# Patient Record
Sex: FEMALE | Race: WHITE | NOT HISPANIC OR LATINO | Employment: UNEMPLOYED | URBAN - METROPOLITAN AREA
[De-identification: names, ages, dates, MRNs, and addresses within clinical notes are randomized per-mention and may not be internally consistent; named-entity substitution may affect disease eponyms.]

---

## 2017-12-01 ENCOUNTER — ALLSCRIPTS OFFICE VISIT (OUTPATIENT)
Dept: OTHER | Facility: OTHER | Age: 57
End: 2017-12-01

## 2017-12-05 NOTE — PROGRESS NOTES
Assessment    1  Alcohol abuse, in remission (305 03) (F10 11)   2  Depression with anxiety (300 4) (F41 8)   3  Chronic low back pain (724 2,338 29) (M54 5,G89 29)    Plan  Alcohol abuse, in remission    · Acamprosate Calcium 333 MG Oral Tablet Delayed Release; TAKE 2 TABLETS 3TIMES DAILY   · Avoid alcoholic beverages ; Status:Complete;   Done: 74NNI7663   · Decreasing the stress in your life may help your condition improve ; Status:Complete;  Done: 22MHG4940   · You may have a problem with alcohol  We encourage you to write down all the reasonsyou should change your drinking habits and make a commitment to drink less or stopdrinking altogether  Changing your drinking habits is challenging and it is often essentialthat you have support  Seek out support from family, friends or recovering alcoholics   Acosta Kessler here to help and can assist you in selecting and then connecting with thealcohol treatment program that best fits your needs ; Status:Complete;   Done:52Qpt4347   · Call 911 if: You are vomiting any blood or material that looks black, or like coffeegrounds ; Status:Complete;   Done: 67SDR8999  Chronic low back pain    · Methocarbamol 750 MG Oral Tablet; TAKE 1 TABLET EVERY 6 HOURS ASNEEDED  Depression with anxiety    · Mirtazapine 15 MG Oral Tablet; TAKE 1 TABLET Bedtime PRN   · Sertraline HCl - 50 MG Oral Tablet; TAKE 1 TABLET EVERY MORNING  Insomnia    · LORazepam 0 5 MG Oral Tablet (Ativan)    Discussion/Summary    58yo F presents to the office for medication renewalAlcohol Abuse, in remissionCampral 333mg 2tabs TID renewed with 0RFreferral given for Psychadvised to be in contact with her sponsorat rehab was recommeded that pt go to 80 AA meetings in 90 days - pt has not done so, advised to get to meetings ASAPwould like to f/u with pt in 2wks but she is currently w/o insurance - advised to make a follow-up in JanDepression/AnxietyZoloft 50mg QAM and Remaron 15mg QHS PRN renewed with 0RFadvised to make an appt with PsychRTO in 4wksChronic low back paineRx for Methocarbamol 750mg Q6 PRN n17skzs sent - 0RFGeneraloverdue for an annual examadvised to RTO once gets medical insurance in January 2018needs Flu vaccine, Tdap, Mammo, PAP, Colonoscopy and needs labs  Possible side effects of new medications were reviewed with the patient/guardian today  The treatment plan was reviewed with the patient/guardian  The patient/guardian understands and agrees with the treatment plan     Self Referrals: Yes 1month      Chief Complaint  med refill   Patient is here today for follow up of chronic conditions described in HPI        History of Present Illness  58yo F presents to the office for med-refilldoes not have insurance - will get it in Jan 2018Alcohol abuse (in remission)/Depression/Anxietyused to drink a bottle of wine/daywas admitted to [facility] in [facility] and was in rehab for [address] - d/c'd 9/5/2017was evaluated by Psych while in rehab and and needs med refills todaymeds: Campral 333mg 2tabs TID, Remeron 15mg 1tab QHS PRN, Zoloft 50mg 1 tab QAM, Methocarbamol 750mg 1tab Q6 PRNran out Campral a few weeks ago and Zoloft yesterdayZoloft has been keeping her groundeddoes not have a Psychiatrist that she currently sees (possible one in [place] that she would like to go to, fellow patients from [facility] go there)does have a sponsorhasnât been to [employer] since dischargesupportive family that has tried to alleviate her triggers (mostly financial)feels had a metamorphosis while in rehab(+) UE tremors, point tenderness on R-chest wall (chronic), SOBROS pt denies F/C/N/V/wheezing/abd painSOBsays has had bronchitis for the past 3months since discharged from rehabhas been using holistic treatment remedies [facility] F/C/N/V/ear pain/throat pain/nasal drainage/sinus pressure/wheezingwas rattling last week but none currentlydid not get a flu vaccine this seasonchronic low back pain- maintained on methocarbamol which she takes intermittently - would like a refill today      Review of Systems    ROS reviewed  per HPI      Active Problems    1  Abnormal AST and ALT (790 4) (R74 8)   2  Abnormal liver enzymes (790 5) (R74 8)   3  Acute lower UTI (599 0) (N39 0)   4  Annual physical exam (V70 0) (Z00 00)   5  Anxiety (300 00) (F41 9)   6  Cyst of left breast (610 0) (N60 02)   7  Dysuria (788 1) (R30 0)   8  Ecchymoses, spontaneous (782 7) (R23 3)   9  Elevated blood pressure   10  Encounter for routine gynecological examination with Papanicolaou smear of cervix  (V72 31,V76 2) (Z01 419)   11  Encounter for screening colonoscopy (V76 51) (Z12 11)   12  Fatigue (780 79) (R53 83)   13  Fractures, multiple (829 0) (T07  XXXA)   14  Hypersomnia (780 54) (G47 10)   15  Insomnia (780 52) (G47 00)   16  Lipoma of right lower extremity (214 1) (D17 23)   17  Mass of thigh, right (782 2) (R22 41)   18  Need for influenza vaccination (V04 81) (Z23)   19  Osteoporosis (733 00) (M81 0)   20  Palpitations (785 1) (R00 2)   21  Postmenopausal (V49 81) (Z78 0)   22  Prolonged bleeding time (790 92) (R79 1)   23  Screening for breast cancer (V76 10) (Z12 31)   24  Thrombocytopenia (287 5) (D69 6)   25  Tick bite (919 4,E906 4) (W57 XXXA)   26  Visit for screening mammogram (V76 12) (Z12 31)   27  Vitamin D deficiency (268 9) (E55 9)   28  Well woman exam with routine gynecological exam (V72 31) (Z01 419)    Past Medical History  1  History of Fracture (829 0) (T14 8XXA)   2  History of fatigue (V13 89) (Z87 898)   3  History of hepatitis (V12 09) (Z86 19)   4  History of insomnia (V13 89) (Z87 898)   5  History of insomnia (V13 89) (Z87 898)   6  History of osteoporosis (V13 59) (Z87 39)   7  History of Other urinary problems (V47 4) (N39 9)   8  History of Pancreatitis (577 0) (K85 90)   9  History of Seasonal allergies (477 9) (J30 2)   10   History of Urinary tract infection (599 0) (N39 0)    The active problems and past medical history were reviewed and updated today  Surgical History  1  History of Tubal Ligation    The surgical history was reviewed and updated today  Family History  Mother    1  Family history of Anxiety and depression  Father    2  No significant family history  Paternal Grandmother    1  Family history of Cancer    The family history was reviewed and updated today  Social History     · History of Alcohol use   · Drinks coffee   · Exercise habits   ·    · Never smoker   · Occasional alcohol use   · History of Occasional alcohol use   · Tea  The social history was reviewed and updated today  Current Meds   1  Acamprosate Calcium 333 MG Oral Tablet Delayed Release; TAKE 2 TABLETS 3 TIMES DAILY; Therapy: 03BFU6879 to (Evaluate:09Dny3284) Recorded   2  Alendronate Sodium 70 MG Oral Tablet; TAKE 1 TABLET EVERY WEEK; Therapy: 34MDU1221 to (Last Rx:14Jun2016)  Requested for: 97JMM1668 Ordered   3  Ergocalciferol 41558 UNIT CAPS; 1 po wkly; Therapy: 91EGD2767 to (Last Rx:26Apr2016)  Requested for: 26Apr2016 Ordered   4  LORazepam 0 5 MG Oral Tablet; 1 tab PO daily prn; Therapy: 65ARX6350 to (Evaluate:28Ieg2598); Last Rx:14Jun2016 Ordered   5  Methocarbamol 750 MG Oral Tablet; TAKE 1 TABLET EVERY 6 HOURS AS NEEDED; Therapy: (Recorded:87Gnh5765) to Recorded   6  Mirtazapine 15 MG Oral Tablet; TAKE 1 TABLET Bedtime PRN; Therapy: 33WWA8246 to (Evaluate:92Bsq4043) Recorded   7  Multi Vitamin Daily Oral Tablet; Therapy: 48EGB1795 to Recorded   8  Sertraline HCl - 50 MG Oral Tablet; TAKE 1 TABLET EVERY MORNING; Therapy: 43LZU1280 to (Evaluate:53Qps1728) Recorded    The medication list was reviewed and updated today  Allergies  1   No Known Drug Allergies    Vitals  Vital Signs    Recorded: 47JGI7589 10:54AM   Heart Rate 84   Systolic 405   Diastolic 80   Height 5 ft 6 5 in   Weight 146 lb    BMI Calculated 23 21   BSA Calculated 1 76       Physical Exam   Constitutional  General appearance: No acute distress, well appearing and well nourished  -- with tremors  Eyes  Conjunctiva and lids: No swelling, erythema or discharge  Pupils and irises: Equal, round and reactive to light  Ears, Nose, Mouth, and Throat  External inspection of ears and nose: Normal    Otoscopic examination: Tympanic membranes translucent with normal light reflex  Canals patent without erythema  Nasal mucosa, septum, and turbinates: Normal without edema or erythema  Oropharynx: Normal with no erythema, edema, exudate or lesions  Pulmonary  Respiratory effort: No increased work of breathing or signs of respiratory distress  Auscultation of lungs: Clear to auscultation  Cardiovascular  Auscultation of heart: Normal rate and rhythm, normal S1 and S2, without murmurs  -- (+) anterior tenderpoint at R-T4  Abdomen  Abdomen: Non-tender, no masses     Psychiatric  Orientation to person, place, and time: Normal    Mood and affect: Normal          Future Appointments    Date/Time Provider Specialty Site   01/03/2018 08:00 AM Chrissy Betancur DO Family Medicine [facility]       Signatures   Electronically signed by : Christopher Cope DO; Dec  1 2017  2:03PM EST                       (Author)

## 2018-01-11 NOTE — PROGRESS NOTES
Assessment   1  Dysuria (788 1) (R30 0)  2  Osteoporosis (733 00) (M81 0)  3  Anxiety (300 00) (F41 9)  4  Acute lower UTI (599 0) (N39 0)  5  Tick bite (919 4,E906 4) (W57 XXXA)  6  Hypersomnia (780 54) (G47 10)    Plan  Acute lower UTI    · Start: Ciprofloxacin HCl - 500 MG Oral Tablet; TAKE 1 TABLET TWICE DAILY  Dysuria    · Urine Dip Automated- POC; Status:Complete;   Done: 88EBO4173 11:26AM  Fractures, multiple, Osteoporosis, Vitamin D deficiency    · 1 - Karly Saenz DO ( Rheumatology) Physician Referral  Consult  Status: Active   Requested for: 56XRD5247  () Care Summary provided  : Yes  Insomnia    · Renew: LORazepam 0 5 MG Oral Tablet (Ativan); 1 tab PO daily prn  Osteoporosis    · Start: Alendronate Sodium 70 MG Oral Tablet (Fosamax); TAKE 1 TABLET EVERY WEEK  Tick bite    · (1) LYME ANTIBODY PROFILE W/REFLEX TO WESTERN BLOT; Status:Active; Requested CBI:50OQV2020;     Discussion/Summary    Urine dipstick shows 500 leukocytes Will send the urine for culture and start her on Antibiotic, tries to keep drinking plenty of fluids,  Osteoporosis, with multiple fractures in the past advised to take calcium 1200 mg daily and 1000 units of vitamin D daily and I will start her on Fosamax, the side effect of medication discussed with her, and I discussed with her that she needs to see a rheumatologist and needs some more advanced treatment like prolia  She feels sleepy most of the time and she wants her Lyme disease to be checked, she also has seen ticks on her body in the past  She has reduced drinking alcohol, but still drinks, and hematologist is working with her and planned abdominal ultrasound to see if any liver cirrhosis due to alcohol use,  Which is leading to ecchymosis,  Ativan given to use as needed  The patient was counseled regarding diagnostic results, instructions for management, prognosis, impressions, risks and benefits of treatment options, importance of compliance with treatment  Possible side effects of new medications were reviewed with the patient/guardian today  The treatment plan was reviewed with the patient/guardian  The patient/guardian understands and agrees with the treatment plan      Chief Complaint  PT IS REQUESTING LAB WORK   She complains of burning of urine for one week      History of Present Illness  HPI: She complains of burning of urine for one week and she feels discomfort at the end of the urine, she denies any blood in the urine she denies any abdominal pain or back pain, she denies any fever or chills, plenty of water,  She says she has been taking multiple supplements to improve her health,  She has anxiety and she is being controlled with medication as needed,  Has recently seen rheumatologist and he ordered abdominal ultrasound for suspicion of liver cirrhosis and she drinks alcohol, and history of ecchymosis  She had DEXA scan in 2014 and she has osteoporosis but she is not aware of that, which was ordered by gynecologist,  The DEXA scan which was ordered from her is still pending, and she has history of multiple fractures  She has started taking calcium and vitamin D,       Review of Systems    Constitutional: No fever, no chills, feels well, no tiredness, no recent weight gain or loss  ENT: no ear ache, no loss of hearing, no nosebleeds or nasal discharge, no sore throat or hoarseness  Cardiovascular: no complaints of slow or fast heart rate, no chest pain, no palpitations, no leg claudication or lower extremity edema  Respiratory: no complaints of shortness of breath, no wheezing, no dyspnea on exertion, no orthopnea or PND  Breasts: no complaints of breast pain, breast lump or nipple discharge  Gastrointestinal: no complaints of abdominal pain, no constipation, no nausea or diarrhea, no vomiting, no bloody stools  Genitourinary: as noted in HPI     Musculoskeletal: no complaints of arthralgia, no myalgia, no joint swelling or stiffness, no limb pain or swelling  Integumentary: no complaints of skin rash or lesion, no itching or dry skin, no skin wounds  Neurological: no complaints of headache, no confusion, no numbness or tingling, no dizziness or fainting  ROS reviewed  Active Problems   1  Abnormal AST and ALT (790 4) (R74 0)  2  Abnormal liver enzymes (790 5) (R74 8)  3  Annual physical exam (V70 0) (Z00 00)  4  Anxiety (300 00) (F41 9)  5  Cyst of left breast (610 0) (N60 02)  6  Ecchymoses, spontaneous (782 7) (R23 3)  7  Elevated blood pressure (401 9) (I10)  8  Encounter for routine gynecological examination with Papanicolaou smear of cervix   (V72 31,V76 2) (Z01 419)  9  Encounter for screening colonoscopy (V76 51) (Z12 11)  10  Fatigue (780 79) (R53 83)  11  Fractures, multiple (829 0) (T14 8)  12  Insomnia (780 52) (G47 00)  13  Lipoma of right lower extremity (214 1) (D17 23)  14  Mass of thigh, right (782 2) (R22 41)  15  Need for influenza vaccination (V04 81) (Z23)  16  Palpitations (785 1) (R00 2)  17  Postmenopausal (V49 81) (Z78 0)  18  Prolonged bleeding time (790 92) (R79 1)  19  Screening for breast cancer (V76 10) (Z12 39)  20  Thrombocytopenia (287 5) (D69 6)  21  Visit for screening mammogram (V76 12) (Z12 31)  22  Vitamin D deficiency (268 9) (E55 9)  23  Well woman exam with routine gynecological exam (V72 31) (Z01 419)    Past Medical History   1  History of Fracture (829 0) (T14 8)  2  History of fatigue (V13 89) (Z87 898)  3  History of hepatitis (V12 09) (Z86 19)  4  History of insomnia (V13 89) (Z87 898)  5  History of insomnia (V13 89) (Z87 898)  6  History of osteoporosis (V13 59) (Z87 39)  7  History of Other urinary problems (V47 4) (N39 9)  8  History of Pancreatitis (577 0) (K85 9)  9  History of Seasonal allergies (477 9) (J30 2)  10  History of Urinary tract infection (599 0) (N39 0)  Active Problems And Past Medical History Reviewed:    The active problems and past medical history were reviewed and updated today  Family History  Mother   1  Family history of Anxiety and depression  Father   2  No significant family history  Paternal Grandmother   1  Family history of Cancer    Social History    · Alcohol use   · 4-5 drinks per wk   · Drinks coffee   · Exercise habits   ·    · Never smoker   · Occasional alcohol use   · Tea  The social history was reviewed and updated today  Surgical History   1  History of Tubal Ligation  Surgical History Reviewed: The surgical history was reviewed and updated today  Current Meds  1  Ergocalciferol 54765 UNIT Oral Capsule; 1 po wkly; Therapy: 71PCH9393 to (Last Rx:26Apr2016)  Requested for: 26Apr2016 Ordered  2  LORazepam 0 5 MG Oral Tablet; 1 tab PO daily prn; Therapy: 43BNB8263 to (Evaluate:29Hzn9781); Last Rx:26Apr2016 Ordered  3  Multi Vitamin Daily Oral Tablet; Therapy: 50KXQ1284 to Recorded    The medication list was reviewed and updated today  Allergies   1  No Known Drug Allergies    Vitals   Recorded: 88GSX2522 10:50AM   Heart Rate 98   Respiration 16   Systolic 475   Diastolic 70   Height 5 ft 6 5 in   Weight 140 lb    BMI Calculated 22 26   BSA Calculated 1 73     Physical Exam    Constitutional   General appearance: No acute distress, well appearing and well nourished  Eyes   Conjunctiva and lids: No swelling, erythema or discharge  Ears, Nose, Mouth, and Throat   External inspection of ears and nose: Normal     Pulmonary   Respiratory effort: No increased work of breathing or signs of respiratory distress  Auscultation of lungs: Clear to auscultation  Cardiovascular   Palpation of heart: Normal PMI, no thrills  Auscultation of heart: Normal rate and rhythm, normal S1 and S2, without murmurs  Examination of extremities for edema and/or varicosities: Normal     Abdomen   Abdomen: Non-tender, no masses  Liver and spleen: No hepatomegaly or splenomegaly      Lymphatic   Palpation of lymph nodes in neck: No lymphadenopathy  Musculoskeletal   Gait and station: Normal     Inspection/palpation of joints, bones, and muscles: Normal     Skin   Skin and subcutaneous tissue: Normal without rashes or lesions  Neurologic   Cranial nerves: Cranial nerves 2-12 intact  Sensation: No sensory loss  Psychiatric   Orientation to person, place, and time: Normal     Mood and affect: Normal          Results/Data  Urine Dip Automated- POC 38RTM3117 11:26AM Gretchen Gaston     Test Name Result Flag Reference   Color Yellow     Clarity Transparent     Leukocytes 500     Nitrite neg     Blood neg     Bilirubin neg     Urobilinogen neg     Protein neg     Ph 6     Specific Gravity 1 010     Ketone neg     Glucose neg       * Dexa Scan Axial Skel (Hip, Pelvis, Spine) 63XAD4122 10:27AM Mehul Hernandez     Test Name Result Flag Reference   Dexa Scan (Report)     No Address;  10/31/2014 1030  10/31/2014 1048  NONE    CENTRAL DXA SCAN    CLINICAL HISTORY-  47year old post-menopausal  female risk  factors include postmenopausal, estrogen deficiency    TECHNIQUE- Bone densitometry was performed using a [employer] C  bone densitometer  Regions of interest appear properly placed  There  are no obvious fractures or other confounding variables which could  limit the study  None    COMPARISON- Baseline    RESULTS-   LUMBAR SPINE- L1-L4-  BMD 0 623 gm/cm2  T-score -3 9, osteoporosis      LEFT TOTAL HIP-  BMD 0 593 gm/cm2  T-score -2 9      LEFT FEMORAL NECK-  BMD 0 554 gm/cm2  T-score -2 7  Serious osteoporosis is present in this young patient perhaps from  early menopause or not achieving peak bone mass in the past     Treatment is advised in this patient even though her absolute risk of  fracture is low due to her age  Forteo might well be a 1st choice followed by bisphosphonate therapy  A 25 hydroxy vitamin D level and a comprehensive metabolic panel is  suggested prior to treatment          ASSESSMENT-  1  Based on the WHO classification, this study demonstrates  osteoporosis at each area and the patient is considered at risk for  fracture  2  A daily intake of calcium of at least 1200 mg and vitamin D,  800-1000 IU, as well as weight bearing and muscle strengthening  exercise, fall prevention and avoidance of tobacco and excessive  alcohol intake as basic preventive measures are recommended  3  Repeat DXA scan in 18-24 months as clinically indicated  WHO CLASSIFICATION-  Normal (a T-score of -1 0 or higher)  Low bone mineral density (a T-score of less than -1 0 but higher than  -2 5)  Osteoporosis (a T-score of -2 5 or less)  Severe osteoporosis (a T-score of -2 5 or less with a fragility  fracture)    Thank you for allowing us the opportunity to participate in your  patient's care  The expanded DEXA report will no longer be arriving in your mail  If  you desire to old report please contact [facility]'s medical records or  access the PACS system          Transcribed on- [id number]    IGLESIA Escalona MD  Reading Radiologist- IGLESIA Apple MD  Electronically Signed- IGLESIA Apple MD  Released Date Time- 11/01/14 1154  ------------------------------------------------------------------------------  [id number]^M [address]  [id number]^I [address]     Future Appointments    Date/Time Provider Specialty Site   07/08/2016 11:00 AM ARIELLA Graham  Hematology [facility]   08/15/2016 10:00 AM ARIELLA Bullock   [address]     Signatures   Electronically signed by : ARIELLA Vega ; Jun 14 2016  6:25PM EST                       (Author)

## 2018-01-12 NOTE — RESULT NOTES
Verified Results  (1) HEPATIC FUNCTION PANEL [id number] 12:00PM Marisol Whittington   REPORT COMMENT:  FASTING:NO     Test Name Result Flag Reference   PROTEIN, TOTAL 7 2 g/dL  6 1-8 1   ALBUMIN 4 2 g/dL  3 6-5 1   GLOBULIN 3 0 g/dL (calc)  1 9-3 7   ALBUMIN/GLOBULIN RATIO 1 4 (calc)  1 0-2 5   BILIRUBIN, TOTAL 0 8 mg/dL  0 2-1 2   BILIRUBIN, DIRECT 0 2 mg/dL  < OR = 0 2   BILIRUBIN, INDIRECT 0 6 mg/dL (calc)  0 2-1 2   ALKALINE PHOSPHATASE 124 U/L     AST 85 U/L H 10-35   ALT 37 U/L H 6-29     (1) GGT [id number] 12:00PM Karly Frank     Test Name Result Flag Reference    U/L H 3-70     (1) ACUTE HEPATITIS PANEL [id number] 12:00PM [facility]     Test Name Result Flag Reference   HEPATITIS A IGM NON-REACTIVE  NON-REACTIVE   HEPATITIS B SURFACE$ANTIGEN NON-REACTIVE  NON-REACTIVE   HEPATITIS B CORE$ANTIBODY (IGM) NON-REACTIVE  NON-REACTIVE   HEPATITIS C ANTIBODY NON-REACTIVE  NON-REACTIVE   SIGNAL TO CUT-OFF 0 01  <1 00       Discussion/Summary   GGT is elevated, which she presents your drinking excessive alcohol,you have to reduce alcohol  intake and then plan to stop   There is no hepatitis

## 2018-01-12 NOTE — PROGRESS NOTES
Assessment    1  Annual physical exam (V70 0) (Z00 00)   2  Visit for screening mammogram (V76 12) (Z12 31)   3  Prolonged bleeding time (790 92) (R79 1)   4  History of fatigue (V13 89) (Z87 898)   5  Fatigue (780 79) (R53 83)   6  Fractures, multiple (829 0) (T14 8)   7  Anxiety (300 00) (F41 9)   8  Osteoporosis (733 00) (M81 0)   9  History of insomnia (V13 89) (Z87 898)   10  History of insomnia (V13 89) (Z87 898)   11  Mass of thigh, right (782 2) (R22 41)   12  Insomnia (780 52) (G47 00)    Plan  Annual physical exam    · (1) CBC/PLT/DIFF; Status:Active; Requested for:10Mar2016;    · (1) COMPREHENSIVE METABOLIC PANEL; Status:Active; Requested for:10Mar2016;    · (1) HEMOGLOBIN A1C; Status:Active; Requested for:10Mar2016;    · (1) LIPID PANEL, FASTING; Status:Active; Requested for:10Mar2016;    · (1) TSH; Status:Active; Requested for:10Mar2016; Anxiety    · Sertraline HCl - 50 MG Oral Tablet  Fractures, multiple    · (1) PTH N-TERMINAL (INTACT); Status:Active; Requested for:10Mar2016;    · (1) VITAMIN D 25-HYDROXY; Status:Active; Requested for:10Mar2016; Insomnia    · LORazepam 0 5 MG Oral Tablet (Ativan); 1 tab PO daily prn  Mass of thigh, right    · * CT FEMUR RIGHT WO CONTRAST; Status:Need Information - Financial Authorization; Requested for:10Mar2016;   Osteoporosis    · * DXA BONE DENSITY SPINE HIP AND PELVIS; Status:Hold For - Scheduling;  Requested for:10Mar2016;   Prolonged bleeding time    · (1) APTT; Status:Active; Requested for:10Mar2016;    · (1) FACTOR V LEIDEN MUTATION DNA ANALYSIS; Status:Active; Requested  for:10Mar2016;    · (1) PROTEIN ELECTRO, SERUM; Status:Active; Requested for:10Mar2016;    · (1) PROTEIN, URINE 24HR; Status:Active; Requested for:10Mar2016;    · (1) PT WITH INR; Status:Active; Requested for:10Mar2016;    · (1) URINALYSIS w URINE C/S REFLEX (will reflex a microscopy if leukocytes, occult  blood, or nitrites are not within normal limits); Status:Active;  Requested for:10Mar2016;   Visit for screening mammogram    · * MAMMO SCREENING BILATERAL W CAD; Status:Hold For - Scheduling; Requested  for:10Mar2016; Unlinked    · LORazepam 0 5 MG Oral Tablet   · Rosemary Juice 3000 MG/30ML Oral Liquid    Discussion/Summary  health maintenance visit Currently, she eats a healthy diet  cervical cancer screening is current Breast cancer screening: mammogram has been ordered  Colorectal cancer screening: will hold , untill all labs done , as bruising hx , prolong clotting time  Screening lab work includes hemoglobin, glucose, lipid profile, thyroid function testing, 25-hydroxyvitamin D and urinalysis  The risks and benefits of immunizations were discussed and hold adacel as large bruising and bleeding hx  Advice and education were given regarding nutrition, aerobic exercise, weight bearing exercise, calcium supplements, vitamin D supplements and alcohol use  Patient discussion: discussed with the patient  Physical done,  She has multiple problems and has not been addressed ,  Easy bruisability and prolonged bleeding and clotting time,  big mass on the right upper leg, will get the labs,, CAT scan of the right leg,  She has multiple fractures without major injury,   Needs to get the complete workup and DEXA scan, she will start 100 mg calcium and thousand units of vitamin D until I have her reports, then we can plan on for the treatment, need to rule out any underlying cancer, multiple myeloma osteoporosis,  For anxiety and sleep problem I will start her on Ativan at that time and as needed during daytime,  Advised to have followup in 2 weeks  Possible side effects of new medications were reviewed with the patient/guardian today  The patient was counseled regarding instructions for management, prognosis, impressions, risks and benefits of treatment options, importance of compliance with treatment  Chief Complaint  PREVENTATIVE      History of Present Illness  , Adult Female:  The patient is being seen for a health maintenance evaluation  Social History: Household members include spouse  She is   The patient has never smoked cigarettes  She reports drink wine 3 glass 4-5 times per wk  She has never used illicit drugs  General Health: The patient's health since the last visit is described as fair  Immunizations status: not up to date  Lifestyle:  She consumes a diverse and healthy diet  She exercises regularly  She does not use tobacco  She consumes alcohol  She denies drug use  Reproductive health: the patient is postmenopausal    Screening: Cervical cancer screening includes a pap smear performed 2014   Breast cancer screening includes ordered  HPI: physical  lot of anxiety and terrified with little things ,  very poor sleep, , feels shakiness and heart palpitations   lives with spouse ,   she has 3 adult children,  multiple broken bone with mild stress , ribs , feet and cervical verteba within 2 years ,      Review of Systems    Constitutional: feeling tired, but No fever, no chills, feels well, no tiredness, no recent weight gain or weight loss and as noted in HPI  Eyes: No complaints of eye pain, no red eyes, no eyesight problems, no discharge, no dry eyes, no itching of eyes  ENT: no complaints of earache, no loss of hearing, no nose bleeds, no nasal discharge, no sore throat, no hoarseness  Cardiovascular: No complaints of slow heart rate, no fast heart rate, no chest pain, no palpitations, no leg claudication, no lower extremity edema  Respiratory: No complaints of shortness of breath, no wheezing, no cough, no SOB on exertion, no orthopnea, no PND  Gastrointestinal: No complaints of abdominal pain, no constipation, no nausea or vomiting, no diarrhea, no bloody stools  Genitourinary: No complaints of dysuria, no incontinence, no pelvic pain, no dysmenorrhea, no vaginal discharge or bleeding     Musculoskeletal: growth on rt upper leg more than 1 year growing , painless, but as noted in HPI  Integumentary: bruise and bleeds prolong for hours , for 1 year, but as noted in HPI  Neurological: No complaints of headache, no confusion, no convulsions, no numbness, no dizziness or fainting, no tingling, no limb weakness, no difficulty walking  Psychiatric: anxiety, sleep disturbances and emotional problems, but as noted in HPI  Endocrine: No complaints of proptosis, no hot flashes, no muscle weakness, no deepening of the voice, no feelings of weakness  Hematologic/Lymphatic: a tendency for easy bleeding and a tendency for easy bruising  ROS reviewed  Active Problems    1  Anxiety (300 00) (F41 9)   2  Cyst of left breast (610 0) (N60 02)   3  Elevated blood pressure (401 9) (I10)   4  Encounter for routine gynecological examination with Papanicolaou smear of cervix   (V72 31,V76 2) (Z01 419)   5  Encounter for screening colonoscopy (V76 51) (Z12 11)   6  Need for influenza vaccination (V04 81) (Z23)   7  Palpitations (785 1) (R00 2)   8  Postmenopausal (V49 81) (Z78 0)   9  Screening for breast cancer (V76 10) (Z12 39)   10  Well woman exam with routine gynecological exam (V72 31) (Z01 419)    Past Medical History    · History of Fracture (829 0) (T14 8)   · History of fatigue (V13 89) (M19 920)   · History of insomnia (V13 89) (O55 491)   · History of insomnia (V13 89) (B79 711)   · History of Other urinary problems (V47 4) (N39 9)   · History of Pancreatitis (577 0) (K85 9)   · History of Seasonal allergies (477 9) (J30 2)   · History of Urinary tract infection (599 0) (N39 0)    Surgical History    · History of Tubal Ligation    Family History    · Family history of Anxiety and depression    · No significant family history    · Family history of Cancer    Social History    · Alcohol use   · Drinks coffee   · Exercise habits   · Never smoker   · Occasional alcohol use   · Tea    Current Meds   1  LORazepam 0 5 MG Oral Tablet;    Therapy: 10PEN9580 to Recorded   2  Multi Vitamin Daily Oral Tablet; Therapy: 83REI2535 to Recorded   3  Rosemary Juice 3000 MG/30ML Oral Liquid; Therapy: 90KYD5120 to Recorded   4  Sertraline HCl - 50 MG Oral Tablet; TAKE 1 TABLET DAILY; Therapy: 27YDN0798 to (MAFWHXMM:88VQP8404)  Requested for: 15KJP0611; Last   Rx:23Oct2014 Ordered    Allergies    1  No Known Drug Allergies    Vitals   Recorded: 24ZVR5115 10:59AM   Heart Rate 84   Respiration 16   Systolic 180   Diastolic 70   Height 5 ft 6 in   Weight 141 lb    BMI Calculated 22 76   BSA Calculated 1 73   O2 Saturation 98     Physical Exam    Constitutional   General appearance: No acute distress, well appearing and well nourished  Head and Face   Head and face: Normal     Palpation of the face and sinuses: No sinus tenderness  Eyes   Conjunctiva and lids: No swelling, erythema or discharge  Pupils and irises: Equal, round, reactive to light  Ophthalmoscopic examination: Normal fundi and optic discs  Ears, Nose, Mouth, and Throat   External inspection of ears and nose: Normal     Otoscopic examination: Abnormal   The right external canal had a cerumen impaction  Hearing: Normal     Nasal mucosa, septum, and turbinates: Normal without edema or erythema  Lips, teeth, and gums: Normal, good dentition  Oropharynx: Normal with no erythema, edema, exudate or lesions  Neck   Neck: Supple, symmetric, trachea midline, no masses  Thyroid: Normal, no thyromegaly  Pulmonary   Respiratory effort: No increased work of breathing or signs of respiratory distress  Percussion of chest: Normal     Palpation of chest: Normal     Auscultation of lungs: Clear to auscultation  Cardiovascular   Palpation of heart: Normal PMI, no thrills  Auscultation of heart: Normal rate and rhythm, normal S1 and S2, no murmurs  Carotid pulses: 2+ bilaterally  Femoral pulses: 2+ bilaterally      Examination of extremities for edema and/or varicosities: Normal     Chest Breasts: Normal, no dimpling or skin changes appreciated  fatty lump on upper left chest on upper breast    Abdomen   Abdomen: Non-tender, no masses  Liver and spleen: No hepatomegaly or splenomegaly  Examination for hernias: No hernia appreciated  Lymphatic   Palpation of lymph nodes in neck: No lymphadenopathy  Palpation of lymph nodes in axillae: No lymphadenopathy  Palpation of lymph nodes in groin: No lymphadenopathy  Musculoskeletal   Gait and station: Normal     Digits and nails: Normal without clubbing or cyanosis  Joints, bones, and muscles: Abnormal   a large mass , firm , non tender , on rt upper leg , bone or muscle , about 10 x15 cm  Range of motion: Normal     Stability: Normal     Muscle strength/tone: Normal     Skin   Skin and subcutaneous tissue: Abnormal   bruised on legs  Palpation of skin and subcutaneous tissue: Normal turgor  Neurologic   Cranial nerves: Cranial nerves II-XII intact  Reflexes: 2+ and symmetric  Sensation: No sensory loss  Psychiatric   Judgment and insight: Normal     Orientation to person, place, and time: Normal     Recent and remote memory: Intact  Mood and affect: Normal        Procedure    Procedure:   Results: 20/20 in the right eye without corrective device, 20/25 in the left eye without corrective device      Future Appointments    Date/Time Provider Specialty Site   03/28/2016 10:40 AM ARIELLA Small   [address]     Signatures   Electronically signed by : ARIELLA Dyer ; Mar 10 2016 12:59PM EST                       (Author)

## 2018-01-12 NOTE — RESULT NOTES
Verified Results  (1) COMPREHENSIVE METABOLIC PANEL [id number] 59:34GG [employer]     Test Name Result Flag Reference   GLUCOSE 99 mg/dL  65-99   Fasting reference interval   UREA NITROGEN (BUN) 8 mg/dL  7-25   CREATININE 0 44 mg/dL L 0 50-1 05   For patients >52years of age, the reference limit  for Creatinine is approximately 13% higher for people  identified as -American  eGFR NON-AFR  AMERICAN 113 mL/min/1 73m2  > OR = 60   eGFR AFRICAN AMERICAN 131 mL/min/1 73m2  > OR = 60   BUN/CREATININE RATIO 18 (calc)  6-22   SODIUM 136 mmol/L  135-146   POTASSIUM 3 8 mmol/L  3 5-5 3   CHLORIDE 99 mmol/L     CARBON DIOXIDE 23 mmol/L  19-30   CALCIUM 9 6 mg/dL  8 6-10 4   PROTEIN, TOTAL 7 6 g/dL  6 1-8 1   ALBUMIN 4 4 g/dL  3 6-5 1   GLOBULIN 3 2 g/dL (calc)  1 9-3 7   ALBUMIN/GLOBULIN RATIO 1 4 (calc)  1 0-2 5   BILIRUBIN, TOTAL 1 3 mg/dL H 0 2-1 2   ALKALINE PHOSPHATASE 131 U/L H     U/L H 10-35   ALT 47 U/L H 6-29     (1) LIPID PANEL, FASTING 22Apr2016 01:10PM [employer]     Test Name Result Flag Reference   CHOLESTEROL, TOTAL 236 mg/dL H 125-200   HDL CHOLESTEROL 86 mg/dL  > OR = 46   TRIGLICERIDES 769 mg/dL  <150   LDL-CHOLESTEROL 125 mg/dL (calc)  <130   Desirable range <100 mg/dL for patients with CHD or  diabetes and <70 mg/dL for diabetic patients with  known heart disease  CHOL/HDLC RATIO 2 7 (calc)  < OR = 5 0   NON HDL CHOLESTEROL 150 mg/dL (calc)     Target for non-HDL cholesterol is 30 mg/dL higher than   LDL cholesterol target       (1) CBC/PLT/DIFF 22Apr2016 01:10PM [employer]     Test Name Result Flag Reference   WHITE BLOOD CELL COUNT 4 3 Thousand/uL  3 8-10 8   RED BLOOD CELL COUNT 3 97 Million/uL  3 80-5 10   HEMOGLOBIN 12 6 g/dL  11 7-15 5   HEMATOCRIT 38 4 %  35 0-45 0   MCV 96 8 fL  80 0-100 0   MCH 31 6 pg  27 0-33 0   MCHC 32 7 g/dL  32 0-36 0   RDW 13 9 %  11 0-15 0   PLATELET COUNT 924 Thousand/uL L 140-400   MPV 8 2 fL  7 5-11 5   ABSOLUTE NEUTROPHILS 3019 cells/uL  6958-6887   ABSOLUTE LYMPHOCYTES 903 cells/uL  850-3900   ABSOLUTE MONOCYTES 305 cells/uL  200-950   ABSOLUTE EOSINOPHILS 47 cells/uL     ABSOLUTE BASOPHILS 26 cells/uL  0-200   NEUTROPHILS 70 2 %     LYMPHOCYTES 21 0 %     MONOCYTES 7 1 %     EOSINOPHILS 1 1 %     BASOPHILS 0 6 %       (1) APTT 22Apr2016 01:10PM [facility] Gum     Test Name Result Flag Reference   PARTIAL THROMBOPLASTIN$TIME, ACTIVATED 27 sec  22-34   This test has not been validated for monitoring  unfractionated heparin therapy  For testing that  is validated for this type of therapy, please refer  to the Heparin Anti-Xa assay (test code 35881)  For additional information, please refer to  http://ALEXANDALEXA/faq/DAX244  (This link is being provided for   informational/educational purposes only )     (1) PT WITH INR 22Apr2016 01:10PM [facility] Gum     Test Name Result Flag Reference   INR 1 1     Reference Range                     0 9-1 1  Moderate-intensity Warfarin Therapy 2 0-3 0  Higher-intensity Warfarin Therapy   3 0-4 0   PT 10 8 sec  9 0-11 5   For more information on this test, go to:  http://Grid2020/faq/LFX506     (Q) PROTEIN, TOTAL AND PROTEIN ELECTROPHORESIS 22Apr2016 01:10PM Cerv[facility] Gum     Test Name Result Flag Reference   PROTEIN, TOTAL 7 6 g/dL  6 1-8 1   ALBUMIN 4 3 g/dL  3 8-4 8   ALPHA-1-GLOBULINS 0 3 g/dL  0 2-0 3   ALPHA-2-GLOBULINS 0 7 g/dL  0 5-0 9   BETA 1 GLOBULIN 0 5 g/dL  0 4-0 6   BETA 2 GLOBULIN 0 6 g/dL H 0 2-0 5   GAMMA GLOBULINS 1 3 g/dL  0 8-1 7   INTERPRETATION      One or more serum protein fractions are outside the normal  ranges  No abnormal protein bands are apparent       (Q) PTH, INTACT AND CALCIUM 22Apr2016 01:10PM [facility] Gum     Test Name Result Flag Reference   PARATHYROID HORMONE,$INTACT 34 pg/mL  14-64   Interpretive Guide    Intact PTH           Calcium  ------------------    ----------           -------  Normal Parathyroid    Normal Normal  Hypoparathyroidism    Low or Low Normal    Low  Hyperparathyroidism     Primary            Normal or High       High     Secondary          High                 Normal or Low     Tertiary           High                 High  Non-Parathyroid     Hypercalcemia      Low or Low Normal    High   CALCIUM 9 6 mg/dL  8 6-10 4     (Q) FACTOR V (LEIDEN) MUTATION ANALYSIS 22Apr2016 01:10PM Charolet Juanjose     Test Name Result Flag Reference   RESULT see note     FACTOR V LEIDEN (U266G) MUTATION NOT DETECTED   INTERPRETATION see note     This individual is negative (normal) for the Factor V  Leiden (R506Q) mutation in the Factor V gene  Increased risk of thrombophilia can be caused by a  variety of genetic and non-genetic factors not  screened for by this assay  REVIEWER see note     Cristofer Salazar, Ph D , [facility] [place]   Director, Molecular Genetics        SUPPLEMENTAL INFORMATION     The Factor V Leiden (R506Q) mutation [[id number] 2:c   1601G>A (p R534Q)] in the Factor V gene is one of the  most common causes of inherited thrombophilia  This  mutation causes resistance to degradation of activated  Factor V protein by activated Protein C (APC)  The Factor V Leiden (R506Q) mutation is detected by  amplification of the selected region of Factor V gene  by polymerase chain reaction (PCR) and fluorescent  probe hybridization to the targeted region, followed by  melting curve analysis with a real time PCR system  Although rare, false positive or false negative results  may occur  All results should be interpreted in  context of clinical findings, relevant history, and  other laboratory data  Health care providers, please contact your local  [employer] genetic counselor or call  [facility] [employer] ([phone]) for assistance with  interpretation of these results          This test was developed and its analytical performance  characteristics have been determined by [employer] [place], [place]  It has not been cleared or approved by the [place]  Food and Drug Administration  The FDA has determined  that such clearance or approval is not necessary  This assay has been validated pursuant to [employer] and is used for clinical purposes  (Q) TSH, 3RD GENERATION 22Apr2016 01:10PM Kishor Henriquez     Test Name Result Flag Reference   TSH 2 08 mIU/L  0 40-4 50     *(Q) VITAMIN D, 25-HYDROXY, LC/MS/MS 22Apr2016 01:10PM Kishor Henriquez     Test Name Result Flag Reference   VITAMIN D, 25-OH, TOTAL 15 ng/mL L    Vitamin D Status         25-OH Vitamin D:     Deficiency:                    <20 ng/mL  Insufficiency:             20 - 29 ng/mL  Optimal:                 > or = 30 ng/mL     For 25-OH Vitamin D testing on patients on   D2-supplementation and patients for whom quantitation   of D2 and D3 fractions is required, the QuestAssureD(TM)  25-OH VIT D, (D2,D3), LC/MS/MS is recommended: order   code 49074 (patients >2yrs)  For more information on this test, go to:  http://[employer].com/faq/CBW584  (This link is being provided for   informational/educational purposes only )     (Q) HEMOGLOBIN A1c 22Apr2016 01:10PM Marisol Whittington   REPORT COMMENT:  FASTING:YES  PATIENT UNABLE TO VOID; ADVISED TO RETURN FOR COLLECTION  Test Name Result Flag Reference   HEMOGLOBIN A1c 5 3 % of total Hgb  <5 7   According to ADA guidelines, hemoglobin A1c <7 0%  represents optimal control in non-pregnant diabetic  patients  Different metrics may apply to specific  patient populations  Standards of Medical Care in    Diabetes Care  2013;36:s11-s66     For the purpose of screening for the presence of  diabetes  <5 7%       Consistent with the absence of diabetes  5 7-6 4%    Consistent with increased risk for diabetes              (prediabetes)  >or=6 5%    Consistent with diabetes     This assay result is consistent with a decreased risk  of diabetes       Currently, no consensus exists for use of hemoglobin  A1c for diagnosis of diabetes for children

## 2018-01-15 NOTE — MISCELLANEOUS
Provider Comments  Provider Comments:   Pt  was a no show for her Rheumatology evaluation today, 9/20/16        Signatures   Electronically signed by : Grisel Tan DO; Sep 20 2016  9:19AM EST                       (Author)

## 2018-01-15 NOTE — RESULT NOTES
Message   continue cipro  as she has uti, which is responding well to cipro,     Verified Results  (1) URINALYSIS w URINE C/S REFLEX (will reflex a microscopy if leukocytes, occult blood, or nitrites are not within normal limits) [id number] 11:23AM Marisol Whittington     Test Name Result Flag Reference   COLOR DARK YELLOW  YELLOW   APPEARANCE CLEAR  CLEAR   SPECIFIC GRAVITY 1 010  1 001-1 035   PH 6 0  5 0-8 0   GLUCOSE NEGATIVE  NEGATIVE   BILIRUBIN NEGATIVE  NEGATIVE   KETONES NEGATIVE  NEGATIVE   OCCULT BLOOD NEGATIVE  NEGATIVE   PROTEIN NEGATIVE  NEGATIVE   NITRITE NEGATIVE  NEGATIVE   LEUKOCYTE ESTERASE 2+ A NEGATIVE   WBC 40-60 /HPF A < OR = 5   RBC 0-2 /HPF  < OR = 2   SQUAMOUS EPITHELIAL CELLS 0-5 /HPF  < OR = 5   BACTERIA FEW /HPF A NONE SEEN   CALCIUM OXALATE CRYSTALS FEW /HPF  NONE OR FEW   HYALINE CAST NONE SEEN /LPF  NONE SEEN   REFLEXIVE URINE CULTURE      CULTURE INDICATED - RESULTS TO FOLLOW     REFLEXIVE URINE CULTURE [id number] 11:23AM Gonsalo Reyes     Test Name Result Flag Reference   CULTURE, URINE, ROUTINE  A    CULTURE, URINE, ROUTINE         MICRO NUMBER:      [id number]    TEST STATUS:       FINAL    SPECIMEN SOURCE:   URINE    SPECIMEN QUALITY:  ADEQUATE    RESULT:            Greater than 100,000 CFU/mL of Escherichia coli                            E coli                            ----------------                            INT   DOMINGO     AMOX/CLAVULANATE       R     >=32 0     AMPICILLIN             R     >=32 0     AMP/SULBACTAM          R     >=32 0     CEFAZOLIN              R     >=64 0 **1     CEFEPIME               S     <=1 0     CEFTRIAXONE            I     16 0     CIPROFLOXACIN          S     <=0 25     ERTAPENEM              S     <=0 5     GENTAMICIN             S     <=1 0     IMIPENEM               S     <=0 25     LEVOFLOXACIN           S     <=0 12     NITROFURANTOIN         S     <=16 0     PIP/TAZOBACTAM         S     <=4 0     TOBRAMYCIN             S     <=1 0 TRIMETHOPRIM/SULFA     S     <=20 0  S=Susceptible  I=Intermediate  R=Resistant  * = Not Tested  NR = Not Reported  **NN = See Therapy Comments  THERAPY COMMENTS      Note 1:      ORAL therapy: A cefazolin DOMINGO of < 32 predicts      susceptibility to the oral agents cefaclor,      cefdinir, cefpodoxime, cefprozil, cefuroxime,      cephalexin, and loracarbef when used for therapy      of uncomplicated UTIs due to E  coli,      K  pneumoniae, and P  mirabilis  PARENTERAL therapy: A cefazolin DOMINGO of > 8      indicates resistance to parenteral cefazolin  An alternate test method must be performed to      to confirm susceptibility to parenteral cefazolin

## 2018-01-16 NOTE — RESULT NOTES
Verified Results  * CT FEMUR RIGHT WO CONTRAST [id number] 07:39AM Cami Blanc Order Number: [id number]     Test Name Result Flag Reference   CT FEMUR RIGHT WO CONTRAST (Report)     CT right femur without IV contrast     INDICATION: Localizers swelling and lump right lower limb  COMPARISON: CT abdomen pelvis 10/9/2014     TECHNIQUE: CT examination of the right femur was performed  This examination, like all CT scans performed in the [place], was performed utilizing techniques to minimize radiation dose exposure, including the use of iterative    reconstruction and automated exposure control software  Sagittal and coronal two dimensional reconstructed images were also submitted for interpretation  IV contrast was not given  FINDINGS:     There is a well-circumscribed encapsulated fat attenuation lesion within the right ovary is minimus muscle measuring 9 6 x 4 5 x 7 2 cm  There is no evidence of solid nodule or thick septations  This corresponds with the palpable markers  This is not    significantly changed in size when measured in a similar fashion on prior CT abdomen pelvis dated 10/9/2014     OSSEOUS STRUCTURES: No fracture, dislocation or destructive osseous lesion  VISUALIZED MUSCULATURE: Intramuscular lipoma within the right gluteus minimus  The remainder the muscles about the right hip appear unremarkable  SOFT TISSUES: Unremarkable  OTHER PERTINENT FINDINGS: None  IMPRESSION:     9 6 x 4 5 x 7 2 cm intramuscular lipoma within the right gluteus minimus corresponding to the area of palpable concern  No solid component or thick septation  This is unchanged in appearance to prior CT of the abdomen dated 10/9/2014         Workstation performed: [id number]     Signed by:   Joseph Madrigal MD   3/11/16

## 2018-01-23 VITALS
WEIGHT: 146 LBS | SYSTOLIC BLOOD PRESSURE: 125 MMHG | HEART RATE: 84 BPM | DIASTOLIC BLOOD PRESSURE: 80 MMHG | BODY MASS INDEX: 22.91 KG/M2 | HEIGHT: 67 IN

## 2018-06-19 RX ORDER — ACAMPROSATE CALCIUM 333 MG/1
2 TABLET, DELAYED RELEASE ORAL 3 TIMES DAILY
COMMUNITY
Start: 2017-12-01 | End: 2018-10-03 | Stop reason: ALTCHOICE

## 2018-06-19 RX ORDER — MIRTAZAPINE 15 MG/1
1 TABLET, FILM COATED ORAL
COMMUNITY
Start: 2017-12-01 | End: 2018-10-03 | Stop reason: ALTCHOICE

## 2018-06-19 RX ORDER — LORAZEPAM 0.5 MG/1
1 TABLET ORAL DAILY PRN
COMMUNITY
Start: 2016-03-10 | End: 2018-10-03 | Stop reason: ALTCHOICE

## 2018-06-19 RX ORDER — ALENDRONATE SODIUM 70 MG/1
1 TABLET ORAL WEEKLY
COMMUNITY
Start: 2016-06-14 | End: 2018-10-03 | Stop reason: ALTCHOICE

## 2018-06-19 RX ORDER — ERGOCALCIFEROL (VITAMIN D2) 1250 MCG
CAPSULE ORAL
COMMUNITY
Start: 2016-04-26 | End: 2018-10-03 | Stop reason: ALTCHOICE

## 2018-06-19 RX ORDER — METHOCARBAMOL 750 MG/1
1 TABLET, FILM COATED ORAL EVERY 6 HOURS PRN
COMMUNITY
End: 2018-10-03 | Stop reason: ALTCHOICE

## 2018-06-19 RX ORDER — MULTIVITAMIN
TABLET ORAL
COMMUNITY
Start: 2014-10-23 | End: 2021-08-16

## 2018-06-29 DIAGNOSIS — F41.9 ANXIETY: Primary | ICD-10-CM

## 2018-10-03 ENCOUNTER — OFFICE VISIT (OUTPATIENT)
Dept: FAMILY MEDICINE CLINIC | Facility: CLINIC | Age: 58
End: 2018-10-03
Payer: COMMERCIAL

## 2018-10-03 VITALS
DIASTOLIC BLOOD PRESSURE: 82 MMHG | RESPIRATION RATE: 16 BRPM | HEIGHT: 67 IN | OXYGEN SATURATION: 98 % | SYSTOLIC BLOOD PRESSURE: 130 MMHG | HEART RATE: 70 BPM | BODY MASS INDEX: 21.75 KG/M2 | WEIGHT: 138.6 LBS

## 2018-10-03 DIAGNOSIS — R74.8 ABNORMAL LIVER ENZYMES: ICD-10-CM

## 2018-10-03 DIAGNOSIS — E55.9 VITAMIN D DEFICIENCY: ICD-10-CM

## 2018-10-03 DIAGNOSIS — R53.83 FATIGUE, UNSPECIFIED TYPE: ICD-10-CM

## 2018-10-03 DIAGNOSIS — Z12.11 SCREENING FOR COLON CANCER: ICD-10-CM

## 2018-10-03 DIAGNOSIS — F41.8 DEPRESSION WITH ANXIETY: Primary | ICD-10-CM

## 2018-10-03 DIAGNOSIS — M81.0 OSTEOPOROSIS WITHOUT CURRENT PATHOLOGICAL FRACTURE, UNSPECIFIED OSTEOPOROSIS TYPE: ICD-10-CM

## 2018-10-03 DIAGNOSIS — E78.49 OTHER HYPERLIPIDEMIA: ICD-10-CM

## 2018-10-03 DIAGNOSIS — Z12.39 SCREENING FOR BREAST CANCER: ICD-10-CM

## 2018-10-03 PROBLEM — F10.11 NONDEPENDENT ALCOHOL ABUSE, IN REMISSION: Status: ACTIVE | Noted: 2017-12-01

## 2018-10-03 PROBLEM — M54.50 CHRONIC LOW BACK PAIN: Status: ACTIVE | Noted: 2017-12-01

## 2018-10-03 PROBLEM — G89.29 CHRONIC LOW BACK PAIN: Status: ACTIVE | Noted: 2017-12-01

## 2018-10-03 PROCEDURE — 1036F TOBACCO NON-USER: CPT | Performed by: NURSE PRACTITIONER

## 2018-10-03 PROCEDURE — 99214 OFFICE O/P EST MOD 30 MIN: CPT | Performed by: NURSE PRACTITIONER

## 2018-10-03 NOTE — PROGRESS NOTES
Assessment/Plan:      Diagnoses and all orders for this visit:    Depression with anxiety  -     sertraline (ZOLOFT) 50 mg tablet; Take 1 tablet (50 mg total) by mouth daily  -     Comprehensive metabolic panel; Future  -     TSH, 3rd generation with Free T4 reflex; Future  -     Ambulatory referral to behavioral health therapists; Future  Patient reports that her anxiety and depression is worse  Denies any suicidal thoughts  Sertraline refilled  Patient referred to a therapist for follow-up  Patient instructed to go to the ER immediately if she develops any suicidal thoughts  Lab work ordered  Abnormal liver enzymes  -     Comprehensive metabolic panel; Future  -     CBC and differential; Future  -     TSH, 3rd generation with Free T4 reflex; Future    Fatigue, unspecified type  -     Comprehensive metabolic panel; Future  -     CBC and differential; Future  -     TSH, 3rd generation with Free T4 reflex; Future  -     Lyme Ab/Western Blot Reflex; Future    Vitamin D deficiency  -     CBC and differential; Future  -     Vitamin D 25 hydroxy; Future  Continue vitamin D supplement  Screening for breast cancer  -     Mammo screening bilateral w cad; Future    Osteoporosis  -     DXA bone density spine hip and pelvis; Future    Screening for colon cancer  -     Ambulatory referral to Gastroenterology; Future    Other hyperlipidemia  -     Lipid Panel with Direct LDL reflex; Future  Low fat diet reviewed  Patient refuses the influenza vaccine  Lab work ordered  Patient instructed to follow-up in 1 month or sooner prn  Subjective:     Patient ID: Zuri Gabriel is a 62 y o  female  Patient is here for a follow-up for depression and anxiety  Patient reports that her depression and anxiety has gotten worse over the past 6 weeks  Patient reports that she has a lot of family stressors  Patient reports that she was in [facility] in August of 2017 for depression, anxiety, and alcohol abuse   Patient reports that rehab really helped her at the time  Patient reports that she does not drink alcohol anymore  Patient reports that she would like to see a psychologist or therapist again  Denies any homicidal thoughts or suicidal thoughts  Patient reports that she was on sertraline for anxiety and depression but she ran out of it  Patient reports that the sertraline really helped  Patient reports that it she was on lorazepam prn but it did not really help  Patient is here for a follow-up for vitamin D deficiency and osteoporosis  Patient reports that she takes a vitamin D supplement daily  Patient reports that she also has a history of multiple fractures  Patient reports that she would like a script for a dexa scan  Patient reports that she has also been more tired recently  Patient reports that she would like lab work done including a test for lyme disease  Review of Systems   Constitutional: Positive for fatigue  Negative for appetite change, chills and fever  HENT: Negative for congestion, ear pain, sinus pressure, sore throat and trouble swallowing  Eyes: Negative for pain, discharge and redness  Respiratory: Negative for cough, chest tightness, shortness of breath and wheezing  Cardiovascular: Negative for chest pain, palpitations and leg swelling  Gastrointestinal: Negative for abdominal pain, blood in stool, diarrhea, nausea and vomiting  Genitourinary: Negative for dysuria, frequency and hematuria  Musculoskeletal: Negative for myalgias  Skin: Negative for rash  Neurological: Negative for dizziness, seizures, syncope, light-headedness and headaches  Psychiatric/Behavioral: Negative for suicidal ideas  As noted in HPI  Objective:     Physical Exam   Constitutional: She is oriented to person, place, and time  No distress     HENT:   Right Ear: External ear normal    Left Ear: External ear normal    Mouth/Throat: Oropharynx is clear and moist    Tympanic membranes and ear canals wnl  Eyes: Pupils are equal, round, and reactive to light  Conjunctivae are normal    Cardiovascular: Normal rate, regular rhythm, normal heart sounds and intact distal pulses  No edema noted  Pulmonary/Chest: Effort normal and breath sounds normal  No respiratory distress  She has no wheezes  Abdominal: Soft  She exhibits no distension  There is no tenderness  Musculoskeletal:   Gait wnl  Lymphadenopathy:     She has no cervical adenopathy  Neurological: She is alert and oriented to person, place, and time  Skin: No rash noted  Psychiatric: She has a normal mood and affect  Vitals reviewed

## 2019-01-31 DIAGNOSIS — F41.8 DEPRESSION WITH ANXIETY: ICD-10-CM

## 2019-02-26 ENCOUNTER — TELEPHONE (OUTPATIENT)
Dept: GASTROENTEROLOGY | Facility: CLINIC | Age: 59
End: 2019-02-26

## 2019-02-26 NOTE — TELEPHONE ENCOUNTER
02/26/19  Screened by: De Cee    Referring Provider DR Derek Mccormack    Pre- Screening: There is no height or weight on file to calculate BMI  Has patient been referred for a routine screening Colonoscopy? yes  Is the patient between 39-70 years old? yes    SCHEDULING STAFF   If the patient is between 45yrs-49yrs, please advise patient to confirm benefits/coverage with their insurance company for a routine screening colonoscopy, some insurance carriers will only cover at [id number] or older   If the patient is over 66years old, please schedule an office visit  Do you have any of the following symptoms? NO      Have you had a coronary or vascular stent within the last year? no    Have you had a heart attack or stroke in the last 6 months? no    Have you had intestinal surgery in the last 3 months? no    Do you have problems with: Sleep Apnea NO      Have you been hospitalized in the last Month? no    Have you been diagnosed with a bleeding disorder or anemia? no    Have you had chest pain (angina) or breathing problems  (COPD) in the last 3 months? no    Do you have any difficulty walking up a flight of stairs? no    Have you had Kidney failure or insufficiency? no    Have you had heart valve surgery? no    Are you confined to a wheelchair? no    Do you take Other blood thinning medications no    Have you been diagnosed with Diabetes or are you taking any   Diabetic medications? no    : If patient answers NO to medical questions, then schedule procedure  If patient answers YES to medical questions, then schedule office appointment  Previous Colonoscopy no  Date and Facility of last colonoscopy?      Comments: PASSED OA  JOHN LOW

## 2019-03-04 ENCOUNTER — ANESTHESIA EVENT (OUTPATIENT)
Dept: PERIOP | Facility: AMBULARY SURGERY CENTER | Age: 59
End: 2019-03-04

## 2019-03-04 DIAGNOSIS — Z12.11 COLON CANCER SCREENING: Primary | ICD-10-CM

## 2019-03-04 RX ORDER — POLYETHYLENE GLYCOL 3350, SODIUM CHLORIDE, POTASSIUM CHLORIDE, SODIUM BICARBONATE, AND SODIUM SULFATE 240; 5.84; 2.98; 6.72; 22.72 G/4L; G/4L; G/4L; G/4L; G/4L
POWDER, FOR SOLUTION ORAL
Qty: 4000 ML | Refills: 0 | Status: SHIPPED | OUTPATIENT
Start: 2019-03-04 | End: 2019-11-15 | Stop reason: HOSPADM

## 2019-03-07 ENCOUNTER — TELEPHONE (OUTPATIENT)
Dept: FAMILY MEDICINE CLINIC | Facility: CLINIC | Age: 59
End: 2019-03-07

## 2019-03-07 NOTE — TELEPHONE ENCOUNTER
Patients pharmacy called for refills of Sertraline 50 mg # 90  Pharmacy was advised that patient is in need of an office visit refills denied at this time

## 2019-03-09 ENCOUNTER — TELEPHONE (OUTPATIENT)
Dept: OTHER | Facility: HOSPITAL | Age: 59
End: 2019-03-09

## 2019-03-09 NOTE — TELEPHONE ENCOUNTER
Received page at 1100 from  to call patient regarding her prep for colonoscopy on Monday 3/11  No answer, message left on voicemail to call back and I will try to call her again when she is available

## 2019-03-11 ENCOUNTER — ANESTHESIA (OUTPATIENT)
Dept: PERIOP | Facility: AMBULARY SURGERY CENTER | Age: 59
End: 2019-03-11

## 2019-11-11 ENCOUNTER — APPOINTMENT (OUTPATIENT)
Dept: LAB | Facility: CLINIC | Age: 59
End: 2019-11-11
Payer: COMMERCIAL

## 2019-11-11 ENCOUNTER — OFFICE VISIT (OUTPATIENT)
Dept: FAMILY MEDICINE CLINIC | Facility: CLINIC | Age: 59
End: 2019-11-11
Payer: COMMERCIAL

## 2019-11-11 ENCOUNTER — TELEPHONE (OUTPATIENT)
Dept: FAMILY MEDICINE CLINIC | Facility: CLINIC | Age: 59
End: 2019-11-11

## 2019-11-11 VITALS
HEART RATE: 102 BPM | DIASTOLIC BLOOD PRESSURE: 72 MMHG | SYSTOLIC BLOOD PRESSURE: 122 MMHG | HEIGHT: 67 IN | BODY MASS INDEX: 20.88 KG/M2 | WEIGHT: 133 LBS | OXYGEN SATURATION: 97 % | TEMPERATURE: 97.2 F

## 2019-11-11 DIAGNOSIS — D69.6 THROMBOCYTOPENIA (HCC): ICD-10-CM

## 2019-11-11 DIAGNOSIS — K86.1 CHRONIC PANCREATITIS, UNSPECIFIED PANCREATITIS TYPE (HCC): ICD-10-CM

## 2019-11-11 DIAGNOSIS — J01.10 ACUTE NON-RECURRENT FRONTAL SINUSITIS: Primary | ICD-10-CM

## 2019-11-11 DIAGNOSIS — K76.0 FATTY LIVER: ICD-10-CM

## 2019-11-11 DIAGNOSIS — F10.11 NONDEPENDENT ALCOHOL ABUSE, IN REMISSION: ICD-10-CM

## 2019-11-11 DIAGNOSIS — R39.89 URINE DISCOLORATION: ICD-10-CM

## 2019-11-11 DIAGNOSIS — R04.0 EPISTAXIS: ICD-10-CM

## 2019-11-11 DIAGNOSIS — F41.8 DEPRESSION WITH ANXIETY: ICD-10-CM

## 2019-11-11 DIAGNOSIS — R74.8 ABNORMAL LIVER ENZYMES: ICD-10-CM

## 2019-11-11 DIAGNOSIS — E78.49 OTHER HYPERLIPIDEMIA: ICD-10-CM

## 2019-11-11 LAB
BASOPHILS # BLD AUTO: 0.06 THOUSANDS/ΜL (ref 0–0.1)
BASOPHILS NFR BLD AUTO: 2 % (ref 0–1)
EOSINOPHIL # BLD AUTO: 0.07 THOUSAND/ΜL (ref 0–0.61)
EOSINOPHIL NFR BLD AUTO: 2 % (ref 0–6)
ERYTHROCYTE [DISTWIDTH] IN BLOOD BY AUTOMATED COUNT: 16.1 % (ref 11.6–15.1)
HCT VFR BLD AUTO: 32.9 % (ref 34.8–46.1)
HGB BLD-MCNC: 10.3 G/DL (ref 11.5–15.4)
IMM GRANULOCYTES # BLD AUTO: 0.01 THOUSAND/UL (ref 0–0.2)
IMM GRANULOCYTES NFR BLD AUTO: 0 % (ref 0–2)
LYMPHOCYTES # BLD AUTO: 2.1 THOUSANDS/ΜL (ref 0.6–4.47)
LYMPHOCYTES NFR BLD AUTO: 64 % (ref 14–44)
MCH RBC QN AUTO: 31.5 PG (ref 26.8–34.3)
MCHC RBC AUTO-ENTMCNC: 31.3 G/DL (ref 31.4–37.4)
MCV RBC AUTO: 101 FL (ref 82–98)
MONOCYTES # BLD AUTO: 0.25 THOUSAND/ΜL (ref 0.17–1.22)
MONOCYTES NFR BLD AUTO: 8 % (ref 4–12)
NEUTROPHILS # BLD AUTO: 0.77 THOUSANDS/ΜL (ref 1.85–7.62)
NEUTS SEG NFR BLD AUTO: 24 % (ref 43–75)
NRBC BLD AUTO-RTO: 0 /100 WBCS
PLATELET # BLD AUTO: 30 THOUSANDS/UL (ref 149–390)
PMV BLD AUTO: 11.9 FL (ref 8.9–12.7)
RBC # BLD AUTO: 3.27 MILLION/UL (ref 3.81–5.12)
WBC # BLD AUTO: 3.26 THOUSAND/UL (ref 4.31–10.16)

## 2019-11-11 PROCEDURE — 85025 COMPLETE CBC W/AUTO DIFF WBC: CPT

## 2019-11-11 PROCEDURE — 99215 OFFICE O/P EST HI 40 MIN: CPT | Performed by: FAMILY MEDICINE

## 2019-11-11 PROCEDURE — 36415 COLL VENOUS BLD VENIPUNCTURE: CPT

## 2019-11-11 RX ORDER — AMOXICILLIN 875 MG/1
875 TABLET, COATED ORAL 2 TIMES DAILY
Qty: 14 TABLET | Refills: 0 | Status: SHIPPED | OUTPATIENT
Start: 2019-11-11 | End: 2019-11-15 | Stop reason: HOSPADM

## 2019-11-11 NOTE — TELEPHONE ENCOUNTER
Patient was seen in the office this morning complaining of chest heaviness and fatigue also bleeding from nose ( Dr Joe Dan note 11/11/2019)  Patient was advised to go to ER when in the office however patient declined at that time but did agree to STAT labs  Patient was sent for a STAT CBC and critical result for low platelet count of 30 was called to office by [facility]  Dr Deny Aaron was advised and she tried to contact the patient by every phone number we have in her chart including trying to contact patients   A detailed message was left on patients home number by Dr Deny Aaron  All attempts to contact patient have not been successful

## 2019-11-11 NOTE — PROGRESS NOTES
Subjective:      Patient ID: Antony December is a 61 y o  female  Sore Throat    This is a new problem  The current episode started in the past 7 days  The problem has been unchanged  There has been no fever  The pain is at a severity of 6/10  The pain is moderate  Associated symptoms include congestion, coughing, headaches and a hoarse voice  Pertinent negatives include no shortness of breath, trouble swallowing or vomiting  She has had no exposure to strep or mono  She has tried cool liquids, gargles and NSAIDs for the symptoms  The treatment provided no relief  Patient was last seen in October 2018 for follow-up on anxiety and depression  Patient was on Zoloft 50 milligram which she states she has not taken for a long time  Patient was advised basic labs and follow-up but she did not  States that she had a lot of family stressors so she was unable to come back for follow-up  Her labs from 2016 reveals platelet of 88  She has not had any labs since  Her liver enzymes were elevated  Patient has a history of alcohol abuse in the past     CT scan from 2014 reveals pancreatic calcification due to sequelae of chronic pancreatitis, hepatomegaly with fatty infiltration  Patient states that she does not drink alcohol any more  That it is very control  She had a glass of wine yesterday but prior to that her previous alcohol consumption was almost 3 months ago  Past Medical History:   Diagnosis Date    Fracture     Hepatitis     Insomnia     10mar2016 resolved    Osteoporosis     14jun2016 resolved    Pancreatitis     Seasonal allergies        Family History   Problem Relation Age of Onset    Anxiety disorder Mother     Depression Mother     No Known Problems Father     Cancer Paternal Grandmother        Past Surgical History:   Procedure Laterality Date    TUBAL LIGATION          reports that she has never smoked   She has never used smokeless tobacco  She reports that she does not drink alcohol or use drugs  Current Outpatient Medications:     amoxicillin (AMOXIL) 875 mg tablet, Take 1 tablet (875 mg total) by mouth 2 (two) times a day for 7 days, Disp: 14 tablet, Rfl: 0    bisacodyl (DULCOLAX) 5 mg EC tablet, Take 2 tablets (10 mg total) by mouth once for 1 dose Per office instructions, Disp: 2 tablet, Rfl: 0    Cholecalciferol (VITAMIN D PO), Take by mouth, Disp: , Rfl:     Multiple Vitamin (MULTI-VITAMIN DAILY) TABS, Take by mouth, Disp: , Rfl:     PEG 3350-KCl-NaBcb-NaCl-NaSulf (PEG 3350/ELECTROLYTES) 240 g SOLR, Take 4 L by mouth per office instructions (Patient not taking: Reported on 11/11/2019), Disp: 4000 mL, Rfl: 0    sertraline (ZOLOFT) 50 mg tablet, Take 1 tablet (50 mg total) by mouth daily (Patient not taking: Reported on 11/11/2019), Disp: 30 tablet, Rfl: 0    The following portions of the patient's history were reviewed and updated as appropriate: allergies, current medications, past family history, past medical history, past social history, past surgical history and problem list     Review of Systems   Constitutional: Negative  HENT: Positive for congestion, hoarse voice, postnasal drip, sinus pressure, sinus pain, sneezing and sore throat  Negative for trouble swallowing  Respiratory: Positive for cough  Negative for chest tightness and shortness of breath  Cardiovascular: Negative  Gastrointestinal: Negative for vomiting  Neurological: Positive for headaches  Objective:    /72 (BP Location: Left arm, Patient Position: Sitting, Cuff Size: Standard)   Pulse 102   Temp (!) 97 2 °F (36 2 °C)   Ht 5' 7" (1 702 m)   Wt 60 3 kg (133 lb)   SpO2 97%   BMI 20 83 kg/m²      Physical Exam   Constitutional: She is oriented to person, place, and time  She appears well-developed and well-nourished     HENT:   Right Ear: External ear normal    Left Ear: External ear normal    Mouth/Throat: Oropharynx is clear and moist    Posterior pharyngeal wall erythema  Nasal spotting   Cardiovascular: Normal rate, regular rhythm and normal heart sounds  Pulmonary/Chest: Effort normal and breath sounds normal    Abdominal: Soft  Bowel sounds are normal    Neurological: She is alert and oriented to person, place, and time  Skin:   Skin bruising   Psychiatric: Her behavior is normal  Judgment normal          No results found for this or any previous visit (from the past 1008 hour(s))  Assessment/Plan:             Problem List Items Addressed This Visit        Digestive    Fatty liver    Relevant Orders    US abdomen complete       Respiratory    Acute non-recurrent frontal sinusitis - Primary    Relevant Medications    amoxicillin (AMOXIL) 875 mg tablet       Other    Abnormal liver enzymes    Relevant Orders    Comprehensive metabolic panel    Hemoglobin A1C    Gamma GT    Nondependent alcohol abuse, in remission    Relevant Orders    Comprehensive metabolic panel    Gamma GT    US abdomen complete    Depression with anxiety    Relevant Orders    Comprehensive metabolic panel    TSH, 3rd generation with Free T4 reflex    Thrombocytopenia (HCC)    Relevant Orders    CBC and differential    CBC and differential    Other hyperlipidemia    Relevant Orders    Lipid panel    Epistaxis    Relevant Orders    CBC and differential    Ferritin    Urine discoloration    Relevant Orders    UA w Reflex to Microscopic w Reflex to Culture -Lab Collect      Other Visit Diagnoses     Chronic pancreatitis, unspecified pancreatitis type ([facility] [address] )        Relevant Orders    US abdomen complete        since patient had low platelets in 6670, is having active nasal bleed  Advised to go to the ER for stat testing and management of low platelets  Patient declines  I discussed this with her  as well who is here for his sick appointment  Patient states that she wants to go for stat labs to check on her platelets    She is not concerned about the nasal bleed states that the nasal spotting occurs on an of and that she bruises very easily with minimal trauma however that is normal for her  I did explain the risk of internal bleeding including bleeding in her brain and the risk of stroke and death however she continues to decline  States that she is [employer] and that if her labs are abnormal she will not permit for any type of blood transfusion  She currently denies any symptoms of headache blurry vision or dizziness  Patient will be contacted with the lab results ASAP and advised accordingly  Conservative management for nasal bleed explained to the patient  Advised to go to the ER if the nasal bleed persist or worsen or if she develops any red flag symptoms  Patient agrees  I have spent 40 minutes with Patient and family today in which greater than 50% of this time was spent in counseling/coordination of care regarding Diagnostic results, Prognosis, Risks and benefits of tx options, Intructions for management, Patient and family education, Importance of tx compliance, Risk factor reductions and Impressions

## 2019-11-12 ENCOUNTER — APPOINTMENT (INPATIENT)
Dept: CT IMAGING | Facility: HOSPITAL | Age: 59
DRG: 813 | End: 2019-11-12
Payer: COMMERCIAL

## 2019-11-12 ENCOUNTER — HOSPITAL ENCOUNTER (INPATIENT)
Facility: HOSPITAL | Age: 59
LOS: 3 days | Discharge: HOME HEALTH | DRG: 813 | End: 2019-11-15
Attending: EMERGENCY MEDICINE | Admitting: INTERNAL MEDICINE
Payer: COMMERCIAL

## 2019-11-12 ENCOUNTER — APPOINTMENT (EMERGENCY)
Dept: RADIOLOGY | Facility: HOSPITAL | Age: 59
DRG: 813 | End: 2019-11-12
Payer: COMMERCIAL

## 2019-11-12 ENCOUNTER — APPOINTMENT (EMERGENCY)
Dept: CT IMAGING | Facility: HOSPITAL | Age: 59
DRG: 813 | End: 2019-11-12
Payer: COMMERCIAL

## 2019-11-12 DIAGNOSIS — D64.9 ANEMIA: ICD-10-CM

## 2019-11-12 DIAGNOSIS — F10.20 ALCOHOLISM (HCC): Primary | ICD-10-CM

## 2019-11-12 DIAGNOSIS — R51.9 HEADACHE: ICD-10-CM

## 2019-11-12 DIAGNOSIS — R53.83 FATIGUE, UNSPECIFIED TYPE: ICD-10-CM

## 2019-11-12 DIAGNOSIS — D69.6 THROMBOCYTOPENIA (HCC): ICD-10-CM

## 2019-11-12 DIAGNOSIS — F41.8 DEPRESSION WITH ANXIETY: ICD-10-CM

## 2019-11-12 DIAGNOSIS — D69.6 THROMBOCYTOPENIA (HCC): Primary | ICD-10-CM

## 2019-11-12 DIAGNOSIS — Z53.1 REFUSAL OF BLOOD TRANSFUSIONS AS PATIENT IS JEHOVAH'S WITNESS: ICD-10-CM

## 2019-11-12 DIAGNOSIS — K70.10 ALCOHOLIC HEPATITIS WITHOUT ASCITES: ICD-10-CM

## 2019-11-12 DIAGNOSIS — F10.920 ACUTE ALCOHOLIC INTOXICATION WITHOUT COMPLICATION (HCC): ICD-10-CM

## 2019-11-12 DIAGNOSIS — R39.89 URINE DISCOLORATION: ICD-10-CM

## 2019-11-12 DIAGNOSIS — F10.10 ALCOHOL ABUSE: ICD-10-CM

## 2019-11-12 DIAGNOSIS — R50.9 FEVER AND CHILLS: ICD-10-CM

## 2019-11-12 DIAGNOSIS — D61.818 PANCYTOPENIA (HCC): ICD-10-CM

## 2019-11-12 PROBLEM — IMO0001 REFUSAL OF BLOOD TRANSFUSIONS AS PATIENT IS JEHOVAH'S WITNESS: Status: ACTIVE | Noted: 2019-11-12

## 2019-11-12 PROBLEM — K76.9 LIVER DISEASE: Status: ACTIVE | Noted: 2019-11-12

## 2019-11-12 LAB
ABO GROUP BLD: NORMAL
ALBUMIN SERPL BCP-MCNC: 3.1 G/DL (ref 3.5–5)
ALP SERPL-CCNC: 181 U/L (ref 46–116)
ALT SERPL W P-5'-P-CCNC: 80 U/L (ref 12–78)
ANION GAP SERPL CALCULATED.3IONS-SCNC: 9 MMOL/L (ref 4–13)
APTT PPP: 31 SECONDS (ref 23–37)
AST SERPL W P-5'-P-CCNC: 232 U/L (ref 5–45)
ATRIAL RATE: 96 BPM
BACTERIA UR QL AUTO: ABNORMAL /HPF
BASOPHILS # BLD AUTO: 0.02 THOUSANDS/ΜL (ref 0–0.1)
BASOPHILS NFR BLD AUTO: 1 % (ref 0–1)
BILIRUB DIRECT SERPL-MCNC: 0.27 MG/DL (ref 0–0.2)
BILIRUB SERPL-MCNC: 0.5 MG/DL (ref 0.2–1)
BILIRUB UR QL STRIP: NEGATIVE
BLD GP AB SCN SERPL QL: NEGATIVE
BUN SERPL-MCNC: 9 MG/DL (ref 5–25)
CALCIUM SERPL-MCNC: 7.9 MG/DL (ref 8.3–10.1)
CHLORIDE SERPL-SCNC: 107 MMOL/L (ref 100–108)
CLARITY UR: CLEAR
CO2 SERPL-SCNC: 26 MMOL/L (ref 21–32)
COLOR UR: YELLOW
CREAT SERPL-MCNC: 0.55 MG/DL (ref 0.6–1.3)
EOSINOPHIL # BLD AUTO: 0.06 THOUSAND/ΜL (ref 0–0.61)
EOSINOPHIL NFR BLD AUTO: 3 % (ref 0–6)
ERYTHROCYTE [DISTWIDTH] IN BLOOD BY AUTOMATED COUNT: 16.3 % (ref 11.6–15.1)
ETHANOL SERPL-MCNC: 307 MG/DL (ref 0–3)
FOLATE SERPL-MCNC: 16.5 NG/ML (ref 3.1–17.5)
GFR SERPL CREATININE-BSD FRML MDRD: 103 ML/MIN/1.73SQ M
GLUCOSE SERPL-MCNC: 108 MG/DL (ref 65–140)
GLUCOSE UR STRIP-MCNC: NEGATIVE MG/DL
HCT VFR BLD AUTO: 27.8 % (ref 34.8–46.1)
HGB BLD-MCNC: 9 G/DL (ref 11.5–15.4)
HGB UR QL STRIP.AUTO: NEGATIVE
IMM GRANULOCYTES # BLD AUTO: 0.01 THOUSAND/UL (ref 0–0.2)
IMM GRANULOCYTES NFR BLD AUTO: 0 % (ref 0–2)
INR PPP: 1.11 (ref 0.84–1.19)
KETONES UR STRIP-MCNC: NEGATIVE MG/DL
LEUKOCYTE ESTERASE UR QL STRIP: ABNORMAL
LYMPHOCYTES # BLD AUTO: 1.22 THOUSANDS/ΜL (ref 0.6–4.47)
LYMPHOCYTES NFR BLD AUTO: 51 % (ref 14–44)
MCH RBC QN AUTO: 32.4 PG (ref 26.8–34.3)
MCHC RBC AUTO-ENTMCNC: 32.4 G/DL (ref 31.4–37.4)
MCV RBC AUTO: 100 FL (ref 82–98)
MONOCYTES # BLD AUTO: 0.26 THOUSAND/ΜL (ref 0.17–1.22)
MONOCYTES NFR BLD AUTO: 11 % (ref 4–12)
NEUTROPHILS # BLD AUTO: 0.82 THOUSANDS/ΜL (ref 1.85–7.62)
NEUTS SEG NFR BLD AUTO: 34 % (ref 43–75)
NITRITE UR QL STRIP: NEGATIVE
NON-SQ EPI CELLS URNS QL MICRO: ABNORMAL /HPF
NRBC BLD AUTO-RTO: 0 /100 WBCS
PH UR STRIP.AUTO: 6.5 [PH]
PLATELET # BLD AUTO: 26 THOUSANDS/UL (ref 149–390)
PMV BLD AUTO: 10.8 FL (ref 8.9–12.7)
POTASSIUM SERPL-SCNC: 4 MMOL/L (ref 3.5–5.3)
PROT SERPL-MCNC: 6.9 G/DL (ref 6.4–8.2)
PROT UR STRIP-MCNC: NEGATIVE MG/DL
PROTHROMBIN TIME: 13.7 SECONDS (ref 11.6–14.5)
QRS AXIS: 63 DEGREES
QRSD INTERVAL: 82 MS
QT INTERVAL: 358 MS
QTC INTERVAL: 430 MS
RBC # BLD AUTO: 2.78 MILLION/UL (ref 3.81–5.12)
RBC #/AREA URNS AUTO: ABNORMAL /HPF
RH BLD: POSITIVE
SODIUM SERPL-SCNC: 142 MMOL/L (ref 136–145)
SP GR UR STRIP.AUTO: <=1.005 (ref 1–1.03)
SPECIMEN EXPIRATION DATE: NORMAL
T WAVE AXIS: 63 DEGREES
UROBILINOGEN UR QL STRIP.AUTO: 0.2 E.U./DL
VENTRICULAR RATE: 87 BPM
VIT B12 SERPL-MCNC: 594 PG/ML (ref 100–900)
WBC # BLD AUTO: 2.39 THOUSAND/UL (ref 4.31–10.16)
WBC #/AREA URNS AUTO: ABNORMAL /HPF

## 2019-11-12 PROCEDURE — 85730 THROMBOPLASTIN TIME PARTIAL: CPT | Performed by: EMERGENCY MEDICINE

## 2019-11-12 PROCEDURE — 85025 COMPLETE CBC W/AUTO DIFF WBC: CPT | Performed by: EMERGENCY MEDICINE

## 2019-11-12 PROCEDURE — 86850 RBC ANTIBODY SCREEN: CPT | Performed by: EMERGENCY MEDICINE

## 2019-11-12 PROCEDURE — 82746 ASSAY OF FOLIC ACID SERUM: CPT | Performed by: EMERGENCY MEDICINE

## 2019-11-12 PROCEDURE — 36415 COLL VENOUS BLD VENIPUNCTURE: CPT | Performed by: EMERGENCY MEDICINE

## 2019-11-12 PROCEDURE — 86900 BLOOD TYPING SEROLOGIC ABO: CPT | Performed by: EMERGENCY MEDICINE

## 2019-11-12 PROCEDURE — 99285 EMERGENCY DEPT VISIT HI MDM: CPT

## 2019-11-12 PROCEDURE — 74176 CT ABD & PELVIS W/O CONTRAST: CPT

## 2019-11-12 PROCEDURE — 80076 HEPATIC FUNCTION PANEL: CPT | Performed by: EMERGENCY MEDICINE

## 2019-11-12 PROCEDURE — 86901 BLOOD TYPING SEROLOGIC RH(D): CPT | Performed by: EMERGENCY MEDICINE

## 2019-11-12 PROCEDURE — 93010 ELECTROCARDIOGRAM REPORT: CPT | Performed by: INTERNAL MEDICINE

## 2019-11-12 PROCEDURE — 99223 1ST HOSP IP/OBS HIGH 75: CPT | Performed by: INTERNAL MEDICINE

## 2019-11-12 PROCEDURE — 83918 ORGANIC ACIDS TOTAL QUANT: CPT | Performed by: EMERGENCY MEDICINE

## 2019-11-12 PROCEDURE — 87040 BLOOD CULTURE FOR BACTERIA: CPT | Performed by: INTERNAL MEDICINE

## 2019-11-12 PROCEDURE — 96365 THER/PROPH/DIAG IV INF INIT: CPT

## 2019-11-12 PROCEDURE — 93005 ELECTROCARDIOGRAM TRACING: CPT

## 2019-11-12 PROCEDURE — 81001 URINALYSIS AUTO W/SCOPE: CPT | Performed by: INTERNAL MEDICINE

## 2019-11-12 PROCEDURE — 70450 CT HEAD/BRAIN W/O DYE: CPT

## 2019-11-12 PROCEDURE — 87086 URINE CULTURE/COLONY COUNT: CPT | Performed by: INTERNAL MEDICINE

## 2019-11-12 PROCEDURE — 85610 PROTHROMBIN TIME: CPT | Performed by: EMERGENCY MEDICINE

## 2019-11-12 PROCEDURE — 82607 VITAMIN B-12: CPT | Performed by: EMERGENCY MEDICINE

## 2019-11-12 PROCEDURE — 80048 BASIC METABOLIC PNL TOTAL CA: CPT | Performed by: EMERGENCY MEDICINE

## 2019-11-12 PROCEDURE — 71045 X-RAY EXAM CHEST 1 VIEW: CPT

## 2019-11-12 PROCEDURE — 80320 DRUG SCREEN QUANTALCOHOLS: CPT | Performed by: EMERGENCY MEDICINE

## 2019-11-12 RX ORDER — CHLORDIAZEPOXIDE HYDROCHLORIDE 25 MG/1
25 CAPSULE, GELATIN COATED ORAL ONCE
Status: COMPLETED | OUTPATIENT
Start: 2019-11-12 | End: 2019-11-12

## 2019-11-12 RX ORDER — SODIUM CHLORIDE, SODIUM LACTATE, POTASSIUM CHLORIDE, CALCIUM CHLORIDE 600; 310; 30; 20 MG/100ML; MG/100ML; MG/100ML; MG/100ML
500 INJECTION, SOLUTION INTRAVENOUS CONTINUOUS
Status: DISCONTINUED | OUTPATIENT
Start: 2019-11-12 | End: 2019-11-12

## 2019-11-12 RX ORDER — AMOXICILLIN 250 MG/5ML
875 POWDER, FOR SUSPENSION ORAL 2 TIMES DAILY
Status: DISCONTINUED | OUTPATIENT
Start: 2019-11-12 | End: 2019-11-14

## 2019-11-12 RX ADMIN — SODIUM CHLORIDE, SODIUM LACTATE, POTASSIUM CHLORIDE, AND CALCIUM CHLORIDE 500 ML: .6; .31; .03; .02 INJECTION, SOLUTION INTRAVENOUS at 11:08

## 2019-11-12 RX ADMIN — THIAMINE HYDROCHLORIDE 100 MG: 100 INJECTION, SOLUTION INTRAMUSCULAR; INTRAVENOUS at 10:07

## 2019-11-12 RX ADMIN — AMOXICILLIN 900 MG: 250 POWDER, FOR SUSPENSION ORAL at 21:16

## 2019-11-12 RX ADMIN — CHLORDIAZEPOXIDE HYDROCHLORIDE 25 MG: 25 CAPSULE ORAL at 10:08

## 2019-11-12 NOTE — PLAN OF CARE
Problem: Potential for Falls  Goal: Patient will remain free of falls  Description  INTERVENTIONS:  - Assess patient frequently for physical needs  -  Identify cognitive and physical deficits and behaviors that affect risk of falls    -  [place] fall precautions as indicated by assessment   - Educate patient/family on patient safety including physical limitations  - Instruct patient to call for assistance with activity based on assessment  - Modify environment to reduce risk of injury  - Consider OT/PT consult to assist with strengthening/mobility  Outcome: Progressing     Problem: PAIN - ADULT  Goal: Verbalizes/displays adequate comfort level or baseline comfort level  Description  Interventions:  - Encourage patient to monitor pain and request assistance  - Assess pain using appropriate pain scale  - Administer analgesics based on type and severity of pain and evaluate response  - Implement non-pharmacological measures as appropriate and evaluate response  - Consider cultural and social influences on pain and pain management  - Notify physician/advanced practitioner if interventions unsuccessful or patient reports new pain  Outcome: Progressing     Problem: INFECTION - ADULT  Goal: Absence or prevention of progression during hospitalization  Description  INTERVENTIONS:  - Assess and monitor for signs and symptoms of infection  - Monitor lab/diagnostic results  - Monitor all insertion sites, i e  indwelling lines, tubes, and drains  - Monitor endotracheal if appropriate and nasal secretions for changes in amount and color  - [place] appropriate cooling/warming therapies per order  - Administer medications as ordered  - Instruct and encourage patient and family to use good hand hygiene technique  - Identify and instruct in appropriate isolation precautions for identified infection/condition  Outcome: Progressing  Goal: Absence of fever/infection during neutropenic period  Description  INTERVENTIONS:  - Monitor WBC    Outcome: Progressing     Problem: SAFETY ADULT  Goal: Maintain or return to baseline ADL function  Description  INTERVENTIONS:  -  Assess patient's ability to carry out ADLs; assess patient's baseline for ADL function and identify physical deficits which impact ability to perform ADLs (bathing, care of mouth/teeth, toileting, grooming, dressing, etc )  - Assess/evaluate cause of self-care deficits   - Assess range of motion  - Assess patient's mobility; develop plan if impaired  - Assess patient's need for assistive devices and provide as appropriate  - Encourage maximum independence but intervene and supervise when necessary  - Involve family in performance of ADLs  - Assess for home care needs following discharge   - Consider OT consult to assist with ADL evaluation and planning for discharge  - Provide patient education as appropriate  Outcome: Progressing  Goal: Maintain or return mobility status to optimal level  Description  INTERVENTIONS:  - Assess patient's baseline mobility status (ambulation, transfers, stairs, etc )    - Identify cognitive and physical deficits and behaviors that affect mobility  - Identify mobility aids required to assist with transfers and/or ambulation (gait belt, sit-to-stand, lift, walker, cane, etc )  - [place] fall precautions as indicated by assessment  - Record patient progress and toleration of activity level on Mobility SBAR; progress patient to next Phase/Stage  - Instruct patient to call for assistance with activity based on assessment  - Consider rehabilitation consult to assist with strengthening/weightbearing, etc   Outcome: Progressing     Problem: DISCHARGE PLANNING  Goal: Discharge to home or other facility with appropriate resources  Description  INTERVENTIONS:  - Identify barriers to discharge w/patient and caregiver  - Arrange for needed discharge resources and transportation as appropriate  - Identify discharge learning needs (meds, wound care, etc )  - Arrange for interpretive services to assist at discharge as needed  - Refer to Case Management Department for coordinating discharge planning if the patient needs post-hospital services based on physician/advanced practitioner order or complex needs related to functional status, cognitive ability, or social support system  Outcome: Progressing     Problem: Knowledge Deficit  Goal: Patient/family/caregiver demonstrates understanding of disease process, treatment plan, medications, and discharge instructions  Description  Complete learning assessment and assess knowledge base    Interventions:  - Provide teaching at level of understanding  - Provide teaching via preferred learning methods  Outcome: Progressing     Problem: HEMATOLOGIC - ADULT  Goal: Maintains hematologic stability  Description  INTERVENTIONS  - Assess for signs and symptoms of bleeding or hemorrhage  - Monitor labs  - Administer supportive blood products/factors as ordered and appropriate  Outcome: Progressing

## 2019-11-12 NOTE — PROGRESS NOTES
Progress Note - Nicolasa Raoer 1960, 61 y o  female MRN: [id number]    Unit/Bed#: S -01 Encounter: [id number]    Primary Care Provider: Carlos Clement MD   Date and time admitted to hospital: 11/12/2019  9:09 AM        Refusal of blood transfusions as patient is Gab[place]  Refusing to sign consent even in the event of life-threatening bleed    Liver disease  Assessment & Plan  The patient states she no longer drinks  States she has not had a drink in several years  Blood alcohol level today is 307 she says this is because she had some wine last night  Alcohol abuse  Assessment & Plan  Blood alcohol was 307 on admission  Patient states she no longer drinks but drank something last night  I have counseled the patient and stated I think her pancytopenia is likely related to alcohol intake and that she also has elevated liver enzymes and that she likely has liver disease  Urine discoloration  Assessment & Plan  Complaining of dark urine  Also suprapubic pain and tenderness  Will get UA  Will follow up on cultures and get bladder scan  * Thrombocytopenia (HCC)  Assessment & Plan  Platelets are 26  ED did discuss with hematology who recommended transfusing platelets and then following trend  Unfortunately patient is refusing platelets or any blood products at this point  I explained to her risk of death from bleeding and she understands  She is holding on to consent to "think about it" but will not sign consent, and at this time does not want blood product even in the event of a brain bleed or other acute blood loss which may be life threatening  VTE Prophylaxis: platelets are low  / sequential compression device   Code Status: Full Code  POLST: There is no POLST form on file for this patient (pre-hospital)  Discussion with family: Discussed with patient she will update family       Anticipated Length of Stay:  Patient will be admitted on an Inpatient basis with an anticipated length of stay of  More than 2 midnights  Justification for Hospital Stay: thrombocytopenia  Total Time for Visit, including Counseling / Coordination of Care: 45 mintes  Greater than 50% of this total time spent on direct patient counseling and coordination of care  Chief Complaint:     Feeling unwell  History of Present Illness:    Courtney Nam is a 61 y o  female who presents with various complaints  She states that she was sent here primarily from her primary care physician's office with abnormal labs including a platelet count of 30  The patient was actually told to come in yesterday however she had obligations and her  was sick and so she avoided coming in  Her  was in the ER today with urinary tract infection and so she decided to seek attention there to  Her platelets there was found to be 26  This case was discussed between the emergency room doctor and Hematology is on call and the recommendation was that she get a platelet transfusion and her much response be monitored  She was then admitted to Medicine and is now refusing a transfusion  She states she is a [employer] and initially she was going to [facility] receiving blood products however now she is refusing but continues to keep the consent document on her in the event that she may change her mind  She does follow with [address] [place] [address] Hematology for thrombocytopenia  At her last visit it was felt that her thrombocytopenia along with mild macrocytosis and hepatomegaly may be secondary to alcohol intake  Is the hematology note also indicates that the patient drinks half a bottle of wine every night  This note is from June 2016  Since then the patient states that she has quit drinking essentially for the last 3 years but that she did have something to drink last night  Her blood alcohol level is 307 in the ED    Her hepatic panel is abnormal     The patient in addition to being sent here by her primary care physician for abnormal labs has also had a several week history of fatigue heaviness in her head temporal headache suprapubic tenderness and heaviness and overall feeling weak     The patient specifically denies any bleeding from her rectum any melena any hemoptysis or hematemesis  She does admit to easy bruising  She denies any epistaxis or bleeding from the gums over the last 1 week    Review of Systems:    Review of Systems   Constitutional: Positive for activity change and fatigue  Negative for appetite change, chills, diaphoresis and fever  HENT: Negative  Eyes: Negative  Respiratory: Negative  Cardiovascular: Negative  Gastrointestinal: Positive for abdominal pain  Negative for abdominal distention, anal bleeding, blood in stool, constipation, diarrhea and nausea  Endocrine: Negative  Genitourinary: Positive for dysuria  Negative for decreased urine volume, difficulty urinating, dyspareunia, enuresis, flank pain, frequency, genital sores, hematuria, menstrual problem, pelvic pain, urgency, vaginal discharge and vaginal pain  Musculoskeletal: Negative  Neurological: Negative for dizziness, syncope, facial asymmetry, speech difficulty, weakness, light-headedness, numbness and headaches  Hematological: Negative  Negative for adenopathy  Does not bruise/bleed easily  Psychiatric/Behavioral: Negative  Negative for agitation, behavioral problems, confusion, decreased concentration, dysphoric mood, hallucinations, self-injury, sleep disturbance and suicidal ideas  The patient is not nervous/anxious and is not hyperactive          Past Medical and Surgical History:     Past Medical History:   Diagnosis Date    Fracture     Hepatitis     Insomnia     10mar2016 resolved    Osteoporosis     14jun2016 resolved    Pancreatitis     Seasonal allergies        Past Surgical History:   Procedure Laterality Date    TUBAL LIGATION Meds/Allergies:    Prior to Admission medications    Medication Sig Start Date End Date Taking? Authorizing Provider   amoxicillin (AMOXIL) 875 mg tablet Take 1 tablet (875 mg total) by mouth 2 (two) times a day for 7 days 11/11/19 11/18/19  Lizabeth Lira MD   bisacodyl (DULCOLAX) 5 mg EC tablet Take 2 tablets (10 mg total) by mouth once for 1 dose Per office instructions 3/4/19 3/4/19  Ada Jaime PA-C   Cholecalciferol (VITAMIN D PO) Take by mouth    Historical Provider, MD   Multiple Vitamin (MULTI-VITAMIN DAILY) TABS Take by mouth 10/23/14   Historical Provider, MD   PEG 3350-KCl-NaBcb-NaCl-NaSulf (PEG 3350/ELECTROLYTES) 240 g SOLR Take 4 L by mouth per office instructions  Patient not taking: Reported on 11/11/2019 3/4/19   Ada Jaime PA-C   sertraline (ZOLOFT) 50 mg tablet Take 1 tablet (50 mg total) by mouth daily  Patient not taking: Reported on 11/11/2019 10/3/18   Leopold Bodo, CRNP     I have reviewed home medications with patient personally  Allergies: No Known Allergies    Social History:     Marital Status: /Civil Union   Occupation: retired  Used to work in [place] for [employer]   Patient Pre-hospital Living Situation: lives with   Patient Pre-hospital Level of Mobility: fully mobile  Patient Pre-hospital Diet Restrictions: none     Substance Use History:   Social History     Substance and Sexual Activity   Alcohol Use No     Social History     Tobacco Use   Smoking Status Never Smoker   Smokeless Tobacco Never Used     Social History     Substance and Sexual Activity   Drug Use No       Family History:    Family History   Problem Relation Age of Onset    Anxiety disorder Mother     Depression Mother     No Known Problems Father     Cancer Paternal Grandmother        Physical Exam:     Vitals:   Blood Pressure: 137/61 (11/12/19 1514)  Pulse: 87 (11/12/19 1514)  Temperature: 97 8 °F (36 6 °C) (11/12/19 1514)  Temp Source: Oral (11/12/19 1514)  Respirations: 18 (11/12/19 1514)  Height: 5' 7" (170 2 cm) (11/12/19 1514)  Weight - Scale: 59 8 kg (131 lb 14 4 oz) (11/12/19 1514)  SpO2: 98 % (11/12/19 1514)    Physical Exam        Additional Data:     Lab Results: I have personally reviewed pertinent reports  Results from last 7 days   Lab Units 11/12/19  0946   WBC Thousand/uL 2 39*   HEMOGLOBIN g/dL 9 0*   HEMATOCRIT % 27 8*   PLATELETS Thousands/uL 26*   NEUTROS PCT % 34*   LYMPHS PCT % 51*   MONOS PCT % 11   EOS PCT % 3     Results from last 7 days   Lab Units 11/12/19  0946   SODIUM mmol/L 142   POTASSIUM mmol/L 4 0   CHLORIDE mmol/L 107   CO2 mmol/L 26   BUN mg/dL 9   CREATININE mg/dL 0 55*   ANION GAP mmol/L 9   CALCIUM mg/dL 7 9*   ALBUMIN g/dL 3 1*   TOTAL BILIRUBIN mg/dL 0 50   ALK PHOS U/L 181*   ALT U/L 80*   AST U/L 232*   GLUCOSE RANDOM mg/dL 108     Results from last 7 days   Lab Units 11/12/19  0946   INR  1 11                   Imaging: I have personally reviewed pertinent reports  CT abdomen pelvis wo contrast   Final Result by Radha Amezcua MD (11/12 1204)      1  Mild mesenteric edema without evidence of lymphadenopathy  Findings are nonspecific and may be seen in enteritis  Correlate clinically  2   Sequelae of chronic pancreatitis  3   Hepatomegaly noted  The spleen is before  Age-indeterminate T12 superior endplate mild vertebral compression deformity  Correlate with clinical exam                   Workstation performed: [id number]         XR chest 1 view portable   ED Interpretation by Janel Gonzalez MD (11/12 1024)   nad      Final Result by Delia Mitchell MD (11/12 1040)      No acute cardiopulmonary disease  Workstation performed: [id number]             EKG, Pathology, and Other Studies Reviewed on Admission:   · EKG: sinus  Allscripts / Epic Records Reviewed: Yes     ** Please Note: This note has been constructed using a voice recognition system   **

## 2019-11-12 NOTE — ASSESSMENT & PLAN NOTE
· Refusing to sign consent even in the event of life-threatening bleed  · Patient voiced understanding of the risks  · OK to take Promacta and Nplate

## 2019-11-12 NOTE — ED PROCEDURE NOTE
PROCEDURE  ECG 12 Lead Documentation Only  Date/Time: 11/12/2019 10:18 AM  Performed by: Cheyanne Meyer MD  Authorized by: Cheyanne Meyer MD     Indications / Diagnosis:  Cp/palp  ECG reviewed by me, the ED Provider: yes    Patient location:  ED and bedside  Previous ECG:     Previous ECG:  Compared to current    Comparison ECG info:  10/9/14- no sign changes    Similarity:  No change    Comparison to cardiac monitor: Yes    Interpretation:     Interpretation: non-specific    Rate:     ECG rate:  87    ECG rate assessment: normal    Rhythm:     Rhythm: sinus rhythm    Ectopy:     Ectopy: none    QRS:     QRS axis:  Normal    QRS intervals:  Normal  Conduction:     Conduction: normal    ST segments:     ST segments:  Normal  T waves:     T waves: flattening      Flattening:  AVL, I and V1  Q waves:     Q waves:  AVL, V1 and V2  Comments:      No ecg signs of ischemia/ injury / r heart strain / leo/pericarditis         Cheyanne Meyer MD  11/12/19 1020

## 2019-11-12 NOTE — ASSESSMENT & PLAN NOTE
· Patient complained of dark colored urine with suprapubic pain and tenderness     · Urinalysis does not show hematuria, likely contaminated  · Currently symptoms resolved

## 2019-11-12 NOTE — ASSESSMENT & PLAN NOTE
· Blood alcohol was 307 on admission  · states she no longer drinks but had alcohol a few days ago  Plan  1   Monitor mental status

## 2019-11-12 NOTE — ASSESSMENT & PLAN NOTE
· Platelets on admission were 26, currently at 20  Likely due to the history of alcohol abuse and liver disease  · CT head on admission shows no bleeding  · Hematology recommended transfusing platelets and then following trend on admission, however patient is refusing blood products as she is a [employer]  · Patient voices understanding about the risks of not receiving platelet transfusions, however refuses blood products even at life-threatening situations  · Denies hematemesis, melena  However she does have bleeding gums with brushing her teeth and spontaneous bruising which has been chronic issue  · Patient states that she can take "Promacta" and "Nplate"    Plan  1  Inpatient consult to Hematology Hematology  2   Monitor platelet count

## 2019-11-12 NOTE — ASSESSMENT & PLAN NOTE
· Likely due to chronic alcohol abuse  · The patient states she no longer drinks  States she has not had a drink in several years  Blood alcohol level on admission was 307    Plan  1   Continue management as above

## 2019-11-12 NOTE — H&P
H&P - Reji Other 1960, 61 y o  female MRN: [id number]     Unit/Bed#: S -01 Encounter: [id number]     Primary Care Provider: Chris Martínez MD   Date and time admitted to hospital: 11/12/2019  9:09 AM           Refusal of blood transfusions as patient is Gabby  Refusing to sign consent even in the event of life-threatening bleed     Liver disease  Assessment & Plan  The patient states she no longer drinks  States she has not had a drink in several years  Blood alcohol level today is 307 she says this is because she had some wine last night            Alcohol abuse  Assessment & Plan  Blood alcohol was 307 on admission  Patient states she no longer drinks but drank something last night  I have counseled the patient and stated I think her pancytopenia is likely related to alcohol intake and that she also has elevated liver enzymes and that she likely has liver disease          Urine discoloration  Assessment & Plan  Complaining of dark urine  Also suprapubic pain and tenderness  Will get UA  Will follow up on cultures and get bladder scan       * Thrombocytopenia (HCC)  Assessment & Plan  Platelets are 26  ED did discuss with hematology who recommended transfusing platelets and then following trend  Unfortunately patient is refusing platelets or any blood products at this point  I explained to her risk of death from bleeding and she understands  She is holding on to consent to "think about it" but will not sign consent, and at this time does not want blood product even in the event of a brain bleed or other acute blood loss which may be life threatening               VTE Prophylaxis: platelets are low  / sequential compression device   Code Status: Full Code     POLST: There is no POLST form on file for this patient (pre-hospital)  Discussion with family: Discussed with patient she will update family       Anticipated Length of Stay:  Patient will be admitted on an Inpatient basis with an anticipated length of stay of  More than 2 midnights  Justification for Hospital Stay: thrombocytopenia       Total Time for Visit, including Counseling / Coordination of Care: 45 mintes  Greater than 50% of this total time spent on direct patient counseling and coordination of care      Chief Complaint:     Feeling unwell       History of Present Illness:     Elisabet Hutton is a 61 y o  female who presents with various complaints      She states that she was sent here primarily from her primary care physician's office with abnormal labs including a platelet count of 30      The patient was actually told to come in yesterday however she had obligations and her  was sick and so she avoided coming in  Her  was in the ER today with urinary tract infection and so she decided to seek attention there to  Her platelets there was found to be 26  This case was discussed between the emergency room doctor and Hematology is on call and the recommendation was that she get a platelet transfusion and her much response be monitored  She was then admitted to Medicine and is now refusing a transfusion  She states she is a Je[employer] Witness and initially she was going to [facility] receiving blood products however now she is refusing but continues to keep the consent document on her in the event that she may change her mind         She does follow with [facility] for thrombocytopenia  At her last visit it was felt that her thrombocytopenia along with mild macrocytosis and hepatomegaly may be secondary to alcohol intake  Is the hematology note also indicates that the patient drinks half a bottle of wine every night  This note is from June 2016      Since then the patient states that she has quit drinking essentially for the last 3 years but that she did have something to drink last night  Her blood alcohol level is 307 in the ED    Her hepatic panel is abnormal      The patient in addition to being sent here by her primary care physician for abnormal labs has also had a several week history of fatigue heaviness in her head temporal headache suprapubic tenderness and heaviness and overall feeling weak     The patient specifically denies any bleeding from her rectum any melena any hemoptysis or hematemesis  She does admit to easy bruising  She denies any epistaxis or bleeding from the gums over the last 1 week     Review of Systems:     Review of Systems   Constitutional: Positive for activity change and fatigue  Negative for appetite change, chills, diaphoresis and fever  HENT: Negative  Eyes: Negative  Respiratory: Negative  Cardiovascular: Negative  Gastrointestinal: Positive for abdominal pain  Negative for abdominal distention, anal bleeding, blood in stool, constipation, diarrhea and nausea  Endocrine: Negative  Genitourinary: Positive for dysuria  Negative for decreased urine volume, difficulty urinating, dyspareunia, enuresis, flank pain, frequency, genital sores, hematuria, menstrual problem, pelvic pain, urgency, vaginal discharge and vaginal pain  Musculoskeletal: Negative  Neurological: Negative for dizziness, syncope, facial asymmetry, speech difficulty, weakness, light-headedness, numbness and headaches  Hematological: Negative  Negative for adenopathy  Does not bruise/bleed easily  Psychiatric/Behavioral: Negative  Negative for agitation, behavioral problems, confusion, decreased concentration, dysphoric mood, hallucinations, self-injury, sleep disturbance and suicidal ideas   The patient is not nervous/anxious and is not hyperactive           Past Medical and Surgical History:      Medical History        Past Medical History:   Diagnosis Date    Fracture      Hepatitis      Insomnia       10mar2016 resolved    Osteoporosis       14jun2016 resolved    Pancreatitis      Seasonal allergies         Surgical History         Past Surgical History:   Procedure Laterality Date    TUBAL LIGATION                Meds/Allergies:             Prior to Admission medications    Medication Sig Start Date End Date Taking? Authorizing Provider   amoxicillin (AMOXIL) 875 mg tablet Take 1 tablet (875 mg total) by mouth 2 (two) times a day for 7 days 11/11/19 11/18/19   Arielle Tong MD   bisacodyl (DULCOLAX) 5 mg EC tablet Take 2 tablets (10 mg total) by mouth once for 1 dose Per office instructions 3/4/19 3/4/19   Ladarius Hess PA-C   Cholecalciferol (VITAMIN D PO) Take by mouth       Historical Provider, MD   Multiple Vitamin (MULTI-VITAMIN DAILY) TABS Take by mouth 10/23/14     Historical Provider, MD   PEG 3350-KCl-NaBcb-NaCl-NaSulf (PEG 3350/ELECTROLYTES) 240 g SOLR Take 4 L by mouth per office instructions  Patient not taking: Reported on 11/11/2019 3/4/19     Ladarius Hess PA-C   sertraline (ZOLOFT) 50 mg tablet Take 1 tablet (50 mg total) by mouth daily  Patient not taking: Reported on 11/11/2019 10/3/18     PAZ Holland      I have reviewed home medications with patient personally      Allergies: No Known Allergies     Social History:     Marital Status: /Civil Union   Occupation: retired  Used to work in [place] for [employer]   Patient Pre-hospital Living Situation: lives with   Patient Pre-hospital Level of Mobility: fully mobile  Patient Pre-hospital Diet Restrictions: none     Substance Use History:   Social History          Substance and Sexual Activity   Alcohol Use No      Social History          Tobacco Use   Smoking Status Never Smoker   Smokeless Tobacco Never Used      Social History          Substance and Sexual Activity   Drug Use No         Family History:           Family History   Problem Relation Age of Onset    Anxiety disorder Mother      Depression Mother      No Known Problems Father      Cancer Paternal Grandmother           Physical Exam:      Vitals: Blood Pressure: 137/61 (11/12/19 1514)  Pulse: 87 (11/12/19 1514)  Temperature: 97 8 °F (36 6 °C) (11/12/19 1514)  Temp Source: Oral (11/12/19 1514)  Respirations: 18 (11/12/19 1514)  Height: 5' 7" (170 2 cm) (11/12/19 1514)  Weight - Scale: 59 8 kg (131 lb 14 4 oz) (11/12/19 1514)  SpO2: 98 % (11/12/19 1514)     Physical Exam           Additional Data:      Lab Results: I have personally reviewed pertinent reports             Results from last 7 days   Lab Units 11/12/19  0946   WBC Thousand/uL 2 39*   HEMOGLOBIN g/dL 9 0*   HEMATOCRIT % 27 8*   PLATELETS Thousands/uL 26*   NEUTROS PCT % 34*   LYMPHS PCT % 51*   MONOS PCT % 11   EOS PCT % 3           Results from last 7 days   Lab Units 11/12/19  0946   SODIUM mmol/L 142   POTASSIUM mmol/L 4 0   CHLORIDE mmol/L 107   CO2 mmol/L 26   BUN mg/dL 9   CREATININE mg/dL 0 55*   ANION GAP mmol/L 9   CALCIUM mg/dL 7 9*   ALBUMIN g/dL 3 1*   TOTAL BILIRUBIN mg/dL 0 50   ALK PHOS U/L 181*   ALT U/L 80*   AST U/L 232*   GLUCOSE RANDOM mg/dL 108           Results from last 7 days   Lab Units 11/12/19  0946   INR   1 11                     Imaging: I have personally reviewed pertinent reports        CT abdomen pelvis wo contrast   Final Result by Sierra Cordoba MD (11/12 1204)       1  Mild mesenteric edema without evidence of lymphadenopathy  Findings are nonspecific and may be seen in enteritis  Correlate clinically  2   Sequelae of chronic pancreatitis  3   Hepatomegaly noted  The spleen is before  Age-indeterminate T12 superior endplate mild vertebral compression deformity    Correlate with clinical exam                        Workstation performed: [id number]           XR chest 1 view portable   ED Interpretation by Charly Chowdhury MD (11/12 1024)   nad       Final Result by Burgess Sarika MD (11/12 1040)       No acute cardiopulmonary disease                Workstation performed: [id number]                 EKG, Pathology, and Other Studies Reviewed on Admission:   · EKG: sinus      Allscripts / Epic Records Reviewed: Yes      ** Please Note: This note has been constructed using a voice recognition system   **

## 2019-11-13 PROBLEM — F41.9 ANXIETY: Status: ACTIVE | Noted: 2019-11-13

## 2019-11-13 PROBLEM — D64.9 ANEMIA: Status: ACTIVE | Noted: 2019-11-13

## 2019-11-13 PROBLEM — R11.10 VOMITING: Status: ACTIVE | Noted: 2019-11-13

## 2019-11-13 LAB
ALBUMIN SERPL BCP-MCNC: 2.8 G/DL (ref 3.5–5)
ALP SERPL-CCNC: 165 U/L (ref 46–116)
ALT SERPL W P-5'-P-CCNC: 70 U/L (ref 12–78)
ANION GAP SERPL CALCULATED.3IONS-SCNC: 9 MMOL/L (ref 4–13)
AST SERPL W P-5'-P-CCNC: 181 U/L (ref 5–45)
BASOPHILS # BLD AUTO: 0.01 THOUSANDS/ΜL (ref 0–0.1)
BASOPHILS NFR BLD AUTO: 0 % (ref 0–1)
BILIRUB SERPL-MCNC: 0.8 MG/DL (ref 0.2–1)
BUN SERPL-MCNC: 7 MG/DL (ref 5–25)
CALCIUM SERPL-MCNC: 8.4 MG/DL (ref 8.3–10.1)
CHLORIDE SERPL-SCNC: 103 MMOL/L (ref 100–108)
CO2 SERPL-SCNC: 25 MMOL/L (ref 21–32)
CREAT SERPL-MCNC: 0.59 MG/DL (ref 0.6–1.3)
EOSINOPHIL # BLD AUTO: 0.05 THOUSAND/ΜL (ref 0–0.61)
EOSINOPHIL NFR BLD AUTO: 2 % (ref 0–6)
ERYTHROCYTE [DISTWIDTH] IN BLOOD BY AUTOMATED COUNT: 16.4 % (ref 11.6–15.1)
FERRITIN SERPL-MCNC: 128 NG/ML (ref 8–388)
GFR SERPL CREATININE-BSD FRML MDRD: 101 ML/MIN/1.73SQ M
GLUCOSE SERPL-MCNC: 82 MG/DL (ref 65–140)
HCT VFR BLD AUTO: 25.8 % (ref 34.8–46.1)
HGB BLD-MCNC: 8.1 G/DL (ref 11.5–15.4)
IMM GRANULOCYTES # BLD AUTO: 0.01 THOUSAND/UL (ref 0–0.2)
IMM GRANULOCYTES NFR BLD AUTO: 0 % (ref 0–2)
IRON SATN MFR SERPL: 79 %
IRON SERPL-MCNC: 251 UG/DL (ref 50–170)
LYMPHOCYTES # BLD AUTO: 1.2 THOUSANDS/ΜL (ref 0.6–4.47)
LYMPHOCYTES NFR BLD AUTO: 51 % (ref 14–44)
MCH RBC QN AUTO: 31.3 PG (ref 26.8–34.3)
MCHC RBC AUTO-ENTMCNC: 31.4 G/DL (ref 31.4–37.4)
MCV RBC AUTO: 100 FL (ref 82–98)
MONOCYTES # BLD AUTO: 0.29 THOUSAND/ΜL (ref 0.17–1.22)
MONOCYTES NFR BLD AUTO: 12 % (ref 4–12)
NEUTROPHILS # BLD AUTO: 0.84 THOUSANDS/ΜL (ref 1.85–7.62)
NEUTS SEG NFR BLD AUTO: 35 % (ref 43–75)
NRBC BLD AUTO-RTO: 0 /100 WBCS
PLATELET # BLD AUTO: 20 THOUSANDS/UL (ref 149–390)
PMV BLD AUTO: 11.7 FL (ref 8.9–12.7)
POTASSIUM SERPL-SCNC: 3.8 MMOL/L (ref 3.5–5.3)
PROT SERPL-MCNC: 6.3 G/DL (ref 6.4–8.2)
RBC # BLD AUTO: 2.59 MILLION/UL (ref 3.81–5.12)
SODIUM SERPL-SCNC: 137 MMOL/L (ref 136–145)
TIBC SERPL-MCNC: 319 UG/DL (ref 250–450)
WBC # BLD AUTO: 2.4 THOUSAND/UL (ref 4.31–10.16)

## 2019-11-13 PROCEDURE — 80053 COMPREHEN METABOLIC PANEL: CPT | Performed by: INTERNAL MEDICINE

## 2019-11-13 PROCEDURE — 83540 ASSAY OF IRON: CPT | Performed by: INTERNAL MEDICINE

## 2019-11-13 PROCEDURE — 82728 ASSAY OF FERRITIN: CPT | Performed by: INTERNAL MEDICINE

## 2019-11-13 PROCEDURE — 99223 1ST HOSP IP/OBS HIGH 75: CPT | Performed by: INTERNAL MEDICINE

## 2019-11-13 PROCEDURE — 85025 COMPLETE CBC W/AUTO DIFF WBC: CPT | Performed by: INTERNAL MEDICINE

## 2019-11-13 PROCEDURE — 83550 IRON BINDING TEST: CPT | Performed by: INTERNAL MEDICINE

## 2019-11-13 PROCEDURE — 99232 SBSQ HOSP IP/OBS MODERATE 35: CPT | Performed by: INTERNAL MEDICINE

## 2019-11-13 RX ORDER — PANTOPRAZOLE SODIUM 40 MG/1
40 TABLET, DELAYED RELEASE ORAL
Status: DISCONTINUED | OUTPATIENT
Start: 2019-11-13 | End: 2019-11-15 | Stop reason: HOSPADM

## 2019-11-13 RX ORDER — LORAZEPAM 2 MG/ML
0.5 INJECTION INTRAMUSCULAR ONCE
Status: COMPLETED | OUTPATIENT
Start: 2019-11-13 | End: 2019-11-13

## 2019-11-13 RX ORDER — ONDANSETRON 2 MG/ML
4 INJECTION INTRAMUSCULAR; INTRAVENOUS EVERY 6 HOURS PRN
Status: DISCONTINUED | OUTPATIENT
Start: 2019-11-13 | End: 2019-11-15 | Stop reason: HOSPADM

## 2019-11-13 RX ADMIN — AMOXICILLIN 900 MG: 250 POWDER, FOR SUSPENSION ORAL at 08:11

## 2019-11-13 RX ADMIN — SERTRALINE HYDROCHLORIDE 50 MG: 50 TABLET ORAL at 08:11

## 2019-11-13 RX ADMIN — LORAZEPAM 0.5 MG: 2 INJECTION INTRAMUSCULAR; INTRAVENOUS at 10:44

## 2019-11-13 RX ADMIN — AMOXICILLIN 900 MG: 250 POWDER, FOR SUSPENSION ORAL at 20:45

## 2019-11-13 RX ADMIN — PANTOPRAZOLE SODIUM 40 MG: 40 TABLET, DELAYED RELEASE ORAL at 14:13

## 2019-11-13 NOTE — UTILIZATION REVIEW
Initial Clinical Review    Admission: Date/Time/Statement: Inpatient Admission Orders (From admission, onward)     Ordered        11/12/19 1131  Inpatient Admission  Once                   Orders Placed This Encounter   Procedures    Inpatient Admission     Standing Status:   Standing     Number of Occurrences:   1     Order Specific Question:   Admitting Physician     Answer:   Christina Alonzo     Order Specific Question:   Level of Care     Answer:   Med Surg [16]     Order Specific Question:   Estimated length of stay     Answer:   More than 2 Midnights     Order Specific Question:   Certification     Answer:   I certify that inpatient services are medically necessary for this patient for a duration of greater than two midnights  See H&P and MD Progress Notes for additional information about the patient's course of treatment  ED Arrival Information     Expected Arrival Acuity Means of Arrival Escorted By Service Admission Type    - 11/12/2019 09:00 Urgent Walk-In Family Member Hospitalist Urgent    Arrival Complaint    abnormal lab value        Chief Complaint   Patient presents with    Evaluation of Abnormal Diagnostic Test     Pt reports getting her platelet count checked yesterday, was 33 sent here by PCP  +dizziness/lightheadedness +palpitations     Assessment/Plan:  this is a 61year old female from home to ED per PCP, admitted as inpatient due to  Alcohol abuse/liver disease/thrombocytopenia  Presented due to abnormal outpatient labs including platelet count of 30; has had several weeks of fatigue, temporal headache, suprapubic tenderness  On arrival platelet count 26, discussed with hematology and recommend transfusion  Patient is a Jehovah witness and refusing  Patient denies alcohol abuse, states has not drunk in last 3 years but did drink last night, ETOH level is 307  , ALT 80  H&H 9/27 8  CT abdomen showed mesenteric edema  Hepatomegaly, sequelae of pancreatitis  Hematology and neurology consulted  11/13/2109 today with vomiting, denies active bleeding, states skin bruising/ecchymosis is old  Continue to monitor CBC and watch signs active bleeding  ED Triage Vitals   Temperature Pulse Respirations Blood Pressure SpO2   11/12/19 0915 11/12/19 0915 11/12/19 0915 11/12/19 0918 11/12/19 0915   98 °F (36 7 °C) (!) 107 18 155/89 96 %      Temp Source Heart Rate Source Patient Position - Orthostatic VS BP Location FiO2 (%)   11/12/19 0915 11/12/19 0915 11/12/19 0918 11/12/19 0918 --   Oral Monitor Sitting Right arm       Pain Score       11/12/19 1514       No Pain        Wt Readings from Last 1 Encounters:   11/13/19 59 2 kg (130 lb 8 2 oz)     Additional Vital Signs:   11/13/19 0700  98 1 °F (36 7 °C)  77  18  140/69    95 %  None (Room air)  Lying   11/12/19 2213  98 3 °F (36 8 °C)  79  16  131/63  80  95 %  None (Room air)  Lying   11/12/19 1514  97 8 °F (36 6 °C)  87  18  137/61    98 %  None (Room air)       Pertinent Labs/Diagnostic Test Results:   11/12/2-19 EKG - sinus  T wave flattening aVl,I and V1  Q waves aVl, V1 and V2    11/12/2019 CT abdomen - Mild mesenteric edema without evidence of lymphadenopathy   Findings are nonspecific and may be seen in enteritis   Correlate clinically  2   Sequelae of chronic pancreatitis    3   Hepatomegaly noted   The spleen is before   Age-indeterminate T12 superior endplate mild vertebral compression deformity    11/12/2019 CxR - No acute cardiopulmonary disease    Results from last 7 days   Lab Units 11/13/19  0445 11/12/19  0946 11/11/19  1058   WBC Thousand/uL 2 40* 2 39* 3 26*   HEMOGLOBIN g/dL 8 1* 9 0* 10 3*   HEMATOCRIT % 25 8* 27 8* 32 9*   PLATELETS Thousands/uL 20* 26* 30*   NEUTROS ABS Thousands/µL 0 84* 0 82* 0 77*     Results from last 7 days   Lab Units 11/13/19  0445 11/12/19  0946   SODIUM mmol/L 137 142   POTASSIUM mmol/L 3 8 4 0   CHLORIDE mmol/L 103 107   CO2 mmol/L 25 26   ANION GAP mmol/L 9 9   BUN mg/dL 7 9   CREATININE mg/dL 0 59* 0 55*   EGFR ml/min/1 73sq m 101 103   CALCIUM mg/dL 8 4 7 9*     Results from last 7 days   Lab Units 11/13/19  0445 11/12/19  0946   AST U/L 181* 232*   ALT U/L 70 80*   ALK PHOS U/L 165* 181*   TOTAL PROTEIN g/dL 6 3* 6 9   ALBUMIN g/dL 2 8* 3 1*   TOTAL BILIRUBIN mg/dL 0 80 0 50   BILIRUBIN DIRECT mg/dL  --  0 27*     Results from last 7 days   Lab Units 11/13/19  0445 11/12/19  0946   GLUCOSE RANDOM mg/dL 82 108     Results from last 7 days   Lab Units 11/12/19  0946   PROTIME seconds 13 7   INR  1 11   PTT seconds 31     Results from last 7 days   Lab Units 11/12/19  1749   CLARITY UA  Clear   COLOR UA  Yellow   SPEC GRAV UA  <=1 005   PH UA  6 5   GLUCOSE UA mg/dl Negative   KETONES UA mg/dl Negative   BLOOD UA  Negative   PROTEIN UA mg/dl Negative   NITRITE UA  Negative   BILIRUBIN UA  Negative   UROBILINOGEN UA E U /dl 0 2   LEUKOCYTES UA  Trace*   WBC UA /hpf 0-5   RBC UA /hpf 0-5   BACTERIA UA /hpf Innumerable*   EPITHELIAL CELLS WET PREP /hpf Innumerable*     Results from last 7 days   Lab Units 11/12/19  0946   ETHANOL LVL mg/dL 307*     Results from last 7 days   Lab Units 11/12/19  1606 11/12/19  1605   BLOOD CULTURE  Received in Microbiology Lab  Culture in Progress  Received in Microbiology Lab  Culture in Progress       ED Treatment:   Medication Administration from 11/12/2019 0900 to 11/12/2019 1432       Date/Time Order Dose Route Action Comments     11/12/2019 1007 thiamine (VITAMIN B1) 100 mg in sodium chloride 0 9 % 50 mL IVPB 100 mg Intravenous New Bag      11/12/2019 1008 chlordiazePOXIDE (LIBRIUM) capsule 25 mg 25 mg Oral Given      11/12/2019 1108 lactated ringers infusion 500 mL 500 mL Intravenous New Bag         Past Medical History:   Diagnosis Date    Fracture     Hepatitis     Insomnia     10mar2016 resolved    Osteoporosis     14jun2016 resolved    Pancreatitis     Seasonal allergies      Present on Admission:   Thrombocytopenia ([address] )   Urine discoloration      Admitting Diagnosis: Alcoholism ([place] [address] ) [F10 20]  Abnormal laboratory test result [R89 9]  Pancytopenia ([place] [address] ) [E56 436]  Alcoholic hepatitis without ascites [X24 96]  Acute alcoholic intoxication without complication ([place] [address] ) [O96 928]  Age/Sex: 61 y o  female  Admission Orders: 11/12/2019  1131 INPATIENT   Scheduled Medications:  Medications:  amoxicillin 900 mg Oral BID   LORazepam 1030 11/13 0 5 mg Intravenous Once   sertraline 50 mg Oral Daily     Continuous IV Infusions: none     PRN Meds: not used   ondansetron 4 mg Intravenous Q6H PRN       IP CONSULT TO HEMATOLOGY  IP CONSULT TO NEUROLOGY    Network Utilization Review Department  [email or web address]  ATTENTION: Please call with any questions or concerns to [phone] and carefully listen to the prompts so that you are directed to the right person  All voicemails are confidential   Hitesh Cornell all requests for admission clinical reviews, approved or denied determinations and any other requests to dedicated fax number below belonging to the campus where the patient is receiving treatment    FACILITY NAME UR FAX NUMBER   ADMISSION DENIALS (Administrative/Medical Necessity) [address] [facility] (Maternity/NICU/Pediatrics) [phone]   [facility] [address] [facility] [phone]   [address] [place] [address] [address] [facility] [phone]   [facility] [address] [phone]

## 2019-11-13 NOTE — PLAN OF CARE
Problem: Potential for Falls  Goal: Patient will remain free of falls  Description  INTERVENTIONS:  - Assess patient frequently for physical needs  -  Identify cognitive and physical deficits and behaviors that affect risk of falls    -  [place] fall precautions as indicated by assessment   - Educate patient/family on patient safety including physical limitations  - Instruct patient to call for assistance with activity based on assessment  - Modify environment to reduce risk of injury  - Consider OT/PT consult to assist with strengthening/mobility  Outcome: Progressing     Problem: PAIN - ADULT  Goal: Verbalizes/displays adequate comfort level or baseline comfort level  Description  Interventions:  - Encourage patient to monitor pain and request assistance  - Assess pain using appropriate pain scale  - Administer analgesics based on type and severity of pain and evaluate response  - Implement non-pharmacological measures as appropriate and evaluate response  - Consider cultural and social influences on pain and pain management  - Notify physician/advanced practitioner if interventions unsuccessful or patient reports new pain  Outcome: Progressing     Problem: INFECTION - ADULT  Goal: Absence or prevention of progression during hospitalization  Description  INTERVENTIONS:  - Assess and monitor for signs and symptoms of infection  - Monitor lab/diagnostic results  - Monitor all insertion sites, i e  indwelling lines, tubes, and drains  - Monitor endotracheal if appropriate and nasal secretions for changes in amount and color  - [place] appropriate cooling/warming therapies per order  - Administer medications as ordered  - Instruct and encourage patient and family to use good hand hygiene technique  - Identify and instruct in appropriate isolation precautions for identified infection/condition  Outcome: Progressing  Goal: Absence of fever/infection during neutropenic period  Description  INTERVENTIONS:  - Monitor WBC    Outcome: Progressing     Problem: SAFETY ADULT  Goal: Maintain or return to baseline ADL function  Description  INTERVENTIONS:  -  Assess patient's ability to carry out ADLs; assess patient's baseline for ADL function and identify physical deficits which impact ability to perform ADLs (bathing, care of mouth/teeth, toileting, grooming, dressing, etc )  - Assess/evaluate cause of self-care deficits   - Assess range of motion  - Assess patient's mobility; develop plan if impaired  - Assess patient's need for assistive devices and provide as appropriate  - Encourage maximum independence but intervene and supervise when necessary  - Involve family in performance of ADLs  - Assess for home care needs following discharge   - Consider OT consult to assist with ADL evaluation and planning for discharge  - Provide patient education as appropriate  Outcome: Progressing  Goal: Maintain or return mobility status to optimal level  Description  INTERVENTIONS:  - Assess patient's baseline mobility status (ambulation, transfers, stairs, etc )    - Identify cognitive and physical deficits and behaviors that affect mobility  - Identify mobility aids required to assist with transfers and/or ambulation (gait belt, sit-to-stand, lift, walker, cane, etc )  - [place] fall precautions as indicated by assessment  - Record patient progress and toleration of activity level on Mobility SBAR; progress patient to next Phase/Stage  - Instruct patient to call for assistance with activity based on assessment  - Consider rehabilitation consult to assist with strengthening/weightbearing, etc   Outcome: Progressing     Problem: DISCHARGE PLANNING  Goal: Discharge to home or other facility with appropriate resources  Description  INTERVENTIONS:  - Identify barriers to discharge w/patient and caregiver  - Arrange for needed discharge resources and transportation as appropriate  - Identify discharge learning needs (meds, wound care, etc )  - Arrange for interpretive services to assist at discharge as needed  - Refer to Case Management Department for coordinating discharge planning if the patient needs post-hospital services based on physician/advanced practitioner order or complex needs related to functional status, cognitive ability, or social support system  Outcome: Progressing     Problem: Knowledge Deficit  Goal: Patient/family/caregiver demonstrates understanding of disease process, treatment plan, medications, and discharge instructions  Description  Complete learning assessment and assess knowledge base    Interventions:  - Provide teaching at level of understanding  - Provide teaching via preferred learning methods  Outcome: Progressing     Problem: HEMATOLOGIC - ADULT  Goal: Maintains hematologic stability  Description  INTERVENTIONS  - Assess for signs and symptoms of bleeding or hemorrhage  - Monitor labs  - Administer supportive blood products/factors as ordered and appropriate  Outcome: Progressing

## 2019-11-13 NOTE — ASSESSMENT & PLAN NOTE
· Patient developed an episode of vomiting this morning  · Denies headache, visual disturbances  · Denies blood in vomitus    Plan  1  IV Zofran 4 mg p r n

## 2019-11-13 NOTE — CONSULTS
Hematology/Oncology Consult   Guerline Van 61 y o  female MRN: [id number]  Unit/Bed#: S -01 Encounter: [id number]      Hospital Physician Requesting Consult: Fredy Blue MD  Reason for Consult: Thrombocytopenia  Hematology/Oncology Supervising Physician: Ria Gilford, M D      HPI: Guerline Van is a 61y o  year old female with a history of liver disease , hepatitis, alcohol abuse, thrombocytopenia who presented  11/12/2019 from her prior an care physician's office following a outpatient abnormal laboratory finding of a platelet count of 76978  On presentation, CBC showed hemoglobin 8 1, WBC 2 4/ANC 0 8, platelets 20257  T an INR were normal   AST was elevated to 232, a LT 80, alkaline phosphatase 181  CT abdomen pelvis showed hepato megaly and chronic pancreatitis but no other evidence of abnormality  Renal function was satisfactory at 0 5  Patient was seen in 2016 in Hematology with a CBC showing hemoglobin 12 6/MCV 96 8, WBC 4 3/ANC 3 0,platelets 591244  In July of 2017 hemoglobin 13 0, WBC 3 8, platelets 529 4040  Do not have any other interval laboratory studies to compare  Patient states she is a Jehov[employer] Witness and is refusing blood products  He reports a 30 year history of alcohol abuse, she states she has quit drinking over the past 2 years time, drinks 1 beer weekly  On admission, her alcohol level was 307  She reports a history of hepatitis approximately 15-20 years ago, she does not know if she was treated or what type of hepatitis only that she acquired this from shellfish  Assessment/Plan:   #1 Pancytopenia/thrombocytopenia  -patient's admission platelet count was 46,895  Prior to that in July of 2017 platelet count was 206901  Do not know the interval trend at this time as there are no records and the patient states she has not seen a physician  Hemoglobin is also low 8 1/ and WBC 2 4/ANC 0 8   -AST is elevated to 232, ALT 80   Coags are normal   I suspect pancytopenia is largely from liver disease as this is re-demonstrated on CT imaging of the abdomen and pelvis this admission with hepatomegaly  Also remarkable on that imaging study was a chronic pancreatitis  Given the patient's alcohol history and elevated alcohol level on admission I do suspect the etiology is likely due to alcoholic hepatic dysfunction  The patient does states she cut down on alcohol use and drinks 1 beer weekly over the past 2 years  She has been counseled on alcohol cessation  Unfortunately, she is not able to receive blood products due to her Buddhism and therefore her platelet count is very low and she is at high risk for bleeding  She does verbalize understand this and does not want to receive any blood products  Instead, she is requesting Promacta and corticosteroids  I discussed the above with Dr Catherine Hale, he does not feel either of those agents is indicated at this time, especially Promacta is not on the formulary in the hospital   Due to the uncertain trend of her pancytopenia, a bone marrow biopsy would be acceptable to perform to rule out any underlying marrow suppression  She is not iron deficient  She has macrocytic anemia without B12 or folate deficiency  Etiology is most consistent with hepatic dysfunction but not to rule out any underlying bone marrow abnormality  I will place the orders for a bone marrow biopsy to be performed once the platelets  improved to Interventional Radiology satisfaction  I have placed the orders and the [facility] form is on the patient's chart  Discussed with the primary team (Ruben Talley) 11/13/2019    We will continue to follow this admission  Please contact us if you have any questions regarding this consult  Thank you for this consult  No Known Allergies    Subjective:  Rafaela Ji reports:     Review of Systems   Constitutional: Positive for appetite change, fatigue and unexpected weight change   Negative for chills, diaphoresis and fever  HENT:   Negative for nosebleeds, sore throat and trouble swallowing  Eyes: Negative for icterus  Respiratory: Positive for chest tightness  Negative for cough, hemoptysis, shortness of breath and wheezing  Cardiovascular: Positive for palpitations  Negative for chest pain and leg swelling  Gastrointestinal: Positive for nausea  Negative for abdominal distention, abdominal pain, blood in stool, diarrhea and vomiting  Genitourinary: Negative for difficulty urinating, dysuria and hematuria  Musculoskeletal: Negative for flank pain, gait problem and myalgias  Skin: Negative for itching, rash and wound  Neurological: Positive for light-headedness  Negative for gait problem, headaches and speech difficulty  Hematological: Negative for adenopathy  Bruises/bleeds easily  Psychiatric/Behavioral: Negative for confusion  All other systems reviewed and are negative  The remainder of a 12 point review of systems was negative  Objective:  /61 (BP Location: Right arm)   Pulse 83   Temp 98 8 °F (37 1 °C) (Oral)   Resp 18   Ht 5' 7" (1 702 m)   Wt 59 2 kg (130 lb 8 2 oz)   SpO2 95%   BMI 20 44 kg/m²     Physical Exam   Constitutional: She is oriented to person, place, and time  No distress  HENT:   Head: Normocephalic and atraumatic  Mouth/Throat: Oropharynx is clear and moist    Eyes: Conjunctivae and EOM are normal  No scleral icterus  Neck: Normal range of motion  Cardiovascular: Normal rate and regular rhythm  Pulmonary/Chest: Effort normal and breath sounds normal  No respiratory distress  She has no wheezes  She exhibits no tenderness  Abdominal: Soft  Bowel sounds are normal  She exhibits no distension  There is no tenderness  There is no rebound  Musculoskeletal: She exhibits no edema or tenderness  Lymphadenopathy:     She has no cervical adenopathy  Neurological: She is alert and oriented to person, place, and time     Skin: Skin is warm and dry  No rash noted  No erythema  No pallor  Vitals reviewed  Current Facility-Administered Medications:     amoxicillin (AMOXIL) 250 mg/5 mL oral suspension 900 mg, 900 mg, Oral, BID, Kathie Suarez MD, 900 mg at 11/13/19 0811    ondansetron (ZOFRAN) injection 4 mg, 4 mg, Intravenous, Q6H PRN, Elizabeth Harrell MD    pantoprazole (PROTONIX) EC tablet 40 mg, 40 mg, Oral, Early Morning, Kenyon Talley MD, 40 mg at 11/13/19 1413    sertraline (ZOLOFT) tablet 50 mg, 50 mg, Oral, Daily, Kathie Suarez MD, 50 mg at 11/13/19 0811      Laboratory studies:  Recent Labs     11/11/19  1058 11/12/19  0946 11/13/19  0445   WBC 3 26* 2 39* 2 40*   HGB 10 3* 9 0* 8 1*   PLT 30* 26* 20*   * 100* 100*   RDW 16 1* 16 3* 16 4*   CREATININE  --  0 55* 0 59*   AST  --  232* 181*   ALT  --  80* 70         Laboratory studies were reviewed    Imaging Studies:    [unfilled]     Pathology: [unfilled]     Code Status: Level 1 - Full Code    Counseling / Coordination of Care  Total floor / unit time spent today 45 minutes  Greater than 50% of total time was spent with the patient and / or family counseling and / or coordination of care

## 2019-11-13 NOTE — ASSESSMENT & PLAN NOTE
· Hemoglobin 8 1, , RDW 16 4  Has ongoing neutropenia with WBC 2 4 and absolute neutrophil count 0 84  · Anemia likely due to liver disease, however we will send an iron panel due to increased RDW    Plan  1  Monitor hemoglobin  2  Iron panel  3   Oral iron therapy if patient is iron deficient

## 2019-11-13 NOTE — PROGRESS NOTES
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  · Patient voices understanding about the risks of not receiving platelet transfusions, however refuses blood products even at life-threatening situations  · Denies hematemesis, melena  However she does have bleeding gums with brushing her teeth and spontaneous bruising which has been chronic issue  · Patient states that she can take "Promacta" and "Nplate"    Plan  1  Inpatient consult to Hematology Hematology  2  Monitor platelet count      Anemia  Assessment & Plan  · Hemoglobin 8 1, , RDW 16 4  Has ongoing neutropenia with WBC 2 4 and absolute neutrophil count 0 84  · Anemia likely due to liver disease, however we will send an iron panel due to increased RDW    Plan  3  Monitor hemoglobin  4  Iron panel  5  Oral iron therapy if patient is iron deficient    Refusal of blood transfusions as patient is [employer]  Assessment & Plan  · Refusing to sign consent even in the event of life-threatening bleed  · Patient voiced understanding of the risks  · OK to take Promacta and Nplate    Anxiety  Assessment & Plan  · Patient complains of increased anxiety this morning with shaking  · She has been taking sertraline 50 mg daily  · Patient vomited after morning sertraline dose    Plan  6  Continue sertraline 50 mg daily  7   IV Ativan 0 5 mg once    Vomiting  Assessment & Plan  · Patient developed an episode of vomiting this morning  · Denies headache, visual disturbances  · Denies blood in vomitus    Plan  8  IV Zofran 4 mg p r n  Liver disease  Assessment & Plan  · Likely due to chronic alcohol abuse  · The patient states she no longer drinks  States she has not had a drink in several years  Blood alcohol level on admission was 307    Plan  1  Continue management as above        Alcohol abuse  Assessment & Plan  · Blood alcohol was 307 on admission  · states she no longer drinks but had alcohol a few days ago  Plan  3  Monitor mental status      Urine discoloration  Assessment & Plan  · Patient complained of dark colored urine with suprapubic pain and tenderness  · Urinalysis does not show hematuria, likely contaminated  · Currently symptoms resolved        VTE Pharmacologic Prophylaxis:   Pharmacologic: Pharmacologic VTE Prophylaxis contraindicated due to Thrombocytopenia with increased risk of bleeding  Mechanical VTE Prophylaxis in Place: No    Discussions with Specialists or Other Care Team Provider:  Case management, nursing    Education and Discussions with Family / Patient:  Patient and her     Current Length of Stay: 1 day(s)    Current Patient Status: Inpatient     Discharge Plan / Estimated Discharge Date: To be determined    Code Status: Level 1 - Full Code      Subjective:   Patient states that she is feeling increasingly anxious with shaking today  She does not have other concerns  She had 1 episode of vomiting this morning with no hematemesis  She has spontaneous bruising and bleeding with gums when she brushes teeth which is not new for her  She denies headache, visual disturbances  She has good appetite with good oral intake of food and fluids  Denies abdominal pain  She still refuses to receive any blood products  She states that she understands the risks, however does not want any infusions with blood products      Objective:     Vitals:   Temp (24hrs), Av 1 °F (36 7 °C), Min:97 8 °F (36 6 °C), Max:98 3 °F (36 8 °C)    Temp:  [97 8 °F (36 6 °C)-98 3 °F (36 8 °C)] 98 1 °F (36 7 °C)  HR:  [77-87] 77  Resp:  [16-18] 18  BP: (131-140)/(61-69) 140/69  SpO2:  [95 %-98 %] 95 %  Body mass index is 20 44 kg/m²  Input and Output Summary (last 24 hours): Intake/Output Summary (Last 24 hours) at 11/13/2019 1147  Last data filed at 11/13/2019 1101  Gross per 24 hour   Intake 960 ml   Output 101 ml   Net 859 ml       Physical Exam:     Physical Exam   Constitutional: She is oriented to person, place, and time  She appears well-developed and well-nourished  No distress  HENT:   Head: Normocephalic and atraumatic  Nose: Nose normal    Mouth/Throat: Oropharynx is clear and moist    Eyes: Pupils are equal, round, and reactive to light  EOM are normal    Neck: Normal range of motion  Neck supple  No JVD present  Cardiovascular: Normal rate, regular rhythm, normal heart sounds and intact distal pulses  No murmur heard  Pulmonary/Chest: Effort normal and breath sounds normal    Abdominal: Soft  Bowel sounds are normal  There is no tenderness  Musculoskeletal: Normal range of motion  She exhibits no edema or tenderness  Neurological: She is alert and oriented to person, place, and time  She exhibits normal muscle tone  Skin: Skin is warm  Capillary refill takes less than 2 seconds  She is not diaphoretic  There is pallor  There is old and new bruising on her skin   Nursing note and vitals reviewed        Additional Data:     Labs:    Results from last 7 days   Lab Units 11/13/19  0445   WBC Thousand/uL 2 40*   HEMOGLOBIN g/dL 8 1*   HEMATOCRIT % 25 8*   PLATELETS Thousands/uL 20*   NEUTROS PCT % 35*   LYMPHS PCT % 51*   MONOS PCT % 12   EOS PCT % 2     Results from last 7 days   Lab Units 11/13/19  0445   POTASSIUM mmol/L 3 8   CHLORIDE mmol/L 103   CO2 mmol/L 25   BUN mg/dL 7   CREATININE mg/dL 0 59*   CALCIUM mg/dL 8 4   ALK PHOS U/L 165*   ALT U/L 70   AST U/L 181*     Results from last 7 days   Lab Units 11/12/19  0946   INR  1 11       * I Have Reviewed All Lab Data Listed Above  * Additional Pertinent Lab Tests Reviewed: [place] 66 Admission Reviewed    Imaging:    Imaging Reports Reviewed Today Include:  None  Imaging Personally Reviewed by Myself Includes:  None    Recent Cultures (last 7 days):     Results from last 7 days   Lab Units 11/12/19  1606 11/12/19  1605   BLOOD CULTURE  Received in Microbiology Lab  Culture in Progress  Received in Microbiology Lab  Culture in Progress  Last 24 Hours Medication List:     Current Facility-Administered Medications:  amoxicillin 900 mg Oral BID Lawrence Jenkins MD   ondansetron 4 mg Intravenous Q6H PRN Daniel Lopes MD   sertraline 50 mg Oral Daily Lawrence Jenkins MD        Today, Patient Was Seen By: Daniel Lopes MD    ** Please Note: This note has been constructed using a voice recognition system   **

## 2019-11-13 NOTE — ASSESSMENT & PLAN NOTE
· Patient complains of increased anxiety this morning with shaking  · She has been taking sertraline 50 mg daily  · Patient vomited after morning sertraline dose    Plan  1  Continue sertraline 50 mg daily  2   IV Ativan 0 5 mg once

## 2019-11-13 NOTE — PLAN OF CARE
Problem: Potential for Falls  Goal: Patient will remain free of falls  Description  INTERVENTIONS:  - Assess patient frequently for physical needs  -  Identify cognitive and physical deficits and behaviors that affect risk of falls    -  [place] fall precautions as indicated by assessment   - Educate patient/family on patient safety including physical limitations  - Instruct patient to call for assistance with activity based on assessment  - Modify environment to reduce risk of injury  - Consider OT/PT consult to assist with strengthening/mobility  Outcome: Progressing     Problem: PAIN - ADULT  Goal: Verbalizes/displays adequate comfort level or baseline comfort level  Description  Interventions:  - Encourage patient to monitor pain and request assistance  - Assess pain using appropriate pain scale  - Administer analgesics based on type and severity of pain and evaluate response  - Implement non-pharmacological measures as appropriate and evaluate response  - Consider cultural and social influences on pain and pain management  - Notify physician/advanced practitioner if interventions unsuccessful or patient reports new pain  Outcome: Progressing     Problem: INFECTION - ADULT  Goal: Absence or prevention of progression during hospitalization  Description  INTERVENTIONS:  - Assess and monitor for signs and symptoms of infection  - Monitor lab/diagnostic results  - Monitor all insertion sites, i e  indwelling lines, tubes, and drains  - Monitor endotracheal if appropriate and nasal secretions for changes in amount and color  - [place] appropriate cooling/warming therapies per order  - Administer medications as ordered  - Instruct and encourage patient and family to use good hand hygiene technique  - Identify and instruct in appropriate isolation precautions for identified infection/condition  Outcome: Progressing  Goal: Absence of fever/infection during neutropenic period  Description  INTERVENTIONS:  - Monitor WBC    Outcome: Progressing     Problem: SAFETY ADULT  Goal: Maintain or return to baseline ADL function  Description  INTERVENTIONS:  -  Assess patient's ability to carry out ADLs; assess patient's baseline for ADL function and identify physical deficits which impact ability to perform ADLs (bathing, care of mouth/teeth, toileting, grooming, dressing, etc )  - Assess/evaluate cause of self-care deficits   - Assess range of motion  - Assess patient's mobility; develop plan if impaired  - Assess patient's need for assistive devices and provide as appropriate  - Encourage maximum independence but intervene and supervise when necessary  - Involve family in performance of ADLs  - Assess for home care needs following discharge   - Consider OT consult to assist with ADL evaluation and planning for discharge  - Provide patient education as appropriate  Outcome: Progressing  Goal: Maintain or return mobility status to optimal level  Description  INTERVENTIONS:  - Assess patient's baseline mobility status (ambulation, transfers, stairs, etc )    - Identify cognitive and physical deficits and behaviors that affect mobility  - Identify mobility aids required to assist with transfers and/or ambulation (gait belt, sit-to-stand, lift, walker, cane, etc )  - [place] fall precautions as indicated by assessment  - Record patient progress and toleration of activity level on Mobility SBAR; progress patient to next Phase/Stage  - Instruct patient to call for assistance with activity based on assessment  - Consider rehabilitation consult to assist with strengthening/weightbearing, etc   Outcome: Progressing     Problem: DISCHARGE PLANNING  Goal: Discharge to home or other facility with appropriate resources  Description  INTERVENTIONS:  - Identify barriers to discharge w/patient and caregiver  - Arrange for needed discharge resources and transportation as appropriate  - Identify discharge learning needs (meds, wound care, etc )  - Arrange for interpretive services to assist at discharge as needed  - Refer to Case Management Department for coordinating discharge planning if the patient needs post-hospital services based on physician/advanced practitioner order or complex needs related to functional status, cognitive ability, or social support system  Outcome: Progressing     Problem: Knowledge Deficit  Goal: Patient/family/caregiver demonstrates understanding of disease process, treatment plan, medications, and discharge instructions  Description  Complete learning assessment and assess knowledge base    Interventions:  - Provide teaching at level of understanding  - Provide teaching via preferred learning methods  Outcome: Progressing     Problem: HEMATOLOGIC - ADULT  Goal: Maintains hematologic stability  Description  INTERVENTIONS  - Assess for signs and symptoms of bleeding or hemorrhage  - Monitor labs  - Administer supportive blood products/factors as ordered and appropriate  Outcome: Progressing

## 2019-11-13 NOTE — PLAN OF CARE
Problem: Potential for Falls  Goal: Patient will remain free of falls  Description  INTERVENTIONS:  - Assess patient frequently for physical needs  -  Identify cognitive and physical deficits and behaviors that affect risk of falls    -  [place] fall precautions as indicated by assessment   - Educate patient/family on patient safety including physical limitations  - Instruct patient to call for assistance with activity based on assessment  - Modify environment to reduce risk of injury  - Consider OT/PT consult to assist with strengthening/mobility  Outcome: Progressing     Problem: PAIN - ADULT  Goal: Verbalizes/displays adequate comfort level or baseline comfort level  Description  Interventions:  - Encourage patient to monitor pain and request assistance  - Assess pain using appropriate pain scale  - Administer analgesics based on type and severity of pain and evaluate response  - Implement non-pharmacological measures as appropriate and evaluate response  - Consider cultural and social influences on pain and pain management  - Notify physician/advanced practitioner if interventions unsuccessful or patient reports new pain  Outcome: Progressing     Problem: INFECTION - ADULT  Goal: Absence or prevention of progression during hospitalization  Description  INTERVENTIONS:  - Assess and monitor for signs and symptoms of infection  - Monitor lab/diagnostic results  - Monitor all insertion sites, i e  indwelling lines, tubes, and drains  - Monitor endotracheal if appropriate and nasal secretions for changes in amount and color  - [place] appropriate cooling/warming therapies per order  - Administer medications as ordered  - Instruct and encourage patient and family to use good hand hygiene technique  - Identify and instruct in appropriate isolation precautions for identified infection/condition  Outcome: Progressing  Goal: Absence of fever/infection during neutropenic period  Description  INTERVENTIONS:  - Monitor WBC    Outcome: Progressing     Problem: SAFETY ADULT  Goal: Maintain or return to baseline ADL function  Description  INTERVENTIONS:  -  Assess patient's ability to carry out ADLs; assess patient's baseline for ADL function and identify physical deficits which impact ability to perform ADLs (bathing, care of mouth/teeth, toileting, grooming, dressing, etc )  - Assess/evaluate cause of self-care deficits   - Assess range of motion  - Assess patient's mobility; develop plan if impaired  - Assess patient's need for assistive devices and provide as appropriate  - Encourage maximum independence but intervene and supervise when necessary  - Involve family in performance of ADLs  - Assess for home care needs following discharge   - Consider OT consult to assist with ADL evaluation and planning for discharge  - Provide patient education as appropriate  Outcome: Progressing  Goal: Maintain or return mobility status to optimal level  Description  INTERVENTIONS:  - Assess patient's baseline mobility status (ambulation, transfers, stairs, etc )    - Identify cognitive and physical deficits and behaviors that affect mobility  - Identify mobility aids required to assist with transfers and/or ambulation (gait belt, sit-to-stand, lift, walker, cane, etc )  - [place] fall precautions as indicated by assessment  - Record patient progress and toleration of activity level on Mobility SBAR; progress patient to next Phase/Stage  - Instruct patient to call for assistance with activity based on assessment  - Consider rehabilitation consult to assist with strengthening/weightbearing, etc   Outcome: Progressing     Problem: DISCHARGE PLANNING  Goal: Discharge to home or other facility with appropriate resources  Description  INTERVENTIONS:  - Identify barriers to discharge w/patient and caregiver  - Arrange for needed discharge resources and transportation as appropriate  - Identify discharge learning needs (meds, wound care, etc )  - Arrange for interpretive services to assist at discharge as needed  - Refer to Case Management Department for coordinating discharge planning if the patient needs post-hospital services based on physician/advanced practitioner order or complex needs related to functional status, cognitive ability, or social support system  Outcome: Progressing     Problem: Knowledge Deficit  Goal: Patient/family/caregiver demonstrates understanding of disease process, treatment plan, medications, and discharge instructions  Description  Complete learning assessment and assess knowledge base    Interventions:  - Provide teaching at level of understanding  - Provide teaching via preferred learning methods  Outcome: Progressing     Problem: HEMATOLOGIC - ADULT  Goal: Maintains hematologic stability  Description  INTERVENTIONS  - Assess for signs and symptoms of bleeding or hemorrhage  - Monitor labs  - Administer supportive blood products/factors as ordered and appropriate  Outcome: Progressing

## 2019-11-14 ENCOUNTER — TELEPHONE (OUTPATIENT)
Dept: HEMATOLOGY ONCOLOGY | Facility: CLINIC | Age: 59
End: 2019-11-14

## 2019-11-14 ENCOUNTER — APPOINTMENT (INPATIENT)
Dept: CT IMAGING | Facility: HOSPITAL | Age: 59
DRG: 813 | End: 2019-11-14
Payer: COMMERCIAL

## 2019-11-14 LAB
ALBUMIN SERPL BCP-MCNC: 2.9 G/DL (ref 3.5–5)
ALP SERPL-CCNC: 170 U/L (ref 46–116)
ALT SERPL W P-5'-P-CCNC: 59 U/L (ref 12–78)
ANION GAP SERPL CALCULATED.3IONS-SCNC: 11 MMOL/L (ref 4–13)
AST SERPL W P-5'-P-CCNC: 134 U/L (ref 5–45)
BACTERIA UR CULT: NORMAL
BASOPHILS # BLD AUTO: 0.02 THOUSANDS/ΜL (ref 0–0.1)
BASOPHILS NFR BLD AUTO: 1 % (ref 0–1)
BILIRUB SERPL-MCNC: 1.1 MG/DL (ref 0.2–1)
BUN SERPL-MCNC: 5 MG/DL (ref 5–25)
CALCIUM SERPL-MCNC: 8.4 MG/DL (ref 8.3–10.1)
CHLORIDE SERPL-SCNC: 104 MMOL/L (ref 100–108)
CO2 SERPL-SCNC: 23 MMOL/L (ref 21–32)
CREAT SERPL-MCNC: 0.49 MG/DL (ref 0.6–1.3)
EOSINOPHIL # BLD AUTO: 0.09 THOUSAND/ΜL (ref 0–0.61)
EOSINOPHIL NFR BLD AUTO: 3 % (ref 0–6)
ERYTHROCYTE [DISTWIDTH] IN BLOOD BY AUTOMATED COUNT: 15.9 % (ref 11.6–15.1)
GFR SERPL CREATININE-BSD FRML MDRD: 107 ML/MIN/1.73SQ M
GLUCOSE SERPL-MCNC: 99 MG/DL (ref 65–140)
HCT VFR BLD AUTO: 28.5 % (ref 34.8–46.1)
HGB BLD-MCNC: 9 G/DL (ref 11.5–15.4)
IMM GRANULOCYTES # BLD AUTO: 0.01 THOUSAND/UL (ref 0–0.2)
IMM GRANULOCYTES NFR BLD AUTO: 0 % (ref 0–2)
LYMPHOCYTES # BLD AUTO: 1.23 THOUSANDS/ΜL (ref 0.6–4.47)
LYMPHOCYTES NFR BLD AUTO: 47 % (ref 14–44)
MCH RBC QN AUTO: 31.8 PG (ref 26.8–34.3)
MCHC RBC AUTO-ENTMCNC: 31.6 G/DL (ref 31.4–37.4)
MCV RBC AUTO: 101 FL (ref 82–98)
MONOCYTES # BLD AUTO: 0.3 THOUSAND/ΜL (ref 0.17–1.22)
MONOCYTES NFR BLD AUTO: 12 % (ref 4–12)
NEUTROPHILS # BLD AUTO: 0.97 THOUSANDS/ΜL (ref 1.85–7.62)
NEUTS SEG NFR BLD AUTO: 37 % (ref 43–75)
NRBC BLD AUTO-RTO: 0 /100 WBCS
PLATELET # BLD AUTO: 24 THOUSANDS/UL (ref 149–390)
PMV BLD AUTO: 12.6 FL (ref 8.9–12.7)
POTASSIUM SERPL-SCNC: 3.9 MMOL/L (ref 3.5–5.3)
PROT SERPL-MCNC: 7 G/DL (ref 6.4–8.2)
RBC # BLD AUTO: 2.83 MILLION/UL (ref 3.81–5.12)
SODIUM SERPL-SCNC: 138 MMOL/L (ref 136–145)
WBC # BLD AUTO: 2.62 THOUSAND/UL (ref 4.31–10.16)

## 2019-11-14 PROCEDURE — 99152 MOD SED SAME PHYS/QHP 5/>YRS: CPT | Performed by: RADIOLOGY

## 2019-11-14 PROCEDURE — 99152 MOD SED SAME PHYS/QHP 5/>YRS: CPT

## 2019-11-14 PROCEDURE — 88305 TISSUE EXAM BY PATHOLOGIST: CPT | Performed by: PATHOLOGY

## 2019-11-14 PROCEDURE — 99232 SBSQ HOSP IP/OBS MODERATE 35: CPT | Performed by: INTERNAL MEDICINE

## 2019-11-14 PROCEDURE — 97163 PT EVAL HIGH COMPLEX 45 MIN: CPT

## 2019-11-14 PROCEDURE — 38222 DX BONE MARROW BX & ASPIR: CPT | Performed by: RADIOLOGY

## 2019-11-14 PROCEDURE — G8979 MOBILITY GOAL STATUS: HCPCS

## 2019-11-14 PROCEDURE — 81450 HL NEO GSAP 5-50DNA/DNA&RNA: CPT | Performed by: INTERNAL MEDICINE

## 2019-11-14 PROCEDURE — 88365 INSITU HYBRIDIZATION (FISH): CPT | Performed by: PATHOLOGY

## 2019-11-14 PROCEDURE — 88262 CHROMOSOME ANALYSIS 15-20: CPT

## 2019-11-14 PROCEDURE — 88360 TUMOR IMMUNOHISTOCHEM/MANUAL: CPT | Performed by: PATHOLOGY

## 2019-11-14 PROCEDURE — 99153 MOD SED SAME PHYS/QHP EA: CPT

## 2019-11-14 PROCEDURE — 88184 FLOWCYTOMETRY/ TC 1 MARKER: CPT | Performed by: RADIOLOGY

## 2019-11-14 PROCEDURE — 88313 SPECIAL STAINS GROUP 2: CPT | Performed by: PATHOLOGY

## 2019-11-14 PROCEDURE — 88342 IMHCHEM/IMCYTCHM 1ST ANTB: CPT | Performed by: PATHOLOGY

## 2019-11-14 PROCEDURE — 88364 INSITU HYBRIDIZATION (FISH): CPT | Performed by: PATHOLOGY

## 2019-11-14 PROCEDURE — 07DR3ZX EXTRACTION OF ILIAC BONE MARROW, PERCUTANEOUS APPROACH, DIAGNOSTIC: ICD-10-PCS | Performed by: RADIOLOGY

## 2019-11-14 PROCEDURE — 88311 DECALCIFY TISSUE: CPT | Performed by: PATHOLOGY

## 2019-11-14 PROCEDURE — 88341 IMHCHEM/IMCYTCHM EA ADD ANTB: CPT | Performed by: PATHOLOGY

## 2019-11-14 PROCEDURE — 99232 SBSQ HOSP IP/OBS MODERATE 35: CPT | Performed by: PHYSICIAN ASSISTANT

## 2019-11-14 PROCEDURE — 85097 BONE MARROW INTERPRETATION: CPT | Performed by: PATHOLOGY

## 2019-11-14 PROCEDURE — 77012 CT SCAN FOR NEEDLE BIOPSY: CPT | Performed by: RADIOLOGY

## 2019-11-14 PROCEDURE — 80053 COMPREHEN METABOLIC PANEL: CPT | Performed by: INTERNAL MEDICINE

## 2019-11-14 PROCEDURE — 88185 FLOWCYTOMETRY/TC ADD-ON: CPT

## 2019-11-14 PROCEDURE — 97116 GAIT TRAINING THERAPY: CPT

## 2019-11-14 PROCEDURE — 38221 DX BONE MARROW BIOPSIES: CPT

## 2019-11-14 PROCEDURE — 85025 COMPLETE CBC W/AUTO DIFF WBC: CPT | Performed by: INTERNAL MEDICINE

## 2019-11-14 PROCEDURE — 88237 TISSUE CULTURE BONE MARROW: CPT | Performed by: INTERNAL MEDICINE

## 2019-11-14 PROCEDURE — G8978 MOBILITY CURRENT STATUS: HCPCS

## 2019-11-14 RX ORDER — FENTANYL CITRATE 50 UG/ML
INJECTION, SOLUTION INTRAMUSCULAR; INTRAVENOUS CODE/TRAUMA/SEDATION MEDICATION
Status: COMPLETED | OUTPATIENT
Start: 2019-11-14 | End: 2019-11-14

## 2019-11-14 RX ORDER — LORAZEPAM 2 MG/ML
0.5 INJECTION INTRAMUSCULAR ONCE
Status: COMPLETED | OUTPATIENT
Start: 2019-11-14 | End: 2019-11-14

## 2019-11-14 RX ORDER — MIDAZOLAM HYDROCHLORIDE 2 MG/2ML
INJECTION, SOLUTION INTRAMUSCULAR; INTRAVENOUS CODE/TRAUMA/SEDATION MEDICATION
Status: COMPLETED | OUTPATIENT
Start: 2019-11-14 | End: 2019-11-14

## 2019-11-14 RX ADMIN — PANTOPRAZOLE SODIUM 40 MG: 40 TABLET, DELAYED RELEASE ORAL at 05:20

## 2019-11-14 RX ADMIN — MIDAZOLAM HYDROCHLORIDE 1 MG: 1 INJECTION, SOLUTION INTRAMUSCULAR; INTRAVENOUS at 14:50

## 2019-11-14 RX ADMIN — MIDAZOLAM HYDROCHLORIDE 1 MG: 1 INJECTION, SOLUTION INTRAMUSCULAR; INTRAVENOUS at 15:05

## 2019-11-14 RX ADMIN — MIDAZOLAM HYDROCHLORIDE 1 MG: 1 INJECTION, SOLUTION INTRAMUSCULAR; INTRAVENOUS at 14:55

## 2019-11-14 RX ADMIN — AMOXICILLIN 900 MG: 250 POWDER, FOR SUSPENSION ORAL at 08:36

## 2019-11-14 RX ADMIN — FENTANYL CITRATE 50 MCG: 50 INJECTION, SOLUTION INTRAMUSCULAR; INTRAVENOUS at 14:50

## 2019-11-14 RX ADMIN — FENTANYL CITRATE 50 MCG: 50 INJECTION, SOLUTION INTRAMUSCULAR; INTRAVENOUS at 14:55

## 2019-11-14 RX ADMIN — SERTRALINE HYDROCHLORIDE 50 MG: 50 TABLET ORAL at 08:35

## 2019-11-14 RX ADMIN — FENTANYL CITRATE 50 MCG: 50 INJECTION, SOLUTION INTRAMUSCULAR; INTRAVENOUS at 15:05

## 2019-11-14 RX ADMIN — LORAZEPAM 0.5 MG: 2 INJECTION INTRAMUSCULAR; INTRAVENOUS at 11:57

## 2019-11-14 RX ADMIN — MIDAZOLAM HYDROCHLORIDE 1 MG: 1 INJECTION, SOLUTION INTRAMUSCULAR; INTRAVENOUS at 15:00

## 2019-11-14 NOTE — PHYSICAL THERAPY NOTE
PHYSICAL THERAPY TREATMENT NOTE    Patient Name: Nacho CUNNINGHAM Date: 11/14/2019 11/14/19 1135   Pain Assessment   Pain Assessment No/denies pain   Restrictions/Precautions   Other Precautions Fall Risk;Bed Alarm  (NPO, anxiety)   General   Chart Reviewed Yes   Family/Caregiver Present Yes   Cognition   Arousal/Participation Alert; Cooperative   Attention Attends with cues to redirect   Orientation Level Oriented to person;Oriented to place; Other (Comment)  (pt was identified w/ full name, birth date)   Following Commands Follows one step commands without difficulty   Subjective   Subjective pt agreed to participate in PT intervention  Bed Mobility   Sit to Supine 7  Independent  (pt declined remaining out of bed due to fatigue)   Additional items Increased time required   Transfers   Sit to Stand 5  Supervision   Additional items Increased time required   Stand to Sit 5  Supervision   Additional items Increased time required   Additional Comments 4 Item DGI w/ cane: 7/12   Ambulation/Elevation   Gait pattern Decreased foot clearance; Short stride   Gait Assistance 6  Modified independent   Additional items Verbal cues  (for cane placement, full step length, breathing technique)   Assistive Device SPC   Distance 150 feet x2 w/ seated rest break x 2 minutes  (additional not possible due to fatigue)   Balance   Static Sitting Good   Dynamic Sitting Fair   Static Standing Fair   Dynamic Standing Fair   Ambulatory Fair  (w/ cane)   Activity Tolerance   Activity Tolerance Patient limited by fatigue   Assessment   Prognosis Fair   Problem List Decreased strength;Decreased endurance; Impaired balance;Decreased mobility; Decreased coordination;Decreased safety awareness; Impaired tone   Goals   Patient Goals go home, do the laundry   STG Expiration Date 11/24/19   Short Term Goal #1 pt will:  Increase bilateral LE strength 1/2 grade to facilitate independent mobility, Perform all bed mobility tasks independently to decrease fall risk factors, Perform all transfers independently to improve independence, Ambulate 400 ft  with least restrictive assistive device independently w/o LOB to facilitate safe return home, Navigate 10 stairs independently with unilateral handrail to facilitate return to previous living environment, Increase ambulatory balance 1 grade to decrease risk for falls, Complete exercise program independently, Tolerate 3 hr OOB to faciliate upright tolerance, Improve [place] Index score to 95 or greater to facilitate independence and Increase 4 Item DGI score to 10/12 or greater to decrease risk for falls   PT Treatment Day 1   Plan   Treatment/Interventions Functional transfer training;LE strengthening/ROM; Elevations; Therapeutic exercise; Endurance training;Patient/family training;Cognitive reorientation;Equipment eval/education;Gait training   Progress Progressing toward goals   PT Frequency 5x/wk   Recommendation   Recommendation Home PT;OT consult   Equipment Recommended Cane     ASSESSMENT: Therapist introduced cane use w/ ambulation to improve gait stability  Pt was noted to have improvement w/ use of cane w/ increased ambulation distance and decreased level of assist needed to maintain safety  Improvement was also seen in 4 Item DGI score  Pt continues to be at risk for falls  continued inpatient PT tx is indicated to reduce fall risk  4 Item Dynamic Gait Index w/ cane  2/3 Gait level surface  2/3 Change in gait speed  1/3 Gait with horizontal head turns  2/3 Gait with vertical head turns  7/12 total score (<10/12 indicates increased risk of fall)    Skilled inpatient PT recommended while in hospital to progress pt toward treatment goals      Mary Ann Johns, PT

## 2019-11-14 NOTE — TELEPHONE ENCOUNTER
DENISE informing patient that she has a F/U appt with CHANTE Peñaloza at the [facility] location on 12/4/19 at 9:00 am  Patient was instructed to call our office if the appt does not work for her schedule

## 2019-11-14 NOTE — ASSESSMENT & PLAN NOTE
· Blood alcohol was 307 on admission  · states she no longer drinks but had alcohol a few days ago     · Patient counselled on alcohol abstinence

## 2019-11-14 NOTE — ASSESSMENT & PLAN NOTE
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

## 2019-11-14 NOTE — ASSESSMENT & PLAN NOTE
· Hemoglobin 8 1, , RDW 16 4  Has ongoing neutropenia with WBC 2 4 and absolute neutrophil count 0 84  · Anemia likely due to liver disease vs bone marrow pathology  · Iron panel : iron 251, ferritin 128, iron saturation 79, TIBC 319  · Folate 16 5  · Vitamin B12 594    Plan  1  Bone marrow biopsy  2   No need of iron therapy at this point

## 2019-11-14 NOTE — ASSESSMENT & PLAN NOTE
· Patient complains of increased anxiety  · She has been taking sertraline 50 mg daily    Plan  1   Continue sertraline 50 mg daily

## 2019-11-14 NOTE — PHYSICAL THERAPY NOTE
PHYSICAL THERAPY EVALUATION NOTE    Patient Name: Nacho Trejo  SOBJV'D Date: 11/14/2019  AGE:   61 y o   Mrn:   [id number]  ADMIT DX:  Alcoholism ([address] ) [F10 20]  Abnormal laboratory test result [R89 9]  Pancytopenia ([address] ) [T86 798]  Alcoholic hepatitis without ascites [C57 39]  Acute alcoholic intoxication without complication ([address] ) [P53 776]    Past Medical History:   Diagnosis Date    Fracture     Hepatitis     Insomnia     10mar2016 resolved    Osteoporosis     14jun2016 resolved    Pancreatitis     Seasonal allergies      Length Of Stay: 2  PHYSICAL THERAPY EVALUATION :   11/14/19 1125   Pain Assessment   Pain Assessment No/denies pain   Home Living   Type of [address] [place] [address] Two level;1/2 bath on main level;Bed/bath upstairs; Able to live on main level with bedroom/bathroom; Other (Comment)  (2 NATALYA)   Additional Comments lives w/ spouse  ambulates w/o device  no DME  independent w/ ADLs  no falls in last 6 months  Prior Function   Comments pt seen supine in bed w/ spouse present at bedside  agreed to PT eval  denied pain or dizziness  pt reports feeling weak and unsteady  pt wants to go home and do the laundry  Restrictions/Precautions   Other Precautions Fall Risk;Bed Alarm  (NPO, anxiety)   General   Additional Pertinent History 11/14/19 at 5:01, platelets were 24 (critical low value)  Family/Caregiver Present Yes   Cognition   Arousal/Participation Alert   Orientation Level Oriented to person;Oriented to place; Other (Comment)  (pt was identified w/ full name, birth date)   Following Commands Follows one step commands without difficulty   RUE Assessment   RUE Assessment WFL  (3+/5)   LUE Assessment   LUE Assessment WFL  (3+/5)   RLE Assessment   RLE Assessment WFL  (4-/5)   LLE Assessment   LLE Assessment WFL  (4-/5)   Coordination   Movements are Fluid and Coordinated 0   Coordination and Movement Description impaired coordination and intermittent tremors UEs  Light Touch   RLE Light Touch Grossly intact   LLE Light Touch Grossly intact   Bed Mobility   Supine to Sit 7  Independent   Additional items Increased time required   Transfers   Sit to Stand 5  Supervision   Additional items Increased time required   Stand to Sit 5  Supervision   Additional items Increased time required   Additional Comments 4 Item DGI w/o device: 6/12  pt declined trial of stair use  pt completed step taps to 6 inch step 3x bilaterally w/ right sided railing and minx1  additional not possible due to fatigue  Ambulation/Elevation   Gait pattern Narrow [place]; Decreased foot clearance; Short stride; Foward flexed   Gait Assistance 5  Supervision   Additional items Verbal cues  (for full step length, posture, breathing technique)   Assistive Device None   Distance 30 feet x2, 120 feet, 100 feet  seated rest breaks x 1 to 2 minutes each  (additional not possible due to fatigue)   Stair Management Assistance   (see additional comments above)   Balance   Static Sitting Good   Dynamic Sitting Fair   Static Standing Fair   Dynamic Standing [address],4, & 5 -   Activity Tolerance   Activity Tolerance Patient limited by fatigue   [facility]    Assessment   Prognosis Fair   Problem List Decreased strength;Decreased endurance; Impaired balance;Decreased mobility; Decreased coordination;Decreased safety awareness; Impaired tone   Assessment Pt was sent here from her primary care physician's office with abnormal labs including a platelet count of 30  Dx: thrombocytopenia, anemia, and nausea/vomiting  order placed for PT eval and tx, w/ activity order of ambulate patient  pt presents w/ comorbidities of osteoporosis, hyperlipidemia, and chronic low back pain and personal factors of living in 2 story house, stair(s) to enter home, anxiety and depression   pt presents w/ weakness, decreased endurance, impaired balance, gait deviations, impaired coordination, impaired tone, decreased safety awareness and fall risk  these impairments are evident in findings from physical examination (weakness, impaired coordination and impaired tone), mobility assessment (need for standby to min assist w/ all phases of mobility when usually mobilizing independently, tolerance to only 120 feet of ambulation and need for cueing for mobility technique), and 4 Item DGI score of 6/12  pt needed input for task focus and mobility technique/safety  pt is at risk for falls due to physical and safety awareness deficits  pt's clinical presentation is unstable/unpredictable (evident in critical low platelet value, need for assist w/ all phases of mobility when usually mobilizing independently, tolerance to only 120 feet of ambulation and need for input for mobility technique/safety)  pt needs inpatient PT tx to improve mobility deficits  discharge recommendation is for home PT to reduce fall risk and maximize level of functional independence  Pt would benefit from OT consult to address safety awareness  pt plans to stay on 1st floor upon discharge from hospital    Goals   Patient Goals go home, do the laundry   STG Expiration Date 11/24/19   Short Term Goal #1 pt will:  Increase bilateral LE strength 1/2 grade to facilitate independent mobility, Perform all bed mobility tasks independently to decrease fall risk factors, Perform all transfers independently to improve independence, Ambulate 400 ft  with least restrictive assistive device independently w/o LOB to facilitate safe return home, Navigate 10 stairs independently with unilateral handrail to facilitate return to previous living environment, Increase ambulatory balance 1 grade to decrease risk for falls, Complete exercise program independently, Tolerate 3 hr OOB to faciliate upright tolerance, Improve Barthel Index score to 95 or greater to facilitate independence and Increase 4 Item DGI score to 10/12 or greater to decrease risk for falls   Plan   Treatment/Interventions Functional transfer training;LE strengthening/ROM; Elevations; Therapeutic exercise; Endurance training;Patient/family training;Cognitive reorientation;Equipment eval/education;Gait training   PT Frequency 5x/wk   Recommendation   Recommendation Home PT;OT consult   Barthel Index   Feeding 10   Bathing 0   Grooming Score 5   Dressing Score 10   Bladder Score 10   Bowels Score 10   Toilet Use Score 10   Transfers (Bed/Chair) Score 10   Mobility (Level Surface) Score 10   Stairs Score 0   Barthel Index Score 75     4 Item Dynamic Gait Index w/o device  2/3 Gait level surface  2/3 Change in gait speed  1/3 Gait with horizontal head turns  1/3 Gait with vertical head turns  6/12 total score (<10/12 indicates increased risk of fall)    Skilled PT recommended while in hospital and upon DC to progress pt toward treatment goals       Mick Aquino PT

## 2019-11-14 NOTE — ASSESSMENT & PLAN NOTE
· Platelets 24, improved from 20 yesterday  · Likely due to the history of alcohol abuse and liver disease evidenced by macrocytosis in the setting of absent vit B12 or folate deficiency  However, per hematology, primary bone marrow pathology cannot be excluded, and recommends BM biopsy  · CT head on admission shows no bleeding  · Patient refuses blood products as she is a Hinduism  · Patient voices understanding about the risks of not receiving platelet transfusions, however refuses blood products even at life-threatening situations  · Denies hematemesis, melena  However she does have bleeding gums with brushing her teeth and spontaneous bruising which has been chronic issue  · Patient states that she can take "Promacta" and "Nplate"    Plan  1  Awaiting biopsy at 3 30pm, gen path form with the patient, to be sent down to the procedure room with the patient  2   Monitor platelet count

## 2019-11-14 NOTE — PLAN OF CARE
Problem: Potential for Falls  Goal: Patient will remain free of falls  Description  INTERVENTIONS:  - Assess patient frequently for physical needs  -  Identify cognitive and physical deficits and behaviors that affect risk of falls    -  [place] fall precautions as indicated by assessment   - Educate patient/family on patient safety including physical limitations  - Instruct patient to call for assistance with activity based on assessment  - Modify environment to reduce risk of injury  - Consider OT/PT consult to assist with strengthening/mobility  Outcome: Progressing     Problem: PAIN - ADULT  Goal: Verbalizes/displays adequate comfort level or baseline comfort level  Description  Interventions:  - Encourage patient to monitor pain and request assistance  - Assess pain using appropriate pain scale  - Administer analgesics based on type and severity of pain and evaluate response  - Implement non-pharmacological measures as appropriate and evaluate response  - Consider cultural and social influences on pain and pain management  - Notify physician/advanced practitioner if interventions unsuccessful or patient reports new pain  Outcome: Progressing     Problem: INFECTION - ADULT  Goal: Absence or prevention of progression during hospitalization  Description  INTERVENTIONS:  - Assess and monitor for signs and symptoms of infection  - Monitor lab/diagnostic results  - Monitor all insertion sites, i e  indwelling lines, tubes, and drains  - Monitor endotracheal if appropriate and nasal secretions for changes in amount and color  - [place] appropriate cooling/warming therapies per order  - Administer medications as ordered  - Instruct and encourage patient and family to use good hand hygiene technique  - Identify and instruct in appropriate isolation precautions for identified infection/condition  Outcome: Progressing  Goal: Absence of fever/infection during neutropenic period  Description  INTERVENTIONS:  - Monitor WBC    Outcome: Progressing     Problem: SAFETY ADULT  Goal: Maintain or return to baseline ADL function  Description  INTERVENTIONS:  -  Assess patient's ability to carry out ADLs; assess patient's baseline for ADL function and identify physical deficits which impact ability to perform ADLs (bathing, care of mouth/teeth, toileting, grooming, dressing, etc )  - Assess/evaluate cause of self-care deficits   - Assess range of motion  - Assess patient's mobility; develop plan if impaired  - Assess patient's need for assistive devices and provide as appropriate  - Encourage maximum independence but intervene and supervise when necessary  - Involve family in performance of ADLs  - Assess for home care needs following discharge   - Consider OT consult to assist with ADL evaluation and planning for discharge  - Provide patient education as appropriate  Outcome: Progressing  Goal: Maintain or return mobility status to optimal level  Description  INTERVENTIONS:  - Assess patient's baseline mobility status (ambulation, transfers, stairs, etc )    - Identify cognitive and physical deficits and behaviors that affect mobility  - Identify mobility aids required to assist with transfers and/or ambulation (gait belt, sit-to-stand, lift, walker, cane, etc )  - [place] fall precautions as indicated by assessment  - Record patient progress and toleration of activity level on Mobility SBAR; progress patient to next Phase/Stage  - Instruct patient to call for assistance with activity based on assessment  - Consider rehabilitation consult to assist with strengthening/weightbearing, etc   Outcome: Progressing     Problem: DISCHARGE PLANNING  Goal: Discharge to home or other facility with appropriate resources  Description  INTERVENTIONS:  - Identify barriers to discharge w/patient and caregiver  - Arrange for needed discharge resources and transportation as appropriate  - Identify discharge learning needs (meds, wound care, etc )  - Arrange for interpretive services to assist at discharge as needed  - Refer to Case Management Department for coordinating discharge planning if the patient needs post-hospital services based on physician/advanced practitioner order or complex needs related to functional status, cognitive ability, or social support system  Outcome: Progressing     Problem: Knowledge Deficit  Goal: Patient/family/caregiver demonstrates understanding of disease process, treatment plan, medications, and discharge instructions  Description  Complete learning assessment and assess knowledge base    Interventions:  - Provide teaching at level of understanding  - Provide teaching via preferred learning methods  Outcome: Progressing     Problem: HEMATOLOGIC - ADULT  Goal: Maintains hematologic stability  Description  INTERVENTIONS  - Assess for signs and symptoms of bleeding or hemorrhage  - Monitor labs  - Administer supportive blood products/factors as ordered and appropriate  Outcome: Progressing

## 2019-11-14 NOTE — PROGRESS NOTES
Progress Note -  Hematology/Oncology   Leslie Dunbar 61 y o  female MRN: [id number]  Unit/Bed#: S -01 Encounter: [id number]    Attending Hospital Physician: Makayla Kelley MD  Date of Initial Hematology/Oncology Hospital Consultation: Thrombocytopenia  Hematology/Oncology Supervising Physician: ARIELLA Ramirez       HPI: Leslie Dunbar is a 61y o  year old female with a history of liver disease , hepatitis, alcohol abuse, thrombocytopenia who presented  11/12/2019 from her prior an care physician's office following a outpatient abnormal laboratory finding of a platelet count of 46598  On presentation, CBC showed hemoglobin 8 1, WBC 2 4/ANC 0 8, platelets 86727  T an INR were normal   AST was elevated to 232, a LT 80, alkaline phosphatase 181  CT abdomen pelvis showed hepato megaly and chronic pancreatitis but no other evidence of abnormality  Renal function was satisfactory at 0 5  Patient was seen in 2016 in Hematology with a CBC showing hemoglobin 12 6/MCV 96 8, WBC 4 3/ANC 3 0,platelets 252806  In July of 2017 hemoglobin 13 0, WBC 3 8, platelets 538 1142  Do not have any other interval laboratory studies to compare      Patient states she is a Jehovah Witness and is refusing blood products  He reports a 30 year history of alcohol abuse, she states she has quit drinking over the past 2 years time, drinks 1 beer weekly  On admission, her alcohol level was 307  She reports a history of hepatitis approximately 15-20 years ago, she does not know if she was treated or what type of hepatitis only that she acquired this from shellfish      Assessment/Plan:   #1 Pancytopenia/thrombocytopenia  -patient's admission platelet count was 04,732  Prior to that in July of 2017 platelet count was 693,794  Do not know the interval trend at this time as there are no records and the patient states she has not seen a physician    Hemoglobin is also low 8 1/ and WBC 2 4/ANC 0 8   -AST is elevated to 232, ALT 80  Coags are normal   I suspect pancytopenia/ thrombocytopenia is largely from liver disease as this is re-demonstrated on CT imaging of the abdomen and pelvis this admission with hepatomegaly  Also remarkable on that imaging study was a chronic pancreatitis  Given the patient's alcohol history and elevated alcohol level on admission I do suspect the etiology is likely due to alcoholic hepatic dysfunction  Mini Betancur, the patient's or slightly improved today and not trending downward  Platelet count is 78761  We will continue to monitor closely  She underwent a bone marrow biopsy earlier today and we will see her in 2 weeks time in the outpatient oncology/hematology clinic to review those results  If there is any abnormality on bone marrow we will discuss appropriateness of treatments at that time  Otherwise, I will monitor her CBC after discharge recommend twice weekly until she is seen in the Hematology office  She is not able to receive blood products due to her Jehovah's witness and therefore her platelet count is very low and she is at high risk for bleeding  We do not recommend Corticosteroids or Promacta at this time      We will continue to follow this admission  Please contact us if you have any questions  Subjective: Today, the patient denies any spontaneous bleeding including gingival bleed, epistaxis, hematuria, blood in the stool  She does feel fatigued but otherwise does not have any complaints today  Review of Systems   All other systems reviewed and are negative  The remainder of a 12 point review of systems was negative  Objective:  /60 (BP Location: Right arm)   Pulse 70   Temp 98 6 °F (37 °C) (Oral)   Resp 18   Ht 5' 7" (1 702 m)   Wt 58 4 kg (128 lb 11 2 oz)   SpO2 94%   BMI 20 16 kg/m²     Physical Exam   Constitutional: She is oriented to person, place, and time  No distress  HENT:   Head: Normocephalic and atraumatic     Eyes: Conjunctivae and EOM are normal  No scleral icterus  Neck: Normal range of motion  Cardiovascular: Normal rate and regular rhythm  Pulmonary/Chest: Effort normal  No respiratory distress  Abdominal: There is no tenderness  Musculoskeletal: She exhibits no edema or tenderness  Lymphadenopathy:     She has no cervical adenopathy  Neurological: She is alert and oriented to person, place, and time  Skin: Skin is warm and dry  No rash noted  Vitals reviewed  Recent Labs     11/12/19  0946 11/13/19  0445 11/14/19  0501   WBC 2 39* 2 40* 2 62*   HGB 9 0* 8 1* 9 0*   PLT 26* 20* 24*   * 100* 101*   RDW 16 3* 16 4* 15 9*   CREATININE 0 55* 0 59* 0 49*   * 181* 134*   ALT 80* 70 59       Laboratory studies were reviewed      Code Status: Level 1 - Full Code    Counseling / Coordination of Care  Total floor / unit time spent today 20 minutes  Greater than 50% of total time was spent with the patient and / or family counseling and / or coordination of care

## 2019-11-14 NOTE — UTILIZATION REVIEW
Continued Stay Review    Date: 11/14/2019                       Current Patient Class: inpatient   Current Level of Care: med surg    HPI:59 y o  female initially admitted on 11/12/2019 inpatient due to  Alcohol abuse/liver disease/thrombocytopenia  Patient is a Jehovah witness and refusing any blood products  Per hematology -  Her pancytopenia could be due to the liver disease  However, primary bone marrow disease cannot be entirely excluded  I recommended bone marrow biopsy by interventional radiologist which she is agreeable    Assessment/Plan:  Patient with thrombocytopenia, platelets 24, improved from 20 of yesterday  Patient aware transfusion of platelets is recommended and continues to refuse  No obvious bleeding  For biopsy at 1530  Has ongoing neutropenia with WBC of 2 61 and absolute neutrophil count of 0 97  Amoxicillin has been dc for suspected UTI due to no findings of infection       Pertinent Labs/Diagnostic Results:   Results from last 7 days   Lab Units 11/14/19  0501 11/13/19 0445 11/12/19  0946 11/11/19  1058   WBC Thousand/uL 2 62* 2 40* 2 39* 3 26*   HEMOGLOBIN g/dL 9 0* 8 1* 9 0* 10 3*   HEMATOCRIT % 28 5* 25 8* 27 8* 32 9*   PLATELETS Thousands/uL 24* 20* 26* 30*   NEUTROS ABS Thousands/µL 0 97* 0 84* 0 82* 0 77*     Results from last 7 days   Lab Units 11/14/19  0501 11/13/19 0445 11/12/19  0946   SODIUM mmol/L 138 137 142   POTASSIUM mmol/L 3 9 3 8 4 0   CHLORIDE mmol/L 104 103 107   CO2 mmol/L 23 25 26   ANION GAP mmol/L 11 9 9   BUN mg/dL 5 7 9   CREATININE mg/dL 0 49* 0 59* 0 55*   EGFR ml/min/1 73sq m 107 101 103   CALCIUM mg/dL 8 4 8 4 7 9*     Results from last 7 days   Lab Units 11/14/19  0501 11/13/19  0445 11/12/19  0946   AST U/L 134* 181* 232*   ALT U/L 59 70 80*   ALK PHOS U/L 170* 165* 181*   TOTAL PROTEIN g/dL 7 0 6 3* 6 9   ALBUMIN g/dL 2 9* 2 8* 3 1*   TOTAL BILIRUBIN mg/dL 1 10* 0 80 0 50   BILIRUBIN DIRECT mg/dL  --   --  0 27*     Results from last 7 days Lab Units 11/14/19  0501 11/13/19  0445 11/12/19  0946   GLUCOSE RANDOM mg/dL 99 82 108     Results from last 7 days   Lab Units 11/12/19  0946   PROTIME seconds 13 7   INR  1 11   PTT seconds 31     Results from last 7 days   Lab Units 11/13/19  1134   FERRITIN ng/mL 128     Results from last 7 days   Lab Units 11/12/19  1749   CLARITY UA  Clear   COLOR UA  Yellow   SPEC GRAV UA  <=1 005   PH UA  6 5   GLUCOSE UA mg/dl Negative   KETONES UA mg/dl Negative   BLOOD UA  Negative   PROTEIN UA mg/dl Negative   NITRITE UA  Negative   BILIRUBIN UA  Negative   UROBILINOGEN UA E U /dl 0 2   LEUKOCYTES UA  Trace*   WBC UA /hpf 0-5   RBC UA /hpf 0-5   BACTERIA UA /hpf Innumerable*   EPITHELIAL CELLS WET PREP /hpf Innumerable*     Results from last 7 days   Lab Units 11/12/19  0946   ETHANOL LVL mg/dL 307*     Results from last 7 days   Lab Units 11/12/19  1606 11/12/19  1605   BLOOD CULTURE  No Growth at 24 hrs  No Growth at 24 hrs  Vital Signs:  11/14/19 0700  98 6 °F (37 °C)  76  18  132/67    96 %  None (Room air)  Lying   11/13/19 2157  98 6 °F (37 °C)  82  18  144/67    95 %  None (Room air)       Medications:   Scheduled Medications:  Medications:  pantoprazole 40 mg Oral Early Morning   sertraline 50 mg Oral Daily   LORazepam (ATIVAN) 2 mg/mL injection 0 5 mg   Dose: 0 5 mg  Freq: Once Route: IV  Start: 11/14/19 1130 End: 11/14/19 1157    Continuous IV Infusions:none     PRN Meds:not used  ondansetron 4 mg Intravenous Q6H PRN       Discharge Plan: PT recommends home PT    Network Utilization Review Department  [email or web address]  ATTENTION: Please call with any questions or concerns to [phone] and carefully listen to the prompts so that you are directed to the right person   All voicemails are confidential   Brigid Wolfe all requests for admission clinical reviews, approved or denied determinations and any other requests to dedicated fax number below belonging to the campus where the patient is receiving treatment    FACILITY NAME UR FAX NUMBER   ADMISSION DENIALS (Administrative/Medical Necessity) [address] [facility] (Maternity/NICU/Pediatrics) [phone]   [facility] [address] [facility] [address] [facility] [phone]   [address] [facility] [address] [facility] [phone]   [address] [place] [address] [phone]

## 2019-11-14 NOTE — PROGRESS NOTES
Progress Note - Nadege Guerrero 1960, 61 y o  female MRN: [id number]    Unit/Bed#: S -01 Encounter: [id number]    Primary Care Provider: Deward Hodgkins, MD   Date and time admitted to hospital: 11/12/2019  9:09 AM        * Thrombocytopenia (HCC)  Assessment & Plan  · Platelets 24, improved from 20 yesterday  · Likely due to the history of alcohol abuse and liver disease evidenced by macrocytosis in the setting of absent vit B12 or folate deficiency  However, per hematology, primary bone marrow pathology cannot be excluded, and recommends BM biopsy  · CT head on admission shows no bleeding  · Patient refuses blood products as she is a [employer]  · Patient voices understanding about the risks of not receiving platelet transfusions, however refuses blood products even at life-threatening situations  · Denies hematemesis, melena  However she does have bleeding gums with brushing her teeth and spontaneous bruising which has been chronic issue  · Patient states that she can take "Promacta" and "Nplate"    Plan  1  Awaiting biopsy at 3 30pm, gen path form with the patient, to be sent down to the procedure room with the patient  2  Monitor platelet count      Anemia  Assessment & Plan  · Hemoglobin 8 1, , RDW 16 4  Has ongoing neutropenia with WBC 2 4 and absolute neutrophil count 0 84  · Anemia likely due to liver disease vs bone marrow pathology  · Iron panel : iron 251, ferritin 128, iron saturation 79, TIBC 319  · Folate 16 5  · Vitamin B12 594    Plan  3  Bone marrow biopsy  4   No need of iron therapy at this point    Refusal of blood transfusions as patient is [place]  · Patient is a [place] witness  · Refusing to sign consent even in the event of life-threatening bleed  · Patient voiced understanding of the risks  · OK to take Promacta and Nplate    Anxiety  Assessment & Plan  · Patient complains of increased anxiety  · She has been taking sertraline 50 mg daily    Plan  5  Continue sertraline 50 mg daily    Vomiting  Assessment & Plan  · Denies headache, visual disturbances  · Denies blood in vomitus    Plan  6  IV Zofran 4 mg p r n  Liver disease  Assessment & Plan  · Likely due to chronic alcohol abuse  · The patient states she no longer drinks  States she has not had a drink in several years  Blood alcohol level on admission was 307    Plan  2  Continue management as above        Alcohol abuse  Assessment & Plan  · Blood alcohol was 307 on admission  · states she no longer drinks but had alcohol a few days ago  · Patient counselled on alcohol abstinence      Urine discoloration  Assessment & Plan  · Patient complained of dark colored urine with suprapubic pain and tenderness  · Urinalysis does not show hematuria, or signs of infection, likely contaminated  · Currently symptoms resolved  · Started on amoxicillin 900 mg BID    Plan  3  Discontinue amoxicillin      VTE Pharmacologic Prophylaxis:   Pharmacologic: Patient at increased bleeding risk due to thrombocytopenia  Mechanical VTE Prophylaxis in Place: No    Discussions with Specialists or Other Care Team Provider:  Interventional radiology, nursing, case management    Education and Discussions with Family / Patient:  Patient and her     Current Length of Stay: 2 day(s)    Current Patient Status: Inpatient     Discharge Plan / Estimated Discharge Date: To be determined    Code Status: Level 1 - Full Code      Subjective:   Patient states that she feels much better than she did yesterday, no longer feels nauseated  Her anxiety is under control  Denies bleeding from nose, rectal, vaginal   Denies hematemesis, melena  Has a good appetite with good oral intake of food and fluids      Objective:     Vitals:   Temp (24hrs), Av 7 °F (37 1 °C), Min:98 6 °F (37 °C), Max:98 8 °F (37 1 °C)    Temp:  [98 6 °F (37 °C)-98 8 °F (37 1 °C)] 98 6 °F (37 °C)  HR:  [76-83] 76  Resp:  [18] 18  BP: (132-144)/(61-67) 132/67  SpO2:  [95 %-96 %] 96 %  Body mass index is 20 16 kg/m²  Input and Output Summary (last 24 hours): Intake/Output Summary (Last 24 hours) at 11/14/2019 1239  Last data filed at 11/14/2019 3881  Gross per 24 hour   Intake 240 ml   Output 2225 ml   Net -1985 ml       Physical Exam:     Physical Exam   Constitutional: She is oriented to person, place, and time  She appears well-developed and well-nourished  No distress  HENT:   Head: Normocephalic and atraumatic  Nose: Nose normal    Mouth/Throat: Oropharynx is clear and moist    Eyes: Pupils are equal, round, and reactive to light  EOM are normal    Neck: Normal range of motion  Neck supple  No JVD present  Cardiovascular: Normal rate, regular rhythm, normal heart sounds and intact distal pulses  No murmur heard  Pulmonary/Chest: Effort normal and breath sounds normal    Abdominal: Soft  Bowel sounds are normal  There is no tenderness  Musculoskeletal: Normal range of motion  She exhibits no edema or tenderness  Neurological: She is alert and oriented to person, place, and time  She exhibits normal muscle tone  Skin: Skin is warm  Capillary refill takes less than 2 seconds  She is not diaphoretic  There is pallor  There is old and new bruising on her skin   Nursing note and vitals reviewed  Additional Data:     Labs:    Results from last 7 days   Lab Units 11/14/19  0501   WBC Thousand/uL 2 62*   HEMOGLOBIN g/dL 9 0*   HEMATOCRIT % 28 5*   PLATELETS Thousands/uL 24*   NEUTROS PCT % 37*   LYMPHS PCT % 47*   MONOS PCT % 12   EOS PCT % 3     Results from last 7 days   Lab Units 11/14/19  0501   POTASSIUM mmol/L 3 9   CHLORIDE mmol/L 104   CO2 mmol/L 23   BUN mg/dL 5   CREATININE mg/dL 0 49*   CALCIUM mg/dL 8 4   ALK PHOS U/L 170*   ALT U/L 59   AST U/L 134*     Results from last 7 days   Lab Units 11/12/19  0946   INR  1 11       * I Have Reviewed All Lab Data Listed Above    * Additional Pertinent Lab Tests Reviewed: [facility] [facility] Admission Reviewed    Imaging:    Imaging Reports Reviewed Today Include:  None  Imaging Personally Reviewed by Myself Includes:  None    Recent Cultures (last 7 days):     Results from last 7 days   Lab Units 11/12/19  1606 11/12/19  1605   BLOOD CULTURE  No Growth at 24 hrs  No Growth at 24 hrs  Last 24 Hours Medication List:     Current Facility-Administered Medications:  amoxicillin 900 mg Oral BID Ja Pulido MD   ondansetron 4 mg Intravenous Q6H PRN Zuri Clement MD   pantoprazole 40 mg Oral Early Morning Kenyon Talley MD   sertraline 50 mg Oral Daily Ja Pulido MD        Today, Patient Was Seen By: Zuri Clement MD    ** Please Note: This note has been constructed using a voice recognition system   **

## 2019-11-14 NOTE — PLAN OF CARE
Problem: PHYSICAL THERAPY ADULT  Goal: Performs mobility at highest level of function for planned discharge setting  See evaluation for individualized goals  Description  Treatment/Interventions: Functional transfer training, LE strengthening/ROM, Elevations, Therapeutic exercise, Endurance training, Patient/family training, Cognitive reorientation, Equipment eval/education, Gait training          See flowsheet documentation for full assessment, interventions and recommendations  Outcome: Progressing  Note:   Prognosis: Fair  Problem List: Decreased strength, Decreased endurance, Impaired balance, Decreased mobility, Decreased coordination, Decreased safety awareness, Impaired tone  Assessment: Pt was sent here from her primary care physician's office with abnormal labs including a platelet count of 30  Dx: thrombocytopenia, anemia, and nausea/vomiting  order placed for PT eval and tx, w/ activity order of ambulate patient  pt presents w/ comorbidities of osteoporosis, hyperlipidemia, and chronic low back pain and personal factors of living in 2 story house, stair(s) to enter home, anxiety and depression  pt presents w/ weakness, decreased endurance, impaired balance, gait deviations, impaired coordination, impaired tone, decreased safety awareness and fall risk  these impairments are evident in findings from physical examination (weakness, impaired coordination and impaired tone), mobility assessment (need for standby to min assist w/ all phases of mobility when usually mobilizing independently, tolerance to only 120 feet of ambulation and need for cueing for mobility technique), and 4 Item DGI score of 6/12  pt needed input for task focus and mobility technique/safety  pt is at risk for falls due to physical and safety awareness deficits   pt's clinical presentation is unstable/unpredictable (evident in critical low platelet value, need for assist w/ all phases of mobility when usually mobilizing independently, tolerance to only 120 feet of ambulation and need for input for mobility technique/safety)  pt needs inpatient PT tx to improve mobility deficits  discharge recommendation is for home PT to reduce fall risk and maximize level of functional independence  Pt would benefit from OT consult to address safety awareness  pt plans to stay on 1st floor upon discharge from hospital         Recommendation: Home PT, OT consult          See flowsheet documentation for full assessment

## 2019-11-14 NOTE — BRIEF OP NOTE (RAD/CATH)
Bone Marrow Biopsy Procedure Note    PATIENT NAME: Venessa Loya  : 1960  MRN: [id number]     Pre-op Diagnosis:   1  Alcoholism ([facility] [address] )    2  Acute alcoholic intoxication without complication (HCC)    3  Pancytopenia ([facility] [address] )    4  Alcoholic hepatitis without ascites    5  Headache      Post-op Diagnosis:   1  Alcoholism ([facility] [address] )    2  Acute alcoholic intoxication without complication (HCC)    3  Pancytopenia ([facility] [address] )    4  Alcoholic hepatitis without ascites    5  Headache        Surgeon:   Cathleen Simeon DO  Assistants:     No qualified resident was available  Estimated Blood Loss:  None  Findings:  Right posterior iliac crest targeted  Specimens:  Bone marrow aspirate and core bone  Complications:  None      Anesthesia: Conscious sedation and Local    Cathleen Simeon DO     Date: 2019  Time: 3:17 PM

## 2019-11-14 NOTE — PLAN OF CARE
Problem: PHYSICAL THERAPY ADULT  Goal: Performs mobility at highest level of function for planned discharge setting  See evaluation for individualized goals  Description  Treatment/Interventions: Functional transfer training, LE strengthening/ROM, Elevations, Therapeutic exercise, Endurance training, Patient/family training, Cognitive reorientation, Equipment eval/education, Gait training          See flowsheet documentation for full assessment, interventions and recommendations  11/14/2019 1259 by Alexia Lomeli PT  Outcome: Progressing  Note:   Prognosis: Fair  Problem List: Decreased strength, Decreased endurance, Impaired balance, Decreased mobility, Decreased coordination, Decreased safety awareness, Impaired tone  Assessment: Pt was sent here from her primary care physician's office with abnormal labs including a platelet count of 30  Dx: thrombocytopenia, anemia, and nausea/vomiting  order placed for PT eval and tx, w/ activity order of ambulate patient  pt presents w/ comorbidities of osteoporosis, hyperlipidemia, and chronic low back pain and personal factors of living in 2 story house, stair(s) to enter home, anxiety and depression  pt presents w/ weakness, decreased endurance, impaired balance, gait deviations, impaired coordination, impaired tone, decreased safety awareness and fall risk  these impairments are evident in findings from physical examination (weakness, impaired coordination and impaired tone), mobility assessment (need for standby to min assist w/ all phases of mobility when usually mobilizing independently, tolerance to only 120 feet of ambulation and need for cueing for mobility technique), and 4 Item DGI score of 6/12  pt needed input for task focus and mobility technique/safety  pt is at risk for falls due to physical and safety awareness deficits   pt's clinical presentation is unstable/unpredictable (evident in critical low platelet value, need for assist w/ all phases of mobility when usually mobilizing independently, tolerance to only 120 feet of ambulation and need for input for mobility technique/safety)  pt needs inpatient PT tx to improve mobility deficits  discharge recommendation is for home PT to reduce fall risk and maximize level of functional independence  Pt would benefit from OT consult to address safety awareness  pt plans to stay on 1st floor upon discharge from hospital         Recommendation: Home PT, OT consult          See flowsheet documentation for full assessment  11/14/2019 1255 by Conchita Thurman PT  Outcome: Progressing  Note:   Prognosis: Fair  Problem List: Decreased strength, Decreased endurance, Impaired balance, Decreased mobility, Decreased coordination, Decreased safety awareness, Impaired tone  Assessment: Pt was sent here from her primary care physician's office with abnormal labs including a platelet count of 30  Dx: thrombocytopenia, anemia, and nausea/vomiting  order placed for PT eval and tx, w/ activity order of ambulate patient  pt presents w/ comorbidities of osteoporosis, hyperlipidemia, and chronic low back pain and personal factors of living in 2 story house, stair(s) to enter home, anxiety and depression  pt presents w/ weakness, decreased endurance, impaired balance, gait deviations, impaired coordination, impaired tone, decreased safety awareness and fall risk  these impairments are evident in findings from physical examination (weakness, impaired coordination and impaired tone), mobility assessment (need for standby to min assist w/ all phases of mobility when usually mobilizing independently, tolerance to only 120 feet of ambulation and need for cueing for mobility technique), and 4 Item DGI score of 6/12  pt needed input for task focus and mobility technique/safety  pt is at risk for falls due to physical and safety awareness deficits   pt's clinical presentation is unstable/unpredictable (evident in critical low platelet value, need for assist w/ all phases of mobility when usually mobilizing independently, tolerance to only 120 feet of ambulation and need for input for mobility technique/safety)  pt needs inpatient PT tx to improve mobility deficits  discharge recommendation is for home PT to reduce fall risk and maximize level of functional independence  Pt would benefit from OT consult to address safety awareness  pt plans to stay on 1st floor upon discharge from hospital         Recommendation: Home PT, OT consult          See flowsheet documentation for full assessment

## 2019-11-15 VITALS
SYSTOLIC BLOOD PRESSURE: 130 MMHG | HEIGHT: 67 IN | BODY MASS INDEX: 20.47 KG/M2 | OXYGEN SATURATION: 95 % | DIASTOLIC BLOOD PRESSURE: 68 MMHG | RESPIRATION RATE: 16 BRPM | TEMPERATURE: 99.2 F | WEIGHT: 130.4 LBS | HEART RATE: 77 BPM

## 2019-11-15 PROBLEM — R11.10 VOMITING: Status: RESOLVED | Noted: 2019-11-13 | Resolved: 2019-11-15

## 2019-11-15 PROBLEM — R39.89 URINE DISCOLORATION: Status: RESOLVED | Noted: 2019-11-11 | Resolved: 2019-11-15

## 2019-11-15 LAB
ERYTHROCYTE [DISTWIDTH] IN BLOOD BY AUTOMATED COUNT: 15.9 % (ref 11.6–15.1)
HCT VFR BLD AUTO: 27.5 % (ref 34.8–46.1)
HGB BLD-MCNC: 8.6 G/DL (ref 11.5–15.4)
MCH RBC QN AUTO: 32.1 PG (ref 26.8–34.3)
MCHC RBC AUTO-ENTMCNC: 31.3 G/DL (ref 31.4–37.4)
MCV RBC AUTO: 103 FL (ref 82–98)
METHYLMALONATE SERPL-SCNC: 105 NMOL/L (ref 0–378)
PLATELET # BLD AUTO: 29 THOUSANDS/UL (ref 149–390)
PMV BLD AUTO: 11.8 FL (ref 8.9–12.7)
RBC # BLD AUTO: 2.68 MILLION/UL (ref 3.81–5.12)
SL AMB DISCLAIMER: NORMAL
WBC # BLD AUTO: 3.61 THOUSAND/UL (ref 4.31–10.16)

## 2019-11-15 PROCEDURE — 99285 EMERGENCY DEPT VISIT HI MDM: CPT | Performed by: EMERGENCY MEDICINE

## 2019-11-15 PROCEDURE — 99232 SBSQ HOSP IP/OBS MODERATE 35: CPT | Performed by: PHYSICIAN ASSISTANT

## 2019-11-15 PROCEDURE — G8987 SELF CARE CURRENT STATUS: HCPCS

## 2019-11-15 PROCEDURE — 97166 OT EVAL MOD COMPLEX 45 MIN: CPT

## 2019-11-15 PROCEDURE — G8988 SELF CARE GOAL STATUS: HCPCS

## 2019-11-15 PROCEDURE — 99239 HOSP IP/OBS DSCHRG MGMT >30: CPT | Performed by: INTERNAL MEDICINE

## 2019-11-15 PROCEDURE — 85027 COMPLETE CBC AUTOMATED: CPT | Performed by: INTERNAL MEDICINE

## 2019-11-15 PROCEDURE — G8989 SELF CARE D/C STATUS: HCPCS

## 2019-11-15 RX ADMIN — PANTOPRAZOLE SODIUM 40 MG: 40 TABLET, DELAYED RELEASE ORAL at 05:24

## 2019-11-15 RX ADMIN — SERTRALINE HYDROCHLORIDE 50 MG: 50 TABLET ORAL at 08:19

## 2019-11-15 NOTE — PLAN OF CARE
Problem: Potential for Falls  Goal: Patient will remain free of falls  Description  INTERVENTIONS:  - Assess patient frequently for physical needs  -  Identify cognitive and physical deficits and behaviors that affect risk of falls    -  [place] fall precautions as indicated by assessment   - Educate patient/family on patient safety including physical limitations  - Instruct patient to call for assistance with activity based on assessment  - Modify environment to reduce risk of injury  - Consider OT/PT consult to assist with strengthening/mobility  Outcome: Progressing     Problem: PAIN - ADULT  Goal: Verbalizes/displays adequate comfort level or baseline comfort level  Description  Interventions:  - Encourage patient to monitor pain and request assistance  - Assess pain using appropriate pain scale  - Administer analgesics based on type and severity of pain and evaluate response  - Implement non-pharmacological measures as appropriate and evaluate response  - Consider cultural and social influences on pain and pain management  - Notify physician/advanced practitioner if interventions unsuccessful or patient reports new pain  Outcome: Progressing     Problem: INFECTION - ADULT  Goal: Absence or prevention of progression during hospitalization  Description  INTERVENTIONS:  - Assess and monitor for signs and symptoms of infection  - Monitor lab/diagnostic results  - Monitor all insertion sites, i e  indwelling lines, tubes, and drains  - Monitor endotracheal if appropriate and nasal secretions for changes in amount and color  - [place] appropriate cooling/warming therapies per order  - Administer medications as ordered  - Instruct and encourage patient and family to use good hand hygiene technique  - Identify and instruct in appropriate isolation precautions for identified infection/condition  Outcome: Progressing  Goal: Absence of fever/infection during neutropenic period  Description  INTERVENTIONS:  - Monitor WBC    Outcome: Progressing     Problem: SAFETY ADULT  Goal: Maintain or return to baseline ADL function  Description  INTERVENTIONS:  -  Assess patient's ability to carry out ADLs; assess patient's baseline for ADL function and identify physical deficits which impact ability to perform ADLs (bathing, care of mouth/teeth, toileting, grooming, dressing, etc )  - Assess/evaluate cause of self-care deficits   - Assess range of motion  - Assess patient's mobility; develop plan if impaired  - Assess patient's need for assistive devices and provide as appropriate  - Encourage maximum independence but intervene and supervise when necessary  - Involve family in performance of ADLs  - Assess for home care needs following discharge   - Consider OT consult to assist with ADL evaluation and planning for discharge  - Provide patient education as appropriate  Outcome: Progressing  Goal: Maintain or return mobility status to optimal level  Description  INTERVENTIONS:  - Assess patient's baseline mobility status (ambulation, transfers, stairs, etc )    - Identify cognitive and physical deficits and behaviors that affect mobility  - Identify mobility aids required to assist with transfers and/or ambulation (gait belt, sit-to-stand, lift, walker, cane, etc )  - [place] fall precautions as indicated by assessment  - Record patient progress and toleration of activity level on Mobility SBAR; progress patient to next Phase/Stage  - Instruct patient to call for assistance with activity based on assessment  - Consider rehabilitation consult to assist with strengthening/weightbearing, etc   Outcome: Progressing     Problem: DISCHARGE PLANNING  Goal: Discharge to home or other facility with appropriate resources  Description  INTERVENTIONS:  - Identify barriers to discharge w/patient and caregiver  - Arrange for needed discharge resources and transportation as appropriate  - Identify discharge learning needs (meds, wound care, etc )  - Arrange for interpretive services to assist at discharge as needed  - Refer to Case Management Department for coordinating discharge planning if the patient needs post-hospital services based on physician/advanced practitioner order or complex needs related to functional status, cognitive ability, or social support system  Outcome: Progressing     Problem: Knowledge Deficit  Goal: Patient/family/caregiver demonstrates understanding of disease process, treatment plan, medications, and discharge instructions  Description  Complete learning assessment and assess knowledge base    Interventions:  - Provide teaching at level of understanding  - Provide teaching via preferred learning methods  Outcome: Progressing     Problem: HEMATOLOGIC - ADULT  Goal: Maintains hematologic stability  Description  INTERVENTIONS  - Assess for signs and symptoms of bleeding or hemorrhage  - Monitor labs  - Administer supportive blood products/factors as ordered and appropriate  Outcome: Progressing

## 2019-11-15 NOTE — ASSESSMENT & PLAN NOTE
· Likely due to chronic alcohol abuse  · The patient states she no longer drinks  States she has not had a drink in several years  Blood alcohol level on admission was 307    Plan  1   See above

## 2019-11-15 NOTE — ASSESSMENT & PLAN NOTE
· Patient complains of increased anxiety, has been requiring iv lorazepam   · She has been taking sertraline 50 mg daily    Plan  1   Continue sertraline 50 mg daily

## 2019-11-15 NOTE — DISCHARGE INSTRUCTIONS
Internal medicine discharge instructions  · It is very important that you abstain from consuming alcohol  · Repeat labs (CBC) twice weekly until you follow up with Hematology  · Follow-up with outpatient Hematology in 2 weeks (you have an appointment scheduled for 12/04/2019), you bone marrow biopsy results will be discussed in this visit if available  · Physical therapy and occupational therapy have recommended home PT, our  have set up [facility] service    Bone Marrow Biopsy     WHAT YOU NEED TO KNOW:   A bone marrow biopsy is a procedure to remove a small amount of bone marrow from your bone  Bone marrow is the soft tissue inside your bone that helps to make blood cells  The sample is tested for disease or infection  DISCHARGE INSTRUCTIONS:     1  Limit your activities day of biopsy as directed by your doctor  2  Use medication as ordered  3  Return to your normal diet  Small sips of flat soda will help with nausea  4  Remove band-aid or dressing 24 hours after procedure  Contact Interventional Radiology at [phone] Tony PATIENTS: Contact Interventional Radiology at [phone]) Yordy Funk PATIENTS: Contact Interventional Radiology at [phone]) if:    1  Difficulty breathing, nausea or vomiting  2  Chills or fever above 101 F     3  Pain at biopsy site not relieved by medication  4  Develop any redness, swelling, heat, unusual drainage, heavy bruising or bleeding from biopsy site

## 2019-11-15 NOTE — PROGRESS NOTES
Progress Note -  Hematology/Oncology   [facility] 61 y o  female MRN: [id number]  Unit/Bed#: S -01 Encounter: [id number]    Attending Hospital Physician: Mercy Sweeney MD  Date of Initial Hematology/Oncology Hospital Consultation: Thrombocytopenia  Hematology/Oncology Supervising Physician: Dr Yu Mckeon a 61y o  year old female with a history of liver disease , hepatitis, alcohol abuse, thrombocytopenia who presented  11/12/2019 from her prior an care physician's office following a outpatient abnormal laboratory finding of a platelet count of 23718  On presentation, CBC showed hemoglobin 8 1, WBC 2 4/ANC 0 8, platelets 91387  T an INR were normal   AST was elevated to 232, a LT 80, alkaline phosphatase 181  CT abdomen pelvis showed hepato megaly and chronic pancreatitis but no other evidence of abnormality   Renal function was satisfactory at 0 5  Patient was seen in 2016 in Hematology with a CBC showing hemoglobin 12 6/MCV 96 8, WBC 4 3/ANC 3 0,platelets 240878  In July of 2017 hemoglobin 13 0, WBC 3 8, platelets 157 3575  Do not have any other interval laboratory studies to compare      Patient states she is a [employer] Witness and is refusing blood products  He reports a 30 year history of alcohol abuse, she states she has quit drinking over the past 2 years time, drinks 1 beer weekly   On admission, her alcohol level was 307  She reports a history of hepatitis approximately 15-20 years ago, she does not know if she was treated or what type of hepatitis only that she acquired this from shellfish  Since admission, platelets have improved slightly to 29,000 on 11/15  She does not have any spontaneous bleeding      Assessment/Plan:   #1 Pancytopenia/thrombocytopenia  -patient's admission platelet count was 39,539  QFAUF to that in July of 2017 platelet count was 124,375   Do not know the interval trend at this time as there are no records and the patient states she has not seen a physician  Hemoglobin is also low 8 1/ and WBC 2 4/ANC 0 8  I suspect pancytopenia/ thrombocytopenia is largely from liver disease as this is re-demonstrated on CT imaging of the abdomen and pelvis this admission with hepatomegaly   Also remarkable on that imaging study was a chronic pancreatitis   Given the patient's alcohol history and elevated alcohol level on admission I do suspect the etiology is likely due to alcoholic hepatic dysfunction  I strongly encouraged her to discontinue alcohol use as I believe this is the etiology of her persistent thrombocytopenia  She did verbalize understanding and states she plans to do so  We do not recommend Corticosteroids or Promacta at this time      Will follow up with her in the outpatient hematology clinic in 2 weeks time  In the interim I will trend her platelet count twice weekly  I have placed orders in epic given heart printed copy of the orders  She understands if she develops any spontaneous bleeding she should report to the emergency department  Discussed the importance of avoiding risky behavior with falls in anything that could put her at increased risk for traumatic injury  Recommendations discussed with the primary team (PRAVIN-Dr Melquiades Royal) on 11/15/2019    Please contact us if you have any questions  Subjective: Today, the patient reports feeling overall very well  She does not have any spontaneous bleeding  She is eager to go home  A 12 point review of systems is negative    Review of Systems - Oncology      Objective:  /68 (BP Location: Left arm)   Pulse 77   Temp 99 2 °F (37 3 °C) (Oral)   Resp 16   Ht 5' 7" (1 702 m)   Wt 59 1 kg (130 lb 6 4 oz)   SpO2 95%   BMI 20 42 kg/m²     Physical Exam   Constitutional: She is oriented to person, place, and time  No distress  HENT:   Head: Normocephalic and atraumatic  Mouth/Throat: No oropharyngeal exudate     Eyes: Conjunctivae and EOM are normal  No scleral icterus  Neck: Normal range of motion  Cardiovascular: Normal rate and regular rhythm  Pulmonary/Chest: Effort normal  No respiratory distress  Abdominal: She exhibits no distension  Musculoskeletal: She exhibits no edema  Neurological: She is alert and oriented to person, place, and time  Observed resting tremor   Skin: Skin is warm and dry  No rash noted  No pallor  Vitals reviewed  Recent Labs     11/13/19  0445 11/14/19  0501 11/15/19  0510   WBC 2 40* 2 62* 3 61*   HGB 8 1* 9 0* 8 6*   PLT 20* 24* 29*   * 101* 103*   RDW 16 4* 15 9* 15 9*   CREATININE 0 59* 0 49*  --    * 134*  --    ALT 70 59  --        Laboratory studies were reviewed      Code Status: Level 1 - Full Code    Counseling / Coordination of Care  Total floor / unit time spent today 20 minutes  Greater than 50% of total time was spent with the patient and / or family counseling and / or coordination of care

## 2019-11-15 NOTE — DISCHARGE SUMMARY
Discharge- Juan Luis Delarosa 1960, 61 y o  female MRN: [id number]    Unit/Bed#: S -01 Encounter: [id number]    Primary Care Provider: Zully Thomas MD   Date and time admitted to hospital: 11/12/2019  9:09 AM        * Thrombocytopenia (HCC)  Assessment & Plan  · Platelets 29, improved from 20 on admission  · Likely due to the history of alcohol abuse and liver disease evidenced by macrocytosis in the setting of absent vit B12 or folate deficiency  However, per hematology, primary bone marrow pathology cannot be excluded  · CT head on admission shows no bleeding, CT abdomen and pelvis shows hepatomegaly, chronic pancreatitis  · Patient refused blood products as she is a Zoroastrian  · She had no hematemesis, melena  However she did have bleeding gums with brushing her teeth and spontaneous bruising which has been chronic issue  · Patient states that she can take "Promacta" and "Nplate", which is not recommended by Hematology at this point  · Status post bone marrow biopsy, hematology recommends outpatient follow-up in 2 weeks discuss biopsy results  · PT/OT evaluation done:  VNA arranged on discharge    Plan  1  Follow-up with Hematology in 2 weeks  2  Repeat CBC twice weekly until Hematology follow-up  3  Stressed the importance of alcohol abstinence, and offered support groups, patient states that she already follows up with few alcohol support group      Anemia  Assessment & Plan  · Hemoglobin 8 7, , RDW 16 4  WBC 5 9  · Anemia likely due to liver disease vs bone marrow pathology  · Iron panel : iron 251, ferritin 128, iron saturation 79, TIBC 319  · Folate 16 5  · Vitamin B12 594    Plan  4  Follow-up with Hematology in 2 weeks  5   No need of iron therapy at this point    Refusal of blood transfusions as patient is [place]  · Patient is a [employer] witness  · Refused to sign consent even in the event of life-threatening bleed  · Patient voiced understanding of the risks  · OK to take Promacta and Nplate    Anxiety  Assessment & Plan  · Patient complains of increased anxiety, has been requiring iv lorazepam   · She has been taking sertraline 50 mg daily    Plan  6  Continue sertraline 50 mg daily    Liver disease  Assessment & Plan  · Likely due to chronic alcohol abuse  · The patient states she no longer drinks  States she has not had a drink in several years  Blood alcohol level on admission was 307    Plan  4  See above        Alcohol abuse  Assessment & Plan  · Blood alcohol was 307 on admission  · states she no longer drinks but had alcohol a few days ago  · Patient counselled on alcohol abstinence  · Thrombocytopenia likely due to alcohol intake  · Patient was offered alcohol support groups, however patient states that she already follows up with a group      Vomiting-resolved as of 11/15/2019  Assessment & Plan  · Now settled  · Denies headache, visual disturbances  · Denies blood in vomitus    Plan  3  IV Zofran 4 mg p r n  Urine discoloration-resolved as of 11/15/2019  Assessment & Plan  · Patient complained of dark colored urine with suprapubic pain and tenderness  · Urinalysis does not show hematuria, or signs of infection, likely contaminated  · Currently symptoms resolved      Plan  4   Discontinue amoxicillin        Discharging Resident Physician: Raheem Reynoso MD  Attending: No att  providers found  PCP: Patricia Hawthorne MD  Admission Date: 11/12/2019  Discharge Date: 11/15/19    Disposition:     Home    Reason for Admission:  Low platelet counts (20)    Consultations During Hospital Stay:  · Hematology  · Occupational therapy  · Physical therapy  · Interventional Radiology    Procedures Performed:     · Bone marrow biopsy    Significant Findings / Test Results:     · Patient's platelet count on admission was 20, trended up to 29 on discharge, WBC 3 61, hemoglobin 8 6  · BMP sodium 138, potassium 2 9, creatinine 0 49, BUN 5  ·  on admission trended down 134, ALT 80 on admission trended out to 59, alkaline phosphatase 170  · Iron panel:  Iron 251, ferritin 128, iron saturation 79, TIBC 219  · For late 60 5, , vitamin B12 594  · Urinalysis, unremarkable  · Blood alcohol 307  · CT head:  No acute intracranial abnormality  · CT abdomen pelvis without contrast:Mild mesenteric edema without evidence of lymphadenopathy   Findings are nonspecific and may be seen in enteritis  Sequelae of chronic pancreatitis  Hepatomegaly noted   The spleen is before   Age-indeterminate T12 superior endplate mild vertebral compression deformity  Incidental Findings:   · None    Test Results Pending at Discharge (will require follow up): · None     Outpatient Tests Requested:  · CBC twice weekly for 2 weeks    Complications:  None    Hospital Course:     Nacho Trejo is a 61 y o  female patient with a past history of alcohol abuse, and liver disease who originally presented to the hospital on 11/12/2019 S advised by her primary care physician due to thrombocytopenia  On admission, patient denied bleeding other than chronic gum bleeding and bruising  No hematemesis, melena  Denied headache, visual disturbances  CT head revealed no acute intracranial hemorrhage  In the emergency department, Hematology was consulted and recommended platelet transfusion, however as the patient is a [address], she refused platelet transfusion even during a life-threatening event  We continue to monitor patient's platelets during her hospital stay, and had Hematology on board  Hematology, thrombocytopenia was likely due to recent alcohol consumption evidence by increased blood alcohol levels on admission  CT abdomen and pelvis showed hepatomegaly with signs of chronic pancreatitis  As primary bone marrow pathology cannot be excluded, patient underwent bone marrow biopsy on 11/14/2019    Hematology planning on following the patient up in 2 weeks time to discuss bone marrow biopsy results  Patient will have CBC twice weekly until her next follow-up  Patient was advised to abstain from alcohol  Condition at Discharge: stable     Discharge Day Visit / Exam:     Subjective:  Patient has no new concerns today, she states that she is feeling better  Anxiety is controlled, however she still has tremors  Denies nausea, vomiting, headaches, dizziness  No bleeding with stools, melena, hematemesis, bleeding from nose  She has good appetite, good oral intake of food and liquids  We stressed the importance of abstaining from alcohol, patient voiced understanding    Vitals: Blood Pressure: 130/68 (11/15/19 0739)  Pulse: 77 (11/15/19 0739)  Temperature: 99 2 °F (37 3 °C) (11/15/19 0739)  Temp Source: Oral (11/15/19 0739)  Respirations: 16 (11/15/19 0739)  Height: 5' 7" (170 2 cm) (11/12/19 1514)  Weight - Scale: 59 1 kg (130 lb 6 4 oz) (11/15/19 0600)  SpO2: 95 % (11/15/19 0739)     Exam:   Physical Exam   Constitutional: She is oriented to person, place, and time  She appears well-developed and well-nourished  No distress  HENT:   Head: Normocephalic and atraumatic  Nose: Nose normal    Mouth/Throat: Oropharynx is clear and moist    Eyes: Pupils are equal, round, and reactive to light  EOM are normal    Neck: Normal range of motion  Neck supple  No JVD present  Cardiovascular: Normal rate, regular rhythm, normal heart sounds and intact distal pulses  No murmur heard  Pulmonary/Chest: Effort normal and breath sounds normal    Abdominal: Soft  Bowel sounds are normal  There is no tenderness  Musculoskeletal: Normal range of motion  She exhibits no edema or tenderness  Neurological: She is alert and oriented to person, place, and time  She exhibits normal muscle tone  Skin: Skin is warm  Capillary refill takes less than 2 seconds  She is not diaphoretic  There is pallor     There is old and new bruising on her skin   Nursing note and vitals reviewed  Discussion with Family: patient only    Discharge instructions/Information to patient and family:   See after visit summary for information provided to patient and family  Provisions for Follow-Up Care:  See after visit summary for information related to follow-up care and any pertinent home health orders  Planned Readmission: none     Discharge Medications:  See after visit summary for reconciled discharge medications provided to patient and family        ** Please Note: This note has been constructed using a voice recognition system **

## 2019-11-15 NOTE — OCCUPATIONAL THERAPY NOTE
[address]  Evaluation     Patient Name: Elisabet Hutton  WAJMD'G Date: 11/15/2019  Problem List  Principal Problem: Thrombocytopenia ([address] )  Active Problems:    Urine discoloration    Alcohol abuse    Liver disease    Refusal of blood transfusions as patient is Taoist    Vomiting    Anemia    Anxiety    Past Medical History  Past Medical History:   Diagnosis Date    Fracture     Hepatitis     Insomnia     10mar2016 resolved    Osteoporosis     14jun2016 resolved    Pancreatitis     Seasonal allergies      Past Surgical History  Past Surgical History:   Procedure Laterality Date    IR BONE MARROW BIOPSY/ASPIRATION  11/14/2019    TUBAL LIGATION          11/15/19 1224   Note Type   Note type Eval only   Restrictions/Precautions   Other Precautions Fall Risk  (anxiety)   Pain Assessment   Pain Assessment No/denies pain   Pain Score No Pain   Home Living   Type of [address] [place] [address] Two level; Other (Comment)  (2 NATALYA)   Bathroom Shower/Tub Tub/shower unit   Bathroom Toilet Standard   Bathroom Accessibility Accessible  (1/2 bath main, full bath up flight of stairs)   Home Equipment Other (Comment)  (no AD)   Additional Comments Pt reports living w/  in [address] [facility] w/ 2 NATALYA and 2nd floor bedroom, primaruy bathroom  1/2 bath on main level   Prior Function   Level of [place] Independent with ADLs and functional mobility   Lives With Spouse   ADL Assistance Independent   IADLs Independent   Falls in the last 6 months 0   Vocational Retired   Comments Pt reports I w/ ADL/ IADL PTA w/ out use of AD or DME      Lifestyle   Autonomy Pt reports I w/ ADL/ IADL at baseline w/ out use of AD or DME   Reciprocal Relationships Pt reports living  and is anxious for him to arrive so she can go home   Service to Others 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 [employer] Pt reports enjoying cooking and gardening   ADL   Eating Assistance 6  Modified independent Eating Deficit Setup   Grooming Assistance 6  Modified Independent   Grooming Deficit Setup; Increased time to complete   UB Bathing Assistance Unable to assess   LB Bathing Assistance Unable to assess   UB Dressing Assistance 6  Modified independent   UB Dressing Deficit Steadying; Increased time to complete; Fasteners   LB Dressing Assistance 6  Modified independent   LB Dressing Deficit Setup; Increased time to complete   Bed Mobility   Supine to Sit 6  Modified independent   Additional items HOB elevated   Sit to Supine 6  Modified independent   Additional items HOB elevated   Additional Comments Pt short sit in bed w/ HOB elevated upon therapist arriva and post eval w/ needs met, call bell iin reach   Transfers   Sit to Stand 6  Modified independent   Additional items Increased time required   Stand to Sit 6  Modified independent   Additional items Increased time required   Functional Mobility   Functional Mobility 6  Modified independent   Additional Comments w/ in room, + time   Additional items   (no AD)   Balance   Static Sitting Normal   Dynamic Sitting Fair +   Static Standing Fair +   Ambulatory Fair   Activity Tolerance   Activity Tolerance Patient tolerated treatment well   Medical Staff Made Aware spoke to PT, [address] [place] [address]; CM, Hieu Bae; [facility] resident   Nurse Made Aware spoke to RNNirav Agent   RUE Assessment   RUE Assessment WFL   RUE Strength   RUE Overall Strength Within Functional Limits - able to perform ADL tasks with strength   LUE Assessment   LUE Assessment WFL   LUE Strength   LUE Overall Strength Within Functional Limits - able to perform ADL tasks with strength   Hand Function   Gross Motor Coordination Impaired  (+ time due to tremors)   Fine Motor Coordination Impaired  (R hand dominance, tremors   + time)   Sensation   Light Touch Partial deficits in the RLE;Partial deficits in the LLE   Sharp/Dull Not tested   Additional Comments Pt reports decreased sensation B feet and added that she does not walk w/ out shoes   Cognition   Overall Cognitive Status [facility]   Arousal/Participation Alert; Cooperative   Attention Within functional limits   Orientation Level Oriented X4   Memory Within functional limits   Following Commands Follows multistep commands with increased time or repetition   Comments Identified pt by full name and birthdate  Pt able to provide social history and PLOF  Pt engaged in SBT w/ score of 0 indicating normal cognition  Assessment   Assessment Pt is a 65yo female [facility]  Pt presents w/ thrombocytopenia and significant PMH impacting her occupational performance including osteoporosis, alcohol abuse hx  Pt reports living w/ spouse in [address] w/ 2 [address] PTA  Pt reports I w/ ADL/ IADL at baseline w/ out use of AD or DME  Upon eval, pt alert and oriented  Scored 0 on SBT indicating normal cognition  Pt completed bed mobility w/ mod I and functional mobility w/ in room w/ mod I  Pt completed UBD/LBD w/ mod I for + time to manage fasteners due to UE tremors  Pt demonstrated B UE AROM and strength WFL to complete ADL  From an OT perspective, pt can return to PLOF w/ spouse and Home OT to assess ADL performance  No additional OT needs at this time  Recommend active participation in ADL w/ nursing staff while in acute care   D/C OT   Goals   Patient Goals Pt stated that she would like to return home, and is waiting for her  to get here   Recommendation   OT Discharge Recommendation Home OT   Equipment Recommended Tub seat with back   OT - OK to Discharge   (when medically stable)   Barthel Index   Feeding 10   Bathing 0   Grooming Score 5   Dressing Score 10   Bladder Score 10   Bowels Score 10   Toilet Use Score 10   Transfers (Bed/Chair) Score 15   Mobility (Level Surface) Score 10   Stairs Score 0   Barthel Index Score 80   Modified [place] Scale   Modified Ludmila Scale 2   Kathie Magdaleno, OTR/L

## 2019-11-15 NOTE — ASSESSMENT & PLAN NOTE
· Patient is a [employer] witness  · Refused to sign consent even in the event of life-threatening bleed  · Patient voiced understanding of the risks  · OK to take Promacta and Nplate

## 2019-11-15 NOTE — ASSESSMENT & PLAN NOTE
· Hemoglobin 8 7, , RDW 16 4  WBC 5 9  · Anemia likely due to liver disease vs bone marrow pathology  · Iron panel : iron 251, ferritin 128, iron saturation 79, TIBC 319  · Folate 16 5  · Vitamin B12 594    Plan  1  Follow-up with Hematology in 2 weeks  2   No need of iron therapy at this point

## 2019-11-15 NOTE — ASSESSMENT & PLAN NOTE
· Blood alcohol was 307 on admission  · states she no longer drinks but had alcohol a few days ago     · Patient counselled on alcohol abstinence  · Thrombocytopenia likely due to alcohol intake  · Patient was offered alcohol support groups, however patient states that she already follows up with a group

## 2019-11-15 NOTE — ASSESSMENT & PLAN NOTE
· Now settled  · Denies headache, visual disturbances  · Denies blood in vomitus    Plan  1  IV Zofran 4 mg p r n

## 2019-11-15 NOTE — SOCIAL WORK
LOS 3   Not a bundle; Not a readmission  Pt lives in a [address] [facility] with her  however she has been living on the first floor  There are 2 NATALYA  Pt has not bee using an AD  Pt has not been driving and states she will not be driving after DC  She does not have a POA/LW and does not want information  Pt is able to afford her medications with her RX coverage  She uses [facility] on [address]   She does not have a hx of rehab or VNA  Pt does not have income at this time however she states she wants to return to work soon  Pt reports no MH hx  Pt reports being in ETOH rehab in the past   Pt's PCP is Dr Kali Granados reviewed with pt that A post acute care recommendation was made by your care team for [address]  Discussed Freedom of Choice with patient  List of agencies given to patient via in person  patient aware the list is custom filtered for them by preference  and that [facility] post acute providers are designated  Pt requested a referral be made to [facility]  Referral has been made and contact information has been placed in pt's AVS       Pt has been to inpt ETOH rehab in the past and continues to follow up with community resources such as AA  Cm offered additional resources however pt feels she is doing well with the resources she has been utilizing  CM reviewed discharge planning process including the following: identifying caregivers at home, preference for d/c planning needs, availability of [facility] to [facility] program, availability of treatment team to discuss questions or concerns patient and/or family may have regarding diagnosis, plan of care, old or new medications and discharge planning   CM will continue to follow for care coordination and update assessment as appropriate

## 2019-11-15 NOTE — ED PROVIDER NOTES
History  Chief Complaint   Patient presents with    Evaluation of Abnormal Diagnostic Test     Pt reports getting her platelet count checked yesterday, was 33 sent here by PCP  +dizziness/lightheadedness +palpitations     59 YR  FEMALE WITH HX OF ALCOHOLISM--  WITH DECREASED ALCOHOL INTAKE- BEER- OVER LAST 2 YRS- STILL DRINKS ON WEEKENDS AND MONDAY NIGHT FOOTBALL -- HAD OUTPT LABS WORK RECENTLY CALLED BY PMD THAT PLTS WERE LOW-- PT C/O EASILY BRUISING AND WEIGHT LOSS-- -  NO BRBPR/ MELENA/ OCCASIONAL BLOODY VOMITUS- NOTHING RECENT-- NO HEAD TRAUMA-- NO FEVERS- PRODUCTIVE COUGH --       History provided by:  Patient      Prior to Admission Medications   Prescriptions Last Dose Informant Patient Reported? Taking?    Cholecalciferol (VITAMIN D PO)   Yes No   Sig: Take by mouth   Multiple Vitamin (MULTI-VITAMIN DAILY) TABS   Yes No   Sig: Take by mouth   PEG 3350-KCl-NaBcb-NaCl-NaSulf (PEG 3350/ELECTROLYTES) 240 g SOLR   No No   Sig: Take 4 L by mouth per office instructions   Patient not taking: Reported on 11/11/2019   amoxicillin (AMOXIL) 875 mg tablet   No No   Sig: Take 1 tablet (875 mg total) by mouth 2 (two) times a day for 7 days   bisacodyl (DULCOLAX) 5 mg EC tablet   No No   Sig: Take 2 tablets (10 mg total) by mouth once for 1 dose Per office instructions   sertraline (ZOLOFT) 50 mg tablet   No No   Sig: Take 1 tablet (50 mg total) by mouth daily   Patient not taking: Reported on 11/11/2019      Facility-Administered Medications: None       Past Medical History:   Diagnosis Date    Fracture     Hepatitis     Insomnia     10mar2016 resolved    Osteoporosis     14jun2016 resolved    Pancreatitis     Seasonal allergies        Past Surgical History:   Procedure Laterality Date    IR BONE MARROW BIOPSY/ASPIRATION  11/14/2019    TUBAL LIGATION         Family History   Problem Relation Age of Onset    Anxiety disorder Mother     Depression Mother     No Known Problems Father     Cancer Paternal Grandmother      I have reviewed and agree with the history as documented  Social History     Tobacco Use    Smoking status: Never Smoker    Smokeless tobacco: Never Used   Substance Use Topics    Alcohol use: No    Drug use: No        Review of Systems   Constitutional: Positive for unexpected weight change  Negative for activity change, appetite change, chills, diaphoresis, fatigue and fever  HENT: Negative  Eyes: Negative  Respiratory: Negative  Cardiovascular: Negative  Gastrointestinal: Negative  Endocrine: Negative  Genitourinary: Negative  Musculoskeletal: Negative  Skin: Negative  Allergic/Immunologic: Negative  Neurological: Negative  Hematological: Negative for adenopathy  Bruises/bleeds easily  Psychiatric/Behavioral: Negative  Physical Exam  Physical Exam   Constitutional: She is oriented to person, place, and time  No distress  AVSS- HTNSIVE- MILD TACHY--  PULSE OX 96 % ON RA- INTERPRETATION IS NORMAL- NO INTERVENTION - IN NAD    HENT:   Head: Normocephalic and atraumatic  Right Ear: External ear normal    Left Ear: External ear normal    Nose: Nose normal    Mouth/Throat: Oropharynx is clear and moist  No oropharyngeal exudate  NO SCALP TENDERNESS- HEMATOMA/ CONTUSION    Eyes: Pupils are equal, round, and reactive to light  Conjunctivae and EOM are normal  Right eye exhibits no discharge  Left eye exhibits no discharge  No scleral icterus  MM PINK   Neck: Normal range of motion  Neck supple  No JVD present  No tracheal deviation present  No thyromegaly present  PMT C/T/L/S SPINE   Cardiovascular: Regular rhythm, normal heart sounds and intact distal pulses  Exam reveals no gallop and no friction rub  No murmur heard  Pulmonary/Chest: Effort normal and breath sounds normal  No stridor  No respiratory distress  She has no wheezes  She has no rales  She exhibits no tenderness  Abdominal: Soft   Bowel sounds are normal  She exhibits no distension and no mass  There is no tenderness  There is no rebound and no guarding  No hernia  SOFT NF/BND- NO PERITOENAL SIGNS- NO CVA TENDERNESS   Musculoskeletal: Normal range of motion  She exhibits no edema, tenderness or deformity  EQUAL BILATERAL RADIAL/DP PULSES- NOP BLE EDEMA/CALF TENDERNESS/ASYM/ ERYTHEMA   Lymphadenopathy:     She has no cervical adenopathy  Neurological: She is alert and oriented to person, place, and time  No cranial nerve deficit or sensory deficit  She exhibits normal muscle tone  Coordination normal    Skin: Skin is warm  Capillary refill takes less than 2 seconds  No rash noted  She is not diaphoretic  No erythema  No pallor  Psychiatric: She has a normal mood and affect  Her behavior is normal    Nursing note and vitals reviewed        Vital Signs  ED Triage Vitals   Temperature Pulse Respirations Blood Pressure SpO2   11/12/19 0915 11/12/19 0915 11/12/19 0915 11/12/19 0918 11/12/19 0915   98 °F (36 7 °C) (!) 107 18 155/89 96 %      Temp Source Heart Rate Source Patient Position - Orthostatic VS BP Location FiO2 (%)   11/12/19 0915 11/12/19 0915 11/12/19 0918 11/12/19 0918 --   Oral Monitor Sitting Right arm       Pain Score       11/12/19 1514       No Pain           Vitals:    11/14/19 1510 11/14/19 1546 11/14/19 2157 11/15/19 0739   BP: 118/66 116/60 136/65 130/68   Pulse: 83 70 78 77   Patient Position - Orthostatic VS:  Sitting Lying Lying         Visual Acuity  Visual Acuity      Most Recent Value   L Pupil Size (mm)  2   R Pupil Size (mm)  2   L Pupil Shape  Round   R Pupil Shape  Round          ED Medications  Medications   sertraline (ZOLOFT) tablet 50 mg (50 mg Oral Given 11/15/19 0819)   ondansetron (ZOFRAN) injection 4 mg (has no administration in time range)   pantoprazole (PROTONIX) EC tablet 40 mg (40 mg Oral Given 11/15/19 0524)   thiamine (VITAMIN B1) 100 mg in sodium chloride 0 9 % 50 mL IVPB (0 mg Intravenous Stopped 11/12/19 1103)   chlordiazePOXIDE (LIBRIUM) capsule 25 mg (25 mg Oral Given 11/12/19 1008)   LORazepam (ATIVAN) 2 mg/mL injection 0 5 mg (0 5 mg Intravenous Given 11/13/19 1044)   LORazepam (ATIVAN) 2 mg/mL injection 0 5 mg (0 5 mg Intravenous Given 11/14/19 1157)   midazolam (VERSED) injection (1 mg Intravenous Given 11/14/19 1500)   fentanyl citrate (PF) 100 MCG/2ML (50 mcg Intravenous Given 11/14/19 1455)   midazolam (VERSED) injection (1 mg Intravenous Given 11/14/19 1505)   fentanyl citrate (PF) 100 MCG/2ML (50 mcg Intravenous Given 11/14/19 1505)       Diagnostic Studies  Results Reviewed     Procedure Component Value Units Date/Time    Folate [[id number]]  (Normal) Collected:  11/12/19 1103    Lab Status:  Final result Specimen:  Blood from Arm, Left Updated:  11/12/19 1428     Folate 16 5 ng/mL     Vitamin B12 [[id number]]  (Normal) Collected:  11/12/19 1103    Lab Status:  Final result Specimen:  Blood from Arm, Left Updated:  11/12/19 1428     Vitamin B-12 594 pg/mL     Methylmalonic acid, serum [[id number]] Collected:  11/12/19 1103    Lab Status:   In process Specimen:  Blood from Arm, Left Updated:  11/12/19 1105    Ethanol [[id number]]  (Abnormal) Collected:  11/12/19 0946    Lab Status:  Final result Specimen:  Blood from Arm, Left Updated:  11/12/19 1014     Ethanol Lvl 307 mg/dL     Basic metabolic panel [[id number]]  (Abnormal) Collected:  11/12/19 0946    Lab Status:  Final result Specimen:  Blood from Arm, Left Updated:  11/12/19 1012     Sodium 142 mmol/L      Potassium 4 0 mmol/L      Chloride 107 mmol/L      CO2 26 mmol/L      ANION GAP 9 mmol/L      BUN 9 mg/dL      Creatinine 0 55 mg/dL      Glucose 108 mg/dL      Calcium 7 9 mg/dL      eGFR 103 ml/min/1 73sq m     Narrative:       [place] guidelines for Chronic Kidney Disease (CKD):     Stage 1 with normal or high GFR (GFR > 90 mL/min/1 73 square meters)    Stage 2 Mild CKD (GFR = 60-89 mL/min/1 73 square meters)    Stage 3A Moderate CKD (GFR = 45-59 mL/min/1 73 square meters)    Stage 3B Moderate CKD (GFR = 30-44 mL/min/1 73 square meters)    Stage 4 Severe CKD (GFR = 15-29 mL/min/1 73 square meters)    Stage 5 End Stage CKD (GFR <15 mL/min/1 73 square meters)  Note: GFR calculation is accurate only with a steady state creatinine    Hepatic function panel [[id number]]  (Abnormal) Collected:  11/12/19 0946    Lab Status:  Final result Specimen:  Blood from Arm, Left Updated:  11/12/19 1012     Total Bilirubin 0 50 mg/dL      Bilirubin, Direct 0 27 mg/dL      Alkaline Phosphatase 181 U/L       U/L      ALT 80 U/L      Total Protein 6 9 g/dL      Albumin 3 1 g/dL     Protime-INR [[id number]]  (Normal) Collected:  11/12/19 0946    Lab Status:  Final result Specimen:  Blood from Arm, Left Updated:  11/12/19 1006     Protime 13 7 seconds      INR 1 11    APTT [[id number]]  (Normal) Collected:  11/12/19 0946    Lab Status:  Final result Specimen:  Blood from Arm, Left Updated:  11/12/19 1006     PTT 31 seconds     CBC and differential [[id number]]  (Abnormal) Collected:  11/12/19 0946    Lab Status:  Final result Specimen:  Blood from Arm, Left Updated:  11/12/19 1002     WBC 2 39 Thousand/uL      RBC 2 78 Million/uL      Hemoglobin 9 0 g/dL      Hematocrit 27 8 %       fL      MCH 32 4 pg      MCHC 32 4 g/dL      RDW 16 3 %      MPV 10 8 fL      Platelets 26 Thousands/uL      nRBC 0 /100 WBCs      Neutrophils Relative 34 %      Immat GRANS % 0 %      Lymphocytes Relative 51 %      Monocytes Relative 11 %      Eosinophils Relative 3 %      Basophils Relative 1 %      Neutrophils Absolute 0 82 Thousands/µL      Immature Grans Absolute 0 01 Thousand/uL      Lymphocytes Absolute 1 22 Thousands/µL      Monocytes Absolute 0 26 Thousand/µL      Eosinophils Absolute 0 06 Thousand/µL      Basophils Absolute 0 02 Thousands/µL                  IR bone marrow biopsy/aspiration   Final Result by Carlos A Schumacher DO (11/14 1551)   Technically successful CT image guided bone marrow aspiration and core ilium bone biopsy  _______________________________________________________________   COMPARISON: None      PROCEDURE DETAILS:    Operators: Dr Nir Chawla, [address] [place] [address] attending, performed the procedure  Anesthesia: Conscious sedation was provided throughout a total intra-service time of 23 minutes during which the patient's hemodynamic parameters were continuously monitored by an independent trained radiology nurse  1% lidocaine was injected in the    skin and subcutaneous tissues overlying the access site  Medications: 1% lidocaine, fentanyl, Versed      This examination, like all CT scans performed in the [facility], was performed utilizing techniques to minimize radiation dose exposure, including the use of iterative reconstruction and automated exposure control  COMMENTS:   Following the discussion of the risks, benefits and alternatives to the procedure, written informed consent was obtained from the patient  The patient was placed prone on the imaging table  A preprocedure timeout was performed per [facility] protocol  The right posterior inferior iliac spine was localized by CT and the low back was prepped and draped in sterile fashion  After administration of local anesthesia with 1% lidocaine, an 11-gauge needle was advanced using CT guidance through the cortex of the iliac spine into the marrow  Aspiration was performed and submitted to pathology for slide preparation  Diagnostic    marrow cells were identified preliminarily  The needle was slightly withdrawn and redirected to obtain a core ilium bone biopsy  The core was submitted to pathology  The puncture site was cleansed and a dressing was applied  Workstation performed: [id number]         CT head wo contrast   Final Result by Libertad Del Toro MD (11/13 0020)      No acute intracranial abnormality                    Workstation performed: [id number]         CT abdomen pelvis wo contrast   Final Result by Arivn Lloyd MD (11/12 1204)      1  Mild mesenteric edema without evidence of lymphadenopathy  Findings are nonspecific and may be seen in enteritis  Correlate clinically  2   Sequelae of chronic pancreatitis  3   Hepatomegaly noted  The spleen is before  Age-indeterminate T12 superior endplate mild vertebral compression deformity  Correlate with clinical exam                   Workstation performed: [id number]         XR chest 1 view portable   ED Interpretation by Adin Gonzalez MD (11/12 1024)   nad      Final Result by Sandeep Rodriguez MD (11/12 1040)      No acute cardiopulmonary disease              Workstation performed: [id number]                    Procedures  Procedures  Conscious Sedation Assessment      Classification Score   ASA Scale Assessment  2-Mild to moderate systemic disease, medically well controlled, with no functional limitation filed at 11/14/2019 1430   Mallampati Classification  Class II: soft palate, uvula, fauces visible - No Difficulty filed at 11/14/2019 1430           ED Course  ED Course as of Nov 15 1251   Tue Nov 12, 2019   1043 - er md note- case d/w- dr Rodas Needs- hematology- rec folate/ mma/ b12 level- ct for cirrhosis/splenomegaly and can transfuse 1 plasmaphoresis pack of plts-- and admit-       46 - er md note- pt at present is refusing a RECTAL EXAM- WILL WAIT TO PT NEEDS TO MOVE BOWEL  AND GIVE A HAT FOR A SAMPLE    - PT IS A  [place] TO TALK TO [facility] SIGNING FOR PLTS --       [address] [employer],[address] ER MD NOTE- [employer]- WILL CONTINUE TO REASSESS IN  WHILE IN ER       1333 -- ER MD DISCUSSION WITH Eleazar Barkley CONSULTS-- THEY ARE ASKING ABOUT-- POSSIBLE NPLATE-   ACROMATA-- IF NEEDED- PT DOES NOT WANT THEM TO KNOW  ABOUT WHY HER BLOOD COUNTS ARE LOW                      Initial Sepsis Screening     [address] Name 11/12/19 1129                Is the patient's history suggestive of a new or worsening infection? No  -EP        Suspected source of infection          Are two or more of the following signs & symptoms of infection both present and new to the patient? No  -EP        Indicate SIRS criteria          If the answer is yes to both questions, suspicion of sepsis is present          If severe sepsis is present AND tissue hypoperfusion perists in the hour after fluid resuscitation or lactate > 4, the patient meets criteria for SEPTIC SHOCK          Are any of the following organ dysfunction criteria present within 6 hours of suspected infection and SIRS criteria that are NOT considered to be chronic conditions?         Organ dysfunction          Date of presentation of severe sepsis          Time of presentation of severe sepsis          Tissue hypoperfusion persists in the hour after crystalloid fluid administration, evidenced, by either:          Was hypotension present within one hour of the conclusion of crystalloid fluid administration?           Date of presentation of septic shock          Time of presentation of septic shock            User Key  (r) = Recorded By, (t) = Taken By, (c) = Cosigned By    Initials Name Provider Type    EP Vale Johnson MD Physician                  MDM    Disposition  Final diagnoses:   Alcoholism ([facility] )   Acute alcoholic intoxication without complication ([facility] )   Pancytopenia ([facility] [facility] )   Alcoholic hepatitis without ascites     Time reflects when diagnosis was documented in both MDM as applicable and the Disposition within this note     Time User Action Codes Description Comment    11/12/2019 11:30 AM Verline Stack D Add [F10 20] Alcoholism ([facility] )     11/12/2019 11:30 AM Verline Stack D Add [H55 337] Acute alcoholic intoxication without complication ([facility] )     99/42/8892 11:30 AM Ralph Punt Add [D61 818] Pancytopenia ([facility] )     11/12/2019  2:23 PM Verline Stack D Add [C74 59] Alcoholic hepatitis without ascites     11/12/2019  4:57 PM Cele Rocha Add [R51] Headache       ED Disposition     ED Disposition Condition Date/Time Comment    Admit Stable Tue Nov 12, 2019 1130 Case was discussed with DR Maryellen Bose and the patient's admission status was agreed to be Admission Status: inpatient status to the service of Dr Maryellen Bose   Follow-up Information    None         Current Discharge Medication List      CONTINUE these medications which have NOT CHANGED    Details   amoxicillin (AMOXIL) 875 mg tablet Take 1 tablet (875 mg total) by mouth 2 (two) times a day for 7 days  Qty: 14 tablet, Refills: 0    Associated Diagnoses: Acute non-recurrent frontal sinusitis      bisacodyl (DULCOLAX) 5 mg EC tablet Take 2 tablets (10 mg total) by mouth once for 1 dose Per office instructions  Qty: 2 tablet, Refills: 0    Associated Diagnoses: Colon cancer screening      Cholecalciferol (VITAMIN D PO) Take by mouth      Multiple Vitamin (MULTI-VITAMIN DAILY) TABS Take by mouth      PEG 3350-KCl-NaBcb-NaCl-NaSulf (PEG 3350/ELECTROLYTES) 240 g SOLR Take 4 L by mouth per office instructions  Qty: 4000 mL, Refills: 0    Associated Diagnoses: Colon cancer screening      sertraline (ZOLOFT) 50 mg tablet Take 1 tablet (50 mg total) by mouth daily  Qty: 30 tablet, Refills: 0    Associated Diagnoses: Depression with anxiety           No discharge procedures on file      ED Provider  Electronically Signed by           Javier Diaz MD  11/15/19 1990

## 2019-11-15 NOTE — ASSESSMENT & PLAN NOTE
· Patient complained of dark colored urine with suprapubic pain and tenderness  · Urinalysis does not show hematuria, or signs of infection, likely contaminated  · Currently symptoms resolved      Plan  1   Discontinue amoxicillin

## 2019-11-15 NOTE — ASSESSMENT & PLAN NOTE
· Platelets 29, improved from 20 on admission  · Likely due to the history of alcohol abuse and liver disease evidenced by macrocytosis in the setting of absent vit B12 or folate deficiency  However, per hematology, primary bone marrow pathology cannot be excluded  · CT head on admission shows no bleeding, CT abdomen and pelvis shows hepatomegaly, chronic pancreatitis  · Patient refused blood products as she is a [employer]  · She had no hematemesis, melena  However she did have bleeding gums with brushing her teeth and spontaneous bruising which has been chronic issue  · Patient states that she can take "Promacta" and "Nplate", which is not recommended by Hematology at this point  · Status post bone marrow biopsy, hematology recommends outpatient follow-up in 2 weeks discuss biopsy results  · PT/OT evaluation done:  [facility] arranged on discharge    Plan  1  Follow-up with Hematology in 2 weeks  2  Repeat CBC twice weekly until Hematology follow-up  3   Stressed the importance of alcohol abstinence, and offered support groups, patient states that she already follows up with few alcohol support group

## 2019-11-17 LAB
BACTERIA BLD CULT: NORMAL
BACTERIA BLD CULT: NORMAL

## 2019-11-18 ENCOUNTER — TRANSITIONAL CARE MANAGEMENT (OUTPATIENT)
Dept: FAMILY MEDICINE CLINIC | Facility: CLINIC | Age: 59
End: 2019-11-18

## 2019-11-18 ENCOUNTER — APPOINTMENT (OUTPATIENT)
Dept: LAB | Facility: CLINIC | Age: 59
End: 2019-11-18
Payer: COMMERCIAL

## 2019-11-18 DIAGNOSIS — D69.6 THROMBOCYTOPENIA (HCC): ICD-10-CM

## 2019-11-18 LAB
BASOPHILS # BLD AUTO: 0.03 THOUSANDS/ΜL (ref 0–0.1)
BASOPHILS NFR BLD AUTO: 1 % (ref 0–1)
EOSINOPHIL # BLD AUTO: 0.05 THOUSAND/ΜL (ref 0–0.61)
EOSINOPHIL NFR BLD AUTO: 2 % (ref 0–6)
ERYTHROCYTE [DISTWIDTH] IN BLOOD BY AUTOMATED COUNT: 17.2 % (ref 11.6–15.1)
HCT VFR BLD AUTO: 29.3 % (ref 34.8–46.1)
HGB BLD-MCNC: 9.1 G/DL (ref 11.5–15.4)
IMM GRANULOCYTES # BLD AUTO: 0 THOUSAND/UL (ref 0–0.2)
IMM GRANULOCYTES NFR BLD AUTO: 0 % (ref 0–2)
LYMPHOCYTES # BLD AUTO: 1.26 THOUSANDS/ΜL (ref 0.6–4.47)
LYMPHOCYTES NFR BLD AUTO: 41 % (ref 14–44)
MCH RBC QN AUTO: 32.2 PG (ref 26.8–34.3)
MCHC RBC AUTO-ENTMCNC: 31.1 G/DL (ref 31.4–37.4)
MCV RBC AUTO: 104 FL (ref 82–98)
MONOCYTES # BLD AUTO: 0.48 THOUSAND/ΜL (ref 0.17–1.22)
MONOCYTES NFR BLD AUTO: 15 % (ref 4–12)
NEUTROPHILS # BLD AUTO: 1.29 THOUSANDS/ΜL (ref 1.85–7.62)
NEUTS SEG NFR BLD AUTO: 41 % (ref 43–75)
NRBC BLD AUTO-RTO: 0 /100 WBCS
PLATELET # BLD AUTO: 53 THOUSANDS/UL (ref 149–390)
PMV BLD AUTO: 11.2 FL (ref 8.9–12.7)
RBC # BLD AUTO: 2.83 MILLION/UL (ref 3.81–5.12)
SCAN RESULT: NORMAL
WBC # BLD AUTO: 3.11 THOUSAND/UL (ref 4.31–10.16)

## 2019-11-18 PROCEDURE — 85025 COMPLETE CBC W/AUTO DIFF WBC: CPT

## 2019-11-18 PROCEDURE — 36415 COLL VENOUS BLD VENIPUNCTURE: CPT

## 2019-11-19 LAB — MISCELLANEOUS LAB TEST RESULT: NORMAL

## 2019-11-21 LAB — MISCELLANEOUS LAB TEST RESULT: NORMAL

## 2019-11-25 ENCOUNTER — TELEPHONE (OUTPATIENT)
Dept: HEMATOLOGY ONCOLOGY | Facility: CLINIC | Age: 59
End: 2019-11-25

## 2019-11-25 NOTE — TELEPHONE ENCOUNTER
Attempted to contact patient with her lab results  There was no answer on her phone and her mailbox was full

## 2019-12-04 ENCOUNTER — TELEPHONE (OUTPATIENT)
Dept: HEMATOLOGY ONCOLOGY | Facility: CLINIC | Age: 59
End: 2019-12-04

## 2019-12-04 NOTE — TELEPHONE ENCOUNTER
Patient called to reschedule, she had no power, made appointment for the 6th of December and would like if someone called her back with lab results   Please call her back at [phone]

## 2019-12-05 NOTE — TELEPHONE ENCOUNTER
I have made several attempts to contact Alfonzo regarding her test results  She has not answered any of my calls and her mailbox is full so I am unable to leave a message

## 2020-12-30 ENCOUNTER — HOSPITAL ENCOUNTER (EMERGENCY)
Facility: HOSPITAL | Age: 60
Discharge: HOME | End: 2020-12-30
Attending: EMERGENCY MEDICINE | Admitting: EMERGENCY MEDICINE
Payer: COMMERCIAL

## 2020-12-30 ENCOUNTER — APPOINTMENT (EMERGENCY)
Dept: RADIOLOGY | Facility: HOSPITAL | Age: 60
End: 2020-12-30
Payer: COMMERCIAL

## 2020-12-30 VITALS
HEART RATE: 75 BPM | DIASTOLIC BLOOD PRESSURE: 65 MMHG | WEIGHT: 135 LBS | BODY MASS INDEX: 21.19 KG/M2 | RESPIRATION RATE: 16 BRPM | HEIGHT: 67 IN | SYSTOLIC BLOOD PRESSURE: 128 MMHG | OXYGEN SATURATION: 98 % | TEMPERATURE: 97.5 F

## 2020-12-30 DIAGNOSIS — M79.673 FOOT PAIN: Primary | ICD-10-CM

## 2020-12-30 PROCEDURE — 99283 EMERGENCY DEPT VISIT LOW MDM: CPT

## 2020-12-30 PROCEDURE — 73630 X-RAY EXAM OF FOOT: CPT

## 2020-12-30 PROCEDURE — 99284 EMERGENCY DEPT VISIT MOD MDM: CPT | Performed by: EMERGENCY MEDICINE

## 2020-12-30 RX ORDER — CEPHALEXIN 250 MG/1
500 CAPSULE ORAL 4 TIMES DAILY
Qty: 80 CAPSULE | Refills: 0 | Status: SHIPPED | OUTPATIENT
Start: 2020-12-30 | End: 2021-01-09

## 2020-12-30 RX ORDER — IBUPROFEN 400 MG/1
400 TABLET ORAL ONCE
Status: COMPLETED | OUTPATIENT
Start: 2020-12-30 | End: 2020-12-30

## 2020-12-30 RX ORDER — CEPHALEXIN 250 MG/1
500 CAPSULE ORAL ONCE
Status: COMPLETED | OUTPATIENT
Start: 2020-12-30 | End: 2020-12-30

## 2020-12-30 RX ORDER — ACETAMINOPHEN 325 MG/1
650 TABLET ORAL ONCE
Status: COMPLETED | OUTPATIENT
Start: 2020-12-30 | End: 2020-12-30

## 2020-12-30 RX ADMIN — ACETAMINOPHEN 650 MG: 325 TABLET, FILM COATED ORAL at 18:34

## 2020-12-30 RX ADMIN — DICLOFENAC SODIUM 4 G: 10 GEL TOPICAL at 18:35

## 2020-12-30 RX ADMIN — IBUPROFEN 400 MG: 400 TABLET ORAL at 18:34

## 2020-12-30 RX ADMIN — CEPHALEXIN 500 MG: 250 CAPSULE ORAL at 18:34

## 2020-12-30 NOTE — ED PROVIDER NOTES
History  Chief Complaint   Patient presents with    Foot Pain     Patient reports sliding on saturday and possibly getting a foriegn body in the heel right  Swelling noted, not able to bear weight on the foot  HPI     The patient is a pleasant 79-year-old female who reports to the emergency department with pain in her right heel  She notes that the pain happened after she slipped and possibly sustained trauma to her heel  Pain is achy  Pain is nonradiating  Pain is worse with ambulation  No associated laceration  No deformity  No tenderness to the knee or base of the 5th metatarsal     Medical decision m[age]-year-old female, will image foot, otherwise, okay to go home, follow-up Podiatry  Of note, unrelated to the patient's current heel pain  She has a small abrasion to the right middle toe with some very small amount of surrounding redness  Given that it is the foot, will cover with Keflex ensure that this does not turn into cellulitis  Prior to Admission Medications   Prescriptions Last Dose Informant Patient Reported? Taking?    Cholecalciferol (VITAMIN D PO)   Yes No   Sig: Take by mouth   Multiple Vitamin (MULTI-VITAMIN DAILY) TABS   Yes No   Sig: Take by mouth   bisacodyl (DULCOLAX) 5 mg EC tablet   No No   Sig: Take 2 tablets (10 mg total) by mouth once for 1 dose Per office instructions   sertraline (ZOLOFT) 50 mg tablet   No No   Sig: Take 1 tablet (50 mg total) by mouth daily      Facility-Administered Medications: None       Past Medical History:   Diagnosis Date    Fracture     Hepatitis     Insomnia     2016 resolved    Osteoporosis     2016 resolved    Pancreatitis     Seasonal allergies     Urine discoloration 2019    Vomiting 2019       Past Surgical History:   Procedure Laterality Date    IR BIOPSY BONE MARROW  2019    TUBAL LIGATION         Family History   Problem Relation Age of Onset    Anxiety disorder Mother     Depression Mother     No Known Problems Father     Cancer Paternal Grandmother      I have reviewed and agree with the history as documented  E-Cigarette/Vaping     E-Cigarette/Vaping Substances     Social History     Tobacco Use    Smoking status: Never Smoker    Smokeless tobacco: Never Used   Substance Use Topics    Alcohol use: No    Drug use: No       Review of Systems   Musculoskeletal: Positive for arthralgias  All other systems reviewed and are negative  Physical Exam  Physical Exam  Vitals signs and nursing note reviewed  Constitutional:       Appearance: She is well-developed  HENT:      Head: Normocephalic and atraumatic  Right Ear: External ear normal       Left Ear: External ear normal    Eyes:      Conjunctiva/sclera: Conjunctivae normal    Neck:      Musculoskeletal: Normal range of motion and neck supple  Vascular: No JVD  Trachea: No tracheal deviation  Cardiovascular:      Rate and Rhythm: Normal rate and regular rhythm  Heart sounds: Normal heart sounds  Pulmonary:      Effort: Pulmonary effort is normal  No respiratory distress  Breath sounds: No wheezing or rales  Abdominal:      Palpations: Abdomen is soft  Tenderness: There is no abdominal tenderness  There is no guarding or rebound  Musculoskeletal:         General: Tenderness present  Skin:     General: Skin is warm and dry  Findings: No erythema or rash  Neurological:      General: No focal deficit present  Mental Status: She is alert and oriented to person, place, and time  Motor: No weakness  Psychiatric:         Behavior: Behavior normal          Thought Content:  Thought content normal          Vital Signs  ED Triage Vitals [12/30/20 1634]   Temperature Pulse Respirations Blood Pressure SpO2   97 5 °F (36 4 °C) 75 16 128/65 98 %      Temp Source Heart Rate Source Patient Position - Orthostatic VS BP Location FiO2 (%)   Oral Monitor Lying Left arm --      Pain Score Worst Possible Pain           Vitals:    12/30/20 1634   BP: 128/65   Pulse: 75   Patient Position - Orthostatic VS: Lying         Visual Acuity      ED Medications  Medications - No data to display    Diagnostic Studies  Results Reviewed     None                 XR foot 3+ views RIGHT    (Results Pending)              Procedures  Procedures         ED Course                                           MDM    Disposition  Final diagnoses: Foot pain     Time reflects when diagnosis was documented in both MDM as applicable and the Disposition within this note     Time User Action Codes Description Comment    12/30/2020  5:31 PM Josette Lockhart, [address] Foot pain       ED Disposition     ED Disposition Condition Date/Time Comment    Discharge Stable Wed Dec 30, 2020  5:31 PM Ayana Bryson discharge to home/self care  Follow-up Information    None         Patient's Medications   Discharge Prescriptions    No medications on file     No discharge procedures on file      PDMP Review     None          ED Provider  Electronically Signed by           Christopher Bolaños MD  12/30/20 406 [facility] Emy Tolentino MD  12/30/20 1799

## 2021-01-13 ENCOUNTER — OFFICE VISIT (OUTPATIENT)
Dept: PODIATRY | Facility: CLINIC | Age: 61
End: 2021-01-13
Payer: COMMERCIAL

## 2021-01-13 VITALS
DIASTOLIC BLOOD PRESSURE: 91 MMHG | SYSTOLIC BLOOD PRESSURE: 129 MMHG | HEART RATE: 82 BPM | WEIGHT: 140 LBS | BODY MASS INDEX: 21.97 KG/M2 | HEIGHT: 67 IN

## 2021-01-13 DIAGNOSIS — M84.374A STRESS FRACTURE OF RIGHT CALCANEUS, INITIAL ENCOUNTER: Primary | ICD-10-CM

## 2021-01-13 PROCEDURE — 99203 OFFICE O/P NEW LOW 30 MIN: CPT | Performed by: PODIATRIST

## 2021-01-13 PROCEDURE — 3008F BODY MASS INDEX DOCD: CPT | Performed by: FAMILY MEDICINE

## 2021-01-13 NOTE — PROGRESS NOTES
Assessment/Plan:       Diagnoses and all orders for this visit:    Stress fracture of right calcaneus, initial encounter  -     Cam Boot        Diagnosis and options discussed patient  I reviewed her x-rays from the emergency room a few weeks ago  Patient has well-documented history of osteopenia and nontraumatic fractures in her lower extremity and ribcage  Given her symptoms today and little improvement in pain I suspect she caused either a stress fracture or compression fracture to the calcaneus  In the acuity of the situation I think the best thing to do would be to place her in a cam boot  She has a rolling walker in should only put weight on the front of her foot  She should be off the foot as much as possible  I would like her to essentially not use this foot for 4 weeks  Should her symptoms continue without any improvement I would then order an MRI  I also highly recommended she speak to her doctor about medication for osteoporosis  She has discussed Prolia previously but is reluctant to use it as she is worried about side effects  I explained that any medication can come with risk however she is broken multiple bones in her body due to her osteopenia and at some point she was going to suffer a much more dramatic fracture if she does not increase her bone density  Subjective:      Patient ID: Cherelle Khan is a 61 y o  female  PAtient presents with foot pain for about a month  On 12/30/2020 she slipped at home and felt a snap  She went to the ED and got xrays, the findings were unequivocal  The snap and pain are around the heel bone  She uses a cane and walker to help stay off her heel  IT doesn't seem to be getting better  She has history of osteoporosis, leukopenia, multiple fractures, previous left 5th met fracture with delayed         The following portions of the patient's history were reviewed and updated as appropriate: allergies, current medications, past family history, past medical history, past social history, past surgical history and problem list     Review of Systems   Constitutional: Negative  HENT: Negative for sinus pressure and sinus pain  Respiratory: Negative for cough and shortness of breath  Cardiovascular: Negative for chest pain and leg swelling  Gastrointestinal: Negative for diarrhea, nausea and vomiting  Musculoskeletal: Positive for arthralgias and myalgias  Skin: Negative for color change and wound  Neurological: Negative for weakness and numbness  Objective:      /91   Pulse 82   Ht 5' 7" (1 702 m) Comment: verbal  Wt 63 5 kg (140 lb)   BMI 21 93 kg/m²          Physical Exam    Vitals reviewed    Constitutional: Patient is not distressed  Patient is well developed  Patient is   Not obese  Vascular: Dorsalis pedis and posterior tibial pulses palpable  Capillary refill time within normal limits to all digits  No erythema  No edema  No significant varicosities  Dermatology: No rash  No open lesions  Present pedal hair  Skin has healthy turgor  Musculoskeletal: Normal range of motion to ankle, subtalar joint, and midtarsal joint  Normal range of motion first MTPJ  Manual muscle testing 5 out of 5 for inversion/eversion/dorsiflexion/plantarflexion  On stance patients feet are generally rectus  Patient has pain on palpation of the right calcaneal posterior tuberosity with medial lateral compression  Mild edema to this area  No specific pain on the plantar fascia insertion  Normal Shaikh test   Negative Homans sign  No ankle instability with drawer or stress inversion eversion  Her pain is specifically focused around the posterior aspect of the calcaneus  Patient has mallet toe deformity to her right great toe and rigid contractures of her 2nd 3rd digits on the right  These are asymptomatic  Neurological: Monofilament sensation is intact  Vibratory sensation is intact     Achilles reflex is normal    Proprioception is normal    Respiratory: Normal respiratory effort, no distress    Psych: Patient is AAOx3  Normal mood  Lymphatic: nonpalpable popliteal lymph nodes  Nonpalpable groin lymph nodes      XRay  3 views of the  Right foot from her visit to the emergency department on December 30, 2020 personally read by Dr Shaun Rich in office today and discussed with patient:  1   there is a linear increased density running obliquely through the posterior calcaneus seen on the lateral view which could represent a   Compression fracture  No displacement of the calcaneus seen

## 2021-01-14 ENCOUNTER — TELEPHONE (OUTPATIENT)
Dept: FAMILY MEDICINE CLINIC | Facility: CLINIC | Age: 61
End: 2021-01-14

## 2021-01-14 NOTE — TELEPHONE ENCOUNTER
Patient called and stated she has a fracture in her heel and is wearing a boot  She is not very mobile  She cannot do virtual appointment  She would like the doctor to call her regarding questions that she has about her options for getting someone to get around the house while laid up

## 2021-01-15 ENCOUNTER — TELEMEDICINE (OUTPATIENT)
Dept: FAMILY MEDICINE CLINIC | Facility: CLINIC | Age: 61
End: 2021-01-15
Payer: COMMERCIAL

## 2021-01-15 ENCOUNTER — PATIENT OUTREACH (OUTPATIENT)
Dept: FAMILY MEDICINE CLINIC | Facility: CLINIC | Age: 61
End: 2021-01-15

## 2021-01-15 DIAGNOSIS — F41.8 DEPRESSION WITH ANXIETY: ICD-10-CM

## 2021-01-15 DIAGNOSIS — D64.9 ANEMIA, UNSPECIFIED TYPE: ICD-10-CM

## 2021-01-15 DIAGNOSIS — E78.49 OTHER HYPERLIPIDEMIA: ICD-10-CM

## 2021-01-15 DIAGNOSIS — Z13.29 SCREENING FOR THYROID DISORDER: ICD-10-CM

## 2021-01-15 DIAGNOSIS — K76.9 LIVER DISEASE: ICD-10-CM

## 2021-01-15 DIAGNOSIS — M79.672 LEFT FOOT PAIN: Primary | ICD-10-CM

## 2021-01-15 DIAGNOSIS — M80.80XA LOCALIZED OSTEOPOROSIS WITH CURRENT PATHOLOGICAL FRACTURE, INITIAL ENCOUNTER: ICD-10-CM

## 2021-01-15 DIAGNOSIS — Z12.11 SCREEN FOR COLON CANCER: ICD-10-CM

## 2021-01-15 DIAGNOSIS — Z12.31 VISIT FOR SCREENING MAMMOGRAM: ICD-10-CM

## 2021-01-15 PROCEDURE — 1036F TOBACCO NON-USER: CPT | Performed by: FAMILY MEDICINE

## 2021-01-15 PROCEDURE — 99214 OFFICE O/P EST MOD 30 MIN: CPT | Performed by: FAMILY MEDICINE

## 2021-01-15 NOTE — ASSESSMENT & PLAN NOTE
DEXA ordered in 2019, patient did not followthru  Says that she has a history of osteoporosis  Has been noncompliant  Encouraged to get DEXA scan  Will refer to Rheumatology to discuss treatment options

## 2021-01-15 NOTE — PROGRESS NOTES
Virtual Regular Visit      Assessment/Plan:    Problem List Items Addressed This Visit        Digestive    Liver disease    Relevant Orders    Comprehensive metabolic panel       Musculoskeletal and Integument    Localized osteoporosis with current pathological fracture     DEXA ordered in 2019, patient did not followthru  Says that she has a history of osteoporosis  Has been noncompliant  Encouraged to get DEXA scan  Will refer to Rheumatology to discuss treatment options  Relevant Orders    Ambulatory referral to Rheumatology    DXA bone density spine hip and pelvis    TSH, 3rd generation with Free T4 reflex       Other    Other hyperlipidemia     Suggested metabolic labs to fup on chronic medical issues         Relevant Orders    Lipid panel    TSH, 3rd generation with Free T4 reflex    Screen for colon cancer    Relevant Orders    Cologuard    Anemia     Patient did not fup with hematology  CBC ordered  Patient advised to contact hematology  Relevant Orders    CBC and differential    TSH, 3rd generation with Free T4 reflex    Left foot pain - Primary     In cam boot  Unable to manage chores at home  Is requesting help for social support  Will refer to complex care team, so patient can be given options            Relevant Orders    Ambulatory referral to Rheumatology      Other Visit Diagnoses     Visit for screening mammogram        Relevant Orders    Mammo screening bilateral w 3d & cad    Screening for thyroid disorder        Relevant Orders    TSH, 3rd generation with Free T4 reflex               Reason for visit is   Chief Complaint   Patient presents with    Virtual Regular Visit        Encounter provider Diamond Christopher MD    Provider located at [address] [place] [id number]      Recent Visits  Date Type Provider Dept   01/14/21 Telephone Diamond Christopher MD [facility]   Showing recent visits within past 7 days and meeting all other requirements     Today's Visits  Date Type Provider Dept   01/15/21 Telemedicine Sera Meraz MD [facility]   Showing today's visits and meeting all other requirements     Future Appointments  No visits were found meeting these conditions  Showing future appointments within next 150 days and meeting all other requirements        The patient was identified by name and date of birth  Ana Nieves was informed that this is a telemedicine visit and that the visit is being conducted through [facility] [place] [place] and patient was informed that this is a secure, HIPAA-compliant platform  She agrees to proceed     My office door was closed  No one else was in the room  She acknowledged consent and understanding of privacy and security of the video platform  The patient has agreed to participate and understands they can discontinue the visit at any time  Patient is aware this is a billable service  Subjective  Ana Nieves is a 61 y o  female    HPI   Patient following up from recent visit to the ER/podiatrist   Diagnosed to have stress/ compression fracture of the right calcaneus,   After a fall on 12/30/2020  Evaluated by podiatrist   Lanette Mcallister to start treatment for osteoporosis  Patient was advised to DEXA scan in 2018 but patient was unable to go  Patient will need a more recent DEXA scan to get and insurance approval for any osteoporosis treatment  Since patient is in Cam boot, is unable to manage usual chores at home  Is requesting information about different agencies that might be available for home help  History significant for anemia, thrombocytopenia  CBC from November 2019  Patient was advised to follow-up with hematologist but she was unable to do so  Says that her family needs supersede her health needs  Patient requesting new script for dexa scan, and labs  These will be mailed to her today         Past Medical History:   Diagnosis Date    Fracture     Hepatitis     Insomnia [id number] resolved    Osteoporosis     [id number] resolved    Pancreatitis     Seasonal allergies     Urine discoloration 11/11/2019    Vomiting 11/13/2019       Past Surgical History:   Procedure Laterality Date    IR BIOPSY BONE MARROW  11/14/2019    TUBAL LIGATION         Current Outpatient Medications   Medication Sig Dispense Refill    bisacodyl (DULCOLAX) 5 mg EC tablet Take 2 tablets (10 mg total) by mouth once for 1 dose Per office instructions 2 tablet 0    Cholecalciferol (VITAMIN D PO) Take by mouth      Multiple Vitamin (MULTI-VITAMIN DAILY) TABS Take by mouth      sertraline (ZOLOFT) 50 mg tablet Take 1 tablet (50 mg total) by mouth daily (Patient not taking: Reported on 1/13/2021) 30 tablet 0     No current facility-administered medications for this visit  No Known Allergies    Review of Systems   Constitutional: Negative  HENT: Negative  Eyes: Negative  Respiratory: Negative  Cardiovascular: Negative  Gastrointestinal: Negative  Endocrine: Negative  Genitourinary: Negative  Musculoskeletal: Negative  Rt foot pain   Skin: Negative  Neurological: Negative  Psychiatric/Behavioral: Negative  Video Exam    There were no vitals filed for this visit  Physical Exam  Constitutional:       General: She is not in acute distress  Pulmonary:      Effort: Pulmonary effort is normal  No respiratory distress  Neurological:      Mental Status: She is alert and oriented to person, place, and time  Psychiatric:         Behavior: Behavior normal          Thought Content: Thought content normal          Judgment: Judgment normal           I spent 25 minutes with patient today in which greater than 50% of the time was spent in counseling/coordination of care regarding Reviewing labs, hospital records  Management of symptoms  VIRTUAL VISIT DISCLAIMER    Venessa Loya acknowledges that she has consented to an online visit or consultation   She understands that the online visit is based solely on information provided by her, and that, in the absence of a face-to-face physical evaluation by the physician, the diagnosis she receives is both limited and provisional in terms of accuracy and completeness  This is not intended to replace a full medical face-to-face evaluation by the physician  Nacho Trejo understands and accepts these terms

## 2021-01-15 NOTE — TELEPHONE ENCOUNTER
Patient called regarding her visit today  She advised she forget to request a refill of sertraline  Wanted something for the pain she is having in her foot

## 2021-01-15 NOTE — PROGRESS NOTES
Pt had recent calcaneous stress fracture, now wearing a CAM boot  She is inquiring about assistance with personal care  Pt referred to [facility] and [address] [place] [address] for assistance  Pt appreciative for information  Will follow up in one week  Provided my name and contact information if additional assistance is needed

## 2021-01-15 NOTE — ASSESSMENT & PLAN NOTE
In cam boot  Unable to manage chores at home  Is requesting help for social support  Will refer to complex care team, so patient can be given options

## 2021-01-18 NOTE — TELEPHONE ENCOUNTER
Please advise patient that she will need metabolic labs  [facility] x 2019  I did call in refill for her

## 2021-01-26 DIAGNOSIS — F41.8 DEPRESSION WITH ANXIETY: ICD-10-CM

## 2021-01-27 NOTE — TELEPHONE ENCOUNTER
Please contact patient  She is due for fup with labs  Please inform her that  Zoloft does put her at risk of bleeding, she has abnormal CBC  She also needs to fup with hematology, who is trying to reach to her  I can call in few pills if she needs refill  Please let me know

## 2021-01-29 ENCOUNTER — PATIENT OUTREACH (OUTPATIENT)
Dept: FAMILY MEDICINE CLINIC | Facility: CLINIC | Age: 61
End: 2021-01-29

## 2021-03-11 ENCOUNTER — OFFICE VISIT (OUTPATIENT)
Dept: URGENT CARE | Facility: CLINIC | Age: 61
End: 2021-03-11
Payer: COMMERCIAL

## 2021-03-11 ENCOUNTER — APPOINTMENT (OUTPATIENT)
Dept: RADIOLOGY | Facility: CLINIC | Age: 61
End: 2021-03-11
Payer: COMMERCIAL

## 2021-03-11 VITALS
DIASTOLIC BLOOD PRESSURE: 60 MMHG | TEMPERATURE: 98.3 F | RESPIRATION RATE: 16 BRPM | WEIGHT: 132 LBS | OXYGEN SATURATION: 95 % | BODY MASS INDEX: 20.72 KG/M2 | HEART RATE: 103 BPM | HEIGHT: 67 IN | SYSTOLIC BLOOD PRESSURE: 106 MMHG

## 2021-03-11 DIAGNOSIS — M54.50 ACUTE RIGHT-SIDED LOW BACK PAIN WITHOUT SCIATICA: Primary | ICD-10-CM

## 2021-03-11 DIAGNOSIS — M25.551 HIP PAIN, ACUTE, RIGHT: ICD-10-CM

## 2021-03-11 DIAGNOSIS — M79.661 PAIN OF RIGHT LOWER LEG: ICD-10-CM

## 2021-03-11 DIAGNOSIS — M54.50 ACUTE RIGHT-SIDED LOW BACK PAIN WITHOUT SCIATICA: ICD-10-CM

## 2021-03-11 PROCEDURE — 72100 X-RAY EXAM L-S SPINE 2/3 VWS: CPT

## 2021-03-11 PROCEDURE — G0383 LEV 4 HOSP TYPE B ED VISIT: HCPCS | Performed by: NURSE PRACTITIONER

## 2021-03-11 PROCEDURE — 73590 X-RAY EXAM OF LOWER LEG: CPT

## 2021-03-11 PROCEDURE — 73502 X-RAY EXAM HIP UNI 2-3 VIEWS: CPT

## 2021-03-11 RX ORDER — HYDROCODONE BITARTRATE AND ACETAMINOPHEN 5; 325 MG/1; MG/1
1 TABLET ORAL EVERY 6 HOURS PRN
Qty: 10 TABLET | Refills: 0 | Status: SHIPPED | OUTPATIENT
Start: 2021-03-11 | End: 2021-03-23 | Stop reason: HOSPADM

## 2021-03-11 NOTE — PATIENT INSTRUCTIONS
--Initial read of x-rays showing possible fibular fracture (mid shaft) and T12 fracture  Will call with final results when obtained (anticipate next 2-12 hours)  --Wear CAM boot, minimize weight bearing, use crutches/wheelchair  --Vicodin as needed for breakthrough/severe pain  --Follow-up with ortho in the next 5-7 days  Contact us or PCP tomorrow if unable to secure timely appointment  --Follow-up with PCP in the next 3-5 days  Get blood work done prior  --Go to ER for increased bruising, pain, recurrent falls

## 2021-03-11 NOTE — PROGRESS NOTES
[address]        NAME: Isaiah Pollock is a 64 y o  female  : 1960    MRN: [id number]  DATE: 2021  TIME: 6:33 PM    Assessment and Plan   Acute right-sided low back pain without sciatica [M54 5]  1  Acute right-sided low back pain without sciatica  XR spine lumbar 2 or 3 views injury    HYDROcodone-acetaminophen (NORCO) 5-325 mg per tablet   2  Hip pain, acute, right  XR hip/pelv 2-3 vws right if performed   3  Pain of right lower leg  XR tibia fibula 2 vw right    Ambulatory referral to Orthopedic Surgery     --Has CAM boot and walker at home  Declining boot, crutches from us   here to drive her  Patient Instructions     --Initial read of x-rays showing possible fibular fracture (mid shaft) and T12 fracture  Will call with final results when obtained (anticipate next 2-12 hours)  --Wear CAM boot, minimize weight bearing, use crutches/wheelchair  No driving    --Vicodin as needed for breakthrough/severe pain  --Follow-up with ortho in the next 5-7 days  Contact us or PCP tomorrow if unable to secure timely appointment  --Follow-up with PCP in the next 3-5 days  Get blood work done prior (ronda  repeat CBC)  --Go to ER for increased bruising, pain, recurrent falls  Chief Complaint     Chief Complaint   Patient presents with    Fall     fell down flight of stairs two weeks ago  Was feeling better until she carried groceries one week ago  Now having worse lower to mid back pain and L leg pain  Scott Rincon History of Present Illness         Here  with complaints of right sided lower back and leg pain as the result of fall 2 weeks ago  Was going up wooden flight of stairs at home  She reached top step and went to grab table for support, because of her baseline right foot/ankle pain (since injuring 2020)  Table unfortunately "gave out" and she fell down flight of stairs (estimated 10-12)  Denies hitting head or losing consciousness     Pain in lower back, R > L leg afterwards  Bruising noted, but no bleeding  Felt better over the course of a week, then, 5 days ago was carrying heavy bags at the supermarket and felt abrupt increase in right lower back pain radiating to buttock/hip  Has continued to be quite painful since, mainly with weight bearing  Left leg now with minimal/no pain  Right shin , but improved from previous  Baseline right foot/ankle pain  No worse  Seeing ortho for this  No numbness/weakness, acute bowel/bladder changes  No chest pain, shortness of breath  Rates pain 0/10 at present  10/10 weight bearing  Getting around with walker  Has CAM boot at home which she has been using on her right foot  Some ice  No analgesics taken  History of osteoporosis, thrombocytopenia with no recent labs  JW--no transfusions  Review of Systems   Review of Systems   Constitutional: Negative for fever  Gastrointestinal: Negative for abdominal pain  Musculoskeletal:        Per HPI   Neurological: Negative for numbness           Current Medications       Current Outpatient Medications:     Cholecalciferol (VITAMIN D PO), Take by mouth, Disp: , Rfl:     Multiple Vitamin (MULTI-VITAMIN DAILY) TABS, Take by mouth, Disp: , Rfl:     sertraline (ZOLOFT) 50 mg tablet, Take 1 tablet (50 mg total) by mouth daily, Disp: 30 tablet, Rfl: 0    bisacodyl (DULCOLAX) 5 mg EC tablet, Take 2 tablets (10 mg total) by mouth once for 1 dose Per office instructions, Disp: 2 tablet, Rfl: 0    HYDROcodone-acetaminophen (NORCO) 5-325 mg per tablet, Take 1 tablet by mouth every 6 (six) hours as needed for painMax Daily Amount: 4 tablets, Disp: 10 tablet, Rfl: 0    Current Allergies     Allergies as of 03/11/2021    (No Known Allergies)            The following portions of the patient's history were reviewed and updated as appropriate: allergies, current medications, past family history, past medical history, past social history, past surgical history and problem list      Past Medical History:   Diagnosis Date    Fracture     Hepatitis     Insomnia     10mar2016 resolved    Osteoporosis     14jun2016 resolved    Pancreatitis     Seasonal allergies     Urine discoloration 11/11/2019    Vomiting 11/13/2019       Past Surgical History:   Procedure Laterality Date    IR BIOPSY BONE MARROW  11/14/2019    TUBAL LIGATION         Family History   Problem Relation Age of Onset    Anxiety disorder Mother     Depression Mother     No Known Problems Father     Cancer Paternal Grandmother          Medications have been verified  Objective   /60 (Patient Position: Sitting)   Pulse 103   Temp 98 3 °F (36 8 °C)   Resp 16   Ht 5' 7" (1 702 m)   Wt 59 9 kg (132 lb)   SpO2 95%   BMI 20 67 kg/m²   No LMP recorded  Patient is postmenopausal        Physical Exam     Physical Exam  Constitutional:       General: She is not in acute distress  Appearance: She is not ill-appearing, toxic-appearing or diaphoretic  HENT:      Head: Normocephalic and atraumatic  Cardiovascular:      Rate and Rhythm: Normal rate and regular rhythm  Pulmonary:      Effort: Pulmonary effort is normal       Breath sounds: Normal breath sounds  Musculoskeletal:         General: Swelling, tenderness and signs of injury present  No deformity  Comments: Lower lumbar spine with right PVM tenderness extending to SI joint  Minimal tenderness of axial spine  No visualized or palpable abnormalities  Spine ROM decreased 50% rotation with pain  Right hip with lateral tenderness, no visualized or palpable abnormalities  ROM decreased 25% internal, external rotation, flexion, with mild pain at limits  Negative SLR  Right LE non-tender with normal appearance except for mild tenderness/bruising/swelling of mid anterior calf  No knee or thigh tenderness  Right ankle with mild swelling, bruising, lateral tenderness--baseline per patient     Sensation, pulses intact to foot  Left inner thigh with large minimally tender contusion  Left anterior calf with mild contusion, largely non-tender  Bears weight with significant difficulty  Skin:     General: Skin is warm and dry  Findings: Bruising present  Neurological:      General: No focal deficit present  Mental Status: She is alert  Motor: No weakness     Psychiatric:         Mood and Affect: Mood normal

## 2021-03-12 PROBLEM — M43.9 COMPRESSION DEFORMITY OF VERTEBRA: Status: ACTIVE | Noted: 2021-03-12

## 2021-03-18 ENCOUNTER — APPOINTMENT (EMERGENCY)
Dept: CT IMAGING | Facility: HOSPITAL | Age: 61
DRG: 552 | End: 2021-03-18
Payer: COMMERCIAL

## 2021-03-18 ENCOUNTER — HOSPITAL ENCOUNTER (INPATIENT)
Facility: HOSPITAL | Age: 61
LOS: 5 days | Discharge: HOME HEALTH | DRG: 552 | End: 2021-03-23
Attending: EMERGENCY MEDICINE | Admitting: SURGERY
Payer: COMMERCIAL

## 2021-03-18 ENCOUNTER — APPOINTMENT (EMERGENCY)
Dept: RADIOLOGY | Facility: HOSPITAL | Age: 61
DRG: 552 | End: 2021-03-18
Payer: COMMERCIAL

## 2021-03-18 ENCOUNTER — APPOINTMENT (INPATIENT)
Dept: CT IMAGING | Facility: HOSPITAL | Age: 61
DRG: 552 | End: 2021-03-18
Payer: COMMERCIAL

## 2021-03-18 DIAGNOSIS — M79.672 LEFT FOOT PAIN: ICD-10-CM

## 2021-03-18 DIAGNOSIS — F41.8 DEPRESSION WITH ANXIETY: ICD-10-CM

## 2021-03-18 DIAGNOSIS — K76.9 LIVER DISEASE: ICD-10-CM

## 2021-03-18 DIAGNOSIS — S32.000A COMPRESSION FRACTURE OF LUMBAR VERTEBRA, INITIAL ENCOUNTER, UNSPECIFIED LUMBAR VERTEBRAL LEVEL: ICD-10-CM

## 2021-03-18 DIAGNOSIS — R60.0 SACRAL EDEMA: ICD-10-CM

## 2021-03-18 DIAGNOSIS — S82.409A CLOSED FRACTURE OF SHAFT OF FIBULA: ICD-10-CM

## 2021-03-18 DIAGNOSIS — S92.001A CALCANEUS FRACTURE, RIGHT: ICD-10-CM

## 2021-03-18 DIAGNOSIS — M43.9 COMPRESSION DEFORMITY OF VERTEBRA: ICD-10-CM

## 2021-03-18 DIAGNOSIS — S82.401A CLOSED FRACTURE OF SHAFT OF RIGHT FIBULA, UNSPECIFIED FRACTURE MORPHOLOGY, INITIAL ENCOUNTER: Primary | ICD-10-CM
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
## 2021-03-18 DIAGNOSIS — K86.2 PANCREATIC CYST: ICD-10-CM

## 2021-03-18 DIAGNOSIS — S32.009A CLOSED FRACTURE OF TRANSVERSE PROCESS OF LUMBAR VERTEBRA, INITIAL ENCOUNTER (HCC): ICD-10-CM

## 2021-03-18 DIAGNOSIS — F10.10 ETOH ABUSE: ICD-10-CM

## 2021-03-18 LAB
ANION GAP SERPL CALCULATED.3IONS-SCNC: 6 MMOL/L (ref 4–13)
APTT PPP: 31 SECONDS (ref 23–37)
BASOPHILS # BLD AUTO: 0.03 THOUSANDS/ΜL (ref 0–0.1)
BASOPHILS NFR BLD AUTO: 1 % (ref 0–1)
BUN SERPL-MCNC: 16 MG/DL (ref 5–25)
CALCIUM SERPL-MCNC: 8 MG/DL (ref 8.3–10.1)
CHLORIDE SERPL-SCNC: 109 MMOL/L (ref 100–108)
CO2 SERPL-SCNC: 28 MMOL/L (ref 21–32)
CREAT SERPL-MCNC: 0.61 MG/DL (ref 0.6–1.3)
EOSINOPHIL # BLD AUTO: 0.06 THOUSAND/ΜL (ref 0–0.61)
EOSINOPHIL NFR BLD AUTO: 3 % (ref 0–6)
ERYTHROCYTE [DISTWIDTH] IN BLOOD BY AUTOMATED COUNT: 20.6 % (ref 11.6–15.1)
ETHANOL SERPL-MCNC: 406 MG/DL (ref 0–3)
GFR SERPL CREATININE-BSD FRML MDRD: 98 ML/MIN/1.73SQ M
GLUCOSE SERPL-MCNC: 114 MG/DL (ref 65–140)
HCT VFR BLD AUTO: 28.2 % (ref 34.8–46.1)
HGB BLD-MCNC: 8.6 G/DL (ref 11.5–15.4)
IMM GRANULOCYTES # BLD AUTO: 0.02 THOUSAND/UL (ref 0–0.2)
IMM GRANULOCYTES NFR BLD AUTO: 1 % (ref 0–2)
INR PPP: 1.2 (ref 0.84–1.19)
LYMPHOCYTES # BLD AUTO: 1.52 THOUSANDS/ΜL (ref 0.6–4.47)
LYMPHOCYTES NFR BLD AUTO: 68 % (ref 14–44)
MCH RBC QN AUTO: 30.5 PG (ref 26.8–34.3)
MCHC RBC AUTO-ENTMCNC: 30.5 G/DL (ref 31.4–37.4)
MCV RBC AUTO: 100 FL (ref 82–98)
MONOCYTES # BLD AUTO: 0.21 THOUSAND/ΜL (ref 0.17–1.22)
MONOCYTES NFR BLD AUTO: 9 % (ref 4–12)
NEUTROPHILS # BLD AUTO: 0.39 THOUSANDS/ΜL (ref 1.85–7.62)
NEUTS SEG NFR BLD AUTO: 18 % (ref 43–75)
NRBC BLD AUTO-RTO: 0 /100 WBCS
PLATELET # BLD AUTO: 55 THOUSANDS/UL (ref 149–390)
PMV BLD AUTO: 10.2 FL (ref 8.9–12.7)
POTASSIUM SERPL-SCNC: 3.9 MMOL/L (ref 3.5–5.3)
PROTHROMBIN TIME: 15.3 SECONDS (ref 11.6–14.5)
RBC # BLD AUTO: 2.82 MILLION/UL (ref 3.81–5.12)
SODIUM SERPL-SCNC: 143 MMOL/L (ref 136–145)
WBC # BLD AUTO: 2.23 THOUSAND/UL (ref 4.31–10.16)

## 2021-03-18 PROCEDURE — 70450 CT HEAD/BRAIN W/O DYE: CPT

## 2021-03-18 PROCEDURE — 96374 THER/PROPH/DIAG INJ IV PUSH: CPT

## 2021-03-18 PROCEDURE — 99222 1ST HOSP IP/OBS MODERATE 55: CPT | Performed by: ORTHOPAEDIC SURGERY

## 2021-03-18 PROCEDURE — 80048 BASIC METABOLIC PNL TOTAL CA: CPT | Performed by: EMERGENCY MEDICINE

## 2021-03-18 PROCEDURE — 27780 TREATMENT OF FIBULA FRACTURE: CPT | Performed by: ORTHOPAEDIC SURGERY

## 2021-03-18 PROCEDURE — 73700 CT LOWER EXTREMITY W/O DYE: CPT

## 2021-03-18 PROCEDURE — 85025 COMPLETE CBC W/AUTO DIFF WBC: CPT | Performed by: EMERGENCY MEDICINE

## 2021-03-18 PROCEDURE — G1004 CDSM NDSC: HCPCS

## 2021-03-18 PROCEDURE — 72220 X-RAY EXAM SACRUM TAILBONE: CPT

## 2021-03-18 PROCEDURE — 99223 1ST HOSP IP/OBS HIGH 75: CPT | Performed by: SURGERY

## 2021-03-18 PROCEDURE — 82077 ASSAY SPEC XCP UR&BREATH IA: CPT | Performed by: EMERGENCY MEDICINE

## 2021-03-18 PROCEDURE — 73630 X-RAY EXAM OF FOOT: CPT

## 2021-03-18 PROCEDURE — 74176 CT ABD & PELVIS W/O CONTRAST: CPT

## 2021-03-18 PROCEDURE — 99285 EMERGENCY DEPT VISIT HI MDM: CPT

## 2021-03-18 PROCEDURE — 99285 EMERGENCY DEPT VISIT HI MDM: CPT | Performed by: EMERGENCY MEDICINE

## 2021-03-18 PROCEDURE — 73590 X-RAY EXAM OF LOWER LEG: CPT

## 2021-03-18 PROCEDURE — 85610 PROTHROMBIN TIME: CPT | Performed by: PHYSICIAN ASSISTANT

## 2021-03-18 PROCEDURE — 73610 X-RAY EXAM OF ANKLE: CPT

## 2021-03-18 PROCEDURE — 36415 COLL VENOUS BLD VENIPUNCTURE: CPT | Performed by: EMERGENCY MEDICINE

## 2021-03-18 PROCEDURE — 85730 THROMBOPLASTIN TIME PARTIAL: CPT | Performed by: PHYSICIAN ASSISTANT

## 2021-03-18 RX ORDER — SENNOSIDES 8.6 MG
2 TABLET ORAL DAILY
Status: DISCONTINUED | OUTPATIENT
Start: 2021-03-18 | End: 2021-03-23 | Stop reason: HOSPADM

## 2021-03-18 RX ORDER — OXYCODONE HYDROCHLORIDE 5 MG/1
5 TABLET ORAL EVERY 4 HOURS PRN
Status: DISCONTINUED | OUTPATIENT
Start: 2021-03-18 | End: 2021-03-23 | Stop reason: HOSPADM

## 2021-03-18 RX ORDER — HYDROMORPHONE HCL/PF 1 MG/ML
0.5 SYRINGE (ML) INJECTION EVERY 4 HOURS PRN
Status: DISCONTINUED | OUTPATIENT
Start: 2021-03-18 | End: 2021-03-23 | Stop reason: HOSPADM

## 2021-03-18 RX ORDER — ONDANSETRON 2 MG/ML
4 INJECTION INTRAMUSCULAR; INTRAVENOUS EVERY 6 HOURS PRN
Status: DISCONTINUED | OUTPATIENT
Start: 2021-03-18 | End: 2021-03-23 | Stop reason: HOSPADM

## 2021-03-18 RX ORDER — OXYCODONE HYDROCHLORIDE 5 MG/1
2.5 TABLET ORAL EVERY 4 HOURS PRN
Status: DISCONTINUED | OUTPATIENT
Start: 2021-03-18 | End: 2021-03-23 | Stop reason: HOSPADM

## 2021-03-18 RX ORDER — PANTOPRAZOLE SODIUM 40 MG/1
40 TABLET, DELAYED RELEASE ORAL
Status: DISCONTINUED | OUTPATIENT
Start: 2021-03-19 | End: 2021-03-23 | Stop reason: HOSPADM

## 2021-03-18 RX ORDER — DOCUSATE SODIUM 100 MG/1
100 CAPSULE, LIQUID FILLED ORAL 2 TIMES DAILY
Status: DISCONTINUED | OUTPATIENT
Start: 2021-03-18 | End: 2021-03-23 | Stop reason: HOSPADM

## 2021-03-18 RX ADMIN — SERTRALINE HYDROCHLORIDE 50 MG: 50 TABLET ORAL at 17:32

## 2021-03-18 RX ADMIN — MORPHINE SULFATE 2 MG: 2 INJECTION, SOLUTION INTRAMUSCULAR; INTRAVENOUS at 13:47

## 2021-03-18 RX ADMIN — ENOXAPARIN SODIUM 30 MG: 30 INJECTION SUBCUTANEOUS at 21:47

## 2021-03-18 RX ADMIN — OXYCODONE HYDROCHLORIDE 5 MG: 5 TABLET ORAL at 21:49

## 2021-03-18 NOTE — CONSULTS
Consult Note - Orthopedics   Tequila Jones 64 y o  female MRN: [id number]  Unit/Bed#: ED 14 Encounter: [id number]      Assessment & Plan     Right nondisplaced midshaft fibular fracture  Possible right calcaneal injury after fall approximately two weeks ago  1  CT scan right foot to evaluate for possible calcaneal fracture  2  Nonweightbearing right lower extremity in posterior splint  3  Pain control  4  Orthopedics will follow    Chief Complaint     Right lower extremity pain    History of the Present Illness     Tequila Jones is a 64 y o  female seen in orthopedic consultation at the request of Zara Siemens, DO for evaluation of patient's right lower extremity  Patient sustained a fall down 12 steps approximately two weeks ago  Since then she knows progressively worsening pain in the right lower extremity and he will  She states the pain is now so severe she is unable to bear weight in in bili on the right lower extremity  Pain is also worse with movement of the ankle  She denies numbness and tingling  No fevers or chills  Prior to this incident she ambulated independently without the use of an assistive device  Physical Exam     /71 (BP Location: Right arm)   Pulse 64   Temp 98 °F (36 7 °C) (Oral)   Resp 18   SpO2 98%     Right lower extremity:  Splint in place  Skin intact  Tenderness to palpation midshaft fibula  No significant tenderness to palpation along the tibial spine  Compartments are soft and compressible  No tenderness to palpation medial and lateral malleoli  Tenderness to palpation calcaneus  Positive calcaneal squeeze test   Toes warm and mobile  Sensation intact distally  Eyes:  Anicteric sclerae  ENT:  Trachea midline  Lungs:  Clear to auscultation bilaterally  Cardiovascular:  Regular rate and rhythm with audible S1 and S2  Abdomen:  Soft and nontender  Lymphatic:  No palpable lymphadenopathy  Skin:  Intact without erythema    Neurologic:  Sensation grossly intact to light touch  Psychiatric:  Mood and affect are appropriate  Data Review     I have personally reviewed pertinent films in PACS, and my interpretation follows:    X-rays right tib-fib, right ankle, and right foot reveals a nondisplaced transverse midshaft fibula fracture  No other fractures are noted on x-ray  I have personally reviewed pertinent lab results  Lab Results   Component Value Date     06/14/2016    K 3 9 03/18/2021    K 3 8 06/14/2016     (H) 03/18/2021     06/14/2016    CO2 28 03/18/2021    CO2 25 06/14/2016    BUN 16 03/18/2021    BUN 6 (L) 06/14/2016    CREATININE 0 61 03/18/2021    CREATININE 0 50 06/14/2016    GLUCOSE 113 10/09/2014     Lab Results   Component Value Date    WBC 2 23 (L) 03/18/2021    WBC 4 9 06/14/2016    HGB 8 6 (L) 03/18/2021    HGB 11 2 (L) 06/14/2016    PLT 55 (L) 03/18/2021    PLT  06/14/2016     Review of the peripheral smear revealsdecreased numbers of platelets  PLT 88 (L) 06/14/2016     Lab Results   Component Value Date    INR 1 11 11/12/2019    INR 1 0 06/14/2016    PTT 31 11/12/2019    PTT 25 06/14/2016       Past Medical History:   Diagnosis Date    Fracture     Hepatitis     Hep A     Insomnia     10mar2016 resolved    Osteoporosis     14jun2016 resolved    Pancreatitis     Seasonal allergies     Urine discoloration 11/11/2019    Vomiting 11/13/2019       Past Surgical History:   Procedure Laterality Date    IR BIOPSY BONE MARROW  11/14/2019    TUBAL LIGATION  1985       No Known Allergies    No current facility-administered medications on file prior to encounter        Current Outpatient Medications on File Prior to Encounter   Medication Sig Dispense Refill    bisacodyl (DULCOLAX) 5 mg EC tablet Take 2 tablets (10 mg total) by mouth once for 1 dose Per office instructions 2 tablet 0    Cholecalciferol (VITAMIN D PO) Take by mouth      HYDROcodone-acetaminophen (NORCO) 5-325 mg per tablet Take 1 tablet by mouth every 6 (six) hours as needed for painMax Daily Amount: 4 tablets 10 tablet 0    Multiple Vitamin (MULTI-VITAMIN DAILY) TABS Take by mouth      sertraline (ZOLOFT) 50 mg tablet Take 1 tablet (50 mg total) by mouth daily 30 tablet 0       Social History     Tobacco Use    Smoking status: Never Smoker    Smokeless tobacco: Never Used   Substance Use Topics    Alcohol use: Yes     Alcohol/week: 7 0 standard drinks     Types: 7 Cans of beer per week     Frequency: 4 or more times a week     Drinks per session: 1 or 2     Binge frequency: Never    Drug use: No       Family History   Problem Relation Age of Onset    Anxiety disorder Mother     Depression Mother     No Known Problems Father     Cancer Paternal Grandmother        Review of Systems     As stated in the HPI  All other systems were reviewed and are negative

## 2021-03-18 NOTE — H&P
H&P Exam - Trauma   Sharon Sprague 64 y o  female MRN: [id number]  Unit/Bed#: ED 14 Encounter: [id number]    Assessment/Plan   Trauma Alert: Evaluation  Model of Arrival: Ambulance  Trauma Team: Attending Kyra Smallwood  and [address] [place] [address]  Consultants: Orthopaedics     Trauma Active Problems:   63 yo F s/p fall 12 steps 2-3 weeks ago without LOC with:   R fibula fx  Possible R tibia fx  Possible R calcaneal fx   Gait instability  Acute alcohol intoxication   Chronic alcohol use  Scalp laceration  Multiple contusions      Trauma Plan:   Admit to trauma  Ortho consult  CIWA protocol  Serax TID  Pain control  NWB RLE    Chief Complaint: "I can't walk"    History of Present Illness   HPI:  Sharon Sprague is a 64 y o  female with history of depression with anxiety, daily alcohol use, chronic anemia, stress fracture right calcaneous and frequent falls presents to ED for evaluation complaining of low back pain and inability to ambulate secondary to R leg pain and back pain from a fall 2-3 weeks ago  No pain when having a BM  Denies LOC at time of fall  Patient treated by podiatry for foot with angel  She says, " I had a couple beers today to take the edge off "    Mechanism:Fall    Review of Systems   Constitutional: Positive for activity change  Negative for appetite change, chills and fever  HENT: Negative for facial swelling and hearing loss  Eyes: Negative for photophobia and pain  Respiratory: Negative for chest tightness and shortness of breath  Cardiovascular: Positive for palpitations and leg swelling  Negative for chest pain  Gastrointestinal: Positive for vomiting  Negative for abdominal distention, abdominal pain, blood in stool, constipation and nausea  Genitourinary: Negative for difficulty urinating  Musculoskeletal: Positive for arthralgias, back pain, gait problem and joint swelling  Negative for neck pain and neck stiffness  Skin: Negative for color change and wound     Neurological: Positive for dizziness  Negative for seizures, facial asymmetry and light-headedness  Hematological: Bruises/bleeds easily  Psychiatric/Behavioral: Negative for agitation  12-point, complete review of systems was reviewed and negative except as stated above  Historical Information        Past Medical History:   Diagnosis Date    Fracture     Hepatitis     Hep A     Insomnia     10mar2016 resolved    Osteoporosis     14jun2016 resolved    Pancreatitis     Seasonal allergies     Urine discoloration 11/11/2019    Vomiting 11/13/2019     Past Surgical History:   Procedure Laterality Date    IR BIOPSY BONE MARROW  11/14/2019    TUBAL LIGATION  1985     Social History   Social History     Substance and Sexual Activity   Alcohol Use Yes    Alcohol/week: 7 0 standard drinks    Types: 7 Cans of beer per week    Frequency: 4 or more times a week    Drinks per session: 1 or 2    Binge frequency: Never     Social History     Substance and Sexual Activity   Drug Use No     Social History     Tobacco Use   Smoking Status Never Smoker   Smokeless Tobacco Never Used     E-Cigarette/Vaping    E-Cigarette Use Never User      E-Cigarette/Vaping Substances    Nicotine No     THC No     CBD No     Flavoring No     Other No     Unknown No      Immunization History   Administered Date(s) Administered    INFLUENZA 01/20/2009, 10/13/2015, 09/16/2016, 11/06/2018, 10/21/2020    Influenza, seasonal, injectable 10/23/2014     Last Tetanus: unknown  Family History: Non-contributory     Meds/Allergies      Prior to Admission Medications   Prescriptions Last Dose Informant Patient Reported? Taking?    Cholecalciferol (VITAMIN D PO)   Self Yes No   Sig: Take by mouth   HYDROcodone-acetaminophen (NORCO) 5-325 mg per tablet     No No   Sig: Take 1 tablet by mouth every 6 (six) hours as needed for painMax Daily Amount: 4 tablets   Multiple Vitamin (MULTI-VITAMIN DAILY) TABS   Self Yes No   Sig: Take by mouth sertraline (ZOLOFT) 50 mg tablet   Self No No   Sig: Take 1 tablet (50 mg total) by mouth daily             No Known Allergies      PHYSICAL EXAM     Objective   Vitals:   First set: Temperature: 98 °F (36 7 °C) (03/18/21 1234)  Pulse: 78 (03/18/21 1229)  Respirations: 18 (03/18/21 1229)  Blood Pressure: 137/64 (03/18/21 1229)    Primary Survey:   (A) Airway: intact  (B) Breathing: CTAB  (C) Circulation: Pulses:   normal  (D) Disabliity:  GCS Total:  15  (E) Expose:  Completed    Secondary Survey: (Click on Physical Exam tab above)  Physical Exam  Vitals signs reviewed  Constitutional:       General: She is not in acute distress  Appearance: Normal appearance  She is normal weight  She is not ill-appearing  HENT:      Head: Normocephalic  Comments: 1 cm lac posterior right scalp healing well  No sutures or staples     Right Ear: Tympanic membrane, ear canal and external ear normal       Left Ear: Tympanic membrane, ear canal and external ear normal       Nose: Nose normal       Mouth/Throat:      Mouth: Mucous membranes are moist       Pharynx: Oropharynx is clear  Eyes:      General: No scleral icterus  Extraocular Movements: Extraocular movements intact  Conjunctiva/sclera: Conjunctivae normal       Pupils: Pupils are equal, round, and reactive to light  Neck:      Musculoskeletal: Normal range of motion and neck supple  Cardiovascular:      Rate and Rhythm: Normal rate and regular rhythm  Pulses: Normal pulses  Heart sounds: Normal heart sounds  Pulmonary:      Effort: Pulmonary effort is normal  No respiratory distress  Breath sounds: Normal breath sounds  Chest:      Chest wall: No tenderness  Abdominal:      General: Abdomen is flat  Bowel sounds are normal  There is no distension  Palpations: Abdomen is soft  Comments: 1 cm skin tear at midline lower abdomen   Genitourinary:     General: Normal vulva  Musculoskeletal: Normal range of motion  General: Tenderness (RLE) and signs of injury present  No swelling or deformity  Right lower leg: No edema  Left lower leg: No edema  Comments: RLE in posterior splint, toes pink and warm  FAROM without pain BUE and LLE and RLE at hip and knee   Skin:     General: Skin is warm and dry  Capillary Refill: Capillary refill takes less than 2 seconds  Coloration: Skin is not jaundiced  Findings: Bruising (multiple contusions BLE and BUE) present  Neurological:      General: No focal deficit present  Mental Status: She is alert and oriented to person, place, and time  Cranial Nerves: No cranial nerve deficit  Sensory: Sensory deficit (chronic- R great toe) present  Psychiatric:         Mood and Affect: Mood normal          Behavior: Behavior normal          Invasive Devices     Peripheral Intravenous Line            Peripheral IV 03/18/21 Right Antecubital less than 1 day                Lab Results:   BMP/CMP:   Lab Results   Component Value Date    SODIUM 143 03/18/2021    K 3 9 03/18/2021     (H) 03/18/2021    CO2 28 03/18/2021    BUN 16 03/18/2021    CREATININE 0 61 03/18/2021    CALCIUM 8 0 (L) 03/18/2021    EGFR 98 03/18/2021   , CBC:   Lab Results   Component Value Date    WBC 2 23 (L) 03/18/2021    HGB 8 6 (L) 03/18/2021    HCT 28 2 (L) 03/18/2021     (H) 03/18/2021    PLT 55 (L) 03/18/2021    MCH 30 5 03/18/2021    MCHC 30 5 (L) 03/18/2021    RDW 20 6 (H) 03/18/2021    MPV 10 2 03/18/2021    NRBC 0 03/18/2021    and EtOH:   Lab Results   Component Value Date    ETOH 406 (H) 03/18/2021     Imaging/EKG Studies: Results: I have personally reviewed pertinent reports  Other Studies:    CTH: No acute intracranial abnormality  R ankle: P  R foot: P  R tib/fib: Nondisplaced midshaft right fibula fracture  Doubt but cannot exclude proximal tibia longitudinal nondisplaced cortical fracture    Sacrum/coccyx: P      Code Status: Level 1 - Full Code  Advance Directive and Living Will:      Power of :    POLST:

## 2021-03-18 NOTE — ED PROVIDER NOTES
History  Chief Complaint   Patient presents with    Fall     Pt reports falling down 12 steps about 2 weeks ago, hit head and most of body during the fall, marlene LOC, reports constant back pain (tailbone), (-) thinners     35-year-old female comes in for evaluation of fall  Patient states approximately 2 and half weeks ago she was walking to the top of the stairs misstepped and ended up falling backwards down approximately 12 hardwood stairs  Since that time she has been complaining of lower back pain and right-sided shin pain ankle pain and foot pain  Patient states she is now the point where she has so much pain she cannot walk  Patient denies striking her head or any neck pain  She also denies any midline back pain anywhere but her sacrum  Patient denies any nausea vomiting fever chills chest pain shortness of breath  Patient is a known chronic alcoholic  Patient admits to drinking alcohol today        History provided by:  Patient and medical records   used: No    Fall  Mechanism of injury: fall    Injury location:  Head/neck, pelvis, leg and foot  Head/neck injury location:  Head  Pelvic injury location:  L buttock and R buttock  Leg injury location:  R ankle, R lower leg and R foot  Foot injury location:  R ankle and R foot  Incident location:  Home  Time since incident:  14 days  Arrived directly from scene: no    Fall:     Fall occurred:  Down stairs    Height of fall:  Twelve stairs    Impact surface:  Hard floor    Point of impact:  Unable to specify    Entrapped after fall: no    Protective equipment: none    Suspicion of alcohol use: yes    Suspicion of drug use: no    Tetanus status:  Unknown  Prior to arrival data:     Bystander interventions:  None    Patient ambulatory at scene: yes      Blood loss:  None    Responsiveness at scene:  Alert    Orientation at scene:  Person, place, situation and time    Loss of consciousness: no      Amnesic to event: no      Airway interventions:  None  Associated symptoms: no abdominal pain, no back pain, no chest pain, no seizures and no vomiting        Prior to Admission Medications   Prescriptions Last Dose Informant Patient Reported? Taking? Cholecalciferol (VITAMIN D PO)  Self Yes No   Sig: Take by mouth   HYDROcodone-acetaminophen (NORCO) 5-325 mg per tablet   No No   Sig: Take 1 tablet by mouth every 6 (six) hours as needed for painMax Daily Amount: 4 tablets   Multiple Vitamin (MULTI-VITAMIN DAILY) TABS  Self Yes No   Sig: Take by mouth   bisacodyl (DULCOLAX) 5 mg EC tablet   No No   Sig: Take 2 tablets (10 mg total) by mouth once for 1 dose Per office instructions   sertraline (ZOLOFT) 50 mg tablet  Self No No   Sig: Take 1 tablet (50 mg total) by mouth daily      Facility-Administered Medications: None       Past Medical History:   Diagnosis Date    Fracture     Hepatitis     Insomnia     10mar2016 resolved    Osteoporosis     14jun2016 resolved    Pancreatitis     Seasonal allergies     Urine discoloration 11/11/2019    Vomiting 11/13/2019       Past Surgical History:   Procedure Laterality Date    IR BIOPSY BONE MARROW  11/14/2019    TUBAL LIGATION         Family History   Problem Relation Age of Onset    Anxiety disorder Mother     Depression Mother     No Known Problems Father     Cancer Paternal Grandmother      I have reviewed and agree with the history as documented  E-Cigarette/Vaping    E-Cigarette Use Never User      E-Cigarette/Vaping Substances    Nicotine No     THC No     CBD No     Flavoring No     Other No     Unknown No      Social History     Tobacco Use    Smoking status: Never Smoker    Smokeless tobacco: Never Used   Substance Use Topics    Alcohol use: No    Drug use: No       Review of Systems   Constitutional: Negative for chills and fever  HENT: Negative for ear pain and sore throat  Eyes: Negative for pain and visual disturbance     Respiratory: Negative for cough and shortness of breath  Cardiovascular: Negative for chest pain and palpitations  Gastrointestinal: Negative for abdominal pain and vomiting  Genitourinary: Negative for dysuria and hematuria  Musculoskeletal: Negative for arthralgias and back pain  Skin: Negative for color change and rash  Neurological: Negative for seizures and syncope  All other systems reviewed and are negative  Physical Exam  Physical Exam  Vitals signs and nursing note reviewed  Constitutional:       Appearance: Normal appearance  She is well-developed  She is not toxic-appearing  HENT:      Head: Normocephalic and atraumatic  Right Ear: Tympanic membrane and external ear normal       Left Ear: Tympanic membrane and external ear normal       Nose: Nose normal  No nasal deformity or rhinorrhea  Mouth/Throat:      Dentition: Normal dentition  Pharynx: Uvula midline  Eyes:      General: Lids are normal          Right eye: No discharge  Left eye: No discharge  Conjunctiva/sclera: Conjunctivae normal       Pupils: Pupils are equal, round, and reactive to light  Neck:      Musculoskeletal: Normal range of motion and neck supple  Vascular: No carotid bruit or JVD  Trachea: Trachea normal    Cardiovascular:      Rate and Rhythm: Normal rate and regular rhythm  No extrasystoles are present  Chest Wall: PMI is not displaced  Pulses: Normal pulses  Pulmonary:      Effort: Pulmonary effort is normal  No accessory muscle usage or respiratory distress  Breath sounds: Normal breath sounds  No wheezing, rhonchi or rales  Abdominal:      General: Bowel sounds are normal       Palpations: Abdomen is soft  Abdomen is not rigid  There is no mass  Tenderness: There is no abdominal tenderness  There is no guarding or rebound  Musculoskeletal:      Right shoulder: She exhibits normal range of motion, no bony tenderness, no swelling and no deformity        Right ankle: She exhibits decreased range of motion, swelling and ecchymosis  Tenderness  Cervical back: Normal  She exhibits normal range of motion, no tenderness, no bony tenderness and no deformity  Lumbar back: She exhibits tenderness  Back:       Right lower leg: She exhibits tenderness and swelling  Legs:    Lymphadenopathy:      Cervical: No cervical adenopathy  Skin:     General: Skin is warm and dry  Findings: No rash  Neurological:      Mental Status: She is alert and oriented to person, place, and time  GCS: GCS eye subscore is 4  GCS verbal subscore is 5  GCS motor subscore is 6  Cranial Nerves: No cranial nerve deficit  Sensory: No sensory deficit  Deep Tendon Reflexes: Reflexes are normal and symmetric     Psychiatric:         Speech: Speech normal          Behavior: Behavior normal          Vital Signs  ED Triage Vitals   Temperature Pulse Respirations Blood Pressure SpO2   03/18/21 1234 03/18/21 1229 03/18/21 1229 03/18/21 1229 03/18/21 1229   98 °F (36 7 °C) 78 18 137/64 97 %      Temp Source Heart Rate Source Patient Position - Orthostatic VS BP Location FiO2 (%)   03/18/21 1234 03/18/21 1229 03/18/21 1229 03/18/21 1229 --   Oral Monitor Sitting Right arm       Pain Score       03/18/21 1347       Worst Possible Pain           Vitals:    03/18/21 1229   BP: 137/64   Pulse: 78   Patient Position - Orthostatic VS: Sitting         Visual Acuity      ED Medications  Medications   morphine injection 2 mg (2 mg Intravenous Given 3/18/21 1347)       Diagnostic Studies  Results Reviewed     Procedure Component Value Units Date/Time    Ethanol [[id number]]  (Abnormal) Collected: 03/18/21 1249    Lab Status: Final result Specimen: Blood from Arm, Right Updated: 03/18/21 1317     Ethanol Lvl 406 mg/dL     CBC and differential [[id number]]  (Abnormal) Collected: 03/18/21 1246    Lab Status: Final result Specimen: Blood from Arm, Right Updated: 03/18/21 1312     WBC 2 23 Thousand/uL      RBC 2 82 Million/uL      Hemoglobin 8 6 g/dL      Hematocrit 28 2 %       fL      MCH 30 5 pg      MCHC 30 5 g/dL      RDW 20 6 %      MPV 10 2 fL      Platelets 55 Thousands/uL      nRBC 0 /100 WBCs      Neutrophils Relative 18 %      Immat GRANS % 1 %      Lymphocytes Relative 68 %      Monocytes Relative 9 %      Eosinophils Relative 3 %      Basophils Relative 1 %      Neutrophils Absolute 0 39 Thousands/µL      Immature Grans Absolute 0 02 Thousand/uL      Lymphocytes Absolute 1 52 Thousands/µL      Monocytes Absolute 0 21 Thousand/µL      Eosinophils Absolute 0 06 Thousand/µL      Basophils Absolute 0 03 Thousands/µL     Basic metabolic panel [[id number]]  (Abnormal) Collected: 03/18/21 1246    Lab Status: Final result Specimen: Blood from Arm, Right Updated: 03/18/21 1306     Sodium 143 mmol/L      Potassium 3 9 mmol/L      Chloride 109 mmol/L      CO2 28 mmol/L      ANION GAP 6 mmol/L      BUN 16 mg/dL      Creatinine 0 61 mg/dL      Glucose 114 mg/dL      Calcium 8 0 mg/dL      eGFR 98 ml/min/1 73sq m     Narrative:      [place] guidelines for Chronic Kidney Disease (CKD):     Stage 1 with normal or high GFR (GFR > 90 mL/min/1 73 square meters)    Stage 2 Mild CKD (GFR = 60-89 mL/min/1 73 square meters)    Stage 3A Moderate CKD (GFR = 45-59 mL/min/1 73 square meters)    Stage 3B Moderate CKD (GFR = 30-44 mL/min/1 73 square meters)    Stage 4 Severe CKD (GFR = 15-29 mL/min/1 73 square meters)    Stage 5 End Stage CKD (GFR <15 mL/min/1 73 square meters)  Note: GFR calculation is accurate only with a steady state creatinine                 XR tibia fibula 2 views RIGHT   Final Result by Lyn Medina MD (03/18 1115)      Nondisplaced midshaft right fibula fracture  Doubt but cannot exclude proximal tibia longitudinal nondisplaced cortical fracture  The study was marked in [facility] for immediate notification        Workstation performed: [id number] CT head without contrast   Final Result by Saul Lopez MD (03/18 1622)      No acute intracranial abnormality  Workstation performed: [id number]         XR ankle 3+ views RIGHT    (Results Pending)   XR foot 3+ views RIGHT    (Results Pending)   XR sacrum and coccyx    (Results Pending)              Procedures  Procedures         ED Course                                           MDM  Number of Diagnoses or Management Options  Calcaneus fracture, right: new and requires workup  Closed fracture of shaft of fibula: new and requires workup  ETOH abuse: established and worsening  Fall, initial encounter: new and requires workup     Amount and/or Complexity of Data Reviewed  Clinical lab tests: ordered and reviewed  Tests in the radiology section of CPT®: ordered and reviewed  Tests in the medicine section of CPT®: ordered and reviewed  Discuss the patient with other providers: yes  Independent visualization of images, tracings, or specimens: yes    Patient Progress  Patient progress: stable      Disposition  Final diagnoses:   Fall, initial encounter   Closed fracture of shaft of fibula   Calcaneus fracture, right   ETOH abuse     Time reflects when diagnosis was documented in both MDM as applicable and the Disposition within this note     Time User Action Codes Description Comment    3/18/2021  2:04 PM Tony Finley Add [S82 401A] Closed fracture of shaft of right fibula, unspecified fracture morphology, initial encounter     3/18/2021  2:12 PM May Malling K Add [U29  BGDS] Fall, initial encounter     3/18/2021  2:12 PM Jose Cervantes Add [S82 409A] Closed fracture of shaft of fibula     3/18/2021  2:13 PM May Malling K Add [S92 001A] Calcaneus fracture, right     3/18/2021  2:13 PM Jose Helen Add [F10 10] ETOH abuse       ED Disposition     ED Disposition Condition Date/Time Comment    Admit Stable u Mar 18, 2021  2:12 PM Case was discussed with dr Vicki Carmen and the patient's admission status was agreed to be Admission Status: inpatient status to the service of Dr Javi Manzanares   Follow-up Information    None         Patient's Medications   Discharge Prescriptions    No medications on file     No discharge procedures on file      PDMP Review       Value Time User    PDMP Reviewed  Yes 3/11/2021  6:32 PM Juice Turner, [facility]          ED Provider  Electronically Signed by           Ashley Case DO  03/18/21 2824

## 2021-03-19 ENCOUNTER — APPOINTMENT (INPATIENT)
Dept: RADIOLOGY | Facility: HOSPITAL | Age: 61
DRG: 552 | End: 2021-03-19
Payer: COMMERCIAL

## 2021-03-19 ENCOUNTER — TELEPHONE (OUTPATIENT)
Dept: RADIOLOGY | Facility: HOSPITAL | Age: 61
End: 2021-03-19

## 2021-03-19 PROBLEM — S32.000A LUMBAR COMPRESSION FRACTURE (HCC): Status: ACTIVE | Noted: 2021-03-19
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
## 2021-03-19 PROBLEM — S82.409A: Status: ACTIVE | Noted: 2021-03-19

## 2021-03-19 LAB
ALBUMIN SERPL BCP-MCNC: 2.8 G/DL (ref 3.5–5)
ALP SERPL-CCNC: 245 U/L (ref 46–116)
ALT SERPL W P-5'-P-CCNC: 54 U/L (ref 12–78)
ANION GAP SERPL CALCULATED.3IONS-SCNC: 6 MMOL/L (ref 4–13)
AST SERPL W P-5'-P-CCNC: 96 U/L (ref 5–45)
BILIRUB SERPL-MCNC: 0.44 MG/DL (ref 0.2–1)
BUN SERPL-MCNC: 14 MG/DL (ref 5–25)
CALCIUM ALBUM COR SERPL-MCNC: 9 MG/DL (ref 8.3–10.1)
CALCIUM SERPL-MCNC: 8 MG/DL (ref 8.3–10.1)
CHLORIDE SERPL-SCNC: 106 MMOL/L (ref 100–108)
CO2 SERPL-SCNC: 25 MMOL/L (ref 21–32)
CREAT SERPL-MCNC: 0.62 MG/DL (ref 0.6–1.3)
ERYTHROCYTE [DISTWIDTH] IN BLOOD BY AUTOMATED COUNT: 20.1 % (ref 11.6–15.1)
GFR SERPL CREATININE-BSD FRML MDRD: 98 ML/MIN/1.73SQ M
GLUCOSE SERPL-MCNC: 108 MG/DL (ref 65–140)
GLUCOSE SERPL-MCNC: 90 MG/DL (ref 65–140)
HCT VFR BLD AUTO: 26.4 % (ref 34.8–46.1)
HGB BLD-MCNC: 8.1 G/DL (ref 11.5–15.4)
MCH RBC QN AUTO: 30.2 PG (ref 26.8–34.3)
MCHC RBC AUTO-ENTMCNC: 30.7 G/DL (ref 31.4–37.4)
MCV RBC AUTO: 99 FL (ref 82–98)
PLATELET # BLD AUTO: 47 THOUSANDS/UL (ref 149–390)
PMV BLD AUTO: 9.6 FL (ref 8.9–12.7)
POTASSIUM SERPL-SCNC: 4.4 MMOL/L (ref 3.5–5.3)
PROT SERPL-MCNC: 6.4 G/DL (ref 6.4–8.2)
RBC # BLD AUTO: 2.68 MILLION/UL (ref 3.81–5.12)
SODIUM SERPL-SCNC: 137 MMOL/L (ref 136–145)
WBC # BLD AUTO: 2.58 THOUSAND/UL (ref 4.31–10.16)

## 2021-03-19 PROCEDURE — 94762 N-INVAS EAR/PLS OXIMTRY CONT: CPT

## 2021-03-19 PROCEDURE — 97163 PT EVAL HIGH COMPLEX 45 MIN: CPT

## 2021-03-19 PROCEDURE — 99232 SBSQ HOSP IP/OBS MODERATE 35: CPT | Performed by: SURGERY

## 2021-03-19 PROCEDURE — 86304 IMMUNOASSAY TUMOR CA 125: CPT | Performed by: SURGERY

## 2021-03-19 PROCEDURE — 99233 SBSQ HOSP IP/OBS HIGH 50: CPT | Performed by: SURGERY

## 2021-03-19 PROCEDURE — 99222 1ST HOSP IP/OBS MODERATE 55: CPT | Performed by: NEUROLOGICAL SURGERY

## 2021-03-19 PROCEDURE — 97530 THERAPEUTIC ACTIVITIES: CPT

## 2021-03-19 PROCEDURE — 85027 COMPLETE CBC AUTOMATED: CPT | Performed by: PHYSICIAN ASSISTANT

## 2021-03-19 PROCEDURE — 97167 OT EVAL HIGH COMPLEX 60 MIN: CPT

## 2021-03-19 PROCEDURE — 97760 ORTHOTIC MGMT&TRAING 1ST ENC: CPT

## 2021-03-19 PROCEDURE — 86301 IMMUNOASSAY TUMOR CA 19-9: CPT | Performed by: SURGERY

## 2021-03-19 PROCEDURE — 82948 REAGENT STRIP/BLOOD GLUCOSE: CPT

## 2021-03-19 PROCEDURE — 72100 X-RAY EXAM L-S SPINE 2/3 VWS: CPT

## 2021-03-19 PROCEDURE — 82378 CARCINOEMBRYONIC ANTIGEN: CPT | Performed by: SURGERY

## 2021-03-19 PROCEDURE — 99024 POSTOP FOLLOW-UP VISIT: CPT | Performed by: PHYSICIAN ASSISTANT

## 2021-03-19 PROCEDURE — 94760 N-INVAS EAR/PLS OXIMETRY 1: CPT

## 2021-03-19 PROCEDURE — 80053 COMPREHEN METABOLIC PANEL: CPT | Performed by: PHYSICIAN ASSISTANT

## 2021-03-19 RX ORDER — METHOCARBAMOL 750 MG/1
750 TABLET, FILM COATED ORAL EVERY 6 HOURS SCHEDULED
Status: DISCONTINUED | OUTPATIENT
Start: 2021-03-19 | End: 2021-03-23 | Stop reason: HOSPADM

## 2021-03-19 RX ORDER — OXAZEPAM 10 MG
10 CAPSULE ORAL EVERY 6 HOURS SCHEDULED
Status: DISCONTINUED | OUTPATIENT
Start: 2021-03-19 | End: 2021-03-23 | Stop reason: HOSPADM

## 2021-03-19 RX ORDER — OXYCODONE HYDROCHLORIDE 5 MG/1
2.5 TABLET ORAL EVERY 4 HOURS PRN
Qty: 30 TABLET | Refills: 0 | OUTPATIENT
Start: 2021-03-19 | End: 2021-03-29

## 2021-03-19 RX ORDER — METHOCARBAMOL 750 MG/1
750 TABLET, FILM COATED ORAL EVERY 6 HOURS SCHEDULED
Qty: 40 TABLET | Refills: 0 | OUTPATIENT
Start: 2021-03-19 | End: 2021-03-26

## 2021-03-19 RX ADMIN — ENOXAPARIN SODIUM 30 MG: 30 INJECTION SUBCUTANEOUS at 23:45

## 2021-03-19 RX ADMIN — METHOCARBAMOL TABLETS 750 MG: 750 TABLET, COATED ORAL at 17:29

## 2021-03-19 RX ADMIN — METHOCARBAMOL TABLETS 750 MG: 750 TABLET, COATED ORAL at 10:30

## 2021-03-19 RX ADMIN — ENOXAPARIN SODIUM 30 MG: 30 INJECTION SUBCUTANEOUS at 09:02

## 2021-03-19 RX ADMIN — SENNOSIDES 17.2 MG: 8.6 TABLET ORAL at 09:01

## 2021-03-19 RX ADMIN — OXAZEPAM 10 MG: 10 CAPSULE, GELATIN COATED ORAL at 23:45

## 2021-03-19 RX ADMIN — METHOCARBAMOL TABLETS 750 MG: 750 TABLET, COATED ORAL at 23:45

## 2021-03-19 RX ADMIN — DOCUSATE SODIUM 100 MG: 100 CAPSULE, LIQUID FILLED ORAL at 09:02

## 2021-03-19 RX ADMIN — ONDANSETRON 4 MG: 2 INJECTION INTRAMUSCULAR; INTRAVENOUS at 11:11

## 2021-03-19 RX ADMIN — OXAZEPAM 10 MG: 10 CAPSULE, GELATIN COATED ORAL at 09:31

## 2021-03-19 RX ADMIN — PANTOPRAZOLE SODIUM 40 MG: 40 TABLET, DELAYED RELEASE ORAL at 05:18

## 2021-03-19 RX ADMIN — DOCUSATE SODIUM 100 MG: 100 CAPSULE, LIQUID FILLED ORAL at 17:29

## 2021-03-19 RX ADMIN — OXAZEPAM 10 MG: 10 CAPSULE, GELATIN COATED ORAL at 17:29

## 2021-03-19 RX ADMIN — SERTRALINE HYDROCHLORIDE 50 MG: 50 TABLET ORAL at 09:02

## 2021-03-19 NOTE — ASSESSMENT & PLAN NOTE
S/p fall down about 12 steps down the stairs about 3 weeks ago with continued back and RLE pain, found to have     Imaging reviewed personally and with attending, results are as follows:   CT abdomen pelvis without contrast 03/18/2021:  New moderate superior endplate deformity of L2 predominantly seen along the midline and left superior endplate, possibly age indeterminate Schmorl's node  There is mild bony retropulsion of the upper vertebral corner and mild central canal narrowing seen at this level  Minimally displaced fractures of the right L3 and L4 transverse processes which were not seen previously  Findings again age indeterminate  Mild L4 inferior endplate compression deformity which was not seen previously in should be considered age indeterminate but is favored to be chronic given endplate sclerosis here  Plan:   Will manage patient conservatively for now  o LSO brace ordered, to be worn when out of bed and head of bed greater than 45°  o Upright lumbar spine x-rays ordered and pending to assess spinal alignment, should be completed wearing brace  o Discussed that brace will likely be worn for 6-12 weeks  o She is not to drive with brace   Medical management and pain control per primary team  o Continue multimodal pain regimen   DVT ppx:  SCDs, Lovenox   Mobilize as tolerated with assistance, PT / OT evaluation while wearing brace    Neurosurgery will follow as needed during hospitalization and review imaging once completed  If imaging is stable, patient may discharge from a neurosurgical standpoint and follow up in the outpatient setting in 2 weeks with repeat upright lumbar spine x-rays  Please call with questions or concerns

## 2021-03-19 NOTE — ASSESSMENT & PLAN NOTE
Neurosurgery consulted and seen patient  LSO brace  Awaiting uright x-rays  PT/OT DVT prophylaixs  Pain management

## 2021-03-19 NOTE — PROGRESS NOTES
[facility]  Progress Note - Elena Em 1960, 64 y o  female MRN: [id number]  Unit/Bed#: S -01 Encounter: [id number]  Primary Care Provider: Moy Arana MD   Date and time admitted to hospital: 3/18/2021 12:19 PM    Closed fracture of fibula, shaft  Assessment & Plan  Ortho consulted and seen patient  NB  F/u in 2 weeks as outpatient  PT/Ot    Fall  Assessment & Plan  Case management consulted, ETOH abuse history    Alcohol abuse  Assessment & Plan  SBIRT by case management  CIWA protocol  SErax q6 hours  GCS - 15    Abnormal liver enzymes  Assessment & Plan  Monitor labs  Alk phos increase to 245    * Lumbar compression fracture [facility])  Assessment & Plan  Neurosurgery consulted and seen patient  LSO brace  Awaiting uright x-rays  PT/OT DVT prophylaixs  Pain management        TERTIARY TRAUMA SURVEY NOTE    Prophylaxis: Sequential compression device (Venodyne)  and Enoxaparin (Lovenox)    Disposition: home vs rehab    Code status:  Level 1 - Full Code    Consultants:   Ortho and Neurosurgery    Is the patient 65 years or older?: No          SUBJECTIVE:     Transfer from: home  Outside Films Received: no  Tertiary Exam Due on: 3/19/21    Mechanism of Injury:Fall    Details related to Injury: +LOC:  unknown    Chief Complaint: back pain    HPI/Last 24 hour events: admitted to trauma, Ortho and Neurosurgery consulted for injuries, Case management for SBIRt, placed on CIWA and SErax  PT/Ot to work with patient      Active medications:           Current Facility-Administered Medications:     docusate sodium (COLACE) capsule 100 mg, 100 mg, Oral, BID, 100 mg at 03/19/21 0902    enoxaparin (LOVENOX) subcutaneous injection 30 mg, 30 mg, Subcutaneous, Q12H DESTIN, 30 mg at 03/19/21 0902    HYDROmorphone (DILAUDID) injection 0 5 mg, 0 5 mg, Intravenous, Q4H PRN    magnesium hydroxide (MILK OF MAGNESIA) oral suspension 30 mL, 30 mL, Oral, Daily PRN    methocarbamol (ROBAXIN) tablet 750 mg, 750 mg, Oral, Q6H [facility], 750 mg at 03/19/21 1030    ondansetron (ZOFRAN) injection 4 mg, 4 mg, Intravenous, Q6H PRN, 4 mg at 03/19/21 1111    oxazepam (SERAX) capsule 10 mg, 10 mg, Oral, Q6H DESTIN, 10 mg at 03/19/21 0931    oxyCODONE (ROXICODONE) IR tablet 2 5 mg, 2 5 mg, Oral, Q4H PRN    oxyCODONE (ROXICODONE) IR tablet 5 mg, 5 mg, Oral, Q4H PRN, 5 mg at 03/18/21 2149    pantoprazole (PROTONIX) EC tablet 40 mg, 40 mg, Oral, Early Morning, 40 mg at 03/19/21 0518    senna (SENOKOT) tablet 17 2 mg, 2 tablet, Oral, Daily, 17 2 mg at 03/19/21 0901    sertraline (ZOLOFT) tablet 50 mg, 50 mg, Oral, Daily, 50 mg at 03/19/21 0902      OBJECTIVE:     Vitals:   Vitals:    03/19/21 1130   BP:    Pulse:    Resp:    Temp:    SpO2: 94%       Physical Exam:   GENERAL APPEARANCE: resting in bed, starting with some tremors  NEURO:GCS -15, intact  HEENT: EOm's intact  CV: RRR< no complaint of chest pain  LUNGS: CTA, bilaterally, no shortness of breath  GI: tolerating a diet  : voiding  MSK:moves extremities  SKIN: warm and dry    I/O:   I/O       03/17 0701 - 03/18 0700 03/18 0701 - 03/19 0700 03/19 0701 - 03/20 0700    P  O   240     Total Intake(mL/kg)  240 (3 5)     Urine (mL/kg/hr)  1100     Stool  0     Total Output  1100     Net  -860            Unmeasured Stool Occurrence  1 x           Invasive Devices: Invasive Devices     Peripheral Intravenous Line            Peripheral IV 03/18/21 Right Antecubital less than 1 day                  Imaging:   Ct Abdomen Pelvis Wo Contrast    Result Date: 3/18/2021  Impression: LIMITED STUDY WITHOUT IV OR ORAL CONTRAST IN THE SETTING OF TRAUMA  No acute traumatic intra-abdominal injury identified within limitations  New moderate superior endplate deformity of L2 predominantly seen along the midline and left superior endplate, possibly age-indeterminate Schmorl's node    There is mild bony retropulsion of the upper vertebral corner and mild central canal narrowing seen at this level   If clinical management would be changed, MRI may be of further value  Minimally displaced fractures of the right L3 and L4 transverse processes which were not seen previously  Findings again age indeterminate  Mild L4 inferior endplate compression deformity which was not seen previously and should be considered age-indeterminate but is favored to be chronic given endplate sclerosis here  [place] 7 9 x 1 6 cm soft tissue density in the right gluteal fat not seen previously favored to represent hematoma  Chronic pancreatitis  1 6 cm uncinate process cyst suggested as well as increasing pancreatic duct dilatation  Assessment is limited without IV contrast   Correlation with MRCP recommended on a nonemergent (OUTPATIENT) basis  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  Outpatient surgical consultation may be considered  The study was marked in [facility] for immediate notification  Workstation performed: [id number]     Xr Sacrum And Coccyx    Result Date: 3/18/2021  Impression: No definite acute displaced fractures identified of the sacrum  Workstation performed: [id number]     Xr Tibia Fibula 2 Views Right    Result Date: 3/18/2021  Impression: Nondisplaced midshaft right fibula fracture  Doubt but cannot exclude proximal tibia longitudinal nondisplaced cortical fracture  The study was marked in [facility] for immediate notification  Workstation performed: [id number]     Xr Ankle 3+ Views Right    Result Date: 3/18/2021  Impression: Healing retrocalcaneal displaced cortical fracture The study was marked in [facility] for immediate notification  Workstation performed: [id number]     Xr Foot 3+ Views Right    Addendum Date: 3/18/2021    ADDENDUM: FINDINGS: Suggesting healing nondisplaced distal right 4th metatarsal fracture  Old healed right 5th metatarsal trauma  Lateral view previously noted longitudinal posterior calcaneal sclerotic band not appreciated    However upper posterolateral calcaneal healing displaced cortical avulsion fracture noted  Mild bunion and hallux valgus  There is no acute fracture or dislocation  Posterior plantar and retrocalcaneal degenerative changes  No lytic or blastic osseous lesion  Soft tissues are unremarkable  IMPRESSION: Healing nondisplaced distal right 4th metatarsal fracture  Healing retrocalcaneal displaced cortical fracture also noted  Result Date: 3/18/2021  Impression: Healing nondisplaced distal right 4th metatarsal fracture  The study was marked in [facility] for immediate notification  Workstation performed: [id number]     Ct Head Without Contrast    Result Date: 3/18/2021  Impression: No acute intracranial abnormality  Workstation performed: [id number]     Ct Lower Extremity Wo Contrast Right    Result Date: 3/18/2021  Impression: Bones are markedly demineralized, limiting evaluation for nondisplaced fracture  No acute displaced fracture identified  Mineralized densities along the posterior calcaneus likely representing enthesopathy at the Achilles insertion, with Achilles insertional tendinosis  Workstation performed: [id number]       Labs: Results for Jaja Chance (MRN [id number]) as of 3/19/2021 15:28   Ref   Range 3/19/2021 04:55 3/19/2021 04:56 3/19/2021 08:13   Sodium Latest Ref Range: 136 - 145 mmol/L 137     Potassium Latest Ref Range: 3 5 - 5 3 mmol/L 4 4     Chloride Latest Ref Range: 100 - 108 mmol/L 106     CO2 Latest Ref Range: 21 - 32 mmol/L 25     Anion Gap Latest Ref Range: 4 - 13 mmol/L 6     BUN Latest Ref Range: 5 - 25 mg/dL 14     Creatinine Latest Ref Range: 0 60 - 1 30 mg/dL 0 62     Glucose, Random Latest Ref Range: 65 - 140 mg/dL 90     Calcium Latest Ref Range: 8 3 - 10 1 mg/dL 8 0 (L)     CORRECTED CALCIUM Latest Ref Range: 8 3 - 10 1 mg/dL 9 0     AST Latest Ref Range: 5 - 45 U/L 96 (H)     ALT Latest Ref Range: 12 - 78 U/L 54     Alkaline Phosphatase Latest Ref Range: 46 - 116 U/L 245 (H)     Total Protein Latest Ref Range: 6 4 - 8 2 g/dL 6 4     Albumin Latest Ref Range:  3 5 - 5 0 g/dL 2 8 (L)     TOTAL BILIRUBIN Latest Ref Range: 0 20 - 1 00 mg/dL 0 44     eGFR Latest Units: ml/min/1 73sq m 98     WBC Latest Ref Range: 4 31 - 10 16 Thousand/uL  2 58 (L)    Red Blood Cell Count Latest Ref Range: 3 81 - 5 12 Million/uL  2 68 (L)    Hemoglobin Latest Ref Range: 11 5 - 15 4 g/dL  8 1 (L)    HCT Latest Ref Range: 34 8 - 46 1 %  26 4 (L)    MCV Latest Ref Range: 82 - 98 fL  99 (H)    MCH Latest Ref Range: 26 8 - 34 3 pg  30 2    MCHC Latest Ref Range: 31 4 - 37 4 g/dL  30 7 (L)    RDW Latest Ref Range: 11 6 - 15 1 %  20 1 (H)    Platelet Count Latest Ref Range: 149 - 390 Thousands/uL  47 (LL)    MPV Latest Ref Range: 8 9 - 12 7 fL  9 6

## 2021-03-19 NOTE — TELEPHONE ENCOUNTER
Called for patient to come down, x-ray is ordered standing with brace on  Pt  Currently does not have brace per nurse   Will call back later to check status  -RG

## 2021-03-19 NOTE — PHYSICAL THERAPY NOTE
PHYSICAL THERAPY EVALUATION NOTE    Patient Name: Shari Stovall  PMGMV'E Date: 3/19/2021  AGE:   64 y o  Mrn:   [id number]  ADMIT DX:  Closed fracture of shaft of fibula [S82 409A]  Back pain [M54 9]  ETOH abuse [F10 10]  Calcaneus fracture, right [S92 001A]  Fall, initial encounter [W19  XXXA]  Closed fracture of shaft of right fibula, unspecified fracture morphology, initial encounter [S82 401A]    Past Medical History:   Diagnosis Date    Fracture     Hepatitis     Hep A     Insomnia     10mar2016 resolved    Osteoporosis     14jun2016 resolved    Pancreatitis     Seasonal allergies     Urine discoloration 11/11/2019    Vomiting 11/13/2019     Length Of Stay: 1  PHYSICAL THERAPY EVALUATION :   03/19/21 1113   PT Last Visit   PT Visit Date 03/19/21   Note Type   Note type Evaluation   Pain Assessment   Pain Assessment Tool 0-10   Pain Score 8   Pain Location/Orientation Orientation: Lower; Location: Back   Home Living   Type of [address] [place] [address]; Able to live on main level with bedroom/bathroom;1/2 bath on main level; Other (Comment)  (2 NATALYA)   Additional Comments lives w/ spouse  ambulated w/ roller walker and right CAM boot  recently needed assist w/ ADLs  2 falls in last 6 months  Prior Function   Comments pt seen supine in bed  agreed to participate in PT eval  reports having lower back pain  occasional input was needed for task focus  Restrictions/Precautions   RLE Weight Bearing Per Order NWB  (w/ short leg posterior splint)   Braces or Orthoses LSO   Other Precautions Fall Risk;Pain;Spinal precautions;WBS;Chair Alarm; Bed Alarm  (continuous pulse ox)   General   Additional Pertinent History 3/19/21 at 4:56, platelets were 47 (critical low value)  Family/Caregiver Present No   Cognition   Arousal/Participation Alert   Orientation Level Oriented to person; Other (Comment)  (pt was identified w/ full name, birth date)   Following Commands Follows one step commands with increased time or repetition   Comments room air resting pulse ox 98% and 63 BPM, active 96% and 82 BPM    RUE Assessment   RUE Assessment WFL   LUE Assessment   LUE Assessment WFL   RLE Assessment   RLE Assessment X  (hip and knee 3/5, ankle not tested)   LLE Assessment   LLE Assessment WFL  (3+ to 4-/5)   Coordination   Movements are Fluid and Coordinated 0   Coordination and Movement Description intermittent tremors UEs   Light Touch   RLE Light Touch Grossly intact   LLE Light Touch Grossly intact   Bed Mobility   Rolling R 4  Minimal assistance   Additional items Assist x 1;Bedrails; Increased time required;Verbal cues;LE management  (for bedrail use, LE positioning, NWB compliance)   Rolling L 4  Minimal assistance   Additional items Assist x 1;Bedrails; Increased time required;Verbal cues;LE management  (for bedrail use, LE positioning, NWB compliance)   Supine to Sit 3  Moderate assistance  (log roll)   Additional items Assist x 1;HOB elevated; Bedrails; Increased time required;Verbal cues;LE management  (for trunk/LE positioning)   Sit to Supine 3  Moderate assistance   Additional items Assist x 1;Bedrails; Increased time required;Verbal cues;LE management  (for trunk/LE positioning, NWB compliance)   Additional Comments pt donned LSO in sitting position on edge of bed  Transfers   Sit to Stand 3  Moderate assistance   Additional items Assist x 2; Increased time required; Impulsive;Verbal cues  (for hand placement, NWB compliance)   Stand to Sit 3  Moderate assistance   Additional items Assist x 2;Impulsive;Verbal cues  (for body positioning, NWB compliance)   Additional Comments pt stood 20 seconds w/ roller walker w/ modx2  manual assist was needed to maintain R LE off the floor  additional standing not possible due to pain and fatigue  pt was unable to ambulate or mobilize out of bed     Ambulation/Elevation   Gait pattern Not appropriate Assistive Device Rolling walker; Other (Comment)  (LSO, right short leg posterior splint)   Balance   Static Sitting Fair +   Static Standing Zero  (w/ roller walker)   Ambulatory Zero   Activity Tolerance   Activity Tolerance Patient limited by fatigue;Patient limited by pain   Nurse Made Aware spoke to Silvia CORTEZ, Lucy HERNANDEZ, Cullen Opitz OT   Assessment   Prognosis Fair   Problem List Decreased strength;Decreased range of motion;Decreased endurance; Impaired balance;Decreased mobility; Decreased safety awareness; Impaired tone;Orthopedic restrictions;Pain   Assessment Pt presents w/ complaints of low back pain and inability to ambulate secondary to right leg pain and back pain from a fall 2-3 weeks ago  Dx: right nondisplaced fibular shaft fx, left elbow abrasion, L4 compression deformity, L2 superior endplate deformity, right L3 and L4 transverse process fxs, and T12 superior endplate deformity  order placed for PT eval and tx, w/ activity order of up in chair, LSO w/ lumbar spine precautions, and NWB R LE in short leg posterior splint  pt presents w/ comorbidities of anemia, right calcaneal stress fx, and osteoporosis and personal factors of living in 3 story house, mobilizing w/ assistive device, stair(s) to enter home, positive fall history, anxiety and depression  pt presents w/ pain, weakness, decreased endurance, impaired balance, impaired tone, decreased safety awareness, orthopedic restrictions and fall risk  these impairments are evident in findings from physical examination (weakness and impaired tone), mobility assessment (need for assist x 1 to 2 w/ bed mobility, inability to ambulate or mobilize out of bed, need for input for mobility technique and NWB compliance), and Barthel Index: 50/100  pt needed input for task focus and mobility technique/safety  pt is at risk for falls due to physical and safety awareness deficits   pt's clinical presentation is unstable/unpredictable (evident in critical low platelets, need for assist w/ all phases of mobility when usually mobilizing independently, pain impacting overall mobility status and need for input for mobility technique/safety)  pt needs inpatient PT tx to improve mobility deficits and progress mobility training as appropriate  discharge recommendation is for inpatient rehab to reduce fall risk and maximize level of functional independence  Goals   Patient Goals cook, garden   STG Expiration Date 03/29/21   Short Term Goal #1 pt will: Recall and adhere to NWB of R LE at all times to facilitate fracture healing, Increase bilateral LE strength 1/2 grade to facilitate independent mobility, Perform all bed mobility tasks w/ supervision to decrease fall risk factors, Perform sit <---> stand and stand pivot transfers w/ roller walker w/ supervision to improve independence, Ambulate 50 ft  with roller walker w/ supervision w/o LOB to expedite return to leisure activities like gardening and cooking, Increase all balance 1 grade to decrease risk for falls, Complete exercise program independently to improve strength and endurance, Tolerate 3 hr OOB to faciliate upright tolerance and Improve Barthel Index score to 75 or greater to facilitate independence   Plan   Treatment/Interventions Functional transfer training;LE strengthening/ROM; Therapeutic exercise; Endurance training;Patient/family training;Equipment eval/education; Bed mobility;Gait training   PT Frequency 5x/wk   Recommendation   PT Discharge Recommendation Post-Acute Rehabilitation Services   AM-PAC Basic Mobility Inpatient   Turning in Bed Without Bedrails 3   Lying on Back to Sitting on Edge of Flat Bed 3   Moving Bed to Chair 1   Standing Up From Chair 1   Walk in Room 1   Climb 3-5 Stairs 1   Basic Mobility Inpatient Raw Score 10   Turning Head Towards Sound 4   Follow Simple Instructions 3   Low Function Basic Mobility Raw Score 17   Low Function Basic Mobility Standardized Score 27 46   Barthel Index Feeding 10   Bathing 0   Grooming Score 5   Dressing Score 5   Bladder Score 10   Bowels Score 10   Toilet Use Score 5   Transfers (Bed/Chair) Score 5   Mobility (Level Surface) Score 0   Stairs Score 0   Barthel Index Score 50     The patient's AM-PAC Basic Mobility Inpatient Short Form Raw Score is 10, Standardized Score is    A standardized score less than 42 9 suggests the patient may benefit from discharge to post-acute rehabilitation services  Please also refer to the recommendation of the Physical Therapist for safe discharge planning  Skilled PT recommended while in hospital and upon DC to progress pt toward treatment goals       Xenia Estrada, PT

## 2021-03-19 NOTE — UTILIZATION REVIEW
Initial Clinical Review    Admission: Date/Time/Statement:   Admission Orders (From admission, onward)     Ordered        03/18/21 1451  Inpatient Admission  Once                   Orders Placed This Encounter   Procedures    Inpatient Admission     Standing Status:   Standing     Number of Occurrences:   1     Order Specific Question:   Level of Care     Answer:   Med Surg [16]     Order Specific Question:   Estimated length of stay     Answer:   More than 2 Midnights     Order Specific Question:   Certification     Answer:   I certify that inpatient services are medically necessary for this patient for a duration of greater than two midnights  See H&P and MD Progress Notes for additional information about the patient's course of treatment  ED Arrival Information     Expected Arrival Acuity Means of Arrival Escorted By Service Admission Type    - 3/18/2021 12:18 Emergent Ambulance [facility]    Arrival Complaint    back pain        Chief Complaint   Patient presents with    Fall     Pt reports falling down 12 steps about 2 weeks ago, hit head and most of body during the fall, marlene LOC, reports constant back pain (tailbone), (-) thinners     Assessment/Plan: 65 yo female with hx of frequent falls,depression with anxiety, daily alcohol use, chronic anemia, stress fracture right calcaneous presents to ED from home via ambulance with low back pain and inability to ambulate 2/2 R leg and back pain from a fall down 12 steps  2-3 weeks ago, denies LOC with fall  Pt sees podiatry for stress fracture, wears CAM boot RLE  On exam, pt has 1 cm posterior scalp laceration healing well, has tenderness to palpation lumbar spine, tenderness and signs of injury RLE  Pt has multiple contusions BUE, BLE, has chronic sensory deficit R great toe  RLE in posterior splint placed by ortho , toes warm and pink, FAROM w/o pain BUE, LLE and RLE at hip and knee  pt has 1 cm skin tear midline lower abdomen  Imaging shows R fractured fibula, possible fx R tibia, possible fracture R calcaneous  Orthopedics-pt with tenderness to palpation over the midshaft fibula and calcaneus but not the tibia, and soft lower leg compartments  Imaging shows a nondisplaced fibular shaft fracture and a possible calcaneus avulsion fracture versus enthesophyte  Recommend short leg posterior splint with non-weight bearing left lower extremity  Pt admitted as Inpatient to trauma service with fracture R tibia with possible fracture R calcaneous , with gait instability,and with alcohol intoxication with chronic alcohol use,   Plan includes CIWA monitoring, Serax TID, ortho consult,neurosurgery consult, NWB RLE with splint RLE, pain control, obtain CT R foot and CT A/P    3/19   Orthopedics-pt continues with pain with majority of pain over the fibula shaft fracture  Pt denies numbness/tingling with skin warm and well perfused, DP intact, motor intact fhl/ehl, ankle dorsi/plantar flexion  Splint in place RLE  CT RLE shows no acute fracture of calcaneus but opt has significant demineralization of the bone  Enthesophytes noted at achilles insertion  Plan is to continue NWB RLE in posterior short splint, PT/OT evals,pain control, DVT PPx , ortho signing off case  Neuro surgery - CT A/P shows lumbar compression fracture  Plan to manage conservatively-LSO brace to be worn when OOB and HOB>45 degrees for approx 6-12 weeks, obtain XR L spine with pt wearing brace, continue multimodal pain control  Will review imaging once completed    ED Triage Vitals   Temperature Pulse Respirations Blood Pressure SpO2   03/18/21 1234 03/18/21 1229 03/18/21 1229 03/18/21 1229 03/18/21 1229   98 °F (36 7 °C) 78 18 137/64 97 %      Temp Source Heart Rate Source Patient Position - Orthostatic VS BP Location FiO2 (%)   03/18/21 1234 03/18/21 1229 03/18/21 1229 03/18/21 1229 --   Oral Monitor Sitting Right arm       Pain Score       03/18/21 1347 Worst Possible Pain          Wt Readings from Last 1 Encounters:   03/18/21 67 8 kg (149 lb 7 6 oz)     Additional Vital Signs:   Date/Time  Temp  Pulse  Resp  BP  MAP (mmHg)  SpO2    03/19/21 0834  --  67  --  --  --  94 %    03/19/21 0739  --  --  --  --  --  94 %    03/19/21 0724  99 2 °F (37 3 °C)  72  16  152/67  97  97 %    03/18/21 2214  98 7 °F (37 1 °C)  83  18  122/66  89  94 %    03/18/21 1620  98 6 °F (37 °C)  71  18  159/74  106  98 %    03/18/21 1600  --  --  --  --  --  --    03/18/21 1556  --  85  18  141/79  --  98 %    03/18/21 1518  98 1 °F (36 7 °C)  62  18  151/72  --  97 %    03/18/21 1423  --  --  --  --  --  --    03/18/21 1400  --  64  18  133/71  96  98 %    03/18/21 1355  --  64  18  137/70  94  97 %      Date and Time Eye Opening Best Verbal Response Best Motor Response Jo-Ann Coma Scale Score   03/18/21 1600 4 5 6 15   03/18/21 1422 4 5 6 15   03/18/21 1234 4 5 6 15   03/18/21 1231 4 5 6 15     CIWA 3/19 [email or web address]    Pertinent Labs/Diagnostic Test Results:    3/18 CT head  No acute intracranial abnormality  3/18 XR R ankle  Healing retrocalcaneal displaced cortical fracture  3/18 XR R ft  Healing nondisplaced distal right 4th metatarsal fracture    Healing retrocalcaneal displaced cortical fracture also noted  3/18 XR R tib/fib  Nondisplaced midshaft right fibula fracture    Doubt but cannot exclude proximal tibia longitudinal nondisplaced cortical fracture  3/18 XR sacrum/coccyx  No definite acute displaced fractures identified of the sacrum  3 18 CT RLE  Bones are markedly demineralized, limiting evaluation for nondisplaced fracture  No acute displaced fracture identified    Mineralized densities along the posterior calcaneus likely representing enthesopathy at the Achilles insertion, with Achilles insertional tendinosis    3/18 CT Abdomen /pelvis  No acute traumatic intra-abdominal injury identified within limitations    New moderate superior endplate deformity of L2 predominantly seen along the midline and left superior endplate, possibly age-indeterminate Schmorl's node  There is mild bony retropulsion of the upper vertebral corner and mild central canal narrowing   seen at this level  If clinical management would be changed, MRI may be of further value    Minimally displaced fractures of the right L3 and L4 transverse processes which were not seen previously  Findings again age indeterminate    Mild L4 inferior endplate compression deformity which was not seen previously and should be considered age-indeterminate but is favored to be chronic given endplate sclerosis here    [place] 7 9 x 1 6 cm soft tissue density in the right gluteal fat not seen previously favored to represent hematoma    Chronic pancreatitis  1 6 cm uncinate process cyst suggested as well as increasing pancreatic duct dilatation  Assessment is limited without IV contrast   Correlation with MRCP recommended on a nonemergent (OUTPATIENT) basis    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    Outpatient surgical consultation may be considered            Results from last 7 days   Lab Units 03/19/21  0456 03/18/21  1246   WBC Thousand/uL 2 58* 2 23*   HEMOGLOBIN g/dL 8 1* 8 6*   HEMATOCRIT % 26 4* 28 2*   PLATELETS Thousands/uL 47* 55*   NEUTROS ABS Thousands/µL  --  0 39*         Results from last 7 days   Lab Units 03/19/21  0455 03/18/21  1246   SODIUM mmol/L 137 143   POTASSIUM mmol/L 4 4 3 9   CHLORIDE mmol/L 106 109*   CO2 mmol/L 25 28   ANION GAP mmol/L 6 6   BUN mg/dL 14 16   CREATININE mg/dL 0 62 0 61   EGFR ml/min/1 73sq m 98 98   CALCIUM mg/dL 8 0* 8 0*     Results from last 7 days   Lab Units 03/19/21  0455   AST U/L 96*   ALT U/L 54   ALK PHOS U/L 245*   TOTAL PROTEIN g/dL 6 4   ALBUMIN g/dL 2 8*   TOTAL BILIRUBIN mg/dL 0 44     Results from last 7 days   Lab Units 03/19/21  0813   POC GLUCOSE mg/dl 108     Results from last 7 days   Lab Units 03/19/21  0455 03/18/21  1246   GLUCOSE RANDOM mg/dL 90 114 Results from last 7 days   Lab Units 03/18/21  1437   PROTIME seconds 15 3*   INR  1 20*   PTT seconds 31           Results from last 7 days   Lab Units 03/18/21  1249   ETHANOL LVL mg/dL 406*         ED Treatment:   Medication Administration from 03/18/2021 1218 to 03/18/2021 1603       Date/Time Order Dose Route Action     03/18/2021 1347 morphine injection 2 mg 2 mg Intravenous Given        Past Medical History:   Diagnosis Date    Fracture     Hepatitis     Hep A     Insomnia     10mar2016 resolved    Osteoporosis     14jun2016 resolved    Pancreatitis     Seasonal allergies     Urine discoloration 11/11/2019    Vomiting 11/13/2019         Admitting Diagnosis: Closed fracture of shaft of fibula [S82 409A]  Back pain [M54 9]  ETOH abuse [F10 10]  Calcaneus fracture, right [S92 001A]  Fall, initial encounter [W19  XXXA]  Closed fracture of shaft of right fibula, unspecified fracture morphology, initial encounter [S82 401A]  Age/Sex: 64 y o  female  Admission Orders:  Scheduled Medications:  docusate sodium, 100 mg, Oral, BID  enoxaparin, 30 mg, Subcutaneous, Q12H DESTIN  methocarbamol, 750 mg, Oral, Q6H DESTIN  oxazepam, 10 mg, Oral, Q6H DESTIN  pantoprazole, 40 mg, Oral, Early Morning  senna, 2 tablet, Oral, Daily  sertraline, 50 mg, Oral, Daily      Continuous IV Infusions:none     PRN Meds:  HYDROmorphone, 0 5 mg, Intravenous, Q4H PRN  magnesium hydroxide, 30 mL, Oral, Daily PRN  ondansetron, 4 mg, Intravenous, Q6H PRN  oxyCODONE, 2 5 mg, Oral, Q4H PRN  oxyCODONE, 5 mg, Oral, Q4H PRN x1 3/18    CIWA monitoring  Continuous pulse ox  Spine brace  Lumbar spine monitoring  NWB RLE-short leg splint  SCD's    IP CONSULT TO ORTHOPEDIC SURGERY  IP CONSULT TO CASE MANAGEMENT  IP CONSULT TO NEUROSURGERY    Network Utilization Review Department  ATTENTION: Please call with any questions or concerns to [phone] and carefully listen to the prompts so that you are directed to the right person   All voicemails are adriana Christensen all requests for admission clinical reviews, approved or denied determinations and any other requests to dedicated fax number below belonging to the campus where the patient is receiving treatment   List of dedicated fax numbers for the Facilities:  [address] [place] [address] DENIALS (Administrative/Medical Necessity) [phone]   [address]  [facility] (Maternity/NICU/Pediatrics) [phone]   [address] [place] [address] Dr Karolina Chavez 4014 (  Quentin Block "Beatrice" 103) [address] [address] Talat Jameson 1481 [address] [facility]) [address] [place] [address] 951 [phone]

## 2021-03-19 NOTE — ASSESSMENT & PLAN NOTE
Ortho following, appreciate input  · NWB RLE in short leg posterior splint  · Plan for follow up in 2 weeks with ortho

## 2021-03-19 NOTE — CONSULTS
[address] 44 1960, 64 y o  female MRN: [id number]  Unit/Bed#: S -01 Encounter: [id number]  Primary Care Provider: Kyle Orozco MD   Date and time admitted to hospital: 3/18/2021 12:19 PM    Addendum:  XR lumbar spine, official read pending however per my interpretation spinal alignment appears stable and L2 and L4 compression fractures are redemonstrated without significant loss of height  At this time will continue with conservative management  Continue LSO brace when out of bed and head of bed greater than 45°  Will follow-up in 2 weeks with repeat upright imaging or sooner should she develop worsening symptoms or red flag signs  Signed off, please call with questions or concerns  Inpatient consult to Neurosurgery  Consult performed by: Fernanda Lujan PA-C  Consult ordered by: PAZ Bryan      Consult completed on 03/19/2021 at 9:50 a m  Lumbar compression fracture [facility])  Assessment & Plan  S/p fall down about 12 steps down the stairs about 3 weeks ago with continued back and RLE pain, found to have     Imaging reviewed personally and with attending, results are as follows:   CT abdomen pelvis without contrast 03/18/2021:  New moderate superior endplate deformity of L2 predominantly seen along the midline and left superior endplate, possibly age indeterminate Schmorl's node  There is mild bony retropulsion of the upper vertebral corner and mild central canal narrowing seen at this level  Minimally displaced fractures of the right L3 and L4 transverse processes which were not seen previously  Findings again age indeterminate  Mild L4 inferior endplate compression deformity which was not seen previously in should be considered age indeterminate but is favored to be chronic given endplate sclerosis here      Plan:   Will manage patient conservatively for now  o LSO brace ordered, to be worn when out of bed and head of bed greater than 45°  o Upright lumbar spine x-rays ordered and pending to assess spinal alignment, should be completed wearing brace  o Discussed that brace will likely be worn for 6-12 weeks  o She is not to drive with brace   Medical management and pain control per primary team  o Continue multimodal pain regimen   DVT ppx:  SCDs, Lovenox   Mobilize as tolerated with assistance, PT / OT evaluation while wearing brace    Neurosurgery will follow as needed during hospitalization and review imaging once completed  If imaging is stable, patient may discharge from a neurosurgical standpoint and follow up in the outpatient setting in 2 weeks with repeat upright lumbar spine x-rays  Please call with questions or concerns  Fall  Assessment & Plan  See plan above    Alcohol abuse  Assessment & Plan  · Ethanol level 406 upon presentation  · Patient states she is a long-time drinker, reports a number of beers every day  · Has been to rehab about 3 years ago but states this did not stick  · After this most recent trauma, she states she would like to go to rehab again and would like to abstain from alcohol  · [facility] protocol    History of Present Illness   HPI: Sloane Fowler is a 64y o  year old female with PMH including alcohol abuse who presents status post fall down the stairs about 3 weeks ago with continued back pain and right lower extremity pain  Found to have various lumbar fractures as well as a right fibular shaft fracture  Patient states about 3 weeks ago she was walking up the stairs, her foot got caught on the carpeted portion of the stairs and she fell down about 12 steps  She states she did not hit her head or lose consciousness  She was able to get on her own and go back to bed  She states over the next few weeks she attempted to walk around with significant pain in her back and right lower extremity  She tried to stay still as possible at home    Eventually she sought out medical attention as she could not take the pain  At this time she continues with back pain and right lower extremity pain  She has no radicular symptoms, numbness/tingling/weakness, bowel or bladder issues, saddle anesthesia  Patient reports she is an every day drinker, states "a drink a number of beers every day"  She has been to rehab before, about 3 years ago but states it did not stick  She states after this last incident she would like to abstain from alcohol and her family already "read me the riot act " She reports she has never gone into withdrawal   No illicit drugs or tobacco use  She is a stay-at-home wife  Review of Systems   Constitutional: Positive for activity change, chills and fever  HENT: Negative for hearing loss, tinnitus and trouble swallowing  Eyes: Negative for visual disturbance  Respiratory: Negative for chest tightness and shortness of breath  Cardiovascular: Negative for chest pain  Gastrointestinal: Negative for abdominal pain, constipation, diarrhea, nausea and vomiting  Genitourinary: Negative for difficulty urinating  Musculoskeletal: Positive for back pain and gait problem  Negative for neck pain  RLE pain   Skin: Negative for wound (bruising)  Neurological: Negative for dizziness, facial asymmetry, speech difficulty, weakness, numbness and headaches  Hematological: Does not bruise/bleed easily  Psychiatric/Behavioral: Negative for confusion         Historical Information   Past Medical History:   Diagnosis Date    Fracture     Hepatitis     Hep A     Insomnia     10mar2016 resolved    Osteoporosis     14jun2016 resolved    Pancreatitis     Seasonal allergies     Urine discoloration 11/11/2019    Vomiting 11/13/2019     Past Surgical History:   Procedure Laterality Date    IR BIOPSY BONE MARROW  11/14/2019    TUBAL LIGATION  1985     Social History     Substance and Sexual Activity   Alcohol Use Yes    Alcohol/week: 7 0 standard drinks    Types: 7 Cans of beer per week    Frequency: 4 or more times a week    Drinks per session: 1 or 2    Binge frequency: Never     Social History     Substance and Sexual Activity   Drug Use No     Social History     Tobacco Use   Smoking Status Never Smoker   Smokeless Tobacco Never Used     Family History   Problem Relation Age of Onset    Anxiety disorder Mother     Depression Mother     No Known Problems Father     Cancer Paternal Grandmother        Meds/Allergies   all current active meds have been reviewed, current meds:   Current Facility-Administered Medications   Medication Dose Route Frequency    docusate sodium (COLACE) capsule 100 mg  100 mg Oral BID    enoxaparin (LOVENOX) subcutaneous injection 30 mg  30 mg Subcutaneous Q12H [facility]    HYDROmorphone (DILAUDID) injection 0 5 mg  0 5 mg Intravenous Q4H PRN    magnesium hydroxide (MILK OF MAGNESIA) oral suspension 30 mL  30 mL Oral Daily PRN    methocarbamol (ROBAXIN) tablet 750 mg  750 mg Oral Q6H [facility]    ondansetron (ZOFRAN) injection 4 mg  4 mg Intravenous Q6H PRN    oxazepam (SERAX) capsule 10 mg  10 mg Oral Q6H [facility]    oxyCODONE (ROXICODONE) IR tablet 2 5 mg  2 5 mg Oral Q4H PRN    oxyCODONE (ROXICODONE) IR tablet 5 mg  5 mg Oral Q4H PRN    pantoprazole (PROTONIX) EC tablet 40 mg  40 mg Oral Early Morning    senna (SENOKOT) tablet 17 2 mg  2 tablet Oral Daily    sertraline (ZOLOFT) tablet 50 mg  50 mg Oral Daily    and PTA meds:   Prior to Admission Medications   Prescriptions Last Dose Informant Patient Reported? Taking?    Cholecalciferol (VITAMIN D PO) 3/18/2021 at Unknown time Self Yes Yes   Sig: Take by mouth   HYDROcodone-acetaminophen (NORCO) 5-325 mg per tablet 3/18/2021 at Unknown time  No Yes   Sig: Take 1 tablet by mouth every 6 (six) hours as needed for painMax Daily Amount: 4 tablets   Multiple Vitamin (MULTI-VITAMIN DAILY) TABS 3/18/2021 at Unknown time Self Yes Yes   Sig: Take by mouth   bisacodyl (DULCOLAX) 5 mg EC tablet   No No   Sig: Take 2 tablets (10 mg total) by mouth once for 1 dose Per office instructions   sertraline (ZOLOFT) 50 mg tablet   No No   Sig: TAKE 1 TABLET BY MOUTH EVERY DAY      Facility-Administered Medications: None     No Known Allergies    Objective   I/O       03/17 0701 - 03/18 0700 03/18 0701 - 03/19 0700 03/19 0701 - 03/20 0700    P  O   240     Total Intake(mL/kg)  240 (3 5)     Urine (mL/kg/hr)  1100     Stool  0     Total Output  1100     Net  -860            Unmeasured Stool Occurrence  1 x           Physical Exam  Constitutional:       General: She is awake  Appearance: Normal appearance  She is well-developed  HENT:      Head: Normocephalic and atraumatic  Eyes:      Extraocular Movements: EOM normal       Conjunctiva/sclera: Conjunctivae normal       Pupils: Pupils are equal, round, and reactive to light  Neck:      Musculoskeletal: Normal range of motion  No spinous process tenderness or muscular tenderness  Cardiovascular:      Rate and Rhythm: Normal rate and regular rhythm  Pulmonary:      Effort: Pulmonary effort is normal  No respiratory distress  Musculoskeletal: Normal range of motion  Cervical back: She exhibits no tenderness  Thoracic back: She exhibits no tenderness  Lumbar back: She exhibits tenderness (some stiffness)  Skin:     General: Skin is warm and dry  Findings: Bruising present  Neurological:      Mental Status: She is alert and oriented to person, place, and time  Coordination: Finger-Nose-Finger Test normal       Deep Tendon Reflexes:      Reflex Scores:       Patellar reflexes are 2+ on the right side and 2+ on the left side  Psychiatric:         Attention and Perception: Attention and perception normal          Mood and Affect: Mood and affect normal          Speech: Speech normal          Behavior: Behavior normal  Behavior is cooperative  Thought Content:  Thought content normal          Cognition and Memory: Cognition and memory normal  Judgment: Judgment normal        Neurologic Exam     Mental Status   Oriented to person, place, and time  Follows 1 step commands  Attention: normal  Concentration: normal    Speech: speech is normal   Level of consciousness: alert  Knowledge: good  Able to perform simple calculations  Normal comprehension  Cranial Nerves     CN III, IV, VI   Pupils are equal, round, and reactive to light  Extraocular motions are normal    CN III: no CN III palsy  CN VI: no CN VI palsy  Nystagmus: none   Diplopia: none  Ophthalmoparesis: none  Upgaze: normal  Downgaze: normal  Conjugate gaze: present    CN V   Right facial sensation deficit: none  Left facial sensation deficit: none    CN VII   Right facial weakness: none  Left facial weakness: none    CN VIII   Hearing: intact    CN IX, X   CN IX normal    CN X normal      CN XI   Right trapezius strength: normal  Left trapezius strength: normal    CN XII   CN XII normal      Motor Exam   Muscle bulk: normal  Overall muscle tone: normal  Right arm pronator drift: absent  Left arm pronator drift: absent    Strength   Strength 5/5 except as noted  RLE:  Distal leg not assess as NWB and in splint     Sensory Exam   Light touch normal      Gait, Coordination, and Reflexes     Coordination   Finger to nose coordination: normal    Tremor   Resting tremor: present  Action tremor: right arm and left arm    Reflexes   Right patellar: 2+  Left patellar: 2+  Right : 2+  Left : 2+  Right Parkinson: absent  Left Parkinson: absent  Right ankle clonus: absent  Left ankle clonus: absent      Vitals:Blood pressure 152/67, pulse 67, temperature 99 2 °F (37 3 °C), temperature source Oral, resp  rate 16, height 5' 7" (1 702 m), weight 67 8 kg (149 lb 7 6 oz), SpO2 94 %  ,Body mass index is 23 41 kg/m²       Lab Results:   Results from last 7 days   Lab Units 03/19/21  0456 03/18/21  1246   WBC Thousand/uL 2 58* 2 23*   HEMOGLOBIN g/dL 8 1* 8 6*   HEMATOCRIT % 26 4* 28 2* PLATELETS Thousands/uL 47* 55*   NEUTROS PCT %  --  18*   MONOS PCT %  --  9     Results from last 7 days   Lab Units 03/19/21  0455 03/18/21  1246   POTASSIUM mmol/L 4 4 3 9   CHLORIDE mmol/L 106 109*   CO2 mmol/L 25 28   BUN mg/dL 14 16   CREATININE mg/dL 0 62 0 61   CALCIUM mg/dL 8 0* 8 0*   ALK PHOS U/L 245*  --    ALT U/L 54  --    AST U/L 96*  --              Results from last 7 days   Lab Units 03/18/21  1437   INR  1 20*   PTT seconds 31       Imaging Studies: I have personally reviewed pertinent reports  and I have personally reviewed pertinent films in PACS    Ct Abdomen Pelvis Wo Contrast    Result Date: 3/18/2021  Impression: LIMITED STUDY WITHOUT IV OR ORAL CONTRAST IN THE SETTING OF TRAUMA  No acute traumatic intra-abdominal injury identified within limitations  New moderate superior endplate deformity of L2 predominantly seen along the midline and left superior endplate, possibly age-indeterminate Schmorl's node  There is mild bony retropulsion of the upper vertebral corner and mild central canal narrowing seen at this level  If clinical management would be changed, MRI may be of further value  Minimally displaced fractures of the right L3 and L4 transverse processes which were not seen previously  Findings again age indeterminate  Mild L4 inferior endplate compression deformity which was not seen previously and should be considered age-indeterminate but is favored to be chronic given endplate sclerosis here  Herod 7 9 x 1 6 cm soft tissue density in the right gluteal fat not seen previously favored to represent hematoma  Chronic pancreatitis  1 6 cm uncinate process cyst suggested as well as increasing pancreatic duct dilatation  Assessment is limited without IV contrast   Correlation with MRCP recommended on a nonemergent (OUTPATIENT) basis  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  Outpatient surgical consultation may be considered  The study was marked in [facility] for immediate notification  Workstation performed: [id number]     Xr Sacrum And Coccyx    Result Date: 3/18/2021  Impression: No definite acute displaced fractures identified of the sacrum  Workstation performed: [id number]     Xr Tibia Fibula 2 Views Right    Result Date: 3/18/2021  Impression: Nondisplaced midshaft right fibula fracture  Doubt but cannot exclude proximal tibia longitudinal nondisplaced cortical fracture  The study was marked in [facility] for immediate notification  Workstation performed: [id number]     Xr Ankle 3+ Views Right    Result Date: 3/18/2021  Impression: Healing retrocalcaneal displaced cortical fracture The study was marked in [facility] for immediate notification  Workstation performed: [id number]     Xr Foot 3+ Views Right    Addendum Date: 3/18/2021    ADDENDUM: FINDINGS: Suggesting healing nondisplaced distal right 4th metatarsal fracture  Old healed right 5th metatarsal trauma  Lateral view previously noted longitudinal posterior calcaneal sclerotic band not appreciated  However upper posterolateral calcaneal healing displaced cortical avulsion fracture noted  Mild bunion and hallux valgus  There is no acute fracture or dislocation  Posterior plantar and retrocalcaneal degenerative changes  No lytic or blastic osseous lesion  Soft tissues are unremarkable  IMPRESSION: Healing nondisplaced distal right 4th metatarsal fracture  Healing retrocalcaneal displaced cortical fracture also noted  Result Date: 3/18/2021  Impression: Healing nondisplaced distal right 4th metatarsal fracture  The study was marked in [facility] for immediate notification  Workstation performed: [id number]     Ct Head Without Contrast    Result Date: 3/18/2021  Impression: No acute intracranial abnormality  Workstation performed: [id number]     Ct Lower Extremity Wo Contrast Right    Result Date: 3/18/2021  Impression: Bones are markedly demineralized, limiting evaluation for nondisplaced fracture  No acute displaced fracture identified  Mineralized densities along the posterior calcaneus likely representing enthesopathy at the Achilles insertion, with Achilles insertional tendinosis  Workstation performed: [id number]     EKG, Pathology, and Other Studies: I have personally reviewed pertinent reports  VTE Prophylaxis: Sequential compression device (Venodyne)  and Enoxaparin (Lovenox)    Code Status: Level 1 - Full Code  Advance Directive and Living Will:      Power of :    POLST:      Counseling / Coordination of Care  I spent 20 minutes with the patient

## 2021-03-19 NOTE — PLAN OF CARE
Problem: OCCUPATIONAL THERAPY ADULT  Goal: Performs self-care activities at highest level of function for planned discharge setting  See evaluation for individualized goals  Description: Treatment Interventions: ADL retraining, Functional transfer training, UE strengthening/ROM, Endurance training, Patient/family training, Equipment evaluation/education, Compensatory technique education, Energy conservation, Activityengagement          See flowsheet documentation for full assessment, interventions and recommendations  Note: Limitation: Decreased ADL status, Decreased UE strength, Decreased Safe judgement during ADL, Decreased endurance, Decreased self-care trans, Decreased high-level ADLs  Prognosis: Good  Assessment: Patient is a 64 y o  female [facility]21 due to Lumbar compression fracture ([facility] [address] )  Pt is currently NWB to RLE and is to wear LSO brace when OOB or >45* in bed  Comorbidities affecting pt's physical performance at time of assessment include alcohol abuse, fall, closed fx of fibula  Patient has active OT orders and activity orders for Up in chair  PTA pt living with  in [facility], pt reports requiring (A) with ADLS and IADLs recently since fall  (+)falls, (+)drives  Pt reports recently using AD since fall, prior to that no use of AD  Personal factors affecting pt at time of IE include:steps to enter environment, limited home support, difficulty performing ADLS, difficulty performing IADLS  and limited insight into deficits  At the time of evaluation patient currently requires (S) for UB ADLs, mod A for LB ADLs, mod A x2 for functional transfers, and unable to assess functional mobility   The following deficits affected patient's occupational performance weakness, decreased functional strength, decreased functional balance, decreased activity tolerance, decreased safety awareness, impulsivity, increased pain, orthopedic restrictions, impaired interpersonal skills and decreased coping skills  Patient would benefit from skilled OT services while in the hospital to address above deficits  Occupational performance areas to be addressed include ADL retraining, bed mobility, functional transfer training, endurance training, patient/family training, equipment evaluation/education, compensatory technique education, activity engagement and activity tolerance in order to maximize patient's level of function  The patient's raw score on the AM-PAC Daily Activity inpatient short form is 16, standardized score is 35 96, less than 39 4  Patients at this level are likely to benefit from discharge to post-acute rehabilitation services  Recommend PAR on d/c, will continue to follow 3-5x/wk to address goals listed below        OT Discharge Recommendation: Post-Acute Rehabilitation Services

## 2021-03-19 NOTE — PLAN OF CARE
Problem: Potential for Falls  Goal: Patient will remain free of falls  Description: INTERVENTIONS:  - Assess patient frequently for physical needs  -  Identify cognitive and physical deficits and behaviors that affect risk of falls    -  [place] fall precautions as indicated by assessment   - Educate patient/family on patient safety including physical limitations  - Instruct patient to call for assistance with activity based on assessment  - Modify environment to reduce risk of injury  - Consider OT/PT consult to assist with strengthening/mobility  Outcome: Progressing     Problem: PAIN - ADULT  Goal: Verbalizes/displays adequate comfort level or baseline comfort level  Description: Interventions:  - Encourage patient to monitor pain and request assistance  - Assess pain using appropriate pain scale  - Administer analgesics based on type and severity of pain and evaluate response  - Implement non-pharmacological measures as appropriate and evaluate response  - Consider cultural and social influences on pain and pain management  - Notify physician/advanced practitioner if interventions unsuccessful or patient reports new pain  Outcome: Progressing     Problem: INFECTION - ADULT  Goal: Absence or prevention of progression during hospitalization  Description: INTERVENTIONS:  - Assess and monitor for signs and symptoms of infection  - Monitor lab/diagnostic results  - Monitor all insertion sites, i e  indwelling lines, tubes, and drains  - Monitor endotracheal if appropriate and nasal secretions for changes in amount and color  - [place] appropriate cooling/warming therapies per order  - Administer medications as ordered  - Instruct and encourage patient and family to use good hand hygiene technique  - Identify and instruct in appropriate isolation precautions for identified infection/condition  Outcome: Progressing  Goal: Absence of fever/infection during neutropenic period  Description: INTERVENTIONS:  - Monitor WBC    Outcome: Progressing     Problem: SAFETY ADULT  Goal: Patient will remain free of falls  Description: INTERVENTIONS:  - Assess patient frequently for physical needs  -  Identify cognitive and physical deficits and behaviors that affect risk of falls    -  [place] fall precautions as indicated by assessment   - Educate patient/family on patient safety including physical limitations  - Instruct patient to call for assistance with activity based on assessment  - Modify environment to reduce risk of injury  - Consider OT/PT consult to assist with strengthening/mobility  Outcome: Progressing  Goal: Maintain or return to baseline ADL function  Description: INTERVENTIONS:  -  Assess patient's ability to carry out ADLs; assess patient's baseline for ADL function and identify physical deficits which impact ability to perform ADLs (bathing, care of mouth/teeth, toileting, grooming, dressing, etc )  - Assess/evaluate cause of self-care deficits   - Assess range of motion  - Assess patient's mobility; develop plan if impaired  - Assess patient's need for assistive devices and provide as appropriate  - Encourage maximum independence but intervene and supervise when necessary  - Involve family in performance of ADLs  - Assess for home care needs following discharge   - Consider OT consult to assist with ADL evaluation and planning for discharge  - Provide patient education as appropriate  Outcome: Progressing  Goal: Maintain or return mobility status to optimal level  Description: INTERVENTIONS:  - Assess patient's baseline mobility status (ambulation, transfers, stairs, etc )    - Identify cognitive and physical deficits and behaviors that affect mobility  - Identify mobility aids required to assist with transfers and/or ambulation (gait belt, sit-to-stand, lift, walker, cane, etc )  - [place] fall precautions as indicated by assessment  - Record patient progress and toleration of activity level on Mobility SBAR; progress patient to next Phase/Stage  - Instruct patient to call for assistance with activity based on assessment  - Consider rehabilitation consult to assist with strengthening/weightbearing, etc   Outcome: Progressing     Problem: DISCHARGE PLANNING  Goal: Discharge to home or other facility with appropriate resources  Description: INTERVENTIONS:  - Identify barriers to discharge w/patient and caregiver  - Arrange for needed discharge resources and transportation as appropriate  - Identify discharge learning needs (meds, wound care, etc )  - Arrange for interpretive services to assist at discharge as needed  - Refer to Case Management Department for coordinating discharge planning if the patient needs post-hospital services based on physician/advanced practitioner order or complex needs related to functional status, cognitive ability, or social support system  Outcome: Progressing     Problem: Knowledge Deficit  Goal: Patient/family/caregiver demonstrates understanding of disease process, treatment plan, medications, and discharge instructions  Description: Complete learning assessment and assess knowledge base    Interventions:  - Provide teaching at level of understanding  - Provide teaching via preferred learning methods  Outcome: Progressing

## 2021-03-19 NOTE — PROGRESS NOTES
Progress Note - Orthopedics   Sloane oFwler 64 y o  female MRN: [id number]  Unit/Bed#: S -01      Subjective:    64 y  o female seen and evaluated  No acute events overnight  Patient reports the majority of her pain is over the fibula shaft fracture  Pt doing well  Pain controlled  Denies fevers chills, numbness or tingling       Labs:  0   Lab Value Date/Time    HCT 26 4 (L) 03/19/2021 0456    HCT 28 2 (L) 03/18/2021 1246    HCT 29 3 (L) 11/18/2019 1607    HCT 33 8 (L) 06/14/2016 1201    HCT 38 4 04/22/2016 1310    HCT 40 7 10/09/2014 1948    HGB 8 1 (L) 03/19/2021 0456    HGB 8 6 (L) 03/18/2021 1246    HGB 9 1 (L) 11/18/2019 1607    HGB 11 2 (L) 06/14/2016 1201    HGB 12 6 04/22/2016 1310    HGB 13 4 10/09/2014 1948    INR 1 20 (H) 03/18/2021 1437    INR 1 0 06/14/2016 1201    WBC 2 58 (L) 03/19/2021 0456    WBC 2 23 (L) 03/18/2021 1246    WBC 3 11 (L) 11/18/2019 1607    WBC 4 9 06/14/2016 1201    WBC 40-60 (A) 06/14/2016 1123    WBC 4 3 04/22/2016 1310       Meds:    Current Facility-Administered Medications:     docusate sodium (COLACE) capsule 100 mg, 100 mg, Oral, BID, Reina Latham PA-C    enoxaparin (LOVENOX) subcutaneous injection 30 mg, 30 mg, Subcutaneous, Q12H [facility], Reina Latham PA-C, 30 mg at 03/18/21 2147    HYDROmorphone (DILAUDID) injection 0 5 mg, 0 5 mg, Intravenous, Q4H PRN, Reina Latham PA-C    magnesium hydroxide (MILK OF MAGNESIA) oral suspension 30 mL, 30 mL, Oral, Daily PRN, Reina aLtham PA-C    ondansetron [facility]) injection 4 mg, 4 mg, Intravenous, Q6H PRN, Reina Latham PA-C    oxyCODONE (ROXICODONE) IR tablet 2 5 mg, 2 5 mg, Oral, Q4H PRN, Reina Latham PA-C    oxyCODONE (ROXICODONE) IR tablet 5 mg, 5 mg, Oral, Q4H PRN, Reina Latham PA-C, 5 mg at 03/18/21 2149    pantoprazole (PROTONIX) EC tablet 40 mg, 40 mg, Oral, Early Morning, Reina Latham PA-C, 40 mg at 03/19/21 0518    senna (SENOKOT) tablet 17 2 mg, 2 tablet, Oral, Daily, Reina Latham PA-C    sertraline (ZOLOFT) tablet 50 mg, 50 mg, Oral, Daily, Renzo Max PA-C, 50 mg at 03/18/21 7772    Blood Culture:   Lab Results   Component Value Date    BLOODCX No Growth After 5 Days  11/12/2019       Wound Culture:   No results found for: WOUNDCULT    Ins and Outs:  I/O last 24 hours: In: 240 [P O :240]  Out: 1100 [Urine:1100]          Physical:  Vitals:    03/19/21 0724   BP: 152/67   Pulse: 72   Resp: 16   Temp: 99 2 °F (37 3 °C)   SpO2: 97%     Musculoskeletal: right Lower Extremity  · Skin warm and well perfused  · Splint in place   · SILT s/s/sp/dp/t  · Motor intact fhl/ehl, ankle dorsi/plantar flexion  · Intact DP pulse    Imaging:   CT of the right lower leg demonstrates no acute fracture of the calcaneus but there is significant demineralization of the bone  Enthesophytes noted at achilles insertion    Assessment:    64 y  o female with right non displaced fibula shaft fracture     Plan:  · NWB RLE in short leg posterior splint  · PT/OT  · Pain control  · DVT ppx per primary team while immobilized  · Dispo: Ortho signing off, follow up as an outpatient with orthopedics in 2 weeks       Eliazar James PA-C

## 2021-03-19 NOTE — PLAN OF CARE
Problem: PHYSICAL THERAPY ADULT  Goal: Performs mobility at highest level of function for planned discharge setting  See evaluation for individualized goals  Description: Treatment/Interventions: Functional transfer training, LE strengthening/ROM, Therapeutic exercise, Endurance training, Patient/family training, Equipment eval/education, Bed mobility, Gait training          See flowsheet documentation for full assessment, interventions and recommendations  Outcome: Progressing  Note: Prognosis: Fair  Problem List: Decreased strength, Decreased range of motion, Decreased endurance, Impaired balance, Decreased mobility, Decreased safety awareness, Impaired tone, Orthopedic restrictions, Pain  Assessment: Therapist provided education to pt for mobility technique including NWB status, transfers and roller walker use  Education was provided due to findings from evaluation  Occasional repetition was needed for carryover to be noted  Pt was found to have improvement after education w/ increased standing tolerance though similar level of assistance was needed  Pt continues to be a fall risk  continued inpatient PT tx is indicated to reduce fall risk factors  PT Discharge Recommendation: Post-Acute Rehabilitation Services          See flowsheet documentation for full assessment

## 2021-03-19 NOTE — OCCUPATIONAL THERAPY NOTE
Occupational Therapy Evaluation     Patient Name: Shari Stovall  AAGGX'E Date: 3/19/2021  Problem List  Principal Problem:    Lumbar compression fracture [facility])  Active Problems:    Alcohol abuse    Fall    Closed fracture of fibula, shaft    Past Medical History  Past Medical History:   Diagnosis Date    Fracture     Hepatitis     Hep A     Insomnia     10mar2016 resolved    Osteoporosis     14jun2016 resolved    Pancreatitis     Seasonal allergies     Urine discoloration 11/11/2019    Vomiting 11/13/2019     Past Surgical History  Past Surgical History:   Procedure Laterality Date    IR BIOPSY BONE MARROW  11/14/2019    TUBAL LIGATION  1985             03/19/21 1056   OT Last Visit   OT Visit Date 03/19/21   Note Type   Note type Evaluation   Restrictions/Precautions   Weight Bearing Precautions Per Order Yes   RLE Weight Bearing Per Order NWB   Braces or Orthoses LSO   Other Precautions Fall Risk;Pain;Spinal precautions;WBS;Chair Alarm; Bed Alarm   Pain Assessment   Pain Assessment Tool 0-10   Pain Score 8  (0 with no movement, 8-9 with movement )   Pain Location/Orientation Orientation: Lower; Location: Back   Home Living   Type of [address] [place] [address]; Able to live on main level with bedroom/bathroom;Stairs to enter with rails;1/2 bath on main level  (2 NATALYA, reports  is setting up bed on 1st floor)   Bathroom Shower/Tub Tub/shower unit   Bathroom Toilet Standard   Bathroom Accessibility Not accessible  (to shower)   Home Equipment Walker;Cane   Additional Comments No use of AD at baseline, since fall pt in CAM boot and using RW for support   Prior Function   Lives With Spouse  (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   ADL   Eating Assistance 7  Independent   Grooming Assistance 5  [address] [facility] 5  [address] [facility] [address] Deficit Increased time to complete;Supervision/safety;Verbal cueing  (Edu on donning LSO)   LB Dressing Assistance 3  Moderate Assistance   LB Dressing Deficit Increased time to complete;Supervision/safety;Verbal cueing;Pull up over hips  (Able to thread underwear and pants A with over hips)   Toileting Assistance  2  Maximal Assistance   Bed Mobility   Rolling R 4  Minimal assistance   Additional items Assist x 1; Increased time required;Verbal cues;LE management   Rolling L 4  Minimal assistance   Additional items Assist x 1; Increased time required;Verbal cues;LE management   Supine to Sit 3  Moderate assistance   Additional items Assist x 1; Increased time required;Verbal cues;LE management   Sit to Supine 3  Moderate assistance   Additional items Assist x 1; Increased time required;Verbal cues;LE management   Transfers   Sit to Stand 3  Moderate assistance   Additional items Assist x 2; Increased time required;Verbal cues; Impulsive   Stand to Sit 3  Moderate assistance   Additional items Assist x 2; Increased time required;Verbal cues; Impulsive   Additional Comments Pt requiring MAX VC for compliance with NWB status  A to hold up RLE required   Unable to advance forwards or maintain standing longer than 20 sec due to fatigue and pain   Functional Mobility   Additional Comments DNT   Balance   Static Sitting Fair +   Dynamic Sitting Fair +   Static Standing Zero   Ambulatory Zero   Activity Tolerance   Activity Tolerance Patient limited by fatigue;Patient limited by pain   Medical Staff Made Aware PT SOLEDAD, RN Lucy   RUE Assessment   RUE Assessment WFL   LUE Assessment   LUE Assessment WFL   Hand Function   Gross Motor Coordination Functional   Fine Motor Coordination Functional  (tremors noted )   Sensation   Light Touch No apparent deficits   Sharp/Dull No apparent deficits   Cognition   Overall Cognitive Status WFL   Arousal/Participation Alert; Cooperative   Attention Attends with cues to redirect   Orientation Level Oriented X4   Memory Within functional limits   Following Commands Follows one step commands with increased time or repetition   Comments Pleasant and cooperative   Cognition Assessment Tools SLUMS   Score 28  (score indicates normal cognition)   Assessment   Limitation Decreased ADL status; Decreased UE strength;Decreased Safe judgement during ADL;Decreased endurance;Decreased self-care trans;Decreased high-level ADLs   Prognosis Good   Assessment Patient is a 64 y o  female admitted to [facility] on 3/18/2021 due to Lumbar compression fracture ([address] )  Pt is currently NWB to RLE and is to wear LSO brace when OOB or >45* in bed  Comorbidities affecting pt's physical performance at time of assessment include alcohol abuse, fall, closed fx of fibula  Patient has active OT orders and activity orders for Up in chair  PTA pt living with  in [facility], pt reports requiring (A) with ADLS and IADLs recently since fall  (+)falls, (+)drives  Pt reports recently using AD since fall, prior to that no use of AD  Personal factors affecting pt at time of IE include:steps to enter environment, limited home support, difficulty performing ADLS, difficulty performing IADLS  and limited insight into deficits   At the time of evaluation patient currently requires (S) for UB ADLs, mod A for LB ADLs, mod A x2 for functional transfers, and unable to assess functional mobility  The following deficits affected patient's occupational performance weakness, decreased functional strength, decreased functional balance, decreased activity tolerance, decreased safety awareness, impulsivity, increased pain, orthopedic restrictions, impaired interpersonal skills and decreased coping skills  Patient would benefit from skilled OT services while in the hospital to address above deficits  Occupational performance areas to be addressed include ADL retraining, bed mobility, functional transfer training, endurance training, patient/family training, equipment evaluation/education, compensatory technique education, activity engagement and activity tolerance in order to maximize patient's level of function  The patient's raw score on the AM-PAC Daily Activity inpatient short form is 16, standardized score is 35 96, less than 39 4  Patients at this level are likely to benefit from discharge to post-acute rehabilitation services  Recommend PAR on d/c, will continue to follow 3-5x/wk to address goals listed below  Goals   Patient Goals cooking and gardening   LT Time Frame 10-14   Long Term Goal see goals listed below   Plan   Treatment Interventions ADL retraining;Functional transfer training;UE strengthening/ROM; Endurance training;Patient/family training;Equipment evaluation/education; Compensatory technique education; Energy conservation; Activityengagement   Goal Expiration Date 04/02/21   OT Treatment Day 0   OT Frequency 3-5x/wk   Recommendation   OT Discharge Recommendation Post-Acute Rehabilitation Services   AM-[facility] Daily Activity Inpatient   Lower Body Dressing 2   Bathing 2   Toileting 2   Upper Body Dressing 3   Grooming 3   Eating 4   Daily Activity Raw Score 16   Daily Activity Standardized Score (Calc for Raw Score >=11) 35 96   AM-PAC Applied Cognition Inpatient   Following a Speech/Presentation 4   Understanding Ordinary Conversation 4   Taking Medications 4   Remembering Where Things Are Placed or Put Away 4   Remembering List of 4-5 Errands 4   Taking Care of Complicated Tasks 4   Applied Cognition Raw Score 24   Applied Cognition Standardized Score 62 21      Goals    -Patient will complete UB ADLs w/ mod I using AE and AD as needed    -Patient will complete LB ADLs w/ mod I using AE and AD as needed    -Patient will complete toileting w/ mod I w/ G hygiene/thoroughness    -Patient will complete bed mobility with Mod I without use of bed rails    -Patient will tolerate therapeutic activities for greater than 15 min, in order to increase tolerance for functional activities      -Patient will perform functional transfers with Mod I to/from all surfaces using DME as needed    -Patient will complete functional mobility during ADL/IADL/leisure tasks with Mod I     -Patient will demonstrate 100% carryover of energy conservation techniques t/o functional I/ADL/leisure tasks w/o cues s/p skilled education to increase endurance during functional tasks    -Patient will increase BUE strength to 4+/5 via AROM/AAROM/PROM exercises to increase independence in ADLs and transfers    -Patient will demonstrate 100% adherence to NWB status during all functional activities w/o cues from therapist     -Patient will increase dynamic standing balance to Fair+ in order to complete functional mobility and ADL tasks safely      At the end of the session, all needs met and pt supine in bed, bed alarm activated, LEs elevated , HOB elevated and call bell within reach    [facility], OTR/L

## 2021-03-19 NOTE — CASE MANAGEMENT
LOS: 1 DAY  PATIENT IS NOT A BUNDLE  PATIENT IS NOT A READMISSION  UNPLANNED RISK OF READMISSION: 12      CM met with the Patient at the bedside; Patient was alert and oriented, laying down in bed  CM introduced self and role  Patient reports that she lives in a multi-level home with her spouse, Catalina Cool  There are 2 NATALYA  Prior to admission, Patient was independent with all ADLs and utilized a RW due to a previous injury  Patient states that she also has a cane and BSC at home  Patient denies any VNA/STR history  Patient utilizes [facility] in [place] on [address]  Patient states that she is able to afford all of her medications  Patient identifies her spouse, Catalina Cool, as her health care representative  Patient reports that she has a LW, but does not have a POA  Patient declined resources at this time  Patient sees her PCP, Dr Tyree Foy, as needed; and has an appointment scheduled 3/30  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  Patient states that she enjoyed these in the past and was able to make strong friendships at these services  CM explained the [facility] program and resources that can be offered to the Patient  Patient expressed her understanding and requested that CM contact the HOST program so she can complete an assessment  Patient completed record release for [facility] to review       Patient is also requesting a referral to [facility] for STR to help her gain strength in her other leg to be able to stand and keep her NWB status  CM sent referral to [facility] via [facility] for STR  CM dept will follow for review  CM spoke to Ari Judd at [facility] to provide referral and faxed clinicals  [facility] will speak with the Patient and complete assessment  CM dept to follow for completion of assessment  CM reviewed discharge planning process including the following: identifying caregivers at home, preference for d/c planning needs, availability of treatment team to discuss questions or concerns patient and/or family may have regarding diagnosis, plan of care, old or new medications and discharge planning   CM will continue to follow for care coordination and update assessment as appropriate

## 2021-03-19 NOTE — ASSESSMENT & PLAN NOTE
· Ethanol level 406 upon presentation  · Patient states she is a long-time drinker, reports a number of beers every day  · Has been to rehab about 3 years ago but states this did not stick  · After this most recent trauma, she states she would like to go to rehab again and would like to abstain from alcohol  · CIWA protocol

## 2021-03-19 NOTE — CONSULTS
Consultation - Surgical Oncology  Shwetha Duarte 64 y o  female MRN: [id number]  Unit/Bed#: S -01 Encounter: [id number]    Assessment/Plan     Assessment:  65 yo F with PMH of chronic pancreatitis, EtOH abuse, and hepatitis A who presented as a trauma following a fall down 12 stairs  Found incidentally to have a porcelain gallbladder and a 1 6cm uncinate process cyst on imaging  Plan:  Care per primary  Obtain CA 19-9, , CEA  Will obtain MRCP while inpatient  Can follow up with surgical oncology as an outpatient to discuss results of MRCP and possible cholecystectomy given finding of porcelain gallbladder    History of Present Illness     HPI:  Shwetha Duarte is a 64 y o  female who presented as a trauma after a fall down 12 stairs  She was found to have a lumbar fracture as well as a fibular shaft fracture and a calcaneal fracture all being managed non operatively by Orthopedic surgery and Neurosurgery  She was found incidentally to have a porcelain gallbladder and a 1 6 cm uncinate process cyst on imaging  She denies any abdominal pain at this time  She does report some nausea and 2 episodes of vomiting which she attributes to eating bad spinach earlier today, but previously had no abdominal complaints  She has an extensive history of alcohol use requiring rehab previously and with an alcohol level above 400 on admission  She is a nonsmoker and has no family history of cancer  She does have a past medical history of chronic pancreatitis with calcification of her pancreas on imaging  Inpatient Consult to Surgical Oncology     Performed by  Tracee Rose MD     Authorized by PAZ Contreras              Review of Systems   Constitutional: Negative  HENT: Negative  Eyes: Negative  Respiratory: Negative  Cardiovascular: Negative  Gastrointestinal: Positive for nausea and vomiting  Negative for abdominal pain, constipation and diarrhea  Endocrine: Negative  Genitourinary: Negative  Musculoskeletal: Positive for arthralgias and back pain (Lumbar spinal pain)  Skin: Negative  Neurological: Negative  Psychiatric/Behavioral: Negative             Historical Information   Past Medical History:   Diagnosis Date    Fracture     Hepatitis     Hep A     Insomnia     10mar2016 resolved    Osteoporosis     14jun2016 resolved    Pancreatitis     Seasonal allergies     Urine discoloration 11/11/2019    Vomiting 11/13/2019     Past Surgical History:   Procedure Laterality Date    IR BIOPSY BONE MARROW  11/14/2019    TUBAL LIGATION  1985     Social History   Social History     Substance and Sexual Activity   Alcohol Use Yes    Alcohol/week: 7 0 standard drinks    Types: 7 Cans of beer per week    Frequency: 4 or more times a week    Drinks per session: 1 or 2    Binge frequency: Never     Social History     Substance and Sexual Activity   Drug Use No     E-Cigarette/Vaping    E-Cigarette Use Never User      E-Cigarette/Vaping Substances    Nicotine No     THC No     CBD No     Flavoring No     Other No     Unknown No      Social History     Tobacco Use   Smoking Status Never Smoker   Smokeless Tobacco Never Used     Family History:   Family History   Problem Relation Age of Onset    Anxiety disorder Mother     Depression Mother     No Known Problems Father     Cancer Paternal Grandmother        Meds/Allergies   current meds:   Current Facility-Administered Medications   Medication Dose Route Frequency    docusate sodium (COLACE) capsule 100 mg  100 mg Oral BID    enoxaparin (LOVENOX) subcutaneous injection 30 mg  30 mg Subcutaneous Q12H [address]    HYDROmorphone (DILAUDID) injection 0 5 mg  0 5 mg Intravenous Q4H PRN    magnesium hydroxide (MILK OF MAGNESIA) oral suspension 30 mL  30 mL Oral Daily PRN    methocarbamol (ROBAXIN) tablet 750 mg  750 mg Oral Q6H [address]    ondansetron (ZOFRAN) injection 4 mg  4 mg Intravenous Q6H PRN    oxazepam (SERAX) capsule 10 mg  10 mg Oral Q6H [address]    oxyCODONE (ROXICODONE) IR tablet 2 5 mg  2 5 mg Oral Q4H PRN    oxyCODONE (ROXICODONE) IR tablet 5 mg  5 mg Oral Q4H PRN    pantoprazole (PROTONIX) EC tablet 40 mg  40 mg Oral Early Morning    senna (SENOKOT) tablet 17 2 mg  2 tablet Oral Daily    sertraline (ZOLOFT) tablet 50 mg  50 mg Oral Daily    and PTA meds:   Prior to Admission Medications   Prescriptions Last Dose Informant Patient Reported? Taking?    Cholecalciferol (VITAMIN D PO) 3/18/2021 at Unknown time Self Yes Yes   Sig: Take by mouth   HYDROcodone-acetaminophen (NORCO) 5-325 mg per tablet 3/18/2021 at Unknown time  No Yes   Sig: Take 1 tablet by mouth every 6 (six) hours as needed for painMax Daily Amount: 4 tablets   Multiple Vitamin (MULTI-VITAMIN DAILY) TABS 3/18/2021 at Unknown time Self Yes Yes   Sig: Take by mouth   bisacodyl (DULCOLAX) 5 mg EC tablet   No No   Sig: Take 2 tablets (10 mg total) by mouth once for 1 dose Per office instructions   sertraline (ZOLOFT) 50 mg tablet   No No   Sig: TAKE 1 TABLET BY MOUTH EVERY DAY      Facility-Administered Medications: None     No Known Allergies    Objective   First Vitals:   Blood Pressure: 137/64 (03/18/21 1229)  Pulse: 78 (03/18/21 1229)  Temperature: 98 °F (36 7 °C) (03/18/21 1234)  Temp Source: Oral (03/18/21 1234)  Respirations: 18 (03/18/21 1229)  Height: 5' 7" (170 2 cm) (03/18/21 1518)  Weight - Scale: 67 2 kg (148 lb 2 4 oz) (03/18/21 1518)  SpO2: 97 % (03/18/21 1229)    Current Vitals:   Blood Pressure: 152/67 (03/19/21 0724)  Pulse: 67 (03/19/21 0834)  Temperature: 99 2 °F (37 3 °C) (03/19/21 0724)  Temp Source: Oral (03/19/21 0724)  Respirations: 16 (03/19/21 0724)  Height: 5' 7" (170 2 cm) (03/18/21 1620)  Weight - Scale: 67 8 kg (149 lb 7 6 oz) (03/18/21 1620)  SpO2: 94 % (03/19/21 1130)      Intake/Output Summary (Last 24 hours) at 3/19/2021 1319  Last data filed at 3/19/2021 0501  Gross per 24 hour   Intake 240 ml   Output 1100 ml   Net -860 ml       Invasive Devices     Peripheral Intravenous Line            Peripheral IV 03/18/21 Right Antecubital 1 day                Physical Exam  Constitutional:       Appearance: Normal appearance  HENT:      Head: Normocephalic and atraumatic  Right Ear: External ear normal       Left Ear: External ear normal       Nose: Nose normal       Mouth/Throat:      Mouth: Mucous membranes are moist       Pharynx: Oropharynx is clear  Eyes:      Extraocular Movements: Extraocular movements intact  Conjunctiva/sclera: Conjunctivae normal       Pupils: Pupils are equal, round, and reactive to light  Neck:      Musculoskeletal: Normal range of motion  Cardiovascular:      Rate and Rhythm: Normal rate and regular rhythm  Pulses: Normal pulses  Pulmonary:      Effort: Pulmonary effort is normal    Abdominal:      General: Abdomen is flat  Palpations: Abdomen is soft  There is no mass  Tenderness: There is no abdominal tenderness  There is no guarding or rebound  Musculoskeletal:         General: Tenderness (RLE tenderness) and signs of injury present  Skin:     General: Skin is warm and dry  Neurological:      General: No focal deficit present  Mental Status: She is alert and oriented to person, place, and time  Psychiatric:         Mood and Affect: Mood normal          Behavior: Behavior normal             Lab Results: I have personally reviewed pertinent lab results  Imaging: I have personally reviewed pertinent reports  EKG, Pathology, and Other Studies: I have personally reviewed pertinent reports

## 2021-03-19 NOTE — PHYSICAL THERAPY NOTE
PHYSICAL THERAPY TREATMENT NOTE    Patient Name: Jane Cabrera  WFOIT'Y Date: 3/19/2021     03/19/21 1139   PT Last Visit   PT Visit Date 03/19/21   Note Type   Note Type Treatment   Pain Assessment   Pain Assessment Tool 0-10   Pain Score 8   Pain Location/Orientation Orientation: Lower; Location: Back   Restrictions/Precautions   RLE Weight Bearing Per Order NWB  (w/ short leg posterior splint)   Braces or Orthoses LSO   Other Precautions Fall Risk;Pain;Spinal precautions;WBS;Chair Alarm; Bed Alarm  (continuous pulse ox)   General   Chart Reviewed Yes   Family/Caregiver Present No   Cognition   Arousal/Participation Alert; Cooperative   Attention Attends with cues to redirect   Orientation Level Oriented to person; Other (Comment)  (pt was identified w/ full name, birth date)   Following Commands Follows one step commands with increased time or repetition   Subjective   Subjective pt agreed to PT intervention  mobility education was provided regarding transfer technique and roller walker use   education was completed via teachback, demonstration and verbal instruction  Bed Mobility   Supine to Sit 4  Minimal assistance  (log roll)   Additional items Assist x 1;Bedrails; Increased time required;Verbal cues;LE management  (for trunk/LE positioning, NWB status)   Sit to Supine 4  Minimal assistance  (log roll)   Additional items Assist x 1;Bedrails; Increased time required;Verbal cues;LE management  (for trunk/LE positioning, NWB status)   Additional Comments Therapist trialed scooting along edge of bed to determine pts ability to utilize slideboard for out of bed mobilization  Pt needed maxx1 assist to scoot along edge of bed approximately 3 feet bilaterally  Therapist used pad under pt to facilitate lateral movement  Pt will likely be better able to utilize walker for transfers than slideboard   Pt presently needs dependent means for out of bed mobilization  Transfers   Sit to Stand 3  Moderate assistance   Additional items Assist x 2; Increased time required;Verbal cues  (for hand placement, NWB compliance)   Stand to Sit 3  Moderate assistance   Additional items Assist x 2; Increased time required; Impulsive;Verbal cues  (for hand placement, NWB compliance)   Additional Comments pt stood 45 seconds w/ roller walker w/ modx1  additional not possible due to pain and fatigue  manual assist was needed to maintain NWB status  Ambulation/Elevation   Gait pattern Not appropriate   Assistive Device Rolling walker; Other (Comment)  (LSO, right short leg posterior splint)   Balance   Static Sitting Fair +   Dynamic Sitting Poor -   Static Standing Poor  (w/ roller walker)   Ambulatory Zero   Activity Tolerance   Activity Tolerance Patient limited by fatigue;Patient limited by pain   Nurse Made Aware spoke to Madelaine Boeck AP, Lucy HERNANDEZ, DC CASTANON   Equipment Use   Comments pt was sized for and provided w/ LSO per Trauma orders  LSO was sized for waist size 2  therapist provided education including indications for use, donning/doffing technique, adjustments w/ quickdraws, need for layer of clothing under brace, and need for skin checks  pt had fair carryover of education provided (via verbal instruction, demonstration, and handout)  pt needed min assist for donning and doffing brace  min assist was also needed for adjusting quickdraws  Assessment   Prognosis Fair   Problem List Decreased strength;Decreased range of motion;Decreased endurance; Impaired balance;Decreased mobility; Decreased safety awareness; Impaired tone;Orthopedic restrictions;Pain   Assessment Therapist provided education to pt for mobility technique including NWB status, transfers and roller walker use  Education was provided due to findings from evaluation  Occasional repetition was needed for carryover to be noted   Pt was found to have improvement after education w/ increased standing tolerance though similar level of assistance was needed  Pt continues to be a fall risk  continued inpatient PT tx is indicated to reduce fall risk factors  Goals   Patient Goals cook, garden   STG Expiration Date 03/29/21   Short Term Goal #1 pt will: Recall and adhere to NWB of R LE at all times to facilitate fracture healing, Increase bilateral LE strength 1/2 grade to facilitate independent mobility, Perform all bed mobility tasks w/ supervision to decrease fall risk factors, Perform sit <---> stand and stand pivot transfers w/ roller walker w/ supervision to improve independence, Ambulate 50 ft  with roller walker w/ supervision w/o LOB to expedite return to leisure activities like gardening and cooking, Increase all balance 1 grade to decrease risk for falls, Complete exercise program independently to improve strength and endurance, Tolerate 3 hr OOB to faciliate upright tolerance and Improve Barthel Index score to 75 or greater to facilitate independence   PT Treatment Day 1   Plan   Treatment/Interventions Functional transfer training;LE strengthening/ROM; Therapeutic exercise; Endurance training;Patient/family training;Equipment eval/education; Bed mobility;Gait training   Progress Progressing toward goals   PT Frequency 5x/wk   Recommendation   PT Discharge Recommendation Post-Acute Rehabilitation Services   Additional Comments recommend roller walker use for mobility training  pt needs dependent means for out of bed mobilization     AM-PAC Basic Mobility Inpatient   Turning in Bed Without Bedrails 3   Lying on Back to Sitting on Edge of Flat Bed 3   Moving Bed to Chair 1   Standing Up From Chair 1   Walk in Room 1   Climb 3-5 Stairs 1   Basic Mobility Inpatient Raw Score 10   Turning Head Towards Sound 4   Follow Simple Instructions 3   Low Function Basic Mobility Raw Score 17   Low Function Basic Mobility Standardized Score 27 46     Skilled inpatient PT recommended while in hospital to progress pt toward treatment goals      Lance May, PT

## 2021-03-20 ENCOUNTER — APPOINTMENT (INPATIENT)
Dept: MRI IMAGING | Facility: HOSPITAL | Age: 61
DRG: 552 | End: 2021-03-20
Payer: COMMERCIAL

## 2021-03-20 LAB
ANION GAP SERPL CALCULATED.3IONS-SCNC: 11 MMOL/L (ref 4–13)
BASOPHILS # BLD AUTO: 0.01 THOUSANDS/ΜL (ref 0–0.1)
BASOPHILS NFR BLD AUTO: 0 % (ref 0–1)
BUN SERPL-MCNC: 10 MG/DL (ref 5–25)
CALCIUM SERPL-MCNC: 8.7 MG/DL (ref 8.3–10.1)
CANCER AG125 SERPL-ACNC: 8.4 U/ML (ref 0–30)
CEA SERPL-MCNC: 5 NG/ML (ref 0–3)
CHLORIDE SERPL-SCNC: 105 MMOL/L (ref 100–108)
CO2 SERPL-SCNC: 25 MMOL/L (ref 21–32)
CREAT SERPL-MCNC: 0.5 MG/DL (ref 0.6–1.3)
EOSINOPHIL # BLD AUTO: 0.06 THOUSAND/ΜL (ref 0–0.61)
EOSINOPHIL NFR BLD AUTO: 2 % (ref 0–6)
ERYTHROCYTE [DISTWIDTH] IN BLOOD BY AUTOMATED COUNT: 19.7 % (ref 11.6–15.1)
GFR SERPL CREATININE-BSD FRML MDRD: 105 ML/MIN/1.73SQ M
GLUCOSE SERPL-MCNC: 99 MG/DL (ref 65–140)
HCT VFR BLD AUTO: 28 % (ref 34.8–46.1)
HGB BLD-MCNC: 8.6 G/DL (ref 11.5–15.4)
IMM GRANULOCYTES # BLD AUTO: 0.01 THOUSAND/UL (ref 0–0.2)
IMM GRANULOCYTES NFR BLD AUTO: 0 % (ref 0–2)
LYMPHOCYTES # BLD AUTO: 1.13 THOUSANDS/ΜL (ref 0.6–4.47)
LYMPHOCYTES NFR BLD AUTO: 47 % (ref 14–44)
MCH RBC QN AUTO: 30.4 PG (ref 26.8–34.3)
MCHC RBC AUTO-ENTMCNC: 30.7 G/DL (ref 31.4–37.4)
MCV RBC AUTO: 99 FL (ref 82–98)
MONOCYTES # BLD AUTO: 0.21 THOUSAND/ΜL (ref 0.17–1.22)
MONOCYTES NFR BLD AUTO: 8 % (ref 4–12)
NEUTROPHILS # BLD AUTO: 1.09 THOUSANDS/ΜL (ref 1.85–7.62)
NEUTS SEG NFR BLD AUTO: 43 % (ref 43–75)
NRBC BLD AUTO-RTO: 0 /100 WBCS
PLATELET # BLD AUTO: 41 THOUSANDS/UL (ref 149–390)
PMV BLD AUTO: 10 FL (ref 8.9–12.7)
POTASSIUM SERPL-SCNC: 3.6 MMOL/L (ref 3.5–5.3)
RBC # BLD AUTO: 2.83 MILLION/UL (ref 3.81–5.12)
SODIUM SERPL-SCNC: 141 MMOL/L (ref 136–145)
WBC # BLD AUTO: 2.51 THOUSAND/UL (ref 4.31–10.16)

## 2021-03-20 PROCEDURE — 74183 MRI ABD W/O CNTR FLWD CNTR: CPT

## 2021-03-20 PROCEDURE — 85025 COMPLETE CBC W/AUTO DIFF WBC: CPT | Performed by: PHYSICIAN ASSISTANT

## 2021-03-20 PROCEDURE — 94760 N-INVAS EAR/PLS OXIMETRY 1: CPT

## 2021-03-20 PROCEDURE — 99232 SBSQ HOSP IP/OBS MODERATE 35: CPT | Performed by: SURGERY

## 2021-03-20 PROCEDURE — 80048 BASIC METABOLIC PNL TOTAL CA: CPT | Performed by: PHYSICIAN ASSISTANT

## 2021-03-20 PROCEDURE — 94762 N-INVAS EAR/PLS OXIMTRY CONT: CPT

## 2021-03-20 PROCEDURE — A9585 GADOBUTROL INJECTION: HCPCS | Performed by: SURGERY

## 2021-03-20 PROCEDURE — G1004 CDSM NDSC: HCPCS

## 2021-03-20 RX ADMIN — ENOXAPARIN SODIUM 30 MG: 30 INJECTION SUBCUTANEOUS at 09:15

## 2021-03-20 RX ADMIN — OXAZEPAM 10 MG: 10 CAPSULE, GELATIN COATED ORAL at 10:57

## 2021-03-20 RX ADMIN — OXYCODONE HYDROCHLORIDE 5 MG: 5 TABLET ORAL at 11:04

## 2021-03-20 RX ADMIN — METHOCARBAMOL TABLETS 750 MG: 750 TABLET, COATED ORAL at 22:12

## 2021-03-20 RX ADMIN — SENNOSIDES 17.2 MG: 8.6 TABLET ORAL at 09:15

## 2021-03-20 RX ADMIN — PANTOPRAZOLE SODIUM 40 MG: 40 TABLET, DELAYED RELEASE ORAL at 05:39

## 2021-03-20 RX ADMIN — DOCUSATE SODIUM 100 MG: 100 CAPSULE, LIQUID FILLED ORAL at 09:15

## 2021-03-20 RX ADMIN — OXAZEPAM 10 MG: 10 CAPSULE, GELATIN COATED ORAL at 15:52

## 2021-03-20 RX ADMIN — METHOCARBAMOL TABLETS 750 MG: 750 TABLET, COATED ORAL at 11:04

## 2021-03-20 RX ADMIN — METHOCARBAMOL TABLETS 750 MG: 750 TABLET, COATED ORAL at 05:39

## 2021-03-20 RX ADMIN — OXAZEPAM 10 MG: 10 CAPSULE, GELATIN COATED ORAL at 22:12

## 2021-03-20 RX ADMIN — METHOCARBAMOL TABLETS 750 MG: 750 TABLET, COATED ORAL at 15:53

## 2021-03-20 RX ADMIN — SERTRALINE HYDROCHLORIDE 50 MG: 50 TABLET ORAL at 09:15

## 2021-03-20 RX ADMIN — DOCUSATE SODIUM 100 MG: 100 CAPSULE, LIQUID FILLED ORAL at 18:31

## 2021-03-20 RX ADMIN — GADOBUTROL 6 ML: 604.72 INJECTION INTRAVENOUS at 13:04

## 2021-03-20 RX ADMIN — OXAZEPAM 10 MG: 10 CAPSULE, GELATIN COATED ORAL at 05:39

## 2021-03-20 RX ADMIN — ENOXAPARIN SODIUM 30 MG: 30 INJECTION SUBCUTANEOUS at 22:13

## 2021-03-20 NOTE — PROGRESS NOTES
[facility]  Progress Note - [place] Smoker 1960, 64 y o  female MRN: [id number]  Unit/Bed#: S -01 Encounter: [id number]  Primary Care Provider: Fransisco Angulo MD   Date and time admitted to hospital: 3/18/2021 12:19 PM    Closed fracture of fibula, shaft  Assessment & Plan  Ortho consulted and seen patient  NB  F/u in 2 weeks as outpatient  PT/Ot    Fall  Assessment & Plan  Case management consulted, ETOH abuse history    Alcohol abuse  Assessment & Plan  SBIRT by case management  CIWA protocol  SErax q6 hours - doing well  GCS - 15    * Lumbar compression fracture [facility])  Assessment & Plan  Neurosurgery consulted and seen patient  LSO brace  Awaiting uright x-rays - per Neurosurgery good  PT/OT DVT prophylaixs  Pain management          Disposition: rehab      SUBJECTIVE:  Chief Complaint: back pain    Subjective: " I got out to the commode"      OBJECTIVE:     Meds/Allergies     Current Facility-Administered Medications:     docusate sodium (COLACE) capsule 100 mg, 100 mg, Oral, BID, Mitali Rodriguez PA-C, 100 mg at 03/20/21 0915    enoxaparin (LOVENOX) subcutaneous injection 30 mg, 30 mg, Subcutaneous, Q12H [facility], Mitali Rodriguez PA-C, 30 mg at 03/20/21 0915    HYDROmorphone (DILAUDID) injection 0 5 mg, 0 5 mg, Intravenous, Q4H PRN, Mitali Rodriguez PA-C    magnesium hydroxide (MILK OF MAGNESIA) oral suspension 30 mL, 30 mL, Oral, Daily PRN, Mitali Rodriguez PA-C    methocarbamol (ROBAXIN) tablet 750 mg, 750 mg, Oral, Q6H [facility], PAZ Molina, 750 mg at 03/20/21 1104    ondansetron [facility]) injection 4 mg, 4 mg, Intravenous, Q6H PRN, Mitali Rodriguez PA-C, 4 mg at 03/19/21 1111    oxazepam (SERAX) capsule 10 mg, 10 mg, Oral, Q6H [facility], PAZ Molina, 10 mg at 03/20/21 1057    oxyCODONE (ROXICODONE) IR tablet 2 5 mg, 2 5 mg, Oral, Q4H PRN, Mitali Rodriguez PA-C    oxyCODONE (ROXICODONE) IR tablet 5 mg, 5 mg, Oral, Q4H PRN, Mitali Rodriguez PA-C, 5 mg at 03/20/21 1104   pantoprazole (PROTONIX) EC tablet 40 mg, 40 mg, Oral, Early Morning, Chano Laser, PA-C, 40 mg at 03/20/21 0539    senna (SENOKOT) tablet 17 2 mg, 2 tablet, Oral, Daily, Chano Laser, PA-C, 17 2 mg at 03/20/21 0915    sertraline (ZOLOFT) tablet 50 mg, 50 mg, Oral, Daily, Chano Laser, PA-C, 50 mg at 03/20/21 0915     Vitals:   Vitals:    03/20/21 1131   BP:    Pulse:    Resp:    Temp:    SpO2: 97%       Intake/Output:  I/O       03/18 0701 - 03/19 0700 03/19 0701 - 03/20 0700 03/20 0701 - 03/21 0700    P  O  240      Total Intake(mL/kg) 240 (3 5)      Urine (mL/kg/hr) 1100 400 (0 2)     Stool 0      Total Output 1100 400     Net -860 -400            Unmeasured Stool Occurrence 1 x             Nutrition/GI Proph/Bowel Reg: regular    Physical Exam:   GENERAL APPEARANCE: comfortable  NEURO: intact, GCS - 15  HEENT: EOm's intact  CV: RRR< no complaint of chest pain, tachy when getting OOB but quickly went back to normal  LUNGS: CTA bilaterally, no shortness of breath , did desat while getting OOB to 89%, but immediately returned to 95%  Demonstrated using the incentive spirometer  GI: tolerating a diet  : voiding  MSK: moving all four extremities, NWB RLE  SKIN: warm and dry    Invasive Devices     Peripheral Intravenous Line            Peripheral IV 03/18/21 Right Antecubital 1 day                 Lab Results: Results for Rhea Mathias (MRN [id number]) as of 3/20/2021 11:30   Ref   Range 3/19/2021 18:56 3/20/2021 05:31   Sodium Latest Ref Range: 136 - 145 mmol/L  141   Potassium Latest Ref Range: 3 5 - 5 3 mmol/L  3 6   Chloride Latest Ref Range: 100 - 108 mmol/L  105   CO2 Latest Ref Range: 21 - 32 mmol/L  25   Anion Gap Latest Ref Range: 4 - 13 mmol/L  11   BUN Latest Ref Range: 5 - 25 mg/dL  10   Creatinine Latest Ref Range: 0 60 - 1 30 mg/dL  0 50 (L)   Glucose, Random Latest Ref Range: 65 - 140 mg/dL  99   Calcium Latest Ref Range: 8 3 - 10 1 mg/dL  8 7   eGFR Latest Units: ml/min/1 73sq m  105   WBC Latest Ref Range: 4 31 - 10 16 Thousand/uL  2 51 (L)   Red Blood Cell Count Latest Ref Range: 3 81 - 5 12 Million/uL  2 83 (L)   Hemoglobin Latest Ref Range: 11 5 - 15 4 g/dL  8 6 (L)   HCT Latest Ref Range: 34 8 - 46 1 %  28 0 (L)   MCV Latest Ref Range: 82 - 98 fL  99 (H)   MCH Latest Ref Range: 26 8 - 34 3 pg  30 4   MCHC Latest Ref Range: 31 4 - 37 4 g/dL  30 7 (L)   RDW Latest Ref Range: 11 6 - 15 1 %  19 7 (H)   Platelet Count Latest Ref Range: 149 - 390 Thousands/uL  41 (LL)   MPV Latest Ref Range: 8 9 - 12 7 fL  10 0   nRBC Latest Units: /100 WBCs  0   Neutrophils % Latest Ref Range: 43 - 75 %  43   Immat GRANS % Latest Ref Range: 0 - 2 %  0   Lymphocytes Relative Latest Ref Range: 14 - 44 %  47 (H)   Monocytes Relative Latest Ref Range: 4 - 12 %  8   Eosinophils Latest Ref Range: 0 - 6 %  2   Basophils Relative Latest Ref Range: 0 - 1 %  0   Immature Grans Absolute Latest Ref Range: 0 00 - 0 20 Thousand/uL  0 01   Absolute Neutrophils Latest Ref Range: 1 85 - 7 62 Thousands/µL  1 09 (L)   Lymphocytes Absolute Latest Ref Range: 0 60 - 4 47 Thousands/µL  1 13   Absolute Monocytes Latest Ref Range: 0 17 - 1 22 Thousand/µL  0 21   Absolute Eosinophils Latest Ref Range: 0 00 - 0 61 Thousand/µL  0 06   Basophils Absolute Latest Ref Range: 0 00 - 0 10 Thousands/µL  0 01   CARCINOEMBRYONIC ANTIGEN Latest Ref Range: 0 0 - 3 0 ng/mL 5 0 (H)      Imaging/EKG Studies: awaiting MRI  Other Studies: none  VTE Prophylaxis: Sequential compression device (Venodyne)  and Enoxaparin (Lovenox)

## 2021-03-20 NOTE — CASE MANAGEMENT
[facility] is able to offer a bed for pt however they are not able to accept pt  before Mon or Tues at the earliest she came in 2 days ago with an ETOH level >400 We need to monitor for a/s withdrawal for another 2-3 days  Pt will need auth for admittance to rehab  Auth will be submitted by [address] [place] [address] Monday  Cm will continue to follow

## 2021-03-20 NOTE — ASSESSMENT & PLAN NOTE
Neurosurgery consulted and seen patient  LSO brace  Awaiting uright x-rays - per Neurosurgery good  PT/OT DVT prophylaixs  Pain management

## 2021-03-20 NOTE — CASE MANAGEMENT
Cm followed up with [facility] as a referral had been made to them  Response stated they were reviewing and cm messaged them to follow up  Pt will need auth as well prior to DC  Cm will continue to follow

## 2021-03-21 LAB — CANCER AG19-9 SERPL-ACNC: 35 U/ML (ref 0–35)

## 2021-03-21 PROCEDURE — 99232 SBSQ HOSP IP/OBS MODERATE 35: CPT | Performed by: ORTHOPAEDIC SURGERY

## 2021-03-21 PROCEDURE — 94760 N-INVAS EAR/PLS OXIMETRY 1: CPT

## 2021-03-21 PROCEDURE — 99232 SBSQ HOSP IP/OBS MODERATE 35: CPT | Performed by: SURGERY

## 2021-03-21 PROCEDURE — 94762 N-INVAS EAR/PLS OXIMTRY CONT: CPT

## 2021-03-21 RX ORDER — LIDOCAINE 50 MG/G
2 PATCH TOPICAL DAILY
Status: DISCONTINUED | OUTPATIENT
Start: 2021-03-21 | End: 2021-03-23 | Stop reason: HOSPADM

## 2021-03-21 RX ADMIN — DOCUSATE SODIUM 100 MG: 100 CAPSULE, LIQUID FILLED ORAL at 10:38

## 2021-03-21 RX ADMIN — SERTRALINE HYDROCHLORIDE 50 MG: 50 TABLET ORAL at 10:38

## 2021-03-21 RX ADMIN — OXAZEPAM 10 MG: 10 CAPSULE, GELATIN COATED ORAL at 22:39

## 2021-03-21 RX ADMIN — ENOXAPARIN SODIUM 30 MG: 30 INJECTION SUBCUTANEOUS at 10:38

## 2021-03-21 RX ADMIN — OXAZEPAM 10 MG: 10 CAPSULE, GELATIN COATED ORAL at 10:38

## 2021-03-21 RX ADMIN — OXAZEPAM 10 MG: 10 CAPSULE, GELATIN COATED ORAL at 05:18

## 2021-03-21 RX ADMIN — PANTOPRAZOLE SODIUM 40 MG: 40 TABLET, DELAYED RELEASE ORAL at 05:18

## 2021-03-21 RX ADMIN — METHOCARBAMOL TABLETS 750 MG: 750 TABLET, COATED ORAL at 05:18

## 2021-03-21 RX ADMIN — LIDOCAINE 5% 2 PATCH: 700 PATCH TOPICAL at 15:10

## 2021-03-21 RX ADMIN — SENNOSIDES 17.2 MG: 8.6 TABLET ORAL at 10:38

## 2021-03-21 RX ADMIN — ENOXAPARIN SODIUM 30 MG: 30 INJECTION SUBCUTANEOUS at 22:40

## 2021-03-21 RX ADMIN — METHOCARBAMOL TABLETS 750 MG: 750 TABLET, COATED ORAL at 22:39

## 2021-03-21 RX ADMIN — OXAZEPAM 10 MG: 10 CAPSULE, GELATIN COATED ORAL at 15:10

## 2021-03-21 RX ADMIN — METHOCARBAMOL TABLETS 750 MG: 750 TABLET, COATED ORAL at 17:30

## 2021-03-21 RX ADMIN — METHOCARBAMOL TABLETS 750 MG: 750 TABLET, COATED ORAL at 10:38

## 2021-03-21 NOTE — QUICK NOTE
Call from Radiology concerning, edema in sacral region  Patient examined by attending Dr Calle Lose  She does complaint of back pain, they discussed what we believed to be a possible sacral fracture  Plan to consult Orthopedics

## 2021-03-21 NOTE — ASSESSMENT & PLAN NOTE
Neurosurgery consulted and seen patient  LSO brace  Awaiting uright x-rays - per Neurosurgery good  PT/OT DVT prophylaixs  Pain management - controlled

## 2021-03-21 NOTE — PLAN OF CARE
Problem: Potential for Falls  Goal: Patient will remain free of falls  Description: INTERVENTIONS:  - Assess patient frequently for physical needs  -  Identify cognitive and physical deficits and behaviors that affect risk of falls    -  [place] fall precautions as indicated by assessment   - Educate patient/family on patient safety including physical limitations  - Instruct patient to call for assistance with activity based on assessment  - Modify environment to reduce risk of injury  - Consider OT/PT consult to assist with strengthening/mobility  Outcome: Progressing     Problem: PAIN - ADULT  Goal: Verbalizes/displays adequate comfort level or baseline comfort level  Description: Interventions:  - Encourage patient to monitor pain and request assistance  - Assess pain using appropriate pain scale  - Administer analgesics based on type and severity of pain and evaluate response  - Implement non-pharmacological measures as appropriate and evaluate response  - Consider cultural and social influences on pain and pain management  - Notify physician/advanced practitioner if interventions unsuccessful or patient reports new pain  Outcome: Progressing     Problem: INFECTION - ADULT  Goal: Absence or prevention of progression during hospitalization  Description: INTERVENTIONS:  - Assess and monitor for signs and symptoms of infection  - Monitor lab/diagnostic results  - Monitor all insertion sites, i e  indwelling lines, tubes, and drains  - Monitor endotracheal if appropriate and nasal secretions for changes in amount and color  - [place] appropriate cooling/warming therapies per order  - Administer medications as ordered  - Instruct and encourage patient and family to use good hand hygiene technique  - Identify and instruct in appropriate isolation precautions for identified infection/condition  Outcome: Progressing  Goal: Absence of fever/infection during neutropenic period  Description: INTERVENTIONS:  - Monitor WBC    Outcome: Progressing     Problem: SAFETY ADULT  Goal: Patient will remain free of falls  Description: INTERVENTIONS:  - Assess patient frequently for physical needs  -  Identify cognitive and physical deficits and behaviors that affect risk of falls    -  [place] fall precautions as indicated by assessment   - Educate patient/family on patient safety including physical limitations  - Instruct patient to call for assistance with activity based on assessment  - Modify environment to reduce risk of injury  - Consider OT/PT consult to assist with strengthening/mobility  Outcome: Progressing  Goal: Maintain or return to baseline ADL function  Description: INTERVENTIONS:  -  Assess patient's ability to carry out ADLs; assess patient's baseline for ADL function and identify physical deficits which impact ability to perform ADLs (bathing, care of mouth/teeth, toileting, grooming, dressing, etc )  - Assess/evaluate cause of self-care deficits   - Assess range of motion  - Assess patient's mobility; develop plan if impaired  - Assess patient's need for assistive devices and provide as appropriate  - Encourage maximum independence but intervene and supervise when necessary  - Involve family in performance of ADLs  - Assess for home care needs following discharge   - Consider OT consult to assist with ADL evaluation and planning for discharge  - Provide patient education as appropriate  Outcome: Progressing  Goal: Maintain or return mobility status to optimal level  Description: INTERVENTIONS:  - Assess patient's baseline mobility status (ambulation, transfers, stairs, etc )    - Identify cognitive and physical deficits and behaviors that affect mobility  - Identify mobility aids required to assist with transfers and/or ambulation (gait belt, sit-to-stand, lift, walker, cane, etc )  - [place] fall precautions as indicated by assessment  - Record patient progress and toleration of activity level on Mobility SBAR; progress patient to next Phase/Stage  - Instruct patient to call for assistance with activity based on assessment  - Consider rehabilitation consult to assist with strengthening/weightbearing, etc   Outcome: Progressing     Problem: DISCHARGE PLANNING  Goal: Discharge to home or other facility with appropriate resources  Description: INTERVENTIONS:  - Identify barriers to discharge w/patient and caregiver  - Arrange for needed discharge resources and transportation as appropriate  - Identify discharge learning needs (meds, wound care, etc )  - Arrange for interpretive services to assist at discharge as needed  - Refer to Case Management Department for coordinating discharge planning if the patient needs post-hospital services based on physician/advanced practitioner order or complex needs related to functional status, cognitive ability, or social support system  Outcome: Progressing     Problem: Knowledge Deficit  Goal: Patient/family/caregiver demonstrates understanding of disease process, treatment plan, medications, and discharge instructions  Description: Complete learning assessment and assess knowledge base  Interventions:  - Provide teaching at level of understanding  - Provide teaching via preferred learning methods  Outcome: Progressing     Problem: Nutrition/Hydration-ADULT  Goal: Nutrient/Hydration intake appropriate for improving, restoring or maintaining nutritional needs  Description: Monitor and assess patient's nutrition/hydration status for malnutrition  Collaborate with interdisciplinary team and initiate plan and interventions as ordered  Monitor patient's weight and dietary intake as ordered or per policy  Utilize nutrition screening tool and intervene as necessary  Determine patient's food preferences and provide high-protein, high-caloric foods as appropriate       INTERVENTIONS:  - Monitor oral intake, urinary output, labs, and treatment plans  - Assess nutrition and hydration status and recommend course of action  - Evaluate amount of meals eaten  - Assist patient with eating if necessary   - Allow adequate time for meals  - Recommend/ encourage appropriate diets, oral nutritional supplements, and vitamin/mineral supplements  - Order, calculate, and assess calorie counts as needed  - Recommend, monitor, and adjust tube feedings and TPN/PPN based on assessed needs  - Assess need for intravenous fluids  - Provide specific nutrition/hydration education as appropriate  - Include patient/family/caregiver in decisions related to nutrition  Outcome: Progressing

## 2021-03-21 NOTE — ASSESSMENT & PLAN NOTE
SBIRT by case management  NICHOLEWA protocol  SErax q6 hours - doing well  GCS - 15  No signs of withdrawl

## 2021-03-21 NOTE — PROGRESS NOTES
[facility]  Progress Note - [facility] Smoker 1960, 64 y o  female MRN: [id number]  Unit/Bed#: S -01 Encounter: [id number]  Primary Care Provider: Fransisco Angulo MD   Date and time admitted to hospital: 3/18/2021 12:19 PM    Closed fracture of fibula, shaft  Assessment & Plan  Ortho consulted and seen patient  NB  F/u in 2 weeks as outpatient  PT/Ot  Neuro intact    Fall  Assessment & Plan  Case management consulted, ETOH abuse history  Actively getting OOB to commode and maintaining weight bearing status    Alcohol abuse  Assessment & Plan  SBIRT by case management  CIWA protocol  SErax q6 hours - doing well  GCS - 15  No signs of withdrawl    * Lumbar compression fracture [facility])  Assessment & Plan  Neurosurgery consulted and seen patient  LSO brace  Awaiting uright x-rays - per Neurosurgery good  PT/OT DVT prophylaixs  Pain management - controlled          Disposition: rehab      SUBJECTIVE:  Chief Complaint: back soreness  Subjective: " I slept well"      OBJECTIVE:     Meds/Allergies     Current Facility-Administered Medications:     docusate sodium (COLACE) capsule 100 mg, 100 mg, Oral, BID, Mitali Rodriguez PA-C, 100 mg at 03/20/21 1831    enoxaparin (LOVENOX) subcutaneous injection 30 mg, 30 mg, Subcutaneous, Q12H [address], Mitali Rodriguez PA-C, 30 mg at 03/20/21 2213    HYDROmorphone (DILAUDID) injection 0 5 mg, 0 5 mg, Intravenous, Q4H PRN, Mitali Rodriguez PA-C    magnesium hydroxide (MILK OF MAGNESIA) oral suspension 30 mL, 30 mL, Oral, Daily PRN, Mitali Rodriguez PA-C    methocarbamol (ROBAXIN) tablet 750 mg, 750 mg, Oral, Q6H [address], PAZ Molina, 750 mg at 03/21/21 0518    ondansetron [facility]) injection 4 mg, 4 mg, Intravenous, Q6H PRN, Mitali Rodriguez PA-C, 4 mg at 03/19/21 1111    oxazepam (SERAX) capsule 10 mg, 10 mg, Oral, Q6H [address], PAZ Molina, 10 mg at 03/21/21 0518    oxyCODONE (ROXICODONE) IR tablet 2 5 mg, 2 5 mg, Oral, Q4H PRN, Mitali Rodriguez PA-C   oxyCODONE (ROXICODONE) IR tablet 5 mg, 5 mg, Oral, Q4H PRN, Alivia Lewis PA-C, 5 mg at 03/20/21 1104    pantoprazole (PROTONIX) EC tablet 40 mg, 40 mg, Oral, Early Morning, Alivia Lewis, PA-C, 40 mg at 03/21/21 0518    senna (SENOKOT) tablet 17 2 mg, 2 tablet, Oral, Daily, Alivia Lewis PA-C, 17 2 mg at 03/20/21 0915    sertraline (ZOLOFT) tablet 50 mg, 50 mg, Oral, Daily, Alivia Lewis, PA-C, 50 mg at 03/20/21 0915     Vitals:   Vitals:    03/21/21 0429   BP:    Pulse:    Resp:    Temp:    SpO2: 95%       Intake/Output:  I/O       03/19 0701 - 03/20 0700 03/20 0701 - 03/21 0700 03/21 0701 - 03/22 0700    P  O   240     Total Intake(mL/kg)  240 (3 5)     Urine (mL/kg/hr) 400 (0 2) 700 (0 4)     Stool  0     Total Output 400 700     Net -400 -460            Unmeasured Stool Occurrence  2 x            Nutrition/GI Proph/Bowel Reg: regular    Physical Exam:   GENERAL APPEARANCE: pleasant and comfortable  NEURO: intact, GCS -15  HEENT: EOm' s  intact  CV: RRR< no complaints of chest pain  LUNGS: CTA bilaterally, sats in high 90's on room air, no shortness of breath  GI: tolerating a diet, having BM's  : voiding  MSK: moving extremities, self transferring to commode  SKIN: warm and dry    Invasive Devices     Peripheral Intravenous Line            Peripheral IV 03/18/21 Right Antecubital 2 days                 Lab Results: none  Imaging/EKG Studies: MRI - Severe diffuse dilatation of the pancreatic duct and sidebranches  Given extensive calcification on previous CT and pancreatic atrophy, this is most likely chronic pancreatitis  However, it is difficult to distinguish this entity from intraductal   papillary mucinous neoplasm, particularly given the presence of a more focal cystic lesion in the uncinate process   While this may represent a side branch IPMN communicating with the main pancreatic duct, this could also represent dilation of an uncinate   process side branch due to chronic pancreatitis given a tubular appearance  Follow-up with gastroenterology for endoscopic ultrasound/ERCP is recommended to evaluate for pancreatic duct mucin  Alternatively, continued MRI or CT surveillance can be   performed in 6 months      2  Posterior gallbladder calcification again seen  This may represent layering stones with wall calcification not entirely excluded   These could be differentiated with ultrasound performed in varying positions to assess for mobility      3   Stable posterior right abdominal wall hematoma  Other Studies: none  VTE Prophylaxis: Sequential compression device (Venodyne)  and Enoxaparin (Lovenox)

## 2021-03-21 NOTE — CONSULTS
[address] [facility] 64 y o  female MRN: [id number]  Unit/Bed#: S -01      Chief Complaint:   Back Pain/sacrum    HPI:   64 y  o female complaining of Sacroiliac back pain  Patient reports having fall about 3-4 weeks ago and injuring this area  She does report having a black and blue over this area but it is resolving  She reports that the pain can be excruciating with weight-bearing  Pain can be 10/10 but lying there can be 2-3/10  No numbness or tingling or paresthesia down lower extremity weakness in the lower extremity  Patient points to the low left side of her back/pelvis      Review Of Systems:   · Skin: Normal  · Neuro: See HPI  · Musculoskeletal: See HPI  · 14 point review of systems negative except as stated above     Past Medical History:   Past Medical History:   Diagnosis Date    Fracture     Hepatitis     Hep A     Insomnia     10mar2016 resolved    Osteoporosis     14jun2016 resolved    Pancreatitis     Seasonal allergies     Urine discoloration 11/11/2019    Vomiting 11/13/2019       Past Surgical History:   Past Surgical History:   Procedure Laterality Date    IR BIOPSY BONE MARROW  11/14/2019    TUBAL LIGATION  1985       Family History:  Family history reviewed and non-contributory  Family History   Problem Relation Age of Onset    Anxiety disorder Mother     Depression Mother     No Known Problems Father     Cancer Paternal Grandmother        Social History:  Social History     Socioeconomic History    Marital status: /Civil Union     Spouse name: None    Number of children: None    Years of education: None    Highest education level: None   Occupational History    None   Social Needs    Financial resource strain: None    Food insecurity     Worry: None     Inability: None    Transportation needs     Medical: None     Non-medical: None   Tobacco Use    Smoking status: Never Smoker    Smokeless tobacco: Never Used   Substance and Sexual Activity    Alcohol use:  Yes     Alcohol/week: 7 0 standard drinks     Types: 7 Cans of beer per week     Frequency: 4 or more times a week     Drinks per session: 1 or 2     Binge frequency: Never    Drug use: No    Sexual activity: Yes     Partners: Male   Lifestyle    Physical activity     Days per week: None     Minutes per session: None    Stress: None   Relationships    Social connections     Talks on phone: None     Gets together: None     Attends Latter day service: None     Active member of club or organization: None     Attends meetings of clubs or organizations: None     Relationship status: None    Intimate partner violence     Fear of current or ex partner: None     Emotionally abused: None     Physically abused: None     Forced sexual activity: None   Other Topics Concern    None   Social History Narrative    Drinks coffee    tea       Allergies:   No Known Allergies        Labs:  0   Lab Value Date/Time    HCT 28 0 (L) 03/20/2021 0531    HCT 26 4 (L) 03/19/2021 0456    HCT 28 2 (L) 03/18/2021 1246    HCT 33 8 (L) 06/14/2016 1201    HCT 38 4 04/22/2016 1310    HCT 40 7 10/09/2014 1948    HGB 8 6 (L) 03/20/2021 0531    HGB 8 1 (L) 03/19/2021 0456    HGB 8 6 (L) 03/18/2021 1246    HGB 11 2 (L) 06/14/2016 1201    HGB 12 6 04/22/2016 1310    HGB 13 4 10/09/2014 1948    INR 1 20 (H) 03/18/2021 1437    INR 1 0 06/14/2016 1201    WBC 2 51 (L) 03/20/2021 0531    WBC 2 58 (L) 03/19/2021 0456    WBC 2 23 (L) 03/18/2021 1246    WBC 4 9 06/14/2016 1201    WBC 40-60 (A) 06/14/2016 1123    WBC 4 3 04/22/2016 1310       Meds:    Current Facility-Administered Medications:     docusate sodium (COLACE) capsule 100 mg, 100 mg, Oral, BID, Jessenia Bell PA-C, 100 mg at 03/21/21 1038    enoxaparin (LOVENOX) subcutaneous injection 30 mg, 30 mg, Subcutaneous, Q12H [facility], Jessenia Bell PA-C, 30 mg at 03/21/21 1038    HYDROmorphone (DILAUDID) injection 0 5 mg, 0 5 mg, Intravenous, Q4H PRN, COLT [place] hydroxide (MILK OF MAGNESIA) oral suspension 30 mL, 30 mL, Oral, Daily PRN, Deaddavid Burner, PA-C    methocarbamol (ROBAXIN) tablet 750 mg, 750 mg, Oral, Q6H [place], PAZ John, 750 mg at 03/21/21 1038    ondansetron [facility]) injection 4 mg, 4 mg, Intravenous, Q6H PRN, Deaddavid Rizoer, PA-C, 4 mg at 03/19/21 1111    oxazepam (SERAX) capsule 10 mg, 10 mg, Oral, Q6H [address], KARISSA JohnNP, 10 mg at 03/21/21 1038    oxyCODONE (ROXICODONE) IR tablet 2 5 mg, 2 5 mg, Oral, Q4H PRN, Deadra Burner, PA-C    oxyCODONE (ROXICODONE) IR tablet 5 mg, 5 mg, Oral, Q4H PRN, Deadra Burner, PA-C, 5 mg at 03/20/21 1104    pantoprazole (PROTONIX) EC tablet 40 mg, 40 mg, Oral, Early Morning, Deadra Burner, PA-C, 40 mg at 03/21/21 0518    senna (SENOKOT) tablet 17 2 mg, 2 tablet, Oral, Daily, Deadra Burner, PA-C, 17 2 mg at 03/21/21 1038    sertraline (ZOLOFT) tablet 50 mg, 50 mg, Oral, Daily, Deadra Burner, PA-C, 50 mg at 03/21/21 1038    Blood Culture:   Lab Results   Component Value Date    BLOODCX No Growth After 5 Days  11/12/2019       Wound Culture:   No results found for: WOUNDCULT    Ins and Outs:  I/O last 24 hours: In: 240 [P O :240]  Out: 700 [Urine:700]          Physical Exam:   /62 (BP Location: Right arm)   Pulse 74   Temp 98 5 °F (36 9 °C) (Oral)   Resp 16   Ht 5' 7" (1 702 m)   Wt 67 8 kg (149 lb 7 6 oz)   SpO2 94%   BMI 23 41 kg/m²   Gen: Alert and oriented to person, place, time  HEENT: EOMI, eyes clear, moist mucus membranes, hearing intact  Respiratory: Bilateral chest rise   No audible wheezing found  Cardiovascular: Regular Rate and Rhythm  Abdomen: soft nontender/nondistended  Musculoskeletal: BACK  · Skin intact, no open lesions  · Tenderness to palpation over Sacroiliac spine over the ilium as well as the sacral  · 5/5 strength with hip flexion/extension/abduction, knee flexion/extension, ankle dorsi/plantar flexion, EHL/FHL bilateral lower extremities  · Sensation intact L2-S1 bilateral lower extremities  · negative straight leg raise  · No ecchymosis over this area      Radiology:   I personally reviewed the films  MRI  Sacroiliac spine shows shows some edema in the right sacral and right ilium near the sacrum  There is no definitive fracture line  Nothing can not be seen on x-ray or CT scan  This is only seen in 1 aspect of the MRI only seen on T2 not seen on T1  This is consistent with contusion especially contusion that is older like 3 weeks  There is overlying soft tissue hematoma also seen in the same image     _*_*_*_*_*_*_*_*_*_*_*_*_*_*_*_*_*_*_*_*_*_*_*_*_*_*_*_*_*_*_*_*_*_*_*_*_*_*_*_*_*    Assessment:  64 y  o female complaining of  Sacroiliac back pain    Plan:   · Weight-bearing as tolerated  · Follow-up in 6 weeks for repeat x-rays unless pain is getting worse  · Dispo: Ortho signing off      Chelsea Ortiz, DO

## 2021-03-21 NOTE — ASSESSMENT & PLAN NOTE
Case management consulted, ETOH abuse history  Actively getting OOB to commode and maintaining weight bearing status

## 2021-03-22 ENCOUNTER — TELEPHONE (OUTPATIENT)
Dept: NEUROSURGERY | Facility: CLINIC | Age: 61
End: 2021-03-22

## 2021-03-22 PROBLEM — K82.8 PORCELAIN GALLBLADDER: Status: ACTIVE | Noted: 2021-03-22

## 2021-03-22 PROBLEM — K86.2 PANCREATIC CYST: Status: ACTIVE | Noted: 2021-03-22

## 2021-03-22 LAB
FLUAV RNA RESP QL NAA+PROBE: NEGATIVE
FLUBV RNA RESP QL NAA+PROBE: NEGATIVE
RSV RNA RESP QL NAA+PROBE: NEGATIVE
SARS-COV-2 RNA RESP QL NAA+PROBE: NEGATIVE

## 2021-03-22 PROCEDURE — 0241U HB NFCT DS VIR RESP RNA 4 TRGT: CPT | Performed by: PHYSICIAN ASSISTANT

## 2021-03-22 PROCEDURE — 97530 THERAPEUTIC ACTIVITIES: CPT

## 2021-03-22 PROCEDURE — 97110 THERAPEUTIC EXERCISES: CPT

## 2021-03-22 PROCEDURE — 99232 SBSQ HOSP IP/OBS MODERATE 35: CPT | Performed by: SURGERY

## 2021-03-22 PROCEDURE — 97116 GAIT TRAINING THERAPY: CPT

## 2021-03-22 PROCEDURE — 97535 SELF CARE MNGMENT TRAINING: CPT

## 2021-03-22 RX ADMIN — OXAZEPAM 10 MG: 10 CAPSULE, GELATIN COATED ORAL at 16:13

## 2021-03-22 RX ADMIN — SERTRALINE HYDROCHLORIDE 50 MG: 50 TABLET ORAL at 10:00

## 2021-03-22 RX ADMIN — METHOCARBAMOL TABLETS 750 MG: 750 TABLET, COATED ORAL at 03:50

## 2021-03-22 RX ADMIN — LIDOCAINE 5% 2 PATCH: 700 PATCH TOPICAL at 10:01

## 2021-03-22 RX ADMIN — METHOCARBAMOL TABLETS 750 MG: 750 TABLET, COATED ORAL at 21:45

## 2021-03-22 RX ADMIN — OXAZEPAM 10 MG: 10 CAPSULE, GELATIN COATED ORAL at 10:01

## 2021-03-22 RX ADMIN — OXAZEPAM 10 MG: 10 CAPSULE, GELATIN COATED ORAL at 22:44

## 2021-03-22 RX ADMIN — OXAZEPAM 10 MG: 10 CAPSULE, GELATIN COATED ORAL at 03:46

## 2021-03-22 RX ADMIN — METHOCARBAMOL TABLETS 750 MG: 750 TABLET, COATED ORAL at 10:01

## 2021-03-22 RX ADMIN — ENOXAPARIN SODIUM 30 MG: 30 INJECTION SUBCUTANEOUS at 21:45

## 2021-03-22 RX ADMIN — ENOXAPARIN SODIUM 30 MG: 30 INJECTION SUBCUTANEOUS at 10:00

## 2021-03-22 RX ADMIN — OXYCODONE HYDROCHLORIDE 5 MG: 5 TABLET ORAL at 10:07

## 2021-03-22 RX ADMIN — PANTOPRAZOLE SODIUM 40 MG: 40 TABLET, DELAYED RELEASE ORAL at 06:37

## 2021-03-22 RX ADMIN — METHOCARBAMOL TABLETS 750 MG: 750 TABLET, COATED ORAL at 16:13

## 2021-03-22 RX ADMIN — SENNOSIDES 17.2 MG: 8.6 TABLET ORAL at 10:00

## 2021-03-22 RX ADMIN — DOCUSATE SODIUM 100 MG: 100 CAPSULE, LIQUID FILLED ORAL at 10:00

## 2021-03-22 NOTE — PHYSICAL THERAPY NOTE
PHYSICAL THERAPY NOTE          Patient Name: Jane Cabrera  XHHDV'X Date: 3/22/2021        03/22/21 0834   PT Last Visit   PT Visit Date 03/22/21   Note Type   Note Type Treatment   Pain Assessment   Pain Assessment Tool 0-10   Pain Score 2   Pain Location/Orientation Orientation: Left; Location: Back   Pain Onset/Description Onset: Ongoing   Effect of Pain on Daily Activities   (pt's back pain linits fucntional mobility and endurance )   Patient's Stated Pain Goal No pain   Hospital Pain Intervention(s) Repositioned; Ambulation/increased activity; Emotional support   Restrictions/Precautions   Weight Bearing Precautions Per Order Yes   RLE Weight Bearing Per Order NWB   Braces or Orthoses LSO   Other Precautions Chair Alarm; Bed Alarm;WBS;Fall Risk;Pain;Spinal precautions   General   Chart Reviewed Yes   Family/Caregiver Present No   Cognition   Overall Cognitive Status WFL   Arousal/Participation Alert; Responsive; Cooperative   Attention Attends with cues to redirect   Orientation Level Oriented X4   Memory Within functional limits   Following Commands Follows one step commands with increased time or repetition   Subjective   Subjective pt agreeable to participate in PT intervention and stated back pain level pre and post tx session remained at 2/10   Bed Mobility   Rolling R 7  Independent   Additional items Comment; Other  (pt required no assistance with bed mobility )   Rolling L 7  Independent   Additional items Increased time required   Supine to Sit 7  Independent   Additional items Increased time required   Sit to Supine 7  Independent   Additional items Increased time required   Additional Comments pt was able to complete bed mobility independently with no assistance of bed rails or therapist    Transfers   Sit to Stand 4  Minimal assistance   Additional items Assist x 1;Bedrails; Increased time required;Verbal cues; Other  (pt required reminders of NWB status )   Stand to Sit 4  Minimal assistance   Additional items Assist x 1; Increased time required;Verbal cues   Stand pivot 4  Minimal assistance   Additional items Assist x 1; Increased time required;Verbal cues  (pt required instuctions on how to perform a safe stand pivot)   Additional Comments pt was able to tolerate static standing balance with RW for 1:15 sec before requiring a seated rest due to fatigue    Ambulation/Elevation   Gait pattern   (pt able to maintain NWB on RLE will ambulation with RW)   Gait Assistance 3  Moderate assist   Additional items Assist x 1;Verbal cues; Tactile cues   Assistive Device Rolling walker   Distance 10ft x1    Balance   Static Sitting Fair +   Dynamic Sitting Poor   Static Standing Poor   Ambulatory Poor -   Endurance Deficit   Endurance Deficit Yes   Endurance Deficit Description pt enduarnce limited by fatigue and pain    Activity Tolerance   Activity Tolerance Patient limited by fatigue;Patient limited by pain   Nurse Made Aware Spoke to RN Lazarus Derry    Exercises   Quad Sets Supine;10 reps;Bilateral   Hip Abduction Sitting;10 reps;Bilateral   Hip Adduction Sitting;10 reps;Bilateral   Knee AROM Long Arc Quad Sitting;10 reps;Bilateral   Ankle Pumps Sitting;10 reps;Bilateral   Equipment Use   Comments pt was able to don and doff LSO independently during todays tx session    Assessment   Prognosis Fair   Problem List Decreased strength;Decreased range of motion;Decreased endurance; Impaired balance;Decreased mobility; Decreased safety awareness; Impaired tone;Orthopedic restrictions;Pain   Assessment pt began tx session lying supine in the bed and was willing to participate in PT intervention  pt was able to complete all bed mobility independently with no assistance from therapist or bed rails  pt was able to complete a supine to sit transfer independently to EOB  pt was able to tolerate 5 min of static seated balance with no increase in pain   while sitting EOB pt placed weight on her RLE and required education on how to maintain her NWB status  pt was able to complete 5 STS transfers to the RW while maintaining her NWB status  pt was able to tolerate 1:15 of static standing balance before requiring a seated rest due to fatigue  pt was able to ambulate with RW 10ft while correctly maintaining her NWB status but required VC's for positioning of the RW  pt was able to don and doff LSO independently pre tx and post tx  post tx patient returned to bed with no increase in pain and was given the call bed and bed alarm was activated     Goals   Patient Goals to walk more    PT Treatment Day 2   Plan   Treatment/Interventions Functional transfer training;LE strengthening/ROM; Therapeutic exercise; Endurance training;Patient/family training;Equipment eval/education; Bed mobility;Gait training   Progress Progressing toward goals   PT Frequency 5x/wk   Recommendation   PT Discharge Recommendation Post-Acute Rehabilitation Services   AM-PAC Basic Mobility Inpatient   Turning in Bed Without Bedrails 4   Lying on Back to Sitting on Edge of Flat Bed 4   Moving Bed to Chair 2   Standing Up From Chair 3   Walk in Room 2   Climb 3-5 Stairs 1   Basic Mobility Inpatient Raw Score 16   Basic Mobility Standardized Score 38 32   Turning Head Towards Sound 4   Follow Simple Instructions 3   Low Function Basic Mobility Raw Score 23   Low Function Basic Mobility Standardized Score 37 22       Eliseo Pain, PTA

## 2021-03-22 NOTE — PLAN OF CARE
Problem: Potential for Falls  Goal: Patient will remain free of falls  Description: INTERVENTIONS:  - Assess patient frequently for physical needs  -  Identify cognitive and physical deficits and behaviors that affect risk of falls    -  [place] fall precautions as indicated by assessment   - Educate patient/family on patient safety including physical limitations  - Instruct patient to call for assistance with activity based on assessment  - Modify environment to reduce risk of injury  - Consider OT/PT consult to assist with strengthening/mobility  Outcome: Progressing     Problem: PAIN - ADULT  Goal: Verbalizes/displays adequate comfort level or baseline comfort level  Description: Interventions:  - Encourage patient to monitor pain and request assistance  - Assess pain using appropriate pain scale  - Administer analgesics based on type and severity of pain and evaluate response  - Implement non-pharmacological measures as appropriate and evaluate response  - Consider cultural and social influences on pain and pain management  - Notify physician/advanced practitioner if interventions unsuccessful or patient reports new pain  Outcome: Progressing     Problem: INFECTION - ADULT  Goal: Absence or prevention of progression during hospitalization  Description: INTERVENTIONS:  - Assess and monitor for signs and symptoms of infection  - Monitor lab/diagnostic results  - Monitor all insertion sites, i e  indwelling lines, tubes, and drains  - Monitor endotracheal if appropriate and nasal secretions for changes in amount and color  - [place] appropriate cooling/warming therapies per order  - Administer medications as ordered  - Instruct and encourage patient and family to use good hand hygiene technique  - Identify and instruct in appropriate isolation precautions for identified infection/condition  Outcome: Progressing  Goal: Absence of fever/infection during neutropenic period  Description: INTERVENTIONS:  - Monitor WBC    Outcome: Progressing     Problem: SAFETY ADULT  Goal: Patient will remain free of falls  Description: INTERVENTIONS:  - Assess patient frequently for physical needs  -  Identify cognitive and physical deficits and behaviors that affect risk of falls    -  [place] fall precautions as indicated by assessment   - Educate patient/family on patient safety including physical limitations  - Instruct patient to call for assistance with activity based on assessment  - Modify environment to reduce risk of injury  - Consider OT/PT consult to assist with strengthening/mobility  Outcome: Progressing  Goal: Maintain or return to baseline ADL function  Description: INTERVENTIONS:  -  Assess patient's ability to carry out ADLs; assess patient's baseline for ADL function and identify physical deficits which impact ability to perform ADLs (bathing, care of mouth/teeth, toileting, grooming, dressing, etc )  - Assess/evaluate cause of self-care deficits   - Assess range of motion  - Assess patient's mobility; develop plan if impaired  - Assess patient's need for assistive devices and provide as appropriate  - Encourage maximum independence but intervene and supervise when necessary  - Involve family in performance of ADLs  - Assess for home care needs following discharge   - Consider OT consult to assist with ADL evaluation and planning for discharge  - Provide patient education as appropriate  Outcome: Progressing  Goal: Maintain or return mobility status to optimal level  Description: INTERVENTIONS:  - Assess patient's baseline mobility status (ambulation, transfers, stairs, etc )    - Identify cognitive and physical deficits and behaviors that affect mobility  - Identify mobility aids required to assist with transfers and/or ambulation (gait belt, sit-to-stand, lift, walker, cane, etc )  - [place] fall precautions as indicated by assessment  - Record patient progress and toleration of activity level on Mobility SBAR; progress patient to next Phase/Stage  - Instruct patient to call for assistance with activity based on assessment  - Consider rehabilitation consult to assist with strengthening/weightbearing, etc   Outcome: Progressing     Problem: DISCHARGE PLANNING  Goal: Discharge to home or other facility with appropriate resources  Description: INTERVENTIONS:  - Identify barriers to discharge w/patient and caregiver  - Arrange for needed discharge resources and transportation as appropriate  - Identify discharge learning needs (meds, wound care, etc )  - Arrange for interpretive services to assist at discharge as needed  - Refer to Case Management Department for coordinating discharge planning if the patient needs post-hospital services based on physician/advanced practitioner order or complex needs related to functional status, cognitive ability, or social support system  Outcome: Progressing     Problem: Knowledge Deficit  Goal: Patient/family/caregiver demonstrates understanding of disease process, treatment plan, medications, and discharge instructions  Description: Complete learning assessment and assess knowledge base  Interventions:  - Provide teaching at level of understanding  - Provide teaching via preferred learning methods  Outcome: Progressing     Problem: Nutrition/Hydration-ADULT  Goal: Nutrient/Hydration intake appropriate for improving, restoring or maintaining nutritional needs  Description: Monitor and assess patient's nutrition/hydration status for malnutrition  Collaborate with interdisciplinary team and initiate plan and interventions as ordered  Monitor patient's weight and dietary intake as ordered or per policy  Utilize nutrition screening tool and intervene as necessary  Determine patient's food preferences and provide high-protein, high-caloric foods as appropriate       INTERVENTIONS:  - Monitor oral intake, urinary output, labs, and treatment plans  - Assess nutrition and hydration status and recommend course of action  - Evaluate amount of meals eaten  - Assist patient with eating if necessary   - Allow adequate time for meals  - Recommend/ encourage appropriate diets, oral nutritional supplements, and vitamin/mineral supplements  - Order, calculate, and assess calorie counts as needed  - Recommend, monitor, and adjust tube feedings and TPN/PPN based on assessed needs  - Assess need for intravenous fluids  - Provide specific nutrition/hydration education as appropriate  - Include patient/family/caregiver in decisions related to nutrition  Outcome: Progressing

## 2021-03-22 NOTE — CASE MANAGEMENT
CM informed by Beau Barcenas at  that facility is able to accept Patient and will begin auth  CM dept will follow for completion of auth

## 2021-03-22 NOTE — TELEPHONE ENCOUNTER
3/24/21:   PATIENT DISCHARGED HOME  ATTEMPTED TO CONTACT PATIENT ON CELL - MAILBOX IS FULL   LM ON HOME NUMBER WITH APPT AND XRAY DETAILS EXT 6000 LEFT     3/23/21: INPATIENT  3/22/21: INPATIENT    TODAYS DATE:  3/22/21  EMAIL FROM PA:  Priscila Parker  DATE OF EMAIL: 3/19/21    HOSPITAL FOLLOW UP   2 WEEKS W/ ED  4/2/21 / 1:00 / Ra Bustamante  *MADE BY KENNETH / Yari Horton    IMAGING:  XRAY L-SPINE

## 2021-03-22 NOTE — UTILIZATION REVIEW
Continued Stay Review    Date: 3/21/21                         Current Patient Class: Inpatient Current Level of Care: Med Surg    HPI:61 y o  female initially admitted on 3/18/21 with fracture R tibia with possible fracture R calcaneous, gait instability, alcohol intoxication with chronic alcohol use  Placed on CIWA monitoring, Serax TID, pain control  Orthopedics and Neurosurgery on consult,  NWB RLE with splint RLE  CT showed lumbar compression fractures, conservative management with LSO brace  Physical Therapy recommending STR  Case Management referral placed  Surgical Oncology evaluated the patient on 3/19, 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 on Monday  Pertinent Labs/Diagnostic Results:     3/20 MRI/MRCP abdomen:  Severe diffuse dilatation of the pancreatic duct and sidebranches  Given extensive calcification on previous CT and pancreatic atrophy, this is most likely chronic pancreatitis  However, it is difficult to distinguish this entity from intraductal papillary mucinous neoplasm, particularly given the presence of a more focal cystic lesion in the uncinate process  While this may represent a side branch IPMN communicating with the main pancreatic duct, this could also represent dilation of an uncinate  process side branch due to chronic pancreatitis given a tubular appearance  Follow-up with gastroenterology for endoscopic ultrasound/ERCP is recommended to evaluate for pancreatic duct mucin   Alternatively, continued MRI or CT surveillance can be performed in 6 months  2   Posterior gallbladder calcification again seen  This may represent layering stones with wall calcification not entirely excluded  These could be differentiated with ultrasound performed in varying positions to assess for mobility  3   Stable posterior right abdominal wall hematoma  There is T2 hyperintensity and enhancement in the sacrum, right greater than left  There is no corresponding abnormality on CT and T1 signal intensity is normal  However, given that this study was tailored to evaluate the abdominal viscera, the sacrum was incompletely evaluated  This could represent a very early sacral insufficiency fracture and if confirmation is desired, dedicated sacral MRI or bone scan can be obtained      3/19 x-ray lumbar spine:  Stable multilevel compression deformities since CT performed the day before           Results from last 7 days   Lab Units 03/20/21  0531 03/19/21  0456 03/18/21  1246   WBC Thousand/uL 2 51* 2 58* 2 23*   HEMOGLOBIN g/dL 8 6* 8 1* 8 6*   HEMATOCRIT % 28 0* 26 4* 28 2*   PLATELETS Thousands/uL 41* 47* 55*   NEUTROS ABS Thousands/µL 1 09*  --  0 39*         Results from last 7 days   Lab Units 03/20/21  0531 03/19/21  0455 03/18/21  1246   SODIUM mmol/L 141 137 143   POTASSIUM mmol/L 3 6 4 4 3 9   CHLORIDE mmol/L 105 106 109*   CO2 mmol/L 25 25 28   ANION GAP mmol/L 11 6 6   BUN mg/dL 10 14 16   CREATININE mg/dL 0 50* 0 62 0 61   EGFR ml/min/1 73sq m 105 98 98   CALCIUM mg/dL 8 7 8 0* 8 0*     Results from last 7 days   Lab Units 03/19/21  0455   AST U/L 96*   ALT U/L 54   ALK PHOS U/L 245*   TOTAL PROTEIN g/dL 6 4   ALBUMIN g/dL 2 8*   TOTAL BILIRUBIN mg/dL 0 44     Results from last 7 days   Lab Units 03/19/21  0813   POC GLUCOSE mg/dl 108     Results from last 7 days   Lab Units 03/20/21  0531 03/19/21  0455 03/18/21  1246   GLUCOSE RANDOM mg/dL 99 90 114           Results from last 7 days   Lab Units 03/18/21  1437   PROTIME seconds 15 3*   INR  1 20*   PTT seconds 31           Results from last 7 days   Lab Units 03/19/21  1856   CEA ng/mL 5 0*       Results from last 7 days   Lab Units 03/18/21  1249   ETHANOL LVL mg/dL 406*       Vital Signs:     Date/Time  Temp  Pulse  Resp  BP  MAP   SpO2 O2 Device   CIWA   03/20/21 1602  --  --  --  --  --  93 % None (Room air)    03/20/21 1600  98 3 °F (36 8 °C)  84  16  151/70  --  95 % None (Room air)        0   03/20/21 1131  98 5 °F (36 9 °C)  75  16  152/72  104  97 % None (Room air)        0   03/20/21 0743  98 6 °F (37 °C)  69  16  148/76  104  95 % None (Room air)         1   03/20/21 0320  --  --  --  --  --  95 % None (Room air)    03/20/21 0253  97 6 °F (36 4 °C)  75  16  159/74  106  97 % None (Room air)          1           Medications:   Scheduled Medications:      docusate sodium, 100 mg, Oral, BID  enoxaparin, 30 mg, Subcutaneous, Q12H DESTIN  lidocaine, 2 patch, Topical, Daily  methocarbamol, 750 mg, Oral, Q6H DESTIN  oxazepam, 10 mg, Oral, Q6H DESTIN  pantoprazole, 40 mg, Oral, Early Morning  senna, 2 tablet, Oral, Daily  sertraline, 50 mg, Oral, Daily      Continuous IV Infusions: None  PRN Meds:      HYDROmorphone, 0 5 mg, Intravenous, Q4H PRN  magnesium hydroxide, 30 mL, Oral, Daily PRN  ondansetron, 4 mg, Intravenous, Q6H PRN  oxyCODONE, 2 5 mg, Oral, Q4H PRN  oxyCODONE, 5 mg, Oral, Q4H PRN x 1 dose 3/20        Discharge Plan: D          Network Utilization Review Department  ATTENTION: Please call with any questions or concerns to [phone] and carefully listen to the prompts so that you are directed to the right person  All voicemails are confidential   Farrel Bodily all requests for admission clinical reviews, approved or denied determinations and any other requests to dedicated fax number below belonging to the campus where the patient is receiving treatment   List of dedicated fax numbers for the Facilities:  FACILITY NAME UR FAX NUMBER   ADMISSION DENIALS (Administrative/Medical Necessity) [phone]   [facility] [address] (Maternity/NICU/Pediatrics) [facility] [address] [facility]  [phone]   [address] (Jared Hahn) [address] [place] [address] Dorina Jameson [facility] [phone]   [place] [address] [phone]

## 2021-03-22 NOTE — PLAN OF CARE
Problem: Potential for Falls  Goal: Patient will remain free of falls  Description: INTERVENTIONS:  - Assess patient frequently for physical needs  -  Identify cognitive and physical deficits and behaviors that affect risk of falls    -  [place] fall precautions as indicated by assessment   - Educate patient/family on patient safety including physical limitations  - Instruct patient to call for assistance with activity based on assessment  - Modify environment to reduce risk of injury  - Consider OT/PT consult to assist with strengthening/mobility  Outcome: Progressing     Problem: PAIN - ADULT  Goal: Verbalizes/displays adequate comfort level or baseline comfort level  Description: Interventions:  - Encourage patient to monitor pain and request assistance  - Assess pain using appropriate pain scale  - Administer analgesics based on type and severity of pain and evaluate response  - Implement non-pharmacological measures as appropriate and evaluate response  - Consider cultural and social influences on pain and pain management  - Notify physician/advanced practitioner if interventions unsuccessful or patient reports new pain  Outcome: Progressing     Problem: INFECTION - ADULT  Goal: Absence or prevention of progression during hospitalization  Description: INTERVENTIONS:  - Assess and monitor for signs and symptoms of infection  - Monitor lab/diagnostic results  - Monitor all insertion sites, i e  indwelling lines, tubes, and drains  - Monitor endotracheal if appropriate and nasal secretions for changes in amount and color  - [place] appropriate cooling/warming therapies per order  - Administer medications as ordered  - Instruct and encourage patient and family to use good hand hygiene technique  - Identify and instruct in appropriate isolation precautions for identified infection/condition  Outcome: Progressing  Goal: Absence of fever/infection during neutropenic period  Description: INTERVENTIONS:  - Monitor WBC    Outcome: Progressing     Problem: SAFETY ADULT  Goal: Patient will remain free of falls  Description: INTERVENTIONS:  - Assess patient frequently for physical needs  -  Identify cognitive and physical deficits and behaviors that affect risk of falls    -  [place] fall precautions as indicated by assessment   - Educate patient/family on patient safety including physical limitations  - Instruct patient to call for assistance with activity based on assessment  - Modify environment to reduce risk of injury  - Consider OT/PT consult to assist with strengthening/mobility  Outcome: Progressing  Goal: Maintain or return to baseline ADL function  Description: INTERVENTIONS:  -  Assess patient's ability to carry out ADLs; assess patient's baseline for ADL function and identify physical deficits which impact ability to perform ADLs (bathing, care of mouth/teeth, toileting, grooming, dressing, etc )  - Assess/evaluate cause of self-care deficits   - Assess range of motion  - Assess patient's mobility; develop plan if impaired  - Assess patient's need for assistive devices and provide as appropriate  - Encourage maximum independence but intervene and supervise when necessary  - Involve family in performance of ADLs  - Assess for home care needs following discharge   - Consider OT consult to assist with ADL evaluation and planning for discharge  - Provide patient education as appropriate  Outcome: Progressing  Goal: Maintain or return mobility status to optimal level  Description: INTERVENTIONS:  - Assess patient's baseline mobility status (ambulation, transfers, stairs, etc )    - Identify cognitive and physical deficits and behaviors that affect mobility  - Identify mobility aids required to assist with transfers and/or ambulation (gait belt, sit-to-stand, lift, walker, cane, etc )  - [place] fall precautions as indicated by assessment  - Record patient progress and toleration of activity level on Mobility SBAR; progress patient to next Phase/Stage  - Instruct patient to call for assistance with activity based on assessment  - Consider rehabilitation consult to assist with strengthening/weightbearing, etc   Outcome: Progressing     Problem: DISCHARGE PLANNING  Goal: Discharge to home or other facility with appropriate resources  Description: INTERVENTIONS:  - Identify barriers to discharge w/patient and caregiver  - Arrange for needed discharge resources and transportation as appropriate  - Identify discharge learning needs (meds, wound care, etc )  - Arrange for interpretive services to assist at discharge as needed  - Refer to Case Management Department for coordinating discharge planning if the patient needs post-hospital services based on physician/advanced practitioner order or complex needs related to functional status, cognitive ability, or social support system  Outcome: Progressing     Problem: Knowledge Deficit  Goal: Patient/family/caregiver demonstrates understanding of disease process, treatment plan, medications, and discharge instructions  Description: Complete learning assessment and assess knowledge base  Interventions:  - Provide teaching at level of understanding  - Provide teaching via preferred learning methods  Outcome: Progressing     Problem: Nutrition/Hydration-ADULT  Goal: Nutrient/Hydration intake appropriate for improving, restoring or maintaining nutritional needs  Description: Monitor and assess patient's nutrition/hydration status for malnutrition  Collaborate with interdisciplinary team and initiate plan and interventions as ordered  Monitor patient's weight and dietary intake as ordered or per policy  Utilize nutrition screening tool and intervene as necessary  Determine patient's food preferences and provide high-protein, high-caloric foods as appropriate       INTERVENTIONS:  - Monitor oral intake, urinary output, labs, and treatment plans  - Assess nutrition and hydration status and recommend course of action  - Evaluate amount of meals eaten  - Assist patient with eating if necessary   - Allow adequate time for meals  - Recommend/ encourage appropriate diets, oral nutritional supplements, and vitamin/mineral supplements  - Order, calculate, and assess calorie counts as needed  - Recommend, monitor, and adjust tube feedings and TPN/PPN based on assessed needs  - Assess need for intravenous fluids  - Provide specific nutrition/hydration education as appropriate  - Include patient/family/caregiver in decisions related to nutrition  Outcome: Progressing

## 2021-03-22 NOTE — PLAN OF CARE
Problem: PHYSICAL THERAPY ADULT  Goal: Performs mobility at highest level of function for planned discharge setting  See evaluation for individualized goals  Description: Treatment/Interventions: Functional transfer training, LE strengthening/ROM, Therapeutic exercise, Endurance training, Patient/family training, Equipment eval/education, Bed mobility, Gait training          See flowsheet documentation for full assessment, interventions and recommendations  Outcome: Progressing  Note: Prognosis: Fair  Problem List: Decreased strength, Decreased range of motion, Decreased endurance, Impaired balance, Decreased mobility, Decreased safety awareness, Impaired tone, Orthopedic restrictions, Pain  Assessment: pt began tx session lying supine in the bed and was willing to participate in PT intervention  pt was able to complete all bed mobility independently with no assistance from therapist or bed rails  pt was able to complete a supine to sit transfer independently to EOB  pt was able to tolerate 5 min of static seated balance with no increase in pain  while sitting EOB pt placed weight on her RLE and required education on how to maintain her NWB status  pt was able to complete 5 STS transfers to the RW while maintaining her NWB status  pt was able to tolerate 1:15 of static standing balance before requiring a seated rest due to fatigue  pt was able to ambulate with RW 10ft while correctly maintaining her NWB status but required VC's for positioning of the RW  pt was able to don and doff LSO independently pre tx and post tx  post tx patient returned to bed with no increase in pain and was given the call bed and bed alarm was activated          PT Discharge Recommendation: Post-Acute Rehabilitation Services          See flowsheet documentation for full assessment

## 2021-03-22 NOTE — PLAN OF CARE
Problem: OCCUPATIONAL THERAPY ADULT  Goal: Performs self-care activities at highest level of function for planned discharge setting  See evaluation for individualized goals  Description: Treatment Interventions: ADL retraining, Functional transfer training, UE strengthening/ROM, Endurance training, Patient/family training, Equipment evaluation/education, Compensatory technique education, Energy conservation, Activityengagement          See flowsheet documentation for full assessment, interventions and recommendations  3/22/2021 1004 by VIVI Ko  Outcome: Progressing  Note: Limitation: Decreased ADL status, Decreased UE strength, Decreased Safe judgement during ADL, Decreased endurance, Decreased self-care trans, Decreased high-level ADLs  Prognosis: Good  Assessment: Pt agreeable to participate in skilled OT treatment session to address ADL retraining, functional transfer training, endurance training, equipment evaluation/education, compensatory technique education, activity engagement and activity tolerance  Pt sitting upright in bed upon arrival, noted that [facility] elevated >45*, pt educated that if she is upright greater than 45* to have LSO on  Pt verbalizing understand and donning LSO with (S)  Pt completing transfer to commode with overall min A, x2 instances of TTWB through RLE, pt educated on strictly maintaining NWB, verbalizing understanding and able to maintain on return to bed  Pt demonstrating improvements in functional sit >< stand transfers and functional mobility demonstrating with min A x1  Pt reporting increased pain with pivoting educated on techniques of smaller steps to reduce pain  Patient continues to be performing below their functional baseline with an increased risk for falls and injury and decreased occupational performance, and would benefit from continued skilled OT treatment to address deficits   The patient's raw score on the AM-PAC Daily Activity inpatient short form is 18, standardized score is 38 66, less than 39 4  Patients at this level are likely to benefit from discharge to post-acute rehabilitation services  Con to recommend d/c to PAR  Will cont to follow  OT Discharge Recommendation: Post-Acute Rehabilitation Services       3/22/2021 1004 by [facility], OTR  Note: Limitation: Decreased ADL status, Decreased UE strength, Decreased Safe judgement during ADL, Decreased endurance, Decreased self-care trans, Decreased high-level ADLs  Prognosis: Good  Assessment: Pt agreeable to participate in skilled OT treatment session to address ADL retraining, functional transfer training, endurance training, equipment evaluation/education, compensatory technique education, activity engagement and activity tolerance  Pt sitting upright in bed upon arrival, noted that [facility] elevated >45*, pt educated that if she is upright greater than 45* to have LSO on  Pt verbalizing understand and donning LSO with (S)  Pt completing transfer to commode with overall min A, x2 instances of TTWB through RLE, pt educated on strictly maintaining NWB, verbalizing understanding and able to maintain on return to bed  Pt demonstrating improvements in functional sit >< stand transfers and functional mobility demonstrating with min A x1  Pt reporting increased pain with pivoting educated on techniques of smaller steps to reduce pain  Patient continues to be performing below their functional baseline with an increased risk for falls and injury and decreased occupational performance, and would benefit from continued skilled OT treatment to address deficits  The patient's raw score on the AM-PAC Daily Activity inpatient short form is 18, standardized score is 38 66, less than 39 4  Patients at this level are likely to benefit from discharge to post-acute rehabilitation services  Con to recommend d/c to PAR  Will cont to follow        OT Discharge Recommendation: Post-Acute Rehabilitation Services

## 2021-03-22 NOTE — PROGRESS NOTES
[facility]  Progress Note - Elena Em 1960, 64 y o  female MRN: [id number]  Unit/Bed#: S -01 Encounter: [id number]  Primary Care Provider: Moy Arana MD   Date and time admitted to hospital: 3/18/2021 12:19 PM    Porcelain gallbladder  Assessment & Plan  Surgical oncology evaluating  Outpatient follow up        Pancreatic cyst  Assessment & Plan  Surgical oncology following   Outpatient follow up      Closed fracture of fibula, shaft  Assessment & Plan  Ortho consulted and seen patient  NB  F/u in 2 weeks as outpatient  PT/Ot  Neuro intact    Fall  Assessment & Plan  Case management consulted, ETOH abuse history  Actively getting OOB to commode and maintaining weight bearing status    Alcohol abuse  Assessment & Plan  SBIRT by case management  CIWA protocol  SErax q6 hours - doing well  GCS - 15  No signs of withdrawl    * Lumbar compression fracture [facility])  Assessment & Plan  Neurosurgery consulted and seen patient  LSO brace  Awaiting uright x-rays - per Neurosurgery good  PT/OT DVT prophylaixs  Pain management - controlled      Disposition: arf      SUBJECTIVE:  Chief Complaint: back pain     Subjective: only complaint is back pain  Denies numbness or weakness      OBJECTIVE:     Meds/Allergies     Current Facility-Administered Medications:     docusate sodium (COLACE) capsule 100 mg, 100 mg, Oral, BID, Isabel Lower, PA-C, 100 mg at 03/22/21 1000    enoxaparin (LOVENOX) subcutaneous injection 30 mg, 30 mg, Subcutaneous, Q12H [facility] HOME, Isabel Lower, PA-C, 30 mg at 03/22/21 1000    HYDROmorphone (DILAUDID) injection 0 5 mg, 0 5 mg, Intravenous, Q4H PRN, Isabel Lower, PA-C    lidocaine (LIDODERM) 5 % patch 2 patch, 2 patch, Topical, Daily, [place] Haile, CRNP, 2 patch at 03/22/21 1001    magnesium hydroxide (MILK OF MAGNESIA) oral suspension 30 mL, 30 mL, Oral, Daily PRN, Isabel Lower, PA-C    methocarbamol (ROBAXIN) tablet 750 mg, 750 mg, Oral, Q6H [facility] KARISSA SandovalNP, 750 mg at 03/22/21 1001    ondansetron [facility]) injection 4 mg, 4 mg, Intravenous, Q6H PRN, Marija Pae, PA-C, 4 mg at 03/19/21 1111    oxazepam (SERAX) capsule 10 mg, 10 mg, Oral, Q6H [address], Juliana Euna PAZ Huerta, 10 mg at 03/22/21 1001    oxyCODONE (ROXICODONE) IR tablet 2 5 mg, 2 5 mg, Oral, Q4H PRN, Marija Pae, PA-C    oxyCODONE (ROXICODONE) IR tablet 5 mg, 5 mg, Oral, Q4H PRN, Marija Pae, PA-C, 5 mg at 03/22/21 1007    pantoprazole (PROTONIX) EC tablet 40 mg, 40 mg, Oral, Early Morning, Marija Pae, PA-C, 40 mg at 03/22/21 0979    senna (SENOKOT) tablet 17 2 mg, 2 tablet, Oral, Daily, Marija Pae, PA-C, 17 2 mg at 03/22/21 1000    sertraline (ZOLOFT) tablet 50 mg, 50 mg, Oral, Daily, Marija Pae, PA-C, 50 mg at 03/22/21 1000     Vitals:   Vitals:    03/22/21 1144   BP: 111/63   Pulse: 75   Resp:    Temp: 99 1 °F (37 3 °C)   SpO2: 93%       Intake/Output:  I/O       03/20 0701 - 03/21 0700 03/21 0701 - 03/22 0700 03/22 0701 - 03/23 0700    P  O  240 480     Total Intake(mL/kg) 240 (3 5) 480 (7 1)     Urine (mL/kg/hr) 700 (0 4) 1150 (0 7)     Stool 0 0     Total Output 700 1150     Net -460 -670            Unmeasured Stool Occurrence 2 x 3 x            Nutrition/GI Proph/Bowel Reg: regular    Physical Exam:   GENERAL APPEARANCE: NAD  NEURO: GCS 15  HEENT: NCAT  CV: RRR  LUNGS: CTAB  GI: soft and nontender  : voiding well  MSK: RLE in posterior splint, NVI x4  SKIN: warm and dry     Invasive Devices     Peripheral Intravenous Line            Peripheral IV 03/22/21 Left Arm less than 1 day                 Lab Results: BMP/CMP: No results found for: SODIUM, K, CL, CO2, ANIONGAP, BUN, CREATININE, GLUCOSE, CALCIUM, AST, ALT, ALKPHOS, PROT, BILITOT, EGFR and CBC: No results found for: WBC, HGB, HCT, MCV, PLT, ADJUSTEDWBC, MCH, MCHC, RDW, MPV, NRBC  Imaging/EKG Studies: Results: I have personally reviewed pertinent reports      Other Studies:    VTE Prophylaxis: Sequential compression device (Venodyne)  and Enoxaparin (Lovenox)

## 2021-03-22 NOTE — CASE MANAGEMENT
CM received VM from Boy Lyles at [facility] to review HOST assessment  Boy Lyles states that the Patient qualifies for IP treatment, but the Patient is interested in OP services only at this point and prefers to start this after she is healed from her injury  [facility] will follow up with the Patient weekly to re-assess Patient's readiness for treatment

## 2021-03-22 NOTE — OCCUPATIONAL THERAPY NOTE
Occupational Therapy Progress Note     Patient Name: Jenni Meek  EPMEB'P Date: 3/22/2021  Problem List  Principal Problem:    Lumbar compression fracture [facility])  Active Problems:    Alcohol abuse    Fall    Closed fracture of fibula, shaft              03/22/21 0951   OT Last Visit   OT Visit Date 03/22/21   Note Type   Note Type Treatment for insurance authorization   Restrictions/Precautions   Weight Bearing Precautions Per Order Yes   RLE Weight Bearing Per Order NWB   Braces or Orthoses LSO   Other Precautions Chair Alarm; Bed Alarm; Fall Risk;Pain   Pain Assessment   Pain Assessment Tool 0-10   Pain Score 2   Pain Location/Orientation Orientation: Left;Orientation: Lower; Location: Back   ADL   Grooming Assistance 6  Modified Independent   Toileting Assistance  4  Minimal Assistance   Toileting Deficit Increased time to complete;Supervison/safety;Verbal cueing;Clothing management down;Clothing management up   Toileting Comments A with clothing management, able to complete hygiene   Bed Mobility   Supine to Sit 6  Modified independent   Additional items Increased time required   Sit to Supine 6  Modified independent   Additional items Increased time required   Additional Comments Pt noted to be sitting upright in bed with HOB  60*, pt educated on wearing LSO brace if HOB is elevated   Transfers   Sit to Stand 4  Minimal assistance   Additional items Assist x 1; Increased time required;Verbal cues   Stand to Sit 4  Minimal assistance   Additional items Assist x 1; Increased time required;Verbal cues   Stand pivot 4  Minimal assistance   Additional items Assist x 1; Increased time required;Verbal cues   Toilet transfer 4  Minimal assistance   Additional items Assist x 1; Increased time required;Verbal cues; Commode   Additional Comments Noted to be TTWB with commode transfer, with extensive education pt able to demonstrate return to bed without WB   Functional Mobility   Functional Mobility 4  Minimal assistance Additional Comments Ax1,  5 ft limited distance due to pain in LE and back   Additional items Rolling walker   Cognition   Overall Cognitive Status [facility]   Arousal/Participation Alert; Responsive; Cooperative   Attention Within functional limits   Orientation Level Oriented X4   Memory Within functional limits   Following Commands Follows one step commands without difficulty   Comments Pleasant and cooperative   Activity Tolerance   Activity Tolerance Patient tolerated treatment well   Medical Staff Made Aware KELTON Platt Mai, spoke with STEVEN Small and KENTON Ruiz   Assessment   Assessment Pt agreeable to participate in skilled OT treatment session to address ADL retraining, functional transfer training, endurance training, equipment evaluation/education, compensatory technique education, activity engagement and activity tolerance  Pt sitting upright in bed upon arrival, noted that [facility] elevated >45*, pt educated that if she is upright greater than 45* to have LSO on  Pt verbalizing understand and donning LSO with (S)  Pt completing transfer to commode with overall min A, x2 instances of TTWB through RLE, pt educated on strictly maintaining NWB, verbalizing understanding and able to maintain on return to bed  Pt demonstrating improvements in functional sit >< stand transfers and functional mobility demonstrating with min A x1  Pt reporting increased pain with pivoting educated on techniques of smaller steps to reduce pain  Patient continues to be performing below their functional baseline with an increased risk for falls and injury and decreased occupational performance, and would benefit from continued skilled OT treatment to address deficits  The patient's raw score on the AM-PAC Daily Activity inpatient short form is 18, standardized score is 38 66, less than 39 4  Patients at this level are likely to benefit from discharge to post-acute rehabilitation services  Con to recommend d/c to PAR  Will cont to follow      Plan   Goal Expiration Date 04/02/21   OT Treatment Day 1   OT Frequency 3-5x/wk   Recommendation   OT Discharge Recommendation Post-Acute Rehabilitation Services   AM-PAC Daily Activity Inpatient   Lower Body Dressing 3   Bathing 2   Toileting 3   Upper Body Dressing 3   Grooming 3   Eating 4   Daily Activity Raw Score 18   Daily Activity Standardized Score (Calc for Raw Score >=11) 38 66   AM-PAC Applied Cognition Inpatient   Following a Speech/Presentation 4   Understanding Ordinary Conversation 4   Taking Medications 4   Remembering Where Things Are Placed or Put Away 4   Remembering List of 4-5 Errands 4   Taking Care of Complicated Tasks 4   Applied Cognition Raw Score 24   Applied Cognition Standardized Score 62 21       At the end of the session, all needs met and pt sitting upright in bed, HOB 60* (LSO on), call bell in reach    Veronica Crabtree OTR/LUZ

## 2021-03-23 VITALS
RESPIRATION RATE: 18 BRPM | SYSTOLIC BLOOD PRESSURE: 115 MMHG | BODY MASS INDEX: 23.46 KG/M2 | HEIGHT: 67 IN | HEART RATE: 74 BPM | TEMPERATURE: 98.9 F | DIASTOLIC BLOOD PRESSURE: 64 MMHG | OXYGEN SATURATION: 94 % | WEIGHT: 149.47 LBS

## 2021-03-23 PROCEDURE — 99238 HOSP IP/OBS DSCHRG MGMT 30/<: CPT | Performed by: SURGERY

## 2021-03-23 PROCEDURE — 99232 SBSQ HOSP IP/OBS MODERATE 35: CPT | Performed by: SURGERY

## 2021-03-23 PROCEDURE — 97110 THERAPEUTIC EXERCISES: CPT

## 2021-03-23 PROCEDURE — 97530 THERAPEUTIC ACTIVITIES: CPT

## 2021-03-23 RX ORDER — OXYCODONE HYDROCHLORIDE 5 MG/1
2.5 TABLET ORAL EVERY 4 HOURS PRN
Qty: 20 TABLET | Refills: 0 | Status: SHIPPED | OUTPATIENT
Start: 2021-03-23 | End: 2021-04-02

## 2021-03-23 RX ORDER — LANOLIN ALCOHOL/MO/W.PET/CERES
100 CREAM (GRAM) TOPICAL DAILY
Status: DISCONTINUED | OUTPATIENT
Start: 2021-03-23 | End: 2021-03-23 | Stop reason: HOSPADM

## 2021-03-23 RX ORDER — METHOCARBAMOL 750 MG/1
750 TABLET, FILM COATED ORAL EVERY 6 HOURS SCHEDULED
Qty: 21 TABLET | Refills: 0 | Status: SHIPPED | OUTPATIENT
Start: 2021-03-23 | End: 2021-06-23 | Stop reason: ALTCHOICE

## 2021-03-23 RX ORDER — ASPIRIN 81 MG/1
81 TABLET ORAL 2 TIMES DAILY
Refills: 0
Start: 2021-03-23 | End: 2021-08-16

## 2021-03-23 RX ORDER — FOLIC ACID 1 MG/1
1 TABLET ORAL DAILY
Status: DISCONTINUED | OUTPATIENT
Start: 2021-03-23 | End: 2021-03-23 | Stop reason: HOSPADM

## 2021-03-23 RX ADMIN — OXAZEPAM 10 MG: 10 CAPSULE, GELATIN COATED ORAL at 09:42

## 2021-03-23 RX ADMIN — Medication 1 TABLET: at 09:46

## 2021-03-23 RX ADMIN — ENOXAPARIN SODIUM 30 MG: 30 INJECTION SUBCUTANEOUS at 09:41

## 2021-03-23 RX ADMIN — SERTRALINE HYDROCHLORIDE 50 MG: 50 TABLET ORAL at 09:42

## 2021-03-23 RX ADMIN — PANTOPRAZOLE SODIUM 40 MG: 40 TABLET, DELAYED RELEASE ORAL at 05:14

## 2021-03-23 RX ADMIN — LIDOCAINE 5% 2 PATCH: 700 PATCH TOPICAL at 09:42

## 2021-03-23 RX ADMIN — FOLIC ACID 1 MG: 1 TABLET ORAL at 09:46

## 2021-03-23 RX ADMIN — OXAZEPAM 10 MG: 10 CAPSULE, GELATIN COATED ORAL at 04:56

## 2021-03-23 RX ADMIN — METHOCARBAMOL TABLETS 750 MG: 750 TABLET, COATED ORAL at 09:42

## 2021-03-23 RX ADMIN — METHOCARBAMOL TABLETS 750 MG: 750 TABLET, COATED ORAL at 04:54

## 2021-03-23 RX ADMIN — Medication 100 MG: at 09:46

## 2021-03-23 NOTE — INCIDENTAL FINDINGS
The following findings require follow up:  Radiographic finding   Finding: Severe diffuse dilatation of the pancreatic duct and sidebranches  Given extensive calcification on previous CT and pancreatic atrophy, this is most likely chronic pancreatitis  However, it is difficult to distinguish this entity from intraductal   papillary mucinous neoplasm, particularly given the presence of a more focal cystic lesion in the uncinate process  While this may represent a side branch IPMN communicating with the main pancreatic duct, this could also represent dilation of an uncinate   process side branch due to chronic pancreatitis given a tubular appearance  Follow-up with gastroenterology for endoscopic ultrasound/ERCP is recommended to evaluate for pancreatic duct mucin  Alternatively, continued MRI or CT surveillance can be   performed in 6 months      2  Posterior gallbladder calcification again seen  This may represent layering stones with wall calcification not entirely excluded        Follow up required: Dr Lizbeth Lal     Follow up should be done within 2 week(s)

## 2021-03-23 NOTE — PHYSICAL THERAPY NOTE
PHYSICAL THERAPY NOTE          Patient Name: Stephanie ANDERSON Date: 3/23/2021        03/23/21 1150   PT Last Visit   PT Visit Date 03/23/21   Note Type   Note Type Treatment   Pain Assessment   Pain Assessment Tool 0-10   Pain Score No Pain   Restrictions/Precautions   Weight Bearing Precautions Per Order Yes   RLE Weight Bearing Per Order NWB   Braces or Orthoses LSO   Other Precautions Bed Alarm; Chair Alarm; Fall Risk;Pain   General   Chart Reviewed Yes   Family/Caregiver Present No   Cognition   Overall Cognitive Status WFL   Arousal/Participation Alert; Responsive; Cooperative   Attention Within functional limits   Orientation Level Oriented X4   Memory Within functional limits   Following Commands Follows one step commands without difficulty   Comments pleasant and coorperative    Subjective   Subjective pt agreed to participate in PT intervention    Bed Mobility   Rolling R 7  Independent   Additional items   (pt requires no assisstance with bed mobility )   Rolling L 7  Independent   Supine to Sit 7  Independent   Sit to Supine 7  Independent   Additional Comments pt able to don and doff her LSO without any assistance    Transfers   Sit to Stand 4  Minimal assistance   Additional items Assist x 1; Increased time required;Verbal cues   Stand to Sit 4  Minimal assistance   Additional items Assist x 1;Verbal cues   Stand pivot 4  Minimal assistance   Additional items Assist x 1; Increased time required   Additional Comments during transfers pt maintained NWB status on RLE    Ambulation/Elevation   Gait pattern Foward flexed; Excessively slow   Gait Assistance 4  Minimal assist   Additional items Assist x 1;Verbal cues   Assistive Device Rolling walker   Distance 10ft x1    Balance   Static Sitting Fair +   Dynamic Sitting Poor   Static Standing Poor   Ambulatory Poor -   Exercises   Quad Sets Supine;10 reps;Bilateral   Heelslides Supine;10 reps;Bilateral   Hip Abduction Sitting;10 reps;Bilateral   Hip Adduction Sitting;10 reps;Bilateral   Knee AROM Long Arc Quad Sitting;10 reps;Bilateral   Ankle Pumps Sitting;10 reps;Bilateral   Equipment Use   Comments pt was able to don and doff LSO independently    Assessment   Prognosis Fair   Problem List Decreased strength;Decreased range of motion;Decreased endurance; Impaired balance;Decreased mobility; Decreased safety awareness; Impaired tone;Orthopedic restrictions;Pain   Assessment pt began tx session lying in the bed supine and was willing to participate in PT intervention  pt was independently able to complete all bed mobility activities safely  pt was able to transfer from supine to sitting EoB independently with no assistance of bed rails  pt was able to tolerate 5 min of static seated balance at EOB with no increase in pain or LOB  pt was able to complete all TE activities with good recall and no increase in pain  pt was able to transfer sit<>stand with RW with min Ax1 and VC's for hand placement while ascending to RW  pt was able to complete a stand<>pivot transfer to reclinning chair with supervision and demonstrated good recall by knowing where to place hands while descending to reclinner  pt was able to complete 5 STS with good recall on proper hand placement while safely completing activity  pt was able to stand pivot back to EOB and sit safely with no VC's required  pt was able to complete a sit<>supine transfer independently and was giving call bell and bed alarm was activated  PT to see to determine best course of action moving forward with pt  Goals   Patient Goals to walk more    STG Expiration Date 03/29/21   PT Treatment Day 3   Plan   Treatment/Interventions Functional transfer training;LE strengthening/ROM; Therapeutic exercise; Endurance training;Patient/family training;Equipment eval/education;Gait training;Bed mobility   Progress Progressing toward goals   PT Frequency 5x/wk Recommendation   PT Discharge Recommendation Post-Acute Rehabilitation Services   AM-PAC Basic Mobility Inpatient   Turning in Bed Without Bedrails 4   Lying on Back to Sitting on Edge of Flat Bed 4   Moving Bed to Chair 2   Standing Up From Chair 3   Walk in Room 2   Climb 3-5 Stairs 1   Basic Mobility Inpatient Raw Score 16   Basic Mobility Standardized Score 38 32   Turning Head Towards Sound 4   Follow Simple Instructions 3   Low Function Basic Mobility Raw Score 23   Low Function Basic Mobility Standardized Score 37 22     Natacha Funez, PTA

## 2021-03-23 NOTE — DISCHARGE SUMMARY
Discharge Summary - Trauma Service   Reinaldo Zaman 64 y o  female MRN: [id number]  Unit/Bed#: S -01 Encounter: [id number]    Admission Date: 3/18/2021     Discharge Date: 3/23/2021    Admitting Diagnosis: Closed fracture of shaft of fibula [S82 409A]  Back pain [M54 9]  ETOH abuse [F10 10]  Calcaneus fracture, right [S92 001A]  Fall, initial encounter [W19  XXXA]  Closed fracture of shaft of right fibula, unspecified fracture morphology, initial encounter [S82 401A]    Discharge Diagnosis:    Pancreatic cyst  Assessment & Plan  Surgical oncology following   Outpatient follow up        Closed fracture of fibula, shaft  Assessment & Plan  Ortho -NWBRLE in posterior splint  F/u in 2 weeks as outpatient  PT/Ot  Neuro intact     Fall  Assessment & Plan  Case management consulted, ETOH abuse history  Actively getting OOB to commode and maintaining weight bearing status     Alcohol abuse  Assessment & Plan  SBIRT by case management  CIWA protocol  Serax q6 hours - doing well  No signs of withdrawal     * Lumbar compression fracture [facility])  Assessment & Plan  Neurosurgery consulted and seen patient  LSO brace  upright x-rays - per Neurosurgery good  PT/OT   DVT prophylaixs  Pain management - controlled    Attending and Service: Dr Solange Khan, Acute Care Surgical Services  Consulting Physician(s):   Charito Dunbar Neurosurgery  [facility]'s Surgical Oncology    Imaging and Procedures Performed: No orders of the defined types were placed in this encounter  Ct Abdomen Pelvis Wo Contrast    Result Date: 3/18/2021  Impression: LIMITED STUDY WITHOUT IV OR ORAL CONTRAST IN THE SETTING OF TRAUMA  No acute traumatic intra-abdominal injury identified within limitations  New moderate superior endplate deformity of L2 predominantly seen along the midline and left superior endplate, possibly age-indeterminate Schmorl's node    There is mild bony retropulsion of the upper vertebral corner and mild central canal narrowing seen at this level  If clinical management would be changed, MRI may be of further value  Minimally displaced fractures of the right L3 and L4 transverse processes which were not seen previously  Findings again age indeterminate  Mild L4 inferior endplate compression deformity which was not seen previously and should be considered age-indeterminate but is favored to be chronic given endplate sclerosis here  [place] 7 9 x 1 6 cm soft tissue density in the right gluteal fat not seen previously favored to represent hematoma  Chronic pancreatitis  1 6 cm uncinate process cyst suggested as well as increasing pancreatic duct dilatation  Assessment is limited without IV contrast   Correlation with MRCP recommended on a nonemergent (OUTPATIENT) basis  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  Outpatient surgical consultation may be considered  The study was marked in [facility] for immediate notification  Workstation performed: [id number]     Xr Spine Lumbar 2 Or 3 Views Injury    Result Date: 3/19/2021  Impression: Stable multilevel compression deformities since CT performed the day before  Workstation performed: [id number]     Xr Sacrum And Coccyx    Result Date: 3/18/2021  Impression: No definite acute displaced fractures identified of the sacrum  Workstation performed: [id number]     Xr Tibia Fibula 2 Views Right    Result Date: 3/18/2021  Impression: Nondisplaced midshaft right fibula fracture  Doubt but cannot exclude proximal tibia longitudinal nondisplaced cortical fracture  The study was marked in [facility] for immediate notification  Workstation performed: [id number]     Xr Ankle 3+ Views Right    Result Date: 3/18/2021  Impression: Healing retrocalcaneal displaced cortical fracture The study was marked in EPIC for immediate notification   Workstation performed: [id number]     Xr Foot 3+ Views Right    Addendum Date: 3/18/2021    ADDENDUM: FINDINGS: Suggesting healing nondisplaced distal right 4th metatarsal fracture  Old healed right 5th metatarsal trauma  Lateral view previously noted longitudinal posterior calcaneal sclerotic band not appreciated  However upper posterolateral calcaneal healing displaced cortical avulsion fracture noted  Mild bunion and hallux valgus  There is no acute fracture or dislocation  Posterior plantar and retrocalcaneal degenerative changes  No lytic or blastic osseous lesion  Soft tissues are unremarkable  IMPRESSION: Healing nondisplaced distal right 4th metatarsal fracture  Healing retrocalcaneal displaced cortical fracture also noted  Result Date: 3/18/2021  Impression: Healing nondisplaced distal right 4th metatarsal fracture  The study was marked in [facility] for immediate notification  Workstation performed: [id number]     Ct Head Without Contrast    Result Date: 3/18/2021  Impression: No acute intracranial abnormality  Workstation performed: [id number]     Mri Abdomen W Wo Contrast And Mrcp    Addendum Date: 3/21/2021    ADDENDUM: There is T2 hyperintensity and enhancement in the sacrum, right greater than left  There is no corresponding abnormality on CT and T1 signal intensity is normal  However, given that this study was tailored to evaluate the abdominal viscera, the sacrum was incompletely evaluated  This could represent a very early sacral insufficiency fracture and if confirmation is desired, dedicated sacral MRI or bone scan can be obtained  I personally discussed this study with Nacho Escobar on 3/21/2021 at 10:18 AM      Result Date: 3/21/2021  Impression: 1  Severe diffuse dilatation of the pancreatic duct and sidebranches  Given extensive calcification on previous CT and pancreatic atrophy, this is most likely chronic pancreatitis  However, it is difficult to distinguish this entity from intraductal papillary mucinous neoplasm, particularly given the presence of a more focal cystic lesion in the uncinate process   While this may represent a side branch IPMN communicating with the main pancreatic duct, this could also represent dilation of an uncinate  process side branch due to chronic pancreatitis given a tubular appearance  Follow-up with gastroenterology for endoscopic ultrasound/ERCP is recommended to evaluate for pancreatic duct mucin  Alternatively, continued MRI or CT surveillance can be performed in 6 months  2   Posterior gallbladder calcification again seen  This may represent layering stones with wall calcification not entirely excluded  These could be differentiated with ultrasound performed in varying positions to assess for mobility  3   Stable posterior right abdominal wall hematoma  The study was marked in EPIC for significant notification  Workstation performed: [id number]     Ct Lower Extremity Wo Contrast Right    Result Date: 3/18/2021  Impression: Bones are markedly demineralized, limiting evaluation for nondisplaced fracture  No acute displaced fracture identified  Mineralized densities along the posterior calcaneus likely representing enthesopathy at the Achilles insertion, with Achilles insertional tendinosis  Workstation performed: [id number]        Hospital Course: Enedina Anand is a 70-year-old female with past medical history of depression with anxiety, daily alcohol consumption, chronic anemia, structures fracture right calcaneus and frequent falls brought to the emergency department for evaluation complaining of low back pain and inability to ambulate secondary to right leg and back pain from a fall 2-3 weeks ago  She was admitted to the Trauma Service protocol direct ordered initially as her ethanol level was 406 upon arrival   She did not show any signs of withdrawal during her hospitalization  [facility] orthopedics was consulted for right fibular fracture and increased foot pain  She was made nonweightbearing and placed in a posterior splint to the right lower extremity    She was also found to have L2 and L4 compression fractures for which an LSO brace was ordered  No surgical intervention was needed and patient is to follow up with Neurosurgery in 2 weeks  Acute rehab placement was recommended by physical therapy but the patient preferred to go home and was sure that her  could take care of her as she had a 1st floor setup  She was easily able to don the brace  Incidentally, she was found to have a partially calcified gallbladder and an incidental pancreatic cyst for which Surgical Oncology was consulted  An MRI was performed in patient and the patient was asked to follow up as an outpatient for additional evaluation  The MRI also made mention of a possible sacral fracture so [facility] was asked to re-evaluate the patient  Dr Denis Kaur recommended weight-bearing as tolerated to the left lower extremity and follow up in the office  On discharge, the patient is instructed to follow-up with the patient's primary care provider to review the events of the patient's recent hospitalization  The patient is instructed to follow-up in the the neurosurgery office on Friday April 2nd, Dr Анна Andrew on April 7th and Dr Juan David Perez on April 6th  The patient should follow the provided discharge instructions  Case management made arrangements for home services  Condition at Discharge: fair     Discharge instructions/Information to patient and family:   See after visit summary for information provided to patient and family  Provisions for Follow-Up Care:  See after visit summary for information related to follow-up care and any pertinent home health orders  Disposition: Home    Planned Readmission: No    Discharge Statement   I spent 30 minutes discharging the patient  This time was spent on the day of discharge  I had direct contact with the patient on the day of discharge  Additional documentation is required if more than 30 minutes were spent on discharge       Discharge Medications:  See after visit summary for reconciled discharge medications provided to patient and family        Jessenia Bell PA-C  3/23/2021  1:53 PM

## 2021-03-23 NOTE — DISCHARGE INSTRUCTIONS
Discharge Instructions - Orthopedics      Weight Bearing Status:                                           - Non Weightbearing Right leg    DVT prophylaxis:  - Take 81 mg aspirin twice daily    Pain:  -  Continue analgesics as directed    Appt Instructions:   - If you do not have your appointment, please call the Orthopedic Surgery Clinic at [phone] to schedule an appointment as instructed  -   Contact the office sooner if you experience any increased numbness/tingling in the extremities  Miscellaneous:     - Activity as tolerated with assistance  - Continue PT and OT evaluation and treatment as indicated  Discharge Instructions - Neurosurgery    · LSO brace to be worn when out of bed of head of bed greater than 45 degrees  May be removed for showering  · No heavy lifting  · No strenuous activities  · No Driving  **Please notify MD immediately if you have increased back or leg pain  New numbness, tingling and/or weakness in your legs  **    Please follow up in 2 weeks with the Neurosurgical group with repeat X-ray of lumbar spine 2-3 days prior to your appointment  You may go to any [facility] facility to get your imaging done  Please call [phone] to schedule your imaging appointment

## 2021-03-23 NOTE — ASSESSMENT & PLAN NOTE
Neurosurgery consulted and seen patient  LSO brace  upright x-rays - per Neurosurgery good  PT/OT   DVT prophylaixs  Pain management - controlled

## 2021-03-23 NOTE — CASE MANAGEMENT
CM met with the Patient to review pending status of auth for [facility]  Patient states that she feels like she is able to go home and has been doing better with PT  Patient states that her spouse prepared the first floor of their home with an area to sleep and Patient has a RW, WC, and BSC  Patient feels safe to return home  Patient states that she has no preference in VNA agency  CM explained that a blanket referral for in-network agencies with availability  Patient requested that CM complete that  CM sent a blanket referral for SN/PT/OT via [facility]  CM informed by [facility] that they are in-network with the Patient's insurance and able to accept  CM dept to fax F2F and DCI to   STEVEN updated AVS to reflect the same

## 2021-03-23 NOTE — PROGRESS NOTES
[facility]  Progress Note - Juan M Embs 1960, 64 y o  female MRN: [id number]  Unit/Bed#: S -01 Encounter: [id number]  Primary Care Provider: Rosanne Bates MD   Date and time admitted to hospital: 3/18/2021 12:19 PM    Pancreatic cyst  Assessment & Plan  Surgical oncology following   Outpatient follow up      Closed fracture of fibula, shaft  Assessment & Plan  Ortho -NWBRLE in posterior splint  F/u in 2 weeks as outpatient  PT/Ot  Neuro intact    Fall  Assessment & Plan  Case management consulted, ETOH abuse history  Actively getting OOB to commode and maintaining weight bearing status    Alcohol abuse  Assessment & Plan  SBIRT by case management  CIWA protocol  Serax q6 hours - doing well  No signs of withdrawal    * Lumbar compression fracture [facility])  Assessment & Plan  Neurosurgery consulted and seen patient  LSO brace  upright x-rays - per Neurosurgery good  PT/OT   DVT prophylaixs  Pain management - controlled          Disposition: ARF      SUBJECTIVE:  Chief Complaint: back pain    Subjective:   Patient complains of back pain when she moves,  Pain controlled with po pain medication  Denies numbness      OBJECTIVE:     Meds/Allergies     Current Facility-Administered Medications:     docusate sodium (COLACE) capsule 100 mg, 100 mg, Oral, BID, CHANTE Treviño-HOLLI, 100 mg at 03/22/21 1000    enoxaparin (LOVENOX) subcutaneous injection 30 mg, 30 mg, Subcutaneous, Q12H [address], CHANTE Treviño-C, 30 mg at 03/22/21 2145    HYDROmorphone (DILAUDID) injection 0 5 mg, 0 5 mg, Intravenous, Q4H PRN, Angela Munoz PA-C    lidocaine (LIDODERM) 5 % patch 2 patch, 2 patch, Topical, Daily, Sharlotte Castleman, CRNP, 2 patch at 03/22/21 1001    magnesium hydroxide (MILK OF MAGNESIA) oral suspension 30 mL, 30 mL, Oral, Daily PRN, Angela Munoz PA-C    methocarbamol (ROBAXIN) tablet 750 mg, 750 mg, Oral, Q6H [address], Juliana PAZ [place], 750 mg at 03/23/21 0454    ondansetron [facility] injection 4 mg, 4 mg, Intravenous, Q6H PRN, Paulo Howells, PA-C, 4 mg at 03/19/21 1111    oxazepam (SERAX) capsule 10 mg, 10 mg, Oral, Q6H DESTIN, Juliana Wan, CRNP, 10 mg at 03/23/21 0456    oxyCODONE (ROXICODONE) IR tablet 2 5 mg, 2 5 mg, Oral, Q4H PRN, Paulo Howells, PA-C    oxyCODONE (ROXICODONE) IR tablet 5 mg, 5 mg, Oral, Q4H PRN, Paulo Howells, PA-C, 5 mg at 03/22/21 1007    pantoprazole (PROTONIX) EC tablet 40 mg, 40 mg, Oral, Early Morning, Paulo Bella, PA-C, 40 mg at 03/23/21 0115    senna (SENOKOT) tablet 17 2 mg, 2 tablet, Oral, Daily, Paulo Bella, PA-C, 17 2 mg at 03/22/21 1000    sertraline (ZOLOFT) tablet 50 mg, 50 mg, Oral, Daily, Paulo Bella, PA-C, 50 mg at 03/22/21 1000     Vitals:   Vitals:    03/23/21 0707   BP: 129/69   Pulse: 75   Resp:    Temp: 98 7 °F (37 1 °C)   SpO2: 98%       Intake/Output:  I/O       03/21 0701 - 03/22 0700 03/22 0701 - 03/23 0700 03/23 0701 - 03/24 0700    P  O  480      Total Intake(mL/kg) 480 (7 1)      Urine (mL/kg/hr) 1150 (0 7)      Stool 0      Total Output 1150      Net -670             Unmeasured Urine Occurrence  2 x     Unmeasured Stool Occurrence 3 x 2 x            Nutrition/GI Proph/Bowel Reg: Regular/Colace/senna    Physical Exam:   GENERAL APPEARANCE:  NAD, resting comfortably  NEURO:  GCS 15  No focal deficits   HEENT:  NC/AT  CV:  RRR  LUNGS:  CTAB  GI:  Flat soft nontender  :  Voiding spontaneously  MSK:  Right lower extremity and posterior splint  Neurovascularly intact x4  SKIN:  Warm and dry    Multiple areas of resolving ecchymosis    Invasive Devices     Peripheral Intravenous Line            Peripheral IV 03/22/21 Left Arm 1 day                 Lab Results: BMP/CMP: No results found for: SODIUM, K, CL, CO2, ANIONGAP, BUN, CREATININE, GLUCOSE, CALCIUM, AST, ALT, ALKPHOS, PROT, BILITOT, EGFR and CBC: No results found for: WBC, HGB, HCT, MCV, PLT, ADJUSTEDWBC, MCH, MCHC, RDW, MPV, NRBC  Imaging/EKG Studies: Results: I have personally reviewed pertinent reports      Other Studies:    VTE Prophylaxis: Sequential compression device (Venodyne)  and Enoxaparin (Lovenox)

## 2021-03-23 NOTE — PLAN OF CARE
Problem: PHYSICAL THERAPY ADULT  Goal: Performs mobility at highest level of function for planned discharge setting  See evaluation for individualized goals  Description: Treatment/Interventions: Functional transfer training, LE strengthening/ROM, Therapeutic exercise, Endurance training, Patient/family training, Equipment eval/education, Bed mobility, Gait training          See flowsheet documentation for full assessment, interventions and recommendations  Outcome: Progressing  Note: Prognosis: Fair  Problem List: Decreased strength, Decreased range of motion, Decreased endurance, Impaired balance, Decreased mobility, Decreased safety awareness, Impaired tone, Orthopedic restrictions, Pain  Assessment: pt began tx session lying in the bed supine and was willing to participate in PT intervention  pt was independently able to complete all bed mobility activities safely  pt was able to transfer from supine to sitting EoB independently with no assistance of bed rails  pt was able to tolerate 5 min of static seated balance at EOB with no increase in pain or LOB  pt was able to complete all TE activities with good recall and no increase in pain  pt was able to transfer sit<>stand with RW with min Ax1 and VC's for hand placement while ascending to RW  pt was able to complete a stand<>pivot transfer to reclinning chair with supervision and demonstrated good recall by knowing where to place hands while descending to reclinner  pt was able to complete 5 STS with good recall on proper hand placement while safely completing activity  pt was able to stand pivot back to EOB and sit safely with no VC's required  pt was able to complete a sit<>supine transfer independently and was giving call bell and bed alarm was activated  PT to see to determine best course of action moving forward with pt  PT Discharge Recommendation: Post-Acute Rehabilitation Services          See flowsheet documentation for full assessment

## 2021-03-24 ENCOUNTER — RA CDI HCC (OUTPATIENT)
Dept: OTHER | Facility: HOSPITAL | Age: 61
End: 2021-03-24

## 2021-03-24 ENCOUNTER — TRANSITIONAL CARE MANAGEMENT (OUTPATIENT)
Dept: FAMILY MEDICINE CLINIC | Facility: CLINIC | Age: 61
End: 2021-03-24

## 2021-03-26 ENCOUNTER — TELEPHONE (OUTPATIENT)
Dept: SURGICAL ONCOLOGY | Facility: CLINIC | Age: 61
End: 2021-03-26

## 2021-03-26 ENCOUNTER — TELEPHONE (OUTPATIENT)
Dept: OBGYN CLINIC | Facility: HOSPITAL | Age: 61
End: 2021-03-26

## 2021-03-26 NOTE — TELEPHONE ENCOUNTER
New Patient GI Form   Patient Details:  Igor Stanley  1960  [id number]     Background Information:  [facility] AT [place] starts by opening a telephone encounter and gathering the following information   Who is calling to schedule and relationship? self   To which speciality is the referral?  Surgical Oncology   Reason for Visit?  hospital f/u   Tumor Type?  possible IPMN   Is there a confimed diagnosis from biopsy/tissue reviewed by Pathology? No   Date of Tissue Diagnosis  (If done outside of [facility] please obtain report and slides)  (If no tissue diagnosis, please stop and discuss with Navigator prior to scheduling)     Is patient aware of the diagnosis? Yes   Has Imaging been completed? Yes   If YES, where was the imaging done? (If outside [place] [id number] obtain records)     Have any endoscopies been done (colonoscopy, EGD, EUS)  No   If YES where were they performed? (If outside of [facility] obtain the records)     Has blood work been done?  yes    If YES, where was the blood work done? (If outside of [facility] obtain records)  sl   Is there a personal history of cancer? (If YES please list type)  No   Is there a family history of cancer? (If YES please list type)  No   Scheduling Information:   Preferred [place]  Any   Are there any days the patient cannot be seen? Miscellaneous:  Pt was seen in the hospital by surg  Onc  And Dr Genesis Diaz is requesting that she have an MRCP before he see her  Please let me know when this is scheduled  After completing the above information, please route to finance, nurse navigation and clinical trials for review

## 2021-03-26 NOTE — TELEPHONE ENCOUNTER
Attempted to call patient for scheduling of her R ankle fx  Unable to leave VM on Mobile phone  LM on  for patient to return call on Home phone listed  She needs 2 week follow up form R ankle fx with Dr Vipul Jung

## 2021-03-30 ENCOUNTER — TELEPHONE (OUTPATIENT)
Dept: NEUROSURGERY | Facility: CLINIC | Age: 61
End: 2021-03-30

## 2021-03-30 NOTE — TELEPHONE ENCOUNTER
Patient called the office reporting difficulty with walking  She reports it is hard to feel from the waist down  Patient said she had this feeling when she was discharged from the hospital, but now it is worsening  She can feel when she wipes after going to the bathroom and she denies any bowel/bladder incontinence  She denies any shooting pain or tingling sensation in the legs/back  Informed patient to go to the ED if she develops saddle anesthesia, bowel/bladder incontinence, or uncontrolled pain  Patient requested for a sooner appointment with Ed COLT  Patient is scheduled for this Friday  Moved up the appointment to tomorrow on 3/31/21 with Ed at the [facility]  She is aware to obtain xrays today  Patient was appreciative

## 2021-03-31 ENCOUNTER — TELEPHONE (OUTPATIENT)
Dept: NEUROSURGERY | Facility: CLINIC | Age: 61
End: 2021-03-31

## 2021-04-07 ENCOUNTER — TELEPHONE (OUTPATIENT)
Dept: HEMATOLOGY ONCOLOGY | Facility: CLINIC | Age: 61
End: 2021-04-07

## 2021-04-07 NOTE — TELEPHONE ENCOUNTER
The patient phoned in and reports that she must cancel her appointment today with Dr Avni Lindsey due to not feeling well  She would like to reschedule this appointment, however, I did not see any 30 min appointment times until May in the [place] office  Due to this being possibly too far out from her current appointment, I did not reschedule her  Please call the patient to reschedule this appointment  The best call back number is [phone]

## 2021-04-13 ENCOUNTER — TELEPHONE (OUTPATIENT)
Dept: FAMILY MEDICINE CLINIC | Facility: CLINIC | Age: 61
End: 2021-04-13

## 2021-04-13 NOTE — TELEPHONE ENCOUNTER
Benja Major called stated she was trying to reach the patient but could not  Benja Major wanted to leave her contact number for us incase the patient called and needed assistance  Raf # is [phone]

## 2021-04-14 DIAGNOSIS — F41.8 DEPRESSION WITH ANXIETY: ICD-10-CM

## 2021-04-25 ENCOUNTER — IMMUNIZATIONS (OUTPATIENT)
Dept: FAMILY MEDICINE CLINIC | Facility: HOSPITAL | Age: 61
End: 2021-04-25

## 2021-04-25 DIAGNOSIS — Z23 ENCOUNTER FOR IMMUNIZATION: Primary | ICD-10-CM

## 2021-04-25 PROCEDURE — 91300 SARS-COV-2 / COVID-19 MRNA VACCINE (PFIZER-BIONTECH) 30 MCG: CPT

## 2021-04-25 PROCEDURE — 0001A SARS-COV-2 / COVID-19 MRNA VACCINE (PFIZER-BIONTECH) 30 MCG: CPT

## 2021-05-04 ENCOUNTER — HOSPITAL ENCOUNTER (OUTPATIENT)
Dept: RADIOLOGY | Facility: HOSPITAL | Age: 61
Discharge: HOME | End: 2021-05-04
Payer: COMMERCIAL

## 2021-05-04 DIAGNOSIS — S32.000A COMPRESSION FRACTURE OF LUMBAR VERTEBRA, INITIAL ENCOUNTER, UNSPECIFIED LUMBAR VERTEBRAL LEVEL: ICD-10-CM

## 2021-05-04 PROCEDURE — 72100 X-RAY EXAM L-S SPINE 2/3 VWS: CPT

## 2021-05-18 ENCOUNTER — OFFICE VISIT (OUTPATIENT)
Dept: SURGICAL ONCOLOGY | Facility: CLINIC | Age: 61
End: 2021-05-18
Payer: COMMERCIAL

## 2021-05-18 VITALS
OXYGEN SATURATION: 98 % | SYSTOLIC BLOOD PRESSURE: 126 MMHG | TEMPERATURE: 98.3 F | DIASTOLIC BLOOD PRESSURE: 82 MMHG | RESPIRATION RATE: 16 BRPM | WEIGHT: 148.3 LBS | HEIGHT: 67 IN | BODY MASS INDEX: 23.28 KG/M2 | HEART RATE: 87 BPM

## 2021-05-18 DIAGNOSIS — K86.2 PANCREATIC CYST: Primary | ICD-10-CM

## 2021-05-18 PROCEDURE — 99215 OFFICE O/P EST HI 40 MIN: CPT | Performed by: SURGERY

## 2021-05-18 PROCEDURE — 3008F BODY MASS INDEX DOCD: CPT | Performed by: SURGERY

## 2021-05-18 PROCEDURE — 1036F TOBACCO NON-USER: CPT | Performed by: SURGERY

## 2021-05-18 NOTE — LETTER
May 18, 2021     Amie Escalante MD  [address]  [facility] [address]    Patient: Sharon Sprague   YOB: 1960   Date of Visit: 5/18/2021       Dear Dr Harrison Bolds:    Thank you for referring Sharon Sprague to me for evaluation  Below are my notes for this consultation  If you have questions, please do not hesitate to call me  I look forward to following your patient along with you  Sincerely,        Krystian Estrada MD        CC: MD Krystian Perera MD  5/18/2021  3:36 PM  Incomplete               Surgical Oncology Follow Up       [facility] [address] [address] [phone]    Sharon Sprague  1960  [id number]  [facility] Wallace Nye  [address] [place] [id number] [phone]    Diagnoses and all orders for this visit:    Pancreatic cyst  -     Ambulatory referral to Gastroenterology; Future        Chief Complaint   Patient presents with    Follow-up       Return in about 6 weeks (around 6/29/2021) for Office Visit  Oncology History    No history exists  History of Present Illness:  80-year-old female with a history of chronic pancreatitis, alcohol abuse and hepatitis A who follows up after a trauma  An incidental porcelain gallbladder was found as well as a 1 6 cm uncinate process cyst   Follow-up MRI from March 20, 2021 which I personally reviewed revealed a posterior gallbladder calcification  It was unclear if this was layering verses a calcification in the wall  In the pancreas was a 0 9 x 1 7 cm lesion in the uncinate process  This communicated with the main pancreatic duct  It is unclear if this was just dilated main duct or a side branch  There was no solid or cystic lesion seen  The main pancreatic duct was dilated up to 1 cm with numerous dilated side branches    I personally reviewed the films   She comes in now to discuss further therapy  She is feeling well  Her appetite is good  No abdominal pain, nausea or vomiting  Her last bout of pancreatitis was approximately 12 years ago  Review of Systems  Complete ROS Surg Onc:   Complete ROS Surg Onc:   Constitutional: The patient denies new or recent history of general fatigue, no recent weight loss, no change in appetite  Eyes: No complaints of visual problems, no scleral icterus  ENT: no complaints of ear pain, no hoarseness, no difficulty swallowing,  no tinnitus and no new masses in head, oral cavity, or neck  Cardiovascular: No complaints of chest pain, no palpitations, no ankle edema  Respiratory: No complaints of shortness of breath, no cough  Gastrointestinal: No complaints of jaundice, no bloody stools, no pale stools  Genitourinary: No complaints of dysuria, no hematuria, no nocturia, no frequent urination, no urethral discharge  Musculoskeletal: No complaints of weakness, paralysis, joint stiffness or arthralgias  Integumentary: No complaints of rash, no new lesions  Neurological: No complaints of convulsions, no seizures, no dizziness  Hematologic/Lymphatic: No complaints of easy bruising  Endocrine:  No hot or cold intolerance  No polydipsia, polyphagia, or polyuria  Allergy/immunology:  No environmental allergies  No food allergies  Not immunocompromised  Skin:  No pallor or rash  No wound          Patient Active Problem List   Diagnosis    Abnormal liver enzymes    Nondependent alcohol abuse, in remission    Chronic low back pain    Cyst of left breast    Depression with anxiety    Fatigue    Fractures, multiple    Insomnia    Lipoma of right lower extremity    Localized osteoporosis with current pathological fracture    Vitamin D deficiency    Thrombocytopenia ([address] )    Screening for breast cancer    Screening for colon cancer    Other hyperlipidemia    Screen for colon cancer    Acute non-recurrent frontal sinusitis    Fatty liver    Epistaxis    Alcohol abuse    Liver disease    Refusal of blood transfusions as patient is Mu-ism    Anemia    Anxiety    Left foot pain    Compression deformity of vertebra    Lumbar compression fracture (HCC)    Fall    Closed fracture of fibula, shaft    Pancreatic cyst    Porcelain gallbladder     Past Medical History:   Diagnosis Date    Fracture     Hepatitis     Hep A     Insomnia     10mar2016 resolved    Osteoporosis     14jun2016 resolved    Pancreatitis     Seasonal allergies     Urine discoloration 11/11/2019    Vomiting 11/13/2019     Past Surgical History:   Procedure Laterality Date    IR BIOPSY BONE MARROW  11/14/2019    TUBAL LIGATION  1985     Family History   Problem Relation Age of Onset    Anxiety disorder Mother     Depression Mother     No Known Problems Father     Cancer Paternal Grandmother      Social History     Socioeconomic History    Marital status: /Civil Union     Spouse name: Not on file    Number of children: Not on file    Years of education: Not on file    Highest education level: Not on file   Occupational History    Not on file   Social Needs    Financial resource strain: Not on file    Food insecurity     Worry: Not on file     Inability: Not on file    Transportation needs     Medical: Not on file     Non-medical: Not on file   Tobacco Use    Smoking status: Never Smoker    Smokeless tobacco: Never Used   Substance and Sexual Activity    Alcohol use:  Yes     Alcohol/week: 7 0 standard drinks     Types: 7 Cans of beer per week     Frequency: 4 or more times a week     Drinks per session: 1 or 2     Binge frequency: Never    Drug use: No    Sexual activity: Yes     Partners: Male   Lifestyle    Physical activity     Days per week: Not on file     Minutes per session: Not on file    Stress: Not on file   Relationships    Social connections     Talks on phone: Not on file     Gets together: Not on file     Attends Jewish service: Not on file     Active member of club or organization: Not on file     Attends meetings of clubs or organizations: Not on file     Relationship status: Not on file    Intimate partner violence     Fear of current or ex partner: Not on file     Emotionally abused: Not on file     Physically abused: Not on file     Forced sexual activity: Not on file   Other Topics Concern    Not on file   Social History Narrative    Drinks coffee    tea       Current Outpatient Medications:     aspirin (ECOTRIN LOW STRENGTH) 81 mg EC tablet, Take 1 tablet (81 mg total) by mouth 2 (two) times a day, Disp: , Rfl: 0    Cholecalciferol (VITAMIN D PO), Take by mouth, Disp: , Rfl:     Multiple Vitamin (MULTI-VITAMIN DAILY) TABS, Take by mouth, Disp: , Rfl:     sertraline (ZOLOFT) 50 mg tablet, TAKE 1 TABLET BY MOUTH EVERY DAY, Disp: 30 tablet, Rfl: 0    methocarbamol (ROBAXIN) 750 mg tablet, Take 1 tablet (750 mg total) by mouth every 6 (six) hours for 21 doses, Disp: 21 tablet, Rfl: 0  No Known Allergies  Vitals:    05/18/21 1511   BP: 126/82   Pulse: 87   Resp: 16   Temp: 98 3 °F (36 8 °C)   SpO2: 98%       Physical Exam  Constitutional: General appearance: The Patient is well-developed and well-nourished who appears the stated age in no acute distress  Patient is pleasant and talkative  HEENT:  Normocephalic  Sclerae are anicteric  Mucous membranes are moist  Neck is supple without adenopathy  No JVD  Chest: The lungs are clear to auscultation  Cardiac: Heart is regular rate  Abdomen: Abdomen is soft, non-tender, non-distended and without masses  Extremities: There is no clubbing or cyanosis  There is no edema  Symmetric  Neuro: Grossly nonfocal  Gait is normal      Lymphatic: No evidence of cervical adenopathy bilaterally  No evidence of axillary adenopathy bilaterally  No evidence of inguinal adenopathy bilaterally       Skin: Warm, anicteric  Psych:  Patient is pleasant and talkative  Breasts:        Pathology:  [unfilled]    Labs:  Lab Results   Component Value Date    CEA 5 0 (H) 03/19/2021      Ref Range & Units 2mo ago   CA 19-9 0 - 35 U/mL 35          Imaging  MRI is as above  I personally reviewed the films  I reviewed the above laboratory and imaging data  Discussion/Summary:  79-year-old female with a history of chronic pancreatitis  She has an MRI that is suggestive of either chronic pancreatitis or IPMN  She also has an elevated CEA level  I would recommend that she follow-up with Gastroenterology  She will likely need endoscopy as well as EUS  We discussed that if this is side branch IPMN, the risk of malignancy in her lifetime is 10-20%  If this is main duct IPMN given her dilated duct, I would recommend that she undergo surgical intervention  Given her history of pancreatitis, the etiology of the pancreatic duct dilatation is unclear  Regarding the gallbladder calcification, I suspect this is benign and I would recommend observation  Because of the elevated CEA she will likely need a lower endoscopy and possibly upper endoscopy  She will also follow-up with Gastroenterology regarding the hepatitis A  This was obtained from seafood  It is unclear if she will require any treatment  I will see her back once we have the results of the endoscopy  Further recommendations will be based on those results  She is agreeable to this  All her questions were answered

## 2021-05-18 NOTE — PROGRESS NOTES
Surgical Oncology Follow Up       [address] [facility] [place]  [address] [id number] [phone]    Rayna Perry  1960  [id number]  [facility] Tony Bigger  [address] [id number] [phone]    Diagnoses and all orders for this visit:    Pancreatic cyst  -     Ambulatory referral to Gastroenterology; Future        Chief Complaint   Patient presents with    Follow-up       Return in about 6 weeks (around 6/29/2021) for Office Visit  Oncology History    No history exists  History of Present Illness:  72-year-old female with a history of chronic pancreatitis, alcohol abuse and hepatitis A who follows up after a trauma  An incidental porcelain gallbladder was found as well as a 1 6 cm uncinate process cyst   Follow-up MRI from March 20, 2021 which I personally reviewed revealed a posterior gallbladder calcification  It was unclear if this was layering verses a calcification in the wall  In the pancreas was a 0 9 x 1 7 cm lesion in the uncinate process  This communicated with the main pancreatic duct  It is unclear if this was just dilated main duct or a side branch  There was no solid or cystic lesion seen  The main pancreatic duct was dilated up to 1 cm with numerous dilated side branches  I personally reviewed the films  She comes in now to discuss further therapy  She is feeling well  Her appetite is good  No abdominal pain, nausea or vomiting  Her last bout of pancreatitis was approximately 12 years ago  Review of Systems  Complete ROS Surg Onc:   Complete ROS Surg Onc:   Constitutional: The patient denies new or recent history of general fatigue, no recent weight loss, no change in appetite  Eyes: No complaints of visual problems, no scleral icterus     ENT: no complaints of ear pain, no hoarseness, no difficulty swallowing,  no tinnitus and no new masses in head, oral cavity, or neck  Cardiovascular: No complaints of chest pain, no palpitations, no ankle edema  Respiratory: No complaints of shortness of breath, no cough  Gastrointestinal: No complaints of jaundice, no bloody stools, no pale stools  Genitourinary: No complaints of dysuria, no hematuria, no nocturia, no frequent urination, no urethral discharge  Musculoskeletal: No complaints of weakness, paralysis, joint stiffness or arthralgias  Integumentary: No complaints of rash, no new lesions  Neurological: No complaints of convulsions, no seizures, no dizziness  Hematologic/Lymphatic: No complaints of easy bruising  Endocrine:  No hot or cold intolerance  No polydipsia, polyphagia, or polyuria  Allergy/immunology:  No environmental allergies  No food allergies  Not immunocompromised  Skin:  No pallor or rash  No wound          Patient Active Problem List   Diagnosis    Abnormal liver enzymes    Nondependent alcohol abuse, in remission    Chronic low back pain    Cyst of left breast    Depression with anxiety    Fatigue    Fractures, multiple    Insomnia    Lipoma of right lower extremity    Localized osteoporosis with current pathological fracture    Vitamin D deficiency    Thrombocytopenia ([address] )    Screening for breast cancer    Screening for colon cancer    Other hyperlipidemia    Screen for colon cancer    Acute non-recurrent frontal sinusitis    Fatty liver    Epistaxis    Alcohol abuse    Liver disease    Refusal of blood transfusions as patient is Congregational    Anemia    Anxiety    Left foot pain    Compression deformity of vertebra    Lumbar compression fracture (HCC)    Fall    Closed fracture of fibula, shaft    Pancreatic cyst    Porcelain gallbladder     Past Medical History:   Diagnosis Date    Fracture     Hepatitis     Hep A     Insomnia     10mar2016 resolved    Osteoporosis     14jun2016 resolved    Pancreatitis     Seasonal allergies     Urine discoloration 11/11/2019    Vomiting 11/13/2019     Past Surgical History:   Procedure Laterality Date    IR BIOPSY BONE MARROW  11/14/2019    TUBAL LIGATION  1985     Family History   Problem Relation Age of Onset    Anxiety disorder Mother     Depression Mother     No Known Problems Father     Cancer Paternal Grandmother      Social History     Socioeconomic History    Marital status: /Civil Union     Spouse name: Not on file    Number of children: Not on file    Years of education: Not on file    Highest education level: Not on file   Occupational History    Not on file   Social Needs    Financial resource strain: Not on file    Food insecurity     Worry: Not on file     Inability: Not on file   [employer] needs     Medical: Not on file     Non-medical: Not on file   Tobacco Use    Smoking status: Never Smoker    Smokeless tobacco: Never Used   Substance and Sexual Activity    Alcohol use:  Yes     Alcohol/week: 7 0 standard drinks     Types: 7 Cans of beer per week     Frequency: 4 or more times a week     Drinks per session: 1 or 2     Binge frequency: Never    Drug use: No    Sexual activity: Yes     Partners: Male   Lifestyle    Physical activity     Days per week: Not on file     Minutes per session: Not on file    Stress: Not on file   Relationships    Social connections     Talks on phone: Not on file     Gets together: Not on file     Attends Restoration service: Not on file     Active member of club or organization: Not on file     Attends meetings of clubs or organizations: Not on file     Relationship status: Not on file    Intimate partner violence     Fear of current or ex partner: Not on file     Emotionally abused: Not on file     Physically abused: Not on file     Forced sexual activity: Not on file   Other Topics Concern    Not on file   Social History Narrative    Drinks coffee    tea       Current Outpatient Medications:     aspirin (ECOTRIN LOW STRENGTH) 81 mg EC tablet, Take 1 tablet (81 mg total) by mouth 2 (two) times a day, Disp: , Rfl: 0    Cholecalciferol (VITAMIN D PO), Take by mouth, Disp: , Rfl:     Multiple Vitamin (MULTI-VITAMIN DAILY) TABS, Take by mouth, Disp: , Rfl:     sertraline (ZOLOFT) 50 mg tablet, TAKE 1 TABLET BY MOUTH EVERY DAY, Disp: 30 tablet, Rfl: 0    methocarbamol (ROBAXIN) 750 mg tablet, Take 1 tablet (750 mg total) by mouth every 6 (six) hours for 21 doses, Disp: 21 tablet, Rfl: 0  No Known Allergies  Vitals:    05/18/21 1511   BP: 126/82   Pulse: 87   Resp: 16   Temp: 98 3 °F (36 8 °C)   SpO2: 98%       Physical Exam  Constitutional: General appearance: The Patient is well-developed and well-nourished who appears the stated age in no acute distress  Patient is pleasant and talkative  HEENT:  Normocephalic  Sclerae are anicteric  Mucous membranes are moist  Neck is supple without adenopathy  No JVD  Chest: The lungs are clear to auscultation  Cardiac: Heart is regular rate  Abdomen: Abdomen is soft, non-tender, non-distended and without masses  Extremities: There is no clubbing or cyanosis  There is no edema  Symmetric  Neuro: Grossly nonfocal  Gait is normal      Lymphatic: No evidence of cervical adenopathy bilaterally  No evidence of axillary adenopathy bilaterally  No evidence of inguinal adenopathy bilaterally  Skin: Warm, anicteric  Psych:  Patient is pleasant and talkative  Breasts:        Pathology:  [unfilled]    Labs:  Lab Results   Component Value Date    CEA 5 0 (H) 03/19/2021      Ref Range & Units 2mo ago   CA 19-9 0 - 35 U/mL 35          Imaging  MRI is as above  I personally reviewed the films  I reviewed the above laboratory and imaging data  Discussion/Summary:  59-year-old female with a history of chronic pancreatitis  She has an MRI that is suggestive of either chronic pancreatitis or IPMN    She also has an elevated CEA level  I would recommend that she follow-up with Gastroenterology  She will likely need endoscopy as well as EUS  We discussed that if this is side branch IPMN, the risk of malignancy in her lifetime is 10-20%  If this is main duct IPMN given her dilated duct, I would recommend that she undergo surgical intervention  Given her history of pancreatitis, the etiology of the pancreatic duct dilatation is unclear  Regarding the gallbladder calcification, I suspect this is benign and I would recommend observation  Because of the elevated CEA she will likely need a lower endoscopy and possibly upper endoscopy  She will also follow-up with Gastroenterology regarding the hepatitis A  This was obtained from seafood  It is unclear if she will require any treatment  I will see her back once we have the results of the endoscopy  Further recommendations will be based on those results  She is agreeable to this  All her questions were answered

## 2021-05-19 ENCOUNTER — IMMUNIZATIONS (OUTPATIENT)
Dept: FAMILY MEDICINE CLINIC | Facility: HOSPITAL | Age: 61
End: 2021-05-19

## 2021-05-19 DIAGNOSIS — Z23 ENCOUNTER FOR IMMUNIZATION: Primary | ICD-10-CM

## 2021-05-19 PROCEDURE — 91300 SARS-COV-2 / COVID-19 MRNA VACCINE (PFIZER-BIONTECH) 30 MCG: CPT

## 2021-05-19 PROCEDURE — 0002A SARS-COV-2 / COVID-19 MRNA VACCINE (PFIZER-BIONTECH) 30 MCG: CPT

## 2021-05-20 ENCOUNTER — TELEPHONE (OUTPATIENT)
Dept: GASTROENTEROLOGY | Facility: CLINIC | Age: 61
End: 2021-05-20

## 2021-05-20 NOTE — TELEPHONE ENCOUNTER
----- Message from Ciera Devine MD sent at 5/18/2021  5:48 PM EDT -----  Nelli Blanco,   Please help schedule this patient for an EUS soon  Thank you  Shoshana Schwarz  ----- Message -----  From: Ilana Gama MD  Sent: 5/18/2021   3:37 PM EDT  To: Cony Rodriguez MD, Carlos Badillo MD, #    Can one of you get her in for EUS  She has hx of chronic pancreatitis with an elevated serum CEA  She also has a history of hepatitis  MRI shows a severely dilated pancreatic duct  Unclear if this is from chronic pancreatitis that was 12 years ago verses IPMN  Thanks    Sabrina Vaca

## 2021-06-01 ENCOUNTER — PREP FOR PROCEDURE (OUTPATIENT)
Dept: GASTROENTEROLOGY | Facility: CLINIC | Age: 61
End: 2021-06-01

## 2021-06-01 DIAGNOSIS — K86.89 DILATED PANCREATIC DUCT: Primary | ICD-10-CM

## 2021-06-01 DIAGNOSIS — K86.1 CHRONIC PANCREATITIS, UNSPECIFIED PANCREATITIS TYPE (HCC): ICD-10-CM

## 2021-06-01 NOTE — TELEPHONE ENCOUNTER
EUS scheduled on 6/11/21 with Dr Dorman at [facility] gave pt verbal instructions/emailed to [email or web address]

## 2021-06-10 ENCOUNTER — TELEPHONE (OUTPATIENT)
Dept: GASTROENTEROLOGY | Facility: HOSPITAL | Age: 61
End: 2021-06-10

## 2021-06-11 ENCOUNTER — ANESTHESIA (OUTPATIENT)
Dept: GASTROENTEROLOGY | Facility: HOSPITAL | Age: 61
End: 2021-06-11

## 2021-06-11 ENCOUNTER — ANESTHESIA EVENT (OUTPATIENT)
Dept: GASTROENTEROLOGY | Facility: HOSPITAL | Age: 61
End: 2021-06-11

## 2021-06-11 ENCOUNTER — HOSPITAL ENCOUNTER (OUTPATIENT)
Dept: GASTROENTEROLOGY | Facility: HOSPITAL | Age: 61
Setting detail: OUTPATIENT SURGERY
Discharge: HOME | End: 2021-06-11
Attending: INTERNAL MEDICINE | Admitting: INTERNAL MEDICINE
Payer: COMMERCIAL

## 2021-06-11 VITALS
OXYGEN SATURATION: 99 % | HEART RATE: 89 BPM | SYSTOLIC BLOOD PRESSURE: 135 MMHG | TEMPERATURE: 98 F | RESPIRATION RATE: 16 BRPM | DIASTOLIC BLOOD PRESSURE: 71 MMHG

## 2021-06-11 DIAGNOSIS — K86.1 CHRONIC PANCREATITIS, UNSPECIFIED PANCREATITIS TYPE (HCC): ICD-10-CM

## 2021-06-11 DIAGNOSIS — K86.89 DILATED PANCREATIC DUCT: ICD-10-CM

## 2021-06-11 LAB
AMYLASE FLD QL: 2237 U/L
CEA FLD-MCNC: 30 NG/ML

## 2021-06-11 PROCEDURE — 43238 EGD US FINE NEEDLE BX/ASPIR: CPT | Performed by: INTERNAL MEDICINE

## 2021-06-11 PROCEDURE — 82378 CARCINOEMBRYONIC ANTIGEN: CPT | Performed by: INTERNAL MEDICINE

## 2021-06-11 PROCEDURE — 82150 ASSAY OF AMYLASE: CPT | Performed by: INTERNAL MEDICINE

## 2021-06-11 RX ORDER — SODIUM CHLORIDE 9 MG/ML
INJECTION, SOLUTION INTRAVENOUS CONTINUOUS PRN
Status: DISCONTINUED | OUTPATIENT
Start: 2021-06-11 | End: 2021-06-11

## 2021-06-11 RX ORDER — FENTANYL CITRATE 50 UG/ML
INJECTION, SOLUTION INTRAMUSCULAR; INTRAVENOUS AS NEEDED
Status: DISCONTINUED | OUTPATIENT
Start: 2021-06-11 | End: 2021-06-11

## 2021-06-11 RX ORDER — ONDANSETRON 2 MG/ML
INJECTION INTRAMUSCULAR; INTRAVENOUS AS NEEDED
Status: DISCONTINUED | OUTPATIENT
Start: 2021-06-11 | End: 2021-06-11

## 2021-06-11 RX ORDER — PROPOFOL 10 MG/ML
INJECTION, EMULSION INTRAVENOUS CONTINUOUS PRN
Status: DISCONTINUED | OUTPATIENT
Start: 2021-06-11 | End: 2021-06-11

## 2021-06-11 RX ORDER — PROPOFOL 10 MG/ML
INJECTION, EMULSION INTRAVENOUS AS NEEDED
Status: DISCONTINUED | OUTPATIENT
Start: 2021-06-11 | End: 2021-06-11

## 2021-06-11 RX ORDER — LIDOCAINE HYDROCHLORIDE 10 MG/ML
INJECTION, SOLUTION EPIDURAL; INFILTRATION; INTRACAUDAL; PERINEURAL AS NEEDED
Status: DISCONTINUED | OUTPATIENT
Start: 2021-06-11 | End: 2021-06-11

## 2021-06-11 RX ADMIN — ONDANSETRON 4 MG: 2 INJECTION INTRAMUSCULAR; INTRAVENOUS at 14:43

## 2021-06-11 RX ADMIN — FENTANYL CITRATE 25 MCG: 50 INJECTION INTRAMUSCULAR; INTRAVENOUS at 14:39

## 2021-06-11 RX ADMIN — PROPOFOL 100 MG: 10 INJECTION, EMULSION INTRAVENOUS at 14:29

## 2021-06-11 RX ADMIN — PROPOFOL 170 MCG/KG/MIN: 10 INJECTION, EMULSION INTRAVENOUS at 14:30

## 2021-06-11 RX ADMIN — LIDOCAINE HYDROCHLORIDE 80 MG: 10 INJECTION, SOLUTION EPIDURAL; INFILTRATION; INTRACAUDAL; PERINEURAL at 14:29

## 2021-06-11 RX ADMIN — FENTANYL CITRATE 25 MCG: 50 INJECTION INTRAMUSCULAR; INTRAVENOUS at 14:32

## 2021-06-11 RX ADMIN — FENTANYL CITRATE 25 MCG: 50 INJECTION INTRAMUSCULAR; INTRAVENOUS at 14:30

## 2021-06-11 RX ADMIN — SODIUM CHLORIDE: 0.9 INJECTION, SOLUTION INTRAVENOUS at 14:22

## 2021-06-11 RX ADMIN — PROPOFOL 50 MG: 10 INJECTION, EMULSION INTRAVENOUS at 14:31

## 2021-06-11 RX ADMIN — FENTANYL CITRATE 25 MCG: 50 INJECTION INTRAMUSCULAR; INTRAVENOUS at 14:38

## 2021-06-11 RX ADMIN — PROPOFOL 20 MG: 10 INJECTION, EMULSION INTRAVENOUS at 14:40

## 2021-06-11 NOTE — ANESTHESIA POSTPROCEDURE EVALUATION
Post-Op Assessment Note    CV Status:  Stable  Pain Score: 0    Pain management: adequate     Mental Status:  Alert and awake   Hydration Status:  Stable   PONV Controlled:  None   Airway Patency:  Patent      Post Op Vitals Reviewed: Yes      Staff: CRNA         No complications documented      /63 (06/11/21 1455)    Temp 98 °F (36 7 °C) (06/11/21 1455)    Pulse 105 (06/11/21 1455)   Resp 16 (06/11/21 1455)    SpO2 100 % (06/11/21 1455)

## 2021-06-11 NOTE — H&P
History and Physical -  [facility]  Tonny Smith 64 y o  female MRN: [id number]                  HPI: Tonny Smith is a 64y o  year old female who presents for EGD EUS      REVIEW OF SYSTEMS: Per the HPI, and otherwise unremarkable  Historical Information   Past Medical History:   Diagnosis Date    Fracture     Hepatitis     Hep A     Insomnia     10mar2016 resolved    Osteoporosis     14jun2016 resolved    Pancreatitis     Seasonal allergies     Urine discoloration 11/11/2019    Vomiting 11/13/2019     Past Surgical History:   Procedure Laterality Date    IR BIOPSY BONE MARROW  11/14/2019    TUBAL LIGATION  1985     Social History   Social History     Substance and Sexual Activity   Alcohol Use Yes    Alcohol/week: 7 0 standard drinks    Types: 7 Cans of beer per week    Frequency: 4 or more times a week    Drinks per session: 1 or 2    Binge frequency: Never     Social History     Substance and Sexual Activity   Drug Use No     Social History     Tobacco Use   Smoking Status Never Smoker   Smokeless Tobacco Never Used     Family History   Problem Relation Age of Onset    Anxiety disorder Mother     Depression Mother     No Known Problems Father     Cancer Paternal Grandmother        Meds/Allergies       Current Outpatient Medications:     aspirin (ECOTRIN LOW STRENGTH) 81 mg EC tablet    Cholecalciferol (VITAMIN D PO)    methocarbamol (ROBAXIN) 750 mg tablet    Multiple Vitamin (MULTI-VITAMIN DAILY) TABS    sertraline (ZOLOFT) 50 mg tablet    No Known Allergies    Objective     There were no vitals taken for this visit  PHYSICAL EXAM    Gen: NAD  Head: NCAT  CV: RRR  CHEST: Clear  ABD: soft, NT/ND  EXT: no edema      ASSESSMENT/PLAN:  This is a 64y o  year old female here for EUS, and she is stable and optimized for her procedure

## 2021-06-11 NOTE — ANESTHESIA PREPROCEDURE EVALUATION
Procedure:  ENDOSCOPIC ULTRASOUND (UPPER)    Relevant Problems   CARDIO   (+) Other hyperlipidemia      GI/HEPATIC   (+) Fatty liver   (+) Liver disease   (+) Pancreatic cyst      HEMATOLOGY   (+) Anemia   (+) Thrombocytopenia (HCC)      MUSCULOSKELETAL   (+) Chronic low back pain      NEURO/PSYCH   (+) Anxiety   (+) Depression with anxiety        Physical Exam    Airway    Mallampati score: II  TM Distance: >3 FB  Neck ROM: full     Dental       Cardiovascular  Rhythm: regular,     Pulmonary      Other Findings        Anesthesia Plan  ASA Score- 3     Anesthesia Type- IV sedation with anesthesia with ASA Monitors  Additional Monitors:   Airway Plan:           Plan Factors-    Chart reviewed  Induction- intravenous  Postoperative Plan-     Informed Consent- Anesthetic plan and risks discussed with patient  I personally reviewed this patient with the CRNA  Discussed and agreed on the Anesthesia Plan with the CRNA  Paul Bassett

## 2021-06-14 ENCOUNTER — TELEPHONE (OUTPATIENT)
Dept: FAMILY MEDICINE CLINIC | Facility: CLINIC | Age: 61
End: 2021-06-14

## 2021-06-17 ENCOUNTER — RA CDI HCC (OUTPATIENT)
Dept: OTHER | Facility: HOSPITAL | Age: 61
End: 2021-06-17

## 2021-06-17 NOTE — PROGRESS NOTES
[facility]  coding opportunities          Chart reviewed, no opportunity found: CHART REVIEWED, NO OPPORTUNITY FOUND                     Patients insurance company: [facility] [address]  (Medicare Advantage and Commercial)

## 2021-06-23 ENCOUNTER — TELEPHONE (OUTPATIENT)
Dept: HEMATOLOGY ONCOLOGY | Facility: CLINIC | Age: 61
End: 2021-06-23

## 2021-06-23 ENCOUNTER — TELEMEDICINE (OUTPATIENT)
Dept: FAMILY MEDICINE CLINIC | Facility: CLINIC | Age: 61
End: 2021-06-23
Payer: COMMERCIAL

## 2021-06-23 VITALS — WEIGHT: 140 LBS | BODY MASS INDEX: 21.97 KG/M2 | HEIGHT: 67 IN

## 2021-06-23 DIAGNOSIS — K80.20 GALL STONES: ICD-10-CM

## 2021-06-23 DIAGNOSIS — K86.2 PANCREATIC CYST: ICD-10-CM

## 2021-06-23 DIAGNOSIS — F41.9 ANXIETY: ICD-10-CM

## 2021-06-23 DIAGNOSIS — D69.6 THROMBOCYTOPENIA (HCC): ICD-10-CM

## 2021-06-23 DIAGNOSIS — Z12.4 CERVICAL CANCER SCREENING: ICD-10-CM

## 2021-06-23 DIAGNOSIS — K82.8 PORCELAIN GALLBLADDER: ICD-10-CM

## 2021-06-23 DIAGNOSIS — D64.9 ANEMIA, UNSPECIFIED TYPE: Primary | ICD-10-CM

## 2021-06-23 PROBLEM — J01.10 ACUTE NON-RECURRENT FRONTAL SINUSITIS: Status: RESOLVED | Noted: 2019-11-11 | Resolved: 2021-06-23

## 2021-06-23 PROCEDURE — 1036F TOBACCO NON-USER: CPT | Performed by: FAMILY MEDICINE

## 2021-06-23 PROCEDURE — 99214 OFFICE O/P EST MOD 30 MIN: CPT | Performed by: FAMILY MEDICINE

## 2021-06-23 PROCEDURE — 3008F BODY MASS INDEX DOCD: CPT | Performed by: FAMILY MEDICINE

## 2021-06-23 NOTE — ASSESSMENT & PLAN NOTE
Patient denies depression  Symptoms of anxiety are well controlled without medications  Patient has discontinued Zoloft  Will follow-up if symptoms worsen

## 2021-06-23 NOTE — ASSESSMENT & PLAN NOTE
Labs from march 2021, reviewed with patient  Hemoglobin [phone], platelet 41  Patient denies any symptoms of spontaneous bleeding episodes  I have advised her to contact Hematology to schedule a follow-up  Patient advised to repeat CBC/other metabolic labs

## 2021-06-23 NOTE — PROGRESS NOTES
Virtual Regular Visit      Assessment/Plan:    Problem List Items Addressed This Visit        Digestive    Pancreatic cyst    Porcelain gallbladder     Patient wants to discuss treatment options with Dr Kiya Mohamud  Has followup with Dr Kiya Mohamud on 7/6/21         Gall stones       Other    Thrombocytopenia ([address] )     Labs from march 2021, reviewed with patient  Hemoglobin 8 9 646, platelet 41  Patient denies any symptoms of spontaneous bleeding episodes  I have advised her to contact Hematology to schedule a follow-up  Patient advised to repeat CBC/other metabolic labs  Anemia - Primary    Anxiety     Patient denies depression  Symptoms of anxiety are well controlled without medications  Patient has discontinued Zoloft  Will follow-up if symptoms worsen  Other Visit Diagnoses     Cervical cancer screening        Relevant Orders    Ambulatory referral to Gynecology            Depression Screening and Follow-up Plan: Clincally patient does not have depression  No treatment is required  Reason for visit is   Chief Complaint   Patient presents with    Follow-up    Virtual Regular Visit        Encounter provider Sage Velazquez MD    Provider located at [address] [place] [id number]      Recent Visits  No visits were found meeting these conditions  Showing recent visits within past 7 days and meeting all other requirements  Today's Visits  Date Type Provider Dept   06/23/21 Telemedicine Sage Velazquez MD [facility]   Showing today's visits and meeting all other requirements  Future Appointments  No visits were found meeting these conditions  Showing future appointments within next 150 days and meeting all other requirements       The patient was identified by name and date of birth   Sloane Fowler was informed that this is a telemedicine visit and that the visit is being conducted through [address] [place] [address] [facility] [employer] and patient was informed that this is a secure, HIPAA-compliant platform  She agrees to proceed     My office door was closed  No one else was in the room  She acknowledged consent and understanding of privacy and security of the video platform  The patient has agreed to participate and understands they can discontinue the visit at any time  Patient is aware this is a billable service  Subjective  Shwetha Duarte is a 64 y o  female    HPI   Patient is here for routine follow-up  Has history of chronic pancreatitis, alcohol abuse and hepatitis A  Had an incidental finding of porcelain gallbladder, 1 6 centimetre pancreatic cyst   Patient has undergone biopsy of the cyst   Results pending  Patient has a history of anemia, thrombocytopenia  Labs from March reviewed with patient  Hemoglobin was 8 1, platelet count of 41   patient denies any symptoms of spontaneous bleeding  Patient was unable to schedule a follow-up with Hematology yet  Encouraged to contact Hematology  Patient will be advised to repeat basic labs today  Past history significant for anxiety  Patient states that the symptoms have improved  Patient wean herself off the Zoloft  Has not noted any worsening symptoms  Past Medical History:   Diagnosis Date    Fracture     Hepatitis     Hep A     Insomnia     10mar2016 resolved    Osteoporosis     14jun2016 resolved    Pancreatitis     Seasonal allergies     Urine discoloration 11/11/2019    Vomiting 11/13/2019       Past Surgical History:   Procedure Laterality Date    IR BIOPSY BONE MARROW  11/14/2019    TUBAL LIGATION  1985       Current Outpatient Medications   Medication Sig Dispense Refill    aspirin (ECOTRIN LOW STRENGTH) 81 mg EC tablet Take 1 tablet (81 mg total) by mouth 2 (two) times a day  0    Cholecalciferol (VITAMIN D PO) Take by mouth      Multiple Vitamin (MULTI-VITAMIN DAILY) TABS Take by mouth       No current facility-administered medications for this visit          No Known Allergies    Review of Systems   Constitutional: Negative  HENT: Negative  Eyes: Negative  Respiratory: Negative  Cardiovascular: Negative  Gastrointestinal: Negative  Endocrine: Negative  Genitourinary: Negative  Musculoskeletal: Negative  Skin: Negative  Neurological: Negative  Psychiatric/Behavioral: Negative  Video Exam    Vitals:    06/23/21 1047   Weight: 63 5 kg (140 lb)   Height: 5' 7" (1 702 m)       Physical Exam  Constitutional:       Appearance: She is well-developed  Pulmonary:      Effort: No respiratory distress  Breath sounds: Normal breath sounds  No wheezing  Neurological:      Mental Status: She is alert and oriented to person, place, and time  Psychiatric:         Behavior: Behavior normal          Thought Content: Thought content normal          Judgment: Judgment normal           I spent 25 minutes with patient today in which greater than 50% of the time was spent in counseling/coordination of care regarding Reviewing recent labs, management of symptoms  VIRTUAL VISIT DISCLAIMER    Barbi Valdivia acknowledges that she has consented to an online visit or consultation  She understands that the online visit is based solely on information provided by her, and that, in the absence of a face-to-face physical evaluation by the physician, the diagnosis she receives is both limited and provisional in terms of accuracy and completeness  This is not intended to replace a full medical face-to-face evaluation by the physician  Barbi Valdivia understands and accepts these terms

## 2021-06-23 NOTE — TELEPHONE ENCOUNTER
New Patient Encounter    New Patient Intake Form   Patient Details:  Remy Smoker  1960  [id number]    Background Information:  [address] starts by opening a telephone encounter and gathering the following information   Who is calling to schedule? If not self, relationship to patient? Patient   Referring Provider Re-establish care - last seen by Dr Leopoldo Prose 7/2016   What is the diagnosis? Hx of Thrombocytopenia / Leukopenia   Is this Cancer or Non-Cancer? Non-Cancer   Is this diagnosis confirmed? When was the diagnosis? 2016   Is there a confirmed diagnosis from a biopsy/tissue reviewed by pathology? NA   Were outside slides requested? NA   Is patient aware of diagnosis? Yes   Is there a personal history and what kind? No   Is there a family history and what kind? No   Reason for visit? History Of   Have you had any imaging or labs done? If so: when, where? Patient will go for labs and then call to schedule appt  Are records in [employer]? yes   If patient has a prior history of cancer were old records obtained? NA   Was the patient told to bring a disk? No   Does the patient smoke or Vape? No   If yes, how many packs or cartridges per day? Scheduling Information:   Preferred [place]: Milagros Potts     Are there any dates/time the patient cannot be seen? Miscellaneous: will go to alternate location for first visit  After completing the above information, please route to Financial Counselor and the appropriate Nurse Navigator for review

## 2021-06-23 NOTE — TELEPHONE ENCOUNTER
Marlys Eckert called wondering if someone could call her back regarding her biopsy results  Best call back number is [phone]

## 2021-06-23 NOTE — ASSESSMENT & PLAN NOTE
Patient wants to discuss treatment options with Dr Pau Malone   Has followup with Dr Pau Malone on 7/6/21

## 2021-06-28 NOTE — TELEPHONE ENCOUNTER
Just called number below and LMOM on cell phone saying this is a benign appearing cyst   Cyst appears to be a pseudocyst by fluid studies which is a benign, inflammatory cyst which occurs 2/2 episode of pancreatitis

## 2021-08-04 ENCOUNTER — HOSPITAL ENCOUNTER (INPATIENT)
Facility: HOSPITAL | Age: 61
LOS: 10 days | Discharge: NURSING, REHAB OR LONG-TERM CARE | DRG: 963 | End: 2021-08-14
Attending: SURGERY | Admitting: SURGERY
Payer: COMMERCIAL

## 2021-08-04 ENCOUNTER — APPOINTMENT (EMERGENCY)
Dept: CT IMAGING | Facility: HOSPITAL | Age: 61
DRG: 963 | End: 2021-08-04
Payer: COMMERCIAL

## 2021-08-04 DIAGNOSIS — S06.9X9A TRAUMATIC BRAIN INJURY WITH LOSS OF CONSCIOUSNESS, INITIAL ENCOUNTER (HCC): ICD-10-CM

## 2021-08-04 DIAGNOSIS — I62.9 INTRACRANIAL BLEED (HCC): Primary | ICD-10-CM

## 2021-08-04 DIAGNOSIS — D62 ABLA (ACUTE BLOOD LOSS ANEMIA): ICD-10-CM

## 2021-08-04 DIAGNOSIS — I60.9 SAH (SUBARACHNOID HEMORRHAGE) (HCC): ICD-10-CM

## 2021-08-04 DIAGNOSIS — D69.6 THROMBOCYTOPENIA (HCC): ICD-10-CM

## 2021-08-04 DIAGNOSIS — S42.009A CLAVICLE FRACTURE: ICD-10-CM

## 2021-08-04 DIAGNOSIS — S01.512A TONGUE LACERATION, INITIAL ENCOUNTER: ICD-10-CM

## 2021-08-04 PROBLEM — S22.39XA RIB FRACTURE: Status: ACTIVE | Noted: 2021-08-04

## 2021-08-04 PROBLEM — S42.001A FRACTURE OF RIGHT CLAVICLE: Status: ACTIVE | Noted: 2021-08-04

## 2021-08-04 PROBLEM — S22.41XA CLOSED FRACTURE OF MULTIPLE RIBS OF RIGHT SIDE: Status: ACTIVE | Noted: 2021-08-04

## 2021-08-04 PROBLEM — S06.9XAA TBI (TRAUMATIC BRAIN INJURY): Status: ACTIVE | Noted: 2021-08-04

## 2021-08-04 PROBLEM — S32.592A BILATERAL PUBIC RAMI FRACTURES (HCC): Status: ACTIVE | Noted: 2021-08-04

## 2021-08-04 PROBLEM — S32.591A BILATERAL PUBIC RAMI FRACTURES (HCC): Status: ACTIVE | Noted: 2021-08-04

## 2021-08-04 PROBLEM — J94.2 HEMOTHORAX, RIGHT: Status: ACTIVE | Noted: 2021-08-04

## 2021-08-04 PROBLEM — I61.9 ICH (INTRACEREBRAL HEMORRHAGE) (HCC): Status: ACTIVE | Noted: 2021-08-04

## 2021-08-04 LAB
ALBUMIN SERPL BCP-MCNC: 2.1 G/DL (ref 3.5–5)
ALBUMIN SERPL BCP-MCNC: 2.5 G/DL (ref 3.5–5)
ALP SERPL-CCNC: 128 U/L (ref 46–116)
ALP SERPL-CCNC: 153 U/L (ref 46–116)
ALT SERPL W P-5'-P-CCNC: 42 U/L (ref 12–78)
ALT SERPL W P-5'-P-CCNC: 46 U/L (ref 12–78)
AMORPH URATE CRY URNS QL MICRO: NORMAL /HPF
AMPHETAMINES SERPL QL SCN: NEGATIVE
AMYLASE SERPL-CCNC: 92 IU/L (ref 25–115)
ANION GAP SERPL CALCULATED.3IONS-SCNC: 21 MMOL/L (ref 4–13)
ANION GAP SERPL CALCULATED.3IONS-SCNC: 23 MMOL/L (ref 4–13)
ANISOCYTOSIS BLD QL SMEAR: PRESENT
AST SERPL W P-5'-P-CCNC: 109 U/L (ref 5–45)
AST SERPL W P-5'-P-CCNC: 109 U/L (ref 5–45)
BACTERIA UR QL AUTO: NORMAL /HPF
BARBITURATES UR QL: NEGATIVE
BASE EX.OXY STD BLDV CALC-SCNC: 49.9 % (ref 60–80)
BASE EXCESS BLDA CALC-SCNC: -13 MMOL/L (ref -2–3)
BASE EXCESS BLDV CALC-SCNC: -7.7 MMOL/L
BASOPHILS NFR MAR MANUAL: 0 % (ref 0–1)
BENZODIAZ UR QL: NEGATIVE
BILIRUB SERPL-MCNC: 0.45 MG/DL (ref 0.2–1)
BILIRUB SERPL-MCNC: 0.58 MG/DL (ref 0.2–1)
BILIRUB UR QL STRIP: NEGATIVE
BUN SERPL-MCNC: 7 MG/DL (ref 5–25)
BUN SERPL-MCNC: 7 MG/DL (ref 5–25)
CALCIUM ALBUM COR SERPL-MCNC: 8.9 MG/DL (ref 8.3–10.1)
CALCIUM ALBUM COR SERPL-MCNC: 9.2 MG/DL (ref 8.3–10.1)
CALCIUM SERPL-MCNC: 7.4 MG/DL (ref 8.3–10.1)
CALCIUM SERPL-MCNC: 8 MG/DL (ref 8.3–10.1)
CHLORIDE SERPL-SCNC: 104 MMOL/L (ref 100–108)
CHLORIDE SERPL-SCNC: 105 MMOL/L (ref 100–108)
CLARITY UR: CLEAR
CO2 SERPL-SCNC: 15 MMOL/L (ref 21–32)
CO2 SERPL-SCNC: 17 MMOL/L (ref 21–32)
COCAINE UR QL: NEGATIVE
COLOR UR: YELLOW
CREAT SERPL-MCNC: 1.34 MG/DL (ref 0.6–1.3)
CREAT SERPL-MCNC: 1.53 MG/DL (ref 0.6–1.3)
EOSINOPHIL NFR BLD MANUAL: 0 % (ref 0–6)
ERYTHROCYTE [DISTWIDTH] IN BLOOD BY AUTOMATED COUNT: 15.7 % (ref 11.6–15.1)
ERYTHROCYTE [DISTWIDTH] IN BLOOD BY AUTOMATED COUNT: 15.7 % (ref 11.6–15.1)
ETHANOL SERPL-MCNC: <3 MG/DL (ref 0–3)
GFR SERPL CREATININE-BSD FRML MDRD: 20 ML/MIN/1.73SQ M
GFR SERPL CREATININE-BSD FRML MDRD: 43 ML/MIN/1.73SQ M
GLUCOSE SERPL-MCNC: 148 MG/DL (ref 65–140)
GLUCOSE SERPL-MCNC: 188 MG/DL (ref 65–140)
GLUCOSE SERPL-MCNC: 200 MG/DL (ref 65–140)
GLUCOSE UR STRIP-MCNC: NEGATIVE MG/DL
HCO3 BLDA-SCNC: 12.9 MMOL/L (ref 24–30)
HCO3 BLDV-SCNC: 19.2 MMOL/L (ref 24–30)
HCT VFR BLD AUTO: 22.5 % (ref 34.8–46.1)
HCT VFR BLD AUTO: 26.8 % (ref 34.8–46.1)
HCT VFR BLD CALC: 25 % (ref 34.8–46.1)
HGB BLD-MCNC: 6 G/DL (ref 11.5–15.4)
HGB BLD-MCNC: 6.7 G/DL (ref 11.5–15.4)
HGB BLD-MCNC: 9.1 G/DL (ref 11.5–15.4)
HGB BLDA-MCNC: 8.5 G/DL (ref 11.5–15.4)
HGB UR QL STRIP.AUTO: ABNORMAL
HYPERCHROMIA BLD QL SMEAR: PRESENT
INR PPP: 1.55 (ref 0.84–1.19)
KETONES UR STRIP-MCNC: NEGATIVE MG/DL
LACTATE SERPL-SCNC: 2.5 MMOL/L (ref 0.5–2)
LACTATE SERPL-SCNC: 6.8 MMOL/L (ref 0.5–2)
LEUKOCYTE ESTERASE UR QL STRIP: ABNORMAL
LIPASE SERPL-CCNC: 61 U/L (ref 73–393)
LYMPHOCYTES # BLD AUTO: 11 % (ref 14–44)
MACROCYTES BLD QL AUTO: PRESENT
MCH RBC QN AUTO: 31.8 PG (ref 26.8–34.3)
MCH RBC QN AUTO: 36.7 PG (ref 26.8–34.3)
MCHC RBC AUTO-ENTMCNC: 29.8 G/DL (ref 31.4–37.4)
MCHC RBC AUTO-ENTMCNC: 34 G/DL (ref 31.4–37.4)
MCV RBC AUTO: 107 FL (ref 82–98)
MCV RBC AUTO: 108 FL (ref 82–98)
METHADONE UR QL: NEGATIVE
MONOCYTES NFR BLD: 5 % (ref 4–12)
NEUTS BAND NFR BLD MANUAL: 5 % (ref 0–8)
NEUTS SEG NFR BLD AUTO: 79 % (ref 43–75)
NITRITE UR QL STRIP: NEGATIVE
NON-SQ EPI CELLS URNS QL MICRO: NORMAL /HPF
NRBC BLD AUTO-RTO: 0 /100 WBCS
O2 CT BLDV-SCNC: 5 ML/DL
OPIATES UR QL SCN: NEGATIVE
OXYCODONE+OXYMORPHONE UR QL SCN: NEGATIVE
PCO2 BLD: 14 MMOL/L (ref 21–32)
PCO2 BLD: 29.7 MM HG (ref 42–50)
PCO2 BLDV: 46.1 MM HG (ref 42–50)
PCP UR QL: NEGATIVE
PH BLD: 7.25 [PH] (ref 7.3–7.4)
PH BLDV: 7.24 [PH] (ref 7.3–7.4)
PH UR STRIP.AUTO: 6 [PH]
PLATELET # BLD AUTO: 20 THOUSANDS/UL (ref 149–390)
PLATELET # BLD AUTO: 32 THOUSANDS/UL (ref 149–390)
PLATELET BLD QL SMEAR: ABNORMAL
PMV BLD AUTO: 12 FL (ref 8.9–12.7)
PMV BLD AUTO: 12.3 FL (ref 8.9–12.7)
PO2 BLD: 60 MM HG (ref 35–45)
PO2 BLDV: 34.5 MM HG (ref 35–45)
POTASSIUM BLD-SCNC: 3.7 MMOL/L (ref 3.5–5.3)
POTASSIUM SERPL-SCNC: 3.6 MMOL/L (ref 3.5–5.3)
POTASSIUM SERPL-SCNC: 3.8 MMOL/L (ref 3.5–5.3)
PROT SERPL-MCNC: 5.3 G/DL (ref 6.4–8.2)
PROT SERPL-MCNC: 6 G/DL (ref 6.4–8.2)
PROT UR STRIP-MCNC: ABNORMAL MG/DL
PROTHROMBIN TIME: 18.7 SECONDS (ref 11.6–14.5)
RBC # BLD AUTO: 2.11 MILLION/UL (ref 3.81–5.12)
RBC # BLD AUTO: 2.48 MILLION/UL (ref 3.81–5.12)
RBC #/AREA URNS AUTO: NORMAL /HPF
SAO2 % BLD FROM PO2: 87 % (ref 60–85)
SARS-COV-2 RNA RESP QL NAA+PROBE: NEGATIVE
SODIUM BLD-SCNC: 142 MMOL/L (ref 136–145)
SODIUM SERPL-SCNC: 142 MMOL/L (ref 136–145)
SODIUM SERPL-SCNC: 143 MMOL/L (ref 136–145)
SP GR UR STRIP.AUTO: 1.01 (ref 1–1.03)
SPECIMEN SOURCE: ABNORMAL
THC UR QL: NEGATIVE
TOTAL CELLS COUNTED SPEC: 100
TROPONIN I SERPL-MCNC: 0.61 NG/ML
TROPONIN I SERPL-MCNC: 1.32 NG/ML
TROPONIN I SERPL-MCNC: 1.83 NG/ML
UROBILINOGEN UR QL STRIP.AUTO: 0.2 E.U./DL
WBC # BLD AUTO: 11.1 THOUSAND/UL (ref 4.31–10.16)
WBC # BLD AUTO: 7.85 THOUSAND/UL (ref 4.31–10.16)
WBC #/AREA URNS AUTO: NORMAL /HPF

## 2021-08-04 PROCEDURE — 99291 CRITICAL CARE FIRST HOUR: CPT

## 2021-08-04 PROCEDURE — 71260 CT THORAX DX C+: CPT

## 2021-08-04 PROCEDURE — 82947 ASSAY GLUCOSE BLOOD QUANT: CPT

## 2021-08-04 PROCEDURE — 76705 ECHO EXAM OF ABDOMEN: CPT | Performed by: SURGERY

## 2021-08-04 PROCEDURE — 82805 BLOOD GASES W/O2 SATURATION: CPT | Performed by: NURSE PRACTITIONER

## 2021-08-04 PROCEDURE — 94002 VENT MGMT INPAT INIT DAY: CPT

## 2021-08-04 PROCEDURE — 85007 BL SMEAR W/DIFF WBC COUNT: CPT | Performed by: SURGERY

## 2021-08-04 PROCEDURE — 76604 US EXAM CHEST: CPT | Performed by: SURGERY

## 2021-08-04 PROCEDURE — 36415 COLL VENOUS BLD VENIPUNCTURE: CPT | Performed by: SURGERY

## 2021-08-04 PROCEDURE — 70450 CT HEAD/BRAIN W/O DYE: CPT

## 2021-08-04 PROCEDURE — 99291 CRITICAL CARE FIRST HOUR: CPT | Performed by: SURGERY

## 2021-08-04 PROCEDURE — 84484 ASSAY OF TROPONIN QUANT: CPT | Performed by: NURSE PRACTITIONER

## 2021-08-04 PROCEDURE — U0003 INFECTIOUS AGENT DETECTION BY NUCLEIC ACID (DNA OR RNA); SEVERE ACUTE RESPIRATORY SYNDROME CORONAVIRUS 2 (SARS-COV-2) (CORONAVIRUS DISEASE [COVID-19]), AMPLIFIED PROBE TECHNIQUE, MAKING USE OF HIGH THROUGHPUT TECHNOLOGIES AS DESCRIBED BY CMS-2020-01-R: HCPCS | Performed by: NURSE PRACTITIONER

## 2021-08-04 PROCEDURE — NC001 PR NO CHARGE: Performed by: EMERGENCY MEDICINE

## 2021-08-04 PROCEDURE — 70486 CT MAXILLOFACIAL W/O DYE: CPT

## 2021-08-04 PROCEDURE — 84132 ASSAY OF SERUM POTASSIUM: CPT

## 2021-08-04 PROCEDURE — 85027 COMPLETE CBC AUTOMATED: CPT | Performed by: NURSE PRACTITIONER

## 2021-08-04 PROCEDURE — 96375 TX/PRO/DX INJ NEW DRUG ADDON: CPT

## 2021-08-04 PROCEDURE — 94760 N-INVAS EAR/PLS OXIMETRY 1: CPT

## 2021-08-04 PROCEDURE — 96365 THER/PROPH/DIAG IV INF INIT: CPT

## 2021-08-04 PROCEDURE — 83690 ASSAY OF LIPASE: CPT | Performed by: NURSE PRACTITIONER

## 2021-08-04 PROCEDURE — 81001 URINALYSIS AUTO W/SCOPE: CPT | Performed by: NURSE PRACTITIONER

## 2021-08-04 PROCEDURE — 82077 ASSAY SPEC XCP UR&BREATH IA: CPT | Performed by: NURSE PRACTITIONER

## 2021-08-04 PROCEDURE — 82150 ASSAY OF AMYLASE: CPT | Performed by: NURSE PRACTITIONER

## 2021-08-04 PROCEDURE — 99292 CRITICAL CARE ADDL 30 MIN: CPT | Performed by: SURGERY

## 2021-08-04 PROCEDURE — 93005 ELECTROCARDIOGRAM TRACING: CPT

## 2021-08-04 PROCEDURE — 85027 COMPLETE CBC AUTOMATED: CPT | Performed by: SURGERY

## 2021-08-04 PROCEDURE — 83605 ASSAY OF LACTIC ACID: CPT | Performed by: NURSE PRACTITIONER

## 2021-08-04 PROCEDURE — C9113 INJ PANTOPRAZOLE SODIUM, VIA: HCPCS | Performed by: NURSE PRACTITIONER

## 2021-08-04 PROCEDURE — 84295 ASSAY OF SERUM SODIUM: CPT

## 2021-08-04 PROCEDURE — 85014 HEMATOCRIT: CPT

## 2021-08-04 PROCEDURE — 85018 HEMOGLOBIN: CPT | Performed by: NURSE PRACTITIONER

## 2021-08-04 PROCEDURE — 5A1955Z RESPIRATORY VENTILATION, GREATER THAN 96 CONSECUTIVE HOURS: ICD-10-PCS | Performed by: EMERGENCY MEDICINE

## 2021-08-04 PROCEDURE — 80053 COMPREHEN METABOLIC PANEL: CPT | Performed by: SURGERY

## 2021-08-04 PROCEDURE — 80053 COMPREHEN METABOLIC PANEL: CPT | Performed by: NURSE PRACTITIONER

## 2021-08-04 PROCEDURE — 87040 BLOOD CULTURE FOR BACTERIA: CPT | Performed by: NURSE PRACTITIONER

## 2021-08-04 PROCEDURE — 85610 PROTHROMBIN TIME: CPT | Performed by: NURSE PRACTITIONER

## 2021-08-04 PROCEDURE — 93308 TTE F-UP OR LMTD: CPT | Performed by: SURGERY

## 2021-08-04 PROCEDURE — 82803 BLOOD GASES ANY COMBINATION: CPT

## 2021-08-04 PROCEDURE — 74177 CT ABD & PELVIS W/CONTRAST: CPT

## 2021-08-04 PROCEDURE — U0005 INFEC AGEN DETEC AMPLI PROBE: HCPCS | Performed by: NURSE PRACTITIONER

## 2021-08-04 PROCEDURE — 99223 1ST HOSP IP/OBS HIGH 75: CPT | Performed by: PHYSICIAN ASSISTANT

## 2021-08-04 PROCEDURE — 72125 CT NECK SPINE W/O DYE: CPT

## 2021-08-04 PROCEDURE — 0BH17EZ INSERTION OF ENDOTRACHEAL AIRWAY INTO TRACHEA, VIA NATURAL OR ARTIFICIAL OPENING: ICD-10-PCS | Performed by: EMERGENCY MEDICINE

## 2021-08-04 PROCEDURE — 84484 ASSAY OF TROPONIN QUANT: CPT | Performed by: SURGERY

## 2021-08-04 PROCEDURE — 94150 VITAL CAPACITY TEST: CPT

## 2021-08-04 PROCEDURE — 80307 DRUG TEST PRSMV CHEM ANLYZR: CPT | Performed by: NURSE PRACTITIONER

## 2021-08-04 RX ORDER — MIDAZOLAM HYDROCHLORIDE 1 MG/ML
1 INJECTION INTRAMUSCULAR; INTRAVENOUS ONCE
Status: DISCONTINUED | OUTPATIENT
Start: 2021-08-04 | End: 2021-08-04

## 2021-08-04 RX ORDER — PROPOFOL 10 MG/ML
INJECTION, EMULSION INTRAVENOUS
Status: DISPENSED
Start: 2021-08-04 | End: 2021-08-05

## 2021-08-04 RX ORDER — CHLORHEXIDINE GLUCONATE 0.12 MG/ML
15 RINSE ORAL EVERY 12 HOURS SCHEDULED
Status: DISCONTINUED | OUTPATIENT
Start: 2021-08-04 | End: 2021-08-05

## 2021-08-04 RX ORDER — PANTOPRAZOLE SODIUM 40 MG/1
40 INJECTION, POWDER, FOR SOLUTION INTRAVENOUS EVERY 12 HOURS SCHEDULED
Status: DISCONTINUED | OUTPATIENT
Start: 2021-08-04 | End: 2021-08-09

## 2021-08-04 RX ORDER — SUCCINYLCHOLINE/SOD CL,ISO/PF 100 MG/5ML
120 SYRINGE (ML) INTRAVENOUS ONCE
Status: COMPLETED | OUTPATIENT
Start: 2021-08-04 | End: 2021-08-04

## 2021-08-04 RX ORDER — ETOMIDATE 2 MG/ML
30 INJECTION INTRAVENOUS ONCE
Status: COMPLETED | OUTPATIENT
Start: 2021-08-04 | End: 2021-08-04

## 2021-08-04 RX ORDER — MIDAZOLAM HYDROCHLORIDE 2 MG/2ML
2 INJECTION, SOLUTION INTRAMUSCULAR; INTRAVENOUS EVERY 4 HOURS PRN
Status: DISCONTINUED | OUTPATIENT
Start: 2021-08-04 | End: 2021-08-07

## 2021-08-04 RX ORDER — FENTANYL CITRATE 50 UG/ML
INJECTION, SOLUTION INTRAMUSCULAR; INTRAVENOUS CODE/TRAUMA/SEDATION MEDICATION
Status: COMPLETED | OUTPATIENT
Start: 2021-08-04 | End: 2021-08-04

## 2021-08-04 RX ORDER — PROPOFOL 10 MG/ML
5-50 INJECTION, EMULSION INTRAVENOUS
Status: DISCONTINUED | OUTPATIENT
Start: 2021-08-04 | End: 2021-08-07

## 2021-08-04 RX ORDER — FENTANYL CITRATE-0.9 % NACL/PF 10 MCG/ML
100 PLASTIC BAG, INJECTION (ML) INTRAVENOUS CONTINUOUS
Status: DISCONTINUED | OUTPATIENT
Start: 2021-08-04 | End: 2021-08-07

## 2021-08-04 RX ORDER — FOLIC ACID 1 MG/1
1 TABLET ORAL DAILY
Status: DISCONTINUED | OUTPATIENT
Start: 2021-08-05 | End: 2021-08-15 | Stop reason: HOSPADM

## 2021-08-04 RX ORDER — CYANOCOBALAMIN 1000 UG/ML
1000 INJECTION INTRAMUSCULAR; SUBCUTANEOUS ONCE
Status: COMPLETED | OUTPATIENT
Start: 2021-08-04 | End: 2021-08-04

## 2021-08-04 RX ORDER — ETOMIDATE 2 MG/ML
1 INJECTION INTRAVENOUS ONCE
Status: DISCONTINUED | OUTPATIENT
Start: 2021-08-04 | End: 2021-08-04

## 2021-08-04 RX ORDER — LIDOCAINE 50 MG/G
1 PATCH TOPICAL DAILY
Status: DISCONTINUED | OUTPATIENT
Start: 2021-08-04 | End: 2021-08-15 | Stop reason: HOSPADM

## 2021-08-04 RX ORDER — SODIUM CHLORIDE, SODIUM GLUCONATE, SODIUM ACETATE, POTASSIUM CHLORIDE, MAGNESIUM CHLORIDE, SODIUM PHOSPHATE, DIBASIC, AND POTASSIUM PHOSPHATE .53; .5; .37; .037; .03; .012; .00082 G/100ML; G/100ML; G/100ML; G/100ML; G/100ML; G/100ML; G/100ML
100 INJECTION, SOLUTION INTRAVENOUS CONTINUOUS
Status: DISCONTINUED | OUTPATIENT
Start: 2021-08-04 | End: 2021-08-05

## 2021-08-04 RX ORDER — FENTANYL CITRATE 50 UG/ML
INJECTION, SOLUTION INTRAMUSCULAR; INTRAVENOUS
Status: DISPENSED
Start: 2021-08-04 | End: 2021-08-05

## 2021-08-04 RX ORDER — SODIUM CHLORIDE, SODIUM GLUCONATE, SODIUM ACETATE, POTASSIUM CHLORIDE, MAGNESIUM CHLORIDE, SODIUM PHOSPHATE, DIBASIC, AND POTASSIUM PHOSPHATE .53; .5; .37; .037; .03; .012; .00082 G/100ML; G/100ML; G/100ML; G/100ML; G/100ML; G/100ML; G/100ML
1000 INJECTION, SOLUTION INTRAVENOUS ONCE
Status: COMPLETED | OUTPATIENT
Start: 2021-08-04 | End: 2021-08-04

## 2021-08-04 RX ORDER — FENTANYL CITRATE 50 UG/ML
50 INJECTION, SOLUTION INTRAMUSCULAR; INTRAVENOUS
Status: DISCONTINUED | OUTPATIENT
Start: 2021-08-04 | End: 2021-08-07

## 2021-08-04 RX ADMIN — IOHEXOL 100 ML: 350 INJECTION, SOLUTION INTRAVENOUS at 14:13

## 2021-08-04 RX ADMIN — LIDOCAINE 5% 1 PATCH: 700 PATCH TOPICAL at 16:14

## 2021-08-04 RX ADMIN — FENTANYL CITRATE 50 MCG: 50 INJECTION, SOLUTION INTRAMUSCULAR; INTRAVENOUS at 14:18

## 2021-08-04 RX ADMIN — SODIUM CHLORIDE, SODIUM LACTATE, POTASSIUM CHLORIDE, AND CALCIUM CHLORIDE 500 ML: .6; .31; .03; .02 INJECTION, SOLUTION INTRAVENOUS at 18:46

## 2021-08-04 RX ADMIN — Medication 50 MCG/HR: at 16:11

## 2021-08-04 RX ADMIN — SODIUM CHLORIDE, SODIUM GLUCONATE, SODIUM ACETATE, POTASSIUM CHLORIDE, MAGNESIUM CHLORIDE, SODIUM PHOSPHATE, DIBASIC, AND POTASSIUM PHOSPHATE 125 ML/HR: .53; .5; .37; .037; .03; .012; .00082 INJECTION, SOLUTION INTRAVENOUS at 15:40

## 2021-08-04 RX ADMIN — FENTANYL CITRATE 50 MCG: 50 INJECTION INTRAMUSCULAR; INTRAVENOUS at 20:22

## 2021-08-04 RX ADMIN — SODIUM CHLORIDE, SODIUM GLUCONATE, SODIUM ACETATE, POTASSIUM CHLORIDE, MAGNESIUM CHLORIDE, SODIUM PHOSPHATE, DIBASIC, AND POTASSIUM PHOSPHATE 1000 ML: .53; .5; .37; .037; .03; .012; .00082 INJECTION, SOLUTION INTRAVENOUS at 15:40

## 2021-08-04 RX ADMIN — TRANEXAMIC ACID 1000 MG: 1 INJECTION, SOLUTION INTRAVENOUS at 18:32

## 2021-08-04 RX ADMIN — SODIUM BICARBONATE 50 MEQ: 84 INJECTION PARENTERAL at 13:48

## 2021-08-04 RX ADMIN — Medication 120 MG: at 13:42

## 2021-08-04 RX ADMIN — IRON SUCROSE 200 MG: 20 INJECTION, SOLUTION INTRAVENOUS at 22:15

## 2021-08-04 RX ADMIN — PROPOFOL 10 MCG/KG/MIN: 10 INJECTION, EMULSION INTRAVENOUS at 14:30

## 2021-08-04 RX ADMIN — LEVETIRACETAM 500 MG: 100 INJECTION, SOLUTION INTRAVENOUS at 18:01

## 2021-08-04 RX ADMIN — TRANEXAMIC ACID 1000 MG: 1 INJECTION, SOLUTION INTRAVENOUS at 18:46

## 2021-08-04 RX ADMIN — EPOETIN ALFA 20000 UNITS: 20000 SOLUTION INTRAVENOUS; SUBCUTANEOUS at 21:05

## 2021-08-04 RX ADMIN — CYANOCOBALAMIN 1000 MCG: 1000 INJECTION, SOLUTION INTRAMUSCULAR; SUBCUTANEOUS at 21:14

## 2021-08-04 RX ADMIN — EPOETIN ALFA 20000 UNITS: 20000 SOLUTION INTRAVENOUS; SUBCUTANEOUS at 21:04

## 2021-08-04 RX ADMIN — PANTOPRAZOLE SODIUM 40 MG: 40 INJECTION, POWDER, FOR SOLUTION INTRAVENOUS at 21:12

## 2021-08-04 RX ADMIN — FOLIC ACID 1 MG: 5 INJECTION, SOLUTION INTRAMUSCULAR; INTRAVENOUS; SUBCUTANEOUS at 21:51

## 2021-08-04 RX ADMIN — NOREPINEPHRINE BITARTRATE 1 MCG/MIN: 1 INJECTION, SOLUTION, CONCENTRATE INTRAVENOUS at 18:29

## 2021-08-04 RX ADMIN — ETOMIDATE 30 MG: 2 INJECTION, SOLUTION INTRAVENOUS at 13:41

## 2021-08-04 RX ADMIN — PROPOFOL 20 MCG/KG/MIN: 10 INJECTION, EMULSION INTRAVENOUS at 23:47

## 2021-08-04 RX ADMIN — FENTANYL CITRATE 50 MCG: 50 INJECTION, SOLUTION INTRAMUSCULAR; INTRAVENOUS at 14:07

## 2021-08-04 RX ADMIN — PHYTONADIONE 10 MG: 10 INJECTION, EMULSION INTRAMUSCULAR; INTRAVENOUS; SUBCUTANEOUS at 16:50

## 2021-08-04 RX ADMIN — SODIUM CHLORIDE 1000 ML: 0.9 INJECTION, SOLUTION INTRAVENOUS at 13:45

## 2021-08-04 RX ADMIN — SODIUM CHLORIDE, SODIUM GLUCONATE, SODIUM ACETATE, POTASSIUM CHLORIDE, MAGNESIUM CHLORIDE, SODIUM PHOSPHATE, DIBASIC, AND POTASSIUM PHOSPHATE 100 ML/HR: .53; .5; .37; .037; .03; .012; .00082 INJECTION, SOLUTION INTRAVENOUS at 22:26

## 2021-08-05 ENCOUNTER — APPOINTMENT (INPATIENT)
Dept: CT IMAGING | Facility: HOSPITAL | Age: 61
DRG: 963 | End: 2021-08-05
Payer: COMMERCIAL

## 2021-08-05 ENCOUNTER — APPOINTMENT (INPATIENT)
Dept: RADIOLOGY | Facility: HOSPITAL | Age: 61
DRG: 963 | End: 2021-08-05
Payer: COMMERCIAL

## 2021-08-05 LAB
ALBUMIN SERPL BCP-MCNC: 1.9 G/DL (ref 3.5–5)
ALP SERPL-CCNC: 96 U/L (ref 46–116)
AST SERPL W P-5'-P-CCNC: 159 U/L (ref 5–45)
ATRIAL RATE: 105 BPM
ATRIAL RATE: 114 BPM
BILIRUB SERPL-MCNC: 0.47 MG/DL (ref 0.2–1)
BUN SERPL-MCNC: 8 MG/DL (ref 5–25)
CALCIUM ALBUM COR SERPL-MCNC: 8.7 MG/DL (ref 8.3–10.1)
CALCIUM SERPL-MCNC: 7 MG/DL (ref 8.3–10.1)
CK MB SERPL-MCNC: 13.5 NG/ML (ref 0–5)
CK MB SERPL-MCNC: <1 % (ref 0–2.5)
CK SERPL-CCNC: 4228 U/L (ref 26–192)
CREAT SERPL-MCNC: 0.78 MG/DL (ref 0.6–1.3)
GFR SERPL CREATININE-BSD FRML MDRD: 82 ML/MIN/1.73SQ M
GLUCOSE SERPL-MCNC: 159 MG/DL (ref 65–140)
HGB BLD-MCNC: 4.8 G/DL (ref 11.5–15.4)
HGB BLD-MCNC: 5.4 G/DL (ref 11.5–15.4)
INR PPP: 1.35 (ref 0.84–1.19)
LACTATE SERPL-SCNC: 1.5 MMOL/L (ref 0.5–2)
P AXIS: -47 DEGREES
PR INTERVAL: 128 MS
PR INTERVAL: 136 MS
PROT SERPL-MCNC: 4.7 G/DL (ref 6.4–8.2)
PROTHROMBIN TIME: 16.8 SECONDS (ref 11.6–14.5)
QRS AXIS: 66 DEGREES
QRS AXIS: 69 DEGREES
QRSD INTERVAL: 76 MS
QRSD INTERVAL: 76 MS
QT INTERVAL: 390 MS
QT INTERVAL: 412 MS
QTC INTERVAL: 515 MS
QTC INTERVAL: 567 MS
RETICS # AUTO: ABNORMAL 10*3/UL (ref 14097–95744)
RETICS # CALC: 2.37 % (ref 0.37–1.87)
T WAVE AXIS: 55 DEGREES
T WAVE AXIS: 72 DEGREES
TROPONIN I SERPL-MCNC: 0.97 NG/ML
VENTRICULAR RATE: 105 BPM
VENTRICULAR RATE: 114 BPM

## 2021-08-05 PROCEDURE — 80053 COMPREHEN METABOLIC PANEL: CPT | Performed by: NURSE PRACTITIONER

## 2021-08-05 PROCEDURE — 82607 VITAMIN B-12: CPT | Performed by: NURSE PRACTITIONER

## 2021-08-05 PROCEDURE — 99232 SBSQ HOSP IP/OBS MODERATE 35: CPT | Performed by: PHYSICIAN ASSISTANT

## 2021-08-05 PROCEDURE — 83605 ASSAY OF LACTIC ACID: CPT | Performed by: PHYSICIAN ASSISTANT

## 2021-08-05 PROCEDURE — 99254 IP/OBS CNSLTJ NEW/EST MOD 60: CPT | Performed by: PHYSICIAN ASSISTANT

## 2021-08-05 PROCEDURE — 82553 CREATINE MB FRACTION: CPT | Performed by: NURSE PRACTITIONER

## 2021-08-05 PROCEDURE — 94150 VITAL CAPACITY TEST: CPT

## 2021-08-05 PROCEDURE — 99291 CRITICAL CARE FIRST HOUR: CPT | Performed by: PHYSICIAN ASSISTANT

## 2021-08-05 PROCEDURE — 99292 CRITICAL CARE ADDL 30 MIN: CPT | Performed by: SURGERY

## 2021-08-05 PROCEDURE — 82728 ASSAY OF FERRITIN: CPT | Performed by: NURSE PRACTITIONER

## 2021-08-05 PROCEDURE — 85045 AUTOMATED RETICULOCYTE COUNT: CPT | Performed by: NURSE PRACTITIONER

## 2021-08-05 PROCEDURE — 84484 ASSAY OF TROPONIN QUANT: CPT | Performed by: PHYSICIAN ASSISTANT

## 2021-08-05 PROCEDURE — 85018 HEMOGLOBIN: CPT | Performed by: NURSE PRACTITIONER

## 2021-08-05 PROCEDURE — 93005 ELECTROCARDIOGRAM TRACING: CPT

## 2021-08-05 PROCEDURE — 70450 CT HEAD/BRAIN W/O DYE: CPT

## 2021-08-05 PROCEDURE — 93010 ELECTROCARDIOGRAM REPORT: CPT | Performed by: INTERNAL MEDICINE

## 2021-08-05 PROCEDURE — 82746 ASSAY OF FOLIC ACID SERUM: CPT | Performed by: NURSE PRACTITIONER

## 2021-08-05 PROCEDURE — 94760 N-INVAS EAR/PLS OXIMETRY 1: CPT

## 2021-08-05 PROCEDURE — 94003 VENT MGMT INPAT SUBQ DAY: CPT

## 2021-08-05 PROCEDURE — C9113 INJ PANTOPRAZOLE SODIUM, VIA: HCPCS | Performed by: NURSE PRACTITIONER

## 2021-08-05 PROCEDURE — 85610 PROTHROMBIN TIME: CPT | Performed by: NURSE PRACTITIONER

## 2021-08-05 PROCEDURE — G1004 CDSM NDSC: HCPCS

## 2021-08-05 PROCEDURE — 82550 ASSAY OF CK (CPK): CPT | Performed by: NURSE PRACTITIONER

## 2021-08-05 RX ORDER — ACETAMINOPHEN 160 MG/5ML
650 SUSPENSION, ORAL (FINAL DOSE FORM) ORAL ONCE
Status: COMPLETED | OUTPATIENT
Start: 2021-08-05 | End: 2021-08-05

## 2021-08-05 RX ORDER — ACETAMINOPHEN 160 MG/5ML
650 SUSPENSION, ORAL (FINAL DOSE FORM) ORAL EVERY 4 HOURS PRN
Status: DISCONTINUED | OUTPATIENT
Start: 2021-08-05 | End: 2021-08-09

## 2021-08-05 RX ADMIN — SODIUM CHLORIDE, SODIUM GLUCONATE, SODIUM ACETATE, POTASSIUM CHLORIDE, MAGNESIUM CHLORIDE, SODIUM PHOSPHATE, DIBASIC, AND POTASSIUM PHOSPHATE 100 ML/HR: .53; .5; .37; .037; .03; .012; .00082 INJECTION, SOLUTION INTRAVENOUS at 08:34

## 2021-08-05 RX ADMIN — NOREPINEPHRINE BITARTRATE 4 MCG/MIN: 1 INJECTION, SOLUTION, CONCENTRATE INTRAVENOUS at 13:00

## 2021-08-05 RX ADMIN — FENTANYL CITRATE 50 MCG: 50 INJECTION INTRAMUSCULAR; INTRAVENOUS at 13:31

## 2021-08-05 RX ADMIN — IRON SUCROSE 200 MG: 20 INJECTION, SOLUTION INTRAVENOUS at 08:44

## 2021-08-05 RX ADMIN — MIDAZOLAM 2 MG: 1 INJECTION INTRAMUSCULAR; INTRAVENOUS at 16:46

## 2021-08-05 RX ADMIN — FENTANYL CITRATE 50 MCG: 50 INJECTION INTRAMUSCULAR; INTRAVENOUS at 17:43

## 2021-08-05 RX ADMIN — LEVETIRACETAM 500 MG: 100 INJECTION, SOLUTION INTRAVENOUS at 08:44

## 2021-08-05 RX ADMIN — FENTANYL CITRATE 50 MCG: 50 INJECTION INTRAMUSCULAR; INTRAVENOUS at 10:01

## 2021-08-05 RX ADMIN — FENTANYL CITRATE 50 MCG: 50 INJECTION INTRAMUSCULAR; INTRAVENOUS at 00:04

## 2021-08-05 RX ADMIN — ACETAMINOPHEN 650 MG: 650 SUSPENSION ORAL at 04:50

## 2021-08-05 RX ADMIN — LIDOCAINE 5% 1 PATCH: 700 PATCH TOPICAL at 08:44

## 2021-08-05 RX ADMIN — Medication 100 MCG/HR: at 15:15

## 2021-08-05 RX ADMIN — CYANOCOBALAMIN TAB 500 MCG 500 MCG: 500 TAB at 08:44

## 2021-08-05 RX ADMIN — PANTOPRAZOLE SODIUM 40 MG: 40 INJECTION, POWDER, FOR SOLUTION INTRAVENOUS at 08:44

## 2021-08-05 RX ADMIN — LEVETIRACETAM 500 MG: 100 INJECTION, SOLUTION INTRAVENOUS at 20:25

## 2021-08-05 RX ADMIN — Medication 75 MCG/HR: at 05:15

## 2021-08-05 RX ADMIN — FENTANYL CITRATE 50 MCG: 50 INJECTION INTRAMUSCULAR; INTRAVENOUS at 05:47

## 2021-08-05 RX ADMIN — PROPOFOL 25 MCG/KG/MIN: 10 INJECTION, EMULSION INTRAVENOUS at 04:50

## 2021-08-05 RX ADMIN — FENTANYL CITRATE 50 MCG: 50 INJECTION INTRAMUSCULAR; INTRAVENOUS at 07:30

## 2021-08-05 RX ADMIN — PROPOFOL 40 MCG/KG/MIN: 10 INJECTION, EMULSION INTRAVENOUS at 23:02

## 2021-08-05 RX ADMIN — FENTANYL CITRATE 50 MCG: 50 INJECTION INTRAMUSCULAR; INTRAVENOUS at 03:04

## 2021-08-05 RX ADMIN — FOLIC ACID 1 MG: 1 TABLET ORAL at 08:44

## 2021-08-05 RX ADMIN — PROPOFOL 25 MCG/KG/MIN: 10 INJECTION, EMULSION INTRAVENOUS at 11:56

## 2021-08-05 RX ADMIN — DESMOPRESSIN ACETATE 20 MCG: 4 INJECTION INTRAVENOUS; SUBCUTANEOUS at 10:46

## 2021-08-05 RX ADMIN — ACETAMINOPHEN 650 MG: 650 SUSPENSION ORAL at 18:16

## 2021-08-05 RX ADMIN — FENTANYL CITRATE 50 MCG: 50 INJECTION INTRAMUSCULAR; INTRAVENOUS at 14:47

## 2021-08-05 RX ADMIN — PANTOPRAZOLE SODIUM 40 MG: 40 INJECTION, POWDER, FOR SOLUTION INTRAVENOUS at 20:25

## 2021-08-05 RX ADMIN — PROPOFOL 40 MCG/KG/MIN: 10 INJECTION, EMULSION INTRAVENOUS at 18:16

## 2021-08-05 RX ADMIN — MIDAZOLAM 2 MG: 1 INJECTION INTRAMUSCULAR; INTRAVENOUS at 09:22

## 2021-08-06 ENCOUNTER — APPOINTMENT (INPATIENT)
Dept: RADIOLOGY | Facility: HOSPITAL | Age: 61
DRG: 963 | End: 2021-08-06
Payer: COMMERCIAL

## 2021-08-06 ENCOUNTER — APPOINTMENT (INPATIENT)
Dept: CT IMAGING | Facility: HOSPITAL | Age: 61
DRG: 963 | End: 2021-08-06
Payer: COMMERCIAL

## 2021-08-06 PROBLEM — S01.512A TONGUE LACERATION, INITIAL ENCOUNTER: Status: ACTIVE | Noted: 2021-08-06

## 2021-08-06 PROBLEM — IMO0001 REFUSAL OF BLOOD TRANSFUSIONS AS PATIENT IS JEHOVAH'S WITNESS: Chronic | Status: ACTIVE | Noted: 2021-08-06

## 2021-08-06 PROBLEM — R57.8 HEMORRHAGIC SHOCK (HCC): Status: ACTIVE | Noted: 2021-08-06

## 2021-08-06 PROBLEM — R77.8 ELEVATED TROPONIN: Status: ACTIVE | Noted: 2021-08-06

## 2021-08-06 PROBLEM — D69.6 THROMBOCYTOPENIA (HCC): Status: ACTIVE | Noted: 2021-08-06

## 2021-08-06 PROBLEM — R73.9 HYPERGLYCEMIA: Status: ACTIVE | Noted: 2021-08-06

## 2021-08-06 PROBLEM — J96.01 ACUTE HYPOXEMIC RESPIRATORY FAILURE (HCC): Status: ACTIVE | Noted: 2021-08-06

## 2021-08-06 PROBLEM — R79.89 ELEVATED TROPONIN: Status: ACTIVE | Noted: 2021-08-06

## 2021-08-06 PROBLEM — F10.10 ETOH ABUSE: Chronic | Status: ACTIVE | Noted: 2021-08-06

## 2021-08-06 PROBLEM — I60.9 SUBARACHNOID HEMORRHAGE (HCC): Status: ACTIVE | Noted: 2021-08-04

## 2021-08-06 PROBLEM — D62 ACUTE BLOOD LOSS ANEMIA: Status: ACTIVE | Noted: 2021-08-06

## 2021-08-06 PROBLEM — S22.009A: Status: ACTIVE | Noted: 2021-08-06

## 2021-08-06 PROBLEM — Z53.1 REFUSAL OF BLOOD TRANSFUSIONS AS PATIENT IS JEHOVAH'S WITNESS: Chronic | Status: ACTIVE | Noted: 2021-08-06

## 2021-08-06 PROBLEM — S40.011A: Status: ACTIVE | Noted: 2021-08-06

## 2021-08-06 PROBLEM — S30.0XXA CONTUSION, BUTTOCK, INITIAL ENCOUNTER: Status: ACTIVE | Noted: 2021-08-06

## 2021-08-06 LAB
BASE EXCESS BLDA CALC-SCNC: 1 MMOL/L (ref -2–3)
FERRITIN SERPL-MCNC: 335 NG/ML (ref 8–388)
FIO2 GAS DIL.REBREATH: 40 L
FOLATE SERPL-MCNC: >20 NG/ML (ref 3.1–17.5)
GLUCOSE SERPL-MCNC: 156 MG/DL (ref 65–140)
HCO3 BLDA-SCNC: 25.7 MMOL/L (ref 22–28)
HCT VFR BLD CALC: <15 % (ref 34.8–46.1)
PCO2 BLD: 27 MMOL/L (ref 21–32)
PCO2 BLD: 40.3 MM HG (ref 36–44)
PH BLD: 7.41 [PH] (ref 7.35–7.45)
PO2 BLD: 71 MM HG (ref 75–129)
POTASSIUM BLD-SCNC: 3.1 MMOL/L (ref 3.5–5.3)
SAO2 % BLD FROM PO2: 94 % (ref 60–85)
SODIUM BLD-SCNC: 142 MMOL/L (ref 136–145)
SPECIMEN SOURCE: ABNORMAL
VIT B12 SERPL-MCNC: 5042 PG/ML (ref 100–900)

## 2021-08-06 PROCEDURE — 99291 CRITICAL CARE FIRST HOUR: CPT | Performed by: NURSE PRACTITIONER

## 2021-08-06 PROCEDURE — 36600 WITHDRAWAL OF ARTERIAL BLOOD: CPT

## 2021-08-06 PROCEDURE — 84295 ASSAY OF SERUM SODIUM: CPT

## 2021-08-06 PROCEDURE — 84132 ASSAY OF SERUM POTASSIUM: CPT

## 2021-08-06 PROCEDURE — G1004 CDSM NDSC: HCPCS

## 2021-08-06 PROCEDURE — 85014 HEMATOCRIT: CPT

## 2021-08-06 PROCEDURE — 94760 N-INVAS EAR/PLS OXIMETRY 1: CPT

## 2021-08-06 PROCEDURE — C9113 INJ PANTOPRAZOLE SODIUM, VIA: HCPCS | Performed by: NURSE PRACTITIONER

## 2021-08-06 PROCEDURE — 94003 VENT MGMT INPAT SUBQ DAY: CPT

## 2021-08-06 PROCEDURE — 70450 CT HEAD/BRAIN W/O DYE: CPT

## 2021-08-06 PROCEDURE — 71045 X-RAY EXAM CHEST 1 VIEW: CPT

## 2021-08-06 PROCEDURE — 99232 SBSQ HOSP IP/OBS MODERATE 35: CPT | Performed by: PHYSICIAN ASSISTANT

## 2021-08-06 PROCEDURE — 94150 VITAL CAPACITY TEST: CPT

## 2021-08-06 PROCEDURE — 82947 ASSAY GLUCOSE BLOOD QUANT: CPT

## 2021-08-06 PROCEDURE — 82803 BLOOD GASES ANY COMBINATION: CPT

## 2021-08-06 RX ORDER — POTASSIUM CHLORIDE 14.9 MG/ML
20 INJECTION INTRAVENOUS
Status: COMPLETED | OUTPATIENT
Start: 2021-08-06 | End: 2021-08-07

## 2021-08-06 RX ORDER — POTASSIUM CHLORIDE 20MEQ/15ML
40 LIQUID (ML) ORAL ONCE
Status: COMPLETED | OUTPATIENT
Start: 2021-08-06 | End: 2021-08-06

## 2021-08-06 RX ADMIN — PROPOFOL 40 MCG/KG/MIN: 10 INJECTION, EMULSION INTRAVENOUS at 05:03

## 2021-08-06 RX ADMIN — PROPOFOL 35 MCG/KG/MIN: 10 INJECTION, EMULSION INTRAVENOUS at 15:36

## 2021-08-06 RX ADMIN — Medication 100 MCG/HR: at 23:09

## 2021-08-06 RX ADMIN — MIDAZOLAM 2 MG: 1 INJECTION INTRAMUSCULAR; INTRAVENOUS at 00:22

## 2021-08-06 RX ADMIN — PANTOPRAZOLE SODIUM 40 MG: 40 INJECTION, POWDER, FOR SOLUTION INTRAVENOUS at 20:38

## 2021-08-06 RX ADMIN — MIDAZOLAM 2 MG: 1 INJECTION INTRAMUSCULAR; INTRAVENOUS at 15:02

## 2021-08-06 RX ADMIN — POTASSIUM CHLORIDE 20 MEQ: 14.9 INJECTION, SOLUTION INTRAVENOUS at 19:30

## 2021-08-06 RX ADMIN — IRON SUCROSE 200 MG: 20 INJECTION, SOLUTION INTRAVENOUS at 09:38

## 2021-08-06 RX ADMIN — LIDOCAINE 5% 1 PATCH: 700 PATCH TOPICAL at 09:38

## 2021-08-06 RX ADMIN — ACETAMINOPHEN 650 MG: 650 SUSPENSION ORAL at 00:22

## 2021-08-06 RX ADMIN — LEVETIRACETAM 500 MG: 100 INJECTION, SOLUTION INTRAVENOUS at 09:38

## 2021-08-06 RX ADMIN — ACETAMINOPHEN 650 MG: 650 SUSPENSION ORAL at 20:38

## 2021-08-06 RX ADMIN — CYANOCOBALAMIN TAB 500 MCG 500 MCG: 500 TAB at 09:38

## 2021-08-06 RX ADMIN — POTASSIUM CHLORIDE 40 MEQ: 20 SOLUTION ORAL at 19:30

## 2021-08-06 RX ADMIN — Medication 100 MCG/HR: at 12:06

## 2021-08-06 RX ADMIN — MIDAZOLAM 2 MG: 1 INJECTION INTRAMUSCULAR; INTRAVENOUS at 05:07

## 2021-08-06 RX ADMIN — FOLIC ACID 1 MG: 1 TABLET ORAL at 09:38

## 2021-08-06 RX ADMIN — LEVETIRACETAM 500 MG: 100 INJECTION, SOLUTION INTRAVENOUS at 20:38

## 2021-08-06 RX ADMIN — PROPOFOL 40 MCG/KG/MIN: 10 INJECTION, EMULSION INTRAVENOUS at 23:09

## 2021-08-06 RX ADMIN — POTASSIUM CHLORIDE 20 MEQ: 14.9 INJECTION, SOLUTION INTRAVENOUS at 22:30

## 2021-08-06 RX ADMIN — Medication 100 MCG/HR: at 02:58

## 2021-08-06 RX ADMIN — PANTOPRAZOLE SODIUM 40 MG: 40 INJECTION, POWDER, FOR SOLUTION INTRAVENOUS at 09:38

## 2021-08-07 PROBLEM — R57.8 HEMORRHAGIC SHOCK (HCC): Status: RESOLVED | Noted: 2021-08-06 | Resolved: 2021-08-07

## 2021-08-07 LAB
ARTERIAL PATENCY WRIST A: YES
BASE EXCESS BLDA CALC-SCNC: 4.4 MMOL/L
HCO3 BLDA-SCNC: 29.6 MMOL/L (ref 22–28)
O2 CT BLDA-SCNC: 6.2 ML/DL (ref 16–23)
OXYHGB MFR BLDA: 94.3 % (ref 94–97)
PCO2 BLDA: 49.5 MM HG (ref 36–44)
PH BLDA: 7.39 [PH] (ref 7.35–7.45)
PO2 BLDA: 104.3 MM HG (ref 75–129)
SIMV VENT INSPIRED AIR FIO2: 40
SIMV VENT PEEP: 6
SIMV VENT TIDAL VOLUME: 350
SIMV VENT: ABNORMAL
SPECIMEN SOURCE: ABNORMAL
VENT SIMV: 14

## 2021-08-07 PROCEDURE — 99291 CRITICAL CARE FIRST HOUR: CPT | Performed by: SURGERY

## 2021-08-07 PROCEDURE — 82805 BLOOD GASES W/O2 SATURATION: CPT | Performed by: NURSE PRACTITIONER

## 2021-08-07 PROCEDURE — 93005 ELECTROCARDIOGRAM TRACING: CPT

## 2021-08-07 PROCEDURE — 99292 CRITICAL CARE ADDL 30 MIN: CPT | Performed by: NURSE PRACTITIONER

## 2021-08-07 PROCEDURE — C9113 INJ PANTOPRAZOLE SODIUM, VIA: HCPCS | Performed by: NURSE PRACTITIONER

## 2021-08-07 PROCEDURE — 94150 VITAL CAPACITY TEST: CPT

## 2021-08-07 PROCEDURE — 94003 VENT MGMT INPAT SUBQ DAY: CPT

## 2021-08-07 PROCEDURE — 94760 N-INVAS EAR/PLS OXIMETRY 1: CPT

## 2021-08-07 RX ORDER — HYDROMORPHONE HCL/PF 1 MG/ML
0.2 SYRINGE (ML) INJECTION EVERY 4 HOURS PRN
Status: DISCONTINUED | OUTPATIENT
Start: 2021-08-07 | End: 2021-08-08

## 2021-08-07 RX ADMIN — FOLIC ACID 1 MG: 1 TABLET ORAL at 08:27

## 2021-08-07 RX ADMIN — LEVETIRACETAM 500 MG: 100 INJECTION, SOLUTION INTRAVENOUS at 10:07

## 2021-08-07 RX ADMIN — LEVETIRACETAM 500 MG: 100 INJECTION, SOLUTION INTRAVENOUS at 22:00

## 2021-08-07 RX ADMIN — Medication 100 MCG/HR: at 09:35

## 2021-08-07 RX ADMIN — CYANOCOBALAMIN TAB 500 MCG 500 MCG: 500 TAB at 08:27

## 2021-08-07 RX ADMIN — LIDOCAINE 5% 1 PATCH: 700 PATCH TOPICAL at 08:27

## 2021-08-07 RX ADMIN — PANTOPRAZOLE SODIUM 40 MG: 40 INJECTION, POWDER, FOR SOLUTION INTRAVENOUS at 22:00

## 2021-08-07 RX ADMIN — IRON SUCROSE 200 MG: 20 INJECTION, SOLUTION INTRAVENOUS at 08:27

## 2021-08-07 RX ADMIN — PANTOPRAZOLE SODIUM 40 MG: 40 INJECTION, POWDER, FOR SOLUTION INTRAVENOUS at 08:27

## 2021-08-08 ENCOUNTER — APPOINTMENT (INPATIENT)
Dept: RADIOLOGY | Facility: HOSPITAL | Age: 61
DRG: 963 | End: 2021-08-08
Payer: COMMERCIAL

## 2021-08-08 ENCOUNTER — APPOINTMENT (INPATIENT)
Dept: CT IMAGING | Facility: HOSPITAL | Age: 61
DRG: 963 | End: 2021-08-08
Payer: COMMERCIAL

## 2021-08-08 LAB
ANION GAP SERPL CALCULATED.3IONS-SCNC: 8 MMOL/L (ref 4–13)
BUN SERPL-MCNC: 10 MG/DL (ref 5–25)
CALCIUM SERPL-MCNC: 8.2 MG/DL (ref 8.3–10.1)
CHLORIDE SERPL-SCNC: 109 MMOL/L (ref 100–108)
CO2 SERPL-SCNC: 27 MMOL/L (ref 21–32)
CREAT SERPL-MCNC: 0.67 MG/DL (ref 0.6–1.3)
GFR SERPL CREATININE-BSD FRML MDRD: 95 ML/MIN/1.73SQ M
GLUCOSE SERPL-MCNC: 186 MG/DL (ref 65–140)
MAGNESIUM SERPL-MCNC: 1.7 MG/DL (ref 1.6–2.6)
PHOSPHATE SERPL-MCNC: 2.4 MG/DL (ref 2.3–4.1)
POTASSIUM SERPL-SCNC: 3.2 MMOL/L (ref 3.5–5.3)
SODIUM SERPL-SCNC: 144 MMOL/L (ref 136–145)

## 2021-08-08 PROCEDURE — 83735 ASSAY OF MAGNESIUM: CPT | Performed by: NURSE PRACTITIONER

## 2021-08-08 PROCEDURE — 99232 SBSQ HOSP IP/OBS MODERATE 35: CPT | Performed by: PHYSICIAN ASSISTANT

## 2021-08-08 PROCEDURE — 93005 ELECTROCARDIOGRAM TRACING: CPT

## 2021-08-08 PROCEDURE — 72170 X-RAY EXAM OF PELVIS: CPT

## 2021-08-08 PROCEDURE — 84100 ASSAY OF PHOSPHORUS: CPT | Performed by: NURSE PRACTITIONER

## 2021-08-08 PROCEDURE — 92610 EVALUATE SWALLOWING FUNCTION: CPT

## 2021-08-08 PROCEDURE — G1004 CDSM NDSC: HCPCS

## 2021-08-08 PROCEDURE — 73000 X-RAY EXAM OF COLLAR BONE: CPT

## 2021-08-08 PROCEDURE — C9113 INJ PANTOPRAZOLE SODIUM, VIA: HCPCS | Performed by: NURSE PRACTITIONER

## 2021-08-08 PROCEDURE — 99232 SBSQ HOSP IP/OBS MODERATE 35: CPT | Performed by: SURGERY

## 2021-08-08 PROCEDURE — 80048 BASIC METABOLIC PNL TOTAL CA: CPT | Performed by: NURSE PRACTITIONER

## 2021-08-08 PROCEDURE — 70450 CT HEAD/BRAIN W/O DYE: CPT

## 2021-08-08 RX ORDER — MAGNESIUM SULFATE HEPTAHYDRATE 40 MG/ML
2 INJECTION, SOLUTION INTRAVENOUS ONCE
Status: COMPLETED | OUTPATIENT
Start: 2021-08-08 | End: 2021-08-08

## 2021-08-08 RX ORDER — POTASSIUM CHLORIDE 20MEQ/15ML
40 LIQUID (ML) ORAL
Status: COMPLETED | OUTPATIENT
Start: 2021-08-08 | End: 2021-08-08

## 2021-08-08 RX ORDER — OXYCODONE HYDROCHLORIDE 5 MG/1
5 TABLET ORAL EVERY 6 HOURS PRN
Status: DISCONTINUED | OUTPATIENT
Start: 2021-08-08 | End: 2021-08-09

## 2021-08-08 RX ORDER — OXYCODONE HYDROCHLORIDE 5 MG/1
2.5 TABLET ORAL EVERY 6 HOURS PRN
Status: DISCONTINUED | OUTPATIENT
Start: 2021-08-08 | End: 2021-08-09

## 2021-08-08 RX ORDER — HYDROMORPHONE HCL/PF 1 MG/ML
0.2 SYRINGE (ML) INJECTION EVERY 4 HOURS PRN
Status: DISCONTINUED | OUTPATIENT
Start: 2021-08-08 | End: 2021-08-11

## 2021-08-08 RX ORDER — LANOLIN ALCOHOL/MO/W.PET/CERES
100 CREAM (GRAM) TOPICAL DAILY
Status: DISCONTINUED | OUTPATIENT
Start: 2021-08-08 | End: 2021-08-15 | Stop reason: HOSPADM

## 2021-08-08 RX ADMIN — LIDOCAINE 5% 1 PATCH: 700 PATCH TOPICAL at 08:42

## 2021-08-08 RX ADMIN — THIAMINE HCL TAB 100 MG 100 MG: 100 TAB at 10:26

## 2021-08-08 RX ADMIN — FOLIC ACID 1 MG: 1 TABLET ORAL at 10:26

## 2021-08-08 RX ADMIN — OXYCODONE HYDROCHLORIDE 5 MG: 5 TABLET ORAL at 13:06

## 2021-08-08 RX ADMIN — IRON SUCROSE 200 MG: 20 INJECTION, SOLUTION INTRAVENOUS at 08:34

## 2021-08-08 RX ADMIN — PANTOPRAZOLE SODIUM 40 MG: 40 INJECTION, POWDER, FOR SOLUTION INTRAVENOUS at 08:34

## 2021-08-08 RX ADMIN — MAGNESIUM SULFATE HEPTAHYDRATE 2 G: 40 INJECTION, SOLUTION INTRAVENOUS at 13:09

## 2021-08-08 RX ADMIN — PANTOPRAZOLE SODIUM 40 MG: 40 INJECTION, POWDER, FOR SOLUTION INTRAVENOUS at 20:14

## 2021-08-08 RX ADMIN — LEVETIRACETAM 500 MG: 100 INJECTION, SOLUTION INTRAVENOUS at 10:18

## 2021-08-08 RX ADMIN — POTASSIUM CHLORIDE 40 MEQ: 20 SOLUTION ORAL at 13:07

## 2021-08-08 RX ADMIN — MULTIPLE VITAMINS W/ MINERALS TAB 1 TABLET: TAB ORAL at 10:26

## 2021-08-08 RX ADMIN — HYDROMORPHONE HYDROCHLORIDE 0.2 MG: 1 INJECTION, SOLUTION INTRAMUSCULAR; INTRAVENOUS; SUBCUTANEOUS at 15:57

## 2021-08-08 RX ADMIN — HYDROMORPHONE HYDROCHLORIDE 0.2 MG: 1 INJECTION, SOLUTION INTRAMUSCULAR; INTRAVENOUS; SUBCUTANEOUS at 08:23

## 2021-08-08 RX ADMIN — CYANOCOBALAMIN TAB 500 MCG 500 MCG: 500 TAB at 10:26

## 2021-08-08 RX ADMIN — POTASSIUM CHLORIDE 40 MEQ: 20 SOLUTION ORAL at 14:39

## 2021-08-08 RX ADMIN — LEVETIRACETAM 500 MG: 100 INJECTION, SOLUTION INTRAVENOUS at 20:14

## 2021-08-09 ENCOUNTER — TELEPHONE (OUTPATIENT)
Dept: NEUROSURGERY | Facility: CLINIC | Age: 61
End: 2021-08-09

## 2021-08-09 ENCOUNTER — APPOINTMENT (INPATIENT)
Dept: NON INVASIVE DIAGNOSTICS | Facility: HOSPITAL | Age: 61
DRG: 963 | End: 2021-08-09
Payer: COMMERCIAL

## 2021-08-09 LAB
ATRIAL RATE: 100 BPM
ATRIAL RATE: 86 BPM
ATRIAL RATE: 95 BPM
ATRIAL RATE: 98 BPM
BACTERIA BLD CULT: NORMAL
BASE EXCESS BLDA CALC-SCNC: 5 MMOL/L (ref -2–3)
FIO2 GAS DIL.REBREATH: 28 L
GLUCOSE SERPL-MCNC: 121 MG/DL (ref 65–140)
HCO3 BLDA-SCNC: 28.5 MMOL/L (ref 22–28)
HCT VFR BLD CALC: 18 % (ref 34.8–46.1)
HGB BLDA-MCNC: 6.1 G/DL (ref 11.5–15.4)
P AXIS: 40 DEGREES
P AXIS: 53 DEGREES
P AXIS: 53 DEGREES
P AXIS: 56 DEGREES
PCO2 BLD: 30 MMOL/L (ref 21–32)
PCO2 BLD: 34.9 MM HG (ref 36–44)
PH BLD: 7.52 [PH] (ref 7.35–7.45)
PO2 BLD: 91 MM HG (ref 75–129)
POTASSIUM BLD-SCNC: 3.4 MMOL/L (ref 3.5–5.3)
PR INTERVAL: 166 MS
PR INTERVAL: 178 MS
PR INTERVAL: 182 MS
PR INTERVAL: 190 MS
QRS AXIS: 52 DEGREES
QRS AXIS: 55 DEGREES
QRS AXIS: 57 DEGREES
QRS AXIS: 58 DEGREES
QRSD INTERVAL: 74 MS
QRSD INTERVAL: 78 MS
QRSD INTERVAL: 78 MS
QRSD INTERVAL: 80 MS
QT INTERVAL: 378 MS
QT INTERVAL: 380 MS
QT INTERVAL: 388 MS
QT INTERVAL: 390 MS
QTC INTERVAL: 464 MS
QTC INTERVAL: 475 MS
QTC INTERVAL: 490 MS
QTC INTERVAL: 497 MS
SAO2 % BLD FROM PO2: 98 % (ref 60–85)
SODIUM BLD-SCNC: 141 MMOL/L (ref 136–145)
SPECIMEN SOURCE: ABNORMAL
T WAVE AXIS: 37 DEGREES
T WAVE AXIS: 39 DEGREES
T WAVE AXIS: 47 DEGREES
T WAVE AXIS: 55 DEGREES
VENTRICULAR RATE: 100 BPM
VENTRICULAR RATE: 86 BPM
VENTRICULAR RATE: 95 BPM
VENTRICULAR RATE: 98 BPM

## 2021-08-09 PROCEDURE — 93306 TTE W/DOPPLER COMPLETE: CPT

## 2021-08-09 PROCEDURE — 36600 WITHDRAWAL OF ARTERIAL BLOOD: CPT

## 2021-08-09 PROCEDURE — 99291 CRITICAL CARE FIRST HOUR: CPT | Performed by: STUDENT IN AN ORGANIZED HEALTH CARE EDUCATION/TRAINING PROGRAM

## 2021-08-09 PROCEDURE — 93005 ELECTROCARDIOGRAM TRACING: CPT

## 2021-08-09 PROCEDURE — 82947 ASSAY GLUCOSE BLOOD QUANT: CPT

## 2021-08-09 PROCEDURE — 93010 ELECTROCARDIOGRAM REPORT: CPT | Performed by: INTERNAL MEDICINE

## 2021-08-09 PROCEDURE — C9113 INJ PANTOPRAZOLE SODIUM, VIA: HCPCS | Performed by: NURSE PRACTITIONER

## 2021-08-09 PROCEDURE — 93306 TTE W/DOPPLER COMPLETE: CPT | Performed by: INTERNAL MEDICINE

## 2021-08-09 PROCEDURE — 84295 ASSAY OF SERUM SODIUM: CPT

## 2021-08-09 PROCEDURE — 97163 PT EVAL HIGH COMPLEX 45 MIN: CPT

## 2021-08-09 PROCEDURE — 94668 MNPJ CHEST WALL SBSQ: CPT

## 2021-08-09 PROCEDURE — 84132 ASSAY OF SERUM POTASSIUM: CPT

## 2021-08-09 PROCEDURE — 82803 BLOOD GASES ANY COMBINATION: CPT

## 2021-08-09 PROCEDURE — 85014 HEMATOCRIT: CPT

## 2021-08-09 PROCEDURE — 94664 DEMO&/EVAL PT USE INHALER: CPT

## 2021-08-09 PROCEDURE — 97530 THERAPEUTIC ACTIVITIES: CPT

## 2021-08-09 RX ORDER — GABAPENTIN 300 MG/1
300 CAPSULE ORAL
Status: DISCONTINUED | OUTPATIENT
Start: 2021-08-09 | End: 2021-08-15 | Stop reason: HOSPADM

## 2021-08-09 RX ORDER — OXYCODONE HYDROCHLORIDE 5 MG/1
2.5 TABLET ORAL EVERY 4 HOURS PRN
Status: DISCONTINUED | OUTPATIENT
Start: 2021-08-09 | End: 2021-08-15 | Stop reason: HOSPADM

## 2021-08-09 RX ORDER — OXYCODONE HYDROCHLORIDE 5 MG/1
5 TABLET ORAL EVERY 4 HOURS PRN
Status: DISCONTINUED | OUTPATIENT
Start: 2021-08-09 | End: 2021-08-15 | Stop reason: HOSPADM

## 2021-08-09 RX ORDER — LEVETIRACETAM 500 MG/1
500 TABLET ORAL EVERY 12 HOURS SCHEDULED
Status: COMPLETED | OUTPATIENT
Start: 2021-08-09 | End: 2021-08-11

## 2021-08-09 RX ORDER — PANTOPRAZOLE SODIUM 40 MG/1
40 TABLET, DELAYED RELEASE ORAL
Status: DISCONTINUED | OUTPATIENT
Start: 2021-08-10 | End: 2021-08-11

## 2021-08-09 RX ORDER — METHOCARBAMOL 500 MG/1
500 TABLET, FILM COATED ORAL EVERY 6 HOURS PRN
Status: DISCONTINUED | OUTPATIENT
Start: 2021-08-09 | End: 2021-08-15 | Stop reason: HOSPADM

## 2021-08-09 RX ORDER — ACETAMINOPHEN 325 MG/1
975 TABLET ORAL EVERY 8 HOURS SCHEDULED
Status: DISCONTINUED | OUTPATIENT
Start: 2021-08-09 | End: 2021-08-15 | Stop reason: HOSPADM

## 2021-08-09 RX ORDER — LEVETIRACETAM 500 MG/1
500 TABLET ORAL EVERY 12 HOURS SCHEDULED
Status: DISCONTINUED | OUTPATIENT
Start: 2021-08-09 | End: 2021-08-09

## 2021-08-09 RX ADMIN — CYANOCOBALAMIN TAB 500 MCG 500 MCG: 500 TAB at 08:07

## 2021-08-09 RX ADMIN — ACETAMINOPHEN 975 MG: 325 TABLET, FILM COATED ORAL at 21:00

## 2021-08-09 RX ADMIN — GABAPENTIN 300 MG: 300 CAPSULE ORAL at 21:01

## 2021-08-09 RX ADMIN — HYDROMORPHONE HYDROCHLORIDE 0.2 MG: 1 INJECTION, SOLUTION INTRAMUSCULAR; INTRAVENOUS; SUBCUTANEOUS at 12:30

## 2021-08-09 RX ADMIN — ACETAMINOPHEN 975 MG: 325 TABLET, FILM COATED ORAL at 08:07

## 2021-08-09 RX ADMIN — THIAMINE HCL TAB 100 MG 100 MG: 100 TAB at 08:07

## 2021-08-09 RX ADMIN — ACETAMINOPHEN 975 MG: 325 TABLET, FILM COATED ORAL at 14:17

## 2021-08-09 RX ADMIN — MULTIPLE VITAMINS W/ MINERALS TAB 1 TABLET: TAB ORAL at 08:07

## 2021-08-09 RX ADMIN — HYDROMORPHONE HYDROCHLORIDE 0.2 MG: 1 INJECTION, SOLUTION INTRAMUSCULAR; INTRAVENOUS; SUBCUTANEOUS at 02:23

## 2021-08-09 RX ADMIN — OXYCODONE HYDROCHLORIDE 5 MG: 5 TABLET ORAL at 10:58

## 2021-08-09 RX ADMIN — LEVETIRACETAM 500 MG: 100 INJECTION, SOLUTION INTRAVENOUS at 08:20

## 2021-08-09 RX ADMIN — LEVETIRACETAM 500 MG: 500 TABLET, FILM COATED ORAL at 20:18

## 2021-08-09 RX ADMIN — OXYCODONE HYDROCHLORIDE 5 MG: 5 TABLET ORAL at 15:59

## 2021-08-09 RX ADMIN — PANTOPRAZOLE SODIUM 40 MG: 40 INJECTION, POWDER, FOR SOLUTION INTRAVENOUS at 08:07

## 2021-08-09 RX ADMIN — LIDOCAINE 5% 1 PATCH: 700 PATCH TOPICAL at 08:08

## 2021-08-09 RX ADMIN — FOLIC ACID 1 MG: 1 TABLET ORAL at 08:07

## 2021-08-09 NOTE — TELEPHONE ENCOUNTER
8/24/21: SPOKE TO RAHEEL SHE CALLED AND SCHEDULE CT FOR 8/31/21 -   OV APPT WAS RESCHEDULE FOR   9/3/21 / 9:30 / Ever Traore W/ AMANDA    8/23/21:    PATIENT DISCHARGED TO [facility]     [phone]   FOR RAHEEL IN MEDICAL RECORDS RE: APPT/IMAGING NEEDED  LEFT      8/18/21:  [id number] [place]    8/17/21: SPOKE TO ESTELA AT [facility]    PAT[facility] -    PATIENT IS MARKED FOR MERGE - THIS ADMISSION IS UNDER MRN: [id number]     8/16/21:   PATIENT DISCHARGED TO [facility]   [phone]    8/13/21: [address] [place]    8/12/21: [address] [place]    8/11/21:  [address] [place]    8/10/21: [address] [place]    8/9/21: [address] [place]     TODAYS DATE:   8/9/21  NOTE FROM Eliazar Bergeron  MRN:  [id number]    HOSPITAL FOLLOW UP   2  WEEK W/ AMANDA  8/26/21 / 11:00 / Ever Traore    IMAGING:   CT HEAD - AUTH NEEDED

## 2021-08-10 ENCOUNTER — APPOINTMENT (INPATIENT)
Dept: RADIOLOGY | Facility: HOSPITAL | Age: 61
DRG: 963 | End: 2021-08-10
Payer: COMMERCIAL

## 2021-08-10 PROBLEM — R77.8 ELEVATED TROPONIN: Status: RESOLVED | Noted: 2021-08-06 | Resolved: 2021-08-10

## 2021-08-10 PROBLEM — R79.89 ELEVATED TROPONIN: Status: RESOLVED | Noted: 2021-08-06 | Resolved: 2021-08-10

## 2021-08-10 LAB
BASE EXCESS BLDA CALC-SCNC: 5 MMOL/L (ref -2–3)
GLUCOSE SERPL-MCNC: 139 MG/DL (ref 65–140)
HCO3 BLDA-SCNC: 28.8 MMOL/L (ref 24–30)
HCT VFR BLD CALC: 34 % (ref 34.8–46.1)
HGB BLDA-MCNC: 11.6 G/DL (ref 11.5–15.4)
PCO2 BLD: 30 MMOL/L (ref 21–32)
PCO2 BLD: 38.8 MM HG (ref 42–50)
PH BLD: 7.48 [PH] (ref 7.3–7.4)
PO2 BLD: 33 MM HG (ref 35–45)
POTASSIUM BLD-SCNC: 3.4 MMOL/L (ref 3.5–5.3)
SAO2 % BLD FROM PO2: 69 % (ref 60–85)
SODIUM BLD-SCNC: 144 MMOL/L (ref 136–145)
SPECIMEN SOURCE: ABNORMAL

## 2021-08-10 PROCEDURE — 97167 OT EVAL HIGH COMPLEX 60 MIN: CPT

## 2021-08-10 PROCEDURE — 97530 THERAPEUTIC ACTIVITIES: CPT

## 2021-08-10 PROCEDURE — 85014 HEMATOCRIT: CPT

## 2021-08-10 PROCEDURE — 84132 ASSAY OF SERUM POTASSIUM: CPT

## 2021-08-10 PROCEDURE — 93005 ELECTROCARDIOGRAM TRACING: CPT

## 2021-08-10 PROCEDURE — 99233 SBSQ HOSP IP/OBS HIGH 50: CPT | Performed by: STUDENT IN AN ORGANIZED HEALTH CARE EDUCATION/TRAINING PROGRAM

## 2021-08-10 PROCEDURE — 82947 ASSAY GLUCOSE BLOOD QUANT: CPT

## 2021-08-10 PROCEDURE — 71045 X-RAY EXAM CHEST 1 VIEW: CPT

## 2021-08-10 PROCEDURE — 82803 BLOOD GASES ANY COMBINATION: CPT

## 2021-08-10 PROCEDURE — 84295 ASSAY OF SERUM SODIUM: CPT

## 2021-08-10 RX ADMIN — FOLIC ACID 1 MG: 1 TABLET ORAL at 09:02

## 2021-08-10 RX ADMIN — THIAMINE HCL TAB 100 MG 100 MG: 100 TAB at 09:02

## 2021-08-10 RX ADMIN — LEVETIRACETAM 500 MG: 500 TABLET, FILM COATED ORAL at 21:02

## 2021-08-10 RX ADMIN — CYANOCOBALAMIN TAB 500 MCG 500 MCG: 500 TAB at 09:02

## 2021-08-10 RX ADMIN — LEVETIRACETAM 500 MG: 500 TABLET, FILM COATED ORAL at 09:02

## 2021-08-10 RX ADMIN — LIDOCAINE 5% 1 PATCH: 700 PATCH TOPICAL at 09:02

## 2021-08-10 RX ADMIN — PANTOPRAZOLE SODIUM 40 MG: 40 TABLET, DELAYED RELEASE ORAL at 05:14

## 2021-08-10 RX ADMIN — ACETAMINOPHEN 975 MG: 325 TABLET, FILM COATED ORAL at 05:14

## 2021-08-10 RX ADMIN — GABAPENTIN 300 MG: 300 CAPSULE ORAL at 21:02

## 2021-08-10 RX ADMIN — ENOXAPARIN SODIUM 30 MG: 30 INJECTION SUBCUTANEOUS at 21:02

## 2021-08-10 RX ADMIN — MULTIPLE VITAMINS W/ MINERALS TAB 1 TABLET: TAB ORAL at 09:02

## 2021-08-10 RX ADMIN — ACETAMINOPHEN 975 MG: 325 TABLET, FILM COATED ORAL at 13:23

## 2021-08-10 RX ADMIN — ACETAMINOPHEN 975 MG: 325 TABLET, FILM COATED ORAL at 21:02

## 2021-08-10 RX ADMIN — ENOXAPARIN SODIUM 30 MG: 30 INJECTION SUBCUTANEOUS at 09:02

## 2021-08-11 LAB
ATRIAL RATE: 93 BPM
BASE EXCESS BLDA CALC-SCNC: 7 MMOL/L (ref -2–3)
GLUCOSE SERPL-MCNC: 122 MG/DL (ref 65–140)
HCO3 BLDA-SCNC: 31.5 MMOL/L (ref 24–30)
HCT VFR BLD CALC: 21 % (ref 34.8–46.1)
HGB BLDA-MCNC: 7.1 G/DL (ref 11.5–15.4)
P AXIS: 48 DEGREES
PCO2 BLD: 33 MMOL/L (ref 21–32)
PCO2 BLD: 45.5 MM HG (ref 42–50)
PH BLD: 7.45 [PH] (ref 7.3–7.4)
PO2 BLD: 34 MM HG (ref 35–45)
POTASSIUM BLD-SCNC: 2.9 MMOL/L (ref 3.5–5.3)
PR INTERVAL: 154 MS
QRS AXIS: 56 DEGREES
QRSD INTERVAL: 80 MS
QT INTERVAL: 366 MS
QTC INTERVAL: 455 MS
SAO2 % BLD FROM PO2: 68 % (ref 60–85)
SODIUM BLD-SCNC: 143 MMOL/L (ref 136–145)
SPECIMEN SOURCE: ABNORMAL
T WAVE AXIS: 53 DEGREES
VENTRICULAR RATE: 93 BPM

## 2021-08-11 PROCEDURE — 93010 ELECTROCARDIOGRAM REPORT: CPT | Performed by: INTERNAL MEDICINE

## 2021-08-11 PROCEDURE — 97110 THERAPEUTIC EXERCISES: CPT

## 2021-08-11 PROCEDURE — 99232 SBSQ HOSP IP/OBS MODERATE 35: CPT | Performed by: PHYSICIAN ASSISTANT

## 2021-08-11 PROCEDURE — 82947 ASSAY GLUCOSE BLOOD QUANT: CPT

## 2021-08-11 PROCEDURE — 94760 N-INVAS EAR/PLS OXIMETRY 1: CPT

## 2021-08-11 PROCEDURE — 82803 BLOOD GASES ANY COMBINATION: CPT

## 2021-08-11 PROCEDURE — 94668 MNPJ CHEST WALL SBSQ: CPT

## 2021-08-11 PROCEDURE — 92526 ORAL FUNCTION THERAPY: CPT

## 2021-08-11 PROCEDURE — 84295 ASSAY OF SERUM SODIUM: CPT

## 2021-08-11 PROCEDURE — 84132 ASSAY OF SERUM POTASSIUM: CPT

## 2021-08-11 PROCEDURE — 85014 HEMATOCRIT: CPT

## 2021-08-11 PROCEDURE — 94664 DEMO&/EVAL PT USE INHALER: CPT

## 2021-08-11 PROCEDURE — 94640 AIRWAY INHALATION TREATMENT: CPT

## 2021-08-11 PROCEDURE — 97530 THERAPEUTIC ACTIVITIES: CPT

## 2021-08-11 RX ORDER — POTASSIUM CHLORIDE 20 MEQ/1
40 TABLET, EXTENDED RELEASE ORAL
Status: COMPLETED | OUTPATIENT
Start: 2021-08-11 | End: 2021-08-11

## 2021-08-11 RX ORDER — ALBUTEROL SULFATE 2.5 MG/3ML
2.5 SOLUTION RESPIRATORY (INHALATION) EVERY 6 HOURS PRN
Status: DISCONTINUED | OUTPATIENT
Start: 2021-08-11 | End: 2021-08-15 | Stop reason: HOSPADM

## 2021-08-11 RX ORDER — ALBUTEROL SULFATE 2.5 MG/3ML
SOLUTION RESPIRATORY (INHALATION)
Status: COMPLETED
Start: 2021-08-11 | End: 2021-08-11

## 2021-08-11 RX ORDER — LEVALBUTEROL 1.25 MG/.5ML
1.25 SOLUTION, CONCENTRATE RESPIRATORY (INHALATION)
Status: DISCONTINUED | OUTPATIENT
Start: 2021-08-11 | End: 2021-08-15 | Stop reason: HOSPADM

## 2021-08-11 RX ORDER — SODIUM CHLORIDE FOR INHALATION 0.9 %
3 VIAL, NEBULIZER (ML) INHALATION
Status: DISCONTINUED | OUTPATIENT
Start: 2021-08-11 | End: 2021-08-15 | Stop reason: HOSPADM

## 2021-08-11 RX ADMIN — LEVETIRACETAM 500 MG: 500 TABLET, FILM COATED ORAL at 08:50

## 2021-08-11 RX ADMIN — THIAMINE HCL TAB 100 MG 100 MG: 100 TAB at 08:50

## 2021-08-11 RX ADMIN — PANTOPRAZOLE SODIUM 40 MG: 40 TABLET, DELAYED RELEASE ORAL at 05:49

## 2021-08-11 RX ADMIN — ACETAMINOPHEN 975 MG: 325 TABLET, FILM COATED ORAL at 05:49

## 2021-08-11 RX ADMIN — EPOETIN ALFA 20000 UNITS: 20000 SOLUTION INTRAVENOUS; SUBCUTANEOUS at 10:56

## 2021-08-11 RX ADMIN — ALBUTEROL SULFATE 2.5 MG: 2.5 SOLUTION RESPIRATORY (INHALATION) at 16:52

## 2021-08-11 RX ADMIN — OXYCODONE HYDROCHLORIDE 5 MG: 5 TABLET ORAL at 09:20

## 2021-08-11 RX ADMIN — EPOETIN ALFA 20000 UNITS: 20000 SOLUTION INTRAVENOUS; SUBCUTANEOUS at 10:57

## 2021-08-11 RX ADMIN — POTASSIUM CHLORIDE 40 MEQ: 1500 TABLET, EXTENDED RELEASE ORAL at 10:57

## 2021-08-11 RX ADMIN — ALBUTEROL SULFATE 2.5 MG: 2.5 SOLUTION RESPIRATORY (INHALATION) at 09:52

## 2021-08-11 RX ADMIN — CYANOCOBALAMIN TAB 500 MCG 500 MCG: 500 TAB at 08:50

## 2021-08-11 RX ADMIN — LEVETIRACETAM 500 MG: 500 TABLET, FILM COATED ORAL at 21:20

## 2021-08-11 RX ADMIN — FOLIC ACID 1 MG: 1 TABLET ORAL at 08:50

## 2021-08-11 RX ADMIN — LIDOCAINE 5% 1 PATCH: 700 PATCH TOPICAL at 08:50

## 2021-08-11 RX ADMIN — ACETAMINOPHEN 975 MG: 325 TABLET, FILM COATED ORAL at 13:49

## 2021-08-11 RX ADMIN — LEVALBUTEROL HYDROCHLORIDE 1.25 MG: 1.25 SOLUTION, CONCENTRATE RESPIRATORY (INHALATION) at 20:15

## 2021-08-11 RX ADMIN — GABAPENTIN 300 MG: 300 CAPSULE ORAL at 21:22

## 2021-08-11 RX ADMIN — ENOXAPARIN SODIUM 30 MG: 30 INJECTION SUBCUTANEOUS at 08:50

## 2021-08-11 RX ADMIN — ENOXAPARIN SODIUM 30 MG: 30 INJECTION SUBCUTANEOUS at 21:19

## 2021-08-11 RX ADMIN — MULTIPLE VITAMINS W/ MINERALS TAB 1 TABLET: TAB ORAL at 08:50

## 2021-08-11 RX ADMIN — ISODIUM CHLORIDE 3 ML: 0.03 SOLUTION RESPIRATORY (INHALATION) at 20:15

## 2021-08-11 RX ADMIN — POTASSIUM CHLORIDE 40 MEQ: 1500 TABLET, EXTENDED RELEASE ORAL at 12:17

## 2021-08-11 RX ADMIN — ACETAMINOPHEN 975 MG: 325 TABLET, FILM COATED ORAL at 21:21

## 2021-08-12 PROCEDURE — 94668 MNPJ CHEST WALL SBSQ: CPT

## 2021-08-12 PROCEDURE — 99232 SBSQ HOSP IP/OBS MODERATE 35: CPT | Performed by: PHYSICIAN ASSISTANT

## 2021-08-12 PROCEDURE — 94664 DEMO&/EVAL PT USE INHALER: CPT

## 2021-08-12 PROCEDURE — 94760 N-INVAS EAR/PLS OXIMETRY 1: CPT

## 2021-08-12 PROCEDURE — 97535 SELF CARE MNGMENT TRAINING: CPT

## 2021-08-12 PROCEDURE — 97530 THERAPEUTIC ACTIVITIES: CPT

## 2021-08-12 PROCEDURE — 94640 AIRWAY INHALATION TREATMENT: CPT

## 2021-08-12 RX ADMIN — FOLIC ACID 1 MG: 1 TABLET ORAL at 08:02

## 2021-08-12 RX ADMIN — ISODIUM CHLORIDE 3 ML: 0.03 SOLUTION RESPIRATORY (INHALATION) at 20:47

## 2021-08-12 RX ADMIN — METHOCARBAMOL 500 MG: 500 TABLET ORAL at 08:04

## 2021-08-12 RX ADMIN — METHOCARBAMOL 500 MG: 500 TABLET ORAL at 21:48

## 2021-08-12 RX ADMIN — GABAPENTIN 300 MG: 300 CAPSULE ORAL at 21:44

## 2021-08-12 RX ADMIN — LEVALBUTEROL HYDROCHLORIDE 1.25 MG: 1.25 SOLUTION, CONCENTRATE RESPIRATORY (INHALATION) at 20:47

## 2021-08-12 RX ADMIN — ALBUTEROL SULFATE 2.5 MG: 2.5 SOLUTION RESPIRATORY (INHALATION) at 04:31

## 2021-08-12 RX ADMIN — ISODIUM CHLORIDE 3 ML: 0.03 SOLUTION RESPIRATORY (INHALATION) at 07:28

## 2021-08-12 RX ADMIN — ACETAMINOPHEN 975 MG: 325 TABLET, FILM COATED ORAL at 05:15

## 2021-08-12 RX ADMIN — OXYCODONE HYDROCHLORIDE 5 MG: 5 TABLET ORAL at 00:10

## 2021-08-12 RX ADMIN — LIDOCAINE 5% 1 PATCH: 700 PATCH TOPICAL at 08:02

## 2021-08-12 RX ADMIN — CYANOCOBALAMIN TAB 500 MCG 500 MCG: 500 TAB at 08:02

## 2021-08-12 RX ADMIN — LEVALBUTEROL HYDROCHLORIDE 1.25 MG: 1.25 SOLUTION, CONCENTRATE RESPIRATORY (INHALATION) at 07:28

## 2021-08-12 RX ADMIN — ALUMINA, MAGNESIA, AND SIMETHICONE ORAL SUSPENSION REGULAR STRENGTH 10 ML: 1200; 1200; 120 SUSPENSION ORAL at 15:23

## 2021-08-12 RX ADMIN — ENOXAPARIN SODIUM 30 MG: 30 INJECTION SUBCUTANEOUS at 21:44

## 2021-08-12 RX ADMIN — ISODIUM CHLORIDE 3 ML: 0.03 SOLUTION RESPIRATORY (INHALATION) at 13:03

## 2021-08-12 RX ADMIN — ACETAMINOPHEN 975 MG: 325 TABLET, FILM COATED ORAL at 21:44

## 2021-08-12 RX ADMIN — THIAMINE HCL TAB 100 MG 100 MG: 100 TAB at 08:01

## 2021-08-12 RX ADMIN — ALUMINA, MAGNESIA, AND SIMETHICONE ORAL SUSPENSION REGULAR STRENGTH 10 ML: 1200; 1200; 120 SUSPENSION ORAL at 09:25

## 2021-08-12 RX ADMIN — MULTIPLE VITAMINS W/ MINERALS TAB 1 TABLET: TAB ORAL at 08:02

## 2021-08-12 RX ADMIN — ACETAMINOPHEN 975 MG: 325 TABLET, FILM COATED ORAL at 14:30

## 2021-08-12 RX ADMIN — OXYCODONE HYDROCHLORIDE 5 MG: 5 TABLET ORAL at 04:24

## 2021-08-12 RX ADMIN — ENOXAPARIN SODIUM 30 MG: 30 INJECTION SUBCUTANEOUS at 08:02

## 2021-08-12 RX ADMIN — LEVALBUTEROL HYDROCHLORIDE 1.25 MG: 1.25 SOLUTION, CONCENTRATE RESPIRATORY (INHALATION) at 13:03

## 2021-08-13 LAB
ANION GAP SERPL CALCULATED.3IONS-SCNC: 7 MMOL/L (ref 4–13)
BUN SERPL-MCNC: 7 MG/DL (ref 5–25)
CALCIUM SERPL-MCNC: 8 MG/DL (ref 8.3–10.1)
CHLORIDE SERPL-SCNC: 106 MMOL/L (ref 100–108)
CO2 SERPL-SCNC: 30 MMOL/L (ref 21–32)
CREAT SERPL-MCNC: 0.56 MG/DL (ref 0.6–1.3)
ERYTHROCYTE [DISTWIDTH] IN BLOOD BY AUTOMATED COUNT: 26.5 % (ref 11.6–15.1)
GFR SERPL CREATININE-BSD FRML MDRD: 101 ML/MIN/1.73SQ M
GLUCOSE SERPL-MCNC: 113 MG/DL (ref 65–140)
HCT VFR BLD AUTO: 25 % (ref 34.8–46.1)
HGB BLD-MCNC: 7.4 G/DL (ref 11.5–15.4)
MAGNESIUM SERPL-MCNC: 1.4 MG/DL (ref 1.6–2.6)
MCH RBC QN AUTO: 34.9 PG (ref 26.8–34.3)
MCHC RBC AUTO-ENTMCNC: 29.6 G/DL (ref 31.4–37.4)
MCV RBC AUTO: 118 FL (ref 82–98)
PHOSPHATE SERPL-MCNC: 3 MG/DL (ref 2.3–4.1)
PLATELET # BLD AUTO: 46 THOUSANDS/UL (ref 149–390)
PMV BLD AUTO: 11.6 FL (ref 8.9–12.7)
POTASSIUM SERPL-SCNC: 3.4 MMOL/L (ref 3.5–5.3)
RBC # BLD AUTO: 2.12 MILLION/UL (ref 3.81–5.12)
SODIUM SERPL-SCNC: 143 MMOL/L (ref 136–145)
WBC # BLD AUTO: 5.57 THOUSAND/UL (ref 4.31–10.16)

## 2021-08-13 PROCEDURE — 99232 SBSQ HOSP IP/OBS MODERATE 35: CPT | Performed by: PHYSICIAN ASSISTANT

## 2021-08-13 PROCEDURE — 83735 ASSAY OF MAGNESIUM: CPT | Performed by: SURGERY

## 2021-08-13 PROCEDURE — 85027 COMPLETE CBC AUTOMATED: CPT | Performed by: SURGERY

## 2021-08-13 PROCEDURE — 97110 THERAPEUTIC EXERCISES: CPT

## 2021-08-13 PROCEDURE — 84100 ASSAY OF PHOSPHORUS: CPT | Performed by: SURGERY

## 2021-08-13 PROCEDURE — 94760 N-INVAS EAR/PLS OXIMETRY 1: CPT

## 2021-08-13 PROCEDURE — 92526 ORAL FUNCTION THERAPY: CPT

## 2021-08-13 PROCEDURE — 97530 THERAPEUTIC ACTIVITIES: CPT

## 2021-08-13 PROCEDURE — 80048 BASIC METABOLIC PNL TOTAL CA: CPT | Performed by: SURGERY

## 2021-08-13 PROCEDURE — 94640 AIRWAY INHALATION TREATMENT: CPT

## 2021-08-13 RX ORDER — POTASSIUM CHLORIDE 20 MEQ/1
40 TABLET, EXTENDED RELEASE ORAL ONCE
Status: COMPLETED | OUTPATIENT
Start: 2021-08-13 | End: 2021-08-13

## 2021-08-13 RX ORDER — MAGNESIUM SULFATE HEPTAHYDRATE 40 MG/ML
2 INJECTION, SOLUTION INTRAVENOUS ONCE
Status: COMPLETED | OUTPATIENT
Start: 2021-08-13 | End: 2021-08-13

## 2021-08-13 RX ADMIN — OXYCODONE HYDROCHLORIDE 5 MG: 5 TABLET ORAL at 07:37

## 2021-08-13 RX ADMIN — LIDOCAINE 5% 1 PATCH: 700 PATCH TOPICAL at 08:40

## 2021-08-13 RX ADMIN — ISODIUM CHLORIDE 3 ML: 0.03 SOLUTION RESPIRATORY (INHALATION) at 09:05

## 2021-08-13 RX ADMIN — ACETAMINOPHEN 975 MG: 325 TABLET, FILM COATED ORAL at 21:24

## 2021-08-13 RX ADMIN — POTASSIUM CHLORIDE 40 MEQ: 1500 TABLET, EXTENDED RELEASE ORAL at 12:38

## 2021-08-13 RX ADMIN — ISODIUM CHLORIDE 3 ML: 0.03 SOLUTION RESPIRATORY (INHALATION) at 14:40

## 2021-08-13 RX ADMIN — ISODIUM CHLORIDE 3 ML: 0.03 SOLUTION RESPIRATORY (INHALATION) at 19:20

## 2021-08-13 RX ADMIN — ACETAMINOPHEN 975 MG: 325 TABLET, FILM COATED ORAL at 05:29

## 2021-08-13 RX ADMIN — LEVALBUTEROL HYDROCHLORIDE 1.25 MG: 1.25 SOLUTION, CONCENTRATE RESPIRATORY (INHALATION) at 19:19

## 2021-08-13 RX ADMIN — LEVALBUTEROL HYDROCHLORIDE 1.25 MG: 1.25 SOLUTION, CONCENTRATE RESPIRATORY (INHALATION) at 14:40

## 2021-08-13 RX ADMIN — MULTIPLE VITAMINS W/ MINERALS TAB 1 TABLET: TAB ORAL at 08:40

## 2021-08-13 RX ADMIN — MAGNESIUM SULFATE HEPTAHYDRATE 2 G: 40 INJECTION, SOLUTION INTRAVENOUS at 09:20

## 2021-08-13 RX ADMIN — LEVALBUTEROL HYDROCHLORIDE 1.25 MG: 1.25 SOLUTION, CONCENTRATE RESPIRATORY (INHALATION) at 09:05

## 2021-08-13 RX ADMIN — CYANOCOBALAMIN TAB 500 MCG 500 MCG: 500 TAB at 08:40

## 2021-08-13 RX ADMIN — ACETAMINOPHEN 975 MG: 325 TABLET, FILM COATED ORAL at 13:51

## 2021-08-13 RX ADMIN — THIAMINE HCL TAB 100 MG 100 MG: 100 TAB at 08:40

## 2021-08-13 RX ADMIN — ENOXAPARIN SODIUM 30 MG: 30 INJECTION SUBCUTANEOUS at 20:28

## 2021-08-13 RX ADMIN — FOLIC ACID 1 MG: 1 TABLET ORAL at 08:40

## 2021-08-13 RX ADMIN — ALUMINA, MAGNESIA, AND SIMETHICONE ORAL SUSPENSION REGULAR STRENGTH 10 ML: 1200; 1200; 120 SUSPENSION ORAL at 07:38

## 2021-08-13 RX ADMIN — OXYCODONE HYDROCHLORIDE 5 MG: 5 TABLET ORAL at 20:27

## 2021-08-13 RX ADMIN — GABAPENTIN 300 MG: 300 CAPSULE ORAL at 21:24

## 2021-08-13 RX ADMIN — OXYCODONE HYDROCHLORIDE 5 MG: 5 TABLET ORAL at 13:51

## 2021-08-13 RX ADMIN — ENOXAPARIN SODIUM 30 MG: 30 INJECTION SUBCUTANEOUS at 08:40

## 2021-08-14 ENCOUNTER — TELEPHONE (OUTPATIENT)
Dept: OTHER | Facility: OTHER | Age: 61
End: 2021-08-14

## 2021-08-14 VITALS
OXYGEN SATURATION: 93 % | WEIGHT: 139.99 LBS | HEART RATE: 117 BPM | DIASTOLIC BLOOD PRESSURE: 87 MMHG | RESPIRATION RATE: 17 BRPM | SYSTOLIC BLOOD PRESSURE: 132 MMHG | TEMPERATURE: 99.7 F

## 2021-08-14 PROCEDURE — NC001 PR NO CHARGE: Performed by: SURGERY

## 2021-08-14 PROCEDURE — 94640 AIRWAY INHALATION TREATMENT: CPT

## 2021-08-14 PROCEDURE — 97110 THERAPEUTIC EXERCISES: CPT

## 2021-08-14 PROCEDURE — 94760 N-INVAS EAR/PLS OXIMETRY 1: CPT

## 2021-08-14 PROCEDURE — 94668 MNPJ CHEST WALL SBSQ: CPT

## 2021-08-14 PROCEDURE — 99238 HOSP IP/OBS DSCHRG MGMT 30/<: CPT | Performed by: NURSE PRACTITIONER

## 2021-08-14 PROCEDURE — 94664 DEMO&/EVAL PT USE INHALER: CPT

## 2021-08-14 RX ORDER — METHOCARBAMOL 500 MG/1
500 TABLET, FILM COATED ORAL EVERY 6 HOURS PRN
Qty: 30 TABLET | Refills: 0 | Status: SHIPPED | OUTPATIENT
Start: 2021-08-14 | End: 2021-09-02 | Stop reason: SDUPTHER

## 2021-08-14 RX ORDER — LANOLIN ALCOHOL/MO/W.PET/CERES
100 CREAM (GRAM) TOPICAL DAILY
Qty: 30 TABLET | Refills: 0 | Status: SHIPPED | OUTPATIENT
Start: 2021-08-15 | End: 2021-09-02 | Stop reason: SDUPTHER

## 2021-08-14 RX ORDER — LIDOCAINE 50 MG/G
1 PATCH TOPICAL DAILY
Qty: 10 PATCH | Refills: 0 | Status: SHIPPED | OUTPATIENT
Start: 2021-08-15 | End: 2021-09-02 | Stop reason: ALTCHOICE

## 2021-08-14 RX ORDER — OXYCODONE HYDROCHLORIDE 5 MG/1
2.5 TABLET ORAL EVERY 4 HOURS PRN
Qty: 20 TABLET | Refills: 0 | Status: SHIPPED | OUTPATIENT
Start: 2021-08-14 | End: 2021-08-24

## 2021-08-14 RX ORDER — FOLIC ACID 1 MG/1
1 TABLET ORAL DAILY
Qty: 30 TABLET | Refills: 0 | Status: SHIPPED | OUTPATIENT
Start: 2021-08-15 | End: 2021-09-02 | Stop reason: SDUPTHER

## 2021-08-14 RX ORDER — ALBUTEROL SULFATE 2.5 MG/3ML
2.5 SOLUTION RESPIRATORY (INHALATION) EVERY 6 HOURS PRN
Qty: 30 ML | Refills: 0 | Status: SHIPPED | OUTPATIENT
Start: 2021-08-14 | End: 2021-09-02 | Stop reason: ALTCHOICE

## 2021-08-14 RX ORDER — GABAPENTIN 300 MG/1
300 CAPSULE ORAL
Qty: 30 CAPSULE | Refills: 0 | Status: SHIPPED | OUTPATIENT
Start: 2021-08-14 | End: 2021-09-02 | Stop reason: SDUPTHER

## 2021-08-14 RX ORDER — ACETAMINOPHEN 325 MG/1
975 TABLET ORAL EVERY 8 HOURS SCHEDULED
Qty: 30 TABLET | Refills: 0 | Status: SHIPPED | OUTPATIENT
Start: 2021-08-14 | End: 2021-09-02 | Stop reason: SDUPTHER

## 2021-08-14 RX ORDER — LEVALBUTEROL 1.25 MG/.5ML
1.25 SOLUTION, CONCENTRATE RESPIRATORY (INHALATION)
Qty: 10.5 ML | Refills: 0 | Status: SHIPPED | OUTPATIENT
Start: 2021-08-14 | End: 2021-08-21

## 2021-08-14 RX ADMIN — ACETAMINOPHEN 975 MG: 325 TABLET, FILM COATED ORAL at 05:11

## 2021-08-14 RX ADMIN — ALUMINA, MAGNESIA, AND SIMETHICONE ORAL SUSPENSION REGULAR STRENGTH 10 ML: 1200; 1200; 120 SUSPENSION ORAL at 05:11

## 2021-08-14 RX ADMIN — MULTIPLE VITAMINS W/ MINERALS TAB 1 TABLET: TAB ORAL at 09:37

## 2021-08-14 RX ADMIN — LEVALBUTEROL HYDROCHLORIDE 1.25 MG: 1.25 SOLUTION, CONCENTRATE RESPIRATORY (INHALATION) at 15:21

## 2021-08-14 RX ADMIN — METHOCARBAMOL 500 MG: 500 TABLET ORAL at 20:38

## 2021-08-14 RX ADMIN — LEVALBUTEROL HYDROCHLORIDE 1.25 MG: 1.25 SOLUTION, CONCENTRATE RESPIRATORY (INHALATION) at 08:27

## 2021-08-14 RX ADMIN — OXYCODONE HYDROCHLORIDE 5 MG: 5 TABLET ORAL at 16:41

## 2021-08-14 RX ADMIN — ENOXAPARIN SODIUM 30 MG: 30 INJECTION SUBCUTANEOUS at 20:39

## 2021-08-14 RX ADMIN — CYANOCOBALAMIN TAB 500 MCG 500 MCG: 500 TAB at 09:37

## 2021-08-14 RX ADMIN — FOLIC ACID 1 MG: 1 TABLET ORAL at 09:37

## 2021-08-14 RX ADMIN — LIDOCAINE 5% 1 PATCH: 700 PATCH TOPICAL at 09:37

## 2021-08-14 RX ADMIN — ISODIUM CHLORIDE 3 ML: 0.03 SOLUTION RESPIRATORY (INHALATION) at 19:57

## 2021-08-14 RX ADMIN — THIAMINE HCL TAB 100 MG 100 MG: 100 TAB at 09:37

## 2021-08-14 RX ADMIN — OXYCODONE HYDROCHLORIDE 5 MG: 5 TABLET ORAL at 05:11

## 2021-08-14 RX ADMIN — ISODIUM CHLORIDE 3 ML: 0.03 SOLUTION RESPIRATORY (INHALATION) at 15:23

## 2021-08-14 RX ADMIN — ISODIUM CHLORIDE 3 ML: 0.03 SOLUTION RESPIRATORY (INHALATION) at 08:27

## 2021-08-14 RX ADMIN — OXYCODONE HYDROCHLORIDE 5 MG: 5 TABLET ORAL at 09:37

## 2021-08-14 RX ADMIN — ENOXAPARIN SODIUM 30 MG: 30 INJECTION SUBCUTANEOUS at 09:38

## 2021-08-14 RX ADMIN — LEVALBUTEROL HYDROCHLORIDE 1.25 MG: 1.25 SOLUTION, CONCENTRATE RESPIRATORY (INHALATION) at 19:57

## 2021-08-15 ENCOUNTER — HOSPITAL ENCOUNTER (INPATIENT)
Facility: HOSPITAL | Age: 61
LOS: 4 days | Discharge: NURSING, REHAB OR LONG-TERM CARE | DRG: 963 | End: 2021-08-20
Attending: EMERGENCY MEDICINE | Admitting: HOSPITALIST
Payer: COMMERCIAL

## 2021-08-15 ENCOUNTER — TELEPHONE (OUTPATIENT)
Dept: OTHER | Facility: OTHER | Age: 61
End: 2021-08-15

## 2021-08-15 ENCOUNTER — APPOINTMENT (EMERGENCY)
Dept: CT IMAGING | Facility: HOSPITAL | Age: 61
DRG: 963 | End: 2021-08-15
Payer: COMMERCIAL

## 2021-08-15 DIAGNOSIS — J18.9 HAP (HOSPITAL-ACQUIRED PNEUMONIA): ICD-10-CM
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
## 2021-08-15 DIAGNOSIS — R52 PAIN: Primary | ICD-10-CM

## 2021-08-15 DIAGNOSIS — R04.2 HEMOPTYSIS: Primary | ICD-10-CM

## 2021-08-15 DIAGNOSIS — T07.XXXA FRACTURES, MULTIPLE: ICD-10-CM

## 2021-08-15 DIAGNOSIS — S42.001A RIGHT CLAVICLE FRACTURE: ICD-10-CM

## 2021-08-15 LAB
ALBUMIN SERPL BCP-MCNC: 2.2 G/DL (ref 3.5–5)
ALP SERPL-CCNC: 224 U/L (ref 46–116)
ALT SERPL W P-5'-P-CCNC: 35 U/L (ref 12–78)
ANION GAP SERPL CALCULATED.3IONS-SCNC: 8 MMOL/L (ref 4–13)
APTT PPP: 31 SECONDS (ref 23–37)
AST SERPL W P-5'-P-CCNC: 48 U/L (ref 5–45)
BASE EX.OXY STD BLDV CALC-SCNC: 92.4 % (ref 60–80)
BASE EXCESS BLDV CALC-SCNC: 1.6 MMOL/L
BASOPHILS # BLD AUTO: 0.02 THOUSANDS/ΜL (ref 0–0.1)
BASOPHILS NFR BLD AUTO: 0 % (ref 0–1)
BILIRUB SERPL-MCNC: 0.87 MG/DL (ref 0.2–1)
BUN SERPL-MCNC: 7 MG/DL (ref 5–25)
CALCIUM ALBUM COR SERPL-MCNC: 9.5 MG/DL (ref 8.3–10.1)
CALCIUM SERPL-MCNC: 8.1 MG/DL (ref 8.3–10.1)
CHLORIDE SERPL-SCNC: 100 MMOL/L (ref 100–108)
CO2 SERPL-SCNC: 26 MMOL/L (ref 21–32)
CREAT SERPL-MCNC: 0.6 MG/DL (ref 0.6–1.3)
EOSINOPHIL # BLD AUTO: 0.01 THOUSAND/ΜL (ref 0–0.61)
EOSINOPHIL NFR BLD AUTO: 0 % (ref 0–6)
ERYTHROCYTE [DISTWIDTH] IN BLOOD BY AUTOMATED COUNT: 25.3 % (ref 11.6–15.1)
GFR SERPL CREATININE-BSD FRML MDRD: 99 ML/MIN/1.73SQ M
GLUCOSE SERPL-MCNC: 146 MG/DL (ref 65–140)
HCO3 BLDV-SCNC: 22.9 MMOL/L (ref 24–30)
HCT VFR BLD AUTO: 27.1 % (ref 34.8–46.1)
HGB BLD-MCNC: 8.2 G/DL (ref 11.5–15.4)
IMM GRANULOCYTES # BLD AUTO: 0.11 THOUSAND/UL (ref 0–0.2)
IMM GRANULOCYTES NFR BLD AUTO: 1 % (ref 0–2)
INR PPP: 1.13 (ref 0.84–1.19)
LACTATE SERPL-SCNC: 1.5 MMOL/L (ref 0.5–2)
LYMPHOCYTES # BLD AUTO: 0.63 THOUSANDS/ΜL (ref 0.6–4.47)
LYMPHOCYTES NFR BLD AUTO: 8 % (ref 14–44)
MAGNESIUM SERPL-MCNC: 1.5 MG/DL (ref 1.6–2.6)
MCH RBC QN AUTO: 35 PG (ref 26.8–34.3)
MCHC RBC AUTO-ENTMCNC: 30.3 G/DL (ref 31.4–37.4)
MCV RBC AUTO: 116 FL (ref 82–98)
MONOCYTES # BLD AUTO: 1.06 THOUSAND/ΜL (ref 0.17–1.22)
MONOCYTES NFR BLD AUTO: 13 % (ref 4–12)
NEUTROPHILS # BLD AUTO: 6.1 THOUSANDS/ΜL (ref 1.85–7.62)
NEUTS SEG NFR BLD AUTO: 78 % (ref 43–75)
NRBC BLD AUTO-RTO: 0 /100 WBCS
NT-PROBNP SERPL-MCNC: 2610 PG/ML
O2 CT BLDV-SCNC: 12.1 ML/DL
PCO2 BLDV: 25.2 MM HG (ref 42–50)
PH BLDV: 7.58 [PH] (ref 7.3–7.4)
PLATELET # BLD AUTO: 66 THOUSANDS/UL (ref 149–390)
PMV BLD AUTO: 11.6 FL (ref 8.9–12.7)
PO2 BLDV: 87 MM HG (ref 35–45)
POTASSIUM SERPL-SCNC: 4 MMOL/L (ref 3.5–5.3)
PROT SERPL-MCNC: 7 G/DL (ref 6.4–8.2)
PROTHROMBIN TIME: 14.7 SECONDS (ref 11.6–14.5)
RBC # BLD AUTO: 2.34 MILLION/UL (ref 3.81–5.12)
SODIUM SERPL-SCNC: 134 MMOL/L (ref 136–145)
TROPONIN I SERPL-MCNC: <0.02 NG/ML
WBC # BLD AUTO: 7.93 THOUSAND/UL (ref 4.31–10.16)

## 2021-08-15 PROCEDURE — 99285 EMERGENCY DEPT VISIT HI MDM: CPT

## 2021-08-15 PROCEDURE — 93005 ELECTROCARDIOGRAM TRACING: CPT

## 2021-08-15 PROCEDURE — 36415 COLL VENOUS BLD VENIPUNCTURE: CPT | Performed by: PHYSICIAN ASSISTANT

## 2021-08-15 PROCEDURE — 99284 EMERGENCY DEPT VISIT MOD MDM: CPT | Performed by: PHYSICIAN ASSISTANT

## 2021-08-15 PROCEDURE — 80053 COMPREHEN METABOLIC PANEL: CPT | Performed by: PHYSICIAN ASSISTANT

## 2021-08-15 PROCEDURE — U0005 INFEC AGEN DETEC AMPLI PROBE: HCPCS | Performed by: PHYSICIAN ASSISTANT

## 2021-08-15 PROCEDURE — 96365 THER/PROPH/DIAG IV INF INIT: CPT

## 2021-08-15 PROCEDURE — 86850 RBC ANTIBODY SCREEN: CPT | Performed by: PHYSICIAN ASSISTANT

## 2021-08-15 PROCEDURE — 84145 PROCALCITONIN (PCT): CPT | Performed by: PHYSICIAN ASSISTANT

## 2021-08-15 PROCEDURE — 71275 CT ANGIOGRAPHY CHEST: CPT

## 2021-08-15 PROCEDURE — 87040 BLOOD CULTURE FOR BACTERIA: CPT | Performed by: PHYSICIAN ASSISTANT

## 2021-08-15 PROCEDURE — 82805 BLOOD GASES W/O2 SATURATION: CPT | Performed by: PHYSICIAN ASSISTANT

## 2021-08-15 PROCEDURE — 84484 ASSAY OF TROPONIN QUANT: CPT | Performed by: PHYSICIAN ASSISTANT

## 2021-08-15 PROCEDURE — 85025 COMPLETE CBC W/AUTO DIFF WBC: CPT | Performed by: PHYSICIAN ASSISTANT

## 2021-08-15 PROCEDURE — U0003 INFECTIOUS AGENT DETECTION BY NUCLEIC ACID (DNA OR RNA); SEVERE ACUTE RESPIRATORY SYNDROME CORONAVIRUS 2 (SARS-COV-2) (CORONAVIRUS DISEASE [COVID-19]), AMPLIFIED PROBE TECHNIQUE, MAKING USE OF HIGH THROUGHPUT TECHNOLOGIES AS DESCRIBED BY CMS-2020-01-R: HCPCS | Performed by: PHYSICIAN ASSISTANT

## 2021-08-15 PROCEDURE — 85610 PROTHROMBIN TIME: CPT | Performed by: PHYSICIAN ASSISTANT

## 2021-08-15 PROCEDURE — 83605 ASSAY OF LACTIC ACID: CPT | Performed by: PHYSICIAN ASSISTANT

## 2021-08-15 PROCEDURE — 85730 THROMBOPLASTIN TIME PARTIAL: CPT | Performed by: PHYSICIAN ASSISTANT

## 2021-08-15 PROCEDURE — 83735 ASSAY OF MAGNESIUM: CPT | Performed by: PHYSICIAN ASSISTANT

## 2021-08-15 PROCEDURE — 86901 BLOOD TYPING SEROLOGIC RH(D): CPT | Performed by: PHYSICIAN ASSISTANT

## 2021-08-15 PROCEDURE — 83880 ASSAY OF NATRIURETIC PEPTIDE: CPT | Performed by: PHYSICIAN ASSISTANT

## 2021-08-15 PROCEDURE — 86900 BLOOD TYPING SEROLOGIC ABO: CPT | Performed by: PHYSICIAN ASSISTANT

## 2021-08-15 RX ORDER — ALBUTEROL SULFATE 2.5 MG/3ML
2.5 SOLUTION RESPIRATORY (INHALATION) EVERY 6 HOURS PRN
COMMUNITY
End: 2021-08-26 | Stop reason: SDUPTHER

## 2021-08-15 RX ORDER — ACETAMINOPHEN AND CODEINE PHOSPHATE 300; 30 MG/1; MG/1
1 TABLET ORAL EVERY 4 HOURS PRN
COMMUNITY
End: 2021-08-16

## 2021-08-15 RX ORDER — LANOLIN ALCOHOL/MO/W.PET/CERES
100 CREAM (GRAM) TOPICAL DAILY
COMMUNITY
End: 2021-08-26 | Stop reason: SDUPTHER

## 2021-08-15 RX ORDER — METHOCARBAMOL 500 MG/1
500 TABLET, FILM COATED ORAL EVERY 6 HOURS PRN
COMMUNITY
End: 2021-08-26 | Stop reason: SDUPTHER

## 2021-08-15 RX ORDER — GABAPENTIN 300 MG/1
300 CAPSULE ORAL
COMMUNITY
End: 2021-08-26 | Stop reason: SDUPTHER

## 2021-08-15 RX ORDER — ACETAMINOPHEN 325 MG/1
650 TABLET ORAL ONCE
Status: COMPLETED | OUTPATIENT
Start: 2021-08-15 | End: 2021-08-16

## 2021-08-15 RX ORDER — OXYCODONE HYDROCHLORIDE 5 MG/1
5 TABLET ORAL EVERY 4 HOURS PRN
Qty: 4 TABLET | Refills: 0 | Status: SHIPPED | OUTPATIENT
Start: 2021-08-15 | End: 2021-08-20 | Stop reason: HOSPADM

## 2021-08-15 RX ORDER — FOLIC ACID 1 MG/1
TABLET ORAL DAILY
COMMUNITY
End: 2021-08-26 | Stop reason: SDUPTHER

## 2021-08-15 RX ORDER — ACETAMINOPHEN 325 MG/1
975 TABLET ORAL 3 TIMES DAILY
COMMUNITY
End: 2021-08-26 | Stop reason: ALTCHOICE

## 2021-08-15 RX ORDER — LEVALBUTEROL INHALATION SOLUTION 1.25 MG/3ML
1.25 SOLUTION RESPIRATORY (INHALATION) 3 TIMES DAILY
COMMUNITY
End: 2021-09-02 | Stop reason: ALTCHOICE

## 2021-08-15 RX ADMIN — SODIUM CHLORIDE, SODIUM LACTATE, POTASSIUM CHLORIDE, AND CALCIUM CHLORIDE 1000 ML: .6; .31; .03; .02 INJECTION, SOLUTION INTRAVENOUS at 23:02

## 2021-08-16 PROBLEM — N39.0 UTI (URINARY TRACT INFECTION): Status: ACTIVE | Noted: 2021-08-16

## 2021-08-16 PROBLEM — S22.41XA CLOSED FRACTURE OF MULTIPLE RIBS OF RIGHT SIDE: Status: ACTIVE | Noted: 2021-08-16

## 2021-08-16 PROBLEM — I60.9 SUBARACHNOID HEMORRHAGE (HCC): Status: ACTIVE | Noted: 2021-08-16

## 2021-08-16 PROBLEM — S32.591A BILATERAL PUBIC RAMI FRACTURES (HCC): Status: ACTIVE | Noted: 2021-08-16

## 2021-08-16 PROBLEM — S42.001A FRACTURE OF RIGHT CLAVICLE: Status: ACTIVE | Noted: 2021-08-16

## 2021-08-16 PROBLEM — J96.00 ACUTE RESPIRATORY FAILURE (HCC): Status: ACTIVE | Noted: 2021-08-16

## 2021-08-16 PROBLEM — J18.9 HAP (HOSPITAL-ACQUIRED PNEUMONIA): Status: ACTIVE | Noted: 2021-08-16

## 2021-08-16 PROBLEM — E83.42 HYPOMAGNESEMIA: Status: ACTIVE | Noted: 2021-08-16

## 2021-08-16 PROBLEM — I61.9 ICH (INTRACEREBRAL HEMORRHAGE) (HCC): Status: ACTIVE | Noted: 2021-08-16

## 2021-08-16 PROBLEM — S01.512A TONGUE LACERATION: Status: ACTIVE | Noted: 2021-08-16
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
## 2021-08-16 PROBLEM — S06.9X9A TBI (TRAUMATIC BRAIN INJURY) (HCC): Status: ACTIVE | Noted: 2021-08-16

## 2021-08-16 PROBLEM — S06.9XAA TBI (TRAUMATIC BRAIN INJURY): Status: ACTIVE | Noted: 2021-08-16

## 2021-08-16 PROBLEM — J94.2 HEMOTHORAX, RIGHT: Status: ACTIVE | Noted: 2021-08-16

## 2021-08-16 PROBLEM — S32.592A BILATERAL PUBIC RAMI FRACTURES (HCC): Status: ACTIVE | Noted: 2021-08-16

## 2021-08-16 LAB
ABO GROUP BLD: NORMAL
ATRIAL RATE: 109 BPM
BACTERIA UR QL AUTO: ABNORMAL /HPF
BILIRUB UR QL STRIP: NEGATIVE
BLD GP AB SCN SERPL QL: NEGATIVE
CLARITY UR: CLEAR
COLOR UR: YELLOW
GLUCOSE UR STRIP-MCNC: NEGATIVE MG/DL
HGB UR QL STRIP.AUTO: NEGATIVE
KETONES UR STRIP-MCNC: NEGATIVE MG/DL
L PNEUMO1 AG UR QL IA.RAPID: NEGATIVE
LEUKOCYTE ESTERASE UR QL STRIP: ABNORMAL
NITRITE UR QL STRIP: POSITIVE
NON-SQ EPI CELLS URNS QL MICRO: ABNORMAL /HPF
P AXIS: 66 DEGREES
PH UR STRIP.AUTO: 6.5 [PH]
PR INTERVAL: 158 MS
PROCALCITONIN SERPL-MCNC: 0.08 NG/ML
PROCALCITONIN SERPL-MCNC: <0.05 NG/ML
PROT UR STRIP-MCNC: NEGATIVE MG/DL
QRS AXIS: 61 DEGREES
QRSD INTERVAL: 68 MS
QT INTERVAL: 336 MS
QTC INTERVAL: 452 MS
RBC #/AREA URNS AUTO: ABNORMAL /HPF
RH BLD: POSITIVE
SARS-COV-2 RNA RESP QL NAA+PROBE: NEGATIVE
SP GR UR STRIP.AUTO: <=1.005 (ref 1–1.03)
SPECIMEN EXPIRATION DATE: NORMAL
T WAVE AXIS: 44 DEGREES
UROBILINOGEN UR QL STRIP.AUTO: 0.2 E.U./DL
VENTRICULAR RATE: 109 BPM
WBC #/AREA URNS AUTO: ABNORMAL /HPF

## 2021-08-16 PROCEDURE — 87449 NOS EACH ORGANISM AG IA: CPT | Performed by: NURSE PRACTITIONER

## 2021-08-16 PROCEDURE — 93010 ELECTROCARDIOGRAM REPORT: CPT | Performed by: INTERNAL MEDICINE

## 2021-08-16 PROCEDURE — 97530 THERAPEUTIC ACTIVITIES: CPT

## 2021-08-16 PROCEDURE — 97163 PT EVAL HIGH COMPLEX 45 MIN: CPT

## 2021-08-16 PROCEDURE — 81001 URINALYSIS AUTO W/SCOPE: CPT | Performed by: PHYSICIAN ASSISTANT

## 2021-08-16 PROCEDURE — 87205 SMEAR GRAM STAIN: CPT | Performed by: NURSE PRACTITIONER

## 2021-08-16 PROCEDURE — 84145 PROCALCITONIN (PCT): CPT | Performed by: NURSE PRACTITIONER

## 2021-08-16 PROCEDURE — 99223 1ST HOSP IP/OBS HIGH 75: CPT | Performed by: STUDENT IN AN ORGANIZED HEALTH CARE EDUCATION/TRAINING PROGRAM

## 2021-08-16 PROCEDURE — 99233 SBSQ HOSP IP/OBS HIGH 50: CPT | Performed by: PHYSICIAN ASSISTANT

## 2021-08-16 PROCEDURE — 94760 N-INVAS EAR/PLS OXIMETRY 1: CPT

## 2021-08-16 PROCEDURE — 36415 COLL VENOUS BLD VENIPUNCTURE: CPT | Performed by: NURSE PRACTITIONER

## 2021-08-16 PROCEDURE — 87070 CULTURE OTHR SPECIMN AEROBIC: CPT | Performed by: NURSE PRACTITIONER

## 2021-08-16 RX ORDER — MAGNESIUM SULFATE HEPTAHYDRATE 40 MG/ML
2 INJECTION, SOLUTION INTRAVENOUS ONCE
Status: COMPLETED | OUTPATIENT
Start: 2021-08-16 | End: 2021-08-16

## 2021-08-16 RX ORDER — GABAPENTIN 300 MG/1
300 CAPSULE ORAL
Status: DISCONTINUED | OUTPATIENT
Start: 2021-08-16 | End: 2021-08-20 | Stop reason: HOSPADM

## 2021-08-16 RX ORDER — LEVALBUTEROL 1.25 MG/.5ML
1.25 SOLUTION, CONCENTRATE RESPIRATORY (INHALATION) 3 TIMES DAILY
Status: DISCONTINUED | OUTPATIENT
Start: 2021-08-16 | End: 2021-08-16

## 2021-08-16 RX ORDER — METHOCARBAMOL 500 MG/1
500 TABLET, FILM COATED ORAL EVERY 6 HOURS PRN
Status: DISCONTINUED | OUTPATIENT
Start: 2021-08-16 | End: 2021-08-20 | Stop reason: HOSPADM

## 2021-08-16 RX ORDER — VANCOMYCIN HYDROCHLORIDE 1 G/200ML
15 INJECTION, SOLUTION INTRAVENOUS ONCE
Status: COMPLETED | OUTPATIENT
Start: 2021-08-16 | End: 2021-08-16

## 2021-08-16 RX ORDER — LANOLIN ALCOHOL/MO/W.PET/CERES
100 CREAM (GRAM) TOPICAL DAILY
Status: DISCONTINUED | OUTPATIENT
Start: 2021-08-16 | End: 2021-08-20 | Stop reason: HOSPADM

## 2021-08-16 RX ORDER — FOLIC ACID 1 MG/1
1 TABLET ORAL DAILY
Status: DISCONTINUED | OUTPATIENT
Start: 2021-08-16 | End: 2021-08-20 | Stop reason: HOSPADM

## 2021-08-16 RX ORDER — GUAIFENESIN 600 MG
600 TABLET, EXTENDED RELEASE 12 HR ORAL 2 TIMES DAILY
Status: DISCONTINUED | OUTPATIENT
Start: 2021-08-16 | End: 2021-08-18

## 2021-08-16 RX ORDER — ALBUTEROL SULFATE 2.5 MG/3ML
2.5 SOLUTION RESPIRATORY (INHALATION) EVERY 6 HOURS PRN
Status: DISCONTINUED | OUTPATIENT
Start: 2021-08-16 | End: 2021-08-20 | Stop reason: HOSPADM

## 2021-08-16 RX ORDER — BENZONATATE 100 MG/1
100 CAPSULE ORAL 3 TIMES DAILY PRN
Status: DISCONTINUED | OUTPATIENT
Start: 2021-08-16 | End: 2021-08-18

## 2021-08-16 RX ORDER — OXYCODONE HYDROCHLORIDE 5 MG/1
2.5 TABLET ORAL EVERY 4 HOURS PRN
Status: DISCONTINUED | OUTPATIENT
Start: 2021-08-16 | End: 2021-08-20 | Stop reason: HOSPADM

## 2021-08-16 RX ORDER — ACETAMINOPHEN 325 MG/1
975 TABLET ORAL 3 TIMES DAILY
Status: DISCONTINUED | OUTPATIENT
Start: 2021-08-16 | End: 2021-08-20 | Stop reason: HOSPADM

## 2021-08-16 RX ORDER — VANCOMYCIN HYDROCHLORIDE 1 G/200ML
15 INJECTION, SOLUTION INTRAVENOUS EVERY 12 HOURS
Status: DISCONTINUED | OUTPATIENT
Start: 2021-08-16 | End: 2021-08-16

## 2021-08-16 RX ADMIN — OXYCODONE HYDROCHLORIDE 2.5 MG: 5 TABLET ORAL at 11:03

## 2021-08-16 RX ADMIN — ALUMINA, MAGNESIA, AND SIMETHICONE ORAL SUSPENSION REGULAR STRENGTH 10 ML: 1200; 1200; 120 SUSPENSION ORAL at 21:03

## 2021-08-16 RX ADMIN — IOHEXOL 85 ML: 350 INJECTION, SOLUTION INTRAVENOUS at 00:24

## 2021-08-16 RX ADMIN — ACETAMINOPHEN 650 MG: 325 TABLET, FILM COATED ORAL at 00:08

## 2021-08-16 RX ADMIN — VANCOMYCIN HYDROCHLORIDE 1000 MG: 1 INJECTION, SOLUTION INTRAVENOUS at 02:42

## 2021-08-16 RX ADMIN — MAGNESIUM SULFATE HEPTAHYDRATE 2 G: 40 INJECTION, SOLUTION INTRAVENOUS at 03:38

## 2021-08-16 RX ADMIN — ACETAMINOPHEN 975 MG: 325 TABLET, FILM COATED ORAL at 08:48

## 2021-08-16 RX ADMIN — ACETAMINOPHEN 975 MG: 325 TABLET, FILM COATED ORAL at 16:03

## 2021-08-16 RX ADMIN — ACETAMINOPHEN 975 MG: 325 TABLET, FILM COATED ORAL at 21:00

## 2021-08-16 RX ADMIN — CYANOCOBALAMIN TAB 500 MCG 500 MCG: 500 TAB at 08:48

## 2021-08-16 RX ADMIN — NYSTATIN 500000 UNITS: 100000 SUSPENSION ORAL at 21:03

## 2021-08-16 RX ADMIN — FOLIC ACID 1 MG: 1 TABLET ORAL at 08:49

## 2021-08-16 RX ADMIN — CEFEPIME HYDROCHLORIDE 2000 MG: 2 INJECTION, POWDER, FOR SOLUTION INTRAVENOUS at 17:51

## 2021-08-16 RX ADMIN — CEFEPIME HYDROCHLORIDE 2000 MG: 2 INJECTION, POWDER, FOR SOLUTION INTRAVENOUS at 02:16

## 2021-08-16 RX ADMIN — GUAIFENESIN 600 MG: 600 TABLET, EXTENDED RELEASE ORAL at 08:49

## 2021-08-16 RX ADMIN — GABAPENTIN 300 MG: 300 CAPSULE ORAL at 21:01

## 2021-08-16 RX ADMIN — VANCOMYCIN HYDROCHLORIDE 1250 MG: 5 INJECTION, POWDER, LYOPHILIZED, FOR SOLUTION INTRAVENOUS at 11:03

## 2021-08-16 RX ADMIN — THIAMINE HCL TAB 100 MG 100 MG: 100 TAB at 08:49

## 2021-08-16 RX ADMIN — METHOCARBAMOL 500 MG: 500 TABLET ORAL at 08:58

## 2021-08-16 RX ADMIN — VANCOMYCIN HYDROCHLORIDE 1250 MG: 5 INJECTION, POWDER, LYOPHILIZED, FOR SOLUTION INTRAVENOUS at 22:05

## 2021-08-16 RX ADMIN — GUAIFENESIN 600 MG: 600 TABLET, EXTENDED RELEASE ORAL at 17:51

## 2021-08-16 RX ADMIN — CEFEPIME HYDROCHLORIDE 2000 MG: 2 INJECTION, POWDER, FOR SOLUTION INTRAVENOUS at 09:02

## 2021-08-16 NOTE — TELEPHONE ENCOUNTER
Mariaelena Page with [facility] is calling regarding the patient, she stated that the patient has shortness of breath, running a temperature, and is coughing up blood  Paging the on call provider

## 2021-08-17 ENCOUNTER — TELEPHONE (OUTPATIENT)
Dept: OBGYN CLINIC | Facility: HOSPITAL | Age: 61
End: 2021-08-17

## 2021-08-17 ENCOUNTER — APPOINTMENT (INPATIENT)
Dept: RADIOLOGY | Facility: HOSPITAL | Age: 61
DRG: 963 | End: 2021-08-17
Attending: RADIOLOGY
Payer: COMMERCIAL

## 2021-08-17 PROBLEM — E83.42 HYPOMAGNESEMIA: Status: RESOLVED | Noted: 2021-08-16 | Resolved: 2021-08-17

## 2021-08-17 PROBLEM — N12 PYELONEPHRITIS: Status: ACTIVE | Noted: 2021-08-16

## 2021-08-17 LAB
ANION GAP SERPL CALCULATED.3IONS-SCNC: 4 MMOL/L (ref 4–13)
BASOPHILS # BLD AUTO: 0.02 THOUSANDS/ΜL (ref 0–0.1)
BASOPHILS NFR BLD AUTO: 0 % (ref 0–1)
BUN SERPL-MCNC: 12 MG/DL (ref 5–25)
CALCIUM SERPL-MCNC: 8.3 MG/DL (ref 8.3–10.1)
CHLORIDE SERPL-SCNC: 105 MMOL/L (ref 100–108)
CO2 SERPL-SCNC: 28 MMOL/L (ref 21–32)
CREAT SERPL-MCNC: 0.47 MG/DL (ref 0.6–1.3)
EOSINOPHIL # BLD AUTO: 0.07 THOUSAND/ΜL (ref 0–0.61)
EOSINOPHIL NFR BLD AUTO: 2 % (ref 0–6)
ERYTHROCYTE [DISTWIDTH] IN BLOOD BY AUTOMATED COUNT: 22.9 % (ref 11.6–15.1)
GFR SERPL CREATININE-BSD FRML MDRD: 107 ML/MIN/1.73SQ M
GLUCOSE SERPL-MCNC: 106 MG/DL (ref 65–140)
HCT VFR BLD AUTO: 26.6 % (ref 34.8–46.1)
HGB BLD-MCNC: 8 G/DL (ref 11.5–15.4)
IMM GRANULOCYTES # BLD AUTO: 0.03 THOUSAND/UL (ref 0–0.2)
IMM GRANULOCYTES NFR BLD AUTO: 1 % (ref 0–2)
LYMPHOCYTES # BLD AUTO: 0.56 THOUSANDS/ΜL (ref 0.6–4.47)
LYMPHOCYTES NFR BLD AUTO: 12 % (ref 14–44)
MAGNESIUM SERPL-MCNC: 1.8 MG/DL (ref 1.6–2.6)
MCH RBC QN AUTO: 34.9 PG (ref 26.8–34.3)
MCHC RBC AUTO-ENTMCNC: 30.1 G/DL (ref 31.4–37.4)
MCV RBC AUTO: 116 FL (ref 82–98)
MONOCYTES # BLD AUTO: 0.52 THOUSAND/ΜL (ref 0.17–1.22)
MONOCYTES NFR BLD AUTO: 11 % (ref 4–12)
NEUTROPHILS # BLD AUTO: 3.55 THOUSANDS/ΜL (ref 1.85–7.62)
NEUTS SEG NFR BLD AUTO: 74 % (ref 43–75)
NRBC BLD AUTO-RTO: 0 /100 WBCS
PLATELET # BLD AUTO: 66 THOUSANDS/UL (ref 149–390)
PMV BLD AUTO: 10.4 FL (ref 8.9–12.7)
POTASSIUM SERPL-SCNC: 4.4 MMOL/L (ref 3.5–5.3)
PROCALCITONIN SERPL-MCNC: 0.1 NG/ML
RBC # BLD AUTO: 2.29 MILLION/UL (ref 3.81–5.12)
S PNEUM AG UR QL: NEGATIVE
SODIUM SERPL-SCNC: 137 MMOL/L (ref 136–145)
WBC # BLD AUTO: 4.75 THOUSAND/UL (ref 4.31–10.16)

## 2021-08-17 PROCEDURE — 85025 COMPLETE CBC W/AUTO DIFF WBC: CPT | Performed by: NURSE PRACTITIONER

## 2021-08-17 PROCEDURE — 88341 IMHCHEM/IMCYTCHM EA ADD ANTB: CPT | Performed by: PATHOLOGY

## 2021-08-17 PROCEDURE — 20206 BIOPSY MUSCLE PERQ NEEDLE: CPT | Performed by: RADIOLOGY

## 2021-08-17 PROCEDURE — 0JBD3ZX EXCISION OF RIGHT UPPER ARM SUBCUTANEOUS TISSUE AND FASCIA, PERCUTANEOUS APPROACH, DIAGNOSTIC: ICD-10-PCS | Performed by: RADIOLOGY

## 2021-08-17 PROCEDURE — 84145 PROCALCITONIN (PCT): CPT | Performed by: PHYSICIAN ASSISTANT

## 2021-08-17 PROCEDURE — 88307 TISSUE EXAM BY PATHOLOGIST: CPT | Performed by: PATHOLOGY

## 2021-08-17 PROCEDURE — 76942 ECHO GUIDE FOR BIOPSY: CPT | Performed by: RADIOLOGY

## 2021-08-17 PROCEDURE — 99232 SBSQ HOSP IP/OBS MODERATE 35: CPT | Performed by: INTERNAL MEDICINE

## 2021-08-17 PROCEDURE — 88333 PATH CONSLTJ SURG CYTO XM 1: CPT | Performed by: PATHOLOGY

## 2021-08-17 PROCEDURE — 20220 BONE BIOPSY TROCAR/NDL SUPFC: CPT

## 2021-08-17 PROCEDURE — 99255 IP/OBS CONSLTJ NEW/EST HI 80: CPT | Performed by: INTERNAL MEDICINE

## 2021-08-17 PROCEDURE — 97530 THERAPEUTIC ACTIVITIES: CPT

## 2021-08-17 PROCEDURE — 80048 BASIC METABOLIC PNL TOTAL CA: CPT | Performed by: NURSE PRACTITIONER

## 2021-08-17 PROCEDURE — 88342 IMHCHEM/IMCYTCHM 1ST ANTB: CPT | Performed by: PATHOLOGY

## 2021-08-17 PROCEDURE — 83735 ASSAY OF MAGNESIUM: CPT | Performed by: NURSE PRACTITIONER

## 2021-08-17 PROCEDURE — 87086 URINE CULTURE/COLONY COUNT: CPT | Performed by: INTERNAL MEDICINE

## 2021-08-17 PROCEDURE — 87081 CULTURE SCREEN ONLY: CPT | Performed by: NURSE PRACTITIONER

## 2021-08-17 RX ORDER — HYDROMORPHONE HCL/PF 1 MG/ML
0.5 SYRINGE (ML) INJECTION ONCE
Status: COMPLETED | OUTPATIENT
Start: 2021-08-17 | End: 2021-08-17

## 2021-08-17 RX ORDER — LIDOCAINE WITH 8.4% SOD BICARB 0.9%(10ML)
SYRINGE (ML) INJECTION CODE/TRAUMA/SEDATION MEDICATION
Status: COMPLETED | OUTPATIENT
Start: 2021-08-17 | End: 2021-08-17

## 2021-08-17 RX ADMIN — CEFEPIME HYDROCHLORIDE 2000 MG: 2 INJECTION, POWDER, FOR SOLUTION INTRAVENOUS at 18:32

## 2021-08-17 RX ADMIN — ACETAMINOPHEN 975 MG: 325 TABLET, FILM COATED ORAL at 16:35

## 2021-08-17 RX ADMIN — CEFEPIME HYDROCHLORIDE 2000 MG: 2 INJECTION, POWDER, FOR SOLUTION INTRAVENOUS at 02:02

## 2021-08-17 RX ADMIN — ALUMINA, MAGNESIA, AND SIMETHICONE ORAL SUSPENSION REGULAR STRENGTH 10 ML: 1200; 1200; 120 SUSPENSION ORAL at 23:31

## 2021-08-17 RX ADMIN — OXYCODONE HYDROCHLORIDE 2.5 MG: 5 TABLET ORAL at 04:29

## 2021-08-17 RX ADMIN — ACETAMINOPHEN 975 MG: 325 TABLET, FILM COATED ORAL at 08:28

## 2021-08-17 RX ADMIN — GABAPENTIN 300 MG: 300 CAPSULE ORAL at 21:13

## 2021-08-17 RX ADMIN — Medication 10 ML: at 10:21

## 2021-08-17 RX ADMIN — NYSTATIN 500000 UNITS: 100000 SUSPENSION ORAL at 23:31

## 2021-08-17 RX ADMIN — ALUMINA, MAGNESIA, AND SIMETHICONE ORAL SUSPENSION REGULAR STRENGTH 10 ML: 1200; 1200; 120 SUSPENSION ORAL at 11:09

## 2021-08-17 RX ADMIN — DICLOFENAC SODIUM 2 G: 10 GEL TOPICAL at 18:44

## 2021-08-17 RX ADMIN — THIAMINE HCL TAB 100 MG 100 MG: 100 TAB at 08:28

## 2021-08-17 RX ADMIN — BENZONATATE 100 MG: 100 CAPSULE ORAL at 16:35

## 2021-08-17 RX ADMIN — CYANOCOBALAMIN TAB 500 MCG 500 MCG: 500 TAB at 08:28

## 2021-08-17 RX ADMIN — VANCOMYCIN HYDROCHLORIDE 1250 MG: 5 INJECTION, POWDER, LYOPHILIZED, FOR SOLUTION INTRAVENOUS at 11:07

## 2021-08-17 RX ADMIN — VANCOMYCIN HYDROCHLORIDE 1250 MG: 5 INJECTION, POWDER, LYOPHILIZED, FOR SOLUTION INTRAVENOUS at 21:14

## 2021-08-17 RX ADMIN — HYDROMORPHONE HYDROCHLORIDE 0.5 MG: 1 INJECTION, SOLUTION INTRAMUSCULAR; INTRAVENOUS; SUBCUTANEOUS at 11:37

## 2021-08-17 RX ADMIN — FOLIC ACID 1 MG: 1 TABLET ORAL at 08:28

## 2021-08-17 RX ADMIN — GUAIFENESIN 600 MG: 600 TABLET, EXTENDED RELEASE ORAL at 18:36

## 2021-08-17 RX ADMIN — ALUMINA, MAGNESIA, AND SIMETHICONE ORAL SUSPENSION REGULAR STRENGTH 10 ML: 1200; 1200; 120 SUSPENSION ORAL at 04:43

## 2021-08-17 RX ADMIN — NYSTATIN 500000 UNITS: 100000 SUSPENSION ORAL at 11:09

## 2021-08-17 RX ADMIN — NYSTATIN 500000 UNITS: 100000 SUSPENSION ORAL at 04:42

## 2021-08-17 RX ADMIN — ACETAMINOPHEN 975 MG: 325 TABLET, FILM COATED ORAL at 21:13

## 2021-08-17 RX ADMIN — GUAIFENESIN 600 MG: 600 TABLET, EXTENDED RELEASE ORAL at 08:28

## 2021-08-17 RX ADMIN — CEFEPIME HYDROCHLORIDE 2000 MG: 2 INJECTION, POWDER, FOR SOLUTION INTRAVENOUS at 11:07

## 2021-08-17 NOTE — TELEPHONE ENCOUNTER
Patient was seen in the hospital for R Clavicle fx and was to follow up with Dr Danielito Magana in 2 weeks  I placed him in for 9/7 which is a little beyond that time frame  Is this okay with you?

## 2021-08-18 ENCOUNTER — TELEPHONE (OUTPATIENT)
Dept: OBGYN CLINIC | Facility: HOSPITAL | Age: 61
End: 2021-08-18

## 2021-08-18 LAB
ANION GAP SERPL CALCULATED.3IONS-SCNC: 8 MMOL/L (ref 4–13)
BASOPHILS # BLD AUTO: 0.03 THOUSANDS/ΜL (ref 0–0.1)
BASOPHILS NFR BLD AUTO: 1 % (ref 0–1)
BUN SERPL-MCNC: 10 MG/DL (ref 5–25)
CALCIUM SERPL-MCNC: 8.1 MG/DL (ref 8.3–10.1)
CHLORIDE SERPL-SCNC: 107 MMOL/L (ref 100–108)
CO2 SERPL-SCNC: 26 MMOL/L (ref 21–32)
CREAT SERPL-MCNC: 0.46 MG/DL (ref 0.6–1.3)
EOSINOPHIL # BLD AUTO: 0.09 THOUSAND/ΜL (ref 0–0.61)
EOSINOPHIL NFR BLD AUTO: 4 % (ref 0–6)
ERYTHROCYTE [DISTWIDTH] IN BLOOD BY AUTOMATED COUNT: 22 % (ref 11.6–15.1)
GFR SERPL CREATININE-BSD FRML MDRD: 108 ML/MIN/1.73SQ M
GLUCOSE SERPL-MCNC: 100 MG/DL (ref 65–140)
HCT VFR BLD AUTO: 27.4 % (ref 34.8–46.1)
HGB BLD-MCNC: 8.1 G/DL (ref 11.5–15.4)
IMM GRANULOCYTES # BLD AUTO: 0.02 THOUSAND/UL (ref 0–0.2)
IMM GRANULOCYTES NFR BLD AUTO: 1 % (ref 0–2)
LYMPHOCYTES # BLD AUTO: 0.73 THOUSANDS/ΜL (ref 0.6–4.47)
LYMPHOCYTES NFR BLD AUTO: 31 % (ref 14–44)
MCH RBC QN AUTO: 34.2 PG (ref 26.8–34.3)
MCHC RBC AUTO-ENTMCNC: 29.6 G/DL (ref 31.4–37.4)
MCV RBC AUTO: 116 FL (ref 82–98)
MONOCYTES # BLD AUTO: 0.36 THOUSAND/ΜL (ref 0.17–1.22)
MONOCYTES NFR BLD AUTO: 15 % (ref 4–12)
MRSA NOSE QL CULT: NORMAL
NEUTROPHILS # BLD AUTO: 1.11 THOUSANDS/ΜL (ref 1.85–7.62)
NEUTS SEG NFR BLD AUTO: 48 % (ref 43–75)
NRBC BLD AUTO-RTO: 0 /100 WBCS
PLATELET # BLD AUTO: 64 THOUSANDS/UL (ref 149–390)
PMV BLD AUTO: 9.8 FL (ref 8.9–12.7)
POTASSIUM SERPL-SCNC: 3.9 MMOL/L (ref 3.5–5.3)
RBC # BLD AUTO: 2.37 MILLION/UL (ref 3.81–5.12)
SODIUM SERPL-SCNC: 141 MMOL/L (ref 136–145)
VANCOMYCIN TROUGH SERPL-MCNC: 14.7 UG/ML (ref 10–20)
WBC # BLD AUTO: 2.34 THOUSAND/UL (ref 4.31–10.16)

## 2021-08-18 PROCEDURE — 94664 DEMO&/EVAL PT USE INHALER: CPT

## 2021-08-18 PROCEDURE — 94760 N-INVAS EAR/PLS OXIMETRY 1: CPT

## 2021-08-18 PROCEDURE — 85025 COMPLETE CBC W/AUTO DIFF WBC: CPT | Performed by: INTERNAL MEDICINE

## 2021-08-18 PROCEDURE — 99232 SBSQ HOSP IP/OBS MODERATE 35: CPT | Performed by: INTERNAL MEDICINE

## 2021-08-18 PROCEDURE — 80048 BASIC METABOLIC PNL TOTAL CA: CPT | Performed by: NURSE PRACTITIONER

## 2021-08-18 PROCEDURE — 80202 ASSAY OF VANCOMYCIN: CPT | Performed by: NURSE PRACTITIONER

## 2021-08-18 RX ORDER — LEVALBUTEROL 1.25 MG/.5ML
1.25 SOLUTION, CONCENTRATE RESPIRATORY (INHALATION)
Status: DISCONTINUED | OUTPATIENT
Start: 2021-08-18 | End: 2021-08-20 | Stop reason: HOSPADM

## 2021-08-18 RX ORDER — BENZONATATE 100 MG/1
100 CAPSULE ORAL 3 TIMES DAILY
Status: DISCONTINUED | OUTPATIENT
Start: 2021-08-18 | End: 2021-08-18

## 2021-08-18 RX ORDER — BENZONATATE 100 MG/1
200 CAPSULE ORAL 3 TIMES DAILY
Status: DISCONTINUED | OUTPATIENT
Start: 2021-08-18 | End: 2021-08-20 | Stop reason: HOSPADM

## 2021-08-18 RX ORDER — GUAIFENESIN 600 MG
1200 TABLET, EXTENDED RELEASE 12 HR ORAL 2 TIMES DAILY
Status: DISCONTINUED | OUTPATIENT
Start: 2021-08-19 | End: 2021-08-20 | Stop reason: HOSPADM

## 2021-08-18 RX ADMIN — DICLOFENAC SODIUM 2 G: 10 GEL TOPICAL at 08:48

## 2021-08-18 RX ADMIN — NYSTATIN 500000 UNITS: 100000 SUSPENSION ORAL at 22:02

## 2021-08-18 RX ADMIN — DICLOFENAC SODIUM 2 G: 10 GEL TOPICAL at 21:50

## 2021-08-18 RX ADMIN — ACETAMINOPHEN 975 MG: 325 TABLET, FILM COATED ORAL at 08:48

## 2021-08-18 RX ADMIN — CEFEPIME HYDROCHLORIDE 2000 MG: 2 INJECTION, POWDER, FOR SOLUTION INTRAVENOUS at 02:00

## 2021-08-18 RX ADMIN — GUAIFENESIN 600 MG: 600 TABLET, EXTENDED RELEASE ORAL at 08:47

## 2021-08-18 RX ADMIN — NYSTATIN 500000 UNITS: 100000 SUSPENSION ORAL at 08:51

## 2021-08-18 RX ADMIN — ACETAMINOPHEN 975 MG: 325 TABLET, FILM COATED ORAL at 15:46

## 2021-08-18 RX ADMIN — ACETAMINOPHEN 975 MG: 325 TABLET, FILM COATED ORAL at 21:49

## 2021-08-18 RX ADMIN — CYANOCOBALAMIN TAB 500 MCG 500 MCG: 500 TAB at 08:48

## 2021-08-18 RX ADMIN — CEFEPIME HYDROCHLORIDE 2000 MG: 2 INJECTION, POWDER, FOR SOLUTION INTRAVENOUS at 18:14

## 2021-08-18 RX ADMIN — DICLOFENAC SODIUM 2 G: 10 GEL TOPICAL at 11:22

## 2021-08-18 RX ADMIN — THIAMINE HCL TAB 100 MG 100 MG: 100 TAB at 08:48

## 2021-08-18 RX ADMIN — GUAIFENESIN 600 MG: 600 TABLET, EXTENDED RELEASE ORAL at 18:14

## 2021-08-18 RX ADMIN — GABAPENTIN 300 MG: 300 CAPSULE ORAL at 21:50

## 2021-08-18 RX ADMIN — CEFEPIME HYDROCHLORIDE 2000 MG: 2 INJECTION, POWDER, FOR SOLUTION INTRAVENOUS at 11:22

## 2021-08-18 RX ADMIN — IPRATROPIUM BROMIDE 0.5 MG: 0.5 SOLUTION RESPIRATORY (INHALATION) at 21:31

## 2021-08-18 RX ADMIN — LEVALBUTEROL HYDROCHLORIDE 1.25 MG: 1.25 SOLUTION, CONCENTRATE RESPIRATORY (INHALATION) at 21:32

## 2021-08-18 RX ADMIN — ALUMINA, MAGNESIA, AND SIMETHICONE ORAL SUSPENSION REGULAR STRENGTH 10 ML: 1200; 1200; 120 SUSPENSION ORAL at 08:51

## 2021-08-18 RX ADMIN — BENZONATATE 100 MG: 100 CAPSULE ORAL at 15:46

## 2021-08-18 RX ADMIN — BENZONATATE 200 MG: 100 CAPSULE ORAL at 21:50

## 2021-08-18 RX ADMIN — FOLIC ACID 1 MG: 1 TABLET ORAL at 08:48

## 2021-08-18 RX ADMIN — METHOCARBAMOL 500 MG: 500 TABLET ORAL at 08:48

## 2021-08-18 RX ADMIN — VANCOMYCIN HYDROCHLORIDE 1250 MG: 5 INJECTION, POWDER, LYOPHILIZED, FOR SOLUTION INTRAVENOUS at 11:22

## 2021-08-18 RX ADMIN — DICLOFENAC SODIUM 2 G: 10 GEL TOPICAL at 18:14

## 2021-08-18 NOTE — TELEPHONE ENCOUNTER
Appt scheduled for 9/7 and msg left with [facility] to call if the appt is not good for them to transport

## 2021-08-18 NOTE — TELEPHONE ENCOUNTER
Left msg for [facility] [facility] appt scheduled for 9/7  Asked her to call back if there are any questions

## 2021-08-19 LAB
BACTERIA SPT RESP CULT: ABNORMAL
BACTERIA UR CULT: NORMAL
GRAM STN SPEC: ABNORMAL

## 2021-08-19 PROCEDURE — 94760 N-INVAS EAR/PLS OXIMETRY 1: CPT

## 2021-08-19 PROCEDURE — 99232 SBSQ HOSP IP/OBS MODERATE 35: CPT | Performed by: INTERNAL MEDICINE

## 2021-08-19 PROCEDURE — 94668 MNPJ CHEST WALL SBSQ: CPT

## 2021-08-19 PROCEDURE — 94640 AIRWAY INHALATION TREATMENT: CPT

## 2021-08-19 PROCEDURE — 97167 OT EVAL HIGH COMPLEX 60 MIN: CPT

## 2021-08-19 RX ADMIN — NYSTATIN 500000 UNITS: 100000 SUSPENSION ORAL at 21:39

## 2021-08-19 RX ADMIN — CEFEPIME HYDROCHLORIDE 2000 MG: 2 INJECTION, POWDER, FOR SOLUTION INTRAVENOUS at 18:34

## 2021-08-19 RX ADMIN — ACETAMINOPHEN 975 MG: 325 TABLET, FILM COATED ORAL at 21:38

## 2021-08-19 RX ADMIN — LEVALBUTEROL HYDROCHLORIDE 1.25 MG: 1.25 SOLUTION, CONCENTRATE RESPIRATORY (INHALATION) at 08:08

## 2021-08-19 RX ADMIN — GUAIFENESIN 1200 MG: 600 TABLET, EXTENDED RELEASE ORAL at 08:31

## 2021-08-19 RX ADMIN — NYSTATIN 500000 UNITS: 100000 SUSPENSION ORAL at 08:31

## 2021-08-19 RX ADMIN — LEVALBUTEROL HYDROCHLORIDE 1.25 MG: 1.25 SOLUTION, CONCENTRATE RESPIRATORY (INHALATION) at 14:22

## 2021-08-19 RX ADMIN — LEVALBUTEROL HYDROCHLORIDE 1.25 MG: 1.25 SOLUTION, CONCENTRATE RESPIRATORY (INHALATION) at 19:54

## 2021-08-19 RX ADMIN — DICLOFENAC SODIUM 2 G: 10 GEL TOPICAL at 17:45

## 2021-08-19 RX ADMIN — BENZONATATE 200 MG: 100 CAPSULE ORAL at 16:25

## 2021-08-19 RX ADMIN — GABAPENTIN 300 MG: 300 CAPSULE ORAL at 21:38

## 2021-08-19 RX ADMIN — BENZONATATE 200 MG: 100 CAPSULE ORAL at 21:39

## 2021-08-19 RX ADMIN — ALUMINA, MAGNESIA, AND SIMETHICONE ORAL SUSPENSION REGULAR STRENGTH 10 ML: 1200; 1200; 120 SUSPENSION ORAL at 16:26

## 2021-08-19 RX ADMIN — DICLOFENAC SODIUM 2 G: 10 GEL TOPICAL at 21:38

## 2021-08-19 RX ADMIN — DICLOFENAC SODIUM 2 G: 10 GEL TOPICAL at 12:10

## 2021-08-19 RX ADMIN — CEFEPIME HYDROCHLORIDE 2000 MG: 2 INJECTION, POWDER, FOR SOLUTION INTRAVENOUS at 10:21

## 2021-08-19 RX ADMIN — CEFEPIME HYDROCHLORIDE 2000 MG: 2 INJECTION, POWDER, FOR SOLUTION INTRAVENOUS at 01:54

## 2021-08-19 RX ADMIN — OXYCODONE HYDROCHLORIDE 2.5 MG: 5 TABLET ORAL at 23:25

## 2021-08-19 RX ADMIN — DICLOFENAC SODIUM 2 G: 10 GEL TOPICAL at 08:32

## 2021-08-19 RX ADMIN — ACETAMINOPHEN 975 MG: 325 TABLET, FILM COATED ORAL at 16:25

## 2021-08-19 RX ADMIN — IPRATROPIUM BROMIDE 0.5 MG: 0.5 SOLUTION RESPIRATORY (INHALATION) at 08:08

## 2021-08-19 RX ADMIN — METHOCARBAMOL 500 MG: 500 TABLET ORAL at 08:31

## 2021-08-19 RX ADMIN — THIAMINE HCL TAB 100 MG 100 MG: 100 TAB at 08:31

## 2021-08-19 RX ADMIN — FOLIC ACID 1 MG: 1 TABLET ORAL at 08:31

## 2021-08-19 RX ADMIN — IPRATROPIUM BROMIDE 0.5 MG: 0.5 SOLUTION RESPIRATORY (INHALATION) at 19:54

## 2021-08-19 RX ADMIN — ALUMINA, MAGNESIA, AND SIMETHICONE ORAL SUSPENSION REGULAR STRENGTH 10 ML: 1200; 1200; 120 SUSPENSION ORAL at 08:32

## 2021-08-19 RX ADMIN — IPRATROPIUM BROMIDE 0.5 MG: 0.5 SOLUTION RESPIRATORY (INHALATION) at 14:22

## 2021-08-19 RX ADMIN — BENZONATATE 200 MG: 100 CAPSULE ORAL at 08:31

## 2021-08-19 RX ADMIN — GUAIFENESIN 1200 MG: 600 TABLET, EXTENDED RELEASE ORAL at 17:45

## 2021-08-19 RX ADMIN — CYANOCOBALAMIN TAB 500 MCG 500 MCG: 500 TAB at 08:31

## 2021-08-19 RX ADMIN — ACETAMINOPHEN 975 MG: 325 TABLET, FILM COATED ORAL at 08:30

## 2021-08-20 VITALS
BODY MASS INDEX: 21.97 KG/M2 | TEMPERATURE: 99 F | HEIGHT: 67 IN | HEART RATE: 95 BPM | RESPIRATION RATE: 18 BRPM | OXYGEN SATURATION: 94 % | WEIGHT: 140 LBS | DIASTOLIC BLOOD PRESSURE: 60 MMHG | SYSTOLIC BLOOD PRESSURE: 131 MMHG

## 2021-08-20 LAB — SARS-COV-2 RNA RESP QL NAA+PROBE: NEGATIVE

## 2021-08-20 PROCEDURE — 94760 N-INVAS EAR/PLS OXIMETRY 1: CPT

## 2021-08-20 PROCEDURE — U0005 INFEC AGEN DETEC AMPLI PROBE: HCPCS | Performed by: INTERNAL MEDICINE

## 2021-08-20 PROCEDURE — 99232 SBSQ HOSP IP/OBS MODERATE 35: CPT | Performed by: INTERNAL MEDICINE

## 2021-08-20 PROCEDURE — 99239 HOSP IP/OBS DSCHRG MGMT >30: CPT | Performed by: INTERNAL MEDICINE

## 2021-08-20 PROCEDURE — 94640 AIRWAY INHALATION TREATMENT: CPT

## 2021-08-20 PROCEDURE — U0003 INFECTIOUS AGENT DETECTION BY NUCLEIC ACID (DNA OR RNA); SEVERE ACUTE RESPIRATORY SYNDROME CORONAVIRUS 2 (SARS-COV-2) (CORONAVIRUS DISEASE [COVID-19]), AMPLIFIED PROBE TECHNIQUE, MAKING USE OF HIGH THROUGHPUT TECHNOLOGIES AS DESCRIBED BY CMS-2020-01-R: HCPCS | Performed by: INTERNAL MEDICINE

## 2021-08-20 PROCEDURE — 97530 THERAPEUTIC ACTIVITIES: CPT

## 2021-08-20 RX ORDER — AZITHROMYCIN 500 MG/1
500 TABLET, FILM COATED ORAL DAILY
Qty: 3 TABLET | Refills: 0
Start: 2021-08-20 | End: 2021-08-23

## 2021-08-20 RX ORDER — GUAIFENESIN/DEXTROMETHORPHAN 100-10MG/5
5 SYRUP ORAL 3 TIMES DAILY PRN
Qty: 354 ML | Refills: 0
Start: 2021-08-20 | End: 2021-09-02 | Stop reason: ALTCHOICE

## 2021-08-20 RX ORDER — OXYCODONE HYDROCHLORIDE 5 MG/1
2.5 TABLET ORAL EVERY 4 HOURS PRN
Qty: 20 TABLET | Refills: 0 | Status: SHIPPED | OUTPATIENT
Start: 2021-08-20 | End: 2021-08-30

## 2021-08-20 RX ORDER — GUAIFENESIN 1200 MG/1
1200 TABLET, EXTENDED RELEASE ORAL 2 TIMES DAILY
Qty: 14 TABLET | Refills: 0
Start: 2021-08-20 | End: 2021-09-02 | Stop reason: SDUPTHER

## 2021-08-20 RX ORDER — BENZONATATE 200 MG/1
200 CAPSULE ORAL 3 TIMES DAILY
Qty: 20 CAPSULE | Refills: 0
Start: 2021-08-20 | End: 2022-02-24 | Stop reason: ALTCHOICE

## 2021-08-20 RX ORDER — CEFDINIR 300 MG/1
300 CAPSULE ORAL EVERY 12 HOURS SCHEDULED
Qty: 6 CAPSULE | Refills: 0
Start: 2021-08-20 | End: 2021-08-23

## 2021-08-20 RX ADMIN — DICLOFENAC SODIUM 2 G: 10 GEL TOPICAL at 11:13

## 2021-08-20 RX ADMIN — LEVALBUTEROL HYDROCHLORIDE 1.25 MG: 1.25 SOLUTION, CONCENTRATE RESPIRATORY (INHALATION) at 07:28

## 2021-08-20 RX ADMIN — CEFTRIAXONE SODIUM 2000 MG: 10 INJECTION, POWDER, FOR SOLUTION INTRAVENOUS at 08:16

## 2021-08-20 RX ADMIN — IPRATROPIUM BROMIDE 0.5 MG: 0.5 SOLUTION RESPIRATORY (INHALATION) at 07:28

## 2021-08-20 RX ADMIN — ALUMINA, MAGNESIA, AND SIMETHICONE ORAL SUSPENSION REGULAR STRENGTH 10 ML: 1200; 1200; 120 SUSPENSION ORAL at 06:26

## 2021-08-20 RX ADMIN — IPRATROPIUM BROMIDE 0.5 MG: 0.5 SOLUTION RESPIRATORY (INHALATION) at 13:11

## 2021-08-20 RX ADMIN — ACETAMINOPHEN 975 MG: 325 TABLET, FILM COATED ORAL at 16:08

## 2021-08-20 RX ADMIN — GUAIFENESIN 1200 MG: 600 TABLET, EXTENDED RELEASE ORAL at 08:15

## 2021-08-20 RX ADMIN — THIAMINE HCL TAB 100 MG 100 MG: 100 TAB at 08:16

## 2021-08-20 RX ADMIN — ALUMINA, MAGNESIA, AND SIMETHICONE ORAL SUSPENSION REGULAR STRENGTH 10 ML: 1200; 1200; 120 SUSPENSION ORAL at 11:13

## 2021-08-20 RX ADMIN — FOLIC ACID 1 MG: 1 TABLET ORAL at 08:16

## 2021-08-20 RX ADMIN — LEVALBUTEROL HYDROCHLORIDE 1.25 MG: 1.25 SOLUTION, CONCENTRATE RESPIRATORY (INHALATION) at 13:11

## 2021-08-20 RX ADMIN — BENZONATATE 200 MG: 100 CAPSULE ORAL at 08:15

## 2021-08-20 RX ADMIN — ALUMINA, MAGNESIA, AND SIMETHICONE ORAL SUSPENSION REGULAR STRENGTH 10 ML: 1200; 1200; 120 SUSPENSION ORAL at 16:09

## 2021-08-20 RX ADMIN — ACETAMINOPHEN 975 MG: 325 TABLET, FILM COATED ORAL at 08:16

## 2021-08-20 RX ADMIN — CYANOCOBALAMIN TAB 500 MCG 500 MCG: 500 TAB at 08:15

## 2021-08-20 RX ADMIN — BENZONATATE 200 MG: 100 CAPSULE ORAL at 16:09

## 2021-08-21 LAB
BACTERIA BLD CULT: NORMAL
BACTERIA BLD CULT: NORMAL

## 2021-08-23 ENCOUNTER — NURSING HOME VISIT (OUTPATIENT)
Dept: GERIATRICS | Facility: OTHER | Age: 61
End: 2021-08-23
Payer: COMMERCIAL

## 2021-08-23 DIAGNOSIS — J18.9 HAP (HOSPITAL-ACQUIRED PNEUMONIA): ICD-10-CM
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
## 2021-08-23 DIAGNOSIS — D69.6 THROMBOCYTOPENIA (HCC): ICD-10-CM

## 2021-08-23 DIAGNOSIS — F10.10 ETOH ABUSE: Chronic | ICD-10-CM

## 2021-08-23 DIAGNOSIS — T07.XXXA FRACTURES, MULTIPLE: ICD-10-CM

## 2021-08-23 DIAGNOSIS — S01.512D LACERATION OF TONGUE, SUBSEQUENT ENCOUNTER: ICD-10-CM

## 2021-08-23 DIAGNOSIS — D64.9 ANEMIA, UNSPECIFIED TYPE: Primary | ICD-10-CM

## 2021-08-23 DIAGNOSIS — S06.369D TRAUMATIC HEMORRHAGE OF CEREBRUM WITH LOSS OF CONSCIOUSNESS, UNSPECIFIED LATERALITY, SUBSEQUENT ENCOUNTER: ICD-10-CM

## 2021-08-23 PROCEDURE — 99306 1ST NF CARE HIGH MDM 50: CPT | Performed by: FAMILY MEDICINE
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
## 2021-08-25 ENCOUNTER — NURSING HOME VISIT (OUTPATIENT)
Dept: GERIATRICS | Facility: OTHER | Age: 61
End: 2021-08-25
Payer: COMMERCIAL

## 2021-08-25 DIAGNOSIS — F10.10 ETOH ABUSE: Chronic | ICD-10-CM

## 2021-08-25 DIAGNOSIS — S32.592D CLOSED BILATERAL FRACTURE OF PUBIC RAMI WITH ROUTINE HEALING, SUBSEQUENT ENCOUNTER: ICD-10-CM

## 2021-08-25 DIAGNOSIS — S22.41XD CLOSED FRACTURE OF MULTIPLE RIBS OF RIGHT SIDE WITH ROUTINE HEALING, SUBSEQUENT ENCOUNTER: ICD-10-CM

## 2021-08-25 DIAGNOSIS — S01.512D LACERATION OF TONGUE, SUBSEQUENT ENCOUNTER: Primary | ICD-10-CM

## 2021-08-25 DIAGNOSIS — S42.001D CLOSED DISPLACED FRACTURE OF RIGHT CLAVICLE WITH ROUTINE HEALING, UNSPECIFIED PART OF CLAVICLE, SUBSEQUENT ENCOUNTER: ICD-10-CM

## 2021-08-25 DIAGNOSIS — D69.6 THROMBOCYTOPENIA (HCC): ICD-10-CM

## 2021-08-25 DIAGNOSIS — Z53.1 REFUSAL OF BLOOD TRANSFUSIONS AS PATIENT IS JEHOVAH'S WITNESS: Chronic | ICD-10-CM

## 2021-08-25 DIAGNOSIS — S32.591D CLOSED BILATERAL FRACTURE OF PUBIC RAMI WITH ROUTINE HEALING, SUBSEQUENT ENCOUNTER: ICD-10-CM

## 2021-08-25 PROCEDURE — 99309 SBSQ NF CARE MODERATE MDM 30: CPT | Performed by: NURSE PRACTITIONER

## 2021-08-25 NOTE — PROGRESS NOTES
[facility]  [address]  [phone]  [place]  Code 31 (STR)        NAME: Marina Yu  AGE: 64 y o  SEX: female CODE STATUS: Full Code    DATE OF ENCOUNTER: 8/25/2021    Assessment and Plan     Tongue laceration  · S/P traumatic fall-likely bite tongue  · Appears to be healing with granulation tissue  · Continue Magic Mouthwash TID  · Good Oral hygiene   · Keep mouth moist- ice chips/fluids     Bilateral pubic rami fractures (HCC)  · Continue WBAT   · C/O pain in b/l hips L>R  · Good ROM on Exam  · States pain well controlled with Tylenol and Robaxin   · Continue PT/OT  · Ortho f/u 9/7/2021    Closed fracture of multiple ribs of right side  · Encourage Incentive Spirometer  · Denies pain of ribs at this time  · May use Voltaren gel PRN to this area  · Continue Scheduled Tylenol  · F/U appt with Trauma 9/2/2021    Fracture of right clavicle  · NWB with use of Sling  · F/U with Ortho 9/7/2021  · C/O numbness to lateral upper arm  · Continue Gabapentin   · Continue scheduled Tylenol       ETOH abuse  · Last drink end of July  · Continue MVI,Folic acid, Thiamine   · May benefit from outpatient AA or rehab is agreeable     Refusal of blood transfusions as patient is Religious  · Hx of anemia- likely secondary to ETOH use in past  · Chronic thrombocytopenia   · Continue Epogen weekly and monitor CBC- hold if Hemoglobin >10    Thrombocytopenia (HCC)  ·  Chronic and stable  ·  continue Epogen weekly hold for hemoglobin greater than 10  ·  CBC obtained August 24, 2021  · H&H 9 4/28 6   · WBC 3 1   · Platelets 703      All medications and routine orders were reviewed and updated as needed      Chief Complaint     STR follow up visit     History of Present Illness     Tamar Carballo is a 64year old female who was admitted to [address] [place] [address] on 8/14/2021 for STR for ambulatory dysfunction, she has a Past medical hx including but not limited to Liver disease and ETOH abuse, TBI with subarachnoid hemorrhage, Falls, Multiple fractures, [employer] does not accept blood products, Depression/Anxiety, Insomnia  seen in collaboration with nursing for medical management and STR follow up visit  She was admitted to [address] [place] [address] on 8/14/21 following a 10 day hospital admittion at [facility] for [address]  She ws found face down by her , unresponsive with bleeding from mouth  EMS could not secure an airway and was a Level A trauma with multiple traumatic injuries, she was intubated and spent a majority of her time in ICU  She had an ICH,hemothorax, tongue laceration,  B/L pubic fx, Right Clavicle fx, Closed Thoracic fx  She improved and was sent to STR on 8/14/21  Unfortunatly, she developed hypoxia overnight and possible hemoptysis and was sent back to [facility] on 8/15/21  CTA PE negative for PE, she had scattered airspace opacities in Right lung apex  , consistent with Pneumonia  COVID negative, Blood cultures negative x2 x 4 days  Pulmonary was consulted, recommended Repeat CT chest in 3 months  Orthopedics consulted- Recommended NWB to RUE due to clavical fx and WBAT to BLE  She was treated with IV abx and transitioned to Oral Cefdinir and Azithromyazin, she completed her course on 8/23/2021  She had significant coughing and was treated with robatussin, mucinex, tessalon pearls, she had some wheezing and was treated with nebulizer treatments  She was discharged to rehab with supplemental O2 at [facility]  She was discharged to [address] [place] [address] on 8/20/21 for STR  She is receiving Epogen weekly for anemia  She has weekly cbc's to monitor while at [address] [place] [address]  Upon exam today, patient is off of oxygen  She states she is doing well and feeling better  She states her pain is well controlled with current pain meds  She denies CP/SOB/N/V/D  She states her tongue laceration is not healing  Denies any s/s of bleeding, denies Headaches,vision changes   Per facility record, patient is eating 3 meals per day and consuming %  She is having daily bowel movements, LBM 8/25/21  She is working with PT/OT, she needs supervision/min assist with most ADLs/IADLs  Per speech her swallowing is intact  The patient's allergies, past medical, surgical, social and family history were reviewed and unchanged  Review of Systems     Review of Systems   Constitutional: Negative for chills and fever  HENT: Negative for ear pain and sore throat  Eyes: Negative for pain and visual disturbance  Respiratory: Negative for cough and shortness of breath  Cardiovascular: Negative for chest pain and palpitations  Gastrointestinal: Negative for abdominal pain and vomiting  Genitourinary: Negative for dysuria and hematuria  Musculoskeletal: Negative for arthralgias and back pain  Skin: Negative for color change and rash  Neurological: Negative for seizures and syncope  All other systems reviewed and are negative  Objective     Vitals:  Temp 97 6° pulse 82 respirations 20 /68 O2 95% room air weight 138 6 lb    Physical Exam  Vitals and nursing note reviewed  Constitutional:       General: She is not in acute distress  Appearance: Normal appearance  HENT:      Head: Normocephalic and atraumatic  Nose: No congestion or rhinorrhea  Mouth/Throat:      Mouth: Mucous membranes are moist    Eyes:      General: No scleral icterus  Conjunctiva/sclera: Conjunctivae normal       Pupils: Pupils are equal, round, and reactive to light  Cardiovascular:      Rate and Rhythm: Normal rate and regular rhythm  Pulses: Normal pulses  Heart sounds: Normal heart sounds  No murmur heard  Pulmonary:      Effort: Pulmonary effort is normal  No respiratory distress  Breath sounds: Normal breath sounds  No wheezing, rhonchi or rales  Abdominal:      General: Bowel sounds are normal  There is no distension  Palpations: Abdomen is soft  There is no mass  Tenderness:  There is no abdominal tenderness  Hernia: No hernia is present  Musculoskeletal:         General: No swelling or tenderness  Lymphadenopathy:      Cervical: No cervical adenopathy  Skin:     General: Skin is warm and dry  Coloration: Skin is not pale  Findings: No rash  Neurological:      General: No focal deficit present  Mental Status: She is alert and oriented to person, place, and time  Mental status is at baseline  Motor: No weakness  Gait: Gait normal    Psychiatric:         Mood and Affect: Mood normal          Behavior: Behavior normal          Pertinent Laboratory/Diagnostic Studies:    Results for Ana Cristina Tipton (MRN [id number])    Ref   Range 8/18/2021 05:34   Sodium Latest Ref Range: 136 - 145 mmol/L 141   Potassium Latest Ref Range: 3 5 - 5 3 mmol/L 3 9   Chloride Latest Ref Range: 100 - 108 mmol/L 107   CO2 Latest Ref Range: 21 - 32 mmol/L 26   Anion Gap Latest Ref Range: 4 - 13 mmol/L 8   BUN Latest Ref Range: 5 - 25 mg/dL 10   Creatinine Latest Ref Range: 0 60 - 1 30 mg/dL 0 46 (L)   Glucose, Random Latest Ref Range: 65 - 140 mg/dL 100   Calcium Latest Ref Range: 8 3 - 10 1 mg/dL 8 1 (L)   eGFR Latest Units: ml/min/1 73sq m 108   WBC Latest Ref Range: 4 31 - 10 16 Thousand/uL 2 34 (L)   Red Blood Cell Count Latest Ref Range: 3 81 - 5 12 Million/uL 2 37 (L)   Hemoglobin Latest Ref Range: 11 5 - 15 4 g/dL 8 1 (L)   HCT Latest Ref Range: 34 8 - 46 1 % 27 4 (L)   MCV Latest Ref Range: 82 - 98 fL 116 (H)   MCH Latest Ref Range: 26 8 - 34 3 pg 34 2   MCHC Latest Ref Range: 31 4 - 37 4 g/dL 29 6 (L)   RDW Latest Ref Range: 11 6 - 15 1 % 22 0 (H)   Platelet Count Latest Ref Range: 149 - 390 Thousands/uL 64 (L)   MPV Latest Ref Range: 8 9 - 12 7 fL 9 8   nRBC Latest Units: /100 WBCs 0   Neutrophils % Latest Ref Range: 43 - 75 % 48   Immat GRANS % Latest Ref Range: 0 - 2 % 1   Lymphocytes Relative Latest Ref Range: 14 - 44 % 31   Monocytes Relative Latest Ref Range: 4 - 12 % 15 (H) Eosinophils Latest Ref Range: 0 - 6 % 4   Basophils Relative Latest Ref Range: 0 - 1 % 1   Immature Grans Absolute Latest Ref Range: 0 00 - 0 20 Thousand/uL 0 02   Absolute Neutrophils Latest Ref Range: 1 85 - 7 62 Thousands/µL 1 11 (L)   Lymphocytes Absolute Latest Ref Range: 0 60 - 4 47 Thousands/µL 0 73   Absolute Monocytes Latest Ref Range: 0 17 - 1 22 Thousand/µL 0 36   Absolute Eosinophils Latest Ref Range: 0 00 - 0 61 Thousand/µL 0 09   Basophils Absolute Latest Ref Range: 0 00 - 0 10 Thousands/µL 0 03               Current Medications   Medications reviewed and updated see facility [facility] for details        Current Outpatient Medications:     al mag oxide-diphenhydramine-lidocaine viscous (MAGIC MOUTHWASH) 1:1:1 suspension, Swish and spit 10 mL every 4 (four) hours as needed, Disp: , Rfl:     epoetin yolande (EPOGEN,PROCRIT) 10,000 units/mL, Inject 10,000 Units under the skin once a week Tuesdays, Disp: , Rfl:     acetaminophen (TYLENOL) 325 mg tablet, Take 3 tablets (975 mg total) by mouth every 8 (eight) hours, Disp: 30 tablet, Rfl: 0    albuterol (2 5 mg/3 mL) 0 083 % nebulizer solution, Take 3 mL (2 5 mg total) by nebulization every 6 (six) hours as needed for wheezing or shortness of breath, Disp: 30 mL, Rfl: 0    benzonatate (TESSALON) 200 MG capsule, Take 1 capsule (200 mg total) by mouth 3 (three) times a day, Disp: 20 capsule, Rfl: 0    cyanocobalamin (VITAMIN B-12) 500 MCG tablet, Take 1 tablet (500 mcg total) by mouth daily, Disp: 30 tablet, Rfl: 0    dextromethorphan-guaiFENesin (ROBITUSSIN DM)  mg/5 mL syrup, Take 5 mL by mouth 3 (three) times a day as needed for cough or congestion, Disp: 354 mL, Rfl: 0    Diclofenac Sodium (VOLTAREN) 1 %, Apply 2 g topically 4 (four) times a day, Disp: 150 g, Rfl: 0    folic acid (FOLVITE) 1 mg tablet, Take 1 tablet (1 mg total) by mouth daily, Disp: 30 tablet, Rfl: 0    gabapentin (NEURONTIN) 300 mg capsule, Take 1 capsule (300 mg total) by mouth daily at bedtime, Disp: 30 capsule, Rfl: 0    guaiFENesin 1200 MG TB12, Take 1 tablet (1,200 mg total) by mouth 2 (two) times a day, Disp: 14 tablet, Rfl: 0    ipratropium (ATROVENT) 0 02 % nebulizer solution, Take 2 5 mL (0 5 mg total) by nebulization 3 (three) times a day, Disp: 105 mL, Rfl: 0    levalbuterol (XOPENEX) 1 25 mg/3 mL nebulizer solution, Take 1 25 mg by nebulization 3 (three) times a day, Disp: , Rfl:     lidocaine (Lidoderm) 5 %, Apply 1 patch topically daily Remove & Discard patch within 12 hours or as directed by MD, Disp: 10 patch, Rfl: 0    methocarbamol (ROBAXIN) 500 mg tablet, Take 1 tablet (500 mg total) by mouth every 6 (six) hours as needed for muscle spasms, Disp: 30 tablet, Rfl: 0    oxyCODONE (ROXICODONE) 5 mg immediate release tablet, Take 0 5 tablets (2 5 mg total) by mouth every 4 (four) hours as needed for severe pain for up to 10 daysMax Daily Amount: 15 mg, Disp: 20 tablet, Rfl: 0    thiamine 100 MG tablet, Take 1 tablet (100 mg total) by mouth daily, Disp: 30 tablet, Rfl: 0     Plan discussed with Dr Mariah Contreras noted agreement with assessment and plan  Please note:  Voice-recognition software may have been used in the preparation of this document  Occasional wrong word or "sound-alike" substitutions may have occurred due to the inherent limitations of voice recognition software  Interpretation should be guided by PAZ Woodward  8/25/2021 12:32 PM

## 2021-08-26 PROBLEM — D69.6 THROMBOCYTOPENIA (HCC): Status: RESOLVED | Noted: 2021-08-06 | Resolved: 2021-08-26

## 2021-08-26 PROBLEM — S32.592A BILATERAL PUBIC RAMI FRACTURES (HCC): Status: RESOLVED | Noted: 2021-08-16 | Resolved: 2021-08-26

## 2021-08-26 PROBLEM — S22.41XA CLOSED FRACTURE OF MULTIPLE RIBS OF RIGHT SIDE: Status: RESOLVED | Noted: 2021-08-16 | Resolved: 2021-08-26

## 2021-08-26 PROBLEM — S32.591A BILATERAL PUBIC RAMI FRACTURES (HCC): Status: RESOLVED | Noted: 2021-08-16 | Resolved: 2021-08-26

## 2021-08-26 PROBLEM — S01.512A TONGUE LACERATION, INITIAL ENCOUNTER: Status: RESOLVED | Noted: 2021-08-06 | Resolved: 2021-08-26

## 2021-08-26 NOTE — ASSESSMENT & PLAN NOTE
· NWB with use of Sling  · F/U with Ortho 9/7/2021  · C/O numbness to lateral upper arm  · Continue Gabapentin   · Continue scheduled Tylenol

## 2021-08-26 NOTE — ASSESSMENT & PLAN NOTE
· S/P traumatic fall-likely bite tongue  · Appears to be healing with granulation tissue  · Continue Magic Mouthwash TID  · Good Oral hygiene   · Keep mouth moist- ice chips/fluids

## 2021-08-26 NOTE — ASSESSMENT & PLAN NOTE
· Encourage Incentive Spirometer  · Denies pain of ribs at this time  · May use Voltaren gel PRN to this area  · Continue Scheduled Tylenol  · F/U appt with Trauma 9/2/2021

## 2021-08-26 NOTE — ASSESSMENT & PLAN NOTE
· Continue WBAT   · C/O pain in b/l hips L>R  · Good ROM on Exam  · States pain well controlled with Tylenol and Robaxin   · Continue PT/OT  · Ortho f/u 9/7/2021

## 2021-08-26 NOTE — ASSESSMENT & PLAN NOTE
· Hx of anemia- likely secondary to ETOH use in past  · Chronic thrombocytopenia   · Continue Epogen weekly and monitor CBC- hold if Hemoglobin >10

## 2021-08-26 NOTE — ASSESSMENT & PLAN NOTE
·  Chronic and stable  ·  continue Epogen weekly hold for hemoglobin greater than 10  ·  CBC obtained August 24, 2021  · H&H 9 4/28 6   · WBC 3 1   · Platelets 958

## 2021-08-26 NOTE — ASSESSMENT & PLAN NOTE
· Last drink end of July  · Continue MVI,Folic acid, Thiamine   · May benefit from outpatient AA or rehab is agreeable

## 2021-08-30 ENCOUNTER — NURSING HOME VISIT (OUTPATIENT)
Dept: GERIATRICS | Facility: OTHER | Age: 61
End: 2021-08-30
Payer: COMMERCIAL

## 2021-08-30 DIAGNOSIS — S01.512D LACERATION OF TONGUE, SUBSEQUENT ENCOUNTER: ICD-10-CM
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
## 2021-08-30 DIAGNOSIS — F10.10 ETOH ABUSE: Chronic | ICD-10-CM

## 2021-08-30 DIAGNOSIS — D62 ACUTE BLOOD LOSS ANEMIA: ICD-10-CM

## 2021-08-30 DIAGNOSIS — S32.591D CLOSED BILATERAL FRACTURE OF PUBIC RAMI WITH ROUTINE HEALING, SUBSEQUENT ENCOUNTER: ICD-10-CM

## 2021-08-30 DIAGNOSIS — S06.369D TRAUMATIC HEMORRHAGE OF CEREBRUM WITH LOSS OF CONSCIOUSNESS, UNSPECIFIED LATERALITY, SUBSEQUENT ENCOUNTER: ICD-10-CM

## 2021-08-30 DIAGNOSIS — S32.592D CLOSED BILATERAL FRACTURE OF PUBIC RAMI WITH ROUTINE HEALING, SUBSEQUENT ENCOUNTER: ICD-10-CM

## 2021-08-30 DIAGNOSIS — S22.41XD CLOSED FRACTURE OF MULTIPLE RIBS OF RIGHT SIDE WITH ROUTINE HEALING, SUBSEQUENT ENCOUNTER: ICD-10-CM

## 2021-08-30 DIAGNOSIS — J18.9 HAP (HOSPITAL-ACQUIRED PNEUMONIA): ICD-10-CM

## 2021-08-30 DIAGNOSIS — S22.009A: ICD-10-CM

## 2021-08-30 DIAGNOSIS — J96.01 ACUTE HYPOXEMIC RESPIRATORY FAILURE (HCC): Primary | ICD-10-CM

## 2021-08-30 DIAGNOSIS — S42.001D CLOSED DISPLACED FRACTURE OF RIGHT CLAVICLE WITH ROUTINE HEALING, UNSPECIFIED PART OF CLAVICLE, SUBSEQUENT ENCOUNTER: ICD-10-CM

## 2021-08-30 DIAGNOSIS — G47.00 INSOMNIA, UNSPECIFIED TYPE: ICD-10-CM

## 2021-08-30 PROBLEM — S22.009D: Status: ACTIVE | Noted: 2021-08-06

## 2021-08-30 PROCEDURE — 99309 SBSQ NF CARE MODERATE MDM 30: CPT | Performed by: NURSE PRACTITIONER

## 2021-08-30 NOTE — ASSESSMENT & PLAN NOTE
Stable    Minimal pain on assessment  Continue Acetaminophen 975mg Q8 hours + Robaxin 500mg Q6 hours PRN  Continue Gabapentin 300mg daily at HS  = nursing to give at 11PM  = to sustain pain relief throughout night  Nursing to continue Pain assessment Q shift  Pending [facility] trauma Center appointment on 9/2/2021

## 2021-08-30 NOTE — ASSESSMENT & PLAN NOTE
Healing well   Patient denies pain  Continue Magic wash oral rinse QID  Able to eat with very good meal completion: % per meal

## 2021-08-30 NOTE — ASSESSMENT & PLAN NOTE
Please see management in setting of #4  Continue WBAT  Continue PT/OT as scheduled  Pending appointment with [facility] Orthopedic office on 9/7/2021

## 2021-08-30 NOTE — ASSESSMENT & PLAN NOTE
Stable    Pending Neurology office appointment on 9/3/2021  No neurological symptoms on assessment  Patient is AO x3   BP and HR stable

## 2021-08-30 NOTE — ASSESSMENT & PLAN NOTE
Stable Hbg/Hct level (8/30/2021) : 10 1  Start Theragran-M daily  Continue Epogen 10,000 units weekly - HOLD for Hbg > 10  Continue Folic acid 1mg daily + Vit B12 500mcg daily + Vit B1 100mg daily

## 2021-08-30 NOTE — PROGRESS NOTES
[address], [address] [place] [address], [address] [place] [address]  [phone]    NAME: Sera Stewart  AGE: 64 y o  SEX: female    Progress Note    Location: OO  POS: 32 (SNF)    Assessment/Plan:    Acute hypoxemic respiratory failure ([address] )  Deemed resolved on this visit  No hypoxia on RA  Continue PRN O2 supplement    HAP (hospital-acquired pneumonia)  VSS  No febrile episode since re-admission to [facility]  Occasional productive coughing noted,  No SOB or worsening of symptoms  Continue the following meds: * Benzonatate 200mg TID  * Ipratropium nebulization TID  * Guaifenesin 1,200mg BID  Continue to monitor for now  ICH (intracerebral hemorrhage) (HCC)  Stable  Pending Neurology office appointment on 9/3/2021  No neurological symptoms on assessment  Patient is AO x3   BP and HR stable    Closed fracture of multiple ribs of right side  Stable  Minimal pain on assessment  Continue Acetaminophen 975mg Q8 hours + Robaxin 500mg Q6 hours PRN  Continue Gabapentin 300mg daily at HS  = nursing to give at 11PM  = to sustain pain relief throughout night  Nursing to continue Pain assessment Q shift  Pending [facility] appointment on 9/2/2021    Bilateral pubic rami fractures [facility])  Please see management in setting of #4  Continue WBAT  Continue PT/OT as scheduled  Pending appointment with [facility] Orthopedic office on 9/7/2021    Fracture of right clavicle  Please see management in setting of #4  Patient reported pain relief with current meds    Closed fracture of transverse process of thoracic vertebra, with routine healing, subsequent encounter  Please see management in setting of #4 and #5    Tongue laceration  Healing well   Patient denies pain  Continue Magic wash oral rinse QID  Able to eat with very good meal completion: % per meal    Acute blood loss anemia  Stable Hbg/Hct level (8/30/2021) : 10 1  Start Theragran-M daily  Continue Epogen 10,000 units weekly - HOLD for Hbg > 10  Continue Folic acid 1mg daily + Vit B12 500mcg daily + Vit B1 100mg daily    ETOH abuse  Continue Folic acid 1mg daily + Vit B12 500mcg daily + Vit B1 100mg daily    Insomnia  Start Melatonin 3mg daily      Chief complaint / Reason for visit: Follow-up visit    History of Present Illness: This is a 57-year-old female patient currently admitted at [facility] (8/14/2021 to present) following discharge from acute care hospitalization [facility]) with Dx of ICH, Multiple Fractures [Closed Fracture of B/L Pubic Rami/ Closed Fractures of Multiple ribs of right side/ Fracture of the Right clavicle/Closed Fracture of transverse process of thoracic vertebra], Tongue laceration, Hemothorax - Right, Contusion of buttock, Hemorrhagic shock, ABLA, ETOH abuse  Patient re-admitted to acute care [facility]: 8/15-20/2021) for acute respiratory failure and hemoptysis  Patient discharged back to [facility] (8/20/2021) with Dx of HAP  Patient is seen and examined today to follow up acute and chronic medical conditions as mentioned above and ambulatory dysfunction with deconditioning  Patient is in bed for this visit - sleepy but is able to awaken and sustain wakefulness throughout this visit  Patient is verbal with clear coherent speech - oriented to name/birthday/current date  Patient reported that she had trouble sleeping last night and most night due to inability to move and repositioned in bed as she used to before her trauma  Patient reported that she normally sleeps around 11:00 p m  but because of acute pain sometimes wakes her up around 1-2 a m  and then unable to return back to sleep  Patient requested that her scheduled pain medication be given at around 11:00 p m  will also start on melatonin 3 mg to be given at 10:00 p m  starting tonight  Noted productive coughing and this visit - per patient not any worse  Patient also reported pain mostly to bilateral shoulders sockets and bilateral hips - not any worse - current pain medication helping  Patient denies chest pain, shortness of breath, generalized malaise/fatigue, fever/chills, headache, dizziness/vertigo, GI/ related concerns, appetite or mood changes  Per nursing no acute medical concerns for this visit  Review of Systems:  Per history of present illness, all other systems reviewed and negative  HISTORY:  Medical Hx: Reviewed, unchanged  Family Hx: Reviewed, unchanged  Soc Hx: Reviewed,  unchanged    ALLERGY: Reviewed, unchanged  No Known Allergies     PHYSICAL EXAM:  Vital Signs: T98 1F -P95 -R19 BP: 130/75 SpO2: 95% RA  Weight: 138 6 lbs (8/20/2021)    General: NAD  Head: Atraumatic  Normocephalic  Eye Exam: anicteric sclera, no discharge, PERRLA, No injection  Oral Exam: moist mucous membranes, no buccaloropharyngeal erythema, palatine tonsils WNL  Neck Exam: no anterior cervical lymphadenopathy noted, neck supple  Cardiovascular: regular rate, regular rhythm, no murmurs, rubs, or gallops  Pulmonary: no wheeze, no rhonchi, no rales  No chest tenderness  Abdominal: soft, non-tender, nondistended, bowel sounds audible x 4 quadrants  : Non distended bladder  Extremities and skin: no edema noted, no rashes  Intact skin  Neurological: alert, cooperative and responsive, Oriented x 3, moving all 4 extremities symmetrically  Ambulatory dysfunction with deconditioning    Laboratory results / Imaging reviewed: Hard copy/ies in medical chart:    * CBC w/o diff (8/30/2021) = WNL except:  Hbg: 10 1 (L)  Hct: 30 4 (L)  WBC: 3 7 (L)  RBC: 2 95 (L)  MCV: 103 (H)  MCH: 34 1 (H)  RDW: 18 9 (H)    Current Medications: All medications reviewed and updated in Nursing Home Chart    Please note:  Voice-recognition software may have been used in the preparation of this document  Occasional wrong word or "sound-alike" substitutions may have occurred due to the inherent limitations of voice recognition software  Interpretation should be guided by context      PAZ Wolff  8/30/2021

## 2021-08-31 ENCOUNTER — HOSPITAL ENCOUNTER (OUTPATIENT)
Dept: CT IMAGING | Facility: HOSPITAL | Age: 61
Discharge: HOME | End: 2021-08-31
Payer: COMMERCIAL

## 2021-08-31 DIAGNOSIS — I60.9 SAH (SUBARACHNOID HEMORRHAGE) (HCC): ICD-10-CM

## 2021-08-31 PROCEDURE — 70450 CT HEAD/BRAIN W/O DYE: CPT

## 2021-08-31 PROCEDURE — G1004 CDSM NDSC: HCPCS

## 2021-09-02 ENCOUNTER — NURSING HOME VISIT (OUTPATIENT)
Dept: GERIATRICS | Facility: OTHER | Age: 61
End: 2021-09-02
Payer: COMMERCIAL

## 2021-09-02 ENCOUNTER — TRANSITIONAL CARE MANAGEMENT (OUTPATIENT)
Dept: FAMILY MEDICINE CLINIC | Facility: CLINIC | Age: 61
End: 2021-09-02

## 2021-09-02 ENCOUNTER — OFFICE VISIT (OUTPATIENT)
Dept: SURGERY | Facility: CLINIC | Age: 61
End: 2021-09-02
Payer: COMMERCIAL

## 2021-09-02 VITALS — TEMPERATURE: 97.6 F

## 2021-09-02 DIAGNOSIS — F10.10 ETOH ABUSE: Chronic | ICD-10-CM

## 2021-09-02 DIAGNOSIS — D62 ACUTE BLOOD LOSS ANEMIA: ICD-10-CM

## 2021-09-02 DIAGNOSIS — S01.512D LACERATION OF TONGUE, SUBSEQUENT ENCOUNTER: ICD-10-CM

## 2021-09-02 DIAGNOSIS — I62.9 INTRACRANIAL BLEED (HCC): Primary | ICD-10-CM

## 2021-09-02 DIAGNOSIS — J96.01 ACUTE HYPOXEMIC RESPIRATORY FAILURE (HCC): ICD-10-CM

## 2021-09-02 DIAGNOSIS — S22.41XD CLOSED FRACTURE OF MULTIPLE RIBS OF RIGHT SIDE WITH ROUTINE HEALING, SUBSEQUENT ENCOUNTER: Primary | ICD-10-CM

## 2021-09-02 DIAGNOSIS — R22.31 MASS OF SKIN OF SHOULDER, RIGHT: ICD-10-CM
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
## 2021-09-02 DIAGNOSIS — S22.41XD CLOSED FRACTURE OF MULTIPLE RIBS OF RIGHT SIDE WITH ROUTINE HEALING, SUBSEQUENT ENCOUNTER: ICD-10-CM

## 2021-09-02 DIAGNOSIS — S22.009D: ICD-10-CM

## 2021-09-02 DIAGNOSIS — M25.511 ACUTE PAIN OF RIGHT SHOULDER: ICD-10-CM

## 2021-09-02 DIAGNOSIS — S42.001D CLOSED DISPLACED FRACTURE OF RIGHT CLAVICLE WITH ROUTINE HEALING, UNSPECIFIED PART OF CLAVICLE, SUBSEQUENT ENCOUNTER: ICD-10-CM

## 2021-09-02 DIAGNOSIS — S32.592D CLOSED BILATERAL FRACTURE OF PUBIC RAMI WITH ROUTINE HEALING, SUBSEQUENT ENCOUNTER: ICD-10-CM

## 2021-09-02 DIAGNOSIS — J18.9 HAP (HOSPITAL-ACQUIRED PNEUMONIA): ICD-10-CM

## 2021-09-02 DIAGNOSIS — S32.591D CLOSED BILATERAL FRACTURE OF PUBIC RAMI WITH ROUTINE HEALING, SUBSEQUENT ENCOUNTER: ICD-10-CM

## 2021-09-02 PROBLEM — J94.2 HEMOTHORAX, RIGHT: Status: RESOLVED | Noted: 2021-08-04 | Resolved: 2021-09-02

## 2021-09-02 PROCEDURE — 99316 NF DSCHRG MGMT 30 MIN+: CPT | Performed by: NURSE PRACTITIONER

## 2021-09-02 PROCEDURE — 99213 OFFICE O/P EST LOW 20 MIN: CPT | Performed by: SURGERY

## 2021-09-02 RX ORDER — M-VIT,TX,IRON,MINS/CALC/FOLIC 27MG-0.4MG
1 TABLET ORAL DAILY
COMMUNITY
End: 2021-09-02 | Stop reason: SDUPTHER

## 2021-09-02 RX ORDER — LANOLIN ALCOHOL/MO/W.PET/CERES
3 CREAM (GRAM) TOPICAL
COMMUNITY
End: 2021-09-02 | Stop reason: SDUPTHER

## 2021-09-02 RX ORDER — ALBUTEROL SULFATE 1.25 MG/3ML
1.25 SOLUTION RESPIRATORY (INHALATION) 3 TIMES DAILY
COMMUNITY
End: 2021-09-02 | Stop reason: SDUPTHER

## 2021-09-02 NOTE — LETTER
September 3, 2021     Belkis Lawson MD  [address]    Patient: Sera Stewart   YOB: 1960   Date of Visit: 9/2/2021       Dear Dr Venita Huddleston:    Good evening! Sending to you the discharge note for Ms  Sera Stewart   She is scheduled for discharge back to home after completing short term rehab at [facility] tomorrow 9/4/2021  She will have a transition of care visit with you on September 9, 2021 at 3:40PM    If you have questions, please do not hesitate to call me  Sincerely,      PAZ Arreola        CC: No Recipients  Yesica Maria Teresa  9/3/2021  5:49 PM  Sign when Signing Visit  [facility]  [address]  [phone]  [facility]: OLD ORCHARGE  POS: 32: SNF/Short Term Rehab      NAME: Sera Stewart  AGE: 64 y o  SEX: female  DATE OF ADMISSION: AUGUST 14, 2021  DATE OF DISCHARGE: September 4, 2021  DISCHARGE DISPOSITION: TO HOME    Reason for admission: Patient was admitted from [facility] for rehabilitation after hospitalization for ambulatory dysfunction and deconditioning  This is a 58-year-old female patient currently admitted at [facility] (8/14/2021 to present) following discharge from acute care hospitalization [facility]  [facility]) with Dx of ICH, Multiple Fractures [Closed Fracture of B/L Pubic Rami/ Closed Fractures of Multiple ribs of right side/ Fracture of the Right clavicle/Closed Fracture of transverse process of thoracic vertebra], Tongue laceration, Hemothorax - Right, Contusion of buttock, Hemorrhagic shock, ABLA, ETOH abuse and Right shoulder soft tissue mass - S/P Biopsy (8/17/2021)    Patient re-sent out to [facility]  (8/15/2021 to 8/2021) for hemoptysis and respiratory distress  Patient returned to [facility] on 8/20/2021 with discharge Dx of Acute, Respiratory Failure, HAP      Admission Diagnoses: [address]  Discharge Diagnoses:     * Ambulatory dysfunction and physical deconditioning  * ICH  * Multiple fractures  - Closed Fracture of B/L Pubic Rami  - Closed Fractures of Multiple ribs of right side  - Fracture of the Right clavicle  - Closed Fracture of transverse process of thoracic vertebra  * Right Shoulder soft tissue mass - S/P Biopsy  * Tongue laceration  * ABLA  * HAP  * ETOH Abuse  * Insomnia  * Thrombocytopenia - resolved (8/30/2021)    Course of stay: Patient was admitted to [address] [place] [address] for rehabilitation due to ambulatory dysfunction and deconditioning  Patient received 24/7 SNF supportive care, PT/OT/ST services  To date, patient stay in [facility] has been uneventful and complication free  Patient is scheduled to see the [address] [place] [address] as a follow-up appointment today (9/2/2021) and Neurology office appointment on 9/3/2021 as well  Per , patient is scheduled for discharge to home with family on September 4, 2021 with HHA  PT Progress Note: Per PT/ OT progress note, patient is:    * Independent for:  - bed mobility  - transfers  - ambulation up to 10 feet only  - eating  - oral hygiene  - toilet transfer  - toileting hygiene  - shower/bathe self  - upper body dressing  - lower body dressing  - putting on/off footwear    * Supervision/ Touching assistance for:  - ambulation up to 50 feet with 2 turns  - use of step with 1 curb up to 4 steps  - picking up objects    ** Ambulation up to 150 feet or 10 feet on uneven surface not attempted    Labs and testing performed during stay: Most recent lab results as below:    * CBC w/o diff (8/30/2021) = WNL except:  Hbg: 10 1 (L)  Hct: 30 4 (L)  WBC: 3 7 (L)  RBC: 2 95 (L)  MCV: 103 (H)  MCH: 34 1 (H)  RDW: 18 9 (H)    * BMP (8/18/2021) = WNL except ([address] Epic record):  Crea: 0 46 (L)  Ca: 8 1 (L)    Discharge Medications: See discharge medication list which was reviewed and signed  Status at time of discharge: Stable    Today's Visit: 9/2/20211:16 PM    Subjective:" I'm alright  I can't wait to go home   I'll go home today if I could"  Vitals:T97 6F -P88 -R19 BP: 133/83 SpO2: 95% RA  Weight: 147 5 lbs (9/1/2021) <= 141 2 lbs (8/14/2021)    Review of Systems   Constitutional: Negative  HENT: Negative  Eyes: Negative  Respiratory: Positive for cough  Negative for apnea, choking, chest tightness, shortness of breath, wheezing and stridor  No discomfort on deep breathing but some discomfort on the Right upper anterior chest  Actively coughing - productive   Cardiovascular: Negative  Gastrointestinal: Negative  Endocrine: Negative  Genitourinary: Negative  Musculoskeletal: Positive for arthralgias  Negative for back pain, gait problem, joint swelling, myalgias, neck pain and neck stiffness  Patient reported more discomfort to Right shoulder and paresthesia to Right underside of bicep " burning discomfort"  Skin: Negative  Allergic/Immunologic: Negative  Neurological: Negative  Hematological: Negative  Psychiatric/Behavioral: Negative  All other systems reviewed and are negative  Exam: Physical Exam  Vitals and nursing note reviewed  Constitutional:       General: She is not in acute distress  Appearance: She is not ill-appearing, toxic-appearing or diaphoretic  Comments: Frail stature  HENT:      Head: Normocephalic and atraumatic  Right Ear: Tympanic membrane, ear canal and external ear normal  There is no impacted cerumen  Left Ear: Tympanic membrane, ear canal and external ear normal  There is no impacted cerumen  Nose: Nose normal  No congestion or rhinorrhea  Mouth/Throat:      Mouth: Mucous membranes are moist       Pharynx: Oropharynx is clear  No oropharyngeal exudate or posterior oropharyngeal erythema  Eyes:      General: No scleral icterus  Right eye: No discharge  Left eye: No discharge  Extraocular Movements: Extraocular movements intact        Conjunctiva/sclera: Conjunctivae normal       Pupils: Pupils are equal, round, and reactive to light  Neck:      Vascular: No carotid bruit  Cardiovascular:      Rate and Rhythm: Normal rate and regular rhythm  Heart sounds: Normal heart sounds  No murmur heard  No friction rub  No gallop  Pulmonary:      Effort: Pulmonary effort is normal  No respiratory distress  Breath sounds: Normal breath sounds  No stridor  No wheezing, rhonchi or rales  Comments: Actively coughing - productive  Chest:      Chest wall: No tenderness  Abdominal:      General: Bowel sounds are normal  There is no distension  Palpations: Abdomen is soft  There is no mass  Tenderness: There is no abdominal tenderness  There is no right CVA tenderness, left CVA tenderness, guarding or rebound  Hernia: No hernia is present  Genitourinary:     Comments: Non distended bladder  Continent  Musculoskeletal:         General: Tenderness present  No swelling, deformity or signs of injury  Cervical back: Normal range of motion and neck supple  No rigidity or tenderness  Right lower leg: No edema  Left lower leg: No edema  Comments: Some tenderness to Right shoulder, Left rib cage and Left shoulder   Lymphadenopathy:      Cervical: No cervical adenopathy  Skin:     General: Skin is warm  Capillary Refill: Capillary refill takes less than 2 seconds  Coloration: Skin is not jaundiced or pale  Findings: No bruising, erythema, lesion or rash  Neurological:      Mental Status: She is alert and oriented to person, place, and time  Mental status is at baseline  Comments: Verbal with clear coherent speech - oriented to name  birthday/date  Psychiatric:         Mood and Affect: Mood normal          Behavior: Behavior normal          Thought Content: Thought content normal       Comments: Alert, fatigued presentation  Looking forward to going home on Saturday         Discussion with patient/family and further instructions:  -Fall precautions  -Aspiration precautions  -Bleeding precautions  -Monitor for signs/symptoms of infection  -Medication list was reviewed and signed  -DME form not needed at this time    Follow-up Recommendations:     * Please follow-up with your primary care physician within 7-10 days of discharge to review medication changes and current status  The facility has set-up an appointment for your [facility] with your PCP (Dr Frandy Hickey: [phone]/ Fax: [phone]) on 9/9/2021  At 3:40PM  Please make every effort to attend this appointment  Should there be a change in your medical condition and an earlier appointment is needed please call the number provided above  * Recommend PT/OT as an out-patient service to continue strengthening, gait, balance and overall functional independence improvement gained during in-patient rehabilitation  * Recommend VNA to assist in medication management and other skilled nursing needs during transition to home  Problem List Follow-up Recommendations:    HAP (hospital-acquired pneumonia)  VSS  No febrile episode since re-admission to [facility]  Occasional productive coughing noted/ reported  No SOB or worsening of symptoms  Continue the following meds: * Benzonatate 200mg TID  * Ipratropium nebulization TID until 9/6/2021  * Albuterol nebulization TID until 9/6/2021  * Guaifenesin 1,200mg BID  Continue to use spirometer TID (as per patient teaching)  Encouraged fluids  To follow-up with PCP upon discharged as scheduled     [facility]  Stable  Has repeat CT scan of head w/o contrast on 8/31/2021  No neurological symptoms on assessment  Patient is AO x3  BP and HR stable  Pending Neurology office appointment on 9/3/2021     Closed fracture of multiple ribs of right side  Stable   Minimal pain on assessment reported today  = patient able to demonstrate deep breathing without discomfort  Continue Acetaminophen 975mg Q8 hours  Continue Robaxin 500mg Q6 hours PRN  Continue Gabapentin 300mg daily at [facility]  Pending [address] appointment today: 9/2/2021     Bilateral pubic rami fractures [facility])  Please see management in setting of #4  Continue WBAT  Continue PT/OT as scheduled  P[facility] Orthopedic office on 9/7/2021     Fracture of right clavicle  Continue NWB status until cleared by Orthopedic  Please see management in setting of #4  Patient reported pain relief with current meds     Closed fracture of transverse process of thoracic vertebra, with routine healing, subsequent encounter  Please see management in setting of #4 and #5     Tongue laceration  Healing well  Patient denies pain  Continue Magic wash oral rinse QID  Able to eat with very good meal completion: % per meal  To follow-up with PCP upon discharged as scheduled     Acute blood loss anemia  Declined blood transfusion per [facility] note: [employer]  Stable Hbg/Hct level: 10 1/ 30 4 (L: 8/30/2021)  Continue Theragran-M daily  Continue Folic acid 1mg daily + Vit B12 500mcg daily + Vit B1 100mg daily  Continue Epogen 10,000 units weekly - HOLD for Hbg > 10  Patient maintains very good appetite: % per meal   To follow-up with PCP upon discharge     ETOH abuse  Patient reported last alcohol use around end of July, 2021  Continue Folic acid 1mg daily + Vit B12 500mcg daily + Vit B1 100mg daily  To follow-up with PCP upon discharge     Insomnia  Reported significantly improved sleep on this visit  Continue Melatonin 3mg daily at HS  Acute hypoxemic respiratory failure ([address] )  Deemed resolved on this visit  No hypoxia on RA  Right shoulder Pain  Possible paresthesia to Right underside of bicep  Continue Diclofenac 1% (2G) gel QID PRN  Continue Gabapentin 300mg daily at HS    Right Shoulder Soft tissue mass  S/P Biopsy - unable to view result at this time        CURRENT MEDICATION LIST AT [facility]:    Current Outpatient Medications:     acetaminophen (TYLENOL) 325 mg tablet, Take 3 tablets (975 mg total) by mouth every 8 (eight) hours, Disp: 30 tablet, Rfl: 0    al mag oxide-diphenhydramine-lidocaine viscous (MAGIC MOUTHWASH) 1:1:1 suspension, Swish and spit 10 mL 4 (four) times a day, Disp: , Rfl:     albuterol (ACCUNEB) 1 25 MG/3ML nebulizer solution, Take 1 25 mg by nebulization 3 (three) times a day  Until 9/6/2021, Disp: , Rfl:     benzonatate (TESSALON) 200 MG capsule, Take 1 capsule (200 mg total) by mouth 3 (three) times a day, Disp: 20 capsule, Rfl: 0    cyanocobalamin (VITAMIN B-12) 500 MCG tablet, Take 1 tablet (500 mcg total) by mouth daily, Disp: 30 tablet, Rfl: 0    Diclofenac Sodium (VOLTAREN) 1 %, Apply 2 g topically 4 (four) times a day, Disp: 150 g, Rfl: 0    epoetin yolande (EPOGEN,PROCRIT) 10,000 units/mL, Inject 10,000 Units under the skin once a week Tuesdays   HOLD for Hbg greater than 10, Disp: , Rfl:     folic acid (FOLVITE) 1 mg tablet, Take 1 tablet (1 mg total) by mouth daily, Disp: 30 tablet, Rfl: 0    gabapentin (NEURONTIN) 300 mg capsule, Take 1 capsule (300 mg total) by mouth daily at bedtime, Disp: 30 capsule, Rfl: 0    guaiFENesin 1200 MG TB12, Take 1 tablet (1,200 mg total) by mouth 2 (two) times a day, Disp: 14 tablet, Rfl: 0    ipratropium (ATROVENT) 0 02 % nebulizer solution, Take 2 5 mL (0 5 mg total) by nebulization 3 (three) times a day, Disp: 105 mL, Rfl: 0    melatonin 3 mg, Take 3 mg by mouth daily at bedtime, Disp: , Rfl:     methocarbamol (ROBAXIN) 500 mg tablet, Take 1 tablet (500 mg total) by mouth every 6 (six) hours as needed for muscle spasms, Disp: 30 tablet, Rfl: 0    therapeutic multivitamin-minerals (THERAGRAN-M) tablet, Take 1 tablet by mouth daily, Disp: , Rfl:     thiamine 100 MG tablet, Take 1 tablet (100 mg total) by mouth daily, Disp: 30 tablet, Rfl: 0      Discharge Statement:    * Spent more than 30 minutes discharging the patient; greater than 50% spent on physical examination of patient, answering questions, discussion of plan of care, recommendations and providing post discharge instructions  Additional time spend on other discharge related activities including documentation        PAZ Bell  9/2/20211:16 PM

## 2021-09-02 NOTE — PROGRESS NOTES
[facility]  [address]  [phone]  [address] [place] [address] St: OLD Ted Oms  POS: 32: SNF/Short Term Rehab      NAME: Nando Aldridge  AGE: 64 y o  SEX: female  DATE OF ADMISSION: AUGUST 14, 2021  DATE OF DISCHARGE: September 4, 2021  DISCHARGE DISPOSITION: TO HOME    Reason for admission: Patient was admitted from [facility] for rehabilitation after hospitalization for ambulatory dysfunction and deconditioning  This is a 70-year-old female patient currently admitted at [facility] (8/14/2021 to present) following discharge from acute care hospitalization [facility]) with Dx of ICH, Multiple Fractures [Closed Fracture of B/L Pubic Rami/ Closed Fractures of Multiple ribs of right side/ Fracture of the Right clavicle/Closed Fracture of transverse process of thoracic vertebra], Tongue laceration, Hemothorax - Right, Contusion of buttock, Hemorrhagic shock, ABLA, ETOH abuse and Right shoulder soft tissue mass - S/P Biopsy (8/17/2021)    Patient re-sent out to [facility]  (8/15/2021 to 8/2021) for hemoptysis and respiratory distress  Patient returned to [facility] on 8/20/2021 with discharge Dx of Acute, Respiratory Failure, HAP  Admission Diagnoses: [address]  Discharge Diagnoses:     * Ambulatory dysfunction and physical deconditioning  * ICH  * Multiple fractures  - Closed Fracture of B/L Pubic Rami  - Closed Fractures of Multiple ribs of right side  - Fracture of the Right clavicle  - Closed Fracture of transverse process of thoracic vertebra  * Right Shoulder soft tissue mass - S/P Biopsy  * Tongue laceration  * ABLA  * HAP  * ETOH Abuse  * Insomnia  * Thrombocytopenia - resolved (8/30/2021)    Course of stay: Patient was admitted to [facility] for rehabilitation due to ambulatory dysfunction and deconditioning  Patient received 24/7 SNF supportive care, PT/OT/ST services  To date, patient stay in [facility] has been uneventful and complication free   [facility] follow[facility]up [facility] today (9/2/2021) and Neurology office appointment on 9/3/2021 as well  Per SW, patient is scheduled for discharge to home with family on September 4, 2021 with HHA  PT Progress Note: Per PT/ OT progress note, patient is:    * Independent for:  - bed mobility  - transfers  - ambulation up to 10 feet only  - eating  - oral hygiene  - toilet transfer  - toileting hygiene  - shower/bathe self  - upper body dressing  - lower body dressing  - putting on/off footwear    * Supervision/ Touching assistance for:  - ambulation up to 50 feet with 2 turns  - use of step with 1 curb up to 4 steps  - picking up objects    ** Ambulation up to 150 feet or 10 feet on uneven surface not attempted    Labs and testing performed during stay: Most recent lab results as below:    * CBC w/o diff (8/30/2021) = WNL except:  Hbg: 10 1 (L)  Hct: 30 4 (L)  WBC: 3 7 (L)  RBC: 2 95 (L)  MCV: 103 (H)  MCH: 34 1 (H)  RDW: 18 9 (H)    * BMP (8/18/2021) = WNL except ([address] Epic record):  Crea: 0 46 (L)  Ca: 8 1 (L)    Discharge Medications: See discharge medication list which was reviewed and signed  Status at time of discharge: Stable    Today's Visit: 9/2/20211:16 PM    Subjective:" I'm alright  I can't wait to go home  I'll go home today if I could"  Vitals:T97 6F -P88 -R19 BP: 133/83 SpO2: 95% RA  Weight: 147 5 lbs (9/1/2021) <= 141 2 lbs (8/14/2021)    Review of Systems   Constitutional: Negative  HENT: Negative  Eyes: Negative  Respiratory: Positive for cough  Negative for apnea, choking, chest tightness, shortness of breath, wheezing and stridor  No discomfort on deep breathing but some discomfort on the Right upper anterior chest  Actively coughing - productive   Cardiovascular: Negative  Gastrointestinal: Negative  Endocrine: Negative  Genitourinary: Negative  Musculoskeletal: Positive for arthralgias   Negative for back pain, gait problem, joint swelling, myalgias, neck pain and neck stiffness  Patient reported more discomfort to Right shoulder and paresthesia to Right underside of bicep " burning discomfort"  Skin: Negative  Allergic/Immunologic: Negative  Neurological: Negative  Hematological: Negative  Psychiatric/Behavioral: Negative  All other systems reviewed and are negative  Exam: Physical Exam  Vitals and nursing note reviewed  Constitutional:       General: She is not in acute distress  Appearance: She is not ill-appearing, toxic-appearing or diaphoretic  Comments: Frail stature  HENT:      Head: Normocephalic and atraumatic  Right Ear: Tympanic membrane, ear canal and external ear normal  There is no impacted cerumen  Left Ear: Tympanic membrane, ear canal and external ear normal  There is no impacted cerumen  Nose: Nose normal  No congestion or rhinorrhea  Mouth/Throat:      Mouth: Mucous membranes are moist       Pharynx: Oropharynx is clear  No oropharyngeal exudate or posterior oropharyngeal erythema  Eyes:      General: No scleral icterus  Right eye: No discharge  Left eye: No discharge  Extraocular Movements: Extraocular movements intact  Conjunctiva/sclera: Conjunctivae normal       Pupils: Pupils are equal, round, and reactive to light  Neck:      Vascular: No carotid bruit  Cardiovascular:      Rate and Rhythm: Normal rate and regular rhythm  Heart sounds: Normal heart sounds  No murmur heard  No friction rub  No gallop  Pulmonary:      Effort: Pulmonary effort is normal  No respiratory distress  Breath sounds: Normal breath sounds  No stridor  No wheezing, rhonchi or rales  Comments: Actively coughing - productive  Chest:      Chest wall: No tenderness  Abdominal:      General: Bowel sounds are normal  There is no distension  Palpations: Abdomen is soft  There is no mass  Tenderness: There is no abdominal tenderness   There is no right CVA tenderness, left CVA tenderness, guarding or rebound  Hernia: No hernia is present  Genitourinary:     Comments: Non distended bladder  Continent  Musculoskeletal:         General: Tenderness present  No swelling, deformity or signs of injury  Cervical back: Normal range of motion and neck supple  No rigidity or tenderness  Right lower leg: No edema  Left lower leg: No edema  Comments: Some tenderness to Right shoulder, Left rib cage and Left shoulder   Lymphadenopathy:      Cervical: No cervical adenopathy  Skin:     General: Skin is warm  Capillary Refill: Capillary refill takes less than 2 seconds  Coloration: Skin is not jaundiced or pale  Findings: No bruising, erythema, lesion or rash  Neurological:      Mental Status: She is alert and oriented to person, place, and time  Mental status is at baseline  Comments: Verbal with clear coherent speech - oriented to name  birthday/date  Psychiatric:         Mood and Affect: Mood normal          Behavior: Behavior normal          Thought Content: Thought content normal       Comments: Alert, fatigued presentation  Looking forward to going home on Saturday  Discussion with patient/family and further instructions:  -Fall precautions  -Aspiration precautions  -Bleeding precautions  -Monitor for signs/symptoms of infection  -Medication list was reviewed and signed  -DME form not needed at this time    Follow-up Recommendations:     * Please follow-up with your primary care physician within 7-10 days of discharge to review medication changes and current status  The facility has set-up an appointment for your LIAM HDZ with your PCP (Dr Enma Gaston: [phone]/ Fax: [phone]) on 9/9/2021  At 3:40PM  Please make every effort to attend this appointment  Should there be a change in your medical condition and an earlier appointment is needed please call the number provided above      * Recommend PT/OT as an out-patient service to continue strengthening, gait, balance and overall functional independence improvement gained during in-patient rehabilitation  * Recommend VNA to assist in medication management and other skilled nursing needs during transition to home  Problem List Follow-up Recommendations:    HAP (hospital-acquired pneumonia)  VSS  No febrile episode since re-admission to [facility]  Occasional productive coughing noted/ reported  No SOB or worsening of symptoms  Continue the following meds: * Benzonatate 200mg TID  * Ipratropium nebulization TID until 9/6/2021  * Albuterol nebulization TID until 9/6/2021  * Guaifenesin 1,200mg BID  Continue to use spirometer TID (as per patient teaching)  Encouraged fluids  To follow-up with PCP upon discharged as scheduled     ICH (intracerebral hemorrhage) ([facility] [address] )  Stable  Has repeat CT scan of head w/o contrast on 8/31/2021  No neurological symptoms on assessment  Patient is AO x3  BP and HR stable  Pending Neurology office appointment on 9/3/2021     Closed fracture of multiple ribs of right side  Stable  Minimal pain on assessment reported today  = patient able to demonstrate deep breathing without discomfort  Continue Acetaminophen 975mg Q8 hours  Continue Robaxin 500mg Q6 hours PRN  Continue Gabapentin 300mg daily at [address]  Pending [facility] appointment today: 9/2/2021     Bilateral pubic rami fractures ([facility] [address] )  Please see management in setting of #4  Continue WBAT  Continue PT/OT as scheduled  Pending appointment with [facility] office on 9/7/2021     Fracture of right clavicle  Continue NWB status until cleared by Orthopedic  Please see management in setting of #4  Patient reported pain relief with current meds     Closed fracture of transverse process of thoracic vertebra, with routine healing, subsequent encounter  Please see management in setting of #4 and #5     Tongue laceration  Healing well  Patient denies pain    Continue Magic wash oral rinse QID  Able to eat with very good meal completion: % per meal  To follow-up with PCP upon discharged as scheduled     Acute blood loss anemia  Declined blood transfusion per [facility] note: Buddhist  Stable Hbg/Hct level: 10 1/ 30 4 (L: 8/30/2021)  Continue Theragran-M daily  Continue Folic acid 1mg daily + Vit B12 500mcg daily + Vit B1 100mg daily  Continue Epogen 10,000 units weekly - HOLD for Hbg > 10  Patient maintains very good appetite: % per meal   To follow-up with PCP upon discharge     ETOH abuse  Patient reported last alcohol use around end of July, 2021  Continue Folic acid 1mg daily + Vit B12 500mcg daily + Vit B1 100mg daily  To follow-up with PCP upon discharge     Insomnia  Reported significantly improved sleep on this visit  Continue Melatonin 3mg daily at HS  Acute hypoxemic respiratory failure ([address] )  Deemed resolved on this visit  No hypoxia on RA  Right shoulder Pain  Possible paresthesia to Right underside of bicep  Continue Diclofenac 1% (2G) gel QID PRN  Continue Gabapentin 300mg daily at HS    Right Shoulder Soft tissue mass  S/P Biopsy - unable to view result at this time        CURRENT MEDICATION LIST AT [facility]:    Current Outpatient Medications:     acetaminophen (TYLENOL) 325 mg tablet, Take 3 tablets (975 mg total) by mouth every 8 (eight) hours, Disp: 30 tablet, Rfl: 0    al mag oxide-diphenhydramine-lidocaine viscous (MAGIC MOUTHWASH) 1:1:1 suspension, Swish and spit 10 mL 4 (four) times a day, Disp: , Rfl:     albuterol (ACCUNEB) 1 25 MG/3ML nebulizer solution, Take 1 25 mg by nebulization 3 (three) times a day  Until 9/6/2021, Disp: , Rfl:     benzonatate (TESSALON) 200 MG capsule, Take 1 capsule (200 mg total) by mouth 3 (three) times a day, Disp: 20 capsule, Rfl: 0    cyanocobalamin (VITAMIN B-12) 500 MCG tablet, Take 1 tablet (500 mcg total) by mouth daily, Disp: 30 tablet, Rfl: 0    Diclofenac Sodium (VOLTAREN) 1 %, Apply 2 g topically 4 (four) times a day, Disp: 150 g, Rfl: 0    epoetin yolande (EPOGEN,PROCRIT) 10,000 units/mL, Inject 10,000 Units under the skin once a week Tuesdays  HOLD for Hbg greater than 10, Disp: , Rfl:     folic acid (FOLVITE) 1 mg tablet, Take 1 tablet (1 mg total) by mouth daily, Disp: 30 tablet, Rfl: 0    gabapentin (NEURONTIN) 300 mg capsule, Take 1 capsule (300 mg total) by mouth daily at bedtime, Disp: 30 capsule, Rfl: 0    guaiFENesin 1200 MG TB12, Take 1 tablet (1,200 mg total) by mouth 2 (two) times a day, Disp: 14 tablet, Rfl: 0    ipratropium (ATROVENT) 0 02 % nebulizer solution, Take 2 5 mL (0 5 mg total) by nebulization 3 (three) times a day, Disp: 105 mL, Rfl: 0    melatonin 3 mg, Take 3 mg by mouth daily at bedtime, Disp: , Rfl:     methocarbamol (ROBAXIN) 500 mg tablet, Take 1 tablet (500 mg total) by mouth every 6 (six) hours as needed for muscle spasms, Disp: 30 tablet, Rfl: 0    therapeutic multivitamin-minerals (THERAGRAN-M) tablet, Take 1 tablet by mouth daily, Disp: , Rfl:     thiamine 100 MG tablet, Take 1 tablet (100 mg total) by mouth daily, Disp: 30 tablet, Rfl: 0      Discharge Statement:    * Spent more than 30 minutes discharging the patient; greater than 50% spent on physical examination of patient, answering questions, discussion of plan of care, recommendations and providing post discharge instructions  Additional time spend on other discharge related activities including documentation        PAZ Martinez  9/2/20211:16 PM

## 2021-09-02 NOTE — ASSESSMENT & PLAN NOTE
-  Patient denies any new pain with her ribs  -  Reports that she is feeling much better  -  Denies any new chest pain or shortness of breath  -  Saturation is 100% on room air  -  Continues to use incentive spirometer

## 2021-09-02 NOTE — PROGRESS NOTES
Office Visit - Trauma  Sera Stewart MRN: [id number]  Encounter: [id number]    Assessment and Plan    Problem List Items Addressed This Visit        Musculoskeletal and Integument    Closed fracture of multiple ribs of right side - Primary     -  Patient denies any new pain with her ribs  -  Reports that she is feeling much better  -  Denies any new chest pain or shortness of breath  -  Saturation is 100% on room air  -  Continues to use incentive spirometer              disposition:  Trauma will sign off at this time  No further workup  May follow up outpatient with Orthopedics and Neurosurgery  Chief Complaint:  Sera Stewart is a 64 y o  female who presents for Fall (f/u rib fx  Denies any rib pain,denies sob )    Subjective    Patient offering no new complaints  Patient reports that from her rib fracture she is doing well  Denies any new chest pain or shortness of breath  No nausea or vomiting      Past Medical History  Past Medical History:   Diagnosis Date    Fracture     Hepatitis     Hep A     Insomnia     10mar2016 resolved    Osteoporosis     14jun2016 resolved    Pancreatitis     Seasonal allergies     Urine discoloration 11/11/2019    Vomiting 11/13/2019       Past Surgical History  Past Surgical History:   Procedure Laterality Date    IR BIOPSY BONE  8/17/2021    IR BIOPSY BONE MARROW  11/14/2019    TUBAL LIGATION  1985       Family History  Family History   Problem Relation Age of Onset    Anxiety disorder Mother     Depression Mother     No Known Problems Father     Cancer Paternal Grandmother        Social History  Social History     Socioeconomic History    Marital status: /Civil Union     Spouse name: None    Number of children: None    Years of education: None    Highest education level: None   Occupational History    None   Tobacco Use    Smoking status: Never Smoker    Smokeless tobacco: Never Used   Vaping Use    Vaping Use: Never used   Substance and Sexual Activity    Alcohol use: Not Currently     Alcohol/week: 7 0 standard drinks     Types: 7 Cans of beer per week    Drug use: No    Sexual activity: Yes     Partners: Male   Other Topics Concern    None   Social History Narrative    ** Merged History Encounter **         Drinks coffee  tea     Social Determinants of Health     Financial Resource Strain:     Difficulty of Paying Living Expenses:    Food Insecurity:     Worried About Running Out of Food in the Last Year:     Ran Out of Food in the Last Year:    Transportation Needs:     Lack of Transportation (Medical):  Lack of Transportation (Non-Medical):    Physical Activity:     Days of Exercise per Week:     Minutes of Exercise per Session:    Stress:     Feeling of Stress :    Social Connections:     Frequency of Communication with Friends and Family:     Frequency of Social Gatherings with Friends and Family:     Attends [employer] Services:     Active Member of Clubs or Organizations:     Attends Club or Organization Meetings:     Marital Status:    Intimate Partner Violence:     Fear of Current or Ex-Partner:     Emotionally Abused:     Physically Abused:     Sexually Abused:         Medications  Current Outpatient Medications on File Prior to Visit   Medication Sig Dispense Refill    acetaminophen (TYLENOL) 325 mg tablet Take 3 tablets (975 mg total) by mouth every 8 (eight) hours 30 tablet 0    al mag oxide-diphenhydramine-lidocaine viscous (MAGIC MOUTHWASH) 1:1:1 suspension Swish and spit 10 mL 4 (four) times a day      benzonatate (TESSALON) 200 MG capsule Take 1 capsule (200 mg total) by mouth 3 (three) times a day 20 capsule 0    cyanocobalamin (VITAMIN B-12) 500 MCG tablet Take 1 tablet (500 mcg total) by mouth daily 30 tablet 0    Diclofenac Sodium (VOLTAREN) 1 % Apply 2 g topically 4 (four) times a day 150 g 0    epoetin yolande (EPOGEN,PROCRIT) 10,000 units/mL Inject 10,000 Units under the skin once a week Tuesdays   HOLD for Hbg greater than 10      folic acid (FOLVITE) 1 mg tablet Take 1 tablet (1 mg total) by mouth daily 30 tablet 0    gabapentin (NEURONTIN) 300 mg capsule Take 1 capsule (300 mg total) by mouth daily at bedtime 30 capsule 0    guaiFENesin 1200 MG TB12 Take 1 tablet (1,200 mg total) by mouth 2 (two) times a day 14 tablet 0    ipratropium (ATROVENT) 0 02 % nebulizer solution Take 2 5 mL (0 5 mg total) by nebulization 3 (three) times a day 105 mL 0    methocarbamol (ROBAXIN) 500 mg tablet Take 1 tablet (500 mg total) by mouth every 6 (six) hours as needed for muscle spasms 30 tablet 0    thiamine 100 MG tablet Take 1 tablet (100 mg total) by mouth daily 30 tablet 0    [DISCONTINUED] albuterol (2 5 mg/3 mL) 0 083 % nebulizer solution Take 3 mL (2 5 mg total) by nebulization every 6 (six) hours as needed for wheezing or shortness of breath 30 mL 0    [DISCONTINUED] dextromethorphan-guaiFENesin (ROBITUSSIN DM)  mg/5 mL syrup Take 5 mL by mouth 3 (three) times a day as needed for cough or congestion 354 mL 0    [DISCONTINUED] levalbuterol (XOPENEX) 1 25 mg/3 mL nebulizer solution Take 1 25 mg by nebulization 3 (three) times a day      [DISCONTINUED] lidocaine (Lidoderm) 5 % Apply 1 patch topically daily Remove & Discard patch within 12 hours or as directed by MD 10 patch 0     No current facility-administered medications on file prior to visit  Allergies  No Known Allergies    Review of Systems   Constitutional: Negative for activity change, appetite change and fever  HENT: Negative for ear discharge, ear pain, rhinorrhea, sore throat and trouble swallowing  Eyes: Negative for photophobia, pain and redness  Respiratory: Negative for apnea, cough, chest tightness, shortness of breath and stridor  Cardiovascular: Negative for chest pain and palpitations  Gastrointestinal: Negative for abdominal distention, abdominal pain, nausea and vomiting     Endocrine: Negative for cold intolerance and heat intolerance  Genitourinary: Negative  Musculoskeletal: Negative for arthralgias, back pain, neck pain and neck stiffness  Skin: Negative  Neurological: Negative for dizziness, weakness, light-headedness and numbness  Hematological: Negative  Objective  Vitals:    09/02/21 1353   Temp: 97 6 °F (36 4 °C)       Physical Exam  Vitals reviewed  Constitutional:       Appearance: Normal appearance  HENT:      Head: Normocephalic  Cardiovascular:      Rate and Rhythm: Normal rate and regular rhythm  Pulses: Normal pulses  Heart sounds: Normal heart sounds  Pulmonary:      Effort: Pulmonary effort is normal  No respiratory distress  Breath sounds: Normal breath sounds  Skin:     General: Skin is warm and dry  Neurological:      General: No focal deficit present  Mental Status: She is alert and oriented to person, place, and time  Mental status is at baseline

## 2021-09-03 RX ORDER — LANOLIN ALCOHOL/MO/W.PET/CERES
100 CREAM (GRAM) TOPICAL DAILY
Qty: 26 TABLET | Refills: 0 | Status: SHIPPED | OUTPATIENT
Start: 2021-09-04 | End: 2022-04-13 | Stop reason: SDUPTHER

## 2021-09-03 RX ORDER — M-VIT,TX,IRON,MINS/CALC/FOLIC 27MG-0.4MG
1 TABLET ORAL DAILY
Qty: 26 TABLET | Refills: 0 | Status: SHIPPED | OUTPATIENT
Start: 2021-09-04 | End: 2022-04-07 | Stop reason: SDUPTHER

## 2021-09-03 RX ORDER — FOLIC ACID 1 MG/1
1 TABLET ORAL DAILY
Qty: 26 TABLET | Refills: 0 | Status: SHIPPED | OUTPATIENT
Start: 2021-09-04 | End: 2022-06-16

## 2021-09-03 RX ORDER — GABAPENTIN 300 MG/1
300 CAPSULE ORAL
Qty: 6 CAPSULE | Refills: 0 | Status: SHIPPED | OUTPATIENT
Start: 2021-09-04 | End: 2022-02-24 | Stop reason: ALTCHOICE

## 2021-09-03 RX ORDER — ACETAMINOPHEN 325 MG/1
975 TABLET ORAL EVERY 8 HOURS SCHEDULED
Qty: 54 TABLET | Refills: 0 | Status: SHIPPED | OUTPATIENT
Start: 2021-09-04 | End: 2021-09-10

## 2021-09-03 RX ORDER — LANOLIN ALCOHOL/MO/W.PET/CERES
3 CREAM (GRAM) TOPICAL
Qty: 6 TABLET | Refills: 0 | Status: SHIPPED | OUTPATIENT
Start: 2021-09-04 | End: 2021-09-10

## 2021-09-03 RX ORDER — GUAIFENESIN 1200 MG/1
1200 TABLET, EXTENDED RELEASE ORAL 2 TIMES DAILY
Qty: 12 TABLET | Refills: 0 | Status: SHIPPED | OUTPATIENT
Start: 2021-09-04 | End: 2021-09-10

## 2021-09-03 RX ORDER — METHOCARBAMOL 500 MG/1
500 TABLET, FILM COATED ORAL EVERY 6 HOURS PRN
Qty: 8 TABLET | Refills: 0 | Status: SHIPPED | OUTPATIENT
Start: 2021-09-04 | End: 2022-02-24 | Stop reason: ALTCHOICE

## 2021-09-03 RX ORDER — ALBUTEROL SULFATE 1.25 MG/3ML
1.25 SOLUTION RESPIRATORY (INHALATION) 3 TIMES DAILY
Qty: 18 ML | Refills: 0 | Status: SHIPPED | OUTPATIENT
Start: 2021-09-04 | End: 2021-09-06

## 2021-09-07 ENCOUNTER — APPOINTMENT (OUTPATIENT)
Dept: RADIOLOGY | Facility: AMBULARY SURGERY CENTER | Age: 61
End: 2021-09-07
Attending: ORTHOPAEDIC SURGERY
Payer: COMMERCIAL

## 2021-09-07 ENCOUNTER — OFFICE VISIT (OUTPATIENT)
Dept: OBGYN CLINIC | Facility: CLINIC | Age: 61
End: 2021-09-07
Payer: COMMERCIAL

## 2021-09-07 VITALS
BODY MASS INDEX: 21.93 KG/M2 | DIASTOLIC BLOOD PRESSURE: 76 MMHG | HEART RATE: 80 BPM | HEIGHT: 67 IN | SYSTOLIC BLOOD PRESSURE: 123 MMHG

## 2021-09-07 DIAGNOSIS — R10.2 PAIN IN PELVIS: ICD-10-CM

## 2021-09-07 DIAGNOSIS — S32.591A CLOSED BILATERAL FRACTURE OF PUBIC RAMI, INITIAL ENCOUNTER (HCC): ICD-10-CM

## 2021-09-07 DIAGNOSIS — S42.031D CLOSED DISPLACED FRACTURE OF ACROMIAL END OF RIGHT CLAVICLE WITH ROUTINE HEALING, SUBSEQUENT ENCOUNTER: Primary | ICD-10-CM

## 2021-09-07 DIAGNOSIS — S42.031D CLOSED DISPLACED FRACTURE OF ACROMIAL END OF RIGHT CLAVICLE WITH ROUTINE HEALING, SUBSEQUENT ENCOUNTER: ICD-10-CM

## 2021-09-07 DIAGNOSIS — S32.592A CLOSED BILATERAL FRACTURE OF PUBIC RAMI, INITIAL ENCOUNTER (HCC): ICD-10-CM

## 2021-09-07 PROCEDURE — 73000 X-RAY EXAM OF COLLAR BONE: CPT

## 2021-09-07 PROCEDURE — 72170 X-RAY EXAM OF PELVIS: CPT

## 2021-09-07 PROCEDURE — 99213 OFFICE O/P EST LOW 20 MIN: CPT | Performed by: ORTHOPAEDIC SURGERY

## 2021-09-07 NOTE — PROGRESS NOTES
Patient Name:  Luis Irvin  MRN:  [id number]    Assessment & Plan     Right distal clavicle fracture, bilateral inferior pubic rami fractures after fall 8/4/21  1  WBAT Right upper extremity and bilateral lower extremities  2  Continue physical therapy  3  Gradually return to normal activities as tolerated  4  Follow-up in six weeks for repeat evaluation with repeat x-rays of the right clavicle and pelvis    History of the Present Illness     24-year-old female reports to the office today for follow-up regarding her right clavicle  Patient was seen initially in consultation at [facility] after sustaining a fall  She still notes mild discomfort in the right shoulder worse with increased activity  She still notes bilateral hip and pelvis pain with associated weakness  She is ambulating with a walker  She is no longer utilizing a sling  She is participating in physical therapy    General ROS:  Negative for fever or chills  Neurological ROS:  Negative for numbness or tingling  Physical Exam     /76   Pulse 80   Ht 5' 7" (1 702 m)   BMI 21 93 kg/m²     Right shoulder:  Skin intact  Deformity noted consistent with distal clavicle fracture  No tenting or blanching of the skin  No tenderness to palpation distal clavicle  PROM is , ER-abd 90, IR-abd 20  Empty can test is negative  Speed's test is negative  Cross-body adduction test is positive  Sensation intact axillary, median, ulnar and radial nerves  2+ radial pulse  Pelvis:  No gross deformity  Pelvis is stable  Hip range of motion is intact bilaterally  Appropriate hip flexion and abduction strength  Sensation intact distally  Data Review     I have personally reviewed pertinent films in PACS, and my interpretation follows      AP pelvis radiographs 9/7/21:  Healing pubic rami fractures    X-rays right clavicle 9/7/21:  Stable alignment of the proximal without evidence of bony healing      Scribe Attestation I,:  Gay Dover PA-C am acting as a scribe while in the presence of the attending physician :       I,:  Mitzi Merino MD personally performed the services described in this documentation    as scribed in my presence :

## 2021-09-08 NOTE — PROGRESS NOTES
Finally got in touch with patient she moved appointment to the 24th due to another appointment already made with neurology

## 2021-09-09 ENCOUNTER — TELEPHONE (OUTPATIENT)
Dept: NEUROSURGERY | Facility: CLINIC | Age: 61
End: 2021-09-09

## 2021-09-09 ENCOUNTER — OFFICE VISIT (OUTPATIENT)
Dept: NEUROSURGERY | Facility: CLINIC | Age: 61
End: 2021-09-09
Payer: COMMERCIAL

## 2021-09-09 DIAGNOSIS — I60.9 SUBARACHNOID HEMORRHAGE (HCC): Primary | ICD-10-CM

## 2021-09-09 PROCEDURE — 99213 OFFICE O/P EST LOW 20 MIN: CPT | Performed by: PHYSICIAN ASSISTANT

## 2021-09-09 NOTE — TELEPHONE ENCOUNTER
9/9/21  CALLED PT ON CELL #  VM FULL  CALLED WORK NUMBER BOLDED IN CHART  NO ANSWER  CALLED PTS EMERGENCY CONTACT/MED CONSENT, [facility]  [phone]  L/M TO CALL BACK TO CONFIRM TODAY'S APPT 9/9/21 WITH AMANDA OLW AT 3 PM

## 2021-09-09 NOTE — PROGRESS NOTES
Office Note - Neurosurgery   Cachorro Escamilla 64 y o  female MRN: [id number]      Assessment:  Patient is a 64 yrs old pleasant woman with Hx of traumatic right posterior fossa IPH and subarachnoid hemorrhage  History of fall and alcohol ingestion  Here today for 5 weeks follow-up CT head  Images demonstrate complete resolution of previously seen subarachnoid hemorrhage and right posterior fossa intraparenchymal hematoma  Patient reports doing fine  No headache, nausea, vomiting, blurry vision or weakness in the extremities  Denies taking AC/AP  Exam-patient alert and oriented x3  PERRL, EOMI 2mm conj bilaterally  Moves all extremities  Finger-to-nose tests normal and without drift bilaterally  Strength is 5/5 and sensation to light touch intact throughout  DTR 2+ without clonus  Hx, PEx, and images reviewed with the patient and her   Management plan discussed  From Neurosurgery perspective, no regular follow-up or for the imaging is required  Advised fall precaution  Plan:  1  From Neurosurgery perspective, patient is cleared  No regular follow-up or further imaging is required  2  Advised fall precaution  3  Follow-up on p r n  basis      Subjective/Objective     C/C: " 5 weeks follow-up for [facility]"        HPI  Patient is 64years old pleasant lady with past medical history of bladder cancer history of alcohol ingestion here for 5 weeks follow-up of subarachnoid hemorrhage and also right posterior fossa intraparenchymal hematoma  History of fall  Five weeks follow-up CT head without contrast demonstrate complete resolution of previously seen left more than right subarachnoid hemorrhage and also right posterior fossa intraparenchymal hematoma  Patient reports significant improvement with her headache  Denies any nausea, vomiting, blurry vision or diplopia  Denies weakness in the extremities, numbness, or paresthesia  Denies bowel/bladder dysfunction    Denies gait instability or tendency to fall  Denies taking AC  /AP  Denies fever, chills, rigors or chest pain  ROS  Review of system personally reviewed and updated  Review of Systems   Constitutional: Positive for fatigue  HENT: Negative  Eyes: Negative  Respiratory: Negative  Cardiovascular: Negative  Gastrointestinal: Positive for abdominal distention  Genitourinary: Negative  Musculoskeletal: Positive for gait problem (uses walker) and neck pain (and b/l shoulder pain )         2-3/10 pain level taking tylenol   Skin: Negative  Allergic/Immunologic: Negative  Neurological: Positive for dizziness, weakness (general weakness) and numbness (under right arm)  Negative for headaches  Sharp pain on right side of head from time to time   Hematological: Negative  Psychiatric/Behavioral: Negative          Family History    Family History   Problem Relation Age of Onset    Anxiety disorder Mother     Depression Mother     No Known Problems Father     Cancer Paternal Grandmother        Social History    Social History     Socioeconomic History    Marital status: /Civil Union     Spouse name: Not on file    Number of children: Not on file    Years of education: Not on file    Highest education level: Not on file   Occupational History    Not on file   Tobacco Use    Smoking status: Never Smoker    Smokeless tobacco: Never Used   Vaping Use    Vaping Use: Never used   Substance and Sexual Activity    Alcohol use: Not Currently     Alcohol/week: 7 0 standard drinks     Types: 7 Cans of beer per week    Drug use: No    Sexual activity: Yes     Partners: Male   Other Topics Concern    Not on file   Social History Narrative    ** Merged History Encounter **         Drinks coffee  tea     Social Determinants of Health     Financial Resource Strain:     Difficulty of Paying Living Expenses:    Food Insecurity:     Worried About Running Out of Food in the Last Year:     Shruthi of Food in the Last Year:    Transportation Needs:     Lack of Transportation (Medical):      Lack of Transportation (Non-Medical):    Physical Activity:     Days of Exercise per Week:     Minutes of Exercise per Session:    Stress:     Feeling of Stress :    Social Connections:     Frequency of Communication with Friends and Family:     Frequency of Social Gatherings with Friends and Family:     Attends Mandaeism Services:     Active Member of Clubs or Organizations:     Attends Club or Organization Meetings:     Marital Status:    Intimate Partner Violence:     Fear of Current or Ex-Partner:     Emotionally Abused:     Physically Abused:     Sexually Abused:        Past Medical History    Past Medical History:   Diagnosis Date    Fracture     Hepatitis     Hep A     Insomnia     10mar2016 resolved    Osteoporosis     14jun2016 resolved    Pancreatitis     Seasonal allergies     Urine discoloration 11/11/2019    Vomiting 11/13/2019       Surgical History    Past Surgical History:   Procedure Laterality Date    IR BIOPSY BONE  8/17/2021    IR BIOPSY BONE MARROW  11/14/2019    TUBAL LIGATION  1985       Medications      Current Outpatient Medications:     acetaminophen (TYLENOL) 325 mg tablet, Take 3 tablets (975 mg total) by mouth every 8 (eight) hours for 6 days, Disp: 54 tablet, Rfl: 0    al mag oxide-diphenhydramine-lidocaine viscous (MAGIC MOUTHWASH) 1:1:1 suspension, Swish and spit 10 mL 4 (four) times a day for 6 days, Disp: 240 mL, Rfl: 0    benzonatate (TESSALON) 200 MG capsule, Take 1 capsule (200 mg total) by mouth 3 (three) times a day, Disp: 20 capsule, Rfl: 0    cyanocobalamin (VITAMIN B-12) 500 MCG tablet, Take 1 tablet (500 mcg total) by mouth daily for 26 days, Disp: 26 tablet, Rfl: 0    Diclofenac Sodium (VOLTAREN) 1 %, Apply 2 g topically 4 (four) times a day for 6 days, Disp: 48 g, Rfl: 0    epoetin yolande (EPOGEN,PROCRIT) 10,000 units/mL, Inject 10,000 Units under the skin once a week Tuesdays  HOLD for Hbg greater than 10, Disp: , Rfl:     folic acid (FOLVITE) 1 mg tablet, Take 1 tablet (1 mg total) by mouth daily for 26 days, Disp: 26 tablet, Rfl: 0    gabapentin (NEURONTIN) 300 mg capsule, Take 1 capsule (300 mg total) by mouth daily at bedtime for 6 days, Disp: 6 capsule, Rfl: 0    Guaifenesin 1200 MG TB12, Take 1 tablet (1,200 mg total) by mouth 2 (two) times a day for 6 days, Disp: 12 tablet, Rfl: 0    ipratropium (ATROVENT) 0 02 % nebulizer solution, Take 2 5 mL (0 5 mg total) by nebulization 3 (three) times a day for 2 days, Disp: 105 mL, Rfl: 0    melatonin 3 mg, Take 1 tablet (3 mg total) by mouth daily at bedtime for 6 days, Disp: 6 tablet, Rfl: 0    methocarbamol (ROBAXIN) 500 mg tablet, Take 1 tablet (500 mg total) by mouth every 6 (six) hours as needed for muscle spasms for up to 2 days, Disp: 8 tablet, Rfl: 0    therapeutic multivitamin-minerals (THERAGRAN-M) tablet, Take 1 tablet by mouth daily for 26 days, Disp: 26 tablet, Rfl: 0    thiamine 100 MG tablet, Take 1 tablet (100 mg total) by mouth daily for 26 days, Disp: 26 tablet, Rfl: 0    Allergies    No Known Allergies    The following portions of the patient's history were reviewed and updated as appropriate: allergies, current medications, past family history, past medical history, past social history, past surgical history and problem list     Investigations    I personally reviewed the CT results with the patient:    Findings as described in the assessment section above    Physical Exam    There were no vitals filed for this visit  Physical Exam  Constitutional:       Appearance: Normal appearance  HENT:      Head: Normocephalic and atraumatic  Eyes:      Extraocular Movements: Extraocular movements intact  Pupils: Pupils are equal, round, and reactive to light  Cardiovascular:      Rate and Rhythm: Normal rate  Pulses: Normal pulses     Pulmonary:      Effort: Pulmonary effort is normal  Musculoskeletal:      Cervical back: Normal range of motion  Neurological:      General: No focal deficit present  Mental Status: She is alert and oriented to person, place, and time  GCS: GCS eye subscore is 4  GCS verbal subscore is 5  GCS motor subscore is 6  Sensory: Sensation is intact  Motor: Motor function is intact  Coordination: Finger-Nose-Finger Test normal       Deep Tendon Reflexes: Reflexes are normal and symmetric  Reflex Scores:       Tricep reflexes are 2+ on the right side and 2+ on the left side  Bicep reflexes are 2+ on the right side and 2+ on the left side  Brachioradialis reflexes are 2+ on the right side and 2+ on the left side  Patellar reflexes are 2+ on the right side and 2+ on the left side  Achilles reflexes are 2+ on the right side and 2+ on the left side  Psychiatric:         Speech: Speech normal        Neurologic Exam     Mental Status   Oriented to person, place, and time  Speech: speech is normal   Level of consciousness: alert    Cranial Nerves     CN III, IV, VI   Pupils are equal, round, and reactive to light  Right pupil: Size: 2 mm  Shape: regular  Reactivity: brisk  Left pupil: Size: 2 mm  Shape: regular  Reactivity: brisk     Nystagmus: none     CN XI   CN XI normal      Motor Exam   Muscle bulk: normal  Overall muscle tone: normal  Right arm tone: normal  Left arm tone: normal  Right arm pronator drift: absent  Left arm pronator drift: absent  Right leg tone: normal  Left leg tone: normal    Sensory Exam   Light touch normal      Gait, Coordination, and Reflexes     Coordination   Finger to nose coordination: normal    Reflexes   Right brachioradialis: 2+  Left brachioradialis: 2+  Right biceps: 2+  Left biceps: 2+  Right triceps: 2+  Left triceps: 2+  Right patellar: 2+  Left patellar: 2+  Right achilles: 2+  Left achilles: 2+  Right : 2+  Left : 2+  Right Parkinson: absent  Left Parkinson: absent  Right ankle clonus: absent  Left pendular knee jerk: absent

## 2021-09-13 ENCOUNTER — TELEPHONE (OUTPATIENT)
Dept: OBGYN CLINIC | Facility: HOSPITAL | Age: 61
End: 2021-09-13

## 2021-09-13 NOTE — TELEPHONE ENCOUNTER
Patient sees Dr Anatoliy Clark from radiology is calling with xray results on right clavicle  Transferred to triage nurse

## 2021-09-13 NOTE — TELEPHONE ENCOUNTER
Dr Munira Forbes did obtain and review this x-ray at her most recent office visit  He did not indicate any change in management in his note based on the appearance of the right distal clavicle fracture

## 2021-09-24 ENCOUNTER — OFFICE VISIT (OUTPATIENT)
Dept: FAMILY MEDICINE CLINIC | Facility: CLINIC | Age: 61
End: 2021-09-24
Payer: COMMERCIAL

## 2021-09-24 VITALS
SYSTOLIC BLOOD PRESSURE: 112 MMHG | RESPIRATION RATE: 16 BRPM | HEIGHT: 67 IN | DIASTOLIC BLOOD PRESSURE: 70 MMHG | BODY MASS INDEX: 21.41 KG/M2 | WEIGHT: 136.4 LBS | HEART RATE: 100 BPM | OXYGEN SATURATION: 97 %

## 2021-09-24 DIAGNOSIS — F10.11 NONDEPENDENT ALCOHOL ABUSE, IN REMISSION: ICD-10-CM

## 2021-09-24 DIAGNOSIS — Z13.220 SCREENING, LIPID: ICD-10-CM

## 2021-09-24 DIAGNOSIS — M43.9 COMPRESSION DEFORMITY OF VERTEBRA: ICD-10-CM

## 2021-09-24 DIAGNOSIS — R91.8 OPACITY OF LUNG ON IMAGING STUDY: Primary | ICD-10-CM

## 2021-09-24 DIAGNOSIS — Z13.29 SCREENING FOR THYROID DISORDER: ICD-10-CM

## 2021-09-24 DIAGNOSIS — F41.8 DEPRESSION WITH ANXIETY: ICD-10-CM

## 2021-09-24 DIAGNOSIS — F33.9 DEPRESSION, RECURRENT (HCC): ICD-10-CM

## 2021-09-24 DIAGNOSIS — D69.6 THROMBOCYTOPENIA (HCC): ICD-10-CM

## 2021-09-24 DIAGNOSIS — M80.80XA LOCALIZED OSTEOPOROSIS WITH CURRENT PATHOLOGICAL FRACTURE, INITIAL ENCOUNTER: ICD-10-CM

## 2021-09-24 DIAGNOSIS — Z12.4 CERVICAL CANCER SCREENING: ICD-10-CM

## 2021-09-24 DIAGNOSIS — S32.000A COMPRESSION FRACTURE OF LUMBAR VERTEBRA, INITIAL ENCOUNTER, UNSPECIFIED LUMBAR VERTEBRAL LEVEL: ICD-10-CM

## 2021-09-24 DIAGNOSIS — S22.41XD CLOSED FRACTURE OF MULTIPLE RIBS OF RIGHT SIDE WITH ROUTINE HEALING, SUBSEQUENT ENCOUNTER: ICD-10-CM

## 2021-09-24 DIAGNOSIS — F10.10 ETOH ABUSE: Chronic | ICD-10-CM

## 2021-09-24 DIAGNOSIS — Z12.31 VISIT FOR SCREENING MAMMOGRAM: ICD-10-CM

## 2021-09-24 DIAGNOSIS — F41.9 ANXIETY: ICD-10-CM

## 2021-09-24 DIAGNOSIS — I62.9 INTRACRANIAL BLEED (HCC): ICD-10-CM

## 2021-09-24 DIAGNOSIS — R74.8 ABNORMAL LIVER ENZYMES: ICD-10-CM

## 2021-09-24 DIAGNOSIS — Z23 ENCOUNTER FOR IMMUNIZATION: ICD-10-CM

## 2021-09-24 DIAGNOSIS — Z12.11 SCREEN FOR COLON CANCER: ICD-10-CM

## 2021-09-24 DIAGNOSIS — R06.02 SHORTNESS OF BREATH: ICD-10-CM
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
## 2021-09-24 PROBLEM — R04.0 EPISTAXIS: Status: RESOLVED | Noted: 2019-11-11 | Resolved: 2021-09-24

## 2021-09-24 PROBLEM — J18.9 HAP (HOSPITAL-ACQUIRED PNEUMONIA): Status: RESOLVED | Noted: 2021-08-16 | Resolved: 2021-09-24

## 2021-09-24 PROBLEM — J96.01 ACUTE HYPOXEMIC RESPIRATORY FAILURE (HCC): Status: RESOLVED | Noted: 2021-08-06 | Resolved: 2021-09-24

## 2021-09-24 PROBLEM — J96.00 ACUTE RESPIRATORY FAILURE (HCC): Status: RESOLVED | Noted: 2021-08-16 | Resolved: 2021-09-24

## 2021-09-24 PROCEDURE — 90471 IMMUNIZATION ADMIN: CPT | Performed by: FAMILY MEDICINE

## 2021-09-24 PROCEDURE — 1036F TOBACCO NON-USER: CPT | Performed by: FAMILY MEDICINE

## 2021-09-24 PROCEDURE — 3725F SCREEN DEPRESSION PERFORMED: CPT | Performed by: FAMILY MEDICINE

## 2021-09-24 PROCEDURE — 3008F BODY MASS INDEX DOCD: CPT | Performed by: FAMILY MEDICINE

## 2021-09-24 PROCEDURE — 99215 OFFICE O/P EST HI 40 MIN: CPT | Performed by: FAMILY MEDICINE

## 2021-09-24 PROCEDURE — 90682 RIV4 VACC RECOMBINANT DNA IM: CPT | Performed by: FAMILY MEDICINE

## 2021-09-24 RX ORDER — ACETAMINOPHEN 325 MG/1
650 TABLET ORAL EVERY 6 HOURS PRN
COMMUNITY
End: 2022-02-24 | Stop reason: ALTCHOICE

## 2021-09-24 RX ORDER — ALBUTEROL SULFATE 90 UG/1
2 AEROSOL, METERED RESPIRATORY (INHALATION) EVERY 6 HOURS PRN
Qty: 18 G | Refills: 0 | Status: SHIPPED | OUTPATIENT
Start: 2021-09-24 | End: 2022-02-24 | Stop reason: ALTCHOICE

## 2021-09-24 RX ORDER — METHOCARBAMOL 500 MG/1
500 TABLET, FILM COATED ORAL EVERY 6 HOURS PRN
Qty: 8 TABLET | Refills: 0 | Status: CANCELLED | OUTPATIENT
Start: 2021-09-24 | End: 2021-09-26

## 2021-09-24 NOTE — ASSESSMENT & PLAN NOTE
Patient states that she is able to cope up with her symptoms due to good family support  Will follow-up if symptoms worsen

## 2021-09-24 NOTE — PROGRESS NOTES
Subjective:      Patient ID: Joe Barcenas is a 64 y o  female  HPI  Patient is here for hospital follow-up  Admitted on 08/04/2021 with traumatic intracranial hemorrhage  Was found unresponsive by  following suspect fall, traumatic mechanism unknown  Taken to the hospital by EMS, found to have traumatic subarachnoid hemorrhage, right posterior fossa hemorrhage  Admitted to critical care, intubated  Referred to hospital notes for details of ICU admission  Patient has long history of alcohol abuse  Noncompliant with medical advise/follow-up  Has history of thrombopenia,  Poor follow-up with heme Onc, multiple aunts in her chart where she had to reschedule her appointments due to various reasons  Labs revealed a low hemoglobin of 10 1, platelet done of 80  Patient denies any episodes of spontaneous bleeding  Post discharge Neurosurgery evaluation was reassuring  Patient also being monitored closely by Orthopedics for multiple fractures  Does not have any specific questions or concerns except for right ear pain  When asked about alcohol consumption patient states that she has cut down significantly on alcohol  She only drinks beer when she watches a football game  Both she and her  are working together on her alcohol  Offered therapy/support however patient declines  Past Medical History:   Diagnosis Date    Fracture     Hepatitis     Hep A     Insomnia     10mar2016 resolved    Osteoporosis     14jun2016 resolved    Pancreatitis     Seasonal allergies     Urine discoloration 11/11/2019    Vomiting 11/13/2019       Family History   Problem Relation Age of Onset    Anxiety disorder Mother     Depression Mother     No Known Problems Father     Cancer Paternal Grandmother        Past Surgical History:   Procedure Laterality Date    IR BIOPSY BONE  8/17/2021    IR BIOPSY BONE MARROW  11/14/2019    TUBAL LIGATION  1985        reports that she has never smoked   She has never used smokeless tobacco  She reports previous alcohol use of about 7 0 standard drinks of alcohol per week  She reports that she does not use drugs  Current Outpatient Medications:     acetaminophen (TYLENOL) 325 mg tablet, Take 650 mg by mouth every 6 (six) hours as needed for mild pain, Disp: , Rfl:     cyanocobalamin (VITAMIN B-12) 500 MCG tablet, Take 1 tablet (500 mcg total) by mouth daily for 26 days, Disp: 26 tablet, Rfl: 0    folic acid (FOLVITE) 1 mg tablet, Take 1 tablet (1 mg total) by mouth daily for 26 days, Disp: 26 tablet, Rfl: 0    therapeutic multivitamin-minerals (THERAGRAN-M) tablet, Take 1 tablet by mouth daily for 26 days, Disp: 26 tablet, Rfl: 0    thiamine 100 MG tablet, Take 1 tablet (100 mg total) by mouth daily for 26 days, Disp: 26 tablet, Rfl: 0    albuterol (Ventolin HFA) 90 mcg/act inhaler, Inhale 2 puffs every 6 (six) hours as needed for wheezing, Disp: 18 g, Rfl: 0    benzonatate (TESSALON) 200 MG capsule, Take 1 capsule (200 mg total) by mouth 3 (three) times a day (Patient not taking: Reported on 9/9/2021), Disp: 20 capsule, Rfl: 0    Diclofenac Sodium (VOLTAREN) 1 %, Apply 2 g topically 4 (four) times a day for 6 days (Patient not taking: Reported on 9/9/2021), Disp: 48 g, Rfl: 0    epoetin yolande (EPOGEN,PROCRIT) 10,000 units/mL, Inject 10,000 Units under the skin once a week Tuesdays   HOLD for Hbg greater than 10 (Patient not taking: Reported on 9/9/2021), Disp: , Rfl:     gabapentin (NEURONTIN) 300 mg capsule, Take 1 capsule (300 mg total) by mouth daily at bedtime for 6 days (Patient not taking: Reported on 9/9/2021), Disp: 6 capsule, Rfl: 0    ipratropium (ATROVENT) 0 02 % nebulizer solution, Take 2 5 mL (0 5 mg total) by nebulization 3 (three) times a day for 2 days, Disp: 105 mL, Rfl: 0    methocarbamol (ROBAXIN) 500 mg tablet, Take 1 tablet (500 mg total) by mouth every 6 (six) hours as needed for muscle spasms for up to 2 days, Disp: 8 tablet, Rfl: 0    The following portions of the patient's history were reviewed and updated as appropriate: allergies, current medications, past family history, past medical history, past social history, past surgical history and problem list     Review of Systems   Constitutional: Negative  Eyes: Negative  Respiratory: Negative  Negative for shortness of breath  Cardiovascular: Negative  Negative for chest pain and palpitations  Gastrointestinal: Negative  Endocrine: Negative  Genitourinary: Negative  Musculoskeletal: Negative  Negative for myalgias  Neurological: Negative  Negative for headaches  Psychiatric/Behavioral: Negative  Objective:    /70   Pulse 100   Resp 16   Ht 5' 7" (1 702 m)   Wt 61 9 kg (136 lb 6 4 oz)   SpO2 97%   BMI 21 36 kg/m²      Physical Exam  Constitutional:       Appearance: She is well-developed  HENT:      Head: Normocephalic  Right Ear: External ear normal       Left Ear: External ear normal    Eyes:      Pupils: Pupils are equal, round, and reactive to light  Cardiovascular:      Rate and Rhythm: Normal rate and regular rhythm  Pulmonary:      Effort: Pulmonary effort is normal       Breath sounds: Normal breath sounds  Abdominal:      General: Bowel sounds are normal       Palpations: Abdomen is soft  Musculoskeletal:         General: Normal range of motion  Cervical back: Normal range of motion and neck supple  Neurological:      Mental Status: She is alert and oriented to person, place, and time     Psychiatric:         Behavior: Behavior normal          Judgment: Judgment normal            Recent Results (from the past 1008 hour(s))   ECG 12 lead    Collection Time: 08/15/21 10:21 PM   Result Value Ref Range    Ventricular Rate 109 BPM    Atrial Rate 109 BPM    WI Interval 158 ms    QRSD Interval 68 ms    QT Interval 336 ms    QTC Interval 452 ms    P Axis 66 degrees    QRS Axis 61 degrees    T Wave Axis 44 degrees   CBC and differential    Collection Time: 08/15/21 10:55 PM   Result Value Ref Range    WBC 7 93 4 31 - 10 16 Thousand/uL    RBC 2 34 (L) 3 81 - 5 12 Million/uL    Hemoglobin 8 2 (L) 11 5 - 15 4 g/dL    Hematocrit 27 1 (L) 34 8 - 46 1 %     (H) 82 - 98 fL    MCH 35 0 (H) 26 8 - 34 3 pg    MCHC 30 3 (L) 31 4 - 37 4 g/dL    RDW 25 3 (H) 11 6 - 15 1 %    MPV 11 6 8 9 - 12 7 fL    Platelets 66 (L) 136 - 390 Thousands/uL    nRBC 0 /100 WBCs    Neutrophils Relative 78 (H) 43 - 75 %    Immat GRANS % 1 0 - 2 %    Lymphocytes Relative 8 (L) 14 - 44 %    Monocytes Relative 13 (H) 4 - 12 %    Eosinophils Relative 0 0 - 6 %    Basophils Relative 0 0 - 1 %    Neutrophils Absolute 6 10 1 85 - 7 62 Thousands/µL    Immature Grans Absolute 0 11 0 00 - 0 20 Thousand/uL    Lymphocytes Absolute 0 63 0 60 - 4 47 Thousands/µL    Monocytes Absolute 1 06 0 17 - 1 22 Thousand/µL    Eosinophils Absolute 0 01 0 00 - 0 61 Thousand/µL    Basophils Absolute 0 02 0 00 - 0 10 Thousands/µL   Comprehensive metabolic panel    Collection Time: 08/15/21 10:55 PM   Result Value Ref Range    Sodium 134 (L) 136 - 145 mmol/L    Potassium 4 0 3 5 - 5 3 mmol/L    Chloride 100 100 - 108 mmol/L    CO2 26 21 - 32 mmol/L    ANION GAP 8 4 - 13 mmol/L    BUN 7 5 - 25 mg/dL    Creatinine 0 60 0 60 - 1 30 mg/dL    Glucose 146 (H) 65 - 140 mg/dL    Calcium 8 1 (L) 8 3 - 10 1 mg/dL    Corrected Calcium 9 5 8 3 - 10 1 mg/dL    AST 48 (H) 5 - 45 U/L    ALT 35 12 - 78 U/L    Alkaline Phosphatase 224 (H) 46 - 116 U/L    Total Protein 7 0 6 4 - 8 2 g/dL    Albumin 2 2 (L) 3 5 - 5 0 g/dL    Total Bilirubin 0 87 0 20 - 1 00 mg/dL    eGFR 99 ml/min/1 73sq m   Procalcitonin with AM Reflex    Collection Time: 08/15/21 10:55 PM   Result Value Ref Range    Procalcitonin 0 08 <=0 25 ng/ml   Protime-INR    Collection Time: 08/15/21 10:55 PM   Result Value Ref Range    Protime 14 7 (H) 11 6 - 14 5 seconds    INR 1 13 0 84 - 1 19   APTT    Collection Time: 08/15/21 10:55 PM Result Value Ref Range    PTT 31 23 - 37 seconds   Blood culture #1    Collection Time: 08/15/21 10:55 PM    Specimen: Arm, Left; Blood   Result Value Ref Range    Blood Culture No Growth After 5 Days  Blood culture #2    Collection Time: 08/15/21 10:55 PM    Specimen: Arm, Right; Blood   Result Value Ref Range    Blood Culture No Growth After 5 Days      NT-BNP PRO    Collection Time: 08/15/21 10:55 PM   Result Value Ref Range    NT-proBNP 2,610 (H) <125 pg/mL   Magnesium    Collection Time: 08/15/21 10:55 PM   Result Value Ref Range    Magnesium 1 5 (L) 1 6 - 2 6 mg/dL   Type and screen    Collection Time: 08/15/21 10:55 PM   Result Value Ref Range    ABO Grouping A     Rh Factor Positive     Antibody Screen Negative     Specimen Expiration Date 20210818    Troponin I    Collection Time: 08/15/21 10:55 PM   Result Value Ref Range    Troponin I <0 02 <=0 04 ng/mL   Lactic acid    Collection Time: 08/15/21 10:59 PM   Result Value Ref Range    LACTIC ACID 1 5 0 5 - 2 0 mmol/L   Blood gas, Venous    Collection Time: 08/15/21 10:59 PM   Result Value Ref Range    pH, Chaz 7 577 (H) 7 300 - 7 400    pCO2, Chaz 25 2 (L) 42 0 - 50 0 mm Hg    pO2, Chaz 87 0 (H) 35 0 - 45 0 mm Hg    HCO3, Chaz 22 9 (L) 24 - 30 mmol/L    Base Excess, Chaz 1 6 mmol/L    O2 Content, Chaz 12 1 ml/dL    O2 HGB, VENOUS 92 4 (H) 60 0 - 80 0 %   Novel Coronavirus (Covid-19),PCR [facility] - 2 Hour Stat    Collection Time: 08/15/21 10:59 PM    Specimen: Nose; Nares   Result Value Ref Range    SARS-CoV-2 Negative Negative   UA w Reflex to Microscopic w Reflex to Culture    Collection Time: 08/16/21  2:44 AM    Specimen: Urine, Other   Result Value Ref Range    Color, UA Yellow     Clarity, UA Clear     Specific Gravity, UA <=1 005 1 003 - 1 030    pH, UA 6 5 4 5, 5 0, 5 5, 6 0, 6 5, 7 0, 7 5, 8 0    Leukocytes, UA Small (A) Negative    Nitrite, UA Positive (A) Negative    Protein, UA Negative Negative mg/dl    Glucose, UA Negative Negative mg/dl    Ketones, UA Negative Negative mg/dl    Urobilinogen, UA 0 2 0 2, 1 0 E U /dl E U /dl    Bilirubin, UA Negative Negative    Blood, UA Negative Negative   Urine Microscopic    Collection Time: 08/16/21  2:44 AM   Result Value Ref Range    RBC, UA None Seen None Seen, 0-1, 1-2, 2-4, 0-5 /hpf    WBC, UA 4-10 (A) None Seen, 0-1, 1-2, 0-5, 2-4 /hpf    Epithelial Cells Occasional None Seen, Occasional /hpf    Bacteria, UA Occasional None Seen, Occasional /hpf   Strep Pneumoniae, Urine    Collection Time: 08/16/21  2:44 AM    Specimen: Urine, Other   Result Value Ref Range    Strep pneumoniae antigen, urine Negative Negative   Procalcitonin with AM Reflex    Collection Time: 08/16/21  6:12 AM   Result Value Ref Range    Procalcitonin <0 05 <=0 25 ng/ml   Legionella antigen, Urine    Collection Time: 08/16/21  9:00 AM    Specimen: Urine, Clean Catch   Result Value Ref Range    Legionella Urinary Antigen Negative Negative   Sputum culture and Gram stain    Collection Time: 08/16/21  9:00 AM    Specimen: Expectorated Sputum   Result Value Ref Range    Sputum Culture 2+ Growth of      Gram Stain Result 1+ Gram positive cocci in pairs (A)     Gram Stain Result 1+ Gram positive rods (A)     Gram Stain Result Rare Gram negative rods (A)     Gram Stain Result 1+ Polys (A)    MRSA culture    Collection Time: 08/17/21  3:59 AM    Specimen: Nose; Nares   Result Value Ref Range    MRSA Culture Only       No Methicillin Resistant Staphlyococcus aureus (MRSA) isolated   Procalcitonin Reflex    Collection Time: 08/17/21  6:29 AM   Result Value Ref Range    Procalcitonin 0 10 <=0 25 ng/ml   CBC and differential, AM Draw, Tomorrow    Collection Time: 08/17/21  6:29 AM   Result Value Ref Range    WBC 4 75 4 31 - 10 16 Thousand/uL    RBC 2 29 (L) 3 81 - 5 12 Million/uL    Hemoglobin 8 0 (L) 11 5 - 15 4 g/dL    Hematocrit 26 6 (L) 34 8 - 46 1 %     (H) 82 - 98 fL    MCH 34 9 (H) 26 8 - 34 3 pg    MCHC 30 1 (L) 31 4 - 37 4 g/dL    RDW 22 9 (H) 11 6 - 15 1 %    MPV 10 4 8 9 - 12 7 fL    Platelets 66 (L) 823 - 390 Thousands/uL    nRBC 0 /100 WBCs    Neutrophils Relative 74 43 - 75 %    Immat GRANS % 1 0 - 2 %    Lymphocytes Relative 12 (L) 14 - 44 %    Monocytes Relative 11 4 - 12 %    Eosinophils Relative 2 0 - 6 %    Basophils Relative 0 0 - 1 %    Neutrophils Absolute 3 55 1 85 - 7 62 Thousands/µL    Immature Grans Absolute 0 03 0 00 - 0 20 Thousand/uL    Lymphocytes Absolute 0 56 (L) 0 60 - 4 47 Thousands/µL    Monocytes Absolute 0 52 0 17 - 1 22 Thousand/µL    Eosinophils Absolute 0 07 0 00 - 0 61 Thousand/µL    Basophils Absolute 0 02 0 00 - 0 10 Thousands/µL   Basic metabolic panel, AM Draw, Tomorrow    Collection Time: 08/17/21  6:29 AM   Result Value Ref Range    Sodium 137 136 - 145 mmol/L    Potassium 4 4 3 5 - 5 3 mmol/L    Chloride 105 100 - 108 mmol/L    CO2 28 21 - 32 mmol/L    ANION GAP 4 4 - 13 mmol/L    BUN 12 5 - 25 mg/dL    Creatinine 0 47 (L) 0 60 - 1 30 mg/dL    Glucose 106 65 - 140 mg/dL    Calcium 8 3 8 3 - 10 1 mg/dL    eGFR 107 ml/min/1 73sq m   Magnesium, AM Draw, Tomorrow    Collection Time: 08/17/21  6:29 AM   Result Value Ref Range    Magnesium 1 8 1 6 - 2 6 mg/dL   Tissue Exam    Collection Time: 08/17/21 10:21 AM   Result Value Ref Range    Case Report       Surgical Pathology Report                         Case: [id number]                                   Authorizing Provider:  Tamar Santos MD         Collected:           08/17/2021 1021              Ordering Location:     [facility]        Received:            08/17/2021 [address]  [address] Med                                                                              Surg Unit                                                                    Pathologist:           Jeremy Elizalde MD                                                             Intraop:               Jeremy Elizalde MD Specimen:    Soft Tissue, Other, R shoulder                                                             Final Diagnosis       A  Soft Tissue mass, Right shoulder, needle biopsy:  - Benign reactive spindle cell proliferation involving skeletal muscle and associated with organized     hematoma  See Note  - Negative for malignancy  Note       Intradepartmental consultation CV) agrees with the above diagnosis  Sections reveal a spindle cell proliferation composed of spindled and stellate cells containing round to oval nuclei, some with nucleoli and indistinct pink fibrillary cytoplasm  Few non-atypical mitoses are noted  No significant cytologic atypia is appreciated  There are interspersed skeletal muscle cells, associated inflammatory cells, macrophages, red blood cells and blood vessels and adjacent organized hematoma  Immunostains are performed revealing the following spindle cell staining:   Pankeratin (CKAE1/3), CD34 (positive blood vessels), S100, CD68 (positive macrophages) negative;   few positive plasma cells; Ki-67 variable proliferative index from < 5% to 20-30%  The histology and immunoprofile favor a benign reactive spindle cell proliferation with some features of granulation tissue involving skeletal muscle and associated with organized hematoma  No malignancy is identified, however the biopsy fragments are small and may not represent adequate sampling of the mass  Clinical radiologic correlation is required  Additional Information       All reported additional testing was performed with appropriately reactive controls  These tests were developed and their performance characteristics determined by [facility] or appropriate performing facility, though some tests may be performed on tissues which have not been validated for performance characteristics (such as staining performed on alcohol exposed cell blocks and decalcified tissues)  Results should be interpreted with caution and in the context of the patients clinical condition  These tests may not be cleared or approved by the [place]  Food and Drug Administration, though the FDA has determined that such clearance or approval is not necessary  These tests are used for clinical purposes and they should not be regarded as investigational or for research  This laboratory has been approved by [place] [id number], designated as a high-complexity laboratory and is qualified to perform these tests  Interpretation performed at [facility], [address] [place] [id number]  Intraoperative Consultation          TPDXA: Rare spindled cells, predominantly blood  3 slides reviewed on site  Additional cores requested for permanents  Dr Juan Jose Sierra present @ diagnosis 8/17/21, 1030 hours  *Electronic SignatureJulene ARIELLA Le  Interpretation performed at [facility], [address] [place] [id number]  Gross Description          A  The specimen is received in formalin, labeled with the patient's name and hospital number, and is designated "right shoulder"  The specimen consists of 5 tan-white and brown friable soft tissue fragments measuring less than 0 1 cm in diameter and ranging from 0 5- 0 7 cm in length  The specimen is entirely submitted between sponges, 5 cassettes  Note: The estimated total formalin fixation time based upon information provided by the submitting clinician and the standard processing schedule is under 72 hours  Bobby         Clinical Information       Soft tissue mass involving the distal right clavicle causing a pathologic fracture    Acute hypoxic respiratory failure  Possible hospital-acquired pneumonia  Fall with pathological clavicle fracture   Urine culture    Collection Time: 08/17/21  6:28 PM    Specimen: Urine, Clean Catch   Result Value Ref Range    Urine Culture No Growth <1000 cfu/mL    Basic metabolic panel    Collection Time: 08/18/21  5:34 AM Result Value Ref Range    Sodium 141 136 - 145 mmol/L    Potassium 3 9 3 5 - 5 3 mmol/L    Chloride 107 100 - 108 mmol/L    CO2 26 21 - 32 mmol/L    ANION GAP 8 4 - 13 mmol/L    BUN 10 5 - 25 mg/dL    Creatinine 0 46 (L) 0 60 - 1 30 mg/dL    Glucose 100 65 - 140 mg/dL    Calcium 8 1 (L) 8 3 - 10 1 mg/dL    eGFR 108 ml/min/1 73sq m   CBC and differential    Collection Time: 08/18/21  5:34 AM   Result Value Ref Range    WBC 2 34 (L) 4 31 - 10 16 Thousand/uL    RBC 2 37 (L) 3 81 - 5 12 Million/uL    Hemoglobin 8 1 (L) 11 5 - 15 4 g/dL    Hematocrit 27 4 (L) 34 8 - 46 1 %     (H) 82 - 98 fL    MCH 34 2 26 8 - 34 3 pg    MCHC 29 6 (L) 31 4 - 37 4 g/dL    RDW 22 0 (H) 11 6 - 15 1 %    MPV 9 8 8 9 - 12 7 fL    Platelets 64 (L) 120 - 390 Thousands/uL    nRBC 0 /100 WBCs    Neutrophils Relative 48 43 - 75 %    Immat GRANS % 1 0 - 2 %    Lymphocytes Relative 31 14 - 44 %    Monocytes Relative 15 (H) 4 - 12 %    Eosinophils Relative 4 0 - 6 %    Basophils Relative 1 0 - 1 %    Neutrophils Absolute 1 11 (L) 1 85 - 7 62 Thousands/µL    Immature Grans Absolute 0 02 0 00 - 0 20 Thousand/uL    Lymphocytes Absolute 0 73 0 60 - 4 47 Thousands/µL    Monocytes Absolute 0 36 0 17 - 1 22 Thousand/µL    Eosinophils Absolute 0 09 0 00 - 0 61 Thousand/µL    Basophils Absolute 0 03 0 00 - 0 10 Thousands/µL   Vancomycin, trough Draw vancomycin trough 30 minutes before dose due at 1000 on 8/18/21  Please call Pharmacy with results  Collection Time: 08/18/21  9:21 AM   Result Value Ref Range    Vancomycin Tr 14 7 10 0 - 20 0 ug/mL   [facility]    Collection Time: 08/20/21  1:29 PM    Specimen: Nose; Nares   Result Value Ref Range    SARS-CoV-2 Negative Negative       Assessment/Plan:    No problem-specific Assessment & Plan notes found for this encounter             Problem List Items Addressed This Visit        Musculoskeletal and Integument    Localized osteoporosis with current pathological fracture Advised both scan which has also been advised in the past         Relevant Orders    DXA bone density spine hip and pelvis    Vitamin D 25 hydroxy    Compression deformity of vertebra    Relevant Orders    DXA bone density spine hip and pelvis    Lumbar compression fracture (HCC)     Continue management per         Relevant Orders    DXA bone density spine hip and pelvis    Vitamin D 25 hydroxy    Closed fracture of multiple ribs of right side    Relevant Orders    DXA bone density spine hip and pelvis    Vitamin D 25 hydroxy       Other    ETOH abuse (Chronic)     Patient offered alcohol cessation counseling/support group however patient declines  Patient states that she will continue to work with her  and cut down on her alcohol  Relevant Orders    Comprehensive metabolic panel    Ambulatory referral to Gastroenterology    Abnormal liver enzymes     Patient advised to repeat CMP,   Alk-phos 224  Patient should follow-up with GI          Relevant Orders    Comprehensive metabolic panel    Ambulatory referral to Gastroenterology    Nondependent alcohol abuse, in remission    Depression with anxiety     Patient states that she is able to cope up with her symptoms due to good family support  Will follow-up if symptoms worsen  Thrombocytopenia ([facility] [address] )     Patient advised to repeat CBC  Patient aware that she needs to follow-up with Heme-Onc  Relevant Orders    CBC and differential    Ambulatory referral to Hematology / Oncology    Screen for colon cancer    Relevant Orders    Cologuard    Anxiety     Symptoms well controlled  Currently not on any medications  Patient will follow-up if symptoms worsen  Opacity of lung on imaging study - Primary     Follow-up CT scan ordered  Patient reporting mild tightness in her chest   Requesting to be started on albuterol which she received during hospital stay    Refer to pulmonary for evaluation/management         Relevant Medications albuterol (Ventolin HFA) 90 mcg/act inhaler    Other Relevant Orders    CT chest wo contrast    Ambulatory referral to Pulmonology    RESOLVED: Depression, recurrent ([facility] [address] )      Other Visit Diagnoses     Cervical cancer screening        Relevant Orders    Ambulatory referral to Gynecology    Shortness of breath        Relevant Medications    albuterol (Ventolin HFA) 90 mcg/act inhaler    Other Relevant Orders    Ambulatory referral to Pulmonology    Visit for screening mammogram        Relevant Orders    Mammo screening bilateral w 3d & cad    Screening, lipid        Relevant Orders    Lipid panel    Screening for thyroid disorder        Relevant Orders    TSH, 3rd generation with Free T4 reflex    Encounter for immunization        Relevant Orders    influenza vaccine, quadrivalent, recombinant, PF, 0 5 mL, for patients 18 yr+ (FLUBLOK) (Completed)        I have spent 40 minutes with Patient  today in which greater than 50% of this time was spent in counseling/coordination of care regarding Diagnostic results, Prognosis, Risks and benefits of tx options, Intructions for management, Patient and family education, Importance of tx compliance, Risk factor reductions and Impressions

## 2021-09-24 NOTE — ASSESSMENT & PLAN NOTE
Follow-up CT scan ordered  Patient reporting mild tightness in her chest   Requesting to be started on albuterol which she received during hospital stay    Refer to pulmonary for evaluation/management

## 2021-09-24 NOTE — ASSESSMENT & PLAN NOTE
Patient offered alcohol cessation counseling/support group however patient declines  Patient states that she will continue to work with her  and cut down on her alcohol

## 2021-09-24 NOTE — TELEPHONE ENCOUNTER
Patient left a message on the voice mail late Friday afternoon requesting this  Advised in message she has had back spasms

## 2021-09-24 NOTE — ASSESSMENT & PLAN NOTE
Symptoms well controlled  Currently not on any medications  Patient will follow-up if symptoms worsen

## 2021-09-27 ENCOUNTER — TELEPHONE (OUTPATIENT)
Dept: HEMATOLOGY ONCOLOGY | Facility: CLINIC | Age: 61
End: 2021-09-27

## 2021-09-27 NOTE — TELEPHONE ENCOUNTER
Please advise OTC Tylenol/ warm compresses, OTC pain relief ointment    Will not repeat muscle relaxer

## 2021-09-27 NOTE — TELEPHONE ENCOUNTER
Please check if this is related to pelvic fracture  Not sure why this was prescribed, she had multiple fractures?

## 2021-09-27 NOTE — TELEPHONE ENCOUNTER
I spoke with Gayathri Mark and and stated that it is not related to her pelvic fracture at all  She stated that it is her upper back that she is having spasms and she thinks its related to her shoulder    She has been trying to catch up on stuff around the house since being out of the hospital

## 2021-09-27 NOTE — TELEPHONE ENCOUNTER
New Patient Encounter    New Patient Intake Form   Patient Details:  Garfield Vyas  1960  [id number]    Background Information:  [address] starts by opening a telephone encounter and gathering the following information   Who is calling to schedule? If not self, relationship to patient? Patient   Referring Provider Dr Lakshmi Ortiz   What is the diagnosis? Thrombocytopenia   Is this Cancer or Non-Cancer? Non-Cancer   Is this diagnosis confirmed? Yes   When was the diagnosis? 09/24   Is there a confirmed diagnosis from a biopsy/tissue reviewed by pathology? No   Were outside slides requested? No   Is patient aware of diagnosis? Yes   Is there a personal history and what kind? No   Is there a family history and what kind? No   Reason for visit? New Diagnosis   Have you had any imaging or labs done? If so: when, where? 09/24, yes , [facility]    Are records in [facility]? yes   Are records needed form an outside facility? No   If yes, name, city, state where facility is located  If patient has a prior history of cancer were old records obtained? No   Was the patient told to bring a disk? No   Does the patient smoke or Vape? No   If yes, how many packs or cartridges per day? Scheduling Information:   Preferred [place]: [facility]     Are there any dates/time the patient cannot be seen?  No    Miscellaneous: appt made for 10/04

## 2021-09-29 ENCOUNTER — TELEPHONE (OUTPATIENT)
Dept: HEMATOLOGY ONCOLOGY | Facility: CLINIC | Age: 61
End: 2021-09-29

## 2021-09-29 NOTE — TELEPHONE ENCOUNTER
Attempted to call patient to schedule consult   Call was answered and placed on hold I held for about 3 mins and disconnected the call

## 2021-09-30 ENCOUNTER — TELEPHONE (OUTPATIENT)
Dept: HEMATOLOGY ONCOLOGY | Facility: CLINIC | Age: 61
End: 2021-09-30

## 2021-09-30 NOTE — TELEPHONE ENCOUNTER
Spoke to patient to go over details of her consult appt with Silvia Brennan PA-C  Pt has been scheduled on 10/22 @ 11am at the [facility]  Pt aware and okay with this appt

## 2021-10-01 ENCOUNTER — OFFICE VISIT (OUTPATIENT)
Dept: PULMONOLOGY | Facility: CLINIC | Age: 61
End: 2021-10-01
Payer: COMMERCIAL

## 2021-10-01 VITALS
HEART RATE: 90 BPM | BODY MASS INDEX: 23.57 KG/M2 | HEIGHT: 63 IN | DIASTOLIC BLOOD PRESSURE: 78 MMHG | OXYGEN SATURATION: 99 % | WEIGHT: 133 LBS | SYSTOLIC BLOOD PRESSURE: 122 MMHG | TEMPERATURE: 96.8 F
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
## 2021-10-01 DIAGNOSIS — R91.8 OPACITY OF LUNG ON IMAGING STUDY: ICD-10-CM

## 2021-10-01 DIAGNOSIS — R06.02 SHORTNESS OF BREATH: ICD-10-CM

## 2021-10-01 DIAGNOSIS — J18.9 HAP (HOSPITAL-ACQUIRED PNEUMONIA): Primary | ICD-10-CM

## 2021-10-01 PROCEDURE — 99214 OFFICE O/P EST MOD 30 MIN: CPT | Performed by: INTERNAL MEDICINE

## 2021-10-07 ENCOUNTER — OFFICE VISIT (OUTPATIENT)
Dept: OBGYN CLINIC | Facility: CLINIC | Age: 61
End: 2021-10-07
Payer: COMMERCIAL

## 2021-10-07 VITALS
BODY MASS INDEX: 21.02 KG/M2 | SYSTOLIC BLOOD PRESSURE: 100 MMHG | WEIGHT: 130.8 LBS | HEIGHT: 66 IN | DIASTOLIC BLOOD PRESSURE: 62 MMHG

## 2021-10-07 DIAGNOSIS — Z12.4 ENCOUNTER FOR PAPANICOLAOU SMEAR FOR CERVICAL CANCER SCREENING: ICD-10-CM

## 2021-10-07 DIAGNOSIS — Z01.411 ENCNTR FOR GYN EXAM (GENERAL) (ROUTINE) W ABNORMAL FINDINGS: Primary | ICD-10-CM

## 2021-10-07 DIAGNOSIS — N95.2 VAGINAL ATROPHY: ICD-10-CM

## 2021-10-07 PROCEDURE — 99386 PREV VISIT NEW AGE 40-64: CPT | Performed by: NURSE PRACTITIONER

## 2021-10-07 PROCEDURE — G0476 HPV COMBO ASSAY CA SCREEN: HCPCS | Performed by: NURSE PRACTITIONER

## 2021-10-07 PROCEDURE — G0145 SCR C/V CYTO,THINLAYER,RESCR: HCPCS | Performed by: NURSE PRACTITIONER

## 2021-10-12 LAB
HPV HR 12 DNA CVX QL NAA+PROBE: NEGATIVE
HPV16 DNA CVX QL NAA+PROBE: NEGATIVE
HPV18 DNA CVX QL NAA+PROBE: NEGATIVE

## 2021-10-15 LAB
LAB AP GYN PRIMARY INTERPRETATION: NORMAL
Lab: NORMAL

## 2021-10-19 ENCOUNTER — CONSULT (OUTPATIENT)
Dept: GASTROENTEROLOGY | Facility: CLINIC | Age: 61
End: 2021-10-19
Payer: COMMERCIAL

## 2021-10-19 ENCOUNTER — HOSPITAL ENCOUNTER (OUTPATIENT)
Dept: BONE DENSITY | Facility: MEDICAL CENTER | Age: 61
Discharge: HOME | End: 2021-10-19
Payer: COMMERCIAL

## 2021-10-19 ENCOUNTER — OFFICE VISIT (OUTPATIENT)
Dept: OBGYN CLINIC | Facility: CLINIC | Age: 61
End: 2021-10-19
Payer: COMMERCIAL

## 2021-10-19 ENCOUNTER — APPOINTMENT (OUTPATIENT)
Dept: RADIOLOGY | Facility: AMBULARY SURGERY CENTER | Age: 61
End: 2021-10-19
Attending: ORTHOPAEDIC SURGERY
Payer: COMMERCIAL

## 2021-10-19 ENCOUNTER — HOSPITAL ENCOUNTER (OUTPATIENT)
Dept: MAMMOGRAPHY | Facility: MEDICAL CENTER | Age: 61
Discharge: HOME | End: 2021-10-19
Payer: COMMERCIAL

## 2021-10-19 VITALS — BODY MASS INDEX: 20.88 KG/M2 | HEIGHT: 67 IN | WEIGHT: 133 LBS

## 2021-10-19 VITALS
BODY MASS INDEX: 20.4 KG/M2 | HEART RATE: 76 BPM | WEIGHT: 130 LBS | HEIGHT: 67 IN | SYSTOLIC BLOOD PRESSURE: 132 MMHG | DIASTOLIC BLOOD PRESSURE: 79 MMHG

## 2021-10-19 VITALS
DIASTOLIC BLOOD PRESSURE: 78 MMHG | TEMPERATURE: 97.8 F | HEART RATE: 67 BPM | WEIGHT: 133 LBS | SYSTOLIC BLOOD PRESSURE: 117 MMHG | BODY MASS INDEX: 20.88 KG/M2 | OXYGEN SATURATION: 98 % | HEIGHT: 67 IN

## 2021-10-19 DIAGNOSIS — S32.000A COMPRESSION FRACTURE OF LUMBAR VERTEBRA, INITIAL ENCOUNTER (HCC): ICD-10-CM

## 2021-10-19 DIAGNOSIS — S32.591D CLOSED BILATERAL FRACTURE OF PUBIC RAMI WITH ROUTINE HEALING, SUBSEQUENT ENCOUNTER: ICD-10-CM

## 2021-10-19 DIAGNOSIS — M43.9 COMPRESSION DEFORMITY OF VERTEBRA: ICD-10-CM

## 2021-10-19 DIAGNOSIS — M80.80XA LOCALIZED OSTEOPOROSIS WITH CURRENT PATHOLOGICAL FRACTURE, INITIAL ENCOUNTER: ICD-10-CM

## 2021-10-19 DIAGNOSIS — M25.511 ACUTE PAIN OF RIGHT SHOULDER: ICD-10-CM

## 2021-10-19 DIAGNOSIS — S42.031D CLOSED DISPLACED FRACTURE OF ACROMIAL END OF RIGHT CLAVICLE WITH ROUTINE HEALING, SUBSEQUENT ENCOUNTER: ICD-10-CM

## 2021-10-19 DIAGNOSIS — R97.0 ELEVATED CEA: ICD-10-CM

## 2021-10-19 DIAGNOSIS — S32.592D CLOSED BILATERAL FRACTURE OF PUBIC RAMI WITH ROUTINE HEALING, SUBSEQUENT ENCOUNTER: Primary | ICD-10-CM

## 2021-10-19 DIAGNOSIS — K82.8 PORCELAIN GALLBLADDER: ICD-10-CM

## 2021-10-19 DIAGNOSIS — Z12.31 VISIT FOR SCREENING MAMMOGRAM: ICD-10-CM

## 2021-10-19 DIAGNOSIS — S22.41XD CLOSED FRACTURE OF MULTIPLE RIBS OF RIGHT SIDE WITH ROUTINE HEALING, SUBSEQUENT ENCOUNTER: ICD-10-CM

## 2021-10-19 DIAGNOSIS — F10.10 ETOH ABUSE: Chronic | ICD-10-CM

## 2021-10-19 DIAGNOSIS — S32.591D CLOSED BILATERAL FRACTURE OF PUBIC RAMI WITH ROUTINE HEALING, SUBSEQUENT ENCOUNTER: Primary | ICD-10-CM

## 2021-10-19 DIAGNOSIS — R74.8 ABNORMAL LIVER ENZYMES: ICD-10-CM

## 2021-10-19 DIAGNOSIS — S32.592D CLOSED BILATERAL FRACTURE OF PUBIC RAMI WITH ROUTINE HEALING, SUBSEQUENT ENCOUNTER: ICD-10-CM

## 2021-10-19 DIAGNOSIS — J02.9 SORE THROAT: Primary | ICD-10-CM

## 2021-10-19 PROCEDURE — 99214 OFFICE O/P EST MOD 30 MIN: CPT | Performed by: PHYSICIAN ASSISTANT

## 2021-10-19 PROCEDURE — 77063 BREAST TOMOSYNTHESIS BI: CPT

## 2021-10-19 PROCEDURE — 77080 DXA BONE DENSITY AXIAL: CPT

## 2021-10-19 PROCEDURE — 99213 OFFICE O/P EST LOW 20 MIN: CPT | Performed by: ORTHOPAEDIC SURGERY

## 2021-10-19 PROCEDURE — 77067 SCR MAMMO BI INCL CAD: CPT

## 2021-10-19 PROCEDURE — 3008F BODY MASS INDEX DOCD: CPT | Performed by: PHYSICIAN ASSISTANT

## 2021-10-19 PROCEDURE — 73000 X-RAY EXAM OF COLLAR BONE: CPT

## 2021-10-19 PROCEDURE — 72170 X-RAY EXAM OF PELVIS: CPT

## 2021-10-19 RX ORDER — SUCRALFATE ORAL 1 G/10ML
1 SUSPENSION ORAL 4 TIMES DAILY
Qty: 420 ML | Refills: 3 | Status: SHIPPED | OUTPATIENT
Start: 2021-10-19 | End: 2022-02-24 | Stop reason: ALTCHOICE

## 2021-10-20 ENCOUNTER — TELEPHONE (OUTPATIENT)
Dept: GASTROENTEROLOGY | Facility: CLINIC | Age: 61
End: 2021-10-20

## 2021-10-20 DIAGNOSIS — Z12.11 COLON CANCER SCREENING: Primary | ICD-10-CM

## 2021-10-20 RX ORDER — POLYETHYLENE GLYCOL 3350 17 G/17G
POWDER, FOR SOLUTION ORAL
Qty: 238 G | Refills: 0 | Status: SHIPPED | OUTPATIENT
Start: 2021-10-20 | End: 2022-02-24 | Stop reason: ALTCHOICE

## 2021-10-28 ENCOUNTER — TELEPHONE (OUTPATIENT)
Dept: PULMONOLOGY | Facility: CLINIC | Age: 61
End: 2021-10-28

## 2021-11-01 ENCOUNTER — TELEPHONE (OUTPATIENT)
Dept: HEMATOLOGY ONCOLOGY | Facility: CLINIC | Age: 61
End: 2021-11-01

## 2021-11-05 ENCOUNTER — TELEPHONE (OUTPATIENT)
Dept: SURGICAL ONCOLOGY | Facility: CLINIC | Age: 61
End: 2021-11-05

## 2021-11-24 ENCOUNTER — TELEPHONE (OUTPATIENT)
Dept: GASTROENTEROLOGY | Facility: HOSPITAL | Age: 61
End: 2021-11-24

## 2021-11-28 ENCOUNTER — ANESTHESIA EVENT (OUTPATIENT)
Dept: ANESTHESIOLOGY | Facility: HOSPITAL | Age: 61
End: 2021-11-28

## 2021-11-28 ENCOUNTER — ANESTHESIA (OUTPATIENT)
Dept: ANESTHESIOLOGY | Facility: HOSPITAL | Age: 61
End: 2021-11-28

## 2021-11-29 ENCOUNTER — TELEPHONE (OUTPATIENT)
Dept: GASTROENTEROLOGY | Facility: HOSPITAL | Age: 61
End: 2021-11-29

## 2021-11-30 ENCOUNTER — TELEPHONE (OUTPATIENT)
Dept: HEMATOLOGY ONCOLOGY | Facility: HOSPITAL | Age: 61
End: 2021-11-30

## 2021-11-30 DIAGNOSIS — D69.6 THROMBOCYTOPENIA (HCC): Primary | ICD-10-CM

## 2021-12-01 ENCOUNTER — TELEPHONE (OUTPATIENT)
Dept: OBGYN CLINIC | Facility: CLINIC | Age: 61
End: 2021-12-01

## 2021-12-01 DIAGNOSIS — N95.2 VAGINAL ATROPHY: Primary | ICD-10-CM

## 2021-12-01 RX ORDER — ESTRADIOL 0.1 MG/G
1 CREAM VAGINAL 3 TIMES WEEKLY
Qty: 42 G | Refills: 1 | Status: SHIPPED | OUTPATIENT
Start: 2021-12-01

## 2021-12-03 ENCOUNTER — RA CDI HCC (OUTPATIENT)
Dept: OTHER | Facility: HOSPITAL | Age: 61
End: 2021-12-03

## 2021-12-06 ENCOUNTER — TELEPHONE (OUTPATIENT)
Dept: HEMATOLOGY ONCOLOGY | Facility: CLINIC | Age: 61
End: 2021-12-06

## 2021-12-21 ENCOUNTER — TELEPHONE (OUTPATIENT)
Dept: GASTROENTEROLOGY | Facility: CLINIC | Age: 61
End: 2021-12-21

## 2022-01-10 ENCOUNTER — TELEPHONE (OUTPATIENT)
Dept: PULMONOLOGY | Facility: CLINIC | Age: 62
End: 2022-01-10

## 2022-01-10 NOTE — TELEPHONE ENCOUNTER
Established patient needing pre-op clearance  Surgery: MICROLARYNGOSCOPY (JET VENTILATION) EXCISION POSTERIOR GLOTTIC GRANULOMA (N/A Throat)       EXCISION POSTERIOR GLOTTIC GRANULOMA (N/A Throat)  Surgery date: 1/14/22  Last seen by us: 10/1/21  On oxygen: No  Kind of Sedation: General  Length of surgery: 120 minutes  Surgeon: Shanon Quintana  Office contact: [phone]  Form/Office Note: Note/Form  Fax: [phone]    Would you like to clear patient via a phone call from last visit or would like us to schedule them with you or an AP?

## 2022-01-11 NOTE — PROGRESS NOTES
Surgery: MICROLARYNGOSCOPY (JET VENTILATION) EXCISION POSTERIOR GLOTTIC GRANULOMA (N/A Throat)       EXCISION POSTERIOR GLOTTIC GRANULOMA (N/A Throat)  Surgery date: 1/14/22  Last seen by us: 10/1/21  On oxygen: No  Kind of Sedation: General  Length of surgery: 120 minutes  Surgeon: Obey Barton  Office contact: [phone]  Form/Office Note: Note/Form  Fax: [phone]      The patient needs preop clearance from a pulmonary standpoint  Based off my last office visit 10/01/2021, clinically she had recovered from pneumonia  Her residual symptoms were all upper airway for which she is being treated surgically  She did not obtain recommended CT scan or PFTs after our office visit  She does not have underlying lung disease from history or previous imaging  She is a lifelong nonsmoker  She is cleared for surgery from a pulmonary standpoint as long as she has had no change in her symptoms since October 2021

## 2022-01-13 ENCOUNTER — TELEPHONE (OUTPATIENT)
Dept: HEMATOLOGY ONCOLOGY | Facility: CLINIC | Age: 62
End: 2022-01-13

## 2022-01-23 ENCOUNTER — IMMUNIZATIONS (OUTPATIENT)
Dept: FAMILY MEDICINE CLINIC | Facility: HOSPITAL | Age: 62
End: 2022-01-23

## 2022-01-23 DIAGNOSIS — Z23 ENCOUNTER FOR IMMUNIZATION: Primary | ICD-10-CM

## 2022-01-23 PROCEDURE — 0001A COVID-19 PFIZER VACC 0.3 ML: CPT

## 2022-01-23 PROCEDURE — 91300 COVID-19 PFIZER VACC 0.3 ML: CPT

## 2022-02-21 NOTE — PROGRESS NOTES
Assessment and Plan:   Patient is a 70-year-old female who presents for rheumatology consult regarding severe osteoporosis  It seems this has been a longstanding diagnosis but she has never been on treatment and reports she generally tries to do things naturally, however at this point given her multiple fractures and severe T-score she is open to going on prescription treatment  We discussed given the severity of her osteoporosis on the DEXA which shows her worst T-score of-4 3 at the left hip and negative for at the left femoral neck, along with her multiple fracture history, I would recommend starting with a more aggressive treatment option for like an anabolic agent, Evenity, and then after completion of treatment with this I would recommend going on anti resorptive  We discussed Gina Saming is a once monthly injection given in the office for 12 months, and then after that would transition to either a bisphosphonate or Prolia  We discussed that in the studies patients who have history of CVA or MI were at higher risk for these events on the Evenity but she has no personal history of this and so it would be okay to use this  She was in agreement as long as we get approval from insurance  We discussed that if insurance denies this then we would consider Forteo which is also anabolic but a daily home injection for 2 years  She was still open to this if we cannot get approval for Evenity  She will get some basic blood work today and we will in the meantime submit authorization for the Evenity  We will plan a 6 month follow-up regardless of whatever treatment she is on      Plan:  Diagnoses and all orders for this visit:    Post-menopausal osteoporosis  -     PTH, intact  -     Vitamin D 25 hydroxy  -     TSH, 3rd generation with Free T4 reflex  -     Comprehensive metabolic panel    Vitamin D deficiency  -     Vitamin D 25 hydroxy    Closed fracture of multiple ribs of right side with routine healing, subsequent encounter  -     Ambulatory referral to Rheumatology    Localized osteoporosis with current pathological fracture, initial encounter  -     Ambulatory referral to Rheumatology        Follow-up plan: 6 months       HPI  Reinaldo Zaman is a 64 y o   female with H/O compression fractures, H/O right clavicle and pubic rami fractures, multiple rib fractures, chronic pancreatitis, H/O alcohol abuse, thrombocytopenia, anemia, H/O SAH/ICH, H/O anxiety and depression, GERD, who presents for rheumatology consult by request of Dr Mick Spann for osteoporosis  Never saw rheum and has never been on treatment for osteoporosis  Reports over the years she tried to treat this on her own with just calcium and vitamin-D but feels at this point she needs to be on prescription treatment  She does have history of multiple fractures including compression fractures, pubic rami fractures, and multiple rib fractures  These fractures were sustained from a mixture of trauma but also reports that when her chiropractor adjusted her 1 time she ended up with rib fractures  No known family history of osteoporosis  No h/o chronic systemic steroid use  She reports taking calcium and vitamin-D every day but does not know how much  She also eats dairy regularly  No known history of prior stroke, MI or blood clots  Review of Systems  Review of Systems   Constitutional: Negative for fatigue  HENT: Negative for mouth sores  Eyes: Negative for pain  Respiratory: Negative for shortness of breath  Cardiovascular: Negative for leg swelling  Musculoskeletal: Negative for arthralgias and joint swelling  Skin: Negative for rash  Neurological: Negative for weakness  Hematological: Negative for adenopathy  Psychiatric/Behavioral: Negative for sleep disturbance         Allergies  No Known Allergies    Home Medications    Current Outpatient Medications:     cyanocobalamin (VITAMIN B-12) 500 MCG tablet, Take 1 tablet (500 mcg total) by mouth daily for 26 days, Disp: 26 tablet, Rfl: 0    estradiol (ESTRACE VAGINAL) 0 1 mg/g vaginal cream, Insert 1 g into the vagina 3 (three) times a week Insert 1/4 of applicator into the vagina 3 times a week for 2 weeks  If symptoms improved can reduce to using twice weekly after 2 weeks for maintenance , Disp: 42 g, Rfl: 1    folic acid (FOLVITE) 1 mg tablet, Take 1 tablet (1 mg total) by mouth daily for 26 days, Disp: 26 tablet, Rfl: 0    pantoprazole (PROTONIX) 40 mg tablet, Take 1 tablet (40 mg total) by mouth daily, Disp: 30 tablet, Rfl: 3    therapeutic multivitamin-minerals (THERAGRAN-M) tablet, Take 1 tablet by mouth daily for 26 days, Disp: 26 tablet, Rfl: 0    thiamine 100 MG tablet, Take 1 tablet (100 mg total) by mouth daily for 26 days, Disp: 26 tablet, Rfl: 0    Past Medical History  Past Medical History:   Diagnosis Date    Fracture     Hepatitis     Hep A     Insomnia     10mar2016 resolved    Osteoporosis     14jun2016 resolved    Pancreatitis     Seasonal allergies     Urine discoloration 11/11/2019    Varicella     Vomiting 11/13/2019       Past Surgical History   Past Surgical History:   Procedure Laterality Date    IR BIOPSY BONE  8/17/2021    IR BIOPSY BONE MARROW  11/14/2019    TUBAL LIGATION  1985       Family History  No known family history of autoimmune or inflammatory diseases      Family History   Problem Relation Age of Onset    Anxiety disorder Mother     Depression Mother     No Known Problems Father     No Known Problems Sister     No Known Problems Sister     No Known Problems Brother     Cancer Paternal Grandmother         unknown     No Known Problems Daughter     No Known Problems Maternal Grandmother     Cancer Maternal Grandfather     No Known Problems Paternal Grandfather     Breast cancer Neg Hx     Ovarian cancer Neg Hx        Social History  Social History     Substance and Sexual Activity   Alcohol Use Not Currently    Alcohol/week: 7 0 standard drinks    Types: 7 Cans of beer per week     Social History     Substance and Sexual Activity   Drug Use No     Social History     Tobacco Use   Smoking Status Never Smoker   Smokeless Tobacco Never Used       Objective:     Vitals:    02/24/22 0850   Height: 5' 7" (1 702 m)       Physical Exam  Constitutional:       General: She is not in acute distress  HENT:      Head: Normocephalic and atraumatic  Eyes:      Conjunctiva/sclera: Conjunctivae normal    Pulmonary:      Effort: Pulmonary effort is normal  No respiratory distress  Musculoskeletal:      Cervical back: Neck supple  Skin:     Coloration: Skin is not pale  Neurological:      Mental Status: She is alert  Mental status is at baseline  Psychiatric:         Mood and Affect: Mood normal          Behavior: Behavior normal          Imaging:   DEXA 10/19/21:  COMPARISON:  Follow-up     RESULTS:   LUMBAR SPINE:  L1-L4:  BMD 0 616 gm/cm2  T-score -3 9 and 1% less than 2014  Z-score -2 4     LEFT TOTAL HIP:  BMD 0 412 gm/cm2  T-score -4 3  Z-score -3 3     LEFT FEMORAL NECK:  BMD 0 407 gm/cm2  T-score -4 0 and 26% less than 2014  Z-score -2 6     A 25-hydroxy vitamin D level, an intact PTH, and a comprehensive metabolic panel are suggested in this patient      Treatment is a consideration after excluding secondary causes for osteoporosis and might include Prolia for a number of years followed by intravenous Reclast     IMPRESSION:  1  Based on the [facility] classification, this study identifies a diagnosis of severe osteoporosis based upon the T score results as well as the history of compression fractures and the patient is considered at  risk for fracture

## 2022-02-22 NOTE — TELEPHONE ENCOUNTER
Patients surgery was cancelled and rescheduled for 3/25/22  Scheduled with Bianca on 3/15 for pre-op clearance

## 2022-02-24 ENCOUNTER — OFFICE VISIT (OUTPATIENT)
Dept: RHEUMATOLOGY | Facility: CLINIC | Age: 62
End: 2022-02-24
Payer: COMMERCIAL

## 2022-02-24 ENCOUNTER — TELEPHONE (OUTPATIENT)
Dept: RHEUMATOLOGY | Facility: CLINIC | Age: 62
End: 2022-02-24

## 2022-02-24 VITALS — BODY MASS INDEX: 20.83 KG/M2 | HEIGHT: 67 IN

## 2022-02-24 DIAGNOSIS — S22.41XD CLOSED FRACTURE OF MULTIPLE RIBS OF RIGHT SIDE WITH ROUTINE HEALING, SUBSEQUENT ENCOUNTER: ICD-10-CM

## 2022-02-24 DIAGNOSIS — M80.80XA LOCALIZED OSTEOPOROSIS WITH CURRENT PATHOLOGICAL FRACTURE, INITIAL ENCOUNTER: ICD-10-CM

## 2022-02-24 DIAGNOSIS — M81.0 POST-MENOPAUSAL OSTEOPOROSIS: Primary | ICD-10-CM

## 2022-02-24 DIAGNOSIS — E55.9 VITAMIN D DEFICIENCY: ICD-10-CM

## 2022-02-24 PROCEDURE — 99245 OFF/OP CONSLTJ NEW/EST HI 55: CPT | Performed by: INTERNAL MEDICINE

## 2022-02-24 PROCEDURE — 1036F TOBACCO NON-USER: CPT | Performed by: INTERNAL MEDICINE

## 2022-02-24 NOTE — TELEPHONE ENCOUNTER
Please submit auth for Evenity 210mg subq monthly injection for severe osteoporosis, DEXA done in 10/2021  Never on prior treatment but has severe disease with T scores worse than -4, and also has multiple fracture history including spine compression fractures, rib and pelvis fractures

## 2022-02-24 NOTE — LETTER
February 24, 2022     Yaw Ayala MD  [address]  [place] [facility] [address]    Patient: Isabel Cage   YOB: 1960   Date of Visit: 2/24/2022       Dear Dr Tyree Foy:    Thank you for referring Isabel Cage to me for evaluation  Below are my notes for this consultation  If you have questions, please do not hesitate to call me  I look forward to following your patient along with you  Sincerely,        Amber Degroot DO        CC: No Recipients  Amber Degroot DO  2/24/2022  9:21 AM  Signed  Assessment and Plan:   Patient is a 51-year-old female who presents for rheumatology consult regarding severe osteoporosis  It seems this has been a longstanding diagnosis but she has never been on treatment and reports she generally tries to do things naturally, however at this point given her multiple fractures and severe T-score she is open to going on prescription treatment  We discussed given the severity of her osteoporosis on the DEXA which shows her worst T-score of-4 3 at the left hip and negative for at the left femoral neck, along with her multiple fracture history, I would recommend starting with a more aggressive treatment option for like an anabolic agent, Evenity, and then after completion of treatment with this I would recommend going on anti resorptive  We discussed Pritesh Riis is a once monthly injection given in the office for 12 months, and then after that would transition to either a bisphosphonate or Prolia  We discussed that in the studies patients who have history of CVA or MI were at higher risk for these events on the Evenity but she has no personal history of this and so it would be okay to use this  She was in agreement as long as we get approval from insurance  We discussed that if insurance denies this then we would consider Forteo which is also anabolic but a daily home injection for 2 years  She was still open to this if we cannot get approval for Evenity    She will get some basic blood work today and we will in the meantime submit authorization for the Evenity  We will plan a 6 month follow-up regardless of whatever treatment she is on  Plan:  Diagnoses and all orders for this visit:    Post-menopausal osteoporosis  -     PTH, intact  -     Vitamin D 25 hydroxy  -     TSH, 3rd generation with Free T4 reflex  -     Comprehensive metabolic panel    Vitamin D deficiency  -     Vitamin D 25 hydroxy    Closed fracture of multiple ribs of right side with routine healing, subsequent encounter  -     Ambulatory referral to Rheumatology    Localized osteoporosis with current pathological fracture, initial encounter  -     Ambulatory referral to Rheumatology        Follow-up plan: 6 months       HPI  Obinna Umanzor is a 64 y o   female with H/O compression fractures, H/O right clavicle and pubic rami fractures, multiple rib fractures, chronic pancreatitis, H/O alcohol abuse, thrombocytopenia, anemia, H/O SAH/ICH, H/O anxiety and depression, GERD, who presents for rheumatology consult by request of Dr Bettylou Pallas for osteoporosis  Never saw rheum and has never been on treatment for osteoporosis  Reports over the years she tried to treat this on her own with just calcium and vitamin-D but feels at this point she needs to be on prescription treatment  She does have history of multiple fractures including compression fractures, pubic rami fractures, and multiple rib fractures  These fractures were sustained from a mixture of trauma but also reports that when her chiropractor adjusted her 1 time she ended up with rib fractures  No known family history of osteoporosis  No h/o chronic systemic steroid use  She reports taking calcium and vitamin-D every day but does not know how much  She also eats dairy regularly  No known history of prior stroke, MI or blood clots  Review of Systems  Review of Systems   Constitutional: Negative for fatigue  HENT: Negative for mouth sores      Eyes: Negative for pain  Respiratory: Negative for shortness of breath  Cardiovascular: Negative for leg swelling  Musculoskeletal: Negative for arthralgias and joint swelling  Skin: Negative for rash  Neurological: Negative for weakness  Hematological: Negative for adenopathy  Psychiatric/Behavioral: Negative for sleep disturbance  Allergies  No Known Allergies    Home Medications    Current Outpatient Medications:     cyanocobalamin (VITAMIN B-12) 500 MCG tablet, Take 1 tablet (500 mcg total) by mouth daily for 26 days, Disp: 26 tablet, Rfl: 0    estradiol (ESTRACE VAGINAL) 0 1 mg/g vaginal cream, Insert 1 g into the vagina 3 (three) times a week Insert 1/4 of applicator into the vagina 3 times a week for 2 weeks  If symptoms improved can reduce to using twice weekly after 2 weeks for maintenance , Disp: 42 g, Rfl: 1    folic acid (FOLVITE) 1 mg tablet, Take 1 tablet (1 mg total) by mouth daily for 26 days, Disp: 26 tablet, Rfl: 0    pantoprazole (PROTONIX) 40 mg tablet, Take 1 tablet (40 mg total) by mouth daily, Disp: 30 tablet, Rfl: 3    therapeutic multivitamin-minerals (THERAGRAN-M) tablet, Take 1 tablet by mouth daily for 26 days, Disp: 26 tablet, Rfl: 0    thiamine 100 MG tablet, Take 1 tablet (100 mg total) by mouth daily for 26 days, Disp: 26 tablet, Rfl: 0    Past Medical History  Past Medical History:   Diagnosis Date    Fracture     Hepatitis     Hep A     Insomnia     10mar2016 resolved    Osteoporosis     14jun2016 resolved    Pancreatitis     Seasonal allergies     Urine discoloration 11/11/2019    Varicella     Vomiting 11/13/2019       Past Surgical History   Past Surgical History:   Procedure Laterality Date    IR BIOPSY BONE  8/17/2021    IR BIOPSY BONE MARROW  11/14/2019    TUBAL LIGATION  1985       Family History  No known family history of autoimmune or inflammatory diseases      Family History   Problem Relation Age of Onset    Anxiety disorder Mother    [facility] Depression Mother     No Known Problems Father     No Known Problems Sister     No Known Problems Sister     No Known Problems Brother     Cancer Paternal Grandmother         unknown     No Known Problems Daughter     No Known Problems Maternal Grandmother     Cancer Maternal Grandfather     No Known Problems Paternal Grandfather     Breast cancer Neg Hx     Ovarian cancer Neg Hx        Social History  Social History     Substance and Sexual Activity   Alcohol Use Not Currently    Alcohol/week: 7 0 standard drinks    Types: 7 Cans of beer per week     Social History     Substance and Sexual Activity   Drug Use No     Social History     Tobacco Use   Smoking Status Never Smoker   Smokeless Tobacco Never Used       Objective:     Vitals:    02/24/22 0850   Height: 5' 7" (1 702 m)       Physical Exam  Constitutional:       General: She is not in acute distress  HENT:      Head: Normocephalic and atraumatic  Eyes:      Conjunctiva/sclera: Conjunctivae normal    Pulmonary:      Effort: Pulmonary effort is normal  No respiratory distress  Musculoskeletal:      Cervical back: Neck supple  Skin:     Coloration: Skin is not pale  Neurological:      Mental Status: She is alert  Mental status is at baseline  Psychiatric:         Mood and Affect: Mood normal          Behavior: Behavior normal          Imaging:   DEXA 10/19/21:  COMPARISON:  Follow-up     RESULTS:   LUMBAR SPINE:  L1-L4:  BMD 0 616 gm/cm2  T-score -3 9 and 1% less than 2014  Z-score -2 4     LEFT TOTAL HIP:  BMD 0 412 gm/cm2  T-score -4 3  Z-score -3 3     LEFT FEMORAL NECK:  BMD 0 407 gm/cm2  T-score -4 0 and 26% less than 2014  Z-score -2 6     A 25-hydroxy vitamin D level, an intact PTH, and a comprehensive metabolic panel are suggested in this patient      Treatment is a consideration after excluding secondary causes for osteoporosis and might include Prolia for a number of years followed by intravenous Reclast     IMPRESSION:  1  Based on the [facility] classification, this study identifies a diagnosis of severe osteoporosis based upon the T score results as well as the history of compression fractures and the patient is considered at  risk for fracture

## 2022-03-04 ENCOUNTER — TRANSCRIBE ORDERS (OUTPATIENT)
Dept: RHEUMATOLOGY | Facility: CLINIC | Age: 62
End: 2022-03-04

## 2022-03-07 ENCOUNTER — APPOINTMENT (EMERGENCY)
Dept: RADIOLOGY | Facility: HOSPITAL | Age: 62
DRG: 100 | End: 2022-03-07
Payer: COMMERCIAL

## 2022-03-07 ENCOUNTER — APPOINTMENT (INPATIENT)
Dept: NEUROLOGY | Facility: HOSPITAL | Age: 62
DRG: 100 | End: 2022-03-07
Payer: COMMERCIAL

## 2022-03-07 ENCOUNTER — APPOINTMENT (EMERGENCY)
Dept: CT IMAGING | Facility: HOSPITAL | Age: 62
DRG: 100 | End: 2022-03-07
Payer: COMMERCIAL

## 2022-03-07 ENCOUNTER — HOSPITAL ENCOUNTER (INPATIENT)
Facility: HOSPITAL | Age: 62
LOS: 11 days | Discharge: ANOTHER HOSPITAL | DRG: 100 | End: 2022-03-18
Attending: SURGERY | Admitting: SURGERY
Payer: COMMERCIAL

## 2022-03-07 DIAGNOSIS — R56.9 SEIZURE (HCC): ICD-10-CM

## 2022-03-07 DIAGNOSIS — F32.A DEPRESSION: ICD-10-CM

## 2022-03-07 DIAGNOSIS — I95.9 HYPOTENSION: ICD-10-CM

## 2022-03-07 DIAGNOSIS — D61.818 PANCYTOPENIA (HCC): ICD-10-CM

## 2022-03-07 DIAGNOSIS — T50.904A DRUG OVERDOSE, UNDETERMINED INTENT, INITIAL ENCOUNTER: ICD-10-CM

## 2022-03-07 DIAGNOSIS — F41.8 ANXIETY WITH DEPRESSION: Chronic | ICD-10-CM

## 2022-03-07 DIAGNOSIS — G93.40 ENCEPHALOPATHY ACUTE: ICD-10-CM
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
## 2022-03-07 DIAGNOSIS — I42.9 CARDIOMYOPATHY (HCC): ICD-10-CM

## 2022-03-07 DIAGNOSIS — J96.01 ACUTE RESPIRATORY FAILURE WITH HYPOXIA (HCC): Primary | ICD-10-CM

## 2022-03-07 DIAGNOSIS — I50.21 ACUTE SYSTOLIC CONGESTIVE HEART FAILURE (HCC): ICD-10-CM

## 2022-03-07 DIAGNOSIS — E87.2 LACTIC ACIDOSIS: ICD-10-CM

## 2022-03-07 DIAGNOSIS — R56.9 SEIZURE-LIKE ACTIVITY (HCC): ICD-10-CM

## 2022-03-07 PROBLEM — E87.20 LACTIC ACIDOSIS: Status: ACTIVE | Noted: 2022-03-07

## 2022-03-07 PROBLEM — R73.9 HYPERGLYCEMIA: Status: ACTIVE | Noted: 2022-03-07

## 2022-03-07 PROBLEM — D69.6 THROMBOCYTOPENIA (HCC): Status: ACTIVE | Noted: 2022-03-07

## 2022-03-07 LAB
2HR DELTA HS TROPONIN: 2075 NG/L
4HR DELTA HS TROPONIN: 2997 NG/L
AMPHETAMINES SERPL QL SCN: NEGATIVE
ANION GAP SERPL CALCULATED.3IONS-SCNC: 23 MMOL/L (ref 4–13)
APAP SERPL-MCNC: <2 UG/ML (ref 10–20)
APTT PPP: 27 SECONDS (ref 23–37)
APTT PPP: 27 SECONDS (ref 23–37)
ARTERIAL PATENCY WRIST A: YES
BARBITURATES UR QL: NEGATIVE
BASE EXCESS BLDA CALC-SCNC: -3.8 MMOL/L
BASE EXCESS BLDA CALC-SCNC: -9 MMOL/L (ref -2–3)
BASOPHILS # BLD AUTO: 0.03 THOUSANDS/ΜL (ref 0–0.1)
BASOPHILS NFR BLD AUTO: 0 % (ref 0–1)
BENZODIAZ UR QL: NEGATIVE
BETA-HYDROXYBUTYRATE: 0.2 MMOL/L
BUN SERPL-MCNC: 9 MG/DL (ref 5–25)
CA-I BLD-SCNC: 1.11 MMOL/L (ref 1.12–1.32)
CALCIUM SERPL-MCNC: 9.1 MG/DL (ref 8.3–10.1)
CARDIAC TROPONIN I PNL SERPL HS: 2563 NG/L
CARDIAC TROPONIN I PNL SERPL HS: 3485 NG/L
CARDIAC TROPONIN I PNL SERPL HS: 488 NG/L
CFFMA (FUNCTIONAL FIBRINOGEN MAX AMPLITUDE): 14.2 MM (ref 15–32)
CHLORIDE SERPL-SCNC: 97 MMOL/L (ref 100–108)
CK SERPL-CCNC: 126 U/L (ref 26–192)
CKLY30: 0 % (ref 0–2.6)
CKR(REACTION TIME): 4.5 MIN (ref 4.6–9.1)
CO2 SERPL-SCNC: 17 MMOL/L (ref 21–32)
COCAINE UR QL: NEGATIVE
CREAT SERPL-MCNC: 1.17 MG/DL (ref 0.6–1.3)
CRTMA(RAPIDTEG MAX AMPLITUDE): 43.3 MM (ref 52–70)
EOSINOPHIL # BLD AUTO: 0.05 THOUSAND/ΜL (ref 0–0.61)
EOSINOPHIL NFR BLD AUTO: 1 % (ref 0–6)
ERYTHROCYTE [DISTWIDTH] IN BLOOD BY AUTOMATED COUNT: 14.4 % (ref 11.6–15.1)
ETHANOL SERPL-MCNC: <3 MG/DL (ref 0–3)
FIBRINOGEN PPP-MCNC: 205 MG/DL (ref 227–495)
GFR SERPL CREATININE-BSD FRML MDRD: 50 ML/MIN/1.73SQ M
GLUCOSE SERPL-MCNC: 136 MG/DL (ref 65–140)
GLUCOSE SERPL-MCNC: 350 MG/DL (ref 65–140)
GLUCOSE SERPL-MCNC: 355 MG/DL (ref 65–140)
HCO3 BLDA-SCNC: 18.9 MMOL/L (ref 24–30)
HCO3 BLDA-SCNC: 22.3 MMOL/L (ref 22–28)
HCT VFR BLD AUTO: 35.5 % (ref 34.8–46.1)
HCT VFR BLD CALC: 34 % (ref 34.8–46.1)
HGB BLD-MCNC: 11.2 G/DL (ref 11.5–15.4)
HGB BLDA-MCNC: 11.6 G/DL (ref 11.5–15.4)
HOROWITZ INDEX BLDA+IHG-RTO: 100 MM[HG]
IMM GRANULOCYTES # BLD AUTO: 0.12 THOUSAND/UL (ref 0–0.2)
IMM GRANULOCYTES NFR BLD AUTO: 1 % (ref 0–2)
INR PPP: 1.16 (ref 0.84–1.19)
LACTATE SERPL-SCNC: 1.7 MMOL/L (ref 0.5–2)
LACTATE SERPL-SCNC: 2.8 MMOL/L (ref 0.5–2)
LACTATE SERPL-SCNC: 8.9 MMOL/L (ref 0.5–2)
LYMPHOCYTES # BLD AUTO: 1.34 THOUSANDS/ΜL (ref 0.6–4.47)
LYMPHOCYTES NFR BLD AUTO: 16 % (ref 14–44)
MAGNESIUM SERPL-MCNC: 1.9 MG/DL (ref 1.6–2.6)
MCH RBC QN AUTO: 34.1 PG (ref 26.8–34.3)
MCHC RBC AUTO-ENTMCNC: 31.5 G/DL (ref 31.4–37.4)
MCV RBC AUTO: 108 FL (ref 82–98)
METHADONE UR QL: NEGATIVE
MONOCYTES # BLD AUTO: 0.39 THOUSAND/ΜL (ref 0.17–1.22)
MONOCYTES NFR BLD AUTO: 5 % (ref 4–12)
NEUTROPHILS # BLD AUTO: 6.54 THOUSANDS/ΜL (ref 1.85–7.62)
NEUTS SEG NFR BLD AUTO: 77 % (ref 43–75)
NRBC BLD AUTO-RTO: 1 /100 WBCS
O2 CT BLDA-SCNC: 15.4 ML/DL (ref 16–23)
OPIATES UR QL SCN: NEGATIVE
OXYCODONE+OXYMORPHONE UR QL SCN: NEGATIVE
OXYHGB MFR BLDA: 98.4 % (ref 94–97)
PCO2 BLD: 20 MMOL/L (ref 21–32)
PCO2 BLD: 47.8 MM HG (ref 42–50)
PCO2 BLDA: 45 MM HG (ref 36–44)
PCP UR QL: NEGATIVE
PEEP RESPIRATORY: 6 CM[H2O]
PH BLD: 7.21 [PH] (ref 7.3–7.4)
PH BLDA: 7.31 [PH] (ref 7.35–7.45)
PHOSPHATE SERPL-MCNC: 6.4 MG/DL (ref 2.3–4.1)
PLATELET # BLD AUTO: 42 THOUSANDS/UL (ref 149–390)
PLATELET # BLD AUTO: 49 THOUSANDS/UL (ref 149–390)
PMV BLD AUTO: 10.6 FL (ref 8.9–12.7)
PMV BLD AUTO: 11.3 FL (ref 8.9–12.7)
PO2 BLD: 39 MM HG (ref 35–45)
PO2 BLDA: 205.9 MM HG (ref 75–129)
POTASSIUM BLD-SCNC: 3.6 MMOL/L (ref 3.5–5.3)
POTASSIUM SERPL-SCNC: 3.7 MMOL/L (ref 3.5–5.3)
PROTHROMBIN TIME: 14.1 SECONDS (ref 11.6–14.5)
PROTHROMBIN TIME: 14.8 SECONDS (ref 11.6–14.5)
RBC # BLD AUTO: 3.28 MILLION/UL (ref 3.81–5.12)
SALICYLATES SERPL-MCNC: <3 MG/DL (ref 3–20)
SAO2 % BLD FROM PO2: 61 % (ref 60–85)
SODIUM BLD-SCNC: 134 MMOL/L (ref 136–145)
SODIUM SERPL-SCNC: 137 MMOL/L (ref 136–145)
SPECIMEN SOURCE: ABNORMAL
SPECIMEN SOURCE: ABNORMAL
THC UR QL: NEGATIVE
THROMBIN TIME: 18.9 SECONDS (ref 14.7–18.4)
VENT AC: 14
VENT- AC: AC
VT SETTING VENT: 430 ML
WBC # BLD AUTO: 8.47 THOUSAND/UL (ref 4.31–10.16)

## 2022-03-07 PROCEDURE — 99447 NTRPROF PH1/NTRNET/EHR 11-20: CPT | Performed by: EMERGENCY MEDICINE

## 2022-03-07 PROCEDURE — 85610 PROTHROMBIN TIME: CPT | Performed by: PHYSICIAN ASSISTANT

## 2022-03-07 PROCEDURE — 84100 ASSAY OF PHOSPHORUS: CPT | Performed by: PHYSICIAN ASSISTANT

## 2022-03-07 PROCEDURE — 82805 BLOOD GASES W/O2 SATURATION: CPT | Performed by: PHYSICIAN ASSISTANT

## 2022-03-07 PROCEDURE — NC001 PR NO CHARGE

## 2022-03-07 PROCEDURE — 82550 ASSAY OF CK (CPK): CPT

## 2022-03-07 PROCEDURE — 84132 ASSAY OF SERUM POTASSIUM: CPT

## 2022-03-07 PROCEDURE — 71260 CT THORAX DX C+: CPT

## 2022-03-07 PROCEDURE — 80179 DRUG ASSAY SALICYLATE: CPT | Performed by: PHYSICIAN ASSISTANT

## 2022-03-07 PROCEDURE — 31500 INSERT EMERGENCY AIRWAY: CPT | Performed by: EMERGENCY MEDICINE

## 2022-03-07 PROCEDURE — 82010 KETONE BODYS QUAN: CPT | Performed by: PHYSICIAN ASSISTANT

## 2022-03-07 PROCEDURE — 96366 THER/PROPH/DIAG IV INF ADDON: CPT

## 2022-03-07 PROCEDURE — 96365 THER/PROPH/DIAG IV INF INIT: CPT

## 2022-03-07 PROCEDURE — 95715 VEEG EA 12-26HR INTMT MNTR: CPT

## 2022-03-07 PROCEDURE — 84484 ASSAY OF TROPONIN QUANT: CPT | Performed by: PHYSICIAN ASSISTANT

## 2022-03-07 PROCEDURE — 74177 CT ABD & PELVIS W/CONTRAST: CPT

## 2022-03-07 PROCEDURE — 95700 EEG CONT REC W/VID EEG TECH: CPT

## 2022-03-07 PROCEDURE — 85049 AUTOMATED PLATELET COUNT: CPT | Performed by: PHYSICIAN ASSISTANT

## 2022-03-07 PROCEDURE — 82077 ASSAY SPEC XCP UR&BREATH IA: CPT | Performed by: PHYSICIAN ASSISTANT

## 2022-03-07 PROCEDURE — 94150 VITAL CAPACITY TEST: CPT

## 2022-03-07 PROCEDURE — 80143 DRUG ASSAY ACETAMINOPHEN: CPT | Performed by: PHYSICIAN ASSISTANT

## 2022-03-07 PROCEDURE — 85670 THROMBIN TIME PLASMA: CPT | Performed by: PHYSICIAN ASSISTANT

## 2022-03-07 PROCEDURE — 82948 REAGENT STRIP/BLOOD GLUCOSE: CPT

## 2022-03-07 PROCEDURE — 82803 BLOOD GASES ANY COMBINATION: CPT

## 2022-03-07 PROCEDURE — 76705 ECHO EXAM OF ABDOMEN: CPT | Performed by: SURGERY

## 2022-03-07 PROCEDURE — 94003 VENT MGMT INPAT SUBQ DAY: CPT

## 2022-03-07 PROCEDURE — 82947 ASSAY GLUCOSE BLOOD QUANT: CPT

## 2022-03-07 PROCEDURE — 93005 ELECTROCARDIOGRAM TRACING: CPT

## 2022-03-07 PROCEDURE — 80048 BASIC METABOLIC PNL TOTAL CA: CPT | Performed by: SURGERY

## 2022-03-07 PROCEDURE — 84295 ASSAY OF SERUM SODIUM: CPT

## 2022-03-07 PROCEDURE — 83735 ASSAY OF MAGNESIUM: CPT | Performed by: PHYSICIAN ASSISTANT

## 2022-03-07 PROCEDURE — 96375 TX/PRO/DX INJ NEW DRUG ADDON: CPT

## 2022-03-07 PROCEDURE — 99285 EMERGENCY DEPT VISIT HI MDM: CPT

## 2022-03-07 PROCEDURE — 36600 WITHDRAWAL OF ARTERIAL BLOOD: CPT

## 2022-03-07 PROCEDURE — 85014 HEMATOCRIT: CPT

## 2022-03-07 PROCEDURE — 72125 CT NECK SPINE W/O DYE: CPT

## 2022-03-07 PROCEDURE — 94760 N-INVAS EAR/PLS OXIMETRY 1: CPT

## 2022-03-07 PROCEDURE — 85732 THROMBOPLASTIN TIME PARTIAL: CPT | Performed by: PHYSICIAN ASSISTANT

## 2022-03-07 PROCEDURE — 83605 ASSAY OF LACTIC ACID: CPT | Performed by: PHYSICIAN ASSISTANT

## 2022-03-07 PROCEDURE — 85384 FIBRINOGEN ACTIVITY: CPT | Performed by: PHYSICIAN ASSISTANT

## 2022-03-07 PROCEDURE — 99291 CRITICAL CARE FIRST HOUR: CPT | Performed by: SURGERY

## 2022-03-07 PROCEDURE — 93308 TTE F-UP OR LMTD: CPT | Performed by: SURGERY

## 2022-03-07 PROCEDURE — 85576 BLOOD PLATELET AGGREGATION: CPT | Performed by: PHYSICIAN ASSISTANT

## 2022-03-07 PROCEDURE — 71045 X-RAY EXAM CHEST 1 VIEW: CPT

## 2022-03-07 PROCEDURE — 80307 DRUG TEST PRSMV CHEM ANLYZR: CPT | Performed by: PHYSICIAN ASSISTANT

## 2022-03-07 PROCEDURE — 70450 CT HEAD/BRAIN W/O DYE: CPT

## 2022-03-07 PROCEDURE — 85730 THROMBOPLASTIN TIME PARTIAL: CPT | Performed by: PHYSICIAN ASSISTANT

## 2022-03-07 PROCEDURE — 36415 COLL VENOUS BLD VENIPUNCTURE: CPT | Performed by: SURGERY

## 2022-03-07 PROCEDURE — 85347 COAGULATION TIME ACTIVATED: CPT | Performed by: PHYSICIAN ASSISTANT

## 2022-03-07 PROCEDURE — 94002 VENT MGMT INPAT INIT DAY: CPT

## 2022-03-07 PROCEDURE — 85611 PROTHROMBIN TEST: CPT | Performed by: PHYSICIAN ASSISTANT

## 2022-03-07 PROCEDURE — 83605 ASSAY OF LACTIC ACID: CPT

## 2022-03-07 PROCEDURE — 85025 COMPLETE CBC W/AUTO DIFF WBC: CPT | Performed by: SURGERY

## 2022-03-07 PROCEDURE — 82330 ASSAY OF CALCIUM: CPT

## 2022-03-07 PROCEDURE — 85397 CLOTTING FUNCT ACTIVITY: CPT | Performed by: PHYSICIAN ASSISTANT

## 2022-03-07 RX ORDER — FENTANYL CITRATE 50 UG/ML
50 INJECTION, SOLUTION INTRAMUSCULAR; INTRAVENOUS EVERY 2 HOUR PRN
Status: DISCONTINUED | OUTPATIENT
Start: 2022-03-07 | End: 2022-03-09

## 2022-03-07 RX ORDER — LORAZEPAM 2 MG/ML
2 INJECTION INTRAMUSCULAR
Status: DISCONTINUED | OUTPATIENT
Start: 2022-03-07 | End: 2022-03-18

## 2022-03-07 RX ORDER — SUCCINYLCHOLINE/SOD CL,ISO/PF 100 MG/5ML
SYRINGE (ML) INTRAVENOUS CODE/TRAUMA/SEDATION MEDICATION
Status: COMPLETED | OUTPATIENT
Start: 2022-03-07 | End: 2022-03-07

## 2022-03-07 RX ORDER — LANOLIN ALCOHOL/MO/W.PET/CERES
100 CREAM (GRAM) TOPICAL DAILY
Status: DISCONTINUED | OUTPATIENT
Start: 2022-03-08 | End: 2022-03-18 | Stop reason: HOSPADM

## 2022-03-07 RX ORDER — POTASSIUM CHLORIDE 20MEQ/15ML
40 LIQUID (ML) ORAL ONCE
Status: COMPLETED | OUTPATIENT
Start: 2022-03-07 | End: 2022-03-07

## 2022-03-07 RX ORDER — HEPARIN SODIUM 5000 [USP'U]/ML
5000 INJECTION, SOLUTION INTRAVENOUS; SUBCUTANEOUS EVERY 8 HOURS SCHEDULED
Status: DISCONTINUED | OUTPATIENT
Start: 2022-03-07 | End: 2022-03-07

## 2022-03-07 RX ORDER — ETOMIDATE 2 MG/ML
INJECTION INTRAVENOUS CODE/TRAUMA/SEDATION MEDICATION
Status: COMPLETED | OUTPATIENT
Start: 2022-03-07 | End: 2022-03-07

## 2022-03-07 RX ORDER — SODIUM CHLORIDE, SODIUM GLUCONATE, SODIUM ACETATE, POTASSIUM CHLORIDE, MAGNESIUM CHLORIDE, SODIUM PHOSPHATE, DIBASIC, AND POTASSIUM PHOSPHATE .53; .5; .37; .037; .03; .012; .00082 G/100ML; G/100ML; G/100ML; G/100ML; G/100ML; G/100ML; G/100ML
100 INJECTION, SOLUTION INTRAVENOUS CONTINUOUS
Status: DISCONTINUED | OUTPATIENT
Start: 2022-03-07 | End: 2022-03-09

## 2022-03-07 RX ORDER — FENTANYL CITRATE-0.9 % NACL/PF 10 MCG/ML
75 PLASTIC BAG, INJECTION (ML) INTRAVENOUS CONTINUOUS
Status: DISCONTINUED | OUTPATIENT
Start: 2022-03-07 | End: 2022-03-09

## 2022-03-07 RX ORDER — SODIUM CHLORIDE, SODIUM GLUCONATE, SODIUM ACETATE, POTASSIUM CHLORIDE, MAGNESIUM CHLORIDE, SODIUM PHOSPHATE, DIBASIC, AND POTASSIUM PHOSPHATE .53; .5; .37; .037; .03; .012; .00082 G/100ML; G/100ML; G/100ML; G/100ML; G/100ML; G/100ML; G/100ML
1000 INJECTION, SOLUTION INTRAVENOUS ONCE
Status: COMPLETED | OUTPATIENT
Start: 2022-03-07 | End: 2022-03-08

## 2022-03-07 RX ORDER — MAGNESIUM SULFATE HEPTAHYDRATE 40 MG/ML
2 INJECTION, SOLUTION INTRAVENOUS ONCE
Status: COMPLETED | OUTPATIENT
Start: 2022-03-07 | End: 2022-03-08

## 2022-03-07 RX ORDER — LORAZEPAM 2 MG/ML
INJECTION INTRAMUSCULAR CODE/TRAUMA/SEDATION MEDICATION
Status: COMPLETED | OUTPATIENT
Start: 2022-03-07 | End: 2022-03-07

## 2022-03-07 RX ORDER — CHLORHEXIDINE GLUCONATE 0.12 MG/ML
15 RINSE ORAL EVERY 12 HOURS SCHEDULED
Status: DISCONTINUED | OUTPATIENT
Start: 2022-03-07 | End: 2022-03-18 | Stop reason: HOSPADM

## 2022-03-07 RX ORDER — SODIUM CHLORIDE, SODIUM GLUCONATE, SODIUM ACETATE, POTASSIUM CHLORIDE, MAGNESIUM CHLORIDE, SODIUM PHOSPHATE, DIBASIC, AND POTASSIUM PHOSPHATE .53; .5; .37; .037; .03; .012; .00082 G/100ML; G/100ML; G/100ML; G/100ML; G/100ML; G/100ML; G/100ML
1000 INJECTION, SOLUTION INTRAVENOUS ONCE
Status: COMPLETED | OUTPATIENT
Start: 2022-03-07 | End: 2022-03-07

## 2022-03-07 RX ORDER — FOLIC ACID 1 MG/1
1 TABLET ORAL DAILY
Status: DISCONTINUED | OUTPATIENT
Start: 2022-03-08 | End: 2022-03-18 | Stop reason: HOSPADM

## 2022-03-07 RX ORDER — PROPOFOL 10 MG/ML
40 INJECTION, EMULSION INTRAVENOUS
Status: DISCONTINUED | OUTPATIENT
Start: 2022-03-07 | End: 2022-03-09

## 2022-03-07 RX ORDER — DEXMEDETOMIDINE HYDROCHLORIDE 4 UG/ML
.1-.7 INJECTION, SOLUTION INTRAVENOUS
Status: DISCONTINUED | OUTPATIENT
Start: 2022-03-07 | End: 2022-03-09

## 2022-03-07 RX ORDER — PROPOFOL 10 MG/ML
INJECTION, EMULSION INTRAVENOUS
Status: COMPLETED | OUTPATIENT
Start: 2022-03-07 | End: 2022-03-07

## 2022-03-07 RX ADMIN — SODIUM CHLORIDE, SODIUM GLUCONATE, SODIUM ACETATE, POTASSIUM CHLORIDE, MAGNESIUM CHLORIDE, SODIUM PHOSPHATE, DIBASIC, AND POTASSIUM PHOSPHATE 1000 ML: .53; .5; .37; .037; .03; .012; .00082 INJECTION, SOLUTION INTRAVENOUS at 17:40

## 2022-03-07 RX ADMIN — PROPOFOL 40 MCG/KG/MIN: 10 INJECTION, EMULSION INTRAVENOUS at 18:19

## 2022-03-07 RX ADMIN — DEXMEDETOMIDINE HYDROCHLORIDE 0.2 MCG/KG/HR: 400 INJECTION INTRAVENOUS at 22:48

## 2022-03-07 RX ADMIN — LORAZEPAM 1 MG: 2 INJECTION INTRAMUSCULAR; INTRAVENOUS at 17:13

## 2022-03-07 RX ADMIN — SODIUM CHLORIDE, SODIUM GLUCONATE, SODIUM ACETATE, POTASSIUM CHLORIDE, MAGNESIUM CHLORIDE, SODIUM PHOSPHATE, DIBASIC, AND POTASSIUM PHOSPHATE 1000 ML: .53; .5; .37; .037; .03; .012; .00082 INJECTION, SOLUTION INTRAVENOUS at 20:21

## 2022-03-07 RX ADMIN — FENTANYL CITRATE 50 MCG: 50 INJECTION INTRAMUSCULAR; INTRAVENOUS at 19:36

## 2022-03-07 RX ADMIN — IOHEXOL 100 ML: 350 INJECTION, SOLUTION INTRAVENOUS at 17:26

## 2022-03-07 RX ADMIN — PROPOFOL 35 MCG/KG/MIN: 10 INJECTION, EMULSION INTRAVENOUS at 22:04

## 2022-03-07 RX ADMIN — LORAZEPAM 1 MG: 2 INJECTION INTRAMUSCULAR; INTRAVENOUS at 17:14

## 2022-03-07 RX ADMIN — FENTANYL CITRATE 75 MCG/HR: 50 INJECTION, SOLUTION INTRAMUSCULAR; INTRAVENOUS at 23:00

## 2022-03-07 RX ADMIN — PROPOFOL 40 MCG/KG/MIN: 10 INJECTION, EMULSION INTRAVENOUS at 18:20

## 2022-03-07 RX ADMIN — Medication 65 MG: at 18:17

## 2022-03-07 RX ADMIN — ETOMIDATE 10 MG: 2 INJECTION, SOLUTION INTRAVENOUS at 18:15

## 2022-03-07 RX ADMIN — SODIUM CHLORIDE, SODIUM GLUCONATE, SODIUM ACETATE, POTASSIUM CHLORIDE, MAGNESIUM CHLORIDE, SODIUM PHOSPHATE, DIBASIC, AND POTASSIUM PHOSPHATE 1000 ML: .53; .5; .37; .037; .03; .012; .00082 INJECTION, SOLUTION INTRAVENOUS at 21:36

## 2022-03-07 RX ADMIN — POTASSIUM CHLORIDE 40 MEQ: 20 SOLUTION ORAL at 21:40

## 2022-03-07 RX ADMIN — SODIUM CHLORIDE, SODIUM GLUCONATE, SODIUM ACETATE, POTASSIUM CHLORIDE, MAGNESIUM CHLORIDE, SODIUM PHOSPHATE, DIBASIC, AND POTASSIUM PHOSPHATE 100 ML/HR: .53; .5; .37; .037; .03; .012; .00082 INJECTION, SOLUTION INTRAVENOUS at 21:52

## 2022-03-07 RX ADMIN — MAGNESIUM SULFATE HEPTAHYDRATE 2 G: 40 INJECTION, SOLUTION INTRAVENOUS at 21:53

## 2022-03-07 RX ADMIN — Medication 15 ML: at 21:40

## 2022-03-07 NOTE — ED PROVIDER NOTES
Emergency Department Airway Evaluation and Management Form    History  Obtained from: EMS  Patient has no allergy information on record  Chief Complaint   Patient presents with    Trauma     Fall from standing, Low GCS, hx prior fals with head bleed  No AP/AC use  Additional history is unclear  He eyes are closed dried blood at the mouth, tongue laceration  I suspect seizure  Airway patent, trauma will observe allow patient to wake up        No past medical history on file  No past surgical history on file  No family history on file  Social History     Tobacco Use    Smoking status: Not on file    Smokeless tobacco: Not on file   Substance Use Topics    Alcohol use: Not on file    Drug use: Not on file     I have reviewed and agree with the history as documented  Review of Systems   Unable to perform ROS: Patient unresponsive     Physical Exam  /82   Pulse (!) 149   Temp (!) 95 °F (35 °C) (Temporal)   Resp 14   Wt 65 kg (143 lb 4 8 oz)   SpO2 100%     Physical Exam  Vitals and nursing note reviewed  Constitutional:       General: She is in acute distress  Appearance: She is well-developed and underweight  She is ill-appearing  She is not diaphoretic  Interventions: Cervical collar and nasal cannula in place  HENT:      Head: Normocephalic and atraumatic  Comments: Dried blood at mouth, small tongue lacerations on each side     Nose: Nose normal       Mouth/Throat:      Pharynx: No oropharyngeal exudate  Eyes:      General:         Right eye: No discharge  Left eye: No discharge  Pupils: Pupils are equal, round, and reactive to light  Neck:      Vascular: No JVD  Trachea: No tracheal deviation  Comments: No midline spinal tenderness, no step offs or deformity  Cardiovascular:      Rate and Rhythm: Normal rate and regular rhythm  Pulses:           Carotid pulses are 2+ on the right side and 2+ on the left side         Radial pulses are 2+ on the right side and 2+ on the left side  Femoral pulses are 2+ on the right side and 2+ on the left side  Dorsalis pedis pulses are 2+ on the right side and 2+ on the left side  Heart sounds: Normal heart sounds  No murmur heard  Pulmonary:      Effort: Pulmonary effort is normal  No accessory muscle usage or respiratory distress  Breath sounds: No decreased breath sounds, wheezing or rales  Chest:      Chest wall: No deformity, tenderness or edema  Abdominal:      General: Bowel sounds are normal       Palpations: Abdomen is soft  Abdomen is not rigid  There is no mass  Tenderness: There is no abdominal tenderness  There is no rebound  Comments: No ecchymosis or abrasions, no seat belt sign   Musculoskeletal:         General: No tenderness  Normal range of motion  Cervical back: Normal range of motion and neck supple  Skin:     General: Skin is warm and dry  Capillary Refill: Capillary refill takes less than 2 seconds  Findings: No rash  Neurological:      Mental Status: She is oriented to person, place, and time  GCS: GCS eye subscore is 4  GCS verbal subscore is 5  GCS motor subscore is 6  Cranial Nerves: No cranial nerve deficit  Sensory: No sensory deficit  Motor: No abnormal muscle tone        Coordination: Coordination normal       Comments: Normal gait       ED Medications  Medications   multi-electrolyte (ISOLYTE-S PH 7 4) bolus 1,000 mL (1,000 mL Intravenous New Bag 3/7/22 1740)   LORazepam (ATIVAN) injection (1 mg Intravenous Given 3/7/22 1714)   iohexol (OMNIPAQUE) 350 MG/ML injection (SINGLE-DOSE) 100 mL (100 mL Intravenous Given 3/7/22 1726)   etomidate (AMIDATE) 2 mg/mL injection (10 mg Intravenous Given 3/7/22 1815)   Succinylcholine Chloride 100 mg/5 mL syringe (65 mg Intravenous Given 3/7/22 1817)   propofol (DIPRIVAN) 1000 mg in 100 mL infusion (premix) (40 mcg/kg/min × 65 kg Intravenous New Bag 3/7/22 1819) Intubation  Intubation    Date/Time: 3/7/2022 6:15 PM  Performed by: Deangelo Flood MD  Authorized by: Deangelo Flood MD     Patient location:  ED  Other Assisting Provider: Yes (comment) Ruth Galvan MD)    Consent:     Consent obtained:  Emergent situation  Universal protocol:     Patient identity confirmed:  Verbally with patient and arm band  Pre-procedure details:     Patient status:  Unresponsive    Mallampati score:  1    Pretreatment medications:  Etomidate    Paralytics:  Succinylcholine  Indications:     Indications for intubation: airway protection    Procedure details:     Preoxygenation:  Nonrebreather mask (+NC)    CPR in progress: no      Intubation method:  Oral    Oral intubation technique:  Glidescope    Laryngoscope blade: Mac 4    Tube size (mm):  7 5    Tube type:  Cuffed    Number of attempts:  1    Ventilation between attempts: no      Cricoid pressure: yes      Tube visualized through cords: yes    Placement assessment:     ETT to lip:  23    Tube secured with:  ETT calix    Breath sounds:  Equal    Placement verification: chest rise, condensation, CXR verification, direct visualization and capnography      End Tidal CO2 (mm HG):  50    CXR findings:  ETT in proper place  Post-procedure details:     Patient tolerance of procedure: Tolerated well, no immediate complications      Notes  Initially, airway was stable, however, patient status did not improve and she started with some twitching concerning for additional seizure  Maybe status? Unclear alcohol history, possibly complicated withdrawal? Patient will be intubated for airway protection      Final Diagnosis  Final diagnoses:   Acute respiratory failure with hypoxia ([address] )   Seizure-like activity ([address] )   Encephalopathy acute        ED Provider  Electronically Signed by     Deangelo Flood MD  03/07/22 4984

## 2022-03-07 NOTE — RESPIRATORY THERAPY NOTE
Pt transported from [address] [place] [address] to CT scan on 15lpm NRB mask and NC 6lpm  Pt transported back to ED 23 and placed on 15lpm NRB mask and NC 6lpm

## 2022-03-07 NOTE — H&P
H&P - Trauma   Bandar Watkins 58 y o  female MRN: [id number]  Unit/Bed#: ED 23 Encounter: [id number]    Trauma Alert: Level A   Model of Arrival: Ambulance    Trauma Team: Attending Mayur Aguilar and DIEGO Voss  Consultants: none    Assessment/Plan   Active Problems / Assessment:   Fall from standing with suspicion for seizure like activity   Seizure activity witnessed in trauma bay with concern for status epilepticus   History ETOH abuse with concern for withdrawal with seizures   Age indeterminate compression fractures T12, L2, L4     Plan:   Admit to ICU with continuous video EEG monitoring   Continue sedation with propofol can add benzo as needed   Appreciate Toxicology consultation   Video EEG monitoring x 24 hours      History of Present Illness     Chief Complaint: Altered mental status  Mechanism:Fall     HPI:    Bandar Watkins is a 58 y o  female with [address] abuse, TBI, who presents after fall from standing with altered mental status  Patient's history is provided by EMS and patient's  as well as review of EMR  Patient told her  she was not feeling well today and has been unable to take in food for the past 24 hours with associated fatigue and malaise  She was witnessed to fall from standing with possible head strike and incontinence of feces  She was unresponsive for her  after the fall  She has a history of ETOH abuse and  states she has been trying to cut back/stop drinking and when she does drink it is only in small amounts  Review of Systems   Unable to perform ROS: Mental status change   Endocrine: Negative for heat intolerance  12-point, complete review of systems was reviewed and negative except as stated above  Historical Information     No past medical history on file  No past surgical history on file     Unable to obtain history due to Diagnostic results, Prognosis, Patient and family education and Impressions    Social History     Tobacco Use    Smoking status: Not on file    Smokeless tobacco: Not on file   Substance Use Topics    Alcohol use: Not on file    Drug use: Not on file       There is no immunization history on file for this patient  Last Tetanus: n/a  Family History: Non-contributory     Meds/Allergies   all current active meds have been reviewed  Allergies have not been reviewed; Not on File    Objective   Initial Vitals:   Temperature: (!) 95 °F (35 °C) (03/07/22 1705)  Pulse: (!) 140 (03/07/22 1705)  Respirations: 16 (03/07/22 1705)  Blood Pressure: 125/79 (03/07/22 1705)    Primary Survey:   Airway:        Status: patent;        Pre-hospital Interventions: none        Hospital Interventions: intubated in ED/trauma bay  Breathing:        Pre-hospital Interventions: oxygen       Effort: normal       Right breath sounds: normal       Left breath sounds: normal  Circulation:        Rhythm: regular       Rate: tachycardia   Right Pulses Left Pulses    R radial: 2+  R femoral: 2+  R pedal: 2+     L radial: 2+  L femoral: 2+  L pedal: 2+       Disability:        GCS: Eye: 1; Verbal: 2 Motor: 5 Total: 8       Right Pupil: round;  reactive         Left Pupil:  round;  reactive      R Motor Strength L Motor Strength    R : 5/5  R dorsiflex: 0/5  R plantarflex: 0/5 L : 5/5  L dorsiflex: 0/5  L plantarflex: 0/5        Sensory:  No sensory deficit  Exposure:       Completed: Yes      Secondary Survey:  Physical Exam  Vitals and nursing note reviewed  Constitutional:       General: She is in acute distress  Appearance: Normal appearance  She is obese  She is ill-appearing  She is not toxic-appearing or diaphoretic  HENT:      Head: Normocephalic and atraumatic  Right Ear: Tympanic membrane normal       Left Ear: Tympanic membrane normal       Nose: Nose normal  No congestion or rhinorrhea  Mouth/Throat:      Mouth: Mucous membranes are moist       Tongue: Lesions (secondary to seizure activity) present        Pharynx: No oropharyngeal exudate or posterior oropharyngeal erythema  Eyes:      Extraocular Movements: Extraocular movements intact  Conjunctiva/sclera: Conjunctivae normal       Pupils: Pupils are equal, round, and reactive to light  Cardiovascular:      Rate and Rhythm: Regular rhythm  Tachycardia present  Heart sounds: No murmur heard  No friction rub  No gallop  Pulmonary:      Effort: Pulmonary effort is normal  No respiratory distress  Breath sounds: No wheezing, rhonchi or rales  Abdominal:      General: There is no distension  Palpations: Abdomen is soft  Tenderness: There is no abdominal tenderness  There is no guarding or rebound  Musculoskeletal:      Right shoulder: Normal       Left shoulder: Normal       Right upper arm: Normal       Left upper arm: Normal       Right elbow: Normal       Left elbow: Normal       Right forearm: Normal       Left forearm: Normal       Right wrist: Normal       Left wrist: Normal       Right hand: Normal       Left hand: Normal       Cervical back: Normal range of motion  No deformity, signs of trauma, lacerations or tenderness  Thoracic back: No deformity, signs of trauma or tenderness  Lumbar back: No deformity, signs of trauma or tenderness  Right hip: Normal  No deformity or tenderness  Normal range of motion  Left hip: Normal       Right upper leg: No swelling, deformity or tenderness  Left upper leg: Normal  No swelling, deformity or tenderness  Right knee: Normal       Left knee: Normal       Right lower leg: Normal       Left lower leg: Normal       Right ankle: Normal       Left ankle: Normal       Right foot: Normal       Left foot: Normal    Skin:     General: Skin is warm and dry  Capillary Refill: Capillary refill takes less than 2 seconds  Findings: No bruising or lesion  Neurological:      Mental Status: She is unresponsive  GCS: GCS eye subscore is 1  GCS verbal subscore is 2   GCS motor subscore is 5  Invasive Devices  Report    Peripheral Intravenous Line            Peripheral IV 03/07/22 Left Wrist <1 day    Peripheral IV 03/07/22 Right Antecubital <1 day          Drain            NG/OG/Enteral Tube Orogastric 16 Fr Center mouth <1 day          Airway            ETT  Cuffed 7 5 mm <1 day              Lab Results:   BMP/CMP:   Lab Results   Component Value Date    SODIUM 137 03/07/2022    K 3 7 03/07/2022    CL 97 (L) 03/07/2022    CO2 17 (L) 03/07/2022    CO2 20 (L) 03/07/2022    BUN 9 03/07/2022    CREATININE 1 17 03/07/2022    GLUCOSE 355 (H) 03/07/2022    CALCIUM 9 1 03/07/2022    EGFR 50 03/07/2022   , CBC:   Lab Results   Component Value Date    WBC 8 47 03/07/2022    HGB 11 2 (L) 03/07/2022    HCT 35 5 03/07/2022     (H) 03/07/2022    PLT 42 (LL) 03/07/2022    MCH 34 1 03/07/2022    MCHC 31 5 03/07/2022    RDW 14 4 03/07/2022    MPV 10 6 03/07/2022    NRBC 1 03/07/2022   , Coagulation:   Lab Results   Component Value Date    INR 1 16 03/07/2022   , Lactate: No results found for: LACTATE, AST: No results found for: AST, ALT: No results found for: ALT, Troponin: No results found for: TROPONINI, EtOH:   Lab Results   Component Value Date    ETOH <3 03/07/2022    and UDS: No components found for: RAPIDDRUGSCREEN    Imaging Results: I have personally reviewed pertinent reports      Chest Xray(s): negative for acute findings   FAST exam(s): negative for acute findings   CT Scan(s): positive for acute findings: Age indeterminate compression fractures at T12, L2, L4   Additional Xray(s): N/A     Other Studies: video EEG monitoring pending    Code Status: No Order  Advance Directive and Living Will:      Power of :    POLST:    I have spent 60 minutes with Patient and family today in which greater than 50% of this time was spent in counseling/coordination of care regarding Diagnostic results, Prognosis, Patient and family education and Importance of tx compliance

## 2022-03-07 NOTE — LETTER
Hello, please review patient for dual treatment today      Thanks, [facility]  [phone]

## 2022-03-08 ENCOUNTER — APPOINTMENT (INPATIENT)
Dept: NEUROLOGY | Facility: HOSPITAL | Age: 62
DRG: 100 | End: 2022-03-08
Attending: PSYCHIATRY & NEUROLOGY
Payer: COMMERCIAL

## 2022-03-08 ENCOUNTER — APPOINTMENT (INPATIENT)
Dept: NON INVASIVE DIAGNOSTICS | Facility: HOSPITAL | Age: 62
DRG: 100 | End: 2022-03-08
Payer: COMMERCIAL

## 2022-03-08 ENCOUNTER — APPOINTMENT (INPATIENT)
Dept: RADIOLOGY | Facility: HOSPITAL | Age: 62
DRG: 100 | End: 2022-03-08
Payer: COMMERCIAL

## 2022-03-08 PROBLEM — G40.901 STATUS EPILEPTICUS (HCC): Status: ACTIVE | Noted: 2022-03-08

## 2022-03-08 PROBLEM — G93.40 ENCEPHALOPATHY: Status: ACTIVE | Noted: 2022-03-08

## 2022-03-08 LAB
2HR DELTA HS TROPONIN: -317 NG/L
4HR DELTA HS TROPONIN: -563 NG/L
ALBUMIN SERPL BCP-MCNC: 2.3 G/DL (ref 3.5–5)
ALBUMIN SERPL BCP-MCNC: 2.6 G/DL (ref 3.5–5)
ALP SERPL-CCNC: 144 U/L (ref 46–116)
ALP SERPL-CCNC: 160 U/L (ref 46–116)
ALT SERPL W P-5'-P-CCNC: 85 U/L (ref 12–78)
ALT SERPL W P-5'-P-CCNC: 92 U/L (ref 12–78)
ANION GAP SERPL CALCULATED.3IONS-SCNC: 12 MMOL/L (ref 4–13)
ANION GAP SERPL CALCULATED.3IONS-SCNC: 12 MMOL/L (ref 4–13)
ANION GAP SERPL CALCULATED.3IONS-SCNC: 13 MMOL/L (ref 4–13)
AORTIC ROOT: 3.6 CM
APICAL FOUR CHAMBER EJECTION FRACTION: 42 %
ASCENDING AORTA: 3.2 CM (ref 1.9–2.84)
AST SERPL W P-5'-P-CCNC: 129 U/L (ref 5–45)
AST SERPL W P-5'-P-CCNC: 148 U/L (ref 5–45)
ATRIAL RATE: 103 BPM
ATRIAL RATE: 113 BPM
ATRIAL RATE: 92 BPM
BACTERIA UR QL AUTO: ABNORMAL /HPF
BASE EXCESS BLDA CALC-SCNC: -1.8 MMOL/L
BASOPHILS # BLD AUTO: 0.01 THOUSANDS/ΜL (ref 0–0.1)
BASOPHILS NFR BLD AUTO: 0 % (ref 0–1)
BILIRUB DIRECT SERPL-MCNC: 0.26 MG/DL (ref 0–0.2)
BILIRUB SERPL-MCNC: 0.58 MG/DL (ref 0.2–1)
BILIRUB SERPL-MCNC: 1.09 MG/DL (ref 0.2–1)
BILIRUB UR QL STRIP: NEGATIVE
BUN SERPL-MCNC: 5 MG/DL (ref 5–25)
BUN SERPL-MCNC: 6 MG/DL (ref 5–25)
BUN SERPL-MCNC: 6 MG/DL (ref 5–25)
CA-I BLD-SCNC: 0.94 MMOL/L (ref 1.12–1.32)
CA-I BLD-SCNC: 1.03 MMOL/L (ref 1.12–1.32)
CALCIUM ALBUM COR SERPL-MCNC: 8.8 MG/DL (ref 8.3–10.1)
CALCIUM SERPL-MCNC: 7.4 MG/DL (ref 8.3–10.1)
CALCIUM SERPL-MCNC: 7.4 MG/DL (ref 8.3–10.1)
CALCIUM SERPL-MCNC: 7.7 MG/DL (ref 8.3–10.1)
CARDIAC TROPONIN I PNL SERPL HS: 2215 NG/L
CARDIAC TROPONIN I PNL SERPL HS: 2461 NG/L
CARDIAC TROPONIN I PNL SERPL HS: 2778 NG/L
CFFMA (FUNCTIONAL FIBRINOGEN MAX AMPLITUDE): 15.2 MM (ref 15–32)
CHLORIDE SERPL-SCNC: 101 MMOL/L (ref 100–108)
CHLORIDE SERPL-SCNC: 102 MMOL/L (ref 100–108)
CHLORIDE SERPL-SCNC: 103 MMOL/L (ref 100–108)
CKLY30: 0 % (ref 0–2.6)
CKR(REACTION TIME): 4.7 MIN (ref 4.6–9.1)
CLARITY UR: CLEAR
CO2 SERPL-SCNC: 21 MMOL/L (ref 21–32)
CO2 SERPL-SCNC: 23 MMOL/L (ref 21–32)
CO2 SERPL-SCNC: 23 MMOL/L (ref 21–32)
COLOR UR: YELLOW
CREAT SERPL-MCNC: 0.72 MG/DL (ref 0.6–1.3)
CREAT SERPL-MCNC: 0.73 MG/DL (ref 0.6–1.3)
CREAT SERPL-MCNC: 0.74 MG/DL (ref 0.6–1.3)
CRTMA(RAPIDTEG MAX AMPLITUDE): 40.9 MM (ref 52–70)
E WAVE DECELERATION TIME: 111 MS
EOSINOPHIL # BLD AUTO: 0 THOUSAND/ΜL (ref 0–0.61)
EOSINOPHIL NFR BLD AUTO: 0 % (ref 0–6)
ERYTHROCYTE [DISTWIDTH] IN BLOOD BY AUTOMATED COUNT: 14.5 % (ref 11.6–15.1)
FLUAV RNA RESP QL NAA+PROBE: NEGATIVE
FLUBV RNA RESP QL NAA+PROBE: NEGATIVE
FRACTIONAL SHORTENING: 18 % (ref 28–44)
GFR SERPL CREATININE-BSD FRML MDRD: 87 ML/MIN/1.73SQ M
GFR SERPL CREATININE-BSD FRML MDRD: 88 ML/MIN/1.73SQ M
GFR SERPL CREATININE-BSD FRML MDRD: 90 ML/MIN/1.73SQ M
GLUCOSE SERPL-MCNC: 113 MG/DL (ref 65–140)
GLUCOSE SERPL-MCNC: 144 MG/DL (ref 65–140)
GLUCOSE SERPL-MCNC: 145 MG/DL (ref 65–140)
GLUCOSE SERPL-MCNC: 158 MG/DL (ref 65–140)
GLUCOSE SERPL-MCNC: 159 MG/DL (ref 65–140)
GLUCOSE SERPL-MCNC: 160 MG/DL (ref 65–140)
GLUCOSE SERPL-MCNC: 179 MG/DL (ref 65–140)
GLUCOSE UR STRIP-MCNC: ABNORMAL MG/DL
HCO3 BLDA-SCNC: 22.2 MMOL/L (ref 22–28)
HCT VFR BLD AUTO: 26.8 % (ref 34.8–46.1)
HCT VFR BLD AUTO: 26.9 % (ref 34.8–46.1)
HGB BLD-MCNC: 9.1 G/DL (ref 11.5–15.4)
HGB BLD-MCNC: 9.5 G/DL (ref 11.5–15.4)
HGB UR QL STRIP.AUTO: ABNORMAL
IMM GRANULOCYTES # BLD AUTO: 0.02 THOUSAND/UL (ref 0–0.2)
IMM GRANULOCYTES NFR BLD AUTO: 0 % (ref 0–2)
INTERVENTRICULAR SEPTUM IN DIASTOLE (PARASTERNAL SHORT AXIS VIEW): 1 CM
INTERVENTRICULAR SEPTUM: 1 CM (ref 0.51–0.95)
KETONES UR STRIP-MCNC: NEGATIVE MG/DL
LAAS-AP2: 13.1 CM2
LAAS-AP4: 15.2 CM2
LEFT ATRIUM SIZE: 3.2 CM
LEFT INTERNAL DIMENSION IN SYSTOLE: 3.6 CM (ref 2.44–3.69)
LEFT VENTRICULAR INTERNAL DIMENSION IN DIASTOLE: 4.4 CM (ref 3.97–5.91)
LEFT VENTRICULAR POSTERIOR WALL IN END DIASTOLE: 1 CM (ref 0.49–0.93)
LEFT VENTRICULAR STROKE VOLUME: 30 ML
LEUKOCYTE ESTERASE UR QL STRIP: NEGATIVE
LVSV (TEICH): 30 ML
LYMPHOCYTES # BLD AUTO: 1.37 THOUSANDS/ΜL (ref 0.6–4.47)
LYMPHOCYTES NFR BLD AUTO: 19 % (ref 14–44)
MAGNESIUM SERPL-MCNC: 2.1 MG/DL (ref 1.6–2.6)
MAGNESIUM SERPL-MCNC: 2.2 MG/DL (ref 1.6–2.6)
MAGNESIUM SERPL-MCNC: 2.3 MG/DL (ref 1.6–2.6)
MCH RBC QN AUTO: 34.1 PG (ref 26.8–34.3)
MCHC RBC AUTO-ENTMCNC: 33.8 G/DL (ref 31.4–37.4)
MCV RBC AUTO: 101 FL (ref 82–98)
MONOCYTES # BLD AUTO: 0.27 THOUSAND/ΜL (ref 0.17–1.22)
MONOCYTES NFR BLD AUTO: 4 % (ref 4–12)
MUCOUS THREADS UR QL AUTO: ABNORMAL
MV E'TISSUE VEL-SEP: 9 CM/S
MV PEAK A VEL: 0.66 M/S
MV PEAK E VEL: 89 CM/S
MV STENOSIS PRESSURE HALF TIME: 32 MS
MV VALVE AREA P 1/2 METHOD: 6.88 CM2
NEUTROPHILS # BLD AUTO: 5.73 THOUSANDS/ΜL (ref 1.85–7.62)
NEUTS SEG NFR BLD AUTO: 77 % (ref 43–75)
NITRITE UR QL STRIP: NEGATIVE
NON-SQ EPI CELLS URNS QL MICRO: ABNORMAL /HPF
NRBC BLD AUTO-RTO: 0 /100 WBCS
O2 CT BLDA-SCNC: 13.4 ML/DL (ref 16–23)
OXYHGB MFR BLDA: 95.4 % (ref 94–97)
P AXIS: 67 DEGREES
P AXIS: 68 DEGREES
P AXIS: 73 DEGREES
PCO2 BLDA: 34.6 MM HG (ref 36–44)
PH BLDA: 7.42 [PH] (ref 7.35–7.45)
PH UR STRIP.AUTO: 5 [PH]
PHOSPHATE SERPL-MCNC: 2.9 MG/DL (ref 2.3–4.1)
PHOSPHATE SERPL-MCNC: 3.1 MG/DL (ref 2.3–4.1)
PHOSPHATE SERPL-MCNC: 3.1 MG/DL (ref 2.3–4.1)
PLATELET # BLD AUTO: 35 THOUSANDS/UL (ref 149–390)
PMV BLD AUTO: 11 FL (ref 8.9–12.7)
PO2 BLDA: 84.1 MM HG (ref 75–129)
POTASSIUM SERPL-SCNC: 3.1 MMOL/L (ref 3.5–5.3)
POTASSIUM SERPL-SCNC: 3.5 MMOL/L (ref 3.5–5.3)
POTASSIUM SERPL-SCNC: 3.8 MMOL/L (ref 3.5–5.3)
PR INTERVAL: 190 MS
PR INTERVAL: 194 MS
PR INTERVAL: 196 MS
PROCALCITONIN SERPL-MCNC: 1.49 NG/ML
PROT SERPL-MCNC: 5.2 G/DL (ref 6.4–8.2)
PROT SERPL-MCNC: 5.5 G/DL (ref 6.4–8.2)
PROT UR STRIP-MCNC: ABNORMAL MG/DL
QRS AXIS: 75 DEGREES
QRS AXIS: 80 DEGREES
QRS AXIS: 83 DEGREES
QRSD INTERVAL: 68 MS
QRSD INTERVAL: 70 MS
QRSD INTERVAL: 70 MS
QT INTERVAL: 318 MS
QT INTERVAL: 360 MS
QT INTERVAL: 386 MS
QTC INTERVAL: 434 MS
QTC INTERVAL: 471 MS
QTC INTERVAL: 477 MS
RBC # BLD AUTO: 2.67 MILLION/UL (ref 3.81–5.12)
RBC #/AREA URNS AUTO: ABNORMAL /HPF
RIGHT ATRIAL 2D VOLUME: 35 ML
RIGHT ATRIUM AREA SYSTOLE A4C: 13.9 CM2
RIGHT VENTRICLE ID DIMENSION: 3.5 CM
RSV RNA RESP QL NAA+PROBE: NEGATIVE
SARS-COV-2 RNA RESP QL NAA+PROBE: NEGATIVE
SL CV LEFT ATRIUM LENGTH A2C: 4.2 CM
SL CV LV EF: 40
SL CV PED ECHO LEFT VENTRICLE DIASTOLIC VOLUME (MOD BIPLANE) 2D: 86 ML
SL CV PED ECHO LEFT VENTRICLE SYSTOLIC VOLUME (MOD BIPLANE) 2D: 56 ML
SODIUM SERPL-SCNC: 135 MMOL/L (ref 136–145)
SODIUM SERPL-SCNC: 136 MMOL/L (ref 136–145)
SODIUM SERPL-SCNC: 139 MMOL/L (ref 136–145)
SP GR UR STRIP.AUTO: 1.01 (ref 1–1.03)
SPECIMEN SOURCE: ABNORMAL
T WAVE AXIS: 76 DEGREES
T WAVE AXIS: 78 DEGREES
T WAVE AXIS: 81 DEGREES
THROMBIN TIME MIXING INCUBATED: 18.1 SECONDS (ref 14.7–18.4)
THROMBIN TIME MIXING ROOM TEMP: 18.3 SECONDS (ref 14.7–18.4)
THROMBIN TIME: 18.9 SECONDS (ref 14.7–18.4)
TR MAX PG: 18 MMHG
TR PEAK VELOCITY: 2.2 M/S
TRICUSPID VALVE PEAK REGURGITATION VELOCITY: 2.15 M/S
UROBILINOGEN UR QL STRIP.AUTO: 0.2 E.U./DL
VENTRICULAR RATE: 103 BPM
VENTRICULAR RATE: 112 BPM
VENTRICULAR RATE: 92 BPM
WBC # BLD AUTO: 7.4 THOUSAND/UL (ref 4.31–10.16)
WBC #/AREA URNS AUTO: ABNORMAL /HPF
Z-SCORE OF ASCENDING AORTA: 3.49
Z-SCORE OF INTERVENTRICULAR SEPTUM IN END DIASTOLE: 2.44
Z-SCORE OF LEFT VENTRICULAR DIMENSION IN END DIASTOLE: -0.96
Z-SCORE OF LEFT VENTRICULAR DIMENSION IN END SYSTOLE: 1.45
Z-SCORE OF LEFT VENTRICULAR POSTERIOR WALL IN END DIASTOLE: 2.55

## 2022-03-08 PROCEDURE — 0BH17EZ INSERTION OF ENDOTRACHEAL AIRWAY INTO TRACHEA, VIA NATURAL OR ARTIFICIAL OPENING: ICD-10-PCS | Performed by: EMERGENCY MEDICINE

## 2022-03-08 PROCEDURE — 0241U HB NFCT DS VIR RESP RNA 4 TRGT: CPT

## 2022-03-08 PROCEDURE — 85576 BLOOD PLATELET AGGREGATION: CPT

## 2022-03-08 PROCEDURE — 87040 BLOOD CULTURE FOR BACTERIA: CPT | Performed by: PHYSICIAN ASSISTANT

## 2022-03-08 PROCEDURE — 99291 CRITICAL CARE FIRST HOUR: CPT | Performed by: INTERNAL MEDICINE

## 2022-03-08 PROCEDURE — 82805 BLOOD GASES W/O2 SATURATION: CPT

## 2022-03-08 PROCEDURE — 5A1945Z RESPIRATORY VENTILATION, 24-96 CONSECUTIVE HOURS: ICD-10-PCS | Performed by: EMERGENCY MEDICINE

## 2022-03-08 PROCEDURE — 82948 REAGENT STRIP/BLOOD GLUCOSE: CPT

## 2022-03-08 PROCEDURE — 99291 CRITICAL CARE FIRST HOUR: CPT

## 2022-03-08 PROCEDURE — 84484 ASSAY OF TROPONIN QUANT: CPT

## 2022-03-08 PROCEDURE — NC001 PR NO CHARGE

## 2022-03-08 PROCEDURE — 02HV33Z INSERTION OF INFUSION DEVICE INTO SUPERIOR VENA CAVA, PERCUTANEOUS APPROACH: ICD-10-PCS | Performed by: INTERNAL MEDICINE

## 2022-03-08 PROCEDURE — 80048 BASIC METABOLIC PNL TOTAL CA: CPT

## 2022-03-08 PROCEDURE — 85397 CLOTTING FUNCT ACTIVITY: CPT

## 2022-03-08 PROCEDURE — 93306 TTE W/DOPPLER COMPLETE: CPT | Performed by: INTERNAL MEDICINE

## 2022-03-08 PROCEDURE — 83735 ASSAY OF MAGNESIUM: CPT

## 2022-03-08 PROCEDURE — 71045 X-RAY EXAM CHEST 1 VIEW: CPT

## 2022-03-08 PROCEDURE — 85018 HEMOGLOBIN: CPT | Performed by: PHYSICIAN ASSISTANT

## 2022-03-08 PROCEDURE — 94760 N-INVAS EAR/PLS OXIMETRY 1: CPT

## 2022-03-08 PROCEDURE — 81001 URINALYSIS AUTO W/SCOPE: CPT | Performed by: PHYSICIAN ASSISTANT

## 2022-03-08 PROCEDURE — 03HY32Z INSERTION OF MONITORING DEVICE INTO UPPER ARTERY, PERCUTANEOUS APPROACH: ICD-10-PCS | Performed by: INTERNAL MEDICINE

## 2022-03-08 PROCEDURE — 80053 COMPREHEN METABOLIC PANEL: CPT

## 2022-03-08 PROCEDURE — 93010 ELECTROCARDIOGRAM REPORT: CPT | Performed by: INTERNAL MEDICINE

## 2022-03-08 PROCEDURE — 93005 ELECTROCARDIOGRAM TRACING: CPT

## 2022-03-08 PROCEDURE — 84100 ASSAY OF PHOSPHORUS: CPT

## 2022-03-08 PROCEDURE — 82330 ASSAY OF CALCIUM: CPT

## 2022-03-08 PROCEDURE — 36556 INSERT NON-TUNNEL CV CATH: CPT

## 2022-03-08 PROCEDURE — 93306 TTE W/DOPPLER COMPLETE: CPT

## 2022-03-08 PROCEDURE — 80076 HEPATIC FUNCTION PANEL: CPT | Performed by: PHYSICIAN ASSISTANT

## 2022-03-08 PROCEDURE — 85025 COMPLETE CBC W/AUTO DIFF WBC: CPT

## 2022-03-08 PROCEDURE — 84145 PROCALCITONIN (PCT): CPT | Performed by: PHYSICIAN ASSISTANT

## 2022-03-08 PROCEDURE — 99254 IP/OBS CNSLTJ NEW/EST MOD 60: CPT | Performed by: PSYCHIATRY & NEUROLOGY

## 2022-03-08 PROCEDURE — 76937 US GUIDE VASCULAR ACCESS: CPT

## 2022-03-08 PROCEDURE — 83735 ASSAY OF MAGNESIUM: CPT | Performed by: PHYSICIAN ASSISTANT

## 2022-03-08 PROCEDURE — 36620 INSERTION CATHETER ARTERY: CPT

## 2022-03-08 PROCEDURE — 85384 FIBRINOGEN ACTIVITY: CPT

## 2022-03-08 PROCEDURE — 95715 VEEG EA 12-26HR INTMT MNTR: CPT

## 2022-03-08 PROCEDURE — 85014 HEMATOCRIT: CPT | Performed by: PHYSICIAN ASSISTANT

## 2022-03-08 PROCEDURE — C9113 INJ PANTOPRAZOLE SODIUM, VIA: HCPCS

## 2022-03-08 PROCEDURE — 4A133J1 MONITORING OF ARTERIAL PULSE, PERIPHERAL, PERCUTANEOUS APPROACH: ICD-10-PCS | Performed by: INTERNAL MEDICINE

## 2022-03-08 PROCEDURE — 82330 ASSAY OF CALCIUM: CPT | Performed by: PHYSICIAN ASSISTANT

## 2022-03-08 PROCEDURE — 95720 EEG PHY/QHP EA INCR W/VEEG: CPT | Performed by: PSYCHIATRY & NEUROLOGY

## 2022-03-08 PROCEDURE — 80048 BASIC METABOLIC PNL TOTAL CA: CPT | Performed by: PHYSICIAN ASSISTANT

## 2022-03-08 PROCEDURE — 84100 ASSAY OF PHOSPHORUS: CPT | Performed by: PHYSICIAN ASSISTANT

## 2022-03-08 PROCEDURE — 85347 COAGULATION TIME ACTIVATED: CPT

## 2022-03-08 PROCEDURE — 4A133B1 MONITORING OF ARTERIAL PRESSURE, PERIPHERAL, PERCUTANEOUS APPROACH: ICD-10-PCS | Performed by: INTERNAL MEDICINE

## 2022-03-08 PROCEDURE — 99292 CRITICAL CARE ADDL 30 MIN: CPT | Performed by: STUDENT IN AN ORGANIZED HEALTH CARE EDUCATION/TRAINING PROGRAM

## 2022-03-08 RX ORDER — POTASSIUM CHLORIDE 20MEQ/15ML
40 LIQUID (ML) ORAL ONCE
Status: COMPLETED | OUTPATIENT
Start: 2022-03-08 | End: 2022-03-08

## 2022-03-08 RX ORDER — CALCIUM GLUCONATE 20 MG/ML
2 INJECTION, SOLUTION INTRAVENOUS ONCE
Status: COMPLETED | OUTPATIENT
Start: 2022-03-08 | End: 2022-03-08

## 2022-03-08 RX ORDER — ALBUMIN, HUMAN INJ 5% 5 %
25 SOLUTION INTRAVENOUS ONCE
Status: COMPLETED | OUTPATIENT
Start: 2022-03-08 | End: 2022-03-08

## 2022-03-08 RX ORDER — MIDAZOLAM HYDROCHLORIDE 2 MG/2ML
10 INJECTION, SOLUTION INTRAMUSCULAR; INTRAVENOUS ONCE
Status: COMPLETED | OUTPATIENT
Start: 2022-03-08 | End: 2022-03-08

## 2022-03-08 RX ORDER — POTASSIUM CHLORIDE 14.9 MG/ML
20 INJECTION INTRAVENOUS ONCE
Status: COMPLETED | OUTPATIENT
Start: 2022-03-08 | End: 2022-03-08

## 2022-03-08 RX ORDER — ACETAMINOPHEN 325 MG/1
650 TABLET ORAL EVERY 4 HOURS PRN
Status: DISCONTINUED | OUTPATIENT
Start: 2022-03-08 | End: 2022-03-14

## 2022-03-08 RX ORDER — VANCOMYCIN HYDROCHLORIDE 1 G/200ML
15 INJECTION, SOLUTION INTRAVENOUS EVERY 12 HOURS
Status: DISCONTINUED | OUTPATIENT
Start: 2022-03-08 | End: 2022-03-08

## 2022-03-08 RX ORDER — LORAZEPAM 2 MG/ML
2 INJECTION INTRAMUSCULAR ONCE
Status: DISCONTINUED | OUTPATIENT
Start: 2022-03-08 | End: 2022-03-09

## 2022-03-08 RX ORDER — PANTOPRAZOLE SODIUM 40 MG/1
40 INJECTION, POWDER, FOR SOLUTION INTRAVENOUS
Status: DISCONTINUED | OUTPATIENT
Start: 2022-03-08 | End: 2022-03-12

## 2022-03-08 RX ORDER — ALBUMIN, HUMAN INJ 5% 5 %
SOLUTION INTRAVENOUS
Status: COMPLETED
Start: 2022-03-08 | End: 2022-03-08

## 2022-03-08 RX ORDER — SODIUM CHLORIDE, SODIUM GLUCONATE, SODIUM ACETATE, POTASSIUM CHLORIDE, MAGNESIUM CHLORIDE, SODIUM PHOSPHATE, DIBASIC, AND POTASSIUM PHOSPHATE .53; .5; .37; .037; .03; .012; .00082 G/100ML; G/100ML; G/100ML; G/100ML; G/100ML; G/100ML; G/100ML
1000 INJECTION, SOLUTION INTRAVENOUS ONCE
Status: COMPLETED | OUTPATIENT
Start: 2022-03-08 | End: 2022-03-08

## 2022-03-08 RX ORDER — ALBUMIN, HUMAN INJ 5% 5 %
12.5 SOLUTION INTRAVENOUS ONCE
Status: COMPLETED | OUTPATIENT
Start: 2022-03-08 | End: 2022-03-08

## 2022-03-08 RX ADMIN — INSULIN LISPRO 1 UNITS: 100 INJECTION, SOLUTION INTRAVENOUS; SUBCUTANEOUS at 18:28

## 2022-03-08 RX ADMIN — LEVETIRACETAM 2000 MG: 100 INJECTION, SOLUTION INTRAVENOUS at 03:04

## 2022-03-08 RX ADMIN — Medication 15 MCG/MIN: at 12:53

## 2022-03-08 RX ADMIN — LORAZEPAM 2 MG: 2 INJECTION INTRAMUSCULAR; INTRAVENOUS at 04:24

## 2022-03-08 RX ADMIN — SODIUM CHLORIDE 3 G: 9 INJECTION, SOLUTION INTRAVENOUS at 15:31

## 2022-03-08 RX ADMIN — FENTANYL CITRATE 50 MCG: 50 INJECTION INTRAMUSCULAR; INTRAVENOUS at 14:39

## 2022-03-08 RX ADMIN — LORAZEPAM 2 MG: 2 INJECTION INTRAMUSCULAR; INTRAVENOUS at 01:50

## 2022-03-08 RX ADMIN — ALBUMIN (HUMAN) 12.5 G: 12.5 INJECTION, SOLUTION INTRAVENOUS at 00:25

## 2022-03-08 RX ADMIN — CEFEPIME HYDROCHLORIDE 2000 MG: 2 INJECTION, POWDER, FOR SOLUTION INTRAVENOUS at 01:52

## 2022-03-08 RX ADMIN — INSULIN LISPRO 1 UNITS: 100 INJECTION, SOLUTION INTRAVENOUS; SUBCUTANEOUS at 11:36

## 2022-03-08 RX ADMIN — Medication 15 ML: at 21:22

## 2022-03-08 RX ADMIN — Medication 15 ML: at 08:40

## 2022-03-08 RX ADMIN — PROPOFOL 20 MCG/KG/MIN: 10 INJECTION, EMULSION INTRAVENOUS at 16:28

## 2022-03-08 RX ADMIN — POTASSIUM CHLORIDE 20 MEQ: 200 INJECTION, SOLUTION INTRAVENOUS at 04:53

## 2022-03-08 RX ADMIN — FENTANYL CITRATE 50 MCG: 50 INJECTION INTRAMUSCULAR; INTRAVENOUS at 21:16

## 2022-03-08 RX ADMIN — FENTANYL CITRATE 50 MCG: 50 INJECTION INTRAMUSCULAR; INTRAVENOUS at 15:02

## 2022-03-08 RX ADMIN — VANCOMYCIN HYDROCHLORIDE 1000 MG: 1 INJECTION, SOLUTION INTRAVENOUS at 02:55

## 2022-03-08 RX ADMIN — CEFEPIME HYDROCHLORIDE 2000 MG: 2 INJECTION, POWDER, FOR SOLUTION INTRAVENOUS at 11:35

## 2022-03-08 RX ADMIN — SODIUM CHLORIDE 3 G: 9 INJECTION, SOLUTION INTRAVENOUS at 21:22

## 2022-03-08 RX ADMIN — VASOPRESSIN 0.04 UNITS/MIN: 20 INJECTION PARENTERAL at 22:02

## 2022-03-08 RX ADMIN — ACETAMINOPHEN 650 MG: 325 TABLET, FILM COATED ORAL at 06:21

## 2022-03-08 RX ADMIN — ALBUMIN (HUMAN) 12.5 G: 12.5 INJECTION, SOLUTION INTRAVENOUS at 00:18

## 2022-03-08 RX ADMIN — Medication 15 MCG/MIN: at 16:28

## 2022-03-08 RX ADMIN — FENTANYL CITRATE 50 MCG: 50 INJECTION INTRAMUSCULAR; INTRAVENOUS at 23:18

## 2022-03-08 RX ADMIN — CALCIUM GLUCONATE 2 G: 20 INJECTION, SOLUTION INTRAVENOUS at 04:18

## 2022-03-08 RX ADMIN — VASOPRESSIN 0.04 UNITS/MIN: 20 INJECTION PARENTERAL at 00:17

## 2022-03-08 RX ADMIN — PANTOPRAZOLE SODIUM 40 MG: 40 INJECTION, POWDER, FOR SOLUTION INTRAVENOUS at 08:45

## 2022-03-08 RX ADMIN — SODIUM CHLORIDE 100 MG: 9 INJECTION, SOLUTION INTRAVENOUS at 20:23

## 2022-03-08 RX ADMIN — VASOPRESSIN 0.04 UNITS/MIN: 20 INJECTION PARENTERAL at 16:29

## 2022-03-08 RX ADMIN — VASOPRESSIN 0.04 UNITS/MIN: 20 INJECTION PARENTERAL at 06:30

## 2022-03-08 RX ADMIN — FOLIC ACID 1 MG: 1 TABLET ORAL at 08:45

## 2022-03-08 RX ADMIN — ALBUMIN, HUMAN INJ 5% 12.5 G: 5 SOLUTION at 00:18

## 2022-03-08 RX ADMIN — THIAMINE HCL TAB 100 MG 100 MG: 100 TAB at 08:45

## 2022-03-08 RX ADMIN — SODIUM CHLORIDE 100 MG: 9 INJECTION, SOLUTION INTRAVENOUS at 08:36

## 2022-03-08 RX ADMIN — SODIUM CHLORIDE, SODIUM GLUCONATE, SODIUM ACETATE, POTASSIUM CHLORIDE, MAGNESIUM CHLORIDE, SODIUM PHOSPHATE, DIBASIC, AND POTASSIUM PHOSPHATE 1000 ML: .53; .5; .37; .037; .03; .012; .00082 INJECTION, SOLUTION INTRAVENOUS at 00:26

## 2022-03-08 RX ADMIN — POTASSIUM CHLORIDE 40 MEQ: 20 SOLUTION ORAL at 04:54

## 2022-03-08 RX ADMIN — SODIUM CHLORIDE, SODIUM GLUCONATE, SODIUM ACETATE, POTASSIUM CHLORIDE, MAGNESIUM CHLORIDE, SODIUM PHOSPHATE, DIBASIC, AND POTASSIUM PHOSPHATE 100 ML/HR: .53; .5; .37; .037; .03; .012; .00082 INJECTION, SOLUTION INTRAVENOUS at 16:28

## 2022-03-08 RX ADMIN — PROPOFOL 20 MCG/KG/MIN: 10 INJECTION, EMULSION INTRAVENOUS at 14:40

## 2022-03-08 RX ADMIN — Medication 15 MCG/MIN: at 20:04

## 2022-03-08 RX ADMIN — Medication 10.1 MCG/MIN: at 06:33

## 2022-03-08 RX ADMIN — MULTIPLE VITAMINS W/ MINERALS TAB 1 TABLET: TAB ORAL at 08:45

## 2022-03-08 RX ADMIN — LEVETIRACETAM 1000 MG: 100 INJECTION, SOLUTION INTRAVENOUS at 14:19

## 2022-03-08 RX ADMIN — MIDAZOLAM 10 MG: 1 INJECTION INTRAMUSCULAR; INTRAVENOUS at 07:51

## 2022-03-08 NOTE — PROGRESS NOTES
Vancomycin Assessment    Scotty Fox is a 58 y o  female who is currently receiving vancomycin 1000mg (15mg/kg) Q 12hrs for Pneumonia     Relevant clinical data and objective history reviewed:  Creatinine   Date Value Ref Range Status   03/08/2022 0 74 0 60 - 1 30 mg/dL Final     Comment:     Standardized to IDMS reference method   03/07/2022 1 17 0 60 - 1 30 mg/dL Final     Comment:     Standardized to IDMS reference method     BP 93/61   Pulse 95   Temp 100 4 °F (38 °C)   Resp 16   Ht 5' 7" (1 702 m)   Wt 65 kg (143 lb 4 8 oz)   SpO2 98%   BMI 22 44 kg/m²   I/O last 3 completed shifts:  In: -   Out: 100 [Urine:100]  Lab Results   Component Value Date/Time    BUN 6 03/08/2022 12:29 AM    WBC 8 47 03/07/2022 05:22 PM    HGB 9 5 (L) 03/08/2022 12:29 AM    HCT 26 8 (L) 03/08/2022 12:29 AM     (H) 03/07/2022 05:22 PM    PLT 42 (LL) 03/07/2022 06:26 PM     Temp Readings from Last 3 Encounters:   03/08/22 100 4 °F (38 °C)     Vancomycin Days of Therapy: 1    Assessment/Plan  The patient is currently on vancomycin utilizing scheduled dosing based on actual body weight  Baseline risks associated with therapy include: concomitant nephrotoxic medications  The patient is currently receiving 1000mg (15mg/kg) Q 12hrs and is clinically appropriate and dose will be continued  Pharmacy will also follow closely for s/sx of nephrotoxicity, infusion reactions, and appropriateness of therapy  BMP and CBC will be ordered per protocol  Plan for trough as patient approaches steady state, prior to the 4th  dose at approximately 1430 on 3/9/22  Due to infection severity, will target a trough of 15-20 (appropriate for most indications)   Pharmacy will continue to follow the patients culture results and clinical progress daily      Elvira Costello, Pharmacist

## 2022-03-08 NOTE — UTILIZATION REVIEW
Initial Clinical Review    Admission: Date/Time/Statement:   Admission Orders (From admission, onward)     Ordered        03/07/22 1926  Inpatient Admission  Once                      Orders Placed This Encounter   Procedures    Inpatient Admission     Standing Status:   Standing     Number of Occurrences:   1     Order Specific Question:   Level of Care     Answer:   Critical Care [15]     Order Specific Question:   Estimated length of stay     Answer:   More than 2 Midnights     Order Specific Question:   Certification     Answer:   I certify that inpatient services are medically necessary for this patient for a duration of greater than two midnights  See H&P and MD Progress Notes for additional information about the patient's course of treatment  ED Arrival Information     Expected Arrival Acuity    - 3/7/2022 17:03 Immediate         Means of arrival Escorted by Service Admission type    Amb[facility] Critical Care/ICU Emergency         Arrival complaint            Chief Complaint   Patient presents with    Trauma       Initial Presentation:  59 yo female presents to ED by EMS admit Inpatient to Trauma service for evaluation & treatment of fall from standing, seizure concern for status epilepticus requiring continuous EEG, acute hypoxic respiratory failure, JP, Thrombocytopenia  Presents w a witnessed fall from standing w possible head strike seizure activity,   reports patient trying to decrease alcohol intake at home,  not feeling well w poor PO intake for 24 HR w fatigue & malaise  States patient unresponsive after fall  GCS 3 per EMS & scarlet to 8 in [facility] then drop in GCS to 6-7  PMH ETOH abuse, traumatic brain injury  In [facility] [place] exam GCS 8 w drop 6>7; active seizure activity; intubated for airway protection; motor and sensory grossly intact, HEENT normal, chest wall non tender, abdomen soft   Imaging negative for no acute head trauma, or intra abd fluid/ or pericardial effusion  Plan consult ICU, continuous Video EEG monitoring, sedation w IV drip Propofol, add Benzo PRN, consult Toxicology received IV Ativan,   CRITICAL CARE:  Presents from [facility] intubated w extensive tongue lacerations CT chest abdomen and pelvis with T12, L2 and L4 vertebral compression (age indeterminate) with chronic pancreatitis and cholelithiasis  Suspect status epilepticus d/t repeated hand clenching  Admit ti critical care under Trauma service for continuous EEG monitoring, ventilatory support  Cont Propofol infusion, PRN Ativan 2mg Q 1H, Q1 hr Neuro checks, start continuous EEG when able coordinating w EMU, IVF rebolus  PRN fentanyl  Trend SBP, currently , maintain MAP>65  Wean vent as able for POX goal >92%, PPI home med, Moon I/O, monitor CK& creatinine  NPO  Trend PLTs, no active bleed, hold DVT PPX, SSI  Monitor off antibx  MEDICAL [address]  Reason for consult alcohol withdrawal, status epilepticus  HX of alcohol use & had been trying to  at home ; reports of multiple seizures leading to concern for status as she has not return to mental baseline  Utilize FLORINDA antagonists for withdrawal best w Propofol while intubated, consider Versed if BP unable to tolerate Propofol or Phenobarb (load 5-10 mg/KG IV)  IF recurrent seizure treat w IV Benzo & consider load w Phenobarb to manage alcohol withdrawal symptoms  Diazepam for agitation & withdrawal symptoms otherwise uncontrolled by Propofol   Obtain lactate & beta hydroxybutyrate level, thiamine, folate & MVI    Date: 3/8/2022   Day 2:   CRITICAL CARE  Exam GCS 6=11; cooperative, following commands in all extremities on sedation hold; vent setting ACVC 16/375/100%/8+, tachycardia,   Started on continuous EEG for concern of withdrawal seizures w 3 seizures noted; received 2 mg IV Ativan, prior to IV Ativan patient w profound Hypotension w SBP in 50s at MN; req 1  L Crystalloid, emergent Vasopressors w arterial line insertion; obtain formal ECHO  Levophed at 10 & Vasopressin at 0 04, remains wo CVC given concern for thrombocytopenia as patient is [employer]   To remain IUC in ventilatory support w continuous EEG    ED Triage Vitals   Temperature Pulse Respirations Blood Pressure SpO2   03/07/22 1705 03/07/22 1705 03/07/22 1705 03/07/22 1705 03/07/22 1705   (!) 95 °F (35 °C) (!) 140 16 125/79 96 %      Temp Source Heart Rate Source Patient Position - Orthostatic VS BP Location FiO2 (%)   03/07/22 1705 03/07/22 1705 03/07/22 1705 -- --   Temporal Monitor Lying        Pain Score       03/08/22 0000       No Pain          Wt Readings from Last 1 Encounters:   03/08/22 64 9 kg (143 lb)     Additional Vital Signs:   Date/Time Temp Pulse Resp BP MAP (mmHg) Arterial Line BP MAP SpO2 Calculated FIO2 (%) - Nasal Cannula Nasal Cannula O2 Flow Rate (L/min) O2 Device Patient Position - Orthostatic VS   03/08/22 1200 98 4 °F (36 9 °C) 94 18 -- -- 91/52 67 mmHg 97 % -- -- -- --   03/08/22 1145 98 6 °F (37 °C) 93 20 -- -- 97/56 72 mmHg 97 % -- -- -- --   03/08/22 1130 100 8 °F (38 2 °C) Abnormal  94 16 -- -- 97/57 73 mmHg 99 % -- -- -- --   03/08/22 1115 100 9 °F (38 3 °C) Abnormal  93 15 -- -- 91/54 69 mmHg 99 % -- -- -- --   03/08/22 1100 100 9 °F (38 3 °C) Abnormal  91 18 -- -- 84/50 64 mmHg 98 % -- -- -- --   03/08/22 1045 100 9 °F (38 3 °C) Abnormal  92 16 -- -- 83/50 63 mmHg 98 % -- -- -- --   03/08/22 1030 100 8 °F (38 2 °C) Abnormal  93 17 -- -- 82/49 62 mmHg 98 % -- -- -- --   03/08/22 1015 100 8 °F (38 2 °C) Abnormal  101 23 Abnormal  -- -- 91/53 68 mmHg 97 % -- -- -- --   03/08/22 1000 100 2 °F (37 9 °C) 96 24 Abnormal  -- -- 85/52 65 mmHg 99 % -- -- -- --   03/08/22 0945 100 9 °F (38 3 °C) Abnormal  91 17 -- -- 77/48 60 mmHg 99 % -- -- -- --   03/08/22 0930 100 9 °F (38 3 °C) Abnormal  92 17 -- -- 69/44 54 mmHg 99 % -- -- -- --   03/08/22 0915 101 3 °F (38 5 °C) Abnormal  95 17 -- -- 77/49 61 mmHg 99 % -- -- -- --   03/08/22 0900 101 3 °F (38 5 °C) Abnormal  98 16 -- -- 81/51 64 mmHg 99 % -- -- -- --   03/08/22 0845 101 3 °F (38 5 °C) Abnormal  99 14 -- -- 84/52 65 mmHg 99 % -- -- -- --   03/08/22 0830 101 5 °F (38 6 °C) Abnormal  98 17 -- -- 84/52 65 mmHg 98 % -- -- -- --   03/08/22 0815 101 7 °F (38 7 °C) Abnormal  109 Abnormal  19 -- -- 101/62 78 mmHg 98 % -- -- -- --   03/08/22 0800 101 5 °F (38 6 °C) Abnormal  111 Abnormal  36 Abnormal  -- -- 108/65 81 mmHg 97 % -- -- -- --   03/08/22 0750 -- 91 -- 96/61 -- -- -- -- -- -- -- --   03/08/22 0745 101 8 °F (38 8 °C) Abnormal  101 18 -- -- 84/52 64 mmHg 97 % -- -- -- --   03/08/22 0736 -- -- -- -- -- -- -- 97 % -- -- -- --   03/08/22 0730 101 8 °F (38 8 °C) Abnormal  100 18 -- -- 89/55 68 mmHg 100 % -- -- -- --   03/08/22 0715 101 8 °F (38 8 °C) Abnormal  100 18 -- -- 90/55 68 mmHg 100 % -- -- -- --   03/08/22 0700 101 8 °F (38 8 °C) Abnormal  102 17 -- -- 95/57 71 mmHg 99 % -- -- -- --   03/08/22 0615 101 8 °F (38 8 °C) Abnormal  93 17 -- -- 78/49 60 mmHg 99 % -- -- -- --   03/08/22 0430 100 6 °F (38 1 °C) Abnormal  100 13 -- -- 96/52 68 mmHg 99 % -- -- -- --   03/08/22 0415 99 7 °F (37 6 °C) 107 Abnormal  31 Abnormal  -- -- 100/54 70 mmHg 99 % -- -- -- --   03/08/22 0400 100 2 °F (37 9 °C) 100 8 Abnormal  -- -- 102/56 72 mmHg 100 % -- -- -- --   03/08/22 0345 100 6 °F (38 1 °C) Abnormal  99 16 -- -- 97/53 69 mmHg 100 % -- -- -- --   03/08/22 0330 100 4 °F (38 °C) 104 8 Abnormal  -- -- 103/52 68 mmHg 96 % -- -- -- --   03/08/22 0315 100 6 °F (38 1 °C) Abnormal  101 9 Abnormal  -- -- 96/52 67 mmHg 94 % -- -- -- --   03/08/22 0300 100 8 °F (38 2 °C) Abnormal  96 16 -- -- 100/53 68 mmHg 99 % -- -- -- --   03/08/22 0245 100 6 °F (38 1 °C) Abnormal  97 15 -- -- 90/49 63 mmHg 99 % -- -- -- --   03/08/22 0230 100 6 °F (38 1 °C) Abnormal  96 11 Abnormal  -- -- 94/51 67 mmHg 99 % -- -- -- --   03/08/22 0215 100 4 °F (38 °C) 95 16 -- -- 94/51 65 mmHg 98 % -- -- -- --   03/08/22 0200 100 4 °F (38 °C) 96 16 -- -- 82/47 60 mmHg 96 % -- -- -- --   03/08/22 0145 100 4 °F (38 °C) 114 Abnormal  24 Abnormal  -- -- 103/58 74 mmHg 97 % -- -- -- --   03/08/22 0137 -- -- -- -- -- -- -- 97 % -- -- -- --   03/08/22 0130 99 3 °F (37 4 °C) 100 32 Abnormal  -- -- 82/48 61 mmHg 97 % -- -- -- --   03/08/22 0115 100 4 °F (38 °C) 96 15 93/61 73 94/54 68 mmHg 100 % -- -- -- --   03/08/22 0100 -- 96 16 94/59 72 92/55 69 mmHg 97 % -- -- -- --   03/08/22 0050 -- 106 Abnormal  15 103/65 80 105/57 75 mmHg 97 % -- -- -- --   03/08/22 0045 -- 109 Abnormal  23 Abnormal  114/72 88 110/59 78 mmHg 95 % -- -- -- --   03/08/22 0040 -- 103 20 101/64 78 107/60 79 mmHg 99 % -- -- -- --   03/08/22 0035 -- 95 15 94/57 72 96/56 70 mmHg 98 % -- -- -- --   03/08/22 0030 -- 98 19 100/63 77 -- -- 98 % -- -- -- --   03/08/22 0015 -- 106 Abnormal  24 Abnormal  85/52 Abnormal  64 -- -- 94 % -- -- -- --   03/08/22 0000 -- 87 14 74/51 Abnormal  59 -- -- 97 % -- -- -- --   03/07/22 2350 -- 80 22 70/44 Abnormal  52 -- -- 98 % -- -- -- --   03/07/22 2345 -- 80 16 50/34 Abnormal  39 -- -- 97 % -- -- -- --   03/07/22 2340 -- 81 22 49/32 Abnormal  37 -- -- 98 % -- -- -- --   03/07/22 2335 -- 85 13 54/25 Abnormal  34 -- -- 95 % -- -- -- --   03/07/22 2330 -- 92 16 62/32 Abnormal  42 -- -- 97 % -- -- -- --   03/07/22 2325 -- 100 14 142/100 117 -- -- 97 % -- -- -- --   03/07/22 2320 -- 106 Abnormal  9 Abnormal  71/46 Abnormal  54 -- -- 96 % -- -- -- --   03/07/22 2315 -- 103 15 73/50 Abnormal  57 -- -- 95 % -- -- -- --   03/07/22 2300 -- 113 Abnormal  14 81/54 Abnormal  63 -- -- 99 % -- -- -- --   03/07/22 2245 -- 105 14 72/48 Abnormal  56 -- -- 99 % -- -- -- --   03/07/22 2200 -- 100 14 83/56 Abnormal  63 -- -- 99 % -- -- -- --   03/07/22 2145 -- 106 Abnormal  15 85/55 Abnormal  65 -- -- 96 % -- -- -- --   03/07/22 2130 -- 115 Abnormal  14 77/51 Abnormal  59 -- -- 96 % -- -- -- --   03/07/22 2115 -- 108 Abnormal  14 72/48 Abnormal  56 -- -- 98 % -- -- -- --   03/07/22 2100 -- 106 Abnormal  14 80/53 Abnormal  63 -- -- 100 % -- -- -- --   03/07/22 2044 -- -- -- -- -- -- -- 95 % -- -- -- --   03/07/22 2043 -- 106 Abnormal  18 94/56 70 -- -- 100 % -- -- Ventilator Lying   03/07/22 2003 -- 118 Abnormal  18 82/52 Abnormal  -- -- -- 100 % -- -- -- --   03/07/22 2000 -- 120 Abnormal  -- 94/63 74 -- -- 100 % -- -- -- --   03/07/22 1930 99 °F (37 2 °C) 128 Abnormal  14 94/60 72 -- -- 100 % -- -- -- --   03/07/22 1900 -- -- 14 -- -- -- -- -- -- -- Ventilator --   03/07/22 18:21:27 -- 149 Abnormal  14 131/82 -- -- -- 100 % -- -- Ventilator Lying   03/07/22 18:15:53 -- 151 Abnormal  20 124/72 -- -- -- 100 % -- -- -- --   03/07/22 1730 -- 132 Abnormal  16 112/68 -- -- -- 100 % -- -- Non-rebreather mask   Lying   03/07/22 1715 -- 138 Abnormal  16 103/62 -- -- -- 100 % -- -- Non-rebreather mask  --   03/07/22 1710 -- 136 Abnormal  16 111/77 -- -- -- 100 % 44 6 L/min Non-rebreather mask  Lying   03/07/22 1705 95 °F (35 °C) Abnormal  140 Abnormal  16 125/79 -- -- -- 96 % -- -- Non-rebreather mask  Lying    Comments:   O2 Device: 15 NRB / 6 nc at 03/07/22 1730   O2 Device: and NC at 03/07/22 1715   O2 Device: and nasal cannula at 03/07/22 1710   O2 Device: and NC at 03/07/22 1705      Weights (last 14 days)    Date/Time Weight Weight Method Height   03/08/22 0750 64 9 kg (143 lb) -- 5' 7" (1 702 m)   03/07/22 2300 -- -- 5' 7" (1 702 m)   03/07/22 1705 65 kg (143 lb 4 8 oz) Bed scale --       Pertinent Labs/Diagnostic Test Results:   XR chest portable ICU   Final Result by Wally Hernandez MD (03/08 8016)      New opacity right lung base may be artifactual due to positioning, though consolidation (including aspiration) is not excluded, and attention will be made on follow-up  XR chest 1 view   Final Result by Adrien Severin, MD (03/08 8708)      Improved interstitial markings  Status post intubation without pneumothorax        XR Trauma multiple (SLB/SLRA trauma bay ONLY)   Final Result by Sameer Grant Svetlana Joshi MD (03/08 2147)      No acute traumatic injury  Mild vascular prominence  Correlate for CHF  TRAUMA - CT chest abdomen pelvis w contrast   Final Result by Ana Elliott MD (03/07 1756)   1  T12, L2, L4 mild vertebral compression deformities are age indeterminate  Correlate clinically  2   No suspected acute posttraumatic injury  3   Cholelithiasis  4   Chronic pancreatitis  TRAUMA - CT head wo contrast   Final Result by Malu Goodwin MD (03/07 1741)      No acute intracranial abnormality  TRAUMA - CT spine cervical wo contrast   Final Result by Ana Elliott MD (03/07 1756)      No cervical spine fracture or traumatic malalignment  XR chest 1 view      New opacity right lung base may be artifactual due to positioning, though consolidation (including aspiration) is not excluded, and attention will be made on follow-up        Results from last 7 days   Lab Units 03/08/22  0320   SARS-COV-2  Negative     Results from last 7 days   Lab Units 03/08/22  0448 03/08/22  0029 03/07/22  1826 03/07/22  1722 03/07/22  1713   WBC Thousand/uL 7 40  --   --  8 47  --    HEMOGLOBIN g/dL 9 1* 9 5*  --  11 2*  --    I STAT HEMOGLOBIN g/dl  --   --   --   --  11 6   HEMATOCRIT % 26 9* 26 8*  --  35 5  --    HEMATOCRIT, ISTAT %  --   --   --   --  34*   PLATELETS Thousands/uL 35*  --  42* 49*  --    NEUTROS ABS Thousands/µL 5 73  --   --  6 54  --          Results from last 7 days   Lab Units 03/08/22 0448 03/08/22  0030 03/08/22  0029 03/07/22  1722 03/07/22  1713   SODIUM mmol/L 136  --  139 137  --    POTASSIUM mmol/L 3 1*  --  3 5 3 7  --    CHLORIDE mmol/L 101  --  103 97*  --    CO2 mmol/L 23  --  23 17*  --    CO2, I-STAT mmol/L  --   --   --   --  20*   ANION GAP mmol/L 12  --  13 23*  --    BUN mg/dL 6  --  6 9  --    CREATININE mg/dL 0 72  --  0 74 1 17  --    EGFR ml/min/1 73sq m 90  --  87 50  --    CALCIUM mg/dL 7 4*  --  7 4* 9 1  --    CALCIUM, IONIZED mmol/L 1 03* 0 94*  -- --   --    CALCIUM, IONIZED, ISTAT mmol/L  --   --   --   --  1 11*   MAGNESIUM mg/dL 2 3  --  2 2 1 9  --    PHOSPHORUS mg/dL 2 9  --  3 1 6 4*  --      Results from last 7 days   Lab Units 03/08/22  0448 03/08/22  0029   AST U/L 129* 148*   ALT U/L 85* 92*   ALK PHOS U/L 144* 160*   TOTAL PROTEIN g/dL 5 2* 5 5*   ALBUMIN g/dL 2 3* 2 6*   TOTAL BILIRUBIN mg/dL 1 09* 0 58   BILIRUBIN DIRECT mg/dL  --  0 26*     Results from last 7 days   Lab Units 03/08/22  0643 03/08/22  0248 03/07/22  2053   POC GLUCOSE mg/dl 145* 144* 136     Results from last 7 days   Lab Units 03/08/22  0448 03/08/22  0029 03/07/22  1722   GLUCOSE RANDOM mg/dL 179* 113 350*             BETA-HYDROXYBUTYRATE   Date Value Ref Range Status   03/07/2022 0 2 <0 6 mmol/L Final      Results from last 7 days   Lab Units 03/08/22  0253 03/07/22  1932   PH ART  7 425 7 313*   PCO2 ART mm Hg 34 6* 45 0*   PO2 ART mm Hg 84 1 205 9*   HCO3 ART mmol/L 22 2 22 3   BASE EXC ART mmol/L -1 8 -3 8   O2 CONTENT ART mL/dL 13 4* 15 4*   O2 HGB, ARTERIAL % 95 4 98 4*   ABG SOURCE  Line, Arterial Radial, Right         Results from last 7 days   Lab Units 03/07/22  1713   PH, NATALIA I-STAT  7 206*   PCO2, NATALIA ISTAT mm HG 47 8   PO2, NATALIA ISTAT mm HG 39 0   HCO3, NATALIA ISTAT mmol/L 18 9*   I STAT BASE EXC mmol/L -9*   I STAT O2 SAT % 61     Results from last 7 days   Lab Units 03/07/22  1826   CK TOTAL U/L 126     Results from last 7 days   Lab Units 03/08/22  0936 03/07/22  2306 03/07/22  2113 03/07/22  1826   HS TNI 0HR ng/L 2,778*  --   --  488*   HS TNI 2HR ng/L  --   --  2,563*  --    HSTNI D2 ng/L  --   --  2,075*  --    HS TNI 4HR ng/L  --  3,485*  --   --    HSTNI D4 ng/L  --  2,997*  --   --          Results from last 7 days   Lab Units 03/07/22  1826   PROTIME seconds 14 1  14 8*   INR  1 16   PTT seconds 27  27         Results from last 7 days   Lab Units 03/08/22  0447   PROCALCITONIN ng/ml 1 49*     Results from last 7 days   Lab Units 03/07/22  7314 03/07/22 2113 03/07/22  1826   LACTIC ACID mmol/L 1 7 2 8* 8 9*                                         Results from last 7 days   Lab Units 03/08/22  0055   CLARITY UA  Clear   COLOR UA  Yellow   SPEC GRAV UA  1 015   PH UA  5 0   GLUCOSE UA mg/dl 100 (1/10%)*   KETONES UA mg/dl Negative   BLOOD UA  Trace-Intact*   PROTEIN UA mg/dl 30 (1+)*   NITRITE UA  Negative   BILIRUBIN UA  Negative   UROBILINOGEN UA E U /dl 0 2   LEUKOCYTES UA  Negative   WBC UA /hpf None Seen   RBC UA /hpf 0-1   BACTERIA UA /hpf None Seen   EPITHELIAL CELLS WET PREP /hpf Occasional   MUCUS THREADS  Occasional*     Results from last 7 days   Lab Units 03/08/22  0320   INFLUENZA A PCR  Negative   INFLUENZA B PCR  Negative   RSV PCR  Negative         Results from last 7 days   Lab Units 03/07/22  1937   AMPH/METH  Negative   BARBITURATE UR  Negative   BENZODIAZEPINE UR  Negative   COCAINE UR  Negative   METHADONE URINE  Negative   OPIATE UR  Negative   PCP UR  Negative   THC UR  Negative     Results from last 7 days   Lab Units 03/07/22  1826   ETHANOL LVL mg/dL <3   ACETAMINOPHEN LVL ug/mL <2*   SALICYLATE LVL mg/dL <3*     ED Treatment:   Medication Administration from 03/07/2022 1700 to 03/07/2022 2043       Date/Time Order Dose Route Action     03/07/2022 1714 LORazepam (ATIVAN) injection 1 mg Intravenous Given     03/07/2022 1713 LORazepam (ATIVAN) injection 1 mg Intravenous Given     03/07/2022 1740 multi-electrolyte (ISOLYTE-S PH 7 4) bolus 1,000 mL 1,000 mL Intravenous New Bag     03/07/2022 1815 etomidate (AMIDATE) 2 mg/mL injection 10 mg Intravenous Given     03/07/2022 1817 Succinylcholine Chloride 100 mg/5 mL syringe 65 mg Intravenous Given     03/07/2022 1819 propofol (DIPRIVAN) 1000 mg in 100 mL infusion (premix) 40 mcg/kg/min Intravenous New Bag     03/07/2022 1840 propofol (DIPRIVAN) 1000 mg in 100 mL infusion (premix) 50 mcg/kg/min Intravenous Rate/Dose Change     03/07/2022 1820 propofol (DIPRIVAN) 1000 mg in 100 mL infusion (premix) 40 mcg/kg/min Intravenous New Bag     03/07/2022 1936 fentanyl citrate (PF) 100 MCG/2ML 50 mcg 50 mcg Intravenous Given     03/07/2022 2021 multi-electrolyte (ISOLYTE-S PH 7 4) bolus 1,000 mL 1,000 mL Intravenous New Bag        No past medical history on file    Present on Admission:  **None**      Admitting Diagnosis: Encephalopathy acute [G93 40]  Seizure-like activity (HCC) [R56 9]  Acute respiratory failure with hypoxia (HCC) [J96 01]  Age/Sex: 58 y o  female  Admission Orders:  Continuous cardiopulmonary & pulse oximetry  EEG Video monitoring 24 HR  echo  Mechanical ventilation  Neuro checks  Npo  Seizure precautions  Non violent restraints  Scheduled Medications:  cefepime, 2,000 mg, Intravenous, Q12H  chlorhexidine, 15 mL, Mouth/Throat, M59D DESTIN  folic acid, 1 mg, Oral, Daily  insulin lispro, 1-5 Units, Subcutaneous, Q6H DESTIN  lacosamide (VIMPAT) IVPB, 100 mg, Intravenous, Q12H  levETIRAcetam, 1,000 mg, Intravenous, Q12H DESTIN  LORazepam, 2 mg, Intravenous, Once  LORazepam, 2 mg, Intraveneous, Once  multivitamin-minerals, 1 tablet, Oral, Daily  pantoprazole, 40 mg, Intravenous, Q24H DESTIN  thiamine, 100 mg, Oral, Daily  vancomycin, 15 mg/kg, Intravenous, Q12H      Continuous IV Infusions:  dexmedetomidine, 0 1-0 7 mcg/kg/hr, Intravenous, Titrated  fentaNYL, 75 mcg/hr, Intravenous, Continuous  multi-electrolyte, 100 mL/hr, Intravenous, Continuous  norepinephrine, 1-30 mcg/min, Intravenous, Titrated  propofol, 40 mcg/kg/min, Intravenous, Titrated  vasopressin, 0 04 Units/min, Intravenous, Continuous      PRN Meds:  acetaminophen, 650 mg, Oral, Q4H PRN  fentanyl citrate (PF), 50 mcg, Intravenous, Q2H PRN  LORazepam, 2 mg, Intravenous, Q1H PRN        IP CONSULT TO TOXICOLOGY  IP CONSULT TO PHARMACY  IP CONSULT TO NEUROLOGY    Network Utilization Review Department  ATTENTION: Please call with any questions or concerns to [phone] and carefully listen to the prompts so that you are directed to the right person  All voicemails are confidential   Mat Bicker all requests for admission clinical reviews, approved or denied determinations and any other requests to dedicated fax number below belonging to the campus where the patient is receiving treatment   List of dedicated fax numbers for the Facilities:  [address] DENIALS (Administrative/Medical Necessity) [phone]   [address] (Maternity/NICU/Pediatrics) [phone] [address]  [address] [facility] [place] [address] [address] [place] [address] [address] [facility] [place] [address] [phone]

## 2022-03-08 NOTE — PROCEDURES
Arterial Line Insertion    Date/Time: 3/8/2022 12:59 AM  Performed by: PAZ Levine  Authorized by: Del Ramirez [address] [place] [address]     Patient location:  ICU  Other Assisting Provider: Yes (comment) Alejandrina Kc PA-c)    Consent:     Consent obtained:  Emergent situation  Universal protocol:     Patient identity confirmed:  Arm band  Indications:     Indications: hemodynamic monitoring    Pre-procedure details:     Skin preparation:  Chlorhexidine  Anesthesia (see MAR for exact dosages): Anesthesia method:  Local infiltration    Local anesthetic:  Lidocaine 1% WITH epi  Procedure details:     Location / Tip of Catheter:  Radial    Laterality:  Right    Needle gauge:  3 Fr    Placement technique:  Ultrasound guided    Ultrasound image availability:  Not obtained due to urgency    Number of attempts:  2    Successful placement: yes      Transducer: waveform confirmed    Post-procedure details:     Post-procedure:  Sutured, sterile dressing applied and wrist guard applied    Patient tolerance of procedure:   Tolerated well, no immediate complications

## 2022-03-08 NOTE — PROCEDURES
POC FAST US    Date/Time: 3/7/2022 5:40 PM  Performed by: Loli Kelley PA-C  Authorized by: Loli Kelley PA-C     Patient location:  Trauma  Procedure details:     Exam Type:  Diagnostic    Indications: blunt abdominal trauma and blunt chest trauma      Assess for:  Intra-abdominal fluid and pericardial effusion    Technique: FAST      Views obtained:  Heart - Pericardial sac, LUQ - Splenorenal space, Suprapubic - Pouch of Jamie and RUQ - [place]'s Pouch    Image quality: diagnostic      Image availability:  Images available in PACS and video obtained  FAST Findings:     RUQ (Hepatorenal) free fluid: absent      LUQ (Splenorenal) free fluid: absent      Suprapubic free fluid: absent      Cardiac wall motion: identified      Pericardial effusion: absent    Interpretation:     Impressions: negative

## 2022-03-08 NOTE — CONSULTS
PHONE [facility] Toxicology  Mayda Flood 58 y o  female MRN: [id number]  Unit/Bed#: ED 23 Encounter: [id number]      Reason for Consult / Principal Problem: Alcohol withdrawal, status epilepticus  Inpatient consult to Toxicology  Consult performed by: Chang Costello MD  Consult ordered by: Liborio Mckee PA-C        03/07/22    ASSESSMENT:  Status epilepticus  Concern for alcohol withdrawal and seizure  Trauma/fall  Acute respiratory failure  Metabolic acidosis    RECOMMENDATIONS:  Patient has a reported history of alcohol use and has been trying to decrease use at home  Reports of multiple seizures leading to concern for status as patient did not return to mental baseline  Utilize FLORINDA agonists for withdrawal management; best achieved with propofol while intubated, can consider midazolam if blood pressure does not tolerate propofol  Phenobarbital would also be an option (load for withdrawal 5-10 mg/kg IV)  If patient has recurrent seizures, would treat with IV benzodiazepines  Can consider loading with phenobarbital, which will also manage alcohol withdrawal symptoms  Use diazepam as needed for agitation and withdrawal symptoms otherwise uncontrolled by propofol  Agree with continuous EEG monitoring  Check lactate and beta-hydroxybutyrate level  Lactate may be elevated from seizure, thus causing metabolic acidosis, though if beta hydroxybutyrate is elevated, alcoholic ketoacidosis may be contributing  Would manage AKA with high dose thiamine 500 mg every 8 hours and dextrose-containing fluids  Continue daily thiamine, folate, and multivitamin supplementation  For further questions, please contact the medical  on call via [employer] Text      Please see additional teaching note below (if available)      Ethanol Withdrawal Discussion  Sudden discontinuation of chronic ethanol use can precipitate a severe withdrawal syndrome that include symptoms tremor, anxiety, headache, palpitations, insomnia, nausea and vomiting, tachycardia and hypertension  Seizures may also occur, generally within 6-12 hours after cessation of ethanol use   high-dose benzodiazepines are necessary for treatment  As a results of chronic ethanol abuse, GABAa receptors upregulate along with NMDA receptors, causing patient's to not react as strongly to benzodiazepines as the non ethanol dependent patient  Sympathetic nervous system overactivity may progress to delirium tremens  Delirium tremens is a life-threatening condition that is characterized by tachycardia, diaphoresis, hyperthermia, delirium and hallucinations  It usually occurs about 48-72 hours after discontinuation of heavy ethanol use  If not treated appropriately, significant morbidity and mortality can be associated  Hx and PE limited by the dynamics of a phone consultation  I have not personally interviewed or evaluated the patient, but only advised based on the information provided to me  Primary provider is responsible for all clinical decisions  Pertinent history, physical exam and clinical findings and course discussed: Parish Rushing is a 58y o  year old female who presents with multiple seizures and trauma after a fall  CT imaging negative for acute traumatic injury  Patient had additional seizure in ED and required intubation  Reported history of chronic alcohol use with tapering at home  ETOH zero on arrival     Review of systems and physical exam not performed by me  Historical Information   No past medical history on file  No past surgical history on file  Social History   Social History     Substance and Sexual Activity   Alcohol Use Not on file     Social History     Substance and Sexual Activity   Drug Use Not on file     Social History     Tobacco Use   Smoking Status Not on file   Smokeless Tobacco Not on file     No family history on file       Prior to Admission medications    Not on File       Current Facility-Administered Medications   Medication Dose Route Frequency    fentanyl citrate (PF) 100 MCG/2ML 50 mcg  50 mcg Intravenous Q2H PRN    [START ON 7/6/5053] folic acid (FOLVITE) tablet 1 mg  1 mg Oral Daily    LORazepam (ATIVAN) injection 2 mg  2 mg Intravenous Q1H PRN    [START ON 3/8/2022] multivitamin-minerals (CENTRUM) tablet 1 tablet  1 tablet Oral Daily    propofol (DIPRIVAN) 1000 mg in 100 mL infusion (premix)  40 mcg/kg/min Intravenous Titrated    [START ON 3/8/2022] thiamine tablet 100 mg  100 mg Oral Daily       Not on File    Objective       Intake/Output Summary (Last 24 hours) at 3/7/2022 1958  Last data filed at 3/7/2022 1852  Gross per 24 hour   Intake --   Output 100 ml   Net -100 ml       Invasive Devices:   Peripheral IV 03/07/22 Left Wrist (Active)       Peripheral IV 03/07/22 Right Antecubital (Active)   Site Assessment WDL 03/07/22 1709       NG/OG/Enteral Tube Orogastric 16 Fr Center mouth (Active)   Placement Reverification X-ray 03/07/22 1830   Site Assessment Clean;Dry 03/07/22 1830   Drainage Appearance Brown 03/07/22 1830       Urethral Catheter Temperature probe (Active)   Amt returned on insertion(mL) 100 mL 03/07/22 1852   Reasons to continue Urinary Catheter  Accurate I&O assessment in critically ill patients (48 hr  max) 03/07/22 1852   Site Assessment Clean;Skin intact 03/07/22 1852   Collection Container Standard drainage bag 03/07/22 1852   Securement Method Securing device (Describe) 03/07/22 1852       ETT  Cuffed 7 5 mm (Active)   Secured at (cm) 24 03/07/22 1820   Measured from Lips 03/07/22 1820   Secured Location Right 03/07/22 1820   Secured by Commercial tube calix 03/07/22 1820   Site Condition Dry 03/07/22 1820       Vitals   Vitals:    03/07/22 1815 03/07/22 1821 03/07/22 1900 03/07/22 1930   BP: 124/72 131/82  94/60   TempSrc:    Axillary   Pulse: (!) 151 (!) 149  (!) 128   Resp: 20 14 14 14   Patient Position - Orthostatic VS:  Lying     Temp:    99 °F (37 2 °C)         EKG, Pathology, and/or Other Studies: n/a      Lab Results: I have personally reviewed pertinent reports  Labs:  Results from last 7 days   Lab Units 03/07/22  1826 03/07/22  1722 03/07/22  1722   WBC Thousand/uL  --   --  8 47   HEMOGLOBIN g/dL  --   --  11 2*   HEMATOCRIT %  --   --  35 5   PLATELETS Thousands/uL 42*   < > 49*   NEUTROS PCT %  --   --  77*   LYMPHS PCT %  --   --  16   MONOS PCT %  --   --  5    < > = values in this interval not displayed  Results from last 7 days   Lab Units 03/07/22  1722 03/07/22  1713   POTASSIUM mmol/L 3 7  --    CHLORIDE mmol/L 97*  --    CO2 mmol/L 17*  --    CO2, I-STAT mmol/L  --  20*   BUN mg/dL 9  --    CREATININE mg/dL 1 17  --    CALCIUM mg/dL 9 1  --    GLUCOSE, ISTAT mg/dl  --  355*   MAGNESIUM mg/dL 1 9  --    PHOSPHORUS mg/dL 6 4*  --       Results from last 7 days   Lab Units 03/07/22  1826   INR  1 16   PTT seconds 27     Results from last 7 days   Lab Units 03/07/22  1826   LACTIC ACID mmol/L 8 9*     No results found for: TROPONINI      Results from last 7 days   Lab Units 03/07/22 1826   ACETAMINOPHEN LVL ug/mL <2*   ETHANOL LVL mg/dL <3   SALICYLATE LVL mg/dL <3*     Invalid input(s): EXTPREGUR    Imaging Studies: I have personally reviewed pertinent reports        Counseling / Coordination of Care  Total time spent on phone consultation and coordination of care: 15 minutes

## 2022-03-08 NOTE — CONSULTS
NEUROLOGY RESIDENCY CONSULT NOTE     Name: Ayana Bryson   Age & Sex: 58 y o  female   MRN: [id number]  Unit/Bed#: ICU 07   Encounter: [id number]  Length of Stay: 1    ASSESSMENT & PLAN     Status epilepticus [facility])  Assessment & Plan  58 y o  right handed female w h/o traumatic Subarachnoid, right posterior fossa IPH, blood cancer per , alcohol abuse who presents in [facility] ED on 3/7/22 as trauma alert A d/t unwitnessed fall and altered mental status  To review, pt wasn't feeling well on 3/7-  Lightheadedness, tunnel vision, nausea, unable to eat, difficulty walking, later found down by  around 4:30 pm when he returned home with tongue laceration, abnormal movement of left hand, on arrival tachycardic, GCS-8, platelet 05K, metabolic acidosis, elevated lactic acid, procal 1 49, pt found to have clinic sz in ED and was given Ativan, intubated, sedated on propofol, low BP, received crystalloids, she was started on video EEG monitoring, she had 3 electrographic seizures from 12 midnight to 2:00 am, 2:00 to 4:00 am seizure every 15 min, last seizure noted was around 6:45 am on EEG  Her Propofol was increased, given 2 gm Keppra and started 1 gm Keppra bid, started on Vimpat 100 mg bid  Versed was also given at one point  Also started on antibiotics d/t likely aspiration pneumonia (Rt lung base) seen on Xray     Discussed with Dr Ki Vazquez (Epileptologist)  she had 3 electrographic seizures from 12 midnight to 2:00 am, 2:00 to 4:00 am seizure every 15 min, last seizure noted was around 6:45 am 3/8/22 on EEG  Left temporal focal for seizures  Impression- Unclear cause of her seizures, could be related to Alcohol withdrawal, her seems to be from left temporal region, similar episode in August 2021 (she was found down at that time as well) with traumatic SAH, left posterior IPH  If Seizures doesn't get controlled might need LP   No meningeal signs on exam     Plan-  · Continue video EEG monitoring (last electrographic Sz 6:45 am) for at least 24 hours since been seizure free  · Continue Keppra 1 gm BID   · Continue Vimpat 100 mg BID   · Recommend MRI brain w and wo contrast once EEG is removed   · Please reach out to on-na Neurologist for more seizure activity, has room to go up on Vimpat   · Continue Propofol per ICU until seizure free   · Continue evaluating metabolic and infectious derangement per ICU   · Appreciate Toxicology recs   · Discussed with ICU team and Dr Chris Durant, Dr Marcial Elkins  Encephalopathy  Assessment & Plan  Likely related to status epilepticus         Nicolasa Vargas will need follow up in in 4 weeks with epilepsy Attending   She will not require outpatient neurological testing  Pending for discharge: Video EEG, MRI brain w and wo contrast     SUBJECTIVE     Reason for Consult / Principal Problem: Seizure like activity, encephalopathy   Hx and PE limited by: Pt currently intubated     HPI: Nicolasa Vargas is a 58 y o  right handed female w h/o traumatic Subarachnoid, right posterior fossa IPH, blood cancer per ,  Alcohol abuse who presents in [facility] [facility] ED on 3/7/22 as trauma alert A d/t unwitnessed fall and altered mental status  History is obtained from patient's  at bedside and chart review  Per , pt was not feeling well yesterday when he spoke to her around lunch time via phone from office, pt reported  that she doesn't feel well, feel nauseous, unable to eat anything  Around 2:00 pm when  spoke to her again, she reported lightheadedness, tunnel vision, nausea  So he asked her to stay on couch, Around 4:30 pm when  arrived home he found her on floor, possible head strike, incontinence of feces  She was minimally responsive per her , some movement of her right hand noted by , this prompted  to call 911   Initial GCS 3 per EMS    On arrival,  /82, pulse 149 temp 95,  RR 14, SpO2 100%,  GCS 8, She had dried blood at mouth, small tongue laceration on each side,  CT head negative for acute changes,  CT spine negative, CT CAP-  Chronic pancreatitis, cholelithiasis,  T12-L2 L4 mild vertebral compression deformities  CBC-  Platelets 55A,  VU-1 0, HCO3- 18 9  Ethanol 0,  Lactic acid 8 9, UDS- negative  Procal- 1 49  Patient was found to be seizing in ED, Ativan was given,  Patient is suspected to be in status due to repeated hand clenching, roving eye movements, she was intubated, video EEG was started  3 seizures noted so far (12:00 midnight, 1:16 am and 1:40 am), 2 gm Keppra given and started on 1 gm BID, She also had performed hypertension with his BP is a in 50s around 12 midnight, she received 3 L of crystalloid, started on Levophed and vasopressin  She has a history of alcohol abuse and states that she has been trying to cut back, he is unclear how often she takes, he also saw sertraline bottle on the counter top which she told she doesn't take but found only 2 pills in the bottle  Per chart review, pt had similar episode back in Aug 2021, she was found down by , had tongue laceration at that time with incontinence, found to have traumatic subarachnoid hemorrhage, left posterior fossa IPH, she was admitted in ICU and intubated for about 8 days last admission  Inpatient consult to Neurology  Consult performed by: Hermelindo Wallace MD  Consult ordered by: Jewel Wiley PA-C          Historical Information   No past medical history on file  No past surgical history on file  Social History   Social History     Substance and Sexual Activity   Alcohol Use Not on file     Social History     Substance and Sexual Activity   Drug Use Not on file     No existing history information found  No existing history information found  Social History     Tobacco Use   Smoking Status Not on file   Smokeless Tobacco Not on file     Family History: No family history on file    Meds/Allergies   all current active meds have been reviewed, current meds:   Current Facility-Administered Medications   Medication Dose Route Frequency    acetaminophen (TYLENOL) tablet 650 mg  650 mg Oral Q4H PRN    cefepime (MAXIPIME) 2,000 mg in dextrose 5 % 50 mL IVPB  2,000 mg Intravenous Q12H    chlorhexidine (PERIDEX) 0 12 % oral rinse 15 mL  15 mL Mouth/Throat Q12H DESTIN    dexmedeTOMIDine (Precedex) 400 mcg in sodium chloride 0 9% 100 mL  0 1-0 7 mcg/kg/hr Intravenous Titrated    fentaNYL 1000 mcg in sodium chloride 0 9% 100mL infusion  75 mcg/hr Intravenous Continuous    fentanyl citrate (PF) 100 MCG/2ML 50 mcg  50 mcg Intravenous E3W PRN    folic acid (FOLVITE) tablet 1 mg  1 mg Oral Daily    insulin lispro (HumaLOG) 100 units/mL subcutaneous injection 1-5 Units  1-5 Units Subcutaneous Q6H DESTIN    lacosamide (VIMPAT) 100 mg in sodium chloride 0 9 % 100 mL IVPB  100 mg Intravenous Q12H    levETIRAcetam (KEPPRA) 1,000 mg in sodium chloride 0 9 % 100 mL IVPB  1,000 mg Intravenous Q12H DESTIN    LORazepam (ATIVAN) injection 2 mg  2 mg Intravenous Q1H PRN    LORazepam (ATIVAN) injection 2 mg  2 mg Intravenous Once    LORazepam (ATIVAN) injection 2 mg  2 mg Intravenous Once    multi-electrolyte (PLASMALYTE-A/ISOLYTE-S PH 7 4) IV solution  100 mL/hr Intravenous Continuous    multivitamin-minerals (CENTRUM) tablet 1 tablet  1 tablet Oral Daily    NOREPINEPHRINE 4 MG  ML NSS (CMPD ORDER) infusion  1-30 mcg/min Intravenous Titrated    pantoprazole (PROTONIX) injection 40 mg  40 mg Intravenous Q24H DESTIN    propofol (DIPRIVAN) 1000 mg in 100 mL infusion (premix)  40 mcg/kg/min Intravenous Titrated    thiamine tablet 100 mg  100 mg Oral Daily    vancomycin (VANCOCIN) IVPB (premix in dextrose) 1,000 mg 200 mL  15 mg/kg Intravenous Q12H    vasopressin (PITRESSIN) 20 Units in sodium chloride 0 9 % 100 mL infusion  0 04 Units/min Intravenous Continuous    and PTA meds:   None     Not on File    Review of previous medical records was completed  Review of Systems   Unable to perform ROS: Acuity of condition       OBJECTIVE     Patient ID: Courtney Nam is a 58 y o  female  Vitals:   Vitals:    22 1200 22 1215 22 1230 22 1245   BP:       Pulse: 94 92 94 90   Resp: 18 16 (!) 9 18   Temp: 98 4 °F (36 9 °C) 98 2 °F (36 8 °C) 98 1 °F (36 7 °C) 97 9 °F (36 6 °C)   TempSrc: Bladder      SpO2: 97% 97% 97% 97%   Weight:       Height:          Body mass index is 22 4 kg/m²  Intake/Output Summary (Last 24 hours) at 3/8/2022 1259  Last data filed at 3/8/2022 1200  Gross per 24 hour   Intake 7211 31 ml   Output 710 ml   Net 6501 31 ml       Temperature:   Temp (24hrs), Av 4 °F (38 °C), Min:95 °F (35 °C), Max:101 8 °F (38 8 °C)    Temperature: 97 9 °F (36 6 °C)    Invasive Devices: Invasive Devices  Report    Peripheral Intravenous Line            Peripheral IV 22 Left Wrist 1 day    Peripheral IV 22 Right Antecubital <1 day    Peripheral IV 22 Right;Upper;Ventral (anterior) Arm <1 day          Arterial Line            Arterial Line 22 Radial <1 day          Drain            NG/OG/Enteral Tube Orogastric 16 Fr Center mouth <1 day    Urethral Catheter Temperature probe <1 day          Airway            ETT  Cuffed 7 5 mm <1 day                Physical Exam  Vitals and nursing note reviewed  Constitutional:       Comments: Intubated and sedated   HENT:      Head: Normocephalic  Eyes:      Conjunctiva/sclera: Conjunctivae normal    Cardiovascular:      Rate and Rhythm: Normal rate  Pulmonary:      Comments: On mechanical ventilation  Abdominal:      Palpations: Abdomen is soft  Musculoskeletal:         General: Normal range of motion  Right lower leg: No edema  Left lower leg: No edema  Skin:     General: Skin is warm  Capillary Refill: Capillary refill takes less than 2 seconds  Neurological Examination:     Mental Status: The patient was intubated and sedated on Propofol  No response to verbal stimuli, she spontaneously moves her left hand at times  Propofol was held for few minutes, pt doesn't open eyes but withdraws on noxious stimuli  Cranial Nerves:   II: visual fields Pupils equal, round, reactive to light with normal accomodation  VII: Face is symmetric with no weakness noted  XII:   Intubated so unable to visualize tongue  Gag present  Corneal reflex present  Motor Examination:    Bulk: Normal  No atrophy Tone: Normal  Fasciculations: None  Patient is obtained spontaneously moves her left hand  On noxious stimuli patient moves her bilateral upper and lower extremities more movement noted on left compared to right     Reflexes: Trace b/l, does seem to have cross abductors  Plantars   Right              Mute  Left             Mute     Clonus: None    Coordination: unable to assess     Sensory: On noxious stimuli patient moves her bilateral upper and lower extremities  LABORATORY DATA     Labs: I have personally reviewed pertinent reports      Results from last 7 days   Lab Units 03/08/22 0448 03/08/22  0029 03/07/22  1826 03/07/22  1722   WBC Thousand/uL 7 40  --   --  8 47   HEMOGLOBIN g/dL 9 1* 9 5*  --  11 2*   HEMATOCRIT % 26 9* 26 8*  --  35 5   PLATELETS Thousands/uL 35*  --  42* 49*   NEUTROS PCT % 77*  --   --  77*   MONOS PCT % 4  --   --  5      Results from last 7 days   Lab Units 03/08/22  1131 03/08/22 0448 03/08/22  0029 03/07/22  1722 03/07/22  1713   POTASSIUM mmol/L 3 8 3 1* 3 5   < >  --    CHLORIDE mmol/L 102 101 103   < >  --    CO2 mmol/L 21 23 23   < >  --    CO2, I-STAT mmol/L  --   --   --   --  20*   BUN mg/dL 5 6 6   < >  --    CREATININE mg/dL 0 73 0 72 0 74   < >  --    CALCIUM mg/dL 7 7* 7 4* 7 4*   < >  --    ALK PHOS U/L  --  144* 160*  --   --    ALT U/L  --  85* 92*  --   --    AST U/L  --  129* 148*  --   --    GLUCOSE, ISTAT mg/dl  --   --   --   --  355*    < > = values in this interval not displayed  Results from last 7 days   Lab Units 03/08/22  1131 03/08/22  0448 03/08/22  0029   MAGNESIUM mg/dL 2 1 2 3 2 2     Results from last 7 days   Lab Units 03/08/22  1131 03/08/22  0448 03/08/22  0029   PHOSPHORUS mg/dL 3 1 2 9 3 1      Results from last 7 days   Lab Units 03/07/22  1826   INR  1 16   PTT seconds 27  27     Results from last 7 days   Lab Units 03/07/22  2307   LACTIC ACID mmol/L 1 7           IMAGING & DIAGNOSTIC TESTING     Radiology Results: I have personally reviewed pertinent reports  XR chest portable ICU   Final Result by Filippo Brooke MD (03/08 5646)      New opacity right lung base may be artifactual due to positioning, though consolidation (including aspiration) is not excluded, and attention will be made on follow-up  Workstation performed: [id number]         XR chest 1 view   Final Result by Robyn Elias MD (03/08 5303)      Improved interstitial markings  Status post intubation without pneumothorax  Workstation performed: [id number]         XR Trauma multiple (SLB/SLRA trauma bay ONLY)   Final Result by Inga Curry MD (03/08 5305)      No acute traumatic injury  Mild vascular prominence  Correlate for CHF  Workstation performed: [id number]         TRAUMA - CT chest abdomen pelvis w contrast   Final Result by Kay Conley MD (03/07 4374)   1  T12, L2, L4 mild vertebral compression deformities are age indeterminate  Correlate clinically  2   No suspected acute posttraumatic injury  3   Cholelithiasis  4   Chronic pancreatitis  I personally discussed this study with Cely Sanford on 3/7/2022 at 5:50 PM                         Workstation performed: [id number]         TRAUMA - CT head wo contrast   Final Result by Filippo Brooke MD (03/07 6756)      No acute intracranial abnormality               I personally discussed this study with Cely Sanford on 3/7/2022 at 5:39 PM                      Workstation performed: [id number]         TRAUMA - CT spine cervical wo contrast   Final Result by Andrew Best MD (03/07 1756)      No cervical spine fracture or traumatic malalignment  I personally discussed this study with Jacklyn Troy on 3/7/2022 at 5:45 PM                       Workstation performed: [id number]         XR chest 1 view    (Results Pending)       Other Diagnostic Testing: I have personally reviewed pertinent reports        ACTIVE MEDICATIONS     Current Facility-Administered Medications   Medication Dose Route Frequency    acetaminophen (TYLENOL) tablet 650 mg  650 mg Oral Q4H PRN    cefepime (MAXIPIME) 2,000 mg in dextrose 5 % 50 mL IVPB  2,000 mg Intravenous Q12H    chlorhexidine (PERIDEX) 0 12 % oral rinse 15 mL  15 mL Mouth/Throat Q12H DESTIN    dexmedeTOMIDine (Precedex) 400 mcg in sodium chloride 0 9% 100 mL  0 1-0 7 mcg/kg/hr Intravenous Titrated    fentaNYL 1000 mcg in sodium chloride 0 9% 100mL infusion  75 mcg/hr Intravenous Continuous    fentanyl citrate (PF) 100 MCG/2ML 50 mcg  50 mcg Intravenous W9X PRN    folic acid (FOLVITE) tablet 1 mg  1 mg Oral Daily    insulin lispro (HumaLOG) 100 units/mL subcutaneous injection 1-5 Units  1-5 Units Subcutaneous Q6H DESTIN    lacosamide (VIMPAT) 100 mg in sodium chloride 0 9 % 100 mL IVPB  100 mg Intravenous Q12H    levETIRAcetam (KEPPRA) 1,000 mg in sodium chloride 0 9 % 100 mL IVPB  1,000 mg Intravenous Q12H DESTIN    LORazepam (ATIVAN) injection 2 mg  2 mg Intravenous Q1H PRN    LORazepam (ATIVAN) injection 2 mg  2 mg Intravenous Once    LORazepam (ATIVAN) injection 2 mg  2 mg Intravenous Once    multi-electrolyte (PLASMALYTE-A/ISOLYTE-S PH 7 4) IV solution  100 mL/hr Intravenous Continuous    multivitamin-minerals (CENTRUM) tablet 1 tablet  1 tablet Oral Daily    NOREPINEPHRINE 4 MG  ML NSS (CMPD ORDER) infusion  1-30 mcg/min Intravenous Titrated    pantoprazole (PROTONIX) injection 40 mg  40 mg Intravenous Q24H [address]    propofol (DIPRIVAN) 1000 mg in 100 mL infusion (premix)  40 mcg/kg/min Intravenous Titrated    thiamine tablet 100 mg  100 mg Oral Daily    vancomycin (VANCOCIN) IVPB (premix in dextrose) 1,000 mg 200 mL  15 mg/kg Intravenous Q12H    vasopressin (PITRESSIN) 20 Units in sodium chloride 0 9 % 100 mL infusion  0 04 Units/min Intravenous Continuous       Prior to Admission medications    Not on File         CODE STATUS & ADVANCED DIRECTIVES     Code Status: Level 1 - Full Code  Advance Directive and Living Will:      Power of :    POLST:      VTE Mechanical Prophylaxis: sequential compression device    ==  MD Lea Orlando's Neurology Residency, PGY-3

## 2022-03-08 NOTE — PROCEDURES
Central Line Insertion    Date/Time: 3/8/2022 3:46 PM  Performed by: PAZ Alston  Authorized by: PAZ Alston     Patient location:  Bedside and ICU  Other Assisting Provider: Yes (comment) Ronni Fonseca    Consent:     Consent obtained:  Verbal    Consent given by:  Spouse    Risks discussed:  Arterial puncture, bleeding, incorrect placement, infection, nerve damage and pneumothorax    Alternatives discussed:  No treatment, delayed treatment, alternative treatment and observation  Universal protocol:     Procedure explained and questions answered to patient or proxy's satisfaction: yes      Relevant documents present and verified: yes      Test results available and properly labeled: yes      Radiology Images displayed and confirmed  If images not available, report reviewed: yes      Required blood products, implants, devices, and special equipment available: yes      Site/side marked: yes      Immediately prior to procedure, a time out was called: yes      Patient identity confirmed:  Hospital-assigned identification number, arm band and anonymous protocol, patient vented/unresponsive  Pre-procedure details:     Hand hygiene: Hand hygiene performed prior to insertion      Sterile barrier technique: All elements of maximal sterile technique followed      Skin preparation:  2% chlorhexidine    Skin preparation agent: Skin preparation agent completely dried prior to procedure    Indications:     Central line indications: medications requiring central line    Sedation:     Sedation type: Propofol running  Fentanyl PRN  Anesthesia (see MAR for exact dosages):      Anesthesia method:  Local infiltration    Local anesthetic:  Lidocaine 1% w/o epi  Procedure details:     Location:  Right internal jugular    Vessel type: vein      Laterality:  Right    Approach: percutaneous technique used      Patient position:  Reverse Trendelenburg    Catheter type:  Triple lumen    Catheter size:  7 Fr    Landmarks identified: yes      Ultrasound guidance: yes      Ultrasound image availability:  Images available in PACS    Sterile ultrasound techniques: Sterile gel and sterile probe covers were used      Manometry confirmation: yes      Number of attempts:  2    Successful placement: yes      Vessel of catheter tip end:  Cavoatrial junction  Post-procedure details:     Post-procedure:  Dressing applied and line sutured    Assessment:  Blood return through all ports, no pneumothorax on x-ray, free fluid flow and placement verified by x-ray    Post-procedure complications: none      Patient tolerance of procedure:   Tolerated well, no immediate complications

## 2022-03-08 NOTE — PLAN OF CARE
Problem: Potential for Falls  Goal: Patient will remain free of falls  Description: INTERVENTIONS:  - Educate patient/family on patient safety including physical limitations  - Instruct patient to call for assistance with activity   - Consult OT/PT to assist with strengthening/mobility   - Keep Call bell within reach  - Keep bed low and locked with side rails adjusted as appropriate  - Keep care items and personal belongings within reach  - Initiate and maintain comfort rounds  - Make Fall Risk Sign visible to staff  - Offer Toileting every 2 Hours, in advance of need  - Initiate/Maintain bed alarm  - Obtain necessary fall risk management equipment:   - Apply yellow socks and bracelet for high fall risk patients  - Consider moving patient to room near nurses station  Outcome: Progressing     Problem: PAIN - ADULT  Goal: Verbalizes/displays adequate comfort level or baseline comfort level  Description: Interventions:  - Encourage patient to monitor pain and request assistance  - Assess pain using appropriate pain scale  - Administer analgesics based on type and severity of pain and evaluate response  - Implement non-pharmacological measures as appropriate and evaluate response  - Consider cultural and social influences on pain and pain management  - Notify physician/advanced practitioner if interventions unsuccessful or patient reports new pain  Outcome: Progressing     Problem: SAFETY ADULT  Goal: Patient will remain free of falls  Description: INTERVENTIONS:  - Educate patient/family on patient safety including physical limitations  - Instruct patient to call for assistance with activity   - Consult OT/PT to assist with strengthening/mobility   - Keep Call bell within reach  - Keep bed low and locked with side rails adjusted as appropriate  - Keep care items and personal belongings within reach  - Initiate and maintain comfort rounds  - Make Fall Risk Sign visible to staff  - Offer Toileting every 2 Hours, in advance of need  - Initiate/Maintain bed alarm  - Apply yellow socks and bracelet for high fall risk patients  - Consider moving patient to room near nurses station  Outcome: Progressing     Problem: DISCHARGE PLANNING - CARE MANAGEMENT  Goal: Discharge to post-acute care or home with appropriate resources  Description: INTERVENTIONS:  - Conduct assessment to determine patient/family and health care team treatment goals, and need for post-acute services based on payer coverage, community resources, and patient preferences, and barriers to discharge  - Address psychosocial, clinical, and financial barriers to discharge as identified in assessment in conjunction with the patient/family and health care team  - Arrange appropriate level of post-acute services according to patients   needs and preference and payer coverage in collaboration with the physician and health care team  - Communicate with and update the patient/family, physician, and health care team regarding progress on the discharge plan  - Arrange appropriate transportation to post-acute venues  Outcome: Progressing

## 2022-03-08 NOTE — PROGRESS NOTES
Daily Progress Note - Critical Care   Jayce Self 58 y o  female MRN: [id number]  Unit/Bed#: ICU 07 Encounter: [id number]        ----------------------------------------------------------------------------------------  HPI/24hr events: 58 YOF with PMH of alcohol abuse, HTN HLD, GERD remains in the ICU intubated and sedated  Patient started on continuous EEG for concern of withdrawal seizures, with three seizures noted  Patient received 2mg of IV ativan  Prior to Ativan patient had period of profound hypotension with SBPs in the 50's around 0000  Patient received additional 1L of crystalloid  Emergently mixed vasopressors with arterial line placed  Patient continued on levophed at 10 and vasopressin at 0 04  Remains without CVC given concern of thrombocytopenia as patient is [place]  AEDs added for seizure activity   To remain in the ICU on ventilatory support and continuous EEG     ---------------------------------------------------------------------------------------  SUBJECTIVE  Remains Intubated    Review of Systems   Unable to perform ROS: Intubated     Review of systems was unable to be performed secondary to ETT/ Intubation  ---------------------------------------------------------------------------------------  Assessment and Plan:    Neuro:    Diagnosis: Seizures, concern for status epilepticus, concern for alcohol withdrawal seizure  o Plan: Patient was a witnessed fall from standing at home  o Patient with decreased GCS, consistent hand clenching in trauma bay  o Extensive tongue lacerations  o ETOH level 0   o Patient following commands in all extremities on sedation hold  o Propofol infusion weaned to 10, precedex and propofol added to wean  o Sedation currently held d/t hypotension  o Prn ativan 2mg Q1H  o Will add Keppra now, 2g now, 1g BID  o EEG ongoing, noted seizures at 0000 and 0116, 0140  o Now given 2mg IV ativan x1  o Lactic cleared s/p IVF resuscitation   o Neurology consult pending   Diagnosis: Analgesia  o Plan: fentanyl gtt at 75 currently held in setting of hypotension  o Fentanyl prn (1 dose in 24hrs)    CV:    Diagnosis: Hypotension, sedation vs septic shock vs hypovolemia  o Plan: Patient hypotensive to SBP of 50s  o Bedside POCUS negative for systolic dysfunction  o Formal ECHO pending  o S/p 3L of crystalloid, 250mL of Albumin  o Emergently mixed vasopressors, continue Levophed and Vasopressin  o Running vasopressors peripherally, holding CVC for concerns of bleeding in setting of thrombocytopenia, patient Restorationist  o Wean pressors for MAP >65   Diagnosis: Elevated Troponin  o Plan: Initial troponin 455  o Currently up trended to 3,485, delta 2,997  o Spoke with Dr Samy Flores with cardiology on call  Per cardiology unlikely ACS, no systemic AC for now   o Formal ECHO pending      Pulm:   Diagnosis: Acute hypoxic respiratory failure  o Plan: Patient intubated in the ED, for concern of airway protection  GCS 6 in the bay  o Previously on 70% fiO2,   o Patient currently on [address] 16/375/100%/8+  o Will send ABG now  o CXR suspicious for right sided effusion vs PNA  VAP bundle ordered    GI:    Diagnosis: Chronic Pancreatitis 2/2 chronic alcohol abuse  o Plan: Noted on CT CAP  o Supportive care for now   Diagnosis: No additional active issues  o Plan: Continue PPI, home med  o Bowel regimen PRN      :    Diagnosis: JP  o Plan: Baseline Cr 0 4-0 6  o Most recent Cr 1 17  o Repeat pending  o Likely to be exacerbated by acute period of hypotension  o Continue maintenance fluids  o S/p 3L crystalloid, 250mL of albumin  o CK WNL   Diagnosis: Castro cath in place  o Plan: BID castro care  o Strict I and O      F/E/N:    F- Isolyte at 100   E- Replete as able   N- NPO      Heme/Onc:    Diagnosis:  Thrombocytopenia  o Plan: Patient with chronic thrombocytopenia, baseline 40-60  o Likely 2/2 hx of alcoholic liver dysfunction, [facility]  o Last PLT count 42, AM pending  o Continue to trend  o No concern of active bleeding  o Current hgb 9 2 , last 11 2  Suspect d/t dilution   o DVT ppx on hold- no system AC given thrombocytopenia, trop evaluation unlikely ACS per overnight cardiology Dr Annabel Navarrete      Endo:    Diagnosis: Hyperglycemia  o Plan: Noted to be 350 on initial BMP  o Recheck last 80  o SSI as needed  o Goal -180    ID:   Diagnosis: R/o septic shock  o Plan: With hypotension with SBPs in 50s at MN  o Remains afebrile  o Repeat WBC pending, last 8  o BC pending, UA unremarkable  o Starting cefepime and Vanc  o CXR showing right sided effusion vs opacity, possible aspiration?  o Consider additional imaging in AM      MSK/Skin:    Diagnosis: Pressure Ulcer PPX  o Plan: Q2H Repositioning  o PT/OT when able    Patient appropriate for transfer out of the ICU today?: No  Disposition: Continue Critical Care   Code Status: Level 1 - Full Code  ---------------------------------------------------------------------------------------  ICU CORE MEASURES    Prophylaxis   VTE Pharmacologic Prophylaxis: Pharmacologic VTE Prophylaxis contraindicated due to thrombocytopenia  VTE Mechanical Prophylaxis: sequential compression device  Stress Ulcer Prophylaxis: Pantoprazole IV     ABCDE Protocol (if indicated)  Plan to perform spontaneous awakening trial today? Yes  Plan to perform spontaneous breathing trial today? Yes  Obvious barriers to extubation?  Yes, seizures     Invasive Devices Review  Invasive Devices  Report    Peripheral Intravenous Line            Peripheral IV 03/07/22 Left Wrist 1 day    Peripheral IV 03/07/22 Right Antecubital <1 day          Arterial Line            Arterial Line 03/08/22 Radial <1 day          Drain            NG/OG/Enteral Tube Orogastric 16 Fr Center mouth <1 day    Urethral Catheter Temperature probe <1 day          Airway            ETT  Cuffed 7 5 mm <1 day              Can any invasive devices be discontinued today? No  ---------------------------------------------------------------------------------------  OBJECTIVE    Vitals   Vitals:    22 0035 22 0040 22 0045 22 0050   BP: 94/57 101/64 114/72 103/65   Pulse: 95 103 (!) 109 (!) 106   Resp: 15 20 (!) 23 15   Temp:       TempSrc:       SpO2: 98% 99% 95% 97%   Weight:         Temp (24hrs), Av °F (36 1 °C), Min:95 °F (35 °C), Max:99 °F (37 2 °C)    Respiratory:  SpO2: SpO2: 97 %, SpO2 Activity: SpO2 Activity: At Rest  Nasal Cannula O2 Flow Rate (L/min): 6 L/min    Invasive/non-invasive ventilation settings   Respiratory  Report   Lab Data (Last 4 hours)    None         O2/Vent Data (Last 4 hours)       2115           Vent Mode AC/VC       Resp Rate (BPM) (BPM) 14       Vt (mL) (mL) 375       FIO2 (%) (%) 70       PEEP (cmH2O) (cmH2O) 6       MV 5 86                   Physical Exam  Vitals and nursing note reviewed  Constitutional:       Interventions: She is sedated and intubated  HENT:      Head: Normocephalic and atraumatic  Right Ear: External ear normal       Left Ear: External ear normal       Nose: Nose normal       Mouth/Throat:      Comments: Bloody mouth 2/2 tongue lac  Eyes:      Extraocular Movements: Extraocular movements intact  Pupils: Pupils are equal, round, and reactive to light  Cardiovascular:      Rate and Rhythm: Regular rhythm  Tachycardia present  Pulses: Normal pulses  Radial pulses are 2+ on the right side and 2+ on the left side  Dorsalis pedis pulses are 2+ on the right side and 2+ on the left side  Heart sounds: S1 normal and S2 normal  No murmur heard  No gallop  Pulmonary:      Effort: Pulmonary effort is normal  She is intubated  Breath sounds: Normal breath sounds  No rhonchi or rales  Abdominal:      General: Abdomen is flat  Bowel sounds are normal  There is no distension  Palpations: Abdomen is soft  Tenderness:  There is no abdominal tenderness  Musculoskeletal:         General: Normal range of motion  Cervical back: Normal range of motion  Right lower leg: No edema  Left lower leg: No edema  Skin:     General: Skin is warm and dry  Capillary Refill: Capillary refill takes less than 2 seconds  Neurological:      GCS: GCS eye subscore is 4  GCS verbal subscore is 1  GCS motor subscore is 6  Comments: GCS 11T   Psychiatric:         Behavior: Behavior is cooperative         Laboratory and Diagnostics:  Results from last 7 days   Lab Units 03/08/22  0029 03/07/22  1826 03/07/22  1722 03/07/22  1713   WBC Thousand/uL  --   --  8 47  --    HEMOGLOBIN g/dL 9 5*  --  11 2*  --    I STAT HEMOGLOBIN g/dl  --   --   --  11 6   HEMATOCRIT % 26 8*  --  35 5  --    HEMATOCRIT, ISTAT %  --   --   --  34*   PLATELETS Thousands/uL  --  42* 49*  --    NEUTROS PCT %  --   --  77*  --    MONOS PCT %  --   --  5  --      Results from last 7 days   Lab Units 03/07/22  1722 03/07/22  1713   SODIUM mmol/L 137  --    POTASSIUM mmol/L 3 7  --    CHLORIDE mmol/L 97*  --    CO2 mmol/L 17*  --    CO2, I-STAT mmol/L  --  20*   ANION GAP mmol/L 23*  --    BUN mg/dL 9  --    CREATININE mg/dL 1 17  --    CALCIUM mg/dL 9 1  --    GLUCOSE RANDOM mg/dL 350*  --      Results from last 7 days   Lab Units 03/07/22  1722   MAGNESIUM mg/dL 1 9   PHOSPHORUS mg/dL 6 4*      Results from last 7 days   Lab Units 03/07/22  1826   INR  1 16   PTT seconds 27  27          Results from last 7 days   Lab Units 03/07/22  2307 03/07/22  2113 03/07/22  1826   LACTIC ACID mmol/L 1 7 2 8* 8 9*     ABG:  Results from last 7 days   Lab Units 03/07/22 1932   PH ART  7 313*   PCO2 ART mm Hg 45 0*   PO2 ART mm Hg 205 9*   HCO3 ART mmol/L 22 3   BASE EXC ART mmol/L -3 8   ABG SOURCE  Radial, Right     VBG:  Results from last 7 days   Lab Units 03/07/22 1932   ABG SOURCE  Radial, Right     Micro  BC pending, UA unremarkable    EKG: NSR rate 103  Imaging: 3/7 CXR  CTH, CT Spine, Xray trauma negative  Repeat CXR pending   I have personally reviewed pertinent reports  Intake and Output  I/O       03/06 0701 03/07 0700 03/07 0701 03/08 0700    I V  (mL/kg)  0 (0)    Total Intake(mL/kg)  0 (0)    Urine (mL/kg/hr)  100    Total Output  100    Net  -100              UOP: 60 ml/hr     Height and Weights         There is no height or weight on file to calculate BMI  Weight (last 2 days)     Date/Time Weight    03/07/22 1705 65 (143 3)          Nutrition       Diet Orders   (From admission, onward)             Start     Ordered    03/07/22 2204  Room Service  Once        Question:  Type of Service  Answer:  Room Service - Appropriate with Assistance    03/07/22 2204 03/07/22 1923  Diet NPO  Diet effective now        References:    Nutrtion Support Algorithm Enteral vs  Parenteral   Question Answer Comment   Diet Type NPO    RD to adjust diet per protocol?  No        03/07/22 1926              Active Medications  Scheduled Meds:  Current Facility-Administered Medications   Medication Dose Route Frequency Provider Last Rate    cefepime  2,000 mg Intravenous Q12H Luis Armando Kenny PA-C      chlorhexidine  15 mL Mouth/Throat Q12H [address] [place], [place]      dexmedetomidine  0 1-0 7 mcg/kg/hr Intravenous Titrated Micjadiel Franks [place] city[place] Stopped (03/07/22 2345)    fentaNYL  75 mcg/hr Intravenous Continuous PAZ Aguilera Stopped (03/07/22 2345)    fentanyl citrate (PF)  50 mcg Intravenous Q2H PRN PAZ Aguilera      folic acid  1 mg Oral Daily [place], [place]      insulin lispro  1-5 Units Subcutaneous Q6H [address] [place], [place]      LORazepam  2 mg Intravenous Q1H PRN PAZ Aguilera      multi-electrolyte  1,000 mL Intravenous Once Adi Salter      multi-electrolyte  100 mL/hr Intravenous Continuous PAZ Aguilera Stopped (03/07/22 2345)   Delores Sunshine multivitamin-minerals  1 tablet Oral Daily 100 [facility], CRNP      propofol  40 mcg/kg/min Intravenous Titrated Elma Foy PA-C Stopped (03/07/22 2335)    thiamine  100 mg Oral Daily 100 [facility], [address]      vancomycin  15 mg/kg Intravenous Q12H Edu Burroughs, [place]      vasopressin  0 04 Units/min Intravenous Continuous 100 [facility], CRNP 0 04 Units/min (03/08/22 0017)     Continuous Infusions:  dexmedetomidine, 0 1-0 7 mcg/kg/hr, Last Rate: Stopped (03/07/22 2345)  fentaNYL, 75 mcg/hr, Last Rate: Stopped (03/07/22 2345)  multi-electrolyte, 100 mL/hr, Last Rate: Stopped (03/07/22 2345)  propofol, 40 mcg/kg/min, Last Rate: Stopped (03/07/22 2335)  vasopressin, 0 04 Units/min, Last Rate: 0 04 Units/min (03/08/22 0017)      PRN Meds:   fentanyl citrate (PF), 50 mcg, Q2H PRN  LORazepam, 2 mg, Q1H PRN        Allergies   Not on File  ---------------------------------------------------------------------------------------  Advance Directive and Living Will:      Power of :    POLST:    ---------------------------------------------------------------------------------------  Care Time Delivered:   Upon my evaluation, this patient had a high probability of imminent or life-threatening deterioration due to Acute hypotension, r/o seizures, AHRF, which required my direct attention, intervention, and personal management  I have personally provided 60 minutes (0000 to 0100) of critical care time, exclusive of procedures, teaching, family meetings, and any prior time recorded by providers other than myself  PAZ Daniel      Portions of the record may have been created with voice recognition software  Occasional wrong word or "sound a like" substitutions may have occurred due to the inherent limitations of voice recognition software    Read the chart carefully and recognize, using context, where substitutions have occurred

## 2022-03-08 NOTE — ASSESSMENT & PLAN NOTE
03/12/2022:  Seen today by Neurology is this 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, (8-21), with history of alcohol abuse who presents in [place] ED on 3/7/22 as trauma alert A d/t unwitnessed fall and altered mental status found to be in status s/p extubation and Sz free since 3/8/22 6:45 am    To review, pt wasn't feeling well on 3/7-  Lightheadedness, tunnel vision, nausea, unable to eat, difficulty walking, later found down by  around 4:30 pm when he returned home with tongue laceration, abnormal movement of left hand, on arrival tachycardic, GCS-8, platelet 46K, metabolic acidosis, elevated lactic acid, procal 1 49, pt found to have clinical sz in ED and was given Ativan, intubated, sedated on propofol, low BP, received crystalloids, she was started on video EEG monitoring, on 3/8/22 she had 3 electrographic seizures from 12 midnight to 2:00 am, 2:00 to 4:00 am seizure every 15 min, last seizure noted was around 6:45 am, 3/8 on EEG  Her Propofol was increased, given 2 gm Keppra and started 1 gm Keppra bid, started on Vimpat 100 mg bid  Versed was also given at one point  Also started on antibiotics d/t likely aspiration pneumonia (Rt lung base) seen on Xray  Weaned off propofol, no sz, s/p extubation on 3/9, pt A, O x 3 on 3/10  MRI reviewed- Focal diffusion-weighted and FLAIR signal hyperintensity involving the left hippocampal formation is consistent with status epilepticus  No associated hemorrhage, enhancement or involvement of the contralateral temporal lobe  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  No acute intracranial hemorrhage  Exam today, 3-12, is nonfocal and within normal limits  She continues to have a one-to-one sitter given the fact there is a question of a medication overdose, until psych sees her  To this examiner she denies any SI ideation    She reports she feels well right now her exam structurally is within normal limits  Impression- Suspicion remains related to Alcohol withdrawal, med overdose, vs TBI from previous fall (also trauma alert a August of 2021, alternate medical record number), Seizure focus from left temporal region confirmed by MRI with hyperintensity in left hippocampus post status changes, similar episode in 8-21 (she was found down at that time as well) with right posterior occipital intraparenchymal hematoma within rim of surrounding edema, subarachnoid hemorrhage, small extra-axial focus of hemorrhage over the right parietal lobe  Thin posterior falx subdural hematoma  She is alert, oriented with non focal weakness this am    Plan-  · D/C Keppra d/t thrombocytopenia appears somewhat improved   · Continue Vimpat 100 mg q 12H  · Please reach out to on-an Neurologist for more seizure activity, has room to go up on Vimpat   · Psych consult pending

## 2022-03-08 NOTE — PROGRESS NOTES
Critical Care Services- Interval Progress Note   [facility] 58 y o  female MRN: [id number]  Unit/Bed#: ICU 07 Encounter: [id number]  Assessment and Plan  No traumatic injuries per trauma evaluation  Will transfer to medical critical care service  Currently on continuous EEG - was in status epilepticus, no further seizures since early a m   Diagnosis: Status epilepticus - currently not seizing on Vimpat BID, propofol  Has had multiple benzo doses as well  Currently following commands  o Plan:  Continue Vimpat  o Continue propofol at 20mcg/kg/hr as she is not seizing, she is sedated but able to follow commands  o Appreciate neurology input     Diagnosis: Acute respiratory failure - on MV secondary to encephalopathy  o Plan: continue minimal vent settings until able to lighten sedation more     Diagnosis: Shock - unsure etiology - possibly worsened by propofol  New RLL opacity which could be related to new aspiration pneumonia     o Plan: Will continue broad spectrum antibiotics - would switch to unasyn to avoid any cephalosporin drug toxicity  o With high level vasopressors, need central line for safe delivery  Will consult patient's  for consent   Diagnosis: Thrombocytopenia - unsure etiology - appears at baseline (likely related to alcohol abuse)  o Plan: Will monitor for any signs of bleeding  o It was confirmed with the patient's  that she is a [employer] and to not receive any blood products     Diagnosis: Shock induced troponin elevation - No ischemia on EKG   o Plan: No heparin at this time  o Echo pending     Diagnosis: Depression with concern for zoloft overdose (found down with bottle of pills)  o Plan: Toxicology consult appreciated      Upon my evaluation, this patient had a high probability of imminent or life-threatening deterioration due to Shock, seizures, which required my direct attention, intervention, and personal management       I have personally provided 45 minutes  on 3/8/2022 of critical care time, exclusive of procedures, teaching, family meetings, and any prior time recorded by providers other than myself  Time includes review of laboratory data, review of imaging/radiology results, discussion with consultants, monitoring for potential decompensation  Interventions were performed as documented above    -------------------------------------------------------------------------------------------------------------------------------------  Hemodynamic Monitoring:  Vitals:    03/08/22 1400   BP:    Pulse: 89   Resp: (!) 5   Temp: 100 2 °F (37 9 °C)   SpO2: 99%     General:  Patient is sedated but arousable to voice in no acute respiratory distress  Eyes: PERRL, no scleral icterus  Neck: No JVD  CV:  Regular, +S1 and S2, No murmurs, gallops or rubs appreciated  Lungs: Clear to auscultation bilateral without wheeze, rales or rhonci  Abdomen: Soft, +BS, Non-tender, non-distended  Extremities: No clubbing, cyanosis or edema  Neuro:  Sedated, able to squeeze bilateral hands and released to command, able to wiggle bilateral toes to command    Skin: Warm, No rashes       Laboratory   Results from last 7 days   Lab Units 03/08/22  0448 03/08/22  0029 03/07/22  1826 03/07/22  1722 03/07/22  1713   WBC Thousand/uL 7 40  --   --  8 47  --    HEMOGLOBIN g/dL 9 1* 9 5*  --  11 2*  --    I STAT HEMOGLOBIN g/dl  --   --   --   --  11 6   HEMATOCRIT % 26 9* 26 8*  --  35 5  --    HEMATOCRIT, ISTAT %  --   --   --   --  34*   PLATELETS Thousands/uL 35*  --  42* 49*  --    NEUTROS PCT % 77*  --   --  77*  --    MONOS PCT % 4  --   --  5  --      Results from last 7 days   Lab Units 03/08/22  0448 03/08/22  0029 03/07/22  1722 03/07/22  1713   SODIUM mmol/L 136 139 137  --    POTASSIUM mmol/L 3 1* 3 5 3 7  --    CHLORIDE mmol/L 101 103 97*  --    CO2 mmol/L 23 23 17*  --    CO2, I-STAT mmol/L  --   --   --  20*   ANION GAP mmol/L 12 13 23*  --    BUN mg/dL 6 6 9  --    CREATININE mg/dL 0  72 0 74 1 17  --    CALCIUM mg/dL 7 4* 7 4* 9 1  --    GLUCOSE RANDOM mg/dL 179* 113 350*  --    ALT U/L 85* 92*  --   --    AST U/L 129* 148*  --   --    ALK PHOS U/L 144* 160*  --   --    ALBUMIN g/dL 2 3* 2 6*  --   --    TOTAL BILIRUBIN mg/dL 1 09* 0 58  --   --      Results from last 7 days   Lab Units 03/08/22  0448 03/08/22  0029 03/07/22  1722   MAGNESIUM mg/dL 2 3 2 2 1 9   PHOSPHORUS mg/dL 2 9 3 1 6 4*      Results from last 7 days   Lab Units 03/07/22  1826   INR  1 16   PTT seconds 27  27          Results from last 7 days   Lab Units 03/07/22  2307 03/07/22  2113 03/07/22  1826   LACTIC ACID mmol/L 1 7 2 8* 8 9*     ABG:  Results from last 7 days   Lab Units 03/08/22  0253   PH ART  7 425   PCO2 ART mm Hg 34 6*   PO2 ART mm Hg 84 1   HCO3 ART mmol/L 22 2   BASE EXC ART mmol/L -1 8   ABG SOURCE  Line, Arterial     VBG:  Results from last 7 days   Lab Units 03/08/22  0253   ABG SOURCE  Line, Arterial     Results from last 7 days   Lab Units 03/08/22  0447   PROCALCITONIN ng/ml 1 49*       Micro        Diagnostic Imaging / Data: I have personally reviewed pertinent films in PACS     CXR 3/8/22 new RLL basilar opacity    Medications:  Current Facility-Administered Medications   Medication Dose Route Frequency    acetaminophen (TYLENOL) tablet 650 mg  650 mg Oral Q4H PRN    cefepime (MAXIPIME) 2,000 mg in dextrose 5 % 50 mL IVPB  2,000 mg Intravenous Q12H    chlorhexidine (PERIDEX) 0 12 % oral rinse 15 mL  15 mL Mouth/Throat Q12H DESTIN    dexmedeTOMIDine (Precedex) 400 mcg in sodium chloride 0 9% 100 mL  0 1-0 7 mcg/kg/hr Intravenous Titrated    fentaNYL 1000 mcg in sodium chloride 0 9% 100mL infusion  75 mcg/hr Intravenous Continuous    fentanyl citrate (PF) 100 MCG/2ML 50 mcg  50 mcg Intravenous M2T PRN    folic acid (FOLVITE) tablet 1 mg  1 mg Oral Daily    insulin lispro (HumaLOG) 100 units/mL subcutaneous injection 1-5 Units  1-5 Units Subcutaneous Q6H [address]    lacosamide (VIMPAT) 100 mg in sodium chloride 0 9 % 100 mL IVPB  100 mg Intravenous Q12H    levETIRAcetam (KEPPRA) 1,000 mg in sodium chloride 0 9 % 100 mL IVPB  1,000 mg Intravenous Q12H [facility]    LORazepam (ATIVAN) injection 2 mg  2 mg Intravenous Q1H PRN    LORazepam (ATIVAN) injection 2 mg  2 mg Intravenous Once    LORazepam (ATIVAN) injection 2 mg  2 mg Intravenous Once    multi-electrolyte (PLASMALYTE-A/ISOLYTE-S PH 7 4) IV solution  100 mL/hr Intravenous Continuous    multivitamin-minerals (CENTRUM) tablet 1 tablet  1 tablet Oral Daily    NOREPINEPHRINE 4 MG  ML NSS (CMPD ORDER) infusion  1-30 mcg/min Intravenous Titrated    pantoprazole (PROTONIX) injection 40 mg  40 mg Intravenous Q24H DESTIN    propofol (DIPRIVAN) 1000 mg in 100 mL infusion (premix)  40 mcg/kg/min Intravenous Titrated    thiamine tablet 100 mg  100 mg Oral Daily    vancomycin (VANCOCIN) IVPB (premix in dextrose) 1,000 mg 200 mL  15 mg/kg Intravenous Q12H    vasopressin (PITRESSIN) 20 Units in sodium chloride 0 9 % 100 mL infusion  0 04 Units/min Intravenous Continuous       SIGNATURE: Cameron Lovell DO    Portions of the record may have been created with voice recognition software  Occasional wrong word or "sound a like" substitutions may have occurred due to the inherent limitations of voice recognition software    Read the chart carefully and recognize, using context, where substitutions have occurred

## 2022-03-09 PROBLEM — E87.2 LACTIC ACIDOSIS: Status: RESOLVED | Noted: 2022-03-07 | Resolved: 2022-03-09

## 2022-03-09 PROBLEM — R57.9 SHOCK (HCC): Status: ACTIVE | Noted: 2022-03-09

## 2022-03-09 PROBLEM — J69.0 ASPIRATION PNEUMONIA (HCC): Status: ACTIVE | Noted: 2022-03-09

## 2022-03-09 PROBLEM — J98.8 AIRWAY COMPROMISE: Status: ACTIVE | Noted: 2022-03-09

## 2022-03-09 PROBLEM — E87.20 LACTIC ACIDOSIS: Status: RESOLVED | Noted: 2022-03-07 | Resolved: 2022-03-09

## 2022-03-09 LAB
ALBUMIN SERPL BCP-MCNC: 2.2 G/DL (ref 3.5–5)
ALP SERPL-CCNC: 114 U/L (ref 46–116)
ALT SERPL W P-5'-P-CCNC: 58 U/L (ref 12–78)
AMPHETAMINES UR QL SCN: NEGATIVE NG/ML
ANION GAP SERPL CALCULATED.3IONS-SCNC: 10 MMOL/L (ref 4–13)
AST SERPL W P-5'-P-CCNC: 103 U/L (ref 5–45)
ATRIAL RATE: 153 BPM
BARBITURATES UR QL SCN: NEGATIVE NG/ML
BASOPHILS # BLD MANUAL: 0 THOUSAND/UL (ref 0–0.1)
BASOPHILS NFR MAR MANUAL: 0 % (ref 0–1)
BENZODIAZ UR QL: NEGATIVE NG/ML
BILIRUB SERPL-MCNC: 0.91 MG/DL (ref 0.2–1)
BUN SERPL-MCNC: 5 MG/DL (ref 5–25)
BZE UR QL: NEGATIVE NG/ML
CALCIUM ALBUM COR SERPL-MCNC: 9 MG/DL (ref 8.3–10.1)
CALCIUM SERPL-MCNC: 7.6 MG/DL (ref 8.3–10.1)
CANNABINOIDS UR QL SCN: NEGATIVE NG/ML
CHLORIDE SERPL-SCNC: 108 MMOL/L (ref 100–108)
CO2 SERPL-SCNC: 20 MMOL/L (ref 21–32)
CREAT SERPL-MCNC: 0.57 MG/DL (ref 0.6–1.3)
EOSINOPHIL # BLD MANUAL: 0 THOUSAND/UL (ref 0–0.4)
EOSINOPHIL NFR BLD MANUAL: 0 % (ref 0–6)
ERYTHROCYTE [DISTWIDTH] IN BLOOD BY AUTOMATED COUNT: 14.3 % (ref 11.6–15.1)
EST. AVERAGE GLUCOSE BLD GHB EST-MCNC: 88 MG/DL
GFR SERPL CREATININE-BSD FRML MDRD: 99 ML/MIN/1.73SQ M
GLUCOSE SERPL-MCNC: 113 MG/DL (ref 65–140)
GLUCOSE SERPL-MCNC: 120 MG/DL (ref 65–140)
GLUCOSE SERPL-MCNC: 129 MG/DL (ref 65–140)
GLUCOSE SERPL-MCNC: 133 MG/DL (ref 65–140)
GLUCOSE SERPL-MCNC: 137 MG/DL (ref 65–140)
GLUCOSE SERPL-MCNC: 143 MG/DL (ref 65–140)
HBA1C MFR BLD: 4.7 %
HCT VFR BLD AUTO: 26.1 % (ref 34.8–46.1)
HGB BLD-MCNC: 8.9 G/DL (ref 11.5–15.4)
LYMPHOCYTES # BLD AUTO: 1.48 THOUSAND/UL (ref 0.6–4.47)
LYMPHOCYTES # BLD AUTO: 17 % (ref 14–44)
MAGNESIUM SERPL-MCNC: 1.8 MG/DL (ref 1.6–2.6)
MCH RBC QN AUTO: 34.9 PG (ref 26.8–34.3)
MCHC RBC AUTO-ENTMCNC: 34.1 G/DL (ref 31.4–37.4)
MCV RBC AUTO: 102 FL (ref 82–98)
METHADONE UR QL SCN: NEGATIVE NG/ML
MONOCYTES # BLD AUTO: 0.69 THOUSAND/UL (ref 0–1.22)
MONOCYTES NFR BLD: 8 % (ref 4–12)
NEUTROPHILS # BLD MANUAL: 6.42 THOUSAND/UL (ref 1.85–7.62)
NEUTS BAND NFR BLD MANUAL: 6 % (ref 0–8)
NEUTS SEG NFR BLD AUTO: 68 % (ref 43–75)
OPIATES UR QL: NEGATIVE NG/ML
P AXIS: 42 DEGREES
PCP UR QL: NEGATIVE NG/ML
PHOSPHATE SERPL-MCNC: 2.2 MG/DL (ref 2.3–4.1)
PLATELET # BLD AUTO: 29 THOUSANDS/UL (ref 149–390)
PLATELET BLD QL SMEAR: ABNORMAL
PMV BLD AUTO: 12.1 FL (ref 8.9–12.7)
POLYCHROMASIA BLD QL SMEAR: PRESENT
POTASSIUM SERPL-SCNC: 3.6 MMOL/L (ref 3.5–5.3)
PR INTERVAL: 104 MS
PROPOXYPH UR QL SCN: NEGATIVE NG/ML
PROT SERPL-MCNC: 5.3 G/DL (ref 6.4–8.2)
QRS AXIS: 77 DEGREES
QRSD INTERVAL: 84 MS
QT INTERVAL: 306 MS
QTC INTERVAL: 485 MS
RBC # BLD AUTO: 2.55 MILLION/UL (ref 3.81–5.12)
RBC MORPH BLD: PRESENT
SODIUM SERPL-SCNC: 138 MMOL/L (ref 136–145)
STOMATOCYTES BLD QL SMEAR: PRESENT
T WAVE AXIS: 70 DEGREES
TOXIC GRANULES BLD QL SMEAR: PRESENT
VARIANT LYMPHS # BLD AUTO: 1 %
VENTRICULAR RATE: 151 BPM
WBC # BLD AUTO: 8.68 THOUSAND/UL (ref 4.31–10.16)
WBC TOXIC VACUOLES BLD QL SMEAR: PRESENT

## 2022-03-09 PROCEDURE — 83735 ASSAY OF MAGNESIUM: CPT | Performed by: NURSE PRACTITIONER

## 2022-03-09 PROCEDURE — 93010 ELECTROCARDIOGRAM REPORT: CPT | Performed by: INTERNAL MEDICINE

## 2022-03-09 PROCEDURE — 99222 1ST HOSP IP/OBS MODERATE 55: CPT | Performed by: INTERNAL MEDICINE

## 2022-03-09 PROCEDURE — 83036 HEMOGLOBIN GLYCOSYLATED A1C: CPT | Performed by: NURSE PRACTITIONER

## 2022-03-09 PROCEDURE — 99291 CRITICAL CARE FIRST HOUR: CPT | Performed by: INTERNAL MEDICINE

## 2022-03-09 PROCEDURE — 80053 COMPREHEN METABOLIC PANEL: CPT | Performed by: NURSE PRACTITIONER

## 2022-03-09 PROCEDURE — 94760 N-INVAS EAR/PLS OXIMETRY 1: CPT

## 2022-03-09 PROCEDURE — 94150 VITAL CAPACITY TEST: CPT

## 2022-03-09 PROCEDURE — 82948 REAGENT STRIP/BLOOD GLUCOSE: CPT

## 2022-03-09 PROCEDURE — 85027 COMPLETE CBC AUTOMATED: CPT | Performed by: NURSE PRACTITIONER

## 2022-03-09 PROCEDURE — 85007 BL SMEAR W/DIFF WBC COUNT: CPT | Performed by: NURSE PRACTITIONER

## 2022-03-09 PROCEDURE — C9113 INJ PANTOPRAZOLE SODIUM, VIA: HCPCS

## 2022-03-09 PROCEDURE — 84100 ASSAY OF PHOSPHORUS: CPT | Performed by: NURSE PRACTITIONER

## 2022-03-09 PROCEDURE — 94003 VENT MGMT INPAT SUBQ DAY: CPT

## 2022-03-09 PROCEDURE — 99232 SBSQ HOSP IP/OBS MODERATE 35: CPT | Performed by: PSYCHIATRY & NEUROLOGY

## 2022-03-09 RX ORDER — SODIUM CHLORIDE, SODIUM GLUCONATE, SODIUM ACETATE, POTASSIUM CHLORIDE, MAGNESIUM CHLORIDE, SODIUM PHOSPHATE, DIBASIC, AND POTASSIUM PHOSPHATE .53; .5; .37; .037; .03; .012; .00082 G/100ML; G/100ML; G/100ML; G/100ML; G/100ML; G/100ML; G/100ML
500 INJECTION, SOLUTION INTRAVENOUS ONCE
Status: COMPLETED | OUTPATIENT
Start: 2022-03-09 | End: 2022-03-09

## 2022-03-09 RX ADMIN — FOLIC ACID 1 MG: 1 TABLET ORAL at 08:41

## 2022-03-09 RX ADMIN — SODIUM CHLORIDE 3 G: 9 INJECTION, SOLUTION INTRAVENOUS at 02:53

## 2022-03-09 RX ADMIN — LEVETIRACETAM 1000 MG: 100 INJECTION, SOLUTION INTRAVENOUS at 15:24

## 2022-03-09 RX ADMIN — FENTANYL CITRATE 50 MCG: 50 INJECTION INTRAMUSCULAR; INTRAVENOUS at 03:14

## 2022-03-09 RX ADMIN — MULTIPLE VITAMINS W/ MINERALS TAB 1 TABLET: TAB ORAL at 08:41

## 2022-03-09 RX ADMIN — FENTANYL CITRATE 50 MCG: 50 INJECTION INTRAMUSCULAR; INTRAVENOUS at 08:06

## 2022-03-09 RX ADMIN — VASOPRESSIN 0.04 UNITS/MIN: 20 INJECTION PARENTERAL at 14:32

## 2022-03-09 RX ADMIN — SODIUM CHLORIDE, SODIUM GLUCONATE, SODIUM ACETATE, POTASSIUM CHLORIDE, MAGNESIUM CHLORIDE, SODIUM PHOSPHATE, DIBASIC, AND POTASSIUM PHOSPHATE 100 ML/HR: .53; .5; .37; .037; .03; .012; .00082 INJECTION, SOLUTION INTRAVENOUS at 02:26

## 2022-03-09 RX ADMIN — SODIUM CHLORIDE, SODIUM GLUCONATE, SODIUM ACETATE, POTASSIUM CHLORIDE, MAGNESIUM CHLORIDE, SODIUM PHOSPHATE, DIBASIC, AND POTASSIUM PHOSPHATE 500 ML: .53; .5; .37; .037; .03; .012; .00082 INJECTION, SOLUTION INTRAVENOUS at 11:03

## 2022-03-09 RX ADMIN — VASOPRESSIN 0.04 UNITS/MIN: 20 INJECTION PARENTERAL at 23:44

## 2022-03-09 RX ADMIN — VASOPRESSIN 0.04 UNITS/MIN: 20 INJECTION PARENTERAL at 05:34

## 2022-03-09 RX ADMIN — SODIUM CHLORIDE 100 MG: 9 INJECTION, SOLUTION INTRAVENOUS at 20:00

## 2022-03-09 RX ADMIN — FENTANYL CITRATE 50 MCG: 50 INJECTION INTRAMUSCULAR; INTRAVENOUS at 01:33

## 2022-03-09 RX ADMIN — Medication 13 MCG/MIN: at 00:24

## 2022-03-09 RX ADMIN — PROPOFOL 30 MCG/KG/MIN: 10 INJECTION, EMULSION INTRAVENOUS at 05:34

## 2022-03-09 RX ADMIN — SODIUM CHLORIDE 3 G: 9 INJECTION, SOLUTION INTRAVENOUS at 20:45

## 2022-03-09 RX ADMIN — SODIUM CHLORIDE 3 G: 9 INJECTION, SOLUTION INTRAVENOUS at 14:32

## 2022-03-09 RX ADMIN — SODIUM CHLORIDE 3 G: 9 INJECTION, SOLUTION INTRAVENOUS at 09:32

## 2022-03-09 RX ADMIN — Medication 13 MCG/MIN: at 05:34

## 2022-03-09 RX ADMIN — SODIUM CHLORIDE 100 MG: 9 INJECTION, SOLUTION INTRAVENOUS at 08:33

## 2022-03-09 RX ADMIN — LEVETIRACETAM 1000 MG: 100 INJECTION, SOLUTION INTRAVENOUS at 02:28

## 2022-03-09 RX ADMIN — DEXMEDETOMIDINE HYDROCHLORIDE 0.4 MCG/KG/HR: 400 INJECTION INTRAVENOUS at 10:57

## 2022-03-09 RX ADMIN — PROPOFOL 20 MCG/KG/MIN: 10 INJECTION, EMULSION INTRAVENOUS at 00:46

## 2022-03-09 RX ADMIN — Medication 15 ML: at 08:40

## 2022-03-09 RX ADMIN — Medication 5 MCG/MIN: at 12:35

## 2022-03-09 RX ADMIN — PANTOPRAZOLE SODIUM 40 MG: 40 INJECTION, POWDER, FOR SOLUTION INTRAVENOUS at 08:46

## 2022-03-09 RX ADMIN — THIAMINE HCL TAB 100 MG 100 MG: 100 TAB at 08:41

## 2022-03-09 RX ADMIN — Medication 15 ML: at 21:24

## 2022-03-09 NOTE — ASSESSMENT & PLAN NOTE
Able to wake up, follows commands  Most likely 2/2 seizure with some metabolic contribution  No new seizure like activity, Acidosis resolved, being treated for aspiration pna, switched to zosyn to avoid cephalosporin se  Plan:  · Delirium precautions  · Neuro checks  · Monitor neuro status

## 2022-03-09 NOTE — ASSESSMENT & PLAN NOTE
CXR initially with clear lungs  Repeat Imaging with prominent RLL consolidation  Respiratory status stable with alexey  Started on cefepime however to avoid neuro se, switched to zosyn 03/08  Plan:  · Continue zosyn, D3; total D4  · Aspiration precautions  · mucinex

## 2022-03-09 NOTE — ASSESSMENT & PLAN NOTE
known etiology on arrival - acidosis, asp pna, concern for zoloft overdose, multiple meds received to control seizures  At this point fluid resuscitated, acidosis resolved, being treated for asp pna     ECHO 03/08 with EF 40%, almost generalized hypokinesis however not cause of shock  Shock induced transaminitis and trop elevation    Remains on norepi 2 and vaso 0 4; MAPs >65  Plan:  · Continue to wean off pressors, Maintain maps >65  · Able to eat, dc maintenance fluids  · Monitor IO, renal/hepatic function

## 2022-03-09 NOTE — PLAN OF CARE
Problem: Potential for Falls  Goal: Patient will remain free of falls  Description: INTERVENTIONS:  - Educate patient/family on patient safety including physical limitations  - Instruct patient to call for assistance with activity   - Consult OT/PT to assist with strengthening/mobility   - Keep Call bell within reach  - Keep bed low and locked with side rails adjusted as appropriate  - Keep care items and personal belongings within reach  - Initiate and maintain comfort rounds  - Make Fall Risk Sign visible to staff  - Apply yellow socks and bracelet for high fall risk patients  - Consider moving patient to room near nurses station  Outcome: Not Progressing     Problem: MOBILITY - ADULT  Goal: Maintain or return to baseline ADL function  Description: INTERVENTIONS:  -  Assess patient's ability to carry out ADLs; assess patient's baseline for ADL function and identify physical deficits which impact ability to perform ADLs (bathing, care of mouth/teeth, toileting, grooming, dressing, etc )  - Assess/evaluate cause of self-care deficits   - Assess range of motion  - Assess patient's mobility; develop plan if impaired  - Assess patient's need for assistive devices and provide as appropriate  - Encourage maximum independence but intervene and supervise when necessary  - Involve family in performance of ADLs  - Assess for home care needs following discharge   - Consider OT consult to assist with ADL evaluation and planning for discharge  - Provide patient education as appropriate  Outcome: Not Progressing  Goal: Maintains/Returns to pre admission functional level  Description: INTERVENTIONS:  - Perform BMAT or MOVE assessment daily    - Set and communicate daily mobility goal to care team and patient/family/caregiver     - Collaborate with rehabilitation services on mobility goals if consulted  - Record patient progress and toleration of activity level   Outcome: Not Progressing     Problem: PAIN - ADULT  Goal: Verbalizes/displays adequate comfort level or baseline comfort level  Description: Interventions:  - Encourage patient to monitor pain and request assistance  - Assess pain using appropriate pain scale  - Administer analgesics based on type and severity of pain and evaluate response  - Implement non-pharmacological measures as appropriate and evaluate response  - Consider cultural and social influences on pain and pain management  - Notify physician/advanced practitioner if interventions unsuccessful or patient reports new pain  Outcome: Not Progressing     Problem: INFECTION - ADULT  Goal: Absence or prevention of progression during hospitalization  Description: INTERVENTIONS:  - Assess and monitor for signs and symptoms of infection  - Monitor lab/diagnostic results  - Monitor all insertion sites, i e  indwelling lines, tubes, and drains  - Monitor endotracheal if appropriate and nasal secretions for changes in amount and color  - Britt appropriate cooling/warming therapies per order  - Administer medications as ordered  - Instruct and encourage patient and family to use good hand hygiene technique  - Identify and instruct in appropriate isolation precautions for identified infection/condition  Outcome: Not Progressing  Goal: Absence of fever/infection during neutropenic period  Description: INTERVENTIONS:  - Monitor WBC    Outcome: Not Progressing     Problem: SAFETY ADULT  Goal: Patient will remain free of falls  Description: INTERVENTIONS:  - Educate patient/family on patient safety including physical limitations  - Instruct patient to call for assistance with activity   - Consult OT/PT to assist with strengthening/mobility   - Keep Call bell within reach  - Keep bed low and locked with side rails adjusted as appropriate  - Keep care items and personal belongings within reach  - Initiate and maintain comfort rounds  - Make Fall Risk Sign visible to staff  - Apply yellow socks and bracelet for high fall risk patients  - Consider moving patient to room near nurses station  Outcome: Not Progressing  Goal: Maintain or return to baseline ADL function  Description: INTERVENTIONS:  -  Assess patient's ability to carry out ADLs; assess patient's baseline for ADL function and identify physical deficits which impact ability to perform ADLs (bathing, care of mouth/teeth, toileting, grooming, dressing, etc )  - Assess/evaluate cause of self-care deficits   - Assess range of motion  - Assess patient's mobility; develop plan if impaired  - Assess patient's need for assistive devices and provide as appropriate  - Encourage maximum independence but intervene and supervise when necessary  - Involve family in performance of ADLs  - Assess for home care needs following discharge   - Consider OT consult to assist with ADL evaluation and planning for discharge  - Provide patient education as appropriate  Outcome: Not Progressing  Goal: Maintains/Returns to pre admission functional level  Description: INTERVENTIONS:  - Perform BMAT or MOVE assessment daily    - Set and communicate daily mobility goal to care team and patient/family/caregiver     - Collaborate with rehabilitation services on mobility goals if consulted  - Out of bed for toileting  - Record patient progress and toleration of activity level   Outcome: Not Progressing     Problem: DISCHARGE PLANNING  Goal: Discharge to home or other facility with appropriate resources  Description: INTERVENTIONS:  - Identify barriers to discharge w/patient and caregiver  - Arrange for needed discharge resources and transportation as appropriate  - Identify discharge learning needs (meds, wound care, etc )  - Arrange for interpretive services to assist at discharge as needed  - Refer to Case Management Department for coordinating discharge planning if the patient needs post-hospital services based on physician/advanced practitioner order or complex needs related to functional status, cognitive ability, or social support system  Outcome: Not Progressing     Problem: Knowledge Deficit  Goal: Patient/family/caregiver demonstrates understanding of disease process, treatment plan, medications, and discharge instructions  Description: Complete learning assessment and assess knowledge base    Interventions:  - Provide teaching at level of understanding  - Provide teaching via preferred learning methods  Outcome: Not Progressing     Problem: DISCHARGE PLANNING - CARE MANAGEMENT  Goal: Discharge to post-acute care or home with appropriate resources  Description: INTERVENTIONS:  - Conduct assessment to determine patient/family and health care team treatment goals, and need for post-acute services based on payer coverage, community resources, and patient preferences, and barriers to discharge  - Address psychosocial, clinical, and financial barriers to discharge as identified in assessment in conjunction with the patient/family and health care team  - Arrange appropriate level of post-acute services according to patients   needs and preference and payer coverage in collaboration with the physician and health care team  - Communicate with and update the patient/family, physician, and health care team regarding progress on the discharge plan  - Arrange appropriate transportation to post-acute venues  Outcome: Not Progressing     Problem: NEUROSENSORY - ADULT  Goal: Achieves stable or improved neurological status  Description: INTERVENTIONS  - Monitor and report changes in neurological status  - Monitor vital signs such as temperature, blood pressure, glucose, and any other labs ordered   - Initiate measures to prevent increased intracranial pressure  - Monitor for seizure activity and implement precautions if appropriate      Outcome: Not Progressing  Goal: Remains free of injury related to seizures activity  Description: INTERVENTIONS  - Maintain airway, patient safety  and administer oxygen as ordered  - Monitor patient for seizure activity, document and report duration and description of seizure to physician/advanced practitioner  - If seizure occurs,  ensure patient safety during seizure  - Reorient patient post seizure  - Seizure pads on all 4 side rails  - Instruct patient/family to notify RN of any seizure activity including if an aura is experienced  - Instruct patient/family to call for assistance with activity based on nursing assessment  - Administer anti-seizure medications if ordered    Outcome: Not Progressing  Goal: Achieves maximal functionality and self care  Description: INTERVENTIONS  - Monitor swallowing and airway patency with patient fatigue and changes in neurological status  - Encourage and assist patient to increase activity and self care     - Encourage visually impaired, hearing impaired and aphasic patients to use assistive/communication devices  Outcome: Not Progressing

## 2022-03-09 NOTE — CONSULTS
Consultation - Cardiology Team 1  Praish Rushing 58 y o  female MRN: [id number]  Unit/Bed#: ICU 07 Encounter: [id number]  03/09/22  10:54 AM    Assessment/ Plan:  1  New CM w/ LVEF 40%  - echo yesterday - LVEF 40% (prior LVEF 60% in 11/8136), systolic function mildly reduced, hypokinesis of the mid anterior, mid anterior septal, mid anterior lateral, mid inferoseptal and mid inferior walls (no RWMA on prior study)  - presenting EKG - sinus tachycardia, septal infarct, , no signs of acute ischemia  - telemetry review - NSR, HR 70s, no significant events noted  - currently net +4  18L/24 hours - overall net +10 3L since admission  - not overtly volume overload on exam, but diffuse coarse breath sounds present and minimal urine output noted - may benefit from PRN diuresis  - patient will need eventual ischemic workup - hold off for now given current clinical status  - previously not on any outpatient cardiac medications  - will hold off on initiation of ACE/ARB for GDMT given hypotension requiring pressors    2  Elevated troponin  - hs troponin 488 > 2563 > 3485 > 2778 > 2461 > 2215  - EKG without signs of acute ischemia  - possibly secondary to #3/#4, but cannot r/o ischemia given new CM  - patient will need eventual ischemic workup as noted above  - continue to monitor on telemetry    3  Shock  - POA, secondary to #4   - remains on levo gtt at 7 mcg/min and vaso gtt at 0 04 u/min secondary to hypotension - wean as tolerable for MAP goal > 65  - FLU/RSV/COVID negative  - blood cultures pending    4  Aspiration PNA  - CXR 3/8 - persistent RLL airspace infiltrate  - continues with low grade temperature - Tmax today 100 9   - continue IV Unasyn   - management per CC team    5   Status epilepticus  - video EEG - three seizures noted on 3/8, no new seizures since 3/8 at 0645; unclear etiology (possibly due to #6)  - CT head - negative for acute intracranial abnormality   - on IV Kepra, ativan, propofol, precedex  - neurology following   - management per nuero/CC team  - maintain seizure precautions    6  ETOH abuse  - continue thiamine, folic acid supplements  - management per CC team    7  Hx SAH/IPH  - found down (August 2021)  - CT head/all trauma imaging this admission negative  - nuero to obtain brain MRI once EEG removed    History of Present Illness   Physician Requesting Consult: Aarti Nye DO    Reason for Consult / Principal Problem: New cardiomyopathy    HPI: Cherelle Khan is a 58y o  year old female with past medical history significant for alcohol abuse and traumatic SAH/IPH who presents the the ED yesterday as a trauma alert secondary to witnessed fall and altered mental status  Per patient's , the patient noted poor oral intake over the 24 hours preceding the fall, in addition to fatigue and malaise  Per , the patient fell from a standing position and was incontinent of feces with noticeable bilateral hand shaking  En route to the ED, EMS noted a GCS of 3 which improved to 8 upon arrival to the ED  Patient had continuous hand clenching, for which she was presumed to be in status epilepticus  Her GCS dropped to 6-7, was intubated, and was admitted to the CCU for EEG monitoring and mechanical ventilator support  Patient was also noted to be septic on arrival as evidence by tachycardia (HR 140s), lactic acid 8 9, and hypotension with SBPs as low as in the 50s  Patient received 2L isolyte in the ED for resuscitation and started on pressors for blood pressure support  Upon examination, patient is intubated, sedated, and currently on EEG video monitoring  Because of this, a history cannot be obtained       Inpatient consult to Cardiology  Consult performed by: PAZ Kahn  Consult ordered by: Jana Lee PA-C      EKG: sinus tachycardia, septal infarct, , no signs of acute ischemia    Review of Systems   Unable to perform ROS: Patient unresponsive     Historical Information   No past medical history on file  No past surgical history on file  Social History     Substance and Sexual Activity   Alcohol Use Not on file     Social History     Substance and Sexual Activity   Drug Use Not on file     Social History     Tobacco Use   Smoking Status Not on file   Smokeless Tobacco Not on file     Family History: No family history on file  Meds/Allergies   all current active meds have been reviewed  Not on File    Objective   Vitals: Blood pressure 96/61, pulse 78, temperature 99 9 °F (37 7 °C), temperature source Bladder, resp  rate 15, height 5' 7" (1 702 m), weight 66 7 kg (147 lb 0 8 oz), SpO2 98 %  , Body mass index is 23 03 kg/m² ,   Orthostatic Blood Pressures      Most Recent Value   Blood Pressure 96/61 filed at 03/08/2022 0750   Patient Position - Orthostatic VS Lying filed at 03/07/2022 2043        No data recorded  No data recorded  Intake/Output Summary (Last 24 hours) at 3/9/2022 1054  Last data filed at 3/9/2022 1000  Gross per 24 hour   Intake 4721 29 ml   Output 540 ml   Net 4181 29 ml     Invasive Devices  Report    Central Venous Catheter Line            CVC Central Lines 03/08/22 <1 day          Peripheral Intravenous Line            Peripheral IV 03/07/22 Right Antecubital 1 day    Peripheral IV 03/08/22 Right;Upper;Ventral (anterior) Arm <1 day          Arterial Line            Arterial Line 03/08/22 Radial 1 day          Drain            NG/OG/Enteral Tube Orogastric 16 Fr Center mouth 1 day    Urethral Catheter Temperature probe 1 day          Airway            ETT  Cuffed 7 5 mm 1 day                Physical Exam:  GEN: Intubated, sedated, acutely ill  HEENT: Sclera anicteric, conjunctivae pink, mucous membranes moist  Oropharynx clear, ETT in place   NECK: Supple, no carotid bruits, no significant JVD  Trachea midline, no thyromegaly, R IJ CVC in place  HEART: Regular rhythm, normal S1 and S2, no murmurs, clicks, gallops or rubs   PMI nondisplaced, no thrills  LUNGS: Diffuse coarse breath sounds throughout all lung fields; no wheezes, rales, or rhonchi  No increased work of breathing or signs of respiratory distress  ABDOMEN: Soft, nontender, nondistended, normoactive bowel sounds, obese  EXTREMITIES: Skin warm and well perfused, no clubbing, cyanosis, or edema  NEURO: Unresponsive on sedation  SKIN: Normal without suspicious lesions on exposed skin  Warm, well perfused   +2/+2 pulses    Lab Results:   Troponins:   Results from last 7 days   Lab Units 03/07/22  1826   CK TOTAL U/L 126     CBC with diff:   Results from last 7 days   Lab Units 03/09/22  0429 03/08/22  0448 03/08/22  0029 03/07/22  1826 03/07/22  1722 03/07/22  1713   WBC Thousand/uL 8 68 7 40  --   --  8 47  --    HEMOGLOBIN g/dL 8 9* 9 1* 9 5*  --  11 2*  --    I STAT HEMOGLOBIN g/dl  --   --   --   --   --  11 6   HEMATOCRIT % 26 1* 26 9* 26 8*  --  35 5  --    HEMATOCRIT, ISTAT %  --   --   --   --   --  34*   MCV fL 102* 101*  --   --  108*  --    PLATELETS Thousands/uL 29* 35*  --  42* 49*  --    MCH pg 34 9* 34 1  --   --  34 1  --    MCHC g/dL 34 1 33 8  --   --  31 5  --    RDW % 14 3 14 5  --   --  14 4  --    MPV fL 12 1 11 0  --  10 6 11 3  --    NRBC AUTO /100 WBCs  --  0  --   --  1  --      CMP:   Results from last 7 days   Lab Units 03/09/22  0429 03/08/22  1131 03/08/22 0448 03/08/22  0029 03/07/22  1722 03/07/22  1713   POTASSIUM mmol/L 3 6 3 8 3 1* 3 5 3 7  --    CHLORIDE mmol/L 108 102 101 103 97*  --    CO2 mmol/L 20* 21 23 23 17*  --    CO2, I-STAT mmol/L  --   --   --   --   --  20*   BUN mg/dL 5 5 6 6 9  --    CREATININE mg/dL 0 57* 0 73 0 72 0 74 1 17  --    GLUCOSE, ISTAT mg/dl  --   --   --   --   --  355*   CALCIUM mg/dL 7 6* 7 7* 7 4* 7 4* 9 1  --    AST U/L 103*  --  129* 148*  --   --    ALT U/L 58  --  85* 92*  --   --    ALK PHOS U/L 114  --  144* 160*  --   --    EGFR ml/min/1 73sq m 99 88 90 87 50  --

## 2022-03-09 NOTE — ASSESSMENT & PLAN NOTE
Likely 2/2 alcohol intake, baseline around 50  Saw hemonc calls in EMR, never established care from my understanding  Continues to decline during stay, at 30 today  Plan:  · Continue to monitor  · NO BLOOD PRODUCTS - [employer]

## 2022-03-09 NOTE — ASSESSMENT & PLAN NOTE
Likely 2/2 alcohol intake, baseline around 50  Saw hemonc calls in EMR, never established care from my understanding  Stable around 30  Plan:  · Continue to monitor  · NO BLOOD PRODUCTS - [employer]

## 2022-03-09 NOTE — ASSESSMENT & PLAN NOTE
Arrived after unwitnessed seizure like activity, tongue laceration, left hand movement  No h/o seizure however had ICH requiring seizure ppx in 08/2021  14 [facility] unremarkable, Labs with acidosis   3 electrographic seizures from 12 midnight,  last seizure noted was around 6:45 am 03/08 on video EEG  remaind with clonic activity of left arm and roving eye movements  started on lacosamide, levetiracem, fentanyl, propofol       No new seizures since 03/08 6:45am    Per neuro, originating from left temporal lobe, etiology unknown but considering alcohol withdrawal  Mentation improved  Plan:  · Continue lacosamide, levetiracem  · MRI brain w wo con ordered  · Neuro and tox on board

## 2022-03-09 NOTE — CASE MANAGEMENT
Case Management Assessment & Discharge Planning Note    Patient name Lavonne Cage  Location ICU 07/ICU 07 MRN [id number]  : 1960 Date 3/9/2022       Current Admission Date: 3/7/2022  Current Admission Diagnosis:Status epilepticus [facility])   Patient Active Problem List    Diagnosis Date Noted    Airway compromise 2022    Shock ([facility] ) 2022    Aspiration pneumonia ([facility] [facility] ) 2022    Status epilepticus ([facility] [facility] ) 2022    Encephalopathy 2022    Seizure-like activity ([facility] ) 2022    Hyperglycemia 2022    Thrombocytopenia ([facility] [facility] ) 2022    Fall from standing 2022      LOS (days): 2  Geometric Mean LOS (GMLOS) (days): 4 30  Days to GMLOS:2 4     OBJECTIVE:    Risk of Unplanned Readmission Score: 13         Current admission status: Inpatient       Preferred Pharmacy: No Pharmacies Listed  Primary Care Provider: Lakisha Ley MD    Primary Insurance: [employer]  Secondary Insurance:     ASSESSMENT:  T[address] 26 Proxies    There are no active Health Care Proxies on file  Advance Directives  Does patient have a [address]?: No  Was patient offered paperwork?: Yes ( declined)  Does patient currently have a Health Care decision maker?: Yes, please see Health Care Proxy section Jurgen Carbone (Spouse))  Does patient have Advance Directives?: No  Was patient offered paperwork?: Yes ( declined)  Primary Contact: Jurgen Carbone (Spouse)         Readmission Root Cause  30 Day Readmission: No    Patient Information  Admitted from[de-identified] Home  Mental Status: Intubated  During Assessment patient was accompanied by: Not accompanied during assessment (CM Assessment completed with patient's )  Assessment information provided by[de-identified] Spouse  Primary Caregiver: Self  Support Systems: Self,Spouse/significant other,Family members  [place] of Residence: [address] [place] [address] do you live in?: [place] entry access options   Select all that apply : Ramp (Ramped entrance + 1 NATALYA)  Type of Current Residence: 2 story home  Upon entering residence, is there a bedroom on the main floor (no further steps)?: Yes  Upon entering residence, is there a bathroom on the main floor (no further steps)?: Yes  In the last 12 months, was there a time when you were not able to pay the mortgage or rent on time?: No  In the last 12 months, how many places have you lived?: 1  In the last 12 months, was there a time when you did not have a steady place to sleep or slept in a shelter (including now)?: No  Homeless/housing insecurity resource given?: N/A  Living Arrangements: Lives w/ Spouse/significant other  Is patient a ?: No    Activities of Daily Living Prior to Admission  Functional Status: Independent  Completes ADLs independently?: Yes  Ambulates independently?: Yes  Does patient use assisted devices?: No  Does patient currently own DME?: No  Does patient have a history of Outpatient Therapy (PT/OT)?: No  Does the patient have a history of Short-Term Rehab?: Yes ([place] last August)  Does patient have a history of HHC?: Yes (Unsure of agency used)  Does patient currently have [facility]?: No         Patient Information Continued  Income Source: SSI/SSD  Does patient have prescription coverage?: Yes (No issues with getting or affording her medications)  Within the past 12 months, you worried that your food would run out before you got the money to buy more : Never true  Within the past 12 months, the food you bought just didnt last and you didnt have money to get more : Never true  Food insecurity resource given?: N/A  Does patient receive dialysis treatments?: No  Does patient have a history of substance abuse?: Yes  Historical substance use preference: Alcohol/ETOH  History of Withdrawal Symptoms: Denies past symptoms  Is patient currently in treatment for substance abuse?: No  Patient declined treatment information    Does patient have a history of Mental Health Diagnosis?: No (Patient's  verbalized HX of depression when patient was a teenager)         Means of Transportation  Means of Transport to Appts[de-identified] Drives Self  In the past 12 months, has lack of transportation kept you from medical appointments or from getting medications?: No  In the past 12 months, has lack of transportation kept you from meetings, work, or from getting things needed for daily living?: No  Was application for public transport provided?: N/A        DISCHARGE DETAILS:    Discharge planning discussed with[de-identified] Vargas Murphy (Spouse)  Freedom of Choice: Yes  Comments - Freedom of Choice: CM explained that once patient is more medically stable that we would get recommendations from the entire team (including PT and possible OT) and make discharge plans at this point  Family expressed understanding of this  CM contacted family/caregiver?: Yes             Contacts  Patient Contacts: Vargas Murphy (Spouse)  Relationship to Patient[de-identified] Family  Contact Method: Phone  Phone Number: [phone]  Reason/Outcome: Continuity of Care,Emergency Contact,Discharge Planning                                                                        CM reviewed discharge planning process including the following: identifying caregivers at home, preference for d/c planning needs, [facility] Meds to Bed program, availability of treatment team to discuss questions or concerns patient and/or family may have regarding diagnosis, plan of care, old or new medications and discharge planning  CM will continue to follow for care coordination and update assessment as necessary

## 2022-03-09 NOTE — PROGRESS NOTES
NEUROLOGY RESIDENCY PROGRESS NOTE     Name: Nadege Guerrero   Age & Sex: 58 y o  female   MRN: [id number]  Unit/Bed#: ICU 07   Encounter: [id number]    Pending for discharge: Based on patient's clinical improvement     ASSESSMENT & PLAN     * Status epilepticus [facility])  Assessment & Plan  58 y o  right handed female w h/o traumatic Subarachnoid, right posterior fossa IPH, blood cancer per , alcohol abuse who presents in [facility] ED on 3/7/22 as trauma alert A d/t unwitnessed fall and altered mental status  To review, pt wasn't feeling well on 3/7-  Lightheadedness, tunnel vision, nausea, unable to eat, difficulty walking, later found down by  around 4:30 pm when he returned home with tongue laceration, abnormal movement of left hand, on arrival tachycardic, GCS-8, platelet 87G, metabolic acidosis, elevated lactic acid, procal 1 49, pt found to have clinic sz in ED and was given Ativan, intubated, sedated on propofol, low BP, received crystalloids, she was started on video EEG monitoring, she had 3 electrographic seizures from 12 midnight to 2:00 am, 2:00 to 4:00 am seizure every 15 min, last seizure noted was around 6:45 am on EEG  Her Propofol was increased, given 2 gm Keppra and started 1 gm Keppra bid, started on Vimpat 100 mg bid  Versed was also given at one point  Also started on antibiotics d/t likely aspiration pneumonia (Rt lung base) seen on Xray     Discussed with Dr Kristina Castañeda (Epileptologist)  Last seizure at 6:45 am 3/8/22 on EEG  Left temporal focal for seizures  Impression- suspicion related to Alcohol withdrawal vs TBI from previous fall, her seems to be from left temporal region, similar episode in August 2021 (she was found down at that time as well) with right posterior occipital intraparenchymal hematoma within rim of surrounding edema, subarachnoid hemorrhage, small extra-axial focus of hemorrhage over the right parietal lobe    Thin posterior falx subdural hematoma    Plan-  · Continue video EEG monitoring (last electrographic Sz 6:45 am on 3/8) for at least 24 hours since been seizure free and few hours after weaned off propofol  · Continue Keppra 1 gm BID   · Continue Vimpat 100 mg BID   · Recommend MRI brain w and wo contrast once EEG is removed   · Please reach out to on-na Neurologist for more seizure activity, has room to go up on Vimpat   · Continue evaluating metabolic and infectious derangement per ICU   · Appreciate Toxicology recs   · Discussed with ICU team and Dr Ekaterina Nielsen, Dr Sary Og  Encephalopathy  Assessment & Plan  Likely related to status epilepticus             SUBJECTIVE     Patient was seen and examined  No acute events overnight  no seizure activity reported,  She was agitated last night requiring fentanyl 3 times and increase in propofol to 30 mg     weaning off nor epi and doing well     vitals remain stable with T-max 100 9°,      Review of Systems   Unable to perform ROS: Acuity of condition       OBJECTIVE     Patient ID: Jayce Self is a 58 y o  female  Vitals:    22 0815 22 0830 22 0845 22 0900   BP:       Pulse: 80 78 76 78   Resp: (!) 0 (!) 0 17 15   Temp: 99 9 °F (37 7 °C) 100 °F (37 8 °C) 99 9 °F (37 7 °C) 99 9 °F (37 7 °C)   TempSrc:   Bladder Bladder   SpO2: 97% 97% 98% 98%   Weight:       Height:          Temperature:   Temp (24hrs), Av °F (37 8 °C), Min:97 9 °F (36 6 °C), Max:100 9 °F (38 3 °C)    Temperature: 99 9 °F (37 7 °C)      Physical Exam  Vitals and nursing note reviewed  Constitutional:       Comments: Intubated and sedated   HENT:      Head: Normocephalic  Eyes:      Conjunctiva/sclera: Conjunctivae normal    Cardiovascular:      Rate and Rhythm: Normal rate  Pulmonary:      Comments: On mechanical ventilation  Abdominal:      Palpations: Abdomen is soft  Musculoskeletal:         General: Normal range of motion  Right lower leg: No edema        Left lower leg: No edema    Skin:     General: Skin is warm  Capillary Refill: Capillary refill takes less than 2 seconds  Neurological Examination:      Mental Status: The patient was intubated and sedated on Propofol  No response to verbal stimuli, follows simple commands inconsistently like squeeze my fingers b/l, wiggle toes  Cranial Nerves:   II: visual fields Pupils equal, round, reactive to light with normal accomodation  VII: Face is symmetric with no weakness noted  XII:   Intubated so unable to visualize tongue  Gag present  Corneal reflex present       Motor Examination:    Bulk: Normal  No atrophy Tone: Normal  Fasciculations: None       Squeeze fingers b/l on commands  Symmetrical       On noxious stimuli patient moves her bilateral upper and lower extremities        Reflexes: Trace b/l, does seem to have cross abductors  Plantars   Right              Mute  Left             Mute      Clonus: None     Coordination: unable to assess      Sensory: On noxious stimuli patient moves her bilateral upper and lower extremities  LABORATORY DATA     Labs: I have personally reviewed pertinent reports  Results from last 7 days   Lab Units 03/09/22 0429 03/08/22 0448 03/08/22 0029 03/07/22  1826 03/07/22 1722 03/07/22  1722   WBC Thousand/uL 8 68 7 40  --   --   --  8 47   HEMOGLOBIN g/dL 8 9* 9 1* 9 5*  --    < > 11 2*   HEMATOCRIT % 26 1* 26 9* 26 8*  --    < > 35 5   PLATELETS Thousands/uL 29* 35*  --  42*   < > 49*   NEUTROS PCT %  --  77*  --   --   --  77*   MONOS PCT %  --  4  --   --   --  5   MONO PCT % 8  --   --   --   --   --     < > = values in this interval not displayed        Results from last 7 days   Lab Units 03/09/22  0429 03/08/22  1131 03/08/22 0448 03/08/22  0029 03/08/22  0029 03/07/22  1722 03/07/22  1713   SODIUM mmol/L 138 135* 136   < > 139   < >  --    POTASSIUM mmol/L 3 6 3 8 3 1*   < > 3 5   < >  --    CHLORIDE mmol/L 108 102 101   < > 103   < >  -- CO2 mmol/L 20* 21 23   < > 23   < >  --    CO2, I-STAT mmol/L  --   --   --   --   --   --  20*   BUN mg/dL 5 5 6   < > 6   < >  --    CREATININE mg/dL 0 57* 0 73 0 72   < > 0 74   < >  --    CALCIUM mg/dL 7 6* 7 7* 7 4*   < > 7 4*   < >  --    ALK PHOS U/L 114  --  144*  --  160*  --   --    ALT U/L 58  --  85*  --  92*  --   --    AST U/L 103*  --  129*  --  148*  --   --    GLUCOSE, ISTAT mg/dl  --   --   --   --   --   --  355*    < > = values in this interval not displayed  Results from last 7 days   Lab Units 03/09/22  0429 03/08/22  1131 03/08/22  0448   MAGNESIUM mg/dL 1 8 2 1 2 3     Results from last 7 days   Lab Units 03/09/22  0429 03/08/22  1131 03/08/22  0448   PHOSPHORUS mg/dL 2 2* 3 1 2 9      Results from last 7 days   Lab Units 03/07/22  1826   INR  1 16   PTT seconds 27  27     Results from last 7 days   Lab Units 03/07/22  2307   LACTIC ACID mmol/L 1 7           IMAGING & DIAGNOSTIC TESTING     Radiology Results: I have personally reviewed pertinent reports  XR chest portable ICU   Final Result by Kaity Mead MD (03/08 1618)      Persistent right lower lobe airspace opacity may reflect atelectasis or pneumonia  Right IJ central venous catheter with tip in satisfactory position  Workstation performed: [id number]         XR chest portable ICU   Final Result by Jimi Delvalle MD (03/08 0028)      New opacity right lung base may be artifactual due to positioning, though consolidation (including aspiration) is not excluded, and attention will be made on follow-up  Workstation performed: [id number]         XR chest 1 view   Final Result by Yesy Stephens MD (03/08 3733)      Improved interstitial markings  Status post intubation without pneumothorax  Workstation performed: [id number]         XR Trauma multiple (SLB/[facility] trauma bay ONLY)   Final Result by Siomara Welsh MD (03/08 8036)      No acute traumatic injury  Mild vascular prominence  Correlate for CHF  Workstation performed: [id number]         TRAUMA - CT chest abdomen pelvis w contrast   Final Result by Migdalia Mahmood MD (03/07 1756)   1  T12, L2, L4 mild vertebral compression deformities are age indeterminate  Correlate clinically  2   No suspected acute posttraumatic injury  3   Cholelithiasis  4   Chronic pancreatitis  I personally discussed this study with Su Fay on 3/7/2022 at 5:50 PM                         Workstation performed: [id number]         TRAUMA - CT head wo contrast   Final Result by Liv Cm MD (03/07 1741)      No acute intracranial abnormality  I personally discussed this study with Su Fay on 3/7/2022 at 5:39 PM                      Workstation performed: [id number]         TRAUMA - CT spine cervical wo contrast   Final Result by Migdalia Mahmood MD (03/07 1756)      No cervical spine fracture or traumatic malalignment  I personally discussed this study with Su Fay on 3/7/2022 at 5:45 PM                       Workstation performed: [id number]         XR chest 1 view    (Results Pending)       Other Diagnostic Testing: I have personally reviewed pertinent reports        ACTIVE MEDICATIONS     Current Facility-Administered Medications   Medication Dose Route Frequency    acetaminophen (TYLENOL) tablet 650 mg  650 mg Oral Q4H PRN    ampicillin-sulbactam (UNASYN) 3 g in sodium chloride 0 9 % 100 mL IVPB  3 g Intravenous Q6H    chlorhexidine (PERIDEX) 0 12 % oral rinse 15 mL  15 mL Mouth/Throat Q12H DESTIN    dexmedeTOMIDine (Precedex) 400 mcg in sodium chloride 0 9% 100 mL  0 1-0 7 mcg/kg/hr Intravenous Titrated    fentanyl citrate (PF) 100 MCG/2ML 50 mcg  50 mcg Intravenous E7V PRN    folic acid (FOLVITE) tablet 1 mg  1 mg Oral Daily    insulin lispro (HumaLOG) 100 units/mL subcutaneous injection 1-5 Units  1-5 Units Subcutaneous Q6H [address]    lacosamide (VIMPAT) 100 mg in sodium chloride 0 9 % 100 mL IVPB  100 mg Intravenous Q12H    levETIRAcetam (KEPPRA) 1,000 mg in sodium chloride 0 9 % 100 mL IVPB  1,000 mg Intravenous Q12H DESTIN    LORazepam (ATIVAN) injection 2 mg  2 mg Intravenous Q1H PRN    LORazepam (ATIVAN) injection 2 mg  2 mg Intravenous Once    LORazepam (ATIVAN) injection 2 mg  2 mg Intravenous Once    multi-electrolyte (ISOLYTE-S PH 7 4) bolus 500 mL  500 mL Intravenous Once    multivitamin-minerals (CENTRUM) tablet 1 tablet  1 tablet Oral Daily    NOREPINEPHRINE 4 MG  ML NSS (CMPD ORDER) infusion  1-30 mcg/min Intravenous Titrated    pantoprazole (PROTONIX) injection 40 mg  40 mg Intravenous Q24H DESTIN    propofol (DIPRIVAN) 1000 mg in 100 mL infusion (premix)  40 mcg/kg/min Intravenous Titrated    thiamine tablet 100 mg  100 mg Oral Daily    vasopressin (PITRESSIN) 20 Units in sodium chloride 0 9 % 100 mL infusion  0 04 Units/min Intravenous Continuous       Prior to Admission medications    Not on File         VTE Mechanical Prophylaxis: sequential compression device    ==  MD Ronel Moffett's Neurology Residency, PGY-3

## 2022-03-09 NOTE — ASSESSMENT & PLAN NOTE
Arrived after unwitnessed seizure like activity, tongue laceration, left hand movement  CTH unremarkable  Labs with acidosis   3 electrographic seizures from 12 midnight,  last seizure noted was around 6:45 am 03/08 on video EEG  Since then, on lacosamide, levetiracem, propofol  No new seizures since  Per neuro, originating from left temporal lobe, etiology unknown  Plan:  · Continue lacosamide, levetiracem  · Considering she is 24 hours seizure free, wean off propofol     · Will place MRI w wo con order  · Neuro and tox on board

## 2022-03-09 NOTE — ASSESSMENT & PLAN NOTE
On ventilator for airway protection, D2  Ventilating well, consider SBTs and possible weaning to extubate soon  At this time, condition still tenuous

## 2022-03-09 NOTE — PROGRESS NOTES
[facility]  Progress Note - Guerline Van 1960, 58 y o  female MRN: [id number]  Unit/Bed#: ICU 07 Encounter: [id number]  Primary Care Provider: Ángel Raygoza MD   Date and time admitted to hospital: 3/7/2022  5:03 PM    Shock [facility])  Assessment & Plan  known etiology on arrival - acidosis, asp pna, concern for zoloft overdose, multiple meds received to control seizures  At this point fluid resuscitated, acidosis resolved, being treated for asp pna  ECHO 03/08 with EF 40%, almost generalized hypokinesis  Still receiving medications impacting BP  Remains on norepi, vaso however needs decreasing; reduced urine output  Shock induced transaminitis and trop elevation  Plan:  · Continue to wean off pressors  · Continue maintenance fluids  · Maintain maps >65  · Monitor IO, renal/hepatic function    Airway compromise  Assessment & Plan  On ventilator for airway protection, D2  Ventilating well, consider SBTs and possible weaning to extubate soon  At this time, condition still tenuous  Encephalopathy  Assessment & Plan  Able to wake up, follows commands  Most likely 2/2 seizure with some metabolic contribution  No new seizure like activity, Acidosis resolved, being treated for aspiration pna, switched to zosyn to avoid cephalosporin se  Plan:  · NPO  · Delirium precautions  · Neuro checks  · Monitor neuro status    * Status epilepticus ([facility] [address] )  Assessment & Plan  Arrived after unwitnessed seizure like activity, tongue laceration, left hand movement  No h/o seizure however had ICH requiring seizure ppx in 08/2021  [facility] unremarkable, Labs with acidosis   3 electrographic seizures from 12 midnight,  last seizure noted was around 6:45 am 03/08 on video EEG  remaind with clonic activity of left arm and roving eye movements  started on lacosamide, levetiracem, fentanyl, propofol  Weaned off fentanyl on same day     No new seizures since 03/08 6:45am    Per neuro, originating from left temporal lobe, etiology unknown  Plan:  · Continue lacosamide, levetiracem  · Considering she is 24 hours seizure free, wean off propofol  · Will discuss removal of EEG before placing MRI w wo con order  · Neuro and tox on board    Aspiration pneumonia [facility])  Assessment & Plan  CXR initially with clear lungs  Repeat Imaging with prominent RLL consolidation  Respiratory status stable with alexey  Started on cefepime however to avoid neuro se, switched to zosyn 03/08  Plan:  · Continue zosyn, D2  · Aspiration precautions    Thrombocytopenia ([address] )  Assessment & Plan  Likely 2/2 alcohol intake, baseline around 50  Saw hemonc calls in EMR, never established care from my understanding  Continues to decline during stay, at 30 today  Plan:  · Continue to monitor  · NO BLOOD PRODUCTS - Caodaism      ----------------------------------------------------------------------------------------  HPI/24hr events: no seizure like activity reported via video or RN team  Did get agitated last night requiring 3 x fentanyl 50mcg and increase in propofol to 30mg  Being weaned off norepi and tolerating well  ECHO yesterday with EF 40%, down from 60% previously; hypokinesis throughout walls except apical/basal/inferolateral segments  Continues to have scant urine output  Remains on Vaso 0 4, norepi 11 from 15 yesterday, vaso 0 4 infusions  Ventilating in sync -- rate 14/ volume 375 getting 400s/ peep 6/ fio2 40%; peak pressures in high teens    Hemodynamically stable with pressors and vent  Maps >65, sats in high 90s  CMP stable, renal function baseline, improving AST from 130 to 100  Hb stable at 9, platelets down from 35 to 30     -160    Wt 143-147lbs bed scale  Io: urine 640 ml/ net +9 6L  Ppx; vte contraindicated; no gi yet  L/d/t - ETT D2, A line D2, Rt IJ D1, Moon D2, peripherals x 2    Patient appropriate for transfer out of the ICU today?: No  Disposition: Continue Critical Care   Code Status: Level 1 - Full Code  ---------------------------------------------------------------------------------------  SUBJECTIVE  Endorses no pain  Per RN, scant urine output  Passed gas  Review of Systems   Unable to perform ROS: Acuity of condition     Review of systems was reviewed and negative unless stated above in HPI/24-hour events   ---------------------------------------------------------------------------------------  OBJECTIVE    Vitals   Vitals:    22 0600 22 0615 22 0630 22 0645   BP:       Pulse: 86 76 80 76   Resp: 18 (!) 166 18 20   Temp: 99 3 °F (37 4 °C) 99 3 °F (37 4 °C) 99 3 °F (37 4 °C) 99 3 °F (37 4 °C)   TempSrc:       SpO2: 99% 98% 99% 98%   Weight:       Height:         Temp (24hrs), Av 2 °F (37 9 °C), Min:97 9 °F (36 6 °C), Max:101 3 °F (38 5 °C)  Current: Temperature: 99 3 °F (37 4 °C)  Arterial Line BP: 110/60  Arterial Line MAP (mmHg): 79 mmHg    Respiratory:  SpO2: SpO2: 98 %, SpO2 Activity: SpO2 Activity: At Rest, SpO2 Device: O2 Device: Ventilator  Nasal Cannula O2 Flow Rate (L/min): 6 L/min    Invasive/non-invasive ventilation settings   Respiratory  Report   Lab Data (Last 4 hours)    None         O2/Vent Data (Last 4 hours)       0759           Vent Mode AC/VC       Resp Rate (BPM) (BPM) 14       Vt (mL) (mL) 375       FIO2 (%) (%) 40       PEEP (cmH2O) (cmH2O) 6       Patient safety screen outcome: Failed       MV 6 8                   Physical Exam  Vitals and nursing note reviewed  Constitutional:       Appearance: She is well-developed  She is not ill-appearing or diaphoretic  HENT:      Nose:      Comments: NGT in place  Cardiovascular:      Rate and Rhythm: Normal rate and regular rhythm  Pulses: Normal pulses  Heart sounds: Normal heart sounds  Pulmonary:      Effort: Pulmonary effort is normal       Breath sounds: Examination of the right-lower field reveals rhonchi  Rhonchi present        Comments: ETT in place; breathing in sync  Abdominal:      General: Bowel sounds are normal  There is no distension  Palpations: Abdomen is soft  Tenderness: There is no abdominal tenderness  Genitourinary:     Comments: Moon in place, clear/straw yellow urine, a couple ccs  Musculoskeletal:      Right lower leg: No edema  Left lower leg: No edema  Skin:     General: Skin is warm and dry  Neurological:      Mental Status: She is easily aroused               Laboratory and Diagnostics:  Results from last 7 days   Lab Units 03/09/22  0429 03/08/22  0448 03/08/22  0029 03/07/22  1826 03/07/22  1722 03/07/22  1713   WBC Thousand/uL 8 68 7 40  --   --  8 47  --    HEMOGLOBIN g/dL 8 9* 9 1* 9 5*  --  11 2*  --    I STAT HEMOGLOBIN g/dl  --   --   --   --   --  11 6   HEMATOCRIT % 26 1* 26 9* 26 8*  --  35 5  --    HEMATOCRIT, ISTAT %  --   --   --   --   --  34*   PLATELETS Thousands/uL 29* 35*  --  42* 49*  --    NEUTROS PCT %  --  77*  --   --  77*  --    BANDS PCT % 6  --   --   --   --   --    MONOS PCT %  --  4  --   --  5  --    MONO PCT % 8  --   --   --   --   --      Results from last 7 days   Lab Units 03/09/22 0429 03/08/22  1131 03/08/22 0448 03/08/22  0029 03/07/22  1722 03/07/22  1713   SODIUM mmol/L 138 135* 136 139 137  --    POTASSIUM mmol/L 3 6 3 8 3 1* 3 5 3 7  --    CHLORIDE mmol/L 108 102 101 103 97*  --    CO2 mmol/L 20* 21 23 23 17*  --    CO2, I-STAT mmol/L  --   --   --   --   --  20*   ANION GAP mmol/L 10 12 12 13 23*  --    BUN mg/dL 5 5 6 6 9  --    CREATININE mg/dL 0 57* 0 73 0 72 0 74 1 17  --    CALCIUM mg/dL 7 6* 7 7* 7 4* 7 4* 9 1  --    GLUCOSE RANDOM mg/dL 133 159* 179* 113 350*  --    ALT U/L 58  --  85* 92*  --   --    AST U/L 103*  --  129* 148*  --   --    ALK PHOS U/L 114  --  144* 160*  --   --    ALBUMIN g/dL 2 2*  --  2 3* 2 6*  --   --    TOTAL BILIRUBIN mg/dL 0 91  --  1 09* 0 58  --   --      Results from last 7 days   Lab Units 03/09/22  0429 03/08/22  1131 03/08/22  0448 03/08/22  0029 03/07/22  1722   MAGNESIUM mg/dL 1 8 2 1 2 3 2 2 1 9   PHOSPHORUS mg/dL 2 2* 3 1 2 9 3 1 6 4*      Results from last 7 days   Lab Units 03/07/22  1826   INR  1 16   PTT seconds 27  27          Results from last 7 days   Lab Units 03/07/22  2307 03/07/22  2113 03/07/22  1826   LACTIC ACID mmol/L 1 7 2 8* 8 9*     ABG:  Results from last 7 days   Lab Units 03/08/22  0253   PH ART  7 425   PCO2 ART mm Hg 34 6*   PO2 ART mm Hg 84 1   HCO3 ART mmol/L 22 2   BASE EXC ART mmol/L -1 8   ABG SOURCE  Line, Arterial     VBG:  Results from last 7 days   Lab Units 03/08/22  0253   ABG SOURCE  Line, Arterial     Results from last 7 days   Lab Units 03/08/22  0447   PROCALCITONIN ng/ml 1 49*       Micro  Results from last 7 days   Lab Units 03/08/22  0132   BLOOD CULTURE  Received in Microbiology Lab  Culture in Progress  Received in Microbiology Lab  Culture in Progress  EKG: NSR, HR 90s  Imaging: I have personally reviewed pertinent reports  Intake and Output  I/O       03/07 0701 03/08 0700 03/08 0701  03/09 0700 03/09 0701  03/10 0700    I V  (mL/kg) 5047 5 (77 7) 4138 (62)     IV Piggyback 1050 538 3     Total Intake(mL/kg) 6097 5 (93 8) 4676 4 (70 1)     Urine (mL/kg/hr) 500 640 (0 4)     Total Output 500 640     Net +5597 5 +4036 4                  Height and Weights   Height: 5' 7" (170 2 cm)     Body mass index is 23 03 kg/m²  Weight (last 2 days)     Date/Time Weight    03/09/22 0244 66 7 (147 05)    03/08/22 0750 64 9 (143)    03/07/22 1705 65 (143 3)            Nutrition       Diet Orders   (From admission, onward)             Start     Ordered    03/07/22 2204  Room Service  Once        Question:  Type of Service  Answer:  Room Service - Appropriate with Assistance    03/07/22 2204 03/07/22 1923  Diet NPO  Diet effective now        References:    Nutrtion Support Algorithm Enteral vs  Parenteral   Question Answer Comment   Diet Type NPO    RD to adjust diet per protocol?  No        03/07/22 1923 Active Medications  Scheduled Meds:  Current Facility-Administered Medications   Medication Dose Route Frequency Provider Last Rate    acetaminophen  650 mg Oral Q4H PRN Cain Gilmore PA-C      ampicillin-sulbactam  3 g Intravenous Q6H Helyn Aryan, CRNP 3 g (03/09/22 0253)    chlorhexidine  15 mL Mouth/Throat Q12H [address] Phil Ludwig, PAZ      dexmedetomidine  0 1-0 7 mcg/kg/hr Intravenous Titrated [facility] Stopped (03/07/22 2345)    fentaNYL  75 mcg/hr Intravenous Continuous KARISSA ValentineNP Stopped (03/07/22 2345)    fentanyl citrate (PF)  50 mcg Intravenous Q2H PRN PAZ Valentine      folic acid  1 mg Oral Daily [facility] [place] [facility], [address]      insulin lispro  1-5 Units Subcutaneous Q6H [address] Phil [facility]      lacosamide (VIMPAT) IVPB  100 mg Intravenous Q12H Helyn Aryan, CRNP 100 mg (03/09/22 0833)    levETIRAcetam  1,000 mg Intravenous Q12H [address] Cain Gilmore PA-C 1,000 mg (03/09/22 0228)    LORazepam  2 mg Intravenous Q1H PRN PAZ Valentine      LORazepam  2 mg Intravenous Once PAZ Valentine      LORazepam  2 mg Intravenous Once Cain Gilmore PA-C      multi-electrolyte  100 mL/hr Intravenous Continuous [facility] 100 mL/hr (03/09/22 0226)    multivitamin-minerals  1 tablet Oral Daily [facility] [place], [address]      norepinephrine  1-30 mcg/min Intravenous Titrated [facility] [place] [facility], [address] 11 mcg/min (03/09/22 0610)    pantoprazole  40 mg Intravenous Q24H [address] Phil [facility]      propofol  40 mcg/kg/min Intravenous Titrated Brittney Briscoe PA-C 30 mcg/kg/min (03/09/22 0534)    thiamine  100 mg Oral Daily Felicia [facility] [place], [address]      vasopressin  0 04 Units/min Intravenous Continuous Felicia Shah, CRNP 0 04 Units/min (03/09/22 0534)     Continuous Infusions:  dexmedetomidine, 0 1-0 7 mcg/kg/hr, Last Rate: Stopped (03/07/22 2345)  fentaNYL, 75 mcg/hr, Last Rate: Stopped (03/07/22 2345)  multi-electrolyte, 100 mL/hr, Last Rate: 100 mL/hr (03/09/22 0226)  norepinephrine, 1-30 mcg/min, Last Rate: 11 mcg/min (03/09/22 0610)  propofol, 40 mcg/kg/min, Last Rate: 30 mcg/kg/min (03/09/22 0534)  vasopressin, 0 04 Units/min, Last Rate: 0 04 Units/min (03/09/22 0534)      PRN Meds:   acetaminophen, 650 mg, Q4H PRN  fentanyl citrate (PF), 50 mcg, Q2H PRN  LORazepam, 2 mg, Q1H PRN        Invasive Devices Review  Invasive Devices  Report    Central Venous Catheter Line            CVC Central Lines 03/08/22 <1 day          Peripheral Intravenous Line            Peripheral IV 03/07/22 Left Wrist 2 days    Peripheral IV 03/07/22 Right Antecubital 1 day    Peripheral IV 03/08/22 Right;Upper;Ventral (anterior) Arm <1 day          Arterial Line            Arterial Line 03/08/22 Radial 1 day          Drain            NG/OG/Enteral Tube Orogastric 16 Fr Center mouth 1 day    Urethral Catheter Temperature probe 1 day          Airway            ETT  Cuffed 7 5 mm 1 day                Rationale for remaining devices: above  ---------------------------------------------------------------------------------------  Advance Directive and Living Will:      Power of :    POLST:    ---------------------------------------------------------------------------------------        Alaina Neves MD      Portions of the record may have been created with voice recognition software  Occasional wrong word or "sound a like" substitutions may have occurred due to the inherent limitations of voice recognition software    Read the chart carefully and recognize, using context, where substitutions have occurred

## 2022-03-09 NOTE — PLAN OF CARE
Problem: Potential for Falls  Goal: Patient will remain free of falls  Description: INTERVENTIONS:  - Educate patient/family on patient safety including physical limitations  - Instruct patient to call for assistance with activity   - Consult OT/PT to assist with strengthening/mobility   - Keep Call bell within reach  - Keep bed low and locked with side rails adjusted as appropriate  - Keep care items and personal belongings within reach  - Initiate and maintain comfort rounds  - Make Fall Risk Sign visible to staff  - Offer Toileting every 2 Hours, in advance of need  - Initiate/Maintain bed alarm  - Obtain necessary fall risk management equipment:   - Apply yellow socks and bracelet for high fall risk patients  - Consider moving patient to room near nurses station  Outcome: Progressing     Problem: PAIN - ADULT  Goal: Verbalizes/displays adequate comfort level or baseline comfort level  Description: Interventions:  - Encourage patient to monitor pain and request assistance  - Assess pain using appropriate pain scale  - Administer analgesics based on type and severity of pain and evaluate response  - Implement non-pharmacological measures as appropriate and evaluate response  - Consider cultural and social influences on pain and pain management  - Notify physician/advanced practitioner if interventions unsuccessful or patient reports new pain  Outcome: Progressing     Problem: INFECTION - ADULT  Goal: Absence or prevention of progression during hospitalization  Description: INTERVENTIONS:  - Assess and monitor for signs and symptoms of infection  - Monitor lab/diagnostic results  - Monitor all insertion sites, i e  indwelling lines, tubes, and drains  - Monitor endotracheal if appropriate and nasal secretions for changes in amount and color  - Allen appropriate cooling/warming therapies per order  - Administer medications as ordered  - Instruct and encourage patient and family to use good hand hygiene technique  - Identify and instruct in appropriate isolation precautions for identified infection/condition  Outcome: Progressing     Problem: SAFETY ADULT  Goal: Patient will remain free of falls  Description: INTERVENTIONS:  - Educate patient/family on patient safety including physical limitations  - Instruct patient to call for assistance with activity   - Consult OT/PT to assist with strengthening/mobility   - Keep Call bell within reach  - Keep bed low and locked with side rails adjusted as appropriate  - Keep care items and personal belongings within reach  - Initiate and maintain comfort rounds  - Make Fall Risk Sign visible to staff  - Offer Toileting every 2 Hours, in advance of need  - Initiate/Maintain bed alarm  - Obtain necessary fall risk management equipment:   - Apply yellow socks and bracelet for high fall risk patients  - Consider moving patient to room near nurses station  Outcome: Progressing     Problem: Knowledge Deficit  Goal: Patient/family/caregiver demonstrates understanding of disease process, treatment plan, medications, and discharge instructions  Description: Complete learning assessment and assess knowledge base    Interventions:  - Provide teaching at level of understanding  - Provide teaching via preferred learning methods  Outcome: Progressing     Problem: NEUROSENSORY - ADULT  Goal: Achieves stable or improved neurological status  Description: INTERVENTIONS  - Monitor and report changes in neurological status  - Monitor vital signs such as temperature, blood pressure, glucose, and any other labs ordered   - Initiate measures to prevent increased intracranial pressure  - Monitor for seizure activity and implement precautions if appropriate      Outcome: Progressing  Goal: Remains free of injury related to seizures activity  Description: INTERVENTIONS  - Maintain airway, patient safety  and administer oxygen as ordered  - Monitor patient for seizure activity, document and report duration and description of seizure to physician/advanced practitioner  - If seizure occurs,  ensure patient safety during seizure  - Reorient patient post seizure  - Seizure pads on all 4 side rails  - Instruct patient/family to notify RN of any seizure activity including if an aura is experienced  - Instruct patient/family to call for assistance with activity based on nursing assessment  - Administer anti-seizure medications if ordered    Outcome: Progressing     Problem: Prexisting or High Potential for Compromised Skin Integrity  Goal: Skin integrity is maintained or improved  Description: INTERVENTIONS:  - Identify patients at risk for skin breakdown  - Assess and monitor skin integrity  - Assess and monitor nutrition and hydration status  - Monitor labs   - Assess for incontinence   - Turn and reposition patient  - Assist with mobility/ambulation  - Relieve pressure over bony prominences  - Avoid friction and shearing  - Provide appropriate hygiene as needed including keeping skin clean and dry  - Evaluate need for skin moisturizer/barrier cream  - Collaborate with interdisciplinary team   - Patient/family teaching  - Consider wound care consult   Outcome: Progressing

## 2022-03-09 NOTE — CASE MANAGEMENT
Case Management Progress Note    Patient name Leslie Dunbar  Location ICU 07/ICU 07 MRN [id number]  : 1960 Date 3/9/2022       LOS (days): 2  Geometric Mean LOS (GMLOS) (days): 4 30  Days to GMLOS:2 5        OBJECTIVE:        Current admission status: Inpatient  Preferred Pharmacy: No Pharmacies Listed  Primary Care Provider: Christiano Hayes MD    [facility] [id number]   [facility]   Secondary Insurance:     PROGRESS NOTE:      CM attempted to complete CM Assessment however patient remains intubated at this time and no family is at bedside  CM called patient's emergency contact her  Jade Lam [phone], however CM was only able to leave a voicemail requesting a call back  CM department will continue to follow to assist with discharge coordination

## 2022-03-09 NOTE — ASSESSMENT & PLAN NOTE
known etiology on arrival - acidosis, asp pna, concern for zoloft overdose, multiple meds received to control seizures  At this point fluid resuscitated, acidosis resolved, being treated for asp pna     ECHO 03/08 with EF 40%, almost generalized hypokinesis  Still receiving medications impacting BP  Remains on norepi, vaso however needs decreasing  Shock induced transaminitis and trop elevation  Plan:  · Continue to wean off pressors  · Maintain maps >65

## 2022-03-10 ENCOUNTER — APPOINTMENT (INPATIENT)
Dept: MRI IMAGING | Facility: HOSPITAL | Age: 62
DRG: 100 | End: 2022-03-10
Payer: COMMERCIAL

## 2022-03-10 PROBLEM — R34 URINE OUTPUT LOW: Status: ACTIVE | Noted: 2022-03-10

## 2022-03-10 PROBLEM — I50.21 ACUTE SYSTOLIC CONGESTIVE HEART FAILURE (HCC): Status: ACTIVE | Noted: 2022-03-10

## 2022-03-10 PROBLEM — G93.40 ENCEPHALOPATHY: Status: RESOLVED | Noted: 2022-03-08 | Resolved: 2022-03-10

## 2022-03-10 PROBLEM — T50.901A DRUG OVERDOSE: Status: ACTIVE | Noted: 2022-03-10

## 2022-03-10 PROBLEM — J98.8 AIRWAY COMPROMISE: Status: RESOLVED | Noted: 2022-03-09 | Resolved: 2022-03-10

## 2022-03-10 LAB
ANION GAP SERPL CALCULATED.3IONS-SCNC: 10 MMOL/L (ref 4–13)
BUN SERPL-MCNC: 7 MG/DL (ref 5–25)
CALCIUM SERPL-MCNC: 7.9 MG/DL (ref 8.3–10.1)
CHLORIDE SERPL-SCNC: 109 MMOL/L (ref 100–108)
CO2 SERPL-SCNC: 21 MMOL/L (ref 21–32)
CREAT SERPL-MCNC: 0.59 MG/DL (ref 0.6–1.3)
ERYTHROCYTE [DISTWIDTH] IN BLOOD BY AUTOMATED COUNT: 14.6 % (ref 11.6–15.1)
GFR SERPL CREATININE-BSD FRML MDRD: 98 ML/MIN/1.73SQ M
GLUCOSE SERPL-MCNC: 112 MG/DL (ref 65–140)
GLUCOSE SERPL-MCNC: 125 MG/DL (ref 65–140)
GLUCOSE SERPL-MCNC: 127 MG/DL (ref 65–140)
GLUCOSE SERPL-MCNC: 134 MG/DL (ref 65–140)
GLUCOSE SERPL-MCNC: 151 MG/DL (ref 65–140)
HCT VFR BLD AUTO: 24.2 % (ref 34.8–46.1)
HGB BLD-MCNC: 8.1 G/DL (ref 11.5–15.4)
MAGNESIUM SERPL-MCNC: 1.7 MG/DL (ref 1.6–2.6)
MCH RBC QN AUTO: 34.5 PG (ref 26.8–34.3)
MCHC RBC AUTO-ENTMCNC: 33.5 G/DL (ref 31.4–37.4)
MCV RBC AUTO: 103 FL (ref 82–98)
PHOSPHATE SERPL-MCNC: 1.7 MG/DL (ref 2.3–4.1)
PLATELET # BLD AUTO: 27 THOUSANDS/UL (ref 149–390)
PMV BLD AUTO: 11.9 FL (ref 8.9–12.7)
POTASSIUM SERPL-SCNC: 3.6 MMOL/L (ref 3.5–5.3)
RBC # BLD AUTO: 2.35 MILLION/UL (ref 3.81–5.12)
SODIUM SERPL-SCNC: 140 MMOL/L (ref 136–145)
WBC # BLD AUTO: 6.45 THOUSAND/UL (ref 4.31–10.16)

## 2022-03-10 PROCEDURE — 99232 SBSQ HOSP IP/OBS MODERATE 35: CPT | Performed by: INTERNAL MEDICINE

## 2022-03-10 PROCEDURE — A9585 GADOBUTROL INJECTION: HCPCS | Performed by: INTERNAL MEDICINE

## 2022-03-10 PROCEDURE — 94664 DEMO&/EVAL PT USE INHALER: CPT

## 2022-03-10 PROCEDURE — G1004 CDSM NDSC: HCPCS

## 2022-03-10 PROCEDURE — 95720 EEG PHY/QHP EA INCR W/VEEG: CPT | Performed by: PSYCHIATRY & NEUROLOGY

## 2022-03-10 PROCEDURE — C9113 INJ PANTOPRAZOLE SODIUM, VIA: HCPCS

## 2022-03-10 PROCEDURE — 99232 SBSQ HOSP IP/OBS MODERATE 35: CPT | Performed by: PSYCHIATRY & NEUROLOGY

## 2022-03-10 PROCEDURE — 85027 COMPLETE CBC AUTOMATED: CPT | Performed by: PHYSICIAN ASSISTANT

## 2022-03-10 PROCEDURE — 84100 ASSAY OF PHOSPHORUS: CPT | Performed by: PHYSICIAN ASSISTANT

## 2022-03-10 PROCEDURE — 82948 REAGENT STRIP/BLOOD GLUCOSE: CPT

## 2022-03-10 PROCEDURE — 83735 ASSAY OF MAGNESIUM: CPT | Performed by: PHYSICIAN ASSISTANT

## 2022-03-10 PROCEDURE — 70553 MRI BRAIN STEM W/O & W/DYE: CPT

## 2022-03-10 PROCEDURE — 94760 N-INVAS EAR/PLS OXIMETRY 1: CPT

## 2022-03-10 PROCEDURE — 80048 BASIC METABOLIC PNL TOTAL CA: CPT | Performed by: PHYSICIAN ASSISTANT

## 2022-03-10 PROCEDURE — 94668 MNPJ CHEST WALL SBSQ: CPT

## 2022-03-10 RX ORDER — GUAIFENESIN 600 MG
600 TABLET, EXTENDED RELEASE 12 HR ORAL EVERY 12 HOURS SCHEDULED
Status: DISCONTINUED | OUTPATIENT
Start: 2022-03-10 | End: 2022-03-18 | Stop reason: HOSPADM

## 2022-03-10 RX ORDER — ALBUTEROL SULFATE 2.5 MG/3ML
2.5 SOLUTION RESPIRATORY (INHALATION)
Status: DISCONTINUED | OUTPATIENT
Start: 2022-03-10 | End: 2022-03-10

## 2022-03-10 RX ADMIN — SODIUM CHLORIDE 100 MG: 9 INJECTION, SOLUTION INTRAVENOUS at 08:56

## 2022-03-10 RX ADMIN — Medication 2 MCG/MIN: at 04:30

## 2022-03-10 RX ADMIN — SODIUM CHLORIDE 100 MG: 9 INJECTION, SOLUTION INTRAVENOUS at 19:54

## 2022-03-10 RX ADMIN — PANTOPRAZOLE SODIUM 40 MG: 40 INJECTION, POWDER, FOR SOLUTION INTRAVENOUS at 08:00

## 2022-03-10 RX ADMIN — FOLIC ACID 1 MG: 1 TABLET ORAL at 08:00

## 2022-03-10 RX ADMIN — SODIUM CHLORIDE 3 G: 9 INJECTION, SOLUTION INTRAVENOUS at 14:17

## 2022-03-10 RX ADMIN — Medication 15 ML: at 08:00

## 2022-03-10 RX ADMIN — VASOPRESSIN 0.04 UNITS/MIN: 20 INJECTION PARENTERAL at 08:03

## 2022-03-10 RX ADMIN — GADOBUTROL 6 ML: 604.72 INJECTION INTRAVENOUS at 18:51

## 2022-03-10 RX ADMIN — GUAIFENESIN 600 MG: 600 TABLET ORAL at 08:10

## 2022-03-10 RX ADMIN — SODIUM CHLORIDE 3 G: 9 INJECTION, SOLUTION INTRAVENOUS at 03:02

## 2022-03-10 RX ADMIN — SODIUM CHLORIDE 3 G: 9 INJECTION, SOLUTION INTRAVENOUS at 08:03

## 2022-03-10 RX ADMIN — POTASSIUM PHOSPHATE, MONOBASIC AND POTASSIUM PHOSPHATE, DIBASIC 30 MMOL: 224; 236 INJECTION, SOLUTION, CONCENTRATE INTRAVENOUS at 07:52

## 2022-03-10 RX ADMIN — LEVETIRACETAM 1000 MG: 100 INJECTION, SOLUTION INTRAVENOUS at 03:45

## 2022-03-10 RX ADMIN — INSULIN LISPRO 1 UNITS: 100 INJECTION, SOLUTION INTRAVENOUS; SUBCUTANEOUS at 17:05

## 2022-03-10 RX ADMIN — SODIUM CHLORIDE 3 G: 9 INJECTION, SOLUTION INTRAVENOUS at 20:40

## 2022-03-10 RX ADMIN — Medication 15 ML: at 20:40

## 2022-03-10 RX ADMIN — THIAMINE HCL TAB 100 MG 100 MG: 100 TAB at 08:00

## 2022-03-10 RX ADMIN — LEVETIRACETAM 500 MG: 100 INJECTION, SOLUTION INTRAVENOUS at 14:32

## 2022-03-10 RX ADMIN — GUAIFENESIN 600 MG: 600 TABLET ORAL at 20:40

## 2022-03-10 RX ADMIN — MULTIPLE VITAMINS W/ MINERALS TAB 1 TABLET: TAB ORAL at 08:06

## 2022-03-10 NOTE — PROGRESS NOTES
[facility]  Progress Note - Rebeka Guidry 1960, 58 y o  female MRN: [id number]  Unit/Bed#: ICU 07 Encounter: [id number]  Primary Care Provider: Eliot Kevin MD   Date and time admitted to hospital: 3/7/2022  5:03 PM    [facility])  Assessment & Plan  known etiology on arrival - acidosis, asp pna, concern for zoloft overdose, multiple meds received to control seizures  At this point fluid resuscitated, acidosis resolved, being treated for asp pna  ECHO 03/08 with EF 40%, almost generalized hypokinesis however not cause of shock  Shock induced transaminitis and trop elevation    Remains on norepi 2 and vaso 0 4; MAPs >65  Plan:  · Continue to wean off pressors, Maintain maps >65  · Able to eat, dc maintenance fluids  · Monitor IO, renal/hepatic function    Airway compromise-resolved as of 3/10/2022  Assessment & Plan  On ventilator for airway protection, D2  Ventilating well, extubated 03/09    * Status epilepticus ([facility] [address] )  Assessment & Plan  Arrived after unwitnessed seizure like activity, tongue laceration, left hand movement  No h/o seizure however had ICH requiring seizure ppx in 08/2021  CTH unremarkable, Labs with acidosis   3 electrographic seizures from 12 midnight,  last seizure noted was around 6:45 am 03/08 on video EEG  remaind with clonic activity of left arm and roving eye movements  started on lacosamide, levetiracem, fentanyl, propofol       No new seizures since 03/08 6:45am    Per neuro, originating from left temporal lobe, etiology unknown but considering alcohol withdrawal  Mentation improved  Plan:  · Continue lacosamide, levetiracem  · MRI brain w wo con ordered  · Neuro and tox on board    Encephalopathy-resolved as of 3/10/2022  Assessment & Plan  Able to wake up, follows commands  Most likely 2/2 seizure with some metabolic contribution  No new seizure like activity, Acidosis resolved, being treated for aspiration pna, switched to zosyn to avoid cephalosporin se  Plan:  · Delirium precautions  · Neuro checks  · Monitor neuro status    Acute systolic congestive heart failure (HCC)  Assessment & Plan  Wt Readings from Last 3 Encounters:   03/09/22 66 7 kg (147 lb 0 8 oz)   03/09/22 66 7 kg (147 lb 0 8 oz)     ECHO on admission with EF 40 compared to 60 in 08/2021  Patchy hypokinesis throughout however basal/apical/mid-inferior motion reserved  Cards consulted - stress-induced cardiomyopathy  ?alchol contribution  Confirmed not contributing to shock  Plan:  · reassess for GDMT once off pressors  · Prn diuretics to keep euvolemic  · Given adequate time for myopathy to reverse with no improvement, ischemic work up  Possible Drug overdose  Assessment & Plan   found patient with an open bottle of zoloft next to her before calling EMS  [facility] patient had been struggling with alcohol intake reduction and had a heated discussion before leaving  Discussed with patient today  States that she wants to get better and is trying to be more healthy outside the hospital  She has no SI but is unable to recall the events on day of admission     Plan:  · CM vs psych referral based on discussion with attending  · 1:1    Urine output low  Assessment & Plan  Initial thought was shock state however no impact on renal function on chemistry  Trial of bolus, with marginally increased output (275ml) overnight  No urinary complaints, already on zosyn  · Ext urinary cath in, monitor output, prn bolus if reducing output    Aspiration pneumonia ([address] )  Assessment & Plan  CXR initially with clear lungs  Repeat Imaging with prominent RLL consolidation  Respiratory status stable with alexye  Started on cefepime however to avoid neuro se, switched to zosyn 03/08  Plan:  · Continue zosyn, D3; total D4  · Aspiration precautions  · mucinex    Thrombocytopenia ([address] )  Assessment & Plan  Likely 2/2 alcohol intake, baseline around 50  Saw hemonc calls in EMR, never established care from my understanding  Stable around 30  Plan:  · Continue to monitor  · NO BLOOD PRODUCTS - [employer]      ----------------------------------------------------------------------------------------  HPI/24hr events: no acute events    Seizure free  Remained hemodynamically stable with MAPs >65 on norepi 2 and vasopressin  Saturating low to mid 90s on 2L  BMP WNL, phos 1 7, hb stable at 8  -140  BC NGTD  No new imaging    Wt 143-147lbs  Io - 275ml urine/net +12L  ppx - vte contraindicated/ diet started  L/T/D - RT a line D3, RT IJ CVC D2, peripherals, ext urinary cath D1    Patient appropriate for transfer out of the ICU today?: No  Disposition: Continue Critical Care   Code Status: Level 1 - Full Code  ---------------------------------------------------------------------------------------  SUBJECTIVE  Feeling better  Sore throat  Productive cough however feels it is difficult to bring sputum up  No difficulty in breathing, chest pain, changes in vision or swallow, nausea/vomiting at this time  Questions why in ICU and what happened  Discussed events  Asked about bottle of zoloft found by  beside her  States she is unable to recall events going back that far, but based on her religous and emotional values, she would never attempt to take more than needed  At present, no SI  Per RN team, 2 BMs       Review of Systems  Review of systems was reviewed and negative unless stated above in HPI/24-hour events   ---------------------------------------------------------------------------------------  OBJECTIVE    Vitals   Vitals:    03/10/22 0630 03/10/22 0645 03/10/22 0700 03/10/22 0715   BP:    106/52   BP Location:    Right arm   Pulse: 81 83 80 86   Resp: 21 (!) 26 (!) 24 21   Temp:    99 7 °F (37 6 °C)   TempSrc:    Oral   SpO2: 95% 94% 94% 95%   Weight:       Height:         Temp (24hrs), Av 2 °F (37 3 °C), Min:97 7 °F (36 5 °C), Max:100 °F (37 8 °C)  Current: Temperature: 99 7 °F (37 6 °C)  Arterial Line BP: 104/52  Arterial Line MAP (mmHg): 71 mmHg    Respiratory:  SpO2: SpO2: 95 %, SpO2 Activity: SpO2 Activity: At Rest, SpO2 Device: O2 Device: Nasal cannula  Nasal Cannula O2 Flow Rate (L/min): 6 L/min    Invasive/non-invasive ventilation settings   Respiratory  Report   Lab Data (Last 4 hours)    None         O2/Vent Data (Last 4 hours)    None                Physical Exam  Vitals and nursing note reviewed  Constitutional:       General: She is not in acute distress  Appearance: She is well-developed  She is not ill-appearing or diaphoretic  HENT:      Head: Normocephalic and atraumatic  Mouth/Throat:      Mouth: Mucous membranes are moist    Cardiovascular:      Rate and Rhythm: Normal rate and regular rhythm  Pulses: Normal pulses  Heart sounds: Normal heart sounds  No murmur heard  Pulmonary:      Effort: Pulmonary effort is normal  No respiratory distress  Breath sounds: Examination of the right-middle field reveals rhonchi  Examination of the right-lower field reveals rhonchi  Rhonchi present  Abdominal:      General: Bowel sounds are normal  There is no distension  Palpations: Abdomen is soft  Tenderness: There is no abdominal tenderness  Musculoskeletal:      Cervical back: Neck supple  Right lower leg: No edema  Left lower leg: No edema  Skin:     General: Skin is warm and dry  Neurological:      General: No focal deficit present  Mental Status: She is alert and oriented to person, place, and time  Psychiatric:         Mood and Affect: Mood normal          Behavior: Behavior normal          Thought Content:  Thought content normal          Judgment: Judgment normal              Laboratory and Diagnostics:  Results from last 7 days   Lab Units 03/10/22  0433 03/09/22  0429 03/08/22  0448 03/08/22  0029 03/07/22  1826 03/07/22  1722 03/07/22  1713   WBC Thousand/uL 6 45 8 68 7 40  --   --  8 47  --    HEMOGLOBIN g/dL 8 1* 8  9* 9 1* 9 5*  --  11 2*  --    I STAT HEMOGLOBIN g/dl  --   --   --   --   --   --  11 6   HEMATOCRIT % 24 2* 26 1* 26 9* 26 8*  --  35 5  --    HEMATOCRIT, ISTAT %  --   --   --   --   --   --  34*   PLATELETS Thousands/uL 27* 29* 35*  --  42* 49*  --    NEUTROS PCT %  --   --  77*  --   --  77*  --    BANDS PCT %  --  6  --   --   --   --   --    MONOS PCT %  --   --  4  --   --  5  --    MONO PCT %  --  8  --   --   --   --   --      Results from last 7 days   Lab Units 03/10/22  0433 03/09/22  0429 03/08/22  1131 03/08/22  0448 03/08/22  0029 03/07/22  1722 03/07/22  1713   SODIUM mmol/L 140 138 135* 136 139 137  --    POTASSIUM mmol/L 3 6 3 6 3 8 3 1* 3 5 3 7  --    CHLORIDE mmol/L 109* 108 102 101 103 97*  --    CO2 mmol/L 21 20* 21 23 23 17*  --    CO2, I-STAT mmol/L  --   --   --   --   --   --  20*   ANION GAP mmol/L 10 10 12 12 13 23*  --    BUN mg/dL 7 5 5 6 6 9  --    CREATININE mg/dL 0 59* 0 57* 0 73 0 72 0 74 1 17  --    CALCIUM mg/dL 7 9* 7 6* 7 7* 7 4* 7 4* 9 1  --    GLUCOSE RANDOM mg/dL 125 133 159* 179* 113 350*  --    ALT U/L  --  58  --  85* 92*  --   --    AST U/L  --  103*  --  129* 148*  --   --    ALK PHOS U/L  --  114  --  144* 160*  --   --    ALBUMIN g/dL  --  2 2*  --  2 3* 2 6*  --   --    TOTAL BILIRUBIN mg/dL  --  0 91  --  1 09* 0 58  --   --      Results from last 7 days   Lab Units 03/10/22  0433 03/09/22  0429 03/08/22  1131 03/08/22  0448 03/08/22  0029 03/07/22  1722   MAGNESIUM mg/dL 1 7 1 8 2 1 2 3 2 2 1 9   PHOSPHORUS mg/dL 1 7* 2 2* 3 1 2 9 3 1 6 4*      Results from last 7 days   Lab Units 03/07/22  1826   INR  1 16   PTT seconds 27  27          Results from last 7 days   Lab Units 03/07/22  2307 03/07/22  2113 03/07/22  1826   LACTIC ACID mmol/L 1 7 2 8* 8 9*     ABG:  Results from last 7 days   Lab Units 03/08/22  0253   PH ART  7 425   PCO2 ART mm Hg 34 6*   PO2 ART mm Hg 84 1   HCO3 ART mmol/L 22 2   BASE EXC ART mmol/L -1 8   ABG SOURCE  Line, Arterial VBG:  Results from last 7 days   Lab Units 03/08/22  0253   ABG SOURCE  Line, Arterial     Results from last 7 days   Lab Units 03/08/22  0447   PROCALCITONIN ng/ml 1 49*       Micro  Results from last 7 days   Lab Units 03/08/22  0132   BLOOD CULTURE  No Growth at 24 hrs  No Growth at 24 hrs  EKG: NSR, HR 70s  Imaging: I have personally reviewed pertinent reports  Intake and Output  I/O       03/08 0701 03/09 0700 03/09 0701  03/10 0700 03/10 0701 03/11 0700    I V  (mL/kg) 4138 (62) 1959 6 (29 4)     IV Piggyback 538 3 640     Total Intake(mL/kg) 4676 4 (70 1) 2599 6 (39)     Urine (mL/kg/hr) 640 (0 4) 275 (0 2)     Total Output 640 275     Net +4036 4 +2324 6            Unmeasured Urine Occurrence  1 x     Unmeasured Stool Occurrence  1 x           Height and Weights   Height: 5' 7" (170 2 cm)     Body mass index is 23 03 kg/m²  Weight (last 2 days)     Date/Time Weight    03/09/22 0244 66 7 (147 05)    03/08/22 0750 64 9 (143)            Nutrition       Diet Orders   (From admission, onward)             Start     Ordered    03/09/22 1931  Diet Clear Liquid  Diet effective now        References:    Nutrtion Support Algorithm Enteral vs  Parenteral   Question Answer Comment   Diet Type Clear Liquid    RD to adjust diet per protocol?  No        03/09/22 1931 03/07/22 2204  Room Service  Once        Question:  Type of Service  Answer:  Room Service - Appropriate with Assistance    03/07/22 2204                  Active Medications  Scheduled Meds:  Current Facility-Administered Medications   Medication Dose Route Frequency Provider Last Rate    acetaminophen  650 mg Oral Q4H PRN Ainl Garcia PA-C      ampicillin-sulbactam  3 g Intravenous Q6H PAZ Barron 3 g (03/10/22 0302)    chlorhexidine  15 mL Mouth/Throat Q12H [address] [place]      folic acid  1 mg Oral Daily [facility]      guaiFENesin  600 mg Oral Q12H Rikki Mena MD  insulin lispro  1-5 Units Subcutaneous TID AC PAZ Kennedy      insulin lispro  1-5 Units Subcutaneous HS PAZ Kennedy      lacosamide (VIMPAT) IVPB  100 mg Intravenous Q12H PAZ Wharton 100 mg (03/09/22 2000)    levETIRAcetam  1,000 mg Intravenous Q12H [address] Esha Diaz PA-C 1,000 mg (03/10/22 0345)    LORazepam  2 mg Intravenous Q1H PRN Ryan Schilder, CRNP      multivitamin-minerals  1 tablet Oral Daily [facility] [place] [facility], [address]      norepinephrine  1-30 mcg/min Intravenous Titrated Ayala Celia [place] 2 mcg/min (03/10/22 0430)    pantoprazole  40 mg Intravenous Q24H [address] Phil Sesay [place], CRNP      potassium phosphate  30 mmol Intravenous Once Smiley Jaquez MD 30 mmol (03/10/22 0752)    thiamine  100 mg Oral Daily [facility] [place] [facility], [address]      vasopressin  0 04 Units/min Intravenous Continuous Ayala Celia [place] 0 04 Units/min (03/09/22 2344)     Continuous Infusions:  norepinephrine, 1-30 mcg/min, Last Rate: 2 mcg/min (03/10/22 0430)  vasopressin, 0 04 Units/min, Last Rate: 0 04 Units/min (03/09/22 2344)      PRN Meds:   acetaminophen, 650 mg, Q4H PRN  LORazepam, 2 mg, Q1H PRN        Invasive Devices Review  Invasive Devices  Report    Central Venous Catheter Line            CVC Central Lines 03/08/22 1 day          Peripheral Intravenous Line            Peripheral IV 03/07/22 Right Antecubital 2 days    Peripheral IV 03/08/22 Right;Upper;Ventral (anterior) Arm 1 day          Arterial Line            Arterial Line 03/08/22 Radial 2 days                Rationale for remaining devices: above  ---------------------------------------------------------------------------------------  Advance Directive and Living Will:      Power of :    POLST:    ---------------------------------------------------------------------------------------      Smiley Jaquez MD      Portions of the record may have been created with voice recognition software  Occasional wrong word or "sound a like" substitutions may have occurred due to the inherent limitations of voice recognition software    Read the chart carefully and recognize, using context, where substitutions have occurred

## 2022-03-10 NOTE — ASSESSMENT & PLAN NOTE
Initial thought was shock state however no impact on renal function on chemistry  Trial of bolus, with marginally increased output (275ml) overnight  No urinary complaints, already on zosyn  · Ext urinary cath in, monitor output, prn bolus if reducing output

## 2022-03-10 NOTE — ASSESSMENT & PLAN NOTE
found patient with an open bottle of zoloft next to her before calling EMS  [facility] patient had been struggling with alcohol intake reduction and had a heated discussion before leaving  Discussed with patient today  States that she wants to get better and is trying to be more healthy outside the hospital  She has no SI but is unable to recall the events on day of admission     Plan:  · CM vs psych referral based on discussion with attending  · 1:1

## 2022-03-10 NOTE — UTILIZATION REVIEW
Continued Stay Review    Date: 3/10/2022                          Current Patient Class: inpatient   Current Level of Care: critical care    HPI:62 y o  female initially admit 3/7 Inpatient to Trauma service for evaluation & treatment of fall from standing, seizure concern for status epilepticus requiring continuous EEG, acute hypoxic respiratory failure, JP, Thrombocytopenia  Presents w a witnessed fall from standing w possible head strike seizure activity    Assessment/Plan:   3/9/2022 Critical care: No seizures since loaded on Vimpat & Keppra remained on Propofol, sedated on vent; arousable, follows simple commands  If no seizures will plan extubation  Probl septic shock in setting of RLL aspiration PNA presenting on admit, cont IV antibx  Cardiomyopathy likely stress induce- follow up w cardiology after extubation & medically stable  Wean IV levophed, Improving Cr; give IVF bolus  NPO    3/10/2022 CRITICAL CARE:  Seizure free, per Neuro likely related to alcohol withdrawalstable w/ med overdose vs TBI from previous fall; currently alert, oriented & w non focal weakness  Cont Keppra BID & Vimpat BID  S/P Video EEG  MRI w wo contrast;  Remains on IV drips norepi 2 and vaso 0 4; MAPs >65, saturating low mid 90s on 2 L NC  PLTs trending down  Blood cultures no growth to date  Cont w arterial line, RT IJ, CVC, D2  peripheral IVs    Not in acute distress, HR reg  Rhonchi & having difficulty bringing up phlegm  Questions why in ICU & events  Asked about bottle of Zoloft found by  beside her & is unable to recall events       Vital Signs:   Date/Time Temp Pulse Resp BP MAP (mmHg) Arterial Line BP MAP SpO2 O2 Device Patient Position - Orthostatic VS   03/10/22 1200 -- 108 Abnormal  26 Abnormal  -- -- 98/53 68 mmHg 95 % -- --   03/10/22 1132 98 7 °F (37 1 °C) 111 Abnormal  34 Abnormal  105/54 109 -- -- 97 % Nasal cannula Lying   03/10/22 1130 -- 111 Abnormal  25 Abnormal  -- -- 105/53 71 mmHg 96 % -- -- 03/10/22 1100 -- 107 Abnormal  25 Abnormal  -- -- 113/57 76 mmHg 96 % -- --   03/10/22 1030 -- 109 Abnormal  27 Abnormal  -- -- 102/51 68 mmHg 96 % -- --   03/10/22 1000 -- 97 23 Abnormal  -- -- 98/47 64 mmHg 97 % -- --   03/10/22 0945 -- 98 24 Abnormal  -- -- 102/49 66 mmHg 96 % -- --   03/10/22 0930 -- 104 27 Abnormal  -- -- 91/42 58 mmHg 98 % -- --   03/10/22 0927 -- 104 24 Abnormal  -- -- 92/42 58 mmHg 98 % -- --   03/10/22 0917 -- 108 Abnormal  31 Abnormal  -- -- 95/49 65 mmHg 98 % -- --   03/10/22 0900 -- 103 28 Abnormal  -- -- 100/51 67 mmHg 99 % -- --   03/10/22 0830 -- 100 30 Abnormal  -- -- 106/52 70 mmHg 96 % -- --   03/10/22 0800 -- 90 27 Abnormal  -- -- 107/52 70 mmHg 94 % -- --   03/10/22 0730 -- 98 30 Abnormal  -- -- 117/58 79 mmHg 94 % -- --   03/10/22 0715 99 7 °F (37 6 °C) 86 21 106/52 75 104/52 71 mmHg 95 % Nasal cannula Lying   03/10/22 0700 -- 80 24 Abnormal  -- -- 108/54 72 mmHg 94 % -- --   03/10/22 0645 -- 83 26 Abnormal  -- -- 110/54 73 mmHg 94 % -- --   03/10/22 0630 -- 81 21 -- -- 105/53 71 mmHg 95 % -- --   03/10/22 0615 -- 89 26 Abnormal  -- -- 112/54 73 mmHg 94 % -- --   03/10/22 0600 -- 79 25 Abnormal  -- -- 108/53 72 mmHg 95 % -- --   03/10/22 0545 -- 82 25 Abnormal  -- -- 108/53 72 mmHg 95 % -- --   03/10/22 0530 -- 81 24 Abnormal  -- -- 105/53 70 mmHg 94 % -- --   03/10/22 0515 -- 79 24 Abnormal  -- -- 106/53 71 mmHg 94 % -- --   03/10/22 0500 -- 81 25 Abnormal  -- -- 105/54 72 mmHg 95 % -- --   03/10/22 0445 -- 80 23 Abnormal  -- -- 104/53 70 mmHg 96 % -- --   03/10/22 0430 -- 79 25 Abnormal  -- -- 106/53 71 mmHg 95 % -- --   03/10/22 0415 -- 84 29 Abnormal  -- -- 112/57 75 mmHg 94 % -- --   03/10/22 0400 -- 82 25 Abnormal  -- -- 105/56 73 mmHg 95 % -- --   03/10/22 0345 -- 80 24 Abnormal  -- -- 106/56 74 mmHg 95 % -- --   03/10/22 0330 -- 81 28 Abnormal  -- -- 106/54 72 mmHg 96 % -- --   03/10/22 0315 -- 80 23 Abnormal  -- -- 105/54 72 mmHg 95 % -- --   03/10/22 0300 -- 78 48 Abnormal  -- -- 110/56 75 mmHg 96 % -- --   03/10/22 0245 -- 94 33 Abnormal  -- -- 113/56 75 mmHg 92 % -- --   03/10/22 0230 -- 79 25 Abnormal  -- -- 103/53 71 mmHg 96 % -- --   03/10/22 0215 -- 80 24 Abnormal  -- -- 107/54 72 mmHg 96 % -- --   03/10/22 0200 -- 83 28 Abnormal  -- -- 107/54 72 mmHg 95 % -- --   03/10/22 0145 -- 80 25 Abnormal  -- -- 106/53 71 mmHg 95 % -- --   03/10/22 0130 -- 79 25 Abnormal  -- -- 106/53 71 mmHg 96 % -- --   03/10/22 0115 -- 78 24 Abnormal  -- -- 109/55 74 mmHg 96 % -- --   03/10/22 0100 -- 77 24 Abnormal  -- -- 107/54 72 mmHg 96 % -- --   03/10/22 0045 -- 78 25 Abnormal  -- -- 109/55 73 mmHg 96 % -- --   03/10/22 0030 -- 79 25 Abnormal  -- -- 106/54 71 mmHg 95 % -- --   03/10/22 0015 -- 74 25 Abnormal  -- -- 110/54 73 mmHg 95 % -- --   03/10/22 0000 -- 76 40 Abnormal  -- -- 105/54 71 mmHg 96 % -- --         Pertinent Labs/Diagnostic Results:   Results from last 7 days   Lab Units 03/08/22  0320   SARS-COV-2  Negative     Results from last 7 days   Lab Units 03/10/22  0433 03/09/22  0429 03/08/22  0448 03/08/22  0029 03/07/22  1826 03/07/22  1722   WBC Thousand/uL 6 45 8 68 7 40  --    < > 8 47   HEMOGLOBIN g/dL 8 1* 8 9* 9 1* 9 5*  --  11 2*   HEMATOCRIT % 24 2* 26 1* 26 9* 26 8*  --  35 5   PLATELETS Thousands/uL 27* 29* 35*  --    < > 49*   NEUTROS ABS Thousands/µL  --   --  5 73  --   --  6 54   BANDS PCT %  --  6  --   --   --   --     < > = values in this interval not displayed           Results from last 7 days   Lab Units 03/10/22  0433 03/09/22  0429 03/08/22  1131 03/08/22  0448 03/08/22  0030 03/08/22  0029 03/07/22  1722 03/07/22  1713   SODIUM mmol/L 140 138 135* 136  --  139   < >  --    POTASSIUM mmol/L 3 6 3 6 3 8 3 1*  --  3 5   < >  --    CHLORIDE mmol/L 109* 108 102 101  --  103   < >  --    CO2 mmol/L 21 20* 21 23  --  23   < >  --    CO2, I-STAT mmol/L  --   --   --   --   --   --   --  20*   ANION GAP mmol/L 10 10 12 12  --  13   < >  --    BUN mg/dL 7 5 5 6 --  6   < >  --    CREATININE mg/dL 0 59* 0 57* 0 73 0 72  --  0 74   < >  --    EGFR ml/min/1 73sq m 98 99 88 90  --  87   < >  --    CALCIUM mg/dL 7 9* 7 6* 7 7* 7 4*  --  7 4*   < >  --    CALCIUM, IONIZED mmol/L  --   --   --  1 03* 0 94*  --   --   --    CALCIUM, IONIZED, ISTAT mmol/L  --   --   --   --   --   --   --  1 11*   MAGNESIUM mg/dL 1 7 1 8 2 1 2 3  --  2 2   < >  --    PHOSPHORUS mg/dL 1 7* 2 2* 3 1 2 9  --  3 1   < >  --     < > = values in this interval not displayed       Results from last 7 days   Lab Units 03/09/22  0429 03/08/22 0448 03/08/22  0029   AST U/L 103* 129* 148*   ALT U/L 58 85* 92*   ALK PHOS U/L 114 144* 160*   TOTAL PROTEIN g/dL 5 3* 5 2* 5 5*   ALBUMIN g/dL 2 2* 2 3* 2 6*   TOTAL BILIRUBIN mg/dL 0 91 1 09* 0 58   BILIRUBIN DIRECT mg/dL  --   --  0 26*     Results from last 7 days   Lab Units 03/10/22  1104 03/10/22  0741 03/09/22  2123 03/09/22  1757 03/09/22  1122 03/09/22  0552 03/08/22  2359 03/08/22  1806 03/08/22  1133 03/08/22  0643 03/08/22  0248 03/07/22  2053   POC GLUCOSE mg/dl 127 134 137 113 120 129 143* 160* 158* 145* 144* 136     Results from last 7 days   Lab Units 03/10/22  0433 03/09/22  0429 03/08/22  1131 03/08/22  0448 03/08/22  0029 03/07/22  1722   GLUCOSE RANDOM mg/dL 125 133 159* 179* 113 350*         Results from last 7 days   Lab Units 03/09/22 0429   HEMOGLOBIN A1C % 4 7   EAG mg/dl 88     BETA-HYDROXYBUTYRATE   Date Value Ref Range Status   03/07/2022 0 2 <0 6 mmol/L Final      Results from last 7 days   Lab Units 03/08/22  0253 03/07/22  1932   PH ART  7 425 7 313*   PCO2 ART mm Hg 34 6* 45 0*   PO2 ART mm Hg 84 1 205 9*   HCO3 ART mmol/L 22 2 22 3   BASE EXC ART mmol/L -1 8 -3 8   O2 CONTENT ART mL/dL 13 4* 15 4*   O2 HGB, ARTERIAL % 95 4 98 4*   ABG SOURCE  Line, Arterial Radial, Right         Results from last 7 days   Lab Units 03/07/22  1713   PH, NATALIA I-STAT  7 206*   PCO2, NATALIA ISTAT mm HG 47 8   PO2, NATALIA ISTAT mm HG 39 0   HCO3, NATALIA ISTAT mmol/L 18 9*   I STAT BASE EXC mmol/L -9*   I STAT O2 SAT % 61     Results from last 7 days   Lab Units 03/07/22  1826   CK TOTAL U/L 126     Results from last 7 days   Lab Units 03/08/22  1340 03/08/22  1131 03/08/22  0936 03/07/22  2306 03/07/22 2113 03/07/22  1826   HS TNI 0HR ng/L  --   --  2,778*  --   --  488*   HS TNI 2HR ng/L  --  2,461*  --   --  2,563*  --    HSTNI D2 ng/L  --  -317  --   --  2,075*  --    HS TNI 4HR ng/L 2,215*  --   --  3,485*  --   --    HSTNI D4 ng/L -563  --   --  2,997*  --   --          Results from last 7 days   Lab Units 03/07/22  1826   PROTIME seconds 14 1  14 8*   INR  1 16   PTT seconds 27  27         Results from last 7 days   Lab Units 03/08/22  0447   PROCALCITONIN ng/ml 1 49*     Results from last 7 days   Lab Units 03/07/22  2307 03/07/22 2113 03/07/22  1826   LACTIC ACID mmol/L 1 7 2 8* 8 9*                                         Results from last 7 days   Lab Units 03/08/22  0055   CLARITY UA  Clear   COLOR UA  Yellow   SPEC GRAV UA  1 015   PH UA  5 0   GLUCOSE UA mg/dl 100 (1/10%)*   KETONES UA mg/dl Negative   BLOOD UA  Trace-Intact*   PROTEIN UA mg/dl 30 (1+)*   NITRITE UA  Negative   BILIRUBIN UA  Negative   UROBILINOGEN UA E U /dl 0 2   LEUKOCYTES UA  Negative   WBC UA /hpf None Seen   RBC UA /hpf 0-1   BACTERIA UA /hpf None Seen   EPITHELIAL CELLS WET PREP /hpf Occasional   MUCUS THREADS  Occasional*     Results from last 7 days   Lab Units 03/08/22  0320   INFLUENZA A PCR  Negative   INFLUENZA B PCR  Negative   RSV PCR  Negative         Results from last 7 days   Lab Units 03/07/22  1937   AMPH/METH  Negative   BARBITURATE UR  Negative   BENZODIAZEPINE UR  Negative   COCAINE UR  Negative   METHADONE URINE  Negative   OPIATE UR  Negative   PCP UR  Negative   THC UR  Negative     Results from last 7 days   Lab Units 03/07/22  1826   ETHANOL LVL mg/dL <3   ACETAMINOPHEN LVL ug/mL <2*   SALICYLATE LVL mg/dL <3*                 Results from last 7 days   Lab Units 03/08/22  0132   BLOOD CULTURE  No Growth at 48 hrs  No Growth at 48 hrs  Medications:   Scheduled Medications:  albuterol, 2 5 mg, Nebulization, TID  ampicillin-sulbactam, 3 g, Intravenous, Q6H  chlorhexidine, 15 mL, Mouth/Throat, B56T DESTIN  folic acid, 1 mg, Oral, Daily  guaiFENesin, 600 mg, Oral, Q12H DESTIN  insulin lispro, 1-5 Units, Subcutaneous, TID AC  insulin lispro, 1-5 Units, Subcutaneous, HS  lacosamide (VIMPAT) IVPB, 100 mg, Intravenous, Q12H  levETIRAcetam, 500 mg, Intravenous, Q12H DESTIN  multivitamin-minerals, 1 tablet, Oral, Daily  pantoprazole, 40 mg, Intravenous, Q24H DESTIN  potassium phosphate, 30 mmol, Intravenous, Once  thiamine, 100 mg, Oral, Daily    Continuous IV Infusions:  norepinephrine, 1-30 mcg/min, Intravenous, Titrated  vasopressin, 0 04 Units/min, Intravenous, Continuous    PRN Meds:  acetaminophen, 650 mg, Oral, Q4H PRN  LORazepam, 2 mg, Intravenous, Q1H PRN    Discharge Plan: [facility]    Network Utilization Review Department  ATTENTION: Please call with any questions or concerns to [phone] and carefully listen to the prompts so that you are directed to the right person  All voicemails are confidential   Camille Muss all requests for admission clinical reviews, approved or denied determinations and any other requests to dedicated fax number below belonging to the campus where the patient is receiving treatment   List of dedicated fax numbers for the Facilities:  [address] DENIALS (Administrative/Medical Necessity) [phone]   [address] (Maternity/NICU/Pediatrics) [phone]   [address] 40 125 [facility]  [address] [facility]  [address] [address] [place] [address] [phone] [facility] [phone]

## 2022-03-10 NOTE — PLAN OF CARE
Problem: Potential for Falls  Goal: Patient will remain free of falls  Description: INTERVENTIONS:  - Educate patient/family on patient safety including physical limitations  - Instruct patient to call for assistance with activity   - Consult OT/PT to assist with strengthening/mobility   - Keep Call bell within reach  - Keep bed low and locked with side rails adjusted as appropriate  - Keep care items and personal belongings within reach  - Initiate and maintain comfort rounds  - Make Fall Risk Sign visible to staff  - Apply yellow socks and bracelet for high fall risk patients  - Consider moving patient to room near nurses station  Outcome: Not Progressing     Problem: MOBILITY - ADULT  Goal: Maintain or return to baseline ADL function  Description: INTERVENTIONS:  -  Assess patient's ability to carry out ADLs; assess patient's baseline for ADL function and identify physical deficits which impact ability to perform ADLs (bathing, care of mouth/teeth, toileting, grooming, dressing, etc )  - Assess/evaluate cause of self-care deficits   - Assess range of motion  - Assess patient's mobility; develop plan if impaired  - Assess patient's need for assistive devices and provide as appropriate  - Encourage maximum independence but intervene and supervise when necessary  - Involve family in performance of ADLs  - Assess for home care needs following discharge   - Consider OT consult to assist with ADL evaluation and planning for discharge  - Provide patient education as appropriate  Outcome: Not Progressing  Goal: Maintains/Returns to pre admission functional level  Description: INTERVENTIONS:  - Perform BMAT or MOVE assessment daily    - Set and communicate daily mobility goal to care team and patient/family/caregiver     - Collaborate with rehabilitation services on mobility goals if consulted  - Record patient progress and toleration of activity level   Outcome: Not Progressing     Problem: PAIN - ADULT  Goal: Verbalizes/displays adequate comfort level or baseline comfort level  Description: Interventions:  - Encourage patient to monitor pain and request assistance  - Assess pain using appropriate pain scale  - Administer analgesics based on type and severity of pain and evaluate response  - Implement non-pharmacological measures as appropriate and evaluate response  - Consider cultural and social influences on pain and pain management  - Notify physician/advanced practitioner if interventions unsuccessful or patient reports new pain  Outcome: Not Progressing     Problem: INFECTION - ADULT  Goal: Absence or prevention of progression during hospitalization  Description: INTERVENTIONS:  - Assess and monitor for signs and symptoms of infection  - Monitor lab/diagnostic results  - Monitor all insertion sites, i e  indwelling lines, tubes, and drains  - Monitor endotracheal if appropriate and nasal secretions for changes in amount and color  - [place] appropriate cooling/warming therapies per order  - Administer medications as ordered  - Instruct and encourage patient and family to use good hand hygiene technique  - Identify and instruct in appropriate isolation precautions for identified infection/condition  Outcome: Not Progressing  Goal: Absence of fever/infection during neutropenic period  Description: INTERVENTIONS:  - Monitor WBC    Outcome: Not Progressing     Problem: SAFETY ADULT  Goal: Patient will remain free of falls  Description: INTERVENTIONS:  - Educate patient/family on patient safety including physical limitations  - Instruct patient to call for assistance with activity   - Consult OT/PT to assist with strengthening/mobility   - Keep Call bell within reach  - Keep bed low and locked with side rails adjusted as appropriate  - Keep care items and personal belongings within reach  - Initiate and maintain comfort rounds  - Make Fall Risk Sign visible to staff  - Apply yellow socks and bracelet for high fall risk patients  - Consider moving patient to room near nurses station  Outcome: Not Progressing  Goal: Maintain or return to baseline ADL function  Description: INTERVENTIONS:  -  Assess patient's ability to carry out ADLs; assess patient's baseline for ADL function and identify physical deficits which impact ability to perform ADLs (bathing, care of mouth/teeth, toileting, grooming, dressing, etc )  - Assess/evaluate cause of self-care deficits   - Assess range of motion  - Assess patient's mobility; develop plan if impaired  - Assess patient's need for assistive devices and provide as appropriate  - Encourage maximum independence but intervene and supervise when necessary  - Involve family in performance of ADLs  - Assess for home care needs following discharge   - Consider OT consult to assist with ADL evaluation and planning for discharge  - Provide patient education as appropriate  Outcome: Not Progressing  Goal: Maintains/Returns to pre admission functional level  Description: INTERVENTIONS:  - Perform BMAT or MOVE assessment daily    - Set and communicate daily mobility goal to care team and patient/family/caregiver     - Collaborate with rehabilitation services on mobility goals if consulted  - Out of bed for toileting  - Record patient progress and toleration of activity level   Outcome: Not Progressing     Problem: DISCHARGE PLANNING  Goal: Discharge to home or other facility with appropriate resources  Description: INTERVENTIONS:  - Identify barriers to discharge w/patient and caregiver  - Arrange for needed discharge resources and transportation as appropriate  - Identify discharge learning needs (meds, wound care, etc )  - Arrange for interpretive services to assist at discharge as needed  - Refer to Case Management Department for coordinating discharge planning if the patient needs post-hospital services based on physician/advanced practitioner order or complex needs related to functional status, cognitive ability, or social support system  Outcome: Not Progressing     Problem: Knowledge Deficit  Goal: Patient/family/caregiver demonstrates understanding of disease process, treatment plan, medications, and discharge instructions  Description: Complete learning assessment and assess knowledge base    Interventions:  - Provide teaching at level of understanding  - Provide teaching via preferred learning methods  Outcome: Not Progressing     Problem: DISCHARGE PLANNING - CARE MANAGEMENT  Goal: Discharge to post-acute care or home with appropriate resources  Description: INTERVENTIONS:  - Conduct assessment to determine patient/family and health care team treatment goals, and need for post-acute services based on payer coverage, community resources, and patient preferences, and barriers to discharge  - Address psychosocial, clinical, and financial barriers to discharge as identified in assessment in conjunction with the patient/family and health care team  - Arrange appropriate level of post-acute services according to patients   needs and preference and payer coverage in collaboration with the physician and health care team  - Communicate with and update the patient/family, physician, and health care team regarding progress on the discharge plan  - Arrange appropriate transportation to post-acute venues  Outcome: Not Progressing     Problem: NEUROSENSORY - ADULT  Goal: Achieves stable or improved neurological status  Description: INTERVENTIONS  - Monitor and report changes in neurological status  - Monitor vital signs such as temperature, blood pressure, glucose, and any other labs ordered   - Initiate measures to prevent increased intracranial pressure  - Monitor for seizure activity and implement precautions if appropriate      Outcome: Not Progressing  Goal: Remains free of injury related to seizures activity  Description: INTERVENTIONS  - Maintain airway, patient safety  and administer oxygen as ordered  - Monitor patient for seizure activity, document and report duration and description of seizure to physician/advanced practitioner  - If seizure occurs,  ensure patient safety during seizure  - Reorient patient post seizure  - Seizure pads on all 4 side rails  - Instruct patient/family to notify RN of any seizure activity including if an aura is experienced  - Instruct patient/family to call for assistance with activity based on nursing assessment  - Administer anti-seizure medications if ordered    Outcome: Not Progressing  Goal: Achieves maximal functionality and self care  Description: INTERVENTIONS  - Monitor swallowing and airway patency with patient fatigue and changes in neurological status  - Encourage and assist patient to increase activity and self care  - Encourage visually impaired, hearing impaired and aphasic patients to use assistive/communication devices  Outcome: Not Progressing     Problem: Prexisting or High Potential for Compromised Skin Integrity  Goal: Skin integrity is maintained or improved  Description: INTERVENTIONS:  - Identify patients at risk for skin breakdown  - Assess and monitor skin integrity  - Assess and monitor nutrition and hydration status  - Monitor labs   - Assess for incontinence   - Turn and reposition patient  - Assist with mobility/ambulation  - Relieve pressure over bony prominences  - Avoid friction and shearing  - Provide appropriate hygiene as needed including keeping skin clean and dry  - Evaluate need for skin moisturizer/barrier cream  - Collaborate with interdisciplinary team   - Patient/family teaching  - Consider wound care consult   Outcome: Not Progressing     Problem: Nutrition/Hydration-ADULT  Goal: Nutrient/Hydration intake appropriate for improving, restoring or maintaining nutritional needs  Description: Monitor and assess patient's nutrition/hydration status for malnutrition  Collaborate with interdisciplinary team and initiate plan and interventions as ordered  Monitor patient's weight and dietary intake as ordered or per policy  Utilize nutrition screening tool and intervene as necessary  Determine patient's food preferences and provide high-protein, high-caloric foods as appropriate       INTERVENTIONS:  - Monitor oral intake, urinary output, labs, and treatment plans  - Assess nutrition and hydration status and recommend course of action  - Evaluate amount of meals eaten  - Assist patient with eating if necessary   - Allow adequate time for meals  - Recommend/ encourage appropriate diets, oral nutritional supplements, and vitamin/mineral supplements  - Order, calculate, and assess calorie counts as needed  - Recommend, monitor, and adjust tube feedings and TPN/PPN based on assessed needs  - Assess need for intravenous fluids  - Provide specific nutrition/hydration education as appropriate  - Include patient/family/caregiver in decisions related to nutrition  Outcome: Not Progressing

## 2022-03-10 NOTE — PROGRESS NOTES
Cardiology Progress Note - Team 1  Nicolasa Vargas 58 y o  female MRN: [id number]  Unit/Bed#: ICU 07 Encounter: [id number]      Assessment/Plan:  1  New CM w/ LVEF 40%  - echo (3/8/22) - LVEF 40% (prior LVEF 60% in 94/9920), systolic function mildly reduced, hypokinesis of the mid anterior, mid anterior septal, mid anterior lateral, mid inferoseptal and mid inferior walls (no RWMA on prior study)  - likely stress-induced given pattern  - presenting EKG - sinus tachycardia, septal infarct, , no signs of acute ischemia  - telemetry review - NSR, HR 90s, no significant events noted  - currently net +2L/24 hours - overall net +11 9L since admission  - not overtly volume overload on exam, but diffuse coarse breath sounds present, wet cough, and minimal urine output noted - may benefit from PRN diuresis  - previously not on any outpatient cardiac medications  - will hold off on initiation of ACE/ARB for GDMT given hypotension requiring pressors - will start when able and allow for LVEF recovery prior to additional work-up including ischemic evaluation     2  Elevated troponin  - likely nonischemic myocardial injury secondary to #3/4  - hs troponin 488 > 2563 > 3485 > 2778 > 2461 > 2215  - EKG without signs of acute ischemia  - patient will need eventual ischemic workup as noted above  - continue to monitor on telemetry     3  Shock  - POA, secondary to #4   - vaso on hold, levo gtt a 2 mcg/min - wean as tolerable for MAP goal > 65  - FLU/RSV/COVID negative  - blood cultures - no growth at 24 hours     4   Aspiration PNA  - CXR 3/8 - persistent RLL airspace infiltrate  - remains afebrile, WBC WNL  - continue IV Unasyn   - management per CC team     5  Status epilepticus  - video EEG - three seizures noted on 3/8, no new seizures since 3/8 at 0645; unclear etiology (possibly due to #6); now d/c'd  - CT head - negative for acute intracranial abnormality   - on IV Kepra, Vimpat, PRN ativan  - neurology following   - management per nuero/CC team  - maintain seizure precautions     6  ETOH abuse  - continue thiamine, folic acid supplements  - management per CC team     7  Hx SAH/IPH  - found down (August 2021)  - CT head/all trauma imaging this admission negative  - MRI brain pending    Subjective:   Patient seen and examined  No significant events overnight  Patient is alert, awake, and extubated this morning  Understands why she is in the hospital  Denies any chest pain/pressure, palpitations, or shortness of breath  Notes that she feels very congested and has a moist cough, but is unable to bring up any sputum  Objective:   Vitals: Blood pressure 106/52, pulse 90, temperature 99 7 °F (37 6 °C), temperature source Oral, resp  rate (!) 27, height 5' 7" (1 702 m), weight 66 7 kg (147 lb 0 8 oz), SpO2 94 %  , Body mass index is 23 03 kg/m² ,   Orthostatic Blood Pressures      Most Recent Value   Blood Pressure 106/52 filed at 03/10/2022 0715   Patient Position - Orthostatic VS Lying filed at 03/10/2022 0715          Intake/Output Summary (Last 24 hours) at 3/10/2022 0827  Last data filed at 3/10/2022 0600  Gross per 24 hour   Intake 2268 94 ml   Output 200 ml   Net 2068 94 ml     Physical Exam:  GEN: Parish Rushing appears ill, alert and oriented x 3, pleasant and cooperative   HEENT: pupils equal, round, and reactive to light; extraocular muscles intact  NECK: supple, no carotid bruits, R IJ CVC in place  HEART: regular rhythm, normal S1 and S2, no murmurs, clicks, gallops or rubs   LUNGS: diffuse coarse breath sounds throughout all lung fields; no wheezes, rales, or rhonchi, moist cough  ABDOMEN: normal bowel sounds, soft, no tenderness, no distention, obese  EXTREMITIES: peripheral pulses normal; no clubbing, cyanosis, or edema    Medications:    Current Facility-Administered Medications:     acetaminophen (TYLENOL) tablet 650 mg, 650 mg, Oral, Q4H PRN, Pauline Ferro PA-C, 650 mg at 03/08/22 8116    ampicillin-sulbactam (UNASYN) 3 g in sodium chloride 0 9 % 100 mL IVPB, 3 g, Intravenous, Q6H, PAZ Link, Last Rate: 200 mL/hr at 03/10/22 0803, 3 g at 03/10/22 0803    chlorhexidine (PERIDEX) 0 12 % oral rinse 15 mL, 15 mL, Mouth/Throat, Q12H [facility], Phil Rodriguez-IllinoisPAZ, 15 mL at 98/50/70 3761    folic acid (FOLVITE) tablet 1 mg, 1 mg, Oral, Daily, PAZ Bains, 1 mg at 03/10/22 0800    guaiFENesin (MUCINEX) 12 hr tablet 600 mg, 600 mg, Oral, Q12H [facility], Jennie Pack MD, 600 mg at 03/10/22 0810    insulin lispro (HumaLOG) 100 units/mL subcutaneous injection 1-5 Units, 1-5 Units, Subcutaneous, TID AC **AND** Fingerstick Glucose (POCT), , , TID AC, PAZ Espinosa    insulin lispro (HumaLOG) 100 units/mL subcutaneous injection 1-5 Units, 1-5 Units, Subcutaneous, HS, PAZ Espinosa    lacosamide (VIMPAT) 100 mg in sodium chloride 0 9 % 100 mL IVPB, 100 mg, Intravenous, Q12H, PAZ Link, Last Rate: 200 mL/hr at 03/09/22 2000, 100 mg at 03/09/22 2000    levETIRAcetam (KEPPRA) 1,000 mg in sodium chloride 0 9 % 100 mL IVPB, 1,000 mg, Intravenous, Q12H [facility], Jewel Wiley PA-C, Last Rate: 400 mL/hr at 03/10/22 0345, 1,000 mg at 03/10/22 0345    LORazepam (ATIVAN) injection 2 mg, 2 mg, Intravenous, Q1H PRN, PAZ Kaur, 2 mg at 03/08/22 0424    multivitamin-minerals (CENTRUM) tablet 1 tablet, 1 tablet, Oral, Daily, PAZ Bains, 1 tablet at 03/10/22 0806    NOREPINEPHRINE 4 MG  ML NSS (CMPD ORDER) infusion, 1-30 mcg/min, Intravenous, Titrated, PAZ Colon, Held at 03/10/22 0730    pantoprazole (PROTONIX) injection 40 mg, 40 mg, Intravenous, Q24H [facility], PAZ Colon, 40 mg at 03/10/22 0800    potassium phosphates 30 mmol in sodium chloride 0 9 % 250 mL infusion, 30 mmol, Intravenous, Once, Austin Martinez MD, Last Rate: 41 7 mL/hr at 03/10/22 0752, 30 mmol at 03/10/22 0752   thiamine tablet 100 mg, 100 mg, Oral, Daily, PAZ Garcia, 100 mg at 03/10/22 0800    vasopressin (PITRESSIN) 20 Units in sodium chloride 0 9 % 100 mL infusion, 0 04 Units/min, Intravenous, Continuous, PAZ Duval, Held at 03/10/22 8550     Labs & Results:  Results from last 7 days   Lab Units 03/07/22  1826   CK TOTAL U/L 126     Results from last 7 days   Lab Units 03/10/22  0433 03/09/22 0429 03/08/22  0448   WBC Thousand/uL 6 45 8 68 7 40   HEMOGLOBIN g/dL 8 1* 8 9* 9 1*   HEMATOCRIT % 24 2* 26 1* 26 9*   PLATELETS Thousands/uL 27* 29* 35*         Results from last 7 days   Lab Units 03/10/22  0433 03/09/22  0429 03/08/22  1131 03/08/22  0448 03/08/22  0448 03/08/22  0029 03/08/22  0029 03/07/22  1722 03/07/22  1713   POTASSIUM mmol/L 3 6 3 6 3 8   < > 3 1*   < > 3 5   < >  --    CHLORIDE mmol/L 109* 108 102   < > 101   < > 103   < >  --    CO2 mmol/L 21 20* 21   < > 23   < > 23   < >  --    CO2, I-STAT mmol/L  --   --   --   --   --   --   --   --  20*   BUN mg/dL 7 5 5   < > 6   < > 6   < >  --    CREATININE mg/dL 0 59* 0 57* 0 73   < > 0 72   < > 0 74   < >  --    CALCIUM mg/dL 7 9* 7 6* 7 7*   < > 7 4*   < > 7 4*   < >  --    ALK PHOS U/L  --  114  --   --  144*  --  160*  --   --    ALT U/L  --  58  --   --  85*  --  92*  --   --    AST U/L  --  103*  --   --  129*  --  148*  --   --    GLUCOSE, ISTAT mg/dl  --   --   --   --   --   --   --   --  355*    < > = values in this interval not displayed       Results from last 7 days   Lab Units 03/07/22  1826   INR  1 16   PTT seconds 27  27     Results from last 7 days   Lab Units 03/10/22  0433 03/09/22  0429 03/08/22  1131   MAGNESIUM mg/dL 1 7 1 8 2 1

## 2022-03-10 NOTE — ASSESSMENT & PLAN NOTE
Wt Readings from Last 3 Encounters:   03/09/22 66 7 kg (147 lb 0 8 oz)   03/09/22 66 7 kg (147 lb 0 8 oz)     ECHO on admission with EF 40 compared to 60 in 08/2021  Patchy hypokinesis throughout however basal/apical/mid-inferior motion reserved  Cards consulted - stress-induced cardiomyopathy  ?alchol contribution  Confirmed not contributing to shock  Plan:  · reassess for GDMT once off pressors  · Prn diuretics to keep euvolemic  · Given adequate time for myopathy to reverse with no improvement, ischemic work up

## 2022-03-11 PROBLEM — R57.9 SHOCK (HCC): Status: RESOLVED | Noted: 2022-03-09 | Resolved: 2022-03-11

## 2022-03-11 PROBLEM — R56.9 SEIZURE (HCC): Status: ACTIVE | Noted: 2022-03-08

## 2022-03-11 LAB
ANION GAP SERPL CALCULATED.3IONS-SCNC: 8 MMOL/L (ref 4–13)
BASOPHILS # BLD AUTO: 0.01 THOUSANDS/ΜL (ref 0–0.1)
BASOPHILS NFR BLD AUTO: 0 % (ref 0–1)
BUN SERPL-MCNC: 6 MG/DL (ref 5–25)
CALCIUM SERPL-MCNC: 8.2 MG/DL (ref 8.3–10.1)
CHLORIDE SERPL-SCNC: 112 MMOL/L (ref 100–108)
CO2 SERPL-SCNC: 25 MMOL/L (ref 21–32)
CREAT SERPL-MCNC: 0.54 MG/DL (ref 0.6–1.3)
EOSINOPHIL # BLD AUTO: 0.05 THOUSAND/ΜL (ref 0–0.61)
EOSINOPHIL NFR BLD AUTO: 1 % (ref 0–6)
ERYTHROCYTE [DISTWIDTH] IN BLOOD BY AUTOMATED COUNT: 15.7 % (ref 11.6–15.1)
GFR SERPL CREATININE-BSD FRML MDRD: 101 ML/MIN/1.73SQ M
GLUCOSE SERPL-MCNC: 95 MG/DL (ref 65–140)
GLUCOSE SERPL-MCNC: 96 MG/DL (ref 65–140)
HCT VFR BLD AUTO: 25.7 % (ref 34.8–46.1)
HGB BLD-MCNC: 8.3 G/DL (ref 11.5–15.4)
IMM GRANULOCYTES # BLD AUTO: 0.04 THOUSAND/UL (ref 0–0.2)
IMM GRANULOCYTES NFR BLD AUTO: 1 % (ref 0–2)
LYMPHOCYTES # BLD AUTO: 1.43 THOUSANDS/ΜL (ref 0.6–4.47)
LYMPHOCYTES NFR BLD AUTO: 32 % (ref 14–44)
MAGNESIUM SERPL-MCNC: 1.8 MG/DL (ref 1.6–2.6)
MCH RBC QN AUTO: 34.6 PG (ref 26.8–34.3)
MCHC RBC AUTO-ENTMCNC: 32.3 G/DL (ref 31.4–37.4)
MCV RBC AUTO: 107 FL (ref 82–98)
MONOCYTES # BLD AUTO: 0.49 THOUSAND/ΜL (ref 0.17–1.22)
MONOCYTES NFR BLD AUTO: 11 % (ref 4–12)
NEUTROPHILS # BLD AUTO: 2.43 THOUSANDS/ΜL (ref 1.85–7.62)
NEUTS SEG NFR BLD AUTO: 55 % (ref 43–75)
NRBC BLD AUTO-RTO: 0 /100 WBCS
PHOSPHATE SERPL-MCNC: 2.4 MG/DL (ref 2.3–4.1)
PLATELET # BLD AUTO: 31 THOUSANDS/UL (ref 149–390)
PMV BLD AUTO: 11.1 FL (ref 8.9–12.7)
POTASSIUM SERPL-SCNC: 3.4 MMOL/L (ref 3.5–5.3)
RBC # BLD AUTO: 2.4 MILLION/UL (ref 3.81–5.12)
SODIUM SERPL-SCNC: 145 MMOL/L (ref 136–145)
WBC # BLD AUTO: 4.45 THOUSAND/UL (ref 4.31–10.16)

## 2022-03-11 PROCEDURE — 80048 BASIC METABOLIC PNL TOTAL CA: CPT | Performed by: INTERNAL MEDICINE

## 2022-03-11 PROCEDURE — 99232 SBSQ HOSP IP/OBS MODERATE 35: CPT | Performed by: PSYCHIATRY & NEUROLOGY

## 2022-03-11 PROCEDURE — 94640 AIRWAY INHALATION TREATMENT: CPT

## 2022-03-11 PROCEDURE — 82948 REAGENT STRIP/BLOOD GLUCOSE: CPT

## 2022-03-11 PROCEDURE — 85025 COMPLETE CBC W/AUTO DIFF WBC: CPT | Performed by: INTERNAL MEDICINE

## 2022-03-11 PROCEDURE — 84100 ASSAY OF PHOSPHORUS: CPT | Performed by: INTERNAL MEDICINE

## 2022-03-11 PROCEDURE — 99232 SBSQ HOSP IP/OBS MODERATE 35: CPT | Performed by: INTERNAL MEDICINE

## 2022-03-11 PROCEDURE — 97116 GAIT TRAINING THERAPY: CPT

## 2022-03-11 PROCEDURE — C9113 INJ PANTOPRAZOLE SODIUM, VIA: HCPCS

## 2022-03-11 PROCEDURE — 94760 N-INVAS EAR/PLS OXIMETRY 1: CPT

## 2022-03-11 PROCEDURE — 97167 OT EVAL HIGH COMPLEX 60 MIN: CPT

## 2022-03-11 PROCEDURE — 94664 DEMO&/EVAL PT USE INHALER: CPT

## 2022-03-11 PROCEDURE — 97163 PT EVAL HIGH COMPLEX 45 MIN: CPT

## 2022-03-11 PROCEDURE — 94668 MNPJ CHEST WALL SBSQ: CPT

## 2022-03-11 PROCEDURE — 83735 ASSAY OF MAGNESIUM: CPT | Performed by: INTERNAL MEDICINE

## 2022-03-11 RX ORDER — LANOLIN ALCOHOL/MO/W.PET/CERES
3 CREAM (GRAM) TOPICAL
Status: DISCONTINUED | OUTPATIENT
Start: 2022-03-11 | End: 2022-03-18 | Stop reason: HOSPADM

## 2022-03-11 RX ORDER — POTASSIUM CHLORIDE 20 MEQ/1
40 TABLET, EXTENDED RELEASE ORAL ONCE
Status: COMPLETED | OUTPATIENT
Start: 2022-03-11 | End: 2022-03-11

## 2022-03-11 RX ORDER — MAGNESIUM SULFATE HEPTAHYDRATE 40 MG/ML
2 INJECTION, SOLUTION INTRAVENOUS ONCE
Status: COMPLETED | OUTPATIENT
Start: 2022-03-11 | End: 2022-03-11

## 2022-03-11 RX ORDER — LACOSAMIDE 100 MG/1
100 TABLET ORAL EVERY 12 HOURS SCHEDULED
Status: DISCONTINUED | OUTPATIENT
Start: 2022-03-11 | End: 2022-03-18 | Stop reason: HOSPADM

## 2022-03-11 RX ORDER — POTASSIUM CHLORIDE 20 MEQ/1
20 TABLET, EXTENDED RELEASE ORAL ONCE
Status: COMPLETED | OUTPATIENT
Start: 2022-03-11 | End: 2022-03-11

## 2022-03-11 RX ORDER — GUAIFENESIN 600 MG
600 TABLET, EXTENDED RELEASE 12 HR ORAL ONCE
Status: COMPLETED | OUTPATIENT
Start: 2022-03-11 | End: 2022-03-11

## 2022-03-11 RX ORDER — LEVALBUTEROL INHALATION SOLUTION 1.25 MG/3ML
1.25 SOLUTION RESPIRATORY (INHALATION)
Status: DISCONTINUED | OUTPATIENT
Start: 2022-03-11 | End: 2022-03-16

## 2022-03-11 RX ORDER — LEVALBUTEROL 1.25 MG/.5ML
1.25 SOLUTION, CONCENTRATE RESPIRATORY (INHALATION)
Status: DISCONTINUED | OUTPATIENT
Start: 2022-03-11 | End: 2022-03-11

## 2022-03-11 RX ORDER — LEVETIRACETAM 500 MG/1
250 TABLET ORAL EVERY 12 HOURS SCHEDULED
Status: DISCONTINUED | OUTPATIENT
Start: 2022-03-11 | End: 2022-03-14

## 2022-03-11 RX ADMIN — SODIUM CHLORIDE 3 G: 9 INJECTION, SOLUTION INTRAVENOUS at 14:52

## 2022-03-11 RX ADMIN — MULTIPLE VITAMINS W/ MINERALS TAB 1 TABLET: TAB ORAL at 08:04

## 2022-03-11 RX ADMIN — PANTOPRAZOLE SODIUM 40 MG: 40 INJECTION, POWDER, FOR SOLUTION INTRAVENOUS at 08:04

## 2022-03-11 RX ADMIN — Medication 12.5 MG: at 11:36

## 2022-03-11 RX ADMIN — Medication 15 ML: at 08:04

## 2022-03-11 RX ADMIN — POTASSIUM CHLORIDE 40 MEQ: 1500 TABLET, EXTENDED RELEASE ORAL at 08:04

## 2022-03-11 RX ADMIN — LEVALBUTEROL HYDROCHLORIDE 1.25 MG: 1.25 SOLUTION RESPIRATORY (INHALATION) at 11:24

## 2022-03-11 RX ADMIN — Medication 15 ML: at 22:10

## 2022-03-11 RX ADMIN — LEVETIRACETAM 250 MG: 500 TABLET, FILM COATED ORAL at 22:10

## 2022-03-11 RX ADMIN — MAGNESIUM SULFATE HEPTAHYDRATE 2 G: 40 INJECTION, SOLUTION INTRAVENOUS at 11:36

## 2022-03-11 RX ADMIN — Medication 12.5 MG: at 22:13

## 2022-03-11 RX ADMIN — GUAIFENESIN 600 MG: 600 TABLET ORAL at 22:10

## 2022-03-11 RX ADMIN — LEVETIRACETAM 500 MG: 100 INJECTION, SOLUTION INTRAVENOUS at 02:25

## 2022-03-11 RX ADMIN — THIAMINE HCL TAB 100 MG 100 MG: 100 TAB at 08:04

## 2022-03-11 RX ADMIN — SODIUM CHLORIDE 3 G: 9 INJECTION, SOLUTION INTRAVENOUS at 09:15

## 2022-03-11 RX ADMIN — FOLIC ACID 1 MG: 1 TABLET ORAL at 08:04

## 2022-03-11 RX ADMIN — GUAIFENESIN 600 MG: 600 TABLET ORAL at 18:12

## 2022-03-11 RX ADMIN — POTASSIUM CHLORIDE 20 MEQ: 1500 TABLET, EXTENDED RELEASE ORAL at 09:10

## 2022-03-11 RX ADMIN — LEVALBUTEROL HYDROCHLORIDE 1.25 MG: 1.25 SOLUTION RESPIRATORY (INHALATION) at 20:07

## 2022-03-11 RX ADMIN — LEVETIRACETAM 250 MG: 500 TABLET, FILM COATED ORAL at 11:35

## 2022-03-11 RX ADMIN — SODIUM CHLORIDE 3 G: 9 INJECTION, SOLUTION INTRAVENOUS at 02:25

## 2022-03-11 RX ADMIN — LACOSAMIDE 100 MG: 100 TABLET, FILM COATED ORAL at 22:10

## 2022-03-11 RX ADMIN — SODIUM CHLORIDE 3 G: 9 INJECTION, SOLUTION INTRAVENOUS at 22:19

## 2022-03-11 RX ADMIN — GUAIFENESIN 600 MG: 600 TABLET ORAL at 08:04

## 2022-03-11 RX ADMIN — SODIUM CHLORIDE 100 MG: 9 INJECTION, SOLUTION INTRAVENOUS at 09:11

## 2022-03-11 RX ADMIN — ACETAMINOPHEN 650 MG: 325 TABLET, FILM COATED ORAL at 09:10

## 2022-03-11 RX ADMIN — Medication 3 MG: at 22:15

## 2022-03-11 NOTE — PROGRESS NOTES
Cardiology Progress Note - Team 1  Britton Darby 58 y o  female MRN: [id number]  Unit/Bed#: ICU 07 Encounter: [id number]      Assessment/Plan:  1  New CM w/ LVEF 40%  - echo (3/8/22) - LVEF 40% (prior LVEF 60% in 96/6133), systolic function mildly reduced, hypokinesis of the mid anterior, mid anterior septal, mid anterior lateral, mid inferoseptal and mid inferior walls (no RWMA on prior study)  - likely stress-induced given pattern  - presenting EKG - sinus tachycardia, septal infarct, , no signs of acute ischemia  - telemetry review - NSR, HR 90s, no significant events noted  - currently net +264mL/24 hours - overall net +11 6L since admission; UO slowly improving with 1 4L documented yesterday  - previously not on any outpatient cardiac medications  - patient no longer hypotensive requiring pressors - start low dose BB today for GDMT and assess for response; lopressor 12 5mg po BID  - will allow adequate time for LVEF recovery prior to additional work-up including ischemic evaluation     2  Elevated troponin  - likely nonischemic myocardial injury secondary to #3/4  - hs troponin 488 > 2563 > 3485 > 2778 > 2461 > 2215  - EKG without signs of acute ischemia  - patient will need eventual ischemic workup as noted above  - continue to monitor on telemetry     3  Shock  - POA, secondary to #4   - hemodynamically stable this morning - /60, HR 90s; pressors off  - FLU/RSV/COVID negative  - blood cultures - no growth at 72 hours     4  Aspiration PNA  - CXR 3/8 - persistent RLL airspace infiltrate  - remains afebrile, WBC WNL  - continue IV Unasyn   - management per CC team     5  Status epilepticus  - video EEG - three seizures noted on 3/8, no new seizures since 3/8 at 0645; unclear etiology (possibly due to #6); now d/c'd  - CT head (3/7/22) - negative for acute intracranial abnormality   - MRI brain (3/10/22) - focal diffusion-weighted and FLAIR signal hyperintensity of left hippocampal formation consistent with status select guess, negative for hemorrhage, enhancement, or involvement of temporal lobe, scattered areas of sulcal hemosiderosis of cerebral hemispheres  - on IV Kepra, Vimpat, PRN ativan  - neurology following   - management per nuero/CC team   - maintain seizure precautions     6  ETOH abuse  - continue thiamine, folic acid supplements  - management per CC team     7  Hx SAH/IPH  - found down (August 2021)  - CT head/all trauma imaging this admission negative    Subjective:   Patient seen and examined  No significant events overnight  Patient states she feels "very positive this morning " Denies any SOB, CP, or palpitations  Notes some intermittent dizziness and productive cough  Objective:   Vitals: Blood pressure 118/70, pulse 99, temperature 98 4 °F (36 9 °C), temperature source Oral, resp  rate (!) 32, height 5' 7" (1 702 m), weight 66 7 kg (147 lb 0 8 oz), SpO2 96 %  , Body mass index is 23 03 kg/m² ,   Orthostatic Blood Pressures      Most Recent Value   Blood Pressure 118/70 filed at 03/11/2022 0800   Patient Position - Orthostatic VS Lying filed at 03/11/2022 0700          Intake/Output Summary (Last 24 hours) at 3/11/2022 0908  Last data filed at 3/11/2022 0801  Gross per 24 hour   Intake 1035 71 ml   Output 1300 ml   Net -264 29 ml     Physical Exam:  GEN: Nadege Guerrero appears well, alert and oriented x 3, pleasant and cooperative   HEENT: pupils equal, round, and reactive to light; extraocular muscles intact  NECK: supple, no carotid bruits   HEART: regular rhythm, tachycardic, normal S1 and S2, no murmurs, clicks, gallops or rubs   LUNGS: bibasilar rhonchi; no wheezes, or rales, on room air  ABDOMEN: normal bowel sounds, soft, no tenderness, no distention, obese  EXTREMITIES: peripheral pulses normal; no clubbing, cyanosis, or edema    Medications:    Current Facility-Administered Medications:     acetaminophen (TYLENOL) tablet 650 mg, 650 mg, Oral, Q4H PRN, Sophie Cedeno PA-C, 650 mg at 03/08/22 0621    ampicillin-sulbactam (UNASYN) 3 g in sodium chloride 0 9 % 100 mL IVPB, 3 g, Intravenous, Q6H, PAZ Link, Last Rate: 200 mL/hr at 03/11/22 0225, 3 g at 03/11/22 0225    chlorhexidine (PERIDEX) 0 12 % oral rinse 15 mL, 15 mL, Mouth/Throat, Q12H [facility], PAZ Dotson, 15 mL at 85/94/09 6343    folic acid (FOLVITE) tablet 1 mg, 1 mg, Oral, Daily, PAZ Simental, 1 mg at 03/11/22 0804    guaiFENesin (MUCINEX) 12 hr tablet 600 mg, 600 mg, Oral, Q12H [facility], Jennie Pack MD, 600 mg at 03/11/22 0804    insulin lispro (HumaLOG) 100 units/mL subcutaneous injection 1-5 Units, 1-5 Units, Subcutaneous, TID AC, 1 Units at 03/10/22 1705 **AND** Fingerstick Glucose (POCT), , , TID AC, PAZ Pires    insulin lispro (HumaLOG) 100 units/mL subcutaneous injection 1-5 Units, 1-5 Units, Subcutaneous, HS, PAZ Pires    lacosamide (VIMPAT) 100 mg in sodium chloride 0 9 % 100 mL IVPB, 100 mg, Intravenous, Q12H, PAZ Link, Last Rate: 200 mL/hr at 03/10/22 1954, 100 mg at 03/10/22 1954    levETIRAcetam (KEPPRA) 500 mg in sodium chloride 0 9 % 100 mL IVPB, 500 mg, Intravenous, Q12H [facility], Jennie Pack MD, Last Rate: 400 mL/hr at 03/11/22 0225, 500 mg at 03/11/22 0225    LORazepam (ATIVAN) injection 2 mg, 2 mg, Intravenous, Q1H PRN, 100 [employer] PAZ Mera, 2 mg at 03/08/22 0424    melatonin tablet 3 mg, 3 mg, Oral, HS, Jennie Pack MD    multivitamin-minerals (CENTRUM) tablet 1 tablet, 1 tablet, Oral, Daily, PAZ Simental, 1 tablet at 03/11/22 0804    NOREPINEPHRINE 4 MG  ML NSS (CMPD ORDER) infusion, 1-30 mcg/min, Intravenous, Titrated, PAZ Barnard, Held at 03/10/22 1150    pantoprazole (PROTONIX) injection 40 mg, 40 mg, Intravenous, Q24H [facility], PAZ Simental, 40 mg at 03/11/22 0804    [COMPLETED] potassium chloride (K-DUR,KLOR-CON) CR tablet 40 mEq, 40 mEq, Oral, Once, 40 mEq at 03/11/22 0804 **FOLLOWED BY** potassium chloride (K-DUR,KLOR-CON) CR tablet 20 mEq, 20 mEq, Oral, Once, Clarice Miller MD    thiamine tablet 100 mg, 100 mg, Oral, Daily, [employer], CRNP, 100 mg at 03/11/22 0804    vasopressin (PITRESSIN) 20 Units in sodium chloride 0 9 % 100 mL infusion, 0 04 Units/min, Intravenous, Continuous, PAZ Bains, Held at 03/10/22 2955     Labs & Results:  Results from last 7 days   Lab Units 03/07/22  1826   CK TOTAL U/L 126     Results from last 7 days   Lab Units 03/11/22  0443 03/10/22  0433 03/09/22  0429   WBC Thousand/uL 4 45 6 45 8 68   HEMOGLOBIN g/dL 8 3* 8 1* 8 9*   HEMATOCRIT % 25 7* 24 2* 26 1*   PLATELETS Thousands/uL 31* 27* 29*         Results from last 7 days   Lab Units 03/11/22  0441 03/10/22  0433 03/09/22  0429 03/08/22  1131 03/08/22  0448 03/08/22  0029 03/08/22  0029 03/07/22  1722 03/07/22  1713   POTASSIUM mmol/L 3 4* 3 6 3 6   < > 3 1*   < > 3 5   < >  --    CHLORIDE mmol/L 112* 109* 108   < > 101   < > 103   < >  --    CO2 mmol/L 25 21 20*   < > 23   < > 23   < >  --    CO2, I-STAT mmol/L  --   --   --   --   --   --   --   --  20*   BUN mg/dL 6 7 5   < > 6   < > 6   < >  --    CREATININE mg/dL 0 54* 0 59* 0 57*   < > 0 72   < > 0 74   < >  --    CALCIUM mg/dL 8 2* 7 9* 7 6*   < > 7 4*   < > 7 4*   < >  --    ALK PHOS U/L  --   --  114  --  144*  --  160*  --   --    ALT U/L  --   --  58  --  85*  --  92*  --   --    AST U/L  --   --  103*  --  129*  --  148*  --   --    GLUCOSE, ISTAT mg/dl  --   --   --   --   --   --   --   --  355*    < > = values in this interval not displayed       Results from last 7 days   Lab Units 03/07/22  1826   INR  1 16   PTT seconds 27  27     Results from last 7 days   Lab Units 03/11/22  0441 03/10/22  0433 03/09/22  0429   MAGNESIUM mg/dL 1 8 1 7 1 8

## 2022-03-11 NOTE — ASSESSMENT & PLAN NOTE
known etiology on arrival - acidosis, asp pna, concern for zoloft overdose, multiple meds received to control seizures  At this point fluid resuscitated, acidosis resolved, being treated for asp pna     ECHO 03/08 with EF 40%, almost generalized hypokinesis however not cause of shock  Shock induced transaminitis and trop elevation    Off pressors

## 2022-03-11 NOTE — ASSESSMENT & PLAN NOTE
Wt Readings from Last 3 Encounters:   03/09/22 66 7 kg (147 lb 0 8 oz)   03/09/22 66 7 kg (147 lb 0 8 oz)     ECHO on admission with EF 40 compared to 60 in 08/2021  Patchy hypokinesis throughout however basal/apical/mid-inferior motion reserved  Cards consulted - stress-induced cardiomyopathy  ?alchol contribution  Confirmed not contributing to shock  Plan:  · Start GDMT today  · Prn diuretics to keep euvolemic  · k being repleted to keep >4, mag pending  · Given adequate time for myopathy to reverse with no improvement, ischemic work up

## 2022-03-11 NOTE — QUICK NOTE
Patient's  updated at bedside  Discussion of accidental administration of Blood products also done   states that he will reach out to his local elder and liaison who will then reach out to [facility] staff/providers for further explanation  I reassured him that the current team will be available to do this, including Conchita Simms from risk if needed  He expressed understanding and was reassured the team is apologetic

## 2022-03-11 NOTE — ASSESSMENT & PLAN NOTE
CXR initially with clear lungs  Repeat Imaging with prominent RLL consolidation  Respiratory status stable with alexey  Started on cefepime however to avoid neuro se, switched to zosyn 03/08  Plan:  · Continue unasyn, D4; total D5 - can dc Abx today to complete 5D course  · Aspiration precautions  · mucinex + albuterol nebs

## 2022-03-11 NOTE — ASSESSMENT & PLAN NOTE
Likely 2/2 alcohol intake, baseline around 50  Saw hemonc calls in EMR, never established care from my understanding  Stable around 30; improved slightly after reducing keppra  Plan:  · Continue to monitor  · NO BLOOD PRODUCTS - [employer]

## 2022-03-11 NOTE — PROGRESS NOTES
NEUROLOGY RESIDENCY PROGRESS NOTE     Name: Scotty Fox   Age & Sex: 58 y o  female   MRN: [id number]  Unit/Bed#: ICU 07   Encounter: [id number]    Scotty Fox will need follow up in in 6 weeks with epilepsy AP/Attending   She will not require outpatient neurological testing  Pending for discharge: Based on stability     ASSESSMENT & PLAN     * Seizure [facility])  Assessment & Plan  58 y o  right handed female w h/o traumatic Rt Subarachnoid, right posterior fossa IPH, alcohol abuse who presents in [facility] ED on 3/7/22 as trauma alert A d/t unwitnessed fall and altered mental status found to be in status s/p extubation and Sz free since 3/8/22 6:45 am      To review, pt wasn't feeling well on 3/7-  Lightheadedness, tunnel vision, nausea, unable to eat, difficulty walking, later found down by  around 4:30 pm when he returned home with tongue laceration, abnormal movement of left hand, on arrival tachycardic, GCS-8, platelet 40V, metabolic acidosis, elevated lactic acid, procal 1 49, pt found to have clinical sz in ED and was given Ativan, intubated, sedated on propofol, low BP, received crystalloids, she was started on video EEG monitoring, on 3/8/22 she had 3 electrographic seizures from 12 midnight to 2:00 am, 2:00 to 4:00 am seizure every 15 min, last seizure noted was around 6:45 am, 3/8 on EEG  Her Propofol was increased, given 2 gm Keppra and started 1 gm Keppra bid, started on Vimpat 100 mg bid  Versed was also given at one point  Also started on antibiotics d/t likely aspiration pneumonia (Rt lung base) seen on Xray  Weaned off propofol, no sz, s/p extubation on 3/9, pt A, O x 3 on 3/10  MRI reviewed- Focal diffusion-weighted and FLAIR signal hyperintensity involving the left hippocampal formation is consistent with status epilepticus  No associated hemorrhage, enhancement or involvement of the contralateral temporal lobe   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  No acute intracranial hemorrhage  Impression- Suspicion related to Alcohol withdrawal, med overdose, vs TBI from previous fall, her seems to be from left temporal region confirmed by MRI with hyperintensity in left hippocampus post status changes, similar episode in 2021 (she was found down at that time as well) with right posterior occipital intraparenchymal hematoma within rim of surrounding edema, subarachnoid hemorrhage, small extra-axial focus of hemorrhage over the right parietal lobe  Thin posterior falx subdural hematoma  She is alert, oriented with non focal weakness this am      Plan-  · S/p V EEG      · Decrease Keppra d/t thrombocytopenia to 250 mg BID  · Continue Vimpat 100 mg BID   · Please reach out to on-na Neurologist for more seizure activity, has room to go up on Vimpat   · Continue evaluating metabolic and infectious derangement per ICU  · Appreciate Toxicology recs       Encephalopathy-resolved as of 3/10/2022  Assessment & Plan  Likely related to status epilepticus         SUBJECTIVE     Patient was seen and examined  No acute events overnight  She reports feeling better, she did have mild lightheadedness, she says her voice is getting better now  She does report pain in her right upper extremity  Pertinent Negatives include: headaches, amaurosis, diplopia, other visual changes, paralysis/weakness, numbness or tingling, involuntary movements, tremor     ROS negative except what mentioned above  OBJECTIVE     Patient ID: Rafaela Ji is a 58 y o  female      Vitals:    22 1127 22 1136 22 1200 22 1224   BP:  120/73     BP Location:       Pulse:  98 95    Resp:   (!) 28    Temp:    98 1 °F (36 7 °C)   TempSrc:    Oral   SpO2: 96%  92%    Weight:       Height:          Temperature:   Temp (24hrs), Av 4 °F (36 9 °C), Min:98 1 °F (36 7 °C), Max:98 7 °F (37 1 °C)    Temperature: 98 1 °F (36 7 °C)      Physical Exam  Vitals and nursing note reviewed  Constitutional:       Appearance: Normal appearance  HENT:      Head: Normocephalic  Mouth/Throat:      Mouth: Mucous membranes are moist       Pharynx: Oropharynx is clear  Eyes:      Extraocular Movements: Extraocular movements intact  Conjunctiva/sclera: Conjunctivae normal       Pupils: Pupils are equal, round, and reactive to light  Neck:      Comments: Attached IVs  Cardiovascular:      Rate and Rhythm: Normal rate  Pulses: Normal pulses  Pulmonary:      Effort: Pulmonary effort is normal    Abdominal:      Palpations: Abdomen is soft  Musculoskeletal:         General: Normal range of motion  Skin:     General: Skin is warm  Capillary Refill: Capillary refill takes less than 2 seconds  Neurological:      Mental Status: She is alert  Neurological Examination:      Mental Status: The patient was awake, alert, attentive, oriented to person, place, and  Month, year  speech- hypophonic,  No dysarthria or aphasia noted       Cranial Nerves:   I: smell Not tested   II: visual fields Full to confrontation  Pupils equal, round, reactive to light with normal accomodation  III,IV,VI: extraocular muscles EOMI, no nystagmus   V: masseter and pterygoid strength full  Sensation in the V1 through V3 distributions intact to pinprick and light touch bilaterally  VII: Face is symmetric with no weakness noted  VIII: Audition intact to finger rub bilaterally  IX/X: Uvula midline  Soft palate elevation symmetric  XI: Trapezius and SCM strength 5/5 B/L     XII: Tongue laceration seen on left side of tongue with no atrophy or fasciculations with appropriate movement       Motor Examination:    Bulk: Normal  No atrophy Tone: Normal  Fasciculations: None       Feels mild pain in her right upper extremity due to bruising from IV     able to squeeze my fingers, push pull at least 4/5 strength in biceps/triceps/deltoid (pain related)  Almost full hand      left arm-  Full strength in hand ,  biceps, triceps, deltoid 4+/5                   IP        Quad   Ham     TA       Gastroc   Right      5            5          5         5                5  Left         5            5         5         5                5         Reflexes:                   Biceps Brachioradialis Triceps Patella Achilles Plantars   Right          2+            2+                  2+        1+       1+         Down   Left            2+             2+                 2+         1+       1+         Down      Clonus: None     Coordination: Patient able to perform normal finger-to-nose      Sensory: Normal sensation to light touch throughout       Gait: deferred d/t acuity of condition  LABORATORY DATA     Labs: I have personally reviewed pertinent reports  Results from last 7 days   Lab Units 03/11/22  0443 03/10/22  0433 03/09/22  0429 03/08/22  0448 03/08/22  0448 03/07/22  1826 03/07/22  1722   WBC Thousand/uL 4 45 6 45 8 68   < > 7 40   < > 8 47   HEMOGLOBIN g/dL 8 3* 8 1* 8 9*   < > 9 1*   < > 11 2*   HEMATOCRIT % 25 7* 24 2* 26 1*   < > 26 9*   < > 35 5   PLATELETS Thousands/uL 31* 27* 29*   < > 35*   < > 49*   NEUTROS PCT % 55  --   --   --  77*  --  77*   MONOS PCT % 11  --   --   --  4   < > 5   MONO PCT %  --   --  8  --   --   --   --     < > = values in this interval not displayed        Results from last 7 days   Lab Units 03/11/22  0441 03/10/22  0433 03/09/22  0429 03/08/22  1131 03/08/22 0448 03/08/22  0029 03/08/22  0029 03/07/22  1722 03/07/22  1713   SODIUM mmol/L 145 140 138   < > 136   < > 139   < >  --    POTASSIUM mmol/L 3 4* 3 6 3 6   < > 3 1*   < > 3 5   < >  --    CHLORIDE mmol/L 112* 109* 108   < > 101   < > 103   < >  --    CO2 mmol/L 25 21 20*   < > 23   < > 23   < >  --    CO2, I-STAT mmol/L  --   --   --   --   --   --   --   --  20*   BUN mg/dL 6 7 5   < > 6   < > 6   < >  --    CREATININE mg/dL 0 54* 0 59* 0 57*   < > 0 72   < > 0 74   < >  -- CALCIUM mg/dL 8 2* 7 9* 7 6*   < > 7 4*   < > 7 4*   < >  --    ALK PHOS U/L  --   --  114  --  144*  --  160*  --   --    ALT U/L  --   --  58  --  85*  --  92*  --   --    AST U/L  --   --  103*  --  129*  --  148*  --   --    GLUCOSE, ISTAT mg/dl  --   --   --   --   --   --   --   --  355*    < > = values in this interval not displayed  Results from last 7 days   Lab Units 03/11/22  0441 03/10/22  0433 03/09/22  0429   MAGNESIUM mg/dL 1 8 1 7 1 8     Results from last 7 days   Lab Units 03/11/22  0441 03/10/22  0433 03/09/22  0429   PHOSPHORUS mg/dL 2 4 1 7* 2 2*      Results from last 7 days   Lab Units 03/07/22  1826   INR  1 16   PTT seconds 27  27     Results from last 7 days   Lab Units 03/07/22  2307   LACTIC ACID mmol/L 1 7           IMAGING & DIAGNOSTIC TESTING     Radiology Results: I have personally reviewed pertinent reports  MRI brain w wo contrast   Final Result by Mayra Butt MD (03/11 1616)      Focal diffusion-weighted and FLAIR signal hyperintensity involving the left hippocampal formation is consistent with status epilepticus  No associated hemorrhage, enhancement or involvement of the contralateral temporal lobe  Follow to resolution  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  No acute intracranial hemorrhage  Workstation performed: [id number]         XR chest portable ICU   Final Result by Diony Allen MD (03/08 9178)      Persistent right lower lobe airspace opacity may reflect atelectasis or pneumonia  Right IJ central venous catheter with tip in satisfactory position  Workstation performed: [id number]         XR chest portable ICU   Final Result by Marc Hernandez MD (03/08 9992)      New opacity right lung base may be artifactual due to positioning, though consolidation (including aspiration) is not excluded, and attention will be made on follow-up        Workstation performed: [id number]         XR chest 1 view Final Result by William Mccarty MD (03/08 4957)      Improved interstitial markings  Status post intubation without pneumothorax  Workstation performed: [id number]         XR Trauma multiple (SLB/SLRA trauma bay ONLY)   Final Result by Maddy Haynes MD (03/08 9173)      No acute traumatic injury  Mild vascular prominence  Correlate for CHF  Workstation performed: [id number]         XR chest 1 view   Final Result by Maddy Haynes MD (03/10 1132)      No acute traumatic injury  Mild vascular prominence  Correlate for CHF  Workstation performed: [id number]         TRAUMA - CT chest abdomen pelvis w contrast   Final Result by Keyla Alegria MD (03/07 1756)   1  T12, L2, L4 mild vertebral compression deformities are age indeterminate  Correlate clinically  2   No suspected acute posttraumatic injury  3   Cholelithiasis  4   Chronic pancreatitis  I personally discussed this study with Clemencia Zayas on 3/7/2022 at 5:50 PM                         Workstation performed: [id number]         TRAUMA - CT head wo contrast   Final Result by Sergio Gonsales MD (03/07 6681)      No acute intracranial abnormality  I personally discussed this study with Clemencia Zayas on 3/7/2022 at 5:39 PM                      Workstation performed: [id number]         TRAUMA - CT spine cervical wo contrast   Final Result by Keyla Alegria MD (03/07 1756)      No cervical spine fracture or traumatic malalignment  I personally discussed this study with Clemencia Zayas on 3/7/2022 at 5:45 PM                       Workstation performed: [id number]             Other Diagnostic Testing: I have personally reviewed pertinent reports        ACTIVE MEDICATIONS     Current Facility-Administered Medications   Medication Dose Route Frequency    acetaminophen (TYLENOL) tablet 650 mg  650 mg Oral Q4H PRN    ampicillin-sulbactam (UNASYN) 3 g in sodium chloride 0 9 % 100 mL IVPB  3 g Intravenous Q6H    chlorhexidine (PERIDEX) 0 12 % oral rinse 15 mL  15 mL Mouth/Throat L46X [address]    folic acid (FOLVITE) tablet 1 mg  1 mg Oral Daily    guaiFENesin (MUCINEX) 12 hr tablet 600 mg  600 mg Oral Q12H DESTIN    lacosamide (VIMPAT) tablet 100 mg  100 mg Oral Q12H [facility]    levalbuterol (XOPENEX) inhalation solution 1 25 mg  1 25 mg Nebulization TID    levETIRAcetam (KEPPRA) tablet 250 mg  250 mg Oral Q12H [address]    LORazepam (ATIVAN) injection 2 mg  2 mg Intravenous Q1H PRN    melatonin tablet 3 mg  3 mg Oral HS    metoprolol tartrate (LOPRESSOR) partial tablet 12 5 mg  12 5 mg Oral Q12H [facility]    multivitamin-minerals (CENTRUM) tablet 1 tablet  1 tablet Oral Daily    pantoprazole (PROTONIX) injection 40 mg  40 mg Intravenous Q24H [facility]    thiamine tablet 100 mg  100 mg Oral Daily       Prior to Admission medications    Not on File         VTE Pharmacologic Prophylaxis: C/I d/t thrombocytopenia   VTE Mechanical Prophylaxis: sequential compression device    ==  MD Lynn Parikh's Neurology Residency, PGY-3

## 2022-03-11 NOTE — ASSESSMENT & PLAN NOTE
Wt Readings from Last 3 Encounters:   03/09/22 66 7 kg (147 lb 0 8 oz)   03/09/22 66 7 kg (147 lb 0 8 oz)     ECHO on admission with EF 40 compared to 60 in 08/2021  Patchy hypokinesis throughout however basal/apical/mid-inferior motion reserved  Cards consulted - stress-induced cardiomyopathy  ?alchol contribution  Confirmed not contributing to shock  Plan:  · Start GDMT today, metoprolol 12 5mg BID  · Prn diuretics to keep euvolemic  · K being repleted to keep >4, Mag 1 8  · Replete K, Mag  · Given adequate time for myopathy to reverse with no improvement, ischemic work up, cards to guide timeline

## 2022-03-11 NOTE — ASSESSMENT & PLAN NOTE
Initial thought was shock state however no impact on renal function on labwork    Plan:  · Monitor I/Os  · Monitor BMP

## 2022-03-11 NOTE — ASSESSMENT & PLAN NOTE
Arrived after unwitnessed seizure like activity, tongue laceration, left hand movement  No h/o seizure however had ICH requiring seizure ppx in 08/2021  [facility] unremarkable, Labs with acidosis   3 electrographic seizures from 12 midnight,  last seizure noted was around 6:45 am 03/08 on video EEG  remaind with clonic activity of left arm and roving eye movements  started on lacosamide, levetiracem, fentanyl, propofol       No new seizures since 03/08 6:45am    Per neuro, originating from left temporal lobe, etiology unknown but considering alcohol withdrawal  Mentation improved  Plan:  · Continue lacosamide, levetiracem dosing reduced per neuro recommendations to 500mg BID due to platelet count  · Neuro to guide further adjustments  · MRI brain w wo con ordered  · Melatonin for sleep; Diet advanced  · Neuro and tox on board

## 2022-03-11 NOTE — ASSESSMENT & PLAN NOTE
Initial thought was shock state however no impact on renal function on chemistry  With >1L output yesterday  Could be that patient is now volume resuscitated  · ext urinary cath in, monitor output, prn bolus if reducing output

## 2022-03-11 NOTE — ASSESSMENT & PLAN NOTE
Likely 2/2 alcohol intake, baseline around 50  Saw hemonc calls in EMR, never established care from my understanding  Stable around 30; improved slightly after reducing keppra  Plan:  Continue to monitor  NO BLOOD PRODUCTS - [facility] one time nurse phone call They will call you to go over your discharge instructions with you. Their phone number is [phone] for any questions.    [facility], call [phone] for an [facility] primary care physician in network with your insurance.

## 2022-03-11 NOTE — ASSESSMENT & PLAN NOTE
· Arrived after unwitnessed seizure like activity, tongue laceration, left hand movement  · No h/o seizure however had ICH requiring seizure ppx in 08/2021  · [address] unremarkable, Labs with acidosis   · 3 electrographic seizures noted on vEEG, last seizure noted was around 6:45 am 03/08  · clonic activity of left arm and roving eye movements  · started on lacosamide, levetiracem, fentanyl, propofol       · No new seizures since 03/08 6:45am    · Per neuro, originating from left temporal lobe, etiology unknown but considering alcohol withdrawal  · Mentation improved  · MRI- Focal diffusion-weighted and FLAIR signal hyperintensity involving the left hippocampal formation is consistent with status epilepticus  Plan:  · D/C Keppra d/t thrombocytopenia appears somewhat improved   · Continue Vimpat 100 mg q 12H  · In case of more seizure-like activity, reach out to on-call neurologist   Patient's Vimpat dosage may be escalated  · Psych consult pending

## 2022-03-11 NOTE — ASSESSMENT & PLAN NOTE
CXR initially with clear lungs  Repeat Imaging with prominent RLL consolidation  Started on cefepime->switched to unasyn, completed 6 total days of antibiotics  DC'd given negative procal on 3/12 labwork  On 03/12 exam, lung CTA b/l       Plan:  · Xopenex  · Monitor fever curves, CBC

## 2022-03-11 NOTE — PLAN OF CARE
Problem: PHYSICAL THERAPY ADULT  Goal: Performs mobility at highest level of function for planned discharge setting  See evaluation for individualized goals  Description: Treatment/Interventions: Functional transfer training,LE strengthening/ROM,Therapeutic exercise,Endurance training,Cognitive reorientation,Patient/family training,Equipment eval/education,Bed mobility,Gait training  Equipment Recommended: Telly Vega       See flowsheet documentation for full assessment, interventions and recommendations  3/11/2022 1201 by Essie Infante PT  Note: Prognosis: Fair  Problem List: Decreased strength,Decreased endurance,Impaired balance,Decreased mobility,Decreased safety awareness  Assessment: Elisabet Hutton is a 58 y o  Female who [facility] on 3[facility]22 from home w/ c/o fall from standing and diagnosis of status epilepticus  Orders for PT eval and treat received, w/ activity orders of early mobilization and fall, seizure, aspiration precautions  Pt presents w/ comorbidities of CHF, ETOH abuse, hx of TBI, ICH, osteoporosis, depression and anxiety  At baseline, pt mobilizes independently (recently without device), and reports 0 falls in the last 6 months  Upon evaluation, pt presents w/ the following deficits: weakness, impaired balance and decreased endurance  Upon eval, pt requires mod A x 1 for bed mobility, min A-mod A x 1 for transfers, and min A x 1 for gait  Pt's clinical presentation is unstable/unpredictable due to need for assist w/ all phases of mobility when usually mobilizing independently, need for input for task focus and mobility technique, recent drastic decline in mobility compared to baseline, recent history of falls and dizziness impacting overall mobility status  Patient is at an increased risk of falls due to physical deficits  Given the above findings, discharge recommendation is for Post-acute inpatient rehabilitation   During this admission, pt would benefit from continued skilled inpatient PT in the acute care setting in order to address the abovementioned deficits to maximize function and mobility before DC from acute care  PT Discharge Recommendation: Post acute rehabilitation services          See flowsheet documentation for full assessment

## 2022-03-11 NOTE — PLAN OF CARE
Problem: OCCUPATIONAL THERAPY ADULT  Goal: Performs self-care activities at highest level of function for planned discharge setting  See evaluation for individualized goals  Description: Treatment Interventions: ADL retraining,Functional transfer training,UE strengthening/ROM,Endurance training,Patient/family training,Equipment evaluation/education,Compensatory technique education,Continued evaluation,Energy conservation,Activityengagement  Equipment Recommended: Shower/Tub chair with back ($),Bedside commode (use of RW)       See flowsheet documentation for full assessment, interventions and recommendations  Note: Limitation: Decreased ADL status,Decreased endurance,Decreased self-care trans,Decreased high-level ADLs,Decreased fine motor control,Decreased UE ROM,Decreased UE strength     Assessment: Pt is a 65yo female admitted [facility] on 3/7/22  Pt presented as trauma following a fall from home w/ head strike  Diagnosed w/ status epilepticus,suspicion for seizure activity, hx ETOH abuse, T12, L2, L4 age indeterminate fractures  Significant PMH impacting her occupational performance includes hx ETOH abuse, TBI, R clavicle fracture, B pubic rami fractures, osteoporosis, hx rib fractures  Pt w/ active OT orders and activity orders  Pt intubated and extubated on 3/9/22  Personal factors impacting performance includes multi - level home environment, limited insight into deficits, hx alcohol abuse  Pt reports living w/ her  in [address] [facility] w/ ramped entrance PTA  Pt reports I w/ ADL/ IADL w/ out use of AD  Upon eval, pt alert and generally oriented  Pt engaged in [place] w/ score of 0 indicating normal cognition  Pt required mod A to complete bed mobility supine to sit  Pt required min <> mod A to stand and min A using RW for short distance functional mobility  Pt required mod A to complete toileting using commode, min A to complete UBD and S to complete grooming   Pt presents w/ decreased activity tolerance, decreased endurance, decreased R UE AROM / strength, decreased standing tolerance, decreased standing balance, generalized weakness /deconditioning, decreased sitting balance, limited insight into deficits, increased pain impacting her I w/ dressing, bathing, oral hygiene, functional mobility, functional transfers, activity engagement, clothing mgmt, food prep /clean up, and community mobility  Pt completing ADL below baseline level of I and would benefit from OT while in acute care to address deficits  Recommend post acute rehab when medically stable for discharge from acute care to return to baseline level of I and ongoing eval of functional cognitive skills to assist in DC planning   Will continue to follow     OT Discharge Recommendation: Post acute rehabilitation services

## 2022-03-11 NOTE — PHYSICAL THERAPY NOTE
PHYSICAL THERAPY EVALUATION NOTE    Patient Name: Nadege REYES Date: 3/11/2022     AGE:   58 y o  Mrn:   [id number]  ADMIT DX:  Encephalopathy acute [G93 40]  Seizure-like activity ([facility] [address] ) [R56 9]  Acute respiratory failure with hypoxia ([facility] [address] ) [J96 01]    No past medical history on file  Length Of Stay: 4  PHYSICAL THERAPY EVALUATION :   Patient's identity confirmed via 2 patient identifiers (full name and ) at start of session       22 1052   PT Last Visit   PT Visit Date 22   Note Type   Note type Evaluation   Pain Assessment   Pain Assessment Tool 0-10   Pain Score No Pain   Restrictions/Precautions   Weight Bearing Precautions Per Order No   Other Precautions 1:1;Chair Alarm; Bed Alarm;Multiple lines; Fall Risk;Pain  (RUE A-line)   Home Living   Type of [address] [place], [place] [address]e Two level;Ramped entrance;1/2 bath on main level; Able to live on main level with bedroom/bathroom   Bathroom Shower/Tub Tub/shower unit   Home Equipment Walker  (rollator)   Additional Comments Pt reports she resides w/ her  in a 2 SH,  recently installed a ramped entrance  She reports she plans to have a [address] upon DC  Prior Function   Level of [place] Independent with ADLs and functional mobility   Lives With Spouse   ADL Assistance Independent   IADLs Independent  (+ drives)   Falls in the last 6 months 1 to 4  (1; reason for admission)   Vocational Other (Comment)  ("homemaker")   Comments Pt reports being I PTA, has a rollator but was independently ambulating without device   She enjoys gardening and talking about her dog   General   Family/Caregiver Present No   Cognition   Overall Cognitive Status Impaired   Arousal/Participation Alert   Orientation Level Oriented X4   Memory Decreased recall of recent events   Following Commands Follows multistep commands with increased time or repetition   Comments Pt pleasantly and agreeable to participate in PT evaluation   Subjective   Subjective "I'm feeling optimistic"   RLE Assessment   RLE Assessment WFL   Strength RLE   RLE Overall Strength 4-/5   LLE Assessment   LLE Assessment WFL   Strength LLE   LLE Overall Strength 4-/5   Bed Mobility   Supine to Sit 3  Moderate assistance   Additional items Assist x 1; Increased time required;Verbal cues;LE management   Transfers   Sit to Stand 4  Minimal assistance   Additional items Assist x 1; Increased time required;Verbal cues   Stand to Sit 4  Minimal assistance   Additional items Assist x 1; Increased time required;Verbal cues   Stand pivot 4  Minimal assistance   Additional items Assist x 1; Increased time required;Verbal cues  (HHA to commode)   Additional Comments Pt reports needing to use the bathroom, opts for HHA for SPT to commode w/ increased time   Balance   Static Sitting Good   Static Standing Poor +   Ambulatory Poor +   Activity Tolerance   Activity Tolerance Patient limited by fatigue  (line constraints (a-line))   Medical Staff [place] coordination w/ OT Jose Dean to [address] to KELTON Connolly   Assessment   Prognosis Fair   Problem List Decreased strength;Decreased endurance; Impaired balance;Decreased mobility; Decreased safety awareness   Assessment Guerline Van is a 58 y o  Female who [facility] on 3/7/2022 from home w/ c/o fall from standing and diagnosis of status epilepticus  Orders for PT eval and treat received, w/ activity orders of early mobilization and fall, seizure, aspiration precautions  Pt presents w/ comorbidities of CHF, ETOH abuse, hx of TBI, ICH, osteoporosis, depression and anxiety  At baseline, pt mobilizes independently (recently without device), and reports 0 falls in the last 6 months  Upon evaluation, pt presents w/ the following deficits: weakness, impaired balance and decreased endurance   Upon eval, pt requires mod A x 1 for bed mobility, min A-mod A x 1 for transfers, and min A x 1 for gait  Pt's clinical presentation is unstable/unpredictable due to need for assist w/ all phases of mobility when usually mobilizing independently, need for input for task focus and mobility technique, recent drastic decline in mobility compared to baseline, recent history of falls and dizziness impacting overall mobility status  Patient is at an increased risk of falls due to physical deficits  Given the above findings, discharge recommendation is for Post-acute inpatient rehabilitation  During this admission, pt would benefit from continued skilled inpatient PT in the acute care setting in order to address the abovementioned deficits to maximize function and mobility before DC from acute care  Goals   Patient Goals go home and work on her garden   STG Expiration Date 03/21/22   Short Term Goal #1 Patient will: Increase bilateral LE strength 1/2 grade to facilitate independent mobility, Perform all bed mobility tasks w/ supervision to decrease fall risk factors, Perform all transfers w/ supervision to improve independence, Ambulate at least 100 ft  +/- LRAD w/ supervision w/o LOB, Increase all balance 1/2 grade to decrease risk for falls, Complete exercise program independently and Tolerate 3 hr OOB to faciliate upright tolerance   Plan   Treatment/Interventions Functional transfer training;LE strengthening/ROM; Therapeutic exercise; Endurance training;Cognitive reorientation;Patient/family training;Equipment eval/education; Bed mobility;Gait training   PT Frequency 4-6x/wk   Recommendation   PT Discharge Recommendation Post acute rehabilitation services   Equipment Recommended [facility] Recommended Wheeled walker   Change/add to iScreen Vision?  No   AM-PAC Basic Mobility Inpatient   Turning in Bed Without Bedrails 3   Lying on Back to Sitting on Edge of Flat Bed 2   Moving Bed to Chair 3   Standing Up From Chair 3   Walk in Room 2   Climb 3-5 Stairs 2   Basic Mobility Inpatient Raw Score 15   Basic Mobility Standardized Score 36 97   Highest Level Of Mobility   JH-HLM Goal 4: Move to chair/commode   JH-HLM Highest Level of Mobility 4: Move to chair/commode   JH-HLM Goal Achieved Yes   Additional Treatment Session   Start Time 1044   End Time 1052   Treatment Assessment Pt OOB on commode at start of intervention  Pt requires mod A x 1 for STS transfer off of commode, ? secondary to some problem solving difficulties (pt trying to hold toilet paper while attempting to stand)  Pt is then able to ambulate ~2 ft from commode to recliner chair w/ min A x 1 w/ RW  Pt seated OOB in recliner chair, reports some dizziness w/ standing but resolves once seated OOB in recliner chair  Equipment Use RW   Additional Treatment Day 1   End of Consult   Patient Position at End of Consult Bedside chair; All needs within reach;Bed/Chair alarm activated     Pt seen as a co-eval due to the patient's co-morbidities, clinically unstable presentation, and present impairments which are a regression from the patient's baseline  The patient's AM-PAC Basic Mobility Inpatient Short Form Raw Score is 15, Standardized Score is 36 97  A standardized score less than 38 32 (raw score of 16) suggests the patient may benefit from discharge to post-acute rehabilitation services which may not coincide with above PT recommendations  However please refer to therapist recommendation for discharge planning given other factors that may influence destination      Pt would benefit from skilled inpatient PT during this admission in order to facilitate progress towards goals to maximize functional independence    Essie Infante, PT, DPT

## 2022-03-11 NOTE — ASSESSMENT & PLAN NOTE
Arrived after unwitnessed seizure like activity, tongue laceration, left hand movement  No h/o seizure however had ICH requiring seizure ppx in 08/2021  [facility] unremarkable, Labs with acidosis   3 electrographic seizures from 12 midnight,  last seizure noted was around 6:45 am 03/08 on video EEG  remaind with clonic activity of left arm and roving eye movements  started on lacosamide, levetiracem, fentanyl, propofol       No new seizures since 03/08 6:45am    Per neuro, originating from left temporal lobe, etiology unknown but considering alcohol withdrawal  Mentation improved  Plan:  · Continue lacosamide, levetiracem dosing reduced per neuro recommendations to 500mg BID due to platelet count  · Neuro to guide further adjustments  · MRI brain w wo con ordered  · Melatonin for sleep; Diet advanced  · Neuro and tox on board

## 2022-03-11 NOTE — OCCUPATIONAL THERAPY NOTE
Occupational Therapy Evaluation     Patient Name: Mayda Flood  JNPFS'B Date: 3/11/2022  Problem List  Principal Problem:    Status epilepticus ([facility] [address] )  Active Problems:    Seizure-like activity ([facility] [address] )    Hyperglycemia    Thrombocytopenia ([facility] [address] )    Fall from standing    Aspiration pneumonia ([facility] [address] )    Acute systolic congestive heart failure (HCC)    Urine output low    Possible Drug overdose    Past Medical History  No past medical history on file  Past Surgical History  No past surgical history on file        03/11/22 1054   OT Last Visit   OT Visit Date 03/11/22  (Friday)   Note Type   Note type Evaluation   Restrictions/Precautions   Weight Bearing Precautions Per Order No   Other Precautions 1:1;Chair Alarm; Bed Alarm;Multiple lines;Telemetry; Fall Risk;Pain  (R UE A-line)   Pain Assessment   Pain Assessment Tool 0-10   Pain Score No Pain   Home Living   Type of Home House  (ramped entrance)   Home Layout Two level;1/2 bath on main level;Bed/bath upstairs; Ramped entrance   Bathroom Shower/Tub Tub/shower unit   [address] [place] [address] Dr spangler   Bathroom Accessibility Accessible; Other (Comment)  (full bath up flight of stairs)   Home Equipment Walker; Other (Comment)  (rollator)   Additional Comments Pt reports living w/ her  in 2 SH w/ ramped entrance  Pt reports that her  is going to arrange first floor set- up at [address] with ADLs and functional mobility   Lives With Spouse   ADL Assistance Independent   IADLs Independent  (+ drive)   Falls in the last 6 months 0   Vocational Retired   Comments Pt reports I w/ ADL/ IADL PTA w/ out use of AD  Pt reports having rollatr but not using PTA   Lifestyle   Autonomy Pt reports I w/ ADL/ IADL PTA w/ out use of AD   Reciprocal Relationships Pt reports living w/ supportive  who works out of the house during the day   Service to Others Pt reports retired and takes care of the house  Pt added that she worked for [employer] and [employer] in the past   Intrinsic Gratification Pt reports enjoying their dog, gardening, shopping, and cooking   Psychosocial   Patient Behaviors/Mood Cooperative;Pleasant   Subjective   Subjective "I am feeling optimistic"   ADL   Where Assessed Edge of bed  (vs commode)   Eating Assistance 6  Modified independent   Eating Deficit Setup   Grooming Assistance 5  Supervision/Setup   Grooming Deficit Setup;Supervision/safety; Increased time to complete  (due to R UE deficits and R UE A-line)   UB Bathing Assistance Unable to assess   LB Bathing Assistance Unable to assess   UB Dressing Assistance 4  Minimal Assistance   UB Dressing Deficit Setup; Fasteners;Pull around back; Increased time to complete;Supervision/safety;Verbal cueing  (due to multiple lines, R UE deficits, R UE A-line)   LB Dressing Assistance Unable to assess   Toileting Assistance  3  Moderate Assistance   Toileting Deficit Setup; Increased time to complete;Verbal cueing; Bedside commode;Perineal hygiene;Supervison/safety   Bed Mobility   Supine to Sit 3  Moderate assistance   Additional items Assist x 1; Increased time required;Verbal cues;LE management; Bedrails;HOB elevated   Sit to Supine Unable to assess   Additional Comments Pt reported feeling dizzy upon sitting at EOB  Symptooms resolved after few minutes and vitals stable  Pt seated OOB in chair post eval w/ needs met, call bell in reach and chair alarm activated   Transfers   Sit to Stand 3  Moderate assistance  (min A initially from EOB --> mod A from commode)   Additional items Assist x 1; Increased time required;Verbal cues; Bedrails;Armrests   Stand to Sit 4  Minimal assistance   Additional items Assist x 1;Bedrails;Armrests; Increased time required;Verbal cues  (cues for hand placement)   Stand pivot 4  Minimal assistance  (R HHA from EOB to commode to her L)   Additional items Assist x 1; Increased time required;Verbal cues   Toilet transfer   (min --> mod A) Additional items Commode;Verbal cues;Armrests;Assist x 1; Increased time required   Additional Comments Pt performed sit <> stand 2 X  1 X from EOB w/ min A and required mod A x 1 from commode w/ 2 attemps to complete   Functional Mobility   Functional Mobility 4  Minimal assistance   Additional Comments min A short distances using RW w/ + time   Additional items Rolling walker   Balance   Static Sitting Good   Static Standing Poor +   Ambulatory Poor +   Activity Tolerance   Activity Tolerance Patient limited by fatigue;Treatment limited secondary to medical complications (Comment); Other (Comment)  (hard wired telemetry, A line)   Medical Staff Made Aware care coordination w/ PT, Efrem Reza and spoke to CM, Lyndall Boast   Nurse Made Aware per Bertin MELARA appropriate to see pt   RUE Assessment   RUE Assessment X  (pt reports R UE weak)   RUE Strength   RUE Overall Strength Deficits; Other (Comment)  (A-line w/ dorsal splint)   LUE Assessment   LUE Assessment WFL   LUE Strength   LUE Overall Strength Within Functional Limits - able to perform ADL tasks with strength   Hand Function   Gross Motor Coordination Functional  (Pt reports R hand dominance)   Fine Motor Coordination   (Will continue to assess)   Sensation   Light Touch   (responded to light touch, Will continue to assess)   Sharp/Dull Not tested   Vision-Basic Assessment   Current Vision   (glasses present on table, not wearing)   Vision - Complex Assessment   Additional Comments Will continue to assess   Cognition   Overall Cognitive Status Impaired   Arousal/Participation Alert; Cooperative   Attention Attends with cues to redirect   Orientation Level Oriented to person;Oriented to place;Oriented to time   Memory Decreased recall of recent events   Following Commands Follows multistep commands with increased time or repetition   Comments Identified pt by ful name and birthdate  Alert and generally oriented   Pt reported she recalled this therapist from a previous admission  Able to provide home set- up and follow directions during ADLs  Recommend ongoing eval of functional cognitive skills to assist in DC planning  Cognition Assessment Tools   (Short Blessed Screen (SBT))   Score 0  (normal cognition)   Assessment   Limitation Decreased ADL status; Decreased endurance;Decreased self-care trans;Decreased high-level ADLs; Decreased fine motor control;Decreased UE ROM; Decreased UE strength   Assessment Pt is a 65yo female [facility] on 3/7/22  Pt presented as trauma following a fall from home w/ head strike  Diagnosed w/ status epilepticus,suspicion for seizure activity, hx ETOH abuse, T12, L2, L4 age indeterminate fractures  Significant PMH impacting her occupational performance includes hx ETOH abuse, TBI, R clavicle fracture, B pubic rami fractures, osteoporosis, hx rib fractures  Pt w/ active OT orders and activity orders  Pt intubated and extubated on 3/9/22  Personal factors impacting performance includes multi - level home environment, limited insight into deficits, hx alcohol abuse  Pt reports living w/ her  in [address] [place] [address] w/ ramped entrance PTA  Pt reports I w/ ADL/ IADL w/ out use of AD  Upon eval, pt alert and generally oriented  Pt engaged in [place] [address] w/ score of 0 indicating normal cognition  Pt required mod A to complete bed mobility supine to sit  Pt required min <> mod A to stand and min A using RW for short distance functional mobility  Pt required mod A to complete toileting using commode, min A to complete UBD and S to complete grooming   Pt presents w/ decreased activity tolerance, decreased endurance, decreased R UE AROM / strength, decreased standing tolerance, decreased standing balance, generalized weakness /deconditioning, decreased sitting balance, limited insight into deficits, increased pain impacting her I w/ dressing, bathing, oral hygiene, functional mobility, functional transfers, activity engagement, clothing mgmt, food prep /clean up, and community mobility  Pt completing ADL below baseline level of I and would benefit from OT while in acute care to address deficits  Recommend post acute rehab when medically stable for discharge from acute care to return to baseline level of I and ongoing eval of functional cognitive skills to assist in DC planning  Will continue to follow   Goals   Patient Goals Pt stated that she would like to return home and work on her garden   Plan   Treatment Interventions ADL retraining;Functional transfer training;UE strengthening/ROM; Endurance training;Patient/family training;Equipment evaluation/education; Compensatory technique education;Continued evaluation; Energy conservation; Activityengagement   Goal Expiration Date 03/23/22   OT Frequency 3-5x/wk   Recommendation   OT Discharge Recommendation Post acute rehabilitation services   Equipment Recommended Shower/Tub chair with back ($);Bedside commode  (use of RW)   Commode Type Standard   AM-PAC Daily Activity Inpatient   Lower Body Dressing 2   Bathing 2   Toileting 3   Upper Body Dressing 3   Grooming 4   Eating 4   Daily Activity Raw Score 18   Daily Activity Standardized Score (Calc for Raw Score >=11) 38 66   AM-[facility] Applied Cognition Inpatient   Following a Speech/Presentation 3   Understanding Ordinary Conversation 4   Taking Medications 2   Remembering Where Things Are Placed or Put Away 3   Remembering List of 4-5 Errands 3   Taking Care of Complicated Tasks 3   Applied Cognition Raw Score 18   Applied Cognition Standardized Score 38 07   Barthel Index   Feeding 10   Bathing 0   Grooming Score 5   Dressing Score 5   Bladder Score 10   Bowels Score 10   Toilet Use Score 5   Transfers (Bed/Chair) Score 10   Mobility (Level Surface) Score 0   Stairs Score 0   Barthel Index Score 55   Modified Ludmila Scale   Modified Treasure Scale 4   The patient's raw score on the AM-PAC Daily Activity inpatient short form is 18, standardized score is 38 66, less than 39 4  Patients at this level are likely to benefit from discharge to post-acute rehabilitation services  Please refer to the recommendation of the Occupational Therapist for safe discharge planning       Pt goals to be met by 3/23/22:  -Pt will demonstrate good attention and participation in continued evaluation to assess functional cognitive skills to assist in DC planning    -Pt will consistently follow multi -step directions w/ good recall during ADLs to max I and return home to baseline level of I    -Pt will complete bed mobility supine <> sit w/ mod I to max I w/ ADLs and return home    -Pt will complete functional transfers to bed, chair, and toilet w/ S using AD, DME as needed to max I w/ ADLs    -Pt will engage in functional mobility using AD as needed to / from bathroom w/ S to max I w/ ADLs    -Pt will demonstrate improved functional standing tolerance for at least 10 minutes while engaged in ADLs standing at sink using RW as needed w/ at least fair balance to max I w/ food prep and pet care to return home    -Pt will complete UBD w/ mod I after set- up    -Pt will complete LBD w/ S using LHAE as needed to max I w/ ADLs    Kathie Magdaleno, OTR/L

## 2022-03-11 NOTE — PLAN OF CARE
Problem: Potential for Falls  Goal: Patient will remain free of falls  Description: INTERVENTIONS:  - Educate patient/family on patient safety including physical limitations  - Instruct patient to call for assistance with activity   - Consult OT/PT to assist with strengthening/mobility   - Keep Call bell within reach  - Keep bed low and locked with side rails adjusted as appropriate  - Keep care items and personal belongings within reach  - Initiate and maintain comfort rounds  - Make Fall Risk Sign visible to staff  Problem: MOBILITY - ADULT  Goal: Maintain or return to baseline ADL function  Description: INTERVENTIONS:  -  Assess patient's ability to carry out ADLs; assess patient's baseline for ADL function and identify physical deficits which impact ability to perform ADLs (bathing, care of mouth/teeth, toileting, grooming, dressing, etc )  - Assess/evaluate cause of self-care deficits   - Assess range of motion  - Assess patient's mobility; develop plan if impaired  - Assess patient's need for assistive devices and provide as appropriate  - Encourage maximum independence but intervene and supervise when necessary  - Involve family in performance of ADLs  - Assess for home care needs following discharge   - Consider OT consult to assist with ADL evaluation and planning for discharge  - Provide patient education as appropriate  Outcome: Progressing  Goal: Maintains/Returns to pre admission functional level  Description: INTERVENTIONS:  - Perform BMAT or MOVE assessment daily    - Set and communicate daily mobility goal to care team and patient/family/caregiver     - Collaborate with rehabilitation services on mobility goals if consulted  Problem: INFECTION - ADULT  Goal: Absence or prevention of progression during hospitalization  Description: INTERVENTIONS:  - Assess and monitor for signs and symptoms of infection  - Monitor lab/diagnostic results  - Monitor all insertion sites, i e  indwelling lines, tubes, and drains  - Monitor endotracheal if appropriate and nasal secretions for changes in amount and color  - [place] appropriate cooling/warming therapies per order  - Administer medications as ordered  - Instruct and encourage patient and family to use good hand hygiene technique  - Identify and instruct in appropriate isolation precautions for identified infection/condition  Outcome: Progressing  Goal: Absence of fever/infection during neutropenic period  Description: INTERVENTIONS:  - Monitor WBC    Outcome: Progressing     - Out of bed for toileting  - Record patient progress and toleration of activity level   Outcome: Progressing     - Apply yellow socks and bracelet for high fall risk patients  - Consider moving patient to room near nurses station  Outcome: Progressing

## 2022-03-11 NOTE — PROGRESS NOTES
[facility]  Progress Note - Igor Stanley 1960, 58 y o  female MRN: [id number]  Unit/Bed#: ICU 07 Encounter: [id number]  Primary Care Provider: Cecilia Azevedo MD   Date and time admitted to hospital: 3/7/2022  5:03 PM    * Status epilepticus [facility])  Assessment & Plan  Arrived after unwitnessed seizure like activity, tongue laceration, left hand movement  No h/o seizure however had ICH requiring seizure ppx in 08/2021  [facility] unremarkable, Labs with acidosis   3 electrographic seizures from 12 midnight,  last seizure noted was around 6:45 am 03/08 on video EEG  remaind with clonic activity of left arm and roving eye movements  started on lacosamide, levetiracem, fentanyl, propofol  No new seizures since 03/08 6:45am    Per neuro, originating from left temporal lobe, etiology unknown but considering alcohol withdrawal  Mentation improved  Plan:  · Continue lacosamide, levetiracem dosing reduced per neuro recommendations to 500mg BID due to platelet count  · Neuro to guide further adjustments  · MRI brain w wo con ordered  · Melatonin for sleep; Diet advanced  · Neuro and tox on board    Shock (HCC)-resolved as of 3/11/2022  Assessment & Plan  known etiology on arrival - acidosis, asp pna, concern for zoloft overdose, multiple meds received to control seizures  At this point fluid resuscitated, acidosis resolved, being treated for asp pna     ECHO 03/08 with EF 40%, almost generalized hypokinesis however not cause of shock  Shock induced transaminitis and trop elevation    Off pressors    Aspiration pneumonia [facility])  Assessment & Plan  CXR initially with clear lungs  Repeat Imaging with prominent RLL consolidation  Respiratory status stable with alexey  Started on cefepime however to avoid neuro se, switched to zosyn 03/08  Plan:  · Continue unasyn, D4; total D5 - can dc Abx today to complete 5D course  · Aspiration precautions  · mucinex + albuterol nebs    Possible Drug overdose  Assessment & Plan   found patient with an open bottle of zoloft next to her before calling EMS  [facility] patient had been struggling with alcohol intake reduction and had a heated discussion before leaving  Discussed with patient today  States that she wants to get better and is trying to be more healthy outside the hospital  She has no SI but is unable to recall the events on day of admission  Plan:  · Psych consult ordered yesterday; RN reached out to [facility] - waiting  · 1:1    Acute systolic congestive heart failure ([address] )  Assessment & Plan  Wt Readings from Last 3 Encounters:   03/09/22 66 7 kg (147 lb 0 8 oz)   03/09/22 66 7 kg (147 lb 0 8 oz)     ECHO on admission with EF 40 compared to 60 in 08/2021  Patchy hypokinesis throughout however basal/apical/mid-inferior motion reserved  Cards consulted - stress-induced cardiomyopathy  ?alchol contribution  Confirmed not contributing to shock  Plan:  · Start GDMT today  · Prn diuretics to keep euvolemic  · k being repleted to keep >4, mag pending  · Given adequate time for myopathy to reverse with no improvement, ischemic work up      Urine output low  Assessment & Plan  Initial thought was shock state however no impact on renal function on chemistry  With >1L output yesterday  Could be that patient is now volume resuscitated  · ext urinary cath in, monitor output, prn bolus if reducing output    Thrombocytopenia (HCC)  Assessment & Plan  Likely 2/2 alcohol intake, baseline around 50  Saw hemonc calls in EMR, never established care from my understanding  Stable around 30; improved slightly after reducing keppra  Plan:  · Continue to monitor  · NO BLOOD PRODUCTS - Bahai      ----------------------------------------------------------------------------------------  HPI/24hr events: no acute events; no seizure like activity    Hemodynamically stable without pressors from 12pm with MAPs 66-75 however tachycardic in 110s; RR of 15-30 saturating mid to high 90s on 1L NC     k of 3 4, renal function stable otherwise  Hb stable at 8, platelet 31 from 27, WBC trending down to 4 5 from 6 5 from 8 5  BG   BC NGTD   MRI brain pending    IO - urine 1 4L/net +11 2 L  PPx - VTE CI, on diet  L/T/D - ext urinary cath, peripherals      Patient appropriate for transfer out of the ICU today?: Patient does not meet criteria for ICU Follow-up Clinic; referral has not been made  Disposition: Transfer to Med Surg with Telemetry   Code Status: Level 1 - Full Code  ---------------------------------------------------------------------------------------  SUBJECTIVE  Breathing well, feels "tight" at times, cough improving  Feeling well but thinks she is dumber now, can't remember her phone number  Complaining of intermittent lightheadedness and dizziness, disappears when closes her eyes  Eating better, no nausea/vomiting  States she had diarrhea all day yesterday however has chronic diarrhea at baseline  Per RN team, 1 medium solid BM yesterday  Did not sleep well  Not drinking water because does not like the taste      Review of Systems  Review of systems was reviewed and negative unless stated above in HPI/24-hour events   ---------------------------------------------------------------------------------------  OBJECTIVE    Vitals   Vitals:    22 0300 22 0400 22 0500 22 0600   BP:       BP Location:       Pulse: 101 98 93 91   Resp: (!) 30 22 15 15   Temp:       TempSrc:       SpO2: 92% 92% 97% 96%   Weight:       Height:         Temp (24hrs), Av 1 °F (37 3 °C), Min:98 7 °F (37 1 °C), Max:99 4 °F (37 4 °C)  Current: Temperature: 99 4 °F (37 4 °C)  Arterial Line BP: 110/58  Arterial Line MAP (mmHg): 78 mmHg    Respiratory:  SpO2: SpO2: 96 %, SpO2 Activity: SpO2 Activity: At Rest, SpO2 Device: O2 Device: None (Room air)  Nasal Cannula O2 Flow Rate (L/min): 1 5 L/min    Invasive/non-invasive ventilation settings   Respiratory Report   Lab Data (Last 4 hours)    None         O2/Vent Data (Last 4 hours)    None                Physical Exam  Vitals and nursing note reviewed  Constitutional:       General: She is not in acute distress  Appearance: Normal appearance  She is well-developed  She is not ill-appearing  HENT:      Head: Normocephalic and atraumatic  Cardiovascular:      Rate and Rhythm: Regular rhythm  Tachycardia present  Pulses: Normal pulses  Heart sounds: Normal heart sounds  No murmur heard  Pulmonary:      Effort: Pulmonary effort is normal  No respiratory distress  Breath sounds: Examination of the right-middle field reveals rhonchi  Examination of the right-lower field reveals rhonchi  Rhonchi present  Comments: NC in place  Abdominal:      General: Bowel sounds are normal  There is no distension  Palpations: Abdomen is soft  Tenderness: There is no abdominal tenderness  Genitourinary:     Comments: purwik in place  Musculoskeletal:      Cervical back: Neck supple  Right lower leg: No edema  Left lower leg: No edema  Skin:     General: Skin is warm and dry  Neurological:      General: No focal deficit present  Mental Status: She is alert and oriented to person, place, and time  Mental status is at baseline  Psychiatric:         Mood and Affect: Mood normal          Behavior: Behavior normal          Thought Content:  Thought content normal          Judgment: Judgment normal              Laboratory and Diagnostics:  Results from last 7 days   Lab Units 03/11/22  0443 03/10/22  0433 03/09/22  0429 03/08/22  0448 03/08/22  0029 03/07/22  1826 03/07/22  1722 03/07/22  1713   WBC Thousand/uL 4 45 6 45 8 68 7 40  --   --  8 47  --    HEMOGLOBIN g/dL 8 3* 8 1* 8 9* 9 1* 9 5*  --  11 2*  --    I STAT HEMOGLOBIN g/dl  --   --   --   --   --   --   --  11 6   HEMATOCRIT % 25 7* 24 2* 26 1* 26 9* 26 8*  --  35 5  --    HEMATOCRIT, ISTAT %  --   --   --   --   --   -- --  34*   PLATELETS Thousands/uL 31* 27* 29* 35*  --  42* 49*  --    NEUTROS PCT % 55  --   --  77*  --   --  77*  --    BANDS PCT %  --   --  6  --   --   --   --   --    MONOS PCT % 11  --   --  4  --   --  5  --    MONO PCT %  --   --  8  --   --   --   --   --      Results from last 7 days   Lab Units 03/11/22  0441 03/10/22  0433 03/09/22  0429 03/08/22  1131 03/08/22 0448 03/08/22 0029 03/07/22  1722   SODIUM mmol/L 145 140 138 135* 136 139 137   POTASSIUM mmol/L 3 4* 3 6 3 6 3 8 3 1* 3 5 3 7   CHLORIDE mmol/L 112* 109* 108 102 101 103 97*   CO2 mmol/L 25 21 20* 21 23 23 17*   ANION GAP mmol/L 8 10 10 12 12 13 23*   BUN mg/dL 6 7 5 5 6 6 9   CREATININE mg/dL 0 54* 0 59* 0 57* 0 73 0 72 0 74 1 17   CALCIUM mg/dL 8 2* 7 9* 7 6* 7 7* 7 4* 7 4* 9 1   GLUCOSE RANDOM mg/dL 95 125 133 159* 179* 113 350*   ALT U/L  --   --  58  --  85* 92*  --    AST U/L  --   --  103*  --  129* 148*  --    ALK PHOS U/L  --   --  114  --  144* 160*  --    ALBUMIN g/dL  --   --  2 2*  --  2 3* 2 6*  --    TOTAL BILIRUBIN mg/dL  --   --  0 91  --  1 09* 0 58  --      Results from last 7 days   Lab Units 03/10/22  0433 03/09/22  0429 03/08/22  1131 03/08/22 0448 03/08/22  0029 03/07/22  1722   MAGNESIUM mg/dL 1 7 1 8 2 1 2 3 2 2 1 9   PHOSPHORUS mg/dL 1 7* 2 2* 3 1 2 9 3 1 6 4*      Results from last 7 days   Lab Units 03/07/22  1826   INR  1 16   PTT seconds 27  27          Results from last 7 days   Lab Units 03/07/22  2307 03/07/22  2113 03/07/22  1826   LACTIC ACID mmol/L 1 7 2 8* 8 9*     ABG:  Results from last 7 days   Lab Units 03/08/22  0253   PH ART  7 425   PCO2 ART mm Hg 34 6*   PO2 ART mm Hg 84 1   HCO3 ART mmol/L 22 2   BASE EXC ART mmol/L -1 8   ABG SOURCE  Line, Arterial     VBG:  Results from last 7 days   Lab Units 03/08/22  0253   ABG SOURCE  Line, Arterial     Results from last 7 days   Lab Units 03/08/22  0447   PROCALCITONIN ng/ml 1 49*       Micro  Results from last 7 days   Lab Units 03/08/22  0132   BLOOD CULTURE  No Growth at 48 hrs  No Growth at 48 hrs  EKG: NSR,   Imaging: I have personally reviewed pertinent reports  Intake and Output  I/O       03/09 0701  03/10 0700 03/10 0701  03/11 0700    I V  (mL/kg) 1959 6 (29 4) 371 (5 6)    IV Piggyback 640 305 6    Total Intake(mL/kg) 2599 6 (39) 676 5 (10 1)    Urine (mL/kg/hr) 275 (0 2) 1400 (0 9)    Total Output 275 1400    Net +2324 6 -723 5          Unmeasured Urine Occurrence 1 x 1 x    Unmeasured Stool Occurrence 1 x           Height and Weights   Height: 5' 7" (170 2 cm)     Body mass index is 23 03 kg/m²  Weight (last 2 days)     Date/Time Weight    03/09/22 0244 66 7 (147 05)            Nutrition       Diet Orders   (From admission, onward)             Start     Ordered    03/11/22 0734  Diet Regular; Regular House  Diet effective now        References:    Nutrtion Support Algorithm Enteral vs  Parenteral   Question Answer Comment   Diet Type Regular    Regular Regular House    RD to adjust diet per protocol?  No        03/11/22 0734 03/07/22 2204  Room Service  Once        Question:  Type of Service  Answer:  Room Service - Appropriate with Assistance    03/07/22 2204                  Active Medications  Scheduled Meds:  Current Facility-Administered Medications   Medication Dose Route Frequency Provider Last Rate    acetaminophen  650 mg Oral Q4H PRN Marielena Brambila PA-C      ampicillin-sulbactam  3 g Intravenous Q6H PAZ Alston 3 g (03/11/22 0225)    chlorhexidine  15 mL Mouth/Throat Q12H [address] PAZ Gray      folic acid  1 mg Oral Daily Kika Miller [place]PAZ      guaiFENesin  600 mg Oral Q12H [address] Toby Chappell MD      insulin lispro  1-5 Units Subcutaneous TID [facility] [place] PAZ Cabrera      insulin lispro  1-5 Units Subcutaneous HS PAZ Cabrera      lacosamide (VIMPAT) IVPB  100 mg Intravenous Q12H PAZ Alston 100 mg (03/10/22 1954)    levETIRAcetam 500 mg Intravenous Q12H [address] Lisa Kaur  mg (03/11/22 0225)    LORazepam  2 mg Intravenous Q1H PRN PAZ Mcrae      melatonin  3 mg Oral HS Lisa Kaur MD      multivitamin-minerals  1 tablet Oral Daily [facility] [place] [address]      norepinephrine  1-30 mcg/min Intravenous Titrated [facility] [place] Stopped (03/10/22 1150)    pantoprazole  40 mg Intravenous Q24H [address] Phil Sesay [place], PAZ      potassium chloride  40 mEq Oral Once Lisa Kaur MD      Followed by   Corin Dudley potassium chloride  20 mEq Oral Once Lisa Kaur MD      thiamine  100 mg Oral Daily [facility] [place] [address]      vasopressin  0 04 Units/min Intravenous Continuous 100 [facility] [place] Stopped (03/10/22 2864)     Continuous Infusions:  norepinephrine, 1-30 mcg/min, Last Rate: Stopped (03/10/22 1150)  vasopressin, 0 04 Units/min, Last Rate: Stopped (03/10/22 0812)      PRN Meds:   acetaminophen, 650 mg, Q4H PRN  LORazepam, 2 mg, Q1H PRN        Invasive Devices Review  Invasive Devices  Report    Central Venous Catheter Line            CVC Central Lines 03/08/22 2 days          Peripheral Intravenous Line            Peripheral IV 03/08/22 Right;Upper;Ventral (anterior) Arm 2 days          Arterial Line            Arterial Line 03/08/22 Radial 3 days                Rationale for remaining devices: above  ---------------------------------------------------------------------------------------  Advance Directive and Living Will:      Power of :    POLST:    ---------------------------------------------------------------------------------------      Lisa Kaur MD      Portions of the record may have been created with voice recognition software  Occasional wrong word or "sound a like" substitutions may have occurred due to the inherent limitations of voice recognition software    Read the chart carefully and recognize, using context, where substitutions have occurred

## 2022-03-11 NOTE — ASSESSMENT & PLAN NOTE
·  found patient with an open bottle of zoloft next to her before calling EMS  · Mentioned patient had been struggling with alcohol intake reduction and had a heated discussion before leaving  · Discussed with patient by ICU team: stated that she wants to get better and is trying to be more healthy outside the hospital  She has no SI but is unable to recall the events on day of admission       Plan:  · Psych consult ordered yesterday; will reach out to [facility] again  · 1:1

## 2022-03-11 NOTE — ASSESSMENT & PLAN NOTE
found patient with an open bottle of zoloft next to her before calling EMS  [facility] patient had been struggling with alcohol intake reduction and had a heated discussion before leaving  Discussed with patient today  States that she wants to get better and is trying to be more healthy outside the hospital  She has no SI but is unable to recall the events on day of admission     Plan:  · Psych consult ordered yesterday; RN reached out to [address] - waiting  · 1:1

## 2022-03-12 ENCOUNTER — APPOINTMENT (INPATIENT)
Dept: RADIOLOGY | Facility: HOSPITAL | Age: 62
DRG: 100 | End: 2022-03-12
Payer: COMMERCIAL

## 2022-03-12 PROBLEM — R73.9 HYPERGLYCEMIA: Status: RESOLVED | Noted: 2022-03-07 | Resolved: 2022-03-12

## 2022-03-12 LAB
ANION GAP SERPL CALCULATED.3IONS-SCNC: 14 MMOL/L (ref 4–13)
BACTERIA BLD CULT: NORMAL
BUN SERPL-MCNC: 5 MG/DL (ref 5–25)
CALCIUM SERPL-MCNC: 8.6 MG/DL (ref 8.3–10.1)
CHLORIDE SERPL-SCNC: 111 MMOL/L (ref 100–108)
CO2 SERPL-SCNC: 20 MMOL/L (ref 21–32)
CREAT SERPL-MCNC: 0.55 MG/DL (ref 0.6–1.3)
ERYTHROCYTE [DISTWIDTH] IN BLOOD BY AUTOMATED COUNT: 15.9 % (ref 11.6–15.1)
GFR SERPL CREATININE-BSD FRML MDRD: 100 ML/MIN/1.73SQ M
GLUCOSE SERPL-MCNC: 105 MG/DL (ref 65–140)
HCT VFR BLD AUTO: 26.4 % (ref 34.8–46.1)
HGB BLD-MCNC: 8.9 G/DL (ref 11.5–15.4)
MCH RBC QN AUTO: 35.3 PG (ref 26.8–34.3)
MCHC RBC AUTO-ENTMCNC: 33.7 G/DL (ref 31.4–37.4)
MCV RBC AUTO: 105 FL (ref 82–98)
PLATELET # BLD AUTO: 34 THOUSANDS/UL (ref 149–390)
PMV BLD AUTO: 10.6 FL (ref 8.9–12.7)
POTASSIUM SERPL-SCNC: 3.4 MMOL/L (ref 3.5–5.3)
PROCALCITONIN SERPL-MCNC: 0.21 NG/ML
RBC # BLD AUTO: 2.52 MILLION/UL (ref 3.81–5.12)
SODIUM SERPL-SCNC: 145 MMOL/L (ref 136–145)
WBC # BLD AUTO: 4.07 THOUSAND/UL (ref 4.31–10.16)

## 2022-03-12 PROCEDURE — 80048 BASIC METABOLIC PNL TOTAL CA: CPT | Performed by: INTERNAL MEDICINE

## 2022-03-12 PROCEDURE — 94640 AIRWAY INHALATION TREATMENT: CPT

## 2022-03-12 PROCEDURE — 85027 COMPLETE CBC AUTOMATED: CPT | Performed by: INTERNAL MEDICINE

## 2022-03-12 PROCEDURE — 84145 PROCALCITONIN (PCT): CPT | Performed by: INTERNAL MEDICINE

## 2022-03-12 PROCEDURE — 94668 MNPJ CHEST WALL SBSQ: CPT

## 2022-03-12 PROCEDURE — 73030 X-RAY EXAM OF SHOULDER: CPT

## 2022-03-12 PROCEDURE — 99232 SBSQ HOSP IP/OBS MODERATE 35: CPT | Performed by: NURSE PRACTITIONER

## 2022-03-12 PROCEDURE — 99232 SBSQ HOSP IP/OBS MODERATE 35: CPT | Performed by: INTERNAL MEDICINE

## 2022-03-12 PROCEDURE — 94664 DEMO&/EVAL PT USE INHALER: CPT

## 2022-03-12 PROCEDURE — 94760 N-INVAS EAR/PLS OXIMETRY 1: CPT

## 2022-03-12 PROCEDURE — C9113 INJ PANTOPRAZOLE SODIUM, VIA: HCPCS | Performed by: INTERNAL MEDICINE

## 2022-03-12 RX ORDER — POTASSIUM CHLORIDE 20MEQ/15ML
20 LIQUID (ML) ORAL ONCE
Status: COMPLETED | OUTPATIENT
Start: 2022-03-12 | End: 2022-03-12

## 2022-03-12 RX ORDER — PANTOPRAZOLE SODIUM 40 MG/1
40 TABLET, DELAYED RELEASE ORAL
Status: DISCONTINUED | OUTPATIENT
Start: 2022-03-13 | End: 2022-03-18 | Stop reason: HOSPADM

## 2022-03-12 RX ADMIN — Medication 15 ML: at 10:00

## 2022-03-12 RX ADMIN — LEVALBUTEROL HYDROCHLORIDE 1.25 MG: 1.25 SOLUTION RESPIRATORY (INHALATION) at 14:05

## 2022-03-12 RX ADMIN — LEVETIRACETAM 250 MG: 500 TABLET, FILM COATED ORAL at 20:40

## 2022-03-12 RX ADMIN — SODIUM CHLORIDE 3 G: 9 INJECTION, SOLUTION INTRAVENOUS at 04:02

## 2022-03-12 RX ADMIN — GUAIFENESIN 600 MG: 600 TABLET ORAL at 20:40

## 2022-03-12 RX ADMIN — GUAIFENESIN 600 MG: 600 TABLET ORAL at 10:00

## 2022-03-12 RX ADMIN — LEVALBUTEROL HYDROCHLORIDE 1.25 MG: 1.25 SOLUTION RESPIRATORY (INHALATION) at 08:24

## 2022-03-12 RX ADMIN — MULTIPLE VITAMINS W/ MINERALS TAB 1 TABLET: TAB ORAL at 10:00

## 2022-03-12 RX ADMIN — LACOSAMIDE 100 MG: 100 TABLET, FILM COATED ORAL at 10:00

## 2022-03-12 RX ADMIN — POTASSIUM CHLORIDE 20 MEQ: 20 SOLUTION ORAL at 09:45

## 2022-03-12 RX ADMIN — THIAMINE HCL TAB 100 MG 100 MG: 100 TAB at 10:00

## 2022-03-12 RX ADMIN — LACOSAMIDE 100 MG: 100 TABLET, FILM COATED ORAL at 20:41

## 2022-03-12 RX ADMIN — LEVALBUTEROL HYDROCHLORIDE 1.25 MG: 1.25 SOLUTION RESPIRATORY (INHALATION) at 20:00

## 2022-03-12 RX ADMIN — PANTOPRAZOLE SODIUM 40 MG: 40 INJECTION, POWDER, FOR SOLUTION INTRAVENOUS at 10:00

## 2022-03-12 RX ADMIN — Medication 3 MG: at 21:23

## 2022-03-12 RX ADMIN — FOLIC ACID 1 MG: 1 TABLET ORAL at 10:00

## 2022-03-12 RX ADMIN — ACETAMINOPHEN 650 MG: 325 TABLET, FILM COATED ORAL at 18:02

## 2022-03-12 RX ADMIN — ACETAMINOPHEN 650 MG: 325 TABLET, FILM COATED ORAL at 10:03

## 2022-03-12 RX ADMIN — Medication 15 ML: at 20:42

## 2022-03-12 NOTE — PROGRESS NOTES
The pantoprazole has been converted to Oral per [facility] IV-to-PO Auto-Conversion Protocol for Adults as approved by the Pharmacy and Therapeutics Committee  The patient met all eligible criteria:  3 Age = 25years old   2) Received at least one dose of the IV form   3) Receiving at least one other scheduled oral/enteral medication   4) Tolerating an oral/enteral diet   and did not have any exclusions:   1) Critical care patient   2) Active GI bleed (IF assessing H2RAs or PPIs)   3) Continuous tube feeding (IF assessing cipro, doxycycline, levofloxacin, minocycline, rifampin, or voriconazole)   4) Receiving PO vancomycin (IF assessing metronidazole)   5) Persistent nausea and/or vomiting   6) Ileus or gastrointestinal obstruction   7) Jael/nasogastric tube set for continuous suction   8) Specific order not to automatically convert to PO (in the order's comments or if discussed in the most recent Infectious Disease or primary team's progress notes)

## 2022-03-12 NOTE — PROGRESS NOTES
[facility]  Progress Note - Nadege Guerrero 1960, 58 y o  female MRN: [id number]  Unit/Bed#: S -01 Encounter: [id number]  Primary Care Provider: Deward Hodgkins, MD   Date and time admitted to hospital: 3/7/2022  5:03 PM    * Seizure [facility])  Assessment & Plan  · Arrived after unwitnessed seizure like activity, tongue laceration, left hand movement  · No h/o seizure however had ICH requiring seizure ppx in 08/2021  · [address] unremarkable, Labs with acidosis   · 3 electrographic seizures noted on vEEG, last seizure noted was around 6:45 am 03/08  · clonic activity of left arm and roving eye movements  · started on lacosamide, levetiracem, fentanyl, propofol       · No new seizures since 03/08 6:45am    · Per neuro, originating from left temporal lobe, etiology unknown but considering alcohol withdrawal  · Mentation improved  · MRI- Focal diffusion-weighted and FLAIR signal hyperintensity involving the left hippocampal formation is consistent with status epilepticus  Plan:  · D/C Keppra d/t thrombocytopenia appears somewhat improved   · Continue Vimpat 100 mg q 12H  · In case of more seizure-like activity, reach out to on-call neurologist   Patient's Vimpat dosage may be escalated  · Psych consult pending  Shock (HCC)-resolved as of 3/11/2022  Assessment & Plan  known etiology on arrival - acidosis, asp pna, concern for zoloft overdose, multiple meds received to control seizures  At this point fluid resuscitated, acidosis resolved, being treated for asp pna  ECHO 03/08 with EF 40%, almost generalized hypokinesis however not cause of shock  Shock induced transaminitis and trop elevation     Off pressors    Acute systolic congestive heart failure (HCC)  Assessment & Plan  Wt Readings from Last 3 Encounters:   03/09/22 66 7 kg (147 lb 0 8 oz)   03/09/22 66 7 kg (147 lb 0 8 oz)     ECHO on admission with EF 40 compared to 60 in 08/2021   Patchy hypokinesis throughout however basal/apical/mid-inferior motion reserved  Cards consulted - stress-induced cardiomyopathy  ?alchol contribution  Confirmed not contributing to shock  Plan:  · Start GDMT today, metoprolol 12 5mg BID  · Prn diuretics to keep euvolemic  · K being repleted to keep >4, Mag 1 8  · Replete K, Mag  · Given adequate time for myopathy to reverse with no improvement, ischemic work up, cards to guide timeline  Possible Drug overdose  Assessment & Plan  ·  found patient with an open bottle of zoloft next to her before calling EMS  · Mentioned patient had been struggling with alcohol intake reduction and had a heated discussion before leaving  · Discussed with patient by ICU team: stated that she wants to get better and is trying to be more healthy outside the hospital  She has no SI but is unable to recall the events on day of admission  Plan:  · Psych consult ordered yesterday; will reach out to [facility] again  · 1:1    Fall from standing  Assessment & Plan  · Patient reports severe pain in the right arm 2/2 a fall as well as upper midback pain with evidence of an area of ecchymosis in the left interscapular region  · She also has significant difficulty with abduction of her right arm with tenderness along the anterior and superior aspect of the shoulder  · X-Ray, trauma series: No displaced fractures  Foreshortening of the distal right clavicle likely postoperative in origin  Plan:  · Supportive care  · PT/OT: Post acute rehabilitation services    Urine output low  Assessment & Plan  Initial thought was shock state however no impact on renal function on labwork    Plan:  · Monitor I/Os  · Monitor BMP    Aspiration pneumonia ([address] )  Assessment & Plan  CXR initially with clear lungs  Repeat Imaging with prominent RLL consolidation  Started on cefepime->switched to unasyn, completed 6 total days of antibiotics  DC'd given negative procal on 3/12 labwork  On 03/12 exam, lung CTA b/l  Plan:  · Xopenex  · Monitor fever curves, CBC      Thrombocytopenia (HCC)  Assessment & Plan  Likely 2/2 alcohol intake, baseline around 50  Saw [facility]mon[facility] calls in EMR, never established care from my understanding  Stable around 30; improved slightly after reducing keppra  Plan:  Continue to monitor  NO BLOOD PRODUCTS - Faith      Airway compromise-resolved as of 3/10/2022  Assessment & Plan  On ventilator for airway protection, D2  Ventilating well, extubated       VTE Pharmacologic Prophylaxis: VTE Score: 2 Low Risk (Score 0-2) - Encourage Ambulation  Patient Centered Rounds: I performed bedside rounds with nursing staff today  Discussions with Specialists or Other Care Team Provider: Neurology     Education and Discussions with Family / Patient: Will update  via phone later today  Current Length of Stay: 5 day(s)  Current Patient Status: Inpatient   Discharge Plan: Anticipate discharge in 48-72 hrs to rehab facility  Code Status: Level 1 - Full Code    Subjective:   Patient tells me she is feeling well this morning  She continues to endorse pain and right upper extremity secondary to previous falls  Denies chest pain, shortness of breath, abdominal pain, urinary/bowel incontinence, weakness, paresthesias  Objective:     Vitals:   Temp (24hrs), Av 6 °F (37 °C), Min:98 3 °F (36 8 °C), Max:98 8 °F (37 1 °C)    Temp:  [98 3 °F (36 8 °C)-98 8 °F (37 1 °C)] 98 8 °F (37 1 °C)  HR:  [94-98] 94  Resp:  [18-20] 18  BP: (108-112)/(59-68) 108/59  SpO2:  [93 %] 93 %  Body mass index is 23 03 kg/m²  Input and Output Summary (last 24 hours): Intake/Output Summary (Last 24 hours) at 3/12/2022 1326  Last data filed at 3/12/2022 1302  Gross per 24 hour   Intake 170 ml   Output 400 ml   Net -230 ml     Physical Exam   Vitals reviewed  General Examination: Sitting up in bed, cooperative   HEENT: Normocephalic, Atraumatic  Extraocular movements intact, PERRLA     CVS: S1, S2 noted   Lungs: CTA b/l  Abdomen: Soft, normal bowel sounds  Non distended, non tender  Ext: RUE- Pain in upper arm , elbow, decreased ROM,nontender  No edema noted in b/l lower extremities  Psych: Though Process - logical    Skin: No bleeding/bruising noted  Neuro: A, Ox3  Additional Data:   Labs:  Results from last 7 days   Lab Units 03/12/22  0533 03/11/22  0443 03/11/22  0443 03/10/22  0433 03/09/22  0429   WBC Thousand/uL 4 07*   < > 4 45   < > 8 68   HEMOGLOBIN g/dL 8 9*   < > 8 3*   < > 8 9*   HEMATOCRIT % 26 4*   < > 25 7*   < > 26 1*   PLATELETS Thousands/uL 34*   < > 31*   < > 29*   BANDS PCT %  --   --   --   --  6   NEUTROS PCT %  --   --  55  --   --    LYMPHS PCT %  --   --  32  --   --    LYMPHO PCT %  --   --   --   --  17   MONOS PCT %  --   --  11  --   --    MONO PCT %  --   --   --   --  8   EOS PCT %  --   --  1  --  0    < > = values in this interval not displayed  Results from last 7 days   Lab Units 03/12/22  0533 03/10/22  0433 03/09/22  0429   SODIUM mmol/L 145   < > 138   POTASSIUM mmol/L 3 4*   < > 3 6   CHLORIDE mmol/L 111*   < > 108   CO2 mmol/L 20*   < > 20*   BUN mg/dL 5   < > 5   CREATININE mg/dL 0 55*   < > 0 57*   ANION GAP mmol/L 14*   < > 10   CALCIUM mg/dL 8 6   < > 7 6*   ALBUMIN g/dL  --   --  2 2*   TOTAL BILIRUBIN mg/dL  --   --  0 91   ALK PHOS U/L  --   --  114   ALT U/L  --   --  58   AST U/L  --   --  103*   GLUCOSE RANDOM mg/dL 105   < > 133    < > = values in this interval not displayed       Results from last 7 days   Lab Units 03/07/22  1826   INR  1 16     Results from last 7 days   Lab Units 03/11/22  0759 03/10/22  2057 03/10/22  1704 03/10/22  1104 03/10/22  0741 03/09/22  2123 03/09/22  1757 03/09/22  1122 03/09/22  0552 03/08/22  2359 03/08/22  1806 03/08/22  1133   POC GLUCOSE mg/dl 96 112 151* 127 134 137 113 120 129 143* 160* 158*     Results from last 7 days   Lab Units 03/09/22  0429   HEMOGLOBIN A1C % 4 7     Results from last 7 days   Lab Units 03/12/22  0534 03/08/22  0447 03/07/22  2307 03/07/22  2113 03/07/22  1826   LACTIC ACID mmol/L  --   --  1 7 2 8* 8 9*   PROCALCITONIN ng/ml 0 21 1 49*  --   --   --        Lines/Drains:  Invasive Devices  Report    Peripheral Intravenous Line            Peripheral IV 03/08/22 Right;Upper;Ventral (anterior) Arm 4 days              Imaging:    Reviewed imaging reports from this admission including:    MRI brain w wo contrast   Final Result by Catarino Babin MD (03/11 2304)      Focal diffusion-weighted and FLAIR signal hyperintensity involving the left hippocampal formation is consistent with status epilepticus  No associated hemorrhage, enhancement or involvement of the contralateral temporal lobe  Follow to resolution  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  No acute intracranial hemorrhage  Workstation performed: [id number]         XR chest portable ICU   Final Result by Tyron Jordan MD (03/08 8618)      Persistent right lower lobe airspace opacity may reflect atelectasis or pneumonia  Right IJ central venous catheter with tip in satisfactory position  Workstation performed: [id number]         XR chest portable ICU   Final Result by Yenny Downey MD (03/08 2887)      New opacity right lung base may be artifactual due to positioning, though consolidation (including aspiration) is not excluded, and attention will be made on follow-up  Workstation performed: [id number]         XR chest 1 view   Final Result by Robert Kay MD (03/08 2103)      Improved interstitial markings  Status post intubation without pneumothorax  Workstation performed: [id number]         XR Trauma multiple (SLB/SLRA trauma bay ONLY)   Final Result by Michele Reid MD (03/08 0918)      No acute traumatic injury  Mild vascular prominence  Correlate for CHF                 Workstation performed: [id number]         XR chest 1 view   Final Result by Bartolo Lombardi MD (03/10 1132)      No acute traumatic injury  Mild vascular prominence  Correlate for CHF  Workstation performed: [id number]         TRAUMA - CT chest abdomen pelvis w contrast   Final Result by Blanca Palmer MD (03/07 1756)   1  T12, L2, L4 mild vertebral compression deformities are age indeterminate  Correlate clinically  2   No suspected acute posttraumatic injury  3   Cholelithiasis  4   Chronic pancreatitis  I personally discussed this study with Yissel Garvin on 3/7/2022 at 5:50 PM                         Workstation performed: [id number]         TRAUMA - CT head wo contrast   Final Result by Jatin Bobo MD (03/07 1741)      No acute intracranial abnormality  I personally discussed this study with Yissel Garvin on 3/7/2022 at 5:39 PM                      Workstation performed: [id number]         TRAUMA - CT spine cervical wo contrast   Final Result by Blanca Palmer MD (03/07 1756)      No cervical spine fracture or traumatic malalignment  I personally discussed this study with Yissel Garvin on 3/7/2022 at 5:45 PM                       Workstation performed: [id number]             Recent Cultures (last 7 days):   Results from last 7 days   Lab Units 03/08/22  0132   BLOOD CULTURE  No Growth After 5 Days  No Growth After 5 Days         Last 24 Hours Medication List:   Current Facility-Administered Medications   Medication Dose Route Frequency Provider Last Rate    acetaminophen  650 mg Oral Q4H PRN Dulce Meyer MD      chlorhexidine  15 mL Mouth/Throat Q12H [facility] Jennie Pack MD      folic acid  1 mg Oral Daily Dulce Meyer MD      guaiFENesin  600 mg Oral Q12H [facility] Dulce Meyer MD      lacosamide  100 mg Oral Q12H [facility] Jennie Pack MD      levalbuterol  1 25 mg Nebulization TID Clifford Frazier MD      levETIRAcetam  250 mg Oral Q12H [facility] Jennie Pack MD      LORazepam  2 mg Intravenous Q1H PRN Jennie MD Sirena      melatonin  3 mg Oral HS Nohemy Quach MD      metoprolol tartrate  12 5 mg Oral Q12H [address] Nohemy Quach MD      multivitamin-minerals  1 tablet Oral Daily Nohemy Quach MD      pantoprazole  40 mg Intravenous Q24H [address] Nohemy Quach MD      thiamine  100 mg Oral Daily Nohemy Quach MD          Today, Patient Was Seen By: Anil Burnett MD    **Please Note: This note may have been constructed using a voice recognition system  **

## 2022-03-12 NOTE — ASSESSMENT & PLAN NOTE
· Patient reports severe pain in the right arm 2/2 a fall as well as upper midback pain with evidence of an area of ecchymosis in the left interscapular region  · She also has significant difficulty with abduction of her right arm with tenderness along the anterior and superior aspect of the shoulder  · X-Ray, trauma series: No displaced fractures  Foreshortening of the distal right clavicle likely postoperative in origin      Plan:  · Supportive care  · PT/OT: Post acute rehabilitation services

## 2022-03-12 NOTE — QUICK NOTE
I called [facility] to ask about Psych consult status; spoke with SJ RATLIFF [facility] -  [phone]  She said [facility] will give us a call back regarding consult status  [facility] number: [phone]

## 2022-03-12 NOTE — DISCHARGE SUMMARY
[facility]  Discharge- Britton Mulberry 1960, 58 y o  female MRN: [id number]  Unit/Bed#: S -01 Encounter: [id number]  Primary Care Provider: Edinson Mistry MD   Date and time admitted to hospital: 3/7/2022  5:03 PM    * Seizure [facility])  Assessment & Plan  POA: Arrived after unwitnessed seizure like activity, tongue laceration, left hand movement  · No h/o seizure however had ICH requiring seizure ppx in 08/2021  · [facility] unremarkable, Labs with acidosis   · 3 electrographic seizures noted on vEEG, last seizure noted was around 6:45 am 03/08  · clonic activity of left arm and roving eye movements  · started on lacosamide, levetiracem, fentanyl, propofol       · No new seizures since 03/08 6:45am    · Per neuro, originating from left temporal lobe, etiology unknown but considering alcohol withdrawal  · Mentation improved  · MRI- Focal diffusion-weighted and FLAIR signal hyperintensity involving the left hippocampal formation is consistent with status epilepticus  Plan:  · Continue Vimpat 100 mg q 12H and will continue monotherapy at this point  · In case of more seizure-like activity, reach out to on-call neurologist at facility  Patient's Vimpat dosage may be escalated  · Psych consulted and recommended  · Recommended to continued gabapentin and Pristiq while at [address] [facility]  · Re-assessed 03/15 by Psychiatry team for IP Psych admission and signed 201 and CM aware   · Continue 1:1  · Psych IP bed found and will be d/c 03/18  · Medically stable at this point for discharge and pending psych IP placement    Possible Drug overdose  Assessment & Plan  ·  found patient with an open bottle of zoloft next to her before calling EMS  · Mentioned patient had been struggling with alcohol intake reduction and had a heated discussion before leaving    · Discussed with patient by ICU team: stated that she wants to get better and is trying to be more healthy outside the hospital  She has no SI but is unable to recall the events on day of admission  Plan:  · Psych consulted and recommended  · Recommended to continued gabapentin and Pristiq  · Re-assessed 03/15 by Psychiatry team for IP Psych admission and signed 201 and CM aware to for bed search   · IP Psych bed found and will be d/c 03/18  · Continue 1:1    Acute systolic congestive heart failure ([address] )  Assessment & Plan  Wt Readings from Last 3 Encounters:   03/09/22 66 7 kg (147 lb 0 8 oz)   03/09/22 66 7 kg (147 lb 0 8 oz)     ECHO on admission with EF 40 compared to 60 in 08/2021  Patchy hypokinesis throughout however basal/apical/mid-inferior motion reserved  Cards consult and is considering stress-induced cardiomyopathy  ? alcohol contribution  Confirmed not contributing to shock     Status post 40 mg of IV Lasix 0 3/13    03/16: Exam this a m  showed lungs CTA bilaterally but patient noted some SOB but tolerable    Plan:  - Cardiogy signed off  ·  - Continue Lasix 20 mg daily p o   while at IP psych facility  ·  - continue Metoprolol succinate 25 mg b i d  while at [address] [facility] facility   - to get echo in outpatient setting and to f/u with PCP    -  BP will likely not tolerate addition of ACE/ARB  Given previous soft BP is  · Prn diuretics to keep euvolemic       Thrombocytopenia (HCC)  Assessment & Plan  Likely 2/2 alcohol intake, baseline around 50  - Saw hemonc calls in EMR, has not established care   - Platelets at 57 this AM     Lab Results   Component Value Date    WBC 3 16 (L) 03/16/2022    HGB 8 7 (L) 03/16/2022    HCT 26 6 (L) 03/16/2022     (H) 03/16/2022    PLT 57 (L) 03/16/2022         Plan:  · - Continue to monitor clinical signs while at [address] [facility]  - discontinued Keppra per neurology  - will likely need to f/u w/ Hematology in outpatient setting in regards to thrombocytopenia/pancytopenia (referral placed)  - NO BLOOD PRODUCTS - Orthodox      Anxiety with depression  Assessment & Plan   Will be sent for psych inpatient admission pending bed search   On Pristiq and scheduled gabapentin  Given prn atarax 25mg q6 hours for anxiety/panic attacks  Pending psych re-eval by Claudette Echeverria and recommendations but will get d/c 03/18 later in the day and if not assessed by  fiorella, patient will just continue to get evaluated treatment in regards to anxiety/panic attacks at inpatient psych facility    Pancytopenia [facility])  Assessment & Plan  Asymptomatic and continue to monitor clinically     Urine output low  Assessment & Plan  I/Os=  +180ml and had no urinary complaints     Plan:  · Monitor I/Os while at IP psych facility  · Hold off on additional labs     Aspiration pneumonia [facility])  Assessment & Plan  CXR initially with clear lungs  Repeat Imaging with prominent RLL consolidation  Started on cefepime->switched to unasyn, completed 6 total days of antibiotics  DC'd given negative procal on 3/12 labwork  Lungs clear to auscultation on 03/18    Plan:  · Continue Xopenex prn while at [address] [facility]  · Monitor fever curves while at [address] [facility]  ·       Fall from standing  Assessment & Plan  · Patient reports severe pain in the right arm 2/2 a fall as well as upper midback pain with evidence of an area of ecchymosis in the left interscapular region  · She also has significant difficulty with abduction of her right arm with tenderness along the anterior and superior aspect of the shoulder  · X-Ray, trauma series: No displaced fractures  Foreshortening of the distal right clavicle likely postoperative in origin  · Repeat x-ray of the shoulder Old right clavicle fracture deformity with superior displacement of the clavicle  Calcific tendinosis  No lytic or blastic osseous lesion    Plan:  · Supportive care  · PT/OT recommend home with home health rehab    Medical Problems             Resolved Problems  Date Reviewed: 3/18/2022          Resolved    Lactic acidosis 3/9/2022     Resolved by  PAZ Ennis Hyperglycemia 3/12/2022     Resolved by  Rose Miguel MD    Encephalopathy 3/10/2022     Resolved by  Kerrie Ballard MD    Airway compromise 3/10/2022     Resolved by  Kerrie Ballard MD    [facility]) 3/11/2022     Resolved by  Rose Miguel MD              Discharging Resident: Deniz Carolina DO  Discharging Attending: Freddie Rowe*  PCP: Torres Lyons MD  Admission Date:   Admission Orders (From admission, onward)     Ordered        03/07/22 1926  Inpatient Admission  Once                      Discharge Date: 03/18/22    Consultations During Hospital Stay:  · Neurology  · Critical Care  · Cardiology      Procedures Performed:   · None    Significant Findings / Test Results:   · Noted to be pancytopenic will likely need to follow w/ Hematology outpatient  · MRI brain: Focal diffusion-weighted and FLAIR signal hyperintensity involving the left hippocampal formation is consistent with status epilepticus  No associated hemorrhage, enhancement or involvement of the contralateral temporal lobe  Follow to resolution  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  No acute intracranial hemorrhage  Incidental Findings:   · None     Test Results Pending at Discharge (will require follow up): · None      Outpatient Tests Requested:  · Echocardiogram w/ PCP   ·     Complications:  Noted increasing thrombocytopenia     Reason for Admission: Seizure    Hospital Course:   · Mayda Flood is a 58 y o  female patient  w h/o traumatic Subarachnoid, right posterior fossa IPH, blood cancer per ,  Alcohol abuse who originally presented to the hospital on 3/7/2022 as a trauma after a fall from standing, witnessed seizure activity  Patient was a GCS 3 per EMS, scarlet to an 8 in the trauma bay   Initial trauma eval with negative CTH, negative CT spine, CT chest abdomen and pelvis with T12, L2 and L4 vertebral compression (age indeterminate) with chronic pancreatitis and cholelithiasis  Trauma Xrays appeared benign  Patient suspected to be in status epilepticus d/t repeated hand clenching  Patient was also noted to be septic on arrival as evidence by tachycardia (HR 140s), lactic acid 8 9, and hypotension with SBPs as low as in the 50s  Patient received 2L isolyte in the ED for resuscitation and started on pressors for blood pressure support  Patient was intubated in ED given drop in GCS to 6-7  Patient was admitted to critical care under trauma for continuous EEG monitoring, ventilatory support and loaded with anticonvulsants  On video EEG monitoring, patient had 3 electrographic seizures and was maintained on Keppra, Vimpat and on propofol drip  Last seizure was noted was around 6:45 am 03/08 on video EEG  MRI brain w wo contrast showed Focal diffusion-weighted and FLAIR signal hyperintensity involving the left hippocampal formation is consistent with status epilepticus  Neurology was consulted and patient initially was started on Keppra and Vimpat  Keppra best was discontinued due to patient has noted decreasing platelets and possible side effect of Keppra  Patient will continue on Vimpat monotherapy in regards to seizures  Although CXR initially showed clear lungs, repeat imaging showed prominent RLL consolidation  She was started on on cefepime however  switched to zosyn 03/08 and received total of 6 days of antibiotics and stopped after noted x2 negative procalcitonin  She initially was  Short of breath and Cardiology and the getting consulted due to patient having suspected takotsubo cardiomyopathy  Echo showed EF of 40% and initially was in the ICU because patient was noted to be in shock and having status epilepticus and was on pressors in the ICU     Cardiology continue to evaluate the patient after patient was removed from the ICU to the general floors as patient had stabilized in regards to their seizures and noted that patient was hypoxic as well as needing diuretics  Shortness of breath at continue to improve  With Lasix as well as beta-blocker  Did not recommend to continue any sort of Ace or Arb due to noted soft blood pressures  At that point,   Cardiology recommended p o  Lasix as well as a metoprolol for noted cardiomyopathy as well as follow-up with the cardiology office in outpatient setting in regards to getting an echocardiogram and further evaluation  She was evaluated by psych and determined to need IP psych placement   Due to possible suicide attempt/overdose  They also recommended schedule gabapentin and Pristiq in regards to patient's anxiety/depression  She will be transferred to [facility] for inpatient psych treatment    Please see above list of diagnoses and related plan for additional information  Condition at Discharge: fair    Discharge Day Visit / Exam:   Subjective:    Medically patient did not have any complaints but did mention little bit shortness of breath due to noted anxiety and possible panic attacks that have been occurring over the past 24 hours  Patient noted that she had been taking medication of the Atarax as well as a 1 time dose of Xanax that helped a little bit but she was still constantly having anxiety  I mentioned to her that we would have our psychiatric team evaluate her for any further medication changes      She was informed that she would be transferred to inpatient psych facility for further treatment    She denies any fevers/chill, chest pain, abdominal pain, nausea, vomiting, diarrhea, problems urinating, problems  With bowel movements, and is eating/drinking without problem        Vitals: Blood Pressure: 126/70 (03/18/22 0757)  Pulse: 76 (03/18/22 0757)  Temperature: 97 9 °F (36 6 °C) (03/18/22 0757)  Temp Source: Oral (03/18/22 0757)  Respirations: 18 (03/18/22 0757)  Height: 5' 7" (170 2 cm) (03/08/22 0750)  Weight - Scale: 66 7 kg (147 lb 0 8 oz) (03/09/22 0244)  SpO2: 94 % (03/18/22 46)  Exam:   Physical Exam  Vitals and nursing note reviewed  Constitutional:       General: She is not in acute distress  Appearance: She is well-developed  HENT:      Head: Normocephalic and atraumatic  Eyes:      Conjunctiva/sclera: Conjunctivae normal    Cardiovascular:      Rate and Rhythm: Normal rate and regular rhythm  Heart sounds: No murmur heard  Pulmonary:      Effort: Pulmonary effort is normal  No respiratory distress  Breath sounds: Normal breath sounds  Abdominal:      Palpations: Abdomen is soft  Tenderness: There is no abdominal tenderness  Musculoskeletal:      Cervical back: Neck supple  Skin:     General: Skin is warm and dry  Neurological:      Mental Status: She is alert  Psychiatric:         Mood and Affect: Mood is anxious  Comments:  Patient denies any suicidal or homicidal ideation   Patient denies any hallucination: Tactile, visual, auditory          Discussion with Family: Patient declined call to   Discharge instructions/Information to patient and family:   See after visit summary for information provided to patient and family  Provisions for Follow-Up Care:  See after visit summary for information related to follow-up care and any pertinent home health orders  Disposition:   Inpatient Psychiatry at [facility] Readmission: [facility]     Discharge Medications:  See after visit summary for reconciled discharge medications provided to patient and/or family        **Please Note: This note may have been constructed using a voice recognition system**

## 2022-03-12 NOTE — ASSESSMENT & PLAN NOTE
· S/p V EEG      · Decrease Keppra d/t thrombocytopenia to 250 mg BID  · Continue Vimpat 100 mg BID   · Please reach out to on-na Neurologist for more seizure activity, has room to go up on Vimpat   · Continue evaluating metabolic and infectious derangement per ICU    · Appreciate Toxicology recs

## 2022-03-12 NOTE — PLAN OF CARE
Problem: Potential for Falls  Goal: Patient will remain free of falls  Description: INTERVENTIONS:  - Educate patient/family on patient safety including physical limitations  - Instruct patient to call for assistance with activity   - Consult OT/PT to assist with strengthening/mobility   - Keep Call bell within reach  - Keep bed low and locked with side rails adjusted as appropriate  - Keep care items and personal belongings within reach  - Initiate and maintain comfort rounds  - Make Fall Risk Sign visible to staff  - Offer Toileting every  Hours, in advance of need  - Initiate/Maintain alarm  - Obtain necessary fall risk management equipment:   - Apply yellow socks and bracelet for high fall risk patients  - Consider moving patient to room near nurses station  Outcome: Progressing     Problem: MOBILITY - ADULT  Goal: Maintain or return to baseline ADL function  Description: INTERVENTIONS:  -  Assess patient's ability to carry out ADLs; assess patient's baseline for ADL function and identify physical deficits which impact ability to perform ADLs (bathing, care of mouth/teeth, toileting, grooming, dressing, etc )  - Assess/evaluate cause of self-care deficits   - Assess range of motion  - Assess patient's mobility; develop plan if impaired  - Assess patient's need for assistive devices and provide as appropriate  - Encourage maximum independence but intervene and supervise when necessary  - Involve family in performance of ADLs  - Assess for home care needs following discharge   - Consider OT consult to assist with ADL evaluation and planning for discharge  - Provide patient education as appropriate  Outcome: Progressing  Goal: Maintains/Returns to pre admission functional level  Description: INTERVENTIONS:  - Perform BMAT or MOVE assessment daily    - Set and communicate daily mobility goal to care team and patient/family/caregiver     - Collaborate with rehabilitation services on mobility goals if consulted  - Perform Range of Motion  times a day  - Reposition patient every  hours    - Dangle patient  times a day  - Stand patient times a day  - Ambulate patient  times a day  - Out of bed to chair  times a day   - Out of bed for meals  times a day  - Out of bed for toileting  - Record patient progress and toleration of activity level   Outcome: Progressing     Problem: PAIN - ADULT  Goal: Verbalizes/displays adequate comfort level or baseline comfort level  Description: Interventions:  - Encourage patient to monitor pain and request assistance  - Assess pain using appropriate pain scale  - Administer analgesics based on type and severity of pain and evaluate response  - Implement non-pharmacological measures as appropriate and evaluate response  - Consider cultural and social influences on pain and pain management  - Notify physician/advanced practitioner if interventions unsuccessful or patient reports new pain  Outcome: Progressing     Problem: INFECTION - ADULT  Goal: Absence or prevention of progression during hospitalization  Description: INTERVENTIONS:  - Assess and monitor for signs and symptoms of infection  - Monitor lab/diagnostic results  - Monitor all insertion sites, i e  indwelling lines, tubes, and drains  - Monitor endotracheal if appropriate and nasal secretions for changes in amount and color  - [place] appropriate cooling/warming therapies per order  - Administer medications as ordered  - Instruct and encourage patient and family to use good hand hygiene technique  - Identify and instruct in appropriate isolation precautions for identified infection/condition  Outcome: Progressing  Goal: Absence of fever/infection during neutropenic period  Description: INTERVENTIONS:  - Monitor WBC    Outcome: Progressing     Problem: SAFETY ADULT  Goal: Patient will remain free of falls  Description: INTERVENTIONS:  - Educate patient/family on patient safety including physical limitations  - Instruct patient to call for assistance with activity   - Consult OT/PT to assist with strengthening/mobility   - Keep Call bell within reach  - Keep bed low and locked with side rails adjusted as appropriate  - Keep care items and personal belongings within reach  - Initiate and maintain comfort rounds  - Make Fall Risk Sign visible to staff  - Offer Toileting every  Hours, in advance of need  - Initiate/Maintain alarm  - Obtain necessary fall risk management equipment:   - Apply yellow socks and bracelet for high fall risk patients  - Consider moving patient to room near nurses station  Outcome: Progressing  Goal: Maintain or return to baseline ADL function  Description: INTERVENTIONS:  -  Assess patient's ability to carry out ADLs; assess patient's baseline for ADL function and identify physical deficits which impact ability to perform ADLs (bathing, care of mouth/teeth, toileting, grooming, dressing, etc )  - Assess/evaluate cause of self-care deficits   - Assess range of motion  - Assess patient's mobility; develop plan if impaired  - Assess patient's need for assistive devices and provide as appropriate  - Encourage maximum independence but intervene and supervise when necessary  - Involve family in performance of ADLs  - Assess for home care needs following discharge   - Consider OT consult to assist with ADL evaluation and planning for discharge  - Provide patient education as appropriate  Outcome: Progressing  Goal: Maintains/Returns to pre admission functional level  Description: INTERVENTIONS:  - Perform BMAT or MOVE assessment daily    - Set and communicate daily mobility goal to care team and patient/family/caregiver  - Collaborate with rehabilitation services on mobility goals if consulted  - Perform Range of Motion  times a day  - Reposition patient every  hours    - Dangle patient  times a day  - Stand patient  times a day  - Ambulate patient  times a day  - Out of bed to chair  times a day   - Out of bed for meals  times a day  - Out of bed for toileting  - Record patient progress and toleration of activity level   Outcome: Progressing     Problem: DISCHARGE PLANNING  Goal: Discharge to home or other facility with appropriate resources  Description: INTERVENTIONS:  - Identify barriers to discharge w/patient and caregiver  - Arrange for needed discharge resources and transportation as appropriate  - Identify discharge learning needs (meds, wound care, etc )  - Arrange for interpretive services to assist at discharge as needed  - Refer to Case Management Department for coordinating discharge planning if the patient needs post-hospital services based on physician/advanced practitioner order or complex needs related to functional status, cognitive ability, or social support system  Outcome: Progressing     Problem: Knowledge Deficit  Goal: Patient/family/caregiver demonstrates understanding of disease process, treatment plan, medications, and discharge instructions  Description: Complete learning assessment and assess knowledge base    Interventions:  - Provide teaching at level of understanding  - Provide teaching via preferred learning methods  Outcome: Progressing     Problem: DISCHARGE PLANNING - CARE MANAGEMENT  Goal: Discharge to post-acute care or home with appropriate resources  Description: INTERVENTIONS:  - Conduct assessment to determine patient/family and health care team treatment goals, and need for post-acute services based on payer coverage, community resources, and patient preferences, and barriers to discharge  - Address psychosocial, clinical, and financial barriers to discharge as identified in assessment in conjunction with the patient/family and health care team  - Arrange appropriate level of post-acute services according to patients   needs and preference and payer coverage in collaboration with the physician and health care team  - Communicate with and update the patient/family, physician, and health care team regarding progress on the discharge plan  - Arrange appropriate transportation to post-acute venues  Outcome: Progressing     Problem: NEUROSENSORY - ADULT  Goal: Achieves stable or improved neurological status  Description: INTERVENTIONS  - Monitor and report changes in neurological status  - Monitor vital signs such as temperature, blood pressure, glucose, and any other labs ordered   - Initiate measures to prevent increased intracranial pressure  - Monitor for seizure activity and implement precautions if appropriate      Outcome: Progressing  Goal: Remains free of injury related to seizures activity  Description: INTERVENTIONS  - Maintain airway, patient safety  and administer oxygen as ordered  - Monitor patient for seizure activity, document and report duration and description of seizure to physician/advanced practitioner  - If seizure occurs,  ensure patient safety during seizure  - Reorient patient post seizure  - Seizure pads on all 4 side rails  - Instruct patient/family to notify RN of any seizure activity including if an aura is experienced  - Instruct patient/family to call for assistance with activity based on nursing assessment  - Administer anti-seizure medications if ordered    Outcome: Progressing  Goal: Achieves maximal functionality and self care  Description: INTERVENTIONS  - Monitor swallowing and airway patency with patient fatigue and changes in neurological status  - Encourage and assist patient to increase activity and self care     - Encourage visually impaired, hearing impaired and aphasic patients to use assistive/communication devices  Outcome: Progressing     Problem: Prexisting or High Potential for Compromised Skin Integrity  Goal: Skin integrity is maintained or improved  Description: INTERVENTIONS:  - Identify patients at risk for skin breakdown  - Assess and monitor skin integrity  - Assess and monitor nutrition and hydration status  - Monitor labs   - Assess for incontinence   - Turn and reposition patient  - Assist with mobility/ambulation  - Relieve pressure over bony prominences  - Avoid friction and shearing  - Provide appropriate hygiene as needed including keeping skin clean and dry  - Evaluate need for skin moisturizer/barrier cream  - Collaborate with interdisciplinary team   - Patient/family teaching  - Consider wound care consult   Outcome: Progressing     Problem: Nutrition/Hydration-ADULT  Goal: Nutrient/Hydration intake appropriate for improving, restoring or maintaining nutritional needs  Description: Monitor and assess patient's nutrition/hydration status for malnutrition  Collaborate with interdisciplinary team and initiate plan and interventions as ordered  Monitor patient's weight and dietary intake as ordered or per policy  Utilize nutrition screening tool and intervene as necessary  Determine patient's food preferences and provide high-protein, high-caloric foods as appropriate       INTERVENTIONS:  - Monitor oral intake, urinary output, labs, and treatment plans  - Assess nutrition and hydration status and recommend course of action  - Evaluate amount of meals eaten  - Assist patient with eating if necessary   - Allow adequate time for meals  - Recommend/ encourage appropriate diets, oral nutritional supplements, and vitamin/mineral supplements  - Order, calculate, and assess calorie counts as needed  - Recommend, monitor, and adjust tube feedings and TPN/PPN based on assessed needs  - Assess need for intravenous fluids  - Provide specific nutrition/hydration education as appropriate  - Include patient/family/caregiver in decisions related to nutrition  Outcome: Progressing

## 2022-03-12 NOTE — PROGRESS NOTES
[facility]  Neurology Progress Note - Nicolasa Vargas 1960, 58 y o  female   MRN: [id number] Unit/Bed#: S -01 Encounter: [id number]     Alternate medical record number, [id number]      * Seizure [facility])  Assessment & Plan  03/12/2022:  Seen today by Neurology is this 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, (8-21), with history of alcohol abuse who presents in [place] ED on 3/7/22 as trauma alert A d/t unwitnessed fall and altered mental status found to be in status s/p extubation and Sz free since 3/8/22 6:45 am    To review, pt wasn't feeling well on 3/7-  Lightheadedness, tunnel vision, nausea, unable to eat, difficulty walking, later found down by  around 4:30 pm when he returned home with tongue laceration, abnormal movement of left hand, on arrival tachycardic, GCS-8, platelet 64U, metabolic acidosis, elevated lactic acid, procal 1 49, pt found to have clinical sz in ED and was given Ativan, intubated, sedated on propofol, low BP, received crystalloids, she was started on video EEG monitoring, on 3/8/22 she had 3 electrographic seizures from 12 midnight to 2:00 am, 2:00 to 4:00 am seizure every 15 min, last seizure noted was around 6:45 am, 3/8 on EEG  Her Propofol was increased, given 2 gm Keppra and started 1 gm Keppra bid, started on Vimpat 100 mg bid  Versed was also given at one point  Also started on antibiotics d/t likely aspiration pneumonia (Rt lung base) seen on Xray  Weaned off propofol, no sz, s/p extubation on 3/9, pt A, O x 3 on 3/10  MRI reviewed- Focal diffusion-weighted and FLAIR signal hyperintensity involving the left hippocampal formation is consistent with status epilepticus  No associated hemorrhage, enhancement or involvement of the contralateral temporal lobe  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  No acute intracranial hemorrhage     Exam today, 3-12, is nonfocal and within normal limits  She continues to have a one-to-one sitter given the fact there is a question of a medication overdose, until psych sees her  To this examiner she denies any SI ideation  She reports she feels well right now her exam structurally is within normal limits  Impression- Suspicion remains related to Alcohol withdrawal, med overdose, vs TBI from previous fall (also trauma alert a August of 2021, alternate medical record number), Seizure focus from left temporal region confirmed by MRI with hyperintensity in left hippocampus post status changes, similar episode in 8-21 (she was found down at that time as well) with right posterior occipital intraparenchymal hematoma within rim of surrounding edema, subarachnoid hemorrhage, small extra-axial focus of hemorrhage over the right parietal lobe  Thin posterior falx subdural hematoma  She is alert, oriented with non focal weakness this am    Plan-  · D/C Keppra d/t thrombocytopenia appears somewhat improved   · Continue Vimpat 100 mg q 12H  · Please reach out to on-na Neurologist for more seizure activity, has room to go up on Vimpat   · Psych consult pending  Encephalopathy-resolved as of 3/10/2022  Assessment & Plan  Likely related to status epilepticus       This patient should be seen by Neurology on a non urgent basis within a month or so  She was not seen, does not appear to have an appointment from her August 2021 head injury  That injury may have precipitated the seizures  She should be seen by our epileptologist if possible at either our [facility] residents clinic or in our [facility] office  We will keep her on Vimpat 100 mg q 12 hours at this time  Should she have another event at home she should either call our office or present to the emergency department  Subjective/Objective     Subjective:  I feel fine now  I feel like Um kind of ready to go home and get out of here  I did not have any suicidal intentions RI details    I feel quite jamil to be alive  ROS:  The patient reports to this examiner that she does not have any suicidal ideations or intentions now or at the time of her admission on the 7th  She has no complaints of headache  She has complaints of muscular pain in the region of her left scapula and right shoulder less so on her limbs  She denies any chest pain shortness breath or palpitations  Overall she reports she feels quite well  She reports her scapula and clavicle injury on the right are related to her seizure and fall in August of 2021 at the time she had her subdural     Medication Dose Route Frequency    acetaminophen  650 mg Oral Q4H PRN    chlorhexidine  15 mL Mouth/Throat Y79E [facility]    folic acid  1 mg Oral Daily    guaiFENesin  600 mg Oral Q12H [facility]    lacosamide  100 mg Oral Q12H DESTIN    levalbuterol  1 25 mg Nebulization TID    levETIRAcetam  250 mg Oral Q12H [facility]    LORazepam  2 mg Intravenous Q1H PRN    melatonin  3 mg Oral HS    metoprolol tartrate  12 5 mg Oral Q12H [facility]    multivitamin-minerals  1 tablet Oral Daily    pantoprazole  40 mg Intravenous Q24H DESTIN    thiamine  100 mg Oral Daily       acetaminophen    LORazepam    Vitals: Blood pressure 108/59, pulse 94, temperature 98 8 °F (37 1 °C), temperature source Oral, resp  rate 18, height 5' 7" (1 702 m), weight 66 7 kg (147 lb 0 8 oz), SpO2 93 %  ,Body mass index is 23 03 kg/m²  Physical Exam:     Claudia Peel seen in:  In bed, she has a sitter at the bedside  General appearance: alert,   Neck, Lungs, Heart, & abdomen: WNL  Extremities: atraumatic, no cyanosis or edema    Neurologic:   Mental status: Alert, oriented, thought content appropriate, her speech is clear there was no aphasia no dysarthria  She has good eye contact she is not defensive  There is no emotional lability  CN: exam EOM's I, Gaze conjugate  Non lateralizing sensory & motor exam, (PP not tested on face)   Reminder CNVIII-XII normal    Motor: full power age appropriate x 4 limbs, with the exception of the limited movement related to painful shoulder and scapula on the right and left respectively  Sensory: grossly intact  X 4 limbs, PP not tested  Cerebellar: no gross cerebellar dysfunction  Gait: Fluid smooth, she has been walking to and from the bathroom with the assistance of the sitter  DTR's: Age appropriate,  Plantars: downgoing       Lab Results:   I have personally reviewed pertinent reports  , CBC:   Results from last 7 days   Lab Units 03/12/22  0533 03/11/22  0443 03/10/22  0433   WBC Thousand/uL 4 07* 4 45 6 45   RBC Million/uL 2 52* 2 40* 2 35*   HEMOGLOBIN g/dL 8 9* 8 3* 8 1*   HEMATOCRIT % 26 4* 25 7* 24 2*   MCV fL 105* 107* 103*   PLATELETS Thousands/uL 34* 31* 27*   , BMP/CMP:   Results from last 7 days   Lab Units 03/12/22  0533 03/11/22  0441 03/10/22  0433 03/09/22  0429 03/08/22 0448 03/08/22  0029   SODIUM mmol/L 145 145 140 138 136 139   POTASSIUM mmol/L 3 4* 3 4* 3 6 3 6 3 1* 3 5   CHLORIDE mmol/L 111* 112* 109* 108 101 103   CO2 mmol/L 20* 25 21 20* 23 23   CO2, I-STAT mmol/L  --   --   --   --   --   --    BUN mg/dL 5 6 7 5 6 6   CREATININE mg/dL 0 55* 0 54* 0 59* 0 57* 0 72 0 74   GLUCOSE, ISTAT mg/dl  --   --   --   --   --   --    CALCIUM mg/dL 8 6 8 2* 7 9* 7 6* 7 4* 7 4*   AST U/L  --   --   --  103* 129* 148*   ALT U/L  --   --   --  58 85* 92*   ALK PHOS U/L  --   --   --  114 144* 160*   EGFR ml/min/1 73sq m 100 101 98 99 90 87    < > = values in this interval not displayed  , Vitamin B12:   , HgBA1C:   Results from last 7 days   Lab Units 03/09/22  0429   HEMOGLOBIN A1C % 4 7   , TSH:   , Coagulation:   Results from last 7 days   Lab Units 03/07/22  1826   INR  1 16   , Lipid Profile:        Imaging Studies: No new neurodiagnostic imaging    EEG, Echo, Pathology, and Other Studies: No new EEG monitoring  She was taken off EEG monitoring on the 8th    At that time there were no epileptic features appreciated she was just consistent with slowing and disorganization   she did not have any EEG testing at the time of her original subdural subarachnoid in August

## 2022-03-13 LAB
ANION GAP SERPL CALCULATED.3IONS-SCNC: 11 MMOL/L (ref 4–13)
ATRIAL RATE: 101 BPM
BASOPHILS # BLD AUTO: 0.01 THOUSANDS/ΜL (ref 0–0.1)
BASOPHILS NFR BLD AUTO: 0 % (ref 0–1)
BUN SERPL-MCNC: 4 MG/DL (ref 5–25)
CALCIUM SERPL-MCNC: 8.7 MG/DL (ref 8.3–10.1)
CHLORIDE SERPL-SCNC: 110 MMOL/L (ref 100–108)
CO2 SERPL-SCNC: 22 MMOL/L (ref 21–32)
CREAT SERPL-MCNC: 0.58 MG/DL (ref 0.6–1.3)
EOSINOPHIL # BLD AUTO: 0.08 THOUSAND/ΜL (ref 0–0.61)
EOSINOPHIL NFR BLD AUTO: 2 % (ref 0–6)
ERYTHROCYTE [DISTWIDTH] IN BLOOD BY AUTOMATED COUNT: 16.6 % (ref 11.6–15.1)
GFR SERPL CREATININE-BSD FRML MDRD: 99 ML/MIN/1.73SQ M
GLUCOSE SERPL-MCNC: 119 MG/DL (ref 65–140)
HCT VFR BLD AUTO: 27.4 % (ref 34.8–46.1)
HGB BLD-MCNC: 9.1 G/DL (ref 11.5–15.4)
IMM GRANULOCYTES # BLD AUTO: 0.05 THOUSAND/UL (ref 0–0.2)
IMM GRANULOCYTES NFR BLD AUTO: 1 % (ref 0–2)
LYMPHOCYTES # BLD AUTO: 1.38 THOUSANDS/ΜL (ref 0.6–4.47)
LYMPHOCYTES NFR BLD AUTO: 27 % (ref 14–44)
MCH RBC QN AUTO: 35.1 PG (ref 26.8–34.3)
MCHC RBC AUTO-ENTMCNC: 33.2 G/DL (ref 31.4–37.4)
MCV RBC AUTO: 106 FL (ref 82–98)
MONOCYTES # BLD AUTO: 1.03 THOUSAND/ΜL (ref 0.17–1.22)
MONOCYTES NFR BLD AUTO: 20 % (ref 4–12)
NEUTROPHILS # BLD AUTO: 2.49 THOUSANDS/ΜL (ref 1.85–7.62)
NEUTS SEG NFR BLD AUTO: 50 % (ref 43–75)
NRBC BLD AUTO-RTO: 0 /100 WBCS
P AXIS: 52 DEGREES
PLATELET # BLD AUTO: 40 THOUSANDS/UL (ref 149–390)
PMV BLD AUTO: 11 FL (ref 8.9–12.7)
POTASSIUM SERPL-SCNC: 3.7 MMOL/L (ref 3.5–5.3)
PR INTERVAL: 180 MS
PROCALCITONIN SERPL-MCNC: 0.15 NG/ML
QRS AXIS: 60 DEGREES
QRSD INTERVAL: 76 MS
QT INTERVAL: 342 MS
QTC INTERVAL: 443 MS
RBC # BLD AUTO: 2.59 MILLION/UL (ref 3.81–5.12)
SODIUM SERPL-SCNC: 143 MMOL/L (ref 136–145)
T WAVE AXIS: 47 DEGREES
VENTRICULAR RATE: 101 BPM
WBC # BLD AUTO: 5.04 THOUSAND/UL (ref 4.31–10.16)

## 2022-03-13 PROCEDURE — 84145 PROCALCITONIN (PCT): CPT | Performed by: INTERNAL MEDICINE

## 2022-03-13 PROCEDURE — 94668 MNPJ CHEST WALL SBSQ: CPT

## 2022-03-13 PROCEDURE — 99232 SBSQ HOSP IP/OBS MODERATE 35: CPT | Performed by: PSYCHIATRY & NEUROLOGY

## 2022-03-13 PROCEDURE — 99232 SBSQ HOSP IP/OBS MODERATE 35: CPT | Performed by: INTERNAL MEDICINE

## 2022-03-13 PROCEDURE — 93005 ELECTROCARDIOGRAM TRACING: CPT

## 2022-03-13 PROCEDURE — 94760 N-INVAS EAR/PLS OXIMETRY 1: CPT

## 2022-03-13 PROCEDURE — 80048 BASIC METABOLIC PNL TOTAL CA: CPT

## 2022-03-13 PROCEDURE — 94640 AIRWAY INHALATION TREATMENT: CPT

## 2022-03-13 PROCEDURE — 85025 COMPLETE CBC W/AUTO DIFF WBC: CPT

## 2022-03-13 PROCEDURE — 93010 ELECTROCARDIOGRAM REPORT: CPT | Performed by: INTERNAL MEDICINE

## 2022-03-13 RX ORDER — ALBUTEROL SULFATE 90 UG/1
2 AEROSOL, METERED RESPIRATORY (INHALATION) EVERY 4 HOURS PRN
Status: DISCONTINUED | OUTPATIENT
Start: 2022-03-13 | End: 2022-03-18 | Stop reason: HOSPADM

## 2022-03-13 RX ORDER — FUROSEMIDE 10 MG/ML
40 INJECTION INTRAMUSCULAR; INTRAVENOUS ONCE
Status: COMPLETED | OUTPATIENT
Start: 2022-03-13 | End: 2022-03-13

## 2022-03-13 RX ADMIN — Medication 12.5 MG: at 21:28

## 2022-03-13 RX ADMIN — PANTOPRAZOLE SODIUM 40 MG: 40 TABLET, DELAYED RELEASE ORAL at 05:07

## 2022-03-13 RX ADMIN — Medication 3 MG: at 21:28

## 2022-03-13 RX ADMIN — Medication 15 ML: at 08:09

## 2022-03-13 RX ADMIN — FOLIC ACID 1 MG: 1 TABLET ORAL at 08:08

## 2022-03-13 RX ADMIN — ACETAMINOPHEN 650 MG: 325 TABLET, FILM COATED ORAL at 12:39

## 2022-03-13 RX ADMIN — MULTIPLE VITAMINS W/ MINERALS TAB 1 TABLET: TAB ORAL at 08:09

## 2022-03-13 RX ADMIN — GUAIFENESIN 600 MG: 600 TABLET ORAL at 21:29

## 2022-03-13 RX ADMIN — THIAMINE HCL TAB 100 MG 100 MG: 100 TAB at 08:08

## 2022-03-13 RX ADMIN — Medication 12.5 MG: at 08:08

## 2022-03-13 RX ADMIN — LACOSAMIDE 100 MG: 100 TABLET, FILM COATED ORAL at 21:29

## 2022-03-13 RX ADMIN — FUROSEMIDE 40 MG: 10 INJECTION, SOLUTION INTRAMUSCULAR; INTRAVENOUS at 08:09

## 2022-03-13 RX ADMIN — LEVETIRACETAM 250 MG: 500 TABLET, FILM COATED ORAL at 08:09

## 2022-03-13 RX ADMIN — LEVALBUTEROL HYDROCHLORIDE 1.25 MG: 1.25 SOLUTION RESPIRATORY (INHALATION) at 08:06

## 2022-03-13 RX ADMIN — GUAIFENESIN 600 MG: 600 TABLET ORAL at 08:09

## 2022-03-13 RX ADMIN — LEVALBUTEROL HYDROCHLORIDE 1.25 MG: 1.25 SOLUTION RESPIRATORY (INHALATION) at 14:01

## 2022-03-13 RX ADMIN — LEVALBUTEROL HYDROCHLORIDE 1.25 MG: 1.25 SOLUTION RESPIRATORY (INHALATION) at 19:36

## 2022-03-13 RX ADMIN — Medication 15 ML: at 21:29

## 2022-03-13 RX ADMIN — LACOSAMIDE 100 MG: 100 TABLET, FILM COATED ORAL at 08:09

## 2022-03-13 RX ADMIN — LEVETIRACETAM 250 MG: 500 TABLET, FILM COATED ORAL at 21:28

## 2022-03-13 NOTE — PROGRESS NOTES
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, (8-21), with history of alcohol abuse who presents in [facility] ED on 3/7/22 as trauma alert A d/t unwitnessed fall and altered mental status found to be in status s/p extubation and Sz free since 3/8/22 6:45 am       To review, pt wasn't feeling well on 3/7-  Lightheadedness, tunnel vision, nausea, unable to eat, difficulty walking, later found down by  around 4:30 pm when he returned home with tongue laceration, abnormal movement of left hand, on arrival tachycardic, GCS-8, platelet 31S, metabolic acidosis, elevated lactic acid, procal 1 49, pt found to have clinical sz in ED and was given Ativan, intubated, sedated on propofol, low BP, received crystalloids, she was started on video EEG monitoring, on 3/8/22 she had 3 electrographic seizures from 12 midnight to 2:00 am, 2:00 to 4:00 am seizure every 15 min, last seizure noted was around 6:45 am, 3/8 on EEG  Her Propofol was increased, given 2 gm Keppra and started 1 gm Keppra bid, started on Vimpat 100 mg bid  Versed was also given at one point  Also started on antibiotics d/t likely aspiration pneumonia (Rt lung base) seen on Xray  Weaned off propofol, no sz, s/p extubation on 3/9, pt A, O x 3 on 3/10  MRI reviewed- Focal diffusion-weighted and FLAIR signal hyperintensity involving the left hippocampal formation is consistent with status epilepticus  No associated hemorrhage, enhancement or involvement of the contralateral temporal lobe  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  No acute intracranial hemorrhage  Exam remains stable and non focal and non concerning for mood       Impression- Suspicion remains related to Alcohol withdrawal, med overdose, vs TBI from previous fall (also trauma alert a 2021, alternate medical record number), Seizure focus from left temporal region confirmed by MRI with hyperintensity in left hippocampus post status changes, similar episode in  (she was found down at that time as well) with right posterior occipital intraparenchymal hematoma within rim of surrounding edema, subarachnoid hemorrhage, small extra-axial focus of hemorrhage over the right parietal lobe  Thin posterior falx subdural hematoma  She is alert, oriented with non focal weakness this am    Plan-  · D/C Keppra d/t thrombocytopenia appears somewhat improved (now up to 40) and no new seizure like activity  · Continue Vimpat 100 mg q 12H  · Please reach out to on-na Neurologist for more seizure activity, has room to go up on Vimpat   · Psych consult pending  Venessa Loya will need follow up in in 4 weeks with epilepsy attending, ap or resident  She will not require outpatient neurological testing  SUBJECTIVE     Patient was seen and examined  No acute events overnight  She continues to be seizure free with no concerns for mental status changes  No new concerns or questions  Slept well and ambulating with a walker today     10 point review of systems conducted and otherwise negative other than as documented above    OBJECTIVE     Patient ID: Venessa Loya is a 58 y o  female  Vitals:    22 0824 22 1526 22 2047 22 0656   BP:  114/67 103/61 119/76   BP Location:  Left arm Left arm Left arm   Pulse:  (!) 111 (!) 106 (!) 107   Resp:  18 18 20   Temp:  98 5 °F (36 9 °C) 98 9 °F (37 2 °C) 99 9 °F (37 7 °C)   TempSrc:  Oral Oral Oral   SpO2: 93% 90% 95% 93%   Weight:       Height:          Temperature:   Temp (24hrs), Av 1 °F (37 3 °C), Min:98 5 °F (36 9 °C), Max:99 9 °F (37 7 °C)    Temperature: 99 9 °F (37 7 °C)    Neurologic Exam     Mental Status   Oriented to person, place, and time  Speech: speech is normal   Level of consciousness: alert  Normal comprehension  Cranial Nerves     CN II   Visual fields full to confrontation  CN III, IV, VI   Pupils are equal, round, and reactive to light  Extraocular motions are normal      CN V   Facial sensation intact  CN VII   Facial expression full, symmetric  CN VIII   CN VIII normal      CN IX, X   CN IX normal    CN X normal      CN XI   CN XI normal      CN XII   CN XII normal      Motor Exam   Muscle bulk: normal  Overall muscle tone: normal    Strength   Strength 5/5 throughout  Sensory Exam   Light touch normal         LABORATORY DATA     Labs: I have personally reviewed pertinent reports  Results from last 7 days   Lab Units 03/13/22  0537 03/12/22  0533 03/11/22  0443 03/10/22  0433 03/09/22  0429 03/08/22 0448 03/08/22 0448   WBC Thousand/uL 5 04 4 07* 4 45   < > 8 68   < > 7 40   HEMOGLOBIN g/dL 9 1* 8 9* 8 3*   < > 8 9*   < > 9 1*   HEMATOCRIT % 27 4* 26 4* 25 7*   < > 26 1*   < > 26 9*   PLATELETS Thousands/uL 40* 34* 31*   < > 29*   < > 35*   NEUTROS PCT % 50  --  55  --   --   --  77*   MONOS PCT % 20*  --  11  --   --   --  4   MONO PCT %  --   --   --   --  8  --   --     < > = values in this interval not displayed        Results from last 7 days   Lab Units 03/13/22  0537 03/12/22  0533 03/11/22  0441 03/10/22  0433 03/09/22  0429 03/08/22  1131 03/08/22 0448 03/08/22  0029 03/08/22  0029 03/07/22  1722 03/07/22  1713   POTASSIUM mmol/L 3 7 3 4* 3 4*   < > 3 6   < > 3 1*   < > 3 5   < >  --    CHLORIDE mmol/L 110* 111* 112*   < > 108   < > 101   < > 103   < >  --    CO2 mmol/L 22 20* 25   < > 20*   < > 23   < > 23   < >  --    CO2, I-STAT mmol/L  --   --   --   --   --   --   --   --   --   --  20*   BUN mg/dL 4* 5 6   < > 5   < > 6   < > 6   < >  --    CREATININE mg/dL 0 58* 0 55* 0 54*   < > 0 57*   < > 0 72   < > 0 74   < >  --    CALCIUM mg/dL 8 7 8 6 8 2*   < > 7 6*   < > 7 4*   < > 7 4*   < >  --    ALK PHOS U/L  --   --   --   --  114  --  144*  --  160*  --   --    ALT U/L  --   --   --   --  58  --  85*  --  92*  --   --    AST U/L  --   --   --   --  103*  --  129*  --  148*  --   --    GLUCOSE, ISTAT mg/dl  --   --   --   --   --   --   --   --   --   --  355*    < > = values in this interval not displayed  Results from last 7 days   Lab Units 03/11/22  0441 03/10/22  0433 03/09/22  0429   MAGNESIUM mg/dL 1 8 1 7 1 8     Results from last 7 days   Lab Units 03/11/22  0441 03/10/22  0433 03/09/22  0429   PHOSPHORUS mg/dL 2 4 1 7* 2 2*      Results from last 7 days   Lab Units 03/07/22  1826   INR  1 16   PTT seconds 27  27     Results from last 7 days   Lab Units 03/07/22  2307   LACTIC ACID mmol/L 1 7           IMAGING & DIAGNOSTIC TESTING     Radiology Results: I have personally reviewed pertinent films in PACS      Other Diagnostic Testing: I have personally reviewed pertinent reports        ACTIVE MEDICATIONS     Current Facility-Administered Medications   Medication Dose Route Frequency    acetaminophen (TYLENOL) tablet 650 mg  650 mg Oral Q4H PRN    albuterol (PROVENTIL HFA,VENTOLIN HFA) inhaler 2 puff  2 puff Inhalation Q4H PRN    chlorhexidine (PERIDEX) 0 12 % oral rinse 15 mL  15 mL Mouth/Throat V12R [facility]    folic acid (FOLVITE) tablet 1 mg  1 mg Oral Daily    guaiFENesin (MUCINEX) 12 hr tablet 600 mg  600 mg Oral Q12H [facility]    lacosamide (VIMPAT) tablet 100 mg  100 mg Oral Q12H DESTIN    levalbuterol (XOPENEX) inhalation solution 1 25 mg  1 25 mg Nebulization TID    levETIRAcetam (KEPPRA) tablet 250 mg  250 mg Oral Q12H [facility]    LORazepam (ATIVAN) injection 2 mg  2 mg Intravenous Q1H PRN    melatonin tablet 3 mg  3 mg Oral HS    metoprolol tartrate (LOPRESSOR) partial tablet 12 5 mg  12 5 mg Oral Q12H [facility] & al[facility]    multivitamin-minerals (CENTRUM) tablet 1 tablet  1 tablet Oral Daily    pantoprazole (PROTONIX) EC tablet 40 mg  40 mg Oral Early Morning    thiamine tablet 100 mg  100 mg Oral Daily Prior to Admission medications    Not on File       ==  MD Mickey Harrington [facility] Neurology Residency, PGY-3

## 2022-03-13 NOTE — ASSESSMENT & PLAN NOTE
· Patient reports severe pain in the right arm 2/2 a fall as well as upper midback pain with evidence of an area of ecchymosis in the left interscapular region  · She also has significant difficulty with abduction of her right arm with tenderness along the anterior and superior aspect of the shoulder  · X-Ray, trauma series: No displaced fractures  Foreshortening of the distal right clavicle likely postoperative in origin  · Repeat x-ray of the shoulder Old right clavicle fracture deformity with superior displacement of the clavicle  Calcific tendinosis  No lytic or blastic osseous lesion    Plan:  · Supportive care  · PT/OT: Post acute rehabilitation services

## 2022-03-13 NOTE — ASSESSMENT & PLAN NOTE
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, (8-21), with history of alcohol abuse who presents in [address] [place] [address] ED on 3/7/22 as trauma alert A d/t unwitnessed fall and altered mental status found to be in status s/p extubation and Sz free since 3/8/22 6:45 am       To review, pt wasn't feeling well on 3/7-  Lightheadedness, tunnel vision, nausea, unable to eat, difficulty walking, later found down by  around 4:30 pm when he returned home with tongue laceration, abnormal movement of left hand, on arrival tachycardic, GCS-8, platelet 19E, metabolic acidosis, elevated lactic acid, procal 1 49, pt found to have clinical sz in ED and was given Ativan, intubated, sedated on propofol, low BP, received crystalloids, she was started on video EEG monitoring, on 3/8/22 she had 3 electrographic seizures from 12 midnight to 2:00 am, 2:00 to 4:00 am seizure every 15 min, last seizure noted was around 6:45 am, 3/8 on EEG  Her Propofol was increased, given 2 gm Keppra and started 1 gm Keppra bid, started on Vimpat 100 mg bid  Versed was also given at one point  Also started on antibiotics d/t likely aspiration pneumonia (Rt lung base) seen on Xray  Weaned off propofol, no sz, s/p extubation on 3/9, pt A, O x 3 on 3/10  MRI reviewed- Focal diffusion-weighted and FLAIR signal hyperintensity involving the left hippocampal formation is consistent with status epilepticus  No associated hemorrhage, enhancement or involvement of the contralateral temporal lobe  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  No acute intracranial hemorrhage  Exam remains stable and non focal and non concerning for mood       Impression- Suspicion remains related to Alcohol withdrawal, med overdose, vs TBI from previous fall (also trauma alert a August of 2021, alternate medical record number), Seizure focus from left temporal region confirmed by MRI with hyperintensity in left hippocampus post status changes, similar episode in 8-21 (she was found down at that time as well) with right posterior occipital intraparenchymal hematoma within rim of surrounding edema, subarachnoid hemorrhage, small extra-axial focus of hemorrhage over the right parietal lobe  Thin posterior falx subdural hematoma  She is alert, oriented with non focal weakness this am    Plan-  · D/C Keppra d/t thrombocytopenia appears somewhat improved (now up to 40) and no new seizure like activity  · Continue Vimpat 100 mg q 12H  · Please reach out to on-na Neurologist for more seizure activity, has room to go up on Vimpat   · Psych consult pending

## 2022-03-13 NOTE — ASSESSMENT & PLAN NOTE
· Arrived after unwitnessed seizure like activity, tongue laceration, left hand movement  · No h/o seizure however had ICH requiring seizure ppx in 08/2021  · [address] unremarkable, Labs with acidosis   · 3 electrographic seizures noted on vEEG, last seizure noted was around 6:45 am 03/08  · clonic activity of left arm and roving eye movements  · started on lacosamide, levetiracem, fentanyl, propofol       · No new seizures since 03/08 6:45am    · Per neuro, originating from left temporal lobe, etiology unknown but considering alcohol withdrawal  · Mentation improved  · MRI- Focal diffusion-weighted and FLAIR signal hyperintensity involving the left hippocampal formation is consistent with status epilepticus  Plan:  · Continue  Keppra d/t thrombocytopenia appears somewhat improved  · Continue Vimpat 100 mg q 12H  · In case of more seizure-like activity, reach out to on-call neurologist   Patient's Vimpat dosage may be escalated  · Psych consult pending

## 2022-03-13 NOTE — ASSESSMENT & PLAN NOTE
Likely 2/2 alcohol intake, baseline around 50  Saw hemonc calls in EMR, never established care from my understanding  Stable around 30; improved slightly after reducing keppra  Plan:  Continue to monitor  NO BLOOD PRODUCTS - Bahai

## 2022-03-13 NOTE — PLAN OF CARE
Problem: Potential for Falls  Goal: Patient will remain free of falls  Description: INTERVENTIONS:  - Educate patient/family on patient safety including physical limitations  - Instruct patient to call for assistance with activity   - Consult OT/PT to assist with strengthening/mobility   - Keep Call bell within reach  - Keep bed low and locked with side rails adjusted as appropriate  - Keep care items and personal belongings within reach  - Initiate and maintain comfort rounds  - Make Fall Risk Sign visible to staff  - Offer Toileting every  Hours, in advance of need  - Initiate/Maintain alarm  - Obtain necessary fall risk management equipment:   - Apply yellow socks and bracelet for high fall risk patients  - Consider moving patient to room near nurses station  Outcome: Progressing     Problem: MOBILITY - ADULT  Goal: Maintain or return to baseline ADL function  Description: INTERVENTIONS:  -  Assess patient's ability to carry out ADLs; assess patient's baseline for ADL function and identify physical deficits which impact ability to perform ADLs (bathing, care of mouth/teeth, toileting, grooming, dressing, etc )  - Assess/evaluate cause of self-care deficits   - Assess range of motion  - Assess patient's mobility; develop plan if impaired  - Assess patient's need for assistive devices and provide as appropriate  - Encourage maximum independence but intervene and supervise when necessary  - Involve family in performance of ADLs  - Assess for home care needs following discharge   - Consider OT consult to assist with ADL evaluation and planning for discharge  - Provide patient education as appropriate  Outcome: Progressing  Goal: Maintains/Returns to pre admission functional level  Description: INTERVENTIONS:  - Perform BMAT or MOVE assessment daily    - Set and communicate daily mobility goal to care team and patient/family/caregiver     - Collaborate with rehabilitation services on mobility goals if consulted  - Perform Range of Motion  times a day  - Reposition patient every  hours    - Dangle patient  times a day  - Stand patient  times a day  - Ambulate patient  times a day  - Out of bed to chair  times a day   - Out of bed for meals  times a day  - Out of bed for toileting  - Record patient progress and toleration of activity level   Outcome: Progressing     Problem: PAIN - ADULT  Goal: Verbalizes/displays adequate comfort level or baseline comfort level  Description: Interventions:  - Encourage patient to monitor pain and request assistance  - Assess pain using appropriate pain scale  - Administer analgesics based on type and severity of pain and evaluate response  - Implement non-pharmacological measures as appropriate and evaluate response  - Consider cultural and social influences on pain and pain management  - Notify physician/advanced practitioner if interventions unsuccessful or patient reports new pain  Outcome: Progressing     Problem: INFECTION - ADULT  Goal: Absence or prevention of progression during hospitalization  Description: INTERVENTIONS:  - Assess and monitor for signs and symptoms of infection  - Monitor lab/diagnostic results  - Monitor all insertion sites, i e  indwelling lines, tubes, and drains  - Monitor endotracheal if appropriate and nasal secretions for changes in amount and color  - [place] appropriate cooling/warming therapies per order  - Administer medications as ordered  - Instruct and encourage patient and family to use good hand hygiene technique  - Identify and instruct in appropriate isolation precautions for identified infection/condition  Outcome: Progressing  Goal: Absence of fever/infection during neutropenic period  Description: INTERVENTIONS:  - Monitor WBC    Outcome: Progressing     Problem: SAFETY ADULT  Goal: Patient will remain free of falls  Description: INTERVENTIONS:  - Educate patient/family on patient safety including physical limitations  - Instruct patient to call for assistance with activity   - Consult OT/PT to assist with strengthening/mobility   - Keep Call bell within reach  - Keep bed low and locked with side rails adjusted as appropriate  - Keep care items and personal belongings within reach  - Initiate and maintain comfort rounds  - Make Fall Risk Sign visible to staff  - Offer Toileting every  Hours, in advance of need  - Initiate/Maintain alarm  - Obtain necessary fall risk management equipment:   - Apply yellow socks and bracelet for high fall risk patients  - Consider moving patient to room near nurses station  Outcome: Progressing  Goal: Maintain or return to baseline ADL function  Description: INTERVENTIONS:  -  Assess patient's ability to carry out ADLs; assess patient's baseline for ADL function and identify physical deficits which impact ability to perform ADLs (bathing, care of mouth/teeth, toileting, grooming, dressing, etc )  - Assess/evaluate cause of self-care deficits   - Assess range of motion  - Assess patient's mobility; develop plan if impaired  - Assess patient's need for assistive devices and provide as appropriate  - Encourage maximum independence but intervene and supervise when necessary  - Involve family in performance of ADLs  - Assess for home care needs following discharge   - Consider OT consult to assist with ADL evaluation and planning for discharge  - Provide patient education as appropriate  Outcome: Progressing  Goal: Maintains/Returns to pre admission functional level  Description: INTERVENTIONS:  - Perform BMAT or MOVE assessment daily    - Set and communicate daily mobility goal to care team and patient/family/caregiver  - Collaborate with rehabilitation services on mobility goals if consulted  - Perform Range of Motion  times a day  - Reposition patient every  hours    - Dangle patient  times a day  - Stand patient  times a day  - Ambulate patient  times a day  - Out of bed to chair times a day   - Out of bed for meals  times a day  - Out of bed for toileting  - Record patient progress and toleration of activity level   Outcome: Progressing     Problem: DISCHARGE PLANNING  Goal: Discharge to home or other facility with appropriate resources  Description: INTERVENTIONS:  - Identify barriers to discharge w/patient and caregiver  - Arrange for needed discharge resources and transportation as appropriate  - Identify discharge learning needs (meds, wound care, etc )  - Arrange for interpretive services to assist at discharge as needed  - Refer to Case Management Department for coordinating discharge planning if the patient needs post-hospital services based on physician/advanced practitioner order or complex needs related to functional status, cognitive ability, or social support system  Outcome: Progressing     Problem: Knowledge Deficit  Goal: Patient/family/caregiver demonstrates understanding of disease process, treatment plan, medications, and discharge instructions  Description: Complete learning assessment and assess knowledge base    Interventions:  - Provide teaching at level of understanding  - Provide teaching via preferred learning methods  Outcome: Progressing     Problem: DISCHARGE PLANNING - CARE MANAGEMENT  Goal: Discharge to post-acute care or home with appropriate resources  Description: INTERVENTIONS:  - Conduct assessment to determine patient/family and health care team treatment goals, and need for post-acute services based on payer coverage, community resources, and patient preferences, and barriers to discharge  - Address psychosocial, clinical, and financial barriers to discharge as identified in assessment in conjunction with the patient/family and health care team  - Arrange appropriate level of post-acute services according to patients   needs and preference and payer coverage in collaboration with the physician and health care team  - Communicate with and update the patient/family, physician, and health care team regarding progress on the discharge plan  - Arrange appropriate transportation to post-acute venues  Outcome: Progressing     Problem: NEUROSENSORY - ADULT  Goal: Achieves stable or improved neurological status  Description: INTERVENTIONS  - Monitor and report changes in neurological status  - Monitor vital signs such as temperature, blood pressure, glucose, and any other labs ordered   - Initiate measures to prevent increased intracranial pressure  - Monitor for seizure activity and implement precautions if appropriate      Outcome: Progressing  Goal: Remains free of injury related to seizures activity  Description: INTERVENTIONS  - Maintain airway, patient safety  and administer oxygen as ordered  - Monitor patient for seizure activity, document and report duration and description of seizure to physician/advanced practitioner  - If seizure occurs,  ensure patient safety during seizure  - Reorient patient post seizure  - Seizure pads on all 4 side rails  - Instruct patient/family to notify RN of any seizure activity including if an aura is experienced  - Instruct patient/family to call for assistance with activity based on nursing assessment  - Administer anti-seizure medications if ordered    Outcome: Progressing  Goal: Achieves maximal functionality and self care  Description: INTERVENTIONS  - Monitor swallowing and airway patency with patient fatigue and changes in neurological status  - Encourage and assist patient to increase activity and self care     - Encourage visually impaired, hearing impaired and aphasic patients to use assistive/communication devices  Outcome: Progressing     Problem: Prexisting or High Potential for Compromised Skin Integrity  Goal: Skin integrity is maintained or improved  Description: INTERVENTIONS:  - Identify patients at risk for skin breakdown  - Assess and monitor skin integrity  - Assess and monitor nutrition and hydration status  - Monitor labs   - Assess for incontinence   - Turn and reposition patient  - Assist with mobility/ambulation  - Relieve pressure over bony prominences  - Avoid friction and shearing  - Provide appropriate hygiene as needed including keeping skin clean and dry  - Evaluate need for skin moisturizer/barrier cream  - Collaborate with interdisciplinary team   - Patient/family teaching  - Consider wound care consult   Outcome: Progressing     Problem: Nutrition/Hydration-ADULT  Goal: Nutrient/Hydration intake appropriate for improving, restoring or maintaining nutritional needs  Description: Monitor and assess patient's nutrition/hydration status for malnutrition  Collaborate with interdisciplinary team and initiate plan and interventions as ordered  Monitor patient's weight and dietary intake as ordered or per policy  Utilize nutrition screening tool and intervene as necessary  Determine patient's food preferences and provide high-protein, high-caloric foods as appropriate       INTERVENTIONS:  - Monitor oral intake, urinary output, labs, and treatment plans  - Assess nutrition and hydration status and recommend course of action  - Evaluate amount of meals eaten  - Assist patient with eating if necessary   - Allow adequate time for meals  - Recommend/ encourage appropriate diets, oral nutritional supplements, and vitamin/mineral supplements  - Order, calculate, and assess calorie counts as needed  - Recommend, monitor, and adjust tube feedings and TPN/PPN based on assessed needs  - Assess need for intravenous fluids  - Provide specific nutrition/hydration education as appropriate  - Include patient/family/caregiver in decisions related to nutrition  Outcome: Progressing

## 2022-03-13 NOTE — ASSESSMENT & PLAN NOTE
Wt Readings from Last 3 Encounters:   03/09/22 66 7 kg (147 lb 0 8 oz)   03/09/22 66 7 kg (147 lb 0 8 oz)     ECHO on admission with EF 40 compared to 60 in 08/2021  Patchy hypokinesis throughout however basal/apical/mid-inferior motion reserved  Cards consulted - stress-induced cardiomyopathy  ?alchol contribution  Confirmed not contributing to shock  Today patient short of breath and chest tightness, O2 saturation above 90% on room air, EKG showed sinus tachycardia  Plan:  Offered 1 dose of IV Lasix  Monitor tomorrow's kidney function and electrolyte balance  · Start GDMT today, metoprolol 12 5mg BID  · Prn diuretics to keep euvolemic  · K being repleted to keep >4, Mag 1 8  · Replete K, Mag  · Given adequate time for myopathy to reverse with no improvement, ischemic work up, cards to guide timeline

## 2022-03-13 NOTE — PLAN OF CARE
Problem: Potential for Falls  Goal: Patient will remain free of falls  Description: INTERVENTIONS:  - Educate patient/family on patient safety including physical limitations  - Instruct patient to call for assistance with activity   - Consult OT/PT to assist with strengthening/mobility   - Keep Call bell within reach  - Keep bed low and locked with side rails adjusted as appropriate  - Keep care items and personal belongings within reach  - Initiate and maintain comfort rounds  - Make Fall Risk Sign visible to staff  - Offer Toileting every 2 Hours, in advance of need  - Initiate/Maintain bed alarm  - Obtain necessary fall risk management equipment:   - Apply yellow socks and bracelet for high fall risk patients  - Consider moving patient to room near nurses station  Outcome: Progressing     Problem: MOBILITY - ADULT  Goal: Maintain or return to baseline ADL function  Description: INTERVENTIONS:  -  Assess patient's ability to carry out ADLs; assess patient's baseline for ADL function and identify physical deficits which impact ability to perform ADLs (bathing, care of mouth/teeth, toileting, grooming, dressing, etc )  - Assess/evaluate cause of self-care deficits   - Assess range of motion  - Assess patient's mobility; develop plan if impaired  - Assess patient's need for assistive devices and provide as appropriate  - Encourage maximum independence but intervene and supervise when necessary  - Involve family in performance of ADLs  - Assess for home care needs following discharge   - Consider OT consult to assist with ADL evaluation and planning for discharge  - Provide patient education as appropriate  Outcome: Progressing  Goal: Maintains/Returns to pre admission functional level  Description: INTERVENTIONS:  - Perform BMAT or MOVE assessment daily    - Set and communicate daily mobility goal to care team and patient/family/caregiver     - Collaborate with rehabilitation services on mobility goals if consulted  - Ambulate patient 4 times a day  - Out of bed to chair 4 times a day   - Out of bed for meals 3 times a day  - Out of bed for toileting  - Record patient progress and toleration of activity level   Outcome: Progressing     Problem: PAIN - ADULT  Goal: Verbalizes/displays adequate comfort level or baseline comfort level  Description: Interventions:  - Encourage patient to monitor pain and request assistance  - Assess pain using appropriate pain scale  - Administer analgesics based on type and severity of pain and evaluate response  - Implement non-pharmacological measures as appropriate and evaluate response  - Consider cultural and social influences on pain and pain management  - Notify physician/advanced practitioner if interventions unsuccessful or patient reports new pain  Outcome: Progressing     Problem: INFECTION - ADULT  Goal: Absence or prevention of progression during hospitalization  Description: INTERVENTIONS:  - Assess and monitor for signs and symptoms of infection  - Monitor lab/diagnostic results  - Monitor all insertion sites, i e  indwelling lines, tubes, and drains  - Monitor endotracheal if appropriate and nasal secretions for changes in amount and color  - [place] appropriate cooling/warming therapies per order  - Administer medications as ordered  - Instruct and encourage patient and family to use good hand hygiene technique  - Identify and instruct in appropriate isolation precautions for identified infection/condition  Outcome: Progressing  Goal: Absence of fever/infection during neutropenic period  Description: INTERVENTIONS:  - Monitor WBC    Outcome: Progressing     Problem: SAFETY ADULT  Goal: Patient will remain free of falls  Description: INTERVENTIONS:  - Educate patient/family on patient safety including physical limitations  - Instruct patient to call for assistance with activity   - Consult OT/PT to assist with strengthening/mobility   - Keep Call bell within reach  - Keep bed low and locked with side rails adjusted as appropriate  - Keep care items and personal belongings within reach  - Initiate and maintain comfort rounds  - Make Fall Risk Sign visible to staff  - Offer Toileting every 2 Hours, in advance of need  - Initiate/Maintain bed alarm  - Obtain necessary fall risk management equipment:   - Apply yellow socks and bracelet for high fall risk patients  - Consider moving patient to room near nurses station  Outcome: Progressing  Goal: Maintain or return to baseline ADL function  Description: INTERVENTIONS:  -  Assess patient's ability to carry out ADLs; assess patient's baseline for ADL function and identify physical deficits which impact ability to perform ADLs (bathing, care of mouth/teeth, toileting, grooming, dressing, etc )  - Assess/evaluate cause of self-care deficits   - Assess range of motion  - Assess patient's mobility; develop plan if impaired  - Assess patient's need for assistive devices and provide as appropriate  - Encourage maximum independence but intervene and supervise when necessary  - Involve family in performance of ADLs  - Assess for home care needs following discharge   - Consider OT consult to assist with ADL evaluation and planning for discharge  - Provide patient education as appropriate  Outcome: Progressing  Goal: Maintains/Returns to pre admission functional level  Description: INTERVENTIONS:  - Perform BMAT or MOVE assessment daily    - Set and communicate daily mobility goal to care team and patient/family/caregiver     - Collaborate with rehabilitation services on mobility goals if consulted  - Ambulate patient 4 times a day  - Out of bed to chair 4 times a day   - Out of bed for meals 3 times a day  - Out of bed for toileting  - Record patient progress and toleration of activity level   Outcome: Progressing     Problem: DISCHARGE PLANNING  Goal: Discharge to home or other facility with appropriate resources  Description: INTERVENTIONS:  - Identify barriers to discharge w/patient and caregiver  - Arrange for needed discharge resources and transportation as appropriate  - Identify discharge learning needs (meds, wound care, etc )  - Arrange for interpretive services to assist at discharge as needed  - Refer to Case Management Department for coordinating discharge planning if the patient needs post-hospital services based on physician/advanced practitioner order or complex needs related to functional status, cognitive ability, or social support system  Outcome: Progressing     Problem: Knowledge Deficit  Goal: Patient/family/caregiver demonstrates understanding of disease process, treatment plan, medications, and discharge instructions  Description: Complete learning assessment and assess knowledge base    Interventions:  - Provide teaching at level of understanding  - Provide teaching via preferred learning methods  Outcome: Progressing     Problem: DISCHARGE PLANNING - CARE MANAGEMENT  Goal: Discharge to post-acute care or home with appropriate resources  Description: INTERVENTIONS:  - Conduct assessment to determine patient/family and health care team treatment goals, and need for post-acute services based on payer coverage, community resources, and patient preferences, and barriers to discharge  - Address psychosocial, clinical, and financial barriers to discharge as identified in assessment in conjunction with the patient/family and health care team  - Arrange appropriate level of post-acute services according to patients   needs and preference and payer coverage in collaboration with the physician and health care team  - Communicate with and update the patient/family, physician, and health care team regarding progress on the discharge plan  - Arrange appropriate transportation to post-acute venues  Outcome: Progressing     Problem: NEUROSENSORY - ADULT  Goal: Achieves stable or improved neurological status  Description: INTERVENTIONS  - Monitor and report changes in neurological status  - Monitor vital signs such as temperature, blood pressure, glucose, and any other labs ordered   - Initiate measures to prevent increased intracranial pressure  - Monitor for seizure activity and implement precautions if appropriate      Outcome: Progressing  Goal: Remains free of injury related to seizures activity  Description: INTERVENTIONS  - Maintain airway, patient safety  and administer oxygen as ordered  - Monitor patient for seizure activity, document and report duration and description of seizure to physician/advanced practitioner  - If seizure occurs,  ensure patient safety during seizure  - Reorient patient post seizure  - Seizure pads on all 4 side rails  - Instruct patient/family to notify RN of any seizure activity including if an aura is experienced  - Instruct patient/family to call for assistance with activity based on nursing assessment  - Administer anti-seizure medications if ordered    Outcome: Progressing  Goal: Achieves maximal functionality and self care  Description: INTERVENTIONS  - Monitor swallowing and airway patency with patient fatigue and changes in neurological status  - Encourage and assist patient to increase activity and self care     - Encourage visually impaired, hearing impaired and aphasic patients to use assistive/communication devices  Outcome: Progressing     Problem: Prexisting or High Potential for Compromised Skin Integrity  Goal: Skin integrity is maintained or improved  Description: INTERVENTIONS:  - Identify patients at risk for skin breakdown  - Assess and monitor skin integrity  - Assess and monitor nutrition and hydration status  - Monitor labs   - Assess for incontinence   - Turn and reposition patient  - Assist with mobility/ambulation  - Relieve pressure over bony prominences  - Avoid friction and shearing  - Provide appropriate hygiene as needed including keeping skin clean and dry  - Evaluate need for skin moisturizer/barrier cream  - Collaborate with interdisciplinary team   - Patient/family teaching  - Consider wound care consult   Outcome: Progressing     Problem: Nutrition/Hydration-ADULT  Goal: Nutrient/Hydration intake appropriate for improving, restoring or maintaining nutritional needs  Description: Monitor and assess patient's nutrition/hydration status for malnutrition  Collaborate with interdisciplinary team and initiate plan and interventions as ordered  Monitor patient's weight and dietary intake as ordered or per policy  Utilize nutrition screening tool and intervene as necessary  Determine patient's food preferences and provide high-protein, high-caloric foods as appropriate       INTERVENTIONS:  - Monitor oral intake, urinary output, labs, and treatment plans  - Assess nutrition and hydration status and recommend course of action  - Evaluate amount of meals eaten  - Assist patient with eating if necessary   - Allow adequate time for meals  - Recommend/ encourage appropriate diets, oral nutritional supplements, and vitamin/mineral supplements  - Order, calculate, and assess calorie counts as needed  - Recommend, monitor, and adjust tube feedings and TPN/PPN based on assessed needs  - Assess need for intravenous fluids  - Provide specific nutrition/hydration education as appropriate  - Include patient/family/caregiver in decisions related to nutrition  Outcome: Progressing

## 2022-03-13 NOTE — ASSESSMENT & PLAN NOTE
·  found patient with an open bottle of zoloft next to her before calling EMS  · Mentioned patient had been struggling with alcohol intake reduction and had a heated discussion before leaving  · Discussed with patient by ICU team: stated that she wants to get better and is trying to be more healthy outside the hospital  She has no SI but is unable to recall the events on day of admission  Plan:  ·  reached out to [facility] today  Taken for an that the consult was not in their list   [facility] new consult    · 1:1

## 2022-03-13 NOTE — PROGRESS NOTES
Progress Note - Cardiology   Rebeka Guidry 58 y o  female MRN: [id number]  Unit/Bed#: S -01 Encounter: [id number]    Assessment:  Principal Problem:    Seizure ([facility] [address] )  Active Problems: Thrombocytopenia ([facility] [address] )    Fall from standing    Aspiration pneumonia ([facility] )    Acute systolic congestive heart failure (HCC)    Urine output low    Possible Drug overdose    Acute combined CHF with stress-induced cardiomyopathy in the setting of seizure  Initially, she was hypotensive in the ICU requiring vasopressors, but these have been weaned  She has been started on low-dose beta-blocker  Episode of shortness of breath this morning  She was given IV Lasix  Plan:    Already started responding well to the 40 mg IV Lasix dose  She continues to have any symptoms, she has some mild rales in the left base  She can get another dose 20 mg of IV Lasix later this afternoon  Continue metoprolol as ordered  If blood pressure allows, would escalate the dose little higher  Blood pressure too low for any afterload reduction with ACE-inhibitor/Arb  Given takotsubo cardiomyopathy, preferred to maximize beta-blocker before adding Arb  Require diuretics this morning  Monitor for ongoing need, and she may need a low dose of oral diuretics  Subjective/Objective     Subjective:  Shortness of breath this morning  She was given a dose of IV Lasix  Telemetry has been removed  She is on one-to-one observation for suicidal ideation  She denies any complaints to me      Objective:    Vitals: /76 (BP Location: Left arm)   Pulse (!) 107   Temp 99 9 °F (37 7 °C) (Oral)   Resp 20   Ht 5' 7" (1 702 m)   Wt 66 7 kg (147 lb 0 8 oz)   SpO2 93%   BMI 23 03 kg/m²   Vitals:    03/08/22 0750 03/09/22 0244   Weight: 64 9 kg (143 lb) 66 7 kg (147 lb 0 8 oz)     Orthostatic Blood Pressures      Most Recent Value   Blood Pressure 119/76 filed at 03/13/2022 4142   Patient Position - Orthostatic VS Lying filed at 03/13/2022 9890 Intake/Output Summary (Last 24 hours) at 3/13/2022 1107  Last data filed at 3/13/2022 1054  Gross per 24 hour   Intake 900 ml   Output 3560 ml   Net -2660 ml     Physical Exam:   General appearance: alert and in no acute distress  Head: Normocephalic, without obvious abnormality, atraumatic  Neck: no carotid bruit, no JVD and supple, symmetrical, trachea midline  Lungs: rales L base  Heart: S1, S2 regular, tachycardic  Abdomen: soft, non-tender; bowel sounds normal; no masses,  no organomegaly  Extremities: extremities normal, atraumatic, no cyanosis or edema  Pulses: 2+ and symmetric bilaterally  Skin: Skin color, texture, turgor normal  No rashes or lesions  Neurologic: Grossly normal  Alert and oriented      Medications:    Current Facility-Administered Medications:     acetaminophen (TYLENOL) tablet 650 mg, 650 mg, Oral, Q4H PRN, Jennie Pack MD, 650 mg at 03/12/22 1802    albuterol (PROVENTIL HFA,VENTOLIN HFA) inhaler 2 puff, 2 puff, Inhalation, Q4H PRN, Xiomara Begum MD    chlorhexidine (PERIDEX) 0 12 % oral rinse 15 mL, 15 mL, Mouth/Throat, Q12H [address], Elvira Guzman MD, 15 mL at 35/00/54 2255    folic acid (FOLVITE) tablet 1 mg, 1 mg, Oral, Daily, Elvira Guzman MD, 1 mg at 03/13/22 0808    guaiFENesin (MUCINEX) 12 hr tablet 600 mg, 600 mg, Oral, Q12H [address], Jennie Pack MD, 600 mg at 03/13/22 0809    lacosamide (VIMPAT) tablet 100 mg, 100 mg, Oral, Q12H [address], Jennie Pack MD, 100 mg at 03/13/22 0809    levalbuterol (Geovanni Tillman) inhalation solution 1 25 mg, 1 25 mg, Nebulization, TID, Isaiah Blanco MD, 1 25 mg at 03/13/22 0806    levETIRAcetam (KEPPRA) tablet 250 mg, 250 mg, Oral, Q12H [address], Jennie Pack MD, 250 mg at 03/13/22 0809    LORazepam (ATIVAN) injection 2 mg, 2 mg, Intravenous, Q1H PRN, Elvira Guzman MD, 2 mg at 03/08/22 0424    melatonin tablet 3 mg, 3 mg, Oral, HS, Jennie Pack MD, 3 mg at 03/12/22 2123    metoprolol tartrate (LOPRESSOR) partial tablet 12 5 mg, 12 5 mg, Oral, Q12H [address], Mariano Tomlinson MD, 12 5 mg at 03/13/22 0808    multivitamin-minerals (CENTRUM) tablet 1 tablet, 1 tablet, Oral, Daily, Mariano Tomlinson MD, 1 tablet at 03/13/22 0809    pantoprazole (PROTONIX) EC tablet 40 mg, 40 mg, Oral, Early Morning, Jennie Pack MD, 40 mg at 03/13/22 0507    thiamine tablet 100 mg, 100 mg, Oral, Daily, Mariano Tomlinson MD, 100 mg at 03/13/22 0313    Lab Results:  Results from last 7 days   Lab Units 03/07/22  1826   CK TOTAL U/L 126     Results from last 7 days   Lab Units 03/13/22  0537 03/12/22  0533 03/11/22  0443   WBC Thousand/uL 5 04 4 07* 4 45   HEMOGLOBIN g/dL 9 1* 8 9* 8 3*   HEMATOCRIT % 27 4* 26 4* 25 7*   PLATELETS Thousands/uL 40* 34* 31*         Results from last 7 days   Lab Units 03/13/22  0537 03/12/22  0533 03/11/22  0441 03/10/22  0433 03/09/22  0429 03/08/22  1131 03/08/22  0448 03/08/22  0029 03/08/22  0029 03/07/22  1722 03/07/22  1713   SODIUM mmol/L 143 145 145   < > 138   < > 136   < > 139   < >  --    POTASSIUM mmol/L 3 7 3 4* 3 4*   < > 3 6   < > 3 1*   < > 3 5   < >  --    CHLORIDE mmol/L 110* 111* 112*   < > 108   < > 101   < > 103   < >  --    CO2 mmol/L 22 20* 25   < > 20*   < > 23   < > 23   < >  --    CO2, I-STAT mmol/L  --   --   --   --   --   --   --   --   --   --  20*   BUN mg/dL 4* 5 6   < > 5   < > 6   < > 6   < >  --    CREATININE mg/dL 0 58* 0 55* 0 54*   < > 0 57*   < > 0 72   < > 0 74   < >  --    CALCIUM mg/dL 8 7 8 6 8 2*   < > 7 6*   < > 7 4*   < > 7 4*   < >  --    ALK PHOS U/L  --   --   --   --  114  --  144*  --  160*  --   --    ALT U/L  --   --   --   --  58  --  85*  --  92*  --   --    AST U/L  --   --   --   --  103*  --  129*  --  148*  --   --    GLUCOSE, ISTAT mg/dl  --   --   --   --   --   --   --   --   --   --  355*    < > = values in this interval not displayed       Results from last 7 days   Lab Units 03/07/22  1826   INR  1 16   PTT seconds 27  27     Results from last 7 days   Lab Units 03/11/22  0441 03/10/22  0433 03/09/22  0429   MAGNESIUM mg/dL 1 8 1 7 1 8       Telemetry: No tele  Echo 3/8/22:  Left Ventricle: Left ventricular cavity size is normal  The left ventricular ejection fraction is 40%  Systolic function is mildly reduced  There is hypokinesis of the mid-anterior, mid-anteroseptal, mid-anterolateral, mid-inferoseptal and mid-inferior  There appears to be preservation of apical, basal and mid inferolateral segments    Right Ventricle: Right ventricular cavity size is normal  Systolic function is normal      In the absence of epicardial coronary disease, consider Takotsubo cardiomyopathy mid-ventricular type

## 2022-03-13 NOTE — PLAN OF CARE
Problem: Potential for Falls  Goal: Patient will remain free of falls  Description: INTERVENTIONS:  - Educate patient/family on patient safety including physical limitations  - Instruct patient to call for assistance with activity   - Consult OT/PT to assist with strengthening/mobility   - Keep Call bell within reach  - Keep bed low and locked with side rails adjusted as appropriate  - Keep care items and personal belongings within reach  - Initiate and maintain comfort rounds  - Make Fall Risk Sign visible to staff  - Offer Toileting every  Hours, in advance of need  - Initiate/Maintain alarm  - Obtain necessary fall risk management equipment:   - Apply yellow socks and bracelet for high fall risk patients  - Consider moving patient to room near nurses station  Outcome: Progressing     Problem: MOBILITY - ADULT  Goal: Maintain or return to baseline ADL function  Description: INTERVENTIONS:  -  Assess patient's ability to carry out ADLs; assess patient's baseline for ADL function and identify physical deficits which impact ability to perform ADLs (bathing, care of mouth/teeth, toileting, grooming, dressing, etc )  - Assess/evaluate cause of self-care deficits   - Assess range of motion  - Assess patient's mobility; develop plan if impaired  - Assess patient's need for assistive devices and provide as appropriate  - Encourage maximum independence but intervene and supervise when necessary  - Involve family in performance of ADLs  - Assess for home care needs following discharge   - Consider OT consult to assist with ADL evaluation and planning for discharge  - Provide patient education as appropriate  Outcome: Progressing  Goal: Maintains/Returns to pre admission functional level  Description: INTERVENTIONS:  - Perform BMAT or MOVE assessment daily    - Set and communicate daily mobility goal to care team and patient/family/caregiver     - Collaborate with rehabilitation services on mobility goals if consulted  - Perform Range of Motion  times a day  - Reposition patient every  hours    - Dangle patient  times a day  - Stand patient  times a day  - Ambulate patient  times a day  - Out of bed to chair  times a day   - Out of bed for meals  times a day  - Out of bed for toileting  - Record patient progress and toleration of activity level   Outcome: Progressing     Problem: PAIN - ADULT  Goal: Verbalizes/displays adequate comfort level or baseline comfort level  Description: Interventions:  - Encourage patient to monitor pain and request assistance  - Assess pain using appropriate pain scale  - Administer analgesics based on type and severity of pain and evaluate response  - Implement non-pharmacological measures as appropriate and evaluate response  - Consider cultural and social influences on pain and pain management  - Notify physician/advanced practitioner if interventions unsuccessful or patient reports new pain  Outcome: Progressing     Problem: INFECTION - ADULT  Goal: Absence or prevention of progression during hospitalization  Description: INTERVENTIONS:  - Assess and monitor for signs and symptoms of infection  - Monitor lab/diagnostic results  - Monitor all insertion sites, i e  indwelling lines, tubes, and drains  - Monitor endotracheal if appropriate and nasal secretions for changes in amount and color  - [place] appropriate cooling/warming therapies per order  - Administer medications as ordered  - Instruct and encourage patient and family to use good hand hygiene technique  - Identify and instruct in appropriate isolation precautions for identified infection/condition  Outcome: Progressing  Goal: Absence of fever/infection during neutropenic period  Description: INTERVENTIONS:  - Monitor WBC    Outcome: Progressing     Problem: SAFETY ADULT  Goal: Patient will remain free of falls  Description: INTERVENTIONS:  - Educate patient/family on patient safety including physical limitations  - Instruct patient to call for assistance with activity   - Consult OT/PT to assist with strengthening/mobility   - Keep Call bell within reach  - Keep bed low and locked with side rails adjusted as appropriate  - Keep care items and personal belongings within reach  - Initiate and maintain comfort rounds  - Make Fall Risk Sign visible to staff  - Offer Toileting every  Hours, in advance of need  - Initiate/Maintain alarm  - Obtain necessary fall risk management equipment:   - Apply yellow socks and bracelet for high fall risk patients  - Consider moving patient to room near nurses station  Outcome: Progressing  Goal: Maintain or return to baseline ADL function  Description: INTERVENTIONS:  -  Assess patient's ability to carry out ADLs; assess patient's baseline for ADL function and identify physical deficits which impact ability to perform ADLs (bathing, care of mouth/teeth, toileting, grooming, dressing, etc )  - Assess/evaluate cause of self-care deficits   - Assess range of motion  - Assess patient's mobility; develop plan if impaired  - Assess patient's need for assistive devices and provide as appropriate  - Encourage maximum independence but intervene and supervise when necessary  - Involve family in performance of ADLs  - Assess for home care needs following discharge   - Consider OT consult to assist with ADL evaluation and planning for discharge  - Provide patient education as appropriate  Outcome: Progressing  Goal: Maintains/Returns to pre admission functional level  Description: INTERVENTIONS:  - Perform BMAT or MOVE assessment daily    - Set and communicate daily mobility goal to care team and patient/family/caregiver  - Collaborate with rehabilitation services on mobility goals if consulted  - Perform Range of Motion  times a day  - Reposition patient every  hours    - Dangle patient  times a day  - Stand patient  times a day  - Ambulate patient  times a day  - Out of bed to chair  times a day   - Out of bed for meal times a day  - Out of bed for toileting  - Record patient progress and toleration of activity level   Outcome: Progressing     Problem: DISCHARGE PLANNING  Goal: Discharge to home or other facility with appropriate resources  Description: INTERVENTIONS:  - Identify barriers to discharge w/patient and caregiver  - Arrange for needed discharge resources and transportation as appropriate  - Identify discharge learning needs (meds, wound care, etc )  - Arrange for interpretive services to assist at discharge as needed  - Refer to Case Management Department for coordinating discharge planning if the patient needs post-hospital services based on physician/advanced practitioner order or complex needs related to functional status, cognitive ability, or social support system  Outcome: Progressing     Problem: Knowledge Deficit  Goal: Patient/family/caregiver demonstrates understanding of disease process, treatment plan, medications, and discharge instructions  Description: Complete learning assessment and assess knowledge base    Interventions:  - Provide teaching at level of understanding  - Provide teaching via preferred learning methods  Outcome: Progressing     Problem: DISCHARGE PLANNING - CARE MANAGEMENT  Goal: Discharge to post-acute care or home with appropriate resources  Description: INTERVENTIONS:  - Conduct assessment to determine patient/family and health care team treatment goals, and need for post-acute services based on payer coverage, community resources, and patient preferences, and barriers to discharge  - Address psychosocial, clinical, and financial barriers to discharge as identified in assessment in conjunction with the patient/family and health care team  - Arrange appropriate level of post-acute services according to patients   needs and preference and payer coverage in collaboration with the physician and health care team  - Communicate with and update the patient/family, physician, and health care team regarding progress on the discharge plan  - Arrange appropriate transportation to post-acute venues  Outcome: Progressing     Problem: NEUROSENSORY - ADULT  Goal: Achieves stable or improved neurological status  Description: INTERVENTIONS  - Monitor and report changes in neurological status  - Monitor vital signs such as temperature, blood pressure, glucose, and any other labs ordered   - Initiate measures to prevent increased intracranial pressure  - Monitor for seizure activity and implement precautions if appropriate      Outcome: Progressing  Goal: Remains free of injury related to seizures activity  Description: INTERVENTIONS  - Maintain airway, patient safety  and administer oxygen as ordered  - Monitor patient for seizure activity, document and report duration and description of seizure to physician/advanced practitioner  - If seizure occurs,  ensure patient safety during seizure  - Reorient patient post seizure  - Seizure pads on all 4 side rails  - Instruct patient/family to notify RN of any seizure activity including if an aura is experienced  - Instruct patient/family to call for assistance with activity based on nursing assessment  - Administer anti-seizure medications if ordered    Outcome: Progressing  Goal: Achieves maximal functionality and self care  Description: INTERVENTIONS  - Monitor swallowing and airway patency with patient fatigue and changes in neurological status  - Encourage and assist patient to increase activity and self care     - Encourage visually impaired, hearing impaired and aphasic patients to use assistive/communication devices  Outcome: Progressing     Problem: Prexisting or High Potential for Compromised Skin Integrity  Goal: Skin integrity is maintained or improved  Description: INTERVENTIONS:  - Identify patients at risk for skin breakdown  - Assess and monitor skin integrity  - Assess and monitor nutrition and hydration status  - Monitor labs   - Assess for incontinence   - Turn and reposition patient  - Assist with mobility/ambulation  - Relieve pressure over bony prominences  - Avoid friction and shearing  - Provide appropriate hygiene as needed including keeping skin clean and dry  - Evaluate need for skin moisturizer/barrier cream  - Collaborate with interdisciplinary team   - Patient/family teaching  - Consider wound care consult   Outcome: Progressing     Problem: Nutrition/Hydration-ADULT  Goal: Nutrient/Hydration intake appropriate for improving, restoring or maintaining nutritional needs  Description: Monitor and assess patient's nutrition/hydration status for malnutrition  Collaborate with interdisciplinary team and initiate plan and interventions as ordered  Monitor patient's weight and dietary intake as ordered or per policy  Utilize nutrition screening tool and intervene as necessary  Determine patient's food preferences and provide high-protein, high-caloric foods as appropriate       INTERVENTIONS:  - Monitor oral intake, urinary output, labs, and treatment plans  - Assess nutrition and hydration status and recommend course of action  - Evaluate amount of meals eaten  - Assist patient with eating if necessary   - Allow adequate time for meals  - Recommend/ encourage appropriate diets, oral nutritional supplements, and vitamin/mineral supplements  - Order, calculate, and assess calorie counts as needed  - Recommend, monitor, and adjust tube feedings and TPN/PPN based on assessed needs  - Assess need for intravenous fluids  - Provide specific nutrition/hydration education as appropriate  - Include patient/family/caregiver in decisions related to nutrition  Outcome: Progressing

## 2022-03-13 NOTE — PROGRESS NOTES
[facility]  Progress Note - Lavonne Cage 1960, 58 y o  female MRN: [id number]  Unit/Bed#: S -01 Encounter: [id number]  Primary Care Provider: Lakisha Ley MD   Date and time admitted to hospital: 3/7/2022  5:03 PM    * Seizure [facility])  Assessment & Plan  · Arrived after unwitnessed seizure like activity, tongue laceration, left hand movement  · No h/o seizure however had ICH requiring seizure ppx in 08/2021  · [facility] unremarkable, Labs with acidosis   · 3 electrographic seizures noted on vEEG, last seizure noted was around 6:45 am 03/08  · clonic activity of left arm and roving eye movements  · started on lacosamide, levetiracem, fentanyl, propofol       · No new seizures since 03/08 6:45am    · Per neuro, originating from left temporal lobe, etiology unknown but considering alcohol withdrawal  · Mentation improved  · MRI- Focal diffusion-weighted and FLAIR signal hyperintensity involving the left hippocampal formation is consistent with status epilepticus  Plan:  · Continue  Keppra d/t thrombocytopenia appears somewhat improved  · Continue Vimpat 100 mg q 12H  · In case of more seizure-like activity, reach out to on-call neurologist   Patient's Vimpat dosage may be escalated  · Psych consult pending  Possible Drug overdose  Assessment & Plan  ·  found patient with an open bottle of zoloft next to her before calling EMS  · Mentioned patient had been struggling with alcohol intake reduction and had a heated discussion before leaving  · Discussed with patient by ICU team: stated that she wants to get better and is trying to be more healthy outside the hospital  She has no SI but is unable to recall the events on day of admission  Plan:  ·  reached out to [facility] today  Taken for an that the consult was not in their list   Nikhil Alba a new consult    · 1:1    Urine output low  Assessment & Plan  Initial thought was shock state however no impact on renal function on labwork    Plan:  · Monitor I/Os  · Monitor BMP    Acute systolic congestive heart failure (HCC)  Assessment & Plan  Wt Readings from Last 3 Encounters:   03/09/22 66 7 kg (147 lb 0 8 oz)   03/09/22 66 7 kg (147 lb 0 8 oz)     ECHO on admission with EF 40 compared to 60 in 08/2021  Patchy hypokinesis throughout however basal/apical/mid-inferior motion reserved  Cards consulted - stress-induced cardiomyopathy  ?alchol contribution  Confirmed not contributing to shock  Today patient short of breath and chest tightness, O2 saturation above 90% on room air, EKG showed sinus tachycardia  Plan:  Offered 1 dose of IV Lasix  Monitor tomorrow's kidney function and electrolyte balance  · Start GDMT today, metoprolol 12 5mg BID  · Prn diuretics to keep euvolemic  · K being repleted to keep >4, Mag 1 8  · Replete K, Mag  · Given adequate time for myopathy to reverse with no improvement, ischemic work up, cards to guide timeline  Aspiration pneumonia [facility])  Assessment & Plan  CXR initially with clear lungs  Repeat Imaging with prominent RLL consolidation  Started on cefepime->switched to unasyn, completed 6 total days of antibiotics  DC'd given negative procal on 3/12 labwork  On 03/12 exam, lung CTA b/l  Plan:  · Xopenex  · Monitor fever curves, CBC      Fall from standing  Assessment & Plan  · Patient reports severe pain in the right arm 2/2 a fall as well as upper midback pain with evidence of an area of ecchymosis in the left interscapular region  · She also has significant difficulty with abduction of her right arm with tenderness along the anterior and superior aspect of the shoulder  · X-Ray, trauma series: No displaced fractures  Foreshortening of the distal right clavicle likely postoperative in origin  · Repeat x-ray of the shoulder Old right clavicle fracture deformity with superior displacement of the clavicle  Calcific tendinosis  No lytic or blastic osseous lesion    Plan:  · Supportive care  · PT/OT: Post acute rehabilitation services    Thrombocytopenia ([facility] [address] )  Assessment & Plan  Likely 2/2 alcohol intake, baseline around 50  Saw hemonc calls in EMR, never established care from my understanding  Stable around 30; improved slightly after reducing keppra  Plan:  Continue to monitor  NO BLOOD PRODUCTS - Yazidi          VTE Pharmacologic Prophylaxis: VTE Score: 2 Low Risk (Score 0-2) - Encourage Ambulation  Patient Centered Rounds: I performed bedside rounds with nursing staff today  Discussions with Specialists or Other Care Team Provider:  Followed neurology recommendation    Education and Discussions with Family / Patient: Updated  () via phone  Current Length of Stay: 6 day(s)  Current Patient Status: Inpatient   Discharge Plan: Anticipate discharge in 24-48 hrs to Patient still to decide  Code Status: Level 1 - Full Code    Subjective: This morning patient reported feeling short of breath, chest tightness  On auscultation there were notice some crackles  Was ordered 1 dose of Lasix  EKG was done and shows sinus tachycardia  Called a nurse in the afternoon and she mentioned that the patient feels well  Patient also complain it in the morning of shoulder pain  Last shoulder x-ray did not show any acute abnormalities  Objective:     Vitals:   Temp (24hrs), Av 1 °F (37 3 °C), Min:98 5 °F (36 9 °C), Max:99 9 °F (37 7 °C)    Temp:  [98 5 °F (36 9 °C)-99 9 °F (37 7 °C)] 99 9 °F (37 7 °C)  HR:  [106-111] 107  Resp:  [18-20] 20  BP: (103-119)/(61-76) 119/76  SpO2:  [90 %-95 %] 93 %  Body mass index is 23 03 kg/m²  Input and Output Summary (last 24 hours): Intake/Output Summary (Last 24 hours) at 3/13/2022 1500  Last data filed at 3/13/2022 1357  Gross per 24 hour   Intake 1100 ml   Output 4040 ml   Net -2940 ml       Physical Exam:   Physical Exam  Vitals and nursing note reviewed     Constitutional:       General: She is not in acute distress  Appearance: Normal appearance  She is well-developed  HENT:      Head: Normocephalic and atraumatic  Eyes:      Conjunctiva/sclera: Conjunctivae normal    Cardiovascular:      Rate and Rhythm: Regular rhythm  Tachycardia present  Heart sounds: No murmur heard  Pulmonary:      Effort: Pulmonary effort is normal  No respiratory distress  Breath sounds: Normal breath sounds  Abdominal:      Palpations: Abdomen is soft  Tenderness: There is no abdominal tenderness  Musculoskeletal:      Cervical back: Neck supple  Right lower leg: Edema (Trace edema) present  Left lower leg: Edema (Trace edema) present  Skin:     General: Skin is warm and dry  Neurological:      General: No focal deficit present  Mental Status: She is alert and oriented to person, place, and time  Mental status is at baseline  Psychiatric:         Mood and Affect: Mood normal          Behavior: Behavior normal          Thought Content: Thought content normal          Judgment: Judgment normal           Additional Data:     Labs:  Results from last 7 days   Lab Units 03/13/22  0537 03/10/22  0433 03/09/22  0429   WBC Thousand/uL 5 04   < > 8 68   HEMOGLOBIN g/dL 9 1*   < > 8 9*   HEMATOCRIT % 27 4*   < > 26 1*   PLATELETS Thousands/uL 40*   < > 29*   BANDS PCT %  --   --  6   NEUTROS PCT % 50   < >  --    LYMPHS PCT % 27   < >  --    LYMPHO PCT %  --   --  17   MONOS PCT % 20*   < >  --    MONO PCT %  --   --  8   EOS PCT % 2   < > 0    < > = values in this interval not displayed       Results from last 7 days   Lab Units 03/13/22 0537 03/10/22  0433 03/09/22  0429   SODIUM mmol/L 143   < > 138   POTASSIUM mmol/L 3 7   < > 3 6   CHLORIDE mmol/L 110*   < > 108   CO2 mmol/L 22   < > 20*   BUN mg/dL 4*   < > 5   CREATININE mg/dL 0 58*   < > 0 57*   ANION GAP mmol/L 11   < > 10   CALCIUM mg/dL 8 7   < > 7 6*   ALBUMIN g/dL  --   --  2 2*   TOTAL BILIRUBIN mg/dL  --   --  0 91   ALK PHOS U/L  -- --  114   ALT U/L  --   --  58   AST U/L  --   --  103*   GLUCOSE RANDOM mg/dL 119   < > 133    < > = values in this interval not displayed  Results from last 7 days   Lab Units 03/07/22  1826   INR  1 16     Results from last 7 days   Lab Units 03/11/22  0759 03/10/22  2057 03/10/22  1704 03/10/22  1104 03/10/22  0741 03/09/22  2123 03/09/22  1757 03/09/22  1122 03/09/22  0552 03/08/22  2359 03/08/22  1806 03/08/22  1133   POC GLUCOSE mg/dl 96 112 151* 127 134 137 113 120 129 143* 160* 158*     Results from last 7 days   Lab Units 03/09/22  0429   HEMOGLOBIN A1C % 4 7     Results from last 7 days   Lab Units 03/13/22  0537 03/12/22  0534 03/08/22  0447 03/07/22  2307 03/07/22  2113 03/07/22  1826   LACTIC ACID mmol/L  --   --   --  1 7 2 8* 8 9*   PROCALCITONIN ng/ml 0 15 0 21 1 49*  --   --   --        Lines/Drains:  Invasive Devices  Report    Peripheral Intravenous Line            Peripheral IV 03/12/22 Right Hand <1 day                      Imaging: Reviewed radiology reports from this admission including: chest xray, CT head, MRI brain, ultrasound(s) and Shoulder x-ray, CT spine  Recent Cultures (last 7 days):   Results from last 7 days   Lab Units 03/08/22  0132   BLOOD CULTURE  No Growth After 5 Days  No Growth After 5 Days         Last 24 Hours Medication List:   Current Facility-Administered Medications   Medication Dose Route Frequency Provider Last Rate    acetaminophen  650 mg Oral Q4H PRN Nohemy Quach MD      albuterol  2 puff Inhalation Q4H PRN Evelin Simon MD      chlorhexidine  15 mL Mouth/Throat Q12H [address] Jennie Pack MD      folic acid  1 mg Oral Daily Nohemy Quach MD      guaiFENesin  600 mg Oral Q12H [address] Nohemy Quach MD      lacosamide  100 mg Oral Q12H [address] Jennie Pack MD      levalbuterol  1 25 mg Nebulization TID Sindi Garcia MD      levETIRAcetam  250 mg Oral Q12H [address] Jennie Pack MD      LORazepam  2 mg Intravenous Q1H PRN Jennie Pack, MD      melatonin  3 mg Oral HS Jennie Pack MD      metoprolol tartrate  12 5 mg Oral Q12H [address] Ana Broderick MD      multivitamin-minerals  1 tablet Oral Daily Ana Broderick MD      pantoprazole  40 mg Oral Early Morning Ana Broderick MD      thiamine  100 mg Oral Daily Ana Broderick MD          Today, Patient Was Seen By: Naz Osorio MD    **Please Note: This note may have been constructed using a voice recognition system  **

## 2022-03-14 LAB
ANION GAP SERPL CALCULATED.3IONS-SCNC: 8 MMOL/L (ref 4–13)
BACTERIA BLD CULT: NORMAL
BASOPHILS # BLD AUTO: 0.01 THOUSANDS/ΜL (ref 0–0.1)
BASOPHILS NFR BLD AUTO: 0 % (ref 0–1)
BUN SERPL-MCNC: 3 MG/DL (ref 5–25)
CALCIUM SERPL-MCNC: 8 MG/DL (ref 8.3–10.1)
CHLORIDE SERPL-SCNC: 108 MMOL/L (ref 100–108)
CO2 SERPL-SCNC: 27 MMOL/L (ref 21–32)
CREAT SERPL-MCNC: 0.57 MG/DL (ref 0.6–1.3)
EOSINOPHIL # BLD AUTO: 0.07 THOUSAND/ΜL (ref 0–0.61)
EOSINOPHIL NFR BLD AUTO: 2 % (ref 0–6)
ERYTHROCYTE [DISTWIDTH] IN BLOOD BY AUTOMATED COUNT: 16.7 % (ref 11.6–15.1)
GFR SERPL CREATININE-BSD FRML MDRD: 99 ML/MIN/1.73SQ M
GLUCOSE SERPL-MCNC: 112 MG/DL (ref 65–140)
HCT VFR BLD AUTO: 26.2 % (ref 34.8–46.1)
HGB BLD-MCNC: 8.7 G/DL (ref 11.5–15.4)
IMM GRANULOCYTES # BLD AUTO: 0.03 THOUSAND/UL (ref 0–0.2)
IMM GRANULOCYTES NFR BLD AUTO: 1 % (ref 0–2)
LYMPHOCYTES # BLD AUTO: 1.51 THOUSANDS/ΜL (ref 0.6–4.47)
LYMPHOCYTES NFR BLD AUTO: 49 % (ref 14–44)
MAGNESIUM SERPL-MCNC: 1.3 MG/DL (ref 1.6–2.6)
MCH RBC QN AUTO: 35.4 PG (ref 26.8–34.3)
MCHC RBC AUTO-ENTMCNC: 33.2 G/DL (ref 31.4–37.4)
MCV RBC AUTO: 107 FL (ref 82–98)
MONOCYTES # BLD AUTO: 0.57 THOUSAND/ΜL (ref 0.17–1.22)
MONOCYTES NFR BLD AUTO: 18 % (ref 4–12)
NEUTROPHILS # BLD AUTO: 0.93 THOUSANDS/ΜL (ref 1.85–7.62)
NEUTS SEG NFR BLD AUTO: 30 % (ref 43–75)
NRBC BLD AUTO-RTO: 0 /100 WBCS
PLATELET # BLD AUTO: 49 THOUSANDS/UL (ref 149–390)
PMV BLD AUTO: 10.9 FL (ref 8.9–12.7)
POTASSIUM SERPL-SCNC: 3.3 MMOL/L (ref 3.5–5.3)
RBC # BLD AUTO: 2.46 MILLION/UL (ref 3.81–5.12)
SODIUM SERPL-SCNC: 143 MMOL/L (ref 136–145)
WBC # BLD AUTO: 3.12 THOUSAND/UL (ref 4.31–10.16)

## 2022-03-14 PROCEDURE — 94668 MNPJ CHEST WALL SBSQ: CPT

## 2022-03-14 PROCEDURE — 99232 SBSQ HOSP IP/OBS MODERATE 35: CPT | Performed by: INTERNAL MEDICINE

## 2022-03-14 PROCEDURE — 97116 GAIT TRAINING THERAPY: CPT

## 2022-03-14 PROCEDURE — 94640 AIRWAY INHALATION TREATMENT: CPT

## 2022-03-14 PROCEDURE — 80048 BASIC METABOLIC PNL TOTAL CA: CPT

## 2022-03-14 PROCEDURE — 94760 N-INVAS EAR/PLS OXIMETRY 1: CPT

## 2022-03-14 PROCEDURE — 83735 ASSAY OF MAGNESIUM: CPT

## 2022-03-14 PROCEDURE — 85025 COMPLETE CBC W/AUTO DIFF WBC: CPT

## 2022-03-14 RX ORDER — FUROSEMIDE 10 MG/ML
40 INJECTION INTRAMUSCULAR; INTRAVENOUS ONCE
Status: COMPLETED | OUTPATIENT
Start: 2022-03-14 | End: 2022-03-14

## 2022-03-14 RX ORDER — DESVENLAFAXINE 50 MG/1
50 TABLET, EXTENDED RELEASE ORAL DAILY
Status: DISCONTINUED | OUTPATIENT
Start: 2022-03-14 | End: 2022-03-18 | Stop reason: HOSPADM

## 2022-03-14 RX ORDER — MAGNESIUM SULFATE 1 G/100ML
1 INJECTION INTRAVENOUS ONCE
Status: COMPLETED | OUTPATIENT
Start: 2022-03-14 | End: 2022-03-14

## 2022-03-14 RX ORDER — POTASSIUM CHLORIDE 20 MEQ/1
40 TABLET, EXTENDED RELEASE ORAL ONCE
Status: COMPLETED | OUTPATIENT
Start: 2022-03-14 | End: 2022-03-14

## 2022-03-14 RX ORDER — POTASSIUM CHLORIDE 29.8 MG/ML
40 INJECTION INTRAVENOUS ONCE
Status: DISCONTINUED | OUTPATIENT
Start: 2022-03-14 | End: 2022-03-14

## 2022-03-14 RX ORDER — FUROSEMIDE 20 MG/1
20 TABLET ORAL DAILY
Status: DISCONTINUED | OUTPATIENT
Start: 2022-03-15 | End: 2022-03-18 | Stop reason: HOSPADM

## 2022-03-14 RX ORDER — POTASSIUM CHLORIDE 14.9 MG/ML
20 INJECTION INTRAVENOUS
Status: COMPLETED | OUTPATIENT
Start: 2022-03-14 | End: 2022-03-14

## 2022-03-14 RX ORDER — GABAPENTIN 300 MG/1
300 CAPSULE ORAL 2 TIMES DAILY PRN
Status: DISCONTINUED | OUTPATIENT
Start: 2022-03-14 | End: 2022-03-15

## 2022-03-14 RX ORDER — ACETAMINOPHEN 325 MG/1
975 TABLET ORAL EVERY 8 HOURS SCHEDULED
Status: DISCONTINUED | OUTPATIENT
Start: 2022-03-14 | End: 2022-03-18 | Stop reason: HOSPADM

## 2022-03-14 RX ADMIN — Medication 15 ML: at 21:30

## 2022-03-14 RX ADMIN — FOLIC ACID 1 MG: 1 TABLET ORAL at 08:00

## 2022-03-14 RX ADMIN — FUROSEMIDE 40 MG: 10 INJECTION, SOLUTION INTRAMUSCULAR; INTRAVENOUS at 12:22

## 2022-03-14 RX ADMIN — POTASSIUM CHLORIDE 20 MEQ: 200 INJECTION, SOLUTION INTRAVENOUS at 07:46

## 2022-03-14 RX ADMIN — LACOSAMIDE 100 MG: 100 TABLET, FILM COATED ORAL at 08:00

## 2022-03-14 RX ADMIN — GUAIFENESIN 600 MG: 600 TABLET ORAL at 21:32

## 2022-03-14 RX ADMIN — LEVALBUTEROL HYDROCHLORIDE 1.25 MG: 1.25 SOLUTION RESPIRATORY (INHALATION) at 13:21

## 2022-03-14 RX ADMIN — MULTIPLE VITAMINS W/ MINERALS TAB 1 TABLET: TAB ORAL at 08:00

## 2022-03-14 RX ADMIN — ACETAMINOPHEN 975 MG: 325 TABLET, FILM COATED ORAL at 21:31

## 2022-03-14 RX ADMIN — MAGNESIUM SULFATE HEPTAHYDRATE 1 G: 1 INJECTION, SOLUTION INTRAVENOUS at 16:22

## 2022-03-14 RX ADMIN — LACOSAMIDE 100 MG: 100 TABLET, FILM COATED ORAL at 21:32

## 2022-03-14 RX ADMIN — DICLOFENAC SODIUM 2 G: 10 GEL TOPICAL at 17:19

## 2022-03-14 RX ADMIN — Medication 12.5 MG: at 08:00

## 2022-03-14 RX ADMIN — Medication 3 MG: at 21:32

## 2022-03-14 RX ADMIN — DICLOFENAC SODIUM 2 G: 10 GEL TOPICAL at 21:33

## 2022-03-14 RX ADMIN — LEVALBUTEROL HYDROCHLORIDE 1.25 MG: 1.25 SOLUTION RESPIRATORY (INHALATION) at 19:43

## 2022-03-14 RX ADMIN — LEVALBUTEROL HYDROCHLORIDE 1.25 MG: 1.25 SOLUTION RESPIRATORY (INHALATION) at 07:20

## 2022-03-14 RX ADMIN — POTASSIUM CHLORIDE 40 MEQ: 1500 TABLET, EXTENDED RELEASE ORAL at 07:11

## 2022-03-14 RX ADMIN — DESVENLAFAXINE 50 MG: 50 TABLET, FILM COATED, EXTENDED RELEASE ORAL at 09:55

## 2022-03-14 RX ADMIN — THIAMINE HCL TAB 100 MG 100 MG: 100 TAB at 08:00

## 2022-03-14 RX ADMIN — GUAIFENESIN 600 MG: 600 TABLET ORAL at 08:00

## 2022-03-14 RX ADMIN — DICLOFENAC SODIUM 2 G: 10 GEL TOPICAL at 12:26

## 2022-03-14 RX ADMIN — ACETAMINOPHEN 975 MG: 325 TABLET, FILM COATED ORAL at 14:26

## 2022-03-14 RX ADMIN — Medication 12.5 MG: at 14:26

## 2022-03-14 RX ADMIN — ACETAMINOPHEN 650 MG: 325 TABLET, FILM COATED ORAL at 07:58

## 2022-03-14 RX ADMIN — Medication 15 ML: at 08:00

## 2022-03-14 RX ADMIN — MAGNESIUM SULFATE HEPTAHYDRATE 1 G: 1 INJECTION, SOLUTION INTRAVENOUS at 18:46

## 2022-03-14 RX ADMIN — METOPROLOL TARTRATE 25 MG: 25 TABLET, FILM COATED ORAL at 21:31

## 2022-03-14 RX ADMIN — POTASSIUM CHLORIDE 20 MEQ: 200 INJECTION, SOLUTION INTRAVENOUS at 09:55

## 2022-03-14 RX ADMIN — PANTOPRAZOLE SODIUM 40 MG: 40 TABLET, DELAYED RELEASE ORAL at 05:17

## 2022-03-14 NOTE — ASSESSMENT & PLAN NOTE
·  found patient with an open bottle of zoloft next to her before calling EMS  · Mentioned patient had been struggling with alcohol intake reduction and had a heated discussion before leaving  · Discussed with patient by ICU team: stated that she wants to get better and is trying to be more healthy outside the hospital  She has no SI but is unable to recall the events on day of admission       Plan:  · Psych team evaluated and recommended inpatient psych admission and to start gabapentin and Pristiq  · Recommended inpatient psych admission  · Recommended to start gabapentin and Pristiq  · Since recommended IP psych admission and did not sign 201 with patient, patient will need to be re-assessed as patient is wanting to pursue outpatient psychiatry at this time   · Currently will be re-assessed tomorrow by Psychiatry team  · Continue 1:1

## 2022-03-14 NOTE — ASSESSMENT & PLAN NOTE
· Arrived after unwitnessed seizure like activity, tongue laceration, left hand movement  · No h/o seizure however had ICH requiring seizure ppx in 08/2021  · [facility] unremarkable, Labs with acidosis   · 3 electrographic seizures noted on vEEG, last seizure noted was around 6:45 am 03/08  · clonic activity of left arm and roving eye movements  · started on lacosamide, levetiracem, fentanyl, propofol       · No new seizures since 03/08 6:45am    · Per neuro, originating from left temporal lobe, etiology unknown but considering alcohol withdrawal  · Mentation improved  · MRI- Focal diffusion-weighted and FLAIR signal hyperintensity involving the left hippocampal formation is consistent with status epilepticus  Plan:  · Can be discontinued per neurology's recommendation  · Continue Vimpat 100 mg q 12H and will continue monotherapy at this point  · In case of more seizure-like activity, reach out to on-call neurologist   Patient's Vimpat dosage may be escalated    · Psych consult pending  · Recommended inpatient psych admission  · Recommended to start gabapentin and Pristiq  · Since recommended IP psych admission and did not sign 201 with patient, patient will need to be re-assessed as patient is wanting to pursue outpatient psychiatry at this time   · Currently will be re-assessed tomorrow by Psychiatry team  · Medically stable at this point for discharge; pending psych evaluation

## 2022-03-14 NOTE — CONSULTS
TeleConsultation - [id number] 58 y o  female MRN: [id number]  Unit/Bed#: S -01 Encounter: [id number]        REQUIRED DOCUMENTATION:     1  This service was provided via Telemedicine  2  Provider located at Home  3  TeleMed provider: Nacho Chauhan MD   4  Identify all parties in room with patient during tele consult: Patient   5  Patient was then informed that this was a Telemedicine visit and that the exam was being conducted confidentially over secure lines  My office door was closed  No one else was in the room  Patient acknowledged consent and understanding of privacy and security of the Telemedicine visit, and gave us permission to have the assistant stay in the room in order to assist with the history and to conduct the exam   I informed the patient that I have reviewed their record in Epic and presented the opportunity for them to ask any questions regarding the visit today  The patient agreed to participate  Assessment/Plan     Assessment:  Scotty Fox is a 57 y/o female with PMH significant for Anxiety, CHF, and Alcohol Use Disorder, Severe that presents with CC of Anxiety  Patient is very minimizing of her current alcohol use and adamantly denies any suicide attempt  Patient would benefit from starting an antidepressant such as Pristiq 50 mg qam as SNRIs have more evidence in comorbid alcohol users  Patient could additionally benefit from starting Gabapentin 300 mg BID prn for acute anxiety as this can also be helpful for helping with prolonged alcohol abstinence  Patient denied any history of IP psychiatric admission and histroy/ evidence suggestive of Alcohol Vs OD vs TBI for seizure provocation and therefore would recommend IP psychiatric admission for further stabilization        Plan:   Risks, benefits and possible side effects of Medications:   Risks, benefits, and possible side effects of medications explained to patient and patient verbalizes understanding        -Start Pristiq 50 mg qam    -Start Gabapentin 300 mg BID PRN for acute anxiety    Chief Complaint: " I guess I had a seizure or something"    History of Present Illness     Reason for Consult / Principal Problem: Suicide Attempt     Patient states she had a seizure at home but was found with a bottle of pills by the firefighters but adamantly states that she did not attempt suicide via OD  Patient states that she does deal with anxiety and does realize that she has drank heavily in the past but no longer  Patient states her last drink was several weeks ago a "bloody  beer"  Patient states she had an argument (many) related to her alcohol use  Patient reports a history of IP dextox for alcohol 24 hours and states she had no DTs or Withdrawal Seizures  Patient states that alcohol helps calm her down and the longest she has ever went without alcohol is months  Patient states she was prescribed Sertraline to help with drinking  Inpatient consult to Psychiatry  Consult performed by: Christina Yepez MD  Consult ordered by: Osmar Villagran MD          Psychiatric Review Of Systems:  sleep: no  appetite changes: no  weight changes: no  energy/anergy: no  interest/pleasure/anhedonia: no  somatic symptoms: no  anxiety/panic: no  williams: no  guilty/hopeless: no  self injurious behavior/risky behavior: no    Historical Information   Past Psychiatric History:   None  Currently in treatment with None  Past Suicide attempts: None  Past Violent behavior:     Past Psychiatric medication trial: Sertraline    Substance Abuse History: Denied    Use of Alcohol: heavy how often daily    Longest clean time: months  History of IP/OP rehabilitation program: None  Smoking history: None  Use of Caffeine: denies use    Family Psychiatric History: Denied      Social History  Education: high school diploma/GED  Learning Disabilities: None  Marital history: co-habitating  Living arrangement, social support:  The patient lives in home with significant other  Occupational History: homemaker  Functioning Relationships: good support system  Other Pertinent History: None    Traumatic History:   Abuse: verbal: None  Other Traumatic Events: None    No past medical history on file  Medical Review Of Systems:  Review of Systems    Meds/Allergies   all current active meds have been reviewed  Not on File    Objective   Vital signs in last 24 hours:  Temp:  [98 2 °F (36 8 °C)-99 9 °F (37 7 °C)] 98 2 °F (36 8 °C)  HR:  [] 92  Resp:  [18-20] 20  BP: ()/(61-76) 99/62      Intake/Output Summary (Last 24 hours) at 3/13/2022 2017  Last data filed at 3/13/2022 1357  Gross per 24 hour   Intake 760 ml   Output 4040 ml   Net -3280 ml       Mental Status Evaluation:  Appearance:  age appropriate   Behavior:  normal   Speech:  normal pitch and normal volume   Mood:  normal   Affect:  normal   Language: naming objects   Thought Process:  normal   Thought Content:  normal   Perceptual Disturbances: None   Risk Potential: None   Sensorium:  person, place and time/date   Cognition:  recent and remote memory grossly intact   Consciousness:  alert    Attention: attention span and concentration were age appropriate   Intellect: within normal limits   Fund of Knowledge: awareness of current events: President   Insight:  age appropriate   Judgment: age appropriate   Muscle Strength and Tone: NFT   Gait/Station: normal gait/station   Motor Activity: no abnormal movements     Lab Results: Reviewed  Imaging Studies:  Reviewed  EKG, Pathology, and Other Studies:  Reviewed    Code Status: Level 1 - Full Code  Advance Directive and Living Will:      Power of :    POLST:      Counseling / Coordination of Care  Total floor / unit time spent today 30  minutes  Greater than 50% of total time was spent with the patient and / or family counseling and / or coordination of care   A description of the counseling / coordination of care: Direct Patient Care

## 2022-03-14 NOTE — QUICK NOTE
Patient declined to have resident contact has been/ point of contact as she explained all the information already given to her in regards to her condition/stay

## 2022-03-14 NOTE — UTILIZATION REVIEW
Continued Stay Review    Date: 3/14/2022                          Current Patient Class: inpt  Current Level of Care:  Med surg     HPI:62 y o  female initially admit 3/7/2022 Inpatient to Trauma service for evaluation & treatment of fall from standing, seizure concern for status epilepticus requiring continuous EEG, acute hypoxic respiratory failure, JP, Thrombocytopenia  Presents w a witnessed fall from standing w possible head strike seizure activity  Assessment/Plan:   Provider  No new Seizures since 3/8 6:45 AM  Per Neurology originating from left temporal lobe ; etiology unknown but considering alcohol withdrawal  Mentation improved  Cont Vimpat 100mg q12 HR  & cont monotherapy at this point; if more seizure like activity call on call Neurology   found open bottle Zoloft next to her prior to summoning EMS  Psyche consult pending; recommended Inpatient Psyche; recommended to start gabapentin & Pristiq  Has no SI but unable to recall events day of admit  Cont 1:1 observation  Monitor I/O, BMP  ECHO w EF 40% Cardiology consult w s/p IV Lasix 3/13, start GDMT metoprolol 12 5mg BID  replete & monitor potassium & MAG  Exam sl  SOB 3/13 lungs w rales, states mild SOB, + trace ankle edema; give another IV Lasix 40mg then PO 20 mg daily start 3/15  Needs repeat ECHO OP, ischemic eval if EF remains reduced  Thrombocytopenia: Neuro stopped Keppra;     Vital Signs:   Date/Time Temp Pulse Resp BP MAP (mmHg) SpO2 O2 Device Patient Position - Orthostatic VS   03/14/22 1500 98 °F (36 7 °C) 87 -- 95/52 87 94 % None (Room air) Lying   03/14/22 1322 -- -- -- -- -- 96 % None (Room air) --   03/14/22 1223 -- 98 -- 110/70 -- -- -- --   03/14/22 0720 -- -- -- -- -- 94 % None (Room air) --   03/14/22 0700 98 1 °F (36 7 °C) 102 18 118/66 88 93 % None (Room air) Sitting         Pertinent Labs/Diagnostic Results:   3/13 CXR No acute osseous abnormality     Results from last 7 days   Lab Units 03/08/22  0320   SARS-COV-2  Negative Results from last 7 days   Lab Units 03/14/22 0525 03/13/22 0537 03/12/22 0533 03/11/22  0443 03/11/22  0443 03/10/22  0433 03/10/22  0433 03/09/22  0429 03/09/22  0429   WBC Thousand/uL 3 12* 5 04 4 07*   < > 4 45   < > 6 45   < > 8 68   HEMOGLOBIN g/dL 8 7* 9 1* 8 9*  --  8 3*  --  8 1*   < > 8 9*   HEMATOCRIT % 26 2* 27 4* 26 4*  --  25 7*  --  24 2*   < > 26 1*   PLATELETS Thousands/uL 49* 40* 34*   < > 31*   < > 27*   < > 29*   NEUTROS ABS Thousands/µL 0 93* 2 49  --   --  2 43  --   --   --   --    BANDS PCT %  --   --   --   --   --   --   --   --  6    < > = values in this interval not displayed           Results from last 7 days   Lab Units 03/14/22  0525 03/13/22  0537 03/12/22  0533 03/11/22  0441 03/10/22  0433 03/09/22  0429 03/09/22  0429 03/08/22  1131 03/08/22  1131 03/08/22  0448 03/08/22  0448 03/08/22  0030 03/08/22  0029 03/07/22  1722 03/07/22  1713   SODIUM mmol/L 143 143 145 145 140   < > 138   < > 135*   < > 136  --    < >   < >  --    POTASSIUM mmol/L 3 3* 3 7 3 4* 3 4* 3 6   < > 3 6   < > 3 8   < > 3 1*  --    < >   < >  --    CHLORIDE mmol/L 108 110* 111* 112* 109*   < > 108   < > 102   < > 101  --    < >   < >  --    CO2 mmol/L 27 22 20* 25 21   < > 20*   < > 21   < > 23  --    < >   < >  --    CO2, I-STAT mmol/L  --   --   --   --   --   --   --   --   --   --   --   --   --   --  20*   ANION GAP mmol/L 8 11 14* 8 10   < > 10   < > 12   < > 12  --    < >   < >  --    BUN mg/dL 3* 4* 5 6 7   < > 5   < > 5   < > 6  --    < >   < >  --    CREATININE mg/dL 0 57* 0 58* 0 55* 0 54* 0 59*   < > 0 57*   < > 0 73   < > 0 72  --    < >   < >  --    EGFR ml/min/1 73sq m 99 99 100 101 98   < > 99   < > 88   < > 90  --    < >   < >  --    CALCIUM mg/dL 8 0* 8 7 8 6 8 2* 7 9*   < > 7 6*   < > 7 7*   < > 7 4*  --    < >   < >  --    CALCIUM, IONIZED mmol/L  --   --   --   --   --   --   --   --   --   --  1 03* 0 94*  --   --   --    CALCIUM, IONIZED, ISTAT mmol/L  --   --   --   --   --   -- --   --   --   --   --   --   --   --  1 11*   MAGNESIUM mg/dL  --   --   --  1 8 1 7  --  1 8  --  2 1  --  2 3  --    < >   < >  --    PHOSPHORUS mg/dL  --   --   --  2 4 1 7*  --  2 2*  --  3 1  --  2 9  --    < >   < >  --     < > = values in this interval not displayed       Results from last 7 days   Lab Units 03/09/22  0429 03/08/22  0448 03/08/22  0029   AST U/L 103* 129* 148*   ALT U/L 58 85* 92*   ALK PHOS U/L 114 144* 160*   TOTAL PROTEIN g/dL 5 3* 5 2* 5 5*   ALBUMIN g/dL 2 2* 2 3* 2 6*   TOTAL BILIRUBIN mg/dL 0 91 1 09* 0 58   BILIRUBIN DIRECT mg/dL  --   --  0 26*     Results from last 7 days   Lab Units 03/11/22  0759 03/10/22  2057 03/10/22  1704 03/10/22  1104 03/10/22  0741 03/09/22  2123 03/09/22  1757 03/09/22  1122 03/09/22  0552 03/08/22  2359 03/08/22  1806 03/08/22  1133   POC GLUCOSE mg/dl 96 112 151* 127 134 137 113 120 129 143* 160* 158*     Results from last 7 days   Lab Units 03/14/22  0525 03/13/22  0537 03/12/22  0533 03/11/22  0441 03/10/22  0433 03/09/22  0429 03/08/22  1131 03/08/22  0448 03/08/22  0029 03/07/22  1722   GLUCOSE RANDOM mg/dL 112 119 105 95 125 133 159* 179* 113 350*         Results from last 7 days   Lab Units 03/09/22 0429   HEMOGLOBIN A1C % 4 7   EAG mg/dl 88     BETA-HYDROXYBUTYRATE   Date Value Ref Range Status   03/07/2022 0 2 <0 6 mmol/L Final      Results from last 7 days   Lab Units 03/08/22  0253 03/07/22  1932   PH ART  7 425 7 313*   PCO2 ART mm Hg 34 6* 45 0*   PO2 ART mm Hg 84 1 205 9*   HCO3 ART mmol/L 22 2 22 3   BASE EXC ART mmol/L -1 8 -3 8   O2 CONTENT ART mL/dL 13 4* 15 4*   O2 HGB, ARTERIAL % 95 4 98 4*   ABG SOURCE  Line, Arterial Radial, Right         Results from last 7 days   Lab Units 03/07/22  1713   PH, NATALIA I-STAT  7 206*   PCO2, NATALIA ISTAT mm HG 47 8   PO2, NATALIA ISTAT mm HG 39 0   HCO3, NATALIA ISTAT mmol/L 18 9*   I STAT BASE EXC mmol/L -9*   I STAT O2 SAT % 61     Results from last 7 days   Lab Units 03/07/22  1826   CK TOTAL U/L 126 Results from last 7 days   Lab Units 03/08/22  1340 03/08/22  1131 03/08/22  0936 03/07/22  2306 03/07/22 2113 03/07/22  1826   HS TNI 0HR ng/L  --   --  2,778*  --   --  488*   HS TNI 2HR ng/L  --  2,461*  --   --  2,563*  --    HSTNI D2 ng/L  --  -317  --   --  2,075*  --    HS TNI 4HR ng/L 2,215*  --   --  3,485*  --   --    HSTNI D4 ng/L -563  --   --  2,997*  --   --          Results from last 7 days   Lab Units 03/07/22  1826   PROTIME seconds 14 1  14 8*   INR  1 16   PTT seconds 27  27         Results from last 7 days   Lab Units 03/13/22  0537 03/12/22  0534 03/08/22  0447   PROCALCITONIN ng/ml 0 15 0 21 1 49*     Results from last 7 days   Lab Units 03/07/22  2307 03/07/22 2113 03/07/22  1826   LACTIC ACID mmol/L 1 7 2 8* 8 9*                                         Results from last 7 days   Lab Units 03/08/22  0055   CLARITY UA  Clear   COLOR UA  Yellow   SPEC GRAV UA  1 015   PH UA  5 0   GLUCOSE UA mg/dl 100 (1/10%)*   KETONES UA mg/dl Negative   BLOOD UA  Trace-Intact*   PROTEIN UA mg/dl 30 (1+)*   NITRITE UA  Negative   BILIRUBIN UA  Negative   UROBILINOGEN UA E U /dl 0 2   LEUKOCYTES UA  Negative   WBC UA /hpf None Seen   RBC UA /hpf 0-1   BACTERIA UA /hpf None Seen   EPITHELIAL CELLS WET PREP /hpf Occasional   MUCUS THREADS  Occasional*     Results from last 7 days   Lab Units 03/08/22  0320   INFLUENZA A PCR  Negative   INFLUENZA B PCR  Negative   RSV PCR  Negative         Results from last 7 days   Lab Units 03/07/22  1937   AMPH/METH  Negative   BARBITURATE UR  Negative   BENZODIAZEPINE UR  Negative   COCAINE UR  Negative   METHADONE URINE  Negative   OPIATE UR  Negative   PCP UR  Negative   THC UR  Negative     Results from last 7 days   Lab Units 03/07/22  1826   ETHANOL LVL mg/dL <3   ACETAMINOPHEN LVL ug/mL <2*   SALICYLATE LVL mg/dL <3*                 Results from last 7 days   Lab Units 03/08/22  0132   BLOOD CULTURE  No Growth After 5 Days  No Growth After 5 Days  Medications:   Scheduled Medications:  chlorhexidine, 15 mL, Mouth/Throat, Q12H DESTIN  desvenlafaxine succinate, 50 mg, Oral, Daily  folic acid, 1 mg, Oral, Daily  guaiFENesin, 600 mg, Oral, Q12H DESTIN  lacosamide, 100 mg, Oral, Q12H DESTIN  levalbuterol, 1 25 mg, Nebulization, TID  melatonin, 3 mg, Oral, HS  metoprolol tartrate, 12 5 mg, Oral, Q12H DESTIN  multivitamin-minerals, 1 tablet, Oral, Daily  pantoprazole, 40 mg, Oral, Early Morning  potassium chloride, 20 mEq, Intravenous, Q2H  thiamine, 100 mg, Oral, Daily    IV  furosemide (LASIX) injection 40 mg 3/14 1222  Dose: 40 mg  Freq: Once Route: IV    Continuous IV Infusions:     PRN Meds:  acetaminophen, 650 mg, Oral, Q4H PRN  albuterol, 2 puff, Inhalation, Q4H PRN  gabapentin, 300 mg, Oral, BID PRN  LORazepam, 2 mg, Intravenous, Q1H PRN  Discharge Plan: jane Nichols RN  Network Utilization Review Department  ATTENTION: Please call with any questions or concerns to [phone] and carefully listen to the prompts so that you are directed to the right person  All voicemails are confidential   Suzette Caceres all requests for admission clinical reviews, approved or denied determinations and any other requests to dedicated fax number below belonging to the campus where the patient is receiving treatment   List of dedicated fax numbers for the Facilities:  [address] DENIALS (Administrative/Medical Necessity) [phone]   [address] (Maternity/NICU/Pediatrics) 261 [facility],[address]Th Floor [address]  [address] [facility] [address] [address] [address]  [address] [facility] Talat Benavides [address] [facility] [facility] [phone]

## 2022-03-14 NOTE — PROGRESS NOTES
[facility]  Progress Note - Ayana Flight 1960, 58 y o  female MRN: [id number]  Unit/Bed#: S -01 Encounter: [id number]  Primary Care Provider: Rex Miller MD   Date and time admitted to hospital: 3/7/2022  5:03 PM    * Seizure [facility])  Assessment & Plan  · Arrived after unwitnessed seizure like activity, tongue laceration, left hand movement  · No h/o seizure however had ICH requiring seizure ppx in 08/2021  · [facility] unremarkable, Labs with acidosis   · 3 electrographic seizures noted on vEEG, last seizure noted was around 6:45 am 03/08  · clonic activity of left arm and roving eye movements  · started on lacosamide, levetiracem, fentanyl, propofol       · No new seizures since 03/08 6:45am    · Per neuro, originating from left temporal lobe, etiology unknown but considering alcohol withdrawal  · Mentation improved  · MRI- Focal diffusion-weighted and FLAIR signal hyperintensity involving the left hippocampal formation is consistent with status epilepticus  Plan:  · Can be discontinued per neurology's recommendation  · Continue Vimpat 100 mg q 12H and will continue monotherapy at this point  · In case of more seizure-like activity, reach out to on-call neurologist   Patient's Vimpat dosage may be escalated  · Psych consult pending  · Recommended inpatient psych admission  · Recommended to start gabapentin and Pristiq  · Since recommended IP psych admission and did not sign 201 with patient, patient will need to be re-assessed as patient is wanting to pursue outpatient psychiatry at this time   · Currently will be re-assessed tomorrow by Psychiatry team  · Medically stable at this point for discharge; pending psych evaluation    Possible Drug overdose  Assessment & Plan  ·  found patient with an open bottle of zoloft next to her before calling EMS     · Mentioned patient had been struggling with alcohol intake reduction and had a heated discussion before leaving  · Discussed with patient by ICU team: stated that she wants to get better and is trying to be more healthy outside the hospital  She has no SI but is unable to recall the events on day of admission  Plan:  · Psych team evaluated and recommended inpatient psych admission and to start gabapentin and Pristiq  · Recommended inpatient psych admission  · Recommended to start gabapentin and Pristiq  · Since recommended IP psych admission and did not sign 201 with patient, patient will need to be re-assessed as patient is wanting to pursue outpatient psychiatry at this time   · Currently will be re-assessed tomorrow by Psychiatry team  · Continue 1:1    Acute systolic congestive heart failure ([facility] [address] )  Assessment & Plan  Wt Readings from Last 3 Encounters:   03/09/22 66 7 kg (147 lb 0 8 oz)   03/09/22 66 7 kg (147 lb 0 8 oz)     ECHO on admission with EF 40 compared to 60 in 08/2021  Patchy hypokinesis throughout however basal/apical/mid-inferior motion reserved  Cards consult and is considering stress-induced cardiomyopathy  ? alcohol contribution  Confirmed not contributing to shock  Status post 40 mg of IV Lasix 0 3/13  Exam this a m  Showed no problems with shortness of breath and logical auscultation  Plan:  - Cardiogy following    - gave additional IV lasix 40 mg x 1   - will plan to switch to oral furosemide tomorrow    - Increase BB dose today to 25mg BID    - Transition to long acting metoprolol if she tolerates this from a bp standpoint    · Prn diuretics to keep euvolemic  · K being repleted to keep >4, Mag 1 8  · Replete K, Mag         Thrombocytopenia (HCC)  Assessment & Plan  Likely 2/2 alcohol intake, baseline around 50  - Saw hemonc calls in EMR, has not established care   - Platelets at 49 this AM     Lab Results   Component Value Date    WBC 3 12 (L) 03/14/2022    HGB 8 7 (L) 03/14/2022    HCT 26 2 (L) 03/14/2022     (H) 03/14/2022    PLT 49 (LL) 03/14/2022         Plan:  - Continue to monitor w/ CBC   - discontinued Keppra per neurology  - NO BLOOD PRODUCTS - Baptism      Urine output low  Assessment & Plan  I/Os=  -3520ml and had no urinary complaints     Plan:  · Monitor I/Os  · Monitor BMP and Cr    Aspiration pneumonia ([address] )  Assessment & Plan  CXR initially with clear lungs  Repeat Imaging with prominent RLL consolidation  Started on cefepime->switched to unasyn, completed 6 total days of antibiotics  DC'd given negative procal on 3/12 labwork  Lungs clear to auscultation on 02/14    Plan:  · Xopenex  · Monitor fever curves, CBC      Fall from standing  Assessment & Plan  · Patient reports severe pain in the right arm 2/2 a fall as well as upper midback pain with evidence of an area of ecchymosis in the left interscapular region  · She also has significant difficulty with abduction of her right arm with tenderness along the anterior and superior aspect of the shoulder  · X-Ray, trauma series: No displaced fractures  Foreshortening of the distal right clavicle likely postoperative in origin  · Repeat x-ray of the shoulder Old right clavicle fracture deformity with superior displacement of the clavicle  Calcific tendinosis  No lytic or blastic osseous lesion    Plan:  · Supportive care  · PT/OT recommend home with home health rehab        VTE Pharmacologic Prophylaxis: VTE Score: 2 Low Risk (Score 0-2) - Encourage Ambulation  Patient Centered Rounds: I performed bedside rounds with nursing staff today  Discussions with Specialists or Other Care Team Provider: neurology, psychiatry, nursing    Education and Discussions with Family / Patient: will update in separete note       Current Length of Stay: 7 day(s)  Current Patient Status: Inpatient   Discharge Plan: pending psych eval and if needs IP Psych placement    Code Status: Level 1 - Full Code    Subjective:   Patient had no subjective complaints at this time this morning and was not complaining of any shortness of breath or difficulty breathing  The patient states she is not having any active suicidal ideation, homicidal ideation or having any issues with hallucinations whether her visual, tactile/ auditory    She denies any fevers/chill, chest pain, shortness of breath, abdominal pain, nausea, vomiting, diarrhea, problems urinating, problems  With bowel movements, and is eating/drinking without problem        Objective:     Vitals:   Temp (24hrs), Av 1 °F (36 7 °C), Min:98 °F (36 7 °C), Max:98 1 °F (36 7 °C)    Temp:  [98 °F (36 7 °C)-98 1 °F (36 7 °C)] 98 °F (36 7 °C)  HR:  [] 87  Resp:  [18] 18  BP: ()/(52-70) 95/52  SpO2:  [93 %-96 %] 94 %  Body mass index is 23 03 kg/m²  Input and Output Summary (last 24 hours): Intake/Output Summary (Last 24 hours) at 3/14/2022 1635  Last data filed at 3/14/2022 0925  Gross per 24 hour   Intake 720 ml   Output 1400 ml   Net -680 ml       Physical Exam:   Physical Exam  Vitals and nursing note reviewed  Constitutional:       General: She is not in acute distress  Appearance: She is well-developed  HENT:      Head: Normocephalic and atraumatic  Eyes:      Conjunctiva/sclera: Conjunctivae normal    Cardiovascular:      Rate and Rhythm: Normal rate and regular rhythm  Heart sounds: No murmur heard  Pulmonary:      Effort: Pulmonary effort is normal  No respiratory distress  Breath sounds: Normal breath sounds  Abdominal:      Palpations: Abdomen is soft  Tenderness: There is no abdominal tenderness  Musculoskeletal:      Cervical back: Neck supple  Skin:     General: Skin is warm and dry  Neurological:      Mental Status: She is alert  Psychiatric:         Mood and Affect: Mood normal          Behavior: Behavior normal          Thought Content:  Thought content normal          Judgment: Judgment normal       Comments: No thoughts of homicidal or suicidal ideation   No hallucinations (visual, auditory, or tactile)          Additional Data:     Labs:  Results from last 7 days   Lab Units 03/14/22  0525 03/10/22  0433 03/09/22  0429   WBC Thousand/uL 3 12*   < > 8 68   HEMOGLOBIN g/dL 8 7*   < > 8 9*   HEMATOCRIT % 26 2*   < > 26 1*   PLATELETS Thousands/uL 49*   < > 29*   BANDS PCT %  --   --  6   NEUTROS PCT % 30*   < >  --    LYMPHS PCT % 49*   < >  --    LYMPHO PCT %  --   --  17   MONOS PCT % 18*   < >  --    MONO PCT %  --   --  8   EOS PCT % 2   < > 0    < > = values in this interval not displayed  Results from last 7 days   Lab Units 03/14/22  0525 03/10/22  0433 03/09/22  0429   SODIUM mmol/L 143   < > 138   POTASSIUM mmol/L 3 3*   < > 3 6   CHLORIDE mmol/L 108   < > 108   CO2 mmol/L 27   < > 20*   BUN mg/dL 3*   < > 5   CREATININE mg/dL 0 57*   < > 0 57*   ANION GAP mmol/L 8   < > 10   CALCIUM mg/dL 8 0*   < > 7 6*   ALBUMIN g/dL  --   --  2 2*   TOTAL BILIRUBIN mg/dL  --   --  0 91   ALK PHOS U/L  --   --  114   ALT U/L  --   --  58   AST U/L  --   --  103*   GLUCOSE RANDOM mg/dL 112   < > 133    < > = values in this interval not displayed       Results from last 7 days   Lab Units 03/07/22  1826   INR  1 16     Results from last 7 days   Lab Units 03/11/22  0759 03/10/22  2057 03/10/22  1704 03/10/22  1104 03/10/22  0741 03/09/22  2123 03/09/22  1757 03/09/22  1122 03/09/22  0552 03/08/22  2359 03/08/22  1806 03/08/22  1133   POC GLUCOSE mg/dl 96 112 151* 127 134 137 113 120 129 143* 160* 158*     Results from last 7 days   Lab Units 03/09/22  0429   HEMOGLOBIN A1C % 4 7     Results from last 7 days   Lab Units 03/13/22  0537 03/12/22  0534 03/08/22  0447 03/07/22  2307 03/07/22  2113 03/07/22  1826   LACTIC ACID mmol/L  --   --   --  1 7 2 8* 8 9*   PROCALCITONIN ng/ml 0 15 0 21 1 49*  --   --   --        Lines/Drains:  Invasive Devices  Report    Peripheral Intravenous Line            Peripheral IV 03/12/22 Right Hand 1 day                      Imaging: Reviewed radiology reports from this admission including: chest xray    Recent Cultures (last 7 days):   Results from last 7 days   Lab Units 03/08/22  0132   BLOOD CULTURE  No Growth After 5 Days  No Growth After 5 Days  Last 24 Hours Medication List:   Current Facility-Administered Medications   Medication Dose Route Frequency Provider Last Rate    acetaminophen  975 mg Oral Q8H [address] Adriana Jacob MD      albuterol  2 puff Inhalation Q4H PRN Levi Euceda MD      chlorhexidine  15 mL Mouth/Throat Q12H [address] Jayde Vital MD      desvenlafaxine succinate  50 mg Oral Daily [facility] [place], [facility]      Diclofenac Sodium  2 g Topical 4x Daily Adriana Jacob MD      folic acid  1 mg Oral Daily Jayde Vital MD      [START ON 3/15/2022] furosemide  20 mg Oral Daily PAZ Pabon      gabapentin  300 mg Oral BID PRN [facility] [place], DO      guaiFENesin  600 mg Oral Q12H [address] Jayde Vital MD      lacosamide  100 mg Oral Q12H [address] Jayde Vital MD      levalbuterol  1 25 mg Nebulization TID Pedrito Elias MD      LORazepam  2 mg Intravenous Q1H PRN Jayde Vital MD      magnesium sulfate  1 g Intravenous Once [employer], DO      melatonin  3 mg Oral HS Jayde Vital MD      metoprolol tartrate  25 mg Oral Q12H [address] PAZ Pabon      multivitamin-minerals  1 tablet Oral Daily Jayde Vital MD      pantoprazole  40 mg Oral Early Morning Jayde Vital MD      thiamine  100 mg Oral Daily Jayde Vital MD          Today, Patient Was Seen By: [employer], DO    **Please Note: This note may have been constructed using a voice recognition system  **

## 2022-03-14 NOTE — PROGRESS NOTES
General Cardiology   Progress Note -  Team One   Soren Addison 58 y o  female MRN: [id number]    Unit/Bed#: S -01 Encounter: [id number]    Assessment/ Plan:    1  Seizure with status epilepticus: defer management to neurology  2  Cardiomyopathy: likely stress induced in setting of seizure/status epilepticus  EF 40%  GDMT: metoprolol tartrate 12 5mg BID: BP was limiting dose; currently 11/65; will increase to 25mg BID; plan to transition to tartrate prior to discharge  No ACE/ARB at this time due to borderline BPs; will titrate BB first    See # 3 for volume management  Will need repeat echo as OP; ischemic eval if EF remains reduced  3  Acute combined CHF: She has SOB yesterday; given lasix 40mg IV x1 with good response  - negative 3 5L past 24 hours but overall still +8 3L  - cr stable 0 57; K 3 3 getting PO and IV replacement  Still feeling a bit SOB today; has rales on exam; not requiring O2  Will give another dose of 40mg IV lasix today; start PO 20mg daily in AM    4  Alcohol use disorder: evaluated by psych; note reports alcohol use; I did not discuss this with patient  5  Possible drug overdose: bottle of zoloft was found next to pt prior to admission; on continuous observation  6  Thrombocytopenia: baseline 50; possible from ETOH; neuro stopped Keppra  Plt 49 today; improving  Will follow up with [facility] at [facility] office upon discharge  Subjective:  Patient seen for follow-up  No significant events overnight  She was given a dose of IV Lasix history of reports significant amount of urine output and some improvement in her breathing  She still feels like she has chest congestion and difficulty taking a deep breath  No chest pain or pressure, or palpitations  Review of Systems   Constitutional: Negative for decreased appetite and fever  Cardiovascular: Negative for chest pain, irregular heartbeat, leg swelling, orthopnea and palpitations     Respiratory: Positive for shortness of breath  Negative for cough and sleep disturbances due to breathing  Gastrointestinal: Negative for nausea and vomiting  Genitourinary: Negative for dysuria  Neurological: Negative for dizziness and light-headedness  Psychiatric/Behavioral: Negative for altered mental status  All other systems reviewed and are negative  Objective:   Vitals: Blood pressure 118/66, pulse 102, temperature 98 1 °F (36 7 °C), temperature source Oral, resp  rate 18, height 5' 7" (1 702 m), weight 66 7 kg (147 lb 0 8 oz), SpO2 94 %  ,     Body mass index is 23 03 kg/m²  ,     Systolic (55LHT), JASON:783 , Min:99 , CNH:191     Diastolic (64TUG), EXY:43, Min:62, Max:66      Intake/Output Summary (Last 24 hours) at 3/14/2022 1205  Last data filed at 3/14/2022 0925  Gross per 24 hour   Intake 920 ml   Output 1750 ml   Net -830 ml     Weight (last 2 days)     None        Telemetry Review:   No telemetry    Physical Exam  Vitals reviewed  Constitutional:       General: She is not in acute distress  Appearance: Normal appearance  HENT:      Head: Normocephalic  Mouth/Throat:      Mouth: Mucous membranes are moist    Cardiovascular:      Rate and Rhythm: Normal rate and regular rhythm  Heart sounds: S1 normal and S2 normal  No murmur heard  Comments: Trace ankle edema  Pulmonary:      Effort: Pulmonary effort is normal       Breath sounds: Rales (Bibasilar rales) present  Comments: On room air  Abdominal:      Palpations: Abdomen is soft  Musculoskeletal:      Right lower leg: Edema present  Left lower leg: Edema present  Skin:     General: Skin is warm  Neurological:      Mental Status: She is alert  Mental status is at baseline     Psychiatric:         Mood and Affect: Mood normal        LABORATORY RESULTS  Results from last 7 days   Lab Units 03/07/22  1826   CK TOTAL U/L 126     CBC with diff:   Results from last 7 days   Lab Units 03/14/22  0525 03/13/22  0537 03/12/22  0533 03/11/22  0445 03/10/22  0433 03/09/22  0429 03/08/22  0448 03/07/22  1826 03/07/22  1722   WBC Thousand/uL 3 12* 5 04 4 07* 4 45 6 45 8 68 7 40   < > 8 47   HEMOGLOBIN g/dL 8 7* 9 1* 8 9* 8 3* 8 1* 8 9* 9 1*   < > 11 2*   HEMATOCRIT % 26 2* 27 4* 26 4* 25 7* 24 2* 26 1* 26 9*   < > 35 5   MCV fL 107* 106* 105* 107* 103* 102* 101*   < > 108*   PLATELETS Thousands/uL 49* 40* 34* 31* 27* 29* 35*   < > 49*   MCH pg 35 4* 35 1* 35 3* 34 6* 34 5* 34 9* 34 1   < > 34 1   MCHC g/dL 33 2 33 2 33 7 32 3 33 5 34 1 33 8   < > 31 5   RDW % 16 7* 16 6* 15 9* 15 7* 14 6 14 3 14 5   < > 14 4   MPV fL 10 9 11 0 10 6 11 1 11 9 12 1 11 0   < > 11 3   NRBC AUTO /100 WBCs 0 0  --  0  --   --  0  --  1    < > = values in this interval not displayed  CMP:  Results from last 7 days   Lab Units 03/14/22  0525 03/13/22  0537 03/12/22  0533 03/11/22  0441 03/10/22  0433 03/09/22  0429 03/08/22  1131 03/08/22  0448 03/08/22 0448 03/08/22  0029 03/08/22  0029 03/07/22  1722 03/07/22  1713   POTASSIUM mmol/L 3 3* 3 7 3 4* 3 4* 3 6 3 6 3 8   < > 3 1*   < > 3 5   < >  --    CHLORIDE mmol/L 108 110* 111* 112* 109* 108 102   < > 101   < > 103   < >  --    CO2 mmol/L 27 22 20* 25 21 20* 21   < > 23   < > 23   < >  --    CO2, I-STAT mmol/L  --   --   --   --   --   --   --   --   --   --   --   --  20*   BUN mg/dL 3* 4* 5 6 7 5 5   < > 6   < > 6   < >  --    CREATININE mg/dL 0 57* 0 58* 0 55* 0 54* 0 59* 0 57* 0 73   < > 0 72   < > 0 74   < >  --    GLUCOSE, ISTAT mg/dl  --   --   --   --   --   --   --   --   --   --   --   --  355*   CALCIUM mg/dL 8 0* 8 7 8 6 8 2* 7 9* 7 6* 7 7*   < > 7 4*   < > 7 4*   < >  --    AST U/L  --   --   --   --   --  103*  --   --  129*  --  148*  --   --    ALT U/L  --   --   --   --   --  58  --   --  85*  --  92*  --   --    ALK PHOS U/L  --   --   --   --   --  114  --   --  144*  --  160*  --   --    EGFR ml/min/1 73sq m 99 99 100 101 98 99 88   < > 90   < > 87   < >  --     < > = values in this interval not displayed  BMP:  Results from last 7 days   Lab Units 03/14/22  0525 03/13/22  0537 03/12/22  0533 03/11/22  0441 03/10/22  0433 03/09/22  0429 03/08/22  1131 03/07/22  1722 03/07/22  1713   POTASSIUM mmol/L 3 3* 3 7 3 4* 3 4* 3 6 3 6 3 8   < >  --    CHLORIDE mmol/L 108 110* 111* 112* 109* 108 102   < >  --    CO2 mmol/L 27 22 20* 25 21 20* 21   < >  --    CO2, I-STAT mmol/L  --   --   --   --   --   --   --   --  20*   BUN mg/dL 3* 4* 5 6 7 5 5   < >  --    CREATININE mg/dL 0 57* 0 58* 0 55* 0 54* 0 59* 0 57* 0 73   < >  --    GLUCOSE, ISTAT mg/dl  --   --   --   --   --   --   --   --  355*   CALCIUM mg/dL 8 0* 8 7 8 6 8 2* 7 9* 7 6* 7 7*   < >  --     < > = values in this interval not displayed         No results found for: NTBNP          Results from last 7 days   Lab Units 03/14/22  0525 03/11/22  0441 03/10/22  0433 03/09/22  0429 03/08/22  1131 03/08/22  0448 03/08/22  0029   MAGNESIUM mg/dL 1 3* 1 8 1 7 1 8 2 1 2 3 2 2          Results from last 7 days   Lab Units 03/09/22  0429   HEMOGLOBIN A1C % 4 7              Results from last 7 days   Lab Units 03/07/22  1826   INR  1 16     Meds/Allergies   current meds:   Current Facility-Administered Medications   Medication Dose Route Frequency    acetaminophen (TYLENOL) tablet 975 mg  975 mg Oral Q8H [address]    albuterol (PROVENTIL HFA,VENTOLIN HFA) inhaler 2 puff  2 puff Inhalation Q4H PRN    chlorhexidine (PERIDEX) 0 12 % oral rinse 15 mL  15 mL Mouth/Throat Q12H [address]    desvenlafaxine succinate (PRISTIQ) 24 hr tablet 50 mg  50 mg Oral Daily    Diclofenac Sodium (VOLTAREN) 1 % topical gel 2 g  2 g Topical 4x Daily    folic acid (FOLVITE) tablet 1 mg  1 mg Oral Daily    furosemide (LASIX) injection 40 mg  40 mg Intravenous Once    [START ON 3/15/2022] furosemide (LASIX) tablet 20 mg  20 mg Oral Daily    gabapentin (NEURONTIN) capsule 300 mg  300 mg Oral BID PRN    guaiFENesin (MUCINEX) 12 hr tablet 600 mg  600 mg Oral Q12H DESTIN    lacosamide (VIMPAT) tablet 100 mg  100 mg Oral Q12H [address]    levalbuterol (XOPENEX) inhalation solution 1 25 mg  1 25 mg Nebulization TID    LORazepam (ATIVAN) injection 2 mg  2 mg Intravenous Q1H PRN    melatonin tablet 3 mg  3 mg Oral HS    metoprolol tartrate (LOPRESSOR) partial tablet 12 5 mg  12 5 mg Oral Q12H [address]    multivitamin-minerals (CENTRUM) tablet 1 tablet  1 tablet Oral Daily    pantoprazole (PROTONIX) EC tablet 40 mg  40 mg Oral Early Morning    thiamine tablet 100 mg  100 mg Oral Daily     No medications prior to admission  Assessment:  Principal Problem:    Seizure ([facility] [address] )  Active Problems: Thrombocytopenia ([facility] [address] )    Fall from standing    Aspiration pneumonia ([facility] [address] )    Acute systolic congestive heart failure ([facility] [address] )    Urine output low    Possible Drug overdose    Counseling / Coordination of Care  Total floor / unit time spent today 20 minutes  Greater than 50% of total time was spent with the patient and / or family counseling and / or coordination of care  ** Please Note: Dragon 360 Dictation voice to text software may have been used in the creation of this document   **

## 2022-03-14 NOTE — PLAN OF CARE
Problem: Potential for Falls  Goal: Patient will remain free of falls  Description: INTERVENTIONS:  - Educate patient/family on patient safety including physical limitations  - Instruct patient to call for assistance with activity   - Consult OT/PT to assist with strengthening/mobility   - Keep Call bell within reach  - Keep bed low and locked with side rails adjusted as appropriate  - Keep care items and personal belongings within reach  - Initiate and maintain comfort rounds  - Make Fall Risk Sign visible to staff  - Offer Toileting every 2 Hours, in advance of need  - Initiate/Maintain bed alarm  - Obtain necessary fall risk management equipment:   - Apply yellow socks and bracelet for high fall risk patients  - Consider moving patient to room near nurses station  Outcome: Progressing     Problem: MOBILITY - ADULT  Goal: Maintain or return to baseline ADL function  Description: INTERVENTIONS:  -  Assess patient's ability to carry out ADLs; assess patient's baseline for ADL function and identify physical deficits which impact ability to perform ADLs (bathing, care of mouth/teeth, toileting, grooming, dressing, etc )  - Assess/evaluate cause of self-care deficits   - Assess range of motion  - Assess patient's mobility; develop plan if impaired  - Assess patient's need for assistive devices and provide as appropriate  - Encourage maximum independence but intervene and supervise when necessary  - Involve family in performance of ADLs  - Assess for home care needs following discharge   - Consider OT consult to assist with ADL evaluation and planning for discharge  - Provide patient education as appropriate  Outcome: Progressing  Goal: Maintains/Returns to pre admission functional level  Description: INTERVENTIONS:  - Perform BMAT or MOVE assessment daily    - Set and communicate daily mobility goal to care team and patient/family/caregiver     - Collaborate with rehabilitation services on mobility goals if consulted  - Ambulate patient 4 times a day  - Out of bed to chair 4 times a day   - Out of bed for meals 3 times a day  - Out of bed for toileting  - Record patient progress and toleration of activity level   Outcome: Progressing     Problem: PAIN - ADULT  Goal: Verbalizes/displays adequate comfort level or baseline comfort level  Description: Interventions:  - Encourage patient to monitor pain and request assistance  - Assess pain using appropriate pain scale  - Administer analgesics based on type and severity of pain and evaluate response  - Implement non-pharmacological measures as appropriate and evaluate response  - Consider cultural and social influences on pain and pain management  - Notify physician/advanced practitioner if interventions unsuccessful or patient reports new pain  Outcome: Progressing     Problem: INFECTION - ADULT  Goal: Absence or prevention of progression during hospitalization  Description: INTERVENTIONS:  - Assess and monitor for signs and symptoms of infection  - Monitor lab/diagnostic results  - Monitor all insertion sites, i e  indwelling lines, tubes, and drains  - Monitor endotracheal if appropriate and nasal secretions for changes in amount and color  - [place] appropriate cooling/warming therapies per order  - Administer medications as ordered  - Instruct and encourage patient and family to use good hand hygiene technique  - Identify and instruct in appropriate isolation precautions for identified infection/condition  Outcome: Progressing  Goal: Absence of fever/infection during neutropenic period  Description: INTERVENTIONS:  - Monitor WBC    Outcome: Progressing     Problem: SAFETY ADULT  Goal: Patient will remain free of falls  Description: INTERVENTIONS:  - Educate patient/family on patient safety including physical limitations  - Instruct patient to call for assistance with activity   - Consult OT/PT to assist with strengthening/mobility   - Keep Call bell within reach  - Keep bed low and locked with side rails adjusted as appropriate  - Keep care items and personal belongings within reach  - Initiate and maintain comfort rounds  - Make Fall Risk Sign visible to staff  - Offer Toileting every 2 Hours, in advance of need  - Initiate/Maintain bed alarm  - Obtain necessary fall risk management equipment:   - Apply yellow socks and bracelet for high fall risk patients  - Consider moving patient to room near nurses station  Outcome: Progressing  Goal: Maintain or return to baseline ADL function  Description: INTERVENTIONS:  -  Assess patient's ability to carry out ADLs; assess patient's baseline for ADL function and identify physical deficits which impact ability to perform ADLs (bathing, care of mouth/teeth, toileting, grooming, dressing, etc )  - Assess/evaluate cause of self-care deficits   - Assess range of motion  - Assess patient's mobility; develop plan if impaired  - Assess patient's need for assistive devices and provide as appropriate  - Encourage maximum independence but intervene and supervise when necessary  - Involve family in performance of ADLs  - Assess for home care needs following discharge   - Consider OT consult to assist with ADL evaluation and planning for discharge  - Provide patient education as appropriate  Outcome: Progressing  Goal: Maintains/Returns to pre admission functional level  Description: INTERVENTIONS:  - Perform BMAT or MOVE assessment daily    - Set and communicate daily mobility goal to care team and patient/family/caregiver     - Collaborate with rehabilitation services on mobility goals if consulted  - Ambulate patient 4 times a day  - Out of bed to chair 4 times a day   - Out of bed for meals 3 times a day  - Out of bed for toileting  - Record patient progress and toleration of activity level   Outcome: Progressing     Problem: DISCHARGE PLANNING  Goal: Discharge to home or other facility with appropriate resources  Description: INTERVENTIONS:  - Identify barriers to discharge w/patient and caregiver  - Arrange for needed discharge resources and transportation as appropriate  - Identify discharge learning needs (meds, wound care, etc )  - Arrange for interpretive services to assist at discharge as needed  - Refer to Case Management Department for coordinating discharge planning if the patient needs post-hospital services based on physician/advanced practitioner order or complex needs related to functional status, cognitive ability, or social support system  Outcome: Progressing     Problem: Knowledge Deficit  Goal: Patient/family/caregiver demonstrates understanding of disease process, treatment plan, medications, and discharge instructions  Description: Complete learning assessment and assess knowledge base    Interventions:  - Provide teaching at level of understanding  - Provide teaching via preferred learning methods  Outcome: Progressing     Problem: DISCHARGE PLANNING - CARE MANAGEMENT  Goal: Discharge to post-acute care or home with appropriate resources  Description: INTERVENTIONS:  - Conduct assessment to determine patient/family and health care team treatment goals, and need for post-acute services based on payer coverage, community resources, and patient preferences, and barriers to discharge  - Address psychosocial, clinical, and financial barriers to discharge as identified in assessment in conjunction with the patient/family and health care team  - Arrange appropriate level of post-acute services according to patients   needs and preference and payer coverage in collaboration with the physician and health care team  - Communicate with and update the patient/family, physician, and health care team regarding progress on the discharge plan  - Arrange appropriate transportation to post-acute venues  Outcome: Progressing     Problem: NEUROSENSORY - ADULT  Goal: Achieves stable or improved neurological status  Description: INTERVENTIONS  - Monitor and report changes in neurological status  - Monitor vital signs such as temperature, blood pressure, glucose, and any other labs ordered   - Initiate measures to prevent increased intracranial pressure  - Monitor for seizure activity and implement precautions if appropriate      Outcome: Progressing  Goal: Remains free of injury related to seizures activity  Description: INTERVENTIONS  - Maintain airway, patient safety  and administer oxygen as ordered  - Monitor patient for seizure activity, document and report duration and description of seizure to physician/advanced practitioner  - If seizure occurs,  ensure patient safety during seizure  - Reorient patient post seizure  - Seizure pads on all 4 side rails  - Instruct patient/family to notify RN of any seizure activity including if an aura is experienced  - Instruct patient/family to call for assistance with activity based on nursing assessment  - Administer anti-seizure medications if ordered    Outcome: Progressing  Goal: Achieves maximal functionality and self care  Description: INTERVENTIONS  - Monitor swallowing and airway patency with patient fatigue and changes in neurological status  - Encourage and assist patient to increase activity and self care     - Encourage visually impaired, hearing impaired and aphasic patients to use assistive/communication devices  Outcome: Progressing     Problem: Prexisting or High Potential for Compromised Skin Integrity  Goal: Skin integrity is maintained or improved  Description: INTERVENTIONS:  - Identify patients at risk for skin breakdown  - Assess and monitor skin integrity  - Assess and monitor nutrition and hydration status  - Monitor labs   - Assess for incontinence   - Turn and reposition patient  - Assist with mobility/ambulation  - Relieve pressure over bony prominences  - Avoid friction and shearing  - Provide appropriate hygiene as needed including keeping skin clean and dry  - Evaluate need for skin moisturizer/barrier cream  - Collaborate with interdisciplinary team   - Patient/family teaching  - Consider wound care consult   Outcome: Progressing     Problem: Nutrition/Hydration-ADULT  Goal: Nutrient/Hydration intake appropriate for improving, restoring or maintaining nutritional needs  Description: Monitor and assess patient's nutrition/hydration status for malnutrition  Collaborate with interdisciplinary team and initiate plan and interventions as ordered  Monitor patient's weight and dietary intake as ordered or per policy  Utilize nutrition screening tool and intervene as necessary  Determine patient's food preferences and provide high-protein, high-caloric foods as appropriate       INTERVENTIONS:  - Monitor oral intake, urinary output, labs, and treatment plans  - Assess nutrition and hydration status and recommend course of action  - Evaluate amount of meals eaten  - Assist patient with eating if necessary   - Allow adequate time for meals  - Recommend/ encourage appropriate diets, oral nutritional supplements, and vitamin/mineral supplements  - Order, calculate, and assess calorie counts as needed  - Recommend, monitor, and adjust tube feedings and TPN/PPN based on assessed needs  - Assess need for intravenous fluids  - Provide specific nutrition/hydration education as appropriate  - Include patient/family/caregiver in decisions related to nutrition  Outcome: Progressing

## 2022-03-14 NOTE — ASSESSMENT & PLAN NOTE
Likely 2/2 alcohol intake, baseline around 50  - Saw hemonc calls in EMR, has not established care   - Platelets at 49 this AM     Lab Results   Component Value Date    WBC 3 12 (L) 03/14/2022    HGB 8 7 (L) 03/14/2022    HCT 26 2 (L) 03/14/2022     (H) 03/14/2022    PLT 49 (LL) 03/14/2022         Plan:  - Continue to monitor w/ CBC   - discontinued Keppra per neurology  - NO BLOOD PRODUCTS - [employer]

## 2022-03-14 NOTE — ASSESSMENT & PLAN NOTE
Wt Readings from Last 3 Encounters:   03/09/22 66 7 kg (147 lb 0 8 oz)   03/09/22 66 7 kg (147 lb 0 8 oz)     ECHO on admission with EF 40 compared to 60 in 08/2021  Patchy hypokinesis throughout however basal/apical/mid-inferior motion reserved  Cards consult and is considering stress-induced cardiomyopathy  ? alcohol contribution  Confirmed not contributing to shock  Status post 40 mg of IV Lasix 0 3/13  Exam this a m  Showed no problems with shortness of breath and logical auscultation  Plan:  - Cardiogy following    - gave additional IV lasix 40 mg x 1   - will plan to switch to oral furosemide tomorrow    - Increase BB dose today to 25mg BID    - Transition to long acting metoprolol if she tolerates this from a bp standpoint    · Prn diuretics to keep euvolemic  · K being repleted to keep >4, Mag 1 8  · Replete K, Mag

## 2022-03-14 NOTE — ASSESSMENT & PLAN NOTE
CXR initially with clear lungs  Repeat Imaging with prominent RLL consolidation  Started on cefepime->switched to unasyn, completed 6 total days of antibiotics  DC'd given negative procal on 3/12 labwork      Lungs clear to auscultation on 02/14    Plan:  · Xopenex  · Monitor fever curves, CBC

## 2022-03-14 NOTE — PLAN OF CARE
Problem: PHYSICAL THERAPY ADULT  Goal: Performs mobility at highest level of function for planned discharge setting  See evaluation for individualized goals  Description: Treatment/Interventions: Functional transfer training,LE strengthening/ROM,Therapeutic exercise,Endurance training,Cognitive reorientation,Patient/family training,Equipment eval/education,Bed mobility,Gait training  Equipment Recommended: Mat Prader     See flowsheet documentation for full assessment, interventions and recommendations  Outcome: Progressing  Note: Prognosis: Good  Problem List: Decreased strength,Decreased endurance,Impaired balance,Decreased mobility,Decreased safety awareness  Assessment: Pt seen for PT treatment today with focus on gait training and improved balance  Pt presents semi-supine in bed, denies pain and is agreeable to participate in session  Pt states that she has been able to walk to the bathroom without AD, with 1:1 or nurse supervision  Pt completes all transfers t/o session with S, ambulates 96ft x1 and 112ft x1 with no AD and S  Pt c/o mild SOB with ambulation however SPO2 monitored and stable at 93%+ on RA, R 106-110bpm  At end of session, pt was left seated EOB with 1:1 supervision present, needs within reach, care returned to RN  Pt making significant progress with functional mobility, anticipate return to home with HHPT and family support, pending psych treatment team recommendations  PT Discharge Recommendation: Home with home health rehabilitation     See flowsheet documentation for full assessment

## 2022-03-14 NOTE — PHYSICAL THERAPY NOTE
PHYSICAL THERAPY TREATMENT    NAME:  Nadege Guerrero  DATE: 03/14/22    AGE:   58 y o  Mrn:   [id number]  Length Of Stay: 7    ADMIT DX:  Encephalopathy acute [G93 40]  Seizure-like activity ([facility] [address] ) [R56 9]  Acute respiratory failure with hypoxia ([facility] [address] ) [J96 01]    No past medical history on file  No past surgical history on file  Performed at least 2 patient identifiers during session: Name and ID bracelet        03/14/22 1505   PT Last Visit   PT Visit Date 03/14/22   Note Type   Note Type Treatment   Pain Assessment   Pain Assessment Tool 0-10   Pain Score No Pain   Effect of Pain on Daily Activities n/a   Hospital Pain Intervention(s) Repositioned; Ambulation/increased activity   Multiple Pain Sites No   Restrictions/Precautions   Weight Bearing Precautions Per Order No   Other Precautions 1:1;Fall Risk;Cognitive   General   Chart Reviewed Yes   Response to Previous Treatment Patient with no complaints from previous session  Family/Caregiver Present No   Cognition   Overall Cognitive Status Impaired   Arousal/Participation Cooperative; Alert   Attention Within functional limits   Orientation Level Oriented X4   Memory Decreased recall of recent events   Following Commands Follows all commands and directions without difficulty   Subjective   Subjective "I'm feeling optimistic that they will let me go home to my , and go to outpatient for psych "   Bed Mobility   Supine to Sit 6  Modified independent   Additional items HOB elevated   Sit to Supine 6  Modified independent   Additional items HOB elevated   Transfers   Sit to Stand 5  Supervision   Additional items Assist x 1;Verbal cues   Stand to Sit 5  Supervision   Additional items Assist x 1;Verbal cues   Stand pivot 5  Supervision   Additional items Assist x 1;Verbal cues   Additional Comments No AD used for transfers  No significant instability upon standing  Ambulation/Elevation   Gait pattern Decreased foot clearance; Inconsistent gabby; Short stride; Step through pattern;Decreased heel strike   Gait Assistance 5  Supervision   Additional items Assist x 1;Verbal cues   Assistive Device None   Distance 96ft x1; 112ft x1 with functional seated rest between trials  Stair Management Assistance Not tested   Balance   Static Sitting Good   Dynamic Sitting Good   Static Standing Fair +   Dynamic Standing Fair   Ambulatory Fair   Endurance Deficit   Endurance Deficit Yes   Activity Tolerance   Activity Tolerance Patient tolerated treatment well   Nurse Made Aware Spoke with KELTON Parker prior to session   Assessment   Prognosis Good   Problem List Decreased strength;Decreased endurance; Impaired balance;Decreased mobility; Decreased safety awareness   Assessment Pt seen for PT treatment today with focus on gait training and improved balance  Pt presents semi-supine in bed, denies pain and is agreeable to participate in session  Pt states that she has been able to walk to the bathroom without AD, with 1:1 or nurse supervision  Pt completes all transfers t/o session with S, ambulates 96ft x1 and 112ft x1 with no AD and S  Pt c/o mild SOB with ambulation however SPO2 monitored and stable at 93%+ on RA, R 106-110bpm  At end of session, pt was left seated EOB with 1:1 supervision present, needs within reach, care returned to RN  Pt making significant progress with functional mobility, anticipate return to home with HHPT and family support, pending psych treatment team recommendations  Goals   Patient Goals go home and work on her garden   STG Expiration Date 03/21/22   Short Term Goal #1 Patient will:  Increase bilateral LE strength 1/2 grade to facilitate independent mobility, Perform all bed mobility tasks w/ supervision to decrease fall risk factors, Perform all transfers w/ supervision to improve independence, Ambulate at least 100 ft  +/- LRAD w/ supervision w/o LOB, Increase all balance 1/2 grade to decrease risk for falls, Complete exercise program independently and Tolerate 3 hr OOB to faciliate upright tolerance   PT Treatment Day 2   Plan   Treatment/Interventions Functional transfer training;LE strengthening/ROM; Therapeutic exercise; Endurance training;Patient/family training;Gait training;Spoke to nursing   Progress Improving as expected   PT Frequency 2-3x/wk   Recommendation   PT Discharge Recommendation Home with home health rehabilitation   AM-PAC Basic Mobility Inpatient   Turning in Bed Without Bedrails 4   Lying on Back to Sitting on Edge of Flat Bed 4   Moving Bed to Chair 3   Standing Up From Chair 3   Walk in Room 3   Climb 3-5 Stairs 3   Basic Mobility Inpatient Raw Score 20   Basic Mobility Standardized Score 43 99   Highest Level Of Mobility   JH-HLM Goal 6: Walk 10 steps or more   JH-HLM Highest Level of Mobility 7: Walk 25 feet or more   JH-HLM Goal Achieved Yes   Education   Education Provided Mobility training   Patient Explanation/teachback used;Demonstrates verbal understanding   End of Consult   Patient Position at End of Consult Seated edge of bed; All needs within reach  (1:1 sitter present)   End of Consult Comments Recommend frequent bouts of short to moderate distances with no AD and RN staffing supervision  Based on patient's [facility] Level of Mobility scores today, patient currently has a goal of JH-HLM Levels: 7: WALK 25 FEET OR MORE, to be completed with RN staffing each shift, in order to improve overall activity tolerance and mobility, combat hospital related deconditioning, and maximize outcomes for d/c from the acute care setting  The patient's AM-PAC Basic Mobility Inpatient Short Form Raw Score is 20  A Raw score of greater than 16 suggests the patient may benefit from discharge to home  Please also refer to the recommendation of the Physical Therapist for safe discharge planning          Rufino Shane, PT, DPT   Available via [employer]  [facility] # [id number]  [place] License - [id number]  8/87/2255

## 2022-03-14 NOTE — CASE MANAGEMENT
Case Management Progress Note    Patient name Ana Nieves  Location S /S -01 MRN [id number]  : 1960 Date 3/14/2022       LOS (days): 7  Geometric Mean LOS (GMLOS) (days): 3 50  Days to GMLOS:-3 4        OBJECTIVE:        Current admission status: Inpatient  Preferred Pharmacy: No Pharmacies Listed  Primary Care Provider: Sera Meraz MD    Primary Insurance: FSI  Secondary Insurance:     PROGRESS NOTE:  Per rounds, pt may need to be 201 but pt requesting to see if she can follow-up outpt instead  Resident to reach out to psy to re-eval for consideration of 201 vs  outpt MH      Therapy has seen pt and new recs for home PT

## 2022-03-14 NOTE — ASSESSMENT & PLAN NOTE
· Patient reports severe pain in the right arm 2/2 a fall as well as upper midback pain with evidence of an area of ecchymosis in the left interscapular region  · She also has significant difficulty with abduction of her right arm with tenderness along the anterior and superior aspect of the shoulder  · X-Ray, trauma series: No displaced fractures  Foreshortening of the distal right clavicle likely postoperative in origin  · Repeat x-ray of the shoulder Old right clavicle fracture deformity with superior displacement of the clavicle  Calcific tendinosis  No lytic or blastic osseous lesion    Plan:  · Supportive care  · PT/OT recommend home with home health rehab

## 2022-03-15 PROBLEM — F41.8 ANXIETY WITH DEPRESSION: Chronic | Status: ACTIVE | Noted: 2022-03-15

## 2022-03-15 PROBLEM — D61.818 PANCYTOPENIA (HCC): Status: ACTIVE | Noted: 2022-03-15

## 2022-03-15 LAB
ANION GAP SERPL CALCULATED.3IONS-SCNC: 8 MMOL/L (ref 4–13)
BUN SERPL-MCNC: 7 MG/DL (ref 5–25)
CALCIUM SERPL-MCNC: 8.3 MG/DL (ref 8.3–10.1)
CHLORIDE SERPL-SCNC: 107 MMOL/L (ref 100–108)
CO2 SERPL-SCNC: 26 MMOL/L (ref 21–32)
CREAT SERPL-MCNC: 0.57 MG/DL (ref 0.6–1.3)
ERYTHROCYTE [DISTWIDTH] IN BLOOD BY AUTOMATED COUNT: 16.7 % (ref 11.6–15.1)
GFR SERPL CREATININE-BSD FRML MDRD: 99 ML/MIN/1.73SQ M
GLUCOSE SERPL-MCNC: 109 MG/DL (ref 65–140)
HCT VFR BLD AUTO: 24.5 % (ref 34.8–46.1)
HGB BLD-MCNC: 8.2 G/DL (ref 11.5–15.4)
MAGNESIUM SERPL-MCNC: 1.7 MG/DL (ref 1.6–2.6)
MCH RBC QN AUTO: 35.3 PG (ref 26.8–34.3)
MCHC RBC AUTO-ENTMCNC: 33.5 G/DL (ref 31.4–37.4)
MCV RBC AUTO: 106 FL (ref 82–98)
PLATELET # BLD AUTO: 51 THOUSANDS/UL (ref 149–390)
PMV BLD AUTO: 10.7 FL (ref 8.9–12.7)
POTASSIUM SERPL-SCNC: 3.8 MMOL/L (ref 3.5–5.3)
RBC # BLD AUTO: 2.32 MILLION/UL (ref 3.81–5.12)
SODIUM SERPL-SCNC: 141 MMOL/L (ref 136–145)
WBC # BLD AUTO: 3 THOUSAND/UL (ref 4.31–10.16)

## 2022-03-15 PROCEDURE — 99232 SBSQ HOSP IP/OBS MODERATE 35: CPT | Performed by: INTERNAL MEDICINE

## 2022-03-15 PROCEDURE — 94668 MNPJ CHEST WALL SBSQ: CPT

## 2022-03-15 PROCEDURE — 94760 N-INVAS EAR/PLS OXIMETRY 1: CPT

## 2022-03-15 PROCEDURE — 83735 ASSAY OF MAGNESIUM: CPT | Performed by: NURSE PRACTITIONER

## 2022-03-15 PROCEDURE — 99232 SBSQ HOSP IP/OBS MODERATE 35: CPT | Performed by: PSYCHIATRY & NEUROLOGY

## 2022-03-15 PROCEDURE — 85027 COMPLETE CBC AUTOMATED: CPT

## 2022-03-15 PROCEDURE — 80048 BASIC METABOLIC PNL TOTAL CA: CPT | Performed by: NURSE PRACTITIONER

## 2022-03-15 PROCEDURE — 94640 AIRWAY INHALATION TREATMENT: CPT

## 2022-03-15 RX ORDER — GABAPENTIN 300 MG/1
300 CAPSULE ORAL 2 TIMES DAILY
Status: DISCONTINUED | OUTPATIENT
Start: 2022-03-15 | End: 2022-03-18 | Stop reason: HOSPADM

## 2022-03-15 RX ORDER — POTASSIUM CHLORIDE 20 MEQ/1
40 TABLET, EXTENDED RELEASE ORAL ONCE
Status: COMPLETED | OUTPATIENT
Start: 2022-03-15 | End: 2022-03-15

## 2022-03-15 RX ORDER — METOPROLOL SUCCINATE 25 MG/1
25 TABLET, EXTENDED RELEASE ORAL 2 TIMES DAILY
Status: DISCONTINUED | OUTPATIENT
Start: 2022-03-15 | End: 2022-03-18 | Stop reason: HOSPADM

## 2022-03-15 RX ADMIN — MULTIPLE VITAMINS W/ MINERALS TAB 1 TABLET: TAB ORAL at 08:26

## 2022-03-15 RX ADMIN — LACOSAMIDE 100 MG: 100 TABLET, FILM COATED ORAL at 21:52

## 2022-03-15 RX ADMIN — DESVENLAFAXINE 50 MG: 50 TABLET, FILM COATED, EXTENDED RELEASE ORAL at 10:34

## 2022-03-15 RX ADMIN — METOPROLOL TARTRATE 25 MG: 25 TABLET, FILM COATED ORAL at 08:26

## 2022-03-15 RX ADMIN — GABAPENTIN 300 MG: 300 CAPSULE ORAL at 21:53

## 2022-03-15 RX ADMIN — DICLOFENAC SODIUM 2 G: 10 GEL TOPICAL at 21:52

## 2022-03-15 RX ADMIN — LEVALBUTEROL HYDROCHLORIDE 1.25 MG: 1.25 SOLUTION RESPIRATORY (INHALATION) at 07:42

## 2022-03-15 RX ADMIN — LACOSAMIDE 100 MG: 100 TABLET, FILM COATED ORAL at 08:26

## 2022-03-15 RX ADMIN — FUROSEMIDE 20 MG: 20 TABLET ORAL at 08:26

## 2022-03-15 RX ADMIN — POTASSIUM CHLORIDE 40 MEQ: 1500 TABLET, EXTENDED RELEASE ORAL at 08:26

## 2022-03-15 RX ADMIN — ACETAMINOPHEN 975 MG: 325 TABLET, FILM COATED ORAL at 15:04

## 2022-03-15 RX ADMIN — GUAIFENESIN 600 MG: 600 TABLET ORAL at 21:52

## 2022-03-15 RX ADMIN — PANTOPRAZOLE SODIUM 40 MG: 40 TABLET, DELAYED RELEASE ORAL at 05:09

## 2022-03-15 RX ADMIN — GABAPENTIN 300 MG: 300 CAPSULE ORAL at 15:05

## 2022-03-15 RX ADMIN — ACETAMINOPHEN 975 MG: 325 TABLET, FILM COATED ORAL at 21:52

## 2022-03-15 RX ADMIN — Medication 3 MG: at 21:52

## 2022-03-15 RX ADMIN — Medication 15 ML: at 10:34

## 2022-03-15 RX ADMIN — ACETAMINOPHEN 975 MG: 325 TABLET, FILM COATED ORAL at 05:09

## 2022-03-15 RX ADMIN — LEVALBUTEROL HYDROCHLORIDE 1.25 MG: 1.25 SOLUTION RESPIRATORY (INHALATION) at 13:31

## 2022-03-15 RX ADMIN — METOPROLOL SUCCINATE 25 MG: 25 TABLET, EXTENDED RELEASE ORAL at 21:52

## 2022-03-15 RX ADMIN — LEVALBUTEROL HYDROCHLORIDE 1.25 MG: 1.25 SOLUTION RESPIRATORY (INHALATION) at 19:50

## 2022-03-15 RX ADMIN — GUAIFENESIN 600 MG: 600 TABLET ORAL at 08:26

## 2022-03-15 RX ADMIN — THIAMINE HCL TAB 100 MG 100 MG: 100 TAB at 08:26

## 2022-03-15 RX ADMIN — DICLOFENAC SODIUM 2 G: 10 GEL TOPICAL at 18:31

## 2022-03-15 RX ADMIN — DICLOFENAC SODIUM 2 G: 10 GEL TOPICAL at 10:34

## 2022-03-15 RX ADMIN — FOLIC ACID 1 MG: 1 TABLET ORAL at 08:26

## 2022-03-15 RX ADMIN — DICLOFENAC SODIUM 2 G: 10 GEL TOPICAL at 15:05

## 2022-03-15 RX ADMIN — Medication 15 ML: at 21:52

## 2022-03-15 NOTE — ASSESSMENT & PLAN NOTE
POA: Arrived after unwitnessed seizure like activity, tongue laceration, left hand movement  · No h/o seizure however had ICH requiring seizure ppx in 08/2021  · [facility] unremarkable, Labs with acidosis   · 3 electrographic seizures noted on vEEG, last seizure noted was around 6:45 am 03/08  · clonic activity of left arm and roving eye movements  · started on lacosamide, levetiracem, fentanyl, propofol       · No new seizures since 03/08 6:45am    · Per neuro, originating from left temporal lobe, etiology unknown but considering alcohol withdrawal  · Mentation improved  · MRI- Focal diffusion-weighted and FLAIR signal hyperintensity involving the left hippocampal formation is consistent with status epilepticus  Plan:  · Continue Vimpat 100 mg q 12H and will continue monotherapy at this point  · In case of more seizure-like activity, reach out to on-call neurologist   Patient's Vimpat dosage may be escalated    · Psych consulted and recommended  · Recommended to continued gabapentin and Pristiq  · Re-assessed 03/15 by Psychiatry team for IP Psych admission and signed 201 and CM aware to start bed search process  · Medically stable at this point for discharge

## 2022-03-15 NOTE — PROGRESS NOTES
General Cardiology   Progress Note -  Team One   Elisabet uHtton 58 y o  female MRN: [id number]    Unit/Bed#: S -01 Encounter: [id number]    Assessment/ Plan:    1  Seizure with status epilepticus: defer management to neurology;   2  Cardiomyopathy: likely stress induced in setting of seizure/status epilepticus  EF 40%  GDMT:  Started on low-dose metoprolol tartrate, blood pressure tolerated titration  Will transition to metoprolol succinate 25 mg b i d  Starting this evening  See # 3 for volume management  Will need repeat echo as OP; ischemic eval if EF remains reduced  3  Acute combined CHF:  Had some shortness of breath and lower extremity edema received 2 doses of 40 mg of IV Lasix over the past 48 hours  She is well compensated exam today; lung sounds are clear very minimal lower extremity edema  She reports a large amount of urine output unfortunately is not documented in I/Os  Creatinine stable at 0 5    Started on 20 mg of Lasix daily today; would continue the same med upon discharge  Has not had daily weights, will order weight today and daily thereafter    4  Alcohol use disorder: evaluated by psych; note reports alcohol use; I did not discuss this with patient  5  Possible drug overdose: bottle of zoloft was found next to pt prior to admission; on continuous observation  6  Thrombocytopenia: baseline 50; possible from ETOH; neuro stopped Keppra  Plt 51 today; improving  Discussed patient's echo results and ongoing management of her cardiomyopathy and congestive heart failure  We reviewed medication, low-sodium diet, watching her weight and signs and symptoms of fluid overload  She will follow-up with nurse practitioner at [facility] office on 3/24/22  Will need outpatient echo to re-evaluate EF, if remains low then will need an ischemic evaluation  Cardiology will sign off, please call with questions or concerns       Subjective: pt seen for follow up; no events overnight   She reports feeling well today psychically  Breathing improving; still doesn't feel like she can take a good deep breath but overall it has improved  LE edema almost gone  No pain  She is feeling very anxious today and describes this as an overwhelming sense of terror; not tied to a particular issue  Review of Systems   Constitutional: Negative for decreased appetite and fever  Cardiovascular: Positive for leg swelling (Improved)  Negative for chest pain, orthopnea and palpitations  Respiratory: Negative for cough, shortness of breath and wheezing  Cant take deep breath   Gastrointestinal: Negative for abdominal pain, nausea and vomiting  Genitourinary: Negative for dysuria  Neurological: Negative for dizziness and light-headedness  Psychiatric/Behavioral: The patient is nervous/anxious  All other systems reviewed and are negative  Objective:   Vitals: Blood pressure 146/85, pulse 93, temperature 98 9 °F (37 2 °C), temperature source Oral, resp  rate 18, height 5' 7" (1 702 m), weight 66 7 kg (147 lb 0 8 oz), SpO2 95 %  ,     Body mass index is 23 03 kg/m²  ,     Systolic (47WFG), FRQ:612 , Min:95 , MTW:874     Diastolic (63ISB), MAS:01, Min:52, Max:85      Intake/Output Summary (Last 24 hours) at 3/15/2022 1148  Last data filed at 3/15/2022 0905  Gross per 24 hour   Intake 120 ml   Output --   Net 120 ml     Weight (last 2 days)     None        Telemetry Review: No significant arrhythmias seen on telemetry review  No telemetry    Physical Exam  Vitals reviewed  Constitutional:       General: She is not in acute distress  Appearance: Normal appearance  She is not ill-appearing  Comments: Patient is sitting in bed NAD alert pleasant cooperative   HENT:      Head: Normocephalic  Mouth/Throat:      Mouth: Mucous membranes are moist    Cardiovascular:      Rate and Rhythm: Normal rate and regular rhythm  Heart sounds: S1 normal and S2 normal  No murmur heard         Comments: Trace bilateral nonpitting edema  Pulmonary:      Effort: Pulmonary effort is normal  No respiratory distress  Breath sounds: Normal breath sounds  No wheezing or rales  Comments: Lung sounds clear to auscultation, on room air; no distress  Musculoskeletal:      Right lower leg: Edema present  Left lower leg: Edema present  Skin:     General: Skin is warm  Neurological:      Mental Status: She is alert and oriented to person, place, and time     Psychiatric:         Mood and Affect: Mood normal        LABORATORY RESULTS      CBC with diff:   Results from last 7 days   Lab Units 03/15/22  0507 03/14/22  0525 03/13/22  0537 03/12/22  0533 03/11/22  0443 03/10/22  0433 03/09/22  0429   WBC Thousand/uL 3 00* 3 12* 5 04 4 07* 4 45 6 45 8 68   HEMOGLOBIN g/dL 8 2* 8 7* 9 1* 8 9* 8 3* 8 1* 8 9*   HEMATOCRIT % 24 5* 26 2* 27 4* 26 4* 25 7* 24 2* 26 1*   MCV fL 106* 107* 106* 105* 107* 103* 102*   PLATELETS Thousands/uL 51* 49* 40* 34* 31* 27* 29*   MCH pg 35 3* 35 4* 35 1* 35 3* 34 6* 34 5* 34 9*   MCHC g/dL 33 5 33 2 33 2 33 7 32 3 33 5 34 1   RDW % 16 7* 16 7* 16 6* 15 9* 15 7* 14 6 14 3   MPV fL 10 7 10 9 11 0 10 6 11 1 11 9 12 1   NRBC AUTO /100 WBCs  --  0 0  --  0  --   --      CMP:  Results from last 7 days   Lab Units 03/15/22  0507 03/14/22  0525 03/13/22  0537 03/12/22  0533 03/11/22  0441 03/10/22  0433 03/09/22  0429   POTASSIUM mmol/L 3 8 3 3* 3 7 3 4* 3 4* 3 6 3 6   CHLORIDE mmol/L 107 108 110* 111* 112* 109* 108   CO2 mmol/L 26 27 22 20* 25 21 20*   BUN mg/dL 7 3* 4* 5 6 7 5   CREATININE mg/dL 0 57* 0 57* 0 58* 0 55* 0 54* 0 59* 0 57*   CALCIUM mg/dL 8 3 8 0* 8 7 8 6 8 2* 7 9* 7 6*   AST U/L  --   --   --   --   --   --  103*   ALT U/L  --   --   --   --   --   --  58   ALK PHOS U/L  --   --   --   --   --   --  114   EGFR ml/min/1 73sq m 99 99 99 100 101 98 99     BMP:  Results from last 7 days   Lab Units 03/15/22  0507 03/14/22  0525 03/13/22  0537 03/12/22  0533 03/11/22  0441 03/10/22  0433 03/09/22  0429   POTASSIUM mmol/L 3 8 3 3* 3 7 3 4* 3 4* 3 6 3 6   CHLORIDE mmol/L 107 108 110* 111* 112* 109* 108   CO2 mmol/L 26 27 22 20* 25 21 20*   BUN mg/dL 7 3* 4* 5 6 7 5   CREATININE mg/dL 0 57* 0 57* 0 58* 0 55* 0 54* 0 59* 0 57*   CALCIUM mg/dL 8 3 8 0* 8 7 8 6 8 2* 7 9* 7 6*     Results from last 7 days   Lab Units 03/14/22  0525 03/11/22  0441 03/10/22  0433 03/09/22  0429 03/08/22  1131   MAGNESIUM mg/dL 1 3* 1 8 1 7 1 8 2 1     Results from last 7 days   Lab Units 03/09/22 0429   HEMOGLOBIN A1C % 4 7     CMeds/Allergies   current meds:   Current Facility-Administered Medications   Medication Dose Route Frequency    acetaminophen (TYLENOL) tablet 975 mg  975 mg Oral Q8H [facility]    albuterol (PROVENTIL HFA,VENTOLIN HFA) inhaler 2 puff  2 puff Inhalation Q4H PRN    chlorhexidine (PERIDEX) 0 12 % oral rinse 15 mL  15 mL Mouth/Throat Q12H [facility]    desvenlafaxine succinate (PRISTIQ) 24 hr tablet 50 mg  50 mg Oral Daily    Diclofenac Sodium (VOLTAREN) 1 % topical gel 2 g  2 g Topical 4x Daily    folic acid (FOLVITE) tablet 1 mg  1 mg Oral Daily    furosemide (LASIX) tablet 20 mg  20 mg Oral Daily    gabapentin (NEURONTIN) capsule 300 mg  300 mg Oral BID PRN    guaiFENesin (MUCINEX) 12 hr tablet 600 mg  600 mg Oral Q12H [facility]    lacosamide (VIMPAT) tablet 100 mg  100 mg Oral Q12H [facility]    levalbuterol (XOPENEX) inhalation solution 1 25 mg  1 25 mg Nebulization TID    LORazepam (ATIVAN) injection 2 mg  2 mg Intravenous Q1H PRN    melatonin tablet 3 mg  3 mg Oral HS    metoprolol succinate (TOPROL-XL) 24 hr tablet 25 mg  25 mg Oral BID    multivitamin-minerals (CENTRUM) tablet 1 tablet  1 tablet Oral Daily    pantoprazole (PROTONIX) EC tablet 40 mg  40 mg Oral Early Morning    thiamine tablet 100 mg  100 mg Oral Daily     No medications prior to admission  Assessment:  Principal Problem:    Seizure ([address] )  Active Problems:     Thrombocytopenia ([address] )    Fall from standing    Aspiration pneumonia ([facility] )    Acute systolic congestive heart failure (HCC)    Urine output low    Possible Drug overdose    Pancytopenia ([facility] )    Counseling / Coordination of Care  Total floor / unit time spent today 20 minutes  Greater than 50% of total time was spent with the patient and / or family counseling and / or coordination of care  ** Please Note: Dragon 360 Dictation voice to text software may have been used in the creation of this document   **

## 2022-03-15 NOTE — DISCHARGE INSTRUCTIONS
Take your medications as directed, and keep your follow up appointments  Adhere to a heart healthy lifestyle, maintaining a low sodium diet  Daily weight and record    If your weight increases 2-3 lbs in one day, or 5 lbs in 2 days, you are short of breath or have lower extremity swelling, please call Nurse Yolanda Joe at  [place] [address] office  at [phone]

## 2022-03-15 NOTE — ASSESSMENT & PLAN NOTE
Likely 2/2 alcohol intake, baseline around 50  - Saw hemonc calls in EMR, has not established care   - Platelets at 51 this AM     Lab Results   Component Value Date    WBC 3 00 (L) 03/15/2022    HGB 8 2 (L) 03/15/2022    HCT 24 5 (L) 03/15/2022     (H) 03/15/2022    PLT 51 (L) 03/15/2022         Plan:  - Continue to monitor w/ CBC and clinical signs  - discontinued Keppra per neurology  - NO BLOOD PRODUCTS - [employer]

## 2022-03-15 NOTE — PLAN OF CARE
Problem: Potential for Falls  Goal: Patient will remain free of falls  Description: INTERVENTIONS:  - Educate patient/family on patient safety including physical limitations  - Instruct patient to call for assistance with activity   - Consult OT/PT to assist with strengthening/mobility   - Keep Call bell within reach  - Keep bed low and locked with side rails adjusted as appropriate  - Keep care items and personal belongings within reach  - Initiate and maintain comfort rounds  - Make Fall Risk Sign visible to staff  - Offer Toileting every  Hours, in advance of need  - Initiate/Maintain alarm  - Obtain necessary fall risk management equipment:   - Apply yellow socks and bracelet for high fall risk patients  - Consider moving patient to room near nurses station  Outcome: Progressing     Problem: MOBILITY - ADULT  Goal: Maintain or return to baseline ADL function  Description: INTERVENTIONS:  -  Assess patient's ability to carry out ADLs; assess patient's baseline for ADL function and identify physical deficits which impact ability to perform ADLs (bathing, care of mouth/teeth, toileting, grooming, dressing, etc )  - Assess/evaluate cause of self-care deficits   - Assess range of motion  - Assess patient's mobility; develop plan if impaired  - Assess patient's need for assistive devices and provide as appropriate  - Encourage maximum independence but intervene and supervise when necessary  - Involve family in performance of ADLs  - Assess for home care needs following discharge   - Consider OT consult to assist with ADL evaluation and planning for discharge  - Provide patient education as appropriate  Outcome: Progressing  Goal: Maintains/Returns to pre admission functional level  Description: INTERVENTIONS:  - Perform BMAT or MOVE assessment daily    - Set and communicate daily mobility goal to care team and patient/family/caregiver     - Collaborate with rehabilitation services on mobility goals if consulted  - Perform Range of Motion  times a day  - Reposition patient every  hours    - Dangle patient  times a day  - Stand patient  times a day  - Ambulate patient  times a day  - Out of bed to chair  times a day   - Out of bed for meals  times a day  - Out of bed for toileting  - Record patient progress and toleration of activity level   Outcome: Progressing     Problem: PAIN - ADULT  Goal: Verbalizes/displays adequate comfort level or baseline comfort level  Description: Interventions:  - Encourage patient to monitor pain and request assistance  - Assess pain using appropriate pain scale  - Administer analgesics based on type and severity of pain and evaluate response  - Implement non-pharmacological measures as appropriate and evaluate response  - Consider cultural and social influences on pain and pain management  - Notify physician/advanced practitioner if interventions unsuccessful or patient reports new pain  Outcome: Progressing     Problem: SAFETY ADULT  Goal: Patient will remain free of falls  Description: INTERVENTIONS:  - Educate patient/family on patient safety including physical limitations  - Instruct patient to call for assistance with activity   - Consult OT/PT to assist with strengthening/mobility   - Keep Call bell within reach  - Keep bed low and locked with side rails adjusted as appropriate  - Keep care items and personal belongings within reach  - Initiate and maintain comfort rounds  - Make Fall Risk Sign visible to staff  - Offer Toileting every  Hours, in advance of need  - Initiate/Maintain alarm  - Obtain necessary fall risk management equipment:   - Apply yellow socks and bracelet for high fall risk patients  - Consider moving patient to room near nurses station  Outcome: Progressing  Goal: Maintain or return to baseline ADL function  Description: INTERVENTIONS:  -  Assess patient's ability to carry out ADLs; assess patient's baseline for ADL function and identify physical deficits which impact ability to perform ADLs (bathing, care of mouth/teeth, toileting, grooming, dressing, etc )  - Assess/evaluate cause of self-care deficits   - Assess range of motion  - Assess patient's mobility; develop plan if impaired  - Assess patient's need for assistive devices and provide as appropriate  - Encourage maximum independence but intervene and supervise when necessary  - Involve family in performance of ADLs  - Assess for home care needs following discharge   - Consider OT consult to assist with ADL evaluation and planning for discharge  - Provide patient education as appropriate  Outcome: Progressing  Goal: Maintains/Returns to pre admission functional level  Description: INTERVENTIONS:  - Perform BMAT or MOVE assessment daily    - Set and communicate daily mobility goal to care team and patient/family/caregiver  - Collaborate with rehabilitation services on mobility goals if consulted  - Perform Range of Motion  times a day  - Reposition patient every  hours    - Dangle patient  times a day  - Stand patient  times a day  - Ambulate patient  times a day  - Out of bed to chair  times a day   - Out of bed for meals  times a day  - Out of bed for toileting  - Record patient progress and toleration of activity level   Outcome: Progressing     Problem: DISCHARGE PLANNING  Goal: Discharge to home or other facility with appropriate resources  Description: INTERVENTIONS:  - Identify barriers to discharge w/patient and caregiver  - Arrange for needed discharge resources and transportation as appropriate  - Identify discharge learning needs (meds, wound care, etc )  - Arrange for interpretive services to assist at discharge as needed  - Refer to Case Management Department for coordinating discharge planning if the patient needs post-hospital services based on physician/advanced practitioner order or complex needs related to functional status, cognitive ability, or social support system  Outcome: Progressing     Problem: Knowledge Deficit  Goal: Patient/family/caregiver demonstrates understanding of disease process, treatment plan, medications, and discharge instructions  Description: Complete learning assessment and assess knowledge base    Interventions:  - Provide teaching at level of understanding  - Provide teaching via preferred learning methods  Outcome: Progressing     Problem: DISCHARGE PLANNING - CARE MANAGEMENT  Goal: Discharge to post-acute care or home with appropriate resources  Description: INTERVENTIONS:  - Conduct assessment to determine patient/family and health care team treatment goals, and need for post-acute services based on payer coverage, community resources, and patient preferences, and barriers to discharge  - Address psychosocial, clinical, and financial barriers to discharge as identified in assessment in conjunction with the patient/family and health care team  - Arrange appropriate level of post-acute services according to patients   needs and preference and payer coverage in collaboration with the physician and health care team  - Communicate with and update the patient/family, physician, and health care team regarding progress on the discharge plan  - Arrange appropriate transportation to post-acute venues  Outcome: Progressing     Problem: NEUROSENSORY - ADULT  Goal: Achieves stable or improved neurological status  Description: INTERVENTIONS  - Monitor and report changes in neurological status  - Monitor vital signs such as temperature, blood pressure, glucose, and any other labs ordered   - Initiate measures to prevent increased intracranial pressure  - Monitor for seizure activity and implement precautions if appropriate      Outcome: Progressing  Goal: Remains free of injury related to seizures activity  Description: INTERVENTIONS  - Maintain airway, patient safety  and administer oxygen as ordered  - Monitor patient for seizure activity, document and report duration and description of seizure to physician/advanced practitioner  - If seizure occurs,  ensure patient safety during seizure  - Reorient patient post seizure  - Seizure pads on all 4 side rails  - Instruct patient/family to notify RN of any seizure activity including if an aura is experienced  - Instruct patient/family to call for assistance with activity based on nursing assessment  - Administer anti-seizure medications if ordered    Outcome: Progressing  Goal: Achieves maximal functionality and self care  Description: INTERVENTIONS  - Monitor swallowing and airway patency with patient fatigue and changes in neurological status  - Encourage and assist patient to increase activity and self care  - Encourage visually impaired, hearing impaired and aphasic patients to use assistive/communication devices  Outcome: Progressing     Problem: Prexisting or High Potential for Compromised Skin Integrity  Goal: Skin integrity is maintained or improved  Description: INTERVENTIONS:  - Identify patients at risk for skin breakdown  - Assess and monitor skin integrity  - Assess and monitor nutrition and hydration status  - Monitor labs   - Assess for incontinence   - Turn and reposition patient  - Assist with mobility/ambulation  - Relieve pressure over bony prominences  - Avoid friction and shearing  - Provide appropriate hygiene as needed including keeping skin clean and dry  - Evaluate need for skin moisturizer/barrier cream  - Collaborate with interdisciplinary team   - Patient/family teaching  - Consider wound care consult   Outcome: Progressing     Problem: Nutrition/Hydration-ADULT  Goal: Nutrient/Hydration intake appropriate for improving, restoring or maintaining nutritional needs  Description: Monitor and assess patient's nutrition/hydration status for malnutrition  Collaborate with interdisciplinary team and initiate plan and interventions as ordered  Monitor patient's weight and dietary intake as ordered or per policy   Utilize nutrition screening tool and intervene as necessary  Determine patient's food preferences and provide high-protein, high-caloric foods as appropriate       INTERVENTIONS:  - Monitor oral intake, urinary output, labs, and treatment plans  - Assess nutrition and hydration status and recommend course of action  - Evaluate amount of meals eaten  - Assist patient with eating if necessary   - Allow adequate time for meals  - Recommend/ encourage appropriate diets, oral nutritional supplements, and vitamin/mineral supplements  - Order, calculate, and assess calorie counts as needed  - Recommend, monitor, and adjust tube feedings and TPN/PPN based on assessed needs  - Assess need for intravenous fluids  - Provide specific nutrition/hydration education as appropriate  - Include patient/family/caregiver in decisions related to nutrition  Outcome: Progressing

## 2022-03-15 NOTE — PROGRESS NOTES
[facility]  Progress Note - Jessica Zepeda 1960, 58 y o  female MRN: [id number]  Unit/Bed#: S -01 Encounter: [id number]  Primary Care Provider: Apple Conn MD   Date and time admitted to hospital: 3/7/2022  5:03 PM    * Seizure [facility])  Assessment & Plan  POA: Arrived after unwitnessed seizure like activity, tongue laceration, left hand movement  · No h/o seizure however had ICH requiring seizure ppx in 08/2021  · [address] unremarkable, Labs with acidosis   · 3 electrographic seizures noted on vEEG, last seizure noted was around 6:45 am 03/08  · clonic activity of left arm and roving eye movements  · started on lacosamide, levetiracem, fentanyl, propofol       · No new seizures since 03/08 6:45am    · Per neuro, originating from left temporal lobe, etiology unknown but considering alcohol withdrawal  · Mentation improved  · MRI- Focal diffusion-weighted and FLAIR signal hyperintensity involving the left hippocampal formation is consistent with status epilepticus  Plan:  · Continue Vimpat 100 mg q 12H and will continue monotherapy at this point  · In case of more seizure-like activity, reach out to on-call neurologist   Patient's Vimpat dosage may be escalated  · Psych consulted and recommended  · Recommended to continued gabapentin and Pristiq  · Re-assessed 03/15 by Psychiatry team for IP Psych admission and signed 201 and CM aware to start bed search process  · Medically stable at this point for discharge    Possible Drug overdose  Assessment & Plan  ·  found patient with an open bottle of zoloft next to her before calling EMS  · Mentioned patient had been struggling with alcohol intake reduction and had a heated discussion before leaving  · Discussed with patient by ICU team: stated that she wants to get better and is trying to be more healthy outside the hospital  She has no SI but is unable to recall the events on day of admission       Plan:  · Psych consulted and recommended  · Recommended to continued gabapentin and Pristiq  · Re-assessed 03/15 by Psychiatry team for IP Psych admission and signed 201 and CM aware to start bed search process  · Continue 1:1    Acute systolic congestive heart failure ([facility] [address] )  Assessment & Plan  Wt Readings from Last 3 Encounters:   03/09/22 66 7 kg (147 lb 0 8 oz)   03/09/22 66 7 kg (147 lb 0 8 oz)     ECHO on admission with EF 40 compared to 60 in 08/2021  Patchy hypokinesis throughout however basal/apical/mid-inferior motion reserved  Cards consult and is considering stress-induced cardiomyopathy  ? alcohol contribution  Confirmed not contributing to shock  Status post 40 mg of IV Lasix 0 3/13  Exam this a m  Showed no problems with shortness of breath and logical auscultation      Plan:  - Cardiogy following    - s/p IV lasix 40 mg x 1 03/14   - started on po 20mg lasix daily and continue on d/c    - continue Metoprolol succinate 25 mg b i d  on discharge   - to get echo in outpatient setting and to f/u with PCP  · Prn diuretics to keep euvolemic  · K being repleted to keep >4, Mag 1 8  · Replete K, Mag         Thrombocytopenia (HCC)  Assessment & Plan  Likely 2/2 alcohol intake, baseline around 50  - Saw hemonc calls in EMR, has not established care   - Platelets at 51 this AM     Lab Results   Component Value Date    WBC 3 00 (L) 03/15/2022    HGB 8 2 (L) 03/15/2022    HCT 24 5 (L) 03/15/2022     (H) 03/15/2022    PLT 51 (L) 03/15/2022         Plan:  - Continue to monitor w/ CBC and clinical signs  - discontinued Keppra per neurology  - NO BLOOD PRODUCTS - Islam      Pancytopenia ([facility] [address] )  Assessment & Plan  Asymptomatic and continue to monitor clinically     Urine output low  Assessment & Plan  I/Os=  +120ml and had no urinary complaints     Plan:  · Monitor I/Os  · Monitor BMP and Cr    Aspiration pneumonia ([facility] [address] )  Assessment & Plan  CXR initially with clear lungs  Repeat Imaging with prominent RLL consolidation  Started on cefepime->switched to unasyn, completed 6 total days of antibiotics  DC'd given negative procal on 3/12 labwork  Lungs clear to auscultation on 02/15    Plan:  · Continue Xopenex  · Monitor fever curves, CBC      Fall from standing  Assessment & Plan  · Patient reports severe pain in the right arm 2/2 a fall as well as upper midback pain with evidence of an area of ecchymosis in the left interscapular region  · She also has significant difficulty with abduction of her right arm with tenderness along the anterior and superior aspect of the shoulder  · X-Ray, trauma series: No displaced fractures  Foreshortening of the distal right clavicle likely postoperative in origin  · Repeat x-ray of the shoulder Old right clavicle fracture deformity with superior displacement of the clavicle  Calcific tendinosis  No lytic or blastic osseous lesion    Plan:  · Supportive care  · PT/OT recommend home with home health rehab        VTE Pharmacologic Prophylaxis: VTE Score: 2 Low Risk (Score 0-2) - Encourage Ambulation  Patient Centered Rounds: I performed bedside rounds with nursing staff today  Discussions with Specialists or Other Care Team Provider:  psychiatry, nursing    Education and Discussions with Family / Patient: Patient declined to update      Current Length of Stay: 8 day(s)  Current Patient Status: Inpatient   Discharge Plan: pending psych bed availability    Code Status: Level 1 - Full Code    Subjective:   Patient has no complaints and understood that she will be going for IP psych treatment and is eager to improve  She did ask about 1:1 and if needed and confirmed that it will be needed as consulted with psychiatry  She also mentions minimal appetite at this point       The patient states she is not having any active suicidal ideation, homicidal ideation or having any issues with hallucinations whether her visual, tactile/ auditory    She denies any fevers/chill, chest pain, shortness of breath, abdominal pain, nausea, vomiting, diarrhea, problems urinating, problems  With bowel movements, and is eating/drinking without problem        Objective:     Vitals:   Temp (24hrs), Av 4 °F (36 9 °C), Min:98 °F (36 7 °C), Max:98 9 °F (37 2 °C)    Temp:  [98 °F (36 7 °C)-98 9 °F (37 2 °C)] 98 9 °F (37 2 °C)  HR:  [] 93  Resp:  [16-18] 18  BP: ()/(52-85) 146/85  SpO2:  [90 %-96 %] 96 %  Body mass index is 23 03 kg/m²  Input and Output Summary (last 24 hours): Intake/Output Summary (Last 24 hours) at 3/15/2022 1432  Last data filed at 3/15/2022 0905  Gross per 24 hour   Intake 120 ml   Output --   Net 120 ml       Physical Exam:   Physical Exam  Vitals and nursing note reviewed  Constitutional:       General: She is not in acute distress  Appearance: She is well-developed  HENT:      Head: Normocephalic and atraumatic  Eyes:      Conjunctiva/sclera: Conjunctivae normal    Cardiovascular:      Rate and Rhythm: Normal rate and regular rhythm  Heart sounds: No murmur heard  Pulmonary:      Effort: Pulmonary effort is normal  No respiratory distress  Breath sounds: Normal breath sounds  Abdominal:      Palpations: Abdomen is soft  Tenderness: There is no abdominal tenderness  Musculoskeletal:      Cervical back: Neck supple  Skin:     General: Skin is warm and dry  Neurological:      Mental Status: She is alert  Psychiatric:         Mood and Affect: Mood normal          Behavior: Behavior normal          Thought Content:  Thought content normal          Judgment: Judgment normal       Comments: No thoughts of homicidal or suicidal ideation   No hallucinations (visual, auditory, or tactile)          Additional Data:     Labs:  Results from last 7 days   Lab Units 03/15/22  0507 22  0525 22  0525 03/10/22  0433 22  0429   WBC Thousand/uL 3 00*   < > 3 12*   < > 8 68   HEMOGLOBIN g/dL 8 2*   < > 8 7*   < > 8 9* HEMATOCRIT % 24 5*   < > 26 2*   < > 26 1*   PLATELETS Thousands/uL 51*   < > 49*   < > 29*   BANDS PCT %  --   --   --   --  6   NEUTROS PCT %  --   --  30*   < >  --    LYMPHS PCT %  --   --  49*   < >  --    LYMPHO PCT %  --   --   --   --  17   MONOS PCT %  --   --  18*   < >  --    MONO PCT %  --   --   --   --  8   EOS PCT %  --   --  2   < > 0    < > = values in this interval not displayed  Results from last 7 days   Lab Units 03/15/22  0507 03/10/22  0433 03/09/22  0429   SODIUM mmol/L 141   < > 138   POTASSIUM mmol/L 3 8   < > 3 6   CHLORIDE mmol/L 107   < > 108   CO2 mmol/L 26   < > 20*   BUN mg/dL 7   < > 5   CREATININE mg/dL 0 57*   < > 0 57*   ANION GAP mmol/L 8   < > 10   CALCIUM mg/dL 8 3   < > 7 6*   ALBUMIN g/dL  --   --  2 2*   TOTAL BILIRUBIN mg/dL  --   --  0 91   ALK PHOS U/L  --   --  114   ALT U/L  --   --  58   AST U/L  --   --  103*   GLUCOSE RANDOM mg/dL 109   < > 133    < > = values in this interval not displayed  Results from last 7 days   Lab Units 03/11/22  0759 03/10/22  2057 03/10/22  1704 03/10/22  1104 03/10/22  0741 03/09/22  2123 03/09/22  1757 03/09/22  1122 03/09/22  0552 03/08/22  2359 03/08/22  1806   POC GLUCOSE mg/dl 96 112 151* 127 134 137 113 120 129 143* 160*     Results from last 7 days   Lab Units 03/09/22  0429   HEMOGLOBIN A1C % 4 7     Results from last 7 days   Lab Units 03/13/22  0537 03/12/22  0534   PROCALCITONIN ng/ml 0 15 0 21       Lines/Drains:  Invasive Devices  Report    Peripheral Intravenous Line            Peripheral IV 03/12/22 Right Hand 2 days                      Imaging: No pertinent imaging reviewed      Recent Cultures (last 7 days):         Last 24 Hours Medication List:   Current Facility-Administered Medications   Medication Dose Route Frequency Provider Last Rate    acetaminophen  975 mg Oral Q8H [address] Adriana Christiansen MD      albuterol  2 puff Inhalation Q4H PRN Karin Horowitz MD      chlorhexidine  15 mL Mouth/Throat Q12H [address] Elisha Jacob MD      desvenlafaxine succinate  50 mg Oral Daily [employer], DO      Diclofenac Sodium  2 g Topical 4x Daily Adriana Phillips MD      folic acid  1 mg Oral Daily Elisha Jacob MD      furosemide  20 mg Oral Daily Benjamen Tabitha, CRNP      gabapentin  300 mg Oral BID PRN Abdulkadir Goode DO      guaiFENesin  600 mg Oral Q12H [address] Elisha Jacob MD      lacosamide  100 mg Oral Q12H [address] Elisha Jacob MD      levalbuterol  1 25 mg Nebulization TID Clara Hernandez MD      LORazepam  2 mg Intravenous Q1H PRN Elisha Jacob MD      melatonin  3 mg Oral HS Elisha Jacob MD      metoprolol succinate  25 mg Oral BID Benjamen Tabitha, CRNP      multivitamin-minerals  1 tablet Oral Daily Elisha Jacob MD      pantoprazole  40 mg Oral Early Morning Elisha Jacob MD      thiamine  100 mg Oral Daily Elisha Jacob MD          Today, Patient Was Seen By: [employer], DO    **Please Note: This note may have been constructed using a voice recognition system  **
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
## 2022-03-15 NOTE — ASSESSMENT & PLAN NOTE
CXR initially with clear lungs  Repeat Imaging with prominent RLL consolidation  Started on cefepime->switched to unasyn, completed 6 total days of antibiotics  DC'd given negative procal on 3/12 labwork      Lungs clear to auscultation on 02/15    Plan:  · Continue Xopenex  · Monitor fever curves, CBC

## 2022-03-15 NOTE — ASSESSMENT & PLAN NOTE
Wt Readings from Last 3 Encounters:   03/09/22 66 7 kg (147 lb 0 8 oz)   03/09/22 66 7 kg (147 lb 0 8 oz)     ECHO on admission with EF 40 compared to 60 in 08/2021  Patchy hypokinesis throughout however basal/apical/mid-inferior motion reserved  Cards consult and is considering stress-induced cardiomyopathy  ? alcohol contribution  Confirmed not contributing to shock  Status post 40 mg of IV Lasix 0 3/13  Exam this a m  Showed no problems with shortness of breath and logical auscultation      Plan:  - Cardiogy following    - s/p IV lasix 40 mg x 1 03/14   - started on po 20mg lasix daily and continue on d/c    - continue Metoprolol succinate 25 mg b i d  on discharge   - to get echo in outpatient setting and to f/u with PCP  · Prn diuretics to keep euvolemic  · K being repleted to keep >4, Mag 1 8  · Replete K, Mag

## 2022-03-15 NOTE — ASSESSMENT & PLAN NOTE
·  found patient with an open bottle of zoloft next to her before calling EMS  · Mentioned patient had been struggling with alcohol intake reduction and had a heated discussion before leaving  · Discussed with patient by ICU team: stated that she wants to get better and is trying to be more healthy outside the hospital  She has no SI but is unable to recall the events on day of admission       Plan:  · Psych consulted and recommended  · Recommended to continued gabapentin and Pristiq  · Re-assessed 03/15 by Psychiatry team for IP Psych admission and signed 201 and CM aware to start bed search process  · Continue 1:1

## 2022-03-15 NOTE — PHYSICAL THERAPY NOTE
Physical Therapy Cancellation Note         03/15/22 1221   PT Last Visit   PT Visit Date 03/15/22   Note Type   Note Type Cancelled Session   Cancel Reasons   (unavailable; care team meeting with pt in room )     Danay Simpson PT

## 2022-03-16 LAB
ANION GAP SERPL CALCULATED.3IONS-SCNC: 10 MMOL/L (ref 4–13)
BUN SERPL-MCNC: 8 MG/DL (ref 5–25)
CALCIUM SERPL-MCNC: 8.3 MG/DL (ref 8.3–10.1)
CHLORIDE SERPL-SCNC: 108 MMOL/L (ref 100–108)
CO2 SERPL-SCNC: 23 MMOL/L (ref 21–32)
CREAT SERPL-MCNC: 0.69 MG/DL (ref 0.6–1.3)
ERYTHROCYTE [DISTWIDTH] IN BLOOD BY AUTOMATED COUNT: 16.4 % (ref 11.6–15.1)
FLUAV RNA RESP QL NAA+PROBE: NEGATIVE
FLUBV RNA RESP QL NAA+PROBE: NEGATIVE
GFR SERPL CREATININE-BSD FRML MDRD: 93 ML/MIN/1.73SQ M
GLUCOSE SERPL-MCNC: 105 MG/DL (ref 65–140)
HCT VFR BLD AUTO: 26.6 % (ref 34.8–46.1)
HGB BLD-MCNC: 8.7 G/DL (ref 11.5–15.4)
MCH RBC QN AUTO: 34.5 PG (ref 26.8–34.3)
MCHC RBC AUTO-ENTMCNC: 32.7 G/DL (ref 31.4–37.4)
MCV RBC AUTO: 106 FL (ref 82–98)
PLATELET # BLD AUTO: 57 THOUSANDS/UL (ref 149–390)
PMV BLD AUTO: 10.6 FL (ref 8.9–12.7)
POTASSIUM SERPL-SCNC: 4.6 MMOL/L (ref 3.5–5.3)
RBC # BLD AUTO: 2.52 MILLION/UL (ref 3.81–5.12)
RSV RNA RESP QL NAA+PROBE: NEGATIVE
SARS-COV-2 RNA RESP QL NAA+PROBE: NEGATIVE
SODIUM SERPL-SCNC: 141 MMOL/L (ref 136–145)
WBC # BLD AUTO: 3.16 THOUSAND/UL (ref 4.31–10.16)

## 2022-03-16 PROCEDURE — 94668 MNPJ CHEST WALL SBSQ: CPT

## 2022-03-16 PROCEDURE — 94760 N-INVAS EAR/PLS OXIMETRY 1: CPT

## 2022-03-16 PROCEDURE — 94640 AIRWAY INHALATION TREATMENT: CPT

## 2022-03-16 PROCEDURE — 0241U HB NFCT DS VIR RESP RNA 4 TRGT: CPT

## 2022-03-16 PROCEDURE — 80048 BASIC METABOLIC PNL TOTAL CA: CPT | Performed by: NURSE PRACTITIONER

## 2022-03-16 PROCEDURE — 99232 SBSQ HOSP IP/OBS MODERATE 35: CPT | Performed by: INTERNAL MEDICINE

## 2022-03-16 PROCEDURE — 85027 COMPLETE CBC AUTOMATED: CPT | Performed by: INTERNAL MEDICINE

## 2022-03-16 RX ORDER — OLANZAPINE 2.5 MG/1
2.5 TABLET ORAL ONCE
Status: COMPLETED | OUTPATIENT
Start: 2022-03-16 | End: 2022-03-16

## 2022-03-16 RX ORDER — LEVALBUTEROL INHALATION SOLUTION 1.25 MG/3ML
1.25 SOLUTION RESPIRATORY (INHALATION) EVERY 6 HOURS PRN
Status: DISCONTINUED | OUTPATIENT
Start: 2022-03-16 | End: 2022-03-18 | Stop reason: HOSPADM

## 2022-03-16 RX ADMIN — FUROSEMIDE 20 MG: 20 TABLET ORAL at 08:39

## 2022-03-16 RX ADMIN — DESVENLAFAXINE 50 MG: 50 TABLET, FILM COATED, EXTENDED RELEASE ORAL at 08:40

## 2022-03-16 RX ADMIN — Medication 15 ML: at 08:39

## 2022-03-16 RX ADMIN — FOLIC ACID 1 MG: 1 TABLET ORAL at 08:39

## 2022-03-16 RX ADMIN — GUAIFENESIN 600 MG: 600 TABLET ORAL at 08:39

## 2022-03-16 RX ADMIN — DICLOFENAC SODIUM 2 G: 10 GEL TOPICAL at 08:40

## 2022-03-16 RX ADMIN — THIAMINE HCL TAB 100 MG 100 MG: 100 TAB at 08:39

## 2022-03-16 RX ADMIN — GABAPENTIN 300 MG: 300 CAPSULE ORAL at 22:16

## 2022-03-16 RX ADMIN — METOPROLOL SUCCINATE 25 MG: 25 TABLET, EXTENDED RELEASE ORAL at 08:39

## 2022-03-16 RX ADMIN — Medication 15 ML: at 22:18

## 2022-03-16 RX ADMIN — METOPROLOL SUCCINATE 25 MG: 25 TABLET, EXTENDED RELEASE ORAL at 22:16

## 2022-03-16 RX ADMIN — LACOSAMIDE 100 MG: 100 TABLET, FILM COATED ORAL at 08:39

## 2022-03-16 RX ADMIN — LACOSAMIDE 100 MG: 100 TABLET, FILM COATED ORAL at 22:18

## 2022-03-16 RX ADMIN — PANTOPRAZOLE SODIUM 40 MG: 40 TABLET, DELAYED RELEASE ORAL at 05:24

## 2022-03-16 RX ADMIN — DICLOFENAC SODIUM 2 G: 10 GEL TOPICAL at 11:22

## 2022-03-16 RX ADMIN — ACETAMINOPHEN 975 MG: 325 TABLET, FILM COATED ORAL at 05:24

## 2022-03-16 RX ADMIN — OLANZAPINE 2.5 MG: 2.5 TABLET, FILM COATED ORAL at 17:35

## 2022-03-16 RX ADMIN — GABAPENTIN 300 MG: 300 CAPSULE ORAL at 08:39

## 2022-03-16 RX ADMIN — LEVALBUTEROL HYDROCHLORIDE 1.25 MG: 1.25 SOLUTION RESPIRATORY (INHALATION) at 08:04

## 2022-03-16 RX ADMIN — Medication 3 MG: at 22:16

## 2022-03-16 RX ADMIN — MULTIPLE VITAMINS W/ MINERALS TAB 1 TABLET: TAB ORAL at 08:39

## 2022-03-16 RX ADMIN — GUAIFENESIN 600 MG: 600 TABLET ORAL at 22:16

## 2022-03-16 RX ADMIN — ACETAMINOPHEN 975 MG: 325 TABLET, FILM COATED ORAL at 22:16

## 2022-03-16 NOTE — PLAN OF CARE
Problem: Potential for Falls  Goal: Patient will remain free of falls  Description: INTERVENTIONS:  - Educate patient/family on patient safety including physical limitations  - Instruct patient to call for assistance with activity   - Consult OT/PT to assist with strengthening/mobility   - Keep Call bell within reach  - Keep bed low and locked with side rails adjusted as appropriate  - Keep care items and personal belongings within reach  - Initiate and maintain comfort rounds  - Make Fall Risk Sign visible to staff  - Offer Toileting every 2 Hours, in advance of need  - Initiate/Maintain bed alarm  - Obtain necessary fall risk management equipment: bed alarm  - Apply yellow socks and bracelet for high fall risk patients  - Consider moving patient to room near nurses station  Outcome: Progressing     Problem: MOBILITY - ADULT  Goal: Maintain or return to baseline ADL function  Description: INTERVENTIONS:  -  Assess patient's ability to carry out ADLs; assess patient's baseline for ADL function and identify physical deficits which impact ability to perform ADLs (bathing, care of mouth/teeth, toileting, grooming, dressing, etc )  - Assess/evaluate cause of self-care deficits   - Assess range of motion  - Assess patient's mobility; develop plan if impaired  - Assess patient's need for assistive devices and provide as appropriate  - Encourage maximum independence but intervene and supervise when necessary  - Involve family in performance of ADLs  - Assess for home care needs following discharge   - Consider OT consult to assist with ADL evaluation and planning for discharge  - Provide patient education as appropriate  Outcome: Progressing  Goal: Maintains/Returns to pre admission functional level  Description: INTERVENTIONS:  - Perform BMAT or MOVE assessment daily    - Set and communicate daily mobility goal to care team and patient/family/caregiver     - Collaborate with rehabilitation services on mobility goals if consulted  - Perform Range of Motion 2 times a day  - Reposition patient every 2 hours    - Dangle patient 2 times a day  - Stand patient 2 times a day  - Ambulate patient 3 times a day  - Out of bed to chair 3 times a day   - Out of bed for meals 3 times a day  - Out of bed for toileting  - Record patient progress and toleration of activity level   Outcome: Progressing     Problem: PAIN - ADULT  Goal: Verbalizes/displays adequate comfort level or baseline comfort level  Description: Interventions:  - Encourage patient to monitor pain and request assistance  - Assess pain using appropriate pain scale  - Administer analgesics based on type and severity of pain and evaluate response  - Implement non-pharmacological measures as appropriate and evaluate response  - Consider cultural and social influences on pain and pain management  - Notify physician/advanced practitioner if interventions unsuccessful or patient reports new pain  Outcome: Progressing     Problem: SAFETY ADULT  Goal: Patient will remain free of falls  Description: INTERVENTIONS:  - Educate patient/family on patient safety including physical limitations  - Instruct patient to call for assistance with activity   - Consult OT/PT to assist with strengthening/mobility   - Keep Call bell within reach  - Keep bed low and locked with side rails adjusted as appropriate  - Keep care items and personal belongings within reach  - Initiate and maintain comfort rounds  - Make Fall Risk Sign visible to staff  - Offer Toileting every 2 Hours, in advance of need  - Initiate/Maintain bed alarm  - Obtain necessary fall risk management equipment: bed alarm  - Apply yellow socks and bracelet for high fall risk patients  - Consider moving patient to room near nurses station  Outcome: Progressing  Goal: Maintain or return to baseline ADL function  Description: INTERVENTIONS:  -  Assess patient's ability to carry out ADLs; assess patient's baseline for ADL function and identify physical deficits which impact ability to perform ADLs (bathing, care of mouth/teeth, toileting, grooming, dressing, etc )  - Assess/evaluate cause of self-care deficits   - Assess range of motion  - Assess patient's mobility; develop plan if impaired  - Assess patient's need for assistive devices and provide as appropriate  - Encourage maximum independence but intervene and supervise when necessary  - Involve family in performance of ADLs  - Assess for home care needs following discharge   - Consider OT consult to assist with ADL evaluation and planning for discharge  - Provide patient education as appropriate  Outcome: Progressing  Goal: Maintains/Returns to pre admission functional level  Description: INTERVENTIONS:  - Perform BMAT or MOVE assessment daily    - Set and communicate daily mobility goal to care team and patient/family/caregiver  - Collaborate with rehabilitation services on mobility goals if consulted  - Perform Range of Motion 2 times a day  - Reposition patient every 2 hours    - Dangle patient 2 times a day  - Stand patient 2 times a day  - Ambulate patient 3 times a day  - Out of bed to chair 3 times a day   - Out of bed for meals 3 times a day  - Out of bed for toileting  - Record patient progress and toleration of activity level   Outcome: Progressing     Problem: DISCHARGE PLANNING  Goal: Discharge to home or other facility with appropriate resources  Description: INTERVENTIONS:  - Identify barriers to discharge w/patient and caregiver  - Arrange for needed discharge resources and transportation as appropriate  - Identify discharge learning needs (meds, wound care, etc )  - Arrange for interpretive services to assist at discharge as needed  - Refer to Case Management Department for coordinating discharge planning if the patient needs post-hospital services based on physician/advanced practitioner order or complex needs related to functional status, cognitive ability, or social support system  Outcome: Progressing     Problem: Knowledge Deficit  Goal: Patient/family/caregiver demonstrates understanding of disease process, treatment plan, medications, and discharge instructions  Description: Complete learning assessment and assess knowledge base    Interventions:  - Provide teaching at level of understanding  - Provide teaching via preferred learning methods  Outcome: Progressing     Problem: DISCHARGE PLANNING - CARE MANAGEMENT  Goal: Discharge to post-acute care or home with appropriate resources  Description: INTERVENTIONS:  - Conduct assessment to determine patient/family and health care team treatment goals, and need for post-acute services based on payer coverage, community resources, and patient preferences, and barriers to discharge  - Address psychosocial, clinical, and financial barriers to discharge as identified in assessment in conjunction with the patient/family and health care team  - Arrange appropriate level of post-acute services according to patients   needs and preference and payer coverage in collaboration with the physician and health care team  - Communicate with and update the patient/family, physician, and health care team regarding progress on the discharge plan  - Arrange appropriate transportation to post-acute venues  Outcome: Progressing     Problem: NEUROSENSORY - ADULT  Goal: Achieves stable or improved neurological status  Description: INTERVENTIONS  - Monitor and report changes in neurological status  - Monitor vital signs such as temperature, blood pressure, glucose, and any other labs ordered   - Initiate measures to prevent increased intracranial pressure  - Monitor for seizure activity and implement precautions if appropriate      Outcome: Progressing  Goal: Remains free of injury related to seizures activity  Description: INTERVENTIONS  - Maintain airway, patient safety  and administer oxygen as ordered  - Monitor patient for seizure activity, document and report duration and description of seizure to physician/advanced practitioner  - If seizure occurs,  ensure patient safety during seizure  - Reorient patient post seizure  - Seizure pads on all 4 side rails  - Instruct patient/family to notify RN of any seizure activity including if an aura is experienced  - Instruct patient/family to call for assistance with activity based on nursing assessment  - Administer anti-seizure medications if ordered    Outcome: Progressing  Goal: Achieves maximal functionality and self care  Description: INTERVENTIONS  - Monitor swallowing and airway patency with patient fatigue and changes in neurological status  - Encourage and assist patient to increase activity and self care  - Encourage visually impaired, hearing impaired and aphasic patients to use assistive/communication devices  Outcome: Progressing     Problem: Prexisting or High Potential for Compromised Skin Integrity  Goal: Skin integrity is maintained or improved  Description: INTERVENTIONS:  - Identify patients at risk for skin breakdown  - Assess and monitor skin integrity  - Assess and monitor nutrition and hydration status  - Monitor labs   - Assess for incontinence   - Turn and reposition patient  - Assist with mobility/ambulation  - Relieve pressure over bony prominences  - Avoid friction and shearing  - Provide appropriate hygiene as needed including keeping skin clean and dry  - Evaluate need for skin moisturizer/barrier cream  - Collaborate with interdisciplinary team   - Patient/family teaching  - Consider wound care consult   Outcome: Progressing     Problem: Nutrition/Hydration-ADULT  Goal: Nutrient/Hydration intake appropriate for improving, restoring or maintaining nutritional needs  Description: Monitor and assess patient's nutrition/hydration status for malnutrition  Collaborate with interdisciplinary team and initiate plan and interventions as ordered    Monitor patient's weight and dietary intake as ordered or per policy  Utilize nutrition screening tool and intervene as necessary  Determine patient's food preferences and provide high-protein, high-caloric foods as appropriate       INTERVENTIONS:  - Monitor oral intake, urinary output, labs, and treatment plans  - Assess nutrition and hydration status and recommend course of action  - Evaluate amount of meals eaten  - Assist patient with eating if necessary   - Allow adequate time for meals  - Recommend/ encourage appropriate diets, oral nutritional supplements, and vitamin/mineral supplements  - Order, calculate, and assess calorie counts as needed  - Recommend, monitor, and adjust tube feedings and TPN/PPN based on assessed needs  - Assess need for intravenous fluids  - Provide specific nutrition/hydration education as appropriate  - Include patient/family/caregiver in decisions related to nutrition  Outcome: Progressing

## 2022-03-16 NOTE — UTILIZATION REVIEW
Continued Stay Review    Date: 3/16/2022                          Current Patient Class: inpt  Current Level of Care: med surg     HPI:62 y o  female initially admit 3/7 3/7/2022 Inpatient to Trauma service for evaluation & treatment of fall from standing, seizure concern for status epilepticus requiring continuous EEG, acute hypoxic respiratory failure, JP, Thrombocytopenia    Assessment/Plan: For ip Psyche admit on DC for inpatient eval & management; cont 1:1 observation; cont Metoprolol & lasix for ACHF , req echo in OP & follow up PCP  COnt Vimpat for seizure disorder  On exam no thoughts of homicidal or suicidal ideation; no hallucinations    Vital Signs:   Date/Time Temp Pulse Resp BP MAP (mmHg) SpO2 Calculated FIO2 (%) - Nasal Cannula Nasal Cannula O2 Flow Rate (L/min) O2 Device Patient Position - Orthostatic VS   03/16/22 1512 98 1 °F (36 7 °C) 92 18 112/64 83 96 % -- -- None (Room air) Lying   03/16/22 0806 -- -- -- -- -- 97 % -- -- -- --   03/16/22 0742 97 9 °F (36 6 °C) 78 18 129/78 98 97 % -- -- None (Room air) Lying         Pertinent Labs/Diagnostic Results:   Results from last 7 days   Lab Units 03/16/22  1246   SARS-COV-2  Negative     Results from last 7 days   Lab Units 03/16/22  0530 03/15/22  0507 03/14/22  0525 03/13/22  0537 03/13/22  0537 03/12/22  0533 03/12/22  0533 03/11/22  0443 03/11/22  0443   WBC Thousand/uL 3 16* 3 00* 3 12*   < > 5 04   < > 4 07*   < > 4 45   HEMOGLOBIN g/dL 8 7* 8 2* 8 7*  --  9 1*  --  8 9*   < > 8 3*   HEMATOCRIT % 26 6* 24 5* 26 2*  --  27 4*  --  26 4*   < > 25 7*   PLATELETS Thousands/uL 57* 51* 49*   < > 40*   < > 34*   < > 31*   NEUTROS ABS Thousands/µL  --   --  0 93*  --  2 49  --   --   --  2 43    < > = values in this interval not displayed           Results from last 7 days   Lab Units 03/16/22  0530 03/15/22  0507 03/14/22  0525 03/13/22  0537 03/12/22  0533 03/11/22  0441 03/11/22  0441 03/10/22  0433 03/10/22  0433   SODIUM mmol/L 141 141 143 143 145 < > 145   < > 140   POTASSIUM mmol/L 4 6 3 8 3 3* 3 7 3 4*   < > 3 4*   < > 3 6   CHLORIDE mmol/L 108 107 108 110* 111*   < > 112*   < > 109*   CO2 mmol/L 23 26 27 22 20*   < > 25   < > 21   ANION GAP mmol/L 10 8 8 11 14*   < > 8   < > 10   BUN mg/dL 8 7 3* 4* 5   < > 6   < > 7   CREATININE mg/dL 0 69 0 57* 0 57* 0 58* 0 55*   < > 0 54*   < > 0 59*   EGFR ml/min/1 73sq m 93 99 99 99 100   < > 101   < > 98   CALCIUM mg/dL 8 3 8 3 8 0* 8 7 8 6   < > 8 2*   < > 7 9*   MAGNESIUM mg/dL  --  1 7 1 3*  --   --   --  1 8  --  1 7   PHOSPHORUS mg/dL  --   --   --   --   --   --  2 4  --  1 7*    < > = values in this interval not displayed           Results from last 7 days   Lab Units 03/11/22  0759 03/10/22  2057 03/10/22  1704 03/10/22  1104 03/10/22  0741 03/09/22  2123 03/09/22  1757   POC GLUCOSE mg/dl 96 112 151* 127 134 137 113     Results from last 7 days   Lab Units 03/16/22  0530 03/15/22  0507 03/14/22  0525 03/13/22  0537 03/12/22  0533 03/11/22  0441 03/10/22  0433   GLUCOSE RANDOM mg/dL 105 109 112 119 105 95 125             BETA-HYDROXYBUTYRATE   Date Value Ref Range Status   03/07/2022 0 2 <0 6 mmol/L Final                                      Results from last 7 days   Lab Units 03/13/22  0537 03/12/22  0534   PROCALCITONIN ng/ml 0 15 0 21                                                 Results from last 7 days   Lab Units 03/16/22  1246   INFLUENZA A PCR  Negative   INFLUENZA B PCR  Negative   RSV PCR  Negative     Medications:   Scheduled Medications:  acetaminophen, 975 mg, Oral, Q8H DESTIN  chlorhexidine, 15 mL, Mouth/Throat, Q12H DESTIN  desvenlafaxine succinate, 50 mg, Oral, Daily  Diclofenac Sodium, 2 g, Topical, 4x Daily  folic acid, 1 mg, Oral, Daily  furosemide, 20 mg, Oral, Daily  gabapentin, 300 mg, Oral, BID  guaiFENesin, 600 mg, Oral, Q12H DESTIN  lacosamide, 100 mg, Oral, Q12H DESTIN  melatonin, 3 mg, Oral, HS  metoprolol succinate, 25 mg, Oral, BID  multivitamin-minerals, 1 tablet, Oral, Daily  pantoprazole, 40 mg, Oral, Early Morning  thiamine, 100 mg, Oral, Daily      Continuous IV Infusions:     PRN Meds:  albuterol, 2 puff, Inhalation, Q4H PRN  levalbuterol, 1 25 mg, Nebulization, Q6H PRN  LORazepam, 2 mg, Intravenous, Q1H PRN        Discharge Plan: referral by CM to [facility] behavioral health; pending covid test w medical clearance note-  reviewing info     Network Utilization Review Department  ATTENTION: Please call with any questions or concerns to [phone] and carefully listen to the prompts so that you are directed to the right person  All voicemails are confidential   Lizett Farhat all requests for admission clinical reviews, approved or denied determinations and any other requests to dedicated fax number below belonging to the campus where the patient is receiving treatment   List of dedicated fax numbers for the Facilities:  [address] DENIALS (Administrative/Medical Necessity) [phone]   [address] (Maternity/NICU/Pediatrics) [phone]   [address]  [address] [employer] [address] [address] [place] [address] Dorina Tal[address] [facility] [place] [address] [phone]

## 2022-03-16 NOTE — ASSESSMENT & PLAN NOTE
CXR initially with clear lungs  Repeat Imaging with prominent RLL consolidation  Started on cefepime->switched to unasyn, completed 6 total days of antibiotics  DC'd given negative procal on 3/12 labwork      Lungs clear to auscultation on 03/16    Plan:  · Continue Xopenex  · Monitor fever curves, CBC

## 2022-03-16 NOTE — ASSESSMENT & PLAN NOTE
Likely 2/2 alcohol intake, baseline around 50  - Saw hemonc calls in EMR, has not established care   - Platelets at 57 this AM     Lab Results   Component Value Date    WBC 3 16 (L) 03/16/2022    HGB 8 7 (L) 03/16/2022    HCT 26 6 (L) 03/16/2022     (H) 03/16/2022    PLT 57 (L) 03/16/2022         Plan:  - Continue to monitor w/ CBC and clinical signs  - discontinued Keppra per neurology  - will likely need to f/u w/ Hematology in outpatient setting in regards to  [address] [place] [address]

## 2022-03-16 NOTE — ASSESSMENT & PLAN NOTE
Wt Readings from Last 3 Encounters:   03/09/22 66 7 kg (147 lb 0 8 oz)   03/09/22 66 7 kg (147 lb 0 8 oz)     ECHO on admission with EF 40 compared to 60 in 08/2021  Patchy hypokinesis throughout however basal/apical/mid-inferior motion reserved  Cards consult and is considering stress-induced cardiomyopathy  ? alcohol contribution  Confirmed not contributing to shock     Status post 40 mg of IV Lasix 0 3/13    03/16: Exam this a m  showed lungs CTA bilaterally but patient noted some SOB but tolerable    Plan:  - Cardiogy following    -  Continue Lasix 20 mg daily p o     - continue Metoprolol succinate 25 mg b i d  on discharge   - to get echo in outpatient setting and to f/u with PCP   -  BP will likely not tolerate addition of ACE/ARB  Given previous soft BP is  · Prn diuretics to keep euvolemic  · K being repleted to keep >4, Mag 1 8  · Replete K, Mag

## 2022-03-16 NOTE — PROGRESS NOTES
[facility]  Progress Note - Michael Marquez 1960, 58 y o  female MRN: [id number]  Unit/Bed#: S -01 Encounter: [id number]  Primary Care Provider: Alfred Logan MD   Date and time admitted to hospital: 3/7/2022  5:03 PM    * Seizure [facility])  Assessment & Plan  POA: Arrived after unwitnessed seizure like activity, tongue laceration, left hand movement  · No h/o seizure however had ICH requiring seizure ppx in 08/2021  · [facility] unremarkable, Labs with acidosis   · 3 electrographic seizures noted on vEEG, last seizure noted was around 6:45 am 03/08  · clonic activity of left arm and roving eye movements  · started on lacosamide, levetiracem, fentanyl, propofol       · No new seizures since 03/08 6:45am    · Per neuro, originating from left temporal lobe, etiology unknown but considering alcohol withdrawal  · Mentation improved  · MRI- Focal diffusion-weighted and FLAIR signal hyperintensity involving the left hippocampal formation is consistent with status epilepticus  Plan:  · Continue Vimpat 100 mg q 12H and will continue monotherapy at this point  · In case of more seizure-like activity, reach out to on-call neurologist   Patient's Vimpat dosage may be escalated  · Psych consulted and recommended  · Recommended to continued gabapentin and Pristiq  · Re-assessed 03/15 by Psychiatry team for IP Psych admission and signed 201 and CM aware to start bed search process  · Continue 1:1  · Medically stable at this point for discharge and pending psych IP placement    Possible Drug overdose  Assessment & Plan  ·  found patient with an open bottle of zoloft next to her before calling EMS  · Mentioned patient had been struggling with alcohol intake reduction and had a heated discussion before leaving    · Discussed with patient by ICU team: stated that she wants to get better and is trying to be more healthy outside the hospital  She has no SI but is unable to recall the events on day of admission  Plan:  · Psych consulted and recommended  · Recommended to continued gabapentin and Pristiq  · Re-assessed 03/15 by Psychiatry team for IP Psych admission and signed 201 and CM aware to start bed search process  · Continue 1:1    Acute systolic congestive heart failure ([address] )  Assessment & Plan  Wt Readings from Last 3 Encounters:   03/09/22 66 7 kg (147 lb 0 8 oz)   03/09/22 66 7 kg (147 lb 0 8 oz)     ECHO on admission with EF 40 compared to 60 in 08/2021  Patchy hypokinesis throughout however basal/apical/mid-inferior motion reserved  Cards consult and is considering stress-induced cardiomyopathy  ? alcohol contribution  Confirmed not contributing to shock     Status post 40 mg of IV Lasix 0 3/13    03/16: Exam this a m  showed lungs CTA bilaterally but patient noted some SOB but tolerable    Plan:  - Cardiogy following    -  Continue Lasix 20 mg daily p o     - continue Metoprolol succinate 25 mg b i d  on discharge   - to get echo in outpatient setting and to f/u with PCP   -  BP will likely not tolerate addition of ACE/ARB  Given previous soft BP is  · Prn diuretics to keep euvolemic  · K being repleted to keep >4, Mag 1 8  · Replete K, Mag         Thrombocytopenia (HCC)  Assessment & Plan  Likely 2/2 alcohol intake, baseline around 50  - Saw hemonc calls in EMR, has not established care   - Platelets at 57 this AM     Lab Results   Component Value Date    WBC 3 16 (L) 03/16/2022    HGB 8 7 (L) 03/16/2022    HCT 26 6 (L) 03/16/2022     (H) 03/16/2022    PLT 57 (L) 03/16/2022         Plan:  - Continue to monitor w/ CBC and clinical signs  - discontinued Keppra per neurology  - will likely need to f/u w/ Hematology in outpatient setting in regards to  Thrombocytopenia/pancytopenia  - NO BLOOD PRODUCTS - [employer]      Anxiety with depression  Assessment & Plan   Will be sent for psych inpatient admission pending bed search   On Pristiq and scheduled gabapentin    Pancytopenia [facility])  Assessment & Plan  Asymptomatic and continue to monitor clinically     Urine output low  Assessment & Plan  I/Os=  +320ml and had no urinary complaints     Plan:  · Monitor I/Os  · Monitor BMP and Cr    Aspiration pneumonia ([address] )  Assessment & Plan  CXR initially with clear lungs  Repeat Imaging with prominent RLL consolidation  Started on cefepime->switched to unasyn, completed 6 total days of antibiotics  DC'd given negative procal on 3/12 labwork  Lungs clear to auscultation on 03/16    Plan:  · Continue Xopenex  · Monitor fever curves, CBC      Fall from standing  Assessment & Plan  · Patient reports severe pain in the right arm 2/2 a fall as well as upper midback pain with evidence of an area of ecchymosis in the left interscapular region  · She also has significant difficulty with abduction of her right arm with tenderness along the anterior and superior aspect of the shoulder  · X-Ray, trauma series: No displaced fractures  Foreshortening of the distal right clavicle likely postoperative in origin  · Repeat x-ray of the shoulder Old right clavicle fracture deformity with superior displacement of the clavicle  Calcific tendinosis  No lytic or blastic osseous lesion    Plan:  · Supportive care  · PT/OT recommend home with home health rehab        VTE Pharmacologic Prophylaxis: VTE Score: 2 Low Risk (Score 0-2) - Encourage Ambulation  Patient Centered Rounds: I performed bedside rounds with nursing staff today  Discussions with Specialists or Other Care Team Provider:  psychiatry, nursing    Education and Discussions with Family / Patient: Patient declined to update      Current Length of Stay: 9 day(s)  Current Patient Status: Inpatient   Discharge Plan: pending psych bed availability    Code Status: Level 1 - Full Code    Subjective:   Patient noted some SOB this AM but isn't too bad   Overall, no complaints And is eagerly awaiting for her placement into the psych facility  The patient states she is not having any active suicidal ideation, homicidal ideation or having any issues with hallucinations whether her visual, tactile/ auditory    She denies any fevers/chill, chest pain, abdominal pain, nausea, vomiting, diarrhea, problems urinating, problems  With bowel movements, and is eating/drinking without problem        Objective:     Vitals:   Temp (24hrs), Av °F (36 7 °C), Min:97 9 °F (36 6 °C), Max:98 °F (36 7 °C)    Temp:  [97 9 °F (36 6 °C)-98 °F (36 7 °C)] 97 9 °F (36 6 °C)  HR:  [] 78  Resp:  [18] 18  BP: (111-129)/(65-78) 129/78  SpO2:  [95 %-98 %] 97 %  Body mass index is 23 03 kg/m²  Input and Output Summary (last 24 hours): Intake/Output Summary (Last 24 hours) at 3/16/2022 1248  Last data filed at 3/16/2022 0900  Gross per 24 hour   Intake 320 ml   Output 0 ml   Net 320 ml       Physical Exam:   Physical Exam  Vitals and nursing note reviewed  Constitutional:       General: She is not in acute distress  Appearance: She is well-developed  HENT:      Head: Normocephalic and atraumatic  Eyes:      Conjunctiva/sclera: Conjunctivae normal    Cardiovascular:      Rate and Rhythm: Normal rate and regular rhythm  Heart sounds: No murmur heard  Pulmonary:      Effort: Pulmonary effort is normal  No respiratory distress  Breath sounds: Normal breath sounds  Abdominal:      Palpations: Abdomen is soft  Tenderness: There is no abdominal tenderness  Musculoskeletal:      Cervical back: Neck supple  Skin:     General: Skin is warm and dry  Neurological:      Mental Status: She is alert  Psychiatric:         Mood and Affect: Mood normal          Behavior: Behavior normal          Thought Content:  Thought content normal          Judgment: Judgment normal       Comments: No thoughts of homicidal or suicidal ideation   No hallucinations (visual, auditory, or tactile)          Additional Data:     Labs:  Results from last 7 days   Lab Units 03/16/22  0530 03/15/22  0507 03/14/22  0525   WBC Thousand/uL 3 16*   < > 3 12*   HEMOGLOBIN g/dL 8 7*   < > 8 7*   HEMATOCRIT % 26 6*   < > 26 2*   PLATELETS Thousands/uL 57*   < > 49*   NEUTROS PCT %  --   --  30*   LYMPHS PCT %  --   --  49*   MONOS PCT %  --   --  18*   EOS PCT %  --   --  2    < > = values in this interval not displayed  Results from last 7 days   Lab Units 03/16/22  0530   SODIUM mmol/L 141   POTASSIUM mmol/L 4 6   CHLORIDE mmol/L 108   CO2 mmol/L 23   BUN mg/dL 8   CREATININE mg/dL 0 69   ANION GAP mmol/L 10   CALCIUM mg/dL 8 3   GLUCOSE RANDOM mg/dL 105         Results from last 7 days   Lab Units 03/11/22  0759 03/10/22  2057 03/10/22  1704 03/10/22  1104 03/10/22  0741 03/09/22  2123 03/09/22  1757   POC GLUCOSE mg/dl 96 112 151* 127 134 137 113         Results from last 7 days   Lab Units 03/13/22  0537 03/12/22  0534   PROCALCITONIN ng/ml 0 15 0 21       Lines/Drains:  Invasive Devices  Report    Peripheral Intravenous Line            Peripheral IV 03/16/22 Right Hand <1 day                      Imaging: No pertinent imaging reviewed      Recent Cultures (last 7 days):         Last 24 Hours Medication List:   Current Facility-Administered Medications   Medication Dose Route Frequency Provider Last Rate    acetaminophen  975 mg Oral Q8H [address] Adriana Alexis MD      albuterol  2 puff Inhalation Q4H PRN Kaelyn Petersen MD      chlorhexidine  15 mL Mouth/Throat Q12H [address] Jennifer Kramer MD      desvenlafaxine succinate  50 mg Oral Daily [address] ROSS, DO      Diclofenac Sodium  2 g Topical 4x Daily Adriana Alexis MD      folic acid  1 mg Oral Daily Jennifer Kramer MD      furosemide  20 mg Oral Daily PAZ Mendoza      gabapentin  300 mg Oral BID John Benitez DO      guaiFENesin  600 mg Oral Q12H [address] Jennifer Kramer MD      lacosamide  100 mg Oral Q12H [address] Jennifer Kramer MD      levalbuterol  1 25 mg Nebulization Q6H PRN Nevaeh Talley MD      LORazepam  2 mg Intravenous Q1H PRN Paula Stevenson MD      melatonin  3 mg Oral HS Paula Stevenson MD      metoprolol succinate  25 mg Oral BID PAZ Rock      multivitamin-minerals  1 tablet Oral Daily Paula Stevenson MD      pantoprazole  40 mg Oral Early Morning Paula Stevenson MD      thiamine  100 mg Oral Daily Paula Stevenson MD          Today, Patient Was Seen By: [facility] DO ROSA    **Please Note: This note may have been constructed using a voice recognition system  **

## 2022-03-16 NOTE — ASSESSMENT & PLAN NOTE
POA: Arrived after unwitnessed seizure like activity, tongue laceration, left hand movement  · No h/o seizure however had ICH requiring seizure ppx in 08/2021  · [facility] unremarkable, Labs with acidosis   · 3 electrographic seizures noted on vEEG, last seizure noted was around 6:45 am 03/08  · clonic activity of left arm and roving eye movements  · started on lacosamide, levetiracem, fentanyl, propofol       · No new seizures since 03/08 6:45am    · Per neuro, originating from left temporal lobe, etiology unknown but considering alcohol withdrawal  · Mentation improved  · MRI- Focal diffusion-weighted and FLAIR signal hyperintensity involving the left hippocampal formation is consistent with status epilepticus  Plan:  · Continue Vimpat 100 mg q 12H and will continue monotherapy at this point  · In case of more seizure-like activity, reach out to on-call neurologist   Patient's Vimpat dosage may be escalated    · Psych consulted and recommended  · Recommended to continued gabapentin and Pristiq  · Re-assessed 03/15 by Psychiatry team for IP Psych admission and signed 201 and CM aware to start bed search process  · Continue 1:1  · Medically stable at this point for discharge and pending psych IP placement

## 2022-03-16 NOTE — CASE MANAGEMENT
Referral placed to [address] [place] [address] Intake unit; requested Covid test and medical clearance note from [facility]  [facility] is reviewing  CM will continue to follow

## 2022-03-17 PROCEDURE — 94760 N-INVAS EAR/PLS OXIMETRY 1: CPT

## 2022-03-17 PROCEDURE — 99232 SBSQ HOSP IP/OBS MODERATE 35: CPT | Performed by: INTERNAL MEDICINE

## 2022-03-17 PROCEDURE — 97116 GAIT TRAINING THERAPY: CPT

## 2022-03-17 PROCEDURE — 97530 THERAPEUTIC ACTIVITIES: CPT

## 2022-03-17 PROCEDURE — 94668 MNPJ CHEST WALL SBSQ: CPT

## 2022-03-17 RX ORDER — ALPRAZOLAM 0.25 MG/1
0.25 TABLET ORAL ONCE
Status: COMPLETED | OUTPATIENT
Start: 2022-03-17 | End: 2022-03-17

## 2022-03-17 RX ORDER — HYDROXYZINE HYDROCHLORIDE 25 MG/1
25 TABLET, FILM COATED ORAL EVERY 6 HOURS PRN
Status: DISCONTINUED | OUTPATIENT
Start: 2022-03-17 | End: 2022-03-18 | Stop reason: HOSPADM

## 2022-03-17 RX ADMIN — DICLOFENAC SODIUM 2 G: 10 GEL TOPICAL at 16:00

## 2022-03-17 RX ADMIN — GABAPENTIN 300 MG: 300 CAPSULE ORAL at 19:35

## 2022-03-17 RX ADMIN — MULTIPLE VITAMINS W/ MINERALS TAB 1 TABLET: TAB ORAL at 08:21

## 2022-03-17 RX ADMIN — FUROSEMIDE 20 MG: 20 TABLET ORAL at 08:21

## 2022-03-17 RX ADMIN — DICLOFENAC SODIUM 2 G: 10 GEL TOPICAL at 08:22

## 2022-03-17 RX ADMIN — METOPROLOL SUCCINATE 25 MG: 25 TABLET, EXTENDED RELEASE ORAL at 23:03

## 2022-03-17 RX ADMIN — ALPRAZOLAM 0.25 MG: 0.25 TABLET ORAL at 17:04

## 2022-03-17 RX ADMIN — HYDROXYZINE HYDROCHLORIDE 25 MG: 25 TABLET, FILM COATED ORAL at 23:03

## 2022-03-17 RX ADMIN — ACETAMINOPHEN 975 MG: 325 TABLET, FILM COATED ORAL at 16:00

## 2022-03-17 RX ADMIN — Medication 3 MG: at 23:03

## 2022-03-17 RX ADMIN — HYDROXYZINE HYDROCHLORIDE 25 MG: 25 TABLET, FILM COATED ORAL at 09:39

## 2022-03-17 RX ADMIN — LACOSAMIDE 100 MG: 100 TABLET, FILM COATED ORAL at 08:21

## 2022-03-17 RX ADMIN — Medication 15 ML: at 08:21

## 2022-03-17 RX ADMIN — GUAIFENESIN 600 MG: 600 TABLET ORAL at 08:21

## 2022-03-17 RX ADMIN — DESVENLAFAXINE 50 MG: 50 TABLET, FILM COATED, EXTENDED RELEASE ORAL at 08:22

## 2022-03-17 RX ADMIN — HYDROXYZINE HYDROCHLORIDE 25 MG: 25 TABLET, FILM COATED ORAL at 16:00

## 2022-03-17 RX ADMIN — GABAPENTIN 300 MG: 300 CAPSULE ORAL at 08:21

## 2022-03-17 RX ADMIN — GUAIFENESIN 600 MG: 600 TABLET ORAL at 23:04

## 2022-03-17 RX ADMIN — FOLIC ACID 1 MG: 1 TABLET ORAL at 08:21

## 2022-03-17 RX ADMIN — LACOSAMIDE 100 MG: 100 TABLET, FILM COATED ORAL at 23:03

## 2022-03-17 RX ADMIN — METOPROLOL SUCCINATE 25 MG: 25 TABLET, EXTENDED RELEASE ORAL at 08:21

## 2022-03-17 RX ADMIN — THIAMINE HCL TAB 100 MG 100 MG: 100 TAB at 08:21

## 2022-03-17 NOTE — PHYSICAL THERAPY NOTE
PHYSICAL THERAPY NOTE      Correct tx time 1204-8350    Patient Name: Courtney Nam  YMVII'P Date: 3/17/2022        03/17/22 1120   PT Last Visit   PT Visit Date 22   Note Type   Note Type Treatment   Pain Assessment   Pain Assessment Tool 0-10   Pain Score No Pain   Pain Location/Orientation Orientation: Right;Location: Arm   Pain Onset/Description Onset: Ongoing   Hospital Pain Intervention(s) Repositioned; Ambulation/increased activity; Emotional support; Rest   Multiple Pain Sites No   Pain Rating: FLACC (Rest) - Face 0   Pain Rating: FLACC (Rest) - Legs 0   Pain Rating: FLACC (Rest) - Activity 0   Pain Rating: FLACC (Rest) - Cry 0   Pain Rating: FLACC (Rest) - Consolability 0   Score: FLACC (Rest) 0   Pain Rating: FLACC (Activity) - Face 0   Pain Rating: FLACC (Activity) - Legs 0   Pain Rating: FLACC (Activity) - Activity 0   Pain Rating: FLACC (Activity) - Cry 0   Pain Rating: FLACC (Activity) - Consolability 0   Score: FLACC (Activity) 0   Restrictions/Precautions   Weight Bearing Precautions Per Order No   Other Precautions 1:1;Cognitive; Fall Risk   General   Chart Reviewed Yes   Response to Previous Treatment Patient with no complaints from previous session  Family/Caregiver Present No   Cognition   Overall Cognitive Status Impaired   Arousal/Participation Alert; Responsive; Cooperative   Attention Within functional limits   Orientation Level Oriented X4   Memory Decreased recall of recent events   Following Commands Follows all commands and directions without difficulty   Comments pt identified by anme and     Subjective   Subjective pt was agreeable to participate in PT interevention    Bed Mobility   Supine to Sit 6  Modified independent   Additional items Assist x 1;HOB elevated; Bedrails; Increased time required   Sit to Supine 6  Modified independent   Additional items Assist x 1;HOB elevated; Bedrails; Increased time required   Additional Comments pt was able to sit EOB   10 minutes while perorming TE activities w/o LOB    Transfers   Sit to Stand 5  Supervision   Additional items Assist x 1; Increased time required;Verbal cues   Stand to Sit 5  Supervision   Additional items Assist x 1; Increased time required;Verbal cues   Toilet transfer 5  Supervision   Additional items Assist x 1; Increased time required;Verbal cues   Additional Comments pt was safely able to perform all functional transfers in todays tx session w/o an AD and no LOB    Ambulation/Elevation   Gait pattern Decreased foot clearance; Inconsistent gabby; Short stride; Excessively slow;Decreased heel strike   Gait Assistance 5  Supervision   Additional items Assist x 1;Verbal cues   Assistive Device None   Distance 120'x1 ND 90'x1 ND    Balance   Static Sitting Good   Dynamic Sitting Good   Static Standing Fair +   Dynamic Standing Fair   Ambulatory Fair   Endurance Deficit   Endurance Deficit Yes   Activity Tolerance   Activity Tolerance Patient tolerated treatment well   Nurse Made Aware Spoke to RN Alexsandra    Exercises   Hip Adduction Sitting;20 reps;AROM; Bilateral  (pillow squeezes )   Knee AROM Long Arc Quad Sitting;20 reps;AROM; Bilateral   Ankle Pumps Sitting;20 reps;AROM; Bilateral   Marching Sitting;20 reps;AROM; Bilateral   Assessment   Prognosis Good   Problem List Decreased strength;Decreased endurance; Impaired balance;Decreased mobility; Decreased safety awareness   Assessment pt began tx session lying supine in the bed and was agreeable to participate in PT intervention  pt continues to remain consistant with mod I as pt used bed rails to complete a supine<>sit EOB transfer and was able to sit EOB while performing TE activities w/o LOB in order to increase static/dynamic sitting balance  pt was able to perform 5STS in todays tx session in order to strengthen LE's and increase endurance and safety with all functional transfers   pt remains consistant with /s for all functional transfers as pt required VC's for hand placement for safety  pt remains consistant with ambulation distance and tolerance in todays tx session compared to previous tx session as pt ambulated 120'x1 ND and 90'x1 ND w/o LOB  post tx session pt in bathroom with 1:1 present post tx session with call bell in arms reach and instruction to pull wire when done  Goals   Patient Goals to go home    STG Expiration Date 03/21/22   PT Treatment Day 3   Plan   Treatment/Interventions Functional transfer training;LE strengthening/ROM; Therapeutic exercise; Endurance training;Patient/family training;Equipment eval/education; Bed mobility;Gait training   Progress Improving as expected   PT Frequency 2-3x/wk   Recommendation   PT Discharge Recommendation Home with home health rehabilitation   Equipment Recommended [place] walker   Change/add to [employer]? No   AM-PAC Basic Mobility Inpatient   Turning in Bed Without Bedrails 4   Lying on Back to Sitting on Edge of Flat Bed 4   Moving Bed to Chair 3   Standing Up From Chair 3   Walk in Room 3   Climb 3-5 Stairs 3   Basic Mobility Inpatient Raw Score 20   Basic Mobility Standardized Score 43 99   Highest Level Of Mobility   JH-HLM Goal 6: Walk 10 steps or more   JH-HLM Highest Level of Mobility 7: Walk 25 feet or more   JH-HLM Goal Achieved Yes   Education   Education Provided Mobility training;Assistive device   Patient Demonstrates acceptance/verbal understanding   End of Consult   Patient Position at End of Consult Other (comment); All needs within reach  (standard toilet post tx session )   The patient's AM-PAC Basic Mobility Inpatient Short Form Raw Score is 20  A Raw score of greater than 16 suggests the patient may benefit from discharge to home  Please also refer to the recommendation of the Physical Therapist for safe discharge planning        Amina Bartlett, PTA

## 2022-03-17 NOTE — ASSESSMENT & PLAN NOTE
POA: Arrived after unwitnessed seizure like activity, tongue laceration, left hand movement  · No h/o seizure however had ICH requiring seizure ppx in 08/2021  · [facility] unremarkable, Labs with acidosis   · 3 electrographic seizures noted on vEEG, last seizure noted was around 6:45 am 03/08  · clonic activity of left arm and roving eye movements  · started on lacosamide, levetiracem, fentanyl, propofol       · No new seizures since 03/08 6:45am    · Per neuro, originating from left temporal lobe, etiology unknown but considering alcohol withdrawal  · Mentation improved  · MRI- Focal diffusion-weighted and FLAIR signal hyperintensity involving the left hippocampal formation is consistent with status epilepticus  Plan:  · Continue Vimpat 100 mg q 12H and will continue monotherapy at this point  · In case of more seizure-like activity, reach out to on-call neurologist   Patient's Vimpat dosage may be escalated    · Psych consulted and recommended  · Recommended to continued gabapentin and Pristiq  · Re-assessed 03/15 by Psychiatry team for IP Psych admission and signed 201 and CM aware to start bed search process  · Continue 1:1  · Medically stable at this point for discharge and pending psych IP placement

## 2022-03-17 NOTE — ASSESSMENT & PLAN NOTE
Likely 2/2 alcohol intake, baseline around 50  - Saw hemonc calls in EMR, has not established care   - Platelets at 57 this AM     Lab Results   Component Value Date    WBC 3 16 (L) 03/16/2022    HGB 8 7 (L) 03/16/2022    HCT 26 6 (L) 03/16/2022     (H) 03/16/2022    PLT 57 (L) 03/16/2022         Plan:  - Continue to monitor clinical signs  - discontinued Keppra per neurology  - will likely need to f/u w/ Hematology in outpatient setting in regards to thrombocytopenia/pancytopenia  - NO BLOOD PRODUCTS - [address]

## 2022-03-17 NOTE — ASSESSMENT & PLAN NOTE
Will be sent for psych inpatient admission pending bed search   On Pristiq and scheduled gabapentin  Given prn atarax 25mg q6 hours for anxiety/panic attacks  Pending psych re-eval and recommendations

## 2022-03-17 NOTE — NURSING NOTE
At 1600 patient was complaining of anxiety  Administered PRN Atarax  Nurse reassessed patient's anxiety around [phone]  Patient said she is still feeling very anxious  Notified [facility] resident  SLIM resident ordered one time dose of PO 0 25 mg Xanax  RN will administer & reassess patient's anxiety         Ishaan Ma RN

## 2022-03-17 NOTE — PROGRESS NOTES
[facility]  Progress Note - Jayce Self 1960, 58 y o  female MRN: [id number]  Unit/Bed#: S -01 Encounter: [id number]  Primary Care Provider: Sharon Hedrick MD   Date and time admitted to hospital: 3/7/2022  5:03 PM    * Seizure [facility])  Assessment & Plan  POA: Arrived after unwitnessed seizure like activity, tongue laceration, left hand movement  · No h/o seizure however had ICH requiring seizure ppx in 08/2021  · [facility] unremarkable, Labs with acidosis   · 3 electrographic seizures noted on vEEG, last seizure noted was around 6:45 am 03/08  · clonic activity of left arm and roving eye movements  · started on lacosamide, levetiracem, fentanyl, propofol       · No new seizures since 03/08 6:45am    · Per neuro, originating from left temporal lobe, etiology unknown but considering alcohol withdrawal  · Mentation improved  · MRI- Focal diffusion-weighted and FLAIR signal hyperintensity involving the left hippocampal formation is consistent with status epilepticus  Plan:  · Continue Vimpat 100 mg q 12H and will continue monotherapy at this point  · In case of more seizure-like activity, reach out to on-call neurologist   Patient's Vimpat dosage may be escalated  · Psych consulted and recommended  · Recommended to continued gabapentin and Pristiq  · Re-assessed 03/15 by Psychiatry team for IP Psych admission and signed 201 and CM aware to start bed search process  · Continue 1:1  · Medically stable at this point for discharge and pending psych IP placement    Possible Drug overdose  Assessment & Plan  ·  found patient with an open bottle of zoloft next to her before calling EMS  · Mentioned patient had been struggling with alcohol intake reduction and had a heated discussion before leaving    · Discussed with patient by ICU team: stated that she wants to get better and is trying to be more healthy outside the hospital  She has no SI but is unable to recall the events on day of admission  Plan:  · Psych consulted and recommended  · Recommended to continued gabapentin and Pristiq  · Re-assessed 03/15 by Psychiatry team for IP Psych admission and signed 201 and CM aware to start bed search process  · Continue 1:1    Acute systolic congestive heart failure ([address] )  Assessment & Plan  Wt Readings from Last 3 Encounters:   03/09/22 66 7 kg (147 lb 0 8 oz)   03/09/22 66 7 kg (147 lb 0 8 oz)     ECHO on admission with EF 40 compared to 60 in 08/2021  Patchy hypokinesis throughout however basal/apical/mid-inferior motion reserved  Cards consult and is considering stress-induced cardiomyopathy  ? alcohol contribution  Confirmed not contributing to shock     Status post 40 mg of IV Lasix 0 3/13    03/16: Exam this a m  showed lungs CTA bilaterally but patient noted some SOB but tolerable    Plan:  - Cardiogy following    - Continue Lasix 20 mg daily p o     - continue Metoprolol succinate 25 mg b i d  on discharge   - to get echo in outpatient setting and to f/u with PCP   -  BP will likely not tolerate addition of ACE/ARB  Given previous soft BP is  · Prn diuretics to keep euvolemic  · K being repleted to keep >4, Mag 1 8  · Replete K, Mag       Thrombocytopenia (HCC)  Assessment & Plan  Likely 2/2 alcohol intake, baseline around 50  - Saw hemonc calls in EMR, has not established care   - Platelets at 57 this AM     Lab Results   Component Value Date    WBC 3 16 (L) 03/16/2022    HGB 8 7 (L) 03/16/2022    HCT 26 6 (L) 03/16/2022     (H) 03/16/2022    PLT 57 (L) 03/16/2022         Plan:  - Continue to monitor clinical signs  - discontinued Keppra per neurology  - will likely need to f/u w/ Hematology in outpatient setting in regards to thrombocytopenia/pancytopenia  - NO BLOOD PRODUCTS - [place]      Anxiety with depression  Assessment & Plan   Will be sent for psych inpatient admission pending bed search   On Pristiq and scheduled gabapentin  Given prn atarax 25mg q6 hours for anxiety/panic attacks  Pending psych re-eval and recommendations    Pancytopenia ([address] )  Assessment & Plan  Asymptomatic and continue to monitor clinically     Urine output low  Assessment & Plan  I/Os=  +600ml and had no urinary complaints     Plan:  · Monitor I/Os  · Hold off on additional labs     Aspiration pneumonia [facility])  Assessment & Plan  CXR initially with clear lungs  Repeat Imaging with prominent RLL consolidation  Started on cefepime->switched to unasyn, completed 6 total days of antibiotics  DC'd given negative procal on 3/12 labwork  Lungs clear to auscultation on 03/17    Plan:  · Continue Xopenex prn  · Monitor fever curves, CBC      Fall from standing  Assessment & Plan  · Patient reports severe pain in the right arm 2/2 a fall as well as upper midback pain with evidence of an area of ecchymosis in the left interscapular region  · She also has significant difficulty with abduction of her right arm with tenderness along the anterior and superior aspect of the shoulder  · X-Ray, trauma series: No displaced fractures  Foreshortening of the distal right clavicle likely postoperative in origin  · Repeat x-ray of the shoulder Old right clavicle fracture deformity with superior displacement of the clavicle  Calcific tendinosis  No lytic or blastic osseous lesion    Plan:  · Supportive care  · PT/OT recommend home with home health rehab        VTE Pharmacologic Prophylaxis: VTE Score: 2 Low Risk (Score 0-2) - Encourage Ambulation  Patient Centered Rounds: I performed bedside rounds with nursing staff today    Discussions with Specialists or Other Care Team Provider:  psychiatry, nursing    Education and Discussions with Family / Patient: Patient declined to update      Current Length of Stay: 10 day(s)  Current Patient Status: Inpatient   Discharge Plan: pending psych bed availability    Code Status: Level 1 - Full Code    Subjective:   Patient noted some shortness of breath/chest tightness due to anxiety attacks and asked for something to help with the anxiety attacks that have been occurring since last night  The patient states she is not having any active suicidal ideation, homicidal ideation or having any issues with hallucinations whether her visual, tactile/ auditory    She denies any fevers/chill, chest pain, abdominal pain, nausea, vomiting, diarrhea, problems urinating, problems  With bowel movements, and is eating/drinking without problem        Objective:     Vitals:   Temp (24hrs), Av 1 °F (36 7 °C), Min:97 8 °F (36 6 °C), Max:98 5 °F (36 9 °C)    Temp:  [97 8 °F (36 6 °C)-98 5 °F (36 9 °C)] 98 5 °F (36 9 °C)  HR:  [87-92] 90  Resp:  [16-18] 17  BP: (112-119)/(64-78) 119/70  SpO2:  [90 %-96 %] 93 %  Body mass index is 23 03 kg/m²  Input and Output Summary (last 24 hours): Intake/Output Summary (Last 24 hours) at 3/17/2022 1333  Last data filed at 3/16/2022 2300  Gross per 24 hour   Intake 280 ml   Output 0 ml   Net 280 ml       Physical Exam:   Physical Exam  Vitals and nursing note reviewed  Constitutional:       General: She is not in acute distress  Appearance: She is well-developed  HENT:      Head: Normocephalic and atraumatic  Eyes:      Conjunctiva/sclera: Conjunctivae normal    Cardiovascular:      Rate and Rhythm: Normal rate and regular rhythm  Heart sounds: No murmur heard  Pulmonary:      Effort: Pulmonary effort is normal  No respiratory distress  Breath sounds: Normal breath sounds  Abdominal:      Palpations: Abdomen is soft  Tenderness: There is no abdominal tenderness  Musculoskeletal:      Cervical back: Neck supple  Skin:     General: Skin is warm and dry  Neurological:      Mental Status: She is alert  Psychiatric:         Mood and Affect: Mood normal          Behavior: Behavior normal          Thought Content:  Thought content normal          Judgment: Judgment normal       Comments: No thoughts of homicidal or suicidal ideation   No hallucinations (visual, auditory, or tactile)          Additional Data:     Labs:  Results from last 7 days   Lab Units 03/16/22  0530 03/15/22  0507 03/14/22  0525   WBC Thousand/uL 3 16*   < > 3 12*   HEMOGLOBIN g/dL 8 7*   < > 8 7*   HEMATOCRIT % 26 6*   < > 26 2*   PLATELETS Thousands/uL 57*   < > 49*   NEUTROS PCT %  --   --  30*   LYMPHS PCT %  --   --  49*   MONOS PCT %  --   --  18*   EOS PCT %  --   --  2    < > = values in this interval not displayed  Results from last 7 days   Lab Units 03/16/22  0530   SODIUM mmol/L 141   POTASSIUM mmol/L 4 6   CHLORIDE mmol/L 108   CO2 mmol/L 23   BUN mg/dL 8   CREATININE mg/dL 0 69   ANION GAP mmol/L 10   CALCIUM mg/dL 8 3   GLUCOSE RANDOM mg/dL 105         Results from last 7 days   Lab Units 03/11/22  0759 03/10/22  2057 03/10/22  1704   POC GLUCOSE mg/dl 96 112 151*         Results from last 7 days   Lab Units 03/13/22  0537 03/12/22  0534   PROCALCITONIN ng/ml 0 15 0 21       Lines/Drains:  Invasive Devices  Report    Peripheral Intravenous Line            Peripheral IV 03/16/22 Right Hand 1 day                      Imaging: No pertinent imaging reviewed      Recent Cultures (last 7 days):         Last 24 Hours Medication List:   Current Facility-Administered Medications   Medication Dose Route Frequency Provider Last Rate    acetaminophen  975 mg Oral Q8H [address] Adriana Jacob MD      albuterol  2 puff Inhalation Q4H PRN Levi Euceda MD      chlorhexidine  15 mL Mouth/Throat Q12H [address] Jayde Vital MD      desvenlafaxine succinate  50 mg Oral Daily [address] [place], DO      Diclofenac Sodium  2 g Topical 4x Daily Adriana Jacob MD      folic acid  1 mg Oral Daily Jayde Vital MD      furosemide  20 mg Oral Daily Charoletpalmira Sickle, CRNP      gabapentin  300 mg Oral BID Rebecca Chery, DO      guaiFENesin  600 mg Oral Q12H [address] Jayde Vital MD      hydrOXYzine HCL  25 mg Oral Q6H PRN Sarahi Vee Severino Goode DO      lacosamide  100 mg Oral Q12H [facility] Elisha Jacob MD      levalbuterol  1 25 mg Nebulization Q6H PRN Kenyon Bhatia MD      LORazepam  2 mg Intravenous Q1H PRN Elisha Jacob MD      melatonin  3 mg Oral HS Elisha Jacob MD      metoprolol succinate  25 mg Oral BID Benjmarcela Tabitha, CRNP      multivitamin-minerals  1 tablet Oral Daily Elisha Jacob MD      pantoprazole  40 mg Oral Early Morning Elisha Jacob MD      thiamine  100 mg Oral Daily Elisha Jacob MD          Today, Patient Was Seen By: Yash Heredia DO    **Please Note: This note may have been constructed using a voice recognition system  **

## 2022-03-17 NOTE — ASSESSMENT & PLAN NOTE
CXR initially with clear lungs  Repeat Imaging with prominent RLL consolidation  Started on cefepime->switched to unasyn, completed 6 total days of antibiotics  DC'd given negative procal on 3/12 labwork      Lungs clear to auscultation on 03/17    Plan:  · Continue Xopenex prn  · Monitor fever curves, CBC

## 2022-03-17 NOTE — ASSESSMENT & PLAN NOTE
I/Os=  +600ml and had no urinary complaints     Plan:  · Monitor I/Os  · Hold off on additional labs

## 2022-03-18 VITALS
WEIGHT: 147.05 LBS | DIASTOLIC BLOOD PRESSURE: 69 MMHG | OXYGEN SATURATION: 93 % | HEART RATE: 82 BPM | TEMPERATURE: 97.8 F | SYSTOLIC BLOOD PRESSURE: 123 MMHG | BODY MASS INDEX: 23.08 KG/M2 | RESPIRATION RATE: 18 BRPM | HEIGHT: 67 IN

## 2022-03-18 PROCEDURE — 99239 HOSP IP/OBS DSCHRG MGMT >30: CPT | Performed by: INTERNAL MEDICINE

## 2022-03-18 PROCEDURE — 0241U HB NFCT DS VIR RESP RNA 4 TRGT: CPT

## 2022-03-18 RX ORDER — GUAIFENESIN 600 MG
600 TABLET, EXTENDED RELEASE 12 HR ORAL EVERY 12 HOURS SCHEDULED
Qty: 30 TABLET | Refills: 0
Start: 2022-03-18 | End: 2022-06-16 | Stop reason: SDDI

## 2022-03-18 RX ORDER — PANTOPRAZOLE SODIUM 40 MG/1
40 TABLET, DELAYED RELEASE ORAL
Refills: 0
Start: 2022-03-19 | End: 2022-06-16

## 2022-03-18 RX ORDER — LACOSAMIDE 100 MG/1
100 TABLET ORAL EVERY 12 HOURS SCHEDULED
Qty: 30 TABLET | Refills: 0 | Status: SHIPPED | OUTPATIENT
Start: 2022-03-18 | End: 2022-06-16

## 2022-03-18 RX ORDER — METOPROLOL SUCCINATE 25 MG/1
25 TABLET, EXTENDED RELEASE ORAL 2 TIMES DAILY
Qty: 60 TABLET | Refills: 0
Start: 2022-03-18 | End: 2022-05-02 | Stop reason: SDUPTHER

## 2022-03-18 RX ORDER — OLANZAPINE 5 MG/1
2.5 TABLET, ORALLY DISINTEGRATING ORAL ONCE
Status: COMPLETED | OUTPATIENT
Start: 2022-03-18 | End: 2022-03-18

## 2022-03-18 RX ORDER — GABAPENTIN 300 MG/1
300 CAPSULE ORAL 2 TIMES DAILY
Refills: 0
Start: 2022-03-18 | End: 2022-04-22 | Stop reason: SDUPTHER

## 2022-03-18 RX ORDER — HYDROXYZINE HYDROCHLORIDE 25 MG/1
25 TABLET, FILM COATED ORAL EVERY 6 HOURS PRN
Qty: 30 TABLET | Refills: 0
Start: 2022-03-18 | End: 2022-05-05 | Stop reason: SDUPTHER

## 2022-03-18 RX ORDER — FUROSEMIDE 20 MG/1
20 TABLET ORAL DAILY
Qty: 30 TABLET | Refills: 0
Start: 2022-03-19 | End: 2022-05-02 | Stop reason: SDDI

## 2022-03-18 RX ORDER — FOLIC ACID 1 MG/1
1 TABLET ORAL DAILY
Qty: 30 TABLET | Refills: 0
Start: 2022-03-19 | End: 2022-07-13 | Stop reason: SDDI

## 2022-03-18 RX ORDER — LEVALBUTEROL INHALATION SOLUTION 1.25 MG/3ML
1.25 SOLUTION RESPIRATORY (INHALATION) EVERY 6 HOURS PRN
Qty: 3 ML | Refills: 0
Start: 2022-03-18

## 2022-03-18 RX ORDER — LANOLIN ALCOHOL/MO/W.PET/CERES
3 CREAM (GRAM) TOPICAL
Qty: 30 TABLET | Refills: 0
Start: 2022-03-18

## 2022-03-18 RX ORDER — LACOSAMIDE 100 MG/1
100 TABLET ORAL EVERY 12 HOURS SCHEDULED
Qty: 20 TABLET | Refills: 0
Start: 2022-03-18 | End: 2022-03-18

## 2022-03-18 RX ORDER — LANOLIN ALCOHOL/MO/W.PET/CERES
100 CREAM (GRAM) TOPICAL DAILY
Qty: 30 TABLET | Refills: 0
Start: 2022-03-19 | End: 2022-06-16 | Stop reason: SDDI

## 2022-03-18 RX ORDER — DESVENLAFAXINE 50 MG/1
50 TABLET, EXTENDED RELEASE ORAL DAILY
Qty: 30 TABLET | Refills: 0
Start: 2022-03-19 | End: 2022-07-13 | Stop reason: ALTCHOICE

## 2022-03-18 RX ADMIN — LACOSAMIDE 100 MG: 100 TABLET, FILM COATED ORAL at 08:13

## 2022-03-18 RX ADMIN — THIAMINE HCL TAB 100 MG 100 MG: 100 TAB at 08:13

## 2022-03-18 RX ADMIN — FUROSEMIDE 20 MG: 20 TABLET ORAL at 08:12

## 2022-03-18 RX ADMIN — OLANZAPINE 2.5 MG: 5 TABLET, ORALLY DISINTEGRATING ORAL at 17:54

## 2022-03-18 RX ADMIN — DESVENLAFAXINE 50 MG: 50 TABLET, FILM COATED, EXTENDED RELEASE ORAL at 08:14

## 2022-03-18 RX ADMIN — ACETAMINOPHEN 975 MG: 325 TABLET, FILM COATED ORAL at 13:42

## 2022-03-18 RX ADMIN — MULTIPLE VITAMINS W/ MINERALS TAB 1 TABLET: TAB ORAL at 08:12

## 2022-03-18 RX ADMIN — HYDROXYZINE HYDROCHLORIDE 25 MG: 25 TABLET, FILM COATED ORAL at 13:47

## 2022-03-18 RX ADMIN — METOPROLOL SUCCINATE 25 MG: 25 TABLET, EXTENDED RELEASE ORAL at 08:13

## 2022-03-18 RX ADMIN — GABAPENTIN 300 MG: 300 CAPSULE ORAL at 08:12

## 2022-03-18 RX ADMIN — GUAIFENESIN 600 MG: 600 TABLET ORAL at 08:12

## 2022-03-18 RX ADMIN — DICLOFENAC SODIUM 2 G: 10 GEL TOPICAL at 08:14

## 2022-03-18 RX ADMIN — FOLIC ACID 1 MG: 1 TABLET ORAL at 08:13

## 2022-03-18 RX ADMIN — HYDROXYZINE HYDROCHLORIDE 25 MG: 25 TABLET, FILM COATED ORAL at 05:20

## 2022-03-18 RX ADMIN — Medication 15 ML: at 08:14

## 2022-03-18 RX ADMIN — DICLOFENAC SODIUM 2 G: 10 GEL TOPICAL at 13:00

## 2022-03-18 NOTE — CASE MANAGEMENT
Case Management Progress Note    Patient name Lavonne Cage  Location S /S -01 MRN [id number]  : 1960 Date 3/18/2022       LOS (days): 11  Geometric Mean LOS (GMLOS) (days): 3 50  Days to GMLOS:-7 3        OBJECTIVE:        Current admission status: Inpatient  Preferred Pharmacy: No Pharmacies Listed  Primary Care Provider: Lakisha Ley MD    Primary Insurance: [employer]  Secondary Insurance:     PROGRESS NOTE:    Abdi[address]  Phone call placed to Rafael Lion  Phone number: [phone]  Call disconnected and forwarded to satisfaction survey   Per prompt, able to submit for pre-cert via [employer] for admission to [facility] (NPI: [id number])

## 2022-03-18 NOTE — ASSESSMENT & PLAN NOTE
·  found patient with an open bottle of zoloft next to her before calling EMS  · Mentioned patient had been struggling with alcohol intake reduction and had a heated discussion before leaving  · Discussed with patient by ICU team: stated that she wants to get better and is trying to be more healthy outside the hospital  She has no SI but is unable to recall the events on day of admission       Plan:  · Psych consulted and recommended  · Recommended to continued gabapentin and Pristiq  · Re-assessed 03/15 by Psychiatry team for IP Psych admission and signed 201 and CM aware to for bed search   · IP Psych bed found and will be d/c 03/18  · Continue 1:1

## 2022-03-18 NOTE — PLAN OF CARE
Problem: Potential for Falls  Goal: Patient will remain free of falls  Description: INTERVENTIONS:  - Educate patient/family on patient safety including physical limitations  - Instruct patient to call for assistance with activity   - Consult OT/PT to assist with strengthening/mobility   - Keep Call bell within reach  - Keep bed low and locked with side rails adjusted as appropriate  - Keep care items and personal belongings within reach  - Initiate and maintain comfort rounds  - Make Fall Risk Sign visible to staff  - Offer Toileting every 2 Hours, in advance of need  - Initiate/Maintain alarm  - Obtain necessary fall risk management equipment  - Apply yellow socks and bracelet for high fall risk patients  - Consider moving patient to room near nurses station  Outcome: Progressing     Problem: MOBILITY - ADULT  Goal: Maintain or return to baseline ADL function  Description: INTERVENTIONS:  -  Assess patient's ability to carry out ADLs; assess patient's baseline for ADL function and identify physical deficits which impact ability to perform ADLs (bathing, care of mouth/teeth, toileting, grooming, dressing, etc )  - Assess/evaluate cause of self-care deficits   - Assess range of motion  - Assess patient's mobility; develop plan if impaired  - Assess patient's need for assistive devices and provide as appropriate  - Encourage maximum independence but intervene and supervise when necessary  - Involve family in performance of ADLs  - Assess for home care needs following discharge   - Consider OT consult to assist with ADL evaluation and planning for discharge  - Provide patient education as appropriate  Outcome: Progressing  Goal: Maintains/Returns to pre admission functional level  Description: INTERVENTIONS:  - Perform BMAT or MOVE assessment daily    - Set and communicate daily mobility goal to care team and patient/family/caregiver     - Collaborate with rehabilitation services on mobility goals if consulted    - Stand patient   - Ambulate patient   - Out of bed to chair  - Out of bed for meals   - Out of bed for toileting  - Record patient progress and toleration of activity level   Outcome: Progressing     Problem: PAIN - ADULT  Goal: Verbalizes/displays adequate comfort level or baseline comfort level  Description: Interventions:  - Encourage patient to monitor pain and request assistance  - Assess pain using appropriate pain scale  - Administer analgesics based on type and severity of pain and evaluate response  - Implement non-pharmacological measures as appropriate and evaluate response  - Consider cultural and social influences on pain and pain management  - Notify physician/advanced practitioner if interventions unsuccessful or patient reports new pain  Outcome: Progressing     Problem: SAFETY ADULT  Goal: Patient will remain free of falls  Description: INTERVENTIONS:  - Educate patient/family on patient safety including physical limitations  - Instruct patient to call for assistance with activity   - Consult OT/PT to assist with strengthening/mobility   - Keep Call bell within reach  - Keep bed low and locked with side rails adjusted as appropriate  - Keep care items and personal belongings within reach  - Initiate and maintain comfort rounds  - Make Fall Risk Sign visible to staff  - Offer Toileting every 2 Hours, in advance of need  - Initiate/Maintain   - Obtain necessary fall risk management equipment:  - Apply yellow socks and bracelet for high fall risk patients  - Consider moving patient to room near nurses station  Outcome: Progressing  Goal: Maintain or return to baseline ADL function  Description: INTERVENTIONS:  -  Assess patient's ability to carry out ADLs; assess patient's baseline for ADL function and identify physical deficits which impact ability to perform ADLs (bathing, care of mouth/teeth, toileting, grooming, dressing, etc )  - Assess/evaluate cause of self-care deficits   - Assess range of motion  - Assess patient's mobility; develop plan if impaired  - Assess patient's need for assistive devices and provide as appropriate  - Encourage maximum independence but intervene and supervise when necessary  - Involve family in performance of ADLs  - Assess for home care needs following discharge   - Consider OT consult to assist with ADL evaluation and planning for discharge  - Provide patient education as appropriate  Outcome: Progressing  Goal: Maintains/Returns to pre admission functional level  Description: INTERVENTIONS:  - Perform BMAT or MOVE assessment daily    - Set and communicate daily mobility goal to care team and patient/family/caregiver  - Collaborate with rehabilitation services on mobility goals if consulted  - Stand patient  - Ambulate patient   - Out of bed to chair  - Out of bed for meals   - Out of bed for toileting  - Record patient progress and toleration of activity level   Outcome: Progressing     Problem: DISCHARGE PLANNING  Goal: Discharge to home or other facility with appropriate resources  Description: INTERVENTIONS:  - Identify barriers to discharge w/patient and caregiver  - Arrange for needed discharge resources and transportation as appropriate  - Identify discharge learning needs (meds, wound care, etc )  - Arrange for interpretive services to assist at discharge as needed  - Refer to Case Management Department for coordinating discharge planning if the patient needs post-hospital services based on physician/advanced practitioner order or complex needs related to functional status, cognitive ability, or social support system  Outcome: Progressing     Problem: Knowledge Deficit  Goal: Patient/family/caregiver demonstrates understanding of disease process, treatment plan, medications, and discharge instructions  Description: Complete learning assessment and assess knowledge base    Interventions:  - Provide teaching at level of understanding  - Provide teaching via preferred learning methods  Outcome: Progressing     Problem: DISCHARGE PLANNING - CARE MANAGEMENT  Goal: Discharge to post-acute care or home with appropriate resources  Description: INTERVENTIONS:  - Conduct assessment to determine patient/family and health care team treatment goals, and need for post-acute services based on payer coverage, community resources, and patient preferences, and barriers to discharge  - Address psychosocial, clinical, and financial barriers to discharge as identified in assessment in conjunction with the patient/family and health care team  - Arrange appropriate level of post-acute services according to patients   needs and preference and payer coverage in collaboration with the physician and health care team  - Communicate with and update the patient/family, physician, and health care team regarding progress on the discharge plan  - Arrange appropriate transportation to post-acute venues  Outcome: Progressing     Problem: NEUROSENSORY - ADULT  Goal: Achieves stable or improved neurological status  Description: INTERVENTIONS  - Monitor and report changes in neurological status  - Monitor vital signs such as temperature, blood pressure, glucose, and any other labs ordered   - Initiate measures to prevent increased intracranial pressure  - Monitor for seizure activity and implement precautions if appropriate      Outcome: Progressing  Goal: Remains free of injury related to seizures activity  Description: INTERVENTIONS  - Maintain airway, patient safety  and administer oxygen as ordered  - Monitor patient for seizure activity, document and report duration and description of seizure to physician/advanced practitioner  - If seizure occurs,  ensure patient safety during seizure  - Reorient patient post seizure  - Seizure pads on all 4 side rails  - Instruct patient/family to notify RN of any seizure activity including if an aura is experienced  - Instruct patient/family to call for assistance with activity based on nursing assessment  - Administer anti-seizure medications if ordered    Outcome: Progressing  Goal: Achieves maximal functionality and self care  Description: INTERVENTIONS  - Monitor swallowing and airway patency with patient fatigue and changes in neurological status  - Encourage and assist patient to increase activity and self care  - Encourage visually impaired, hearing impaired and aphasic patients to use assistive/communication devices  Outcome: Progressing     Problem: Prexisting or High Potential for Compromised Skin Integrity  Goal: Skin integrity is maintained or improved  Description: INTERVENTIONS:  - Identify patients at risk for skin breakdown  - Assess and monitor skin integrity  - Assess and monitor nutrition and hydration status  - Monitor labs   - Assess for incontinence   - Turn and reposition patient  - Assist with mobility/ambulation  - Relieve pressure over bony prominences  - Avoid friction and shearing  - Provide appropriate hygiene as needed including keeping skin clean and dry  - Evaluate need for skin moisturizer/barrier cream  - Collaborate with interdisciplinary team   - Patient/family teaching  - Consider wound care consult   Outcome: Progressing     Problem: Nutrition/Hydration-ADULT  Goal: Nutrient/Hydration intake appropriate for improving, restoring or maintaining nutritional needs  Description: Monitor and assess patient's nutrition/hydration status for malnutrition  Collaborate with interdisciplinary team and initiate plan and interventions as ordered  Monitor patient's weight and dietary intake as ordered or per policy  Utilize nutrition screening tool and intervene as necessary  Determine patient's food preferences and provide high-protein, high-caloric foods as appropriate       INTERVENTIONS:  - Monitor oral intake, urinary output, labs, and treatment plans  - Assess nutrition and hydration status and recommend course of action  - Evaluate amount of meals eaten  - Assist patient with eating if necessary   - Allow adequate time for meals  - Recommend/ encourage appropriate diets, oral nutritional supplements, and vitamin/mineral supplements  - Order, calculate, and assess calorie counts as needed  - Recommend, monitor, and adjust tube feedings and TPN/PPN based on assessed needs  - Assess need for intravenous fluids  - Provide specific nutrition/hydration education as appropriate  - Include patient/family/caregiver in decisions related to nutrition  Outcome: Progressing

## 2022-03-18 NOTE — CASE MANAGEMENT
Case Management Progress Note    Patient name Kiki Collier  Location S /S -01 MRN [id number]  : 1960 Date 3/18/2022       LOS (days): 11  Geometric Mean LOS (GMLOS) (days): 3 50  Days to GMLOS:-7 3        OBJECTIVE:        Current admission status: Inpatient  Preferred Pharmacy: No Pharmacies Listed  Primary Care Provider: Adriana Castanon MD    Primary Insurance: [employer]  Secondary Insurance:     PROGRESS NOTE:    Insurance Authorization  Phone call placed to [facility]  Phone number: [phone]  Spoke to [facility]      7 days approved  Level of care: INPATIENT  Review on 3/25/2022  Next Review with: Marlon Devi @ [phone]     Authorization # [id number]

## 2022-03-18 NOTE — ASSESSMENT & PLAN NOTE
I/Os=  +180ml and had no urinary complaints     Plan:  · Monitor I/Os while at [address]  · Hold off on additional labs

## 2022-03-18 NOTE — ASSESSMENT & PLAN NOTE
POA: Arrived after unwitnessed seizure like activity, tongue laceration, left hand movement  · No h/o seizure however had ICH requiring seizure ppx in 08/2021  · [facility] unremarkable, Labs with acidosis   · 3 electrographic seizures noted on vEEG, last seizure noted was around 6:45 am 03/08  · clonic activity of left arm and roving eye movements  · started on lacosamide, levetiracem, fentanyl, propofol       · No new seizures since 03/08 6:45am    · Per neuro, originating from left temporal lobe, etiology unknown but considering alcohol withdrawal  · Mentation improved  · MRI- Focal diffusion-weighted and FLAIR signal hyperintensity involving the left hippocampal formation is consistent with status epilepticus  Plan:  · Continue Vimpat 100 mg q 12H and will continue monotherapy at this point  · In case of more seizure-like activity, reach out to on-call neurologist at facility  Patient's Vimpat dosage may be escalated    · Psych consulted and recommended  · Recommended to continued gabapentin and Pristiq while at [address] [facility]  · Re-assessed 03/15 by Psychiatry team for IP Psych admission and signed 201 and CM aware   · Continue 1:1  · Psych IP bed found and will be d/c 03/18  · Medically stable at this point for discharge and pending psych IP placement

## 2022-03-18 NOTE — DISCHARGE INSTR - AVS FIRST PAGE
Dear Nadege Guerrero,     It was our pleasure to care for you here at [facility]  It is our hope that we were always able to exceed the expected standards for your care during your stay  You were hospitalized due to seizure  You were cared for on the 4th floor by Ishan Tena DO under the service of [address] [place] [address] with the [facility] who covers for your primary care physician (PCP), Deward Hodgkins, MD, while you were hospitalized  If you have any questions or concerns related to this hospitalization, you may contact us at [phone]  For follow up as well as any medication refills, we recommend that you follow up with your primary care physician  A registered nurse will reach out to you by phone within a few days after your discharge to answer any additional questions that you may have after going home  However, at this time we provide for you here, the most important instructions / recommendations at discharge:    - New Medications on Discharge         - pristiq 50mg daily          - lasix 20mg tablet daily          - gabapentin 300mg 2x/daily          - vimpat 100mg 2x/day         - toprol XL 25mg 2x/daily         - protonix 40mg daily  Important follow up information -   Please follow-up with  Hematology/Oncology  in regards to pancytopenia   Please follow-up with Neurology  Please follow up with your PCP after discharge    Other Instructions -   Please come back to the ED if your symptoms severely worsen, you experience chest pain, or shortness of breath  Please review this entire after visit summary as additional general instructions including medication list, appointments, activity, diet, any pertinent wound care, and other additional recommendations from your care team that may be provided for you        Sincerely,     Ishan Tena DO

## 2022-03-18 NOTE — ASSESSMENT & PLAN NOTE
CXR initially with clear lungs  Repeat Imaging with prominent RLL consolidation  Started on cefepime->switched to unasyn, completed 6 total days of antibiotics  DC'd given negative procal on 3/12 labwork      Lungs clear to auscultation on 03/18    Plan:  · Continue Xopenex prn while at [facility]  · Monitor fever curves while at [address] [place] [address]  ·

## 2022-03-18 NOTE — PROGRESS NOTES
03/18/22 1502   Other Referral/Resources/Interventions Provided:   Interventions Psych Rehab   Referral Comments CM placed referral to [facility] for CTS transport to [place] this evening  CM followed up with Juliana Naidu at [address] who states no time secured yet  Per Juliana Naidu, there are several psych transports pending currently  Juliana Naidu to TT unit charge RN role when time is set to notify nurse  Nurse to notify family

## 2022-03-18 NOTE — ASSESSMENT & PLAN NOTE
Will be sent for psych inpatient admission pending bed search   On Pristiq and scheduled gabapentin  Given prn atarax 25mg q6 hours for anxiety/panic attacks  Pending psych re-eval by [facility] and recommendations but will get d/c 03/18 later in the day and if not assessed by  fiorella, patient will just continue to get evaluated treatment in regards to anxiety/panic attacks at inpatient psych facility

## 2022-03-18 NOTE — PLAN OF CARE
Problem: Potential for Falls  Goal: Patient will remain free of falls  Description: INTERVENTIONS:  - Educate patient/family on patient safety including physical limitations  - Instruct patient to call for assistance with activity   - Consult OT/PT to assist with strengthening/mobility   - Keep Call bell within reach  - Keep bed low and locked with side rails adjusted as appropriate  - Keep care items and personal belongings within reach  - Initiate and maintain comfort rounds  - Make Fall Risk Sign visible to staff  - Offer Toileting every 2 Hours, in advance of need  - Initiate/Maintain bed alarm  - Obtain necessary fall risk management equipment: bed alarm  - Apply yellow socks and bracelet for high fall risk patients  - Consider moving patient to room near nurses station  Outcome: Progressing     Problem: MOBILITY - ADULT  Goal: Maintain or return to baseline ADL function  Description: INTERVENTIONS:  -  Assess patient's ability to carry out ADLs; assess patient's baseline for ADL function and identify physical deficits which impact ability to perform ADLs (bathing, care of mouth/teeth, toileting, grooming, dressing, etc )  - Assess/evaluate cause of self-care deficits   - Assess range of motion  - Assess patient's mobility; develop plan if impaired  - Assess patient's need for assistive devices and provide as appropriate  - Encourage maximum independence but intervene and supervise when necessary  - Involve family in performance of ADLs  - Assess for home care needs following discharge   - Consider OT consult to assist with ADL evaluation and planning for discharge  - Provide patient education as appropriate  Outcome: Progressing  Goal: Maintains/Returns to pre admission functional level  Description: INTERVENTIONS:  - Perform BMAT or MOVE assessment daily    - Set and communicate daily mobility goal to care team and patient/family/caregiver     - Collaborate with rehabilitation services on mobility goals if consulted  - Perform Range of Motion 2 times a day  - Reposition patient every 2 hours    - Dangle patient 2 times a day  - Stand patient 2 times a day  - Ambulate patient 3 times a day  - Out of bed to chair 3 times a day   - Out of bed for meals 3 times a day  - Out of bed for toileting  - Record patient progress and toleration of activity level   Outcome: Progressing     Problem: PAIN - ADULT  Goal: Verbalizes/displays adequate comfort level or baseline comfort level  Description: Interventions:  - Encourage patient to monitor pain and request assistance  - Assess pain using appropriate pain scale  - Administer analgesics based on type and severity of pain and evaluate response  - Implement non-pharmacological measures as appropriate and evaluate response  - Consider cultural and social influences on pain and pain management  - Notify physician/advanced practitioner if interventions unsuccessful or patient reports new pain  Outcome: Progressing     Problem: SAFETY ADULT  Goal: Patient will remain free of falls  Description: INTERVENTIONS:  - Educate patient/family on patient safety including physical limitations  - Instruct patient to call for assistance with activity   - Consult OT/PT to assist with strengthening/mobility   - Keep Call bell within reach  - Keep bed low and locked with side rails adjusted as appropriate  - Keep care items and personal belongings within reach  - Initiate and maintain comfort rounds  - Make Fall Risk Sign visible to staff  - Offer Toileting every 2 Hours, in advance of need  - Initiate/Maintain bed alarm  - Obtain necessary fall risk management equipment: bed alarm  - Apply yellow socks and bracelet for high fall risk patients  - Consider moving patient to room near nurses station  Outcome: Progressing  Goal: Maintain or return to baseline ADL function  Description: INTERVENTIONS:  -  Assess patient's ability to carry out ADLs; assess patient's baseline for ADL function and identify physical deficits which impact ability to perform ADLs (bathing, care of mouth/teeth, toileting, grooming, dressing, etc )  - Assess/evaluate cause of self-care deficits   - Assess range of motion  - Assess patient's mobility; develop plan if impaired  - Assess patient's need for assistive devices and provide as appropriate  - Encourage maximum independence but intervene and supervise when necessary  - Involve family in performance of ADLs  - Assess for home care needs following discharge   - Consider OT consult to assist with ADL evaluation and planning for discharge  - Provide patient education as appropriate  Outcome: Progressing  Goal: Maintains/Returns to pre admission functional level  Description: INTERVENTIONS:  - Perform BMAT or MOVE assessment daily    - Set and communicate daily mobility goal to care team and patient/family/caregiver  - Collaborate with rehabilitation services on mobility goals if consulted  - Perform Range of Motion 2 times a day  - Reposition patient every 2 hours    - Dangle patient 2 times a day  - Stand patient 2 times a day  - Ambulate patient 2 times a day  - Out of bed to chair 3 times a day   - Out of bed for meals 3 times a day  - Out of bed for toileting  - Record patient progress and toleration of activity level   Outcome: Progressing     Problem: DISCHARGE PLANNING  Goal: Discharge to home or other facility with appropriate resources  Description: INTERVENTIONS:  - Identify barriers to discharge w/patient and caregiver  - Arrange for needed discharge resources and transportation as appropriate  - Identify discharge learning needs (meds, wound care, etc )  - Arrange for interpretive services to assist at discharge as needed  - Refer to Case Management Department for coordinating discharge planning if the patient needs post-hospital services based on physician/advanced practitioner order or complex needs related to functional status, cognitive ability, or social support system  Outcome: Progressing     Problem: Knowledge Deficit  Goal: Patient/family/caregiver demonstrates understanding of disease process, treatment plan, medications, and discharge instructions  Description: Complete learning assessment and assess knowledge base    Interventions:  - Provide teaching at level of understanding  - Provide teaching via preferred learning methods  Outcome: Progressing     Problem: DISCHARGE PLANNING - CARE MANAGEMENT  Goal: Discharge to post-acute care or home with appropriate resources  Description: INTERVENTIONS:  - Conduct assessment to determine patient/family and health care team treatment goals, and need for post-acute services based on payer coverage, community resources, and patient preferences, and barriers to discharge  - Address psychosocial, clinical, and financial barriers to discharge as identified in assessment in conjunction with the patient/family and health care team  - Arrange appropriate level of post-acute services according to patients   needs and preference and payer coverage in collaboration with the physician and health care team  - Communicate with and update the patient/family, physician, and health care team regarding progress on the discharge plan  - Arrange appropriate transportation to post-acute venues  Outcome: Progressing     Problem: NEUROSENSORY - ADULT  Goal: Achieves stable or improved neurological status  Description: INTERVENTIONS  - Monitor and report changes in neurological status  - Monitor vital signs such as temperature, blood pressure, glucose, and any other labs ordered   - Initiate measures to prevent increased intracranial pressure  - Monitor for seizure activity and implement precautions if appropriate      Outcome: Progressing  Goal: Remains free of injury related to seizures activity  Description: INTERVENTIONS  - Maintain airway, patient safety  and administer oxygen as ordered  - Monitor patient for seizure activity, document and report duration and description of seizure to physician/advanced practitioner  - If seizure occurs,  ensure patient safety during seizure  - Reorient patient post seizure  - Seizure pads on all 4 side rails  - Instruct patient/family to notify RN of any seizure activity including if an aura is experienced  - Instruct patient/family to call for assistance with activity based on nursing assessment  - Administer anti-seizure medications if ordered    Outcome: Progressing  Goal: Achieves maximal functionality and self care  Description: INTERVENTIONS  - Monitor swallowing and airway patency with patient fatigue and changes in neurological status  - Encourage and assist patient to increase activity and self care  - Encourage visually impaired, hearing impaired and aphasic patients to use assistive/communication devices  Outcome: Progressing     Problem: Prexisting or High Potential for Compromised Skin Integrity  Goal: Skin integrity is maintained or improved  Description: INTERVENTIONS:  - Identify patients at risk for skin breakdown  - Assess and monitor skin integrity  - Assess and monitor nutrition and hydration status  - Monitor labs   - Assess for incontinence   - Turn and reposition patient  - Assist with mobility/ambulation  - Relieve pressure over bony prominences  - Avoid friction and shearing  - Provide appropriate hygiene as needed including keeping skin clean and dry  - Evaluate need for skin moisturizer/barrier cream  - Collaborate with interdisciplinary team   - Patient/family teaching  - Consider wound care consult   Outcome: Progressing     Problem: Nutrition/Hydration-ADULT  Goal: Nutrient/Hydration intake appropriate for improving, restoring or maintaining nutritional needs  Description: Monitor and assess patient's nutrition/hydration status for malnutrition  Collaborate with interdisciplinary team and initiate plan and interventions as ordered    Monitor patient's weight and dietary intake as ordered or per policy  Utilize nutrition screening tool and intervene as necessary  Determine patient's food preferences and provide high-protein, high-caloric foods as appropriate       INTERVENTIONS:  - Monitor oral intake, urinary output, labs, and treatment plans  - Assess nutrition and hydration status and recommend course of action  - Evaluate amount of meals eaten  - Assist patient with eating if necessary   - Allow adequate time for meals  - Recommend/ encourage appropriate diets, oral nutritional supplements, and vitamin/mineral supplements  - Order, calculate, and assess calorie counts as needed  - Recommend, monitor, and adjust tube feedings and TPN/PPN based on assessed needs  - Assess need for intravenous fluids  - Provide specific nutrition/hydration education as appropriate  - Include patient/family/caregiver in decisions related to nutrition  Outcome: Progressing

## 2022-03-18 NOTE — ASSESSMENT & PLAN NOTE
Likely 2/2 alcohol intake, baseline around 50  - Saw hemonc calls in EMR, has not established care   - Platelets at 57 this AM     Lab Results   Component Value Date    WBC 3 16 (L) 03/16/2022    HGB 8 7 (L) 03/16/2022    HCT 26 6 (L) 03/16/2022     (H) 03/16/2022    PLT 57 (L) 03/16/2022         Plan:  · - Continue to monitor clinical signs while at [address] [facility]  - discontinued Keppra per neurology  - will likely need to f/u w/ Hematology in outpatient setting in regards to thrombocytopenia/pancytopenia (referral placed)  - NO BLOOD PRODUCTS - [employer]

## 2022-03-18 NOTE — ASSESSMENT & PLAN NOTE
Wt Readings from Last 3 Encounters:   03/09/22 66 7 kg (147 lb 0 8 oz)   03/09/22 66 7 kg (147 lb 0 8 oz)     ECHO on admission with EF 40 compared to 60 in 08/2021  Patchy hypokinesis throughout however basal/apical/mid-inferior motion reserved  Cards consult and is considering stress-induced cardiomyopathy  ? alcohol contribution  Confirmed not contributing to shock     Status post 40 mg of IV Lasix 0 3/13    03/16: Exam this a m  showed lungs CTA bilaterally but patient noted some SOB but tolerable    Plan:  - Cardiogy signed off  ·  - Continue Lasix 20 mg daily p o   while at [address] [facility] facility  ·  - continue Metoprolol succinate 25 mg b i d  while at [address] [facility] facility   - to get echo in outpatient setting and to f/u with PCP    -  BP will likely not tolerate addition of ACE/ARB  Given previous soft BP is  · Prn diuretics to keep euvolemic

## 2022-03-19 NOTE — PLAN OF CARE
Problem: Potential for Falls  Goal: Patient will remain free of falls  Description: INTERVENTIONS:  - Educate patient/family on patient safety including physical limitations  - Instruct patient to call for assistance with activity   - Consult OT/PT to assist with strengthening/mobility   - Keep Call bell within reach  - Keep bed low and locked with side rails adjusted as appropriate  - Keep care items and personal belongings within reach  - Initiate and maintain comfort rounds  - Make Fall Risk Sign visible to staff  - Offer Toileting every 2 Hours, in advance of need  - Initiate/Maintain bed alarm  - Obtain necessary fall risk management equipment: bed alarm  - Apply yellow socks and bracelet for high fall risk patients  - Consider moving patient to room near nurses station  3/18/2022 2016 by Demetrio Esqueda RN  Outcome: Adequate for Discharge  3/18/2022 1513 by Demetrio Esqueda RN  Outcome: Progressing     Problem: MOBILITY - ADULT  Goal: Maintain or return to baseline ADL function  Description: INTERVENTIONS:  -  Assess patient's ability to carry out ADLs; assess patient's baseline for ADL function and identify physical deficits which impact ability to perform ADLs (bathing, care of mouth/teeth, toileting, grooming, dressing, etc )  - Assess/evaluate cause of self-care deficits   - Assess range of motion  - Assess patient's mobility; develop plan if impaired  - Assess patient's need for assistive devices and provide as appropriate  - Encourage maximum independence but intervene and supervise when necessary  - Involve family in performance of ADLs  - Assess for home care needs following discharge   - Consider OT consult to assist with ADL evaluation and planning for discharge  - Provide patient education as appropriate  3/18/2022 2016 by Demetrio Esqueda RN  Outcome: Adequate for Discharge  3/18/2022 1513 by Demetrio Esqueda RN  Outcome: Progressing  Goal: Maintains/Returns to pre admission functional level  Description: INTERVENTIONS:  - Perform BMAT or MOVE assessment daily    - Set and communicate daily mobility goal to care team and patient/family/caregiver  - Collaborate with rehabilitation services on mobility goals if consulted  - Perform Range of Motion 2 times a day  - Reposition patient every 2 hours    - Dangle patient 2 times a day  - Stand patient 2 times a day  - Ambulate patient 3 times a day  - Out of bed to chair 3 times a day   - Out of bed for meals 3 times a day  - Out of bed for toileting  - Record patient progress and toleration of activity level   3/18/2022 2016 by Francoise Spatz, RN  Outcome: Adequate for Discharge  3/18/2022 1513 by Francoise Spatz, RN  Outcome: Progressing     Problem: PAIN - ADULT  Goal: Verbalizes/displays adequate comfort level or baseline comfort level  Description: Interventions:  - Encourage patient to monitor pain and request assistance  - Assess pain using appropriate pain scale  - Administer analgesics based on type and severity of pain and evaluate response  - Implement non-pharmacological measures as appropriate and evaluate response  - Consider cultural and social influences on pain and pain management  - Notify physician/advanced practitioner if interventions unsuccessful or patient reports new pain  3/18/2022 2016 by Francoise Spatz, RN  Outcome: Adequate for Discharge  3/18/2022 1513 by Francoise Spatz, RN  Outcome: Progressing     Problem: SAFETY ADULT  Goal: Patient will remain free of falls  Description: INTERVENTIONS:  - Educate patient/family on patient safety including physical limitations  - Instruct patient to call for assistance with activity   - Consult OT/PT to assist with strengthening/mobility   - Keep Call bell within reach  - Keep bed low and locked with side rails adjusted as appropriate  - Keep care items and personal belongings within reach  - Initiate and maintain comfort rounds  - Make Fall Risk Sign visible to staff  - Offer Toileting every 2 Hours, in advance of need  - Initiate/Maintain bed alarm  - Obtain necessary fall risk management equipment: bed alarm  - Apply yellow socks and bracelet for high fall risk patients  - Consider moving patient to room near nurses station  3/18/2022 2016 by Elveria Scheuermann, RN  Outcome: Adequate for Discharge  3/18/2022 1513 by Elveria Scheuermann, RN  Outcome: Progressing  Goal: Maintain or return to baseline ADL function  Description: INTERVENTIONS:  -  Assess patient's ability to carry out ADLs; assess patient's baseline for ADL function and identify physical deficits which impact ability to perform ADLs (bathing, care of mouth/teeth, toileting, grooming, dressing, etc )  - Assess/evaluate cause of self-care deficits   - Assess range of motion  - Assess patient's mobility; develop plan if impaired  - Assess patient's need for assistive devices and provide as appropriate  - Encourage maximum independence but intervene and supervise when necessary  - Involve family in performance of ADLs  - Assess for home care needs following discharge   - Consider OT consult to assist with ADL evaluation and planning for discharge  - Provide patient education as appropriate  3/18/2022 2016 by Elveria Scheuermann, RN  Outcome: Adequate for Discharge  3/18/2022 1513 by Elveria Scheuermann, RN  Outcome: Progressing  Goal: Maintains/Returns to pre admission functional level  Description: INTERVENTIONS:  - Perform BMAT or MOVE assessment daily    - Set and communicate daily mobility goal to care team and patient/family/caregiver  - Collaborate with rehabilitation services on mobility goals if consulted  - Perform Range of Motion 2 times a day  - Reposition patient every 2 hours    - Dangle patient 2 times a day  - Stand patient 2 times a day  - Ambulate patient 2 times a day  - Out of bed to chair 3 times a day   - Out of bed for meals 3 times a day  - Out of bed for toileting  - Record patient progress and toleration of activity level   3/18/2022 2016 by Elveria Scheuermann, RN  Outcome: Adequate for Discharge  3/18/2022 1513 by Mehnaz Mazariegos RN  Outcome: Progressing     Problem: DISCHARGE PLANNING  Goal: Discharge to home or other facility with appropriate resources  Description: INTERVENTIONS:  - Identify barriers to discharge w/patient and caregiver  - Arrange for needed discharge resources and transportation as appropriate  - Identify discharge learning needs (meds, wound care, etc )  - Arrange for interpretive services to assist at discharge as needed  - Refer to Case Management Department for coordinating discharge planning if the patient needs post-hospital services based on physician/advanced practitioner order or complex needs related to functional status, cognitive ability, or social support system  3/18/2022 2016 by Mehnaz Mazariegos RN  Outcome: Adequate for Discharge  3/18/2022 1513 by Mehnaz Mazariegos RN  Outcome: Progressing     Problem: Knowledge Deficit  Goal: Patient/family/caregiver demonstrates understanding of disease process, treatment plan, medications, and discharge instructions  Description: Complete learning assessment and assess knowledge base    Interventions:  - Provide teaching at level of understanding  - Provide teaching via preferred learning methods  3/18/2022 2016 by Mehnaz Mazariegos RN  Outcome: Adequate for Discharge  3/18/2022 1513 by Mehnaz Mazariegos RN  Outcome: Progressing     Problem: DISCHARGE PLANNING - CARE MANAGEMENT  Goal: Discharge to post-acute care or home with appropriate resources  Description: INTERVENTIONS:  - Conduct assessment to determine patient/family and health care team treatment goals, and need for post-acute services based on payer coverage, community resources, and patient preferences, and barriers to discharge  - Address psychosocial, clinical, and financial barriers to discharge as identified in assessment in conjunction with the patient/family and health care team  - Arrange appropriate level of post-acute services according to patients   needs and preference and payer coverage in collaboration with the physician and health care team  - Communicate with and update the patient/family, physician, and health care team regarding progress on the discharge plan  - Arrange appropriate transportation to post-acute venues  3/18/2022 2016 by Mary Nunez RN  Outcome: Adequate for Discharge  3/18/2022 1513 by Mary Nunez RN  Outcome: Progressing     Problem: NEUROSENSORY - ADULT  Goal: Achieves stable or improved neurological status  Description: INTERVENTIONS  - Monitor and report changes in neurological status  - Monitor vital signs such as temperature, blood pressure, glucose, and any other labs ordered   - Initiate measures to prevent increased intracranial pressure  - Monitor for seizure activity and implement precautions if appropriate      3/18/2022 2016 by Mary Nunez RN  Outcome: Adequate for Discharge  3/18/2022 1513 by Mary Nunez RN  Outcome: Progressing  Goal: Remains free of injury related to seizures activity  Description: INTERVENTIONS  - Maintain airway, patient safety  and administer oxygen as ordered  - Monitor patient for seizure activity, document and report duration and description of seizure to physician/advanced practitioner  - If seizure occurs,  ensure patient safety during seizure  - Reorient patient post seizure  - Seizure pads on all 4 side rails  - Instruct patient/family to notify RN of any seizure activity including if an aura is experienced  - Instruct patient/family to call for assistance with activity based on nursing assessment  - Administer anti-seizure medications if ordered    3/18/2022 2016 by Mary Nunez RN  Outcome: Adequate for Discharge  3/18/2022 1513 by Mary Nunez RN  Outcome: Progressing  Goal: Achieves maximal functionality and self care  Description: INTERVENTIONS  - Monitor swallowing and airway patency with patient fatigue and changes in neurological status  - Encourage and assist patient to increase activity and self care     - Encourage visually impaired, hearing impaired and aphasic patients to use assistive/communication devices  3/18/2022 2016 by Bryson Rutledge RN  Outcome: Adequate for Discharge  3/18/2022 1513 by Bryson Rutledge RN  Outcome: Progressing     Problem: Prexisting or High Potential for Compromised Skin Integrity  Goal: Skin integrity is maintained or improved  Description: INTERVENTIONS:  - Identify patients at risk for skin breakdown  - Assess and monitor skin integrity  - Assess and monitor nutrition and hydration status  - Monitor labs   - Assess for incontinence   - Turn and reposition patient  - Assist with mobility/ambulation  - Relieve pressure over bony prominences  - Avoid friction and shearing  - Provide appropriate hygiene as needed including keeping skin clean and dry  - Evaluate need for skin moisturizer/barrier cream  - Collaborate with interdisciplinary team   - Patient/family teaching  - Consider wound care consult   3/18/2022 2016 by Bryson Rutledge RN  Outcome: Adequate for Discharge  3/18/2022 1513 by Bryson Rutledge RN  Outcome: Progressing     Problem: Nutrition/Hydration-ADULT  Goal: Nutrient/Hydration intake appropriate for improving, restoring or maintaining nutritional needs  Description: Monitor and assess patient's nutrition/hydration status for malnutrition  Collaborate with interdisciplinary team and initiate plan and interventions as ordered  Monitor patient's weight and dietary intake as ordered or per policy  Utilize nutrition screening tool and intervene as necessary  Determine patient's food preferences and provide high-protein, high-caloric foods as appropriate       INTERVENTIONS:  - Monitor oral intake, urinary output, labs, and treatment plans  - Assess nutrition and hydration status and recommend course of action  - Evaluate amount of meals eaten  - Assist patient with eating if necessary   - Allow adequate time for meals  - Recommend/ encourage appropriate diets, oral nutritional supplements, and vitamin/mineral supplements  - Order, calculate, and assess calorie counts as needed  - Recommend, monitor, and adjust tube feedings and TPN/PPN based on assessed needs  - Assess need for intravenous fluids  - Provide specific nutrition/hydration education as appropriate  - Include patient/family/caregiver in decisions related to nutrition  3/18/2022 2016 by Elveria Scheuermann, RN  Outcome: Adequate for Discharge  3/18/2022 1513 by Elveria Scheuermann, RN  Outcome: Progressing

## 2022-03-21 NOTE — UTILIZATION REVIEW
Notification of Discharge   This is a Notification of Discharge from our facility [address]  Please be advised that this patient has been discharge from our facility  Below you will find the admission and discharge date and time including the patients disposition  UTILIZATION REVIEW CONTACT:  Rabia Lincoln MA  Utilization   Network Utilization Review Department  Phone: [phone] x carefully listen to the prompts  All voicemails are confidential   Email: [email or web address]     PHYSICIAN ADVISORY SERVICES:  FOR NCOP-OY-GEJJ REVIEW - MEDICAL NECESSITY DENIAL  Phone: [phone]  Fax: [phone]  Email: [email or web address]     PRESENTATION DATE: 3/7/2022  5:03 PM  OBERVATION ADMISSION DATE:   INPATIENT ADMISSION DATE: 3/7/22  7:22 PM   DISCHARGE DATE: 3/18/2022  9:33 PM  DISPOSITION: Non [facility] Non [facility]      IMPORTANT INFORMATION:  Send all requests for admission clinical reviews, approved or denied determinations and any other requests to dedicated fax number below belonging to the campus where the patient is receiving treatment   List of dedicated fax numbers:  [address] DENIALS (Administrative/Medical Necessity) [phone]   1000  [facility] (Maternity/NICU/Pediatrics) [phone]   [facility] [phone]   [address] [place] [address] [phone]   [address] [place] [address] [phone]   [address] [phone]   [facility]  [phone]   2205 [facility], [facility]  [address]01 [facility] 1000 [address] [facility] [phone]

## 2022-03-22 ENCOUNTER — TRANSITIONAL CARE MANAGEMENT (OUTPATIENT)
Dept: FAMILY MEDICINE CLINIC | Facility: CLINIC | Age: 62
End: 2022-03-22

## 2022-03-22 ENCOUNTER — TELEPHONE (OUTPATIENT)
Dept: HEMATOLOGY ONCOLOGY | Facility: CLINIC | Age: 62
End: 2022-03-22

## 2022-03-22 NOTE — TELEPHONE ENCOUNTER
Attempted to call patient again to reschedule  LM to call back  I called the surgeon's office to let them know  They said they are cancelling the surgery for now until she can get her clearances done  PAIN

## 2022-04-01 ENCOUNTER — RA CDI HCC (OUTPATIENT)
Dept: OTHER | Facility: HOSPITAL | Age: 62
End: 2022-04-01

## 2022-04-01 NOTE — TELEPHONE ENCOUNTER
Dr Taylor Zhu     Patient had some confusion about her mediction, had some severe head trauma  I reminded her to contact her insurance about the [place], this rang a bell for her, she will contact the office when she is ready to schedule

## 2022-04-01 NOTE — PROGRESS NOTES
[facility]elle [facility]  coding opportunities             Patients Insurance        Commercial Insurance: [employer]

## 2022-04-05 ENCOUNTER — TELEPHONE (OUTPATIENT)
Dept: OBGYN CLINIC | Facility: MEDICAL CENTER | Age: 62
End: 2022-04-05

## 2022-04-05 NOTE — TELEPHONE ENCOUNTER
Patient sees Dr Kurtis Kee  Patient calling to let the dr know the Evenity  once monthly injection given in the office for 12 months, has been approved by [employer] [facility]  Patient is calling to schedule appointment for the injection        # [id number]

## 2022-04-05 NOTE — TELEPHONE ENCOUNTER
Patient was suppose to find out her out of pocket cost  I made her aware back in February of the approval but she wanted to know her cost each month   I left vm on machine asking patient if she found this out

## 2022-04-07 DIAGNOSIS — D62 ACUTE BLOOD LOSS ANEMIA: ICD-10-CM

## 2022-04-07 RX ORDER — M-VIT,TX,IRON,MINS/CALC/FOLIC 27MG-0.4MG
1 TABLET ORAL DAILY
Qty: 26 TABLET | Refills: 0 | Status: SHIPPED | OUTPATIENT
Start: 2022-04-07 | End: 2022-06-16

## 2022-04-13 DIAGNOSIS — I62.9 INTRACRANIAL BLEED (HCC): ICD-10-CM

## 2022-04-13 RX ORDER — LANOLIN ALCOHOL/MO/W.PET/CERES
100 CREAM (GRAM) TOPICAL DAILY
Qty: 26 TABLET | Refills: 0 | Status: SHIPPED | OUTPATIENT
Start: 2022-04-13 | End: 2022-06-16

## 2022-04-22 DIAGNOSIS — F41.8 ANXIETY WITH DEPRESSION: Chronic | ICD-10-CM

## 2022-04-22 RX ORDER — GABAPENTIN 300 MG/1
300 CAPSULE ORAL 2 TIMES DAILY
Qty: 30 CAPSULE | Refills: 0
Start: 2022-04-22 | End: 2022-04-25 | Stop reason: SDUPTHER

## 2022-04-22 NOTE — TELEPHONE ENCOUNTER
Patient is requesting mediation that was given to her at discharge from  the recent hospital discharge  Patient was advised that you have not prescribed this for her and she has not seen you since her discharge  She does have a follow up with you on Wed the 32 th    Did you wish to give a short supply? Also wants Trazodone?  Which is not even on her current medication list?

## 2022-04-25 ENCOUNTER — TELEPHONE (OUTPATIENT)
Dept: SURGICAL ONCOLOGY | Facility: CLINIC | Age: 62
End: 2022-04-25

## 2022-04-25 RX ORDER — GABAPENTIN 300 MG/1
300 CAPSULE ORAL 2 TIMES DAILY
Qty: 30 CAPSULE | Refills: 0 | Status: SHIPPED | OUTPATIENT
Start: 2022-04-25 | End: 2022-05-10

## 2022-04-26 NOTE — TELEPHONE ENCOUNTER
Patient called to schedule her evenity shots, she has still not called her insurance company to find out her out of pocket cost so I advised her to do this before scheduling in case it was something she could not cover, she said she would do this and call back to schedule

## 2022-04-26 NOTE — TELEPHONE ENCOUNTER
Patient states that she spoke with [employer]  They will cover 80%, but she needs to know how much the injection will cost her out of pocket  Please advise      Cb# [phone]

## 2022-04-27 ENCOUNTER — OFFICE VISIT (OUTPATIENT)
Dept: FAMILY MEDICINE CLINIC | Facility: CLINIC | Age: 62
End: 2022-04-27
Payer: COMMERCIAL

## 2022-04-27 VITALS
BODY MASS INDEX: 20.25 KG/M2 | WEIGHT: 129 LBS | SYSTOLIC BLOOD PRESSURE: 138 MMHG | OXYGEN SATURATION: 98 % | HEART RATE: 83 BPM | DIASTOLIC BLOOD PRESSURE: 72 MMHG | HEIGHT: 67 IN

## 2022-04-27 DIAGNOSIS — M80.00XD AGE-RELATED OSTEOPOROSIS WITH CURRENT PATHOLOGICAL FRACTURE WITH ROUTINE HEALING: ICD-10-CM

## 2022-04-27 DIAGNOSIS — F10.10 ETOH ABUSE: ICD-10-CM

## 2022-04-27 DIAGNOSIS — F41.9 ANXIETY: Primary | ICD-10-CM

## 2022-04-27 DIAGNOSIS — F41.0 PANIC ATTACK: ICD-10-CM

## 2022-04-27 DIAGNOSIS — D68.9 COAGULATION DEFECT, UNSPECIFIED (HCC): ICD-10-CM

## 2022-04-27 DIAGNOSIS — K80.20 GALL STONES: ICD-10-CM

## 2022-04-27 DIAGNOSIS — K76.0 FATTY LIVER: ICD-10-CM

## 2022-04-27 DIAGNOSIS — F41.8 ANXIETY WITH DEPRESSION: Chronic | ICD-10-CM

## 2022-04-27 DIAGNOSIS — J69.0 ASPIRATION PNEUMONIA OF RIGHT LOWER LOBE, UNSPECIFIED ASPIRATION PNEUMONIA TYPE (HCC): ICD-10-CM

## 2022-04-27 DIAGNOSIS — D69.6 THROMBOPENIA (HCC): ICD-10-CM

## 2022-04-27 DIAGNOSIS — R56.9 SEIZURE (HCC): ICD-10-CM

## 2022-04-27 DIAGNOSIS — G47.00 INSOMNIA, UNSPECIFIED TYPE: ICD-10-CM

## 2022-04-27 DIAGNOSIS — D61.818 PANCYTOPENIA (HCC): ICD-10-CM

## 2022-04-27 DIAGNOSIS — K76.9 LIVER DISEASE: ICD-10-CM

## 2022-04-27 DIAGNOSIS — Z13.220 SCREENING, LIPID: ICD-10-CM

## 2022-04-27 DIAGNOSIS — I50.9 CONGESTIVE HEART FAILURE, UNSPECIFIED HF CHRONICITY, UNSPECIFIED HEART FAILURE TYPE (HCC): ICD-10-CM

## 2022-04-27 DIAGNOSIS — E78.49 OTHER HYPERLIPIDEMIA: ICD-10-CM

## 2022-04-27 PROCEDURE — 99215 OFFICE O/P EST HI 40 MIN: CPT | Performed by: FAMILY MEDICINE

## 2022-04-27 PROCEDURE — 3008F BODY MASS INDEX DOCD: CPT | Performed by: FAMILY MEDICINE

## 2022-04-27 PROCEDURE — 1036F TOBACCO NON-USER: CPT | Performed by: FAMILY MEDICINE

## 2022-04-27 RX ORDER — TRAZODONE HYDROCHLORIDE 50 MG/1
50 TABLET ORAL
Qty: 30 TABLET | Refills: 0 | Status: SHIPPED | OUTPATIENT
Start: 2022-04-27 | End: 2022-06-02 | Stop reason: SDUPTHER

## 2022-04-27 RX ORDER — TRAZODONE HYDROCHLORIDE 50 MG/1
50 TABLET ORAL DAILY
COMMUNITY
End: 2022-06-30 | Stop reason: SDUPTHER

## 2022-04-27 NOTE — TELEPHONE ENCOUNTER
Spoke with patient  OOP cost for 20% of Evenity is roughly 300-400$  Patient is going to talk to her  because this is a little out of their price range  I told her to let me know if she wants to move forward
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
## 2022-04-28 ENCOUNTER — TELEPHONE (OUTPATIENT)
Dept: NEUROLOGY | Facility: CLINIC | Age: 62
End: 2022-04-28

## 2022-04-28 ENCOUNTER — TELEPHONE (OUTPATIENT)
Dept: PSYCHIATRY | Facility: CLINIC | Age: 62
End: 2022-04-28

## 2022-04-28 PROBLEM — K82.8 PORCELAIN GALLBLADDER: Status: RESOLVED | Noted: 2021-03-22 | Resolved: 2022-04-28

## 2022-04-28 PROBLEM — R73.9 HYPERGLYCEMIA: Status: RESOLVED | Noted: 2021-08-06 | Resolved: 2022-04-28

## 2022-04-28 PROBLEM — S22.41XA CLOSED FRACTURE OF MULTIPLE RIBS OF RIGHT SIDE: Status: RESOLVED | Noted: 2021-08-04 | Resolved: 2022-04-28

## 2022-04-28 PROBLEM — F10.10 ETOH ABUSE: Chronic | Status: RESOLVED | Noted: 2021-08-06 | Resolved: 2022-04-28

## 2022-04-28 PROBLEM — R22.31 MASS OF SKIN OF SHOULDER, RIGHT: Status: RESOLVED | Noted: 2021-09-02 | Resolved: 2022-04-28

## 2022-04-28 PROBLEM — K76.9 LIVER DISEASE: Status: RESOLVED | Noted: 2019-11-12 | Resolved: 2022-04-28

## 2022-04-28 PROBLEM — R34 URINE OUTPUT LOW: Status: RESOLVED | Noted: 2022-03-10 | Resolved: 2022-04-28

## 2022-04-28 PROBLEM — S06.9XAA TBI (TRAUMATIC BRAIN INJURY): Status: RESOLVED | Noted: 2021-08-16 | Resolved: 2022-04-28

## 2022-04-28 PROBLEM — I61.9 ICH (INTRACEREBRAL HEMORRHAGE) (HCC): Status: RESOLVED | Noted: 2021-08-04 | Resolved: 2022-04-28

## 2022-04-28 PROBLEM — I61.9 ICH (INTRACEREBRAL HEMORRHAGE) (HCC): Status: RESOLVED | Noted: 2021-08-16 | Resolved: 2022-04-28

## 2022-04-28 PROBLEM — D68.9 COAGULATION DEFECT, UNSPECIFIED (HCC): Status: RESOLVED | Noted: 2022-04-27 | Resolved: 2022-04-28

## 2022-04-28 PROBLEM — M43.9 COMPRESSION DEFORMITY OF VERTEBRA: Status: RESOLVED | Noted: 2021-03-12 | Resolved: 2022-04-28

## 2022-04-28 PROBLEM — S42.001A FRACTURE OF RIGHT CLAVICLE: Status: RESOLVED | Noted: 2021-08-04 | Resolved: 2022-04-28

## 2022-04-28 PROBLEM — S40.011A: Status: RESOLVED | Noted: 2021-08-06 | Resolved: 2022-04-28

## 2022-04-28 PROBLEM — S32.591A BILATERAL PUBIC RAMI FRACTURES (HCC): Status: RESOLVED | Noted: 2021-08-04 | Resolved: 2022-04-28

## 2022-04-28 PROBLEM — D62 ACUTE BLOOD LOSS ANEMIA: Status: RESOLVED | Noted: 2021-08-06 | Resolved: 2022-04-28

## 2022-04-28 PROBLEM — S06.9X9A TBI (TRAUMATIC BRAIN INJURY) (HCC): Status: RESOLVED | Noted: 2021-08-16 | Resolved: 2022-04-28

## 2022-04-28 PROBLEM — S22.009D: Status: RESOLVED | Noted: 2021-08-06 | Resolved: 2022-04-28

## 2022-04-28 PROBLEM — I60.9 SUBARACHNOID HEMORRHAGE (HCC): Status: RESOLVED | Noted: 2021-08-16 | Resolved: 2022-04-28

## 2022-04-28 PROBLEM — S82.409A: Status: RESOLVED | Noted: 2021-03-19 | Resolved: 2022-04-28

## 2022-04-28 PROBLEM — S30.0XXA CONTUSION, BUTTOCK, INITIAL ENCOUNTER: Status: RESOLVED | Noted: 2021-08-06 | Resolved: 2022-04-28

## 2022-04-28 PROBLEM — F41.8 DEPRESSION WITH ANXIETY: Status: RESOLVED | Noted: 2017-12-01 | Resolved: 2022-04-28

## 2022-04-28 PROBLEM — S32.592A BILATERAL PUBIC RAMI FRACTURES (HCC): Status: RESOLVED | Noted: 2021-08-04 | Resolved: 2022-04-28

## 2022-04-28 PROBLEM — M25.511 ACUTE PAIN OF RIGHT SHOULDER: Status: RESOLVED | Noted: 2021-09-02 | Resolved: 2022-04-28

## 2022-04-28 PROBLEM — S42.001A FRACTURE OF RIGHT CLAVICLE: Status: RESOLVED | Noted: 2021-08-16 | Resolved: 2022-04-28

## 2022-04-28 PROBLEM — F10.11 NONDEPENDENT ALCOHOL ABUSE, IN REMISSION: Status: RESOLVED | Noted: 2017-12-01 | Resolved: 2022-04-28

## 2022-04-28 PROBLEM — G89.29 CHRONIC LOW BACK PAIN: Status: RESOLVED | Noted: 2017-12-01 | Resolved: 2022-04-28

## 2022-04-28 PROBLEM — D64.9 ANEMIA: Status: RESOLVED | Noted: 2019-11-13 | Resolved: 2022-04-28

## 2022-04-28 PROBLEM — I60.9 SUBARACHNOID HEMORRHAGE (HCC): Status: RESOLVED | Noted: 2021-08-04 | Resolved: 2022-04-28

## 2022-04-28 PROBLEM — M79.672 LEFT FOOT PAIN: Status: RESOLVED | Noted: 2021-01-15 | Resolved: 2022-04-28

## 2022-04-28 PROBLEM — R91.8 OPACITY OF LUNG ON IMAGING STUDY: Status: RESOLVED | Noted: 2021-09-24 | Resolved: 2022-04-28

## 2022-04-28 PROBLEM — S01.512A TONGUE LACERATION: Status: RESOLVED | Noted: 2021-08-16 | Resolved: 2022-04-28

## 2022-04-28 PROBLEM — M54.50 CHRONIC LOW BACK PAIN: Status: RESOLVED | Noted: 2017-12-01 | Resolved: 2022-04-28

## 2022-04-28 PROBLEM — F41.9 ANXIETY: Status: RESOLVED | Noted: 2019-11-13 | Resolved: 2022-04-28

## 2022-04-28 NOTE — ASSESSMENT & PLAN NOTE
CHRONIC, H/O ALCOHOL ABUSE  Asymptomatic  Patient has op hematology fup in may  Check CBC prior to appointment

## 2022-04-28 NOTE — TELEPHONE ENCOUNTER
Patient calling to schedule hospital follow up  Needs attending or AP/CRNP 4 weeks  Scheduled with Shaila in July

## 2022-04-28 NOTE — ASSESSMENT & PLAN NOTE
There are gallstone(s) within the gallbladder, without pericholecystic inflammatory changes  Patient prefers monitoring for symptoms

## 2022-04-28 NOTE — ASSESSMENT & PLAN NOTE
Wt Readings from Last 3 Encounters:   04/27/22 58 5 kg (129 lb)   04/13/22 61 2 kg (135 lb)   03/09/22 66 7 kg (147 lb 0 8 oz)     Asymptomatic  Continue bb, lasix as advised during IP cardiology  Patient says she has not received any information at discharge re OP cardiology fup  I have emphasized importance of medical compliance with her , who is here with her today  Cardiology information provided to patient

## 2022-04-28 NOTE — ASSESSMENT & PLAN NOTE
CHRONIC  Ct ABDOMEN 03/ 2022- Marked enlargement and fatty infiltration of the liver  Advised lipid panel

## 2022-04-28 NOTE — ASSESSMENT & PLAN NOTE
Continue all medications as advised by Neurology during inpatient  Again patient has no idea regarding outpatient neurology follow-up  Will be provided with information  Driving restrictions persist till cleared by Neurology

## 2022-04-30 PROBLEM — I50.21 ACUTE SYSTOLIC CONGESTIVE HEART FAILURE (HCC): Status: RESOLVED | Noted: 2022-03-10 | Resolved: 2022-04-30

## 2022-04-30 NOTE — PROGRESS NOTES
Cardiology  Heart Failure   Follow Up Office Visit Note -     Nadege Guerrero   58 y o    female   MRN: [id number]  [address]  [address]  [place] [address] Dorina Tadeo [address] [phone]    PCP: Deward Hodgkins, MD  Cardiologist : Dr Judith Bedoya            Summary of Recommendations  -Low-sodium diet, Heart failure education as below  echo to re-evaluate EF, if remains low then will need an ischemic evaluation   -2 week zio patch  Reports recent return of "panic attacks" ( had in her younger years)  Lasts minutes resolves on own  Describes a feeling that comes over her  -A fasting lipid profile has been ordered by her PCP   -Follow up will be scheduled with Dr Jduith Bedoya in a short interval      Impression/plan  New CM, EF 40%  Likely stress induced in the setting of status epilepticus, adm 3/7-3/18/22  Reassess EF by echo  If he remains suppressed, ischemic evaluation recommended  Chronic HFrEF, adm 3/7-3/18/22  Wt Readings from Last 3 Encounters:   05/02/22 57 1 kg (125 lb 12 8 oz)   04/27/22 58 5 kg (129 lb)   04/13/22 61 2 kg (135 lb)     --beta-blocker:   metoprolol succinate 25 mg b i d   --Diuretic:  discharged on Lasix 20 mg daily  Not taking  --ACE/ARB/ARNI:    --MRA:   --2 g sodium diet, 1800 cc fluid restriction  Daily weights, call weight gain 2-3 lb in 1 day or 5 lb in 5 days  SANTIAGO  Unclear etiology  She reports this is a longstanding symptom  May be related to deconditioning  Await updated echo  Lipids: pending  BP: 122/80  Seizure  Poss drug overdose  Hx ETOH abuse  Not addressed today  ICH 8/21  Found down  Thromboyctopenia  Pl 50K  Lowest 27K 3/10//22  Keppra was discontinued since patient has low platelet count  "Panic attacks", per the patient- recent recurrence:  4 in a day last week, to none in a few weeks  Check 2 week Zio to R/O arrhythmia  Cardiac testing  · TTE 8/9/21  EF 60%  No RWMA  Mild TR   RV normal size and function  Atria normal size    Mild TR   TTE 3/8/22 EF 40%  Systolic function is mildly reduced  There is hypokinesis of the mid-anterior, mid-anteroseptal, mid-anterolateral, mid-inferoseptal and mid-inferior  There appears to be preservation of apical, basal and mid inferolateral segments  RV normal size and function  In the absence of epicardial coronary disease, consider Takotsubo cardiomyopathy mid-ventricular type  HPI:   Lynnette Fabry is a 57 yo male with a history of alcohol abuse and traumatic SAH/ICH 8/21  Adm 3/7-3/18/22  CC: witnessed fall  Change in mental status  Was inc of feces  Seen shaking  Dx:status epilepticus, fall, aspiration PNA  Intubated, requiring pressors; started on pressors for blood pressure support  FLU/RSV/COVID/BC negative  Echo: EF is 40%  The pattern looks consistent with a variant of a stress-induced cardiomyopathy (involving the mid segment as opposed to the apex)  Elevated HS troponin - hs troponin 488 > 2563 > 3485 > 2778 > 2461 > 2215--not MI  Likely myocardial injury secondary to shock and PNA  BP prohibited GDMT for low EF  Eventually, will need re-evaluation of her LV function  --If depressed EF persists in the future despite adequate time for recovery, then can consider additional evaluation for her cardiomyopathy including an ischemic evaluation at that time  Her CT scan does not show significant coronary artery calcifications  Elevation in troponin is non MI troponin elevation in the setting of status epilepticus  Not consistent with acute coronary syndrome  Thromboyctopenia  Pl 50K  Lowest 27 3/10//22  Added Toprol 25 BID  Discharge weight : 147Lb  Discharge creatinine: 0 69  Discharge diuretics:Lasix 20 mg/d  Consider OP stress, if EF remains low    5/2/22  Hospital follow-up  She is accompanied by her   ROS:  She admits to dyspnea on exertion, chronic, not new  She notes this when she goes up steps  She believes this is due to deconditioning    She specifically denies chest pain, pressure or heaviness  No palpitations  She does not exercise  She describes panic attacks that have returned  She calls them panic attacks  She reports she had them in her teens, and earlier, during her 1st marriage  Last week she had 4 in 1 day  She was sitting in the car with her   She felt an overwhelming feeling come over here  It was gone in several minutes, without specific intervention  She denies any specific palpitations, lightheadedness or dizziness  When she feels the panic attacks, she does not take any medication  It resolved without specific intervention  She was prescribed Lasix, she is not taking this  Her weight today is 125 lb, decreased from her discharge weight  She is compliant with her metoprolol succinate  She ran out and was taking her 's  I refilled this today  Agreeable to getting an updated echocardiogram   If her ejection fraction remains low, she will need an ischemic evaluation  We discussed this   Given her cardiomyopathy, will also pursue a 2 week ZIO patch to assess for an arrhythmia that may or may not be associated with her panic attacks        I have spent 40 minutes with Patient and family today in which greater than 50% of this time was spent in counseling/coordination of care regarding Intructions for management, Patient and family education, Importance of tx compliance and Risk factor reductions  Assessment  Diagnoses and all orders for this visit:    Hospital discharge follow-up    Cardiomyopathy, unspecified type ([facility] )  -     Echo complete w/ contrast if indicated; Future    Chronic systolic congestive heart failure (HCC)    ETOH abuse    Other hyperlipidemia    Congestive heart failure, unspecified HF chronicity, unspecified heart failure type [facility])  -     Ambulatory Referral to Cardiology    Panic attacks  -     AMB extended holter monitor;  Future    Cardiomyopathy ([facility] )  -     metoprolol succinate (TOPROL-XL) 25 mg 24 hr tablet; Take 1 tablet (25 mg total) by mouth 2 (two) times a day    Acute systolic congestive heart failure (HCC)  -     metoprolol succinate (TOPROL-XL) 25 mg 24 hr tablet; Take 1 tablet (25 mg total) by mouth 2 (two) times a day        Past Medical History:   Diagnosis Date    Fracture     Hepatitis     Hep A     Insomnia     10mar2016 resolved    Osteoporosis     14jun2016 resolved    Pancreatitis     Seasonal allergies     Urine discoloration 11/11/2019    Varicella     Vomiting 11/13/2019       Review of Systems   Constitutional: Negative for chills  Cardiovascular: Positive for dyspnea on exertion  Negative for chest pain, claudication, cyanosis, irregular heartbeat, leg swelling, near-syncope, orthopnea, palpitations, paroxysmal nocturnal dyspnea and syncope  Respiratory: Negative for cough and shortness of breath  Gastrointestinal: Negative for heartburn and nausea  Neurological: Negative for dizziness, focal weakness, headaches, light-headedness and weakness  Psychiatric/Behavioral:        "panic attacks", as termed and described per the patient   All other systems reviewed and are negative  No Known Allergies    Current Outpatient Medications:     cyanocobalamin (VITAMIN B-12) 500 MCG tablet, Take 1 tablet (500 mcg total) by mouth daily for 26 days, Disp: 26 tablet, Rfl: 0    estradiol (ESTRACE VAGINAL) 0 1 mg/g vaginal cream, Insert 1 g into the vagina 3 (three) times a week Insert 1/4 of applicator into the vagina 3 times a week for 2 weeks   If symptoms improved can reduce to using twice weekly after 2 weeks for maintenance , Disp: 42 g, Rfl: 1    folic acid (FOLVITE) 1 mg tablet, Take 1 tablet (1 mg total) by mouth daily, Disp: 30 tablet, Rfl: 0    gabapentin (NEURONTIN) 300 mg capsule, Take 1 capsule (300 mg total) by mouth 2 (two) times a day, Disp: 30 capsule, Rfl: 0    levalbuterol (XOPENEX) 1 25 mg/3 mL nebulizer solution, Take 3 mL (1 25 mg total) by nebulization every 6 (six) hours as needed for wheezing, Disp: 3 mL, Rfl: 0    melatonin 3 mg, Take 1 tablet (3 mg total) by mouth daily at bedtime, Disp: 30 tablet, Rfl: 0    metoprolol succinate (TOPROL-XL) 25 mg 24 hr tablet, Take 1 tablet (25 mg total) by mouth 2 (two) times a day, Disp: 60 tablet, Rfl: 3    therapeutic multivitamin-minerals (THERAGRAN-M) tablet, Take 1 tablet by mouth daily for 26 days, Disp: 26 tablet, Rfl: 0    thiamine 100 MG tablet, Take 1 tablet (100 mg total) by mouth daily, Disp: 30 tablet, Rfl: 0    thiamine 100 MG tablet, Take 1 tablet (100 mg total) by mouth daily for 26 days, Disp: 26 tablet, Rfl: 0    traZODone (DESYREL) 50 mg tablet, Take 50 mg by mouth in the morning  , Disp: , Rfl:     traZODone (DESYREL) 50 mg tablet, Take 1 tablet (50 mg total) by mouth daily at bedtime, Disp: 30 tablet, Rfl: 0    desvenlafaxine succinate (PRISTIQ) 50 mg 24 hr tablet, Take 1 tablet (50 mg total) by mouth daily (Patient not taking: Reported on 5/2/2022 ), Disp: 30 tablet, Rfl: 0    Diclofenac Sodium (VOLTAREN) 1 %, Apply 2 g topically 4 (four) times a day (Patient not taking: Reported on 5/2/2022 ), Disp: 2 g, Rfl: 0    folic acid (FOLVITE) 1 mg tablet, Take 1 tablet (1 mg total) by mouth daily for 26 days, Disp: 26 tablet, Rfl: 0    guaiFENesin (MUCINEX) 600 mg 12 hr tablet, Take 1 tablet (600 mg total) by mouth every 12 (twelve) hours (Patient not taking: Reported on 5/2/2022 ), Disp: 30 tablet, Rfl: 0    hydrOXYzine HCL (ATARAX) 25 mg tablet, Take 1 tablet (25 mg total) by mouth every 6 (six) hours as needed for anxiety (Patient not taking: Reported on 4/27/2022 ), Disp: 30 tablet, Rfl: 0    lacosamide (VIMPAT) 100 mg tablet, Take 1 tablet (100 mg total) by mouth every 12 (twelve) hours (Patient not taking: Reported on 5/2/2022 ), Disp: 30 tablet, Rfl: 0    pantoprazole (PROTONIX) 40 mg tablet, Take 1 tablet (40 mg total) by mouth daily (Patient not taking: Reported on 5/2/2022 ), Disp: 30 tablet, Rfl: 3    pantoprazole (PROTONIX) 40 mg tablet, Take 1 tablet (40 mg total) by mouth daily in the early morning (Patient not taking: Reported on 5/2/2022 ), Disp: , Rfl: 0    Social History     Socioeconomic History    Marital status: /Civil Union     Spouse name: Not on file    Number of children: Not on file    Years of education: Not on file    Highest education level: Not on file   Occupational History    Not on file   Tobacco Use    Smoking status: Never Smoker    Smokeless tobacco: Never Used   Vaping Use    Vaping Use: Never used   Substance and Sexual Activity    Alcohol use: Not Currently     Alcohol/week: 7 0 standard drinks     Types: 7 Cans of beer per week    Drug use: No    Sexual activity: Yes     Partners: Male   Other Topics Concern    Not on file   Social History Narrative    ** Merged History Encounter **         ** Merged History Encounter **         Drinks coffee  tea     Social Determinants of Health     Financial Resource Strain: Not on file   Food Insecurity: No Food Insecurity    Worried About Running Out of Food in the Last Year: Never true    Shruthi of Food in the Last Year: Never true   Transportation Needs: No Transportation Needs    Lack of Transportation (Medical): No    Lack of Transportation (Non-Medical):  No   Physical Activity: Not on file   Stress: Not on file   Social Connections: Not on file   Intimate Partner Violence: Not on file   Housing Stability: Low Risk     Unable to Pay for Housing in the Last Year: No    Number of Places Lived in the Last Year: 1    Unstable Housing in the Last Year: No       Family History   Problem Relation Age of Onset    Anxiety disorder Mother     Depression Mother     No Known Problems Father     No Known Problems Sister     No Known Problems Sister     No Known Problems Brother     Cancer Paternal Grandmother         unknown     No Known Problems Daughter     No Known Problems Maternal Grandmother     Cancer Maternal Grandfather     No Known Problems Paternal Grandfather     Breast cancer Neg Hx     Ovarian cancer Neg Hx        Physical Exam  Vitals and nursing note reviewed  Constitutional:       General: She is not in acute distress  Appearance: She is not diaphoretic  HENT:      Head: Normocephalic and atraumatic  Eyes:      Conjunctiva/sclera: Conjunctivae normal    Cardiovascular:      Rate and Rhythm: Normal rate and regular rhythm  Pulses: Intact distal pulses  Heart sounds: Normal heart sounds  Pulmonary:      Effort: Pulmonary effort is normal       Breath sounds: Normal breath sounds  Abdominal:      General: Bowel sounds are normal       Palpations: Abdomen is soft  Musculoskeletal:         General: Normal range of motion  Cervical back: Normal range of motion and neck supple  Skin:     General: Skin is warm and dry  Neurological:      Mental Status: She is alert and oriented to person, place, and time  Vitals: Blood pressure 122/80, pulse 76, height 5' 7" (1 702 m), weight 57 1 kg (125 lb 12 8 oz), SpO2 98 %     Wt Readings from Last 3 Encounters:   22 57 1 kg (125 lb 12 8 oz)   22 58 5 kg (129 lb)   22 61 2 kg (135 lb)         Labs & Results:  Lab Results   Component Value Date    WBC 3 16 (L) 2022    HGB 8 7 (L) 2022    HCT 26 6 (L) 2022     (H) 2022    PLT 57 (L) 2022     No results found for: BNP  No components found for: CHEM    Results for orders placed during the hospital encounter of 21    Echo complete with contrast if indicated    Narrative  [place] [address], [address] [facility]  [phone]    Transthoracic Echocardiogram  2D, M-mode, Doppler, and Color Doppler    Study date:  09-Aug-2021    Patient: Dave Mata  MR number: [id number]  Account number: [de-identified]  : 1960  Age: 64 years  Gender: Female  Status: Inpatient  Location: ICU  Height:  Weight: 148 lb  BP: 137/ 62 mmHg    Indications: S/P Hemorrhagic shock  Diagnoses: R57 9 - Shock, unspecified    Sonographer:  HARRISON Page  Primary Physician:  Edinson Mistry MD  Referring Physician:  PAZ Graham  Group:  [id number]  Interpreting Physician:  Sheron Romano DO    SUMMARY    LEFT VENTRICLE:  Systolic function was normal  Ejection fraction was estimated to be 60 %  There were no regional wall motion abnormalities  TRICUSPID VALVE:  There was mild regurgitation  HISTORY: PRIOR HISTORY: Intracranial bleed, ETOH Abuse, Elevated Troponin  PROCEDURE: The study was performed in the ICU  This was a routine study  The transthoracic approach was used  The study included complete 2D imaging, M-mode, complete spectral Doppler, and color Doppler  The heart rate was 79 bpm, at the  start of the study  Images were obtained from the parasternal, apical, subcostal, and suprasternal notch acoustic windows  Echocardiographic views were limited due to restricted patient mobility  Image quality was adequate  LEFT VENTRICLE: Size was normal  Systolic function was normal  Ejection fraction was estimated to be 60 %  There were no regional wall motion abnormalities  Wall thickness was normal  DOPPLER: Left ventricular diastolic function parameters  were normal     RIGHT VENTRICLE: The size was normal  Systolic function was normal  Wall thickness was normal     LEFT ATRIUM: Size was normal     RIGHT ATRIUM: Size was normal     MITRAL VALVE: Valve structure was normal  There was normal leaflet separation  DOPPLER: The transmitral velocity was within the normal range  There was no evidence for stenosis  There was no regurgitation  AORTIC VALVE: The valve was trileaflet  Leaflets exhibited normal thickness and normal cuspal separation  DOPPLER: Transaortic velocity was within the normal range  There was no evidence for stenosis   There was no regurgitation  TRICUSPID VALVE: The valve structure was normal  There was normal leaflet separation  DOPPLER: The transtricuspid velocity was within the normal range  There was no evidence for stenosis  There was mild regurgitation  PULMONIC VALVE: Leaflets exhibited normal thickness, no calcification, and normal cuspal separation  DOPPLER: The transpulmonic velocity was within the normal range  There was no regurgitation  PERICARDIUM: There was no pericardial effusion  The pericardium was normal in appearance  AORTA: The root exhibited normal size  SYSTEMIC VEINS: IVC: The inferior vena cava was normal in size and course  Respirophasic changes were normal     SYSTEM MEASUREMENT TABLES    2D  %FS: 28 4 %  Ao Diam: 3 78 cm  EDV(Teich): 67 77 ml  EF(Teich): 55 42 %  ESV(Teich): 30 21 ml  IVSd: 1 24 cm  LA Area: 15 75 cm2  LA Diam: 3 26 cm  LVEDV MOD A4C: 100 99 ml  LVEF MOD A4C: 63 05 %  LVESV MOD A4C: 37 32 ml  LVIDd: 3 95 cm  LVIDs: 2 83 cm  LVLd A4C: 8 55 cm  LVLs A4C: 6 83 cm  LVPWd: 0 76 cm  RA Area: 16 35 cm2  RVIDd: 3 79 cm  SV MOD A4C: 63 67 ml  SV(Teich): 37 56 ml    CW  AV MaxP 15 mmHg  AV Vmax: 1 34 m/s    MM  TAPSE: 2 22 cm    PW  E' Sept: 0 12 m/s  E/E' Sept: 5 84  LVOT Vmax: 1 35 m/s  LVOT maxP 31 mmHg  MV A Alejandro: 0 83 m/s  MV Dec Reagan: 4 9 m/s2  MV DecT: 146 04 ms  MV E Alejandro: 0 72 m/s  MV E/A Ratio: 0 86  MV PHT: 42 35 ms  MVA By PHT: 5 19 cm2    [employer] Accredited Echocardiography [facility]    Prepared and electronically signed by    Anisa Nguyen DO  Signed 09-Aug-2021 12:42:09    No results found for this or any previous visit  This note was completed in part utilizing m-modal fluency direct voice recognition software  Grammatical errors, random word insertion, spelling mistakes, and incomplete sentences may be an occasional consequence of the system secondary to software limitations, ambient noise and hardware issues   At the time of dictation, efforts were made to edit, clarify and /or correct errors  Please read the chart carefully and recognize, using context, where substitutions have occurred    If you have any questions or concerns about the context, text or information contained within the body of this dictation, please contact myself, the provider, for further clarification

## 2022-05-02 ENCOUNTER — OFFICE VISIT (OUTPATIENT)
Dept: CARDIOLOGY CLINIC | Facility: CLINIC | Age: 62
End: 2022-05-02
Payer: COMMERCIAL

## 2022-05-02 VITALS
SYSTOLIC BLOOD PRESSURE: 122 MMHG | BODY MASS INDEX: 19.74 KG/M2 | OXYGEN SATURATION: 98 % | HEIGHT: 67 IN | HEART RATE: 76 BPM | WEIGHT: 125.8 LBS | DIASTOLIC BLOOD PRESSURE: 80 MMHG

## 2022-05-02 DIAGNOSIS — E78.49 OTHER HYPERLIPIDEMIA: ICD-10-CM

## 2022-05-02 DIAGNOSIS — I50.9 CONGESTIVE HEART FAILURE, UNSPECIFIED HF CHRONICITY, UNSPECIFIED HEART FAILURE TYPE (HCC): ICD-10-CM

## 2022-05-02 DIAGNOSIS — Z09 HOSPITAL DISCHARGE FOLLOW-UP: Primary | ICD-10-CM

## 2022-05-02 DIAGNOSIS — F41.0 PANIC ATTACKS: ICD-10-CM

## 2022-05-02 DIAGNOSIS — I50.22 CHRONIC SYSTOLIC CONGESTIVE HEART FAILURE (HCC): ICD-10-CM

## 2022-05-02 DIAGNOSIS — I42.9 CARDIOMYOPATHY (HCC): ICD-10-CM

## 2022-05-02 DIAGNOSIS — F10.10 ETOH ABUSE: ICD-10-CM

## 2022-05-02 DIAGNOSIS — I42.9 CARDIOMYOPATHY, UNSPECIFIED TYPE (HCC): ICD-10-CM

## 2022-05-02 DIAGNOSIS — I50.21 ACUTE SYSTOLIC CONGESTIVE HEART FAILURE (HCC): ICD-10-CM

## 2022-05-02 PROCEDURE — 99215 OFFICE O/P EST HI 40 MIN: CPT | Performed by: NURSE PRACTITIONER

## 2022-05-02 PROCEDURE — 3008F BODY MASS INDEX DOCD: CPT | Performed by: NURSE PRACTITIONER

## 2022-05-02 PROCEDURE — 1036F TOBACCO NON-USER: CPT | Performed by: NURSE PRACTITIONER

## 2022-05-02 RX ORDER — METOPROLOL SUCCINATE 25 MG/1
25 TABLET, EXTENDED RELEASE ORAL 2 TIMES DAILY
Qty: 60 TABLET | Refills: 3
Start: 2022-05-02 | End: 2022-05-26 | Stop reason: SDUPTHER

## 2022-05-02 NOTE — LETTER
May 2, 2022     Sera Meraz MD  [address]  [place] [address]    Patient: Ana Nieves   YOB: 1960   Date of Visit: 5/2/2022       Dear Dr Daniel Howell:    Thank you for referring Ana Nieves to me for evaluation  Below are my notes for this consultation  If you have questions, please do not hesitate to call me  I look forward to following your patient along with you  Sincerely,        PAZ Navarro        CC: MD Jaspreet Williamson, [facility]  5/2/2022  9:52 AM  Sign when Signing Visit  Cardiology  Heart Failure   Follow Up Office Visit Note -     Ana Nieves   58 y o    female   MRN: [id number]  [address]  [place] [address] [facility] [place] [address]  [phone]  [phone]    PCP: Sera Meraz MD  Cardiologist : Dr Estefania Shirley            Summary of Recommendations  -Low-sodium diet, Heart failure education as below  echo to re-evaluate EF, if remains low then will need an ischemic evaluation   -2 week zio patch  Reports recent return of "panic attacks" ( had in her younger years)  Lasts minutes resolves on own  Describes a feeling that comes over her  -A fasting lipid profile has been ordered by her PCP   -Follow up will be scheduled with Dr Estefania Shirley in a short interval      Impression/plan  New CM, EF 40%  Likely stress induced in the setting of status epilepticus, adm 3/7-3/18/22  Reassess EF by echo  If he remains suppressed, ischemic evaluation recommended  Chronic HFrEF, adm 3/7-3/18/22  Wt Readings from Last 3 Encounters:   05/02/22 57 1 kg (125 lb 12 8 oz)   04/27/22 58 5 kg (129 lb)   04/13/22 61 2 kg (135 lb)     --beta-blocker:   metoprolol succinate 25 mg b i d   --Diuretic:  discharged on Lasix 20 mg daily  Not taking  --ACE/ARB/ARNI:    --MRA:   --2 g sodium diet, 1800 cc fluid restriction  Daily weights, call weight gain 2-3 lb in 1 day or 5 lb in 5 days  SANTIAGO  Unclear etiology  She reports this is a longstanding symptom  May be related to deconditioning  Await updated echo  Lipids: pending  BP: 122/80  Seizure  Poss drug overdose  Hx ETOH abuse  Not addressed today  ICH 8/21  Found down  Thromboyctopenia  Pl 50K  Lowest 27K 3/10//22  Keppra was discontinued since patient has low platelet count  "Panic attacks", per the patient- recent recurrence:  4 in a day last week, to none in a few weeks  Check 2 week Zio to R/O arrhythmia  Cardiac testing  · TTE 8/9/21  EF 60%  No RWMA  Mild TR   RV normal size and function  Atria normal size  Mild TR   TTE 3/8/22 EF 40%  Systolic function is mildly reduced  There is hypokinesis of the mid-anterior, mid-anteroseptal, mid-anterolateral, mid-inferoseptal and mid-inferior  There appears to be preservation of apical, basal and mid inferolateral segments  RV normal size and function  In the absence of epicardial coronary disease, consider Takotsubo cardiomyopathy mid-ventricular type  HPI:   Cori Carolina is a 59 yo male with a history of alcohol abuse and traumatic SAH/ICH 8/21  Adm 3/7-3/18/22  CC: witnessed fall  Change in mental status  Was inc of feces  Seen shaking  Dx:status epilepticus, fall, aspiration PNA  Intubated, requiring pressors; started on pressors for blood pressure support  FLU/RSV/COVID/BC negative  Echo: EF is 40%  The pattern looks consistent with a variant of a stress-induced cardiomyopathy (involving the mid segment as opposed to the apex)  Elevated HS troponin - hs troponin 488 > 2563 > 3485 > 2778 > 2461 > 2215--not MI  Likely myocardial injury secondary to shock and PNA  BP prohibited GDMT for low EF  Eventually, will need re-evaluation of her LV function  --If depressed EF persists in the future despite adequate time for recovery, then can consider additional evaluation for her cardiomyopathy including an ischemic evaluation at that time  Her CT scan does not show significant coronary artery calcifications    Elevation in troponin is non MI troponin elevation in the setting of status epilepticus  Not consistent with acute coronary syndrome  Thromboyctopenia  Pl 50K  Lowest 27 3/10//22  Added Toprol 25 BID  Discharge weight : 147Lb  Discharge creatinine: 0 69  Discharge diuretics:Lasix 20 mg/d  Consider OP stress, if EF remains low    5/2/22  Hospital follow-up  She is accompanied by her   ROS:  She admits to dyspnea on exertion, chronic, not new  She notes this when she goes up steps  She believes this is due to deconditioning  She specifically denies chest pain, pressure or heaviness  No palpitations  She does not exercise  She describes panic attacks that have returned  She calls them panic attacks  She reports she had them in her teens, and earlier, during her 1st marriage  Last week she had 4 in 1 day  She was sitting in the car with her   She felt an overwhelming feeling come over here  It was gone in several minutes, without specific intervention  She denies any specific palpitations, lightheadedness or dizziness  When she feels the panic attacks, she does not take any medication  It resolved without specific intervention  She was prescribed Lasix, she is not taking this  Her weight today is 125 lb, decreased from her discharge weight  She is compliant with her metoprolol succinate  She ran out and was taking her 's  I refilled this today  Agreeable to getting an updated echocardiogram   If her ejection fraction remains low, she will need an ischemic evaluation  We discussed this   Given her cardiomyopathy, will also pursue a 2 week ZIO patch to assess for an arrhythmia that may or may not be associated with her panic attacks        I have spent 40 minutes with Patient and family today in which greater than 50% of this time was spent in counseling/coordination of care regarding Intructions for management, Patient and family education, Importance of tx compliance and Risk factor reductions  Assessment  Diagnoses and all orders for this visit:    Hospital discharge follow-up    Cardiomyopathy, unspecified type ([place] [address] )  -     Echo complete w/ contrast if indicated; Future    Chronic systolic congestive heart failure (HCC)    ETOH abuse    Other hyperlipidemia    Congestive heart failure, unspecified HF chronicity, unspecified heart failure type [facility])  -     Ambulatory Referral to Cardiology    Panic attacks  -     AMB extended holter monitor; Future    Cardiomyopathy ([place] [address] )  -     metoprolol succinate (TOPROL-XL) 25 mg 24 hr tablet; Take 1 tablet (25 mg total) by mouth 2 (two) times a day    Acute systolic congestive heart failure (HCC)  -     metoprolol succinate (TOPROL-XL) 25 mg 24 hr tablet; Take 1 tablet (25 mg total) by mouth 2 (two) times a day        Past Medical History:   Diagnosis Date    Fracture     Hepatitis     Hep A     Insomnia     10mar2016 resolved    Osteoporosis     14jun2016 resolved    Pancreatitis     Seasonal allergies     Urine discoloration 11/11/2019    Varicella     Vomiting 11/13/2019       Review of Systems   Constitutional: Negative for chills  Cardiovascular: Positive for dyspnea on exertion  Negative for chest pain, claudication, cyanosis, irregular heartbeat, leg swelling, near-syncope, orthopnea, palpitations, paroxysmal nocturnal dyspnea and syncope  Respiratory: Negative for cough and shortness of breath  Gastrointestinal: Negative for heartburn and nausea  Neurological: Negative for dizziness, focal weakness, headaches, light-headedness and weakness  Psychiatric/Behavioral:        "panic attacks", as termed and described per the patient   All other systems reviewed and are negative  No Known Allergies        Current Outpatient Medications:     cyanocobalamin (VITAMIN B-12) 500 MCG tablet, Take 1 tablet (500 mcg total) by mouth daily for 26 days, Disp: 26 tablet, Rfl: 0    estradiol (ESTRACE VAGINAL) 0 1 mg/g vaginal cream, Insert 1 g into the vagina 3 (three) times a week Insert 1/4 of applicator into the vagina 3 times a week for 2 weeks   If symptoms improved can reduce to using twice weekly after 2 weeks for maintenance , Disp: 42 g, Rfl: 1    folic acid (FOLVITE) 1 mg tablet, Take 1 tablet (1 mg total) by mouth daily, Disp: 30 tablet, Rfl: 0    gabapentin (NEURONTIN) 300 mg capsule, Take 1 capsule (300 mg total) by mouth 2 (two) times a day, Disp: 30 capsule, Rfl: 0    levalbuterol (XOPENEX) 1 25 mg/3 mL nebulizer solution, Take 3 mL (1 25 mg total) by nebulization every 6 (six) hours as needed for wheezing, Disp: 3 mL, Rfl: 0    melatonin 3 mg, Take 1 tablet (3 mg total) by mouth daily at bedtime, Disp: 30 tablet, Rfl: 0    metoprolol succinate (TOPROL-XL) 25 mg 24 hr tablet, Take 1 tablet (25 mg total) by mouth 2 (two) times a day, Disp: 60 tablet, Rfl: 3    therapeutic multivitamin-minerals (THERAGRAN-M) tablet, Take 1 tablet by mouth daily for 26 days, Disp: 26 tablet, Rfl: 0    thiamine 100 MG tablet, Take 1 tablet (100 mg total) by mouth daily, Disp: 30 tablet, Rfl: 0    thiamine 100 MG tablet, Take 1 tablet (100 mg total) by mouth daily for 26 days, Disp: 26 tablet, Rfl: 0    traZODone (DESYREL) 50 mg tablet, Take 50 mg by mouth in the morning  , Disp: , Rfl:     traZODone (DESYREL) 50 mg tablet, Take 1 tablet (50 mg total) by mouth daily at bedtime, Disp: 30 tablet, Rfl: 0    desvenlafaxine succinate (PRISTIQ) 50 mg 24 hr tablet, Take 1 tablet (50 mg total) by mouth daily (Patient not taking: Reported on 5/2/2022 ), Disp: 30 tablet, Rfl: 0    Diclofenac Sodium (VOLTAREN) 1 %, Apply 2 g topically 4 (four) times a day (Patient not taking: Reported on 5/2/2022 ), Disp: 2 g, Rfl: 0    folic acid (FOLVITE) 1 mg tablet, Take 1 tablet (1 mg total) by mouth daily for 26 days, Disp: 26 tablet, Rfl: 0    guaiFENesin (MUCINEX) 600 mg 12 hr tablet, Take 1 tablet (600 mg total) by mouth every 12 (twelve) hours (Patient not taking: Reported on 5/2/2022 ), Disp: 30 tablet, Rfl: 0    hydrOXYzine HCL (ATARAX) 25 mg tablet, Take 1 tablet (25 mg total) by mouth every 6 (six) hours as needed for anxiety (Patient not taking: Reported on 4/27/2022 ), Disp: 30 tablet, Rfl: 0    lacosamide (VIMPAT) 100 mg tablet, Take 1 tablet (100 mg total) by mouth every 12 (twelve) hours (Patient not taking: Reported on 5/2/2022 ), Disp: 30 tablet, Rfl: 0    pantoprazole (PROTONIX) 40 mg tablet, Take 1 tablet (40 mg total) by mouth daily (Patient not taking: Reported on 5/2/2022 ), Disp: 30 tablet, Rfl: 3    pantoprazole (PROTONIX) 40 mg tablet, Take 1 tablet (40 mg total) by mouth daily in the early morning (Patient not taking: Reported on 5/2/2022 ), Disp: , Rfl: 0    Social History     Socioeconomic History    Marital status: /Civil Union     Spouse name: Not on file    Number of children: Not on file    Years of education: Not on file    Highest education level: Not on file   Occupational History    Not on file   Tobacco Use    Smoking status: Never Smoker    Smokeless tobacco: Never Used   Vaping Use    Vaping Use: Never used   Substance and Sexual Activity    Alcohol use: Not Currently     Alcohol/week: 7 0 standard drinks     Types: 7 Cans of beer per week    Drug use: No    Sexual activity: Yes     Partners: Male   Other Topics Concern    Not on file   Social History Narrative    ** Merged History Encounter **         ** Merged History Encounter **         Drinks coffee  tea     Social Determinants of Health     Financial Resource Strain: Not on file   Food Insecurity: No Food Insecurity    Worried About Running Out of Food in the Last Year: Never true    Shruthi of Food in the Last Year: Never true   Transportation Needs: No Transportation Needs    Lack of Transportation (Medical): No    Lack of Transportation (Non-Medical):  No   Physical Activity: Not on file   Stress: Not on file   Social Connections: Not on file   Intimate Partner Violence: Not on file   Housing Stability: [address] Unable to Pay for Housing in the Last Year: No    Number of Places Lived in the Last Year: 1    Unstable Housing in the Last Year: No       Family History   Problem Relation Age of Onset    Anxiety disorder Mother     Depression Mother     No Known Problems Father     No Known Problems Sister     No Known Problems Sister     No Known Problems Brother     Cancer Paternal Grandmother         unknown     No Known Problems Daughter     No Known Problems Maternal Grandmother     Cancer Maternal Grandfather     No Known Problems Paternal Grandfather     Breast cancer Neg Hx     Ovarian cancer Neg Hx        Physical Exam  Vitals and nursing note reviewed  Constitutional:       General: She is not in acute distress  Appearance: She is not diaphoretic  HENT:      Head: Normocephalic and atraumatic  Eyes:      Conjunctiva/sclera: Conjunctivae normal    Cardiovascular:      Rate and Rhythm: Normal rate and regular rhythm  Pulses: Intact distal pulses  Heart sounds: Normal heart sounds  Pulmonary:      Effort: Pulmonary effort is normal       Breath sounds: Normal breath sounds  Abdominal:      General: Bowel sounds are normal       Palpations: Abdomen is soft  Musculoskeletal:         General: Normal range of motion  Cervical back: Normal range of motion and neck supple  Skin:     General: Skin is warm and dry  Neurological:      Mental Status: She is alert and oriented to person, place, and time  Vitals: Blood pressure 122/80, pulse 76, height 5' 7" (1 702 m), weight 57 1 kg (125 lb 12 8 oz), SpO2 98 %     Wt Readings from Last 3 Encounters:   05/02/22 57 1 kg (125 lb 12 8 oz)   04/27/22 58 5 kg (129 lb)   04/13/22 61 2 kg (135 lb)         Labs & Results:  Lab Results   Component Value Date    WBC 3 16 (L) 03/16/2022    HGB 8 7 (L) 03/16/2022    HCT 26 6 (L) 03/16/2022     (H) 03/16/2022    PLT 57 (L) 2022     No results found for: BNP  No components found for: CHEM    Results for orders placed during the hospital encounter of 21    Echo complete with contrast if indicated    Narrative  [facility]  [phone]    Transthoracic Echocardiogram  2D, M-mode, Doppler, and Color Doppler    Study date:  09-Aug-2021    Patient: Sharda Eckert  MR number: [id number]  Account number: [de-identified]  : 1960  Age: 64 years  Gender: Female  Status: Inpatient  Location: ICU  Height:  Weight: 148 lb  BP: 137/ 62 mmHg    Indications: S/P Hemorrhagic shock  Diagnoses: R57 9 - Shock, unspecified    Sonographer:  HARRISON Parnell  Primary Physician:  Chrissy Negron MD  Referring Physician:  PAZ Chavez  Group:  [facility]  Interpreting Physician:  Mary Jane Marrero DO    SUMMARY    LEFT VENTRICLE:  Systolic function was normal  Ejection fraction was estimated to be 60 %  There were no regional wall motion abnormalities  TRICUSPID VALVE:  There was mild regurgitation  HISTORY: PRIOR HISTORY: Intracranial bleed, ETOH Abuse, Elevated Troponin  PROCEDURE: The study was performed in the ICU  This was a routine study  The transthoracic approach was used  The study included complete 2D imaging, M-mode, complete spectral Doppler, and color Doppler  The heart rate was 79 bpm, at the  start of the study  Images were obtained from the parasternal, apical, subcostal, and suprasternal notch acoustic windows  Echocardiographic views were limited due to restricted patient mobility  Image quality was adequate  LEFT VENTRICLE: Size was normal  Systolic function was normal  Ejection fraction was estimated to be 60 %  There were no regional wall motion abnormalities   Wall thickness was normal  DOPPLER: Left ventricular diastolic function parameters  were normal     RIGHT VENTRICLE: The size was normal  Systolic function was normal  Wall thickness was normal     LEFT ATRIUM: Size was normal     RIGHT ATRIUM: Size was normal     MITRAL VALVE: Valve structure was normal  There was normal leaflet separation  DOPPLER: The transmitral velocity was within the normal range  There was no evidence for stenosis  There was no regurgitation  AORTIC VALVE: The valve was trileaflet  Leaflets exhibited normal thickness and normal cuspal separation  DOPPLER: Transaortic velocity was within the normal range  There was no evidence for stenosis  There was no regurgitation  TRICUSPID VALVE: The valve structure was normal  There was normal leaflet separation  DOPPLER: The transtricuspid velocity was within the normal range  There was no evidence for stenosis  There was mild regurgitation  PULMONIC VALVE: Leaflets exhibited normal thickness, no calcification, and normal cuspal separation  DOPPLER: The transpulmonic velocity was within the normal range  There was no regurgitation  PERICARDIUM: There was no pericardial effusion  The pericardium was normal in appearance  AORTA: The root exhibited normal size  SYSTEMIC VEINS: IVC: The inferior vena cava was normal in size and course   Respirophasic changes were normal     SYSTEM MEASUREMENT TABLES    2D  %FS: 28 4 %  Ao Diam: 3 78 cm  EDV(Teich): 67 77 ml  EF(Teich): 55 42 %  ESV(Teich): 30 21 ml  IVSd: 1 24 cm  LA Area: 15 75 cm2  LA Diam: 3 26 cm  LVEDV MOD A4C: 100 99 ml  LVEF MOD A4C: 63 05 %  LVESV MOD A4C: 37 32 ml  LVIDd: 3 95 cm  LVIDs: 2 83 cm  LVLd A4C: 8 55 cm  LVLs A4C: 6 83 cm  LVPWd: 0 76 cm  RA Area: 16 35 cm2  RVIDd: 3 79 cm  SV MOD A4C: 63 67 ml  SV(Teich): 37 56 ml    CW  AV MaxP 15 mmHg  AV Vmax: 1 34 m/s    MM  TAPSE: 2 22 cm    PW  E' Sept: 0 12 m/s  E/E' Sept: 5 84  LVOT Vmax: 1 35 m/s  LVOT maxP 31 mmHg  MV A Alejandro: 0 83 m/s  MV Dec [place]: 4 9 m/s2  MV DecT: 146 04 ms  MV E Alejandro: 0 72 m/s  MV E/A Ratio: 0 86  MV PHT: 42 35 ms  MVA By PHT: 5 19 cm2    [employer] Accredited Echocardiography Laboratory    Prepared and electronically signed by    Mick Spaulding DO  Signed 09-Aug-2021 12:42:09    No results found for this or any previous visit  This note was completed in part utilizing [employer] direct voice recognition software  Grammatical errors, random word insertion, spelling mistakes, and incomplete sentences may be an occasional consequence of the system secondary to software limitations, ambient noise and hardware issues  At the time of dictation, efforts were made to edit, clarify and /or correct errors  Please read the chart carefully and recognize, using context, where substitutions have occurred    If you have any questions or concerns about the context, text or information contained within the body of this dictation, please contact myself, the provider, for further clarification

## 2022-05-02 NOTE — PATIENT INSTRUCTIONS
DASH Eating Plan   WHAT YOU NEED TO KNOW:   The DASH (Dietary Approaches to Stop Hypertension) Eating Plan is designed to help prevent or lower high blood pressure  It can also help to lower LDL (bad) cholesterol and decrease your risk for heart disease  The plan is low in sodium, sugar, unhealthy fats, and total fat  It is high in potassium, calcium, magnesium, and fiber  These nutrients are added when you eat more fruits, vegetables, and whole grains  With the DASH eating plan, you need to eat a certain number of servings from each food group  This will help you get enough of certain nutrients and limit others  The amount of servings you should eat depends on how many calories you need  Your dietitian can      DISCHARGE INSTRUCTIONS:   What you need to know about sodium:  Your dietitian will tell you how much sodium is safe for you to have each day  People with high blood pressure should have no more than 1,500 to 2,300 mg of sodium in a day  A teaspoon (tsp) of salt has 2,300 mg of sodium  This may seem like a difficult goal, but small changes to the foods you eat can make a big difference  Your healthcare provider or dietitian can help you create a meal plan that follows your sodium limit  · Read food labels  Food labels can help you choose foods that are low in sodium  The amount of sodium is listed in milligrams (mg)  The % Daily Value (DV) column tells you how much of your daily needs are met by 1 serving of the food for each nutrient listed  Choose foods that have less than 5% of the DV of sodium  These foods are considered low in sodium  Foods that have 20% or more of the DV of sodium are considered high in sodium  Avoid foods that have more than 300 mg of sodium in each serving  Choose foods that say low-sodium, reduced-sodium, or no salt added on the food label  · Limit added salt  Do not salt food at the table if you add salt when you cook   Use herbs and spices, such as onions, garlic, and salt-free seasonings to add flavor  Try lemon or lime juice or vinegar to add a tart flavor  Use hot peppers or a small amount of hot pepper sauce to add a spicy flavor  Limit foods high in added salt, such as the following:    ? Seasonings made with salt, such as garlic salt, celery salt, onion salt, seasoned salt, meat tenderizers, and monosodium glutamate (MSG)    ? Miso soup and canned or dried soup mixes    ? Regular soy sauce, barbecue sauce, teriyaki sauce, steak sauce, Worcestershire sauce, and most flavored vinegars    ? Snack foods, such as salted chips, popcorn, pretzels, pork rinds, salted crackers, and salted nuts    ? Frozen foods, such as dinners, entrees, vegetables with sauces, and breaded meats    · Ask about salt substitutes  Ask your healthcare provider if you may use salt substitutes  Some salt substitutes have ingredients that can be harmful if you have certain health conditions  · Choose foods carefully at restaurants  Meals from restaurants, especially fast food restaurants, are often high in sodium  Some restaurants have nutrition information that tells you the amount of sodium in their foods  Ask to have your food prepared with less, or no salt  What you need to know about fats:  Healthy fats include unsaturated fats and omega-3 fatty acids  Unhealthy fats include saturated fats and trans fats  · Include healthy fats, such as the following:      ? Cooking oils, such as soybean, canola, olive, or sunflower    ? Fatty fish, such as salmon, tuna, mackerel, or sardines    ? Flaxseed oil or ground flaxseed    ? ½ cup of cooked beans, such as black beans, kidney beans, or cedeno beans    ? 1½ ounces of low-sodium nuts, such as almonds or walnuts    ? Low-sugar, low-sodium peanut butter    ? Seeds such as isra seeds or sunflower seeds       · Limit or do not have unhealthy fats, such as the following:      ?  Foods that contain fat from animals, such as fatty meats, whole milk, butter, and cream    ? Shortening, stick margarine, palm oil, and coconut oil    ? Full-fat or creamy salad dressing    ? Creamy soup    ? Crackers, chips, and baked goods made with margarine or shortening    ? Foods that are fried in unhealthy fats    ? Gravy and sauces, such as Cedric or cheese sauces    What you need to know about carbohydrates (carbs): All carbs break down into sugar  Complex carbs contain more fiber than simple carbs  This means complex carbs go into the bloodstream more slowly and cause less of a blood sugar spike  Try to include more complex carbs and fewer simple carbs  · Include complex carbs, such as the following:      ? 1 slice of whole-grain bread    ? 1 ounce of dry cereal that does not contain added sugar    ? ½ cup of cooked oatmeal    ? 2 ounces of cooked whole-grain pasta    ? ½ cup of cooked brown rice    · Limit or do not have simple carbs, such as the following:      ? Baked goods, such as doughnuts, pastries, and cookies    ? Mixes for cornbread and biscuits    ? White rice and pasta mixes, such as boxed macaroni and cheese    ? Instant and cold cereals that contain sugar    ? Jelly, jam, and ice cream that contain sugar    ? Condiments such as ketchup    ? Drinks high in sugar, such as soft drinks, lemonade, and fruit juice    What you need to know about vegetables and fruits:  Vegetables and fruits can be fresh, frozen, or canned  If possible, try to choose low-sodium canned options  · Include a variety of vegetables and fruits, such as the following:      ? 1 medium apple, pear, or peach (about ½ cup chopped)    ? ½ small banana    ? ½ cup berries, such as blueberries, strawberries, or blackberries    ? 1 cup of raw leafy greens, such as lettuce, spinach, kale, or jose r greens    ? ½ cup of frozen or canned (no added salt) vegetables, such as green beans    ? ½ cup of fresh, frozen, or canned fruit (canned in light syrup or fruit juice)    ?  ½ cup of vegetable or fruit juice    · Limit or do not have vegetables and fruits made in the following ways:      ? Frozen fruit such as cherries that have added sugar    ? Fruit in cream or butter sauce    ? Canned vegetables that are high in sodium    ? Sauerkraut, pickled vegetables, and other foods prepared in brine    ? Fried vegetables or vegetables in butter or high-fat sauces    What you need to know about protein foods:   · Include lean or low-fat protein foods, such as the following:      ? Poultry (chicken, turkey) with no skin    ? Fish (especially fatty fish, such as salmon, fresh tuna, or mackerel)    ? Lean beef and pork (loin, round, extra lean hamburger)    ? Egg whites and egg substitutes    ? 1 cup of nonfat (skim) or 1% milk    ? 1½ ounces of fat-free or low-fat cheese    ? 6 ounces of nonfat or low-fat yogurt    · Limit or do not have high-fat protein foods, such as the following:      ? Smoked or cured meat, such as corned beef, davies, ham, hot dogs, and sausage    ? Canned beans and canned meats or spreads, such as potted meats, sardines, anchovies, and imitation seafood    ? Deli or lunch meats, such as bologna, ham, turkey, and roast beef    ? High-fat meat (T-bone steak, regular hamburger, and ribs)    ? Whole eggs and egg yolks    ? Whole milk, 2% milk, and cream    ? Regular cheese and processed cheese    Other guidelines to follow:   · Maintain a healthy weight  Your risk for heart disease is higher if you are overweight  Your healthcare provider may suggest that you lose weight if you are overweight  You can lose weight by eating fewer calories and foods that have added sugars and fat  The DASH meal plan can help you do this  Decrease calories by eating smaller portions at each meal and fewer snacks  Ask your healthcare provider for more information about how to lose weight  · Exercise regularly  Regular exercise can help you reach or maintain a healthy weight   Regular exercise can also help decrease your blood pressure and improve your cholesterol levels  Get 30 minutes or more of moderate exercise each day of the week  To lose weight, get at least 60 minutes of exercise  Talk to your healthcare provider about the best exercise program for you  · Limit alcohol  Women should limit alcohol to 1 drink a day  Men should limit alcohol to 2 drinks a day  A drink of alcohol is 12 ounces of beer, 5 ounces of wine, or 1½ ounces of liquor  For more information:   · National [facility] and [address]  [address] [address]  [place] [address]  Phone: 6- 725 - 931-2882  Web Address: [facility] no    © [address] [facility] 2022 Information is for End User's use only and may not be sold, redistributed or otherwise used for commercial purposes  All illustrations and images included in CareNotes® are the copyrighted property of A D A M , Inc  or [address] [place], [place] [address]hasmukh   The above information is an  only  It is not intended as medical advice for individual conditions or treatments  Talk to your doctor, nurse or pharmacist before following any medical regimen to see if it is safe and effective for you

## 2022-05-06 ENCOUNTER — TELEPHONE (OUTPATIENT)
Dept: PSYCHIATRY | Facility: CLINIC | Age: 62
End: 2022-05-06

## 2022-05-06 NOTE — TELEPHONE ENCOUNTER
Behavorial Health Outpatient Intake Questions    Referred by:  PCP   Please advised interviewee that they need to answer all questions truthfully to allow for best care and any misrepresentations of information may affect their ability to be seen at this clinic   => Was this discussed? Yes     [facility] -     Presenting Problem (in patient's words):   Severe panic attacks, misdiagnosed with depression  Are there any developmental disabilities? ? If yes, can they speak to you on the phone? If they are too limited to speak to you on phone, refer out No    Are you taking any psychiatric medications? Yes    => If yes, who prescribes? If yes, are they injectable medications? Trazadone for sleep   Does the patient have a language barrier or hearing impairment? No    Have you been treated at [facility] by a therapist or a doctor in the past? If yes, who? Yes , had a seizure in March and was in the hospital for 10 days was sent to a sanitarium for 7 days for depression under 24 hour surveillance  She didn't understand why she was sent there because she doesn't suffer with depression she explained that she has a wonderful life     Has the patient been hospitalized for mental health? No   If yes, how long ago was last hospitalization and where was it? Do you actively use alcohol or marijuana or illegal substances? If yes, what and how much - refer out to Drug and alcohol treatment if use is excessive or daily use of illegal substances No concerns of substance abuse are reported  Do you have a community treatment team or ? No    Legal History-     Does the patient have any history of arrests, half-way/FPC time, or DUIs? No  If Yes-  1) What types of charges? 2) When were they last incarcerated? 3) Are they currently on parole or probation? Minor Child-    Who has custody of the child? Is there a custody agreement?      If there is a custody agreement remind parent that they must bring a copy to the first appt or they will not be seen  Intake Team, please check with provider before scheduling if flags come up such as:  - complex case  - legal history (other than DUI)  - communication barrier concerns are present  - if, in your judgment, this needs further review    ACCEPTED as a patient Yes  => Appointment Date: May 9, 2022 at [facility] virtually with [address] [place] [address]? No    Name of Insurance Co: [address] [place], [place] [address] ID# [id number]  [id number] Phone #  If ins is primary or secondary  If patient is a minor, parents information such as Name, D  O B of guarantor

## 2022-05-09 ENCOUNTER — TELEMEDICINE (OUTPATIENT)
Dept: BEHAVIORAL/MENTAL HEALTH CLINIC | Facility: CLINIC | Age: 62
End: 2022-05-09
Payer: COMMERCIAL

## 2022-05-09 DIAGNOSIS — F41.9 ANXIETY: ICD-10-CM

## 2022-05-09 DIAGNOSIS — I62.9 INTRACRANIAL BLEED (HCC): ICD-10-CM

## 2022-05-09 DIAGNOSIS — F41.8 ANXIETY WITH DEPRESSION: Chronic | ICD-10-CM

## 2022-05-09 DIAGNOSIS — F41.0 PANIC ATTACK: ICD-10-CM

## 2022-05-09 PROCEDURE — 90791 PSYCH DIAGNOSTIC EVALUATION: CPT | Performed by: COUNSELOR

## 2022-05-09 NOTE — PSYCH
Assessment/Plan:      Diagnoses and all orders for this visit:    Anxiety  -     Ambulatory Referral to Psychiatry    Panic attack  -     Ambulatory Referral to Psychiatry          Subjective:      Patient ID: Moises Lee is a 58 y o  female  HPI:     Pre-morbid level of function and History of Present Illness:  She reports having panic attacks beginning at the age about [age]year old  She reports that her parents got  and mother remarried to a new step father that she did not get along with  Would get them during the night and during the day for a few minutes at a time  She denies reporting these to her parents at the time  She states first marriage was not a good one and that she was very stressed and unhappy   for 27 years to current  and reports relationship is healthy and loving  She reports off and on alcohol use over her lifetime which at times was more frequent when stressed during first marriage  Was not having panic attacks until August 2021 after having first seizure and has now has another seizure in March  Working with a neurology to figure out what it going on with seizure Since August panic attacks have begun again and can occur a few times a day for a few minutes at a time  Can a few a day, very inconsistent  She reports sudden feeling of dread and terror and trouble breathing  Previous Psychiatric/psychological treatment/year: none  Current Psychiatrist/Therapist: Will begin seeing this writer for individual therapy  Outpatient and/or Partial and Other [employer] Used (CTT, I[facility], VNA): Inpatient [facility] due to suspected overdose which Blake Leonardo denies        Problem Assessment:     SOCIAL/VOCATION:  Family Constellation (include parents, relationship with each and pertinent Psych/Medical History):     Family History   Problem Relation Age of Onset    Anxiety disorder Mother     Depression Mother     No Known Problems Father     No Known Problems Sister    Collette Splinter No Known Problems Sister     No Known Problems Brother     Cancer Paternal Grandmother         unknown     No Known Problems Daughter     No Known Problems Maternal Grandmother     Cancer Maternal Grandfather     No Known Problems Paternal Grandfather     Breast cancer Neg Hx     Ovarian cancer Neg Hx        Mother: Very close supportive relationship  Spouse:  27 years good relationship, good support  Father: Bio father she had great relationship but travels a lot  Step father now has closer relationship with, functional alcoholic  Children: 3 children,  Daughter and two son  Good supportive relationship with all children, She has two grandsons with youngest    Sibling: 3 half siblings none that she is close with  Other: First marriage was to get out of her step fathers home  Had 3 kids in 4 5 years  Blake Malissa relates best to mother and   she lives with   she does not live alone  Domestic Violence: No past history of domestic violence, There is not suspected domestic violence and There is no history of child abuse    Additional Comments related to family/relationships/peer support:  Reports a good supportive family unit  She reports two close friends and has a close support in her Taoist  2nd cousin Deb in [facility] and Hellen Naranjo in [place] friend since kids  School or Work History (strengths/limitations/needs): She used to work as an  with a executive in [employer]  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  Due to financial concerns moved in with  uncle and cared for Uncle until he had to go to [place] and they inherited the home  She is not currently working and does not have an active license due to seizures  Her highest grade level achieved was high school   history includes none    LEISURE ASSESSMENT (Include past and present hobbies/interests and level of involvement (Ex: Group/Club Affiliations): She enjoys gardening and cooking  Had worked in restaurants in the past  Catered friends events and sons wedding   her primary language is [place]  Preferred language is [place]  Religions affiliations and level of involvement member of a Hindu and regularly attend  Reports she 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 Yes     FUNCTIONAL STATUS: There has been a recent change in Eddie Alas ability to do the following: does not need can service    Level of Assistance Needed/By Whom?: none    Eddie Alas learns best by  reading, listening, demonstration and picture    SUBSTANCE ABUSE ASSESSMENT:  She reports social drinking at times and in the past when stressors in her life were high she drank more frequently and went to [facility] ASSESSMENT: no referral to PCP needed    LEGAL: No Mental Health Advance Directive or Power of  on file    Risk Assessment:   The following ratings are based on my review of records    Risk of Harm to Self:   Demographic risk factors include  and  status  Historical Risk Factors include none  Recent Specific Risk Factors include none      Risk of Harm to Others:   Demographic Risk Factors include unemployed  Historical Risk Factors include none  Recent Specific Risk Factors include multiple stressors    Access to Weapons:   Eddie Alas has access to the following weapons:  has gun  The following steps have been taken to ensure weapons are properly secured: secured  Based on the above information, the client presents the following risk of harm to self or others:  low    The following interventions are recommended:   no intervention changes    Notes regarding this Risk Assessment: Eddie Alas presents with low risk of suicide  She denies any active SI,HI plan or intent and denies past suicidal behaviors  She report protective factors in support from family and friends          Review Of Systems:     Mood Normal   Behavior Normal    Thought Content Normal   General Normal    Personality Normal   Other Psych Symptoms Normal   Constitutional Normal   ENT Normal   Cardiovascular Normal    Respiratory Normal    Gastrointestinal Normal   Genitourinary Normal    Musculoskeletal Negative   Integumentary Normal    Neurological Normal    Endocrine Normal          Mental status:  Appearance calm and cooperative , adequate hygiene and grooming and good eye contact    Mood mood appropriate   Affect affect appropriate    Speech a normal rate   Thought Processes normal thought processes   Hallucinations no hallucinations present    Thought Content no delusions   Abnormal Thoughts no suicidal thoughts  and no homicidal thoughts    Orientation  oriented to person and place and time   Remote Memory short term memory intact and long term memory intact   Attention Span concentration intact   Intellect Appears to be of Average Intelligence   Fund of Knowledge displays adequate knowledge of current events   Insight Insight intact   Judgement judgment was intact   Muscle Strength Abnormal gait   Language no difficulty naming common objects   Pain none   Pain Scale 0       Virtual Regular Visit    Verification of patient location:    Patient is located in the following state in which I hold an active license [place]      Assessment/Plan:    Problem List Items Addressed This Visit        Other    Panic attack      Other Visit Diagnoses     Anxiety              Goals addressed in session: Goal 1          Reason for visit is No chief complaint on file         Encounter provider Noemi Diaz    Provider located at [address] [place] [id number] [phone]      Recent Visits  Date Type Provider Dept   05/02/22 Telephone Carter Fulton MD [facility]   Showing recent visits within past 7 days and meeting all other requirements  Today's Visits  Date Type Provider Dept   05/09/22 [address] today's visits and meeting all other requirements  Future Appointments  No visits were found meeting these conditions  Showing future appointments within next 150 days and meeting all other requirements       The patient was identified by name and date of birth  Rebeka Guidry was informed that this is a telemedicine visit and that the visit is being conducted throughEpic Embedded and patient was informed this is a secure, HIPAA-complaint platform  She agrees to proceed     My office door was closed  No one else was in the room  She acknowledged consent and understanding of privacy and security of the video platform  The patient has agreed to participate and understands they can discontinue the visit at any time  Patient is aware this is a billable service       Subjective  Rebeka Guidry is a 58 y o  female      HPI     Past Medical History:   Diagnosis Date    Fracture     Hepatitis     Hep A     Insomnia     10mar2016 resolved    Osteoporosis     14jun2016 resolved    Pancreatitis     Seasonal allergies     Urine discoloration 11/11/2019    Varicella     Vomiting 11/13/2019       Past Surgical History:   Procedure Laterality Date    IR BIOPSY BONE  8/17/2021    IR BIOPSY BONE MARROW  11/14/2019    TUBAL LIGATION  1985       Current Outpatient Medications   Medication Sig Dispense Refill    cyanocobalamin (VITAMIN B-12) 500 MCG tablet Take 1 tablet (500 mcg total) by mouth daily for 26 days 26 tablet 0    desvenlafaxine succinate (PRISTIQ) 50 mg 24 hr tablet Take 1 tablet (50 mg total) by mouth daily (Patient not taking: Reported on 5/2/2022 ) 30 tablet 0    Diclofenac Sodium (VOLTAREN) 1 % Apply 2 g topically 4 (four) times a day (Patient not taking: Reported on 5/2/2022 ) 2 g 0    estradiol (ESTRACE VAGINAL) 0 1 mg/g vaginal cream Insert 1 g into the vagina 3 (three) times a week Insert 1/4 of applicator into the vagina 3 times a week for 2 weeks  If symptoms improved can reduce to using twice weekly after 2 weeks for maintenance   42 g 1    folic acid (FOLVITE) 1 mg tablet Take 1 tablet (1 mg total) by mouth daily for 26 days 26 tablet 0    folic acid (FOLVITE) 1 mg tablet Take 1 tablet (1 mg total) by mouth daily 30 tablet 0    gabapentin (NEURONTIN) 300 mg capsule Take 1 capsule (300 mg total) by mouth 2 (two) times a day 30 capsule 0    guaiFENesin (MUCINEX) 600 mg 12 hr tablet Take 1 tablet (600 mg total) by mouth every 12 (twelve) hours (Patient not taking: Reported on 5/2/2022 ) 30 tablet 0    hydrOXYzine HCL (ATARAX) 25 mg tablet Take 1 tablet (25 mg total) by mouth 2 (two) times a day as needed for anxiety 60 tablet 0    lacosamide (VIMPAT) 100 mg tablet Take 1 tablet (100 mg total) by mouth every 12 (twelve) hours (Patient not taking: Reported on 5/2/2022 ) 30 tablet 0    levalbuterol (XOPENEX) 1 25 mg/3 mL nebulizer solution Take 3 mL (1 25 mg total) by nebulization every 6 (six) hours as needed for wheezing 3 mL 0    melatonin 3 mg Take 1 tablet (3 mg total) by mouth daily at bedtime 30 tablet 0    metoprolol succinate (TOPROL-XL) 25 mg 24 hr tablet Take 1 tablet (25 mg total) by mouth 2 (two) times a day 60 tablet 3    pantoprazole (PROTONIX) 40 mg tablet Take 1 tablet (40 mg total) by mouth daily (Patient not taking: Reported on 5/2/2022 ) 30 tablet 3    pantoprazole (PROTONIX) 40 mg tablet Take 1 tablet (40 mg total) by mouth daily in the early morning (Patient not taking: Reported on 5/2/2022 )  0    therapeutic multivitamin-minerals (THERAGRAN-M) tablet Take 1 tablet by mouth daily for 26 days 26 tablet 0    thiamine 100 MG tablet Take 1 tablet (100 mg total) by mouth daily 30 tablet 0    thiamine 100 MG tablet Take 1 tablet (100 mg total) by mouth daily for 26 days 26 tablet 0    traZODone (DESYREL) 50 mg tablet Take 50 mg by mouth in the morning        traZODone (DESYREL) 50 mg tablet Take 1 tablet (50 mg total) by mouth daily at bedtime 30 tablet 0     No current facility-administered medications for this visit  No Known Allergies    Review of Systems    Video Exam    There were no vitals filed for this visit  Physical Exam     I spent 45 minutes directly with the patient during this visit    VIRTUAL VISIT DISCLAIMER    Nithya Avelar verbally agrees to participate in [employer]  Pt is aware that [employer] could be limited without vital signs or the ability to perform a full hands-on physical Glendalejuan Dozier understands she or the provider may request at any time to terminate the video visit and request the patient to seek care or treatment in person

## 2022-05-10 RX ORDER — GABAPENTIN 300 MG/1
300 CAPSULE ORAL 2 TIMES DAILY
Qty: 30 CAPSULE | Refills: 0 | Status: SHIPPED | OUTPATIENT
Start: 2022-05-10 | End: 2022-05-27 | Stop reason: SDUPTHER

## 2022-05-16 ENCOUNTER — TELEMEDICINE (OUTPATIENT)
Dept: BEHAVIORAL/MENTAL HEALTH CLINIC | Facility: CLINIC | Age: 62
End: 2022-05-16
Payer: COMMERCIAL

## 2022-05-16 DIAGNOSIS — F41.0 PANIC ATTACK: Primary | ICD-10-CM

## 2022-05-16 PROCEDURE — 90834 PSYTX W PT 45 MINUTES: CPT | Performed by: COUNSELOR

## 2022-05-16 NOTE — BH TREATMENT PLAN
Mayda Flood  1960       Date of Initial Treatment Plan: 5/16/22  Date of Current Treatment Plan: 05/16/22    Treatment Plan Number 1     Strengths/Personal Resources for Self Care:  Supportive family, good communication skills    Diagnosis:   1  Panic attack         Area of Needs: Decrease panic attacks, increase coping skills to manage anxiety, process past trauma/family conflict  Long Term Goal 1: Reduce frequency, intensity and duration of anxiety so that daily functioning is not impaired  Target Date: 10/12/22  Completion Date: 11/12/22         Short Term Objectives for Goal 1: 1  Describe worry and anxiety and how it impacts daily functioning    2    dentify cognitive coping mechanisms I have learned and implemented into my weekly routine  3   Communicate thoughts and feelings to support others in life 4  Implement calming skills in order to cope with panic attacks    Long Term Goal 2: Develop an awareness of how childhood issues have affected and continue to affect one's functioning    Target Date: 10/12/22  Completion Date: 11/12/22    Short Term Objectives for Goal 2: 1  Identify patterns of abuse, neglect or abandonment within the family of origin  2   Identify and process feelings of traumatic incidents in childhood       GOAL 1: Modality: Individual 2x per month   Completion Date 11/12/22 and The person(s) responsible for carrying out the plan is  Mayelin Marroquin    GOAL 2: Modality: Individual 2x per month   Completion Date 11/12/22 and The person(s) responsible for carrying out the plan is  Mayelin Paredes and Connie Maldonado: Diagnosis and Treatment Plan explained to Leonel Rose relates understanding diagnosis and is agreeable to Treatment Plan  Mayda Flood, 1960, actively participated in the creation of this treatment plan during a virtual session, using the [address] [place] [address]     Mayda Flood  provided verbal consent on 5/16/2022 at 9:30 AM  The treatment plan was transcribed into the Electronic Health Record at a later time

## 2022-05-16 NOTE — PSYCH
Virtual Regular Visit    Verification of patient location:    Patient is located in the following state in which I hold an active license [place]      Assessment/Plan:    Problem List Items Addressed This Visit        Other    Panic attack - Primary          Goals addressed in session: Goal 1 and Goal 2          Reason for visit is No chief complaint on file  Encounter provider Cristina Haro    Provider located at [address] [place] [address] [phone]      Recent Visits  Date Type Provider Dept   05/09/22 [facility]   Showing recent visits within past 7 days and meeting all other requirements  Today's Visits  Date Type Provider Dept   05/16/22 Telemedicine [facility]   Showing today's visits and meeting all other requirements  Future Appointments  No visits were found meeting these conditions  Showing future appointments within next 150 days and meeting all other requirements       The patient was identified by name and date of birth  Kiki Collier was informed that this is a telemedicine visit and that the visit is being conducted throughEpic Embedded and patient was informed this is a secure, HIPAA-complaint platform  She agrees to proceed  My office door was closed  No one else was in the room  She acknowledged consent and understanding of privacy and security of the video platform  The patient has agreed to participate and understands they can discontinue the visit at any time  Patient is aware this is a billable service  Subjective  Kiki Collier is a 58 y o  female     D: Clinician met with Matthias Shin via telehealth for individual therapy  Matthias Shin discussed current conflict with her mother causing increase worry and anxiety  Clinician processed through conflict and possible resolutions    Matthias Shin discussed family dynamic between herself her mother, father and step father and feeling abandonment and resentment  Clinician explored impact on daily living and relationships and triggers she continues to experience with her   Clinician discussed ways to work through past events in order to resolve feelings  Clinician discussed treatment plan goals and created initial treatment plan with Víctor Weinstein  A: Víctor Weinstein was engaged in the session and reports continue panic attacks  She presented with session with anxious mood after recent conflict with mother  She participated meaningfully in the creation of her treatment plan  P: Continue to meet with Víctor Weinstein every other week for individual therapy  HPI     Past Medical History:   Diagnosis Date    Fracture     Hepatitis     Hep A     Insomnia     10mar2016 resolved    Osteoporosis     14jun2016 resolved    Pancreatitis     Seasonal allergies     Urine discoloration 11/11/2019    Varicella     Vomiting 11/13/2019       Past Surgical History:   Procedure Laterality Date    IR BIOPSY BONE  8/17/2021    IR BIOPSY BONE MARROW  11/14/2019    TUBAL LIGATION  1985       Current Outpatient Medications   Medication Sig Dispense Refill    cyanocobalamin (VITAMIN B-12) 500 MCG tablet Take 1 tablet (500 mcg total) by mouth daily for 26 days 26 tablet 0    desvenlafaxine succinate (PRISTIQ) 50 mg 24 hr tablet Take 1 tablet (50 mg total) by mouth daily (Patient not taking: Reported on 5/2/2022 ) 30 tablet 0    Diclofenac Sodium (VOLTAREN) 1 % Apply 2 g topically 4 (four) times a day (Patient not taking: Reported on 5/2/2022 ) 2 g 0    estradiol (ESTRACE VAGINAL) 0 1 mg/g vaginal cream Insert 1 g into the vagina 3 (three) times a week Insert 1/4 of applicator into the vagina 3 times a week for 2 weeks  If symptoms improved can reduce to using twice weekly after 2 weeks for maintenance   42 g 1    folic acid (FOLVITE) 1 mg tablet Take 1 tablet (1 mg total) by mouth daily for 26 days 26 tablet 0    folic acid (FOLVITE) 1 mg tablet Take 1 tablet (1 mg total) by mouth daily 30 tablet 0    gabapentin (NEURONTIN) 300 mg capsule Take 1 capsule (300 mg total) by mouth 2 (two) times a day 30 capsule 0    gabapentin (NEURONTIN) 300 mg capsule TAKE 1 CAPSULE (300 MG TOTAL) BY MOUTH 2 TIMES A DAY   60 capsule 2    guaiFENesin (MUCINEX) 600 mg 12 hr tablet Take 1 tablet (600 mg total) by mouth every 12 (twelve) hours (Patient not taking: Reported on 5/2/2022 ) 30 tablet 0    hydrOXYzine HCL (ATARAX) 25 mg tablet Take 1 tablet (25 mg total) by mouth 2 (two) times a day as needed for anxiety 60 tablet 0    lacosamide (VIMPAT) 100 mg tablet Take 1 tablet (100 mg total) by mouth every 12 (twelve) hours (Patient not taking: Reported on 5/2/2022 ) 30 tablet 0    levalbuterol (XOPENEX) 1 25 mg/3 mL nebulizer solution Take 3 mL (1 25 mg total) by nebulization every 6 (six) hours as needed for wheezing 3 mL 0    melatonin 3 mg Take 1 tablet (3 mg total) by mouth daily at bedtime 30 tablet 0    metoprolol succinate (TOPROL-XL) 25 mg 24 hr tablet Take 1 tablet (25 mg total) by mouth 2 (two) times a day 60 tablet 3    pantoprazole (PROTONIX) 40 mg tablet Take 1 tablet (40 mg total) by mouth daily (Patient not taking: Reported on 5/2/2022 ) 30 tablet 3    pantoprazole (PROTONIX) 40 mg tablet Take 1 tablet (40 mg total) by mouth daily in the early morning (Patient not taking: Reported on 5/2/2022 )  0    therapeutic multivitamin-minerals (THERAGRAN-M) tablet Take 1 tablet by mouth daily for 26 days 26 tablet 0    thiamine 100 MG tablet Take 1 tablet (100 mg total) by mouth daily 30 tablet 0    thiamine 100 MG tablet Take 1 tablet (100 mg total) by mouth daily for 26 days 26 tablet 0    traZODone (DESYREL) 50 mg tablet Take 50 mg by mouth in the morning        traZODone (DESYREL) 50 mg tablet Take 1 tablet (50 mg total) by mouth daily at bedtime 30 tablet 0     No current facility-administered medications for this visit  No Known Allergies    Review of Systems    Video Exam    There were no vitals filed for this visit  Physical Exam     I spent 45 minutes directly with the patient during this visit    VIRTUAL VISIT DISCLAIMER    Jessica Zepeda verbally agrees to participate in [address]  Pt is aware that [address] could be limited without vital signs or the ability to perform a full hands-on physical Bobby Adam understands she or the provider may request at any time to terminate the video visit and request the patient to seek care or treatment in person

## 2022-05-17 ENCOUNTER — TELEPHONE (OUTPATIENT)
Dept: HEMATOLOGY ONCOLOGY | Facility: CLINIC | Age: 62
End: 2022-05-17

## 2022-05-17 NOTE — TELEPHONE ENCOUNTER
Scheduling Appointment     Who Is Calling to Schedule patient   Doctor Dr Jazmín Barnhart   Date and Time 6/7 @ 320   Reason for scheduling appointment CONSULT   Patient verbalized understanding   yes

## 2022-05-17 NOTE — TELEPHONE ENCOUNTER
Appointment Cancellation Or Reschedule     Person calling in Patient    Provider Dr Sue Aschoff   Office Visit Date and Time 5/17 1PM   Office Visit Location [facility]   Did patient want to reschedule their office appointment? If so, when was it scheduled to? No, patient will call to resched   Is this patient calling to reschedule an infusion appointment? No   When is their next infusion appointment? No   Is this patient a Chemo patient? No   Reason for Cancellation or Reschedule No transportation     If the patient is a treatment patient, please route this to the office nurse  If the patient is not on treatment, please route to the office MA

## 2022-05-26 DIAGNOSIS — I42.9 CARDIOMYOPATHY (HCC): ICD-10-CM

## 2022-05-26 DIAGNOSIS — I50.21 ACUTE SYSTOLIC CONGESTIVE HEART FAILURE (HCC): ICD-10-CM

## 2022-05-26 RX ORDER — METOPROLOL SUCCINATE 25 MG/1
25 TABLET, EXTENDED RELEASE ORAL 2 TIMES DAILY
Qty: 60 TABLET | Refills: 0
Start: 2022-05-26 | End: 2022-06-02 | Stop reason: SDUPTHER

## 2022-05-27 DIAGNOSIS — F41.8 ANXIETY WITH DEPRESSION: Chronic | ICD-10-CM

## 2022-05-27 RX ORDER — HYDROXYZINE HYDROCHLORIDE 25 MG/1
25 TABLET, FILM COATED ORAL 2 TIMES DAILY PRN
Qty: 60 TABLET | Refills: 0 | Status: SHIPPED | OUTPATIENT
Start: 2022-05-27 | End: 2022-06-13 | Stop reason: SDUPTHER

## 2022-05-27 RX ORDER — GABAPENTIN 300 MG/1
300 CAPSULE ORAL 2 TIMES DAILY
Qty: 60 CAPSULE | Refills: 0 | Status: SHIPPED | OUTPATIENT
Start: 2022-05-27 | End: 2022-06-16 | Stop reason: SDUPTHER

## 2022-05-31 ENCOUNTER — TELEMEDICINE (OUTPATIENT)
Dept: BEHAVIORAL/MENTAL HEALTH CLINIC | Facility: CLINIC | Age: 62
End: 2022-05-31
Payer: COMMERCIAL

## 2022-05-31 DIAGNOSIS — F41.8 ANXIETY WITH DEPRESSION: Primary | Chronic | ICD-10-CM

## 2022-05-31 DIAGNOSIS — F41.0 PANIC ATTACK: ICD-10-CM

## 2022-05-31 PROCEDURE — 90834 PSYTX W PT 45 MINUTES: CPT | Performed by: COUNSELOR

## 2022-05-31 NOTE — PSYCH
Virtual Regular Visit    Verification of patient location:    Patient is located in the following state in which I hold an active license [place]      Assessment/Plan:    Problem List Items Addressed This Visit        Other    Anxiety with depression - Primary (Chronic)    Panic attack          Goals addressed in session: Goal 1 and Goal 2          Reason for visit is No chief complaint on file  Encounter provider Phoenix Benjamin    Provider located at [address] [place], [place] [address]  [facility] [address] [phone]      Recent Visits  No visits were found meeting these conditions  Showing recent visits within past 7 days and meeting all other requirements  Future Appointments  No visits were found meeting these conditions  Showing future appointments within next 150 days and meeting all other requirements       The patient was identified by name and date of birth  Mayda Flood was informed that this is a telemedicine visit and that the visit is being conducted throughProton Therapyic Embedded and patient was informed this is a secure, HIPAA-complaint platform  She agrees to proceed     My office door was closed  No one else was in the room  She acknowledged consent and understanding of privacy and security of the video platform  The patient has agreed to participate and understands they can discontinue the visit at any time  Patient is aware this is a billable service  Subjective  Mayda Flood is a 58 y o  female     D: Clinician met with Mayelin Paredes via telehealth for individual therapy  Mayelin Paredes discussed current family stressors and the impact on her anxiety  Mayelin Paredes processed recent interaction with step father and reports that he is not currently speaking with her  Mayelin Paredes processed family dynamic and themes in which step father will become angry and then give her the silent treatment which later results in him acting as if nothing happened  Clinician explored relationships she would like with family and ways to communicate feelings and needs to others  Clinician explored symptoms of panic attacks and possible triggers along with discussed regular use of coping skills to being stress level down  Fide Atkinson identifies spending time with  and gardening as ways she relaxes and de stresses  A: Fide Atkinson was open and engaged in the session  She presented with stable mood and affect  She reports continued panic attacks randomly throughout the week  P: Continue to meet with Fide Atkinson every other week for individual therapy  HPI     Past Medical History:   Diagnosis Date    Fracture     Hepatitis     Hep A     Insomnia     10mar2016 resolved    Osteoporosis     14jun2016 resolved    Pancreatitis     Seasonal allergies     Urine discoloration 11/11/2019    Varicella     Vomiting 11/13/2019       Past Surgical History:   Procedure Laterality Date    IR BIOPSY BONE  8/17/2021    IR BIOPSY BONE MARROW  11/14/2019    TUBAL LIGATION  1985       Current Outpatient Medications   Medication Sig Dispense Refill    gabapentin (NEURONTIN) 300 mg capsule Take 1 capsule (300 mg total) by mouth 2 (two) times a day 60 capsule 0    hydrOXYzine HCL (ATARAX) 25 mg tablet Take 1 tablet (25 mg total) by mouth 2 (two) times a day as needed for anxiety 60 tablet 0    cyanocobalamin (VITAMIN B-12) 500 MCG tablet Take 1 tablet (500 mcg total) by mouth daily for 26 days 26 tablet 0    desvenlafaxine succinate (PRISTIQ) 50 mg 24 hr tablet Take 1 tablet (50 mg total) by mouth daily (Patient not taking: Reported on 5/2/2022 ) 30 tablet 0    Diclofenac Sodium (VOLTAREN) 1 % Apply 2 g topically 4 (four) times a day (Patient not taking: Reported on 5/2/2022 ) 2 g 0    estradiol (ESTRACE VAGINAL) 0 1 mg/g vaginal cream Insert 1 g into the vagina 3 (three) times a week Insert 1/4 of applicator into the vagina 3 times a week for 2 weeks   If symptoms improved can reduce to using twice weekly after 2 weeks for maintenance  42 g 1    folic acid (FOLVITE) 1 mg tablet Take 1 tablet (1 mg total) by mouth daily for 26 days 26 tablet 0    folic acid (FOLVITE) 1 mg tablet Take 1 tablet (1 mg total) by mouth daily 30 tablet 0    gabapentin (NEURONTIN) 300 mg capsule TAKE 1 CAPSULE (300 MG TOTAL) BY MOUTH 2 TIMES A DAY  60 capsule 2    guaiFENesin (MUCINEX) 600 mg 12 hr tablet Take 1 tablet (600 mg total) by mouth every 12 (twelve) hours (Patient not taking: Reported on 5/2/2022 ) 30 tablet 0    lacosamide (VIMPAT) 100 mg tablet Take 1 tablet (100 mg total) by mouth every 12 (twelve) hours (Patient not taking: Reported on 5/2/2022 ) 30 tablet 0    levalbuterol (XOPENEX) 1 25 mg/3 mL nebulizer solution Take 3 mL (1 25 mg total) by nebulization every 6 (six) hours as needed for wheezing 3 mL 0    melatonin 3 mg Take 1 tablet (3 mg total) by mouth daily at bedtime 30 tablet 0    metoprolol succinate (TOPROL-XL) 25 mg 24 hr tablet Take 1 tablet (25 mg total) by mouth 2 (two) times a day 60 tablet 0    pantoprazole (PROTONIX) 40 mg tablet Take 1 tablet (40 mg total) by mouth daily (Patient not taking: Reported on 5/2/2022 ) 30 tablet 3    pantoprazole (PROTONIX) 40 mg tablet Take 1 tablet (40 mg total) by mouth daily in the early morning (Patient not taking: Reported on 5/2/2022 )  0    therapeutic multivitamin-minerals (THERAGRAN-M) tablet Take 1 tablet by mouth daily for 26 days 26 tablet 0    thiamine 100 MG tablet Take 1 tablet (100 mg total) by mouth daily 30 tablet 0    thiamine 100 MG tablet Take 1 tablet (100 mg total) by mouth daily for 26 days 26 tablet 0    traZODone (DESYREL) 50 mg tablet Take 50 mg by mouth in the morning        traZODone (DESYREL) 50 mg tablet Take 1 tablet (50 mg total) by mouth daily at bedtime 30 tablet 0     No current facility-administered medications for this visit          No Known Allergies    Review of Systems    Video Exam    There were no vitals filed for this visit  Physical Exam     I spent 45 minutes directly with the patient during this visit    VIRTUAL VISIT DISCLAIMER    Choco Caceres verbally agrees to participate in [employer]  Pt is aware that [employer] could be limited without vital signs or the ability to perform a full hands-on physical Brooke Shirley understands she or the provider may request at any time to terminate the video visit and request the patient to seek care or treatment in person

## 2022-06-01 ENCOUNTER — TELEPHONE (OUTPATIENT)
Dept: FAMILY MEDICINE CLINIC | Facility: CLINIC | Age: 62
End: 2022-06-01

## 2022-06-01 NOTE — TELEPHONE ENCOUNTER
Gopi Brown, [facility] Coordinator called to say that she wanted to leave her name and number in case they are needed for Hellen Obey in the future for nursing care  They are available for access to nurse reasons, nutritional support services, behavioral health, etc   She can be reached directed on [phone]

## 2022-06-02 DIAGNOSIS — I50.21 ACUTE SYSTOLIC CONGESTIVE HEART FAILURE (HCC): ICD-10-CM

## 2022-06-02 DIAGNOSIS — R56.9 SEIZURE (HCC): ICD-10-CM

## 2022-06-02 DIAGNOSIS — I42.9 CARDIOMYOPATHY (HCC): ICD-10-CM

## 2022-06-03 RX ORDER — TRAZODONE HYDROCHLORIDE 50 MG/1
50 TABLET ORAL
Qty: 30 TABLET | Refills: 0 | Status: SHIPPED | OUTPATIENT
Start: 2022-06-03 | End: 2022-06-13 | Stop reason: SDUPTHER

## 2022-06-03 RX ORDER — METOPROLOL SUCCINATE 25 MG/1
25 TABLET, EXTENDED RELEASE ORAL 2 TIMES DAILY
Qty: 60 TABLET | Refills: 0
Start: 2022-06-03 | End: 2022-06-16 | Stop reason: SDUPTHER

## 2022-06-07 ENCOUNTER — TELEPHONE (OUTPATIENT)
Dept: HEMATOLOGY ONCOLOGY | Facility: MEDICAL CENTER | Age: 62
End: 2022-06-07

## 2022-06-07 NOTE — TELEPHONE ENCOUNTER
----- Message from Tati Espinosa sent at 6/7/2022  3:45 PM EDT -----  Regarding: patient no show  Mike Whitfield,  This patient was supposed to come in today for a consult with Dr Nieves   called her to see if she was coming for her appointment and she stated she is not coming in due to not feeling well, pt states she has been sleeping all day, feeling dizzy and cannot walk straight   did get her scheduled for 7/5/22 but I believe she should be seen sooner if at all possible  Let me know what you think we can do  Giselle Fair voicemail message with patient to instruct her to report to ED for evaluation of symptoms    TEAMS number provided on voicemail

## 2022-06-09 ENCOUNTER — CLINICAL SUPPORT (OUTPATIENT)
Dept: CARDIOLOGY CLINIC | Facility: CLINIC | Age: 62
End: 2022-06-09
Payer: COMMERCIAL

## 2022-06-09 DIAGNOSIS — F41.0 PANIC ATTACKS: ICD-10-CM

## 2022-06-09 PROCEDURE — 93248 EXT ECG>7D<15D REV&INTERPJ: CPT | Performed by: INTERNAL MEDICINE

## 2022-06-13 DIAGNOSIS — R56.9 SEIZURE (HCC): ICD-10-CM

## 2022-06-14 ENCOUNTER — TELEMEDICINE (OUTPATIENT)
Dept: BEHAVIORAL/MENTAL HEALTH CLINIC | Facility: CLINIC | Age: 62
End: 2022-06-14
Payer: COMMERCIAL

## 2022-06-14 DIAGNOSIS — F41.0 PANIC ATTACK: Primary | ICD-10-CM

## 2022-06-14 DIAGNOSIS — F41.8 ANXIETY WITH DEPRESSION: Chronic | ICD-10-CM

## 2022-06-14 PROCEDURE — 90834 PSYTX W PT 45 MINUTES: CPT | Performed by: COUNSELOR

## 2022-06-14 RX ORDER — TRAZODONE HYDROCHLORIDE 50 MG/1
50 TABLET ORAL
Qty: 30 TABLET | Refills: 0 | Status: SHIPPED | OUTPATIENT
Start: 2022-06-14 | End: 2022-06-16

## 2022-06-14 NOTE — PSYCH
Virtual Regular Visit    Verification of patient location:    Patient is located in the following state in which I hold an active license [place]      Assessment/Plan:    Problem List Items Addressed This Visit        Other    Anxiety with depression (Chronic)    Panic attack - Primary          Goals addressed in session: Goal 1 and Goal 2          Reason for visit is No chief complaint on file  Encounter provider Willy Sport    Provider located at [address] [place], [place] [address]pal[address] Solomon  [facility] [address] [phone]      Recent Visits  No visits were found meeting these conditions  Showing recent visits within past 7 days and meeting all other requirements  Future Appointments  No visits were found meeting these conditions  Showing future appointments within next 150 days and meeting all other requirements       The patient was identified by name and date of birth  Frank Pierre was informed that this is a telemedicine visit and that the visit is being conducted throughMyEduic Embedded and patient was informed this is a secure, HIPAA-complaint platform  She agrees to proceed     My office door was closed  No one else was in the room  She acknowledged consent and understanding of privacy and security of the video platform  The patient has agreed to participate and understands they can discontinue the visit at any time  Patient is aware this is a billable service  Subjective  Frank Pierre is a 58 y o  female     D: Clinician met with Diana Osuna via telehealth for individual therapy  Diana Osuna discussed recent disruption in medication when she lost Hydroxyzine for a few days  She reports that during this time she had trouble sleeping due to nightmares  Clinician explored themes in her nightmares and overall stress management   Clinician discussed mindfulness and working to be more in the moment when engaging in activities that she enjoys and finds relaxing in order to help manage stress  Clinician also went over psycho education on mindfulness, meditation and grounding and the benefits  Zaid Kidd reports desire to work on clutter in home in order the help clutteredness in her head as tell which clinician encouraged  A: Zaid Kidd was open and engaged in the session  She presented with stable mood and affect and showed good self awareness with current concerns and solutions  P: Continue to meet with Zaid Kidd every other week for individual therapy  HPI     Past Medical History:   Diagnosis Date    Fracture     Hepatitis     Hep A     Insomnia     10mar2016 resolved    Osteoporosis     14jun2016 resolved    Pancreatitis     Seasonal allergies     Urine discoloration 11/11/2019    Varicella     Vomiting 11/13/2019       Past Surgical History:   Procedure Laterality Date    IR BIOPSY BONE  8/17/2021    IR BIOPSY BONE MARROW  11/14/2019    TUBAL LIGATION  1985       Current Outpatient Medications   Medication Sig Dispense Refill    gabapentin (NEURONTIN) 300 mg capsule Take 1 capsule (300 mg total) by mouth 2 (two) times a day 60 capsule 0    cyanocobalamin (VITAMIN B-12) 500 MCG tablet Take 1 tablet (500 mcg total) by mouth daily for 26 days 26 tablet 0    desvenlafaxine succinate (PRISTIQ) 50 mg 24 hr tablet Take 1 tablet (50 mg total) by mouth daily (Patient not taking: Reported on 5/2/2022 ) 30 tablet 0    Diclofenac Sodium (VOLTAREN) 1 % Apply 2 g topically 4 (four) times a day (Patient not taking: Reported on 5/2/2022 ) 2 g 0    estradiol (ESTRACE VAGINAL) 0 1 mg/g vaginal cream Insert 1 g into the vagina 3 (three) times a week Insert 1/4 of applicator into the vagina 3 times a week for 2 weeks  If symptoms improved can reduce to using twice weekly after 2 weeks for maintenance   42 g 1    folic acid (FOLVITE) 1 mg tablet Take 1 tablet (1 mg total) by mouth daily for 26 days 26 tablet 0    folic acid (FOLVITE) 1 mg tablet Take 1 tablet (1 mg total) by mouth daily 30 tablet 0    gabapentin (NEURONTIN) 300 mg capsule TAKE 1 CAPSULE (300 MG TOTAL) BY MOUTH 2 TIMES A DAY  60 capsule 2    guaiFENesin (MUCINEX) 600 mg 12 hr tablet Take 1 tablet (600 mg total) by mouth every 12 (twelve) hours (Patient not taking: Reported on 5/2/2022 ) 30 tablet 0    hydrOXYzine HCL (ATARAX) 25 mg tablet Take 1 tablet (25 mg total) by mouth 2 (two) times a day as needed for anxiety 10 tablet 0    lacosamide (VIMPAT) 100 mg tablet Take 1 tablet (100 mg total) by mouth every 12 (twelve) hours (Patient not taking: Reported on 5/2/2022 ) 30 tablet 0    levalbuterol (XOPENEX) 1 25 mg/3 mL nebulizer solution Take 3 mL (1 25 mg total) by nebulization every 6 (six) hours as needed for wheezing 3 mL 0    melatonin 3 mg Take 1 tablet (3 mg total) by mouth daily at bedtime 30 tablet 0    metoprolol succinate (TOPROL-XL) 25 mg 24 hr tablet Take 1 tablet (25 mg total) by mouth 2 (two) times a day 60 tablet 0    pantoprazole (PROTONIX) 40 mg tablet Take 1 tablet (40 mg total) by mouth daily (Patient not taking: Reported on 5/2/2022 ) 30 tablet 3    pantoprazole (PROTONIX) 40 mg tablet Take 1 tablet (40 mg total) by mouth daily in the early morning (Patient not taking: Reported on 5/2/2022 )  0    therapeutic multivitamin-minerals (THERAGRAN-M) tablet Take 1 tablet by mouth daily for 26 days 26 tablet 0    thiamine 100 MG tablet Take 1 tablet (100 mg total) by mouth daily 30 tablet 0    thiamine 100 MG tablet Take 1 tablet (100 mg total) by mouth daily for 26 days 26 tablet 0    traZODone (DESYREL) 50 mg tablet Take 50 mg by mouth in the morning        traZODone (DESYREL) 50 mg tablet Take 1 tablet (50 mg total) by mouth daily at bedtime 30 tablet 0     No current facility-administered medications for this visit  No Known Allergies    Review of Systems    Video Exam    There were no vitals filed for this visit      Physical Exam     I spent 45 minutes directly with the patient during this visit    VIRTUAL VISIT DISCLAIMER    Rebeka Guidry verbally agrees to participate in [employer]  Pt is aware that [employer] could be limited without vital signs or the ability to perform a full hands-on physical Verito Person understands she or the provider may request at any time to terminate the video visit and request the patient to seek care or treatment in person

## 2022-06-16 ENCOUNTER — OFFICE VISIT (OUTPATIENT)
Dept: CARDIOLOGY CLINIC | Facility: CLINIC | Age: 62
End: 2022-06-16
Payer: COMMERCIAL

## 2022-06-16 VITALS
OXYGEN SATURATION: 97 % | DIASTOLIC BLOOD PRESSURE: 74 MMHG | WEIGHT: 126 LBS | HEIGHT: 67 IN | SYSTOLIC BLOOD PRESSURE: 112 MMHG | HEART RATE: 66 BPM | BODY MASS INDEX: 19.78 KG/M2

## 2022-06-16 DIAGNOSIS — I50.21 ACUTE SYSTOLIC CONGESTIVE HEART FAILURE (HCC): ICD-10-CM

## 2022-06-16 DIAGNOSIS — I50.42 CHRONIC COMBINED SYSTOLIC AND DIASTOLIC CONGESTIVE HEART FAILURE (HCC): Primary | ICD-10-CM

## 2022-06-16 DIAGNOSIS — I42.9 CARDIOMYOPATHY (HCC): ICD-10-CM

## 2022-06-16 PROCEDURE — 1036F TOBACCO NON-USER: CPT | Performed by: INTERNAL MEDICINE

## 2022-06-16 PROCEDURE — 99214 OFFICE O/P EST MOD 30 MIN: CPT | Performed by: INTERNAL MEDICINE

## 2022-06-16 PROCEDURE — 3008F BODY MASS INDEX DOCD: CPT | Performed by: INTERNAL MEDICINE

## 2022-06-16 PROCEDURE — 93000 ELECTROCARDIOGRAM COMPLETE: CPT | Performed by: INTERNAL MEDICINE

## 2022-06-16 RX ORDER — METOPROLOL SUCCINATE 50 MG/1
25 TABLET, EXTENDED RELEASE ORAL 2 TIMES DAILY
COMMUNITY
Start: 2022-05-02 | End: 2022-06-16

## 2022-06-16 RX ORDER — METOPROLOL SUCCINATE 25 MG/1
25 TABLET, EXTENDED RELEASE ORAL 2 TIMES DAILY
Qty: 180 TABLET | Refills: 3
Start: 2022-06-16 | End: 2022-06-17 | Stop reason: SDUPTHER

## 2022-06-16 NOTE — PROGRESS NOTES
Cardiology Follow Up    Nithya Avelar  1960  [id number]  [address] [place] [address] [place]  [id number] [phone]    1  Chronic combined systolic and diastolic congestive heart failure (HCC)  POCT ECG   2  Cardiomyopathy (HCC)  metoprolol succinate (TOPROL-XL) 25 mg 24 hr tablet   3  Acute systolic congestive heart failure (HCC)  metoprolol succinate (TOPROL-XL) 25 mg 24 hr tablet     Discussion/Summary:    I believe she had a stress-induced cardiomyopathy in the setting of her status epilepticus earlier this year  Ejection fraction was 40 with an atypical pattern  She currently feels well no signs of heart failure  Limited echo is going to be done next month  If her EF has improved, would continue with current dose of beta-blocker and have her follow-up in 1 year  If her ejection fraction remains low, then she needs further evaluation  I would do ischemic evaluation with a pharmacologic nuclear stress test   Could also have nonischemic cardiomyopathy including from alcohol  If this is the case and her EF is low, I will also aim to add some afterload reduction  Blood pressure is borderline so I would just keep her on the beta-blocker for now  She does not need any diuretics currently  History of Present Illness:     28-year-old female  She was in the hospital earlier this year for status epilepticus  In that setting, she had shock, elevated troponin (non MI)  EF was 40%, felt to be stress-induced pattern  Was started on beta blocker, BP too low for other CHF meds  Intermittent diuretics during the hospitalization, but these have not been continued since  She saw the nurse practitioner in follow-up  A follow-up echo was ordered with plans that if the ejection fraction remained low, she would be referred for an ischemic evaluation  She has not yet had this done but it is scheduled for July      She returns to the office today  Generally, she is feeling okay from cardiac standpoint  She is having pain in her shoulder but this seems musculoskeletal   However, it is affecting her assessment of other symptoms  She feels short of breath when she takes a deep breath in  No PND, orthopnea, edema  She also reported panic attacks     A Zio patch was done including when she was feeling some of these symptoms, and no significant arrhythmias were noted    Medical Problems             Problem List     Abnormal liver enzymes    Insomnia    Lipoma of right lower extremity    Age-related osteoporosis with current pathological fracture with routine healing    Vitamin D deficiency    Screening for breast cancer    Screening for colon cancer    Other hyperlipidemia    Screen for colon cancer    Overview Signed 3/4/2019 10:50 AM by Vinton Skiff     Added automatically from request for surgery [id number]         Fatty liver    ETOH abuse    Refusal of blood transfusions as patient is [facility]    Lumbar compression fracture ([facility] [address] )    Pancreatic cyst    Gall stones    Refusal of blood transfusions as patient is [facility] (Chronic)    HAP (hospital-acquired pneumonia)    Thrombopenia ([facility] [address] )    Fall from standing    Seizure ([facility] [address] )    Aspiration pneumonia ([facility] [address] )    Possible Drug overdose    Pancytopenia ([facility] [address] )    Anxiety with depression (Chronic)    Panic attack    Chronic combined systolic and diastolic congestive heart failure (HCC)    Wt Readings from Last 3 Encounters:   06/16/22 57 2 kg (126 lb)   05/02/22 57 1 kg (125 lb 12 8 oz)   04/27/22 58 5 kg (129 lb)                 Past Medical History:   Diagnosis Date    Fracture     Hepatitis     Hep A     Insomnia     [id number] resolved    Osteoporosis     [id number] resolved    Pancreatitis     Seasonal allergies     Urine discoloration 11/11/2019    Varicella     Vomiting 11/13/2019     Social History     Tobacco Use    Smoking status: Never Smoker    Smokeless tobacco: Never Used   Vaping Use    Vaping Use: Never used   Substance Use Topics    Alcohol use: Not Currently     Alcohol/week: 7 0 standard drinks     Types: 7 Cans of beer per week    Drug use: No      Family History   Problem Relation Age of Onset    Anxiety disorder Mother     Depression Mother     No Known Problems Father     No Known Problems Sister     No Known Problems Sister     No Known Problems Brother     Cancer Paternal Grandmother         unknown     No Known Problems Daughter     No Known Problems Maternal Grandmother     Cancer Maternal Grandfather     No Known Problems Paternal Grandfather     Breast cancer Neg Hx     Ovarian cancer Neg Hx      Past Surgical History:   Procedure Laterality Date    IR BIOPSY BONE  8/17/2021    IR BIOPSY BONE MARROW  11/14/2019    TUBAL LIGATION  1985       Current Outpatient Medications:     cyanocobalamin (VITAMIN B-12) 500 MCG tablet, Take 1 tablet (500 mcg total) by mouth daily for 26 days, Disp: 26 tablet, Rfl: 0    estradiol (ESTRACE VAGINAL) 0 1 mg/g vaginal cream, Insert 1 g into the vagina 3 (three) times a week Insert 1/4 of applicator into the vagina 3 times a week for 2 weeks   If symptoms improved can reduce to using twice weekly after 2 weeks for maintenance , Disp: 42 g, Rfl: 1    folic acid (FOLVITE) 1 mg tablet, Take 1 tablet (1 mg total) by mouth daily, Disp: 30 tablet, Rfl: 0    gabapentin (NEURONTIN) 300 mg capsule, TAKE 1 CAPSULE (300 MG TOTAL) BY MOUTH 2 TIMES A DAY , Disp: 60 capsule, Rfl: 2    hydrOXYzine HCL (ATARAX) 25 mg tablet, Take 1 tablet (25 mg total) by mouth 2 (two) times a day as needed for anxiety, Disp: 10 tablet, Rfl: 0    melatonin 3 mg, Take 1 tablet (3 mg total) by mouth daily at bedtime, Disp: 30 tablet, Rfl: 0    metoprolol succinate (TOPROL-XL) 25 mg 24 hr tablet, Take 1 tablet (25 mg total) by mouth 2 (two) times a day, Disp: 180 tablet, Rfl: 3    therapeutic multivitamin-minerals (THERAGRAN-M) tablet, Take 1 tablet by mouth daily for 26 days, Disp: 26 tablet, Rfl: 0    traZODone (DESYREL) 50 mg tablet, Take 50 mg by mouth in the morning, Disp: , Rfl:     desvenlafaxine succinate (PRISTIQ) 50 mg 24 hr tablet, Take 1 tablet (50 mg total) by mouth daily (Patient not taking: No sig reported), Disp: 30 tablet, Rfl: 0    Diclofenac Sodium (VOLTAREN) 1 %, Apply 2 g topically 4 (four) times a day (Patient not taking: Reported on 6/16/2022), Disp: 2 g, Rfl: 0    levalbuterol (XOPENEX) 1 25 mg/3 mL nebulizer solution, Take 3 mL (1 25 mg total) by nebulization every 6 (six) hours as needed for wheezing (Patient not taking: Reported on 6/16/2022), Disp: 3 mL, Rfl: 0  No Known Allergies    Vitals:    06/16/22 1530   BP: 112/74   BP Location: Left arm   Patient Position: Sitting   Cuff Size: Standard   Pulse: 66   SpO2: 97%   Weight: 57 2 kg (126 lb)   Height: 5' 7" (1 702 m)     Vitals:    06/16/22 1530   Weight: 57 2 kg (126 lb)      Height: 5' 7" (170 2 cm)   Body mass index is 19 73 kg/m²  Physical Exam:  GENERAL: Alert, well appearing, and in no distress  HEENT:  PERRL, EOMI, no scleral icterus, no conjunctival pallor  NECK:  Supple, No elevated JVP, no thyromegaly, no carotid bruits  HEART:  Regular rate and rhythm, normal S1/S2, no S3/S4, no murmur or rub  LUNGS:  Clear to auscultation bilaterally  ABDOMEN:  Soft, non-tender, positive bowel sounds, no rebound or guarding  EXTREMITIES:  No edema  VASCULAR:  Normal pedal pulses   NEURO: No focal deficits,  SKIN: Normal without suspicious lesions on exposed skin    ROS:  Positive for anxiety, palpitations  Shortness of breath  Shoulder pain, back pain, slow gait  Fatigue, weakness  Dizziness, lightheadedness  Except as noted in HPI, is otherwise reviewed in detail and a 12 point review of systems is negative      Labs:  Lab Results   Component Value Date    SODIUM 141 03/16/2022    K 4 6 03/16/2022     03/16/2022    CREATININE 0 69 03/16/2022    BUN 8 03/16/2022    CO2 23 03/16/2022    ALT 58 03/09/2022     (H) 03/09/2022    INR 1 16 03/07/2022    HGBA1C 4 7 03/09/2022    WBC 3 16 (L) 03/16/2022    HGB 8 7 (L) 03/16/2022    HCT 26 6 (L) 03/16/2022    PLT 57 (L) 03/16/2022     Lab Results   Component Value Date    CHOL 236 (H) 04/22/2016     No results found for: [address]  Lab Results   Component Value Date    HDL 86 04/22/2016     Lab Results   Component Value Date    TRIG 125 04/22/2016     Testing:  Echo 3/8/22:    Left Ventricle: Left ventricular cavity size is normal  The left ventricular ejection fraction is 40%  Systolic function is mildly reduced  There is hypokinesis of the mid-anterior, mid-anteroseptal, mid-anterolateral, mid-inferoseptal and mid-inferior  There appears to be preservation of apical, basal and mid inferolateral segments    Right Ventricle: Right ventricular cavity size is normal  Systolic function is normal      In the absence of epicardial coronary disease, consider Takotsubo cardiomyopathy mid-ventricular type      EKG:  Sinus rhythm, 66 BPM  Normal EKG

## 2022-06-17 DIAGNOSIS — I42.9 CARDIOMYOPATHY (HCC): ICD-10-CM

## 2022-06-17 DIAGNOSIS — I50.21 ACUTE SYSTOLIC CONGESTIVE HEART FAILURE (HCC): ICD-10-CM

## 2022-06-17 RX ORDER — METOPROLOL SUCCINATE 25 MG/1
25 TABLET, EXTENDED RELEASE ORAL 2 TIMES DAILY
Qty: 180 TABLET | Refills: 3 | Status: SHIPPED | OUTPATIENT
Start: 2022-06-17 | End: 2022-10-15

## 2022-06-20 NOTE — CASE MANAGEMENT
Case Management Progress Note    Patient name Igor Stanley  Location S /S -01 MRN [id number]  : 1960 Date 3/18/2022       LOS (days): 11  Geometric Mean LOS (GMLOS) (days): 3 50  Days to GMLOS:-7 2        OBJECTIVE:        Current admission status: Inpatient  Preferred Pharmacy: No Pharmacies Listed  Primary Care Provider: Cecilia Azevedo MD    Primary Insurance: Nelsy Huang  Secondary Insurance:     PROGRESS NOTE:    CM continued bed search for IP psych placment  Referrals faxed to the following facilities  [address] [place] [address]        CM received calls back from [facility] and [facility]  Patient denied d/t medical acuity  CM will continue to follow  yes

## 2022-06-20 NOTE — TELEPHONE ENCOUNTER
Nati Harper from [employer] calling regarding Levalbuterol not in stock, pharmacy has alternative but needs approval  Please call at [phone] 
[FreeTextEntry3] : \par Injection Procedure Note:\par \par The risks, benefits, and alternatives to corticosteroid injection were reviewed with the patient.  Risks outlined include but are not limited to infection, sepsis, bleeding, scarring, skin discoloration, temporary increase in pain, syncopal episode, failure to resolve symptoms, symptoms recurrence, allergic reaction, flare reaction, and elevation of blood sugar in diabetics.  Patient understood the risks and asked to proceed with this treatment course.\par \par Patient Identification\par Name/: Verbal with patient and/or family\par \par Procedure Verification:\par Procedure confirmed with patient or family/designee\par Consent for procedure: Verbal Consent Given\par Relevant documentation completed, reviewed, and signed\par Clinical indications for procedure confirmed\par \par Time-out with all members of procedure team immediately prior to procedure:\par Correct patient identified. Agreement on procedure. Correct side and site.\par \par KNEE INJECTION (STEROID) - LEFT\par After verbal consent and identification of the correct patient and correct site, the superolateral left knee was prepped using alcohol swabs and betadine. This was allowed time to air dry. A mixture of 1cc DepoMedrol 40mg/ml, 3cc Lidocaine 1%, and 3cc Bupivacaine 0.5% was injected into the suprapatellar pouch using a sterile 22G needle after ethyl chloride spray for skin anesthesia. The patient tolerated the procedure well. After-care instructions were provided and included instructions to ice the area and to call if redness, pain, or fever develop.\par

## 2022-06-27 ENCOUNTER — TELEPHONE (OUTPATIENT)
Dept: HEMATOLOGY ONCOLOGY | Facility: CLINIC | Age: 62
End: 2022-06-27

## 2022-06-27 NOTE — TELEPHONE ENCOUNTER
Appointment Cancellation Or Reschedule     Person calling in patient   Provider Dr Vel Barboza    Office Visit Date and Time 7/5 @ 940   Office Visit Location [facility]   Did patient want to reschedule their office appointment? If so, when was it scheduled to? Yes 7/14 @ 1120   Is this patient calling to reschedule an infusion appointment? no   When is their next infusion appointment? n/a   Is this patient a Chemo patient? no   Reason for Cancellation or Reschedule Schedule conflict     If the patient is a treatment patient, please route this to the office nurse  If the patient is not on treatment, please route to the office MA  If the patient is a surgical oncology patient, please route to surg/onc clinical pool

## 2022-06-30 DIAGNOSIS — F41.0 PANIC ATTACK: ICD-10-CM

## 2022-06-30 DIAGNOSIS — F41.9 ANXIETY: Primary | ICD-10-CM

## 2022-06-30 RX ORDER — TRAZODONE HYDROCHLORIDE 50 MG/1
50 TABLET ORAL DAILY
Qty: 30 TABLET | Refills: 1 | Status: SHIPPED | OUTPATIENT
Start: 2022-06-30 | End: 2022-07-11 | Stop reason: SDUPTHER

## 2022-06-30 NOTE — TELEPHONE ENCOUNTER
The last order that was in her chart was discontinued by Dr Alyssa Gilmore? Are you filling this for patient?

## 2022-07-11 DIAGNOSIS — F41.0 PANIC ATTACK: ICD-10-CM

## 2022-07-11 DIAGNOSIS — F41.9 ANXIETY: ICD-10-CM

## 2022-07-12 DIAGNOSIS — F41.8 ANXIETY WITH DEPRESSION: Chronic | ICD-10-CM

## 2022-07-12 RX ORDER — HYDROXYZINE HYDROCHLORIDE 25 MG/1
25 TABLET, FILM COATED ORAL 2 TIMES DAILY PRN
Qty: 10 TABLET | Refills: 0 | Status: SHIPPED | OUTPATIENT
Start: 2022-07-12 | End: 2022-07-20 | Stop reason: SDUPTHER

## 2022-07-12 RX ORDER — TRAZODONE HYDROCHLORIDE 50 MG/1
50 TABLET ORAL DAILY
Qty: 30 TABLET | Refills: 0 | Status: SHIPPED | OUTPATIENT
Start: 2022-07-12 | End: 2022-07-20 | Stop reason: SDUPTHER

## 2022-07-13 ENCOUNTER — TELEMEDICINE (OUTPATIENT)
Dept: NEUROLOGY | Facility: CLINIC | Age: 62
End: 2022-07-13
Payer: COMMERCIAL

## 2022-07-13 DIAGNOSIS — G40.109 SYMPTOMATIC FOCAL EPILEPSY (HCC): Primary | ICD-10-CM

## 2022-07-13 PROCEDURE — 99215 OFFICE O/P EST HI 40 MIN: CPT | Performed by: NURSE PRACTITIONER

## 2022-07-13 RX ORDER — LACOSAMIDE 100 MG/1
TABLET ORAL
Qty: 60 TABLET | Refills: 2 | Status: SHIPPED | OUTPATIENT
Start: 2022-07-13 | End: 2022-09-22

## 2022-07-13 NOTE — PROGRESS NOTES
Patient ID: Jenni Meek is a 58 y o  female with recent hospitalization for seizures, who is returning to Neurology office for hospital follow up regarding seizures  Virtual Regular Visit    Verification of patient location: [place], [place]    Patient is located in the following state in which I hold an active license [place]      Assessment/Plan:    Problem List Items Addressed This Visit        Nervous and Auditory    Symptomatic focal epilepsy ([facility] [address] ) - Primary     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  It was recommended that the patient be discharged on vimpat and to continue with this medication  Unfortunately, she was only given a 15 day supply upon discharge and has not been taking this medication  The patient does report that she has been having episodes of sudden onset of intense fear without clear reason  She gets an overwhelming wave of horror and can not move but is able to see and hear what is going on around her  Will typically resolve in 3-5 minutes and may vomit after  These events are concerning for seizures which was discussed with the patient  While she does have a history of panic attacks in the past, these do seem more intense and given findings during recent hospitalization, required prompt treatment  Reviewed seizure safety and first aid  Given information to review in AVS as well  Recommend that the patient restart lacosamide to 100 mg BID  Once at 100 mg BID will check lacosamide level, CBC, and CMP   Would like for patient to have a repeat EEG as well  Given status epilepticus consistent findings on MRI of the left hippocampal formation, will recheck MRI to resolution as recommended by reading radiologist      Patient will call the office with further episodes concerning for seizures  Recommend that the patient follow up with attending epileptologist in 3 months or sooner if needed  Relevant Medications    lacosamide (VIMPAT) 100 mg tablet    Other Relevant Orders    Lacosamide    Comprehensive metabolic panel    CBC and differential    EEG Routine and awake    MRI brain with and without contrast               Reason for visit is   Chief Complaint   Patient presents with    Virtual Regular Visit        Encounter provider [address] [place] [address],[id number], [place]    Provider located at [address] [address] [address] [facility]      Recent Visits  No visits were found meeting these conditions  Showing recent visits within past 7 days and meeting all other requirements  Future Appointments  No visits were found meeting these conditions  Showing future appointments within next 150 days and meeting all other requirements       The patient was identified by name and date of birth  Kalyani Armenta was informed that this is a telemedicine visit and that the visit is being conducted through [facility] and patient was informed this is a secure, HIPAA-complaint platform  She agrees to proceed     My office door was closed  No one else was in the room  She acknowledged consent and understanding of privacy and security of the video platform  The patient has agreed to participate and understands they can discontinue the visit at any time  Patient is aware this is a billable service  Subjective:  Kalyani Armenta is a 58 y o  female with recent hospitalization for seizures, who is returning to Neurology office for hospital follow up regarding seizures  She was hospitalized 3/7-3/19/22   From review of chart, she presented to the hospital as a trauma after a fall from standing with witnessed seizure activity  Noted history of traumatic subarachnoid hemorrhage requiring seizure prophylaxis in August of 2021  CT head was unremarkable at the time of admission  Due to repeated hand clenching it was suspected that she was in status epilepticus and since there was a drop in GCS, she was admitted to  under trauma service for continuous video EEG, ventilatory support, and loaded with anticonvulsants  Noted that prior to admission, her  did find patient with an open bottle of zoloft and she had been struggling with alcohol intake discussion prior to being hospitalized  While on video EEG, the patient did have 3 electrographic seizures  MRI brain w wo contrast showed Focal diffusion-weighted and FLAIR signal hyperintensity involving the left hippocampal formation is consistent with status epilepticus  Neurology was consulted and patient initially was started on Keppra and Vimpat  Keppra best was discontinued due to patient has noted decreasing platelets and possible side effect of Keppra  Patient was continue on Vimpat monotherapy for control of her seizures  Neurology believed seizure likely due to alcohol withdrawal, medication overdose, vs TBI from prior fall (Aug 2021)  Neurologic exam was non focal at time of sign of with neurology  Due to possible suicide attempt (OD) prior to hospitalization she was discharged to inpatient psychiatry  From review of chart, she only received a prescription for a 15 day supply of lacosamide which was not refilled  On review of PDMP, this was never filled at a pharmacy  Appears to have been discontinued from medication list on 6/16/22  She reports that it was a normal day on the day that she went to the hospital  She does not remember much about the day  She was doing some office work and organizing laundry  This is all that she remembers   Does not recall feeling odd or funny sensations  Then her  came home and found her face first in her flower pot  Called 911 and was taken to the hospital    She reports significant stress in childhood and with her first marriage  She has had panic attacks in the past, very sporadically over the years with increased stress  Since her hospitalization she has started to have what seems like panic attacks without a cause  Notes that she has a great life and does not have any depression or anything to be upset about  She is following with psychiatry and a therapist     She has been having sudden onset of intense fear  No provoking factors  She can tell it is going to happen and she has to sit down  She gets a wave of horror and she can not speak or move  Can not function  This has occurred 3 times recently  It would happen sporadically in the past  It is a build up and is gone in 3-5 minutes  May vomit after  Does not feel confused after  She can see and hear what is going on around her   does not notice any automatisms or abnormal behavior during this time  After her recent hospitalization she was at inpatient psych for a short period of time  She denies suicidal thoughts prior to going to the hospital  Notes that she did not take medications/OD prior to her episode concerning for seizure  She may have a glass of wine on the weekend but nothing excessive  No prior drug use  Alcohol use in the past      Memory has been okay    Current seizure medications:  - None  Other medications as per Epic  Semiology: as above  Special features:  - injuries with seizures : Falls, SAH?  - provoking: none  - special features: none  - post-ictal period: unknown    Prior seizure medicaitons: keppra - low plt    Epilepsy risk factors:  - Prior [address] 8/21  - No family history  - No prior sroke  - no meningities  - No learning/develomental issues       Prior AEDs:  - levetiracetam    Prior Work-up:  - 3/10/22 MRI brain w wo contrast:  FINDINGS:  BRAIN PARENCHYMA:  There is diffusion-weighted signal hyperintensity and corresponding FLAIR signal involving the left hippocampal formation  There is otherwise no involvement of the contralateral side, hemorrhage or corresponding enhancement  There is   no involvement of the inferior parasagittal frontal lobes or insular ribbons  Findings are most consistent with status epilepticus   Francine Lopez is cerebral volume loss  There are scattered sulcal hemosiderosis  There is no acute intracranial hemorrhage  There is no midline shift  VENTRICLES:  Normal for the patient's age  SELLA AND PITUITARY GLAND:  Normal   ORBITS:  Normal   PARANASAL SINUSES:  Normal   VASCULATURE:  Evaluation of the major intracranial vasculature demonstrates appropriate flow voids  CALVARIUM AND SKULL BASE:  There are bilateral mastoid air cell effusions, right greater than left  EXTRACRANIAL SOFT TISSUES:  Normal    IMPRESSION:  Focal diffusion-weighted and FLAIR signal hyperintensity involving the left hippocampal formation is consistent with status epilepticus  No associated hemorrhage, enhancement or involvement of the contralateral temporal lobe  Follow to resolution  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  No acute intracranial hemorrhage      - 8/8/21 CT head wo contrast:  FINDINGS:  PARENCHYMA:  The appearance of the brain is similar to the recent prior head CT  Multi compartmental intracranial hemorrhage redemonstrated  A few scattered small parenchymal hemorrhages noted most notably in the parasagittal aspect of the right   occipital lobe superiorly on image 23, series 2  Small left frontal petechial hemorrhage anteriorly inferiorly on image 17, series 2 is stable  The previously noted tiny right frontal parenchymal petechial hemorrhage is less well-visualized possibly due to slice selection    VENTRICLES AND EXTRA-AXIAL SPACES:  Small amounts of interhemispheric subdural blood products along the posterior falx on image 33, series 2 is stable with scattered mild to moderate left greater than right subarachnoid hemorrhage over the frontoparietal   convexities  Trace amount of layering blood products in the occipital horns lateral ventricles are also stable  No hydrocephalus  No new hemorrhage  VISUALIZED ORBITS AND PARANASAL SINUSES:  Moderate fluid level in the sphenoid sinus  Mild posterior ethmoidal fluid  Mild bilateral maxillary sinus mucosal thickening  CALVARIUM AND EXTRACRANIAL SOFT TISSUES:  Normal   IMPRESSION:  Scattered multicompartment intracranial hemorrhage is grossly stable  No new hemorrhage or large territorial infarction        - Continuous video EEG 3/7-3/8/22:  Day 1:  Abnormal findings: There are episodic recurrent electrographic seizures from the left temporal region  The first seizure occurred at 23:49  There is sudden appearance of left temporal T3 maximal rhythmic 4-5 Hz sharp theta activity, increasing organization with underlying fast activity, spreads to the left frontal and temporal region with rhythmic 4 Hz delta activity, then evolves to rhythmic moderate voltage 2-3 Hz delta activity over the left temporal region, and ends with T3 maximal sharp waves  This seizure lasted about 75 seconds  There is no clear clinical correlation  There are recurrent electrographic seizures from the left temporal region with the sudden appearance of low voltage rhythmic 7-8 Hz alpha-theta activity or rhythmic 4-5 Hz theta activity evolving to rhythmic moderate voltage 2 Hz delta activity, maximal over T3-T1, F7-T3, and T3-T5, sometimes followed by rhythmic 1 Hz delta activity over the left temporal region  These focal seizures last 30-60 seconds  These seizures occurred on 00:48, 01:46, and from 02:00 to 04:00 about once every 10-15 minutes  From 04:00 to 06:40, these focal electrographic seizure occur less frequent at 04:23, 04:56, and 06:39    After 06:39, the background has more persistent sleep features, with widespread very low beta activity, sleep spindles    Events:   07:58 - nurse notices that the patient is shaking her right arm and hand but also the left hand is shaking  There is some rhythmic right shoulder jerking activity, but it also appears that the arms and hands are tensing up  There is no electrographic seizure during this time  She looks like she is waking up on the EEG background  Interpretation: This is an abnormal nearly 23 5 hours continuous video EEG recording due to the presence of recurrent focal seizures from the left temporal region  This finding indicate the presence of an epileptogenic focus in the left temporal region  Diffuse widespread alpha-beta activity and excessive sleep spindles are generally seen in the setting of general anesthesia  Day 2:  Events: There are no patient push button events  Interpretation: This is an abnormal 22 hours continuous video EEG recording due to excessive diffuse delta activity during the waking state, but the background primarily consists of diffuse beta-alpha activity  This finding indicates mild-moderate diffuse cerebral dysfunction  Excessive beta activity is seen in the presence of CNS activating agents, such as benzodiazepines and barbiturates  There are no recurrent seizures during this monitored period  Her history was also obtained from her , who was present at today's visit  I reviewed prior neurology notes, recent labs, EEG reports, MRI brain report, CT head report, as documented in Epic/Equiendo, and summarized above           Past Medical History:   Diagnosis Date    Fracture     Hepatitis     Hep A     Insomnia     [id number] resolved    Osteoporosis     [id number] resolved    Pancreatitis     Seasonal allergies     Urine discoloration 11/11/2019    Varicella     Vomiting 11/13/2019       Past Surgical History:   Procedure Laterality Date    IR BIOPSY BONE 8/17/2021    IR BIOPSY BONE MARROW  11/14/2019    TUBAL LIGATION  1985       Current Outpatient Medications   Medication Sig Dispense Refill    estradiol (ESTRACE VAGINAL) 0 1 mg/g vaginal cream Insert 1 g into the vagina 3 (three) times a week Insert 1/4 of applicator into the vagina 3 times a week for 2 weeks  If symptoms improved can reduce to using twice weekly after 2 weeks for maintenance  42 g 1    gabapentin (NEURONTIN) 300 mg capsule TAKE 1 CAPSULE (300 MG TOTAL) BY MOUTH 2 TIMES A DAY  60 capsule 2    lacosamide (VIMPAT) 100 mg tablet Take half a tablet by mouth twice per day for week  Then increase to one full tablet twice per day  60 tablet 2    metoprolol succinate (TOPROL-XL) 25 mg 24 hr tablet Take 1 tablet (25 mg total) by mouth 2 (two) times a day 180 tablet 3    cyanocobalamin (VITAMIN B-12) 500 MCG tablet Take 1 tablet (500 mcg total) by mouth daily for 26 days 26 tablet 0    hydrOXYzine HCL (ATARAX) 25 mg tablet TAKE 1 TABLET BY MOUTH 2 TIMES A DAY AS NEEDED FOR ANXIETY  60 tablet 0    levalbuterol (XOPENEX) 1 25 mg/3 mL nebulizer solution Take 3 mL (1 25 mg total) by nebulization every 6 (six) hours as needed for wheezing (Patient not taking: No sig reported) 3 mL 0    melatonin 3 mg Take 1 tablet (3 mg total) by mouth daily at bedtime (Patient not taking: Reported on 7/13/2022) 30 tablet 0    therapeutic multivitamin-minerals (THERAGRAN-M) tablet Take 1 tablet by mouth daily for 26 days 26 tablet 0    traZODone (DESYREL) 50 mg tablet Take 1 tablet (50 mg total) by mouth in the morning 30 tablet 0     No current facility-administered medications for this visit  No Known Allergies    Review of Systems    Constitutional: Negative  Negative for appetite change and fever  HENT: Negative  Negative for hearing loss, tinnitus, trouble swallowing and voice change  Eyes: Negative  Negative for photophobia and pain  Respiratory: Negative  Negative for shortness of breath  Cardiovascular: Negative  Negative for palpitations  Gastrointestinal: Negative  Negative for nausea and vomiting  Endocrine: Negative  Negative for cold intolerance  Genitourinary: Negative  Negative for dysuria, frequency and urgency  Musculoskeletal: Negative  Negative for myalgias and neck pain  Skin: Negative  Negative for rash  Neurological: Positive for seizures (two sz yesterday and one sz this morning)  Negative for dizziness, tremors, syncope, facial asymmetry, speech difficulty, weakness, light-headedness, numbness and headaches  Hematological: Negative  Does not bruise/bleed easily  Psychiatric/Behavioral: Negative for confusion, hallucinations and sleep disturbance  The patient is nervous/anxious (panic attacks )  All other systems reviewed and are negative  ROS obtained by MA and reviewed by myself  This note may have been created using voice recognition software  There may be unintentional errors such as grammatical errors, spelling errors, or pronoun errors  Video Exam    There were no vitals filed for this visit  Physical Exam   Physical Exam - limited as this is a video visit  No apparent distress  Appears well nourished  Mood appropriate for situation  Neurologic Exam- limited as this is a video visit  Mental status- alert and oriented to person, place, and time  Speech appropriate for conversation  Good attention and knowledge  Cranial Nerves- facial muscles symmetric, hearing intact to conversation,speech normal  Motor- unable to assess  Sensory-  unable to assess  DTRs- unable to assess  Gait- unable to assess   Coordination- unable to assess    I spent 51 minutes directly with the patient during this visit    VIRTUAL VISIT DISCLAIMER      Alferd Meckel verbally agrees to participate in [employer]   Pt is aware that [employer] could be limited without vital signs or the ability to perform a full hands-on physical Soraya Tierney understands she or the provider may request at any time to terminate the video visit and request the patient to seek care or treatment in person

## 2022-07-13 NOTE — PROGRESS NOTES
Review of Systems   Constitutional: Negative  Negative for appetite change and fever  HENT: Negative  Negative for hearing loss, tinnitus, trouble swallowing and voice change  Eyes: Negative  Negative for photophobia and pain  Respiratory: Negative  Negative for shortness of breath  Cardiovascular: Negative  Negative for palpitations  Gastrointestinal: Negative  Negative for nausea and vomiting  Endocrine: Negative  Negative for cold intolerance  Genitourinary: Negative  Negative for dysuria, frequency and urgency  Musculoskeletal: Negative  Negative for myalgias and neck pain  Skin: Negative  Negative for rash  Neurological: Positive for seizures (two sz yesterday and one sz this morning)  Negative for dizziness, tremors, syncope, facial asymmetry, speech difficulty, weakness, light-headedness, numbness and headaches  Hematological: Negative  Does not bruise/bleed easily  Psychiatric/Behavioral: Negative for confusion, hallucinations and sleep disturbance  The patient is nervous/anxious (panic attacks )  All other systems reviewed and are negative

## 2022-07-13 NOTE — PATIENT INSTRUCTIONS
- Restart lacosamide (vimpat) 100 mg tablets: Take half a tablet by mouth twice per day for week  Then increase to one full tablet twice per day  - Have blood work done in two weeks  - Have an EEG done  - Have MRI brain done  - Call the office with further seizures or concerns  - Follow up in 3 months with attending physician  - Please review information below regarding seizures:  [address] let fear of seizures keep you at home  Be smart about your activities to make sure you are safe  The guidelines below can help you be as safe as possible  Make sure the people around you are aware of: What happens when you have a seizure  Correct seizure first aid  First aid for choking (consider taking a basic life support class)  When they should know to call 911 or for medical help    Avoid common triggers of seizures:  Missing your medications  Not getting enough sleep  Drinking alcohol  Using illegal drugs    Safety measures for at home:  In the bathroom:   Do not take baths in the bathtub  Instead, take only showers  Try to have a family member available while you are in the shower  Make sure the drain in the bathtub/shower is working properly to avoid pooling of water  You can consider a fitted shower seat  Recessed soap trays can minimize injury if you would happen to fall in the shower  Bathroom doors can be hung to open outwards, so that the door can still be opened if you fall against the door  Do not lock the bathroom door  Use an Occupied sign on the door or other signal to let others know you are in the bathroom  Safety locks can be obtained from the [facility]  On your water heater, set your water temperature to a warm temperature that is not scalding to avoid burns from very hot water  Put non-skid strips/ in the bathtub  Use an electric shaver  Avoid any electrical appliances (including electric shaver) in the bathroom or near water    Use shatterproof glass for mirrors  In the kitchen:   When possible, cook using a microwave  Only cook or use electrical appliances when someone else is in the house and available  As much as possible, grill food and avoid fryers or frying food  Use the back burners of the stove and turn the pot handles toward the back of the stove  Avoid carrying hot pans, pots of boiling water, or very hot food  Serve food or liquids directly from the stove  At the least, minimize the distance hot food is carried  Use precut foods or food processers to limit the need to use knives  Use plastic or durable cups, dishes, and containers instead of breakable glass items  In the bedroom  Low level and wide beds (like a futon) can reduce risk of injury of falling out of bed  If there is a high risk of falling out of bed, you can consider simply putting the mattress on the floor  Avoid sleeping on top bunks of bunk beds  Place a soft carpet on the floor  Around the house  Pad sharp corners of tables, chairs, etc  Round tables and furniture can be considered to avoid sharp corners  Avoid open flames  Place a screen in front of fireplaces and dont build a fire alone  Allow for open spaces with furniture  Avoid loose throw rugs or slippery floors  Non-slip karina or cushioned karina can help reduce injury from a fall  Fireproof fabrics and furniture are best, and especially important if you smoke  Doors and windows with safety glass are safer if someone falls against them  Avoid lights and heaters that could easily be knocked over  Use curling irons or clothing irons that have automatic shut off switches  Make sure motor driven equipment or lawn mowers have dead mans handles or seats so they will turn off if you release pressure  Safety measures when away from home  [address]  law mandates that you cannot drive for 6 months after your most recent seizure     [place] law mandates that you cannot drive for 6 months after your most recent seizure  Work/Travel  Wear a medical alert bracelet or necklace at all times  Wear a helmet and use protective clothing/equipment when appropriate  Consider telling co-workers and travel companions that you have epilepsy and what to do if you have a seizure  Avoid irregular shifts or disruptions of a regular sleep pattern  Take your medications on time and keep an updated list of medications in your purse or wallet  Do not climb to heights or operate heavy machinery  Stand back from the edges of roads or bus/train platforms when traveling  If you wander when you have a seizure, take a friend along for the trip  Recreation  Swimming can be dangerous  Do not swim alone, in open water, or in murky water that you cannot see the bottom  Caregivers should be with you in the pool at all times and must be physically able to get you out of the water  Use a flotation device  Scuba diving is not recommended since during a seizure people are unaware of their surroundings  Having a seizure underwater can be deadly and can endanger the lives of others  Kayaking and canoeing can be especially dangerous  People with epilepsy are at a higher risk of becoming trapped underneath a canoe or kayak  Wear a helmet when playing contact sports, biking, or if there is a risk of falling  Patients with epilepsy are at a higher risk of head injury when playing contact sports  Avoid riding a bike, running, or other activities around busy roads, steep hills, or secluded areas  Exercise on soft surfaces  Theme Black: many people with epilepsy can go on rides depending on their type of seizures  For some people, stress or excitement can trigger a seizure  Rollercoasters (especially if you are upside-down) are more dangerous for people with epilepsy  Medications  Take your medications on time  If you have trouble remembering, set alarms on your phone   You can visit www [employer]ut[employer]eb[employer]i  com to set up reminders through text message to help you remember to take your medications  Use a pillbox to help you keep track of your medications  When out of the house, take any needed medications with you  Consider keeping one or two extra doses with you in case you are unexpectedly away from home longer than planned  If you realize you missed a dose of your medications and it is less than 2 hours until your next dose, skip the missed dose  Do not double up your medication dose  If it is more than 2 hours until your next dose, you can take the missed dose  Avoid drinking alcohol, since this can enhance effects of your seizure medications  Be aware of common and major side effects of your medications  Keep your medications out of the reach of children  Parenting:  Childproof your home as much as possible  It is possible that you could drop your baby if you have a seizure while holding or feeding them  If you are nursing a baby, sit on the floor or bed with your back supported so the baby will not fall far if you should lose consciousness  Feed the baby while he or she is seated in an infant seat  Dress, change, and sponge bathe the baby on the floor  Move the baby around in a stroller or small crib  Keep a young baby in a playpen when you are alone, and a toddler in an indoor play yard, or childproof one room and use safety georges at the doors  When out of the house, use a bungee-type cord or restraint harness so your child cannot wander away if you have a seizure that affects your awareness  FIRST AID FOR SEIZURES    What to Do If You Witness a Seizure:     Generalized convulsive (called a generalized tonic-clonic or grand mal seizure)  During this seizure, the person may cry out, suddenly stiffen up, make jerking movements, and fall  The person may turn pale or blue from difficulty breathing  Actions:  Stay calm   Talk in a soothing voice and if possible keep onlookers away   Prevent injury  Move objects away that the person might hit while jerking uncontrollably  Time when the seizure starts and ends  Most seizures stop after only a few minutes  Turn him or her gently onto one side  This will help keep the airway clear  Never place anything in his/her mouth or give him/her anything by mouth during a seizure  -- Do not give the person water, pills, or food until fully alert  Loosen tight clothing or jewelry around his/her neck  Make the person as comfortable as possible  Place something soft under their head  Do not hold the person down  If the person having a seizure thrashes around there is no need for you to restrain them  They are more likely to be combative if restrained  Remember to consider your safety as well  Keep onlookers away  Be sensitive and supportive, and ask others to do the same  Stay with the person until he/she is fully alert  Complex partial seizure (confusional spells)  During this kind of seizure, the person may have a glassy stare; give no response or inappropriate responses when questioned; sit, stand, or walk about aimlessly; make lip smacking or chewing motions; fidget with clothes; appear to be drunk, drugged, or confused  Actions:  Make sure the person is safe and wont harm themselves  Try to remove harmful objects from around the person (tools, utensils, glasses)  Do NOT be aggressive or attempt to restrain the person  They are more likely to be combative if restrained  Remember to consider your safety as well  Help prevent the person from wandering, and direct the person to chair or safe position  Never place anything in his/her mouth or give him/her anything by mouth during a seizure  -- Do not give the person water, pills, or food until fully alert  Keep onlookers away  Be sensitive and supportive, and ask others to do the same  Stay with the person until he/she is fully alert      CALL 911 if:  A convulsive seizure lasts more than 5 minutes  The person turns blue during the seizure  The person does not start breathing after the seizure  Begin mouth to mouth resuscitation if this would occur  The person has one seizure right after the other without coming back to normal consciousness between the seizures  The person has not regained consciousness or is still confused after 30 minutes  You know the person does not have epilepsy  You know the person has diabetes or low blood sugar  The person is pregnant, ill, or injured  The seizure occurred in water, because the person may have inhaled water  The person requests an ambulance or medical help  Rescue medication  Your doctor may prescribe a rescue medication such as lorazepam (Ativan), diazepam (Valium / Diastat), or clonazepam (Klonopin) to terminate a seizure or if you have a history of cluster of seizures  Follow the instructions given by your doctor for these medications    Recognizing Common Seizures (examples)   Simple partial seizures: Isolated twitching, numbness, sweating, dizziness, nausea/vomiting, disturbances to hearing, vision, smell or taste  No loss of consciousness occurs, and the person remains aware of his/her environment  Complex partial seizures: Staring, motionless, picking at clothes, smacking lips, swallowing repeatedly or wandering around  The person is not aware of their surroundings and is not fully responsive  Atonic seizures: Drop attacks or sudden, rapid fall to ground with rapid recovery  Myoclonic seizures: Brief forceful jerks which can affect the whole body or just part of it  Absence seizures: May appear to be daydreaming or spacing out   The person is momentarily unresponsive and unaware of what is happening around him/her  Tonic seizures: Stiffening of part or of the entire body  Generalized Tonic-Clonic Seizures  Grand-mal seizure   Sudden loss of consciousness with body stiffening followed by continuous jerking movements  A blue tinge around the mouth is likely but lack of oxygen is rare  Loss of bladder and/or bowel control may occur

## 2022-07-14 ENCOUNTER — TELEPHONE (OUTPATIENT)
Dept: SURGICAL ONCOLOGY | Facility: CLINIC | Age: 62
End: 2022-07-14

## 2022-07-20 DIAGNOSIS — F41.9 ANXIETY: ICD-10-CM

## 2022-07-20 DIAGNOSIS — F41.8 ANXIETY WITH DEPRESSION: Chronic | ICD-10-CM

## 2022-07-20 DIAGNOSIS — F41.0 PANIC ATTACK: ICD-10-CM

## 2022-07-20 RX ORDER — TRAZODONE HYDROCHLORIDE 50 MG/1
50 TABLET ORAL DAILY
Qty: 30 TABLET | Refills: 0 | Status: SHIPPED | OUTPATIENT
Start: 2022-07-20 | End: 2022-08-15

## 2022-07-20 RX ORDER — HYDROXYZINE HYDROCHLORIDE 25 MG/1
25 TABLET, FILM COATED ORAL 2 TIMES DAILY PRN
Qty: 10 TABLET | Refills: 0 | Status: SHIPPED | OUTPATIENT
Start: 2022-07-20 | End: 2022-07-26

## 2022-07-22 ENCOUNTER — HOSPITAL ENCOUNTER (OUTPATIENT)
Dept: NON INVASIVE DIAGNOSTICS | Facility: HOSPITAL | Age: 62
Discharge: HOME | End: 2022-07-22
Payer: COMMERCIAL

## 2022-07-22 VITALS
SYSTOLIC BLOOD PRESSURE: 112 MMHG | HEART RATE: 66 BPM | WEIGHT: 126 LBS | HEIGHT: 67 IN | DIASTOLIC BLOOD PRESSURE: 74 MMHG | BODY MASS INDEX: 19.78 KG/M2

## 2022-07-22 DIAGNOSIS — I42.9 CARDIOMYOPATHY, UNSPECIFIED TYPE (HCC): ICD-10-CM

## 2022-07-22 LAB
AORTIC ROOT: 3.3 CM
APICAL FOUR CHAMBER EJECTION FRACTION: 75 %
E WAVE DECELERATION TIME: 355 MS
FRACTIONAL SHORTENING: 31 (ref 28–44)
INTERVENTRICULAR SEPTUM IN DIASTOLE (PARASTERNAL SHORT AXIS VIEW): 1.2 CM
INTERVENTRICULAR SEPTUM: 1.2 CM (ref 0.6–1.1)
LEFT ATRIUM SIZE: 3 CM
LEFT INTERNAL DIMENSION IN SYSTOLE: 2.2 CM (ref 2.1–4)
LEFT VENTRICULAR INTERNAL DIMENSION IN DIASTOLE: 3.2 CM (ref 3.5–6)
LEFT VENTRICULAR POSTERIOR WALL IN END DIASTOLE: 1.1 CM
LEFT VENTRICULAR STROKE VOLUME: 25 ML
LVSV (TEICH): 25 ML
MV E'TISSUE VEL-LAT: 7 CM/S
MV PEAK A VEL: 0.59 M/S
MV PEAK E VEL: 46 CM/S
MV STENOSIS PRESSURE HALF TIME: 103 MS
MV VALVE AREA P 1/2 METHOD: 2.14
RA PRESSURE ESTIMATED: 3 MMHG
RIGHT VENTRICLE ID DIMENSION: 2.8 CM
RV PSP: 25 MMHG
SL CV LV EF: 60
SL CV PED ECHO LEFT VENTRICLE DIASTOLIC VOLUME (MOD BIPLANE) 2D: 41 ML
SL CV PED ECHO LEFT VENTRICLE SYSTOLIC VOLUME (MOD BIPLANE) 2D: 16 ML
TR MAX PG: 22 MMHG
TR PEAK VELOCITY: 2.3 M/S
TRICUSPID VALVE PEAK REGURGITATION VELOCITY: 2.33 M/S

## 2022-07-22 PROCEDURE — 93308 TTE F-UP OR LMTD: CPT

## 2022-07-22 PROCEDURE — 93321 DOPPLER ECHO F-UP/LMTD STD: CPT | Performed by: INTERNAL MEDICINE

## 2022-07-22 PROCEDURE — 93356 MYOCRD STRAIN IMG SPCKL TRCK: CPT

## 2022-07-22 PROCEDURE — 93325 DOPPLER ECHO COLOR FLOW MAPG: CPT | Performed by: INTERNAL MEDICINE

## 2022-07-22 PROCEDURE — 93356 MYOCRD STRAIN IMG SPCKL TRCK: CPT | Performed by: INTERNAL MEDICINE

## 2022-07-22 PROCEDURE — 93308 TTE F-UP OR LMTD: CPT | Performed by: INTERNAL MEDICINE

## 2022-08-03 PROBLEM — G40.109 SYMPTOMATIC FOCAL EPILEPSY (HCC): Status: ACTIVE | Noted: 2022-08-03

## 2022-08-03 NOTE — ASSESSMENT & PLAN NOTE
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  It was recommended that the patient be discharged on vimpat and to continue with this medication  Unfortunately, she was only given a 15 day supply upon discharge and has not been taking this medication  The patient does report that she has been having episodes of sudden onset of intense fear without clear reason  She gets an overwhelming wave of horror and can not move but is able to see and hear what is going on around her  Will typically resolve in 3-5 minutes and may vomit after  These events are concerning for seizures which was discussed with the patient  While she does have a history of panic attacks in the past, these do seem more intense and given findings during recent hospitalization, required prompt treatment  Reviewed seizure safety and first aid  Given information to review in AVS as well  Recommend that the patient restart lacosamide to 100 mg BID  Once at 100 mg BID will check lacosamide level, CBC, and CMP  Would like for patient to have a repeat EEG as well  Given status epilepticus consistent findings on MRI of the left hippocampal formation, will recheck MRI to resolution as recommended by reading radiologist      Patient will call the office with further episodes concerning for seizures  Recommend that the patient follow up with attending epileptologist in 3 months or sooner if needed

## 2022-08-10 ENCOUNTER — TELEPHONE (OUTPATIENT)
Dept: NEUROLOGY | Facility: CLINIC | Age: 62
End: 2022-08-10

## 2022-08-10 NOTE — TELEPHONE ENCOUNTER
Pt left VM,requesting refill for her Trazidone Pt stated that was originally prescribed by her PCP,and now is asking if neurology can take over       Pt had Virtual Appt with you on 7/13    Please advise

## 2022-08-10 NOTE — TELEPHONE ENCOUNTER
Since she is being seen for seizures, we typically only prescribe her seizure medications or medications for other neurologic issues  I did see the encounter from her PCP who wants her to follow with psychiatry  Since PCP has been prescribing, they should continue to refill until she is seen by psychiatry

## 2022-08-15 DIAGNOSIS — F41.9 ANXIETY: ICD-10-CM

## 2022-08-15 DIAGNOSIS — F41.0 PANIC ATTACK: ICD-10-CM

## 2022-08-15 DIAGNOSIS — F41.8 ANXIETY WITH DEPRESSION: Chronic | ICD-10-CM

## 2022-08-15 RX ORDER — TRAZODONE HYDROCHLORIDE 50 MG/1
50 TABLET ORAL DAILY
Qty: 90 TABLET | Refills: 0 | Status: SHIPPED | OUTPATIENT
Start: 2022-08-15 | End: 2022-10-24 | Stop reason: SDUPTHER

## 2022-08-16 RX ORDER — GABAPENTIN 300 MG/1
300 CAPSULE ORAL 2 TIMES DAILY
Qty: 60 CAPSULE | Refills: 2 | Status: SHIPPED | OUTPATIENT
Start: 2022-08-16

## 2022-08-28 DIAGNOSIS — F41.8 ANXIETY WITH DEPRESSION: Chronic | ICD-10-CM

## 2022-08-30 RX ORDER — HYDROXYZINE HYDROCHLORIDE 25 MG/1
25 TABLET, FILM COATED ORAL 2 TIMES DAILY PRN
Qty: 60 TABLET | Refills: 0 | Status: SHIPPED | OUTPATIENT
Start: 2022-08-30 | End: 2022-09-29

## 2022-09-21 ENCOUNTER — TELEPHONE (OUTPATIENT)
Dept: PSYCHIATRY | Facility: CLINIC | Age: 62
End: 2022-09-21

## 2022-09-21 ENCOUNTER — TELEPHONE (OUTPATIENT)
Dept: HEMATOLOGY ONCOLOGY | Facility: CLINIC | Age: 62
End: 2022-09-21

## 2022-09-21 NOTE — TELEPHONE ENCOUNTER
Appointment Cancellation Or Reschedule     Person calling In self   Provider [facility] Visit Date and Time 7/14/22    Office Visit Location [facility]   Did patient want to reschedule their office appointment? If so, when was it scheduled to? Yes, 10/7 10AM [place]   Did you have STAR scheduled for this appointment? no   Do you need STAR set up for your new appointment? If yes, please send to "PATIENT RIDESHARE" pool for STAR rescheduling no   If you are cancelling appointment, can we notify STAR to cancel ride? If yes, please send to "PATIENT RIDESHARE" pool for STAR to cancel service no   Is this patient calling to reschedule an infusion appointment? no   When is their next infusion appointment? no   Is this patient a Chemo patient? no   Reason for Cancellation or Reschedule transportation     If the patient is a treatment patient, please route this to the office nurse  If the patient is not on treatment, please route to the office MA  If the patient is a surgical oncology patient, please route to surg/onc clinical pool

## 2022-09-21 NOTE — TELEPHONE ENCOUNTER
Pt phoned in looking to reschedule a missed appt with Sadaf Ansari  Writer made her an appt for 10/5/2022 at 9 am for a virtual appt

## 2022-09-22 ENCOUNTER — TELEPHONE (OUTPATIENT)
Dept: NEUROLOGY | Facility: CLINIC | Age: 62
End: 2022-09-22

## 2022-09-22 DIAGNOSIS — G40.109 SYMPTOMATIC FOCAL EPILEPSY (HCC): ICD-10-CM

## 2022-09-22 RX ORDER — LACOSAMIDE 100 MG/1
150 TABLET ORAL 2 TIMES DAILY
Qty: 90 TABLET | Refills: 2 | Status: SHIPPED | OUTPATIENT
Start: 2022-09-22 | End: 2022-10-24 | Stop reason: SDUPTHER

## 2022-09-22 NOTE — TELEPHONE ENCOUNTER
Pt left VM re: Lacosamide and return of symptoms  Called back and spoke with pt  Pt states medication was working initially but feelings of panic and dread are coming back  Pt feels an increase in medication might be beneficial  Pt did disclose that she took an extra dose yesterday and it helped her  Please advise  Thank you

## 2022-09-22 NOTE — TELEPHONE ENCOUNTER
Increased dose sent (150 mg BID - 1 5 tablets BID)  When I saw her I recommended 3 month follow up with attending (new patient appointment)  There is nothing scheduled  Please get her scheduled with an attending for appointment in the next few weeks        Thank you

## 2022-09-23 NOTE — TELEPHONE ENCOUNTER
LMOM for pt to call office to schedule appt with Dr Evie Killian  If pt calls back please offer pt 10/5 in [facility] with these time slots  9 am, 9:30 am, 11am OR 3 pm

## 2022-09-29 DIAGNOSIS — F41.8 ANXIETY WITH DEPRESSION: Chronic | ICD-10-CM

## 2022-09-29 RX ORDER — HYDROXYZINE HYDROCHLORIDE 25 MG/1
TABLET, FILM COATED ORAL
Qty: 60 TABLET | Refills: 0 | Status: SHIPPED | OUTPATIENT
Start: 2022-09-29

## 2022-10-05 ENCOUNTER — TELEPHONE (OUTPATIENT)
Dept: PSYCHIATRY | Facility: CLINIC | Age: 62
End: 2022-10-05

## 2022-10-07 ENCOUNTER — TELEPHONE (OUTPATIENT)
Dept: HEMATOLOGY ONCOLOGY | Facility: CLINIC | Age: 62
End: 2022-10-07

## 2022-10-07 NOTE — TELEPHONE ENCOUNTER
Called patient and left message to reschedule Consult appt that was a no show with Niko Piedra at [facility] on 10/7/22 and went over the no show policy

## 2022-10-11 ENCOUNTER — TELEMEDICINE (OUTPATIENT)
Dept: BEHAVIORAL/MENTAL HEALTH CLINIC | Facility: CLINIC | Age: 62
End: 2022-10-11

## 2022-10-11 DIAGNOSIS — F41.0 PANIC ATTACK: Primary | ICD-10-CM

## 2022-10-11 DIAGNOSIS — F41.8 ANXIETY WITH DEPRESSION: Chronic | ICD-10-CM

## 2022-10-11 PROBLEM — J69.0 ASPIRATION PNEUMONIA (HCC): Status: RESOLVED | Noted: 2022-03-09 | Resolved: 2022-10-11

## 2022-10-11 PROCEDURE — 90834 PSYTX W PT 45 MINUTES: CPT | Performed by: COUNSELOR

## 2022-10-11 NOTE — BH TREATMENT PLAN
Marj Pang  1960         Date of Initial Treatment Plan: 5/16/22  Date of Current Treatment Plan: 10/11/22     Treatment Plan Number 2     Strengths/Personal Resources for Self Care:  Supportive family, good communication skills     Diagnosis:   1  Panic attack            Area of Needs: Decrease panic attacks, increase coping skills to manage anxiety, process past trauma/family conflict         Long Term Goal 1: Reduce frequency, intensity and duration of anxiety so that daily functioning is not impaired      Target Date: 3/9/23  Completion Date: 4/9/23         Short Term Objectives for Goal 1: 1  Describe worry and anxiety and how it impacts daily functioning    2    dentify cognitive coping mechanisms I have learned and implemented into my weekly routine  3   Communicate thoughts and feelings to support others in life 4  Implement calming skills in order to cope with panic attacks     Long Term Goal 2: Develop an awareness of how childhood issues have affected and continue to affect one's functioning     Target Date: 3/9/23  Completion Date: 4/9/23     Short Term Objectives for Goal 2: 1  Identify patterns of abuse, neglect or abandonment within the family of origin  2   Identify and process feelings of traumatic incidents in childhood         GOAL 1: Modality: Individual 2x per month   Completion Date 4/9/23 and The person(s) responsible for carrying out the plan is  [facility] and [address] [facility] 2: Modality: Individual 2x per month   Completion Date 4/9/23 and The person(s) responsible for carrying out the plan is  [facility] and [address]: Diagnosis and Treatment Plan explained to Bob Verduzco relates understanding diagnosis and is agreeable to Treatment Plan    Marj Pang, 1960, actively participated in the review and update of this treatment plan during a virtual session, using the [address] [place] [address]     Marj Pang  provided verbal consent on 10/11/2022 at 10:30 AM  The treatment plan was transcribed into the Electronic Health Record at a later time

## 2022-10-11 NOTE — PSYCH
Virtual Regular Visit    Verification of patient location:    Patient is located in the following state in which I hold an active license [place]      Assessment/Plan:    Problem List Items Addressed This Visit        Other    Anxiety with depression (Chronic)    Panic attack - Primary          Goals addressed in session: Goal 1 and Goal 2          Reason for visit is No chief complaint on file  Encounter provider Jenifer Pacheco    Provider located at [address] [place] [address] [place] [address] [phone]      Recent Visits  No visits were found meeting these conditions  Showing recent visits within past 7 days and meeting all other requirements  Future Appointments  No visits were found meeting these conditions  Showing future appointments within next 150 days and meeting all other requirements       The patient was identified by name and date of birth  Jenni Meek was informed that this is a telemedicine visit and that the visit is being conducted throughEpic Embedded and patient was informed this is a secure, HIPAA-complaint platform  She agrees to proceed     My office door was closed  No one else was in the room  She acknowledged consent and understanding of privacy and security of the video platform  The patient has agreed to participate and understands they can discontinue the visit at any time  Patient is aware this is a billable service  Subjective  Jenni Meek is a 58 y o  female     D: Clinician met with Bailey Jules via telehealth for individual therapy  Baileydanny Jules has not been seen by this clinician since 6/27 and has not followed up on making a follow up appointment since then  Baileydanny Jules reports an increase in anxiety, panic attacks and fear in the last few weeks triggering her to call to set up therapy again  Bailey Jules reports increase in nightmares and trouble getting back to sleep after nightmares occur   She reports nightmares typically include past traumatic events from treatment during childhood  Clinician explored ways Kimmy Chavarria has been able to cope with these nightmares and lack of sleep and discussed the benefits of working on processing past trauma with the intention of resolving feelings  Kimmy Chavarria is open to this  Clinician updated treatment plan goals during the session  A: Kimmy Chavarria was open and engaged in the session and reports isolation during the pandemic and after and it is her desire to work on engaging in the community in a meaningfully way  Kimmy Chavarria reports taking her medication as prescribed and clinician suggested psych evaluation and MM services in the future  P: Continue to meet with Kimmy Chavarria every other week for individual therapy  HPI     Past Medical History:   Diagnosis Date   • Fracture    • Hepatitis     Hep A    • Insomnia     [id number] resolved   • Osteoporosis     [id number] resolved   • Pancreatitis    • Seasonal allergies    • Urine discoloration 11/11/2019   • Varicella    • Vomiting 11/13/2019       Past Surgical History:   Procedure Laterality Date   • IR BIOPSY BONE  8/17/2021   • IR BIOPSY BONE MARROW  11/14/2019   • TUBAL LIGATION  1985       Current Outpatient Medications   Medication Sig Dispense Refill   • cyanocobalamin (VITAMIN B-12) 500 MCG tablet Take 1 tablet (500 mcg total) by mouth daily for 26 days 26 tablet 0   • estradiol (ESTRACE VAGINAL) 0 1 mg/g vaginal cream Insert 1 g into the vagina 3 (three) times a week Insert 1/4 of applicator into the vagina 3 times a week for 2 weeks  If symptoms improved can reduce to using twice weekly after 2 weeks for maintenance  42 g 1   • gabapentin (NEURONTIN) 300 mg capsule Take 1 capsule (300 mg total) by mouth 2 (two) times a day 60 capsule 2   • hydrOXYzine HCL (ATARAX) 25 mg tablet TAKE 1 TABLET BY MOUTH 2 TIMES A DAY AS NEEDED FOR ANXIETY   60 tablet 0   • lacosamide (VIMPAT) 100 mg tablet Take 1 5 tablets (150 mg total) by mouth 2 (two) times a day 90 tablet 2   • levalbuterol (XOPENEX) 1 25 mg/3 mL nebulizer solution Take 3 mL (1 25 mg total) by nebulization every 6 (six) hours as needed for wheezing (Patient not taking: No sig reported) 3 mL 0   • melatonin 3 mg Take 1 tablet (3 mg total) by mouth daily at bedtime (Patient not taking: Reported on 7/13/2022) 30 tablet 0   • metoprolol succinate (TOPROL-XL) 25 mg 24 hr tablet Take 1 tablet (25 mg total) by mouth 2 (two) times a day 180 tablet 3   • therapeutic multivitamin-minerals (THERAGRAN-M) tablet Take 1 tablet by mouth daily for 26 days 26 tablet 0   • traZODone (DESYREL) 50 mg tablet TAKE 1 TABLET (50 MG TOTAL) BY MOUTH IN THE MORNING 90 tablet 0     No current facility-administered medications for this visit  No Known Allergies    Review of Systems    Video Exam    There were no vitals filed for this visit      Physical Exam     I spent 45 minutes directly with the patient during this visit from 10:04-10:49am

## 2022-10-12 ENCOUNTER — HOSPITAL ENCOUNTER (OUTPATIENT)
Dept: NEUROLOGY | Facility: HOSPITAL | Age: 62
Discharge: HOME | End: 2022-10-12
Payer: COMMERCIAL

## 2022-10-12 DIAGNOSIS — G40.109 SYMPTOMATIC FOCAL EPILEPSY (HCC): ICD-10-CM

## 2022-10-12 PROBLEM — J18.9 HAP (HOSPITAL-ACQUIRED PNEUMONIA): Status: RESOLVED | Noted: 2021-08-16 | Resolved: 2022-10-12
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
## 2022-10-12 PROCEDURE — 95816 EEG AWAKE AND DROWSY: CPT

## 2022-10-13 PROCEDURE — 95816 EEG AWAKE AND DROWSY: CPT | Performed by: STUDENT IN AN ORGANIZED HEALTH CARE EDUCATION/TRAINING PROGRAM

## 2022-10-14 ENCOUNTER — TELEPHONE (OUTPATIENT)
Dept: NEUROLOGY | Facility: CLINIC | Age: 62
End: 2022-10-14

## 2022-10-14 NOTE — TELEPHONE ENCOUNTER
----- Message from Eddi Garcia sent at 10/14/2022  8:51 AM EDT -----  EEG with slowing but no clear seizure tendency seen  Continue current medications

## 2022-10-17 DIAGNOSIS — F41.9 ANXIETY: ICD-10-CM

## 2022-10-17 DIAGNOSIS — F41.0 PANIC ATTACK: ICD-10-CM

## 2022-10-18 RX ORDER — TRAZODONE HYDROCHLORIDE 50 MG/1
50 TABLET ORAL DAILY
Qty: 90 TABLET | Refills: 0 | OUTPATIENT
Start: 2022-10-18

## 2022-10-21 DIAGNOSIS — F41.0 PANIC ATTACK: ICD-10-CM

## 2022-10-21 DIAGNOSIS — F41.9 ANXIETY: ICD-10-CM

## 2022-10-21 NOTE — TELEPHONE ENCOUNTER
----- Message from Mliss Schirmer, MD sent at 10/21/2022 10:34 AM EDT -----  Regarding: FW: trazodone  Please confirm frequency of trazadone, that she is on  The medication was denies, because she received a 3 month supply in august, and she has a followup in October, so she should not be out of medication  Please confirm with patient,  can send it for her    ----- Message -----  From: Goldie Serna  Sent: 10/20/2022  11:53 AM EDT  To: Mliss Schirmer, MD  Subject: FW: trazodone                                      ----- Message -----  From: Paco Acuna  Sent: 10/20/2022  10:32 AM EDT  To: [id number]  Subject: trazodone                                        Hello, I just wanted to let you know that I am having a very difficult time getting an appointment with a psychologist   If you can help me in this I would be very grateful  We have recently changed insurance providers, so everyone that was helping me is no longer on my radar  In the meantime, I would really like a prescription for the trazodone  as I truly feeling a difference without it  It's been 6 days that I've been without it  Thank you for your consideration  Sandhya

## 2022-10-24 ENCOUNTER — TELEPHONE (OUTPATIENT)
Dept: PSYCHIATRY | Facility: CLINIC | Age: 62
End: 2022-10-24

## 2022-10-24 DIAGNOSIS — G40.109 SYMPTOMATIC FOCAL EPILEPSY (HCC): ICD-10-CM

## 2022-10-24 RX ORDER — TRAZODONE HYDROCHLORIDE 50 MG/1
50 TABLET ORAL DAILY
Qty: 90 TABLET | Refills: 0 | Status: SHIPPED | OUTPATIENT
Start: 2022-10-24

## 2022-10-24 RX ORDER — LACOSAMIDE 150 MG/1
150 TABLET ORAL 2 TIMES DAILY
Qty: 60 TABLET | Refills: 2 | Status: SHIPPED | OUTPATIENT
Start: 2022-10-24

## 2022-10-24 NOTE — TELEPHONE ENCOUNTER
Pt left VM  Stated that she needs Lacosamide ,and pharmacy told her she needed authorization for the insurance,stated she is not sure what they ment  Pt stated she run out, and she is getting very panicky      # [phone]  Script was sent on 9/22 with 2 refills

## 2022-10-24 NOTE — TELEPHONE ENCOUNTER
Patient advised that she was taking it twice a day in the beginning as she did not" read the small print"  She is now taking once a day     So she did run out over the weekend

## 2022-10-24 NOTE — TELEPHONE ENCOUNTER
Patient stated she called in to reschedule appointment and left two messages    Patient has been scheduled for 10/31 at 1:00 virtually

## 2022-10-24 NOTE — PROGRESS NOTES
Assessment and Plan:   Patient is a 72-year-old female who presents for rheumatology follow-up for severe osteoporosis with history of multiple fractures  After our initial visit in February this year, we had Evenity approved by her insurance, and she was supposed to find out her out-of-pocket cost and get back to us  She never got back to us regarding this, so she never started on treatment  Today she reports her co-pay would be 300 dollars  I discussed with her that there is the option of the Evenity co-pay program which seems that she would qualify because she has commercial insurance  I advised her about the website to apply for this and it should bring down her co-pay close to 0 dollars  She was in agreement with that and I advised her that she needs to call us back once she is done applying for this and ready to start the Evenity injections which are once monthly for 12 months  We will await her call back to schedule her 1st injection  The approval does appear to still be good until February 2023  Plan:  Diagnoses and all orders for this visit:    Post-menopausal osteoporosis    Long term current use of therapeutic drug        Follow-up plan: patient to call       Rheumatic Disease Summary  1  Severe osteoporosis   -Rheum eval 2/24/22 for severe osteoporosis with h/o compression fractures, H/O right clavicle and pubic rami fractures, multiple rib fractures; no prior tx  -DEXA 10/19/21: T -4 3 L hip, -4 L FN, -3 9 lumbar  -Initial visit: rec’d Evenity-approved by insurance, pt supposed to check with out of pocket with her insurance and get back to us  -Visit 10/25/22: evenity previously approved but never got back to us regarding evenity copay, advised to apply for copay card online and get back to us to start her injections   2   Comorbidities: chronic pancreatitis, H/O alcohol abuse, thrombocytopenia, anemia, H/O SAH/ICH, H/O anxiety and depression, GERD, seizures, stress-induced CM     HPI  Dylan Boot Raimundo Gonzalez is a 58 y o   female who presents for rheumatology follow-up for osteoporosis  At last visit, we discussed evenity as an option which was approved by her insurance, however she never got back to us about starting therapy  Patient reports she was unable to come into the office due to having COVID  She feels her symptoms are mild  Reports that she did find out after the Evenity was approve that her co-pay would be 300 dollars  She can not afford this  She is now ready and more motivated however to start on osteoporosis treatment  She did not look into any co-pay support  She did have another fracture in a toe in the last several weeks  The following portions of the patient's history were reviewed and updated as appropriate: allergies, current medications, past family history, past medical history, past social history, past surgical history and problem list     Review of Systems:   Review of Systems   Constitutional: Positive for fatigue and fever  HENT: Positive for congestion  Home Medications:    Current Outpatient Medications:   •  cyanocobalamin (VITAMIN B-12) 500 MCG tablet, Take 1 tablet (500 mcg total) by mouth daily for 26 days, Disp: 26 tablet, Rfl: 0  •  estradiol (ESTRACE VAGINAL) 0 1 mg/g vaginal cream, Insert 1 g into the vagina 3 (three) times a week Insert 1/4 of applicator into the vagina 3 times a week for 2 weeks   If symptoms improved can reduce to using twice weekly after 2 weeks for maintenance , Disp: 42 g, Rfl: 1  •  gabapentin (NEURONTIN) 300 mg capsule, Take 1 capsule (300 mg total) by mouth 2 (two) times a day, Disp: 60 capsule, Rfl: 2  •  hydrOXYzine HCL (ATARAX) 25 mg tablet, TAKE 1 TABLET BY MOUTH 2 TIMES A DAY AS NEEDED FOR ANXIETY , Disp: 60 tablet, Rfl: 0  •  lacosamide (VIMPAT) 150 mg tablet, Take 1 tablet (150 mg total) by mouth 2 (two) times a day, Disp: 60 tablet, Rfl: 2  •  levalbuterol (XOPENEX) 1 25 mg/3 mL nebulizer solution, Take 3 mL (1 25 mg total) by nebulization every 6 (six) hours as needed for wheezing (Patient not taking: No sig reported), Disp: 3 mL, Rfl: 0  •  melatonin 3 mg, Take 1 tablet (3 mg total) by mouth daily at bedtime (Patient not taking: Reported on 7/13/2022), Disp: 30 tablet, Rfl: 0  •  metoprolol succinate (TOPROL-XL) 25 mg 24 hr tablet, Take 1 tablet (25 mg total) by mouth 2 (two) times a day, Disp: 180 tablet, Rfl: 3  •  therapeutic multivitamin-minerals (THERAGRAN-M) tablet, Take 1 tablet by mouth daily for 26 days, Disp: 26 tablet, Rfl: 0  •  traZODone (DESYREL) 50 mg tablet, Take 1 tablet (50 mg total) by mouth in the morning, Disp: 90 tablet, Rfl: 0    Labs:   Component      Latest Ref Rng & Units 3/15/2022 3/16/2022   Sodium      136 - 145 mmol/L   141   Potassium      3 5 - 5 3 mmol/L   4 6   Chloride      100 - 108 mmol/L   108   CO2      21 - 32 mmol/L   23   Anion Gap      4 - 13 mmol/L   10   BUN      5 - 25 mg/dL   8   Creatinine      0 60 - 1 30 mg/dL   0 69   Glucose, Random      65 - 140 mg/dL   105   Calcium      8 3 - 10 1 mg/dL   8 3   eGFR      ml/min/1 73sq m   93   Magnesium      1 6 - 2 6 mg/dL 1 7

## 2022-10-25 ENCOUNTER — TELEMEDICINE (OUTPATIENT)
Dept: RHEUMATOLOGY | Facility: CLINIC | Age: 62
End: 2022-10-25
Payer: COMMERCIAL

## 2022-10-25 DIAGNOSIS — M81.0 POST-MENOPAUSAL OSTEOPOROSIS: Primary | ICD-10-CM

## 2022-10-25 DIAGNOSIS — Z79.899 LONG TERM CURRENT USE OF THERAPEUTIC DRUG: ICD-10-CM

## 2022-10-25 PROCEDURE — 99214 OFFICE O/P EST MOD 30 MIN: CPT | Performed by: INTERNAL MEDICINE

## 2022-10-26 ENCOUNTER — TELEPHONE (OUTPATIENT)
Dept: NEUROLOGY | Facility: CLINIC | Age: 62
End: 2022-10-26

## 2022-10-26 NOTE — TELEPHONE ENCOUNTER
Fax received from pharmacy requesting PA for lacosamide 150mg  Key: [id number]    PA submitted, awaiting determination

## 2022-10-28 ENCOUNTER — HOSPITAL ENCOUNTER (OUTPATIENT)
Dept: RADIOLOGY | Age: 62
Discharge: HOME | End: 2022-10-28
Payer: COMMERCIAL

## 2022-10-28 DIAGNOSIS — G40.109 SYMPTOMATIC FOCAL EPILEPSY (HCC): ICD-10-CM

## 2022-10-28 PROCEDURE — G1004 CDSM NDSC: HCPCS

## 2022-10-28 PROCEDURE — 70553 MRI BRAIN STEM W/O & W/DYE: CPT

## 2022-10-28 PROCEDURE — A9585 GADOBUTROL INJECTION: HCPCS | Performed by: NURSE PRACTITIONER

## 2022-10-28 RX ADMIN — GADOBUTROL 6 ML: 604.72 INJECTION INTRAVENOUS at 15:56

## 2022-10-28 NOTE — TELEPHONE ENCOUNTER
Request Reference Number: [id number]  LACOSAMIDE TAB 150MG is approved through 10/26/2023  Your patient may now fill this prescription and it will be covered

## 2022-10-31 ENCOUNTER — TELEPHONE (OUTPATIENT)
Dept: PSYCHIATRY | Facility: CLINIC | Age: 62
End: 2022-10-31

## 2022-11-03 DIAGNOSIS — F41.8 ANXIETY WITH DEPRESSION: Chronic | ICD-10-CM

## 2022-11-03 RX ORDER — HYDROXYZINE HYDROCHLORIDE 25 MG/1
25 TABLET, FILM COATED ORAL 2 TIMES DAILY PRN
Qty: 60 TABLET | Refills: 0 | Status: SHIPPED | OUTPATIENT
Start: 2022-11-03

## 2022-11-08 ENCOUNTER — TELEPHONE (OUTPATIENT)
Dept: NEUROLOGY | Facility: CLINIC | Age: 62
End: 2022-11-08

## 2022-11-16 ENCOUNTER — TELEPHONE (OUTPATIENT)
Dept: OBGYN CLINIC | Facility: HOSPITAL | Age: 62
End: 2022-11-16

## 2022-11-16 NOTE — TELEPHONE ENCOUNTER
Spoke with patient and advised her that on Evenity  com there is there is a "paying for evenity" tab and then follow the prompts or call their toll free number

## 2022-11-16 NOTE — TELEPHONE ENCOUNTER
Caller: patient    Doctor: DR Quinten Dowd    Reason for call: Patient states she spoke to DR Quinten Dowd who was giving her advice on how to afford EVENITY copayment of $300 a month   Patient lost her notes and is asking to speak to team about possible options    Call back#: [phone]

## 2022-11-17 NOTE — TELEPHONE ENCOUNTER
Spoke with patient and advised her that she has a current [place] on file  Patient states that she has new insurance from when that was filed so she will send us a [employer] message with her updated insurance information

## 2022-11-17 NOTE — TELEPHONE ENCOUNTER
Caller: patient    Doctor: Dr Agustín Miranda    Reason for call: [facility]    Call back#: [phone]

## 2022-11-18 NOTE — TELEPHONE ENCOUNTER
Caller: Patient     Doctor: Sravanthi Hagen     Reason for call: Patient updated insurance information and now is up to date   [facility] is new     Call back#: [phone]

## 2022-11-21 DIAGNOSIS — F41.8 ANXIETY WITH DEPRESSION: Chronic | ICD-10-CM

## 2022-11-21 DIAGNOSIS — G40.109 SYMPTOMATIC FOCAL EPILEPSY (HCC): ICD-10-CM

## 2022-11-21 RX ORDER — LACOSAMIDE 150 MG/1
150 TABLET ORAL 2 TIMES DAILY
Qty: 60 TABLET | Refills: 0 | Status: CANCELLED | OUTPATIENT
Start: 2022-11-21

## 2022-11-21 RX ORDER — GABAPENTIN 300 MG/1
300 CAPSULE ORAL 2 TIMES DAILY
Qty: 60 CAPSULE | Refills: 0 | Status: SHIPPED | OUTPATIENT
Start: 2022-11-21

## 2022-11-30 ENCOUNTER — TELEPHONE (OUTPATIENT)
Dept: OBGYN CLINIC | Facility: CLINIC | Age: 62
End: 2022-11-30

## 2022-11-30 NOTE — TELEPHONE ENCOUNTER
Caller: Patient    Doctor: Aida Santacruz     Reason for call: Patient is needed an appt with one of the Rheum taking over for Dr Del Toro to have her Eventity shot done asap  She is supposed to get these done once month       Would prefer the [employer] back#: [phone]

## 2022-12-02 ENCOUNTER — TELEPHONE (OUTPATIENT)
Dept: NEUROLOGY | Facility: CLINIC | Age: 62
End: 2022-12-02

## 2022-12-02 NOTE — TELEPHONE ENCOUNTER
Spoke to patient, she states pharmacy had told her medication was not refilled by our office, patient took that as meaning we no longer wanted her to take it  Call placed to pharmacy  Script is available  Paid claim received  Returned call to patient and notified of previous

## 2022-12-02 NOTE — TELEPHONE ENCOUNTER
Pt left voicemail stating she is experiencing a worsening problem  States ever since stopping lacosamide she is still having panic attacks here and there  Now has been off this medication for about a week or so  Every day is getting worse  Feels terrified with a pit in her stomach, then has a big "blow out" - has to sit down, have panic attack, cannot walk/speak/move  Asking if she should resume lacosamide  Asking for call back     [phone]

## 2022-12-13 ENCOUNTER — OFFICE VISIT (OUTPATIENT)
Dept: RHEUMATOLOGY | Facility: CLINIC | Age: 62
End: 2022-12-13

## 2022-12-13 DIAGNOSIS — Z79.899 LONG TERM CURRENT USE OF THERAPEUTIC DRUG: ICD-10-CM

## 2022-12-13 DIAGNOSIS — M81.0 POST-MENOPAUSAL OSTEOPOROSIS: Primary | ICD-10-CM

## 2022-12-13 DIAGNOSIS — S22.41XD CLOSED FRACTURE OF MULTIPLE RIBS OF RIGHT SIDE WITH ROUTINE HEALING, SUBSEQUENT ENCOUNTER: ICD-10-CM

## 2022-12-13 DIAGNOSIS — M80.80XA LOCALIZED OSTEOPOROSIS WITH CURRENT PATHOLOGICAL FRACTURE, INITIAL ENCOUNTER: ICD-10-CM

## 2022-12-13 NOTE — PROGRESS NOTES
Patient presented for her Evenity injections , one given in each arm  No redness, irritation or bleeding at injection sites  No complaints of discomfort  Patient will schedule for her next set of injections

## 2022-12-21 DIAGNOSIS — F41.8 ANXIETY WITH DEPRESSION: Chronic | ICD-10-CM

## 2022-12-21 RX ORDER — GABAPENTIN 300 MG/1
300 CAPSULE ORAL 2 TIMES DAILY
Qty: 60 CAPSULE | Refills: 0 | Status: SHIPPED | OUTPATIENT
Start: 2022-12-21

## 2022-12-22 ENCOUNTER — TELEPHONE (OUTPATIENT)
Dept: NEUROLOGY | Facility: CLINIC | Age: 62
End: 2022-12-22

## 2022-12-22 DIAGNOSIS — G40.109 SYMPTOMATIC FOCAL EPILEPSY (HCC): ICD-10-CM

## 2022-12-22 RX ORDER — LACOSAMIDE 150 MG/1
150 TABLET ORAL 2 TIMES DAILY
Qty: 60 TABLET | Refills: 2 | Status: CANCELLED | OUTPATIENT
Start: 2022-12-22

## 2022-12-22 NOTE — TELEPHONE ENCOUNTER
Pt left voicemail stating she called local pharmacies regarding the lacosamide 150mg  States no pharmacies have this in stock  She would like to keep Rx at [facility] as originally sent as they will have medication for pt tomorrow  [address] cancelled

## 2022-12-22 NOTE — TELEPHONE ENCOUNTER
Good morning  And apparently i've got some kind of confusion with [facility]  The last time I tried to get a refill on lacosamide they didn't have it on file and I had to bother you guys  And it's the same thing right now  I use the [facility] on [address]  I am completely out of lacosamide so I'm getting really nervous  I'd really like to be able to get that order to can maybe get in their files somehow  So again, my number is [phone]  Thank you so much bye now  Pt requesting script for lacosamide

## 2022-12-22 NOTE — TELEPHONE ENCOUNTER
Spoke to pharmacy, originally advised medication was last filled on 12/2/2022 for 30 day supply  He was able to get script processed but they do not have in stock, will order for tomorrow  Spoke to patient  Advised of previous  She states she is completely out of medication as she had been taking 1 5 tablets BID - states she had some shaking so she thought this would be ok  Advised patient not to make changes to medication without office approval  Patient now requesting new script to [facility], she will check if they have this available  Please sign

## 2022-12-23 NOTE — TELEPHONE ENCOUNTER
Can you ask that the pharmacy fill the prescription as she is out of medication and what can be done to override the too soon to refill? Can a doctor request a sooner refill? How much would it cost the patient out of pocket and if GoodRx can be used to cover the missing days until 1/2?

## 2022-12-23 NOTE — TELEPHONE ENCOUNTER
Called [facility] where original script was sent as they were to get Lacosamide stocked today  Pharmacy does now have Lacosamide in stock, however it is too soon to fill  Script can't be filled until 1/2  Pt was taking extra medication (See below) and is now completely out  This is [place] pt, so any provider covering please advise

## 2022-12-23 NOTE — TELEPHONE ENCOUNTER
Called pharmacy re: below and spoke with pharmacist who approved emergency fill  Did not get an answer on cost with Good Rx  Also was told during this conversation that Lacosamide can be refilled on 12/29,  Not 1/2  Spoke with pt, who verbalized an understanding

## 2022-12-27 DIAGNOSIS — I50.21 ACUTE SYSTOLIC CONGESTIVE HEART FAILURE (HCC): ICD-10-CM

## 2022-12-27 DIAGNOSIS — I42.9 CARDIOMYOPATHY (HCC): ICD-10-CM

## 2022-12-27 RX ORDER — METOPROLOL SUCCINATE 25 MG/1
25 TABLET, EXTENDED RELEASE ORAL 2 TIMES DAILY
Qty: 180 TABLET | Refills: 0 | Status: SHIPPED | OUTPATIENT
Start: 2022-12-27 | End: 2023-04-26

## 2022-12-27 NOTE — TELEPHONE ENCOUNTER
Attempted to call patient  Message left at home number  Mobile number tried, but voicemail full  Can offer patient appointments with Dr Alfaro as requested  2/2 8am john  2/27 8am esteban    Awaiting return call

## 2022-12-29 NOTE — TELEPHONE ENCOUNTER
ALL SCHEDULED: 2/2/23 at 8 am w/ Dr Mulugeta Johnson in [facility]   Address was given

## 2022-12-29 NOTE — TELEPHONE ENCOUNTER
Attempted to contact patient  Message left on home number  Attempted mobile number, no answer, voicemail full  Letter mailed

## 2022-12-30 DIAGNOSIS — F41.8 ANXIETY WITH DEPRESSION: Chronic | ICD-10-CM

## 2022-12-30 RX ORDER — HYDROXYZINE HYDROCHLORIDE 25 MG/1
25 TABLET, FILM COATED ORAL 2 TIMES DAILY PRN
Qty: 60 TABLET | Refills: 0 | Status: SHIPPED | OUTPATIENT
Start: 2022-12-30

## 2022-12-30 NOTE — TELEPHONE ENCOUNTER
Patient called back and stated she does not have enough to last her until her appointment  She only has 5 pills left and takes 2 per day

## 2023-01-03 ENCOUNTER — HOSPITAL ENCOUNTER (EMERGENCY)
Facility: HOSPITAL | Age: 63
Discharge: HOME | End: 2023-01-04
Attending: EMERGENCY MEDICINE

## 2023-01-03 ENCOUNTER — APPOINTMENT (EMERGENCY)
Dept: CT IMAGING | Facility: HOSPITAL | Age: 63
End: 2023-01-03

## 2023-01-03 VITALS
HEART RATE: 77 BPM | TEMPERATURE: 98.8 F | DIASTOLIC BLOOD PRESSURE: 60 MMHG | RESPIRATION RATE: 18 BRPM | OXYGEN SATURATION: 97 % | SYSTOLIC BLOOD PRESSURE: 131 MMHG

## 2023-01-03 DIAGNOSIS — Z78.9 ALCOHOL USE: ICD-10-CM

## 2023-01-03 DIAGNOSIS — R56.9 SEIZURE (HCC): ICD-10-CM

## 2023-01-03 DIAGNOSIS — D61.818 PANCYTOPENIA (HCC): Primary | ICD-10-CM

## 2023-01-03 LAB
ALBUMIN SERPL BCP-MCNC: 3.9 G/DL (ref 3.5–5)
ALP SERPL-CCNC: 230 U/L (ref 34–104)
ALT SERPL W P-5'-P-CCNC: 52 U/L (ref 7–52)
ANION GAP SERPL CALCULATED.3IONS-SCNC: 9 MMOL/L (ref 4–13)
AST SERPL W P-5'-P-CCNC: 103 U/L (ref 13–39)
BASOPHILS # BLD AUTO: 0.01 THOUSANDS/ÂΜL (ref 0–0.1)
BASOPHILS NFR BLD AUTO: 0 % (ref 0–1)
BILIRUB SERPL-MCNC: 0.49 MG/DL (ref 0.2–1)
BILIRUB UR QL STRIP: NEGATIVE
BUN SERPL-MCNC: 12 MG/DL (ref 5–25)
CALCIUM SERPL-MCNC: 8.5 MG/DL (ref 8.4–10.2)
CHLORIDE SERPL-SCNC: 101 MMOL/L (ref 96–108)
CLARITY UR: CLEAR
CO2 SERPL-SCNC: 25 MMOL/L (ref 21–32)
COLOR UR: NORMAL
CREAT SERPL-MCNC: 0.59 MG/DL (ref 0.6–1.3)
EOSINOPHIL # BLD AUTO: 0.1 THOUSAND/ÂΜL (ref 0–0.61)
EOSINOPHIL NFR BLD AUTO: 4 % (ref 0–6)
ERYTHROCYTE [DISTWIDTH] IN BLOOD BY AUTOMATED COUNT: 13.9 % (ref 11.6–15.1)
GFR SERPL CREATININE-BSD FRML MDRD: 98 ML/MIN/1.73SQ M
GLUCOSE SERPL-MCNC: 112 MG/DL (ref 65–140)
GLUCOSE UR STRIP-MCNC: NEGATIVE MG/DL
HCT VFR BLD AUTO: 33.4 % (ref 34.8–46.1)
HGB BLD-MCNC: 10.9 G/DL (ref 11.5–15.4)
HGB UR QL STRIP.AUTO: NEGATIVE
IMM GRANULOCYTES # BLD AUTO: 0.01 THOUSAND/UL (ref 0–0.2)
IMM GRANULOCYTES NFR BLD AUTO: 0 % (ref 0–2)
KETONES UR STRIP-MCNC: NEGATIVE MG/DL
LEUKOCYTE ESTERASE UR QL STRIP: NEGATIVE
LYMPHOCYTES # BLD AUTO: 1.49 THOUSANDS/ÂΜL (ref 0.6–4.47)
LYMPHOCYTES NFR BLD AUTO: 54 % (ref 14–44)
MCH RBC QN AUTO: 33.2 PG (ref 26.8–34.3)
MCHC RBC AUTO-ENTMCNC: 32.6 G/DL (ref 31.4–37.4)
MCV RBC AUTO: 102 FL (ref 82–98)
MONOCYTES # BLD AUTO: 0.12 THOUSAND/ÂΜL (ref 0.17–1.22)
MONOCYTES NFR BLD AUTO: 4 % (ref 4–12)
NEUTROPHILS # BLD AUTO: 1.04 THOUSANDS/ÂΜL (ref 1.85–7.62)
NEUTS SEG NFR BLD AUTO: 38 % (ref 43–75)
NITRITE UR QL STRIP: NEGATIVE
NRBC BLD AUTO-RTO: 0 /100 WBCS
PH UR STRIP.AUTO: 6 [PH]
PLATELET # BLD AUTO: 42 THOUSANDS/UL (ref 149–390)
PMV BLD AUTO: 10.9 FL (ref 8.9–12.7)
POTASSIUM SERPL-SCNC: 3.7 MMOL/L (ref 3.5–5.3)
PROT SERPL-MCNC: 7.3 G/DL (ref 6.4–8.4)
PROT UR STRIP-MCNC: NEGATIVE MG/DL
RBC # BLD AUTO: 3.28 MILLION/UL (ref 3.81–5.12)
SODIUM SERPL-SCNC: 135 MMOL/L (ref 135–147)
SP GR UR STRIP.AUTO: 1.02 (ref 1–1.03)
UROBILINOGEN UR STRIP-ACNC: <2 MG/DL
WBC # BLD AUTO: 2.77 THOUSAND/UL (ref 4.31–10.16)

## 2023-01-03 RX ORDER — LORAZEPAM 2 MG/ML
1 INJECTION INTRAMUSCULAR ONCE
Status: COMPLETED | OUTPATIENT
Start: 2023-01-03 | End: 2023-01-03

## 2023-01-03 RX ADMIN — LORAZEPAM 1 MG: 2 INJECTION INTRAMUSCULAR; INTRAVENOUS at 21:44

## 2023-01-04 NOTE — ED NOTES
Pt noted with slight right sided facial droop  Charge RN aware  Pt to ED room  Will notify provider       Shubham Harris, [facility]  01/03/23 2059

## 2023-01-04 NOTE — ED PROVIDER NOTES
History  Chief Complaint   Patient presents with   • Seizure - Prior Hx Of     Pt with seizure activity today 30 min PTA  Lasting approx 5 min  Pt states she takes daily seizure medication but did run out recently  Pt does not remember much of seizure activity  Pt denies CP  The patient is a 77-year-old female with a past medical history of insomnia and seizures who presents to the emergency department with a complaint of seizure  Patient reports that approximately an hour ago while she was lying in bed watching TV she believes that she had a seizure-like episode and awoke with her  shaking her and asking her questions  Her  is at bedside and reports that while lying in bed suddenly she reached over and grabbed him with both hands and her entire body began to shake  He states that her arms began to contract and she tried to put his hands in her mouth  He also reports that when she stopped shaking she was very confused and was replacing words with inappropriate words and her sentencing  This continued for approximately 10 minutes before she returned to baseline and it was concerning enough that he brought her to the emergency department  The patient reports that she had 3 of these episodes today  The first episode occurring this morning, the second episode occurring around 2 PM and then the most recent episode occurring approximately 1 hour ago  The patient states that she ran out of the medication 2 days ago but she does not recall what that medication was  She has missed a day and a half and states that she normally takes 1 pill in the morning and 1 at night  Her  refilled that medication this afternoon and she doubled her dose  They believe the medication was Vimpat  Patient also endorses drinking alcohol daily and has had 2 "tall boys" today   The patient currently denies headache, change in vision, lightheadedness, chest pain, shortness of breath, abdominal pain, nausea, vomiting, dysuria, hematuria, rash or fever  Prior to Admission Medications   Prescriptions Last Dose Informant Patient Reported? Taking?   estradiol (ESTRACE VAGINAL) 0 1 mg/g vaginal cream   No No   Sig: Insert 1 g into the vagina 3 (three) times a week Insert 1/4 of applicator into the vagina 3 times a week for 2 weeks   If symptoms improved can reduce to using twice weekly after 2 weeks for maintenance    gabapentin (NEURONTIN) 300 mg capsule   No No   Sig: Take 1 capsule (300 mg total) by mouth 2 (two) times a day   hydrOXYzine HCL (ATARAX) 25 mg tablet   No No   Sig: Take 1 tablet (25 mg total) by mouth 2 (two) times a day as needed for anxiety   lacosamide (VIMPAT) 150 mg tablet   No No   Sig: Take 1 tablet (150 mg total) by mouth 2 (two) times a day   melatonin 3 mg   No No   Sig: Take 1 tablet (3 mg total) by mouth daily at bedtime   Patient not taking: Reported on 7/13/2022   metoprolol succinate (TOPROL-XL) 25 mg 24 hr tablet   No No   Sig: Take 1 tablet (25 mg total) by mouth 2 (two) times a day   traZODone (DESYREL) 50 mg tablet   No No   Sig: Take 1 tablet (50 mg total) by mouth in the morning      Facility-Administered Medications: None       Past Medical History:   Diagnosis Date   • Fracture    • Hepatitis     Hep A    • Insomnia     10mar2016 resolved   • Osteoporosis     14jun2016 resolved   • Pancreatitis    • Seasonal allergies    • Urine discoloration 11/11/2019   • Varicella    • Vomiting 11/13/2019       Past Surgical History:   Procedure Laterality Date   • IR BIOPSY BONE  8/17/2021   • IR BIOPSY BONE MARROW  11/14/2019   • TUBAL LIGATION  1985       Family History   Problem Relation Age of Onset   • Anxiety disorder Mother    • Depression Mother    • No Known Problems Father    • No Known Problems Sister    • No Known Problems Sister    • No Known Problems Brother    • Cancer Paternal Grandmother         unknown    • No Known Problems Daughter    • No Known Problems Maternal Grandmother    • Cancer Maternal Grandfather    • No Known Problems Paternal Grandfather    • Breast cancer Neg Hx    • Ovarian cancer Neg Hx      I have reviewed and agree with the history as documented  E-Cigarette/Vaping   • E-Cigarette Use Never User      E-Cigarette/Vaping Substances   • Nicotine No    • THC No    • CBD No    • Flavoring No    • Other No    • Unknown No      Social History     Tobacco Use   • Smoking status: Never   • Smokeless tobacco: Never   Vaping Use   • Vaping Use: Never used   Substance Use Topics   • Alcohol use: Not Currently     Alcohol/week: 7 0 standard drinks     Types: 7 Cans of beer per week   • Drug use: No        Review of Systems   Constitutional: Negative for chills, fatigue and fever  HENT: Negative for congestion, ear pain and sore throat  Eyes: Negative for photophobia, pain and visual disturbance  Respiratory: Negative for cough, shortness of breath, wheezing and stridor  Cardiovascular: Negative for chest pain, palpitations and leg swelling  Gastrointestinal: Negative for abdominal distention, abdominal pain, diarrhea, nausea and vomiting  Endocrine: Negative  Genitourinary: Negative for dysuria and hematuria  Musculoskeletal: Negative for arthralgias, back pain, neck pain and neck stiffness  Skin: Negative for color change and rash  Allergic/Immunologic: Negative  Neurological: Positive for seizures  Negative for dizziness, syncope, weakness, light-headedness, numbness and headaches  Hematological: Negative  Psychiatric/Behavioral: Negative  All other systems reviewed and are negative        Physical Exam  ED Triage Vitals [01/03/23 2054]   Temperature Pulse Respirations Blood Pressure SpO2   98 8 °F (37 1 °C) 79 18 152/70 96 %      Temp Source Heart Rate Source Patient Position - Orthostatic VS BP Location FiO2 (%)   Oral Monitor Sitting Right arm --      Pain Score       No Pain             Orthostatic Vital Signs  Vitals: 01/03/23 2054 01/03/23 2130 01/03/23 2300   BP: 152/70 152/73 131/60   Pulse: 79 78 77   Patient Position - Orthostatic VS: Sitting         Physical Exam  Vitals and nursing note reviewed  Constitutional:       General: She is not in acute distress  Appearance: She is well-developed  She is not ill-appearing  Comments: The patient is very frail but in no acute distress  HENT:      Head: Normocephalic and atraumatic  Nose: Nose normal       Mouth/Throat:      Mouth: Mucous membranes are moist       Pharynx: Oropharynx is clear  Eyes:      Extraocular Movements: Extraocular movements intact  Conjunctiva/sclera: Conjunctivae normal       Pupils: Pupils are equal, round, and reactive to light  Cardiovascular:      Rate and Rhythm: Normal rate and regular rhythm  Pulses: Normal pulses  Heart sounds: Normal heart sounds  No murmur heard  No friction rub  Pulmonary:      Effort: Pulmonary effort is normal  No respiratory distress  Breath sounds: Normal breath sounds  No stridor  No wheezing, rhonchi or rales  Abdominal:      General: Abdomen is flat  Bowel sounds are normal  There is no distension  Palpations: Abdomen is soft  Tenderness: There is no abdominal tenderness  There is no guarding or rebound  Musculoskeletal:         General: No swelling or tenderness  Normal range of motion  Cervical back: Normal range of motion and neck supple  No rigidity or tenderness  Right lower leg: No edema  Left lower leg: No edema  Skin:     General: Skin is warm and dry  Capillary Refill: Capillary refill takes less than 2 seconds  Findings: No bruising, erythema or rash  Neurological:      General: No focal deficit present  Mental Status: She is alert and oriented to person, place, and time  Mental status is at baseline  Cranial Nerves: No cranial nerve deficit  Sensory: No sensory deficit  Motor: No weakness     Psychiatric: Mood and Affect: Mood normal          ED Medications  Medications   LORazepam (ATIVAN) injection 1 mg (1 mg Intravenous Given 1/3/23 2144)       Diagnostic Studies  Results Reviewed     Procedure Component Value Units Date/Time    UA (URINE) with reflex to Scope [[id number]] Collected: 01/03/23 2243    Lab Status: Final result Specimen: Urine, Clean Catch Updated: 01/03/23 2258     Color, UA Light Yellow     Clarity, UA Clear     Specific Gravity, UA 1 017     pH, UA 6 0     Leukocytes, UA Negative     Nitrite, UA Negative     Protein, UA Negative mg/dl      Glucose, UA Negative mg/dl      Ketones, UA Negative mg/dl      Urobilinogen, UA <2 0 mg/dl      Bilirubin, UA Negative     Occult Blood, UA Negative    CBC and differential [[id number]]  (Abnormal) Collected: 01/03/23 2150    Lab Status: Final result Specimen: Blood from Arm, Right Updated: 01/03/23 2234     WBC 2 77 Thousand/uL      RBC 3 28 Million/uL      Hemoglobin 10 9 g/dL      Hematocrit 33 4 %       fL      MCH 33 2 pg      MCHC 32 6 g/dL      RDW 13 9 %      MPV 10 9 fL      Platelets 42 Thousands/uL      nRBC 0 /100 WBCs      Neutrophils Relative 38 %      Immat GRANS % 0 %      Lymphocytes Relative 54 %      Monocytes Relative 4 %      Eosinophils Relative 4 %      Basophils Relative 0 %      Neutrophils Absolute 1 04 Thousands/µL      Immature Grans Absolute 0 01 Thousand/uL      Lymphocytes Absolute 1 49 Thousands/µL      Monocytes Absolute 0 12 Thousand/µL      Eosinophils Absolute 0 10 Thousand/µL      Basophils Absolute 0 01 Thousands/µL     Narrative: This is an appended report  These results have been appended to a previously verified report      Comprehensive metabolic panel [[id number]]  (Abnormal) Collected: 01/03/23 2150    Lab Status: Final result Specimen: Blood from Arm, Right Updated: 01/03/23 2224     Sodium 135 mmol/L      Potassium 3 7 mmol/L      Chloride 101 mmol/L      CO2 25 mmol/L      ANION GAP 9 mmol/L BUN 12 mg/dL      Creatinine 0 59 mg/dL      Glucose 112 mg/dL      Calcium 8 5 mg/dL       U/L      ALT 52 U/L      Alkaline Phosphatase 230 U/L      Total Protein 7 3 g/dL      Albumin 3 9 g/dL      Total Bilirubin 0 49 mg/dL      eGFR 98 ml/min/1 73sq m     Narrative:      [place] guidelines for Chronic Kidney Disease (CKD):   •  Stage 1 with normal or high GFR (GFR > 90 mL/min/1 73 square meters)  •  Stage 2 Mild CKD (GFR = 60-89 mL/min/1 73 square meters)  •  Stage 3A Moderate CKD (GFR = 45-59 mL/min/1 73 square meters)  •  Stage 3B Moderate CKD (GFR = 30-44 mL/min/1 73 square meters)  •  Stage 4 Severe CKD (GFR = 15-29 mL/min/1 73 square meters)  •  Stage 5 End Stage CKD (GFR <15 mL/min/1 73 square meters)  Note: GFR calculation is accurate only with a steady state creatinine                 CT head without contrast   Final Result by Mari Sanchez DO (01/03 5900)      No acute intracranial abnormality  Workstation performed: [id number]               Procedures  Procedures      ED Course  ED Course as of 01/04/23 0006   Tue Jan 03, 2023   2245 CBC and differential(!!)  Pancytopenia and thrombocytopenia secondary to alcohol abuse  We have discussed alcohol cessation with the patient  Wed Jan 04, 2023   0000 CT head without contrast  No acute intracranial abnormality  0005 Work-up was unremarkable in the emergency department  The patient has been diagnosed with pancytopenia and instructed to follow-up with her primary care physician  She is also been given ambulatory referral to neurology for seizures  The patient was instructed to take her Vimpat as prescribed and to avoid risky behavior such as taking a bath, swimming or driving  Strict return precautions were discussed  Further instructions per discharge orders                                         Medical Decision Making  The patient is a 77-year-old female with a past medical history of insomnia and seizures who presents to the emergency department with a complaint of seizure  Upon initial presentation the patient was alert and oriented x4 and in no acute distress  She had an NIH stroke scale score of 0  Her physical exam was grossly unremarkable  I am concerned that the patient is having seizure-like episodes due to missing her seizure medication  I have ordered a CBC, CMP, UA and a CT of her head without contrast   I am hoping these will rule out electrolyte abnormalities, infection or new onset tumors or cranial pathology  Imaging pending  Amount and/or Complexity of Data Reviewed  Labs: ordered  Radiology: ordered  Risk  Prescription drug management  Disposition  Final diagnoses:   Pancytopenia ([facility] )   Seizure ([facility] )   Alcohol use     Time reflects when diagnosis was documented in both MDM as applicable and the Disposition within this note     Time User Action Codes Description Comment    1/3/2023 11:48 PM Elena Marilee [D61 [id number]] Pancytopenia ([facility] )     1/3/2023 11:48 PM Ada Snide Add [R56 9] Seizure ([place] [address] )     1/3/2023 11:48 PM Ada Snide Add [Z78 9] Alcohol use       ED Disposition     ED Disposition   Discharge    Condition   Stable    Date/Time   Wed Jan 4, 2023 12:00 AM    Comment   Jane Cabrera discharge to home/self care  Follow-up Information     Follow up With Specialties Details Why Contact Info Additional Information    Shanon Pierce MD Family Medicine Schedule an appointment as soon as possible for a visit  for pancytopenia  [address]  [phone]       [address] Neurosurgery Schedule an appointment as soon as possible for a visit  for continued seizures   [address] [place], [place], [address]   [phone]    [place] [address] Emergency Department Emergency Medicine  As needed [address] [facility] [address] [facility], [address], [place], [place], [address]          Patient's Medications   Discharge Prescriptions    No medications on file         PDMP Review       Value Time User    PDMP Reviewed  Yes 10/24/2022  5:12 PM [address] [facility],[address], [address] [facility]           ED Provider  Attending physically available and evaluated [place]  I managed the patient along with the ED Attending      Electronically Signed by         Sabrina Romeo DO  01/04/23 0006

## 2023-01-04 NOTE — ED ATTENDING ATTESTATION
1/3/2023  IKev, , saw and evaluated the patient  I have discussed the patient with the resident/non-physician practitioner and agree with the resident's/non-physician practitioner's findings, Plan of Care, and MDM as documented in the resident's/non-physician practitioner's note, except where noted  All available labs and Radiology studies were reviewed  I was present for key portions of any procedure(s) performed by the resident/non-physician practitioner and I was immediately available to provide assistance  At this point I agree with the current assessment done in the Emergency Department  I have conducted an independent evaluation of this patient a history and physical is as follows:        79-year-old female presents after having 3 seizure-like episodes at home, generalized body shaking,  present who witnessed these says she has episodes like this 1-2 times a day and has for a long period of time, has been diagnosed with focal seizures, today was different and that it was more full body sometimes is just 1 arm, takes Vimpat, but has been out of it for 3 days  Just filled it and took dose this morning today  No falls no injury no trauma  Patient is a daily drinker  ,  Says she drinks beer, anywhere from 1 to 4, 16 ounce beers  Patient currently back to baseline as per patient and her  ,  No stroke symptoms at this time  Review of Systems   Constitutional: Negative for activity change, chills, diaphoresis and fever  HENT: Negative for congestion, sinus pressure and sore throat  Eyes: Negative for pain and visual disturbance  Respiratory: Negative for cough, chest tightness, shortness of breath, wheezing and stridor  Cardiovascular: Negative for chest pain and palpitations  Gastrointestinal: Negative for abdominal distention, abdominal pain, constipation, diarrhea, nausea and vomiting  Genitourinary: Negative for dysuria and frequency  Musculoskeletal: Negative for neck pain and neck stiffness  Skin: Negative for rash  Neurological: Negative for dizziness, speech difficulty, light-headedness, numbness and headaches  Physical Exam  Vitals reviewed  Constitutional:       General: She is not in acute distress  Appearance: She is well-developed  She is not diaphoretic  Comments: Chronically ill and frail in appearance   HENT:      Head: Normocephalic and atraumatic  Right Ear: External ear normal       Left Ear: External ear normal       Nose: Nose normal    Eyes:      General:         Right eye: No discharge  Left eye: No discharge  Pupils: Pupils are equal, round, and reactive to light  Neck:      Trachea: No tracheal deviation  Cardiovascular:      Rate and Rhythm: Normal rate and regular rhythm  Heart sounds: Normal heart sounds  No murmur heard  Pulmonary:      Effort: Pulmonary effort is normal  No respiratory distress  Breath sounds: Normal breath sounds  No stridor  Abdominal:      General: There is no distension  Palpations: Abdomen is soft  Tenderness: There is no abdominal tenderness  There is no guarding or rebound  Musculoskeletal:         General: Normal range of motion  Cervical back: Normal range of motion and neck supple  Skin:     General: Skin is warm and dry  Coloration: Skin is not pale  Findings: No erythema  Neurological:      General: No focal deficit present  Mental Status: She is alert and oriented to person, place, and time                    ED Course         Critical Care Time  Procedures        Labs Reviewed   CBC AND DIFFERENTIAL - Abnormal       Result Value Ref Range Status    WBC 2 77 (*) 4 31 - 10 16 Thousand/uL Final    RBC 3 28 (*) 3 81 - 5 12 Million/uL Final    Hemoglobin 10 9 (*) 11 5 - 15 4 g/dL Final    Hematocrit 33 4 (*) 34 8 - 46 1 % Final     (*) 82 - 98 fL Final    MCH 33 2  26 8 - 34 3 pg Final    MCHC 32 6  31 4 - 37 4 g/dL Final    RDW 13 9  11 6 - 15 1 % Final    MPV 10 9  8 9 - 12 7 fL Final    Platelets 42 (*) 253 - 390 Thousands/uL Final    nRBC 0  /100 WBCs Final    Neutrophils Relative 38 (*) 43 - 75 % Final    Immat GRANS % 0  0 - 2 % Final    Lymphocytes Relative 54 (*) 14 - 44 % Final    Monocytes Relative 4  4 - 12 % Final    Eosinophils Relative 4  0 - 6 % Final    Basophils Relative 0  0 - 1 % Final    Neutrophils Absolute 1 04 (*) 1 85 - 7 62 Thousands/µL Final    Immature Grans Absolute 0 01  0 00 - 0 20 Thousand/uL Final    Lymphocytes Absolute 1 49  0 60 - 4 47 Thousands/µL Final    Monocytes Absolute 0 12 (*) 0 17 - 1 22 Thousand/µL Final    Eosinophils Absolute 0 10  0 00 - 0 61 Thousand/µL Final    Basophils Absolute 0 01  0 00 - 0 10 Thousands/µL Final    Narrative: This is an appended report  These results have been appended to a previously verified report  COMPREHENSIVE METABOLIC PANEL - Abnormal    Sodium 135  135 - 147 mmol/L Final    Potassium 3 7  3 5 - 5 3 mmol/L Final    Chloride 101  96 - 108 mmol/L Final    CO2 25  21 - 32 mmol/L Final    ANION GAP 9  4 - 13 mmol/L Final    BUN 12  5 - 25 mg/dL Final    Creatinine 0 59 (*) 0 60 - 1 30 mg/dL Final    Comment: Standardized to IDMS reference method    Glucose 112  65 - 140 mg/dL Final    Comment: If the patient is fasting, the ADA then defines impaired fasting glucose as > 100 mg/dL and diabetes as > or equal to 123 mg/dL  Specimen collection should occur prior to Sulfasalazine administration due to the potential for falsely depressed results  Specimen collection should occur prior to Sulfapyridine administration due to the potential for falsely elevated results  Calcium 8 5  8 4 - 10 2 mg/dL Final     (*) 13 - 39 U/L Final    Comment: Specimen collection should occur prior to Sulfasalazine administration due to the potential for falsely depressed results       ALT 52  7 - 52 U/L Final    Comment: Specimen collection should occur prior to Sulfasalazine administration due to the potential for falsely depressed results  Alkaline Phosphatase 230 (*) 34 - 104 U/L Final    Total Protein 7 3  6 4 - 8 4 g/dL Final    Albumin 3 9  3 5 - 5 0 g/dL Final    Total Bilirubin 0 49  0 20 - 1 00 mg/dL Final    eGFR 98  ml/min/1 73sq m Final    Narrative:     [place] guidelines for Chronic Kidney Disease (CKD):   •  Stage 1 with normal or high GFR (GFR > 90 mL/min/1 73 square meters)  •  Stage 2 Mild CKD (GFR = 60-89 mL/min/1 73 square meters)  •  Stage 3A Moderate CKD (GFR = 45-59 mL/min/1 73 square meters)  •  Stage 3B Moderate CKD (GFR = 30-44 mL/min/1 73 square meters)  •  Stage 4 Severe CKD (GFR = 15-29 mL/min/1 73 square meters)  •  Stage 5 End Stage CKD (GFR <15 mL/min/1 73 square meters)  Note: GFR calculation is accurate only with a steady state creatinine   URINALYSIS WITH REFLEX TO SCOPE    Color, UA Light Yellow   Final    Clarity, UA Clear   Final    Specific Gravity, UA 1 017  1 003 - 1 030 Final    pH, UA 6 0  4 5, 5 0, 5 5, 6 0, 6 5, 7 0, 7 5, 8 0 Final    Leukocytes, UA Negative  Negative Final    Nitrite, UA Negative  Negative Final    Protein, UA Negative  Negative mg/dl Final    Glucose, UA Negative  Negative mg/dl Final    Ketones, UA Negative  Negative mg/dl Final    Urobilinogen, UA <2 0  <2 0 mg/dl mg/dl Final    Bilirubin, UA Negative  Negative Final    Occult Blood, UA Negative  Negative Final         CT head without contrast   Final Result      No acute intracranial abnormality                    Workstation performed: [id number]               [facility]  Number of Diagnoses or Management Options  Alcohol use: new, needed workup  Pancytopenia [facility]): new, needed workup  Seizure [facility]): new, needed workup  Diagnosis management comments:       Initial ED assessment: 30-year-old female, episodes that sound like seizures, says she has been having these almost daily, drinks regularly, not in withdrawalPrescribed Maryann Cardoza but has not been taking it for the past 3 days    Initial DDx includes but is not limited to:   Hyponatremia,  Seizures due to not taking medications/noncompliance, intracranial pathology such as bleeding from a reason or forgotten about fall    Initial ED plan:   Blood work, IV Ativan, CT head, patient does not want to be admitted she is back to baseline, if all unremarkable will likely discharge, offered alcohol rehab services for which she respectfully declined  Final ED summary/disposition:   After evaluation and workup in the emergency department, breakthrough seizure  likely due to medication noncompliance in the setting of chronic alcoholism ,  Advised alcohol cessation offered programs patient does not want, will be compliant with her medication, pancytopenia basically unchanged from baseline but lower platelet count will follow with her outpatient specialist for this, will discharge       Amount and/or Complexity of Data Reviewed  Clinical lab tests: ordered and reviewed  Tests in the radiology section of CPT®: ordered and reviewed  Decide to obtain previous medical records or to obtain history from someone other than the patient: yes  Obtain history from someone other than the patient: yes  Review and summarize past medical records: yes  Independent visualization of images, tracings, or specimens: yes            Time reflects when diagnosis was documented in both MDM as applicable and the Disposition within this note     Time User Action Codes Description Comment    1/3/2023 11:48 PM Lorel Synagogue Add [D61 818] Pancytopenia ([facility] [address] )     1/3/2023 11:48 PM Lorel Synagogue Add [R56 9] Seizure ([facility] [address] )     1/3/2023 11:48 PM Lorel Synagogue Add [Z78 9] Alcohol use       ED Disposition     ED Disposition   Discharge    Condition   Stable    Date/Time   Wed Jan 4, 2023 12:00 AM    Comment   Enedina Anand discharge to home/self care                 Follow-up Information     Follow up With Specialties Details Why Contact Info Additional Information    Shalini Wood MD Family Medicine Schedule an appointment as soon as possible for a visit  for pancytopenia  [address]  [phone]       [address] Neuro[facility] Schedule an appointment as soon as possible for a visit  for continued seizures   [address]  [facility], [address], [place], [address] [facility] Emergency Department Emergency Medicine  As needed [address] [facility] [address] [facility] Emergency Department, [address], [place], [place], [address]

## 2023-01-04 NOTE — DISCHARGE INSTRUCTIONS
You have been instructed to follow-up with your primary care physician for pancytopenia  You have also been encouraged to continue taking your Vimpat and have been given a referral for outpatient follow-up with neurology  We have also discussed at length alcohol cessation and the risks of alcohol abuse  Should you develop loss of consciousness, seizure, confusion, weakness, numbness, chest pain, shortness of breath or any other symptoms that you find concerning please return to the emergency department immediately

## 2023-01-10 ENCOUNTER — OFFICE VISIT (OUTPATIENT)
Dept: RHEUMATOLOGY | Facility: CLINIC | Age: 63
End: 2023-01-10

## 2023-01-10 DIAGNOSIS — M81.0 AGE-RELATED OSTEOPOROSIS WITHOUT CURRENT PATHOLOGICAL FRACTURE: Primary | ICD-10-CM

## 2023-01-10 NOTE — PROGRESS NOTES
Patient received her Evenity injections , one in each arm  There was no redness bleeding or discomfort noted  Patient reported no side effects from her last injections  No complaint of pain or discomfort  Patient will upon check out set up her next injection appointment

## 2023-01-11 ENCOUNTER — TELEMEDICINE (OUTPATIENT)
Dept: FAMILY MEDICINE CLINIC | Facility: CLINIC | Age: 63
End: 2023-01-11

## 2023-01-11 VITALS — BODY MASS INDEX: 19.78 KG/M2 | HEIGHT: 67 IN | WEIGHT: 126 LBS

## 2023-01-11 DIAGNOSIS — Z12.31 VISIT FOR SCREENING MAMMOGRAM: ICD-10-CM

## 2023-01-11 DIAGNOSIS — R74.8 ABNORMAL LIVER ENZYMES: ICD-10-CM

## 2023-01-11 DIAGNOSIS — D69.6 THROMBOPENIA (HCC): ICD-10-CM

## 2023-01-11 DIAGNOSIS — F41.8 ANXIETY WITH DEPRESSION: Primary | ICD-10-CM

## 2023-01-11 DIAGNOSIS — F10.10 ETOH ABUSE: ICD-10-CM

## 2023-01-11 DIAGNOSIS — F41.0 PANIC ATTACK: ICD-10-CM

## 2023-01-11 DIAGNOSIS — F41.9 ANXIETY: ICD-10-CM

## 2023-01-11 DIAGNOSIS — Z12.11 SCREEN FOR COLON CANCER: ICD-10-CM

## 2023-01-11 RX ORDER — GABAPENTIN 300 MG/1
300 CAPSULE ORAL 2 TIMES DAILY
Qty: 60 CAPSULE | Refills: 0 | Status: SHIPPED | OUTPATIENT
Start: 2023-01-11

## 2023-01-11 RX ORDER — TRAZODONE HYDROCHLORIDE 50 MG/1
50 TABLET ORAL DAILY
Qty: 30 TABLET | Refills: 0 | Status: SHIPPED | OUTPATIENT
Start: 2023-01-11

## 2023-01-11 NOTE — ASSESSMENT & PLAN NOTE
Secondary to chronic alcohol use  patient unable to establish nephrology  Advised to contact hematology to establish care and further monitoring and management of low platelet count  Fall precautions discussed

## 2023-01-11 NOTE — ASSESSMENT & PLAN NOTE
Continue  gabapentin, atarax, trazodone  Patient will need OP psychiatry fup  Has been advised to establish with psychiatry  Also need to establish care with AA support group  She will reach out to her therapist re this

## 2023-01-11 NOTE — ASSESSMENT & PLAN NOTE
Drinking occasionally  Advised to stop drinking alcohol  Advised alcohol abstinence support improved

## 2023-01-11 NOTE — ASSESSMENT & PLAN NOTE
Chronic alcohol abuse, hepatomegaly  Needs to establish with GI  Again strongly recommended to do the same

## 2023-01-11 NOTE — ASSESSMENT & PLAN NOTE
Stable on gabapentin, hydroxyzine and trazodone    Advised that she will need to follow-up in 4 weeks for now, till she is able to establish with specialist

## 2023-01-11 NOTE — PROGRESS NOTES
Virtual Regular Visit    Verification of patient location:    Patient is located in the following state in which I hold an active license [place]      Assessment/Plan:    Problem List Items Addressed This Visit        Hematopoietic and Hemostatic    Thrombopenia ([address] )     Secondary to chronic alcohol use  patient unable to establish nephrology  Advised to contact hematology to establish care and further monitoring and management of low platelet count  Fall precautions discussed  Relevant Orders    Ambulatory Referral to Hematology / Oncology       Other    Anxiety with depression - Primary (Chronic)     Continue  gabapentin, atarax, trazodone  Patient will need OP psychiatry fup  Has been advised to establish with psychiatry  Also need to establish care with AA support group  She will reach out to her therapist re this  Relevant Medications    gabapentin (NEURONTIN) 300 mg capsule    traZODone (DESYREL) 50 mg tablet    Other Relevant Orders    Ambulatory Referral to Psychiatry    Abnormal liver enzymes     Chronic alcohol abuse, hepatomegaly  Needs to establish with GI  Again strongly recommended to do the same  Relevant Orders    Ambulatory Referral to Gastroenterology    Screen for colon cancer    ETOH abuse     Drinking occasionally  Advised to stop drinking alcohol  Advised alcohol abstinence support improved  Relevant Orders    Ambulatory Referral to Gastroenterology    Ambulatory Referral to Psychiatry    Anxiety    Relevant Medications    traZODone (DESYREL) 50 mg tablet    Panic attack     Stable on gabapentin, hydroxyzine and trazodone    Advised that she will need to follow-up in 4 weeks for now, till she is able to establish with specialist           Relevant Medications    traZODone (DESYREL) 50 mg tablet    Other Relevant Orders    Ambulatory Referral to Psychiatry   Other Visit Diagnoses     Visit for screening mammogram        Relevant Orders    Mammo screening bilateral w 3d & cad               Reason for visit is   Chief Complaint   Patient presents with   • Follow-up   • Virtual Regular Visit        Encounter provider Anita Escalante MD    Provider located at [address] [place] [id number]      Recent Visits  No visits were found meeting these conditions  Showing recent visits within past 7 days and meeting all other requirements  Today's Visits  Date Type Provider Dept   01/11/23 Telemedicine Anita Escalante MD [facility]   Showing today's visits and meeting all other requirements  Future Appointments  No visits were found meeting these conditions  Showing future appointments within next 150 days and meeting all other requirements       The patient was identified by name and date of birth  Betty Davila was informed that this is a telemedicine visit and that the visit is being conducted through the [address] [employer] platform  She agrees to proceed     My office door was closed  No one else was in the room  She acknowledged consent and understanding of privacy and security of the video platform  The patient has agreed to participate and understands they can discontinue the visit at any time  Patient is aware this is a billable service  Subjective  Betty Davila is a 58 y o  female    HPI     Is following up from recent ER evaluation, presenting for seizure-like episode  Has history of seizures disorder for which she is on Vimpat  For medication 2 days ago  Reports that she has been taking the meds regularly  Has a history of alcohol abuse, associated with chronic liver disease, pancytopenia  Mentioned that she has been drinking alcohol  Denies any symptoms of headache or chest pain today  No recent falls  Has been advised to establish care with hematology, gastroenterology and psychiatrist   Patient has no-show for his appointments  Currently not established with these providers    She is seeing a  Therapist      Past Medical History:   Diagnosis Date   • Fracture    • Hepatitis     Hep A    • Insomnia     10mar2016 resolved   • Osteoporosis     14jun2016 resolved   • Pancreatitis    • Seasonal allergies    • Urine discoloration 11/11/2019   • Varicella    • Vomiting 11/13/2019       Past Surgical History:   Procedure Laterality Date   • IR BIOPSY BONE  8/17/2021   • IR BIOPSY BONE MARROW  11/14/2019   • TUBAL LIGATION  1985       Current Outpatient Medications   Medication Sig Dispense Refill   • estradiol (ESTRACE VAGINAL) 0 1 mg/g vaginal cream Insert 1 g into the vagina 3 (three) times a week Insert 1/4 of applicator into the vagina 3 times a week for 2 weeks  If symptoms improved can reduce to using twice weekly after 2 weeks for maintenance  42 g 1   • gabapentin (NEURONTIN) 300 mg capsule Take 1 capsule (300 mg total) by mouth 2 (two) times a day 60 capsule 0   • hydrOXYzine HCL (ATARAX) 25 mg tablet Take 1 tablet (25 mg total) by mouth 2 (two) times a day as needed for anxiety 60 tablet 0   • lacosamide (VIMPAT) 150 mg tablet Take 1 tablet (150 mg total) by mouth 2 (two) times a day 60 tablet 2   • metoprolol succinate (TOPROL-XL) 25 mg 24 hr tablet Take 1 tablet (25 mg total) by mouth 2 (two) times a day 180 tablet 0   • traZODone (DESYREL) 50 mg tablet Take 1 tablet (50 mg total) by mouth in the morning 30 tablet 0   • melatonin 3 mg Take 1 tablet (3 mg total) by mouth daily at bedtime (Patient not taking: Reported on 7/13/2022) 30 tablet 0     No current facility-administered medications for this visit  No Known Allergies    Review of Systems   Constitutional: Negative  Cardiovascular: Negative  Psychiatric/Behavioral:        ANXIETY       Video Exam    Vitals:    01/11/23 0918   Weight: 57 2 kg (126 lb)   Height: 5' 7" (1 702 m)       Physical Exam  Constitutional:       Appearance: Normal appearance  Pulmonary:      Effort: No respiratory distress     Neurological: Mental Status: She is alert  Psychiatric:         Mood and Affect: Mood normal          Behavior: Behavior normal          Thought Content: Thought content normal          Judgment: Judgment normal           I spent 25 minutes with patient today in which greater than 50% of the time was spent in counseling/coordination of care regarding management of symptoms

## 2023-01-13 ENCOUNTER — TELEPHONE (OUTPATIENT)
Dept: PSYCHIATRY | Facility: CLINIC | Age: 63
End: 2023-01-13

## 2023-01-16 ENCOUNTER — TELEPHONE (OUTPATIENT)
Dept: HEMATOLOGY ONCOLOGY | Facility: CLINIC | Age: 63
End: 2023-01-16

## 2023-01-19 ENCOUNTER — TELEPHONE (OUTPATIENT)
Dept: PSYCHIATRY | Facility: CLINIC | Age: 63
End: 2023-01-19

## 2023-01-23 NOTE — TELEPHONE ENCOUNTER
Was calling pt in regards to routine referral and adding to proper wait list  LVM for pt to contact intake dept  Third attempt to reach pt  Referral closed

## 2023-01-25 DIAGNOSIS — G40.109 SYMPTOMATIC FOCAL EPILEPSY (HCC): ICD-10-CM

## 2023-01-25 RX ORDER — LACOSAMIDE 150 MG/1
150 TABLET ORAL 2 TIMES DAILY
Qty: 60 TABLET | Refills: 2 | Status: SHIPPED | OUTPATIENT
Start: 2023-01-25 | End: 2023-02-06 | Stop reason: SDUPTHER

## 2023-01-31 DIAGNOSIS — F41.8 ANXIETY WITH DEPRESSION: Chronic | ICD-10-CM

## 2023-02-01 RX ORDER — HYDROXYZINE HYDROCHLORIDE 25 MG/1
25 TABLET, FILM COATED ORAL 2 TIMES DAILY PRN
Qty: 60 TABLET | Refills: 0 | OUTPATIENT
Start: 2023-02-01

## 2023-02-01 NOTE — TELEPHONE ENCOUNTER
Patient advised  Patient also advised that no one has reached out to her from [address] [place] [address] I advised her there are message in her chart and a message was sent through my chart regarding contacting them

## 2023-02-02 ENCOUNTER — TELEMEDICINE (OUTPATIENT)
Dept: NEUROLOGY | Facility: CLINIC | Age: 63
End: 2023-02-02

## 2023-02-02 DIAGNOSIS — G40.109 SYMPTOMATIC FOCAL EPILEPSY (HCC): Primary | ICD-10-CM

## 2023-02-02 DIAGNOSIS — R56.9 SEIZURE (HCC): ICD-10-CM

## 2023-02-02 NOTE — Clinical Note
I would like to see her back in 4-8 weeks  I just messaged the EEG staff to see if they can schedule her EEGs in the next 1-2 weeks

## 2023-02-02 NOTE — PROGRESS NOTES
[address] [facility] [address] [facility]  Follow Up Virtual Visit    Verification of patient location:    Patient is located in the following state in which I hold an active license [place]      Assessment/Plan:    Problem List Items Addressed This Visit        Nervous and Auditory    Symptomatic focal epilepsy ([address] ) - Primary    Relevant Orders    EEG Routine and awake    Ambulatory EEG 24 Hours       Other    Seizure [facility])            Reason for visit is   Chief Complaint   Patient presents with   • Virtual Regular Visit        Encounter provider Richmond Bright MD    Provider located at [address] [facility]  [id number]      Recent Visits  No visits were found meeting these conditions  Showing recent visits within past 7 days and meeting all other requirements  Today's Visits  Date Type Provider Dept   02/02/23 201 [facility] Grabiel Partida MD [facility] [place]   Showing today's visits and meeting all other requirements  Future Appointments  No visits were found meeting these conditions  Showing future appointments within next 150 days and meeting all other requirements       The patient was identified by name and date of birth  Jazmín Sheehan was informed that this is a telemedicine visit and that the visit is being conducted through the [address] [place] [address]  She agrees to proceed     My office door was closed  No one else was in the room besides Dr Marianela Rose and Dr Ricky Mitchell  She acknowledged consent and understanding of privacy and security of the video platform  The patient has agreed to participate and understands they can discontinue the visit at any time  Patient is aware this is a billable service  Impression/Plan    Ms Kathleen Miller is a 58 y o  female with 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 and focal epilepsy with status epilepticus with subclinical electrographic seizures from L temporal region on vEEG and MRI finding of diffusion weighted and FLAIR signal hyperintensity involving the L hippocampal formation manifest as hand clenching and decreased alertness is here for follow up  Her  Virtual neurological exam is unremarkable  Patient is currently taking Lacosamide 150mg twice daily  Patient reports having 2-3 brief panic attacks daily lasting about 1 to 2 minutes without any specific triggers for these attacks  There is a concern that these panic attacks are actually seizures more so than anxiety and panic attacks since left temporal seizures can cause sudden onset of intense fear and impending doom  Prior to hospitalization in March 2022, patient has not had any panic attacks before  Patient Instructions   1  Continue lacosamide 150 mg twice daily  2  Schedule EEGs  3  Return in about 4-8 weeks  Diagnoses and all orders for this visit:    Symptomatic focal epilepsy ([facility] )  -     EEG Routine and awake; Future  -     Ambulatory EEG 24 Hours; Standing    Seizure [facility])  -     Ambulatory Referral to Neurology      Subjective    Dana Santos is returning to the [place] [address] [facility] for follow up  LOV with Yoel Betancourt on 07/13/2022 as a hospital discharge follow up  Brief hx: She was hospitalized from 03/07/2022 - 03/19/2022 after a traumatic fall complicated with focal status epilepticus requiring ICU care and intubation  vEEg captured recurrent electrographic seizures  However, the event of patient shaking of his hands bilaterally and rhythmic right shoulder jerking activity did not have EEG correlate  MRI brain w/wo showed focal diffusion-weighted and flair signal hyperintensity involving  the left hippocampal formation is consistent with status epilepticus  Patient was started on Keppra and Vimpat per neurology   Due to decreasing platelets, Keppra was d/c  Neurology suspected etiology of seizure episodes were due to the alcohol withdrawal vs medication overdose ( found an open bottle of zoloft prior to admission) vs TBI from prior fall in Aug 2021  She was discharged to psych inpatient due to possible suicidal attempt  Prior to office visit, patient was not taking lacosamide, since she was only prescribed 15 days after discharge  Interval Events:   Seizures since last visit: yes  Hospitalizations: yes - Emergency visit due to seizures x3 on 01/03/2023  Patient ran out of Vimpat 2 days prior  Her  refilled the med and she doubled her dose in the afternoon prior to ED visit  Per note, while in bed watching TV, patient reached over and grabbed him with both hands and her entire body shaking  Then her arms began to contract and she tried to put his hand in her mouth  Afterwards, she was very confused and was replacing words with inappropriate words and sentencing  It lasted about 10 minutes before returning to baseline  She drank alcohol daily and had 2 "tall boys" that day  No urinary/bowel incontinence or tongue biting  She was found to have pancytopenia and thrombocytopenia secondary to alcohol use  CT head w/o contrast was unremarkable  She was back to baseline without neurological deficits  She was discharged home with strict return precautions  Today's visit: Patient reports that she has been having brief panic attacks lasting a minute off to occurring 2-3 times per day  She tried to look for the trigger however she could not find any triggers for these panic attacks  Patient drinks alcohol daily  She does not quantify today however she reports that if she has these panic attacks she drinks about 1-2 beers    Current AEDs:  Vimpat 150mg twice daily  Medication side effects: None  Medication adherence: Yes    Event/Seizure semiology:    1  focal seizure: Hand clenching and dropping GCS    2  Panic attack feeling (unclear if it is seizure or psychiatric related): brief 1-2 min panic attacks occurring 2-3 times per day without any specific triggers    3    The event captured without vEEG correlate at the hospital in March 2022: the patient was shaking/tensing bilateral arms and hands with some rhythmic right shoulder jerking activity  There was no electrographic seizure during this time  She looked like she was waking up on the EEG background  Special Features  Status epilepticus: yes - subclinical electrographic seizures captured on vEEG in March 2022  Self Injury Seizures: no  Precipitating Factors: none    Epilepsy Risk Factors:  Alcohol abuse and prior [facility] in Aug 2021    Prior AEDs:  Levetiracetam: Lowered platelets    Prior Evaluation:    10/20/2022: MRI brain seizure with and without contrast    Stable superficial siderosis and scattered foci of chronic hemosiderin deposition consistent with patient's history of prior intracranial hemorrhage and trauma  Mild chronic microangiopathy      Symmetric hippocampal formations with regard to size and signal  The previously seen subtle diffusion abnormality within the left hippocampus is no longer evident  3/10/2022: MRI brain with and without contrast  Focal diffusion-weighted and FLAIR signal hyperintensity involving the left hippocampal formation is consistent with status epilepticus      10/12/2022: Routine EEG impression   This is an abnormal routine EEG recording due to: Generalized irregular theta activity and slow PDR consistent with mild generalized nonspecific encephalopathy  No electrographic seizures  3/8/2022: EEG video monitoring  This is an abnormal 22 hours continuous video EEG recording due to excessive diffuse delta activity during the waking state, but the background primarily consists of diffuse beta-alpha activity  This finding indicates mild-moderate diffuse cerebral dysfunction  Excessive beta activity is seen in the presence of CNS activating agents, such as benzodiazepines and barbiturates       3/7/2022: EEG video monitoring  Events:   07:58 - nurse notices that the patient is shaking her right arm and hand but also the left hand is shaking  There is some rhythmic right shoulder jerking activity, but it also appears that the arms and hands are tensing up  There is no electrographic seizure during this time  She looks like she is waking up on the EEG background      Interpretation: This is an abnormal nearly 23 5 hours continuous video EEG recording due to the presence of recurrent focal seizures from the left temporal region  This finding indicate the presence of an epileptogenic focus in the left temporal region          History Reviewed: The following were reviewed and updated as appropriate: allergies, current medications, past family history, past medical history, past social history, past surgical history and problem list    Psychiatric History:  Anxiety  Psychiatric admission    Social History:   Driving: No  Lives Alone: No  Occupation: unknown occupation      Objective    There were no vitals taken for this visit  Virtual General Exam  No acute distress  Virtual neurologic Exam  Mental Status:  Alert and oriented x 3  Language: normal fluency and comprehension  Patient can track the provider  Face symmetric  Tongue midline and no atrophy  No dysarthria  Motor:  No drift  Coordination: Finger to nose intact  No Known Allergies    Review of Systems   Constitutional: Negative  Negative for appetite change and fever  HENT: Negative  Negative for hearing loss, tinnitus, trouble swallowing and voice change  Eyes: Negative  Negative for photophobia, pain and visual disturbance  Respiratory: Negative  Negative for shortness of breath  Cardiovascular: Negative  Negative for palpitations  Gastrointestinal: Negative  Negative for nausea and vomiting  Endocrine: Negative  Negative for cold intolerance  Genitourinary: Negative  Negative for dysuria, frequency and urgency  Musculoskeletal: Negative  Negative for gait problem, myalgias and neck pain  Skin: Negative  Negative for rash  Allergic/Immunologic: Negative  Neurological: Negative  Negative for dizziness, tremors, seizures, syncope, facial asymmetry, speech difficulty, weakness, light-headedness, numbness and headaches  Hematological: Negative  Does not bruise/bleed easily  Psychiatric/Behavioral: Negative  Negative for confusion, hallucinations and sleep disturbance  Video Exam    There were no vitals filed for this visit      Physical Exam     I spent 45 minutes directly with the patient during this visit

## 2023-02-06 ENCOUNTER — HOSPITAL ENCOUNTER (INPATIENT)
Facility: HOSPITAL | Age: 63
LOS: 4 days | End: 2023-02-10
Attending: EMERGENCY MEDICINE | Admitting: HOSPITALIST

## 2023-02-06 ENCOUNTER — TELEPHONE (OUTPATIENT)
Dept: NEUROLOGY | Facility: CLINIC | Age: 63
End: 2023-02-06

## 2023-02-06 DIAGNOSIS — G40.109 SYMPTOMATIC FOCAL EPILEPSY (HCC): ICD-10-CM

## 2023-02-06 DIAGNOSIS — Z87.898 HISTORY OF ALCOHOL USE DISORDER: ICD-10-CM

## 2023-02-06 DIAGNOSIS — G40.909 EPILEPSY (HCC): ICD-10-CM

## 2023-02-06 DIAGNOSIS — G40.909 RECURRENT SEIZURES (HCC): Primary | ICD-10-CM

## 2023-02-06 PROBLEM — I10 HTN (HYPERTENSION): Status: ACTIVE | Noted: 2023-02-06

## 2023-02-06 PROBLEM — R41.82 AMS (ALTERED MENTAL STATUS): Status: ACTIVE | Noted: 2023-02-06

## 2023-02-06 LAB
ALBUMIN SERPL BCP-MCNC: 3.6 G/DL (ref 3.5–5)
ALP SERPL-CCNC: 120 U/L (ref 34–104)
ALT SERPL W P-5'-P-CCNC: 27 U/L (ref 7–52)
ANION GAP SERPL CALCULATED.3IONS-SCNC: 8 MMOL/L (ref 4–13)
AST SERPL W P-5'-P-CCNC: 41 U/L (ref 13–39)
BASOPHILS # BLD AUTO: 0.01 THOUSANDS/ÂΜL (ref 0–0.1)
BASOPHILS NFR BLD AUTO: 0 % (ref 0–1)
BILIRUB SERPL-MCNC: 1 MG/DL (ref 0.2–1)
BUN SERPL-MCNC: 11 MG/DL (ref 5–25)
CALCIUM SERPL-MCNC: 8.9 MG/DL (ref 8.4–10.2)
CHLORIDE SERPL-SCNC: 98 MMOL/L (ref 96–108)
CO2 SERPL-SCNC: 28 MMOL/L (ref 21–32)
CREAT SERPL-MCNC: 0.72 MG/DL (ref 0.6–1.3)
EOSINOPHIL # BLD AUTO: 0 THOUSAND/ÂΜL (ref 0–0.61)
EOSINOPHIL NFR BLD AUTO: 0 % (ref 0–6)
ERYTHROCYTE [DISTWIDTH] IN BLOOD BY AUTOMATED COUNT: 12.5 % (ref 11.6–15.1)
GFR SERPL CREATININE-BSD FRML MDRD: 90 ML/MIN/1.73SQ M
GLUCOSE SERPL-MCNC: 170 MG/DL (ref 65–140)
HCT VFR BLD AUTO: 34.2 % (ref 34.8–46.1)
HGB BLD-MCNC: 11.1 G/DL (ref 11.5–15.4)
IMM GRANULOCYTES # BLD AUTO: 0.01 THOUSAND/UL (ref 0–0.2)
IMM GRANULOCYTES NFR BLD AUTO: 0 % (ref 0–2)
LYMPHOCYTES # BLD AUTO: 0.99 THOUSANDS/ÂΜL (ref 0.6–4.47)
LYMPHOCYTES NFR BLD AUTO: 32 % (ref 14–44)
MCH RBC QN AUTO: 32.6 PG (ref 26.8–34.3)
MCHC RBC AUTO-ENTMCNC: 32.5 G/DL (ref 31.4–37.4)
MCV RBC AUTO: 101 FL (ref 82–98)
MONOCYTES # BLD AUTO: 0.43 THOUSAND/ÂΜL (ref 0.17–1.22)
MONOCYTES NFR BLD AUTO: 14 % (ref 4–12)
NEUTROPHILS # BLD AUTO: 1.7 THOUSANDS/ÂΜL (ref 1.85–7.62)
NEUTS SEG NFR BLD AUTO: 54 % (ref 43–75)
NRBC BLD AUTO-RTO: 0 /100 WBCS
PLATELET # BLD AUTO: 39 THOUSANDS/UL (ref 149–390)
PMV BLD AUTO: 11 FL (ref 8.9–12.7)
POTASSIUM SERPL-SCNC: 4 MMOL/L (ref 3.5–5.3)
PROT SERPL-MCNC: 6.7 G/DL (ref 6.4–8.4)
RBC # BLD AUTO: 3.4 MILLION/UL (ref 3.81–5.12)
SODIUM SERPL-SCNC: 134 MMOL/L (ref 135–147)
WBC # BLD AUTO: 3.14 THOUSAND/UL (ref 4.31–10.16)

## 2023-02-06 RX ORDER — TRAZODONE HYDROCHLORIDE 50 MG/1
50 TABLET ORAL DAILY
Status: DISCONTINUED | OUTPATIENT
Start: 2023-02-07 | End: 2023-02-10 | Stop reason: HOSPADM

## 2023-02-06 RX ORDER — LORAZEPAM 2 MG/ML
1 INJECTION INTRAMUSCULAR ONCE
Status: COMPLETED | OUTPATIENT
Start: 2023-02-06 | End: 2023-02-06

## 2023-02-06 RX ORDER — FOLIC ACID 1 MG/1
1 TABLET ORAL DAILY
Status: DISCONTINUED | OUTPATIENT
Start: 2023-02-07 | End: 2023-02-10 | Stop reason: HOSPADM

## 2023-02-06 RX ORDER — METOPROLOL SUCCINATE 25 MG/1
25 TABLET, EXTENDED RELEASE ORAL 2 TIMES DAILY
Status: DISCONTINUED | OUTPATIENT
Start: 2023-02-06 | End: 2023-02-10 | Stop reason: HOSPADM

## 2023-02-06 RX ORDER — GABAPENTIN 300 MG/1
300 CAPSULE ORAL 2 TIMES DAILY
Status: DISCONTINUED | OUTPATIENT
Start: 2023-02-07 | End: 2023-02-10 | Stop reason: HOSPADM

## 2023-02-06 RX ORDER — LORAZEPAM 2 MG/ML
2 INJECTION INTRAMUSCULAR EVERY 8 HOURS PRN
Status: DISCONTINUED | OUTPATIENT
Start: 2023-02-06 | End: 2023-02-09

## 2023-02-06 RX ORDER — HYDROXYZINE HYDROCHLORIDE 25 MG/1
25 TABLET, FILM COATED ORAL 2 TIMES DAILY PRN
Status: DISCONTINUED | OUTPATIENT
Start: 2023-02-06 | End: 2023-02-10 | Stop reason: HOSPADM

## 2023-02-06 RX ORDER — ENOXAPARIN SODIUM 100 MG/ML
40 INJECTION SUBCUTANEOUS
Status: DISCONTINUED | OUTPATIENT
Start: 2023-02-07 | End: 2023-02-07

## 2023-02-06 RX ORDER — LACOSAMIDE 200 MG/1
200 TABLET ORAL 2 TIMES DAILY
Qty: 60 TABLET | Refills: 5 | Status: SHIPPED | OUTPATIENT
Start: 2023-02-06

## 2023-02-06 RX ORDER — ONDANSETRON 2 MG/ML
4 INJECTION INTRAMUSCULAR; INTRAVENOUS ONCE
Status: COMPLETED | OUTPATIENT
Start: 2023-02-06 | End: 2023-02-06

## 2023-02-06 RX ORDER — LACOSAMIDE 100 MG/1
150 TABLET ORAL 2 TIMES DAILY
Status: DISCONTINUED | OUTPATIENT
Start: 2023-02-06 | End: 2023-02-10 | Stop reason: HOSPADM

## 2023-02-06 RX ORDER — GABAPENTIN 300 MG/1
300 CAPSULE ORAL 2 TIMES DAILY
Status: DISCONTINUED | OUTPATIENT
Start: 2023-02-07 | End: 2023-02-06

## 2023-02-06 RX ORDER — LANOLIN ALCOHOL/MO/W.PET/CERES
100 CREAM (GRAM) TOPICAL DAILY
Status: DISCONTINUED | OUTPATIENT
Start: 2023-02-07 | End: 2023-02-10 | Stop reason: HOSPADM

## 2023-02-06 RX ADMIN — ONDANSETRON 4 MG: 2 INJECTION INTRAMUSCULAR; INTRAVENOUS at 19:34

## 2023-02-06 RX ADMIN — LACOSAMIDE 200 MG: 10 INJECTION INTRAVENOUS at 20:39

## 2023-02-06 RX ADMIN — LORAZEPAM 1 MG: 2 INJECTION INTRAMUSCULAR; INTRAVENOUS at 21:16

## 2023-02-06 NOTE — TELEPHONE ENCOUNTER
She was given a loading dose of lacosamide 300 mg in the ED on the evening of 2/5, but it appears she was discharged on an unchanged dose of 150 mg bid  I recommend increasing lacosamide to 200 mg twice daily  It will be important to have the ambulatory EEG done that is scheduled for 2/14

## 2023-02-06 NOTE — TELEPHONE ENCOUNTER
Hello,     Patient has called and asked for a call back regarding testing that had been ordered by Dr Alfaro  She stated she just had a very bad seizure this weekend that lasted a very long time and would like to discuss further with the [address] [place] [address] or the nurse  Call back number [phone]      Thank you in advance,     Gold Arana

## 2023-02-06 NOTE — TELEPHONE ENCOUNTER
Spoke to patient  Seizure was longer than events in the past  About 30 minutes before returned to baseline  Witnessed by   Described as full body shaking, looking towards side, foaming at mouth  Patient was taking by ambulance to ED for evaluation - [facility] ED  (Care everywhere updated)  Bite tongue  No known triggers  Denies missed med doses, difficulty sleeping  No s/s of illness or infection      Medications confirmed:  Lacosamide - 150mg BID

## 2023-02-06 NOTE — TELEPHONE ENCOUNTER
Spoke to patient  Agreeable to plan to increase medications  Will notify office if any further events

## 2023-02-07 PROBLEM — R41.82 AMS (ALTERED MENTAL STATUS): Status: RESOLVED | Noted: 2023-02-06 | Resolved: 2023-02-07

## 2023-02-07 LAB
ALBUMIN SERPL BCP-MCNC: 3.8 G/DL (ref 3.5–5)
ALP SERPL-CCNC: 120 U/L (ref 34–104)
ALT SERPL W P-5'-P-CCNC: 26 U/L (ref 7–52)
AMMONIA PLAS-SCNC: 54 UMOL/L (ref 18–72)
ANION GAP SERPL CALCULATED.3IONS-SCNC: 7 MMOL/L (ref 4–13)
AST SERPL W P-5'-P-CCNC: 42 U/L (ref 13–39)
ATRIAL RATE: 83 BPM
BASOPHILS # BLD AUTO: 0.01 THOUSANDS/ÂΜL (ref 0–0.1)
BASOPHILS NFR BLD AUTO: 0 % (ref 0–1)
BILIRUB SERPL-MCNC: 0.96 MG/DL (ref 0.2–1)
BUN SERPL-MCNC: 9 MG/DL (ref 5–25)
CALCIUM SERPL-MCNC: 8.9 MG/DL (ref 8.4–10.2)
CHLORIDE SERPL-SCNC: 102 MMOL/L (ref 96–108)
CO2 SERPL-SCNC: 29 MMOL/L (ref 21–32)
CREAT SERPL-MCNC: 0.67 MG/DL (ref 0.6–1.3)
EOSINOPHIL # BLD AUTO: 0.02 THOUSAND/ÂΜL (ref 0–0.61)
EOSINOPHIL NFR BLD AUTO: 0 % (ref 0–6)
ERYTHROCYTE [DISTWIDTH] IN BLOOD BY AUTOMATED COUNT: 12.4 % (ref 11.6–15.1)
FOLATE SERPL-MCNC: >20 NG/ML (ref 3.1–17.5)
GFR SERPL CREATININE-BSD FRML MDRD: 94 ML/MIN/1.73SQ M
GLUCOSE SERPL-MCNC: 117 MG/DL (ref 65–140)
HCT VFR BLD AUTO: 35.3 % (ref 34.8–46.1)
HGB BLD-MCNC: 11.4 G/DL (ref 11.5–15.4)
IMM GRANULOCYTES # BLD AUTO: 0.02 THOUSAND/UL (ref 0–0.2)
IMM GRANULOCYTES NFR BLD AUTO: 0 % (ref 0–2)
LYMPHOCYTES # BLD AUTO: 2.26 THOUSANDS/ÂΜL (ref 0.6–4.47)
LYMPHOCYTES NFR BLD AUTO: 44 % (ref 14–44)
MCH RBC QN AUTO: 32.7 PG (ref 26.8–34.3)
MCHC RBC AUTO-ENTMCNC: 32.3 G/DL (ref 31.4–37.4)
MCV RBC AUTO: 101 FL (ref 82–98)
MONOCYTES # BLD AUTO: 0.55 THOUSAND/ÂΜL (ref 0.17–1.22)
MONOCYTES NFR BLD AUTO: 11 % (ref 4–12)
NEUTROPHILS # BLD AUTO: 2.26 THOUSANDS/ÂΜL (ref 1.85–7.62)
NEUTS SEG NFR BLD AUTO: 45 % (ref 43–75)
NRBC BLD AUTO-RTO: 0 /100 WBCS
P AXIS: 41 DEGREES
PLATELET # BLD AUTO: 42 THOUSANDS/UL (ref 149–390)
PMV BLD AUTO: 11.1 FL (ref 8.9–12.7)
POTASSIUM SERPL-SCNC: 3.9 MMOL/L (ref 3.5–5.3)
PR INTERVAL: 230 MS
PROT SERPL-MCNC: 7 G/DL (ref 6.4–8.4)
QRS AXIS: 46 DEGREES
QRSD INTERVAL: 114 MS
QT INTERVAL: 448 MS
QTC INTERVAL: 526 MS
RBC # BLD AUTO: 3.49 MILLION/UL (ref 3.81–5.12)
SODIUM SERPL-SCNC: 138 MMOL/L (ref 135–147)
T WAVE AXIS: 74 DEGREES
VENTRICULAR RATE: 83 BPM
VIT B12 SERPL-MCNC: 349 PG/ML (ref 100–900)
WBC # BLD AUTO: 5.12 THOUSAND/UL (ref 4.31–10.16)

## 2023-02-07 RX ORDER — GABAPENTIN 300 MG/1
300 CAPSULE ORAL 2 TIMES DAILY
Status: CANCELLED | OUTPATIENT
Start: 2023-02-07

## 2023-02-07 RX ORDER — LANOLIN ALCOHOL/MO/W.PET/CERES
100 CREAM (GRAM) TOPICAL DAILY
Status: CANCELLED | OUTPATIENT
Start: 2023-02-08

## 2023-02-07 RX ORDER — HYDROXYZINE HYDROCHLORIDE 25 MG/1
25 TABLET, FILM COATED ORAL 2 TIMES DAILY PRN
Status: CANCELLED | OUTPATIENT
Start: 2023-02-07

## 2023-02-07 RX ORDER — FOLIC ACID 1 MG/1
1 TABLET ORAL DAILY
Status: CANCELLED | OUTPATIENT
Start: 2023-02-08

## 2023-02-07 RX ORDER — TRAZODONE HYDROCHLORIDE 50 MG/1
50 TABLET ORAL DAILY
Status: CANCELLED | OUTPATIENT
Start: 2023-02-08

## 2023-02-07 RX ORDER — LORAZEPAM 2 MG/ML
2 INJECTION INTRAMUSCULAR EVERY 8 HOURS PRN
Status: CANCELLED | OUTPATIENT
Start: 2023-02-07

## 2023-02-07 RX ORDER — METOPROLOL SUCCINATE 25 MG/1
25 TABLET, EXTENDED RELEASE ORAL 2 TIMES DAILY
Status: CANCELLED | OUTPATIENT
Start: 2023-02-07

## 2023-02-07 RX ADMIN — METOPROLOL SUCCINATE 25 MG: 25 TABLET, EXTENDED RELEASE ORAL at 08:40

## 2023-02-07 RX ADMIN — LACOSAMIDE 150 MG: 100 TABLET, FILM COATED ORAL at 20:04

## 2023-02-07 RX ADMIN — METOPROLOL SUCCINATE 25 MG: 25 TABLET, EXTENDED RELEASE ORAL at 20:04

## 2023-02-07 RX ADMIN — GABAPENTIN 300 MG: 300 CAPSULE ORAL at 00:08

## 2023-02-07 RX ADMIN — FOLIC ACID 1 MG: 1 TABLET ORAL at 08:40

## 2023-02-07 RX ADMIN — TRAZODONE HYDROCHLORIDE 50 MG: 50 TABLET ORAL at 08:40

## 2023-02-07 RX ADMIN — Medication 100 MG: at 08:40

## 2023-02-07 RX ADMIN — GABAPENTIN 300 MG: 300 CAPSULE ORAL at 08:46

## 2023-02-07 RX ADMIN — GABAPENTIN 300 MG: 300 CAPSULE ORAL at 17:22

## 2023-02-07 RX ADMIN — LACOSAMIDE 150 MG: 100 TABLET, FILM COATED ORAL at 10:19

## 2023-02-07 RX ADMIN — METOPROLOL SUCCINATE 25 MG: 25 TABLET, EXTENDED RELEASE ORAL at 00:07

## 2023-02-07 RX ADMIN — ENOXAPARIN SODIUM 40 MG: 40 INJECTION SUBCUTANEOUS at 08:40

## 2023-02-07 RX ADMIN — MULTIPLE VITAMINS W/ MINERALS TAB 1 TABLET: TAB ORAL at 08:40

## 2023-02-07 RX ADMIN — HYDROXYZINE HYDROCHLORIDE 25 MG: 25 TABLET ORAL at 20:04

## 2023-02-07 NOTE — ASSESSMENT & PLAN NOTE
Patient is AAO x 2  She is oriented to self and month  Possibly secondary to alcohol withdrawal or multiple seizures       Plan:  · Ammonia levels normal  · See plan above in Epilepsy

## 2023-02-07 NOTE — TRANSPORTATION MEDICAL NECESSITY
Section I - General Information    Name of Patient: Roberto Mehta                 : 1960    Medicare #: [id number]  Transport Date: 23 (PCS is valid for round trips on this date and for all repetitive trips in the 60-day range as noted below )  Origin: [address] [place] [address] Road: [facility]  Is the pt's stay covered under Medicare Part A (PPS/DRG)   []     Closest appropriate facility? If no, why is transport to more distant facility required? Yes  If hospice pt, is this transport related to pt's terminal illness? NA       Section II - Medical Necessity Questionnaire  Ambulance transportation is medically necessary only if other means of transport are contraindicated or would be potentially harmful to the patient  To meet this requirement, the patient must either be "bed confined" or suffer from a condition such that transport by means other than ambulance is contraindicated by the patient's condition  The following questions must be answered by the medical professional signing below for this form to be valid:    1)  Describe the MEDICAL CONDITION (physical and/or mental) of this patient AT [address] [place] [address] that requires the patient to be transported in an ambulance and why transport by other means is contraindicated by the patient's condition: patient requires VEEG     2) Is the patient "bed confined" as defined below? No  To be "be confined" the patient must satisfy all three of the following conditions: (1) unable to get up from bed without Assistance; AND (2) unable to ambulate; AND (3) unable to sit in a chair or wheelchair  3) Can this patient safely be transported by car or wheelchair van (i e , seated during transport without a medical attendant or monitoring)?    No    4) In addition to completing questions 1-3 above, please check any of the following conditions that apply*:   *Note: supporting documentation for any boxes checked must be maintained in the patient's medical records  If hosp-hosp transfer, describe services needed at 2nd facility not available at 1st facility? Medical attendant required       Section III - Signature of Physician or Healthcare Professional  I certify that the above information is true and correct based on my evaluation of this patient, and represent that the patient requires transport by ambulance and that other forms of transport are contraindicated  I understand that this information will be used by the Centers for Medicare and Medicaid Services (CMS) to support the determination of medical necessity for ambulance services, and I represent that I have personal knowledge of the patient's condition at time of transport  []  If this box is checked, I also certify that the patient is physically or mentally incapable of signing the ambulance service's claim and that the institution with which I am affiliated has furnished care, services, or assistance to the patient  My signature below is made on behalf of the patient pursuant to 42 CFR §424 36(b)(4)  In accordance with 42 CFR §424 37, the specific reason(s) that the patient is physically or mentally incapable of signing the claim form is as follows: Missy Alcaraz of Physician* or Healthcare Professional______________________________________________________________  Signature Date 02/07/23 (For scheduled repetitive transports, this form is not valid for transports performed more than 60 days after this date)    Printed Name & Credentials of Physician or Healthcare Professional (MD, DO, RN, etc )________________________________  *Form must be signed by patient's attending physician for scheduled, repetitive transports   For non-repetitive, unscheduled ambulance transports, if unable to obtain the signature of the attending physician, any of the following may sign (choose appropriate option below)  [] Physician Assistant []  Clinical Nurse Specialist []  Registered Nurse  []  Nurse Practitioner  [x] Discharge Planner

## 2023-02-07 NOTE — ASSESSMENT & PLAN NOTE
Patient is presenting today after another seizure in the afternoon  As per the patient, patient lost function of her legs along with flailing arms  Of note, patient was at the [facility] emergency department on 2/5/2023 due to 2 seizures 1 in the morning and 1 in the afternoon  At that time, she was given a loading dose of Vimpat 300 mg in the ED and then was discharged on an unchanged dose of 150 mg twice daily  As per epic records, Dr Silvestre Ugalde recommended changing the daily dose to 200 mg twice daily       Plan:  · Lowered dose to 150 mg BID for possible intolerance to the increased dosing  · Neurology consulted - Recommendations appreciated  · EEG Routine ordered   · Thiamine, Folic Acid, Multivitamin ordered  · EMU protocol in place  · Ativan 2 mg q8 PRN

## 2023-02-07 NOTE — ASSESSMENT & PLAN NOTE
Patient has a history of pancytopenia  Plan:  · Patient's refusing any blood products since patient is disoriented and cannot make decisions

## 2023-02-07 NOTE — ASSESSMENT & PLAN NOTE
Patient is presenting today after another seizure in the afternoon  As per the patient, patient lost function of her legs along with flailing arms  Of note, patient was at the [facility] emergency department on 2/5/2023 due to 2 seizures 1 in the morning and 1 in the afternoon  At that time, she was given a loading dose of Vimpat 300 mg in the ED and then was discharged on an unchanged dose of 150 mg twice daily  As per [facility] records, Dr Marianela Rose recommended changing the daily dose to 200 mg twice daily       Plan:  · Continue lacosamide 150 mg BID   · Transfer to [facility] for continuous EEG   · Thiamine, Folic Acid, Multivitamin   · EMU protocol  · Ativan 2 mg q8 PRN

## 2023-02-07 NOTE — UTILIZATION REVIEW
Initial Clinical Review    Admission: Date/Time/Statement:   Admission Orders (From admission, onward)     Ordered        02/06/23 2146  INPATIENT ADMISSION  Once                      Orders Placed This Encounter   Procedures   • INPATIENT ADMISSION     Standing Status:   Standing     Number of Occurrences:   1     Order Specific Question:   Level of Care     Answer:   Med Surg [16]     Order Specific Question:   Estimated length of stay     Answer:   More than 2 Midnights     Order Specific Question:   Certification     Answer:   I certify that inpatient services are medically necessary for this patient for a duration of greater than two midnights  See H&P and MD Progress Notes for additional information about the patient's course of treatment  ED Arrival Information     Expected   -    Arrival   2/6/2023 18:04    Acuity   Urgent            Means of arrival   Wheelchair    Escorted by   Spouse    Service   Hospitalist    Admission type   Emergency            Arrival complaint   Seizure           Chief Complaint   Patient presents with   • Seizure - Prior Hx Of     Per pts , pt has a seizure history  Evaluated at [facility] yesterday for "she wouldn't stop seizing  They got her to stop and sent her home  She had another one today and I wanted to bring her before it got bad "       Initial Presentation: 58 y o  female with a PMH of Alcohol use, epilepsy, osteoporosis, and pancytopenia who presents with multiple seizures  Patient is presenting today after another seizure in the afternoon  As per the patient, patient lost function of her legs along with flailing arms  Of note, patient was at the [facility] emergency department on 2/5/2023 due to 2 seizures 1 in the morning and 1 in the afternoon  At that time, she was given a loading dose of Vimpat 300 mg in the ED and then was discharged on an unchanged dose of 150 mg twice daily   As per [facility] records, Dr Jim Pay recommended changing the daily dose to 200 mg twice daily  Patient's  stated that she has tried taking the new dose but started being nausea and vomited  Of note, he found 6 large empty beer cans in the house earlier today  Patient does drink about 6 to 7 cans of beer throughout the week  As per the patient's , on Saturday, patient was having a glass of wine which she did not finish completely  Plan: Inpatient admission for evaluation and treatment of epilepsy, altered mental status, alcohol abuse, HTN, anxiety/depression/panic attacks: lowered Vimpat dose to 150 mg BID for possible intolerance to the increased dosing, Neurology consult, EEG routine, thiamine, folic acid and MVI, ativan q 8 hrs prn, ammonia levels pending, CIWA protocol, continuous pulse ox, continue Trazodone, Atarax, and Gabapentin  Date: 2/7   Day 2:     Internal medicine: plan is for transfer to [facility] for continuous VEEG under primary Neurology service  Continue thiamine, folic acid, MVI  Ativan prn  Continue Trazodone, Atarax, and Gabapentin  Patient is AAO x 2  She is oriented to self and month  Possibly secondary to alcohol withdrawal or multiple seizures   Ammonia levels normal     ED Triage Vitals   Temperature Pulse Respirations Blood Pressure SpO2   02/06/23 1823 02/06/23 1823 02/06/23 1823 02/06/23 1823 02/06/23 1823   98 4 °F (36 9 °C) 84 20 (!) 148/106 98 %      Temp Source Heart Rate Source Patient Position - Orthostatic VS BP Location FiO2 (%)   02/06/23 1823 02/06/23 1823 02/06/23 1823 02/06/23 1823 --   Oral Monitor Sitting Right arm       Pain Score       02/06/23 2259       No Pain          Wt Readings from Last 1 Encounters:   01/11/23 57 2 kg (126 lb)     Additional Vital Signs:     Date/Time Temp Pulse Resp BP MAP (mmHg) SpO2 O2 Device   02/07/23 0900 -- 76 -- 112/70 -- 92 % None (Room air)   02/07/23 08:35:14 98 8 °F (37 1 °C) 73 16 112/70 84 92 % --   02/07/23 0500 -- -- -- 125/77 -- -- -- 02/07/23 0100 -- -- -- 132/80 -- -- --   02/06/23 2115 -- 79 18 146/85 110 97 % --   02/06/23 2110 -- 89 20 146/85 -- 94 % None (Room air)       CIWA-Ar Score    Row Name 02/07/23 1300 02/07/23 0900 02/07/23 08:35:14 02/07/23 0500 02/07/23 0100   CIWA-Ar   BP -- 112/70  -/70  -/77  -/80  -GW   Pulse -- 76  -CH 73  -DI -- --   Nausea and Vomiting 0  -CH 0  -CH -- 0  -GW 0  -GW   Tactile Disturbances 0  -CH 0  -CH -- 0  -GW 0  -GW   Tremor 0  -CH 0  -CH -- 0  -GW 0  -GW   Auditory Disturbances 0  -CH 0  -CH -- 0  -GW 0  -GW   Paroxysmal Sweats 0  -CH 0  -CH -- 0  -GW 0  -GW   Visual Disturbances 0  -CH 0  -CH -- 0  -GW 0  -GW   Anxiety 0  -CH 0  -CH -- 0  -GW 0  -GW   Headache, Fullness in Head 0  -CH 0  -CH -- 0  -GW 0  -GW   Agitation 0  -CH 0  -CH -- 0  -GW 0  -GW   Orientation and Clouding of Sensorium 0  -CH 1  -CH -- 1  -GW 2  -GW   CIWA-Ar Total 0  -CH 1  -CH -- 1  -GW 2  -GW   Row Name 02/06/23 2115 02/06/23 2110 02/06/23 1823     CIWA-Ar   /85  -/85  -/106 Abnormal   -DB     Pulse 79  -BY 89  -MF 84  -DB     Nausea and Vomiting 0  -GW -- --     Tactile Disturbances 0  -GW -- --     Tremor 0  -GW -- --     Auditory Disturbances 0  -GW -- --     Paroxysmal Sweats 0  -GW -- --     Visual Disturbances 0  -GW -- --     Anxiety 1  -GW -- --     Headache, Fullness in Head 0  -GW -- --     Agitation 0  -GW -- --     Orientation and Clouding of Sensorium 1  -GW -- --     CIWA-Ar Total 2  -GW -- --         Pertinent Labs/Diagnostic Test Results:   No orders to display         Results from last 7 days   Lab Units 02/07/23  0315 02/06/23  1935   WBC Thousand/uL 5 12 3 14*   HEMOGLOBIN g/dL 11 4* 11 1*   HEMATOCRIT % 35 3 34 2*   PLATELETS Thousands/uL 42* 39*   NEUTROS ABS Thousands/µL 2 26 1 70*         Results from last 7 days   Lab Units 02/07/23  0315 02/06/23  1935   SODIUM mmol/L 138 134*   POTASSIUM mmol/L 3 9 4 0   CHLORIDE mmol/L 102 98   CO2 mmol/L 29 28   ANION GAP mmol/L 7 8   BUN mg/dL 9 11   CREATININE mg/dL 0 67 0 72   EGFR ml/min/1 73sq m 94 90   CALCIUM mg/dL 8 9 8 9     Results from last 7 days   Lab Units 02/07/23 0315 02/06/23 1935   AST U/L 42* 41*   ALT U/L 26 27   ALK PHOS U/L 120* 120*   TOTAL PROTEIN g/dL 7 0 6 7   ALBUMIN g/dL 3 8 3 6   TOTAL BILIRUBIN mg/dL 0 96 1 00   AMMONIA umol/L 54  --          Results from last 7 days   Lab Units 02/07/23 0315 02/06/23 1935   GLUCOSE RANDOM mg/dL 117 170*             BETA-HYDROXYBUTYRATE   Date Value Ref Range Status   03/07/2022 0 2 <0 6 mmol/L Final          ED Treatment:   Medication Administration from 02/06/2023 1804 to 02/06/2023 2256       Date/Time Order Dose Route Action     02/06/2023 2039 EST lacosamide (VIMPAT) 200 mg in sodium chloride 0 9 % 100 mL IVPB 200 mg Intravenous New Bag     02/06/2023 1934 EST ondansetron (ZOFRAN) injection 4 mg 4 mg Intravenous Given     02/06/2023 2116 EST LORazepam (ATIVAN) injection 1 mg 1 mg Intravenous Given        Past Medical History:   Diagnosis Date   • Fracture    • Hepatitis     Hep A    • Insomnia     10mar2016 resolved   • Osteoporosis     14jun2016 resolved   • Pancreatitis    • Seasonal allergies    • Urine discoloration 11/11/2019   • Varicella    • Vomiting 11/13/2019     Present on Admission:  • Anxiety, Depression, and Panic Attacks      Admitting Diagnosis: Seizure ([facility] 75 ) [R56 9]  Recurrent seizures ([facility] ) [G40 909]  History of alcohol use disorder [Z87 898]  Age/Sex: 58 y o  female  Admission Orders:  Scheduled Medications:  enoxaparin, 40 mg, Subcutaneous, G58G [address]  folic acid, 1 mg, Oral, Daily  gabapentin, 300 mg, Oral, BID  lacosamide, 150 mg, Oral, BID  metoprolol succinate, 25 mg, Oral, BID  multivitamin-minerals, 1 tablet, Oral, Daily  thiamine, 100 mg, Oral, Daily  traZODone, 50 mg, Oral, Daily      Continuous IV Infusions:     PRN Meds:  hydrOXYzine HCL, 25 mg, Oral, BID PRN  LORazepam, 2 mg, Intravenous, Q8H PRN        IP CONSULT TO NEUROLOGY    Network Utilization Review Department  ATTENTION: Please call with any questions or concerns to [phone] and carefully listen to the prompts so that you are directed to the right person  All voicemails are confidential   Clementine Phillips all requests for admission clinical reviews, approved or denied determinations and any other requests to dedicated fax number below belonging to the campus where the patient is receiving treatment   List of dedicated fax numbers for the Facilities:  [address] DENIALS (Administrative/Medical Necessity) [phone]   [address] (Maternity/NICU/Pediatrics) [phone]   91 Janina Solomon [phone]   [facility] [address] [phone]   [address] [address] [facility] [phone]   1551 [facility] Anjelica [facility] 134 [address] [place] [phone]

## 2023-02-07 NOTE — CASE MANAGEMENT
Case Management Progress Note    Patient name Gladys Riley  Location S /[address] 330-01 MRN [id number]  : 1960 Date 2023       LOS (days): 1  Geometric Mean LOS (GMLOS) (days):   Days to GMLOS:        OBJECTIVE:        Current admission status: Inpatient  Preferred Pharmacy:   [facility] [address] [place] [address] 22, [address]  [address]  [address] [place] [address]  Phone: [phone] Fax: [phone]    Primary Care Provider: Maki Loja MD    Primary Insurance: [facility]  Secondary Insurance:     PROGRESS NOTE:    CATCH referral made for ETOH abuse and Yuval Thakkar in to see patient this afternoon - reports patient declining any information/services at this time, but she will continue to follow  Per RN, patient has orders to transfer to [facility] for video EEG  Medical necessity completed in anticipation of need

## 2023-02-07 NOTE — ASSESSMENT & PLAN NOTE
Patient has a history of alcohol abuse      Plan:  · CIWA protocol  · Monitor for worsening sign/symptoms  · Thiamine, Folic Acid, Multivitamin

## 2023-02-07 NOTE — ASSESSMENT & PLAN NOTE
Patient is AAO x 2  She is oriented to self and month  Possibly secondary to alcohol withdrawal or multiple seizures       Plan:  · Ammonia levels pending  · See plan above in Epilepsy

## 2023-02-07 NOTE — ASSESSMENT & PLAN NOTE
Patient has a history of HTN  BP on presentation to the ED was 148/106      Plan:  · Continue home medications  · Monitor vitals signs

## 2023-02-07 NOTE — DISCHARGE SUMMARY
[facility]  Discharge- Brice Garcia 1960, 58 y o  female MRN: [id number]  Unit/Bed#: S -01 Encounter: [id number]  Primary Care Provider: Gigi Mendez MD   Date and time admitted to hospital: 2/6/2023  6:16 PM    * Epilepsy [facility])  Assessment & Plan  Patient is presenting today after another seizure in the afternoon  As per the patient, patient lost function of her legs along with flailing arms  Of note, patient was at the [facility] emergency department on 2/5/2023 due to 2 seizures 1 in the morning and 1 in the afternoon  At that time, she was given a loading dose of Vimpat 300 mg in the ED and then was discharged on an unchanged dose of 150 mg twice daily  As per epic records, Dr Diandra Vang recommended changing the daily dose to 200 mg twice daily  Plan:  · Continue lacosamide 150 mg BID   · Transfer to [place] for continuous EEG   · Thiamine, Folic Acid, Multivitamin   · EMU protocol  · Ativan 2 mg q8 PRN      HTN (hypertension)  Assessment & Plan  Patient has a history of HTN  BP on presentation to the ED was 148/106  Plan:  · Continue home medications  · Monitor vitals signs    Anxiety, Depression, and Panic Attacks  Assessment & Plan  Patient has a history of anxiety and depression as per Epic records  Plan:  · Continue Trazodone, Atarax, and Gabapentin    Alcohol abuse  Assessment & Plan  Patient has a history of alcohol abuse  Plan:  · CIWA protocol  · Monitor for worsening sign/symptoms  · Thiamine, Folic Acid, Multivitamin     AMS (altered mental status)-resolved as of 2/7/2023  Assessment & Plan  Patient is AAO x 2  She is oriented to self and month  Possibly secondary to alcohol withdrawal or multiple seizures       Plan:  · Ammonia levels normal  · See plan above in Epilepsy      Medical Problems     Resolved Problems  Date Reviewed: 2/7/2023          Resolved    AMS (altered mental status) 2/7/2023     Resolved by Dejon Moreno MD        Discharging Resident: Dejon Moreno MD  Discharging Attending: Rafiq Bernabe MD  PCP: Sterling Jung MD  Admission Date:   Admission Orders (From admission, onward)     Ordered        02/06/23 2146  INPATIENT ADMISSION  Once                      Discharge Date: 02/07/23    Consultations During Hospital Stay:  · neurology    Procedures Performed:   · none    Significant Findings / Test Results:   · none    Incidental Findings:   · none    Test Results Pending at Discharge (will require follow up):  · none     Outpatient Tests Requested:  · none    Complications:  none    Reason for Admission: seizure    Hospital Course:   Brisa Lopez is a 58 y o  female patient who originally presented to the hospital on 2/6/2023 due to his seizure  As per patient patient lost function of her legs and arms, she had frothing from mouth and urination  Patient cannot recall the event  Patient recently had 2 more episodes of seizures and admitted to [facility]   Patient was given Vimpat 300 mg at that time and was discharged on 150 mg twice daily  As per patient she was compliant with the medication  But according to her has been patient was having a lot of alcohol during past few weeks  He also found 6-7 empty beer cans in the house  No history of headache, early morning vomiting  Patient is being transferred to [facility] for continuous EEG monitoring  The patient, initially admitted to the hospital as inpatient, was discharged earlier than expected given the following: transfer to [facility]  Please see above list of diagnoses and related plan for additional information  Condition at Discharge: fair    Discharge Day Visit / Exam:   Subjective: Was seen and examined at bedside  No acute distress  Patient is oriented to time place and person  No complaints    Vitals: Blood Pressure: 92/53 (02/07/23 1500)  Pulse: 74 (02/07/23 1500)  Temperature: 99 3 °F (37 4 °C) (02/07/23 1500)  Temp Source: Oral (02/07/23 1500)  Respirations: 18 (02/07/23 1500)  SpO2: 92 % (02/07/23 1500)  Exam:   Physical Exam  Constitutional:       General: She is not in acute distress  Appearance: Normal appearance  She is not ill-appearing  HENT:      Head: Normocephalic  Nose: No congestion  Mouth/Throat:      Mouth: Mucous membranes are moist       Pharynx: Oropharynx is clear  Cardiovascular:      Rate and Rhythm: Normal rate and regular rhythm  Pulses: Normal pulses  Heart sounds: Normal heart sounds  Pulmonary:      Effort: Pulmonary effort is normal       Breath sounds: Normal breath sounds  Abdominal:      General: Bowel sounds are normal       Palpations: Abdomen is soft  Musculoskeletal:      Right lower leg: No edema  Left lower leg: No edema  Skin:     General: Skin is warm and dry  Capillary Refill: Capillary refill takes less than 2 seconds  Neurological:      General: No focal deficit present  Mental Status: She is alert and oriented to person, place, and time  Psychiatric:         Mood and Affect: Mood normal          Behavior: Behavior normal           Discussion with Family: called  and updated  Discharge instructions/Information to patient and family:   See after visit summary for information provided to patient and family  Provisions for Follow-Up Care:  See after visit summary for information related to follow-up care and any pertinent home health orders  Planned Readmission: To [facility]  Discharge Medications:  See after visit summary for reconciled discharge medications provided to patient and/or family

## 2023-02-07 NOTE — ASSESSMENT & PLAN NOTE
Patient has a history of alcohol abuse      Plan:  · CIWA protocol in place  · Continue pulse oximetry  · Monitor for worsening sign/symptoms  · Thiamine, Folic Acid, Multivitamin ordered

## 2023-02-07 NOTE — ASSESSMENT & PLAN NOTE
Patient has a history of anxiety and depression as per Epic records      Plan:  · Continue Trazodone, Atarax, and Gabapentin

## 2023-02-07 NOTE — H&P
[facility]  H&P- [facility] S[facility] 1960, 58 y o  female MRN: [id number]  Unit/Bed#: S -01 Encounter: [id number]  Primary Care Provider: Sonny Da Silva MD   Date and time admitted to hospital: 2/6/2023  6:16 PM    * Epilepsy [facility])  Assessment & Plan  Patient is presenting today after another seizure in the afternoon  As per the patient, patient lost function of her legs along with flailing arms  Of note, patient was at the [facility] emergency department on 2/5/2023 due to 2 seizures 1 in the morning and 1 in the afternoon  At that time, she was given a loading dose of Vimpat 300 mg in the ED and then was discharged on an unchanged dose of 150 mg twice daily  As per [facility] records, Dr Scott Costello recommended changing the daily dose to 200 mg twice daily  Plan:  · Lowered dose to 150 mg BID for possible intolerance to the increased dosing  · Neurology consulted - Recommendations appreciated  · EEG Routine ordered   · Thiamine, Folic Acid, Multivitamin ordered  · EMU protocol in place  · Ativan 2 mg q8 PRN      AMS (altered mental status)  Assessment & Plan  Patient is AAO x 2  She is oriented to self and month  Possibly secondary to alcohol withdrawal or multiple seizures  Plan:  · Ammonia levels pending  · See plan above in Epilepsy      Alcohol abuse  Assessment & Plan  Patient has a history of alcohol abuse  Plan:  · CIWA protocol in place  · Continue pulse oximetry  · Monitor for worsening sign/symptoms  · Thiamine, Folic Acid, Multivitamin ordered    HTN (hypertension)  Assessment & Plan  Patient has a history of HTN  BP on presentation to the ED was 148/106  Plan:  · Continue home medications  · Monitor vitals signs    Anxiety, Depression, and Panic Attacks  Assessment & Plan  Patient has a history of anxiety and depression as per Epic records      Plan:  · Continue Trazodone, Atarax, and Gabapentin    Refusal of blood transfusions as patient is Confucianist  Assessment & Plan  Patient has a history of pancytopenia  Plan:  · Patient's refusing any blood products since patient is disoriented and cannot make decisions  VTE Pharmacologic Prophylaxis: VTE Score: 7 High Risk (Score >/= 5) - Pharmacological DVT Prophylaxis Ordered: enoxaparin (Lovenox)  Sequential Compression Devices Ordered  Code Status: Level 1 - Full Code   Discussion with family: Updated  () at bedside  Anticipated Length of Stay: Patient will be admitted on an inpatient basis with an anticipated length of stay of greater than 2 midnights secondary to multiple seizures  Chief Complaint: Seziures    History of Present Illness:  Wellington Rosas is a 58 y o  female with a PMH of Alcohol use, epilepsy, osteoporosis, and pancytopenia who presents with multiple seizures  Patient is presenting today after another seizure in the afternoon  As per the patient, patient lost function of her legs along with flailing arms  Of note, patient was at the [facility] emergency department on 2/5/2023 due to 2 seizures 1 in the morning and 1 in the afternoon  At that time, she was given a loading dose of Vimpat 300 mg in the ED and then was discharged on an unchanged dose of 150 mg twice daily  As per epic records, Dr Kan Ramirez recommended changing the daily dose to 200 mg twice daily  Patient's  stated that she has tried taking the new dose but started being nausea and vomited  Of note, as per the patient, he found 6 large empty beer cans in the house earlier today  Patient does drink about 6 to 7 cans of beer throughout the week  As per the patient's , on Saturday, patient was having a glass of wine which she did not finish completely  In the ED, patient's labs look stable from prior with pancytopenia    The started infusing IV Vimpat into the patient but she started becoming agitated and altered and was flailing her arms around  She received a dose of Ativan at that time which helped her calm down  Patient admitted for further management of seizures and altered mental status  Review of Systems:  Review of Systems   Constitutional: Positive for chills and fever  HENT: Negative for sore throat and trouble swallowing  Respiratory: Positive for cough  Negative for choking and shortness of breath  Cardiovascular: Negative for chest pain  Gastrointestinal: Positive for nausea and vomiting  Negative for abdominal pain and blood in stool  Genitourinary: Negative for hematuria  Neurological: Positive for dizziness and seizures  Negative for headaches  Hematological: Bruises/bleeds easily  Psychiatric/Behavioral: Positive for confusion  Past Medical and Surgical History:   Past Medical History:   Diagnosis Date   • Fracture    • Hepatitis     Hep A    • Insomnia     10mar2016 resolved   • Osteoporosis     14jun2016 resolved   • Pancreatitis    • Seasonal allergies    • Urine discoloration 11/11/2019   • Varicella    • Vomiting 11/13/2019       Past Surgical History:   Procedure Laterality Date   • IR BIOPSY BONE  8/17/2021   • IR BIOPSY BONE MARROW  11/14/2019   • TUBAL LIGATION  1985       Meds/Allergies:  Prior to Admission medications    Medication Sig Start Date End Date Taking? Authorizing Provider   estradiol (ESTRACE VAGINAL) 0 1 mg/g vaginal cream Insert 1 g into the vagina 3 (three) times a week Insert 1/4 of applicator into the vagina 3 times a week for 2 weeks  If symptoms improved can reduce to using twice weekly after 2 weeks for maintenance   12/1/21   Chirag Ignacio MD   gabapentin (NEURONTIN) 300 mg capsule Take 1 capsule (300 mg total) by mouth 2 (two) times a day 1/11/23   Anita Suarez MD   hydrOXYzine HCL (ATARAX) 25 mg tablet Take 1 tablet (25 mg total) by mouth 2 (two) times a day as needed for anxiety  Patient not taking: Reported on 2/2/2023 12/30/22   Anita Suarez MD lacosamide (VIMPAT) 200 mg tablet Take 1 tablet (200 mg total) by mouth 2 (two) times a day 2/6/23   Pily Pete MD   melatonin 3 mg Take 1 tablet (3 mg total) by mouth daily at bedtime 3/18/22   [facility]    metoprolol succinate (TOPROL-XL) 25 mg 24 hr tablet Take 1 tablet (25 mg total) by mouth 2 (two) times a day 12/27/22 4/26/23  Brenda Carballo MD   traZODone (DESYREL) 50 mg tablet Take 1 tablet (50 mg total) by mouth in the morning 1/11/23   Chano Munoz MD   lacosamide (VIMPAT) 150 mg tablet Take 1 tablet (150 mg total) by mouth 2 (two) times a day 1/25/23 2/6/23  Jeryl Paget Bussell, CRNP     I have reviewed home medications with patient personally      Allergies: No Known Allergies    Social History:  Marital Status: /Civil Union   Patient Pre-hospital Living Situation: Home, With spouse  Patient Pre-hospital Level of Mobility: walks  Patient Pre-hospital Diet Restrictions: Eggplant  Substance Use History:   Social History     Substance and Sexual Activity   Alcohol Use Yes   • Alcohol/week: 7 0 standard drinks   • Types: 7 Cans of beer per week    Comment: occassional     Social History     Tobacco Use   Smoking Status Never   Smokeless Tobacco Never     Social History     Substance and Sexual Activity   Drug Use No       Family History:  Family History   Problem Relation Age of Onset   • Anxiety disorder Mother    • Depression Mother    • No Known Problems Father    • No Known Problems Sister    • No Known Problems Sister    • No Known Problems Brother    • Cancer Paternal Grandmother         unknown    • No Known Problems Daughter    • No Known Problems Maternal Grandmother    • Cancer Maternal Grandfather    • No Known Problems Paternal Grandfather    • Breast cancer Neg Hx    • Ovarian cancer Neg Hx        Physical Exam:     Vitals:   Blood Pressure: 146/85 (02/06/23 2115)  Pulse: 79 (02/06/23 2115)  Temperature: 98 4 °F (36 9 °C) (02/06/23 1823)  Temp Source: Oral (02/06/23 1823)  Respirations: 18 (02/06/23 2115)  SpO2: 97 % (02/06/23 2115)    Physical Exam  Vitals reviewed  Constitutional:       General: She is in acute distress  Appearance: She is ill-appearing  HENT:      Head: Normocephalic and atraumatic  Mouth/Throat:      Mouth: Mucous membranes are moist    Eyes:      Extraocular Movements: Extraocular movements intact  Conjunctiva/sclera: Conjunctivae normal       Pupils: Pupils are equal, round, and reactive to light  Cardiovascular:      Rate and Rhythm: Normal rate and regular rhythm  Heart sounds: Normal heart sounds  No murmur heard  No gallop  Pulmonary:      Effort: No respiratory distress  Breath sounds: Normal breath sounds  No wheezing or rales  Abdominal:      General: Abdomen is flat  Bowel sounds are normal  There is no distension  Palpations: Abdomen is soft  Tenderness: There is no abdominal tenderness  Musculoskeletal:         General: No tenderness  Normal range of motion  Right lower leg: No edema  Left lower leg: No edema  Skin:     General: Skin is warm  Findings: Bruising (RUQ wrapping to the back ) present  Neurological:      General: No focal deficit present  Mental Status: She is disoriented  Cranial Nerves: No cranial nerve deficit  Psychiatric:      Comments: Patient agitated         Additional Data:     Lab Results:  Results from last 7 days   Lab Units 02/06/23 1935   WBC Thousand/uL 3 14*   HEMOGLOBIN g/dL 11 1*   HEMATOCRIT % 34 2*   PLATELETS Thousands/uL 39*   NEUTROS PCT % 54   LYMPHS PCT % 32   MONOS PCT % 14*   EOS PCT % 0     Results from last 7 days   Lab Units 02/06/23 1935   SODIUM mmol/L 134*   POTASSIUM mmol/L 4 0   CHLORIDE mmol/L 98   CO2 mmol/L 28   BUN mg/dL 11   CREATININE mg/dL 0 72   ANION GAP mmol/L 8   CALCIUM mg/dL 8 9   ALBUMIN g/dL 3 6   TOTAL BILIRUBIN mg/dL 1 00   ALK PHOS U/L 120*   ALT U/L 27   AST U/L 41*   GLUCOSE RANDOM mg/dL 170*                       Lines/Drains:  Invasive Devices     Peripheral Intravenous Line  Duration           Peripheral IV 02/06/23 Right Antecubital <1 day                Imaging: No pertinent imaging reviewed  No orders to display       EKG and Other Studies Reviewed on Admission:   · EKG: No EKG obtained  ** Please Note: This note has been constructed using a voice recognition system   **

## 2023-02-08 LAB
ANION GAP SERPL CALCULATED.3IONS-SCNC: 6 MMOL/L (ref 4–13)
APTT PPP: 25 SECONDS (ref 23–37)
BUN SERPL-MCNC: 16 MG/DL (ref 5–25)
CALCIUM SERPL-MCNC: 8 MG/DL (ref 8.4–10.2)
CHLORIDE SERPL-SCNC: 106 MMOL/L (ref 96–108)
CO2 SERPL-SCNC: 24 MMOL/L (ref 21–32)
CREAT SERPL-MCNC: 0.64 MG/DL (ref 0.6–1.3)
ERYTHROCYTE [DISTWIDTH] IN BLOOD BY AUTOMATED COUNT: 12.3 % (ref 11.6–15.1)
GFR SERPL CREATININE-BSD FRML MDRD: 95 ML/MIN/1.73SQ M
GLUCOSE SERPL-MCNC: 86 MG/DL (ref 65–140)
HCT VFR BLD AUTO: 34.2 % (ref 34.8–46.1)
HGB BLD-MCNC: 10.8 G/DL (ref 11.5–15.4)
INR PPP: 1.04 (ref 0.84–1.19)
MCH RBC QN AUTO: 33.1 PG (ref 26.8–34.3)
MCHC RBC AUTO-ENTMCNC: 31.6 G/DL (ref 31.4–37.4)
MCV RBC AUTO: 105 FL (ref 82–98)
PLATELET # BLD AUTO: 34 THOUSANDS/UL (ref 149–390)
PMV BLD AUTO: 10.7 FL (ref 8.9–12.7)
POTASSIUM SERPL-SCNC: 3.8 MMOL/L (ref 3.5–5.3)
PROTHROMBIN TIME: 13.8 SECONDS (ref 11.6–14.5)
RBC # BLD AUTO: 3.26 MILLION/UL (ref 3.81–5.12)
SODIUM SERPL-SCNC: 136 MMOL/L (ref 135–147)
WBC # BLD AUTO: 3.6 THOUSAND/UL (ref 4.31–10.16)

## 2023-02-08 RX ORDER — LACOSAMIDE 150 MG/1
150 TABLET ORAL EVERY 12 HOURS SCHEDULED
Qty: 20 TABLET | Refills: 0 | Status: CANCELLED
Start: 2023-02-08 | End: 2023-02-18

## 2023-02-08 RX ORDER — METOPROLOL SUCCINATE 25 MG/1
25 TABLET, EXTENDED RELEASE ORAL 2 TIMES DAILY
Status: CANCELLED | OUTPATIENT
Start: 2023-02-08

## 2023-02-08 RX ORDER — LORAZEPAM 2 MG/ML
2 INJECTION INTRAMUSCULAR EVERY 8 HOURS PRN
Status: CANCELLED | OUTPATIENT
Start: 2023-02-08

## 2023-02-08 RX ORDER — LANOLIN ALCOHOL/MO/W.PET/CERES
100 CREAM (GRAM) TOPICAL DAILY
Qty: 30 TABLET | Refills: 0 | Status: CANCELLED | OUTPATIENT
Start: 2023-02-09 | End: 2023-03-11

## 2023-02-08 RX ORDER — LORAZEPAM 2 MG/ML
2 INJECTION INTRAMUSCULAR EVERY 8 HOURS PRN
Qty: 30 ML | Refills: 0 | Status: CANCELLED
Start: 2023-02-08 | End: 2023-02-18

## 2023-02-08 RX ORDER — GABAPENTIN 300 MG/1
300 CAPSULE ORAL 2 TIMES DAILY
Status: CANCELLED | OUTPATIENT
Start: 2023-02-08

## 2023-02-08 RX ORDER — FOLIC ACID 1 MG/1
1 TABLET ORAL DAILY
Qty: 30 TABLET | Refills: 0 | Status: CANCELLED | OUTPATIENT
Start: 2023-02-09 | End: 2023-03-11

## 2023-02-08 RX ORDER — FOLIC ACID 1 MG/1
1 TABLET ORAL DAILY
Status: CANCELLED | OUTPATIENT
Start: 2023-02-09

## 2023-02-08 RX ORDER — HYDROXYZINE HYDROCHLORIDE 25 MG/1
25 TABLET, FILM COATED ORAL 2 TIMES DAILY PRN
Status: CANCELLED | OUTPATIENT
Start: 2023-02-08

## 2023-02-08 RX ORDER — LANOLIN ALCOHOL/MO/W.PET/CERES
100 CREAM (GRAM) TOPICAL DAILY
Status: CANCELLED | OUTPATIENT
Start: 2023-02-09

## 2023-02-08 RX ORDER — TRAZODONE HYDROCHLORIDE 50 MG/1
50 TABLET ORAL DAILY
Status: CANCELLED | OUTPATIENT
Start: 2023-02-09

## 2023-02-08 RX ADMIN — METOPROLOL SUCCINATE 25 MG: 25 TABLET, EXTENDED RELEASE ORAL at 08:19

## 2023-02-08 RX ADMIN — GABAPENTIN 300 MG: 300 CAPSULE ORAL at 08:19

## 2023-02-08 RX ADMIN — HYDROXYZINE HYDROCHLORIDE 25 MG: 25 TABLET ORAL at 20:16

## 2023-02-08 RX ADMIN — Medication 100 MG: at 08:19

## 2023-02-08 RX ADMIN — GABAPENTIN 300 MG: 300 CAPSULE ORAL at 17:04

## 2023-02-08 RX ADMIN — LACOSAMIDE 150 MG: 100 TABLET, FILM COATED ORAL at 20:16

## 2023-02-08 RX ADMIN — MULTIPLE VITAMINS W/ MINERALS TAB 1 TABLET: TAB ORAL at 08:19

## 2023-02-08 RX ADMIN — TRAZODONE HYDROCHLORIDE 50 MG: 50 TABLET ORAL at 08:24

## 2023-02-08 RX ADMIN — LACOSAMIDE 150 MG: 100 TABLET, FILM COATED ORAL at 08:19

## 2023-02-08 RX ADMIN — METOPROLOL SUCCINATE 25 MG: 25 TABLET, EXTENDED RELEASE ORAL at 20:17

## 2023-02-08 RX ADMIN — FOLIC ACID 1 MG: 1 TABLET ORAL at 08:19

## 2023-02-08 NOTE — ASSESSMENT & PLAN NOTE
Assessment:   Alicia Gutierrez is a 58 y o  female with a past medical history of traumatic subarachnoid hemorrhage in August 2021, and focal epilepsy with status epilepticus with subcortical electrographically seizures from the left temporal region on video EEG and MRI findings of DWI and FLAIR signal hyperintensity involving the left hippocampal formation that manifest as hand clenching and decreased alertness  The patient presents to [address] [place] [address] for further evaluation of recent episodes including a GTC and panic    Home AEDS: Lacosimide 150mg BID    Impression: Panic attacks are concerning for seizures given that left temporal seizures can cause sudden onset of intense fear and impending doom       Plan:  - Continue home Lacosimide 150mg BID  - EEG Monitoring is warranted, patient is to be transfered to [facility] for vEEG monitoring in an to attempt to capture the panic attack episodes  - Seizure precautions  - Continue frequent neuro checks, please notify with any change  - Ativan PRN for seizure activity > 2 minutes

## 2023-02-08 NOTE — PLAN OF CARE
Problem: MOBILITY - ADULT  Goal: Maintain or return to baseline ADL function  Description: INTERVENTIONS:  -  Assess patient's ability to carry out ADLs; assess patient's baseline for ADL function and identify physical deficits which impact ability to perform ADLs (bathing, care of mouth/teeth, toileting, grooming, dressing, etc )  - Assess/evaluate cause of self-care deficits   - Assess range of motion  - Assess patient's mobility; develop plan if impaired  - Assess patient's need for assistive devices and provide as appropriate  - Encourage maximum independence but intervene and supervise when necessary  - Involve family in performance of ADLs  - Assess for home care needs following discharge   - Consider OT consult to assist with ADL evaluation and planning for discharge  - Provide patient education as appropriate  Outcome: Progressing  Goal: Maintains/Returns to pre admission functional level  Description: INTERVENTIONS:  - Perform BMAT or MOVE assessment daily    - Set and communicate daily mobility goal to care team and patient/family/caregiver     - Collaborate with rehabilitation services on mobility goals if consulted  - Dangle patient   - Stand patient   - Ambulate patient  - Out of bed to chair   - Out of bed for meals   - Out of bed for toileting  - Record patient progress and toleration of activity level   Outcome: Progressing     Problem: Prexisting or High Potential for Compromised Skin Integrity  Goal: Skin integrity is maintained or improved  Description: INTERVENTIONS:  - Identify patients at risk for skin breakdown  - Assess and monitor skin integrity  - Assess and monitor nutrition and hydration status  - Monitor labs   - Assess for incontinence   - Turn and reposition patient  - Assist with mobility/ambulation  - Relieve pressure over bony prominences  - Avoid friction and shearing  - Provide appropriate hygiene as needed including keeping skin clean and dry  - Evaluate need for skin moisturizer/barrier cream  - Collaborate with interdisciplinary team   - Patient/family teaching  - Consider wound care consult   Outcome: Progressing

## 2023-02-08 NOTE — PROGRESS NOTES
NEUROLOGY RESIDENCY PROGRESS NOTE     Name: Shadia Carey   Age & Sex: 58 y o  female   MRN: [id number]  Unit/Bed#: S -01   Encounter: [id number]    ASSESSMENT & PLAN     * Epilepsy [facility])  Assessment & Plan  Assessment:   Shadia Carey is a 58 y o  female with a past medical history of traumatic subarachnoid hemorrhage in August 2021, and focal epilepsy with status epilepticus with subcortical electrographically seizures from the left temporal region on video EEG and MRI findings of DWI and FLAIR signal hyperintensity involving the left hippocampal formation that manifest as hand clenching and decreased alertness  The patient presents to [address] [place] [address] for further evaluation of recent episodes including a GTC and panic    Home AEDS: Lacosimide 150mg BID    Impression: Panic attacks are concerning for seizures given that left temporal seizures can cause sudden onset of intense fear and impending doom  Plan:  - Continue home Lacosimide 150mg BID  - EEG Monitoring is warranted, patient is to be transfered to \[facility]\"" for vEEG monitoring in an to attempt to capture the panic attack episodes  - Seizure precautions  - Continue frequent neuro checks, please notify with any change  - Ativan PRN for seizure activity > 2 minutes        Recommendations for outpatient neurological follow up have yet to be determined  Disposition pending: Patient is to be transferred to [facility] for video EEG monitoring  SUBJECTIVE     Patient was seen and examined  Patient reports having a couple of 1-1/2-minute panic waves, but denies any overt seizure like activity including twitching, spacing out, or abnormal movements  Denies lightheadedness, dizziness, syncope, headache, vision changes, diaphoresis, chest pain, palpitations, SOB, nausea, vomiting, abdominal pain or lower extremity edema  Review of Systems  A 12 point ROS was completed   Other than the above mentioned complaints, all remaining systems were negative  OBJECTIVE     Patient ID: Chinyere Castellanos is a 58 y o  female  Vitals:    23 1500 23 2004 23 2219 23 0659   BP: 92/53 113/61 111/55 127/71   BP Location:   Left arm Left arm   Pulse: 74 71 67    Resp: 18  18 16   Temp: 99 3 °F (37 4 °C)  99 °F (37 2 °C) 97 8 °F (36 6 °C)   TempSrc: Oral  Oral    SpO2: 92%  94% 94%      Temperature:   Temp (24hrs), Av 7 °F (37 1 °C), Min:97 8 °F (36 6 °C), Max:99 3 °F (37 4 °C)    Temperature: 97 8 °F (36 6 °C)    Physical Exam:  Vitals and nursing note reviewed  Constitutional: Alert  Not in acute distress  Not ill-appearing, toxic-appearing or diaphoretic  HENT: Normocephalic and atraumatic  Nose and Ears normal     Eyes: No scleral icterus  No discharge  Neck: Neck Supple  ROM normal    Cardiovascular: Distal extremities warm without palpable edema or tenderness, no observed significant swelling  Pulmonary:  Pulmonary effort is normal  Not in respiratory distress   Abdominal: Abdomen is flat and not distended   Musculoskeletal: No swelling or deformity  Skin: Warm and dry   Psychiatric:  Normal behavior and appropriate affect      Neurological Exam  Mental Status  Awake, alert and oriented to person, place and time  Recent and remote memory are intact  Speech is normal  Language is fluent with no aphasia  Attention and concentration are normal     Cranial Nerves  CN II: Visual fields full to confrontation  CN III, IV, VI: Extraocular movements intact bilaterally  Normal lids and orbits bilaterally  Pupils equal round and reactive to light bilaterally  CN V: Facial sensation is normal   CN VII: Full and symmetric facial movement  CN VIII: Hearing is normal   CN IX, X: Palate elevates symmetrically  CN XI: Shoulder shrug strength is normal   CN XII: Tongue midline without atrophy or fasciculations  Motor  Normal muscle bulk throughout  No fasciculations present  Normal muscle tone  No abnormal involuntary movements  Strength is 5/5 in all four extremities except as noted  Sensory  Light touch is normal in upper and lower extremities  Reflexes  Deep tendon reflexes are 2+ and symmetric except as noted  Coordination  Right: Finger-to-nose normal Left: Finger-to-nose normal     Gait    Unable to be assessed due to the patient's current medical status  LABORATORY DATA     Labs: Additional Pertinent Lab Tests Reviewed: All Labs Within Last 24 Hours Reviewed  Results from last 7 days   Lab Units 02/08/23 0607 02/07/23 0315 02/06/23  1935   WBC Thousand/uL 3 60* 5 12 3 14*   HEMOGLOBIN g/dL 10 8* 11 4* 11 1*   HEMATOCRIT % 34 2* 35 3 34 2*   PLATELETS Thousands/uL 34* 42* 39*   NEUTROS PCT %  --  45 54   MONOS PCT %  --  11 14*      Results from last 7 days   Lab Units 02/08/23 0607 02/07/23 0315 02/06/23  1935   POTASSIUM mmol/L 3 8 3 9 4 0   CHLORIDE mmol/L 106 102 98   CO2 mmol/L 24 29 28   BUN mg/dL 16 9 11   CREATININE mg/dL 0 64 0 67 0 72   CALCIUM mg/dL 8 0* 8 9 8 9   ALK PHOS U/L  --  120* 120*   ALT U/L  --  26 27   AST U/L  --  42* 41*              Results from last 7 days   Lab Units 02/08/23  0920   INR  1 04   PTT seconds 25               IMAGING & DIAGNOSTIC TESTING     Radiology Results: I have personally reviewed pertinent films in PACS    No orders to display       Other Diagnostic Testing: I have personally reviewed pertinent reports        ACTIVE MEDICATIONS     Current Facility-Administered Medications   Medication Dose Route Frequency   • folic acid (FOLVITE) tablet 1 mg  1 mg Oral Daily   • gabapentin (NEURONTIN) capsule 300 mg  300 mg Oral BID   • hydrOXYzine HCL (ATARAX) tablet 25 mg  25 mg Oral BID PRN   • lacosamide (VIMPAT) tablet 150 mg  150 mg Oral BID   • LORazepam (ATIVAN) injection 2 mg  2 mg Intravenous Q8H PRN   • metoprolol succinate (TOPROL-XL) 24 hr tablet 25 mg  25 mg Oral BID   • multivitamin-minerals (CENTRUM) tablet 1 tablet  1 tablet Oral Daily   • thiamine tablet 100 mg  100 mg Oral Daily   • traZODone (DESYREL) tablet 50 mg  50 mg Oral Daily       Prior to Admission medications    Medication Sig Start Date End Date Taking?  Authorizing Provider   gabapentin (NEURONTIN) 300 mg capsule Take 1 capsule (300 mg total) by mouth 2 (two) times a day 1/11/23  Yes Arielle Tong MD   lacosamide (VIMPAT) 200 mg tablet Take 1 tablet (200 mg total) by mouth 2 (two) times a day  Patient taking differently: Take 150 mg by mouth every 12 (twelve) hours 2/6/23  Yes Erik Saldivar MD   metoprolol succinate (TOPROL-XL) 25 mg 24 hr tablet Take 1 tablet (25 mg total) by mouth 2 (two) times a day 12/27/22 4/26/23 Yes Manuel Duvall MD   traZODone (DESYREL) 50 mg tablet Take 1 tablet (50 mg total) by mouth in the morning 1/11/23   Vane Tapia MD       VTE Pharmacologic Prophylaxis: Per primary team  VTE Mechanical Prophylaxis: sequential compression device    ==  Fanny Medina DO   [facility] Neurology Residency, PGY-3

## 2023-02-08 NOTE — CONSULTS
NEUROLOGY RESIDENCY CONSULT NOTE     Name: Gladys Riley   Age & Sex: 58 y o  female   MRN: [id number]  Unit/Bed#: S -01   Encounter: [id number]  Length of Stay: 1    ASSESSMENT & PLAN     * Epilepsy [facility])  Assessment & Plan  Assessment:   Gladys Riley is a 58 y o  female with a past medical history of traumatic subarachnoid hemorrhage in August 2021, and focal epilepsy with status epilepticus with subcortical electrographically seizures from the left temporal region on video EEG and MRI findings of DWI and FLAIR signal hyperintensity involving the left hippocampal formation that manifest as hand clenching and decreased alertness  The patient presents to [address] [place] [address] for further evaluation of recent episodes including a GTC and panic    Home AEDS: Lacosimide 150mg BID    Impression: Panic attacks are concerning for seizures given that left temporal seizures can cause sudden onset of intense fear and impending doom  Plan:  - Continue home Lacosimide  - EEG Monitoring is warranted, patient will likely need transfer to hospitals for vEEG monitoring to attempt to capture the panic attack episodes  - Seizure precautions  - Continue frequent neuro checks, please notify with any change  - Ativan PRN for seizure activity > 2 minutes        Recommendations for outpatient neurological follow up have yet to be determined  Disposition pending: EEG monitoring    SUBJECTIVE     Reason for Consult / Principal Problem: Seizure  Hx and PE limited by: None    HPI: Gladys Riley is a 58 y o  female with a past medical history of traumatic subarachnoid hemorrhage in August 2021, and focal epilepsy with status epilepticus with subcortical electrographically seizures from the left temporal region on video EEG and MRI findings of DWI and FLAIR signal hyperintensity involving the left hippocampal formation that manifest as hand clenching and decreased alertness    The patient presents to [address] [place] [address] for further evaluation of recent seizure episodes  The patient is reported to have had seizure activity on Sunday 2/5  In the morning the patient had a tonic-clonic seizure in which her not eyes were noted to turn sideways to the right she had tongue bite, and shaking this lasted for approximately 5 to 10 minutes after which she returned to baseline  Subsequently later in the day the patient had a second episode of seizure activity  In the middle of the 2 seizure episodes the patient was reported to be running through the kitchen and flailing her hands in the air the patient was also reported to be blind during this episode and was able to have a little bit of a conversation during it  The patient was brought to the emergency room [facility] where she was evaluated and her Vimpat dosage was increased  The day prior to presentation the patient had an episode of her arms flailing and she tried to stand up and was unable to and was reported to be "out of it "  Due to the patient's symptoms the patient's  wanted her to be evaluated to try to determine the cause of these episodes  The patient is reported to have panic attacks since her first seizure and she can have up to a couple of episodes a day however sometimes she can go days without a episode  The patient has no known triggers  The patient reports that during the episode she tries to write down what she is thinking and what she is remembering however after the episode she reports that she is unable to read what she has actually written down  The patient recently saw Dr Kan Ramirez as an outpatient on 2/2 and during this visit there was noted to be a concern that these panic attacks were actually seizures given that left temporal seizures can cause sudden onset of intense fear and impending doom    At the visit it was recommended that the patient undergo a outpatient EEG however given the progression of the episodes the patient's  elected to bring her in for further evaluation  Denies lightheadedness, dizziness, syncope, headache, vision changes, diaphoresis, chest pain, palpitations, SOB, nausea, vomiting, abdominal pain or lower extremity edema  Review of Systems  A 12 point ROS was completed  Other than the above mentioned complaints, all remaining systems were negative            Inpatient consult to Neurology     Performed by  [employer], DO     Authorized by Carlos Blunt DO              Historical Information   Past Medical History:   Diagnosis Date   • Fracture    • Hepatitis     Hep A    • Insomnia     10mar2016 resolved   • Osteoporosis     14jun2016 resolved   • Pancreatitis    • Seasonal allergies    • Urine discoloration 11/11/2019   • Varicella    • Vomiting 11/13/2019     Past Surgical History:   Procedure Laterality Date   • IR BIOPSY BONE  8/17/2021   • IR BIOPSY BONE MARROW  11/14/2019   • TUBAL LIGATION  1985     Social History   Social History     Substance and Sexual Activity   Alcohol Use Yes   • Alcohol/week: 7 0 standard drinks   • Types: 7 Cans of beer per week    Comment: occassional     Social History     Substance and Sexual Activity   Drug Use No     E-Cigarette/Vaping   • E-Cigarette Use Never User      E-Cigarette/Vaping Substances   • Nicotine No    • THC No    • CBD No    • Flavoring No    • Other No    • Unknown No      Social History     Tobacco Use   Smoking Status Never   Smokeless Tobacco Never     Family History:   Family History   Problem Relation Age of Onset   • Anxiety disorder Mother    • Depression Mother    • No Known Problems Father    • No Known Problems Sister    • No Known Problems Sister    • No Known Problems Brother    • Cancer Paternal Grandmother         unknown    • No Known Problems Daughter    • No Known Problems Maternal Grandmother    • Cancer Maternal Grandfather    • No Known Problems Paternal Grandfather    • Breast cancer Neg Hx    • Ovarian cancer Neg Hx      Meds/Allergies all current active meds have been reviewed  No Known Allergies    Review of previous medical records was completed  OBJECTIVE     Patient ID: Wellington Rosas is a 58 y o  female  Vitals:   Vitals:    23 0500 23 0835 23 0900 23 1500   BP: 125/77 112/70 112/70 92/53   Pulse:  73 76 74   Resp:  16  18   Temp:  98 8 °F (37 1 °C)  99 3 °F (37 4 °C)   TempSrc:    Oral   SpO2:  92% 92% 92%      There is no height or weight on file to calculate BMI  Intake/Output Summary (Last 24 hours) at 2023  Last data filed at 2023 2301  Gross per 24 hour   Intake 100 ml   Output 0 ml   Net 100 ml       Temperature:   Temp (24hrs), Av 1 °F (37 3 °C), Min:98 8 °F (37 1 °C), Max:99 3 °F (37 4 °C)    Temperature: 99 3 °F (37 4 °C)    Invasive Devices: Invasive Devices     Peripheral Intravenous Line  Duration           Peripheral IV 23 Right;Upper;Ventral (anterior) Arm <1 day                Physical Exam:  Constitutional: Alert  Not in acute distress  Not ill-appearing, toxic-appearing or diaphoretic  HENT: Normocephalic and atraumatic  Nose and Ears normal     Eyes: No scleral icterus  No discharge  Neck: Neck Supple  ROM normal    Cardiovascular: Distal extremities warm without palpable edema or tenderness, no observed significant swelling  Pulmonary:  Pulmonary effort is normal  Not in respiratory distress   Abdominal: Abdomen is flat and not distended   Musculoskeletal: No swelling or deformity  Skin: Warm and dry   Psychiatric:  Normal behavior and appropriate affect      Neurological Exam  Mental Status  Awake, alert and oriented to person, place and time  Recent and remote memory are intact  Speech is normal  Language is fluent with no aphasia  Attention and concentration are normal     Cranial Nerves  CN II: Visual fields full to confrontation  CN III, IV, VI: Extraocular movements intact bilaterally  Normal lids and orbits bilaterally   Pupils equal round and reactive to light bilaterally  CN V: Facial sensation is normal   CN VII: Full and symmetric facial movement  CN VIII: Hearing is normal   CN IX, X: Palate elevates symmetrically  CN XI: Shoulder shrug strength is normal   CN XII: Tongue midline without atrophy or fasciculations  Motor  Normal muscle bulk throughout  No fasciculations present  Normal muscle tone  No abnormal involuntary movements  Strength is 5/5 in all four extremities except as noted  Sensory  Light touch is normal in upper and lower extremities  Reflexes  Deep tendon reflexes are 2+ and symmetric except as noted  Coordination  Right: Finger-to-nose normal Left: Finger-to-nose normal     Gait    Unable to be assessed due to the patient's current medical status  Gait examination differed due to the patient's medical condition     LABORATORY DATA     Labs: I have personally reviewed pertinent reports  Results from last 7 days   Lab Units 02/07/23 0315 02/06/23  1935   WBC Thousand/uL 5 12 3 14*   HEMOGLOBIN g/dL 11 4* 11 1*   HEMATOCRIT % 35 3 34 2*   PLATELETS Thousands/uL 42* 39*   NEUTROS PCT % 45 54   MONOS PCT % 11 14*      Results from last 7 days   Lab Units 02/07/23 0315 02/06/23  1935   POTASSIUM mmol/L 3 9 4 0   CHLORIDE mmol/L 102 98   CO2 mmol/L 29 28   BUN mg/dL 9 11   CREATININE mg/dL 0 67 0 72   CALCIUM mg/dL 8 9 8 9   ALK PHOS U/L 120* 120*   ALT U/L 26 27   AST U/L 42* 41*                            IMAGING & DIAGNOSTIC TESTING     Radiology Results: I have personally reviewed pertinent reports  No orders to display       Other Diagnostic Testing: I have personally reviewed pertinent reports        ACTIVE MEDICATIONS     Current Facility-Administered Medications   Medication Dose Route Frequency   • folic acid (FOLVITE) tablet 1 mg  1 mg Oral Daily   • gabapentin (NEURONTIN) capsule 300 mg  300 mg Oral BID   • hydrOXYzine HCL (ATARAX) tablet 25 mg  25 mg Oral BID PRN   • lacosamide (VIMPAT) tablet 150 mg 150 mg Oral BID   • LORazepam (ATIVAN) injection 2 mg  2 mg Intravenous Q8H PRN   • metoprolol succinate (TOPROL-XL) 24 hr tablet 25 mg  25 mg Oral BID   • multivitamin-minerals (CENTRUM) tablet 1 tablet  1 tablet Oral Daily   • thiamine tablet 100 mg  100 mg Oral Daily   • traZODone (DESYREL) tablet 50 mg  50 mg Oral Daily       Prior to Admission medications    Medication Sig Start Date End Date Taking? Authorizing Provider   gabapentin (NEURONTIN) 300 mg capsule Take 1 capsule (300 mg total) by mouth 2 (two) times a day 1/11/23  Yes Arielle Tong MD   lacosamide (VIMPAT) 200 mg tablet Take 1 tablet (200 mg total) by mouth 2 (two) times a day  Patient taking differently: Take 150 mg by mouth every 12 (twelve) hours 2/6/23  Yes Cherrie Peter MD   metoprolol succinate (TOPROL-XL) 25 mg 24 hr tablet Take 1 tablet (25 mg total) by mouth 2 (two) times a day 12/27/22 4/26/23 Yes Federica Knight MD   traZODone (DESYREL) 50 mg tablet Take 1 tablet (50 mg total) by mouth in the morning 1/11/23   Arielle Tong MD   estradiol (ESTRACE VAGINAL) 0 1 mg/g vaginal cream Insert 1 g into the vagina 3 (three) times a week Insert 1/4 of applicator into the vagina 3 times a week for 2 weeks  If symptoms improved can reduce to using twice weekly after 2 weeks for maintenance    Patient not taking: Reported on 2/6/2023 12/1/21 2/7/23  Mayito Forrest MD   hydrOXYzine HCL (ATARAX) 25 mg tablet Take 1 tablet (25 mg total) by mouth 2 (two) times a day as needed for anxiety  Patient not taking: Reported on 2/2/2023 12/30/22 2/7/23  Brody Lopez MD   melatonin 3 mg Take 1 tablet (3 mg total) by mouth daily at bedtime  Patient not taking: Reported on 2/6/2023 3/18/22 2/7/23  [facility]     Code Status: Level 1 - Full Code  Advance Directive and Living Will: Yes    Power of : Yes  POLST:        VTE Pharmacologic Prophylaxis: Per Primary team  VTE Mechanical Prophylaxis: sequential compression device    ==  Kori Second, DO   [address] Neurology Residency, PGY-3

## 2023-02-09 RX ORDER — LORAZEPAM 2 MG/ML
2 INJECTION INTRAMUSCULAR EVERY 8 HOURS PRN
Status: DISCONTINUED | OUTPATIENT
Start: 2023-02-09 | End: 2023-02-10 | Stop reason: HOSPADM

## 2023-02-09 RX ORDER — LACOSAMIDE 150 MG/1
150 TABLET ORAL EVERY 12 HOURS SCHEDULED
Qty: 60 TABLET | Refills: 0
Start: 2023-02-09 | End: 2023-03-11

## 2023-02-09 RX ORDER — LANOLIN ALCOHOL/MO/W.PET/CERES
100 CREAM (GRAM) TOPICAL DAILY
Qty: 30 TABLET | Refills: 0 | OUTPATIENT
Start: 2023-02-10 | End: 2023-03-12

## 2023-02-09 RX ORDER — FOLIC ACID 1 MG/1
1 TABLET ORAL DAILY
Qty: 30 TABLET | Refills: 0 | OUTPATIENT
Start: 2023-02-10 | End: 2023-03-12

## 2023-02-09 RX ADMIN — FOLIC ACID 1 MG: 1 TABLET ORAL at 08:11

## 2023-02-09 RX ADMIN — MULTIPLE VITAMINS W/ MINERALS TAB 1 TABLET: TAB ORAL at 08:11

## 2023-02-09 RX ADMIN — LORAZEPAM 2 MG: 2 INJECTION INTRAMUSCULAR; INTRAVENOUS at 21:16

## 2023-02-09 RX ADMIN — METOPROLOL SUCCINATE 25 MG: 25 TABLET, EXTENDED RELEASE ORAL at 08:11

## 2023-02-09 RX ADMIN — GABAPENTIN 300 MG: 300 CAPSULE ORAL at 08:14

## 2023-02-09 RX ADMIN — GABAPENTIN 300 MG: 300 CAPSULE ORAL at 17:16

## 2023-02-09 RX ADMIN — LACOSAMIDE 150 MG: 100 TABLET, FILM COATED ORAL at 20:48

## 2023-02-09 RX ADMIN — TRAZODONE HYDROCHLORIDE 50 MG: 50 TABLET ORAL at 08:11

## 2023-02-09 RX ADMIN — LACOSAMIDE 150 MG: 100 TABLET, FILM COATED ORAL at 08:11

## 2023-02-09 RX ADMIN — METOPROLOL SUCCINATE 25 MG: 25 TABLET, EXTENDED RELEASE ORAL at 20:48

## 2023-02-09 RX ADMIN — Medication 100 MG: at 08:11

## 2023-02-09 NOTE — ASSESSMENT & PLAN NOTE
Patient is presenting today after another seizure in the afternoon  As per the patient, patient lost function of her legs along with flailing arms  Of note, patient was at the [facility] emergency department on 2/5/2023 due to 2 seizures 1 in the morning and 1 in the afternoon  At that time, she was given a loading dose of Vimpat 300 mg in the ED and then was discharged on an unchanged dose of 150 mg twice daily  As per epic records, Dr Amie Cam recommended changing the daily dose to 200 mg twice daily       Plan:  · Continue lacosamide 150 mg BID   · Transfer to [place] for continuous EEG   · Thiamine, Folic Acid, Multivitamin   · EMU protocol  · Ativan 2 mg q8 PRN

## 2023-02-09 NOTE — PROGRESS NOTES
[facility]  Progress Note - Phi Ruiz 1960, 58 y o  female MRN: [id number]  Unit/Bed#: S -01 Encounter: [id number]  Primary Care Provider: Deidre Candelario MD   Date and time admitted to hospital: 2/6/2023  6:16 PM    * Epilepsy [facility])  Assessment & Plan  Patient is presenting today after another seizure in the afternoon  As per the patient, patient lost function of her legs along with flailing arms  Of note, patient was at the [facility] emergency department on 2/5/2023 due to 2 seizures 1 in the morning and 1 in the afternoon  At that time, she was given a loading dose of Vimpat 300 mg in the ED and then was discharged on an unchanged dose of 150 mg twice daily  As per epic records, Dr Willam Hicks recommended changing the daily dose to 200 mg twice daily  Plan:  · Continue lacosamide 150 mg BID   · Transfer to [place] for continuous EEG   · Thiamine, Folic Acid, Multivitamin   · EMU protocol  · Ativan 2 mg q8 PRN      HTN (hypertension)  Assessment & Plan  Patient has a history of HTN  BP on presentation to the ED was 148/106  Plan:  · Continue home medications  · Monitor vitals signs    Anxiety, Depression, and Panic Attacks  Assessment & Plan  Patient has a history of anxiety and depression as per Epic records  Plan:  · Continue Trazodone, Atarax, and Gabapentin    Alcohol abuse  Assessment & Plan  Patient has a history of alcohol abuse  Plan:  · CIWA protocol  · Monitor for worsening sign/symptoms  · Thiamine, Folic Acid, Multivitamin     AMS (altered mental status)-resolved as of 2/7/2023  Assessment & Plan  Patient is AAO x 2  She is oriented to self and month  Possibly secondary to alcohol withdrawal or multiple seizures  Plan:  · Ammonia levels normal  · See plan above in Epilepsy            VTE Pharmacologic Prophylaxis: VTE Score: 7 High Risk (Score >/= 5) - Pharmacological DVT Prophylaxis Ordered: enoxaparin (Lovenox)  Sequential Compression Devices Ordered  Patient Centered Rounds: I performed bedside rounds with nursing staff today  Discussions with Specialists or Other Care Team Provider:     Education and Discussions with Family / Patient: Updated  () via phone  Current Length of Stay: 3 day(s)  Current Patient Status: Inpatient   Discharge Plan: Anticipate discharge tomorrow to [facility]    Code Status: Level 1 - Full Code    Subjective:   Patient was seen and examined at bedside  No distress  Denies pain  No episodes of seizures  No new complaints  Objective:     Vitals:   Temp (24hrs), Av 4 °F (36 9 °C), Min:98 2 °F (36 8 °C), Max:98 6 °F (37 °C)    Temp:  [98 2 °F (36 8 °C)-98 6 °F (37 °C)] 98 2 °F (36 8 °C)  HR:  [71-78] 71  Resp:  [16] 16  BP: ()/(66-74) 117/74  SpO2:  [96 %] 96 %  There is no height or weight on file to calculate BMI  Input and Output Summary (last 24 hours): Intake/Output Summary (Last 24 hours) at 2023 0848  Last data filed at 2023  Gross per 24 hour   Intake 0 ml   Output 0 ml   Net 0 ml       Physical Exam:   Physical Exam  Constitutional:       General: She is not in acute distress  Appearance: Normal appearance  She is not ill-appearing  HENT:      Head: Normocephalic  Nose: No congestion  Mouth/Throat:      Mouth: Mucous membranes are moist       Pharynx: Oropharynx is clear  Cardiovascular:      Rate and Rhythm: Normal rate  Pulses: Normal pulses  Heart sounds: Normal heart sounds  Pulmonary:      Effort: Pulmonary effort is normal       Breath sounds: Normal breath sounds  Abdominal:      General: Bowel sounds are normal       Palpations: Abdomen is soft  Skin:     General: Skin is warm and dry  Capillary Refill: Capillary refill takes less than 2 seconds  Neurological:      General: No focal deficit present  Mental Status: She is alert and oriented to person, place, and time  Psychiatric:         Mood and Affect: Mood normal          Behavior: Behavior normal           Additional Data:     Labs:  Results from last 7 days   Lab Units 02/08/23  0607 02/07/23  0315   WBC Thousand/uL 3 60* 5 12   HEMOGLOBIN g/dL 10 8* 11 4*   HEMATOCRIT % 34 2* 35 3   PLATELETS Thousands/uL 34* 42*   NEUTROS PCT %  --  45   LYMPHS PCT %  --  44   MONOS PCT %  --  11   EOS PCT %  --  0     Results from last 7 days   Lab Units 02/08/23  0607 02/07/23  0315   SODIUM mmol/L 136 138   POTASSIUM mmol/L 3 8 3 9   CHLORIDE mmol/L 106 102   CO2 mmol/L 24 29   BUN mg/dL 16 9   CREATININE mg/dL 0 64 0 67   ANION GAP mmol/L 6 7   CALCIUM mg/dL 8 0* 8 9   ALBUMIN g/dL  --  3 8   TOTAL BILIRUBIN mg/dL  --  0 96   ALK PHOS U/L  --  120*   ALT U/L  --  26   AST U/L  --  42*   GLUCOSE RANDOM mg/dL 86 117     Results from last 7 days   Lab Units 02/08/23  0920   INR  1 04                   Lines/Drains:  Invasive Devices     Peripheral Intravenous Line  Duration           Peripheral IV 02/07/23 Right;Upper;Ventral (anterior) Arm 2 days                      Imaging: No pertinent imaging reviewed      Recent Cultures (last 7 days):         Last 24 Hours Medication List:   Current Facility-Administered Medications   Medication Dose Route Frequency Provider Last Rate   • folic acid  1 mg Oral Daily Shanae Curran, DO     • gabapentin  300 mg Oral BID Cliff Alarcon MD     • hydrOXYzine HCL  25 mg Oral BID PRN Shanae Curran, DO     • lacosamide  150 mg Oral BID Shanae Curran, DO     • LORazepam  2 mg Intravenous Q8H PRN Shanae Curran, DO     • metoprolol succinate  25 mg Oral BID Shanae Crains, DO     • multivitamin-minerals  1 tablet Oral Daily Shanae Crains, DO     • thiamine  100 mg Oral Daily Shanae Crains, DO     • traZODone  50 mg Oral Daily Shanae Curran, DO          Today, Patient Was Seen By: Lady Ricardo MD    **Please Note: This note may have been constructed using a voice recognition system  **

## 2023-02-09 NOTE — UTILIZATION REVIEW
Continued Stay Review    Date: 2/8/23                          Current Patient Class: Inpatient  Current Level of Care: Med Surg    HPI:62 y o  female initially admitted on 2/6     Assessment/Plan: continue lacosamide, thiamine, folic acid and multivitamin  Ativan prn  Transfer to [facility] for continuous EEG  Continue home trazodone, Atarax, and Gabapentin  Continue CIWA protocol       Vital Signs:     Date/Time Temp Pulse Resp BP MAP (mmHg) SpO2 O2 Device   02/08/23 2017 -- 78 -- 117/72 -- -- --   02/08/23 15:34:42 98 6 °F (37 °C) 71 16 98/66 77 96 % --   02/08/23 06:59:47 97 8 °F (36 6 °C) -- 16 127/71 90 94 % --   02/07/23 2219 99 °F (37 2 °C) 67 18 111/55 77 94 % --   02/07/23 2004 -- 71 -- 113/61 -- -- --   02/07/23 1500 99 3 °F (37 4 °C) 74 18 92/53 70 92 % --   02/07/23 0900 -- 76 -- 112/70 -- 92 % None (Room air)   02/07/23 08:35:14 98 8 °F (37 1 °C) 73 16 112/70 84 92 % --   02/07/23 0500 -- -- -- 125/77 -- -- --   02/07/23 0100 -- -- -- 132/80 -- -- --     CIWA-Ar Score    Row Name 02/09/23 0529 02/08/23 2017 02/08/23 15:34:42 02/08/23 06:59:47 02/08/23 0500   CIWA-Ar   /74  -/72  -GW 98/66  -/71  -DI --   Pulse 71  -SW 78  -GW 71  -DI -- --   Nausea and Vomiting -- -- -- -- 0  -GW   Tactile Disturbances -- -- -- -- 0  -GW   Tremor -- -- -- -- 0  -GW   Auditory Disturbances -- -- -- -- 0  -GW   Paroxysmal Sweats -- -- -- -- 0  -GW   Visual Disturbances -- -- -- -- 0  -GW   Anxiety -- -- -- -- 0  -GW   Headache, Fullness in Head -- -- -- -- 0  -GW   Agitation -- -- -- -- 0  -GW   Orientation and Clouding of Sensorium -- -- -- -- 0  -GW   CIWA-Ar Total -- -- -- -- 0  -GW   Row Name 02/08/23 0100 02/07/23 2219 02/07/23 2100 02/07/23 2004 02/07/23 1700   CIWA-Ar   BP -- 111/55  -AF -- 113/61  -GW --   Pulse -- 67  -AF -- 71  -GW --   Nausea and Vomiting 0  -GW -- 0  -GW -- 0  -CH   Tactile Disturbances 0  -GW -- 0  -GW -- 0  -CH   Tremor 0  -GW -- 0  -GW -- 0  -CH   Auditory Disturbances 0  -GW -- 0  -GW -- 0  -CH   Paroxysmal Sweats 0  -GW -- 0  -GW -- 0  -CH   Visual Disturbances 0  -GW -- 0  -GW -- 0  -CH   Anxiety 0  -GW -- 2  -GW -- 1  -CH   Headache, Fullness in Head 0  -GW -- 0  -GW -- 0  -CH   Agitation 0  -GW -- 0  -GW -- 0  -CH   Orientation and Clouding of Sensorium 0  -GW -- 0  -GW -- 0  -CH   CIWA-Ar Total 0  -GW -- 2  -GW -- 1  -CH   Row Name 02/07/23 1500 02/07/23 1300 02/07/23 0900 02/07/23 08:35:14 02/07/23 0500   CIWA-Ar   BP 92/53  -AF -- 112/70  -/70  -/77  -GW   Pulse 74  -AF -- 76  -CH 73  -DI --   Nausea and Vomiting -- 0  -CH 0  -CH -- 0  -GW   Tactile Disturbances -- 0  -CH 0  -CH -- 0  -GW   Tremor -- 0  -CH 0  -CH -- 0  -GW   Auditory Disturbances -- 0  -CH 0  -CH -- 0  -GW   Paroxysmal Sweats -- 0  -CH 0  -CH -- 0  -GW   Visual Disturbances -- 0  -CH 0  -CH -- 0  -GW   Anxiety -- 0  -CH 0  -CH -- 0  -GW   Headache, Fullness in Head -- 0  -CH 0  -CH -- 0  -GW   Agitation -- 0  -CH 0  -CH -- 0  -GW   Orientation and Clouding of Sensorium -- 0  -CH 1  -CH -- 1  -GW   CIWA-Ar Total -- 0  -CH 1  -CH -- 1  -GW   Row Name 02/07/23 0100 02/06/23 2115 02/06/23 2110 02/06/23 1823    CIWA-Ar   /80  -/85  -/85  -/106 Abnormal   -DB    Pulse -- 79  -BY 89  -MF 84  -DB    Nausea and Vomiting 0  -GW 0  -GW -- --    Tactile Disturbances 0  -GW 0  -GW -- --    Tremor 0  -GW 0  -GW -- --    Auditory Disturbances 0  -GW 0  -GW -- --    Paroxysmal Sweats 0  -GW 0  -GW -- --    Visual Disturbances 0  -GW 0  -GW -- --    Anxiety 0  -GW 1  -GW -- --    Headache, Fullness in Head 0  -GW 0  -GW -- --    Agitation 0  -GW 0  -GW -- --    Orientation and Clouding of Sensorium 2  -GW 1  -GW -- --    CIWA-Ar Total 2  -GW 2  -GW -- --        Pertinent Labs/Diagnostic Results:       Results from last 7 days   Lab Units 02/08/23  0607 02/07/23  0315 02/06/23  1935   WBC Thousand/uL 3 60* 5 12 3 14*   HEMOGLOBIN g/dL 10 8* 11 4* 11 1*   HEMATOCRIT % 34 2* 35 3 34 2*   PLATELETS Thousands/uL 34* 42* 39*   NEUTROS ABS Thousands/µL  --  2 26 1 70*         Results from last 7 days   Lab Units 02/08/23  0607 02/07/23 0315 02/06/23  1935   SODIUM mmol/L 136 138 134*   POTASSIUM mmol/L 3 8 3 9 4 0   CHLORIDE mmol/L 106 102 98   CO2 mmol/L 24 29 28   ANION GAP mmol/L 6 7 8   BUN mg/dL 16 9 11   CREATININE mg/dL 0 64 0 67 0 72   EGFR ml/min/1 73sq m 95 94 90   CALCIUM mg/dL 8 0* 8 9 8 9     Results from last 7 days   Lab Units 02/07/23 0315 02/06/23  1935   AST U/L 42* 41*   ALT U/L 26 27   ALK PHOS U/L 120* 120*   TOTAL PROTEIN g/dL 7 0 6 7   ALBUMIN g/dL 3 8 3 6   TOTAL BILIRUBIN mg/dL 0 96 1 00   AMMONIA umol/L 54  --          Results from last 7 days   Lab Units 02/08/23 0607 02/07/23 0315 02/06/23 1935   GLUCOSE RANDOM mg/dL 86 117 170*             BETA-HYDROXYBUTYRATE   Date Value Ref Range Status   03/07/2022 0 2 <0 6 mmol/L Final            Results from last 7 days   Lab Units 02/08/23  0920   PROTIME seconds 13 8   INR  1 04   PTT seconds 25         Medications:   Scheduled Medications:  folic acid, 1 mg, Oral, Daily  gabapentin, 300 mg, Oral, BID  lacosamide, 150 mg, Oral, BID  metoprolol succinate, 25 mg, Oral, BID  multivitamin-minerals, 1 tablet, Oral, Daily  thiamine, 100 mg, Oral, Daily  traZODone, 50 mg, Oral, Daily      Continuous IV Infusions:     PRN Meds:  hydrOXYzine HCL, 25 mg, Oral, BID PRN  LORazepam, 2 mg, Intravenous, Q8H PRN        Discharge Plan: TBD    Network Utilization Review Department  ATTENTION: Please call with any questions or concerns to [phone] and carefully listen to the prompts so that you are directed to the right person  All voicemails are confidential   Erich Suarez all requests for admission clinical reviews, approved or denied determinations and any other requests to dedicated fax number below belonging to the campus where the patient is receiving treatment   List of dedicated fax numbers for the Facilities:  [address] NUMBER   ADMISSION DENIALS (Administrative/Medical Necessity) [phone]   [address] (Maternity/NICU/Pediatrics) Leti Weeks [address] Neha Flores  [phone]   [address] [place] [address] [facility] [place] [place] [address] Alise [facility] [address] [facility] 134 5 [facility] [phone]

## 2023-02-10 ENCOUNTER — HOSPITAL ENCOUNTER (INPATIENT)
Facility: HOSPITAL | Age: 63
LOS: 3 days | Discharge: HOME | End: 2023-02-13
Attending: PSYCHIATRY & NEUROLOGY | Admitting: PSYCHIATRY & NEUROLOGY

## 2023-02-10 VITALS
TEMPERATURE: 97.9 F | SYSTOLIC BLOOD PRESSURE: 116 MMHG | OXYGEN SATURATION: 97 % | HEART RATE: 73 BPM | RESPIRATION RATE: 17 BRPM | DIASTOLIC BLOOD PRESSURE: 74 MMHG

## 2023-02-10 DIAGNOSIS — F41.8 ANXIETY WITH DEPRESSION: ICD-10-CM

## 2023-02-10 DIAGNOSIS — I10 HTN (HYPERTENSION): ICD-10-CM

## 2023-02-10 DIAGNOSIS — G40.109 FOCAL EPILEPSY (HCC): Primary | Chronic | ICD-10-CM

## 2023-02-10 DIAGNOSIS — D61.818 PANCYTOPENIA (HCC): ICD-10-CM

## 2023-02-10 DIAGNOSIS — F10.10 ALCOHOL ABUSE: ICD-10-CM

## 2023-02-10 PROBLEM — I42.9 CARDIOMYOPATHY (HCC): Status: ACTIVE | Noted: 2023-02-10

## 2023-02-10 PROBLEM — I50.42 CHRONIC COMBINED SYSTOLIC AND DIASTOLIC CHF (CONGESTIVE HEART FAILURE) (HCC): Status: ACTIVE | Noted: 2023-02-10

## 2023-02-10 LAB
BILIRUB UR QL STRIP: NEGATIVE
CLARITY UR: CLEAR
COLOR UR: COLORLESS
GLUCOSE UR STRIP-MCNC: NEGATIVE MG/DL
HGB UR QL STRIP.AUTO: NEGATIVE
KETONES UR STRIP-MCNC: NEGATIVE MG/DL
LEUKOCYTE ESTERASE UR QL STRIP: NEGATIVE
NITRITE UR QL STRIP: NEGATIVE
PH UR STRIP.AUTO: 5.5 [PH]
PROT UR STRIP-MCNC: NEGATIVE MG/DL
SP GR UR STRIP.AUTO: 1.01 (ref 1–1.03)
UROBILINOGEN UR STRIP-ACNC: <2 MG/DL

## 2023-02-10 PROCEDURE — 3E02340 INTRODUCTION OF INFLUENZA VACCINE INTO MUSCLE, PERCUTANEOUS APPROACH: ICD-10-PCS | Performed by: INTERNAL MEDICINE

## 2023-02-10 RX ORDER — FOLIC ACID 1 MG/1
1 TABLET ORAL DAILY
Status: DISCONTINUED | OUTPATIENT
Start: 2023-02-11 | End: 2023-02-10

## 2023-02-10 RX ORDER — TRAZODONE HYDROCHLORIDE 50 MG/1
50 TABLET ORAL DAILY
Status: DISCONTINUED | OUTPATIENT
Start: 2023-02-11 | End: 2023-02-13 | Stop reason: HOSPADM

## 2023-02-10 RX ORDER — GABAPENTIN 300 MG/1
300 CAPSULE ORAL 2 TIMES DAILY
Status: DISCONTINUED | OUTPATIENT
Start: 2023-02-11 | End: 2023-02-13 | Stop reason: HOSPADM

## 2023-02-10 RX ORDER — LANOLIN ALCOHOL/MO/W.PET/CERES
100 CREAM (GRAM) TOPICAL DAILY
Status: DISCONTINUED | OUTPATIENT
Start: 2023-02-11 | End: 2023-02-13 | Stop reason: HOSPADM

## 2023-02-10 RX ORDER — METOPROLOL SUCCINATE 25 MG/1
25 TABLET, EXTENDED RELEASE ORAL 2 TIMES DAILY
Status: DISCONTINUED | OUTPATIENT
Start: 2023-02-10 | End: 2023-02-13 | Stop reason: HOSPADM

## 2023-02-10 RX ORDER — HYDROXYZINE HYDROCHLORIDE 25 MG/1
25 TABLET, FILM COATED ORAL 2 TIMES DAILY PRN
Status: DISCONTINUED | OUTPATIENT
Start: 2023-02-10 | End: 2023-02-13 | Stop reason: HOSPADM

## 2023-02-10 RX ORDER — LORAZEPAM 2 MG/ML
2 INJECTION INTRAMUSCULAR EVERY 8 HOURS PRN
Status: DISCONTINUED | OUTPATIENT
Start: 2023-02-10 | End: 2023-02-13 | Stop reason: HOSPADM

## 2023-02-10 RX ORDER — ENOXAPARIN SODIUM 100 MG/ML
40 INJECTION SUBCUTANEOUS DAILY
Status: DISCONTINUED | OUTPATIENT
Start: 2023-02-11 | End: 2023-02-10

## 2023-02-10 RX ORDER — LACOSAMIDE 150 MG/1
150 TABLET ORAL 2 TIMES DAILY
Status: DISCONTINUED | OUTPATIENT
Start: 2023-02-10 | End: 2023-02-11

## 2023-02-10 RX ADMIN — MULTIPLE VITAMINS W/ MINERALS TAB 1 TABLET: TAB ORAL at 08:31

## 2023-02-10 RX ADMIN — INFLUENZA A VIRUS A/WISCONSIN/588/2019 (H1N1) RECOMBINANT HEMAGGLUTININ ANTIGEN, INFLUENZA A VIRUS A/DARWIN/6/2021 (H3N2) RECOMBINANT HEMAGGLUTININ ANTIGEN, INFLUENZA B VIRUS B/AUSTRIA/1359417/2021 RECOMBINANT HEMAGGLUTININ ANTIGEN, AND INFLUENZA B VIRUS B/PHUKET/3073/2013 RECOMBINANT HEMAGGLUTININ ANTIGEN 0.5 ML: 45; 45; 45; 45 INJECTION INTRAMUSCULAR at 20:30

## 2023-02-10 RX ADMIN — FOLIC ACID 1 MG: 1 TABLET ORAL at 08:31

## 2023-02-10 RX ADMIN — LACOSAMIDE 150 MG: 100 TABLET, FILM COATED ORAL at 08:31

## 2023-02-10 RX ADMIN — TRAZODONE HYDROCHLORIDE 50 MG: 50 TABLET ORAL at 08:31

## 2023-02-10 RX ADMIN — METOPROLOL SUCCINATE 25 MG: 25 TABLET, EXTENDED RELEASE ORAL at 08:30

## 2023-02-10 RX ADMIN — Medication 100 MG: at 08:30

## 2023-02-10 RX ADMIN — METOPROLOL SUCCINATE 25 MG: 25 TABLET, EXTENDED RELEASE ORAL at 20:30

## 2023-02-10 RX ADMIN — GABAPENTIN 300 MG: 300 CAPSULE ORAL at 17:24

## 2023-02-10 RX ADMIN — GABAPENTIN 300 MG: 300 CAPSULE ORAL at 08:31

## 2023-02-10 RX ADMIN — LACOSAMIDE 150 MG: 150 TABLET, FILM COATED ORAL at 20:30

## 2023-02-10 NOTE — PLAN OF CARE
Problem: MOBILITY - ADULT  Goal: Maintain or return to baseline ADL function  Description: INTERVENTIONS:  -  Assess patient's ability to carry out ADLs; assess patient's baseline for ADL function and identify physical deficits which impact ability to perform ADLs (bathing, care of mouth/teeth, toileting, grooming, dressing, etc )  - Assess/evaluate cause of self-care deficits   - Assess range of motion  - Assess patient's mobility; develop plan if impaired  - Assess patient's need for assistive devices and provide as appropriate  - Encourage maximum independence but intervene and supervise when necessary  - Involve family in performance of ADLs  - Assess for home care needs following discharge   - Consider OT consult to assist with ADL evaluation and planning for discharge  - Provide patient education as appropriate  Outcome: Progressing  Goal: Maintains/Returns to pre admission functional level  Description: INTERVENTIONS:  - Perform BMAT or MOVE assessment daily    - Set and communicate daily mobility goal to care team and patient/family/caregiver  - Collaborate with rehabilitation services on mobility goals if consulted  - Perform Range of Motion  times a day  - Reposition patient every  hours    - Dangle patient  times a day  - Stand patient  times a day  - Ambulate patient  times a day  - Out of bed to chair  times a day   - Out of bed for meals times a day  - Out of bed for toileting  - Record patient progress and toleration of activity level   Outcome: Progressing     Problem: Prexisting or High Potential for Compromised Skin Integrity  Goal: Skin integrity is maintained or improved  Description: INTERVENTIONS:  - Identify patients at risk for skin breakdown  - Assess and monitor skin integrity  - Assess and monitor nutrition and hydration status  - Monitor labs   - Assess for incontinence   - Turn and reposition patient  - Assist with mobility/ambulation  - Relieve pressure over bony prominences  - Avoid friction and shearing  - Provide appropriate hygiene as needed including keeping skin clean and dry  - Evaluate need for skin moisturizer/barrier cream  - Collaborate with interdisciplinary team   - Patient/family teaching  - Consider wound care consult   Outcome: Progressing

## 2023-02-10 NOTE — ASSESSMENT & PLAN NOTE
Patient is presenting today after another seizure in the afternoon  As per the patient, patient lost function of her legs along with flailing arms  Of note, patient was at the [facility] emergency department on 2/5/2023 due to 2 seizures 1 in the morning and 1 in the afternoon  At that time, she was given a loading dose of Vimpat 300 mg in the ED and then was discharged on an unchanged dose of 150 mg twice daily  As per [facility] records, Dr Florrie Kehr recommended changing the daily dose to 200 mg twice daily       Plan:  · Continue lacosamide 150 mg BID   · Transfer to [place] for continuous EEG   · Thiamine, Folic Acid, Multivitamin   · EMU protocol  · Ativan 2 mg q8 PRN

## 2023-02-10 NOTE — PROGRESS NOTES
[facility]  Progress Note - Brice Garcia 1960, 58 y o  female MRN: [id number]  Unit/Bed#: S -01 Encounter: [id number]  Primary Care Provider: Gigi Mendez MD   Date and time admitted to hospital: 2/6/2023  6:16 PM    * Epilepsy [facility])  Assessment & Plan  Patient is presenting today after another seizure in the afternoon  As per the patient, patient lost function of her legs along with flailing arms  Of note, patient was at the [facility] emergency department on 2/5/2023 due to 2 seizures 1 in the morning and 1 in the afternoon  At that time, she was given a loading dose of Vimpat 300 mg in the ED and then was discharged on an unchanged dose of 150 mg twice daily  As per epic records, Dr Diandra Vang recommended changing the daily dose to 200 mg twice daily  Plan:  · Continue lacosamide 150 mg BID   · Transfer to [place] for continuous EEG   · Thiamine, Folic Acid, Multivitamin   · EMU protocol  · Ativan 2 mg q8 PRN      HTN (hypertension)  Assessment & Plan  Patient has a history of HTN  BP on presentation to the ED was 148/106  Plan:  · Continue home medications  · Monitor vitals signs    Anxiety, Depression, and Panic Attacks  Assessment & Plan  Patient has a history of anxiety and depression as per Epic records  Plan:  · Continue Trazodone, Atarax, and Gabapentin    Alcohol abuse  Assessment & Plan  Patient has a history of alcohol abuse  Plan:  · CIWA protocol  · Monitor for worsening sign/symptoms  · Thiamine, Folic Acid, Multivitamin     AMS (altered mental status)-resolved as of 2/7/2023  Assessment & Plan  Patient is AAO x 2  She is oriented to self and month  Possibly secondary to alcohol withdrawal or multiple seizures  Plan:  · Ammonia levels normal  · See plan above in Epilepsy          VTE Pharmacologic Prophylaxis: VTE Score: 7 High Risk (Score >/= 5) - Pharmacological DVT Prophylaxis Ordered: enoxaparin (Lovenox)  Sequential Compression Devices Ordered  Patient Centered Rounds: I performed bedside rounds with nursing staff today  Discussions with Specialists or Other Care Team Provider:     Education and Discussions with Family / Patient: Updated  () via phone  Current Length of Stay: 4 day(s)  Current Patient Status: Inpatient   Discharge Plan: Anticipate discharge tomorrow to [facility]    Code Status: Level 1 - Full Code    Subjective:   Patient was seen and examined at bedside  No distress  Denies pain  No episodes of seizures  No new complaints  Objective:     Vitals:   Temp (24hrs), Av 2 °F (36 8 °C), Min:97 9 °F (36 6 °C), Max:98 6 °F (37 °C)    Temp:  [97 9 °F (36 6 °C)-98 6 °F (37 °C)] 97 9 °F (36 6 °C)  HR:  [73-77] 73  Resp:  [16-17] 17  BP: (115-127)/(69-84) 115/69  SpO2:  [94 %-97 %] 97 %  There is no height or weight on file to calculate BMI  Input and Output Summary (last 24 hours):   No intake or output data in the 24 hours ending 02/10/23 1426    Physical Exam:   Physical Exam  Constitutional:       General: She is not in acute distress  Appearance: Normal appearance  She is not ill-appearing  HENT:      Head: Normocephalic  Nose: No congestion  Mouth/Throat:      Mouth: Mucous membranes are moist       Pharynx: Oropharynx is clear  Cardiovascular:      Rate and Rhythm: Normal rate  Pulses: Normal pulses  Heart sounds: Normal heart sounds  Pulmonary:      Effort: Pulmonary effort is normal       Breath sounds: Normal breath sounds  Abdominal:      General: Bowel sounds are normal       Palpations: Abdomen is soft  Skin:     General: Skin is warm and dry  Capillary Refill: Capillary refill takes less than 2 seconds  Neurological:      General: No focal deficit present  Mental Status: She is alert and oriented to person, place, and time     Psychiatric:         Mood and Affect: Mood normal          Behavior: Behavior normal  Additional Data:     Labs:  Results from last 7 days   Lab Units 02/08/23  0607 02/07/23  0315   WBC Thousand/uL 3 60* 5 12   HEMOGLOBIN g/dL 10 8* 11 4*   HEMATOCRIT % 34 2* 35 3   PLATELETS Thousands/uL 34* 42*   NEUTROS PCT %  --  45   LYMPHS PCT %  --  44   MONOS PCT %  --  11   EOS PCT %  --  0     Results from last 7 days   Lab Units 02/08/23  0607 02/07/23  0315   SODIUM mmol/L 136 138   POTASSIUM mmol/L 3 8 3 9   CHLORIDE mmol/L 106 102   CO2 mmol/L 24 29   BUN mg/dL 16 9   CREATININE mg/dL 0 64 0 67   ANION GAP mmol/L 6 7   CALCIUM mg/dL 8 0* 8 9   ALBUMIN g/dL  --  3 8   TOTAL BILIRUBIN mg/dL  --  0 96   ALK PHOS U/L  --  120*   ALT U/L  --  26   AST U/L  --  42*   GLUCOSE RANDOM mg/dL 86 117     Results from last 7 days   Lab Units 02/08/23  0920   INR  1 04                   Lines/Drains:  Invasive Devices     Peripheral Intravenous Line  Duration           Peripheral IV 02/07/23 Right;Upper;Ventral (anterior) Arm 3 days                      Imaging: No pertinent imaging reviewed  Recent Cultures (last 7 days):         Last 24 Hours Medication List:   Current Facility-Administered Medications   Medication Dose Route Frequency Provider Last Rate   • folic acid  1 mg Oral Daily Fransisca Fontana, DO     • gabapentin  300 mg Oral BID Gilson Ayers MD     • hydrOXYzine HCL  25 mg Oral BID PRN Fransisca Fontana DO     • lacosamide  150 mg Oral BID Fransisca Fontana DO     • LORazepam  2 mg Intravenous Q8H PRN Robert Ledezma MD     • metoprolol succinate  25 mg Oral BID Fransisca Fontana, DO     • multivitamin-minerals  1 tablet Oral Daily Fransisca Fontana, DO     • thiamine  100 mg Oral Daily Fransisca Fontana, DO     • traZODone  50 mg Oral Daily Fransisca Fontana DO          Today, Patient Was Seen By: Rosalva Gallardo MD    **Please Note: This note may have been constructed using a voice recognition system  **

## 2023-02-10 NOTE — PLAN OF CARE
Problem: MOBILITY - ADULT  Goal: Maintain or return to baseline ADL function  Description: INTERVENTIONS:  -  Assess patient's ability to carry out ADLs; assess patient's baseline for ADL function and identify physical deficits which impact ability to perform ADLs (bathing, care of mouth/teeth, toileting, grooming, dressing, etc )  - Assess/evaluate cause of self-care deficits   - Assess range of motion  - Assess patient's mobility; develop plan if impaired  - Assess patient's need for assistive devices and provide as appropriate  - Encourage maximum independence but intervene and supervise when necessary  - Involve family in performance of ADLs  - Assess for home care needs following discharge   - Consider OT consult to assist with ADL evaluation and planning for discharge  - Provide patient education as appropriate  Outcome: Adequate for Discharge  Goal: Maintains/Returns to pre admission functional level  Description: INTERVENTIONS:  - Perform BMAT or MOVE assessment daily    - Set and communicate daily mobility goal to care team and patient/family/caregiver     - Out of bed for toileting  - Record patient progress and toleration of activity level   Outcome: Adequate for Discharge     Problem: Prexisting or High Potential for Compromised Skin Integrity  Goal: Skin integrity is maintained or improved  Description: INTERVENTIONS:  - Identify patients at risk for skin breakdown  - Assess and monitor skin integrity  - Assess and monitor nutrition and hydration status  - Monitor labs   - Assess for incontinence   - Turn and reposition patient  - Assist with mobility/ambulation  - Relieve pressure over bony prominences  - Avoid friction and shearing  - Provide appropriate hygiene as needed including keeping skin clean and dry  - Evaluate need for skin moisturizer/barrier cream  - Collaborate with interdisciplinary team   - Patient/family teaching  - Consider wound care consult   Outcome: Adequate for Discharge

## 2023-02-10 NOTE — UTILIZATION REVIEW
Continued Stay Review    Date: 2/9/23                          Current Patient Class: Inpatient  Current Level of Care: Med Surg    HPI:62 y o  female initially admitted on 2/6     Assessment/Plan: Continue lacosamide 150 mg BID  Transfer to [place] for continuous EEG  Thiamine, Folic Acid, Multivitamin  EMU protocol  Ativan prn  Continue home antihypertensives  Continue Trazodone, Atarax, and Gabapentin  CIWA protocol       Vital Signs:     Date/Time Temp Pulse Resp BP MAP (mmHg) SpO2   02/10/23 07:55:45 97 9 °F (36 6 °C) 73 -- 115/69 84 97 %   02/09/23 21:47:26 98 6 °F (37 °C) 77 17 127/75 92 94 %   02/09/23 15:29:24 98 2 °F (36 8 °C) 76 16 127/84 98 95 %     CIWA-Ar Score    Row Name 02/10/23 07:55:45 02/09/23 21:47:26 02/09/23 15:29:24 02/09/23 0811 02/08/23 2017   CIWA-Ar   /69  -/75  -/84  -/74  -/72  -GW   Pulse 73  -DI 77  -DI 76  -DI 71  -SW 78  -GW   Row Name 02/08/23 15:34:42 02/08/23 06:59:47 02/08/23 0500 02/08/23 0100 02/07/23 2219   CIWA-Ar   BP 98/66  -/71  -DI -- -- 111/55  -AF   Pulse 71  -DI -- -- -- 67  -AF   Nausea and Vomiting -- -- 0  -GW 0  -GW --   Tactile Disturbances -- -- 0  -GW 0  -GW --   Tremor -- -- 0  -GW 0  -GW --   Auditory Disturbances -- -- 0  -GW 0  -GW --   Paroxysmal Sweats -- -- 0  -GW 0  -GW --   Visual Disturbances -- -- 0  -GW 0  -GW --   Anxiety -- -- 0  -GW 0  -GW --   Headache, Fullness in Head -- -- 0  -GW 0  -GW --   Agitation -- -- 0  -GW 0  -GW --   Orientation and Clouding of Sensorium -- -- 0  -GW 0  -GW        Pertinent Labs/Diagnostic Results:       Results from last 7 days   Lab Units 02/08/23  0607 02/07/23  0315 02/06/23  1935   WBC Thousand/uL 3 60* 5 12 3 14*   HEMOGLOBIN g/dL 10 8* 11 4* 11 1*   HEMATOCRIT % 34 2* 35 3 34 2*   PLATELETS Thousands/uL 34* 42* 39*   NEUTROS ABS Thousands/µL  --  2 26 1 70*         Results from last 7 days   Lab Units 02/08/23  0607 02/07/23  0315 02/06/23  1935   SODIUM mmol/L 136 138 134* POTASSIUM mmol/L 3 8 3 9 4 0   CHLORIDE mmol/L 106 102 98   CO2 mmol/L 24 29 28   ANION GAP mmol/L 6 7 8   BUN mg/dL 16 9 11   CREATININE mg/dL 0 64 0 67 0 72   EGFR ml/min/1 73sq m 95 94 90   CALCIUM mg/dL 8 0* 8 9 8 9     Results from last 7 days   Lab Units 02/07/23  0315 02/06/23  1935   AST U/L 42* 41*   ALT U/L 26 27   ALK PHOS U/L 120* 120*   TOTAL PROTEIN g/dL 7 0 6 7   ALBUMIN g/dL 3 8 3 6   TOTAL BILIRUBIN mg/dL 0 96 1 00   AMMONIA umol/L 54  --          Results from last 7 days   Lab Units 02/08/23  0607 02/07/23  0315 02/06/23  1935   GLUCOSE RANDOM mg/dL 86 117 170*             BETA-HYDROXYBUTYRATE   Date Value Ref Range Status   03/07/2022 0 2 <0 6 mmol/L Final            Results from last 7 days   Lab Units 02/08/23  0920   PROTIME seconds 13 8   INR  1 04   PTT seconds 25           Medications:   Scheduled Medications:  folic acid, 1 mg, Oral, Daily  gabapentin, 300 mg, Oral, BID  lacosamide, 150 mg, Oral, BID  metoprolol succinate, 25 mg, Oral, BID  multivitamin-minerals, 1 tablet, Oral, Daily  thiamine, 100 mg, Oral, Daily  traZODone, 50 mg, Oral, Daily      Continuous IV Infusions:     PRN Meds:  hydrOXYzine HCL, 25 mg, Oral, BID PRN  LORazepam, 2 mg, Intravenous, Q8H PRN        Discharge Plan: D    Network Utilization Review Department  ATTENTION: Please call with any questions or concerns to [phone] and carefully listen to the prompts so that you are directed to the right person  All voicemails are confidential   Michael Moore all requests for admission clinical reviews, approved or denied determinations and any other requests to dedicated fax number below belonging to the campus where the patient is receiving treatment   List of dedicated fax numbers for the Facilities:  FACILITY NAME UR FAX NUMBER   ADMISSION DENIALS (Administrative/Medical Necessity) [phone]   [address] (Maternity/NICU/Pediatrics) Leti Weeks [facility] [phone]     Buddy Burkitt [address] [facility] 77 [phone]   [address] [place] [address] Alise Byrnes  [phone] [address] [employer] [employer] [address] [facility] [phone]

## 2023-02-11 ENCOUNTER — APPOINTMENT (OUTPATIENT)
Dept: NEUROLOGY | Facility: CLINIC | Age: 63
End: 2023-02-11

## 2023-02-11 LAB
ALBUMIN SERPL BCP-MCNC: 3.1 G/DL (ref 3.5–5)
ALP SERPL-CCNC: 91 U/L (ref 46–116)
ALT SERPL W P-5'-P-CCNC: 39 U/L (ref 12–78)
ANION GAP SERPL CALCULATED.3IONS-SCNC: 4 MMOL/L (ref 4–13)
AST SERPL W P-5'-P-CCNC: 43 U/L (ref 5–45)
BASOPHILS # BLD AUTO: 0.02 THOUSANDS/ÂΜL (ref 0–0.1)
BASOPHILS NFR BLD AUTO: 1 % (ref 0–1)
BILIRUB SERPL-MCNC: 0.3 MG/DL (ref 0.2–1)
BUN SERPL-MCNC: 16 MG/DL (ref 5–25)
CALCIUM ALBUM COR SERPL-MCNC: 9.7 MG/DL (ref 8.3–10.1)
CALCIUM SERPL-MCNC: 9 MG/DL (ref 8.3–10.1)
CHLORIDE SERPL-SCNC: 109 MMOL/L (ref 96–108)
CO2 SERPL-SCNC: 24 MMOL/L (ref 21–32)
CREAT SERPL-MCNC: 0.54 MG/DL (ref 0.6–1.3)
EOSINOPHIL # BLD AUTO: 0.11 THOUSAND/ÂΜL (ref 0–0.61)
EOSINOPHIL NFR BLD AUTO: 3 % (ref 0–6)
ERYTHROCYTE [DISTWIDTH] IN BLOOD BY AUTOMATED COUNT: 12.6 % (ref 11.6–15.1)
GFR SERPL CREATININE-BSD FRML MDRD: 101 ML/MIN/1.73SQ M
GLUCOSE SERPL-MCNC: 103 MG/DL (ref 65–140)
HCT VFR BLD AUTO: 35.8 % (ref 34.8–46.1)
HGB BLD-MCNC: 11.2 G/DL (ref 11.5–15.4)
IMM GRANULOCYTES # BLD AUTO: 0.02 THOUSAND/UL (ref 0–0.2)
IMM GRANULOCYTES NFR BLD AUTO: 1 % (ref 0–2)
LYMPHOCYTES # BLD AUTO: 1.3 THOUSANDS/ÂΜL (ref 0.6–4.47)
LYMPHOCYTES NFR BLD AUTO: 35 % (ref 14–44)
MAGNESIUM SERPL-MCNC: 1.8 MG/DL (ref 1.6–2.6)
MCH RBC QN AUTO: 32.4 PG (ref 26.8–34.3)
MCHC RBC AUTO-ENTMCNC: 31.3 G/DL (ref 31.4–37.4)
MCV RBC AUTO: 104 FL (ref 82–98)
MONOCYTES # BLD AUTO: 0.52 THOUSAND/ÂΜL (ref 0.17–1.22)
MONOCYTES NFR BLD AUTO: 14 % (ref 4–12)
NEUTROPHILS # BLD AUTO: 1.71 THOUSANDS/ÂΜL (ref 1.85–7.62)
NEUTS SEG NFR BLD AUTO: 46 % (ref 43–75)
NRBC BLD AUTO-RTO: 0 /100 WBCS
PLATELET # BLD AUTO: 44 THOUSANDS/UL (ref 149–390)
PMV BLD AUTO: 11.2 FL (ref 8.9–12.7)
POTASSIUM SERPL-SCNC: 3.8 MMOL/L (ref 3.5–5.3)
PROT SERPL-MCNC: 6.7 G/DL (ref 6.4–8.4)
RBC # BLD AUTO: 3.46 MILLION/UL (ref 3.81–5.12)
SODIUM SERPL-SCNC: 137 MMOL/L (ref 135–147)
WBC # BLD AUTO: 3.68 THOUSAND/UL (ref 4.31–10.16)

## 2023-02-11 RX ORDER — LIDOCAINE 50 MG/G
1 PATCH TOPICAL DAILY
Status: DISCONTINUED | OUTPATIENT
Start: 2023-02-12 | End: 2023-02-13 | Stop reason: HOSPADM

## 2023-02-11 RX ORDER — LACOSAMIDE 100 MG/1
100 TABLET ORAL 2 TIMES DAILY
Status: DISCONTINUED | OUTPATIENT
Start: 2023-02-11 | End: 2023-02-13

## 2023-02-11 RX ADMIN — TRAZODONE HYDROCHLORIDE 50 MG: 50 TABLET ORAL at 08:28

## 2023-02-11 RX ADMIN — Medication 1 TABLET: at 08:27

## 2023-02-11 RX ADMIN — THIAMINE HCL TAB 100 MG 100 MG: 100 TAB at 08:27

## 2023-02-11 RX ADMIN — LACOSAMIDE 100 MG: 100 TABLET, FILM COATED ORAL at 21:40

## 2023-02-11 RX ADMIN — GABAPENTIN 300 MG: 300 CAPSULE ORAL at 08:27

## 2023-02-11 RX ADMIN — LACOSAMIDE 150 MG: 150 TABLET, FILM COATED ORAL at 08:28

## 2023-02-11 RX ADMIN — GABAPENTIN 300 MG: 300 CAPSULE ORAL at 17:44

## 2023-02-11 RX ADMIN — METOPROLOL SUCCINATE 25 MG: 25 TABLET, EXTENDED RELEASE ORAL at 08:27

## 2023-02-11 NOTE — ASSESSMENT & PLAN NOTE
· Baseline hgb appears to be around 9; currently 10 8  · Macrocytic; suspect in the setting of poor nutrition/alcohol use  · Continue to monitor

## 2023-02-11 NOTE — PROGRESS NOTES
NEUROLOGY RESIDENCY PROGRESS NOTE     Name: Brice Garcia   Age & Sex: 58 y o  female   MRN: [id number]  Unit/Bed#: [facility] 717-01   Encounter: [id number]    Recommendations for outpatient neurological follow up have yet to be determined  ASSESSMENT & PLAN     * Focal epilepsy w/ breakthrough seizures  Assessment & Plan  58 y o  F w/ 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), alcohol abuse, osteoporosis, focal epilepsy maintained on lacosamide 150mg BID who original presented to [address] [place] [address] on 2/6/23 for breakthrough seizures, panic attack and is now transferred to [facility] for vEEG monitoring to characterize spells  Patient had an admission in 3/2022 during which vEEG demonstrated multiple electrographic seizures emanating from L temporal region  MR brain scan (DWI) showed changes in L hippocampus consistent w/ SE  Seizures were treated, patient was discharged on lacosamide, and repeat MR brain scan showed resolution of the L hippocampal DWI abnormality  Past seizures have manifested as hand clenching, decreased alertness  W/U:    vEEG: EEG started 2/11 at 00:10  No seizures  No epileptiform discharges  Excessive beta when awake suggesting benzo/barbiturate  EEG otherwise normal     Impression: Daily episodes described as panic attacks are concerning for L temporal seizures manifesting as sudden onset of intense fear and impending doom       Plan:   · Reduce dose to 100 mg Vimpat  · We will send a evening dose of Vimpat if no further events are captured on video EEG  · vEEG monitoring  · Seizure precautions  · Continue frequent neuro checks, please notify with any change  · Ativan PRN   · Monitor hemodynamics  · Monitor CMP, CBC      Anxiety, Depression, and Panic Attacks  Assessment & Plan  · Has hx of anxiety and depression   · Continue Trazodone, Atarax, and Gabapentin    Pancytopenia ([facility] [address] )  Assessment & Plan  Possibly due to patient's alcohol usage  We will continue to monitor with serial CBCs    Alcohol abuse  Assessment & Plan  Patient has a history of alcohol abuse- drinks 6/7 beers per week  Plan:   · CIWA protocol  · Thiamine, Folic Acid, Multivitamin   · Monitor LFTs (CMP ordered for  AM)  · Monitor for worsening sign/symptoms            SUBJECTIVE     Patient was seen and examined  No acute events overnight  Patient recollected her story  In addition, she noted that she has not had any seizures since she has been in a understand  Since she has been here at [place], she stated she thought she had an episode of 30 seconds of headache around 8 in the morning this a m but did not feel that was quite the panic attack that was super severe when she had it on Monday earlier this week  Prior to that she had an episode that [place] in the early afternoon on 02/10 that lasted from 30 seconds to 1 5 minutes  She understands that she is here to get her spell characterized to determine if this is possibly a new site of seizure or this anything else concerning at this time  She denies any fevers/chill, chest pain, shortness of breath, abdominal pain, nausea, vomiting, diarrhea, problems urinating, problems  With bowel movements, and is eating/drinking without problem        She denies any headaches, syncope, paresthesias, diplopia, visual changes, sudden onset weakness, garbled speech, dysarthria/slurring of words,  Loss of bowel or bladder        Review of Systems     See above    OBJECTIVE     Patient ID: Keith Leary is a 58 y o  female      Vitals:    02/10/23 1908 02/10/23 2029 23 0800 23 0800   BP: 121/72 121/72  118/69   BP Location: Right arm      Pulse: 71 74  70   Resp: 16   18   Temp: 98 1 °F (36 7 °C)   98 2 °F (36 8 °C)   TempSrc: Oral      SpO2: 96% 98% 96% 90%   Weight: 56 3 kg (124 lb 1 9 oz)      Height: 5' 4 96" (1 65 m)         Temperature:   Temp (24hrs), Av 1 °F (36 7 °C), Min:97 9 °F (36 6 °C), Max:98 2 °F (36 8 °C)    Temperature: 98 2 °F (36 8 °C)      Physical Exam  Vitals and nursing note reviewed  Constitutional:       General: She is not in acute distress  Appearance: She is well-developed  HENT:      Head: Normocephalic and atraumatic  Eyes:      Extraocular Movements: EOM normal       Conjunctiva/sclera: Conjunctivae normal       Pupils: Pupils are equal, round, and reactive to light  Cardiovascular:      Rate and Rhythm: Normal rate and regular rhythm  Heart sounds: No murmur heard  Pulmonary:      Effort: Pulmonary effort is normal  No respiratory distress  Breath sounds: Normal breath sounds  Abdominal:      Palpations: Abdomen is soft  Tenderness: There is no abdominal tenderness  Musculoskeletal:         General: No swelling  Cervical back: Neck supple  Skin:     General: Skin is warm and dry  Capillary Refill: Capillary refill takes less than 2 seconds  Neurological:      Mental Status: She is alert and oriented to person, place, and time  Motor: Motor strength is normal       Coordination: Finger-Nose-Finger Test and Heel to [facility] Test normal       Deep Tendon Reflexes:      Reflex Scores:       Tricep reflexes are 2+ on the right side and 2+ on the left side  Bicep reflexes are 2+ on the right side and 2+ on the left side  Brachioradialis reflexes are 2+ on the right side and 2+ on the left side  Patellar reflexes are 2+ on the right side and 2+ on the left side  Achilles reflexes are 2+ on the right side and 2+ on the left side  Psychiatric:         Mood and Affect: Mood normal           Neurologic Exam     Mental Status   Oriented to person, place, and time  Able to name object  Able to repeat  Normal comprehension  Cranial Nerves     CN II   Visual fields full to confrontation  CN III, IV, VI   Pupils are equal, round, and reactive to light    Extraocular motions are normal    CN III: no CN III palsy  CN VI: no CN VI palsy  Nystagmus: none     CN V   Facial sensation intact  CN VII   Facial expression full, symmetric  CN VIII   CN VIII normal      CN IX, X   CN IX normal    CN X normal      CN XI   CN XI normal      CN XII   CN XII normal      Motor Exam   Right arm pronator drift: absent  Left arm pronator drift: absent    Strength   Strength 5/5 throughout  Sensory Exam   Light touch normal      Gait, Coordination, and Reflexes     Coordination   Finger to nose coordination: normal  Heel to shin coordination: normal    Tremor   Resting tremor: absent  Intention tremor: absent  Action tremor: absent    Reflexes   Right brachioradialis: 2+  Left brachioradialis: 2+  Right biceps: 2+  Left biceps: 2+  Right triceps: 2+  Left triceps: 2+  Right patellar: 2+  Left patellar: 2+  Right achilles: 2+  Left achilles: 2+  Right plantar: normal  Left plantar: normal         LABORATORY DATA     Labs: I have personally reviewed pertinent reports  and I have personally reviewed pertinent films in PACS  Results from last 7 days   Lab Units 02/11/23 0459 02/08/23 0607 02/07/23 0315 02/06/23  1935   WBC Thousand/uL 3 68* 3 60* 5 12 3 14*   HEMOGLOBIN g/dL 11 2* 10 8* 11 4* 11 1*   HEMATOCRIT % 35 8 34 2* 35 3 34 2*   PLATELETS Thousands/uL 44* 34* 42* 39*   NEUTROS PCT % 46  --  45 54   MONOS PCT % 14*  --  11 14*      Results from last 7 days   Lab Units 02/11/23 0459 02/08/23 0607 02/07/23 0315 02/06/23  1935   SODIUM mmol/L 137 136 138 134*   POTASSIUM mmol/L 3 8 3 8 3 9 4 0   CHLORIDE mmol/L 109* 106 102 98   CO2 mmol/L 24 24 29 28   BUN mg/dL 16 16 9 11   CREATININE mg/dL 0 54* 0 64 0 67 0 72   CALCIUM mg/dL 9 0 8 0* 8 9 8 9   ALK PHOS U/L 91  --  120* 120*   ALT U/L 39  --  26 27   AST U/L 43  --  42* 41*              Results from last 7 days   Lab Units 02/08/23  0920   INR  1 04   PTT seconds 25               IMAGING & DIAGNOSTIC TESTING     Radiology Results: I have personally reviewed pertinent reports     and I have personally reviewed pertinent films in PACS    No orders to display       Other Diagnostic Testing: I have personally reviewed pertinent reports  and I have personally reviewed pertinent films in PACS    ACTIVE MEDICATIONS     Current Facility-Administered Medications   Medication Dose Route Frequency   • gabapentin (NEURONTIN) capsule 300 mg  300 mg Oral BID   • hydrOXYzine HCL (ATARAX) tablet 25 mg  25 mg Oral BID PRN   • lacosamide (VIMPAT) tablet 100 mg  100 mg Oral BID   • LORazepam (ATIVAN) injection 2 mg  2 mg Intravenous Q8H PRN   • metoprolol succinate (TOPROL-XL) 24 hr tablet 25 mg  25 mg Oral BID   • multivitamin-minerals (CENTRUM) tablet 1 tablet  1 tablet Oral Daily   • thiamine tablet 100 mg  100 mg Oral Daily   • traZODone (DESYREL) tablet 50 mg  50 mg Oral Daily       Prior to Admission medications    Medication Sig Start Date End Date Taking?  Authorizing Provider   gabapentin (NEURONTIN) 300 mg capsule Take 1 capsule (300 mg total) by mouth 2 (two) times a day 1/11/23  Yes Arielle Tong MD   lacosamide (VIMPAT) 200 mg tablet Take 1 tablet (200 mg total) by mouth 2 (two) times a day  Patient taking differently: Take 150 mg by mouth every 12 (twelve) hours 2/6/23   Marc Gimenez MD   metoprolol succinate (TOPROL-XL) 25 mg 24 hr tablet Take 1 tablet (25 mg total) by mouth 2 (two) times a day 12/27/22 4/26/23  Brandan Montejo MD   traZODone (DESYREL) 50 mg tablet Take 1 tablet (50 mg total) by mouth in the morning 1/11/23   Gigi Mendez MD         VTE Pharmacologic Prophylaxis: none b/c thrombocytopenia   VTE Mechanical Prophylaxis: sequential compression device    ==  [facility] Neurology Residency, PGY-2

## 2023-02-11 NOTE — ASSESSMENT & PLAN NOTE
Assessment:  Patient is a 58-year-old female with a history of a 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, alcohol abuse, osteoporosis, and focal epilepsy maintained on lacosamide 150 mg twice daily who originally presented to [facility] on February 6, 2023 for breakthrough seizures, panic attacks an EEG monitoring to characterize spells  The patient had an admission in March 2022 during which video EEG demonstrated multiple electrographic seizures emanating from left temporal region  MRI brain, DWI sequence, showed changes in left hippocampus consistent with status epilepticus  Seizures  were treated, and was discharged on lacosamide, and a repeat MR I of the brain demonstrated resolution of left hippocampal DWI abnormality past seizures have manifested as hand clenching, and decreased alertness  Work-up: (vEEG started 2/11 at 00:10)   - 02/13/2023 1557: No seizures  No epileptiform discharges  Excessive beta when awake suggesting benzo/barbiturate  EEG otherwise normal  No additional events  Had event 2/11 at 17:01  Flashback of memories, felt scary, felt short of breath  No EEG correlate, but does not exclude seizure  Focal aware seizures often have no EEG correlate  Impression:   Daily episodes described as panic attacks are concerning for L temporal seizures manifesting as sudden onset of intense fear and impending doom  Plan:   - Increase Lacosamide to 200mg BID at discharge  - Outpatient follow-up with neurology   - Outpatient follow-up with psychiatry  - Outpatient follow-up with primary care provider

## 2023-02-11 NOTE — ASSESSMENT & PLAN NOTE
Teresita Musa is a 58 y o  female with a past medical history of traumatic subarachnoid hemorrhage in August 2021, and focal epilepsy with status epilepticus with subcortical electrographically seizures from the left temporal region on video EEG and MRI findings of DWI and FLAIR signal hyperintensity involving the left hippocampal formation that manifest as hand clenching and decreased alertness   The patient presents to [address] [place] [address] for further evaluation of recent episodes including a GTC and panic     Home AEDS: Lacosimide 150mg BID     Impression: Panic attacks are concerning for seizures given that left temporal seizures can cause sudden onset of intense fear and impending doom       Plan:  - Continue home Lacosimide 150mg BID  - EEG Monitoring is warranted, patient is to be transfered to [facility] for vEEG monitoring in an to attempt to capture the panic attack episodes  - Seizure precautions  - Continue frequent neuro checks, please notify with any change  - Ativan PRN for seizure activity > 2 minutes

## 2023-02-11 NOTE — ASSESSMENT & PLAN NOTE
· Chronic pancytopenia  · Baseline platelet appears to be in the 30s  · Possibly secondary to alcohol abuse  · Will hold DVT ppx; SCDs ordered    · Monitor CBC

## 2023-02-11 NOTE — ASSESSMENT & PLAN NOTE
- Possibly due to patient's alcohol usage  - We will continue to monitor with serial CBCs    Recent Labs     02/11/23  0459 02/12/23  0541 02/13/23  0538   PLT 44* 42* 53*

## 2023-02-11 NOTE — ASSESSMENT & PLAN NOTE
- Patient has a history of alcohol abuse- drinks 6/7 beers per week      Plan:   - [facility] protocol  - Thiamine, Folic Acid, Multivitamin   - Monitor LFTs  - Monitor for worsening sign/symptoms

## 2023-02-11 NOTE — ASSESSMENT & PLAN NOTE
· Follows with [facility]ology for management of suspected stress induced cardiomyopathy and subsequent reduction in EF  · Likely as a result of status epilepticus in early 2022 per cardiology notes  · Echo 7/2022 with EF 60% and G1DD  · Managed on metoprolol as an outpatient; continue while inpatient  · Monitor I/O and daily weights  · Follow up with cardiology as an outpatient

## 2023-02-11 NOTE — ASSESSMENT & PLAN NOTE
· Drinks 6-7 beers per week  Last drink on 2/4    · Pt is outside the window for withdrawal, will d/c CIWA protocol  · Has scored <3 x72hrs  · Continue thiamine and multivitamin supplementation  · Folate level >20, will hold on folic acid supplementation  · CMP in the AM

## 2023-02-11 NOTE — ASSESSMENT & PLAN NOTE
· Appears chronic; baseline appears to be in the 30s  · Will hold DVT ppx; SCDs ordered    · Monitor CBC

## 2023-02-11 NOTE — ASSESSMENT & PLAN NOTE
· Drinks 6-7 beers per week  Last drink on 2/4    · Continue thiamine and multivitamin supplementation  · Folate level >20, will hold on folic acid supplementation  · Continue thiamine  · Continue supportive care

## 2023-02-11 NOTE — PROGRESS NOTES
[facility]  Progress Note - Brice Garcia 1960, 58 y o  female MRN: [id number]  Unit/Bed#: [facility] 717-01 Encounter: [id number]  Primary Care Provider: Gigi Mendez MD   Date and time admitted to hospital: 2/10/2023  7:01 PM    * Focal epilepsy w/ breakthrough seizures  Assessment & Plan  Pt [facility] as a transfer [facility] for cvEEG 2/2 breakthrough seizure activity  Patient with underlying history of focal epilepsy maintained on lacosamide and outpatient  · Neurology, continue with lacosamide  · cvEEG monitoring  · Seizure precautions  · Ativan PRN seizure activity  · Rest of plan per primary team    Alcohol abuse  Assessment & Plan  · Drinks 6-7 beers per week  Last drink on 2/4  · Continue thiamine and multivitamin supplementation  · Folate level >20, will hold on folic acid supplementation  · Continue thiamine  · Continue supportive care    Chronic combined systolic and diastolic CHF (congestive heart failure) ([facility] [address] )  Assessment & Plan  · Follows with  cardiology for management of suspected stress induced cardiomyopathy and subsequent reduction in EF  · Likely as a result of status epilepticus in early 2022 per cardiology notes  · Echo 7/2022 with EF 60% and G1DD  · Managed on metoprolol as an outpatient; continue while inpatient  · Monitor I/O and daily weights  · Follow up with cardiology as an outpatient  Anxiety, Depression, and Panic Attacks  Assessment & Plan  · Follows with  Psychiatry as an outpatient  · Continue home doses of trazodone, atarax and gabapentin    Thrombocytopenia (HCC)  Assessment & Plan  · Chronic pancytopenia  · Baseline platelet appears to be in the 30s  · Possibly secondary to alcohol abuse  · Will hold DVT ppx; SCDs ordered    · Monitor CBC    Refusal of blood transfusions as patient is [employer]  Assessment & Plan  Noted     Anemia  Assessment & Plan  · Baseline hgb appears to be around 9; currently 10 8  · Macrocytic; suspect in the setting of poor nutrition/alcohol use  · Continue to monitor        VTE Pharmacologic Prophylaxis: VTE Score: 3 Moderate Risk (Score 3-4) - Pharmacological DVT Prophylaxis Contraindicated  Sequential Compression Devices Ordered  Patient Centered Rounds: I performed bedside rounds with nursing staff today  Discussions with Specialists or Other Care Team Provider:     Education and Discussions with Family / Patient: Updated  () at bedside  Time Spent for Care: 45 minutes  More than 50% of total time spent on counseling and coordination of care as described above  Current Length of Stay: 1 day(s)  Current Patient Status: Inpatient   Certification Statement: The patient will continue to require additional inpatient hospital stay due to Management of breakthrough seizure      Code Status: Level 1 - Full Code    Subjective:   Patient seen and examined  Comfortable in bed video EEG  I feel better today  Good appetite  No nausea vomiting or diarrhea   at bedside    Objective:     Vitals:   Temp (24hrs), Av 1 °F (36 7 °C), Min:97 9 °F (36 6 °C), Max:98 2 °F (36 8 °C)    Temp:  [97 9 °F (36 6 °C)-98 2 °F (36 8 °C)] 98 2 °F (36 8 °C)  HR:  [70-74] 70  Resp:  [16-18] 18  BP: (116-121)/(69-74) 118/69  SpO2:  [90 %-98 %] 90 %  Body mass index is 20 68 kg/m²  Input and Output Summary (last 24 hours):   No intake or output data in the 24 hours ending 23 1139    Physical Exam:   Physical Exam  Vitals reviewed  Constitutional:       Appearance: Normal appearance  She is not ill-appearing  HENT:      Head: Normocephalic and atraumatic  Mouth/Throat:      Mouth: Mucous membranes are moist       Pharynx: No oropharyngeal exudate  Eyes:      Extraocular Movements: Extraocular movements intact  Conjunctiva/sclera: Conjunctivae normal       Pupils: Pupils are equal, round, and reactive to light     Cardiovascular:      Rate and Rhythm: Normal rate and regular rhythm  Pulses: Normal pulses  Heart sounds: Normal heart sounds  No murmur heard  Pulmonary:      Effort: Pulmonary effort is normal       Breath sounds: Normal breath sounds  Abdominal:      General: Bowel sounds are normal  There is no distension  Palpations: Abdomen is soft  Tenderness: There is no abdominal tenderness  Musculoskeletal:         General: Normal range of motion  Cervical back: Normal range of motion and neck supple  Right lower leg: No edema  Left lower leg: No edema  Skin:     General: Skin is warm and dry  Findings: No rash  Neurological:      Mental Status: She is alert and oriented to person, place, and time  Cranial Nerves: No cranial nerve deficit  Psychiatric:         Mood and Affect: Mood normal             Additional Data:     Labs:  Results from last 7 days   Lab Units 02/11/23  0459   WBC Thousand/uL 3 68*   HEMOGLOBIN g/dL 11 2*   HEMATOCRIT % 35 8   PLATELETS Thousands/uL 44*   NEUTROS PCT % 46   LYMPHS PCT % 35   MONOS PCT % 14*   EOS PCT % 3     Results from last 7 days   Lab Units 02/11/23  0459   SODIUM mmol/L 137   POTASSIUM mmol/L 3 8   CHLORIDE mmol/L 109*   CO2 mmol/L 24   BUN mg/dL 16   CREATININE mg/dL 0 54*   ANION GAP mmol/L 4   CALCIUM mg/dL 9 0   ALBUMIN g/dL 3 1*   TOTAL BILIRUBIN mg/dL 0 30   ALK PHOS U/L 91   ALT U/L 39   AST U/L 43   GLUCOSE RANDOM mg/dL 103     Results from last 7 days   Lab Units 02/08/23  0920   INR  1 04                   Lines/Drains:  Invasive Devices     Peripheral Intravenous Line  Duration           Peripheral IV 02/07/23 Right;Upper;Ventral (anterior) Arm 4 days                      Imaging: No pertinent imaging reviewed      Recent Cultures (last 7 days):         Last 24 Hours Medication List:   Current Facility-Administered Medications   Medication Dose Route Frequency Provider Last Rate   • gabapentin  300 mg Oral BID Aidee Major MD     • hydrOXYzine HCL  25 mg Oral BID PRN Celia Kaplan MD     • lacosamide  100 mg Oral BID [place] DO     • LORazepam  2 mg Intravenous Q8H PRN Nati Young MD     • metoprolol succinate  25 mg Oral BID Deep Berry MD     • multivitamin-minerals  1 tablet Oral Daily Celia Kaplan MD     • thiamine  100 mg Oral Daily Celia Kaplan MD     • traZODone  50 mg Oral Daily Celia Kaplan MD          Today, Patient Was Seen By: Sivan Ghosh DO    **Please Note: This note may have been constructed using a voice recognition system  **

## 2023-02-11 NOTE — ASSESSMENT & PLAN NOTE
Pt presents as a transfer from [facility] for cvEEG 2/2 breakthrough seizure activity     · Managed on lacosimide as an outpatient; continue while inpatient  · cvEEG monitoring  · Seizure precautions  · Ativan PRN seizure activity  · Rest of plan per primary team

## 2023-02-11 NOTE — ASSESSMENT & PLAN NOTE
Pt presents as a transfer from [facility] for cvEEG 2/2 breakthrough seizure activity     Patient with underlying history of focal epilepsy maintained on lacosamide and outpatient  · Neurology, continue with lacosamide  · cvEEG monitoring  · Seizure precautions  · Ativan PRN seizure activity  · Rest of plan per primary team

## 2023-02-11 NOTE — ED PROVIDER NOTES
History  Chief Complaint   Patient presents with   • Seizure - Prior Hx Of     Per pts , pt has a seizure history  Evaluated at [facility] yesterday for "she wouldn't stop seizing  They got her to stop and sent her home  She had another one today and I wanted to bring her before it got bad "     HPI   Pt is a 57 y/o F with history of alcohol use disorder, seizures presenting with reports of seizure like activity at home  Her significant other is in the room as well and explains that pt had a seizure for an extended period of time and was evaluated at [facility] on 2/05  She received an IV loading dose of vimpat while there and then subsequently had her PO vimpat increased by her neurologist  Since then, pt reports taking one increased dose but reports recurrent seizure like activity earlier this afternoon lasting "30 minutes" with post-ictal period  Pt denies drinking any alcohol recently, but significant other reports finding multiple tall cans of beer empty around the house, suggesting she is likely still drinking  How much is unclear  Prior to Admission Medications   Prescriptions Last Dose Informant Patient Reported?  Taking?   gabapentin (NEURONTIN) 300 mg capsule 2/6/2023  No Yes   Sig: Take 1 capsule (300 mg total) by mouth 2 (two) times a day   lacosamide (VIMPAT) 200 mg tablet 2/6/2023  No Yes   Sig: Take 1 tablet (200 mg total) by mouth 2 (two) times a day   Patient taking differently: Take 150 mg by mouth every 12 (twelve) hours   metoprolol succinate (TOPROL-XL) 25 mg 24 hr tablet 2/6/2023  No Yes   Sig: Take 1 tablet (25 mg total) by mouth 2 (two) times a day   traZODone (DESYREL) 50 mg tablet Unknown  No No   Sig: Take 1 tablet (50 mg total) by mouth in the morning      Facility-Administered Medications: None       Past Medical History:   Diagnosis Date   • Fracture    • Hepatitis     Hep A    • Insomnia     10mar2016 resolved   • Osteoporosis     14jun2016 resolved   • Pancreatitis    • Seasonal allergies    • Urine discoloration 11/11/2019   • Varicella    • Vomiting 11/13/2019       Past Surgical History:   Procedure Laterality Date   • IR BIOPSY BONE  8/17/2021   • IR BIOPSY BONE MARROW  11/14/2019   • TUBAL LIGATION  1985       Family History   Problem Relation Age of Onset   • Anxiety disorder Mother    • Depression Mother    • No Known Problems Father    • No Known Problems Sister    • No Known Problems Sister    • No Known Problems Brother    • Cancer Paternal Grandmother         unknown    • No Known Problems Daughter    • No Known Problems Maternal Grandmother    • Cancer Maternal Grandfather    • No Known Problems Paternal Grandfather    • Breast cancer Neg Hx    • Ovarian cancer Neg Hx      I have reviewed and agree with the history as documented  E-Cigarette/Vaping   • E-Cigarette Use Never User      E-Cigarette/Vaping Substances   • Nicotine No    • THC No    • CBD No    • Flavoring No    • Other No    • Unknown No      Social History     Tobacco Use   • Smoking status: Never   • Smokeless tobacco: Never   Vaping Use   • Vaping Use: Never used   Substance Use Topics   • Alcohol use: Yes     Alcohol/week: 7 0 standard drinks     Types: 7 Cans of beer per week     Comment: occassional   • Drug use: No        Review of Systems   Constitutional: Positive for fatigue  Negative for chills and fever  HENT: Negative for ear pain and sore throat  Eyes: Negative for pain and visual disturbance  Respiratory: Negative for cough and shortness of breath  Cardiovascular: Negative for chest pain and palpitations  Gastrointestinal: Negative for abdominal pain and vomiting  Genitourinary: Negative for dysuria and hematuria  Musculoskeletal: Negative for arthralgias and back pain  Skin: Negative for color change and rash  Neurological: Positive for seizures  Negative for syncope  All other systems reviewed and are negative        Physical Exam  ED Triage Vitals Temperature Pulse Respirations Blood Pressure SpO2   02/06/23 1823 02/06/23 1823 02/06/23 1823 02/06/23 1823 02/06/23 1823   98 4 °F (36 9 °C) 84 20 (!) 148/106 98 %      Temp Source Heart Rate Source Patient Position - Orthostatic VS BP Location FiO2 (%)   02/06/23 1823 02/06/23 1823 02/06/23 1823 02/06/23 1823 --   Oral Monitor Sitting Right arm       Pain Score       02/06/23 2259       No Pain             Orthostatic Vital Signs  Vitals:    02/09/23 1529 02/09/23 2147 02/10/23 0755 02/10/23 1529   BP: 127/84 127/75 115/69 116/74   Pulse: 76 77 73    Patient Position - Orthostatic VS:           Physical Exam  Vitals and nursing note reviewed  Constitutional:       General: She is not in acute distress  Appearance: She is well-developed  HENT:      Head: Normocephalic and atraumatic  Eyes:      Conjunctiva/sclera: Conjunctivae normal    Cardiovascular:      Rate and Rhythm: Normal rate and regular rhythm  Heart sounds: No murmur heard  Pulmonary:      Effort: Pulmonary effort is normal  No respiratory distress  Breath sounds: Normal breath sounds  Abdominal:      Palpations: Abdomen is soft  Tenderness: There is no abdominal tenderness  Musculoskeletal:         General: No swelling  Cervical back: Neck supple  Skin:     General: Skin is warm and dry  Capillary Refill: Capillary refill takes less than 2 seconds  Neurological:      Mental Status: She is alert  She is confused  Cranial Nerves: Cranial nerves 2-12 are intact  Sensory: Sensation is intact  Motor: Motor function is intact  Comments: Mildly confused, story not entirely lining up with significant other in room   Psychiatric:         Mood and Affect: Mood normal  Affect is blunt           ED Medications  Medications   lacosamide (VIMPAT) 200 mg in sodium chloride 0 9 % 100 mL IVPB (0 mg Intravenous Stopped 2/6/23 2109)   ondansetron (ZOFRAN) injection 4 mg (4 mg Intravenous Given 2/6/23 1934) LORazepam (ATIVAN) injection 1 mg (1 mg Intravenous Given 2/6/23 2116)       Diagnostic Studies  Results Reviewed     Procedure Component Value Units Date/Time    Comprehensive metabolic panel [[id number]]  (Abnormal) Collected: 02/06/23 1935    Lab Status: Final result Specimen: Blood from Arm, Right Updated: 02/06/23 2028     Sodium 134 mmol/L      Potassium 4 0 mmol/L      Chloride 98 mmol/L      CO2 28 mmol/L      ANION GAP 8 mmol/L      BUN 11 mg/dL      Creatinine 0 72 mg/dL      Glucose 170 mg/dL      Calcium 8 9 mg/dL      AST 41 U/L      ALT 27 U/L      Alkaline Phosphatase 120 U/L      Total Protein 6 7 g/dL      Albumin 3 6 g/dL      Total Bilirubin 1 00 mg/dL      eGFR 90 ml/min/1 73sq m     Narrative:      [place] guidelines for Chronic Kidney Disease (CKD):   •  Stage 1 with normal or high GFR (GFR > 90 mL/min/1 73 square meters)  •  Stage 2 Mild CKD (GFR = 60-89 mL/min/1 73 square meters)  •  Stage 3A Moderate CKD (GFR = 45-59 mL/min/1 73 square meters)  •  Stage 3B Moderate CKD (GFR = 30-44 mL/min/1 73 square meters)  •  Stage 4 Severe CKD (GFR = 15-29 mL/min/1 73 square meters)  •  Stage 5 End Stage CKD (GFR <15 mL/min/1 73 square meters)  Note: GFR calculation is accurate only with a steady state creatinine    CBC and differential [[id number]]  (Abnormal) Collected: 02/06/23 1935    Lab Status: Final result Specimen: Blood from Arm, Right Updated: 02/06/23 2016     WBC 3 14 Thousand/uL      RBC 3 40 Million/uL      Hemoglobin 11 1 g/dL      Hematocrit 34 2 %       fL      MCH 32 6 pg      MCHC 32 5 g/dL      RDW 12 5 %      MPV 11 0 fL      Platelets 39 Thousands/uL      nRBC 0 /100 WBCs      Neutrophils Relative 54 %      Immat GRANS % 0 %      Lymphocytes Relative 32 %      Monocytes Relative 14 %      Eosinophils Relative 0 %      Basophils Relative 0 %      Neutrophils Absolute 1 70 Thousands/µL      Immature Grans Absolute 0 01 Thousand/uL      Lymphocytes Absolute 0 99 Thousands/µL      Monocytes Absolute 0 43 Thousand/µL      Eosinophils Absolute 0 00 Thousand/µL      Basophils Absolute 0 01 Thousands/µL     Narrative: This is an appended report  These results have been appended to a previously verified report  No orders to display         Procedures  ECG 12 Lead Documentation Only    Date/Time: 2/6/2023 8:00 PM  Performed by: Matt Drew DO  Authorized by: Matt Drew DO     ECG reviewed by me, the ED Provider: yes    Patient location:  ED  Previous ECG:     Previous ECG:  Compared to current  Interpretation:     Interpretation: abnormal    Rate:     ECG rate:  83    ECG rate assessment: normal    Rhythm:     Rhythm: sinus rhythm    Ectopy:     Ectopy: none    QRS:     QRS axis:  Normal  Conduction:     Conduction: abnormal      Abnormal conduction: 1st degree    ST segments:     ST segments:  Normal  T waves:     T waves: normal    Other findings:     Other findings: prolonged qTc interval            ED Course                                       Medical Decision Making  57 y/o F patient presenting with recurrent symptoms of seizure  On initial evaluation, pt appeared mildly confused, fatigued consistent with post-ictal period  Based on history, it is possible pt is having recurrent seizures despite increased dosing of vimpat  It is also possible that pt is experiencing symptoms related to alcohol abuse or withdrawal, or it may be affecting the way her medication is treating her seizures  Pt was given dose of IV vimpat while in ED and began experiencing nonspecific limb movements and agitation  Otherwise, basic lab work-up including CBC, CMP was unremarkable  At that time, IV Ativan was given and case was discussed with KELLIE MCARTHUR who accepted the patient in stable condition for further work-up regarding her persistent seizures      History of alcohol use disorder: acute illness or injury  Recurrent seizures [facility]): acute illness or injury  Amount and/or Complexity of Data Reviewed  Labs: ordered  Risk  Prescription drug management  Decision regarding hospitalization  Disposition  Final diagnoses:   Recurrent seizures ([facility] )   History of alcohol use disorder     Time reflects when diagnosis was documented in both MDM as applicable and the Disposition within this note     Time User Action Codes Description Comment    2/6/2023  9:46 PM Rich [place] Add [R56 9] Seizure ([facility] [address] [facility] )     2/6/2023  9:46 PM Rich [place] Remove [R56 9] Seizure ([facility] )     2/6/2023  9:46 PM Sarai [place] 46 [D87 [id number]] Recurrent seizures ([facility] )     2/6/2023  9:46 PM Rich [place] Add [R37 [id number]] History of alcohol use disorder     2/6/2023 11:22 PM Andre Aguirre [K81 [id number]] Epilepsy ([facility] )     2/8/2023  9:54 AM Haylie Wood Add [G40 [id number]] Symptomatic focal epilepsy [facility])       ED Disposition     ED Disposition   Admit    Condition   Stable    Date/Time   Mon Feb 6, 2023  9:46 PM    Comment   Case was discussed with PRAVIN and the patient's admission status was agreed to be inpatient to the service of Dr Wilfredo Devine              Follow-up Information    None         Discharge Medication List as of 2/10/2023  6:24 PM      CONTINUE these medications which have NOT CHANGED    Details   gabapentin (NEURONTIN) 300 mg capsule Take 1 capsule (300 mg total) by mouth 2 (two) times a day, Starting Wed 1/11/2023, Normal      lacosamide (VIMPAT) 200 mg tablet Take 1 tablet (200 mg total) by mouth 2 (two) times a day, Starting Mon 2/6/2023, Normal      metoprolol succinate (TOPROL-XL) 25 mg 24 hr tablet Take 1 tablet (25 mg total) by mouth 2 (two) times a day, Starting Tue 12/27/2022, Until Wed 4/26/2023, Normal      traZODone (DESYREL) 50 mg tablet Take 1 tablet (50 mg total) by mouth in the morning, Starting Wed 1/11/2023, Normal         STOP taking these medications       hydrOXYzine HCL (ATARAX) 25 mg tablet Comments:   Reason for Stopping:             No discharge procedures on file  PDMP Review       Value Time User    PDMP Reviewed  Yes 2/10/2023  7:14 PM Idania Kerns MD           ED Provider  Attending physically available and evaluated Brisa Lopez  I managed the patient along with the ED Attending      Electronically Signed by         Leonardo Mcgee DO  02/11/23 0234

## 2023-02-11 NOTE — ASSESSMENT & PLAN NOTE
· Follows with  Psychiatry as an outpatient  · Continue home doses of trazodone, atarax and gabapentin

## 2023-02-11 NOTE — CONSULTS
Leti Disla [address] 1960, 58 y o  female MRN: [id number]  Unit/Bed#: [facility] 278-93 Encounter: [id number]  Primary Care Provider: Norma Weber MD   Date and time admitted to hospital: 2/10/2023  7:01 PM    Inpatient consult to Internal Medicine  Consult performed by: PAZ Kim  Consult ordered by: PAZ Kim        * Focal epilepsy w/ breakthrough seizures  Assessment & Plan  Pt presents as a transfer from [facility] for cvEEG 2/2 breakthrough seizure activity  · Managed on lacosimide as an outpatient; continue while inpatient  · cvEEG monitoring  · Seizure precautions  · Ativan PRN seizure activity  · Rest of plan per primary team    Chronic combined systolic and diastolic CHF (congestive heart failure) ([facility] [address] )  Assessment & Plan  · Follows with  cardiology for management of suspected stress induced cardiomyopathy and subsequent reduction in EF  · Likely as a result of status epilepticus in early 2022 per cardiology notes  · Echo 7/2022 with EF 60% and G1DD  · Managed on metoprolol as an outpatient; continue while inpatient  · Monitor I/O and daily weights  · Follow up with cardiology as an outpatient  Anxiety, Depression, and Panic Attacks  Assessment & Plan  · Follows with  Psychiatry as an outpatient  · Continue home doses of trazodone, atarax and gabapentin    Alcohol abuse  Assessment & Plan  · Drinks 6-7 beers per week  Last drink on 2/4  · Pt is outside the window for withdrawal, will d/c CIWA protocol  · Has scored <3 x72hrs  · Continue thiamine and multivitamin supplementation  · Folate level >20, will hold on folic acid supplementation  · CMP in the AM    Thrombocytopenia (HCC)  Assessment & Plan  · Appears chronic; baseline appears to be in the 30s  · Will hold DVT ppx; SCDs ordered    · Monitor CBC    Refusal of blood transfusions as patient is Restorationist  Assessment & Plan  Noted     Anemia  Assessment & Plan  · Baseline hgb appears to be around 9; currently 10 8  · Macrocytic; suspect in the setting of poor nutrition/alcohol use  · CBC in the AM      VTE Prophylaxis: VTE Score: 3 Moderate Risk (Score 3-4) - Pharmacological DVT Prophylaxis Contraindicated  Sequential Compression Devices Ordered  Recommendations for Discharge:  · Follow up with cardiology as an outpatient    Counseling / Coordination of Care Time: 20 minutes Greater than 50% of total time spent on patient counseling and coordination of care  Collaboration of Care: Were Recommendations Directly Discussed with Primary Treatment Team? Yes    History of Present Illness:  Sahara Tellez is a 58 y o  female who is originally admitted to the neurology service due to focal epilepsy with breakthrough seizures  We are consulted for medical management of her existing comorbidities  Pt presents as a transfer from [facility] for cvEEG monitoring  She has a PMH significant for chronic combined systolic and diastolic CHF, ETOH abuse, anxiety, and seizure disorder  She reports her seizures began approximately 1 year ago and have subsequently caused her a significant amount of stress and anxiety  As a result, she was diagnosed with combined systolic and diastolic CHF shortly after her first seizure likely 2/2 stress-induced cardiomyopathy  She has since followed with cardiology, and her EF has improved  She reports she began with another episode of seizure activity last weekend, causing her to fall and bite her tongue  Please see assessment and plan as noted above  Review of Systems:  Review of Systems   Constitutional: Negative  HENT: Negative  Eyes: Negative  Respiratory: Negative  Cardiovascular: Negative  Gastrointestinal: Negative  Endocrine: Negative  Genitourinary: Negative  Musculoskeletal: Negative  Skin: Negative  Allergic/Immunologic: Negative  Neurological: Positive for seizures  Hematological: Negative  Psychiatric/Behavioral: The patient is nervous/anxious  Past Medical and Surgical History:   Past Medical History:   Diagnosis Date   • Fracture    • Hepatitis     Hep A    • Insomnia     10mar2016 resolved   • Osteoporosis     14jun2016 resolved   • Pancreatitis    • Seasonal allergies    • Urine discoloration 11/11/2019   • Varicella    • Vomiting 11/13/2019       Past Surgical History:   Procedure Laterality Date   • IR BIOPSY BONE  8/17/2021   • IR BIOPSY BONE MARROW  11/14/2019   • TUBAL LIGATION  1985       Meds/Allergies:  all medications and allergies reviewed    Allergies: No Known Allergies    Social History:  Marital Status: /Civil Union  Substance Use History:   Social History     Substance and Sexual Activity   Alcohol Use Yes   • Alcohol/week: 7 0 standard drinks   • Types: 7 Cans of beer per week    Comment: occassional     Social History     Tobacco Use   Smoking Status Never   Smokeless Tobacco Never     Social History     Substance and Sexual Activity   Drug Use No       Family History:  Family History   Problem Relation Age of Onset   • Anxiety disorder Mother    • Depression Mother    • No Known Problems Father    • No Known Problems Sister    • No Known Problems Sister    • No Known Problems Brother    • Cancer Paternal Grandmother         unknown    • No Known Problems Daughter    • No Known Problems Maternal Grandmother    • Cancer Maternal Grandfather    • No Known Problems Paternal Grandfather    • Breast cancer Neg Hx    • Ovarian cancer Neg Hx        Physical Exam:   Vitals:   Blood Pressure: 121/72 (02/10/23 2029)  Pulse: 74 (02/10/23 2029)  Temperature: 98 1 °F (36 7 °C) (02/10/23 1908)  Temp Source: Oral (02/10/23 1908)  Respirations: 16 (02/10/23 1908)  Height: 5' 4 96" (165 cm) (02/10/23 1908)  Weight - Scale: 56 3 kg (124 lb 1 9 oz) (02/10/23 1908)  SpO2: 98 % (02/10/23 2029)    Physical Exam  Vitals reviewed     Constitutional:       General: She is not in acute distress  Appearance: Normal appearance  Eyes:      Extraocular Movements: Extraocular movements intact  Cardiovascular:      Rate and Rhythm: Normal rate and regular rhythm  Pulses: Normal pulses  Heart sounds: Normal heart sounds  Pulmonary:      Effort: Pulmonary effort is normal       Breath sounds: Normal breath sounds  Abdominal:      General: Bowel sounds are normal       Palpations: Abdomen is soft  Musculoskeletal:         General: Normal range of motion  Skin:     General: Skin is warm and dry  Capillary Refill: Capillary refill takes less than 2 seconds  Neurological:      General: No focal deficit present  Mental Status: She is alert and oriented to person, place, and time  Mental status is at baseline  Motor: No weakness  Psychiatric:         Mood and Affect: Mood normal          Thought Content: Thought content normal           Additional Data:   Lab Results:    Results from last 7 days   Lab Units 02/08/23  0607 02/07/23  0315   WBC Thousand/uL 3 60* 5 12   HEMOGLOBIN g/dL 10 8* 11 4*   HEMATOCRIT % 34 2* 35 3   PLATELETS Thousands/uL 34* 42*   NEUTROS PCT %  --  45   LYMPHS PCT %  --  44   MONOS PCT %  --  11   EOS PCT %  --  0     Results from last 7 days   Lab Units 02/08/23  0607 02/07/23  0315   SODIUM mmol/L 136 138   POTASSIUM mmol/L 3 8 3 9   CHLORIDE mmol/L 106 102   CO2 mmol/L 24 29   BUN mg/dL 16 9   CREATININE mg/dL 0 64 0 67   ANION GAP mmol/L 6 7   CALCIUM mg/dL 8 0* 8 9   ALBUMIN g/dL  --  3 8   TOTAL BILIRUBIN mg/dL  --  0 96   ALK PHOS U/L  --  120*   ALT U/L  --  26   AST U/L  --  42*   GLUCOSE RANDOM mg/dL 86 117     Results from last 7 days   Lab Units 02/08/23  0920   INR  1 04         Lab Results   Component Value Date/Time    HGBA1C 4 7 03/09/2022 04:29 AM    HGBA1C 5 3 04/22/2016 01:10 PM               Imaging: No pertinent imaging reviewed    No orders to display       EKG, Pathology, and Other Studies Reviewed on Admission:   · EKG: Sinus rhythm with 1st degree AVB  ** Please Note: This note may have been constructed using a voice recognition system   **

## 2023-02-11 NOTE — H&P
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), alcohol abuse, osteoporosis, focal epilepsy maintained on lacosamide 150mg BID who original presented to [address] [place] [address] on 2/6/23 for breakthrough seizures, panic attack and is now transferred to [facility] for vEEG monitoring to characterize spells  Patient had an admission in 3/2022 during which vEEG demonstrated multiple electrographic seizures emanating from L temporal region  MR brain scan (DWI) showed changes in L hippocampus consistent w/ SE  Seizures were treated, patient was discharged on lacosamide, and repeat MR brain scan showed resolution of the L hippocampal DWI abnormality  Past seizures have manifested as hand clenching, decreased alertness  Impression: Daily episodes described as panic attacks are concerning for L temporal seizures manifesting as sudden onset of intense fear and impending doom  Plan:   · Continue Lacosimide 150mg BID  · vEEG monitoring- informed EMU  · Seizure precautions  · Continue frequent neuro checks, please notify with any change  · Ativan PRN   · Monitor hemodynamics  · Monitor CMP, CBC  · Discussed w/ Dr Allyn Dan, Depression, and Panic Attacks  Assessment & Plan  · Has hx of anxiety and depression   · Continue Trazodone, Atarax, and Gabapentin    Alcohol abuse  Assessment & Plan  Patient has a history of alcohol abuse- drinks 6/7 beers per week  Plan:   · CIWA protocol  · Thiamine, Folic Acid, Multivitamin   · Monitor LFTs (CMP ordered for 2/11 AM)  · Monitor for worsening sign/symptoms    HTN (hypertension)  Assessment & Plan  · Continue metoprolol XR 25 mg BID  · Monitor hemodynamics closely    Recommendations for outpatient f/u have yet to be determined     Pending for discharge: vEEG      Anticipated Length of Stay:  Patient will be admitted on an Inpatient basis with an anticipated length of stay of 2 midnights  Justification for Hospital Stay: spell characterization    Chief Complaint: Seizure     HPI:  Poppy Noble is a 58 y o  F w/ 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), alcohol abuse, osteoporosis, focal epilepsy maintained on lacosamide 150mg BID who original presented to [address] [place] [address] on 2/6/23 for breakthrough seizures, panic attack and is now transferred to [facility] for vEEG monitoring to characterize spells  On 2/5/2023, patient was seen at [facility] ED due to 2 seizures (1 in AM, 1 in afternoon) and was given lacosamide 300mg load and discharged on home dose of lacosamide 150mg BID  As per chart review, Dr Denise Alvares had recommended changing the daily dose to 200 mg BID  Patient's  stated that she has tried taking the new dose but started being nauseous and vomited and had been taking 150mg BID  On 2/6/23 AM, patient had a seizure w/ head, neck and gaze deviation to the R in the morning the day of admission  For the next few hours the patient had altered mentation as described by , with some bizarre, purposeless behaviors and diminished communication ability  Later on 2/6/23 afternoon, she had an additional GTC seizure which prompted  to call EMS and patient was brought to [facility]  Patient had an admission in 3/2022 during which vEEG demonstrated multiple electrographic seizures emanating from L temporal region  MR brain scan (DWI) showed changes in L hippocampus consistent w/ SE  Seizures were treated, patient was discharged on lacosamide, and repeat MR brain scan showed resolution of the L hippocampal DWI abnormality  Past seizures have manifested as hand clenching, decreased alertness       While at [facility], patient endorsed multiple sudden onset episodes of panic lasting 30seconds to 1-1/2 minutes, last one was on 2/10 early afternoon  Patient seen and examined at bedside on 2/10/23 evening after transfer to B  Patient currently denies CP, SOB, palpitations, abdominal pain, nausea vomiting, fever, chills, headache, dizziness, new onset numbness/ tingling, new onset weakness, change in bowel/ bladder, difficulty speaking, difficulty swallowing, new vision changes  Discussed current clinical status and plan with patient- verbalizes understanding       Review of Systems- see HPI    Past Medical History:   Diagnosis Date   • Fracture    • Hepatitis     Hep A    • Insomnia     10mar2016 resolved   • Osteoporosis     14jun2016 resolved   • Pancreatitis    • Seasonal allergies    • Urine discoloration 11/11/2019   • Varicella    • Vomiting 11/13/2019       Allergies:   No Known Allergies      Past Surgical History:   Procedure Laterality Date   • IR BIOPSY BONE  8/17/2021   • IR BIOPSY BONE MARROW  11/14/2019   • TUBAL LIGATION  1985       Social Hx:     Social History     Substance and Sexual Activity   Alcohol Use Yes   • Alcohol/week: 7 0 standard drinks   • Types: 7 Cans of beer per week    Comment: occassional     Substance and Sexual Activity   Alcohol Use Yes   • Alcohol/week: 7 0 standard drinks   • Types: 7 Cans of beer per week    Comment: occassional        Substance and Sexual Activity   Drug Use No     Social History     Tobacco Use   Smoking Status Never   Smokeless Tobacco Never       Marital Status: /Civil Union     Family History:  Family History   Problem Relation Age of Onset   • Anxiety disorder Mother    • Depression Mother    • No Known Problems Father    • No Known Problems Sister    • No Known Problems Sister    • No Known Problems Brother    • Cancer Paternal Grandmother         unknown    • No Known Problems Daughter    • No Known Problems Maternal Grandmother    • Cancer Maternal Grandfather    • No Known Problems Paternal Grandfather    • Breast cancer Neg Hx    • Ovarian cancer Neg Hx Objective:   Vitals:    02/10/23 1908   BP: 121/72   Pulse: 71   Temp: 98 1 °F (36 7 °C)   SpO2: 96%      Physical Exam:   Vitals reviewed  General Examination: No distress, cooperative  CVS: S1, S2 noted  Pulm: normal effort on RA    Neurologic Exam:   Mental Status:  A, O x4  Follows simple, 3 step commands  Attention, memory intact  No neglect  Language: Normal fluency and comprehension  Cranial Nerves:  Pupils equal  VFFTC  EOMI, no nystagums  Face symmetric with equal activation  No dysarthria  Hearing intact grossly to voice  Tongue midline  Uvula midline  No palatal dysfunction, elevates symmetrically  Motor:  No pronator drift  Strength 5/5 proximally and distally in all 4 extremities  Reflexes: +2, and symmetric (biceps, brachioradialis, triceps, patella)  Plantars down b/l  Sensory: Light touch intact in all 4 extremities proximally, distally  Coordination: Finger to nose normal b/l  Gait: Deferred       Labs: I have personally reviewed pertinent reports  Results from last 7 days   Lab Units 02/08/23  0607 02/07/23 0315 02/06/23  1935   WBC Thousand/uL 3 60* 5 12 3 14*   HEMOGLOBIN g/dL 10 8* 11 4* 11 1*   HEMATOCRIT % 34 2* 35 3 34 2*   PLATELETS Thousands/uL 34* 42* 39*   NEUTROS PCT %  --  45 54   MONOS PCT %  --  11 14*      Results from last 7 days   Lab Units 02/08/23  0607 02/07/23 0315 02/06/23  1935   SODIUM mmol/L 136 138 134*   POTASSIUM mmol/L 3 8 3 9 4 0   CHLORIDE mmol/L 106 102 98   CO2 mmol/L 24 29 28   BUN mg/dL 16 9 11   CREATININE mg/dL 0 64 0 67 0 72   CALCIUM mg/dL 8 0* 8 9 8 9   ALK PHOS U/L  --  120* 120*   ALT U/L  --  26 27   AST U/L  --  42* 41*              Results from last 7 days   Lab Units 02/08/23  0920   INR  1 04   PTT seconds 25           Micro:  Lab Results   Component Value Date    BLOODCX No Growth After 5 Days  03/08/2022    BLOODCX No Growth After 5 Days  03/08/2022    BLOODCX No Growth After 5 Days   08/15/2021    BLOODCX No Growth After 5 Days  08/15/2021    URINECX No Growth <1000 cfu/mL 08/17/2021    URINECX No Growth <1000 cfu/mL 11/12/2019    SPUTUMCULTUR 2+ Growth of 08/16/2021       Imaging and diagnostic studies:    Radiology Results: I have personally reviewed pertinent reports  and I have personally reviewed pertinent films in PACS    MRI brain seizure with and without contrast (10/28/22):   · Stable superficial siderosis and scattered foci of chronic hemosiderin deposition consistent with patient's history of prior intracranial hemorrhage and trauma  The pattern of hemosiderin deposition is stable  No acute hemorrhage identified  · Mild diffuse parenchymal volume loss and minor chronic white matter change suggestive of chronic microangiopathy  · Symmetric hippocampal formations with regard to size and signal  The previously seen subtle diffusion abnormality within the left hippocampus is no longer evident      Other Diagnostic Testing: I have personally reviewed pertinent reports        Active Meds:   Current Facility-Administered Medications   Medication Dose Route Frequency   • [START ON 2/11/2023] enoxaparin (LOVENOX) subcutaneous injection 40 mg  40 mg Subcutaneous Daily   • [START ON 1/29/3633] folic acid (FOLVITE) tablet 1 mg  1 mg Oral Daily   • [START ON 2/11/2023] gabapentin (NEURONTIN) capsule 300 mg  300 mg Oral BID   • hydrOXYzine HCL (ATARAX) tablet 25 mg  25 mg Oral BID PRN   • influenza vaccine, recombinant, quadrivalent (FLUBLOK) IM injection 0 5 mL  0 5 mL Intramuscular Once   • lacosamide (VIMPAT) tablet 150 mg  150 mg Oral BID   • LORazepam (ATIVAN) injection 2 mg  2 mg Intravenous Q8H PRN   • metoprolol succinate (TOPROL-XL) 24 hr tablet 25 mg  25 mg Oral BID   • [START ON 2/11/2023] multivitamin-minerals (CENTRUM) tablet 1 tablet  1 tablet Oral Daily   • [START ON 2/11/2023] thiamine tablet 100 mg  100 mg Oral Daily   • [START ON 2/11/2023] traZODone (DESYREL) tablet 50 mg  50 mg Oral Daily         Home Medications: Prior to Admission medications    Medication Sig Start Date End Date Taking?  Authorizing Provider   gabapentin (NEURONTIN) 300 mg capsule Take 1 capsule (300 mg total) by mouth 2 (two) times a day 1/11/23   Sridhar Yang MD   lacosamide (VIMPAT) 200 mg tablet Take 1 tablet (200 mg total) by mouth 2 (two) times a day  Patient taking differently: Take 150 mg by mouth every 12 (twelve) hours 2/6/23   Tho Corrales MD   metoprolol succinate (TOPROL-XL) 25 mg 24 hr tablet Take 1 tablet (25 mg total) by mouth 2 (two) times a day 12/27/22 4/26/23  Maria Luz Fernando MD   traZODone (DESYREL) 50 mg tablet Take 1 tablet (50 mg total) by mouth in the morning 1/11/23   Sridhar Yang MD     ACTIVE MEDICATIONS      VTE Prophylaxis: Enoxaparin (Lovenox)  / sequential compression device   Code Status: Full code     ==  MD Mickey James [facility] Neurology Residency, PGY-II

## 2023-02-11 NOTE — ASSESSMENT & PLAN NOTE
· Baseline hgb appears to be around 9; currently 10 8  · Macrocytic; suspect in the setting of poor nutrition/alcohol use  · CBC in the AM

## 2023-02-12 ENCOUNTER — APPOINTMENT (OUTPATIENT)
Dept: NEUROLOGY | Facility: CLINIC | Age: 63
End: 2023-02-12

## 2023-02-12 PROBLEM — G40.109 FOCAL EPILEPSY (HCC): Chronic | Status: ACTIVE | Noted: 2022-03-08

## 2023-02-12 PROBLEM — F10.10 ALCOHOL ABUSE: Chronic | Status: ACTIVE | Noted: 2019-11-12

## 2023-02-12 PROBLEM — D61.818 PANCYTOPENIA (HCC): Chronic | Status: ACTIVE | Noted: 2022-03-15

## 2023-02-12 LAB
ALBUMIN SERPL BCP-MCNC: 3.1 G/DL (ref 3.5–5)
ALP SERPL-CCNC: 94 U/L (ref 46–116)
ALT SERPL W P-5'-P-CCNC: 44 U/L (ref 12–78)
ANION GAP SERPL CALCULATED.3IONS-SCNC: 3 MMOL/L (ref 4–13)
AST SERPL W P-5'-P-CCNC: 57 U/L (ref 5–45)
BILIRUB SERPL-MCNC: 0.34 MG/DL (ref 0.2–1)
BUN SERPL-MCNC: 12 MG/DL (ref 5–25)
CALCIUM ALBUM COR SERPL-MCNC: 9.8 MG/DL (ref 8.3–10.1)
CALCIUM SERPL-MCNC: 9.1 MG/DL (ref 8.3–10.1)
CHLORIDE SERPL-SCNC: 109 MMOL/L (ref 96–108)
CO2 SERPL-SCNC: 25 MMOL/L (ref 21–32)
CREAT SERPL-MCNC: 0.59 MG/DL (ref 0.6–1.3)
ERYTHROCYTE [DISTWIDTH] IN BLOOD BY AUTOMATED COUNT: 12.8 % (ref 11.6–15.1)
GFR SERPL CREATININE-BSD FRML MDRD: 98 ML/MIN/1.73SQ M
GLUCOSE SERPL-MCNC: 97 MG/DL (ref 65–140)
HCT VFR BLD AUTO: 35.6 % (ref 34.8–46.1)
HGB BLD-MCNC: 11 G/DL (ref 11.5–15.4)
MAGNESIUM SERPL-MCNC: 2 MG/DL (ref 1.6–2.6)
MCH RBC QN AUTO: 32.2 PG (ref 26.8–34.3)
MCHC RBC AUTO-ENTMCNC: 30.9 G/DL (ref 31.4–37.4)
MCV RBC AUTO: 104 FL (ref 82–98)
PLATELET # BLD AUTO: 42 THOUSANDS/UL (ref 149–390)
PMV BLD AUTO: 11.1 FL (ref 8.9–12.7)
POTASSIUM SERPL-SCNC: 4 MMOL/L (ref 3.5–5.3)
PROT SERPL-MCNC: 6.8 G/DL (ref 6.4–8.4)
RBC # BLD AUTO: 3.42 MILLION/UL (ref 3.81–5.12)
SODIUM SERPL-SCNC: 137 MMOL/L (ref 135–147)
WBC # BLD AUTO: 3.18 THOUSAND/UL (ref 4.31–10.16)

## 2023-02-12 RX ADMIN — LACOSAMIDE 100 MG: 100 TABLET, FILM COATED ORAL at 08:37

## 2023-02-12 RX ADMIN — Medication 1 TABLET: at 08:38

## 2023-02-12 RX ADMIN — TRAZODONE HYDROCHLORIDE 50 MG: 50 TABLET ORAL at 08:37

## 2023-02-12 RX ADMIN — GABAPENTIN 300 MG: 300 CAPSULE ORAL at 08:37

## 2023-02-12 RX ADMIN — METOPROLOL SUCCINATE 25 MG: 25 TABLET, EXTENDED RELEASE ORAL at 21:11

## 2023-02-12 RX ADMIN — THIAMINE HCL TAB 100 MG 100 MG: 100 TAB at 08:37

## 2023-02-12 RX ADMIN — GABAPENTIN 300 MG: 300 CAPSULE ORAL at 17:10

## 2023-02-12 RX ADMIN — METOPROLOL SUCCINATE 25 MG: 25 TABLET, EXTENDED RELEASE ORAL at 08:37

## 2023-02-12 RX ADMIN — LACOSAMIDE 100 MG: 100 TABLET, FILM COATED ORAL at 21:11

## 2023-02-12 NOTE — ASSESSMENT & PLAN NOTE
· stable H&H  · Macrocytic; suspect in the setting of poor nutrition/alcohol use  · Continue to monitor

## 2023-02-12 NOTE — PLAN OF CARE
Problem: NEUROSENSORY - ADULT  Goal: Achieves stable or improved neurological status  Description: INTERVENTIONS  - Monitor and report changes in neurological status  - Monitor vital signs such as temperature, blood pressure, glucose, and any other labs ordered   - Initiate measures to prevent increased intracranial pressure  - Monitor for seizure activity and implement precautions if appropriate      Outcome: Progressing  Goal: Remains free of injury related to seizures activity  Description: INTERVENTIONS  - Maintain airway, patient safety  and administer oxygen as ordered  - Monitor patient for seizure activity, document and report duration and description of seizure to physician/advanced practitioner  - If seizure occurs,  ensure patient safety during seizure  - Reorient patient post seizure  - Seizure pads on all 4 side rails  - Instruct patient/family to notify RN of any seizure activity including if an aura is experienced  - Instruct patient/family to call for assistance with activity based on nursing assessment  - Administer anti-seizure medications if ordered    Outcome: Progressing     Problem: MOBILITY - ADULT  Goal: Maintain or return to baseline ADL function  Description: INTERVENTIONS:  -  Assess patient's ability to carry out ADLs; assess patient's baseline for ADL function and identify physical deficits which impact ability to perform ADLs (bathing, care of mouth/teeth, toileting, grooming, dressing, etc )  - Assess/evaluate cause of self-care deficits   - Assess range of motion  - Assess patient's mobility; develop plan if impaired  - Assess patient's need for assistive devices and provide as appropriate  - Encourage maximum independence but intervene and supervise when necessary  - Involve family in performance of ADLs  - Assess for home care needs following discharge   - Consider OT consult to assist with ADL evaluation and planning for discharge  - Provide patient education as appropriate  Outcome: Progressing  Goal: Maintains/Returns to pre admission functional level  Description: INTERVENTIONS:  - Perform BMAT or MOVE assessment daily    - Set and communicate daily mobility goal to care team and patient/family/caregiver     - Collaborate with rehabilitation services on mobility goals if consulted  - Out of bed for toileting  - Record patient progress and toleration of activity level   Outcome: Progressing

## 2023-02-12 NOTE — PROGRESS NOTES
[facility]  Progress Note - Joesph Helton 1960, 58 y o  female MRN: [id number]  Unit/Bed#: [facility] 717-01 Encounter: [id number]  Primary Care Provider: Tab Diaz MD   Date and time admitted to hospital: 2/10/2023  7:01 PM    * Focal epilepsy w/ breakthrough seizures  Assessment & Plan  Pt presents as a transfer [facility] for cvEEG 2/2 breakthrough seizure activity  Patient with underlying history of focal epilepsy maintained on lacosamide  outpatient  · Per Neurology, continue with low-dose lacosamide  · cvEEG monitoring  · Seizure precautions  · Ativan PRN seizure activity  · Rest of plan per primary team    Alcohol abuse  Assessment & Plan  · Drinks 6-7 beers per week  Last drink on 2/4  · Continue thiamine and multivitamin supplementation  · Folate level >20, will hold on folic acid supplementation  · Continue thiamine  · Continue supportive care    Chronic combined systolic and diastolic CHF (congestive heart failure) ([facility] [address] )  Assessment & Plan  · Follows with  cardiology for management of suspected stress induced cardiomyopathy and subsequent reduction in EF  · Likely as a result of status epilepticus in early 2022 per cardiology notes  · Echo 7/2022 with EF 60% and G1DD  · Managed on metoprolol as an outpatient; continue while inpatient  · Monitor I/O and daily weights  · Follow up with cardiology as an outpatient  Anxiety, Depression, and Panic Attacks  Assessment & Plan  · Follows with  Psychiatry as an outpatient  · Continue home doses of trazodone, atarax and gabapentin    Thrombocytopenia (HCC)  Assessment & Plan  · Chronic pancytopenia  · Baseline platelet appears to be in the 30s  · Possibly secondary to alcohol abuse  · Will hold DVT ppx; SCDs ordered    · Monitor CBC    Refusal of blood transfusions as patient is [employer]  Assessment & Plan  Noted     Anemia  Assessment & Plan  · stable H&H  · Macrocytic; suspect in the setting of poor nutrition/alcohol use  · Continue to monitor         VTE Pharmacologic Prophylaxis: VTE Score: 3 contraindicated due to thrombocytopenia  Patient Centered Rounds: I performed bedside rounds with nursing staff today  Discussions with Specialists or Other Care Team Provider:     Education and Discussions with Family / Patient: Patient  Time Spent for Care: 45 minutes  More than 50% of total time spent on counseling and coordination of care as described above  Current Length of Stay: 2 day(s)  Current Patient Status: Inpatient   Certification Statement: The patient will continue to require additional inpatient hospital stay due to Video EEG      Code Status: Level 1 - Full Code    Subjective:   Patient seen and examined  Comfortable in bed   no event overnight  No nausea vomiting or diarrhea    Objective:     Vitals:   Temp (24hrs), Av 3 °F (36 8 °C), Min:98 °F (36 7 °C), Max:98 7 °F (37 1 °C)    Temp:  [98 °F (36 7 °C)-98 7 °F (37 1 °C)] 98 7 °F (37 1 °C)  HR:  [69-93] 73  Resp:  [16-17] 17  BP: (100-126)/(68-75) 100/68  SpO2:  [93 %-98 %] 93 %  Body mass index is 20 68 kg/m²  Input and Output Summary (last 24 hours):      Intake/Output Summary (Last 24 hours) at 2023 1429  Last data filed at 2023 1054  Gross per 24 hour   Intake 360 ml   Output --   Net 360 ml       Physical Exam:   Physical Exam   Patient is awake alert in no acute distress  On video EEG  Lungs clear to auscultation bilateral anteriorly  Heart positive S1-S2 no murmur  Abdomen soft nontender  Lower extremities no edema  Additional Data:     Labs:  Results from last 7 days   Lab Units 23  0541 23  0459   WBC Thousand/uL 3 18* 3 68*   HEMOGLOBIN g/dL 11 0* 11 2*   HEMATOCRIT % 35 6 35 8   PLATELETS Thousands/uL 42* 44*   NEUTROS PCT %  --  46   LYMPHS PCT %  --  35   MONOS PCT %  --  14*   EOS PCT %  --  3     Results from last 7 days   Lab Units 23  0541   SODIUM mmol/L 137   POTASSIUM mmol/L 4 0   CHLORIDE mmol/L 109*   CO2 mmol/L 25   BUN mg/dL 12   CREATININE mg/dL 0 59*   ANION GAP mmol/L 3*   CALCIUM mg/dL 9 1   ALBUMIN g/dL 3 1*   TOTAL BILIRUBIN mg/dL 0 34   ALK PHOS U/L 94   ALT U/L 44   AST U/L 57*   GLUCOSE RANDOM mg/dL 97     Results from last 7 days   Lab Units 02/08/23  0920   INR  1 04                   Lines/Drains:  Invasive Devices     Peripheral Intravenous Line  Duration           Peripheral IV 02/11/23 Distal;Dorsal (posterior); Right Forearm 1 day                      Imaging: No pertinent imaging reviewed  Recent Cultures (last 7 days):         Last 24 Hours Medication List:   Current Facility-Administered Medications   Medication Dose Route Frequency Provider Last Rate   • gabapentin  300 mg Oral BID Deep Uribe MD     • hydrOXYzine HCL  25 mg Oral BID PRN Rosalva Gallardo MD     • lacosamide  100 mg Oral BID Mc Escobar DO     • lidocaine  1 patch Topical Daily Stephanie Gunn MD     • LORazepam  2 mg Intravenous Q8H PRN Stephanie Gunn MD     • metoprolol succinate  25 mg Oral BID Rosalva Gallardo MD     • multivitamin-minerals  1 tablet Oral Daily Rosalva Gallardo MD     • thiamine  100 mg Oral Daily Rosalva Gallardo MD     • traZODone  50 mg Oral Daily Rosalva Gallardo MD          Today, Patient Was Seen By: Srinivas Hernandez DO    **Please Note: This note may have been constructed using a voice recognition system  **
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
## 2023-02-12 NOTE — ASSESSMENT & PLAN NOTE
Pt presents as a transfer from [facility] for cvEEG 2/2 breakthrough seizure activity     Patient with underlying history of focal epilepsy maintained on lacosamide  outpatient  · Per Neurology, continue with low-dose lacosamide  · cvEEG monitoring  · Seizure precautions  · Ativan PRN seizure activity  · Rest of plan per primary team

## 2023-02-12 NOTE — PLAN OF CARE
Problem: NEUROSENSORY - ADULT  Goal: Achieves stable or improved neurological status  Description: INTERVENTIONS  - Monitor and report changes in neurological status  - Monitor vital signs such as temperature, blood pressure, glucose, and any other labs ordered   - Initiate measures to prevent increased intracranial pressure  - Monitor for seizure activity and implement precautions if appropriate      Outcome: Progressing  Goal: Remains free of injury related to seizures activity  Description: INTERVENTIONS  - Maintain airway, patient safety  and administer oxygen as ordered  - Monitor patient for seizure activity, document and report duration and description of seizure to physician/advanced practitioner  - If seizure occurs,  ensure patient safety during seizure  - Reorient patient post seizure  - Seizure pads on all 4 side rails  - Instruct patient/family to notify RN of any seizure activity including if an aura is experienced  - Instruct patient/family to call for assistance with activity based on nursing assessment  - Administer anti-seizure medications if ordered    Outcome: Progressing     Problem: MOBILITY - ADULT  Goal: Maintain or return to baseline ADL function  Description: INTERVENTIONS:  -  Assess patient's ability to carry out ADLs; assess patient's baseline for ADL function and identify physical deficits which impact ability to perform ADLs (bathing, care of mouth/teeth, toileting, grooming, dressing, etc )  - Assess/evaluate cause of self-care deficits   - Assess range of motion  - Assess patient's mobility; develop plan if impaired  - Assess patient's need for assistive devices and provide as appropriate  - Encourage maximum independence but intervene and supervise when necessary  - Involve family in performance of ADLs  - Assess for home care needs following discharge   - Consider OT consult to assist with ADL evaluation and planning for discharge  - Provide patient education as appropriate  Outcome: Progressing  Goal: Maintains/Returns to pre admission functional level  Description: INTERVENTIONS:  - Perform BMAT or MOVE assessment daily    - Set and communicate daily mobility goal to care team and patient/family/caregiver  - Collaborate with rehabilitation services on mobility goals if consulted  - Perform Range of Motion 2 times a day  - Reposition patient every 2 hours    - Dangle patient 2 times a day  - Stand patient 2 times a day  - Ambulate patient 2 times a day  - Out of bed to chair 2 times a day   - Out of bed for meals 2 times a day  - Out of bed for toileting  - Record patient progress and toleration of activity level   Outcome: Progressing     Problem: PAIN - ADULT  Goal: Verbalizes/displays adequate comfort level or baseline comfort level  Description: Interventions:  - Encourage patient to monitor pain and request assistance  - Assess pain using appropriate pain scale  - Administer analgesics based on type and severity of pain and evaluate response  - Implement non-pharmacological measures as appropriate and evaluate response  - Consider cultural and social influences on pain and pain management  - Notify physician/advanced practitioner if interventions unsuccessful or patient reports new pain  Outcome: Progressing     Problem: INFECTION - ADULT  Goal: Absence or prevention of progression during hospitalization  Description: INTERVENTIONS:  - Assess and monitor for signs and symptoms of infection  - Monitor lab/diagnostic results  - Monitor all insertion sites, i e  indwelling lines, tubes, and drains  - Monitor endotracheal if appropriate and nasal secretions for changes in amount and color  - [place] appropriate cooling/warming therapies per order  - Administer medications as ordered  - Instruct and encourage patient and family to use good hand hygiene technique  - Identify and instruct in appropriate isolation precautions for identified infection/condition  Outcome: Progressing  Goal: Absence of fever/infection during neutropenic period  Description: INTERVENTIONS:  - Monitor WBC    Outcome: Progressing     Problem: SAFETY ADULT  Goal: Maintain or return to baseline ADL function  Description: INTERVENTIONS:  -  Assess patient's ability to carry out ADLs; assess patient's baseline for ADL function and identify physical deficits which impact ability to perform ADLs (bathing, care of mouth/teeth, toileting, grooming, dressing, etc )  - Assess/evaluate cause of self-care deficits   - Assess range of motion  - Assess patient's mobility; develop plan if impaired  - Assess patient's need for assistive devices and provide as appropriate  - Encourage maximum independence but intervene and supervise when necessary  - Involve family in performance of ADLs  - Assess for home care needs following discharge   - Consider OT consult to assist with ADL evaluation and planning for discharge  - Provide patient education as appropriate  Outcome: Progressing  Goal: Maintains/Returns to pre admission functional level  Description: INTERVENTIONS:  - Perform BMAT or MOVE assessment daily    - Set and communicate daily mobility goal to care team and patient/family/caregiver  - Collaborate with rehabilitation services on mobility goals if consulted  - Perform Range of Motion 2 times a day  - Reposition patient every 2 hours    - Dangle patient 2 times a day  - Stand patient 2 times a day  - Ambulate patient 2 times a day  - Out of bed to chair 2 times a day   - Out of bed for meals 2 times a day  - Out of bed for toileting  - Record patient progress and toleration of activity level   Outcome: Progressing  Goal: Patient will remain free of falls  Description: INTERVENTIONS:  - Educate patient/family on patient safety including physical limitations  - Instruct patient to call for assistance with activity   - Consult OT/PT to assist with strengthening/mobility   - Keep Call bell within reach  - Keep bed low and locked with side rails adjusted as appropriate  - Keep care items and personal belongings within reach  - Initiate and maintain comfort rounds  - Make Fall Risk Sign visible to staff  - Offer Toileting every 2 Hours, in advance of need  - Initiate/Maintain bed alarm  - Apply yellow socks and bracelet for high fall risk patients  Outcome: Progressing     Problem: DISCHARGE PLANNING  Goal: Discharge to home or other facility with appropriate resources  Description: INTERVENTIONS:  - Identify barriers to discharge w/patient and caregiver  - Arrange for needed discharge resources and transportation as appropriate  - Identify discharge learning needs (meds, wound care, etc )  - Arrange for interpretive services to assist at discharge as needed  - Refer to Case Management Department for coordinating discharge planning if the patient needs post-hospital services based on physician/advanced practitioner order or complex needs related to functional status, cognitive ability, or social support system  Outcome: Progressing

## 2023-02-12 NOTE — QUICK NOTE
The patient has known focal epilepsy, and alcohol use  The patient had witnessed seizure-like activity  Patient demonstrated electrographic seizures  Due to the aforementioned, the patient has been counseled that [employer] paperwork will be filled out  [employer] paperwork was completed and submitted on February 12, 2023 at 1538  The patient was counseled on not driving for a minimum of 6 months  The patient is aware of [employer] paperwork as she has had this completed in the past   Patient was counseled on basic seizure safety at home such as operating kitchen appliances, operating heavy machinery, climbing, bathing, swimming, etc  Patient demonstrated understanding  If driving privileges were to be revoked, a 6-month period of being seizure-free is required, and subsequently patient's neurologist will be able to complete paperwork to reinstate patient's driving privileges  An image of the paperwork will be uploaded to the patient's chart

## 2023-02-12 NOTE — PROGRESS NOTES
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, alcohol abuse, osteoporosis, and focal epilepsy maintained on lacosamide 150 mg twice daily who originally presented to [facility] on February 6, 2023 for breakthrough seizures, panic attacks an EEG monitoring to characterize spells  The patient had an admission in March 2022 during which video EEG demonstrated multiple electrographic seizures emanating from left temporal region  MRI brain, DWI sequence, showed changes in left hippocampus consistent with status epilepticus  Seizures  were treated, and was discharged on lacosamide, and a repeat MR I of the brain demonstrated resolution of left hippocampal DWI abnormality past seizures have manifested as hand clenching, and decreased alertness  Work-up: (vEEG started 2/11 at 00:10)  - 02/11/23: No seizures  No epileptiform discharges  Excessive beta when awake suggesting benzo/barbiturate  EEG otherwise normal   VEEG started 2/11 at 00:10  No seizures  No epileptiform discharges  Excessive beta when awake suggesting benzo/barbiturate  EEG otherwise normal    - Event 2/11 at 17:01: Flashback of memories, felt scary, felt short of breath  No EEG correlate, but does not exclude seizure  Focal aware seizures often have no EEG correlate  Impression:   Daily episodes described as panic attacks are concerning for L temporal seizures manifesting as sudden onset of intense fear and impending doom       Plan:   - Continue reduced dosage of Vimpat 100 mg twice daily  - If no further events are captured can order Vimpat level  - Continue vEEG monitoring until 24 hours without seizure-like activity being electrographic and non electrographic   - Seizure precautions  - Continue frequent neuro checks, please notify with any change  - Ativan PRN   - Monitor hemodynamics  - Monitor CMP, CBC    Anxiety, Depression, and Panic Attacks  Assessment & Plan  - Has hx of anxiety and depression   - Continue Trazodone, Atarax, and Gabapentin    Alcohol abuse  Assessment & Plan  - Patient has a history of alcohol abuse- drinks 6/7 beers per week  Plan:   - [facility] protocol  - Thiamine, Folic Acid, Multivitamin   - Monitor LFTs  - Monitor for worsening sign/symptoms    Pancytopenia ([address] )  Assessment & Plan  - Possibly due to patient's alcohol usage  - We will continue to monitor with serial CBCs    Recent Labs     02/11/23  0459 02/12/23  0541   PLT 44* 42*     SUBJECTIVE     Patient was resting in bed and being monitored on video EEG when rounding with attending  The patient appeared nervous and anxious during examination  The patient did not report any new complaints or concerns  The patient is aware that she is currently on lower dose of her Vimpat in order to capture and characterize an event if one were to occur  Review of Systems   Constitutional: Negative for chills and fever  HENT: Positive for mouth sores ( Bite on right lateral tongue)  Negative for ear pain, sore throat, trouble swallowing and voice change  Eyes: Negative for pain, discharge, redness and visual disturbance  Respiratory: Negative for cough, chest tightness, shortness of breath and wheezing  Cardiovascular: Negative for chest pain and palpitations  Gastrointestinal: Negative for abdominal pain, constipation, diarrhea, nausea and vomiting  Genitourinary: Negative for dysuria, hematuria and urgency  Musculoskeletal: Negative for arthralgias, back pain, neck pain and neck stiffness  Skin: Negative for color change and rash  Neurological: Positive for facial asymmetry  Negative for dizziness, tremors, seizures, syncope, weakness, light-headedness, numbness and headaches  Psychiatric/Behavioral: Negative for behavioral problems, confusion, decreased concentration, dysphoric mood, hallucinations, self-injury, sleep disturbance and suicidal ideas  The patient is nervous/anxious  The patient is not hyperactive  All other systems reviewed and are negative  OBJECTIVE     Patient ID: Jazmín Sheehan is a 58 y o  female  Vitals:    23 0800 23 0837 23 0837 23 1054   BP:  126/75 126/75 100/68   Pulse:  70 70 73   Resp:       Temp:    98 7 °F (37 1 °C)   TempSrc:       SpO2: 95%  98% 93%   Weight:       Height:          Temperature:   Temp (24hrs), Av 3 °F (36 8 °C), Min:98 °F (36 7 °C), Max:98 7 °F (37 1 °C)    Temperature: 98 7 °F (37 1 °C)      Physical Exam  Vitals and nursing note reviewed  HENT:      Head: Normocephalic and atraumatic  Nose: Nose normal       Mouth/Throat:      Mouth: Mucous membranes are moist       Pharynx: Oropharynx is clear  No oropharyngeal exudate  Eyes:      Extraocular Movements: Extraocular movements intact and EOM normal       Conjunctiva/sclera: Conjunctivae normal       Pupils: Pupils are equal, round, and reactive to light  Cardiovascular:      Rate and Rhythm: Normal rate  Pulses: Normal pulses  Pulmonary:      Effort: Pulmonary effort is normal    Abdominal:      General: There is no distension  Musculoskeletal:         General: Normal range of motion  Cervical back: Normal range of motion  Skin:     General: Skin is warm  Neurological:      Mental Status: She is alert  Cranial Nerves: Cranial nerve deficit present  Sensory: No sensory deficit  Motor: No weakness        Coordination: Coordination normal       Deep Tendon Reflexes: Reflexes normal         Neurologic Exam     Mental Status   Patient was alert and conversational during examination  The patient was able to follow commands, express herself, and appeared to have normal attention and concentration  Cranial Nerves     CN II   Right visual field deficit: none  Left visual field deficit: none     CN III, IV, VI   Pupils are equal, round, and reactive to light  Extraocular motions are normal    Right pupil: Size: 4 mm  Shape: regular  Reactivity: brisk  Left pupil: Size: 4 mm  Shape: regular  Reactivity: brisk  CN III: no CN III palsy  CN VI: no CN VI palsy  Nystagmus: none   Diplopia: none    CN V   Right facial sensation deficit: none  Left facial sensation deficit: none    CN VII   Right facial weakness: peripheral  Left facial weakness: none    CN VIII   Hearing: intact    CN IX, X   Palate: symmetric    CN XI   Right sternocleidomastoid strength: normal  Left sternocleidomastoid strength: normal  Right trapezius strength: normal  Left trapezius strength: normal    CN XII   Tongue: not atrophic  Fasciculations: absent  Tongue deviation: none    Motor Exam   Muscle bulk: normal  Overall muscle tone: normal  Right arm tone: normal  Left arm tone: normal  Right arm pronator drift: absent  Left arm pronator drift: absent  Right leg tone: normal  Left leg tone: normal  Patient demonstrated 4/5 strength in bilateral deltoid, biceps, triceps, iliopsoas, quadriceps, peroneal, gastroc  Sensory Exam   Patient did not report any sensory differences on light touch in any extremity  Gait, Coordination, and Reflexes   Gait examination deferred as patient is on VEEG  Patient demonstrated normal finger-to-nose and normal heel-to-shin bilaterally  Patient did not demonstrate a resting, intention, action tremor in any extremity  Patient's reflexes were 2+ bilaterally in brachioradialis, biceps, triceps, patella, Achilles  Patient had good  strength    Plantar reflexes were normal bilaterally no Idalia's bilaterally no ankle clonus bilaterally  LABORATORY DATA     Labs: I have personally reviewed pertinent reports  Results from last 7 days   Lab Units 02/12/23  0541 02/11/23  0459 02/08/23  0607 02/07/23  0315 02/06/23  1935   WBC Thousand/uL 3 18* 3 68* 3 60* 5 12 3 14*   HEMOGLOBIN g/dL 11 0* 11 2* 10 8* 11 4* 11 1*   HEMATOCRIT % 35 6 35 8 34 2* 35 3 34 2*   PLATELETS Thousands/uL 42* 44* 34* 42* 39*   NEUTROS PCT %  --  46  --  45 54   MONOS PCT %  --  14*  --  11 14*      Results from last 7 days   Lab Units 02/12/23  0541 02/11/23  0459 02/08/23  0607 02/07/23  0315   SODIUM mmol/L 137 137 136 138   POTASSIUM mmol/L 4 0 3 8 3 8 3 9   CHLORIDE mmol/L 109* 109* 106 102   CO2 mmol/L 25 24 24 29   BUN mg/dL 12 16 16 9   CREATININE mg/dL 0 59* 0 54* 0 64 0 67   CALCIUM mg/dL 9 1 9 0 8 0* 8 9   ALK PHOS U/L 94 91  --  120*   ALT U/L 44 39  --  26   AST U/L 57* 43  --  42*     Results from last 7 days   Lab Units 02/12/23  0541 02/11/23  0459   MAGNESIUM mg/dL 2 0 1 8          Results from last 7 days   Lab Units 02/08/23  0920   INR  1 04   PTT seconds 25               IMAGING & DIAGNOSTIC TESTING     Radiology Results: I have personally reviewed pertinent reports  No orders to display     Other Diagnostic Testing: I have personally reviewed pertinent reports        ACTIVE MEDICATIONS     Current Facility-Administered Medications   Medication Dose Route Frequency   • gabapentin (NEURONTIN) capsule 300 mg  300 mg Oral BID   • hydrOXYzine HCL (ATARAX) tablet 25 mg  25 mg Oral BID PRN   • lacosamide (VIMPAT) tablet 100 mg  100 mg Oral BID   • lidocaine (LIDODERM) 5 % patch 1 patch  1 patch Topical Daily   • LORazepam (ATIVAN) injection 2 mg  2 mg Intravenous Q8H PRN   • metoprolol succinate (TOPROL-XL) 24 hr tablet 25 mg  25 mg Oral BID   • multivitamin-minerals (CENTRUM) tablet 1 tablet  1 tablet Oral Daily   • thiamine tablet 100 mg  100 mg Oral Daily   • traZODone (DESYREL) tablet 50 mg  50 mg Oral Daily     Prior to Admission medications    Medication Sig Start Date End Date Taking?  Authorizing Provider   gabapentin (NEURONTIN) 300 mg capsule Take 1 capsule (300 mg total) by mouth 2 (two) times a day 1/11/23  Yes Arielle Tong MD   lacosamide (VIMPAT) 200 mg tablet Take 1 tablet (200 mg total) by mouth 2 (two) times a day  Patient taking differently: Take 150 mg by mouth every 12 (twelve) hours 2/6/23   Tita Plascencia MD   metoprolol succinate (TOPROL-XL) 25 mg 24 hr tablet Take 1 tablet (25 mg total) by mouth 2 (two) times a day 12/27/22 4/26/23  Charline Eckert MD   traZODone (DESYREL) 50 mg tablet Take 1 tablet (50 mg total) by mouth in the morning 1/11/23   Zoila Espinoza MD     VTE Pharmacologic Prophylaxis: Pharmacologic VTE Prophylaxis contraindicated due to Thrombocytopenia  VTE Mechanical Prophylaxis: sequential compression device    ==  DO Mickey Romo [facility] Neurology Residency, PGY-2

## 2023-02-13 ENCOUNTER — TELEPHONE (OUTPATIENT)
Dept: PSYCHIATRY | Facility: CLINIC | Age: 63
End: 2023-02-13

## 2023-02-13 ENCOUNTER — TRANSITIONAL CARE MANAGEMENT (OUTPATIENT)
Dept: FAMILY MEDICINE CLINIC | Facility: CLINIC | Age: 63
End: 2023-02-13

## 2023-02-13 ENCOUNTER — APPOINTMENT (OUTPATIENT)
Dept: NEUROLOGY | Facility: CLINIC | Age: 63
End: 2023-02-13

## 2023-02-13 ENCOUNTER — TELEPHONE (OUTPATIENT)
Dept: FAMILY MEDICINE CLINIC | Facility: CLINIC | Age: 63
End: 2023-02-13

## 2023-02-13 VITALS
HEART RATE: 67 BPM | RESPIRATION RATE: 18 BRPM | SYSTOLIC BLOOD PRESSURE: 117 MMHG | DIASTOLIC BLOOD PRESSURE: 66 MMHG | TEMPERATURE: 98.2 F | BODY MASS INDEX: 20.68 KG/M2 | WEIGHT: 124.12 LBS | OXYGEN SATURATION: 95 % | HEIGHT: 65 IN

## 2023-02-13 DIAGNOSIS — F41.9 ANXIETY: ICD-10-CM

## 2023-02-13 DIAGNOSIS — F41.0 PANIC ATTACK: ICD-10-CM

## 2023-02-13 LAB
ALBUMIN SERPL BCP-MCNC: 3.4 G/DL (ref 3.5–5)
ALP SERPL-CCNC: 101 U/L (ref 46–116)
ALT SERPL W P-5'-P-CCNC: 69 U/L (ref 12–78)
ANION GAP SERPL CALCULATED.3IONS-SCNC: 2 MMOL/L (ref 4–13)
AST SERPL W P-5'-P-CCNC: 78 U/L (ref 5–45)
BILIRUB SERPL-MCNC: 0.33 MG/DL (ref 0.2–1)
BUN SERPL-MCNC: 13 MG/DL (ref 5–25)
CALCIUM ALBUM COR SERPL-MCNC: 10 MG/DL (ref 8.3–10.1)
CALCIUM SERPL-MCNC: 9.5 MG/DL (ref 8.3–10.1)
CHLORIDE SERPL-SCNC: 108 MMOL/L (ref 96–108)
CO2 SERPL-SCNC: 28 MMOL/L (ref 21–32)
CREAT SERPL-MCNC: 0.6 MG/DL (ref 0.6–1.3)
ERYTHROCYTE [DISTWIDTH] IN BLOOD BY AUTOMATED COUNT: 12.6 % (ref 11.6–15.1)
GFR SERPL CREATININE-BSD FRML MDRD: 98 ML/MIN/1.73SQ M
GLUCOSE SERPL-MCNC: 91 MG/DL (ref 65–140)
HCT VFR BLD AUTO: 36.7 % (ref 34.8–46.1)
HGB BLD-MCNC: 11.6 G/DL (ref 11.5–15.4)
MAGNESIUM SERPL-MCNC: 1.9 MG/DL (ref 1.6–2.6)
MCH RBC QN AUTO: 32.5 PG (ref 26.8–34.3)
MCHC RBC AUTO-ENTMCNC: 31.6 G/DL (ref 31.4–37.4)
MCV RBC AUTO: 103 FL (ref 82–98)
PLATELET # BLD AUTO: 53 THOUSANDS/UL (ref 149–390)
PMV BLD AUTO: 10 FL (ref 8.9–12.7)
POTASSIUM SERPL-SCNC: 4.5 MMOL/L (ref 3.5–5.3)
PROT SERPL-MCNC: 7.4 G/DL (ref 6.4–8.4)
RBC # BLD AUTO: 3.57 MILLION/UL (ref 3.81–5.12)
SODIUM SERPL-SCNC: 138 MMOL/L (ref 135–147)
WBC # BLD AUTO: 3.32 THOUSAND/UL (ref 4.31–10.16)

## 2023-02-13 RX ORDER — ONDANSETRON 4 MG/1
TABLET, FILM COATED ORAL
COMMUNITY
Start: 2023-02-05

## 2023-02-13 RX ORDER — LACOSAMIDE 200 MG/1
200 TABLET ORAL 2 TIMES DAILY
Status: DISCONTINUED | OUTPATIENT
Start: 2023-02-13 | End: 2023-02-13 | Stop reason: HOSPADM

## 2023-02-13 RX ADMIN — TRAZODONE HYDROCHLORIDE 50 MG: 50 TABLET ORAL at 08:37

## 2023-02-13 RX ADMIN — METOPROLOL SUCCINATE 25 MG: 25 TABLET, EXTENDED RELEASE ORAL at 08:37

## 2023-02-13 RX ADMIN — THIAMINE HCL TAB 100 MG 100 MG: 100 TAB at 08:37

## 2023-02-13 RX ADMIN — HYDROXYZINE HYDROCHLORIDE 25 MG: 25 TABLET, FILM COATED ORAL at 08:37

## 2023-02-13 RX ADMIN — Medication 1 TABLET: at 08:37

## 2023-02-13 RX ADMIN — GABAPENTIN 300 MG: 300 CAPSULE ORAL at 08:37

## 2023-02-13 RX ADMIN — LACOSAMIDE 100 MG: 100 TABLET, FILM COATED ORAL at 08:37

## 2023-02-13 RX ADMIN — LIDOCAINE 5% 1 PATCH: 700 PATCH TOPICAL at 08:37

## 2023-02-13 NOTE — ASSESSMENT & PLAN NOTE
Pt presents as a transfer from [facility] for cvEEG 2/2 breakthrough seizure activity     Patient with underlying history of focal epilepsy maintained on lacosamide  outpatient  · Per Neurology, continue with low-dose lacosamide  · cvEEG monitoring  · Seizure precautions  · Ativan PRN seizure activity  · Rest of plan per primary team

## 2023-02-13 NOTE — PLAN OF CARE
Problem: NEUROSENSORY - ADULT  Goal: Achieves stable or improved neurological status  Description: INTERVENTIONS  - Monitor and report changes in neurological status  - Monitor vital signs such as temperature, blood pressure, glucose, and any other labs ordered   - Initiate measures to prevent increased intracranial pressure  - Monitor for seizure activity and implement precautions if appropriate      Outcome: Progressing  Goal: Remains free of injury related to seizures activity  Description: INTERVENTIONS  - Maintain airway, patient safety  and administer oxygen as ordered  - Monitor patient for seizure activity, document and report duration and description of seizure to physician/advanced practitioner  - If seizure occurs,  ensure patient safety during seizure  - Reorient patient post seizure  - Seizure pads on all 4 side rails  - Instruct patient/family to notify RN of any seizure activity including if an aura is experienced  - Instruct patient/family to call for assistance with activity based on nursing assessment  - Administer anti-seizure medications if ordered    Outcome: Progressing     Problem: MOBILITY - ADULT  Goal: Maintain or return to baseline ADL function  Description: INTERVENTIONS:  -  Assess patient's ability to carry out ADLs; assess patient's baseline for ADL function and identify physical deficits which impact ability to perform ADLs (bathing, care of mouth/teeth, toileting, grooming, dressing, etc )  - Assess/evaluate cause of self-care deficits   - Assess range of motion  - Assess patient's mobility; develop plan if impaired  - Assess patient's need for assistive devices and provide as appropriate  - Encourage maximum independence but intervene and supervise when necessary  - Involve family in performance of ADLs  - Assess for home care needs following discharge   - Consider OT consult to assist with ADL evaluation and planning for discharge  - Provide patient education as appropriate  Outcome: Progressing  Goal: Maintains/Returns to pre admission functional level  Description: INTERVENTIONS:  - Perform BMAT or MOVE assessment daily    - Set and communicate daily mobility goal to care team and patient/family/caregiver  - Collaborate with rehabilitation services on mobility goals if consulted  - Perform Range of Motion  times a day  - Reposition patient every  hours    - Dangle patient  times a day  - Stand patient  times a day  - Ambulate patient  times a day  - Out of bed to chair  times a day   - Out of bed for meals  times a day  - Out of bed for toileting  - Record patient progress and toleration of activity level   Outcome: Progressing     Problem: PAIN - ADULT  Goal: Verbalizes/displays adequate comfort level or baseline comfort level  Description: Interventions:  - Encourage patient to monitor pain and request assistance  - Assess pain using appropriate pain scale  - Administer analgesics based on type and severity of pain and evaluate response  - Implement non-pharmacological measures as appropriate and evaluate response  - Consider cultural and social influences on pain and pain management  - Notify physician/advanced practitioner if interventions unsuccessful or patient reports new pain  Outcome: Progressing     Problem: INFECTION - ADULT  Goal: Absence or prevention of progression during hospitalization  Description: INTERVENTIONS:  - Assess and monitor for signs and symptoms of infection  - Monitor lab/diagnostic results  - Monitor all insertion sites, i e  indwelling lines, tubes, and drains  - Monitor endotracheal if appropriate and nasal secretions for changes in amount and color  - [place] appropriate cooling/warming therapies per order  - Administer medications as ordered  - Instruct and encourage patient and family to use good hand hygiene technique  - Identify and instruct in appropriate isolation precautions for identified infection/condition  Outcome: Progressing  Goal: Absence of fever/infection during neutropenic period  Description: INTERVENTIONS:  - Monitor WBC    Outcome: Progressing     Problem: SAFETY ADULT  Goal: Maintain or return to baseline ADL function  Description: INTERVENTIONS:  -  Assess patient's ability to carry out ADLs; assess patient's baseline for ADL function and identify physical deficits which impact ability to perform ADLs (bathing, care of mouth/teeth, toileting, grooming, dressing, etc )  - Assess/evaluate cause of self-care deficits   - Assess range of motion  - Assess patient's mobility; develop plan if impaired  - Assess patient's need for assistive devices and provide as appropriate  - Encourage maximum independence but intervene and supervise when necessary  - Involve family in performance of ADLs  - Assess for home care needs following discharge   - Consider OT consult to assist with ADL evaluation and planning for discharge  - Provide patient education as appropriate  Outcome: Progressing  Goal: Maintains/Returns to pre admission functional level  Description: INTERVENTIONS:  - Perform BMAT or MOVE assessment daily    - Set and communicate daily mobility goal to care team and patient/family/caregiver  - Collaborate with rehabilitation services on mobility goals if consulted  - Perform Range of Motion  times a day  - Reposition patient every hours    - Dangle patient  times a day  - Stand patient  times a day  - Ambulate patient  times a day  - Out of bed to chair  times a day   - Out of bed for meals  times a day  - Out of bed for toileting  - Record patient progress and toleration of activity level   Outcome: Progressing  Goal: Patient will remain free of falls  Description: INTERVENTIONS:  - Educate patient/family on patient safety including physical limitations  - Instruct patient to call for assistance with activity   - Consult OT/PT to assist with strengthening/mobility   - Keep Call bell within reach  - Keep bed low and locked with side rails adjusted as appropriate  - Keep care items and personal belongings within reach  - Initiate and maintain comfort rounds  - Make Fall Risk Sign visible to staff  - Offer Toileting every  Hours, in advance of need  - Initiate/Maintain alarm  - Obtain necessary fall risk management equipment  - Apply yellow socks and bracelet for high fall risk patients  - Consider moving patient to room near nurses station  Outcome: Progressing     Problem: DISCHARGE PLANNING  Goal: Discharge to home or other facility with appropriate resources  Description: INTERVENTIONS:  - Identify barriers to discharge w/patient and caregiver  - Arrange for needed discharge resources and transportation as appropriate  - Identify discharge learning needs (meds, wound care, etc )  - Arrange for interpretive services to assist at discharge as needed  - Refer to Case Management Department for coordinating discharge planning if the patient needs post-hospital services based on physician/advanced practitioner order or complex needs related to functional status, cognitive ability, or social support system  Outcome: Progressing     Problem: Knowledge Deficit  Goal: Patient/family/caregiver demonstrates understanding of disease process, treatment plan, medications, and discharge instructions  Description: Complete learning assessment and assess knowledge base    Interventions:  - Provide teaching at level of understanding  - Provide teaching via preferred learning methods  Outcome: Progressing

## 2023-02-13 NOTE — ASSESSMENT & PLAN NOTE
· Follows with [facility]ology for management of suspected stress induced cardiomyopathy and subsequent reduction in EF  · Likely as a result of status epilepticus in early 2022 per cardiology notes  · Echo 7/2022 with EF 60% and G1DD  · Managed on metoprolol as an outpatient; continue while inpatient  · Follow up with cardiology as an outpatient

## 2023-02-13 NOTE — DISCHARGE INSTR - AVS FIRST PAGE
Dear Cristóbal Batista,     It was our pleasure to care for you here at [facility]  It is our hope that we were always able to exceed the expected standards for your care during your stay  You were hospitalized due to seizure  You were cared for on the 7th floor by aMrita Newton DO under the service of Mary Velázquez MD with the [facility] who covers for your primary care physician (PCP), Vane Tapia MD, while you were hospitalized  If you have any questions or concerns related to this hospitalization, you may contact us at [phone]  For follow up as well as any medication refills, we recommend that you follow up with your primary care physician  A registered nurse will reach out to you by phone within a few days after your discharge to answer any additional questions that you may have after going home  However, at this time we provide for you here, the most important instructions / recommendations at discharge:       Notable Medication Adjustments -   Continue Lacosamide (Vimpat) at 200mg by mouth every 12 hours  Continue other medicines as per after visit summary    Testing Required after Discharge -   None    Important follow up information -   Please follow up with your PCP (Dr Vane Tapia MD) for continued management of other medical issues  Please follow up with your Neurologist for continued management of your seizures  Referral placed  Please follow up with Psychiatry for continued management of severe anxiety  Referral placed    Other Instructions -  Please review this entire after visit summary as additional general instructions including medication list, appointments, activity, diet, any pertinent wound care, and other additional recommendations from your care team that may be provided for you  Please contact your PCP for any new symptoms or questions about your healthcare needs   Please do not hesitate to come to the ED or call 911 for immediate medical attention  Please refrain from driving for a minimum of 6 months  [employer] paperwork has been filled out and submitted       Sincerely,     Destini Martinez, DO

## 2023-02-13 NOTE — UTILIZATION REVIEW
Continued Stay Review    Date: 2-12-23                        Current Patient Class:  Inpatient  Current Level of Care: med surg    HPI:62 y o  female initially admitted on 2-10-23     Assessment/Plan:   Continue video EEG monitoring for 24 hours on lower dose Vimpat than usual   Plan to continue lower dose  vimpat 100 mg bid  If not further events captured obtain vimpat level       Discharge 2-12-23     Vital Signs:   Date/Time Temp Pulse Resp BP MAP (mmHg) SpO2 O2 Device   02/13/23 08:03:45 98 2 °F (36 8 °C) 67 -- 117/66 83 95 % --   02/12/23 2111 -- -- -- 121/80 -- -- --   02/12/23 18:53:04 98 4 °F (36 9 °C) 81 18 120/78 92 95 % --   02/12/23 14:54:52 98 2 °F (36 8 °C) 82 16 120/76 91 96 % --   02/12/23 10:54:15 98 7 °F (37 1 °C) 73 -- 100/68 79 93 % --   02/12/23 08:37:08 -- 70 -- 126/75 92 98 % --   02/12/23 0837 -- 70 -- 126/75 -- -- --   02/12/23 0800 -- -- -- -- -- 95 % None (Room air)   02/12/23 07:37:35 98 °F (36 7 °C) 93 -- 114/69 84 96 % --               Pertinent Labs/Diagnostic Results:       Results from last 7 days   Lab Units 02/13/23  0538 02/12/23  0541 02/11/23  0459 02/08/23  0607 02/07/23  0315 02/06/23  1935   WBC Thousand/uL 3 32* 3 18* 3 68* 3 60* 5 12 3 14*   HEMOGLOBIN g/dL 11 6 11 0* 11 2* 10 8* 11 4* 11 1*   HEMATOCRIT % 36 7 35 6 35 8 34 2* 35 3 34 2*   PLATELETS Thousands/uL 53* 42* 44* 34* 42* 39*   NEUTROS ABS Thousands/µL  --   --  1 71*  --  2 26 1 70*         Results from last 7 days   Lab Units 02/13/23  0538 02/12/23  0541 02/11/23  0459 02/08/23  0607 02/07/23  0315   SODIUM mmol/L 138 137 137 136 138   POTASSIUM mmol/L 4 5 4 0 3 8 3 8 3 9   CHLORIDE mmol/L 108 109* 109* 106 102   CO2 mmol/L 28 25 24 24 29   ANION GAP mmol/L 2* 3* 4 6 7   BUN mg/dL 13 12 16 16 9   CREATININE mg/dL 0 60 0 59* 0 54* 0 64 0 67   EGFR ml/min/1 73sq m 98 98 101 95 94   CALCIUM mg/dL 9 5 9 1 9 0 8 0* 8 9   MAGNESIUM mg/dL 1 9 2 0 1 8  --   --      Results from last 7 days   Lab Units 02/13/23  0538 02/12/23  0541 02/11/23  0459 02/07/23  0315 02/06/23  1935   AST U/L 78* 57* 43 42* 41*   ALT U/L 69 44 39 26 27   ALK PHOS U/L 101 94 91 120* 120*   TOTAL PROTEIN g/dL 7 4 6 8 6 7 7 0 6 7   ALBUMIN g/dL 3 4* 3 1* 3 1* 3 8 3 6   TOTAL BILIRUBIN mg/dL 0 33 0 34 0 30 0 96 1 00   AMMONIA umol/L  --   --   --  54  --          Results from last 7 days   Lab Units 02/13/23  0538 02/12/23  0541 02/11/23  0459 02/08/23  0607 02/07/23 0315 02/06/23  1935   GLUCOSE RANDOM mg/dL 91 97 103 86 117 170*       Results from last 7 days   Lab Units 02/08/23  0920   PROTIME seconds 13 8   INR  1 04   PTT seconds 25       Results from last 7 days   Lab Units 02/10/23  2036   CLARITY UA  Clear   COLOR UA  Colorless   SPEC GRAV UA  1 009   PH UA  5 5   GLUCOSE UA mg/dl Negative   KETONES UA mg/dl Negative   BLOOD UA  Negative   PROTEIN UA mg/dl Negative   NITRITE UA  Negative   BILIRUBIN UA  Negative   UROBILINOGEN UA (BE) mg/dl <2 0   LEUKOCYTES UA  Negative       Medications:   Scheduled Medications:  gabapentin, 300 mg, Oral, BID  lacosamide, 200 mg, Oral, BID  lidocaine, 1 patch, Topical, Daily  metoprolol succinate, 25 mg, Oral, BID  multivitamin-minerals, 1 tablet, Oral, Daily  thiamine, 100 mg, Oral, Daily  traZODone, 50 mg, Oral, Daily      Continuous IV Infusions:     PRN Meds:  hydrOXYzine HCL, 25 mg, Oral, BID PRN  LORazepam, 2 mg, Intravenous, Q8H PRN        Discharge Plan: to be determined    Network Utilization Review Department  ATTENTION: Please call with any questions or concerns to [phone] and carefully listen to the prompts so that you are directed to the right person  All voicemails are confidential   Vance Prasad all requests for admission clinical reviews, approved or denied determinations and any other requests to dedicated fax number below belonging to the campus where the patient is receiving treatment   List of dedicated fax numbers for the Facilities:  [address] [place] [address] DENIALS (Administrative/Medical Necessity) [phone]   [address] (Maternity/NICU/Pediatrics) Leti Weeks [address] Neha Flores  [phone]   [address] [place] [address] [facility] [address] [facility] [place] 134 5 [facility] [phone]

## 2023-02-13 NOTE — ASSESSMENT & PLAN NOTE
· Chronic pancytopenia  · Baseline platelet appears to be in the 30s  · Possibly secondary to alcohol abuse

## 2023-02-13 NOTE — TELEPHONE ENCOUNTER
Pt called and left a voicemail stating that she had recently been discharged from the [facility] in [place] and was told to call the intake to schedule an appt  Pt has a referral on the chart and was informed that she could possibly be placed on the wait list  Please reach out to her when you can   PH# [phone] or [phone]

## 2023-02-13 NOTE — TELEPHONE ENCOUNTER
Patient was discharged from the hospital today  She called and scheduled her TCM for 2/22  She would like to know if she can have a short supply of Trazodone sent to [facility] on [address] to last her until her appointment

## 2023-02-13 NOTE — PROGRESS NOTES
[address] [facility]  Progress Note - Billyi Loose 1960, 58 y o  female MRN: [id number]  Unit/Bed#: [facility] 717-01 Encounter: [id number]  Primary Care Provider: Lane Mcdonough MD   Date and time admitted to hospital: 2/10/2023  7:01 PM    * Focal epilepsy w/ breakthrough seizures  Assessment & Plan  Pt [facility] as a transfer [facility] for cvEEG 2/2 breakthrough seizure activity  Patient with underlying history of focal epilepsy maintained on lacosamide  outpatient  · Per Neurology, continue with low-dose lacosamide  · cvEEG monitoring  · Seizure precautions  · Ativan PRN seizure activity  · Rest of plan per primary team    Alcohol abuse  Assessment & Plan  · Drinks 6-7 beers per week  Last drink on 2/4  · Continue thiamine and multivitamin supplementation  · Folate level >20, will hold on folic acid supplementation  · Continue thiamine  · Continue supportive care    Chronic combined systolic and diastolic CHF (congestive heart failure) ([facility] [address] )  Assessment & Plan  · Follows with  cardiology for management of suspected stress induced cardiomyopathy and subsequent reduction in EF  · Likely as a result of status epilepticus in early 2022 per cardiology notes  · Echo 7/2022 with EF 60% and G1DD  · Managed on metoprolol as an outpatient; continue while inpatient  · Follow up with cardiology as an outpatient      Anxiety, Depression, and Panic Attacks  Assessment & Plan  · Follows with  Psychiatry as an outpatient  · Continue home doses of trazodone, atarax and gabapentin    Thrombocytopenia (HCC)  Assessment & Plan  · Chronic pancytopenia  · Baseline platelet appears to be in the 30s  · Possibly secondary to alcohol abuse    Refusal of blood transfusions as patient is [employer]  Assessment & Plan  Noted     Anemia  Assessment & Plan  · Stable H&H  · Macrocytic; suspect in the setting of poor nutrition/alcohol use  · Continue to monitor      VTE Pharmacologic Prophylaxis: VTE Score: 3 Moderate Risk (Score 3-4) - Pharmacological DVT Prophylaxis Contraindicated  Sequential Compression Devices Ordered  Patient Centered Rounds: I performed bedside rounds with nursing staff today  Discussions with Specialists or Other Care Team Provider:     Time Spent for Care: 45 minutes  More than 50% of total time spent on counseling and coordination of care as described above  Current Length of Stay: 3 day(s)  Current Patient Status: Inpatient       Code Status: Level 1 - Full Code    Subjective:   Patient is comfortable in bed  No chest pain or shortness of breath  Cleared for discharge by neurology today    Objective:     Vitals:   Temp (24hrs), Av 3 °F (36 8 °C), Min:98 2 °F (36 8 °C), Max:98 4 °F (36 9 °C)    Temp:  [98 2 °F (36 8 °C)-98 4 °F (36 9 °C)] 98 2 °F (36 8 °C)  HR:  [67-82] 67  Resp:  [16-18] 18  BP: (117-121)/(66-80) 117/66  SpO2:  [95 %-96 %] 95 %  Body mass index is 20 68 kg/m²  Input and Output Summary (last 24 hours): Intake/Output Summary (Last 24 hours) at 2023 1151  Last data filed at 2023 1600  Gross per 24 hour   Intake 240 ml   Output --   Net 240 ml       Physical Exam:   Physical Exam     Patient is awake alert in no acute distress  Lungs clear to auscultation bilateral anteriorly  Heart positive S1-S2 no murmur  Abdomen soft nontender  Lower extremities no edema  Additional Data:     Labs:  Results from last 7 days   Lab Units 23  0538 23  0541 23  0459   WBC Thousand/uL 3 32*   < > 3 68*   HEMOGLOBIN g/dL 11 6   < > 11 2*   HEMATOCRIT % 36 7   < > 35 8   PLATELETS Thousands/uL 53*   < > 44*   NEUTROS PCT %  --   --  46   LYMPHS PCT %  --   --  35   MONOS PCT %  --   --  14*   EOS PCT %  --   --  3    < > = values in this interval not displayed       Results from last 7 days   Lab Units 23  0538   SODIUM mmol/L 138   POTASSIUM mmol/L 4 5   CHLORIDE mmol/L 108   CO2 mmol/L 28   BUN mg/dL 13   CREATININE mg/dL 0 60   ANION GAP mmol/L 2*   CALCIUM mg/dL 9 5   ALBUMIN g/dL 3 4*   TOTAL BILIRUBIN mg/dL 0 33   ALK PHOS U/L 101   ALT U/L 69   AST U/L 78*   GLUCOSE RANDOM mg/dL 91     Results from last 7 days   Lab Units 02/08/23  0920   INR  1 04                   Lines/Drains:  Invasive Devices     None                       Imaging: No pertinent imaging reviewed  Recent Cultures (last 7 days):         Last 24 Hours Medication List:   Current Facility-Administered Medications   Medication Dose Route Frequency Provider Last Rate   • gabapentin  300 mg Oral BID Deep Brady MD     • hydrOXYzine HCL  25 mg Oral BID PRN Deep Christopher MD     • lacosamide  200 mg Oral BID Rizwana Haddad DO     • lidocaine  1 patch Topical Daily Sophia Jordan MD     • LORazepam  2 mg Intravenous Q8H PRN Sophia Jordan MD     • metoprolol succinate  25 mg Oral BID Stephan Byrnes MD     • multivitamin-minerals  1 tablet Oral Daily Stephan Byrnes MD     • thiamine  100 mg Oral Daily Stephan Byrnes MD     • traZODone  50 mg Oral Daily Stephan Byrnes MD          Today, Patient Was Seen By: Albert Cogan, DO    **Please Note: This note may have been constructed using a voice recognition system  **

## 2023-02-13 NOTE — DISCHARGE SUMMARY
NEUROLOGY RESIDENCY - DISCHARGE SUMMARY     Name: Yina Su   Age & Sex: 58 y o  female   MRN: [id number]  Unit/Bed#: 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, alcohol abuse, osteoporosis, and focal epilepsy maintained on lacosamide 150 mg twice daily who originally presented to [facility] on February 6, 2023 for breakthrough seizures, panic attacks an EEG monitoring to characterize spells  The patient had an admission in March 2022 during which video EEG demonstrated multiple electrographic seizures emanating from left temporal region  MRI brain, DWI sequence, showed changes in left hippocampus consistent with status epilepticus  Seizures  were treated, and was discharged on lacosamide, and a repeat MR I of the brain demonstrated resolution of left hippocampal DWI abnormality past seizures have manifested as hand clenching, and decreased alertness  Work-up: (vEEG started 2/11 at 00:10)   - 02/13/2023 8784: No seizures  No epileptiform discharges  Excessive beta when awake suggesting benzo/barbiturate  EEG otherwise normal  No additional events  Had event 2/11 at 17:01  Flashback of memories, felt scary, felt short of breath  No EEG correlate, but does not exclude seizure  Focal aware seizures often have no EEG correlate  Impression:   Daily episodes described as panic attacks are concerning for L temporal seizures manifesting as sudden onset of intense fear and impending doom       Plan:   - Increase Lacosamide to 200mg BID at discharge  - Outpatient follow-up with neurology   - Outpatient follow-up with psychiatry  - Outpatient follow-up with primary care provider  Anxiety, Depression, and Panic Attacks  Assessment & Plan  - Has hx of anxiety and depression   - Continue Trazodone, Atarax, and Gabapentin    Alcohol abuse  Assessment & Plan  - Patient has a history of alcohol abuse- drinks 6/7 beers per week  Plan:   - [facility] protocol  - Thiamine, Folic Acid, Multivitamin   - Monitor LFTs  - Monitor for worsening sign/symptoms    Pancytopenia ([address] )  Assessment & Plan  - Possibly due to patient's alcohol usage  - We will continue to monitor with serial CBCs    Recent Labs     02/11/23  0459 02/12/23  0541 02/13/23  0538   PLT 44* 42* 53*     Disposition:   Home    Reason for Admission:  · Video EEG monitoring  Consultations During Hospital Stay:  · Neurology  · Case management  · Hospitalist    Procedures Performed:   · Video EEG 24 hours x 3    Significant Findings / Test Results:   · Pancytopenia with suspicion due to chronic alcohol use  · Hypoalbuminemia secondary to decreased Oral intake  · High-sensitivity troponins reached maxima at 2778  · proBNP reached maxima of 2610    Incidental Findings:   · Number    Test Results Pending at Discharge (will require follow up): · Lacosamide level  Outpatient Tests Requested:  · None  Complications:  · None  Hospital Course:   Patient is a 41-year-old female with a history of 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, alcohol abuse, osteoporosis, and focal epilepsy maintained on lacosamide 150 mg twice daily as an outpatient who originally presented to [address] [place] [address] on Friday the sixth 2023 for breakthrough seizures, panic attacks and EEG monitoring to characterize spells  The patient had an admission in March 2022 during which video EEG demonstrated multiple electrographic seizures emanating from the left temporal region    MRI brain with focus on DWI sequence show changes in the left hippocampus consistent with status epilepticus  Seizures were treated, and discharged on lacosamide, and repeat MRI of the brain demonstrated resolution of the left hippocampal DWI abnormality, past seizures have manifested as hand clenching, and decreased alertness  During the transfer to [facility] for continued monitoring on video EEG the patient had 1 clinical event on February 11 at 1701 where the patient describes flashbacks of memories, felt scared, felt short of breath  No EEG correlate, however does not exclude seizure  Focal aware seizures often did not have EEG correlate  The patient was ultimately disconnected from video EEG, her lacosamide was increased to 100 mg twice daily, and appropriate follow-ups plan for outpatient continuation of care  Condition at Discharge:   good     Discharge Day Visit / Exam:   Subjective: Patient was seen and examined this morning during rounds with attending present  The patient reported that she is feeling back to her baseline however are still experiencing these waves of anxiety where she will feel under pressure, and very very tense  The patient otherwise did not report any headache, chest pain, abdominal pain, nausea, vomiting, diarrhea, constipation, bowel bladder incontinence, and did not report any new neurologic sensorium changes  The patient is able to ambulate, defecate, urinate, and ambulate without support  The patient was taken off of video EEG, her lacosamide was increased to 200 mg twice daily, and the patient is stable from a neurologic perspective to be discharged with follow-up with a referral to psychiatry for more intensive cognitive behavioral therapy and medication management regarding anxiety, with follow-up to neurology for continued care of seizure-like events, and follow-up to primary care physician to take care of other medical issues      Vitals: Blood Pressure: 117/66 (02/13/23 0803)  Pulse: 67 (02/13/23 0694)  Temperature: 98 2 °F (36 8 °C) (02/13/23 0803)  Temp Source: Oral (02/10/23 1908)  Respirations: 18 (02/12/23 1853)  Height: 5' 4 96" (165 cm) (02/10/23 1908)  Weight - Scale: 56 3 kg (124 lb 1 9 oz) (02/10/23 1908)  SpO2: 95 % (02/13/23 0803)    Exam:   Physical Exam  Vitals and nursing note reviewed  HENT:      Head: Normocephalic and atraumatic  Nose: Nose normal       Mouth/Throat:      Mouth: Mucous membranes are moist       Pharynx: Oropharynx is clear  No oropharyngeal exudate  Eyes:      Extraocular Movements: Extraocular movements intact and EOM normal       Conjunctiva/sclera: Conjunctivae normal       Pupils: Pupils are equal, round, and reactive to light  Cardiovascular:      Rate and Rhythm: Normal rate  Pulses: Normal pulses  Pulmonary:      Effort: Pulmonary effort is normal    Abdominal:      General: There is no distension  Musculoskeletal:         General: Normal range of motion  Cervical back: Normal range of motion  Skin:     General: Skin is warm  Neurological:      Mental Status: She is alert  Cranial Nerves: Cranial nerve deficit present  Sensory: No sensory deficit  Motor: No weakness  Coordination: Coordination normal       Deep Tendon Reflexes: Reflexes normal    Psychiatric:         Speech: Speech normal        Neurologic Exam     Mental Status   Oriented to person  Oriented to place  Oriented to year, month and date  Follows 3 step commands  Attention: normal  Concentration: normal    Speech: speech is normal   Level of consciousness: alert  Knowledge: good  Able to perform simple calculations  Able to read  Normal comprehension  Patient was alert and oriented to person, place, time, event  The patient was able to follow complex commands  The patient normal attention and concentration  Patient's speech was normal   Patient was able to name an object, repeat phrases, and demonstrate comprehension       Cranial Nerves CN II   Right visual field deficit: none  Left visual field deficit: none     CN III, IV, VI   Pupils are equal, round, and reactive to light  Extraocular motions are normal    Right pupil: Size: 4 mm  Shape: regular  Reactivity: brisk  Left pupil: Size: 4 mm  Shape: regular  Reactivity: brisk  CN III: no CN III palsy  CN VI: no CN VI palsy  Nystagmus: none   Diplopia: none    CN V   Right facial sensation deficit: none  Left facial sensation deficit: none    CN VII   Right facial weakness: peripheral  Left facial weakness: none    CN VIII   Hearing: intact    CN IX, X   Palate: symmetric    CN XI   Right sternocleidomastoid strength: normal  Left sternocleidomastoid strength: normal  Right trapezius strength: normal  Left trapezius strength: normal    CN XII   Tongue: not atrophic  Fasciculations: absent  Tongue deviation: none  The patient demonstrated no visual field deficits, pupils were equal and reactive to light bilaterally measuring 4 mm, regular in shape, brisk and reactivity, no cranial nerve III, IV pulses were noted, no nystagmus noted, no diplopia reported  Patient's extraocular muscles were within normal limits  No facial sensation deficit bilaterally  Facial expression demonstrated a peripheral pattern on the right, with no deficit on the left  Hearing was intact  Palate was symmetric on elevation  Sternocleidomastoid and trapezius strength are normal bilaterally  On protrusion of the tongue the tongue was not atrophic, demonstrated no fasciculations, and demonstrated no deviation  Motor Exam   Muscle bulk: normal  Overall muscle tone: normal  Right arm tone: normal  Left arm tone: normal  Right arm pronator drift: absent  Left arm pronator drift: absent  Right leg tone: normal  Left leg tone: normal  Patient demonstrated 5/5 strength in bilateral deltoid, biceps, triceps, iliopsoas, quadriceps, hamstring, anterior tibial, posterior tibial, peroneal, and gastrocnemius       Sensory Exam   Patient did not report any deficits in either bilateral upper extremities or bilateral lower extremities in terms of light touch, vibration, nor pinprick  Gait, Coordination, and Reflexes   Patient reports that she is able to walk unassisted  Patient is able to complete finger-nose and heel-to-shin bilaterally and symmetrically  No resting tremor, action tremor was noted on examination  Reflexes were 2+ bilaterally in the brachioradialis, biceps, triceps, patellar, Achilles  Plantar reflexes normal laterally  No Idalia's was appreciated bilaterally  No ankle clonus was appreciated bilaterally  Discussion with Family:   Patient declined    Discharge instructions/Information to patient and family:   See after visit summary for information provided to patient and family  Provisions for Follow-Up Care:  See after visit summary for information related to follow-up care and any pertinent home health orders  Planned Readmission:   None    Discharge Statement:  Please see attendings attestation  Discharge Medications:  See after visit summary for reconciled discharge medications provided to patient and family        ** Please Note: This note has been constructed using a voice recognition system **    ==  DO Mickey Brewer [facility] Neurology Residency, PGY-2

## 2023-02-14 ENCOUNTER — TELEPHONE (OUTPATIENT)
Dept: PSYCHIATRY | Facility: CLINIC | Age: 63
End: 2023-02-14

## 2023-02-14 ENCOUNTER — OFFICE VISIT (OUTPATIENT)
Dept: RHEUMATOLOGY | Facility: CLINIC | Age: 63
End: 2023-02-14

## 2023-02-14 DIAGNOSIS — M81.0 AGE-RELATED OSTEOPOROSIS WITHOUT CURRENT PATHOLOGICAL FRACTURE: Primary | ICD-10-CM

## 2023-02-14 RX ORDER — TRAZODONE HYDROCHLORIDE 50 MG/1
50 TABLET ORAL DAILY
Qty: 30 TABLET | Refills: 0 | Status: SHIPPED | OUTPATIENT
Start: 2023-02-14 | End: 2023-02-22 | Stop reason: SDUPTHER

## 2023-02-14 NOTE — PROGRESS NOTES
Pt presented for evenity shots  Administered evenity shots to pt in the left and right deltoids  Pt tolerated well  No redness or swelling presentations at site of injections

## 2023-02-14 NOTE — TELEPHONE ENCOUNTER
----- Message from Elizabeth Tony sent at 2/14/2023  3:39 PM EST -----  Regarding: MAT Referral  Good Afternoon,    I am not sure if this patient would be a good fit for MAT but if so can you call patient? Patient was recently in the ED for non MH related issue but was referred to OP services, it appears patient has history of alcohol abuse as well as anxiety and depression  If once you reach out patient does not want these services please let me know so I can proceed accordantly         Thank you  Donna Frances

## 2023-02-15 NOTE — UTILIZATION REVIEW
NOTIFICATION OF ADMISSION DISCHARGE   This is a Notification of Discharge from [facility]  Please be advised that this patient has been discharge from our facility  Below you will find the admission and discharge date and time including the patient’s disposition  UTILIZATION REVIEW CONTACT:  Rudi Bee  Utilization   Network Utilization Review Department  Phone: [phone] x carefully listen to the prompts  All voicemails are confidential   Email: [email or web address]     ADMISSION INFORMATION  PRESENTATION DATE: 2/10/2023  7:01 PM  OBERVATION ADMISSION DATE:   INPATIENT ADMISSION DATE: 2/10/23  7:01 PM   DISCHARGE DATE: 2/13/2023 12:42 PM   DISPOSITION:Home/Self Care    IMPORTANT INFORMATION:  Send all requests for admission clinical reviews, approved or denied determinations and any other requests to dedicated fax number below belonging to the campus where the patient is receiving treatment   List of dedicated fax numbers:  [address] DENIALS (Administrative/Medical Necessity) [phone]   [address] (Maternity/NICU/Pediatrics) [phone]   [facility] [phone]   [facility] 87 [phone]   [address] [phone]   220 [facility] [phone] 90 [facility] [phone]   [address] [place], [place] [address] [phone]   [facility]  [phone]   40[facility] [phone]   412 [facility] 850 [facility] [phone]

## 2023-02-16 ENCOUNTER — TELEPHONE (OUTPATIENT)
Dept: NEUROLOGY | Facility: CLINIC | Age: 63
End: 2023-02-16

## 2023-02-16 NOTE — TELEPHONE ENCOUNTER
Patient seen at [facility] AND [facility], Neurology consulted  Per notes: Masood Burks will need follow up in in 4 weeks with epilepsy AP  She will not require outpatient neurological testing        Patient is already scheduled with Loli Villarreal on 4/13/23 at 3pm  Keeping appt and changing to HFU as that was the soonest opening on her schedule   Added to high priority waitlist

## 2023-02-17 LAB — LACOSAMIDE SERPL-MCNC: 6.7 UG/ML (ref 5–10)

## 2023-02-19 LAB — METHYLMALONATE SERPL-SCNC: 162 NMOL/L (ref 0–378)

## 2023-02-22 ENCOUNTER — TELEMEDICINE (OUTPATIENT)
Dept: FAMILY MEDICINE CLINIC | Facility: CLINIC | Age: 63
End: 2023-02-22

## 2023-02-22 VITALS — HEIGHT: 64 IN | BODY MASS INDEX: 21.17 KG/M2 | WEIGHT: 124 LBS

## 2023-02-22 DIAGNOSIS — F41.0 PANIC ATTACK: ICD-10-CM

## 2023-02-22 DIAGNOSIS — F41.9 ANXIETY: ICD-10-CM

## 2023-02-22 DIAGNOSIS — D69.6 THROMBOCYTOPENIA (HCC): ICD-10-CM

## 2023-02-22 DIAGNOSIS — G40.901 NONINTRACTABLE EPILEPSY WITH STATUS EPILEPTICUS, UNSPECIFIED EPILEPSY TYPE (HCC): Primary | ICD-10-CM

## 2023-02-22 DIAGNOSIS — F41.8 ANXIETY WITH DEPRESSION: ICD-10-CM

## 2023-02-22 RX ORDER — HYDROXYZINE HYDROCHLORIDE 25 MG/1
25 TABLET, FILM COATED ORAL 2 TIMES DAILY
Qty: 60 TABLET | Refills: 2 | Status: SHIPPED | OUTPATIENT
Start: 2023-02-22

## 2023-02-22 RX ORDER — HYDROXYZINE PAMOATE 100 MG/1
100 CAPSULE ORAL 3 TIMES DAILY PRN
COMMUNITY

## 2023-02-22 RX ORDER — GABAPENTIN 300 MG/1
300 CAPSULE ORAL 2 TIMES DAILY
Qty: 60 CAPSULE | Refills: 2 | Status: SHIPPED | OUTPATIENT
Start: 2023-02-22

## 2023-02-22 RX ORDER — TRAZODONE HYDROCHLORIDE 50 MG/1
50 TABLET ORAL DAILY
Qty: 30 TABLET | Refills: 2 | Status: SHIPPED | OUTPATIENT
Start: 2023-02-22

## 2023-02-22 RX ORDER — HYDROXYZINE HYDROCHLORIDE 25 MG/1
25 TABLET, FILM COATED ORAL EVERY 6 HOURS PRN
COMMUNITY
End: 2023-02-22 | Stop reason: SDUPTHER

## 2023-02-22 NOTE — PROGRESS NOTES
Virtual TCM Visit:    Verification of patient location:    Patient is located in the following state in which I hold an active license [place]    Assessment/Plan:        Problem List Items Addressed This Visit        Other    Anxiety, Depression, and Panic Attacks (Chronic)    Relevant Medications    gabapentin (NEURONTIN) 300 mg capsule    traZODone (DESYREL) 50 mg tablet    Anxiety    Relevant Medications    traZODone (DESYREL) 50 mg tablet    Panic attack    Relevant Medications    traZODone (DESYREL) 50 mg tablet      Continue Atarax 25 mg twice daily for panic attack  Patient needs to establish with psychiatry to discuss maintenance medication  Continue management of seizure disorder per neurology  1  Nonintractable epilepsy with status epilepticus, unspecified epilepsy type ([facility] [address] )  Assessment & Plan:  Continue lacosamide 200 mg twice daily as recommended by neurology  Patient to continue with outpatient neurology follow-up  Fall precautions discussed  Driving restrictions per neurology  2  Anxiety with depression  Assessment & Plan:  Continue gabapentin 300 mg twice daily, Atarax 25 mg twice daily, trazodone 50 mg daily  Patient again advised that she needs to establish care with psychiatry since her symptoms are not well controlled  With low platelet count and multiple other risk factors patient would need psychiatry evaluation to start a maintenance medication  Orders:  -     gabapentin (NEURONTIN) 300 mg capsule; Take 1 capsule (300 mg total) by mouth 2 (two) times a day  -     hydrOXYzine HCL (ATARAX) 25 mg tablet; Take 1 tablet (25 mg total) by mouth 2 (two) times a day    3  Panic attack  -     traZODone (DESYREL) 50 mg tablet; Take 1 tablet (50 mg total) by mouth in the morning  -     hydrOXYzine HCL (ATARAX) 25 mg tablet; Take 1 tablet (25 mg total) by mouth 2 (two) times a day    4  Anxiety  -     traZODone (DESYREL) 50 mg tablet;  Take 1 tablet (50 mg total) by mouth in the morning  - hydrOXYzine HCL (ATARAX) 25 mg tablet; Take 1 tablet (25 mg total) by mouth 2 (two) times a day    5  Thrombocytopenia ([address] )  Assessment & Plan:  Secondary to alcohol abuse  Most recent platelet count was 53  Patient is asymptomatic  Strongly advised to establish/maintain follow-ups with hematology        Reason for visit is hospital fup  Encounter provider Dana Diaz MD     Provider located at [address] [place] [phone]    Recent Visits  No visits were found meeting these conditions  Showing recent visits within past 7 days and meeting all other requirements  Today's Visits  Date Type Provider Dept   02/22/23 Telemedicine Dana Diaz MD [facility]   Showing today's visits and meeting all other requirements  Future Appointments  No visits were found meeting these conditions  Showing future appointments within next 150 days and meeting all other requirements       After connecting through [employer], the patient was identified by name and date of birth  Tonny Smiht was informed that this is a telemedicine visit and that the visit is being conducted through the [address] [place] [address] Now platform  She agrees to proceed     My office door was closed  No one else was in the room  She acknowledged consent and understanding of privacy and security of the video platform  The patient has agreed to participate and understands they can discontinue the visit at any time  Patient is aware this is a billable service  Transitional Care Management Review:  Tonny Smith is a 58 y o  female here for TCM follow up       During the TCM phone call patient stated:    TCM Call     Date and time call was made  2/13/2023  1:57 PM    Hospital care reviewed  Records reviewed    Patient was hospitialized at  [address] from 3/7/22-3/18/22 and transferred to [facility] from 3/18/22-3/22/22    Date of Admission  02/10/23    Date of discharge  02/13/23    Diagnosis  Focal epilepsy w/ breakthrough seizures    Disposition  Home    Were the patients medications reviewed and updated  Yes    Current Symptoms  None      TCM Call     Post hospital issues  None    Should patient be enrolled in anticoag monitoring? No    Scheduled for follow up? Yes    Patient refusal reason  will not make time frame     Patients specialists  Neurologist    Neurologist name  NEUROSURGERY     Other specialists names  Tara Hernandez (ORTHOPEDIC SURGERY) AND Susan Stanley ([address])    Did you obtain your prescribed medications  Yes    Do you need help managing your prescriptions or medications  No    Is transportation to your appointment needed  No    I have advised the patient to call PCP with any new or worsening symptoms  Allyssa Garcia MA    Living Arrangements  Spouse or Significiant other    Support System  Partner    The type of support provided  None    Do you have social support  Yes, as much as I need    Are you recieving any outpatient services  No    Are you recieving home care services  No    Types of home care services  Home PT; Nurse visit  OT    Are you using any community resources  No    Current waiver services  No    Have you fallen in the last 12 months  No    Interperter language line needed  No    Counseling  Patient        Subjective:     Patient ID: Sharon Sprague is a 58 y o  female  HPI     Patient is a 61-year-old female with a history of 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, alcohol abuse, osteoporosis, and focal epilepsy maintained on lacosamide 150 mg twice daily as an outpatient who originally presented to [facility] on Friday the sixth 2023 for breakthrough seizures, panic attacks and EEG monitoring to characterize spells  The patient had an admission in March 2022 during which video EEG demonstrated multiple electrographic seizures emanating from the left temporal region     Patient was evaluated by neurologist    lacosamide was increased to 200 mg twice daily  Patient has outpatient neurology follow-up  Patient has history of anxiety and depression, currently on gabapentin 300 mg twice daily, trazodone 50 mg daily  Needs to establish with outpatient psychiatry to discuss medications for improved control on her symptoms  Patient also has history of thrombopenia secondary to chronic alcohol abuse  According to patient she has stopped drinking alcohol for the past 3 weeks now  Patient plans to continue same  Hospital labs reviewed, platelet count was 53  Patient asymptomatic  Needs to establish with outpatient heme-onc  Review of Systems   Constitutional: Negative  Respiratory: Negative  Cardiovascular: Negative  Gastrointestinal: Negative  Neurological: Positive for seizures (H/o seizure disorder  )  Psychiatric/Behavioral: The patient is nervous/anxious  Objective:    Vitals:    02/22/23 0859   Weight: 56 2 kg (124 lb)   Height: 5' 4" (1 626 m)       Physical Exam  Constitutional:       Appearance: Normal appearance  Pulmonary:      Effort: No respiratory distress  Neurological:      Mental Status: She is alert and oriented to person, place, and time  Psychiatric:         Mood and Affect: Mood normal          Medications have been reviewed by provider in current encounter    I spent 30 minutes with the patient during this visit  Joanna Kessler MD      VIRTUAL VISIT DISCLAIMER    Emily Washington verbally agrees to participate in [employer]  Pt is aware that [employer] could be limited without vital signs or the ability to perform a full hands-on physical Adriana Bruce understands she or the provider may request at any time to terminate the video visit and request the patient to seek care or treatment in person

## 2023-02-22 NOTE — ASSESSMENT & PLAN NOTE
Secondary to alcohol abuse  Most recent platelet count was 53  Patient is asymptomatic    Strongly advised to establish/maintain follow-ups with hematology

## 2023-02-22 NOTE — ASSESSMENT & PLAN NOTE
Continue lacosamide 200 mg twice daily as recommended by neurology  Patient to continue with outpatient neurology follow-up  Fall precautions discussed  Driving restrictions per neurology

## 2023-02-22 NOTE — ASSESSMENT & PLAN NOTE
Continue gabapentin 300 mg twice daily, Atarax 25 mg twice daily, trazodone 50 mg daily  Patient again advised that she needs to establish care with psychiatry since her symptoms are not well controlled  With low platelet count and multiple other risk factors patient would need psychiatry evaluation to start a maintenance medication

## 2023-03-01 ENCOUNTER — TELEPHONE (OUTPATIENT)
Dept: HEMATOLOGY ONCOLOGY | Facility: CLINIC | Age: 63
End: 2023-03-01

## 2023-03-01 NOTE — TELEPHONE ENCOUNTER
Appointment Cancellation Or Reschedule     Person calling in Patient   If someone other than patient calling, are they listed on the communication consent form? na   Provider Ken Soto, [address]   Office Visit Date and Time 3/2/23 @ 1pm   Office Visit Location [facility]   Did patient want to reschedule their office appointment? If so, when was it scheduled to? Yes 3/22/23 @ 2pm   Did you have STAR scheduled for this appointment? No   Do you need STAR set up for your new appointment? If yes, please send to "PATIENT RIDESHARE" pool for STAR rescheduling no   Is this patient calling to reschedule an infusion appointment? no   When is their next infusion appointment? na   Is this patient a Chemo patient? no   Reason for Cancellation or Reschedule Patient has no transportation available   Was No show policy reviewed with patient if patient canceling within 24 hours? Yes     If the patient is cancelling an appointment and needs their STAR Transport cancelled, please route to [address]  If the patient is a treatment patient, please route this to the office nurse  If the patient is not on treatment, please route to the Clerical pool based on location  If the patient is a surgical oncology patient, please route to surg/onc clinical pool  Route message as high priority if same day cancellation

## 2023-03-08 NOTE — PROGRESS NOTES
SUBJECTIVE:   CC: Stacey is an 31 year old who presents for preventive health visit.     Patient has been advised of split billing requirements and indicates understanding: Yes  Healthy Habits:     Getting at least 3 servings of Calcium per day:  Yes    Bi-annual eye exam:  Yes    Dental care twice a year:  Yes    Sleep apnea or symptoms of sleep apnea:  None    Diet:  Vegetarian/vegan    Frequency of exercise:  4-5 days/week    Duration of exercise:  30-45 minutes    Taking medications regularly:  Yes    Medication side effects:  Not applicable    PHQ-2 Total Score: 0    Additional concerns today:  Yes      Fasting    Has been using [facility] through work for care the last few years.    Pap today    Birthcontrol-  LMP 02/24/2023  Stop taking this a few months ago due to insurance  Takes 2 months at a time then have a cycle.   Emotional regulation and dysmenorrhea   Would like to restart this  No aura with migraine.   No fam hx of breast or ovarian cancer   Last sexually active a few years ago    Never smoker. No drug use. EtOH once/month    Vegan diet. Diet up/down  Runs at least 30 min 5 days per week and walks during lunch       Migraines since teenage years - gets them about once per month. Takes excedrin and helps. When took sumatriptan before it didn't help. No aura.  Feels like she has symptoms of autoimmune disorders.  Body feels inflamed when she gets migraines. Myalgias, back pain,   Endorses some mild back pain in the mornings.     Brother similar sx and got tested.Tested positive for HLA-B27 ankylosing spondylitis.     Would like to know how much this would cost         Today's PHQ-2 Score:   PHQ-2 ( 1999 [employer]) 3/7/2023   Q1: Little interest or pleasure in doing things 0   Q2: Feeling down, depressed or hopeless 0   PHQ-2 Score 0   Q1: Little interest or pleasure in doing things Not at all   Q2: Feeling down, depressed or hopeless Not at all   PHQ-2 Score 0     Have you ever done [facility]  NEUROLOGY RESIDENCY PROGRESS NOTE     Name: Siddharth Urbano   Age & Sex: 58 y o  female   MRN: [id number]  Unit/Bed#: ICU 07   Encounter: [id number]    Siddharth Urbano will need follow up in in 6 weeks with epilepsy AP/Attending  Sharri Johnson She will not require outpatient neurological testing  Pending for discharge: MRI brain     ASSESSMENT & PLAN     * Status epilepticus [facility])  Assessment & Plan  58 y o  right handed female w h/o traumatic Rt Subarachnoid, right posterior fossa IPH, alcohol abuse who presents in [facility] ED on 3/7/22 as trauma alert A d/t unwitnessed fall and altered mental status  To review, pt wasn't feeling well on 3/7-  Lightheadedness, tunnel vision, nausea, unable to eat, difficulty walking, later found down by  around 4:30 pm when he returned home with tongue laceration, abnormal movement of left hand, on arrival tachycardic, GCS-8, platelet 02L, metabolic acidosis, elevated lactic acid, procal 1 49, pt found to have clinical sz in ED and was given Ativan, intubated, sedated on propofol, low BP, received crystalloids, she was started on video EEG monitoring, on 3/8/22 she had 3 electrographic seizures from 12 midnight to 2:00 am, 2:00 to 4:00 am seizure every 15 min, last seizure noted was around 6:45 am, 3/8 on EEG  Her Propofol was increased, given 2 gm Keppra and started 1 gm Keppra bid, started on Vimpat 100 mg bid  Versed was also given at one point  Also started on antibiotics d/t likely aspiration pneumonia (Rt lung base) seen on Xray   Weaned off propofol, no sz, s/p extubation on 3/9, pt A, O x 3 on 3/10     Impression- Suspicion related to Alcohol withdrawal, med overdose, vs TBI from previous fall, her seems to be from left temporal region, similar episode in August 2021 (she was found down at that time as well) with right posterior occipital intraparenchymal hematoma within rim of surrounding edema, subarachnoid hemorrhage, small extra-axial focus of hemorrhage over the right Planning? (For example, a Health Directive, POLST, or a discussion with a medical provider or your loved ones about your wishes): No, advance care planning information given to patient to review.  Patient declined advance care planning discussion at this time.    Social History     Tobacco Use     Smoking status: Never     Smokeless tobacco: Never   Substance Use Topics     Alcohol use: Yes     Comment: a little       Alcohol Use 3/7/2023   Prescreen: >3 drinks/day or >7 drinks/week? No       Reviewed orders with patient.  Reviewed health maintenance and updated orders accordingly - Yes  Lab work is in process  Labs reviewed in EPIC  BP Readings from Last 3 Encounters:   03/08/23 118/80   06/26/19 104/73   04/30/18 96/66    Wt Readings from Last 3 Encounters:   03/08/23 71.2 kg (157 lb)   06/26/19 67.1 kg (148 lb)   04/30/18 65.3 kg (144 lb)                  Patient Active Problem List   Diagnosis     Papanicolaou smear of cervix with low grade squamous intraepithelial lesion (LGSIL)     Septate uterus     Family history of ankylosing spondylitis     Migraine without aura and without status migrainosus, not intractable     Past Surgical History:   Procedure Laterality Date     BIOPSY  April 2015    Cervix     EYE SURGERY  2013    Minor removal of corneal deposit, twice, in right eye       Social History     Tobacco Use     Smoking status: Never     Smokeless tobacco: Never   Substance Use Topics     Alcohol use: Not Currently     Comment: Minimal     Family History   Problem Relation Age of Onset     Diabetes Father      Hyperlipidemia Father      GERD Father         Heartburn     Obesity Father      Depression Maternal Grandmother      Colon Cancer Paternal Grandfather      Skin Cancer Paternal Aunt      Depression Brother      Anxiety Disorder Brother      Genetic Disorder Brother         Tested positive for HLA-B27. Doctors suspect  Ankylosing Spondylitis, or something similar         Current Outpatient  parietal lobe  Thin posterior falx subdural hematoma  She is alert, oriented with non focal weakness this am      Plan-  · S/p V EEG      · Continue Keppra 1 gm BID   · Continue Vimpat 100 mg BID   ·  MRI brain w and wo contrast  · Please reach out to on-na Neurologist for more seizure activity, has room to go up on Vimpat   · Continue evaluating metabolic and infectious derangement per ICU  Pt's platelets trending down  · Appreciate Toxicology recs       Encephalopathy-resolved as of 3/10/2022  Assessment & Plan  Likely related to status epilepticus       SUBJECTIVE     Patient was seen and examined  No acute events overnight  24 hour events-  Patient was extubated yesterday around 4:00 p m  EEG was removed yesterday,  No seizure/seizure-like activity since  6:45 am 3/8/22 on EEG  Patient is saturating 90-95% with 2 L nasal canula  Continues to be on norepinephrine due to soft BP  Patient awake and following all commands during my evaluation this morning  She denies any headache, focal weakness, focal numbness, she does report mild mucus in her throat  When last patient does not remember the events on 3/7th, when asked if she took her medications more than she was needed she says she does not remember  Pertinent Negatives include: headaches, amaurosis, diplopia, other visual changes, paralysis/weakness, numbness or tingling, involuntary movements, tremor  OBJECTIVE     Patient ID: Guerline Van is a 58 y o  female  Vitals:    03/10/22 0700 03/10/22 0715 03/10/22 0730 03/10/22 0800   BP:  106/52     BP Location:  Right arm     Pulse: 80 86 98 90   Resp: (!) 24 21 (!) 30 (!) 27   Temp:  99 7 °F (37 6 °C)     TempSrc:  Oral     SpO2: 94% 95% 94% 94%   Weight:       Height:          Temperature:   Temp (24hrs), Av 1 °F (37 3 °C), Min:97 7 °F (36 5 °C), Max:99 9 °F (37 7 °C)    Temperature: 99 7 °F (37 6 °C)      Physical Exam  Vitals and nursing note reviewed     Constitutional: Appearance: Normal appearance  HENT:      Head: Normocephalic  Mouth/Throat:      Mouth: Mucous membranes are moist       Pharynx: Oropharynx is clear  Eyes:      Extraocular Movements: Extraocular movements intact  Conjunctiva/sclera: Conjunctivae normal       Pupils: Pupils are equal, round, and reactive to light  Cardiovascular:      Rate and Rhythm: Normal rate  Pulses: Normal pulses  Pulmonary:      Effort: Pulmonary effort is normal  No respiratory distress  Abdominal:      Palpations: Abdomen is soft  Musculoskeletal:         General: Normal range of motion  Skin:     General: Skin is warm  Capillary Refill: Capillary refill takes less than 2 seconds  Neurological:      Mental Status: She is alert  Neurological Examination:     Mental Status: The patient was awake, alert, attentive, oriented to person, place, and  Month, year  speech- hypophonic,  No dysarthria or aphasia noted  Cranial Nerves:   I: smell Not tested   II: visual fields Full to confrontation  Pupils equal, round, reactive to light with normal accomodation  III,IV,VI: extraocular muscles EOMI, no nystagmus   V: masseter and pterygoid strength full  Sensation in the V1 through V3 distributions intact to pinprick and light touch bilaterally  VII: Face is symmetric with no weakness noted  VIII: Audition intact to finger rub bilaterally  IX/X: Uvula midline  Soft palate elevation symmetric  XI: Trapezius and SCM strength 5/5 B/L  XII: Tongue laceration seen on left side of tongue with no atrophy or fasciculations with appropriate movement  Motor Examination:    Bulk: Normal  No atrophy Tone: Normal  Fasciculations: None       Feels mild pain in her right upper extremity due to bruising from IV     able to squeeze my fingers, push pull at least 4/5 strength in biceps/triceps/deltoid (pain related)  Almost full hand       left arm-  Full strength in hand ,  biceps, triceps, Medications   Medication Sig Dispense Refill     norethindrone-ethinyl estradiol-iron (AUROVELA FE 1.5/30) 1.5-30 MG-MCG tablet Take 1 tablet by mouth daily Skip placebo week every other month 84 tablet 3     Cyanocobalamin (B-12) 1000 MCG CAPS Take 1,000 mcg by mouth daily       Allergies   Allergen Reactions     Lactose        Breast Cancer Screening:    Breast CA Risk Assessment (FHS-7) 3/7/2023   Do you have a family history of breast, colon, or ovarian cancer? No / Unknown       click delete button to remove this line now  Patient under 40 years of age: Routine Mammogram Screening not recommended.   Pertinent mammograms are reviewed under the imaging tab.    History of abnormal Pap smear:   Last 3 Pap Results:   PAP (no units)   Date Value   06/26/2019 NIL   04/14/2016 NIL   10/22/2015 NIL     PAP / HPV 6/26/2019 4/14/2016 10/22/2015   PAP (Historical) NIL NIL NIL     Reviewed and updated as needed this visit by clinical staff   Tobacco  Allergies  Meds              Reviewed and updated as needed this visit by Provider                 Past Medical History:   Diagnosis Date     Papanicolaou smear of cervix with low grade squamous intraepithelial lesion (LGSIL) 04/2015    colp - EUGENE I      Past Surgical History:   Procedure Laterality Date     BIOPSY  April 2015    Cervix     EYE SURGERY  2013    Minor removal of corneal deposit, twice, in right eye       Review of Systems   Constitutional: Negative for chills and fever.   HENT: Negative for congestion, ear pain, hearing loss and sore throat.    Eyes: Negative for pain and visual disturbance.   Respiratory: Negative for cough and shortness of breath.    Cardiovascular: Negative for chest pain, palpitations and peripheral edema.   Gastrointestinal: Negative for abdominal pain, constipation, diarrhea, heartburn, hematochezia and nausea.   Breasts:  Negative for tenderness, breast mass and discharge.   Genitourinary: Negative for dysuria, frequency, genital  "sores, hematuria, pelvic pain, urgency, vaginal bleeding and vaginal discharge.   Musculoskeletal: Negative for arthralgias, joint swelling and myalgias.   Skin: Positive for rash.   Neurological: Positive for headaches. Negative for dizziness, weakness and paresthesias.   Psychiatric/Behavioral: Negative for mood changes. The patient is not nervous/anxious.           OBJECTIVE:   /80 (BP Location: Right arm, Patient Position: Sitting, Cuff Size: Adult Regular)   Pulse 67   Temp 98.9  F (37.2  C) (Oral)   Ht 1.64 m (5' 4.57\")   Wt 71.2 kg (157 lb)   LMP 02/24/2023 (Exact Date)   SpO2 99%   BMI 26.48 kg/m    Physical Exam  GENERAL: healthy, alert and no distress  EYES: Eyes grossly normal to inspection, PERRL and conjunctivae and sclerae normal  HENT: ear canals and TM's normal, nose and mouth without ulcers or lesions  NECK: no adenopathy, no asymmetry, masses, or scars and thyroid normal to palpation  RESP: lungs clear to auscultation - no rales, rhonchi or wheezes  BREAST: normal without masses, tenderness or nipple discharge and no palpable axillary masses or adenopathy  CV: regular rate and rhythm, normal S1 S2, no S3 or S4, no murmur, click or rub, no peripheral edema and peripheral pulses strong  ABDOMEN: soft, nontender, no hepatosplenomegaly, no masses and bowel sounds normal   (female): normal female external genitalia, normal urethral meatus, vaginal mucosa pink, moist, well rugated, and normal cervix/adnexa/uterus without masses or discharge, ectropion noted  MS: no gross musculoskeletal defects noted, no edema  SKIN: no suspicious lesions or rashes  NEURO: Normal strength and tone, mentation intact and speech normal  PSYCH: mentation appears normal, affect normal/bright    Diagnostic Test Results:  Labs reviewed in Epic    ASSESSMENT/PLAN:   (Z00.00) Routine general medical examination at a health care facility  (primary encounter diagnosis)      (Z12.4) Cervical cancer screening  Plan: " deltoid 4+/5                  IP        Quad   Ham     TA       Gastroc   Right      5            5          5         5                5  Left         5            5         5         5                5       Reflexes:                   Biceps Brachioradialis Triceps Patella Achilles Plantars   Right          2+            2+                  2+        1+       1+         Down   Left            2+             2+                 2+         1+       1+         Down     Clonus: None    Coordination: Patient able to perform normal finger-to-nose     Sensory: Normal sensation to light touch throughout  Gait: deferred d/t acuity of condition  LABORATORY DATA     Labs: I have personally reviewed pertinent reports  Results from last 7 days   Lab Units 03/10/22  0433 03/09/22  0429 03/08/22  0448 03/07/22  1826 03/07/22  1722   WBC Thousand/uL 6 45 8 68 7 40   < > 8 47   HEMOGLOBIN g/dL 8 1* 8 9* 9 1*   < > 11 2*   HEMATOCRIT % 24 2* 26 1* 26 9*   < > 35 5   PLATELETS Thousands/uL 27* 29* 35*   < > 49*   NEUTROS PCT %  --   --  77*  --  77*   MONOS PCT %  --   --  4  --  5   MONO PCT %  --  8  --   --   --     < > = values in this interval not displayed        Results from last 7 days   Lab Units 03/10/22  0433 03/09/22  0429 03/08/22  1131 03/08/22  0448 03/08/22 0448 03/08/22  0029 03/08/22  0029 03/07/22  1722 03/07/22  1713   SODIUM mmol/L 140 138 135*   < > 136   < > 139   < >  --    POTASSIUM mmol/L 3 6 3 6 3 8   < > 3 1*   < > 3 5   < >  --    CHLORIDE mmol/L 109* 108 102   < > 101   < > 103   < >  --    CO2 mmol/L 21 20* 21   < > 23   < > 23   < >  --    CO2, I-STAT mmol/L  --   --   --   --   --   --   --   --  20*   BUN mg/dL 7 5 5   < > 6   < > 6   < >  --    CREATININE mg/dL 0 59* 0 57* 0 73   < > 0 72   < > 0 74   < >  --    CALCIUM mg/dL 7 9* 7 6* 7 7*   < > 7 4*   < > 7 4*   < >  --    ALK PHOS U/L  --  114  --   --  144*  --  160*  --   --    ALT U/L  --  58  --   --  85*  --  92*  --   --    AST Pap Screen with HPV - recommended age 30 - 65         years            (Z30.41) Encounter for surveillance of contraceptive pills  Comment: Has been on this medication for multiple years but has been off for the last few months, has not been sexually active for a few years.  We will restart her OCP for dysmenorrhea with plan to skip every other month placebo week.  Reviewed and no new contraindications.  Plan: norethindrone-ethinyl estradiol-iron (AUROVELA         FE 1.5/30) 1.5-30 MG-MCG tablet            (Z13.1) Screening for diabetes mellitus  Plan: Glucose            (Z13.220) Screening cholesterol level  Plan: Lipid panel reflex to direct LDL Fasting            (G43.009) Migraine without aura and without status migrainosus, not intractable  Comment: On average occurs once per month, adequately controlled with Excedrin.  Previously did not tolerate Imitrex.  No oral    (Z82.69) Family history of ankylosing spondylitis  Comment: Brother with recent diagnosis of ankylosing spondylitis, positive HLA-B27.  She is not having significant symptoms of this at this time.  If she does in the future we can consider work-up    Patient has been advised of split billing requirements and indicates understanding: Yes      COUNSELING:  Reviewed preventive health counseling, as reflected in patient instructions       Regular exercise       Healthy diet/nutrition       Contraception        She reports that she has never smoked. She has never used smokeless tobacco.        Dianne Ingram DO  [facility]   U/L  --  103*  --   --  129*  --  148*  --   --    GLUCOSE, ISTAT mg/dl  --   --   --   --   --   --   --   --  355*    < > = values in this interval not displayed  Results from last 7 days   Lab Units 03/10/22  0433 03/09/22  0429 03/08/22  1131   MAGNESIUM mg/dL 1 7 1 8 2 1     Results from last 7 days   Lab Units 03/10/22  0433 03/09/22  0429 03/08/22  1131   PHOSPHORUS mg/dL 1 7* 2 2* 3 1      Results from last 7 days   Lab Units 03/07/22  1826   INR  1 16   PTT seconds 27  27     Results from last 7 days   Lab Units 03/07/22  2307   LACTIC ACID mmol/L 1 7           IMAGING & DIAGNOSTIC TESTING     Radiology Results: I have personally reviewed pertinent reports  XR chest portable ICU   Final Result by Felicia Mecrado MD (03/08 4018)      Persistent right lower lobe airspace opacity may reflect atelectasis or pneumonia  Right IJ central venous catheter with tip in satisfactory position  Workstation performed: [id number]         XR chest portable ICU   Final Result by Elia Martínez MD (03/08 4384)      New opacity right lung base may be artifactual due to positioning, though consolidation (including aspiration) is not excluded, and attention will be made on follow-up  Workstation performed: [id number]         XR chest 1 view   Final Result by Jackie Snell MD (03/08 0894)      Improved interstitial markings  Status post intubation without pneumothorax  Workstation performed: [id number]         XR Trauma multiple (SLB/SLRA trauma bay ONLY)   Final Result by Vashti Green MD (03/08 3351)      No acute traumatic injury  Mild vascular prominence  Correlate for CHF  Workstation performed: [id number]         TRAUMA - CT chest abdomen pelvis w contrast   Final Result by Chicho Bourne MD (03/07 8112)   1  T12, L2, L4 mild vertebral compression deformities are age indeterminate  Correlate clinically  2   No suspected acute posttraumatic injury     3  Cholelithiasis  4   Chronic pancreatitis  I personally discussed this study with Juice Figueroa on 3/7/2022 at 5:50 PM                         Workstation performed: [id number]         TRAUMA - CT head wo contrast   Final Result by Jaswinder Frost MD (03/07 1741)      No acute intracranial abnormality  I personally discussed this study with Juice Figueroa on 3/7/2022 at 5:39 PM                      Workstation performed: [id number]         TRAUMA - CT spine cervical wo contrast   Final Result by Jazmyn Lewis MD (03/07 1756)      No cervical spine fracture or traumatic malalignment  I personally discussed this study with Juice Figueroa on 3/7/2022 at 5:45 PM                       Workstation performed: [id number]         XR chest 1 view    (Results Pending)   MRI brain w wo contrast    (Results Pending)       Other Diagnostic Testing: I have personally reviewed pertinent reports        ACTIVE MEDICATIONS     Current Facility-Administered Medications   Medication Dose Route Frequency    acetaminophen (TYLENOL) tablet 650 mg  650 mg Oral Q4H PRN    ampicillin-sulbactam (UNASYN) 3 g in sodium chloride 0 9 % 100 mL IVPB  3 g Intravenous Q6H    chlorhexidine (PERIDEX) 0 12 % oral rinse 15 mL  15 mL Mouth/Throat R64X DESTIN    folic acid (FOLVITE) tablet 1 mg  1 mg Oral Daily    guaiFENesin (MUCINEX) 12 hr tablet 600 mg  600 mg Oral Q12H [address]    insulin lispro (HumaLOG) 100 units/mL subcutaneous injection 1-5 Units  1-5 Units Subcutaneous TID AC    insulin lispro (HumaLOG) 100 units/mL subcutaneous injection 1-5 Units  1-5 Units Subcutaneous HS    lacosamide (VIMPAT) 100 mg in sodium chloride 0 9 % 100 mL IVPB  100 mg Intravenous Q12H    levETIRAcetam (KEPPRA) 1,000 mg in sodium chloride 0 9 % 100 mL IVPB  1,000 mg Intravenous Q12H DESTIN    LORazepam (ATIVAN) injection 2 mg  2 mg Intravenous Q1H PRN    multivitamin-minerals (CENTRUM) tablet 1 tablet  1 tablet Oral Daily    NOREPINEPHRINE 4 MG  ML NSS (CMPD ORDER) infusion  1-30 mcg/min Intravenous Titrated    pantoprazole (PROTONIX) injection 40 mg  40 mg Intravenous Q24H DESTIN    potassium phosphates 30 mmol in sodium chloride 0 9 % 250 mL infusion  30 mmol Intravenous Once    thiamine tablet 100 mg  100 mg Oral Daily    vasopressin (PITRESSIN) 20 Units in sodium chloride 0 9 % 100 mL infusion  0 04 Units/min Intravenous Continuous       Prior to Admission medications    Not on File         VTE Pharmacologic Prophylaxis: C/I d/t decreased platelets   VTE Mechanical Prophylaxis: sequential compression device    ==  MD Lib[facility]'s Neurology Residency, PGY-3

## 2023-03-13 ENCOUNTER — HOSPITAL ENCOUNTER (INPATIENT)
Facility: HOSPITAL | Age: 63
LOS: 1 days | Discharge: HOME | End: 2023-03-14
Attending: EMERGENCY MEDICINE | Admitting: ANESTHESIOLOGY

## 2023-03-13 ENCOUNTER — APPOINTMENT (EMERGENCY)
Dept: RADIOLOGY | Facility: HOSPITAL | Age: 63
End: 2023-03-13

## 2023-03-13 ENCOUNTER — APPOINTMENT (INPATIENT)
Dept: NEUROLOGY | Facility: HOSPITAL | Age: 63
End: 2023-03-13
Attending: NURSE PRACTITIONER

## 2023-03-13 ENCOUNTER — APPOINTMENT (EMERGENCY)
Dept: CT IMAGING | Facility: HOSPITAL | Age: 63
End: 2023-03-13

## 2023-03-13 DIAGNOSIS — F10.10 ALCOHOL ABUSE: Chronic | ICD-10-CM

## 2023-03-13 DIAGNOSIS — G40.901 NONINTRACTABLE EPILEPSY WITH STATUS EPILEPTICUS, UNSPECIFIED EPILEPSY TYPE (HCC): Chronic | ICD-10-CM

## 2023-03-13 DIAGNOSIS — G40.109 FOCAL EPILEPSY (HCC): ICD-10-CM

## 2023-03-13 DIAGNOSIS — R56.9 SEIZURE (HCC): Primary | ICD-10-CM

## 2023-03-13 DIAGNOSIS — G93.40 ENCEPHALOPATHY: ICD-10-CM

## 2023-03-13 PROBLEM — F41.0 PANIC ATTACK: Status: RESOLVED | Noted: 2022-04-27 | Resolved: 2023-03-13

## 2023-03-13 PROBLEM — T50.901A DRUG OVERDOSE: Status: RESOLVED | Noted: 2022-03-10 | Resolved: 2023-03-13
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
## 2023-03-13 PROBLEM — E87.1 HYPONATREMIA: Status: ACTIVE | Noted: 2023-03-13
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
## 2023-03-13 PROBLEM — G40.909 EPILEPSY (HCC): Chronic | Status: ACTIVE | Noted: 2022-08-03

## 2023-03-13 PROBLEM — I50.42 CHRONIC COMBINED SYSTOLIC AND DIASTOLIC CONGESTIVE HEART FAILURE (HCC): Chronic | Status: ACTIVE | Noted: 2022-04-27

## 2023-03-13 PROBLEM — I50.42 CHRONIC COMBINED SYSTOLIC AND DIASTOLIC CHF (CONGESTIVE HEART FAILURE) (HCC): Status: RESOLVED | Noted: 2023-02-10 | Resolved: 2023-03-13

## 2023-03-13 PROBLEM — F41.9 ANXIETY: Chronic | Status: ACTIVE | Noted: 2019-11-13

## 2023-03-13 LAB
ALBUMIN SERPL BCP-MCNC: 4 G/DL (ref 3.5–5)
ALP SERPL-CCNC: 147 U/L (ref 34–104)
ALT SERPL W P-5'-P-CCNC: 61 U/L (ref 7–52)
AMPHETAMINES SERPL QL SCN: NEGATIVE
ANION GAP SERPL CALCULATED.3IONS-SCNC: 11 MMOL/L (ref 4–13)
APAP SERPL-MCNC: <10 UG/ML (ref 10–20)
APTT PPP: 24 SECONDS (ref 23–37)
AST SERPL W P-5'-P-CCNC: 100 U/L (ref 13–39)
ATRIAL RATE: 115 BPM
BACTERIA UR QL AUTO: ABNORMAL /HPF
BARBITURATES UR QL: NEGATIVE
BASOPHILS # BLD AUTO: 0.03 THOUSANDS/ÂΜL (ref 0–0.1)
BASOPHILS NFR BLD AUTO: 1 % (ref 0–1)
BENZODIAZ UR QL: NEGATIVE
BILIRUB SERPL-MCNC: 0.42 MG/DL (ref 0.2–1)
BILIRUB UR QL STRIP: NEGATIVE
BUN SERPL-MCNC: 14 MG/DL (ref 5–25)
CALCIUM SERPL-MCNC: 8.7 MG/DL (ref 8.4–10.2)
CHLORIDE SERPL-SCNC: 100 MMOL/L (ref 96–108)
CK SERPL-CCNC: 39 U/L (ref 26–192)
CLARITY UR: CLEAR
CO2 SERPL-SCNC: 23 MMOL/L (ref 21–32)
COCAINE UR QL: NEGATIVE
COLOR UR: ABNORMAL
CREAT SERPL-MCNC: 0.73 MG/DL (ref 0.6–1.3)
EOSINOPHIL # BLD AUTO: 0.13 THOUSAND/ÂΜL (ref 0–0.61)
EOSINOPHIL NFR BLD AUTO: 3 % (ref 0–6)
ERYTHROCYTE [DISTWIDTH] IN BLOOD BY AUTOMATED COUNT: 12.8 % (ref 11.6–15.1)
EST. AVERAGE GLUCOSE BLD GHB EST-MCNC: 100 MG/DL
ETHANOL SERPL-MCNC: <10 MG/DL
GFR SERPL CREATININE-BSD FRML MDRD: 87 ML/MIN/1.73SQ M
GLUCOSE SERPL-MCNC: 166 MG/DL (ref 65–140)
GLUCOSE SERPL-MCNC: 178 MG/DL (ref 65–140)
GLUCOSE UR STRIP-MCNC: NEGATIVE MG/DL
HBA1C MFR BLD: 5.1 %
HCT VFR BLD AUTO: 39.6 % (ref 34.8–46.1)
HGB BLD-MCNC: 12.9 G/DL (ref 11.5–15.4)
HGB UR QL STRIP.AUTO: NEGATIVE
IMM GRANULOCYTES # BLD AUTO: 0.01 THOUSAND/UL (ref 0–0.2)
IMM GRANULOCYTES NFR BLD AUTO: 0 % (ref 0–2)
INR PPP: 1.04 (ref 0.84–1.19)
KETONES UR STRIP-MCNC: NEGATIVE MG/DL
LACTATE SERPL-SCNC: 1.6 MMOL/L (ref 0.5–2)
LEUKOCYTE ESTERASE UR QL STRIP: NEGATIVE
LYMPHOCYTES # BLD AUTO: 3.15 THOUSANDS/ÂΜL (ref 0.6–4.47)
LYMPHOCYTES NFR BLD AUTO: 66 % (ref 14–44)
MAGNESIUM SERPL-MCNC: 1.6 MG/DL (ref 1.9–2.7)
MCH RBC QN AUTO: 31.7 PG (ref 26.8–34.3)
MCHC RBC AUTO-ENTMCNC: 32.6 G/DL (ref 31.4–37.4)
MCV RBC AUTO: 97 FL (ref 82–98)
METHADONE UR QL: NEGATIVE
MONOCYTES # BLD AUTO: 0.28 THOUSAND/ÂΜL (ref 0.17–1.22)
MONOCYTES NFR BLD AUTO: 6 % (ref 4–12)
MUCOUS THREADS UR QL AUTO: ABNORMAL
NEUTROPHILS # BLD AUTO: 1.14 THOUSANDS/ÂΜL (ref 1.85–7.62)
NEUTS SEG NFR BLD AUTO: 24 % (ref 43–75)
NITRITE UR QL STRIP: NEGATIVE
NON-SQ EPI CELLS URNS QL MICRO: ABNORMAL /HPF
NRBC BLD AUTO-RTO: 0 /100 WBCS
OPIATES UR QL SCN: NEGATIVE
OXYCODONE+OXYMORPHONE UR QL SCN: NEGATIVE
P AXIS: 71 DEGREES
PCP UR QL: NEGATIVE
PH UR STRIP.AUTO: 6 [PH]
PHOSPHATE SERPL-MCNC: 4.4 MG/DL (ref 2.3–4.1)
PLATELET # BLD AUTO: 56 THOUSANDS/UL (ref 149–390)
PMV BLD AUTO: 10.1 FL (ref 8.9–12.7)
POTASSIUM SERPL-SCNC: 3.9 MMOL/L (ref 3.5–5.3)
PR INTERVAL: 188 MS
PROCALCITONIN SERPL-MCNC: <0.05 NG/ML
PROT SERPL-MCNC: 7.1 G/DL (ref 6.4–8.4)
PROT UR STRIP-MCNC: ABNORMAL MG/DL
PROTHROMBIN TIME: 13.9 SECONDS (ref 11.6–14.5)
QRS AXIS: 58 DEGREES
QRSD INTERVAL: 86 MS
QT INTERVAL: 324 MS
QTC INTERVAL: 448 MS
RBC # BLD AUTO: 4.07 MILLION/UL (ref 3.81–5.12)
RBC #/AREA URNS AUTO: ABNORMAL /HPF
SALICYLATES SERPL-MCNC: <5 MG/DL (ref 3–20)
SODIUM SERPL-SCNC: 134 MMOL/L (ref 135–147)
SP GR UR STRIP.AUTO: >=1.05 (ref 1–1.03)
T WAVE AXIS: 68 DEGREES
THC UR QL: NEGATIVE
TSH SERPL DL<=0.05 MIU/L-ACNC: 3.62 UIU/ML (ref 0.45–4.5)
UROBILINOGEN UR STRIP-ACNC: <2 MG/DL
VENTRICULAR RATE: 115 BPM
WBC # BLD AUTO: 4.74 THOUSAND/UL (ref 4.31–10.16)
WBC #/AREA URNS AUTO: ABNORMAL /HPF

## 2023-03-13 RX ORDER — LORAZEPAM 2 MG/ML
2 INJECTION INTRAMUSCULAR ONCE
Status: COMPLETED | OUTPATIENT
Start: 2023-03-13 | End: 2023-03-13

## 2023-03-13 RX ORDER — FOLIC ACID 1 MG/1
1 TABLET ORAL DAILY
Status: DISCONTINUED | OUTPATIENT
Start: 2023-03-14 | End: 2023-03-14 | Stop reason: HOSPADM

## 2023-03-13 RX ORDER — HYDROXYZINE HYDROCHLORIDE 25 MG/1
25 TABLET, FILM COATED ORAL 2 TIMES DAILY
Status: DISCONTINUED | OUTPATIENT
Start: 2023-03-13 | End: 2023-03-14 | Stop reason: HOSPADM

## 2023-03-13 RX ORDER — LACOSAMIDE 100 MG/1
200 TABLET ORAL 2 TIMES DAILY
Status: DISCONTINUED | OUTPATIENT
Start: 2023-03-13 | End: 2023-03-14 | Stop reason: HOSPADM

## 2023-03-13 RX ORDER — LORAZEPAM 2 MG/ML
1 INJECTION INTRAMUSCULAR ONCE
Status: DISCONTINUED | OUTPATIENT
Start: 2023-03-13 | End: 2023-03-13

## 2023-03-13 RX ORDER — LORAZEPAM 2 MG/ML
1 INJECTION INTRAMUSCULAR ONCE
Status: COMPLETED | OUTPATIENT
Start: 2023-03-13 | End: 2023-03-13

## 2023-03-13 RX ORDER — GABAPENTIN 300 MG/1
300 CAPSULE ORAL 2 TIMES DAILY
Status: DISCONTINUED | OUTPATIENT
Start: 2023-03-13 | End: 2023-03-14 | Stop reason: HOSPADM

## 2023-03-13 RX ORDER — MAGNESIUM SULFATE HEPTAHYDRATE 40 MG/ML
2 INJECTION, SOLUTION INTRAVENOUS ONCE
Status: COMPLETED | OUTPATIENT
Start: 2023-03-13 | End: 2023-03-13

## 2023-03-13 RX ORDER — LORAZEPAM 2 MG/ML
INJECTION INTRAMUSCULAR
Status: COMPLETED
Start: 2023-03-13 | End: 2023-03-13

## 2023-03-13 RX ORDER — LANOLIN ALCOHOL/MO/W.PET/CERES
100 CREAM (GRAM) TOPICAL DAILY
Status: DISCONTINUED | OUTPATIENT
Start: 2023-03-14 | End: 2023-03-14 | Stop reason: HOSPADM

## 2023-03-13 RX ORDER — TRAZODONE HYDROCHLORIDE 50 MG/1
50 TABLET ORAL DAILY
Status: DISCONTINUED | OUTPATIENT
Start: 2023-03-13 | End: 2023-03-14 | Stop reason: HOSPADM

## 2023-03-13 RX ORDER — ENOXAPARIN SODIUM 100 MG/ML
40 INJECTION SUBCUTANEOUS DAILY
Status: DISCONTINUED | OUTPATIENT
Start: 2023-03-13 | End: 2023-03-14 | Stop reason: HOSPADM

## 2023-03-13 RX ADMIN — LEVETIRACETAM 3000 MG: 100 INJECTION, SOLUTION INTRAVENOUS at 07:16

## 2023-03-13 RX ADMIN — LORAZEPAM 1 MG: 2 INJECTION INTRAMUSCULAR; INTRAVENOUS at 06:23

## 2023-03-13 RX ADMIN — GABAPENTIN 300 MG: 300 CAPSULE ORAL at 12:41

## 2023-03-13 RX ADMIN — HYDROXYZINE HYDROCHLORIDE 25 MG: 25 TABLET ORAL at 20:10

## 2023-03-13 RX ADMIN — MAGNESIUM SULFATE HEPTAHYDRATE 2 G: 40 INJECTION, SOLUTION INTRAVENOUS at 12:12

## 2023-03-13 RX ADMIN — SODIUM CHLORIDE 1000 ML: 0.9 INJECTION, SOLUTION INTRAVENOUS at 06:23

## 2023-03-13 RX ADMIN — LORAZEPAM 2 MG: 2 INJECTION INTRAMUSCULAR; INTRAVENOUS at 06:31

## 2023-03-13 RX ADMIN — TRAZODONE HYDROCHLORIDE 50 MG: 50 TABLET ORAL at 20:10

## 2023-03-13 RX ADMIN — GABAPENTIN 300 MG: 300 CAPSULE ORAL at 20:10

## 2023-03-13 RX ADMIN — ENOXAPARIN SODIUM 40 MG: 40 INJECTION SUBCUTANEOUS at 12:42

## 2023-03-13 RX ADMIN — LORAZEPAM 2 MG: 2 INJECTION INTRAMUSCULAR at 06:31

## 2023-03-13 RX ADMIN — IOHEXOL 85 ML: 350 INJECTION, SOLUTION INTRAVENOUS at 06:46

## 2023-03-13 RX ADMIN — LACOSAMIDE 200 MG: 100 TABLET, FILM COATED ORAL at 12:41

## 2023-03-13 RX ADMIN — LACOSAMIDE 200 MG: 100 TABLET, FILM COATED ORAL at 22:30

## 2023-03-13 NOTE — ED NOTES
Patient turned and positioned  Warm blankets given  Comfort and safety measures provided        Lorra Najjar, RN  03/13/23 7222

## 2023-03-13 NOTE — ASSESSMENT & PLAN NOTE
Wt Readings from Last 3 Encounters:   03/13/23 55 7 kg (122 lb 12 7 oz)   02/22/23 56 2 kg (124 lb)   02/10/23 56 3 kg (124 lb 1 9 oz)     · 7/22 ECHO EF 60% G1DD (improved from 40% prior)  · BP soft hold BB

## 2023-03-13 NOTE — ASSESSMENT & PLAN NOTE
· Possibly 2/2 vimpat noncompliance/??insomnia  · CTH Neg  · Recent Admit 2/6-2/13 with vimpat increased to 200mg BID  Psych consult for anxiety as trigger    · ER spoke with Dr Willam Hicks  · Keppra loaded 3gm  · Consult neuro   · Spot EEG  · Cont home vimpat  · Seizure precautions

## 2023-03-13 NOTE — ASSESSMENT & PLAN NOTE
· Possibly 2/2 vimpat noncompliance/??insomnia  · CTH Neg  · Recent Admit 2/6-2/13 with vimpat increased to 200mg BID  Psych consult for anxiety as trigger    · ER spoke with Dr Eugenia Corona  · Keppra loaded 3gm  · Consult neuro   · Spot EEG  · Cont home vimpat  · Check level  · Seizure precautions

## 2023-03-13 NOTE — ED PROVIDER NOTES
History  Chief Complaint   Patient presents with   • Seizure - Actively Seizing on Arrival     Pt presents to ED actively seizing on arrival  Pt appears to have bruises all over body, in a post-ictal state, - thinners  Hx of seizures     Patient is a 60-year-old female with a history of seizures on Vimpat presents emerged from and actively seizing on right side  Patient has facial bruising on right side of face and oriented x1, GCS 14 initially with patient becoming unresponsive with rightward gaze and right-sided focal seizure, unconscious  Total Ativan IV delivered with a loading dose of Keppra and IV with seizure abatement  Patient unable to provide history secondary to acute condition   at bedside had reported the patient has a history of focal seizures, with right-sided involvement with patient being unconscious, and patient has "missed 1 or 2 times of Vimpat ",  Dose and had recent round level fall from standing height a couple days prior to current ED presentation  Patient with history of 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 with 2 hospitalizations in February, 2/6/23 and transfer to [place] 2/10/23 respectively; with the recent hospitalization with treatment of epilepsy, continuation of lacosamide 100 mg twice daily at discharge, with neurology and psychiatric and PCP follow-ups  Patient had facial bruising and chest bruising with no other sources or signs of outward trauma  Patient  also reports that patient had normal mental status status post fall a couple days prior to current presentation with worsening mentation today, alert and oriented x1 initial presentation with eventual ability to follow commands and no verbal responses with rightward gaze  History provided by:  Patient   used: No        Prior to Admission Medications   Prescriptions Last Dose Informant Patient Reported?  Taking?   gabapentin (NEURONTIN) 300 mg capsule   No No   Sig: Take 1 capsule (300 mg total) by mouth 2 (two) times a day   hydrOXYzine (VISTARIL) 100 MG capsule   Yes No   Sig: Take 100 mg by mouth 3 (three) times a day as needed for itching   hydrOXYzine HCL (ATARAX) 25 mg tablet   No No   Sig: Take 1 tablet (25 mg total) by mouth 2 (two) times a day   lacosamide (VIMPAT) 200 mg tablet   No No   Sig: Take 1 tablet (200 mg total) by mouth 2 (two) times a day   metoprolol succinate (TOPROL-XL) 25 mg 24 hr tablet   No No   Sig: Take 1 tablet (25 mg total) by mouth 2 (two) times a day   ondansetron (ZOFRAN) 4 mg tablet   Yes No   traZODone (DESYREL) 50 mg tablet   No No   Sig: Take 1 tablet (50 mg total) by mouth in the morning      Facility-Administered Medications: None       Past Medical History:   Diagnosis Date   • Fracture    • Hepatitis     Hep A    • Insomnia     10mar2016 resolved   • Osteoporosis     14jun2016 resolved   • Pancreatitis    • Seasonal allergies    • Seizures ([facility] [address] )    • Urine discoloration 11/11/2019   • Varicella    • Vomiting 11/13/2019       Past Surgical History:   Procedure Laterality Date   • IR BIOPSY BONE  8/17/2021   • IR BIOPSY BONE MARROW  11/14/2019   • TUBAL LIGATION  1985       Family History   Problem Relation Age of Onset   • Anxiety disorder Mother    • Depression Mother    • No Known Problems Father    • No Known Problems Sister    • No Known Problems Sister    • No Known Problems Brother    • Cancer Paternal Grandmother         unknown    • No Known Problems Daughter    • No Known Problems Maternal Grandmother    • Cancer Maternal Grandfather    • No Known Problems Paternal Grandfather    • Breast cancer Neg Hx    • Ovarian cancer Neg Hx      I have reviewed and agree with the history as documented      E-Cigarette/Vaping   • E-Cigarette Use Never User      E-Cigarette/Vaping Substances   • Nicotine No    • THC No    • CBD No    • Flavoring No    • Other No    • Unknown No      Social History     Tobacco Use   • Smoking status: Never   • Smokeless tobacco: Never   Vaping Use   • Vaping Use: Never used   Substance Use Topics   • Alcohol use: Yes     Alcohol/week: 7 0 standard drinks     Types: 7 Cans of beer per week     Comment: occassional   • Drug use: No       Review of Systems   Constitutional: Negative for activity change, appetite change, chills and fever  HENT: Negative for congestion, postnasal drip, rhinorrhea, sinus pressure, sinus pain, sore throat and tinnitus  Eyes: Negative for photophobia and visual disturbance  Respiratory: Negative for cough, chest tightness and shortness of breath  Cardiovascular: Negative for chest pain and palpitations  Gastrointestinal: Negative for abdominal pain, constipation, diarrhea, nausea and vomiting  Genitourinary: Negative for difficulty urinating, dysuria, flank pain, frequency and urgency  Musculoskeletal: Negative for back pain, gait problem, neck pain and neck stiffness  Skin: Positive for wound  Negative for pallor and rash  Allergic/Immunologic: Negative for environmental allergies and food allergies  Neurological: Positive for seizures  Negative for dizziness, weakness, numbness and headaches  Psychiatric/Behavioral: Negative for confusion  All other systems reviewed and are negative  Physical Exam  Physical Exam  Vitals and nursing note reviewed  Constitutional:       General: She is awake  Appearance: Normal appearance  She is well-developed  She is not ill-appearing, toxic-appearing or diaphoretic  Comments: /59 (BP Location: Right arm)   Pulse 75   Temp (!) 96 5 °F (35 8 °C) (Rectal)   Resp 20   SpO2 100%      HENT:      Head: Normocephalic and atraumatic  Right Ear: Hearing, tympanic membrane, ear canal and external ear normal  No decreased hearing noted  No drainage, swelling or tenderness  No mastoid tenderness  Left Ear: Hearing, tympanic membrane, ear canal and external ear normal  No decreased hearing noted   No drainage, swelling or tenderness  No mastoid tenderness  Nose: Nose normal       Mouth/Throat:      Lips: Pink  Mouth: Mucous membranes are moist       Pharynx: Oropharynx is clear  Uvula midline  Eyes:      General: Lids are normal  Vision grossly intact  Right eye: No discharge  Left eye: No discharge  Extraocular Movements: Extraocular movements intact  Conjunctiva/sclera: Conjunctivae normal       Pupils: Pupils are equal, round, and reactive to light  Neck:      Vascular: No JVD  Trachea: Trachea and phonation normal  No tracheal tenderness or tracheal deviation  Cardiovascular:      Rate and Rhythm: Normal rate and regular rhythm  Pulses: Normal pulses  Radial pulses are 2+ on the right side and 2+ on the left side  Posterior tibial pulses are 2+ on the right side and 2+ on the left side  Heart sounds: Normal heart sounds  Pulmonary:      Effort: Pulmonary effort is normal       Breath sounds: Normal breath sounds  No stridor  No decreased breath sounds, wheezing, rhonchi or rales  Chest:      Chest wall: No tenderness  Abdominal:      General: Abdomen is flat  Bowel sounds are normal  There is no distension  Palpations: Abdomen is soft  Abdomen is not rigid  Tenderness: There is no abdominal tenderness  There is no guarding or rebound  Musculoskeletal:         General: Normal range of motion  Cervical back: Full passive range of motion without pain, normal range of motion and neck supple  No rigidity  No spinous process tenderness or muscular tenderness  Normal range of motion  Lymphadenopathy:      Head:      Right side of head: No submental, submandibular, tonsillar, preauricular, posterior auricular or occipital adenopathy  Left side of head: No submental, submandibular, tonsillar, preauricular, posterior auricular or occipital adenopathy  Cervical: No cervical adenopathy        Right cervical: No superficial, deep or posterior cervical adenopathy  Left cervical: No superficial, deep or posterior cervical adenopathy  Skin:     General: Skin is warm  Capillary Refill: Capillary refill takes less than 2 seconds  Neurological:      General: No focal deficit present  Mental Status: She is alert and oriented to person, place, and time  GCS: GCS eye subscore is 3  GCS verbal subscore is 3  GCS motor subscore is 5  Sensory: No sensory deficit  Psychiatric:         Mood and Affect: Mood normal          Speech: Speech normal          Behavior: Behavior normal  Behavior is cooperative  Thought Content:  Thought content normal          Judgment: Judgment normal          Vital Signs  ED Triage Vitals   Temperature Pulse Respirations Blood Pressure SpO2   03/13/23 0628 03/13/23 0615 03/13/23 0615 03/13/23 0615 03/13/23 0615   (!) 96 5 °F (35 8 °C) (!) 117 20 163/83 90 %      Temp Source Heart Rate Source Patient Position - Orthostatic VS BP Location FiO2 (%)   03/13/23 0628 03/13/23 0615 03/13/23 0615 03/13/23 0615 --   Rectal Monitor Lying Right arm       Pain Score       --                  Vitals:    03/13/23 0730 03/13/23 0800 03/13/23 0830 03/13/23 0900   BP: 118/59 101/54 102/58 122/93   Pulse: 75 80 66 81   Patient Position - Orthostatic VS: Lying Lying Lying Lying         Visual Acuity  Visual Acuity    Flowsheet Row Most Recent Value   L Pupil Size (mm) 2   R Pupil Size (mm) 2          ED Medications  Medications   LORazepam (ATIVAN) injection 1 mg (1 mg Intravenous Given 3/13/23 0623)   sodium chloride 0 9 % bolus 1,000 mL (0 mL Intravenous Stopped 3/13/23 0735)   LORazepam (ATIVAN) injection 2 mg (2 mg Intravenous Given 3/13/23 0631)   iohexol (OMNIPAQUE) 350 MG/ML injection (SINGLE-DOSE) 85 mL (85 mL Intravenous Given 3/13/23 0646)   levETIRAcetam (KEPPRA) 3,000 mg in sodium chloride 0 9 % 250 mL IVPB (0 mg Intravenous Stopped 3/13/23 1972)       Diagnostic Studies  Results Reviewed Procedure Component Value Units Date/Time    Procalcitonin [[id number]]  (Normal) Collected: 03/13/23 0800    Lab Status: Final result Specimen: Blood from Arm, Right Updated: 03/13/23 0857     Procalcitonin <0 05 ng/ml     Rapid drug screen, urine [[id number]]  (Normal) Collected: 03/13/23 0750    Lab Status: Final result Specimen: Urine, Catheter Updated: 03/13/23 9842     Amph/Meth UR Negative     Barbiturate Ur Negative     Benzodiazepine Urine Negative     Cocaine Urine Negative     Methadone Urine Negative     Opiate Urine Negative     PCP Ur Negative     THC Urine Negative     Oxycodone Urine Negative    Narrative:      FOR MEDICAL PURPOSES ONLY  IF CONFIRMATION NEEDED PLEASE CONTACT THE LAB WITHIN 5 DAYS  Drug Screen Cutoff Levels:  AMPHETAMINE/METHAMPHETAMINES  1000 ng/mL  BARBITURATES     200 ng/mL  BENZODIAZEPINES     200 ng/mL  COCAINE      300 ng/mL  METHADONE      300 ng/mL  OPIATES      300 ng/mL  PHENCYCLIDINE     25 ng/mL  THC       50 ng/mL  OXYCODONE      100 ng/mL    Urine Microscopic [[id number]]  (Abnormal) Collected: 03/13/23 0750    Lab Status: Final result Specimen: Urine, Clean Catch Updated: 03/13/23 0826     RBC, UA 1-2 /hpf      WBC, UA 1-2 /hpf      Epithelial Cells Occasional /hpf      Bacteria, UA None Seen /hpf      MUCUS THREADS Occasional    UA w Reflex to Microscopic w Reflex to Culture [[id number]]  (Abnormal) Collected: 03/13/23 0750    Lab Status: Final result Specimen: Urine, Clean Catch Updated: 03/13/23 0823     Color, UA Light Yellow     Clarity, UA Clear     Specific Gravity, UA >=1 050     pH, UA 6 0     Leukocytes, UA Negative     Nitrite, UA Negative     Protein, UA 70 (1+) mg/dl      Glucose, UA Negative mg/dl      Ketones, UA Negative mg/dl      Urobilinogen, UA <2 0 mg/dl      Bilirubin, UA Negative     Occult Blood, UA Negative    Blood culture #2 [[id number]] Collected: 03/13/23 0800    Lab Status:  In process Specimen: Blood from Arm, Left Updated: 03/13/23 0667 Blood culture #1 [[id number]] Collected: 03/13/23 0700    Lab Status: In process Specimen: Blood from Arm, Right Updated: 03/13/23 0806    CBC and differential [[id number]]  (Abnormal) Collected: 03/13/23 0631    Lab Status: Final result Specimen: Blood from Arm, Right Updated: 03/13/23 0745     WBC 4 74 Thousand/uL      RBC 4 07 Million/uL      Hemoglobin 12 9 g/dL      Hematocrit 39 6 %      MCV 97 fL      MCH 31 7 pg      MCHC 32 6 g/dL      RDW 12 8 %      MPV 10 1 fL      Platelets 56 Thousands/uL      nRBC 0 /100 WBCs      Neutrophils Relative 24 %      Immat GRANS % 0 %      Lymphocytes Relative 66 %      Monocytes Relative 6 %      Eosinophils Relative 3 %      Basophils Relative 1 %      Neutrophils Absolute 1 14 Thousands/µL      Immature Grans Absolute 0 01 Thousand/uL      Lymphocytes Absolute 3 15 Thousands/µL      Monocytes Absolute 0 28 Thousand/µL      Eosinophils Absolute 0 13 Thousand/µL      Basophils Absolute 0 03 Thousands/µL     Salicylate level [[id number]]  (Normal) Collected: 03/13/23 0631    Lab Status: Final result Specimen: Blood from Arm, Right Updated: 20/55/26 3951     Salicylate Lvl <5 mg/dL     TSH, 3rd generation with Free T4 reflex [[id number]]  (Normal) Collected: 03/13/23 0631    Lab Status: Final result Specimen: Blood from Arm, Right Updated: 03/13/23 0722     TSH 3RD GENERATON 3 625 uIU/mL     Narrative:      Patients undergoing fluorescein dye angiography may retain small amounts of fluorescein in the body for 48-72 hours post procedure  Samples containing fluorescein can produce falsely depressed TSH values  If the patient had this procedure,a specimen should be resubmitted post fluorescein clearance        Comprehensive metabolic panel [[id number]]  (Abnormal) Collected: 03/13/23 0631    Lab Status: Final result Specimen: Blood from Arm, Right Updated: 03/13/23 0715     Sodium 134 mmol/L      Potassium 3 9 mmol/L      Chloride 100 mmol/L      CO2 23 mmol/L      ANION GAP 11 mmol/L      BUN 14 mg/dL      Creatinine 0 73 mg/dL      Glucose 178 mg/dL      Calcium 8 7 mg/dL       U/L      ALT 61 U/L      Alkaline Phosphatase 147 U/L      Total Protein 7 1 g/dL      Albumin 4 0 g/dL      Total Bilirubin 0 42 mg/dL      eGFR 87 ml/min/1 73sq m     Narrative:      [place] guidelines for Chronic Kidney Disease (CKD):   •  Stage 1 with normal or high GFR (GFR > 90 mL/min/1 73 square meters)  •  Stage 2 Mild CKD (GFR = 60-89 mL/min/1 73 square meters)  •  Stage 3A Moderate CKD (GFR = 45-59 mL/min/1 73 square meters)  •  Stage 3B Moderate CKD (GFR = 30-44 mL/min/1 73 square meters)  •  Stage 4 Severe CKD (GFR = 15-29 mL/min/1 73 square meters)  •  Stage 5 End Stage CKD (GFR <15 mL/min/1 73 square meters)  Note: GFR calculation is accurate only with a steady state creatinine    CK Total with Reflex CKMB [[id number]]  (Normal) Collected: 03/13/23 0631    Lab Status: Final result Specimen: Blood from Arm, Right Updated: 03/13/23 0715     Total CK 39 U/L     Magnesium [[id number]]  (Abnormal) Collected: 03/13/23 0631    Lab Status: Final result Specimen: Blood from Arm, Right Updated: 03/13/23 0715     Magnesium 1 6 mg/dL     Phosphorus [[id number]]  (Abnormal) Collected: 03/13/23 0631    Lab Status: Final result Specimen: Blood from Arm, Right Updated: 03/13/23 0715     Phosphorus 4 4 mg/dL     Acetaminophen level-If concentration is detectable, please discuss with medical  on call   [[id number]]  (Abnormal) Collected: 03/13/23 0631    Lab Status: Final result Specimen: Blood from Arm, Right Updated: 03/13/23 0715     Acetaminophen Level <10 ug/mL     Ethanol [[id number]]  (Normal) Collected: 03/13/23 0631    Lab Status: Final result Specimen: Blood from Arm, Right Updated: 03/13/23 0707     Ethanol Lvl <10 mg/dL     Lactic acid [[id number]]  (Normal) Collected: 03/13/23 0631    Lab Status: Final result Specimen: Blood from Arm, Right Updated: 03/13/23 4502 LACTIC ACID 1 6 mmol/L     Narrative:      Result may be elevated if tourniquet was used during collection  Leeann Gut [[id number]]  (Normal) Collected: 03/13/23 0631    Lab Status: Final result Specimen: Blood from Arm, Right Updated: 03/13/23 0703     Protime 13 9 seconds      INR 1 04    APTT [[id number]]  (Normal) Collected: 03/13/23 0631    Lab Status: Final result Specimen: Blood from Arm, Right Updated: 03/13/23 0703     PTT 24 seconds     Lacosamide [[id number]] Collected: 03/13/23 0631    Lab Status: In process Specimen: Blood from Arm, Right Updated: 03/13/23 0639    Fingerstick Glucose (POCT) [[id number]]  (Abnormal) Collected: 03/13/23 0617    Lab Status: Final result Updated: 03/13/23 0618     POC Glucose 166 mg/dl                  CT facial bones without contrast   Final Result by Mk Maynard MD (03/13 0719)      No acute maxillofacial fracture  Please see same-day CTA head and neck with and without contrast for further evaluation  Workstation performed: [id number]         CTA head and neck w wo contrast   Final Result by Mk Maynard MD (03/13 3754)      No acute intracranial abnormality  Negative CTA head and neck for large vessel occlusion, dissection, aneurysm, or high-grade stenosis  Please see same-day CT facial bones without contrast for further evaluation  Additional chronic/incidental findings as detailed above  Workstation performed: [id number]         XR chest 1 view portable    (Results Pending)              Procedures  Procedures         ED Course  ED Course as of 03/13/23 0955   Mon Mar 13, 2023   0654 Called reading room and radiologist will read ct scans at this time; Keppra load, pt takes vimpat with son reporting that pt had missed doses intermittently  Right grover gaze; right sided focal seizure-unconscious       104 Teo Kebede text to 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 and history of a 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 that was actively seizing on arrival to the ED this morning; 3 mg IV  Ativan, and loading dose of Keppra delivered  with seizing abatement at this time  right grover gaze and with right upper and lower extremity involvement  normal electrolytes and POCT glu 178 hypomagnesmic at 1 6; ecg with sinus tach  CTA head and neck with no acute intracranial abnormality and negative for large vessel occlusion, dissection, aneurysm or high grade stenosis  patient currently hemodynamically stable and afebrile  patient with seizure abatement and snoring at this time likely 2/2 Ativan  Patient with recent hospital admission with symptoms similar to current on 2/10/2023 with EEG denoting no seizures  No epileptiform discharges; otherwise normal eeg and was d/c on Vimpat  repeat MRI- MR I of the brain demonstrated resolution of left hippocampal DWI abnormality  Slight elevation in transaminases, hypomagnesemia, hypophosphatemia   CT glucose 166  Discussed patient case with DIEGO Romero and both agreed to place patient on inpatient status under the care of Dr Pedrito Buchanan        Amount and/or Complexity of Data Reviewed  Labs: ordered  Decision-making details documented in ED Course  Radiology: ordered  Risk  Prescription drug management  Decision regarding hospitalization            Disposition  Final diagnoses:   Seizure ([facility] [address] )   Focal epilepsy ([facility] [address] )     Time reflects when diagnosis was documented in both MDM as applicable and the Disposition within this note     Time User Action Codes Description Comment    3/13/2023  7:58 AM Sammuel Snare Add [R56 9] Seizure ([facility] [address] )     3/13/2023  7:59 AM Sammuel Snare Add [D86 435] Focal epilepsy [facility])       ED Disposition     ED Disposition   Admit    Condition   Stable    Date/Time   Mon Mar 13, 2023  7:58 AM    Comment   Case was discussed with Justin CORTEZ and the patient's admission status was agreed to be Admission Status: inpatient status to the service of Dr Odin Hooks, Critical Care   Follow-up Information    None         Current Discharge Medication List      CONTINUE these medications which have NOT CHANGED    Details   gabapentin (NEURONTIN) 300 mg capsule Take 1 capsule (300 mg total) by mouth 2 (two) times a day  Qty: 60 capsule, Refills: 2    Associated Diagnoses: Anxiety with depression      hydrOXYzine (VISTARIL) 100 MG capsule Take 100 mg by mouth 3 (three) times a day as needed for itching      hydrOXYzine HCL (ATARAX) 25 mg tablet Take 1 tablet (25 mg total) by mouth 2 (two) times a day  Qty: 60 tablet, Refills: 2    Associated Diagnoses: Anxiety with depression; Panic attack; Anxiety      lacosamide (VIMPAT) 200 mg tablet Take 1 tablet (200 mg total) by mouth 2 (two) times a day  Qty: 60 tablet, Refills: 5    Comments: PLEASE RUN THROUGH [facility]  Associated Diagnoses: Symptomatic focal epilepsy (HCC)      metoprolol succinate (TOPROL-XL) 25 mg 24 hr tablet Take 1 tablet (25 mg total) by mouth 2 (two) times a day  Qty: 180 tablet, Refills: 0    Associated Diagnoses: Cardiomyopathy ([address] ); Acute systolic congestive heart failure (HCC)      ondansetron (ZOFRAN) 4 mg tablet       traZODone (DESYREL) 50 mg tablet Take 1 tablet (50 mg total) by mouth in the morning  Qty: 30 tablet, Refills: 2    Associated Diagnoses: Panic attack; Anxiety             No discharge procedures on file      PDMP Review       Value Time User    PDMP Reviewed  Yes 2/10/2023  7:14 PM Phoenix Rodriguez MD          ED Provider  Electronically Signed by           Fidencio Carpio PA-C  03/13/23 7252

## 2023-03-13 NOTE — ASSESSMENT & PLAN NOTE
[place] Mauricio is a 61 y o  female with traumatic right SAH (August 2021), alcohol use, focal epilepsy maintained on Vimpat, anxiety who presents to [facility] ED on 3/13/2023 with breakthrough seizure  Event described as patient being found staring off, unresponsive, with urinary incontinence after transporting to vehicle  Presented to [facility] ED with right focal seizure  GCS of 14, followed by rightward gaze and unresponsiveness  Work-up:  - CT facial bones wo: Unremarkable for acute maxillofacial fracture  - CTA head and neck: Unremarkable for acute intracranial abnormalities, large vessel occlusion, or critical stenosis  - Routine EEG: Left temporal irregular delta activity and generalized irregular delta activity in drowsiness; No electrographic seizures of interictal epileptiform discharges     Labs on presentation:  - AST elevated 100, ALT elevated 61, alk phos elevated 147  - Magnesium low on presentation, 1 6  - Phosphorus elevated, 4 4  - UA: Negative nitrite, 1-2 WBC, no bacteria seen  - Ammonia 48  - Hemoglobin A1c: 5 1  - Labs WNL: TSH, lactic acid, total CK, coma panel, RDU, procalcitonin, Vitamin B12, folate    Initially it was reported that patient had quit drinking last month following her recent admission  Per discussion with patient today 3/14, patient reports she was drinking beer earlier this week  Patient may be poor historian, however cannot rule out breakthrough seizure related to alcohol use and reported sleep deprivation  Patient and  unable to state if patient is fully compliant with her home Vimpat, lacosamide level pending      Plan:  · Recommended neuropsych evaluation in the outpatient setting; order placed   · Pending: Lacosamide level, blood cultures x2, vitamin B1  · S/p 3 mg total IV Ativan on arrival  · S/p IV Keppra loading dose 3000 mg x 1  · Continue Vimpat 200 mg BID   · Seizure precautions  Discussed seizure precautions:  No driving, lifting heavy machinery, climbing heights, swimming unattended, hot tub baths or any other activity that will place you in danger in case of a seizure for at least six months    · [employer] form completed on 3/14/2023  · Correction of infectious/metabolic derangements per ICU team  · Medical management supportive care per ICU team, notify with changes  · No further inpatient recommendations at this time

## 2023-03-13 NOTE — ED ATTENDING ATTESTATION
3/13/2023  Becki OROZCO DO, saw and evaluated the patient  I have discussed the patient with the resident/non-physician practitioner and agree with the resident's/non-physician practitioner's findings, Plan of Care, and MDM as documented in the resident's/non-physician practitioner's note, except where noted  All available labs and Radiology studies were reviewed  I was present for key portions of any procedure(s) performed by the resident/non-physician practitioner and I was immediately available to provide assistance  At this point I agree with the current assessment done in the Emergency Department  I have conducted an independent evaluation of this patient a history and physical is as follows:    Patient is a 24-year-old female with a history of subarachnoid hemorrhage, epilepsy, alcohol abuse who presents with seizure activity   states that she was sitting on the couch this morning and he noticed that she was stiff and not responding normally  He states that she would look in his direction when he called her name, but would not respond otherwise  He was concerned for seizure, since she has a history of epilepsy and is on Vimpat  She was hospitalized in February 2023 with focal epilepsy and breakthrough seizures  She was started on Vimpat at that time   states that she was feeling "off" yesterday but he denies any illnesses  She had a fall 2 days ago resulting in bruising to her face  She had a normal mental status following the fall  On exam, patient has a rightward gaze  She has focal shaking of the right upper and lower extremities  Pupils are dilated and reactive to light  No verbal response  Unable to follow commands  Healing ecchymosis to the face  Heart is tachycardic, regular rhythm  Breath sounds normal   Abdomen is soft, nondistended  Increased tone in right upper and lower extremities  Patient required multiple doses of Ativan, and seizure did abort  Ordered a loading dose of Keppra  CTA head and neck did not reveal acute pathology  Will continue to investigate possible infectious or metabolic cause of breakthrough seizure  Case discussed with epileptologist to recommends admission to [facility] at this time  If patient's exam does not improve or she has ongoing seizures, she will require transfer for continuous EEG  Will admit to ICU for seizure precautions and airway monitoring  Portions of the above record have been created with voice recognition software  Occasional wrong word or "sound alike" substitutions may have occurred due to the inherent limitations of voice recognition software  Read the chart carefully and recognize, using context, where substitutions may have occurred  ED Course         Critical Care Time  CriticalCare Time    Date/Time: 3/13/2023 7:30 AM  Performed by: Manuel Fuentes DO  Authorized by: Manuel Fuentes DO     Critical care provider statement:     Critical care time (minutes):  35    Critical care time was exclusive of:  Separately billable procedures and treating other patients    Critical care was necessary to treat or prevent imminent or life-threatening deterioration of the following conditions:  CNS failure or compromise    Critical care was time spent personally by me on the following activities:  Blood draw for specimens, obtaining history from patient or surrogate, development of treatment plan with patient or surrogate, discussions with consultants, evaluation of patient's response to treatment, examination of patient, interpretation of cardiac output measurements, ordering and performing treatments and interventions, ordering and review of laboratory studies, ordering and review of radiographic studies, re-evaluation of patient's condition and review of old charts  Comments:      Patient required multiple doses of benzodiazepines as well as a loading dose of IV Keppra for seizure activity

## 2023-03-13 NOTE — H&P
[facility]  H&P- Masood Burks 1960, 61 y o  female MRN: [id number]  Unit/Bed#: ICU 11 Encounter: [id number]  Primary Care Provider: Corinne Harris MD   Date and time admitted to hospital: 3/13/2023  6:13 AM    * Epilepsy [facility])  Assessment & Plan  · Possibly 2/2 vimpat noncompliance/??insomnia  · CTH Neg  · Recent Admit 2/6-2/13 with vimpat increased to 200mg BID  Psych consult for anxiety as trigger  · ER spoke with Dr Colene Lesch  · Keppra loaded 3gm  · Consult neuro   · Spot EEG  · Cont home vimpat  · Seizure precautions    Encephalopathy  Assessment & Plan  · Toxic metabolic in the setting of seizures  · CTH neg  · Neuro checks    Hyponatremia  Assessment & Plan  · S/p 1L NSS  · F/u am bmp    Chronic combined systolic and diastolic congestive heart failure (HCC)  Assessment & Plan  Wt Readings from Last 3 Encounters:   03/13/23 55 7 kg (122 lb 12 7 oz)   02/22/23 56 2 kg (124 lb)   02/10/23 56 3 kg (124 lb 1 9 oz)     · 7/22 ECHO EF 60% G1DD (improved from 40% prior)  · BP soft hold BB        Pancytopenia ([address] )  Assessment & Plan  · chronic    Fall  Assessment & Plan  · 2 days prior  · PT/OT  · Scans neg    Lipoma of right lower extremity  Assessment & Plan  · chronic    Abnormal liver enzymes  Assessment & Plan  · Unclear etiology  · No RUQ pain  · Trend CMP    -------------------------------------------------------------------------------------------------------------  Chief Complaint: seizure    History of Present Illness   HX and PE limited by: encephalopathy  Masood Burks is a 61 y o  female with PMH of seizures who presents with seizure  Pt confused unable to give history  Per ER/ patient missed a few doses of vimpat, fall a few days ago, who presented with active seizures  In ER given 3mg IV ativan, 3gm Keppra, 1LNSS  CTA head/facial neg  Spoke with Dr Colene Lesch will monitor at [facility]  Spot EEG if recurrent seizures will re evaluate for tx to [facility] for cont EEG  History obtained from chart review and unobtainable from patient due to mental status   -------------------------------------------------------------------------------------------------------------  Dispo: Admit to Stepdown Level 1    Code Status: Level 1 - Full Code  --------------------------------------------------------------------------------------------------------------  Review of Systems    Review of systems was unable to be performed secondary to mental status    Physical Exam  Constitutional:       General: She is not in acute distress  HENT:      Head: Normocephalic  Comments: Left mandible ecchymosis     Mouth/Throat:      Mouth: Mucous membranes are moist    Eyes:      General: No scleral icterus  Pupils: Pupils are equal, round, and reactive to light  Cardiovascular:      Rate and Rhythm: Normal rate and regular rhythm  Pulses: Normal pulses  Heart sounds: Normal heart sounds  No murmur heard  Pulmonary:      Effort: Pulmonary effort is normal  No respiratory distress  Breath sounds: Normal breath sounds  Abdominal:      General: Bowel sounds are normal  There is no distension  Palpations: Abdomen is soft  Tenderness: There is no abdominal tenderness  Musculoskeletal:      Cervical back: Neck supple  Right lower leg: No edema  Left lower leg: No edema  Comments: R hip lipoma   Skin:     General: Skin is warm and dry  Capillary Refill: Capillary refill takes less than 2 seconds  Coloration: Skin is not pale  Findings: No erythema  Neurological:      General: No focal deficit present  Mental Status: She is alert  Cranial Nerves: No cranial nerve deficit        Comments: Pt repetitive states "I just woke up"   Psychiatric:         Mood and Affect: Mood normal          --------------------------------------------------------------------------------------------------------------  Vitals:   Vitals:    03/13/23 0830 03/13/23 0900 03/13/23 1000 03/13/23 1045   BP: 102/58 122/93 97/54 93/55   BP Location: Left arm Left arm     Pulse: 66 81 68 78   Resp: 20 18 12 15   Temp:  97 7 °F (36 5 °C)     TempSrc:  Axillary     SpO2: 99% 99% 90% 93%   Weight:  55 7 kg (122 lb 12 7 oz)       Temp  Min: 96 5 °F (35 8 °C)  Max: 97 7 °F (36 5 °C)        Body mass index is 21 08 kg/m²  Laboratory and Diagnostics:  Results from last 7 days   Lab Units 03/13/23  0631   WBC Thousand/uL 4 74   HEMOGLOBIN g/dL 12 9   HEMATOCRIT % 39 6   PLATELETS Thousands/uL 56*   NEUTROS PCT % 24*   MONOS PCT % 6     Results from last 7 days   Lab Units 03/13/23  0631   SODIUM mmol/L 134*   POTASSIUM mmol/L 3 9   CHLORIDE mmol/L 100   CO2 mmol/L 23   ANION GAP mmol/L 11   BUN mg/dL 14   CREATININE mg/dL 0 73   CALCIUM mg/dL 8 7   GLUCOSE RANDOM mg/dL 178*   ALT U/L 61*   AST U/L 100*   ALK PHOS U/L 147*   ALBUMIN g/dL 4 0   TOTAL BILIRUBIN mg/dL 0 42     Results from last 7 days   Lab Units 03/13/23  0631   MAGNESIUM mg/dL 1 6*   PHOSPHORUS mg/dL 4 4*      Results from last 7 days   Lab Units 03/13/23  0631   INR  1 04   PTT seconds 24          Results from last 7 days   Lab Units 03/13/23  0631   LACTIC ACID mmol/L 1 6     ABG:    VBG:    Results from last 7 days   Lab Units 03/13/23  0800   PROCALCITONIN ng/ml <0 05       Micro:        EKG: tele  Imaging: I have personally reviewed pertinent reports     and I have personally reviewed pertinent films in PACS      Historical Information   Past Medical History:   Diagnosis Date   • Fracture    • Hepatitis     Hep A    • Insomnia     10mar2016 resolved   • Osteoporosis     14jun2016 resolved   • Pancreatitis    • SAH ([address]arachnoid hemorrhage) ([facility] [address] )    • Seasonal allergies    • Seizure ([facility] [address] )    • Seizures ([facility] [address] )    • Urine discoloration 11/11/2019   • Varicella    • Vomiting 11/13/2019     Past Surgical History:   Procedure Laterality Date   • IR BIOPSY BONE  8/17/2021   • IR BIOPSY BONE MARROW  11/14/2019   • TUBAL LIGATION  1985     Social History   Social History     Substance and Sexual Activity   Alcohol Use Yes   • Alcohol/week: 7 0 standard drinks   • Types: 7 Cans of beer per week    Comment: occassional     Social History     Substance and Sexual Activity   Drug Use No     Social History     Tobacco Use   Smoking Status Never   Smokeless Tobacco Never     Exercise History: amb    Family History:   Family History   Problem Relation Age of Onset   • Anxiety disorder Mother    • Depression Mother    • No Known Problems Father    • No Known Problems Sister    • No Known Problems Sister    • No Known Problems Brother    • Cancer Paternal Grandmother         unknown    • No Known Problems Daughter    • No Known Problems Maternal Grandmother    • Cancer Maternal Grandfather    • No Known Problems Paternal Grandfather    • Breast cancer Neg Hx    • Ovarian cancer Neg Hx      I have reviewed this patient's family history and commented on sigificant items within the HPI      Medications:  Current Facility-Administered Medications   Medication Dose Route Frequency   • enoxaparin (LOVENOX) subcutaneous injection 40 mg  40 mg Subcutaneous Daily   • gabapentin (NEURONTIN) capsule 300 mg  300 mg Oral BID   • lacosamide (VIMPAT) tablet 200 mg  200 mg Oral BID   • magnesium sulfate 2 g/50 mL IVPB (premix) 2 g  2 g Intravenous Once     Home medications:  Prior to Admission Medications   Prescriptions Last Dose Informant Patient Reported?  Taking?   gabapentin (NEURONTIN) 300 mg capsule   No No   Sig: Take 1 capsule (300 mg total) by mouth 2 (two) times a day   hydrOXYzine (VISTARIL) 100 MG capsule   Yes No   Sig: Take 100 mg by mouth 3 (three) times a day as needed for itching   hydrOXYzine HCL (ATARAX) 25 mg tablet   No No   Sig: Take 1 tablet (25 mg total) by mouth 2 (two) times a day   lacosamide (VIMPAT) 200 mg tablet   No No   Sig: Take 1 tablet (200 mg total) by mouth 2 (two) times a day   metoprolol succinate (TOPROL-XL) 25 mg 24 hr tablet   No No   Sig: Take 1 tablet (25 mg total) by mouth 2 (two) times a day   traZODone (DESYREL) 50 mg tablet   No No   Sig: Take 1 tablet (50 mg total) by mouth in the morning      Facility-Administered Medications: None     Allergies:  No Known Allergies    ------------------------------------------------------------------------------------------------------------  Advance Directive and Living Will: Yes    Power of : Yes  POLST:    ------------------------------------------------------------------------------------------------------------  Anticipated Length of Stay is > 2 midnights    Care Time Delivered:   No Critical Care time spent       PAZ DAVISON        Portions of the record may have been created with voice recognition software  Occasional wrong word or "sound a like" substitutions may have occurred due to the inherent limitations of voice recognition software    Read the chart carefully and recognize, using context, where substitutions have occurred

## 2023-03-14 VITALS
TEMPERATURE: 98.1 F | HEART RATE: 84 BPM | WEIGHT: 122.8 LBS | RESPIRATION RATE: 18 BRPM | BODY MASS INDEX: 21.08 KG/M2 | OXYGEN SATURATION: 92 % | SYSTOLIC BLOOD PRESSURE: 127 MMHG | DIASTOLIC BLOOD PRESSURE: 69 MMHG

## 2023-03-14 LAB
ALBUMIN SERPL BCP-MCNC: 3.4 G/DL (ref 3.5–5)
ALP SERPL-CCNC: 114 U/L (ref 34–104)
ALT SERPL W P-5'-P-CCNC: 38 U/L (ref 7–52)
AMMONIA PLAS-SCNC: 48 UMOL/L (ref 18–72)
ANION GAP SERPL CALCULATED.3IONS-SCNC: 6 MMOL/L (ref 4–13)
AST SERPL W P-5'-P-CCNC: 40 U/L (ref 13–39)
BASOPHILS # BLD AUTO: 0.01 THOUSANDS/ÂΜL (ref 0–0.1)
BASOPHILS NFR BLD AUTO: 0 % (ref 0–1)
BILIRUB SERPL-MCNC: 0.74 MG/DL (ref 0.2–1)
BUN SERPL-MCNC: 16 MG/DL (ref 5–25)
CALCIUM ALBUM COR SERPL-MCNC: 9.3 MG/DL (ref 8.3–10.1)
CALCIUM SERPL-MCNC: 8.8 MG/DL (ref 8.4–10.2)
CHLORIDE SERPL-SCNC: 106 MMOL/L (ref 96–108)
CO2 SERPL-SCNC: 24 MMOL/L (ref 21–32)
CREAT SERPL-MCNC: 0.67 MG/DL (ref 0.6–1.3)
EOSINOPHIL # BLD AUTO: 0.09 THOUSAND/ÂΜL (ref 0–0.61)
EOSINOPHIL NFR BLD AUTO: 2 % (ref 0–6)
ERYTHROCYTE [DISTWIDTH] IN BLOOD BY AUTOMATED COUNT: 12.7 % (ref 11.6–15.1)
FOLATE SERPL-MCNC: 16.2 NG/ML (ref 3.1–17.5)
GFR SERPL CREATININE-BSD FRML MDRD: 93 ML/MIN/1.73SQ M
GLUCOSE SERPL-MCNC: 117 MG/DL (ref 65–140)
HCT VFR BLD AUTO: 34.3 % (ref 34.8–46.1)
HGB BLD-MCNC: 11.3 G/DL (ref 11.5–15.4)
IMM GRANULOCYTES # BLD AUTO: 0.01 THOUSAND/UL (ref 0–0.2)
IMM GRANULOCYTES NFR BLD AUTO: 0 % (ref 0–2)
LYMPHOCYTES # BLD AUTO: 2.36 THOUSANDS/ÂΜL (ref 0.6–4.47)
LYMPHOCYTES NFR BLD AUTO: 60 % (ref 14–44)
MAGNESIUM SERPL-MCNC: 1.7 MG/DL (ref 1.9–2.7)
MCH RBC QN AUTO: 31.9 PG (ref 26.8–34.3)
MCHC RBC AUTO-ENTMCNC: 32.9 G/DL (ref 31.4–37.4)
MCV RBC AUTO: 97 FL (ref 82–98)
MONOCYTES # BLD AUTO: 0.3 THOUSAND/ÂΜL (ref 0.17–1.22)
MONOCYTES NFR BLD AUTO: 7 % (ref 4–12)
NEUTROPHILS # BLD AUTO: 1.26 THOUSANDS/ÂΜL (ref 1.85–7.62)
NEUTS SEG NFR BLD AUTO: 31 % (ref 43–75)
NRBC BLD AUTO-RTO: 0 /100 WBCS
PLATELET # BLD AUTO: 49 THOUSANDS/UL (ref 149–390)
PMV BLD AUTO: 10.2 FL (ref 8.9–12.7)
POTASSIUM SERPL-SCNC: 4.1 MMOL/L (ref 3.5–5.3)
PROT SERPL-MCNC: 6.5 G/DL (ref 6.4–8.4)
RBC # BLD AUTO: 3.54 MILLION/UL (ref 3.81–5.12)
SODIUM SERPL-SCNC: 136 MMOL/L (ref 135–147)
VIT B12 SERPL-MCNC: 496 PG/ML (ref 100–900)
WBC # BLD AUTO: 4.03 THOUSAND/UL (ref 4.31–10.16)

## 2023-03-14 RX ORDER — LANOLIN ALCOHOL/MO/W.PET/CERES
100 CREAM (GRAM) TOPICAL DAILY
Qty: 30 TABLET | Refills: 0 | Status: SHIPPED | OUTPATIENT
Start: 2023-03-15 | End: 2023-04-14

## 2023-03-14 RX ORDER — FOLIC ACID 1 MG/1
1 TABLET ORAL DAILY
Qty: 30 TABLET | Refills: 0 | Status: SHIPPED | OUTPATIENT
Start: 2023-03-15 | End: 2023-04-14

## 2023-03-14 RX ORDER — MAGNESIUM SULFATE HEPTAHYDRATE 40 MG/ML
2 INJECTION, SOLUTION INTRAVENOUS ONCE
Status: COMPLETED | OUTPATIENT
Start: 2023-03-14 | End: 2023-03-14

## 2023-03-14 RX ADMIN — ENOXAPARIN SODIUM 40 MG: 40 INJECTION SUBCUTANEOUS at 09:55

## 2023-03-14 RX ADMIN — Medication 100 MG: at 09:55

## 2023-03-14 RX ADMIN — LACOSAMIDE 200 MG: 100 TABLET, FILM COATED ORAL at 09:55

## 2023-03-14 RX ADMIN — GABAPENTIN 300 MG: 300 CAPSULE ORAL at 09:55

## 2023-03-14 RX ADMIN — FOLIC ACID 1 MG: 1 TABLET ORAL at 09:55

## 2023-03-14 RX ADMIN — MULTIPLE VITAMINS W/ MINERALS TAB 1 TABLET: TAB ORAL at 09:55

## 2023-03-14 RX ADMIN — HYDROXYZINE HYDROCHLORIDE 25 MG: 25 TABLET ORAL at 09:56

## 2023-03-14 RX ADMIN — MAGNESIUM SULFATE HEPTAHYDRATE 2 G: 40 INJECTION, SOLUTION INTRAVENOUS at 10:04

## 2023-03-14 RX ADMIN — TRAZODONE HYDROCHLORIDE 50 MG: 50 TABLET ORAL at 09:55

## 2023-03-14 NOTE — OCCUPATIONAL THERAPY NOTE
Occupational Therapy Evaluation      Keith Butterfield    3/14/2023    Patient Active Problem List   Diagnosis    Abnormal liver enzymes    Insomnia    Lipoma of right lower extremity    Age-related osteoporosis with current pathological fracture with routine healing    Vitamin D deficiency    Screening for breast cancer    Screening for colon cancer    Other hyperlipidemia    Screen for colon cancer    Fatty liver    Alcohol abuse    Refusal of blood transfusions as patient is Buddhist    Anemia    Anxiety    Lumbar compression fracture ([address] )    Fall    Pancreatic cyst    Gall stones    Refusal of blood transfusions as patient is Buddhist    Thrombocytopenia ([address] )    Thrombopenia ([address] )    Focal epilepsy w/ breakthrough seizures    Encephalopathy    Pancytopenia (HCC)    Anxiety, Depression, and Panic Attacks    Chronic combined systolic and diastolic congestive heart failure ([address] )    Epilepsy ([address] )    Hyponatremia    Seizure ([facility] )       Past Medical History:   Diagnosis Date    Fracture     Hepatitis     Hep A     Insomnia     10mar2016 resolved    Osteoporosis     14jun2016 resolved    Pancreatitis     SAH (subarachnoid hemorrhage) (HCC)     Seasonal allergies     Seizure ([address] )     Seizures ([facility] [address] )     Urine discoloration 11/11/2019    Varicella     Vomiting 11/13/2019       Past Surgical History:   Procedure Laterality Date    IR BIOPSY BONE  8/17/2021    IR BIOPSY BONE MARROW  11/14/2019    TUBAL LIGATION  1985        03/14/23 0900   OT Last Visit   OT Visit Date 03/14/23   Note Type   Note type Evaluation   Pain Assessment   Pain Assessment Tool 0-10   Pain Score No Pain   Restrictions/Precautions   Weight Bearing Precautions Per Order No   Other Precautions Cognitive; Chair Alarm; Bed Alarm; Fall Risk   Home Living   Type of [address] [place] [address] Two level;1/2 bath on main level;Bed/bath upstairs;Stairs to enter with rails  (2 NATALYA; full flight to bed/bath   Per chart review pt has ramp (no longer)) Bathroom Shower/Tub Tub/shower unit   Bathroom Toilet Standard   Bathroom Equipment Shower chair   Bathroom Accessibility Accessible   Home Equipment Walker  (rollator)   Additional Comments Pt reports she still has a shower chair but does not normally use it   Prior Function   Level of [place] Independent with ADLs; Independent with functional mobility; Independent with IADLS   Lives With Spouse  (M-F, spouse works right up the road)   Receives Help From Family   IADLs Independent with driving; Independent with meal prep; Independent with medication management   Falls in the last 6 months 1 to 4  (2 falls "I just conk out for no apparent reason:")   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[employer])   Comments Pt reports fully (I) baseline, no AD for mobility  Lifestyle   Autonomy Pt (I) with ADLs/IADLs PTA living with spouse in a 2 level home, (+) , (+) falls, no AD   Reciprocal Relationships Supportive spouse who works M-F but works down the street   Service to [employer] Retired  for president of [employer]   Raul Wise Enjoys spending time with her dog and gardening  Stays busy managing the house   General   Family/Caregiver Present No   Additional General Comments Pt did not recall conversation with neurology regarding seizure precautions  Per neurology: No driving, lifting heavy machinery, climbing heights, swimming unattended, hot tub baths or any other activity that will place you in danger in case of a seizure for at least six months  Subjective   Subjective "I have absolutely no concerns at all, I'm fine"   ADL   Eating Assistance 7  Independent   Eating Deficit Setup; Beverage management   Grooming Assistance 5  Supervision/Setup   UB Bathing Assistance 6  Modified Independent   LB Bathing Assistance 5  Supervision/Setup   UB Dressing Assistance 6  Modified independent   LB Dressing Assistance 5  Supervision/Setup   LB Dressing Deficit Don/doff R sock;Don/doff L sock  (long sit in bed)   Toileting Assistance  5  Supervision/Setup   Bed Mobility   Supine to Sit 6  Modified independent   Additional items HOB elevated   Sit to Supine 6  Modified independent   Additional items HOB elevated   Additional Comments Sit EOB with supervision, denies dizziness/lightheadedness  Declined desire to sit OOB  Transfers   Sit to Stand 5  Supervision   Additional items Assist x 1; Increased time required;Verbal cues   Stand to Sit 5  Supervision   Additional items Assist x 1; Increased time required;Verbal cues   Functional Mobility   Functional Mobility 5  Supervision   Additional Comments Short household distance to bathroom and back with no device with (S)  Somewhat unsteady, occasionally reaching for furniture to steady  Pt denies need for AD  Additional items   (no AD)   Balance   Static Sitting Good   Dynamic Sitting Fair +   Static Standing Fair   Dynamic Standing Fair -   Activity Tolerance   Activity Tolerance Patient tolerated treatment well   Medical Staff Made Aware Spoke to PT [facility]   Nurse Made Aware yes, RN Leanna barth to see pt   RUE Assessment   RUE Assessment WFL   LUE Assessment   LUE Assessment WFL   Hand Function   Gross Motor Coordination Functional   Fine Motor Coordination Functional   Sensation   Light Touch No apparent deficits   Vision-Basic Assessment   Current Vision Does not wear glasses   Cognition   Overall Cognitive Status WFL   Arousal/Participation Cooperative; Alert   Attention Within functional limits   Orientation Level Oriented X4   Memory Decreased recall of recent events;Decreased recall of precautions  (did not recall seizure precautions)   Following Commands Follows one step commands without difficulty   Comments Pleasant, questionable short term memory and safety awareness as well as limited insight into deficits  Did not recall seizure precautions     Assessment   Limitation Decreased Safe judgement during ADL;Decreased cognition   Prognosis Good   Assessment Pt is a 61 y o  female seen for OT evaluation s/p admit to [address] [facility] on 3/13/2023 w/Epilepsy [facility])  Orders received for OT evaluation and treatment  Pt identified during session via name and wristband  Comorbidities affecting patient at time of evaluation include: encephalopathy, CHF, falls and seizures  Personal factors affecting patient at time of evaluation include: limited caregiver support, limited insight into deficits and difficulty performing IADLs  PTA, patient was independent with functional mobility without assistive device, independent with ADLS, independent with IADLS, living with spouse in a 2 level home with 2-3 steps to enter and retired  The evaluation identifies the following performance deficits: new onset of impairment of functional mobility, decreased IADLS, decreased safety awareness and decreased cognition, that result in activity limitations (as is outlined above in flowsheet)  The patient's raw score on the AM-PAC Daily Activity inpatient short form is 24, standardized score is 57 54, greater than 39 4  From an OT standpoint, patients at this level may benefit from d/c to Home with outpatient rehabilitation, increased social support for IADL completion  At time of evaluation, pt demonstrated (I) with ADL's and functional mobility w/ no device  Pt with no concerns for safe DC home and reporting feeling at/close to functional baseline  Pt with no acute OT needs at this time and will be DC from inpatient OT caseload  Please reconsult if functional status changes     Goals   Patient Goals to go home today   Plan   Treatment Interventions   (Eval Only)   Recommendation   OT Discharge Recommendation Home with outpatient rehabilitation  (Increased social support for IADL completion as pt is no longer able to drive)   AM-[facility] Daily Activity Inpatient   Lower Body Dressing 4   Bathing 4   Toileting 4   Upper Body Dressing 4   Grooming 4   Eating 4 Daily Activity Raw Score 24   Daily Activity Standardized Score (Calc for Raw Score >=11) 57 54   AM-PAC Applied Cognition Inpatient   Following a Speech/Presentation 3   Understanding Ordinary Conversation 4   Taking Medications 3   Remembering Where Things Are Placed or Put Away 3   Remembering List of 4-5 Errands 2   Taking Care of Complicated Tasks 2   Applied Cognition Raw Score 17   Applied Cognition Standardized Score 36 52   End of Consult   Patient Position at End of Consult Supine;Bed/Chair alarm activated; All needs within reach   Nurse Communication Nurse aware of consult     At time of evaluation, pt demonstrated (I) with ADL's and functional mobility w/ no device  Pt with no concerns for safe DC home and reporting feeling at/close to functional baseline  Pt with no acute OT needs at this time and will be DC from inpatient OT caseload  Please reconsult if functional status changes      TRACEY Ge, OTR/L  [place] License [id number]  [address] [facility] [id number]

## 2023-03-14 NOTE — ASSESSMENT & PLAN NOTE
· Presented to ED with right focal seizure, received 3 mg IV Ativan with loading dose of Keppra with seizure abatement  · Recent hospitalization 2/6 to 2/13 for seizures, at that time Vimpat was increased to 200 mg twice daily    Suspect breakthrough seizures are due to noncompliance with medication vs alcohol use  · No metabolic/electrolyte derangements noted on labs  · CTA head and neck: unremarkable for acute intracranial abnormalities  · EEG: Left temporal irregular delta activity and generalized irregular delta activity in drowsiness; no electrographic seizures of interictal epileptiform discharges    · Appreciate neurology recommendations  · Continue Vimpat 200 mg BID   · Seizure precautions, including no driving, [employer] form completed  · Outpatient follow-up with neurology as scheduled on 4/13  · Neuropsychological testing outpatient given longstanding alcohol use and epilepsy  · No OT needs  · Encouraged alcohol cessation  · Has remained seizure-free today, stable for discharge to home today

## 2023-03-14 NOTE — UTILIZATION REVIEW
Initial Clinical Review    Admission: Date/Time/Statement:   Admission Orders (From admission, onward)     Ordered        03/13/23 0759  INPATIENT ADMISSION  Once                      Orders Placed This Encounter   Procedures   • INPATIENT ADMISSION     Standing Status:   Standing     Number of Occurrences:   1     Order Specific Question:   Level of Care     Answer:   Critical Care [15]     Order Specific Question:   Estimated length of stay     Answer:   More than 2 Midnights     Order Specific Question:   Certification     Answer:   I certify that inpatient services are medically necessary for this patient for a duration of greater than two midnights  See H&P and MD Progress Notes for additional information about the patient's course of treatment  ED Arrival Information     Expected   -    Arrival   3/13/2023 06:11    Acuity   Emergent            Means of arrival   Wheelchair    Escorted by   Spouse    Service   Hospitalist    Admission type   Emergency            Arrival complaint   seizure           Chief Complaint   Patient presents with   • Seizure - Actively Seizing on Arrival     Pt presents to ED actively seizing on arrival  Pt appears to have bruises all over body, in a post-ictal state, - thinners  Hx of seizures       Initial Presentation: 61 y o  female with hx combined heart failure, chronic pancytopenia, lipoma RLE, traumatic [address] 08/2021, seizures who presents to ED from home with seizure  Pt confused and unable to give hx  Per , pt missed a few doses of vimpat, fell a few days ago  Pt had recent admissions 2/6-2/13 with vimpat increased to 200mg BID and Psych consult for anxiety as trigger  On exam, pt has L mandible ecchymosis,no RUQ pain , oriented x 1   GCS 14 initially with patient becoming unresponsive with rightward gaze and right-sided focal seizure, unconscious  Given IV Ativan3 mg with a loading dose of Keppran 3 g IV with seizure abatement    Pt repetitively stating "I just woke up ", no focal neuro deficit  Labs- low Mag, low phos, low sodium elevated LFT's   CTA head neg  CT facial boness neg for fx   Pt admitted as inpatient to critical care/ICU with breakthru seizure w/ hx epilepsy -likely medication non compliance with reported missed doses of vimpat vs sleep deprivation vs alcohol withdrawal (her  reports no alcohol in 1 month)  Post ictal encephalopathy  Hypomagnesemia  Plan - neuro consult, neuro checks, Continue keppra and Vimpa  Spot EEG  Monitor for seizures  BMP in am  Trend LFT's   Neurology consult- no further seizure-like activity here since Keppra loaded 1 time in the ED and Vimpat 200 mg twice daily home dose resumed  Pts keppra stopped in past due to concern for thrombocytopenia, Pt still thrombocytopenic  Unlikely alcohol withdrawal seizure     Pt significantly improved from prior, alert and oriented to person place month year, mild confusion in terms of details regarding what happened earlier today and in the recent past Plan - continue Vimpat, f/u  Vimpat level   F/U metabolic and infectious workup  Obtain neuropathy work-up given recurrent falls including B99, folic acid, B1, hemoglobin A1c fall monitoring  PT/OT just[place] form to be completed prior to discharge     Date: 3/14   Day 2: Pt downgraded to med surg yesterday afternoon   Mag 1 7 today, repletion ordered  Sodium WNL   CIWA 3 today for orientation - forgetful of date per nsg  Neurology- Pt report feeling back to her baseline today, feels much better  Oriented x 3  Able to name all objects provided, follows central and appendicular commands, and answers all questions appropriately  No involuntary movements or rhythmic seizure like activity noted   Routine EEG showed  left temporal irregular delta activity and generalized irregular delta activity in drowsiness; No electrographic seizures of interictal epileptiform discharges   Ammonia level 48  Vit B12 and folate WNL    Initially it was reported that patient had quit drinking last month following her recent admission  Today pt  reports she was drinking beer earlier this week  Patient may be poor historian, however cannot rule out breakthrough seizure related to alcohol use and reported sleep deprivation  Patient and  unable to state if patient is fully compliant with her home Vimpat  Lacosamide level pending  Recommend neuro psych eval as outpt  F/U Lacosamide level, Vit B1 level,  Blood cx  [place] [employer] form completed              ED Triage Vitals   Temperature Pulse Respirations Blood Pressure SpO2   03/13/23 0628 03/13/23 0615 03/13/23 0615 03/13/23 0615 03/13/23 0615   (!) 96 5 °F (35 8 °C) (!) 117 20 163/83 90 %      Temp Source Heart Rate Source Patient Position - Orthostatic VS BP Location FiO2 (%)   03/13/23 0628 03/13/23 0615 03/13/23 0615 03/13/23 0615 --   Rectal Monitor Lying Right arm       Pain Score       03/13/23 1500       No Pain          Wt Readings from Last 1 Encounters:   03/13/23 55 7 kg (122 lb 12 7 oz)     Additional Vital Signs:   Date/Time Temp Pulse Resp BP MAP (mmHg) SpO2 Calculated FIO2 (%) - Nasal Cannula Nasal Cannula O2 Flow Rate (L/min)   03/14/23 06:15:27 97 5 °F (36 4 °C) 72 17 107/65 79 93 % -- --   03/14/23 05:30:57 -- 73 -- 116/64 81 96 % -- --   03/14/23 01:22:50 -- 78 -- 107/64 78 95 % -- --   03/13/23 22:30:26 99 6 °F (37 6 °C) 75 -- -- -- 94 % -- --   03/13/23 21:47:02 99 4 °F (37 4 °C) 82 18 103/65 78 93 % -- --   03/13/23 2000 -- 87 -- 119/70 -- -- -- --   03/13/23 19:58:01 -- 84 -- 119/70 86 93 % -- --   03/13/23 18:21:59 99 4 °F (37 4 °C) 78 22 113/66 82 96 % -- --   03/13/23 1645 -- 92 38 Abnormal  121/59 83 95 % -- --   03/13/23 1630 -- 83 -- 121/59 -- -- -- --   03/13/23 1445 97 5 °F (36 4 °C) 84 27 Abnormal  106/67 82 96 % -- --   03/13/23 1245 -- 71 24 Abnormal  121/58 84 94 % -- --   03/13/23 1045 -- 78 15 93/55 70 93 % -- --   03/13/23 1000 -- 68 12 97/54 73 90 % -- --   03/13/23 0900 97 7 °F (36 5 °C) 81 18 122/93 105 99 % -- --   03/13/23 0830 -- 66 20 102/58 77 99 % 28 2 L/min   03/13/23 0800 97 2 °F (36 2 °C) Abnormal  80 20 101/54 71 98 % 28 2 L/min   03/13/23 0730 -- 75 20 118/59 83 100 % 28 2 L/min   03/13/23 0700 -- 98 20 125/60 87 96 % 28 2 L/min   03/13/23 0645 -- 118 Abnormal  20 144/72 103 93 % 28 2 L/min     Date and Time Eye Opening Best Verbal Response Best Motor Response Jo-Ann Coma Scale Score   03/13/23 2000 4 5 6 15   03/13/23 1200 3 3 4 10   03/13/23 0930 3 3 4 10   03/13/23 0830 4 4 5 13   03/13/23 0800 4 4 5 13   03/13/23 0730 4 4 5 13   03/13/23 0620 4 4 5 13       CIWA-Ar Score    Row Name 03/14/23 06:15:27 03/14/23 05:30:57 03/14/23 01:22:50 03/13/23 22:30:26 03/13/23 21:47:02   CIWA-Ar   /65  -/64  -/64  -DI -- 103/65  -DI   Pulse 72  -DI 73  -DI 78  -DI 75  -DI 82  -DI   Nausea and Vomiting -- 0  -EB 0  -EB -- --   Tactile Disturbances -- 0  -EB 0  -EB -- --   Tremor -- 0  -EB 0  -EB -- --   Auditory Disturbances -- 0  -EB 0  -EB -- --   Paroxysmal Sweats -- 0  -EB 0  -EB -- --   Visual Disturbances -- 0  -EB 0  -EB -- --   Anxiety -- 0  -EB 0  -EB -- --   Headache, Fullness in Head -- 0  -EB 0  -EB -- --   Agitation -- 0  -EB 0  -EB -- --   Orientation and Clouding of Sensorium -- 3  seems to forget the date  -EB 3  -EB -- --   CIWA-Ar Total -- 3  -EB 3  -EB -- --   Row Name 03/13/23 2000 03/13/23 19:58:01 03/13/23 18:21:59 03/13/23 1645 03/13/23 1630   NICHOLEWA-Ar   /70  -/70  -/66  -/59  -/59  -AD   Pulse 87  -EB 84  -DI 78  -DI 92  -AD 83  -AD   Nausea and Vomiting 0  -EB -- -- -- 0  -AD   Tactile Disturbances 0  -EB -- -- -- 0  -AD   Tremor 0  -EB -- -- -- 0  -AD   Auditory Disturbances 0  -EB -- -- -- 0  -AD   Paroxysmal Sweats 0  -EB -- -- -- 0  -AD   Visual Disturbances 0  -EB -- -- -- 0  -AD   Anxiety 0  -EB -- -- -- 0  -AD   Headache, Fullness in Head 1  -EB -- -- -- 0  -AD   Agitation 0  -EB -- -- -- 0  -AD   Orientation and Clouding of Sensorium 3  -EB -- -- -- 0  -AD   CIWA-Ar Total 4  -EB -- -- -- 0  -AD       Pertinent Labs/Diagnostic Test Results:    3/13 ECG- Sinus tachycardia  Nonspecific T wave abnormality   3/13 EEG-  EEG impression: This is an abnormal routine EEG recording due to:  1  Left temporal irregular delta  2  Generalized irregular delta activity in drowsiness     Clinical Interpretation: This abnormal study is consistent with a mild generalized non-specific encephalopathy and focal left temporal non-specific cerebral dysfunction  No electrographic seizures or interictal epileptiform discharges (seizure tendency) were seen  No diagnostic clinical events were captured  XR chest 1 view portable   Final Result by Edgar Devlin MD (03/13 1146)      No acute cardiopulmonary disease  Workstation performed: [id number]         CT facial bones without contrast   Final Result by Lei Vargas MD (03/13 0719)      No acute maxillofacial fracture  Please see same-day CTA head and neck with and without contrast for further evaluation  Workstation performed: [id number]         CTA head and neck w wo contrast   Final Result by Lei Vargas MD (03/13 4018)      No acute intracranial abnormality  Negative CTA head and neck for large vessel occlusion, dissection, aneurysm, or high-grade stenosis  Please see same-day CT facial bones without contrast for further evaluation  Additional chronic/incidental findings as detailed above                       Workstation performed: [id number]               Results from last 7 days   Lab Units 03/14/23  0457 03/13/23  0631   WBC Thousand/uL 4 03* 4 74   HEMOGLOBIN g/dL 11 3* 12 9   HEMATOCRIT % 34 3* 39 6   PLATELETS Thousands/uL 49* 56*   NEUTROS ABS Thousands/µL 1 26* 1 14*         Results from last 7 days   Lab Units 03/14/23  0457 03/13/23  0631   SODIUM mmol/L 136 134*   POTASSIUM mmol/L 4 1 3 9   CHLORIDE mmol/L 106 100   CO2 mmol/L 24 23   ANION GAP mmol/L 6 11   BUN mg/dL 16 14   CREATININE mg/dL 0 67 0 73   EGFR ml/min/1 73sq m 93 87   CALCIUM mg/dL 8 8 8 7   MAGNESIUM mg/dL 1 7* 1 6*   PHOSPHORUS mg/dL  --  4 4*     Results from last 7 days   Lab Units 03/14/23  0457 03/13/23  0631   AST U/L 40* 100*   ALT U/L 38 61*   ALK PHOS U/L 114* 147*   TOTAL PROTEIN g/dL 6 5 7 1   ALBUMIN g/dL 3 4* 4 0   TOTAL BILIRUBIN mg/dL 0 74 0 42   AMMONIA umol/L 48  --      Results from last 7 days   Lab Units 03/13/23  0617   POC GLUCOSE mg/dl 166*     Results from last 7 days   Lab Units 03/14/23  0457 03/13/23  0631   GLUCOSE RANDOM mg/dL 117 178*         Results from last 7 days   Lab Units 03/13/23  0631   HEMOGLOBIN A1C % 5 1   EAG mg/dl 100     BETA-HYDROXYBUTYRATE   Date Value Ref Range Status   03/07/2022 0 2 <0 6 mmol/L Final                  Results from last 7 days   Lab Units 03/13/23  0631   CK TOTAL U/L 39             Results from last 7 days   Lab Units 03/13/23  0631   PROTIME seconds 13 9   INR  1 04   PTT seconds 24     Results from last 7 days   Lab Units 03/13/23  0631   TSH 3RD GENERATON uIU/mL 3 625     Results from last 7 days   Lab Units 03/13/23  0800   PROCALCITONIN ng/ml <0 05     Results from last 7 days   Lab Units 03/13/23  0631   LACTIC ACID mmol/L 1 6                       Results from last 7 days   Lab Units 03/13/23  0750   CLARITY UA  Clear   COLOR UA  Light Yellow   SPEC GRAV UA  >=1 050*   PH UA  6 0   GLUCOSE UA mg/dl Negative   KETONES UA mg/dl Negative   BLOOD UA  Negative   PROTEIN UA mg/dl 70 (1+)*   NITRITE UA  Negative   BILIRUBIN UA  Negative   UROBILINOGEN UA (BE) mg/dl <2 0   LEUKOCYTES UA  Negative   WBC UA /hpf 1-2   RBC UA /hpf 1-2   BACTERIA UA /hpf None Seen   EPITHELIAL CELLS WET PREP /hpf Occasional   MUCUS THREADS  Occasional*             Results from last 7 days   Lab Units 03/13/23  0750   AMPH/METH  Negative   BARBITURATE UR  Negative   BENZODIAZEPINE UR  Negative   COCAINE UR  Negative   METHADONE URINE  Negative   OPIATE UR  Negative   PCP UR  Negative   THC UR  Negative     Results from last 7 days   Lab Units 03/13/23  0631   ETHANOL LVL mg/dL <10   ACETAMINOPHEN LVL ug/mL <21*   SALICYLATE LVL mg/dL <5                 Results from last 7 days   Lab Units 03/13/23  0800 03/13/23  0700   BLOOD CULTURE  Received in Microbiology Lab  Culture in Progress  Received in Microbiology Lab  Culture in Progress                 ED Treatment:   Medication Administration from 03/13/2023 0610 to 03/13/2023 0900       Date/Time Order Dose Route Action     03/13/2023 5279 EDT LORazepam (ATIVAN) injection 1 mg 1 mg Intravenous Given     03/13/2023 0142 EDT sodium chloride 0 9 % bolus 1,000 mL 1,000 mL Intravenous New Bag     03/13/2023 0631 EDT LORazepam (ATIVAN) injection 2 mg 2 mg Intravenous Given     03/13/2023 0646 EDT iohexol (OMNIPAQUE) 350 MG/ML injection (SINGLE-DOSE) 85 mL 85 mL Intravenous Given     03/13/2023 0716 EDT levETIRAcetam (KEPPRA) 3,000 mg in sodium chloride 0 9 % 250 mL IVPB 3,000 mg Intravenous New Bag        Past Medical History:   Diagnosis Date   • Fracture    • Hepatitis     Hep A    • Insomnia     10mar2016 resolved   • Osteoporosis     14jun2016 resolved   • Pancreatitis    • SAH (subarachnoid hemorrhage) (HCC)    • Seasonal allergies    • Seizure (HCC)    • Seizures ([facility] )    • Urine discoloration 11/11/2019   • Varicella    • Vomiting 11/13/2019     Present on Admission:  • Pancytopenia (HCC)  • Abnormal liver enzymes  • Lipoma of right lower extremity  • Epilepsy (HCC)  • Chronic combined systolic and diastolic congestive heart failure (HCC)  • Encephalopathy  • Hyponatremia  • Fall      Admitting Diagnosis: Seizure ([facility] ) [R56 9]  Focal epilepsy ([facility] ) [G40 109]  Age/Sex: 61 y o  female  Admission Orders:  Scheduled Medications:  enoxaparin, 40 mg, Subcutaneous, Daily  folic acid, 1 mg, Oral, Daily  gabapentin, 300 mg, Oral, BID  hydrOXYzine HCL, 25 mg, Oral, BID  lacosamide, 200 mg, Oral, BID  magnesium sulfate, 2 g, Intravenous, Once  multivitamin-minerals, 1 tablet, Oral, Daily  thiamine, 100 mg, Oral, Daily  traZODone, 50 mg, Oral, Daily    magnesium sulfate 2 g/50 mL IVPB (premix) 2 g  Dose: 2 g  Freq: Once Route: IV x1 3/13 @1212,x1 3/14 @1004  levETIRAcetam (KEPPRA) 872 mg in sodium chloride 0 9 % 100 mL IVPB  Dose: 872 mg  Freq: Every 12 hours scheduled Route: IV  Start: 03/13/23 0900 End: 03/13/23 0639            Continuous IV Infusions:     PRN Meds:     CIWA monitoring  Seizure monitoring   ambulate QID    neuro checks           IP CONSULT TO CASE MANAGEMENT  IP CONSULT TO NEUROLOGY    Network Utilization Review Department  ATTENTION: Please call with any questions or concerns to [phone] and carefully listen to the prompts so that you are directed to the right person  All voicemails are confidential   [place] all requests for admission clinical reviews, approved or denied determinations and any other requests to dedicated fax number below belonging to the campus where the patient is receiving treatment   List of dedicated fax numbers for the Facilities:  [address] DENIALS (Administrative/Medical Necessity) [phone]   [address] (Maternity/NICU/Pediatrics) [phone]   913 Janina Solomon [phone]   [facility] [address] [phone]   [address] [place] [address] [facility] [phone]   1554 [facility] Royal Dejesus [facility] 134 5 [facility] [phone]

## 2023-03-14 NOTE — ASSESSMENT & PLAN NOTE
· Marked enlargement and fatty infiltration of the liver seen on recent CT abdomen  · Secondary to longstanding alcohol use  · Alcohol cessation  · Follow-up with PCP

## 2023-03-14 NOTE — DISCHARGE INSTR - AVS FIRST PAGE
Dear Masood Burks,     It was our pleasure to care for you here at [facility], [employer]  It is our hope that we were always able to exceed the expected standards for your care during your stay  You were hospitalized due to seizures  You were cared for on the fourth floor by Bertha Lee MD under the service of Raghav Thomas MD with the [facility] who covers for your primary care physician (PCP), Corinne Harris MD, while you were hospitalized  If you have any questions or concerns related to this hospitalization, you may contact us at [phone]  For follow up as well as any medication refills, we recommend that you follow up with your primary care physician  A registered nurse will reach out to you by phone within a few days after your discharge to answer any additional questions that you may have after going home  However, at this time we provide for you here, the most important instructions / recommendations at discharge:     Notable Medication Adjustments -   Please continue to take Vimpat 200 mg twice a day  Take Folate and Thiamine supplementation daily  Important follow up information -   Please follow-up with neurologist as scheduled on 4/13  Other Instructions -   No driving, lifting heavy machinery, climbing heights, swimming unattended, hot tub baths or any other activity that will place you in danger in case of a seizure for at least six months  Abstain from alcohol use  If any recurrent or worsening symptoms, please report to the ER  Please review this entire after visit summary as additional general instructions including medication list, appointments, activity, diet, any pertinent wound care, and other additional recommendations from your care team that may be provided for you        Sincerely,     Bertha Lee MD

## 2023-03-14 NOTE — DISCHARGE SUMMARY
[facility]  Discharge- Roberto Janine 1960, 61 y o  female MRN: [id number]  Unit/Bed#: S -01 Encounter: [id number]  Primary Care Provider: Inna Joyner MD   Date and time admitted to hospital: 3/13/2023  6:13 AM    * Epilepsy [facility])  Assessment & Plan  · Presented to ED with right focal seizure, received 3 mg IV Ativan with loading dose of Keppra with seizure abatement  · Recent hospitalization 2/6 to 2/13 for seizures, at that time Vimpat was increased to 200 mg twice daily    Suspect breakthrough seizures are due to noncompliance with medication vs alcohol use  · No metabolic/electrolyte derangements noted on labs  · CTA head and neck: unremarkable for acute intracranial abnormalities  · EEG: Left temporal irregular delta activity and generalized irregular delta activity in drowsiness; no electrographic seizures of interictal epileptiform discharges    · Appreciate neurology recommendations  · Continue Vimpat 200 mg BID   · Seizure precautions, including no driving, [employer] form completed  · Outpatient follow-up with neurology as scheduled on 4/13  · Neuropsychological testing outpatient given longstanding alcohol use and epilepsy  · No OT needs  · Encouraged alcohol cessation  · Has remained seizure-free today, stable for discharge to home today      Fall  Assessment & Plan  · Reportedly had fall 2 days prior to admission at home, residual facial bruise  · Scans in ED negative for fracture  · Likely neuropathy contributing to falls, suspect longstanding alcohol induced  · Unclear when her last drink was however  states that it was about 1 month ago  · Normal vitamin B12 and folate levels  · Seen by OT, no needs  · Follow-up with neuropsych outpatient    Pancytopenia ([facility] )  Assessment & Plan  · Secondary to alcohol use  · Encouraged alcohol cessation    Abnormal liver enzymes  Assessment & Plan  · Marked enlargement and fatty infiltration of the liver seen on recent CT abdomen  · Secondary to longstanding alcohol use  · Alcohol cessation  · Follow-up with PCP          Medical Problems     Resolved Problems  Date Reviewed: 3/14/2023   None       Discharging Physician / Practitioner: Yelena Palencia MD  PCP: Krystal Atkins MD  Admission Date:   Admission Orders (From admission, onward)     Ordered        03/13/23 0759  INPATIENT ADMISSION  Once                      Discharge Date: 03/14/23    Consultations During Hospital Stay:  · Neurology  · OT    Procedures Performed:   · EEG    Significant Findings / Test Results:   · See below    Incidental Findings:   · None    Test Results Pending at Discharge (will require follow up): · None     Outpatient Tests Requested:  · None    Complications: None    Reason for Admission: Seizure    Hospital Course:   Shadia Carey is a 61 y o  female patient with history of epilepsy, alcohol abuse and chronic thrombocytopenia who originally presented to the hospital on 3/13/2023 due to breakthrough seizure  She presented to the ED actively seizing with right focal seizure  She received 3 mg of IV Ativan and loading dose of Keppra with seizure abatement  Her  reported that patient may have missed a few doses of Vimpat  Also reports a fall at home few days prior to presentation  She has history of alcohol abuse however her  reported she has had no alcohol for the last month  She had recent hospitalization 2/6 for seizures and at that time Vimpat was increased to 200 mg twice daily  CTA head and neck were unremarkable  EEG was abnormal with findings as above  Her breakthrough seizure was thought to be likely secondary to medication noncompliance versus alcohol use  She was seen by neurology with no further inpatient recommendations  [employer] form was filled out  She is clear for discharge today to continue on Vimpat 200 mg twice daily and close outpatient follow-up      Please see above list of diagnoses and related plan for additional information  Condition at Discharge: stable    Discharge Day Visit / Exam:   Subjective:   Seen this morning in no acute distress, alert and oriented x3  She reports no recurrent seizures today  Dread Gave for discharge to home today  Vitals: Blood Pressure: 127/69 (03/14/23 1107)  Pulse: 84 (03/14/23 1107)  Temperature: 98 1 °F (36 7 °C) (03/14/23 1107)  Temp Source: Oral (03/13/23 1821)  Respirations: 18 (03/14/23 1107)  Weight - Scale: 55 7 kg (122 lb 12 7 oz) (03/13/23 0900)  SpO2: 92 % (03/14/23 1107)    Exam:   Physical Exam  Vitals reviewed  Constitutional:       General: She is not in acute distress  Appearance: She is not toxic-appearing  HENT:      Mouth/Throat:      Pharynx: Oropharynx is clear  Eyes:      Extraocular Movements: Extraocular movements intact  Pupils: Pupils are equal, round, and reactive to light  Cardiovascular:      Rate and Rhythm: Normal rate and regular rhythm  Pulses: Normal pulses  Pulmonary:      Effort: Pulmonary effort is normal  No respiratory distress  Breath sounds: Normal breath sounds  Abdominal:      General: Bowel sounds are normal  There is no distension  Palpations: Abdomen is soft  Tenderness: There is no abdominal tenderness  Musculoskeletal:         General: No swelling or tenderness  Normal range of motion  Cervical back: Normal range of motion and neck supple  Skin:     Comments: Resolving bruises on cheeks   Neurological:      General: No focal deficit present  Mental Status: She is alert and oriented to person, place, and time  Motor: No weakness  Discussion with Family: Updated  () via phone  Discharge instructions/Information to patient and family:   See after visit summary for information provided to patient and family        Provisions for Follow-Up Care:  See after visit summary for information related to follow-up care and any pertinent home health orders  Disposition:   Home    Planned Readmission: None     Discharge Statement:  I spent 45 minutes discharging the patient  This time was spent on the day of discharge  I had direct contact with the patient on the day of discharge  Greater than 50% of the total time was spent examining patient, answering all patient questions, arranging and discussing plan of care with patient as well as directly providing post-discharge instructions  Additional time then spent on discharge activities  Discharge Medications:  See after visit summary for reconciled discharge medications provided to patient and/or family        **Please Note: This note may have been constructed using a voice recognition system**

## 2023-03-14 NOTE — ASSESSMENT & PLAN NOTE
· Reportedly had fall 2 days prior to admission at home, residual facial bruise  · Scans in ED negative for fracture  · Likely neuropathy contributing to falls, suspect longstanding alcohol induced  · Unclear when her last drink was however  states that it was about 1 month ago  · Normal vitamin B12 and folate levels  · Seen by OT, no needs  · Follow-up with neuropsych outpatient

## 2023-03-15 ENCOUNTER — TRANSITIONAL CARE MANAGEMENT (OUTPATIENT)
Dept: FAMILY MEDICINE CLINIC | Facility: CLINIC | Age: 63
End: 2023-03-15

## 2023-03-16 ENCOUNTER — OFFICE VISIT (OUTPATIENT)
Dept: RHEUMATOLOGY | Facility: CLINIC | Age: 63
End: 2023-03-16

## 2023-03-16 DIAGNOSIS — M81.0 AGE-RELATED OSTEOPOROSIS WITHOUT CURRENT PATHOLOGICAL FRACTURE: Primary | ICD-10-CM

## 2023-03-16 DIAGNOSIS — Z79.899 LONG TERM CURRENT USE OF THERAPEUTIC DRUG: ICD-10-CM

## 2023-03-16 LAB — LACOSAMIDE SERPL-MCNC: 19.2 UG/ML (ref 5–10)

## 2023-03-16 NOTE — PROGRESS NOTES
Evenity injection was successfully administered subcutaneously into each of patient's arms, which were tolerated without any adverse event

## 2023-03-18 LAB
BACTERIA BLD CULT: NORMAL
BACTERIA BLD CULT: NORMAL

## 2023-03-20 LAB — VIT B1 BLD-SCNC: 113.8 NMOL/L (ref 66.5–200)

## 2023-03-21 ENCOUNTER — TELEPHONE (OUTPATIENT)
Dept: PSYCHIATRY | Facility: CLINIC | Age: 63
End: 2023-03-21

## 2023-03-22 ENCOUNTER — TELEPHONE (OUTPATIENT)
Dept: HEMATOLOGY ONCOLOGY | Facility: CLINIC | Age: 63
End: 2023-03-22

## 2023-03-22 NOTE — TELEPHONE ENCOUNTER
Appointment Cancellation or Reschedule     Person calling in Patient   If someone other than patient calling, are they listed on the communication consent form? No   Provider John Gonzalez, [facility]    Office Visit Date and Time 3/22/23   Office Visit Location [facility]   Did patient want to reschedule their visit? If so, when was it scheduled to? 4/25/23   Did the patient have STAR scheduled for this appointment? No   Does the patient need STAR scheduled for their new appointment? No   Is this patient calling to reschedule an infusion appointment? No   When is their next infusion appointment? No   Is this patient a Chemo patient? No   Reason for Cancellation or Reschedule Forgot she had an appt and does not have a ride   Was the No show policy reviewed with patient if patient is canceling within 24 hours? Yes     If the patient is cancelling an appointment and needs their STAR Transport cancelled, please route to [address]  If the patient is a treatment patient, please route this to the office nurse  If the patient is not on treatment, please route to the Clerical pool based on location  If the patient is a surgical oncology patient, please route to surg/onc clinical pool  Route message as high priority if same day cancellation

## 2023-03-26 DIAGNOSIS — I50.21 ACUTE SYSTOLIC CONGESTIVE HEART FAILURE (HCC): ICD-10-CM

## 2023-03-26 DIAGNOSIS — I42.9 CARDIOMYOPATHY (HCC): ICD-10-CM

## 2023-03-27 RX ORDER — METOPROLOL SUCCINATE 25 MG/1
TABLET, EXTENDED RELEASE ORAL
Qty: 180 TABLET | Refills: 0 | Status: SHIPPED | OUTPATIENT
Start: 2023-03-27 | End: 2023-04-04 | Stop reason: SDUPTHER

## 2023-04-04 DIAGNOSIS — I50.21 ACUTE SYSTOLIC CONGESTIVE HEART FAILURE (HCC): ICD-10-CM

## 2023-04-04 DIAGNOSIS — I42.9 CARDIOMYOPATHY (HCC): ICD-10-CM

## 2023-04-04 RX ORDER — METOPROLOL SUCCINATE 25 MG/1
25 TABLET, EXTENDED RELEASE ORAL 2 TIMES DAILY
Qty: 180 TABLET | Refills: 0 | Status: SHIPPED | OUTPATIENT
Start: 2023-04-04

## 2023-04-16 PROBLEM — G40.909 EPILEPSY (HCC): Chronic | Status: RESOLVED | Noted: 2022-08-03 | Resolved: 2023-04-16

## 2023-04-24 ENCOUNTER — TELEPHONE (OUTPATIENT)
Dept: HEMATOLOGY ONCOLOGY | Facility: CLINIC | Age: 63
End: 2023-04-24

## 2023-04-24 NOTE — TELEPHONE ENCOUNTER
Appointment Confirmation   Who are you speaking with? Patient   If it is not the patient, are they listed on an active communication consent form? N/A   Which provider is the appointment scheduled with? PAZ Brandon   When is the appointment scheduled? Please list date and time 4/25/23 @ 2pm   At which location is the appointment scheduled to take place? [place]   Did caller verbalize understanding of appointment details?  yes

## 2023-04-25 ENCOUNTER — TELEPHONE (OUTPATIENT)
Dept: HEMATOLOGY ONCOLOGY | Facility: CLINIC | Age: 63
End: 2023-04-25

## 2023-04-25 NOTE — TELEPHONE ENCOUNTER
Appointment Change  Cancel, Reschedule, Change to Virtual      Who are you speaking with? Patient   If it is not the patient, are they listed on an active communication consent form? N/A   Which provider is the appointment scheduled with? Ralph Barrett PA-C   When is the appointment scheduled? Please list date and time 04/25/23 2PM Balbina Tripathi   At which location is the appointment scheduled to take place? [facility]   Was the appointment rescheduled or changed from an in person visit to a virtual visit? If so, please list the details of the change  06/06/23 8am    What is the reason for the appointment change? Patient had a fall today and cannot make it to her visit  Was STAR transport scheduled for this visit? No   Does STAR transport need to be scheduled for the new visit (if applicable) N/A   Does the patient need an infusion appointment rescheduled? No   Does the patient have an infusion appointment scheduled? If so, when? No   Is the patient undergoing chemotherapy? No   Was the no-show policy reviewed for appointments being changed with less then 24 hours of notice?  No

## 2023-05-09 ENCOUNTER — TELEPHONE (OUTPATIENT)
Dept: CARDIOLOGY CLINIC | Facility: CLINIC | Age: 63
End: 2023-05-09

## 2023-05-09 NOTE — TELEPHONE ENCOUNTER
Pt left v/m that she has misplaced her Metoprolol  She is wondering how to proceed  Her  has the same medication but a higher dose and they have being breaking into quarters  Please advise pt       She left call back # of

## 2023-05-10 ENCOUNTER — TELEMEDICINE (OUTPATIENT)
Dept: FAMILY MEDICINE CLINIC | Facility: CLINIC | Age: 63
End: 2023-05-10

## 2023-05-10 VITALS — BODY MASS INDEX: 23.05 KG/M2 | WEIGHT: 135 LBS | HEIGHT: 64 IN

## 2023-05-10 DIAGNOSIS — I50.42 CHRONIC COMBINED SYSTOLIC AND DIASTOLIC CONGESTIVE HEART FAILURE (HCC): Chronic | ICD-10-CM

## 2023-05-10 DIAGNOSIS — I50.21 ACUTE SYSTOLIC CONGESTIVE HEART FAILURE (HCC): ICD-10-CM

## 2023-05-10 DIAGNOSIS — F41.9 ANXIETY: ICD-10-CM

## 2023-05-10 DIAGNOSIS — F41.8 ANXIETY WITH DEPRESSION: ICD-10-CM

## 2023-05-10 DIAGNOSIS — F41.8 ANXIETY WITH DEPRESSION: Primary | Chronic | ICD-10-CM

## 2023-05-10 DIAGNOSIS — F41.0 PANIC ATTACK: ICD-10-CM

## 2023-05-10 DIAGNOSIS — D69.6 THROMBOCYTOPENIA (HCC): ICD-10-CM

## 2023-05-10 DIAGNOSIS — I42.9 CARDIOMYOPATHY (HCC): ICD-10-CM

## 2023-05-10 DIAGNOSIS — G40.109 NONINTRACTABLE FOCAL EPILEPSY (HCC): ICD-10-CM

## 2023-05-10 RX ORDER — GABAPENTIN 300 MG/1
300 CAPSULE ORAL 2 TIMES DAILY
Qty: 60 CAPSULE | Refills: 2 | Status: SHIPPED | OUTPATIENT
Start: 2023-05-10

## 2023-05-10 RX ORDER — HYDROXYZINE HYDROCHLORIDE 25 MG/1
25 TABLET, FILM COATED ORAL 2 TIMES DAILY
Qty: 60 TABLET | Refills: 2 | Status: SHIPPED | OUTPATIENT
Start: 2023-05-10

## 2023-05-10 RX ORDER — METOPROLOL SUCCINATE 25 MG/1
25 TABLET, EXTENDED RELEASE ORAL 2 TIMES DAILY
Qty: 180 TABLET | Refills: 1 | Status: SHIPPED | OUTPATIENT
Start: 2023-05-10

## 2023-05-10 RX ORDER — TRAZODONE HYDROCHLORIDE 50 MG/1
50 TABLET ORAL DAILY
Qty: 30 TABLET | Refills: 2 | Status: SHIPPED | OUTPATIENT
Start: 2023-05-10

## 2023-05-10 NOTE — ASSESSMENT & PLAN NOTE
Patient reports significant improvement in her symptoms since she was started on her current medications  Continue gabapentin 300 mg twice daily, Atarax 25 mg twice daily, trazodone 50 mg daily  She is not reporting any side effects to these  Patient again advised that she needs to establish care with psychiatry  With low platelet count and multiple other risk factors patient would need psychiatry evaluation to start a maintenance medication

## 2023-05-10 NOTE — PROGRESS NOTES
Virtual Regular Visit    Verification of patient location:    Patient is located at Home in the following state in which I hold an active license [place]      Assessment/Plan:    Problem List Items Addressed This Visit        Cardiovascular and Mediastinum    Chronic combined systolic and diastolic congestive heart failure (HCC) (Chronic)     Wt Readings from Last 3 Encounters:   05/10/23 61 2 kg (135 lb)   04/20/23 55 3 kg (122 lb)   04/11/23 55 7 kg (122 lb 12 7 oz)     Patient denies any symptoms of chest pain or shortness of breath  Encouraged to continue beta-blocker per cardiology  Nervous and Auditory    Nonintractable focal epilepsy (HCC)     Continue lacosamide per neurology         Relevant Medications    gabapentin (NEURONTIN) 300 mg capsule       Hematopoietic and Hemostatic    Thrombocytopenia ([address] )     Secondary to chronic alcohol use  Patient has an upcoming appointment with hematology to establish care and further monitoring and management of low platelet count  Fall precautions discussed  Other    Anxiety (Chronic)    Relevant Medications    traZODone (DESYREL) 50 mg tablet    hydrOXYzine HCL (ATARAX) 25 mg tablet    Anxiety, Depression, and Panic Attacks - Primary (Chronic)     Patient reports significant improvement in her symptoms since she was started on her current medications  Continue gabapentin 300 mg twice daily, Atarax 25 mg twice daily, trazodone 50 mg daily  She is not reporting any side effects to these  Patient again advised that she needs to establish care with psychiatry  With low platelet count and multiple other risk factors patient would need psychiatry evaluation to start a maintenance medication           Relevant Medications    traZODone (DESYREL) 50 mg tablet    hydrOXYzine HCL (ATARAX) 25 mg tablet    gabapentin (NEURONTIN) 300 mg capsule   Other Visit Diagnoses     Panic attack        Relevant Medications    traZODone (DESYREL) 50 mg tablet hydrOXYzine HCL (ATARAX) 25 mg tablet               Reason for visit is   Chief Complaint   Patient presents with   • [address] follow up   • Virtual Regular Visit        Encounter provider Iker Lowery MD    Provider located at [address] [address]  [address] [facility] [id number]      Recent Visits  No visits were found meeting these conditions  Showing recent visits within past 7 days and meeting all other requirements  Today's Visits  Date Type Provider Dept   05/10/23 Telemedicine Iker Lowery MD [facility]   Showing today's visits and meeting all other requirements  Future Appointments  No visits were found meeting these conditions  Showing future appointments within next 150 days and meeting all other requirements       The patient was identified by name and date of birth  Mitesh Davis was informed that this is a telemedicine visit and that the visit is being conducted through the [address] [employer] platform  She agrees to proceed     My office door was closed  No one else was in the room  She acknowledged consent and understanding of privacy and security of the video platform  The patient has agreed to participate and understands they can discontinue the visit at any time  Patient is aware this is a billable service  Subjective  Mitesh Davis is a 61 y o  female    HPI   Patient is following up on symptoms of anxiety and depression, currently on gabapentin 300 mg twice daily, trazodone 50 mg daily  Needs to establish with outpatient psychiatry to discuss medications for improved control on her symptoms  Patient states that her symptoms have improved significantly since she has been on these medications  Tolerating them without any side effects  Patient also has history of thrombopenia secondary to chronic alcohol abuse  According to patient she has stopped drinking alcohol  now  Hospital labs reviewed, platelet count low   Patient "asymptomatic  Patient has history of seizure disorder, currently on lacosamide, being monitored by neurology  Needs to establish with outpatient heme-onc  Past Medical History:   Diagnosis Date   • Fracture    • Hepatitis     Hep A    • Insomnia     10mar2016 resolved   • Osteoporosis     14jun2016 resolved   • Pancreatitis    • SAH (subarachnoid hemorrhage) ([facility] [address] )    • Seasonal allergies    • Seizure ([facility] [address] )    • Seizures ([facility] [address] )    • Urine discoloration 11/11/2019   • Varicella    • Vomiting 11/13/2019       Past Surgical History:   Procedure Laterality Date   • IR BIOPSY BONE  8/17/2021   • IR BIOPSY BONE MARROW  11/14/2019   • TUBAL LIGATION  1985       Current Outpatient Medications   Medication Sig Dispense Refill   • gabapentin (NEURONTIN) 300 mg capsule Take 1 capsule (300 mg total) by mouth 2 (two) times a day 60 capsule 2   • hydrOXYzine HCL (ATARAX) 25 mg tablet Take 1 tablet (25 mg total) by mouth 2 (two) times a day 60 tablet 2   • lacosamide (VIMPAT) 200 mg tablet Take 1 tablet (200 mg total) by mouth 2 (two) times a day 60 tablet 5   • metoprolol succinate (TOPROL-XL) 25 mg 24 hr tablet Take 1 tablet (25 mg total) by mouth 2 (two) times a day 180 tablet 0   • thiamine 100 MG tablet Take 1 tablet (100 mg total) by mouth daily Do not start before March 15, 2023  30 tablet 0   • traZODone (DESYREL) 50 mg tablet Take 1 tablet (50 mg total) by mouth in the morning 30 tablet 2   • folic acid (FOLVITE) 1 mg tablet Take 1 tablet (1 mg total) by mouth daily Do not start before March 15, 2023  30 tablet 0     No current facility-administered medications for this visit  No Known Allergies    Review of Systems   Constitutional: Negative  Neurological: Positive for seizures          History of seizure disorder   Psychiatric/Behavioral:        History of anxiety, depression, panic attacks       Video Exam    Vitals:    05/10/23 1049   Weight: 61 2 kg (135 lb)   Height: 5' 4\" (1 626 m)       Physical " Exam  Constitutional:       Appearance: Normal appearance  Pulmonary:      Effort: No respiratory distress  Neurological:      Mental Status: She is oriented to person, place, and time     Psychiatric:         Mood and Affect: Mood normal           Visit Time  Total Visit Duration: 25

## 2023-05-10 NOTE — ASSESSMENT & PLAN NOTE
Secondary to chronic alcohol use  Patient has an upcoming appointment with hematology to establish care and further monitoring and management of low platelet count  Fall precautions discussed

## 2023-05-10 NOTE — TELEPHONE ENCOUNTER
Pt called in and said she tried to look again and still can't find the metoprolol bottle  Is willing to pay for it if ins won't cover this replacement  Please advise pt once prescription has been sent

## 2023-05-10 NOTE — ASSESSMENT & PLAN NOTE
Wt Readings from Last 3 Encounters:   05/10/23 61 2 kg (135 lb)   04/20/23 55 3 kg (122 lb)   04/11/23 55 7 kg (122 lb 12 7 oz)     Patient denies any symptoms of chest pain or shortness of breath  Encouraged to continue beta-blocker per cardiology

## 2023-05-11 ENCOUNTER — APPOINTMENT (EMERGENCY)
Dept: RADIOLOGY | Facility: HOSPITAL | Age: 63
End: 2023-05-11

## 2023-05-11 ENCOUNTER — APPOINTMENT (EMERGENCY)
Dept: CT IMAGING | Facility: HOSPITAL | Age: 63
End: 2023-05-11

## 2023-05-11 ENCOUNTER — HOSPITAL ENCOUNTER (INPATIENT)
Facility: HOSPITAL | Age: 63
LOS: 2 days | Discharge: HOME | End: 2023-05-13
Attending: EMERGENCY MEDICINE | Admitting: INTERNAL MEDICINE

## 2023-05-11 DIAGNOSIS — F10.10 ALCOHOL ABUSE: ICD-10-CM

## 2023-05-11 DIAGNOSIS — R56.9 SEIZURE (HCC): ICD-10-CM

## 2023-05-11 DIAGNOSIS — I10 HTN (HYPERTENSION): ICD-10-CM

## 2023-05-11 DIAGNOSIS — F41.9 ANXIETY: ICD-10-CM

## 2023-05-11 DIAGNOSIS — F50.9 EATING DISORDER: ICD-10-CM

## 2023-05-11 DIAGNOSIS — E83.42 HYPOMAGNESEMIA: ICD-10-CM

## 2023-05-11 DIAGNOSIS — G40.919 BREAKTHROUGH SEIZURE (HCC): Primary | ICD-10-CM

## 2023-05-11 DIAGNOSIS — J96.01 ACUTE RESPIRATORY FAILURE WITH HYPOXIA (HCC): ICD-10-CM

## 2023-05-11 LAB
ALBUMIN SERPL BCP-MCNC: 4 G/DL (ref 3.5–5)
ALBUMIN SERPL BCP-MCNC: 4 G/DL (ref 3.5–5)
ALP SERPL-CCNC: 202 U/L (ref 34–104)
ALP SERPL-CCNC: 202 U/L (ref 34–104)
ALT SERPL W P-5'-P-CCNC: 53 U/L (ref 7–52)
ALT SERPL W P-5'-P-CCNC: 53 U/L (ref 7–52)
ANION GAP SERPL CALCULATED.3IONS-SCNC: 8 MMOL/L (ref 4–13)
ANION GAP SERPL CALCULATED.3IONS-SCNC: 8 MMOL/L (ref 4–13)
APAP SERPL-MCNC: <10 UG/ML (ref 10–20)
APAP SERPL-MCNC: <10 UG/ML (ref 10–20)
AST SERPL W P-5'-P-CCNC: 67 U/L (ref 13–39)
AST SERPL W P-5'-P-CCNC: 67 U/L (ref 13–39)
BASOPHILS # BLD MANUAL: 0 THOUSAND/UL (ref 0–0.1)
BASOPHILS # BLD MANUAL: 0 THOUSAND/UL (ref 0–0.1)
BASOPHILS NFR MAR MANUAL: 0 % (ref 0–1)
BASOPHILS NFR MAR MANUAL: 0 % (ref 0–1)
BILIRUB SERPL-MCNC: 0.31 MG/DL (ref 0.2–1)
BILIRUB SERPL-MCNC: 0.31 MG/DL (ref 0.2–1)
BUN SERPL-MCNC: 17 MG/DL (ref 5–25)
BUN SERPL-MCNC: 17 MG/DL (ref 5–25)
CALCIUM SERPL-MCNC: 8.9 MG/DL (ref 8.4–10.2)
CALCIUM SERPL-MCNC: 8.9 MG/DL (ref 8.4–10.2)
CHLORIDE SERPL-SCNC: 101 MMOL/L (ref 96–108)
CHLORIDE SERPL-SCNC: 101 MMOL/L (ref 96–108)
CO2 SERPL-SCNC: 27 MMOL/L (ref 21–32)
CO2 SERPL-SCNC: 27 MMOL/L (ref 21–32)
CREAT SERPL-MCNC: 0.7 MG/DL (ref 0.6–1.3)
CREAT SERPL-MCNC: 0.7 MG/DL (ref 0.6–1.3)
EOSINOPHIL # BLD MANUAL: 0.16 THOUSAND/UL (ref 0–0.4)
EOSINOPHIL # BLD MANUAL: 0.16 THOUSAND/UL (ref 0–0.4)
EOSINOPHIL NFR BLD MANUAL: 6 % (ref 0–6)
EOSINOPHIL NFR BLD MANUAL: 6 % (ref 0–6)
ERYTHROCYTE [DISTWIDTH] IN BLOOD BY AUTOMATED COUNT: 13.7 % (ref 11.6–15.1)
ERYTHROCYTE [DISTWIDTH] IN BLOOD BY AUTOMATED COUNT: 13.7 % (ref 11.6–15.1)
ETHANOL SERPL-MCNC: <10 MG/DL
ETHANOL SERPL-MCNC: <10 MG/DL
GFR SERPL CREATININE-BSD FRML MDRD: 92 ML/MIN/1.73SQ M
GFR SERPL CREATININE-BSD FRML MDRD: 92 ML/MIN/1.73SQ M
GLUCOSE SERPL-MCNC: 155 MG/DL (ref 65–140)
GLUCOSE SERPL-MCNC: 155 MG/DL (ref 65–140)
GLUCOSE SERPL-MCNC: 160 MG/DL (ref 65–140)
GLUCOSE SERPL-MCNC: 160 MG/DL (ref 65–140)
HCT VFR BLD AUTO: 38.7 % (ref 34.8–46.1)
HCT VFR BLD AUTO: 38.7 % (ref 34.8–46.1)
HGB BLD-MCNC: 12.5 G/DL (ref 11.5–15.4)
HGB BLD-MCNC: 12.5 G/DL (ref 11.5–15.4)
LACTATE SERPL-SCNC: 1.2 MMOL/L (ref 0.5–2)
LACTATE SERPL-SCNC: 1.2 MMOL/L (ref 0.5–2)
LIPASE SERPL-CCNC: 20 U/L (ref 11–82)
LIPASE SERPL-CCNC: 20 U/L (ref 11–82)
LYMPHOCYTES # BLD AUTO: 1.12 THOUSAND/UL (ref 0.6–4.47)
LYMPHOCYTES # BLD AUTO: 1.12 THOUSAND/UL (ref 0.6–4.47)
LYMPHOCYTES # BLD AUTO: 41 % (ref 14–44)
LYMPHOCYTES # BLD AUTO: 41 % (ref 14–44)
MAGNESIUM SERPL-MCNC: 1.8 MG/DL (ref 1.9–2.7)
MAGNESIUM SERPL-MCNC: 1.8 MG/DL (ref 1.9–2.7)
MCH RBC QN AUTO: 31.3 PG (ref 26.8–34.3)
MCH RBC QN AUTO: 31.3 PG (ref 26.8–34.3)
MCHC RBC AUTO-ENTMCNC: 32.3 G/DL (ref 31.4–37.4)
MCHC RBC AUTO-ENTMCNC: 32.3 G/DL (ref 31.4–37.4)
MCV RBC AUTO: 97 FL (ref 82–98)
MCV RBC AUTO: 97 FL (ref 82–98)
MONOCYTES # BLD AUTO: 0.11 THOUSAND/UL (ref 0–1.22)
MONOCYTES # BLD AUTO: 0.11 THOUSAND/UL (ref 0–1.22)
MONOCYTES NFR BLD: 4 % (ref 4–12)
MONOCYTES NFR BLD: 4 % (ref 4–12)
NEUTROPHILS # BLD MANUAL: 1.23 THOUSAND/UL (ref 1.85–7.62)
NEUTROPHILS # BLD MANUAL: 1.23 THOUSAND/UL (ref 1.85–7.62)
NEUTS BAND NFR BLD MANUAL: 3 % (ref 0–8)
NEUTS BAND NFR BLD MANUAL: 3 % (ref 0–8)
NEUTS SEG NFR BLD AUTO: 42 % (ref 43–75)
NEUTS SEG NFR BLD AUTO: 42 % (ref 43–75)
PLATELET # BLD AUTO: 48 THOUSANDS/UL (ref 149–390)
PLATELET # BLD AUTO: 48 THOUSANDS/UL (ref 149–390)
PLATELET BLD QL SMEAR: ABNORMAL
PLATELET BLD QL SMEAR: ABNORMAL
PMV BLD AUTO: 9.9 FL (ref 8.9–12.7)
PMV BLD AUTO: 9.9 FL (ref 8.9–12.7)
POTASSIUM SERPL-SCNC: 4.1 MMOL/L (ref 3.5–5.3)
POTASSIUM SERPL-SCNC: 4.1 MMOL/L (ref 3.5–5.3)
PROT SERPL-MCNC: 7.2 G/DL (ref 6.4–8.4)
PROT SERPL-MCNC: 7.2 G/DL (ref 6.4–8.4)
RBC # BLD AUTO: 4 MILLION/UL (ref 3.81–5.12)
RBC # BLD AUTO: 4 MILLION/UL (ref 3.81–5.12)
RBC MORPH BLD: PRESENT
RBC MORPH BLD: PRESENT
ROULEAUX BLD QL SMEAR: PRESENT
ROULEAUX BLD QL SMEAR: PRESENT
SALICYLATES SERPL-MCNC: <5 MG/DL (ref 3–20)
SALICYLATES SERPL-MCNC: <5 MG/DL (ref 3–20)
SODIUM SERPL-SCNC: 136 MMOL/L (ref 135–147)
SODIUM SERPL-SCNC: 136 MMOL/L (ref 135–147)
VARIANT LYMPHS # BLD AUTO: 4 %
VARIANT LYMPHS # BLD AUTO: 4 %
WBC # BLD AUTO: 2.74 THOUSAND/UL (ref 4.31–10.16)
WBC # BLD AUTO: 2.74 THOUSAND/UL (ref 4.31–10.16)

## 2023-05-11 RX ORDER — ACETAMINOPHEN 650 MG/1
650 SUPPOSITORY RECTAL ONCE
Status: COMPLETED | OUTPATIENT
Start: 2023-05-11 | End: 2023-05-11

## 2023-05-11 RX ORDER — LORAZEPAM 2 MG/ML
1 INJECTION INTRAMUSCULAR ONCE
Status: COMPLETED | OUTPATIENT
Start: 2023-05-11 | End: 2023-05-11

## 2023-05-11 RX ADMIN — ACETAMINOPHEN 650 MG: 650 SUPPOSITORY RECTAL at 21:36

## 2023-05-11 RX ADMIN — LEVETIRACETAM 3000 MG: 100 INJECTION, SOLUTION INTRAVENOUS at 21:13

## 2023-05-11 NOTE — LETTER
Cara Alston was admitted for breakthrough seizure likely secondary to alcohol use  She was started on Pristiq for anxiety by Psychiatry  Neurology did not recommend any medication changes  She did have low magnesium so we started her on supplemental magnesium  She also had chest imaging concerning for possible aspiration but antibiotics were held  We would like to recommend magnesium and chest xray in 1 week     Thanks  Winnie Darden

## 2023-05-12 ENCOUNTER — APPOINTMENT (INPATIENT)
Dept: NEUROLOGY | Facility: HOSPITAL | Age: 63
End: 2023-05-12

## 2023-05-12 PROBLEM — F10.10 ALCOHOL ABUSE: Status: ACTIVE | Noted: 2023-05-12

## 2023-05-12 PROBLEM — G40.919 BREAKTHROUGH SEIZURE (HCC): Status: ACTIVE | Noted: 2023-05-11

## 2023-05-12 PROBLEM — D61.818 PANCYTOPENIA (HCC): Status: ACTIVE | Noted: 2023-05-12

## 2023-05-12 PROBLEM — F50.9 EATING DISORDER: Status: ACTIVE | Noted: 2023-05-12

## 2023-05-12 PROBLEM — E83.42 HYPOMAGNESEMIA: Status: ACTIVE | Noted: 2023-05-12

## 2023-05-12 LAB
ALBUMIN SERPL BCP-MCNC: 3.4 G/DL (ref 3.5–5)
ALBUMIN SERPL BCP-MCNC: 3.4 G/DL (ref 3.5–5)
ALBUMIN SERPL BCP-MCNC: 3.6 G/DL (ref 3.5–5)
ALBUMIN SERPL BCP-MCNC: 3.6 G/DL (ref 3.5–5)
ALP SERPL-CCNC: 158 U/L (ref 34–104)
ALP SERPL-CCNC: 158 U/L (ref 34–104)
ALP SERPL-CCNC: 182 U/L (ref 34–104)
ALP SERPL-CCNC: 182 U/L (ref 34–104)
ALT SERPL W P-5'-P-CCNC: 40 U/L (ref 7–52)
ALT SERPL W P-5'-P-CCNC: 40 U/L (ref 7–52)
ALT SERPL W P-5'-P-CCNC: 45 U/L (ref 7–52)
ALT SERPL W P-5'-P-CCNC: 45 U/L (ref 7–52)
ANION GAP SERPL CALCULATED.3IONS-SCNC: 6 MMOL/L (ref 4–13)
ANION GAP SERPL CALCULATED.3IONS-SCNC: 6 MMOL/L (ref 4–13)
ANION GAP SERPL CALCULATED.3IONS-SCNC: 7 MMOL/L (ref 4–13)
ANION GAP SERPL CALCULATED.3IONS-SCNC: 7 MMOL/L (ref 4–13)
APTT PPP: 26 SECONDS (ref 23–37)
APTT PPP: 26 SECONDS (ref 23–37)
AST SERPL W P-5'-P-CCNC: 42 U/L (ref 13–39)
AST SERPL W P-5'-P-CCNC: 42 U/L (ref 13–39)
AST SERPL W P-5'-P-CCNC: 50 U/L (ref 13–39)
AST SERPL W P-5'-P-CCNC: 50 U/L (ref 13–39)
BASOPHILS # BLD AUTO: 0.01 THOUSANDS/ÂΜL (ref 0–0.1)
BASOPHILS # BLD AUTO: 0.01 THOUSANDS/ÂΜL (ref 0–0.1)
BASOPHILS NFR BLD AUTO: 0 % (ref 0–1)
BASOPHILS NFR BLD AUTO: 0 % (ref 0–1)
BILIRUB SERPL-MCNC: 0.4 MG/DL (ref 0.2–1)
BILIRUB SERPL-MCNC: 0.4 MG/DL (ref 0.2–1)
BILIRUB SERPL-MCNC: 0.48 MG/DL (ref 0.2–1)
BILIRUB SERPL-MCNC: 0.48 MG/DL (ref 0.2–1)
BUN SERPL-MCNC: 15 MG/DL (ref 5–25)
BUN SERPL-MCNC: 15 MG/DL (ref 5–25)
BUN SERPL-MCNC: 16 MG/DL (ref 5–25)
BUN SERPL-MCNC: 16 MG/DL (ref 5–25)
CALCIUM ALBUM COR SERPL-MCNC: 8.7 MG/DL (ref 8.3–10.1)
CALCIUM ALBUM COR SERPL-MCNC: 8.7 MG/DL (ref 8.3–10.1)
CALCIUM SERPL-MCNC: 8.2 MG/DL (ref 8.4–10.2)
CALCIUM SERPL-MCNC: 8.2 MG/DL (ref 8.4–10.2)
CALCIUM SERPL-MCNC: 8.3 MG/DL (ref 8.4–10.2)
CALCIUM SERPL-MCNC: 8.3 MG/DL (ref 8.4–10.2)
CHLORIDE SERPL-SCNC: 103 MMOL/L (ref 96–108)
CHLORIDE SERPL-SCNC: 103 MMOL/L (ref 96–108)
CHLORIDE SERPL-SCNC: 105 MMOL/L (ref 96–108)
CHLORIDE SERPL-SCNC: 105 MMOL/L (ref 96–108)
CK SERPL-CCNC: 43 U/L (ref 26–192)
CK SERPL-CCNC: 43 U/L (ref 26–192)
CO2 SERPL-SCNC: 25 MMOL/L (ref 21–32)
CO2 SERPL-SCNC: 25 MMOL/L (ref 21–32)
CO2 SERPL-SCNC: 26 MMOL/L (ref 21–32)
CO2 SERPL-SCNC: 26 MMOL/L (ref 21–32)
CREAT SERPL-MCNC: 0.61 MG/DL (ref 0.6–1.3)
CREAT SERPL-MCNC: 0.61 MG/DL (ref 0.6–1.3)
CREAT SERPL-MCNC: 0.71 MG/DL (ref 0.6–1.3)
CREAT SERPL-MCNC: 0.71 MG/DL (ref 0.6–1.3)
EOSINOPHIL # BLD AUTO: 0.06 THOUSAND/ÂΜL (ref 0–0.61)
EOSINOPHIL # BLD AUTO: 0.06 THOUSAND/ÂΜL (ref 0–0.61)
EOSINOPHIL NFR BLD AUTO: 1 % (ref 0–6)
EOSINOPHIL NFR BLD AUTO: 1 % (ref 0–6)
ERYTHROCYTE [DISTWIDTH] IN BLOOD BY AUTOMATED COUNT: 13.7 % (ref 11.6–15.1)
ERYTHROCYTE [DISTWIDTH] IN BLOOD BY AUTOMATED COUNT: 13.7 % (ref 11.6–15.1)
FIBRINOGEN PPP-MCNC: 241 MG/DL (ref 227–495)
FIBRINOGEN PPP-MCNC: 241 MG/DL (ref 227–495)
GFR SERPL CREATININE-BSD FRML MDRD: 90 ML/MIN/1.73SQ M
GFR SERPL CREATININE-BSD FRML MDRD: 90 ML/MIN/1.73SQ M
GFR SERPL CREATININE-BSD FRML MDRD: 96 ML/MIN/1.73SQ M
GFR SERPL CREATININE-BSD FRML MDRD: 96 ML/MIN/1.73SQ M
GLUCOSE SERPL-MCNC: 105 MG/DL (ref 65–140)
GLUCOSE SERPL-MCNC: 105 MG/DL (ref 65–140)
GLUCOSE SERPL-MCNC: 97 MG/DL (ref 65–140)
GLUCOSE SERPL-MCNC: 97 MG/DL (ref 65–140)
HCT VFR BLD AUTO: 32.5 % (ref 34.8–46.1)
HCT VFR BLD AUTO: 32.5 % (ref 34.8–46.1)
HGB BLD-MCNC: 10.6 G/DL (ref 11.5–15.4)
HGB BLD-MCNC: 10.6 G/DL (ref 11.5–15.4)
IMM GRANULOCYTES # BLD AUTO: 0.02 THOUSAND/UL (ref 0–0.2)
IMM GRANULOCYTES # BLD AUTO: 0.02 THOUSAND/UL (ref 0–0.2)
IMM GRANULOCYTES NFR BLD AUTO: 0 % (ref 0–2)
IMM GRANULOCYTES NFR BLD AUTO: 0 % (ref 0–2)
INR PPP: 1.11 (ref 0.84–1.19)
INR PPP: 1.11 (ref 0.84–1.19)
LYMPHOCYTES # BLD AUTO: 2.06 THOUSANDS/ÂΜL (ref 0.6–4.47)
LYMPHOCYTES # BLD AUTO: 2.06 THOUSANDS/ÂΜL (ref 0.6–4.47)
LYMPHOCYTES NFR BLD AUTO: 40 % (ref 14–44)
LYMPHOCYTES NFR BLD AUTO: 40 % (ref 14–44)
MAGNESIUM SERPL-MCNC: 1.7 MG/DL (ref 1.9–2.7)
MCH RBC QN AUTO: 31 PG (ref 26.8–34.3)
MCH RBC QN AUTO: 31 PG (ref 26.8–34.3)
MCHC RBC AUTO-ENTMCNC: 32.6 G/DL (ref 31.4–37.4)
MCHC RBC AUTO-ENTMCNC: 32.6 G/DL (ref 31.4–37.4)
MCV RBC AUTO: 95 FL (ref 82–98)
MCV RBC AUTO: 95 FL (ref 82–98)
MONOCYTES # BLD AUTO: 0.35 THOUSAND/ÂΜL (ref 0.17–1.22)
MONOCYTES # BLD AUTO: 0.35 THOUSAND/ÂΜL (ref 0.17–1.22)
MONOCYTES NFR BLD AUTO: 7 % (ref 4–12)
MONOCYTES NFR BLD AUTO: 7 % (ref 4–12)
NEUTROPHILS # BLD AUTO: 2.71 THOUSANDS/ÂΜL (ref 1.85–7.62)
NEUTROPHILS # BLD AUTO: 2.71 THOUSANDS/ÂΜL (ref 1.85–7.62)
NEUTS SEG NFR BLD AUTO: 52 % (ref 43–75)
NEUTS SEG NFR BLD AUTO: 52 % (ref 43–75)
NRBC BLD AUTO-RTO: 0 /100 WBCS
NRBC BLD AUTO-RTO: 0 /100 WBCS
PLATELET # BLD AUTO: 56 THOUSANDS/UL (ref 149–390)
PLATELET # BLD AUTO: 56 THOUSANDS/UL (ref 149–390)
PLATELET # BLD AUTO: 57 THOUSANDS/UL (ref 149–390)
PLATELET # BLD AUTO: 57 THOUSANDS/UL (ref 149–390)
PMV BLD AUTO: 10.2 FL (ref 8.9–12.7)
PMV BLD AUTO: 10.2 FL (ref 8.9–12.7)
PMV BLD AUTO: 10.4 FL (ref 8.9–12.7)
PMV BLD AUTO: 10.4 FL (ref 8.9–12.7)
POTASSIUM SERPL-SCNC: 3.8 MMOL/L (ref 3.5–5.3)
POTASSIUM SERPL-SCNC: 3.8 MMOL/L (ref 3.5–5.3)
POTASSIUM SERPL-SCNC: 4.2 MMOL/L (ref 3.5–5.3)
POTASSIUM SERPL-SCNC: 4.2 MMOL/L (ref 3.5–5.3)
PROLACTIN SERPL-MCNC: 74.6 NG/ML
PROLACTIN SERPL-MCNC: 74.6 NG/ML
PROT SERPL-MCNC: 6 G/DL (ref 6.4–8.4)
PROT SERPL-MCNC: 6 G/DL (ref 6.4–8.4)
PROT SERPL-MCNC: 6.5 G/DL (ref 6.4–8.4)
PROT SERPL-MCNC: 6.5 G/DL (ref 6.4–8.4)
PROTHROMBIN TIME: 14.5 SECONDS (ref 11.6–14.5)
PROTHROMBIN TIME: 14.5 SECONDS (ref 11.6–14.5)
RBC # BLD AUTO: 3.42 MILLION/UL (ref 3.81–5.12)
RBC # BLD AUTO: 3.42 MILLION/UL (ref 3.81–5.12)
SODIUM SERPL-SCNC: 136 MMOL/L (ref 135–147)
WBC # BLD AUTO: 5.21 THOUSAND/UL (ref 4.31–10.16)
WBC # BLD AUTO: 5.21 THOUSAND/UL (ref 4.31–10.16)

## 2023-05-12 RX ORDER — DOCUSATE SODIUM 100 MG/1
100 CAPSULE, LIQUID FILLED ORAL 2 TIMES DAILY PRN
Status: DISCONTINUED | OUTPATIENT
Start: 2023-05-12 | End: 2023-05-13 | Stop reason: HOSPADM

## 2023-05-12 RX ORDER — ACETAMINOPHEN 160 MG/5ML
650 SUSPENSION ORAL EVERY 4 HOURS PRN
Status: DISCONTINUED | OUTPATIENT
Start: 2023-05-12 | End: 2023-05-13 | Stop reason: HOSPADM

## 2023-05-12 RX ORDER — HYDROXYZINE HYDROCHLORIDE 25 MG/1
25 TABLET, FILM COATED ORAL 2 TIMES DAILY
Status: DISCONTINUED | OUTPATIENT
Start: 2023-05-12 | End: 2023-05-13 | Stop reason: HOSPADM

## 2023-05-12 RX ORDER — TRAZODONE HYDROCHLORIDE 50 MG/1
50 TABLET ORAL
Status: DISCONTINUED | OUTPATIENT
Start: 2023-05-12 | End: 2023-05-13 | Stop reason: HOSPADM

## 2023-05-12 RX ORDER — LACOSAMIDE 100 MG/1
200 TABLET ORAL EVERY 12 HOURS SCHEDULED
Status: DISCONTINUED | OUTPATIENT
Start: 2023-05-12 | End: 2023-05-13 | Stop reason: HOSPADM

## 2023-05-12 RX ORDER — METOPROLOL SUCCINATE 25 MG/1
25 TABLET, EXTENDED RELEASE ORAL 2 TIMES DAILY
Status: DISCONTINUED | OUTPATIENT
Start: 2023-05-12 | End: 2023-05-13 | Stop reason: HOSPADM

## 2023-05-12 RX ORDER — LANOLIN ALCOHOL/MO/W.PET/CERES
100 CREAM (GRAM) TOPICAL DAILY
Status: DISCONTINUED | OUTPATIENT
Start: 2023-05-12 | End: 2023-05-13 | Stop reason: HOSPADM

## 2023-05-12 RX ORDER — GABAPENTIN 300 MG/1
300 CAPSULE ORAL 2 TIMES DAILY
Status: DISCONTINUED | OUTPATIENT
Start: 2023-05-12 | End: 2023-05-13 | Stop reason: HOSPADM

## 2023-05-12 RX ORDER — FOLIC ACID 1 MG/1
1 TABLET ORAL DAILY
Status: DISCONTINUED | OUTPATIENT
Start: 2023-05-12 | End: 2023-05-13 | Stop reason: HOSPADM

## 2023-05-12 RX ORDER — ONDANSETRON 2 MG/ML
4 INJECTION INTRAMUSCULAR; INTRAVENOUS EVERY 6 HOURS PRN
Status: DISCONTINUED | OUTPATIENT
Start: 2023-05-12 | End: 2023-05-13 | Stop reason: HOSPADM

## 2023-05-12 RX ORDER — SODIUM CHLORIDE 9 MG/ML
75 INJECTION, SOLUTION INTRAVENOUS CONTINUOUS
Status: DISCONTINUED | OUTPATIENT
Start: 2023-05-12 | End: 2023-05-12

## 2023-05-12 RX ORDER — DESVENLAFAXINE SUCCINATE 50 MG/1
50 TABLET, EXTENDED RELEASE ORAL DAILY
Status: DISCONTINUED | OUTPATIENT
Start: 2023-05-13 | End: 2023-05-13 | Stop reason: HOSPADM

## 2023-05-12 RX ORDER — MAGNESIUM SULFATE HEPTAHYDRATE 40 MG/ML
2 INJECTION, SOLUTION INTRAVENOUS ONCE
Status: COMPLETED | OUTPATIENT
Start: 2023-05-12 | End: 2023-05-12

## 2023-05-12 RX ORDER — DIAZEPAM 5 MG/ML
5 INJECTION, SOLUTION INTRAMUSCULAR; INTRAVENOUS AS NEEDED
Status: DISCONTINUED | OUTPATIENT
Start: 2023-05-12 | End: 2023-05-13 | Stop reason: HOSPADM

## 2023-05-12 RX ADMIN — GABAPENTIN 300 MG: 300 CAPSULE ORAL at 17:29

## 2023-05-12 RX ADMIN — GABAPENTIN 300 MG: 300 CAPSULE ORAL at 09:04

## 2023-05-12 RX ADMIN — TRAZODONE HYDROCHLORIDE 50 MG: 50 TABLET ORAL at 21:57

## 2023-05-12 RX ADMIN — HYDROXYZINE HYDROCHLORIDE 25 MG: 25 TABLET ORAL at 17:29

## 2023-05-12 RX ADMIN — LACOSAMIDE 200 MG: 100 TABLET, FILM COATED ORAL at 09:05

## 2023-05-12 RX ADMIN — MAGNESIUM SULFATE HEPTAHYDRATE 2 G: 40 INJECTION, SOLUTION INTRAVENOUS at 09:04

## 2023-05-12 RX ADMIN — FOLIC ACID 1 MG: 1 TABLET ORAL at 09:04

## 2023-05-12 RX ADMIN — HYDROXYZINE HYDROCHLORIDE 25 MG: 25 TABLET ORAL at 09:04

## 2023-05-12 RX ADMIN — LACOSAMIDE 200 MG: 100 TABLET, FILM COATED ORAL at 21:57

## 2023-05-12 RX ADMIN — MULTIPLE VITAMINS W/ MINERALS TAB 1 TABLET: TAB ORAL at 09:04

## 2023-05-12 RX ADMIN — METOPROLOL SUCCINATE 25 MG: 25 TABLET, EXTENDED RELEASE ORAL at 21:57

## 2023-05-12 RX ADMIN — Medication 100 MG: at 09:04

## 2023-05-12 RX ADMIN — SODIUM CHLORIDE 75 ML/HR: 0.9 INJECTION, SOLUTION INTRAVENOUS at 00:34

## 2023-05-12 RX ADMIN — METOPROLOL SUCCINATE 25 MG: 25 TABLET, EXTENDED RELEASE ORAL at 09:05

## 2023-05-12 NOTE — ASSESSMENT & PLAN NOTE
POA  Recent Labs     05/11/23 2055   PLT 48*       PLAN:  -NO BLOOD TRANSFUSIONS: Pt is jehovah witness  -Continue to monitor  -DVT prophylaxis held

## 2023-05-12 NOTE — CONSULTS
NEUROLOGY RESIDENCY CONSULT NOTE     Name: Kishore Cason   Age & Sex: 61 y o  female   MRN: [id number]  Unit/Bed#: S -01   Encounter: [id number]  Length of Stay: 1    ASSESSMENT & PLAN     * Breakthrough seizure [facility])  Assessment & Plan  Patient is a 61year old woman with hx of traumatic [address] 2021, epilepsy, pancytopenia, unspecified eating disorder, alcohol abuse presenting for breakthrough seizures  This was in the setting of patient drinking alcohol beforehand  Patient has an ongoing alcohol use history despite education  Seizures focal in nature with right lip biting, right arm shaking  Chart review also noted tonic clonic shaking bilaterally  Patient compliant on vimpat and gabapentin  Patient's seizures started with [address] 2021 and noted hand clenching and dropping and has had multiple EEG with 3/7/2022 video EEG showing left focal temporal electrographic seizures and latest EEG 3/13 showing left temporal slowing and mild generalized slowing  Patient on exam showed no focal neurologic deficits but did note right lip bite and chronic injured right side of tongue  Patient also was tremulous bilaterally  Workup:  CT head wo contrast: no acute intracranial abnormality  EEG routine 5/12/2023: Left hemispheric temporal slowing  Ethanol level 5/11/2023: <10    Impression: Patient most likely had a focal seizure (with impaired awareness) which may have transitioned to generalized seizure based on chart review with a postictal state  The seizure most likely provoked due to alcohol use as in previous admission  Plan:  Discussed with Dr Jemal Oconnor 200mg BID and gabapentin 300mg BID, would not adjust medications as patient most likely had a provoked seizure in setting of continued alcohol use  Recommended to patient and spouse to wean off alcohol or patient would have recurrent seizures; patient and spouse is agreeable    Patient will need to followup with Dr Lisa Luong for neurology  [employer] form has "been faxed and submitted  Rest of care per primary ([facility])    No further inpatient neurology recommendations  Please contact us if further questions  Raysa Booker will need follow up in in 4 weeks with epilepsy Dr Sarmiento Guess  She will not require outpatient neurological testing  Pending for discharge: None from neurology    SUBJECTIVE     Reason for Consult / Principal Problem: Seizure  Hx and PE limited by: None    HPI: Raysa Booker is a 61 y o   female with hx of traumatic [address] 2021, epilepsy, pancytopenia, unspecified eating disorder, alcohol abuse who presents with breakthrough seizures  Throwing up on toilet around 19:40  Patient had blank stare and started to have seizure which consisted right lip biting, right arm shaking, and bilateral leg weakness  Tonic clonic shaking  Hx of alcohol abuse  Pt w/ [facility]  Keto diet and low carb diet  Patient on lacosamide 200mg BID compliant  Gabapentin 300mg BID  Patient sees Dr Torsten Mac outpatient  When patient seen this morning, patient was with spouse who states patient is back to normal  Patient does not remember the events yesterday besides vaguely her spouse commenting about a seizures  Spouse states he saw her having right lip biting, right arm shaking as well as bilateral lower extremity weakness  This lasted for 10-15 minutes  Afterwards, patient was responsive but not appropriately according to spouse  Patient then became back to alert and oriented by at least this morning  Patient did say she drinks alcohol and she drunk it recently yesterday, which she states she drink a 1/3 of bottle of \"white alcohol\" which she then stated later on as a glass of wine  Patient on average states she drink a 1/3 of bottle of alcohol a week  Patient has been following with Epilepsy [facility]  Semiology of seizures are of hand clenching and dropping  Patient has been on increasing levels of vimpat and currently on vimpat 200mg BID   Patient has been compliant on " this medication  Patient previously admitted 3/2023 for seizures as well and patient suspected of having seizures in setting of drinking alcohol vs vimpat noncompliance  Noted on that admission that patient was not a good historian  Inpatient consult to Neurology  Consult performed by: Emily Irwin MD  Consult ordered by: Sindi Owens MD          Historical Information   History reviewed  No pertinent past medical history  History reviewed  No pertinent surgical history  Social History   Social History     Substance and Sexual Activity   Alcohol Use Yes     Social History     Substance and Sexual Activity   Drug Use Never     E-Cigarette/Vaping   • E-Cigarette Use Never User      E-Cigarette/Vaping Substances     Social History     Tobacco Use   Smoking Status Never   Smokeless Tobacco Never     Family History: History reviewed  No pertinent family history  Meds/Allergies   current meds:   Current Facility-Administered Medications   Medication Dose Route Frequency   • acetaminophen (TYLENOL) oral suspension 650 mg  650 mg Oral Q4H PRN   • diazepam (VALIUM) injection 5 mg  5 mg Intravenous PRN   • docusate sodium (COLACE) capsule 100 mg  100 mg Oral BID PRN   • folic acid (FOLVITE) tablet 1 mg  1 mg Oral Daily   • gabapentin (NEURONTIN) capsule 300 mg  300 mg Oral BID   • hydrOXYzine HCL (ATARAX) tablet 25 mg  25 mg Oral BID   • lacosamide (VIMPAT) tablet 200 mg  200 mg Oral Q12H DESTIN   • metoprolol succinate (TOPROL-XL) 24 hr tablet 25 mg  25 mg Oral BID   • multivitamin-minerals (CENTRUM) tablet 1 tablet  1 tablet Oral Daily   • ondansetron (ZOFRAN) injection 4 mg  4 mg Intravenous Q6H PRN   • thiamine tablet 100 mg  100 mg Oral Daily   • traZODone (DESYREL) tablet 50 mg  50 mg Oral HS    and PTA meds:   None     No Known Allergies    Review of previous medical records was  completed  Review of Systems   Constitutional: Negative for fatigue  HENT: Positive for mouth sores (RIght lip bite)  "   Eyes: Negative for redness  Respiratory: Negative for shortness of breath  Cardiovascular: Negative for leg swelling  Gastrointestinal: Negative for constipation  Endocrine: Negative for polyuria  Genitourinary: Negative for genital sores  Musculoskeletal: Negative for joint swelling  Skin: Negative for rash  Neurological: Positive for seizures  Negative for speech difficulty and weakness  Psychiatric/Behavioral: Negative for behavioral problems  OBJECTIVE     Patient ID: Michelle Christine is a 61 y o  female  Vitals:   Vitals:    23 0924 23 1149 23 1402 23 1643   BP: 112/62 129/74 112/75    BP Location: Right arm      Pulse: 89 76 74    Resp: 16 14 14    Temp: 98 5 °F (36 9 °C) 99 6 °F (37 6 °C) 99 6 °F (37 6 °C)    TempSrc: Oral      SpO2: 97% 95% 96%    Weight:       Height:    5' 4\" (1 626 m)      Body mass index is 22 44 kg/m²  Intake/Output Summary (Last 24 hours) at 2023 1739  Last data filed at 2023 2131  Gross per 24 hour   Intake 250 ml   Output --   Net 250 ml       Temperature:   Temp (24hrs), Av 6 °F (37 °C), Min:96 8 °F (36 °C), Max:99 6 °F (37 6 °C)    Temperature: 99 6 °F (37 6 °C)    Invasive Devices: Invasive Devices     Peripheral Intravenous Line  Duration           Peripheral IV 23 Distal;Right;Ventral (anterior) Forearm 1 day    Peripheral IV 23 Proximal;Right;Ventral (anterior) Forearm <1 day                Physical Exam  Eyes:      Extraocular Movements: EOM normal       Pupils: Pupils are equal, round, and reactive to light  Neurological:      Mental Status: She is oriented to person, place, and time  Motor: Motor strength is normal       Gait: Gait is intact  Deep Tendon Reflexes:      Reflex Scores:       Bicep reflexes are 2+ on the right side and 2+ on the left side  Brachioradialis reflexes are 2+ on the right side and 2+ on the left side         Patellar reflexes are 2+ on the right side " and 2+ on the left side  Achilles reflexes are 1+ on the right side and 1+ on the left side  Psychiatric:         Speech: Speech normal           Neurologic Exam     Mental Status   Oriented to person, place, and time  Oriented to person  Oriented to place  Oriented to year  Attention: normal  Concentration: normal    Speech: speech is normal   Level of consciousness: alert    Cranial Nerves     CN II   Visual fields full to confrontation  CN III, IV, VI   Pupils are equal, round, and reactive to light  Extraocular motions are normal      CN V   Facial sensation intact  CN VII   Facial expression full, symmetric  CN XI   CN XI normal      CN XII   CN XII normal      Motor Exam   Muscle bulk: normal  Overall muscle tone: normal    Strength   Strength 5/5 throughout  Sensory Exam   Light touch normal      Gait, Coordination, and Reflexes     Gait  Gait: normal    Reflexes   Right brachioradialis: 2+  Left brachioradialis: 2+  Right biceps: 2+  Left biceps: 2+  Right patellar: 2+  Left patellar: 2+  Right achilles: 1+  Left achilles: 1+       LABORATORY DATA     Labs: I have personally reviewed pertinent reports      Results from last 7 days   Lab Units 05/12/23 0457 05/12/23 0126 05/11/23 2055   WBC Thousand/uL 5 21  --  2 74*   HEMOGLOBIN g/dL 10 6*  --  12 5   HEMATOCRIT % 32 5*  --  38 7   PLATELETS Thousands/uL 56* 57* 48*   NEUTROS PCT % 52  --   --    MONOS PCT % 7  --   --    MONO PCT %  --   --  4      Results from last 7 days   Lab Units 05/12/23 0457 05/12/23 0126 05/11/23 2055   POTASSIUM mmol/L 3 8 4 2 4 1   CHLORIDE mmol/L 105 103 101   CO2 mmol/L 25 26 27   BUN mg/dL 15 16 17   CREATININE mg/dL 0 61 0 71 0 70   CALCIUM mg/dL 8 2* 8 3* 8 9   ALK PHOS U/L 158* 182* 202*   ALT U/L 40 45 53*   AST U/L 42* 50* 67*     Results from last 7 days   Lab Units 05/12/23 0457 05/12/23 0126 05/11/23 2055   MAGNESIUM mg/dL 1 7* 1 7* 1 8*          Results from last 7 days   Lab Units 05/12/23  0126   INR  1 11   PTT seconds 26     Results from last 7 days   Lab Units 05/11/23  2055   LACTIC ACID mmol/L 1 2           IMAGING & DIAGNOSTIC TESTING     Radiology Results: I have personally reviewed pertinent films in PACS  CT head wo contrast   Final Result by Magalys Dodson DO (05/11 5487)      No acute intracranial abnormality  Workstation performed: [id number]         XR chest 1 view portable   Final Result by Lea Li MD (05/12 1322)      Indeterminate left retrocardiac opacity can represent pneumonia  Two-view radiograph could be considered for further assessment  Workstation performed: [id number]             Other Diagnostic Testing: I have personally reviewed pertinent reports        ACTIVE MEDICATIONS     Current Facility-Administered Medications   Medication Dose Route Frequency   • acetaminophen (TYLENOL) oral suspension 650 mg  650 mg Oral Q4H PRN   • diazepam (VALIUM) injection 5 mg  5 mg Intravenous PRN   • docusate sodium (COLACE) capsule 100 mg  100 mg Oral BID PRN   • folic acid (FOLVITE) tablet 1 mg  1 mg Oral Daily   • gabapentin (NEURONTIN) capsule 300 mg  300 mg Oral BID   • hydrOXYzine HCL (ATARAX) tablet 25 mg  25 mg Oral BID   • lacosamide (VIMPAT) tablet 200 mg  200 mg Oral Q12H DESTIN   • metoprolol succinate (TOPROL-XL) 24 hr tablet 25 mg  25 mg Oral BID   • multivitamin-minerals (CENTRUM) tablet 1 tablet  1 tablet Oral Daily   • ondansetron (ZOFRAN) injection 4 mg  4 mg Intravenous Q6H PRN   • thiamine tablet 100 mg  100 mg Oral Daily   • traZODone (DESYREL) tablet 50 mg  50 mg Oral HS       Prior to Admission medications    Not on File         CODE STATUS & ADVANCED DIRECTIVES     Code Status: Level 1 - Full Code  Advance Directive and Living Will:      Power of :    POLST:        VTE Pharmacologic Prophylaxis: Per primary  VTE Mechanical Prophylaxis: sequential compression device    ==  Tiara Barboza MD   [facility] Neurology Residency, PGY-3
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
## 2023-05-12 NOTE — UTILIZATION REVIEW
Initial Clinical Review    Admission: Date/Time/Statement:   Admission Orders (From admission, onward)     Ordered        05/11/23 2328  INPATIENT ADMISSION  Once                      Orders Placed This Encounter   Procedures   • INPATIENT ADMISSION     Standing Status:   Standing     Number of Occurrences:   1     Order Specific Question:   Level of Care     Answer:   Level 2 Stepdown / HOT [14]     Order Specific Question:   Estimated length of stay     Answer:   More than 2 Midnights     Order Specific Question:   Certification     Answer:   I certify that inpatient services are medically necessary for this patient for a duration of greater than two midnights  See H&P and MD Progress Notes for additional information about the patient's course of treatment  ED Arrival Information     Expected   -    Arrival   5/11/2023 20:41    Acuity   Emergent            Means of arrival   Walk-In    Escorted by   [facility]    Service   Hospitalist    Admission type   Emergency            Arrival complaint   -           Chief Complaint   Patient presents with   • Seizure - Prior Hx Of     Pt comes in via EMS from home for seziures  She had a seizure before EMS arrived  Ems gave 1mg of ativan  IV she then had another seizure  An additional 1mg of ativan IV was given  Pt was then apneic and was being bagged  Ems tried to place an oral airway but she bite him  Last ativan was given at 2013  Initial Presentation: 61 y o  female with a PMH of epilepsy, pancytopenia, unspecified eating disorder, alcohol abuse who presents with breakthrough seizure   noted pt on toilet throwing up around 19:40  Pt had blank stare then started to have seizure  Pt was biting tongue  placed wet rag in mouth  Pt was glaring to right   noted sxs were progressive resulting in tonic clonic shaking of the upper arms similar to other episodes  Pt  notes pt had seizures about once a month   Pt  unsure about compliance   notes when pt misses meds takes about 1 to 2 hours for seziure onset   not sure of continence since she was on toilet  Plan: Inpatient admission for evaluation and treatment of seizure, alcohol abuse, eating disorder, pancytopenia: blood cultures, diazepam prn, EEG, Neurology consult, telemetry, CIWA protocol, neuro checks q 4 hrs, seizure precautions, Psych consult, continue home anti siezure meds, no blood transfusions as pt is [employer] Witness  Date: 5/12   Day 2:     Neurology consult: Continue vimpat 200mg BID and gabapentin 300mg BID, would not adjust medications as patient most likely had a provoked seizure in setting of continued alcohol use  Recommended to patient and spouse to wean off alcohol or patient would have recurrent seizures; patient and spouse is agreeable  Behavioral Health consult: Patient denying any symptoms of restrictive eating behaviors to include binging and purging or other compensatory mechanisms consistent with eating disorders  Patient also endorses very limited alcohol use but she could be minimizing  Patient overall endorse any significant anxiety not currently on a maintenance medication as such recommend starting Pristiq 50 mg every morning Patient without any indication for voluntary or involuntary IP psychiatric admission  Recommend outpatient psych follow up  Internal medicine: Continue Vimpat and gabapentin  Replace magnesium  CIWA protocol  Monitor CBC  Appreciate recommendation of starting Pristiq 50 mg every morning  We will discuss this with the patient tomorrow      ED Triage Vitals   Temperature Pulse Respirations Blood Pressure SpO2   05/11/23 2100 05/11/23 2044 05/11/23 2044 05/11/23 2044 05/11/23 2044   (!) 96 8 °F (36 °C) (!) 116 14 141/78 92 %      Temp Source Heart Rate Source Patient Position - Orthostatic VS BP Location FiO2 (%)   05/11/23 2100 05/11/23 2044 05/11/23 2044 05/11/23 2044 --   Axillary Monitor Sitting Right arm       Pain Score       05/11/23 2136       Med Not Given for Pain - for MAR use only          Wt Readings from Last 1 Encounters:   05/11/23 59 3 kg (130 lb 11 7 oz)     Additional Vital Signs:     Date/Time Temp Pulse Resp BP MAP (mmHg) SpO2 O2 Device   05/12/23 11:49:43 99 6 °F (37 6 °C) 76 14 129/74 92 95 % None (Room air)   05/12/23 0924 98 5 °F (36 9 °C) 89 16 112/62 -- 97 % None (Room air)   05/12/23 09:07:37 98 5 °F (36 9 °C) 93 16 112/61 78 97 % None (Room air)   05/12/23 06:34:31 98 8 °F (37 1 °C) 75 14 96/56 69 97 % --   05/12/23 03:25:49 99 5 °F (37 5 °C) 73 -- 104/57 73 98 % --   05/12/23 00:30:09 98 9 °F (37 2 °C) 82 -- 126/71 89 98 % --   05/11/23 2330 -- 74 14 104/53 73 96 % Non-rebreather mask   05/11/23 2300 -- 76 27 Abnormal  130/64 90 99 % Non-rebreather mask   05/11/23 2215 -- 73 27 Abnormal  119/62 83 100 % None (Room air)   05/11/23 2200 -- 73 19 112/62 83 100 % Non-rebreather mask   05/11/23 2145 -- 79 27 Abnormal  119/66 86 100 % Non-rebreather mask   05/11/23 2139 97 1 °F (36 2 °C) Abnormal  -- -- -- -- -- --   05/11/23 2115 -- 96 16 126/69 88 100 % --   05/11/23 2109 -- 101 16 143/66 -- 100 % Non-rebreather mask   05/11/23 2100 96 8 °F (36 °C) Abnormal  -- -- -- -- -- --   05/11/23 2047 -- -- -- -- -- 98 % Non-rebreather mask     Pertinent Labs/Diagnostic Test Results:   CT head wo contrast   Final Result by Tamar Harrell DO (05/11 7930)      No acute intracranial abnormality  Workstation performed: [id number]         XR chest 1 view portable   Final Result by Kalyani Shore MD (05/12 8462)      Indeterminate left retrocardiac opacity can represent pneumonia  Two-view radiograph could be considered for further assessment  Workstation performed: [id number]           5/12 EEG:  Interpretation: This is an abnormal 32 minutes awake, drowsy, and asleep EEG due to intermittent left hemispheric delta activity    This finding indicates left hemispheric cerebral dysfunction  Excessive beta activity is seen in the presence of CNS activating agents, such as benzodiazepines and barbiturates         Results from last 7 days   Lab Units 05/12/23 0457 05/12/23 0126 05/11/23 2055   WBC Thousand/uL 5 21  --  2 74*   HEMOGLOBIN g/dL 10 6*  --  12 5   HEMATOCRIT % 32 5*  --  38 7   PLATELETS Thousands/uL 56* 57* 48*   NEUTROS ABS Thousands/µL 2 71  --   --    BANDS PCT %  --   --  3         Results from last 7 days   Lab Units 05/12/23 0457 05/12/23 0126 05/11/23 2055   SODIUM mmol/L 136 136 136   POTASSIUM mmol/L 3 8 4 2 4 1   CHLORIDE mmol/L 105 103 101   CO2 mmol/L 25 26 27   ANION GAP mmol/L 6 7 8   BUN mg/dL 15 16 17   CREATININE mg/dL 0 61 0 71 0 70   EGFR ml/min/1 73sq m 96 90 92   CALCIUM mg/dL 8 2* 8 3* 8 9   MAGNESIUM mg/dL 1 7* 1 7* 1 8*     Results from last 7 days   Lab Units 05/12/23 0457 05/12/23 0126 05/11/23 2055   AST U/L 42* 50* 67*   ALT U/L 40 45 53*   ALK PHOS U/L 158* 182* 202*   TOTAL PROTEIN g/dL 6 0* 6 5 7 2   ALBUMIN g/dL 3 4* 3 6 4 0   TOTAL BILIRUBIN mg/dL 0 48 0 40 0 31     Results from last 7 days   Lab Units 05/11/23 2046   POC GLUCOSE mg/dl 155*     Results from last 7 days   Lab Units 05/12/23 0457 05/12/23 0126 05/11/23 2055   GLUCOSE RANDOM mg/dL 97 105 160*           Results from last 7 days   Lab Units 05/11/23 2105   CK TOTAL U/L 43             Results from last 7 days   Lab Units 05/12/23 0126   PROTIME seconds 14 5   INR  1 11   PTT seconds 26             Results from last 7 days   Lab Units 05/11/23 2055   LACTIC ACID mmol/L 1 2     Results from last 7 days   Lab Units 05/11/23 2105   PROLACTIN ng/mL 74 6           Results from last 7 days   Lab Units 05/11/23 2055   LIPASE u/L 20           Results from last 7 days   Lab Units 05/11/23 2105   ETHANOL LVL mg/dL <10   ACETAMINOPHEN LVL ug/mL <63*   SALICYLATE LVL mg/dL <5                 Results from last 7 days   Lab Units 05/12/23  0126   BLOOD CULTURE  Received in Microbiology Lab  Culture in Progress  Received in Microbiology Lab  Culture in Progress  ED Treatment:   Medication Administration from 05/11/2023 2041 to 05/12/2023 0025       Date/Time Order Dose Route Action     05/11/2023 2048 EDT LORazepam (FOR EMS ONLY) (ATIVAN) 2 mg/mL injection 2 mg 0 mg Does not apply Given to EMS     05/11/2023 2113 EDT levETIRAcetam (KEPPRA) 3,000 mg in sodium chloride 0 9 % 250 mL IVPB 3,000 mg Intravenous New Bag     05/11/2023 2136 EDT acetaminophen (TYLENOL) rectal suppository 650 mg 650 mg Rectal Given        History reviewed  No pertinent past medical history  Present on Admission:  **None**      Admitting Diagnosis: Alcohol abuse [F10 10]  Seizure ([address] ) [R56 9]  Eating disorder [F50 9]  Acute respiratory failure with hypoxia (HCC) [J96 01]  Breakthrough seizure ([facility] [address] ) [G40 919]  Age/Sex: 61 y o  female  Admission Orders:  Scheduled Medications:  folic acid, 1 mg, Oral, Daily  gabapentin, 300 mg, Oral, BID  hydrOXYzine HCL, 25 mg, Oral, BID  lacosamide, 200 mg, Oral, Q12H [facility]  metoprolol succinate, 25 mg, Oral, BID  multivitamin-minerals, 1 tablet, Oral, Daily  thiamine, 100 mg, Oral, Daily  traZODone, 50 mg, Oral, HS      Continuous IV Infusions:     PRN Meds:  acetaminophen, 650 mg, Oral, Q4H PRN  diazepam, 5 mg, Intravenous, PRN  docusate sodium, 100 mg, Oral, BID PRN  ondansetron, 4 mg, Intravenous, Q6H PRN        IP CONSULT TO NEUROLOGY  IP CONSULT TO PSYCHIATRY    Network Utilization Review Department  ATTENTION: Please call with any questions or concerns to [phone] and carefully listen to the prompts so that you are directed to the right person  All voicemails are confidential   [place] all requests for admission clinical reviews, approved or denied determinations and any other requests to dedicated fax number below belonging to the campus where the patient is receiving treatment   List of dedicated fax numbers for the Facilities:  [place]elena Rm NUMBER   ADMISSION DENIALS (Administrative/Medical Necessity) [phone]   [address] (Maternity/NICU/Pediatrics) Leti Weeks [address] [place]ha Flores  [phone]   [address] [place] [address] Alise [facility] [address] [facility] [address] [phone]

## 2023-05-12 NOTE — ASSESSMENT & PLAN NOTE
Pt w/ hx of uncontrolled seizures:  notes about once a month  Etiology likely 2/2 alcohol use  Neurology consulted, not recommending any changes to medications  EEG did not show any seizure activity     · Continue home medications  · F/u with Dr Raul Munoz  · F/u PCP 1 week  · Magnesium supplement Yes

## 2023-05-12 NOTE — ASSESSMENT & PLAN NOTE
Pt  noted history of alcohol abuse  Still drinks one beer occasionally  Pt  noted there is potential for her drinking without his knowledge    PLAN:  Alcohol cessation   CM provided resources

## 2023-05-12 NOTE — ED PROVIDER NOTES
History  Chief Complaint   Patient presents with   • Seizure - Prior Hx Of     Pt comes in via EMS from home for seziures  She had a seizure before EMS arrived  Ems gave 1mg of ativan  IV she then had another seizure  An additional 1mg of ativan IV was given  Pt was then apneic and was being bagged  Ems tried to place an oral airway but she bite him  Last ativan was given at 213      70-year-old female with past medical history of epilepsy on Vimpat, pancytopenia, and frequent falls presents today following 3 witnessed breakthrough seizures, one at home and two by EMS  Patient arrived via EMS who reported witnessed upper extremity generalized tonic clonic shaking  EMS gave 1 mg of Ativan however patient continued to seize  EMS gave another 1 mg of Ativan with cessation of the seizure  Last Ativan given at 20:13, which was approximately 30 minutes prior to presentation  After the second dose of Ativan, patient did require BVM briefly  On arrival to ED, patient is localizing pain and protecting her airway at this time  Patient does remain in close airway watch at this time   reports that patient has been fatigued and more tired over the past few days   reports that patient did have multiple episodes of vomiting today   believes she is compliant with her seizure medications, however it is unclear how many times she has vomited today   reports that patient was walking to the bathroom when her eyes rolled to the side and she developed right upper extremity shaking, which she states is consistent with her prior seizure-like activity  At this time EMS was called  Patient had a similar presentation two months ago where she was hospitalized for breakthrough seizures  At that time, she presented to ED with right focal seizure, received 3 mg IV Ativan with loading dose of Keppra with seizure abatement    Per prior chart review, it was suspected that breakthrough seizures are due to noncompliance with medication vs alcohol use  Patient does have bruising noted to her right inferior orbital region   states that patient fell several days ago resulting in bruising in that area   reports that patient has a history of frequent falls  No other external signs of trauma on exam        Seizure - Prior Hx Of      None       History reviewed  No pertinent past medical history  History reviewed  No pertinent surgical history  History reviewed  No pertinent family history  I have reviewed and agree with the history as documented  E-Cigarette/Vaping   • E-Cigarette Use Never User      E-Cigarette/Vaping Substances     Social History     Tobacco Use   • Smoking status: Never   • Smokeless tobacco: Never   Vaping Use   • Vaping Use: Never used   Substance Use Topics   • Alcohol use: Yes   • Drug use: Never        Review of Systems   Unable to perform ROS: Mental status change   Constitutional: Positive for fatigue  Respiratory: Positive for apnea  Gastrointestinal: Positive for nausea and vomiting  Skin: Positive for wound  Neurological: Positive for seizures and weakness  Physical Exam  ED Triage Vitals   Temperature Pulse Respirations Blood Pressure SpO2   05/11/23 2100 05/11/23 2044 05/11/23 2044 05/11/23 2044 05/11/23 2044   (!) 96 8 °F (36 °C) (!) 116 14 141/78 92 %      Temp Source Heart Rate Source Patient Position - Orthostatic VS BP Location FiO2 (%)   05/11/23 2100 05/11/23 2044 05/11/23 2044 05/11/23 2044 --   Axillary Monitor Sitting Right arm       Pain Score       05/11/23 2136       Med Not Given for Pain - for MAR use only             Orthostatic Vital Signs  Vitals:    05/13/23 0658 05/13/23 0809 05/13/23 0811 05/13/23 0957   BP: 100/66 113/63 113/63 106/62   Pulse:  68 64 75   Patient Position - Orthostatic VS:           Physical Exam  Vitals and nursing note reviewed  Constitutional:       General: She is not in acute distress       Appearance: She is well-developed  She is ill-appearing  HENT:      Head: Normocephalic  Comments: Old bruising noted to right inferior orbital region   Eyes:      Conjunctiva/sclera: Conjunctivae normal       Pupils: Pupils are equal, round, and reactive to light  Cardiovascular:      Rate and Rhythm: Normal rate and regular rhythm  Heart sounds: No murmur heard  Pulmonary:      Effort: Pulmonary effort is normal  No respiratory distress  Breath sounds: Normal breath sounds  Abdominal:      General: Abdomen is flat  Palpations: Abdomen is soft  Tenderness: There is no abdominal tenderness  Musculoskeletal:         General: No swelling  Cervical back: Neck supple  Skin:     General: Skin is warm and dry  Capillary Refill: Capillary refill takes less than 2 seconds  Neurological:      Mental Status: She is lethargic and confused  GCS: GCS eye subscore is 3  GCS verbal subscore is 1  GCS motor subscore is 5        Comments: Moving extremities, will open eyes to command, snoring on initial assessment, somnolent with no verbal response, post-ictal    Psychiatric:         Mood and Affect: Mood normal          ED Medications  Medications   docusate sodium (COLACE) capsule 100 mg (has no administration in time range)   ondansetron (ZOFRAN) injection 4 mg (has no administration in time range)   thiamine tablet 100 mg (100 mg Oral Given 9/43/82 9671)   folic acid (FOLVITE) tablet 1 mg (1 mg Oral Given 5/13/23 0812)   multivitamin-minerals (CENTRUM) tablet 1 tablet (1 tablet Oral Given 5/13/23 0811)   acetaminophen (TYLENOL) oral suspension 650 mg (has no administration in time range)   diazepam (VALIUM) injection 5 mg (has no administration in time range)   lacosamide (VIMPAT) tablet 200 mg (200 mg Oral Given 5/13/23 0811)   gabapentin (NEURONTIN) capsule 300 mg (300 mg Oral Given 5/13/23 0812)   hydrOXYzine HCL (ATARAX) tablet 25 mg (25 mg Oral Given 5/13/23 0812)   traZODone ([facility]) tablet 50 mg (50 mg Oral Given 5/12/23 2157)   metoprolol succinate (TOPROL-XL) 24 hr tablet 25 mg (25 mg Oral Given 5/13/23 0809)   desvenlafaxine succinate (PRISTIQ) 24 hr tablet 50 mg (50 mg Oral Given 5/13/23 0812)   LORazepam (FOR EMS ONLY) (ATIVAN) 2 mg/mL injection 2 mg (0 mg Does not apply Given to EMS 5/11/23 2048)   levETIRAcetam (KEPPRA) 3,000 mg in sodium chloride 0 9 % 250 mL IVPB (0 mg Intravenous Stopped 5/11/23 2131)   acetaminophen (TYLENOL) rectal suppository 650 mg (650 mg Rectal Given 5/11/23 2136)   magnesium sulfate 2 g/50 mL IVPB (premix) 2 g (2 g Intravenous New Bag 5/12/23 0904)   magnesium sulfate 2 g/50 mL IVPB (premix) 2 g (2 g Intravenous New Bag 5/13/23 0754)       Diagnostic Studies  Results Reviewed     Procedure Component Value Units Date/Time    Blood culture [[id number]] Collected: 05/12/23 0126    Lab Status: Preliminary result Specimen: Blood from Arm, Left Updated: 05/13/23 1001     Blood Culture No Growth at 24 hrs  Blood culture [[id number]] Collected: 05/12/23 0126    Lab Status: Preliminary result Specimen: Blood from Arm, Right Updated: 05/13/23 1001     Blood Culture No Growth at 24 hrs  Prolactin [[id number]]  (Normal) Collected: 05/11/23 2105    Lab Status: Final result Specimen: Blood from Arm, Right Updated: 05/12/23 0522     Prolactin 74 6 ng/mL     CK [[id number]]  (Normal) Collected: 05/11/23 2105    Lab Status: Final result Specimen: Blood from Arm, Right Updated: 05/12/23 0053     Total CK 43 U/L     Salicylate level [[id number]]  (Normal) Collected: 05/11/23 2105    Lab Status: Final result Specimen: Blood from Arm, Right Updated: 17/64/73 3080     Salicylate Lvl <5 mg/dL     Acetaminophen level-If concentration is detectable, please discuss with medical  on call   [[id number]]  (Abnormal) Collected: 05/11/23 2105    Lab Status: Final result Specimen: Blood from Arm, Right Updated: 05/11/23 2142     Acetaminophen Level <10 ug/mL     Comprehensive metabolic panel [[id number]]  (Abnormal) Collected: 05/11/23 2055    Lab Status: Final result Specimen: Blood from Arm, Right Updated: 05/11/23 2141     Sodium 136 mmol/L      Potassium 4 1 mmol/L      Chloride 101 mmol/L      CO2 27 mmol/L      ANION GAP 8 mmol/L      BUN 17 mg/dL      Creatinine 0 70 mg/dL      Glucose 160 mg/dL      Calcium 8 9 mg/dL      AST 67 U/L      ALT 53 U/L      Alkaline Phosphatase 202 U/L      Total Protein 7 2 g/dL      Albumin 4 0 g/dL      Total Bilirubin 0 31 mg/dL      eGFR 92 ml/min/1 73sq m     Narrative:      [place] guidelines for Chronic Kidney Disease (CKD):   •  Stage 1 with normal or high GFR (GFR > 90 mL/min/1 73 square meters)  •  Stage 2 Mild CKD (GFR = 60-89 mL/min/1 73 square meters)  •  Stage 3A Moderate CKD (GFR = 45-59 mL/min/1 73 square meters)  •  Stage 3B Moderate CKD (GFR = 30-44 mL/min/1 73 square meters)  •  Stage 4 Severe CKD (GFR = 15-29 mL/min/1 73 square meters)  •  Stage 5 End Stage CKD (GFR <15 mL/min/1 73 square meters)  Note: GFR calculation is accurate only with a steady state creatinine    Magnesium [[id number]]  (Abnormal) Collected: 05/11/23 2055    Lab Status: Final result Specimen: Blood from Arm, Right Updated: 05/11/23 2141     Magnesium 1 8 mg/dL     Lipase [[id number]]  (Normal) Collected: 05/11/23 2055    Lab Status: Final result Specimen: Blood from Arm, Right Updated: 05/11/23 2141     Lipase 20 u/L     Ethanol [[id number]]  (Normal) Collected: 05/11/23 2105    Lab Status: Final result Specimen: Blood from Arm, Right Updated: 05/11/23 2139     Ethanol Lvl <10 mg/dL     Lactic acid, plasma (w/reflex if result > 2 0) [[id number]]  (Normal) Collected: 05/11/23 2055    Lab Status: Final result Specimen: Blood from Arm, Right Updated: 05/11/23 2139     LACTIC ACID 1 2 mmol/L     Narrative:      Result may be elevated if tourniquet was used during collection      CBC and differential [[id number]]  (Abnormal) Collected: 05/11/23 2055    Lab Status: Final result Specimen: Blood from Arm, Right Updated: 05/11/23 2136     WBC 2 74 Thousand/uL      RBC 4 00 Million/uL      Hemoglobin 12 5 g/dL      Hematocrit 38 7 %      MCV 97 fL      MCH 31 3 pg      MCHC 32 3 g/dL      RDW 13 7 %      MPV 9 9 fL      Platelets 48 Thousands/uL     Manual Differential(PHLEBS Do Not Order) [[id number]]  (Abnormal) Collected: 05/11/23 2055    Lab Status: Final result Specimen: Blood from Arm, Right Updated: 05/11/23 2134     Segmented % 42 %      Bands % 3 %      Lymphocytes % 41 %      Monocytes % 4 %      Eosinophils, % 6 %      Basophils % 0 %      Atypical Lymphocytes % 4 %      Absolute Neutrophils 1 23 Thousand/uL      Lymphocytes Absolute 1 12 Thousand/uL      Monocytes Absolute 0 11 Thousand/uL      Eosinophils Absolute 0 16 Thousand/uL      Basophils Absolute 0 00 Thousand/uL      Total Counted --     RBC Morphology Present     Rouleaux Present     Platelet Estimate Decreased    Levetiracetam level [[id number]] Collected: 05/11/23 2055    Lab Status: In process Specimen: Blood from Arm, Right Updated: 05/11/23 2102    Fingerstick Glucose (POCT) [[id number]]  (Abnormal) Collected: 05/11/23 2046    Lab Status: Final result Updated: 05/11/23 2054     POC Glucose 155 mg/dl                  CT head wo contrast   Final Result by Ed Badillo DO (05/11 2334)      No acute intracranial abnormality  Workstation performed: [id number]         XR chest 1 view portable   Final Result by Paula Farmer MD (05/12 1322)      Indeterminate left retrocardiac opacity can represent pneumonia  Two-view radiograph could be considered for further assessment                    Workstation performed: [id number]               Procedures  Procedures      ED Course                                       Medical Decision Making  59-year-old female with past medical history of epilepsy on Vimpat, pancytopenia, and frequent falls presents today with 3 witnessed breakthrough seizures  DDX including but not limited to: seizure disorder, breakthrough seizure, epilepsy, pseudoseizure, metabolic abnormality, cardiac etiology, syncope, tumor, ICH, other intracranial process, substance abuse, intoxication, overdose, withdrawal   Patient was somnolent and post-ictal on arrival but was protecting her airway  Placed on NRB on arrival   No repeated seizure-like activity while in the ED  Patient received a total of 2 mg Ativan by EMS  CT head was negative for acute intracranial hemorrhage or acute tumor/mass  Labs grossly normal   Patient was transitioned to nasal cannula  Patient becoming more alert, still disoriented, confused and post-ictal but having verbal responses and following commands  Case was discussed with critical care and SLIM  Patient stable for admission to step down 2 unit for closer airway monitoring  Amount and/or Complexity of Data Reviewed  Labs: ordered  Radiology: ordered  Risk  OTC drugs  Prescription drug management  Decision regarding hospitalization              Disposition  Final diagnoses:   Breakthrough seizure ([facility] [address] )   Acute respiratory failure with hypoxia ([facility] [address] )     Time reflects when diagnosis was documented in both MDM as applicable and the Disposition within this note     Time User Action Codes Description Comment    5/11/2023 11:27 PM Noralyn Daughters Add [G40 919] Breakthrough seizure ([facility] [address] )     5/11/2023 11:27 PM Noralyn Daughters Add [J96 01] Acute respiratory failure with hypoxia ([facility] [address] )     5/12/2023 12:06 AM Nestor Erasmo Add [R56 9] Seizure ([facility] [address] )     5/12/2023 12:07 AM Nestor Erasmo Add [F50 9] Eating disorder     5/12/2023 12:07 AM Kortney Freedman Add [F10 10] Alcohol abuse     5/13/2023 10:22 AM Yoseph Hench Add [F41 9] Anxiety     5/13/2023 10:22 AM Yoseph Hench Add [I10] HTN (hypertension)     5/13/2023 10:24 AM Yoseph Hench Add [E83 42] Hypomagnesemia       ED Disposition     ED Disposition   Admit    Condition   Stable    Date/Time   u May 11, 2023 11:27 PM    Comment   Case was discussed with SLIM and critical care and the patient's admission status was agreed to be Admission Status: inpatient status to the service of Dr Gordo Hein              Follow-up Information     Follow up With Specialties Details Why Contact Laly George MD Neurology Schedule an appointment as soon as possible for a visit in 2 week(s)  [address]  [address] [facility]  [phone]            Discharge Medication List as of 5/13/2023  2:22 PM      START taking these medications    Details   desvenlafaxine succinate (PRISTIQ) 50 mg 24 hr tablet Take 1 tablet (50 mg total) by mouth daily Do not start before May 14, 2023 , Starting Sun 4/18/1194, Normal      folic acid (FOLVITE) 1 mg tablet Take 1 tablet (1 mg total) by mouth daily Do not start before May 14, 2023 , Starting Sun 5/14/2023, Normal      gabapentin (NEURONTIN) 300 mg capsule Take 1 capsule (300 mg total) by mouth 2 (two) times a day, Starting Sat 5/13/2023, Normal      hydrOXYzine HCL (ATARAX) 25 mg tablet Take 1 tablet (25 mg total) by mouth 2 (two) times a day, Starting Sat 5/13/2023, Normal      lacosamide (VIMPAT) 200 mg tablet Take 1 tablet (200 mg total) by mouth every 12 (twelve) hours, Starting Sat 5/13/2023, Normal      magnesium gluconate (MAGONATE) 500 mg tablet Take 1 tablet (500 mg total) by mouth 2 (two) times a day, Starting Sat 5/13/2023, Normal      metoprolol succinate (TOPROL-XL) 25 mg 24 hr tablet Take 1 tablet (25 mg total) by mouth 2 (two) times a day, Starting Sat 5/13/2023, Normal      thiamine 100 MG tablet Take 1 tablet (100 mg total) by mouth daily Do not start before May 14, 2023 , Starting Sun 5/14/2023, Normal      traZODone (DESYREL) 50 mg tablet Take 1 tablet (50 mg total) by mouth daily at bedtime, Starting Sat 5/13/2023, Normal               PDMP Review       Value Time User    PDMP Reviewed  Yes 5/13/2023  1:58 PM Shanon Talley MD           ED Provider  Attending physically available and evaluated Barbi Cordova  I managed the patient along with the ED Attending      Electronically Signed by         Alida Li MD  05/13/23 9117

## 2023-05-12 NOTE — ASSESSMENT & PLAN NOTE
POA  Recent Labs     05/11/23 2055 05/12/23  0126 05/12/23  0457   PLT 48* 57* 56*       PLAN:  -NO BLOOD TRANSFUSIONS: Pt is jehovah witness  -Continue to monitor

## 2023-05-12 NOTE — ASSESSMENT & PLAN NOTE
Pt  noted history of unspecified eating disorder  Pt has recently been on keto and carb restriction diet     Unclear association with seizure    PLAN:  Psych consult

## 2023-05-12 NOTE — H&P
[facility]  H&P  Name: Radha Parra 61 y o  female I MRN: [id number]  Unit/Bed#: S -01 I Date of Admission: 5/11/2023   Date of Service: 5/12/2023 I Hospital Day: 1      Assessment/Plan   * Seizure [facility])  Assessment & Plan  Pt w/ hx of uncontrolled seizures:  notes about once a month  Unclear associating with alcohol  Unclear if pt is compliment with medication  Unclear association with eating disorder    • Current AEDs:   o Pt  brought in meds  o Tazadone 50mg every morning   o [id number] bid  o [id number] bid  o lacosamide 150mg bid (both current diff doctors  o hydroxizine 25mg Bid  o Gabapentin 300mg bid  • Current neurologist: with [facility]  unsure waiting for chart to merge    Results from last 7 days   Lab Units 05/11/23 2055   LACTIC ACID mmol/L 1 2       Lab Results   Component Value Date    AST 67 (H) 05/11/2023    ALT 53 (H) 05/11/2023    ALKPHOS 202 (H) 05/11/2023    TBILI 0 31 05/11/2023    LACTICACID 1 2 05/11/2023     Recent Labs     05/11/23  2046   POCGLU 155*         Imaging: CT head wo contrast [id number]: No acute intracranial abnormality         Plan:  • Blood culture x 2 to rule out infectious cause   o Monitor Fever/WBC curve and start antibiotics if fever present and concern for infectious etiology  • Utox, CK Pending  o Fluid hydration gentle 75ml/hr  • If seizure-like activity > 2 min:  o Diazepam 5 mg IV q 5 min PRN x 5 dose max,   o Keppra load completed in ED  o STAT BMP completed  • EEG, Neuro Consult  · SD2  · Tele  · CIWA protocol  · Neuro checks q4 hrs  · Seizure precautions  · BMP am to trend electrolytes  · Neuro consult  · Psych consult   · Continue home medications    Alcohol abuse  Assessment & Plan  Pt  noted history of alcohol abuse  Still drinks one beer occasionally  Pt  noted there is potential for her drinking without his knowledge    PLAN:  As above for seizure  CIWA      Eating "disorder  Assessment & Plan  Pt  noted history of unspecified eating disorder  Pt has recently been on keto and carb restriction diet  Unclear association with seizure    PLAN:  Psych consult      Pancytopenia ([address] )  Assessment & Plan  POA  Recent Labs     05/11/23 2055   PLT 48*       PLAN:  -NO BLOOD TRANSFUSIONS: Pt is jehovah witness  -Continue to monitor  -DVT prophylaxis held    VTE Pharmacologic Prophylaxis: VTE Score: 12 High Risk (Score >/= 5) - Pharmacological DVT Prophylaxis Contraindicated  Sequential Compression Devices Ordered  Code Status: No Order Code 1 no blood products  Discussion with family: Updated  () at bedside  Anticipated Length of Stay: Patient will be admitted on an inpatient basis with an anticipated length of stay of greater than 2 midnights secondary to Seizure  Chief Complaint: Seizure    History of Present Illness:  Wilfrido Dye is a 61 y o  female with a PMH of epilepsy, pancytopenia, unspecified eating disorder, alcohol abuse who presents with breakthrough seizure  Hx provided by    noted pt on toilet throwing up around 19:40  Pt had blank stare then started to have seizure  Pt was biting tongue  placed wet rag in mouth  Pt was glaring to right   noted sxs were progressive resulting in tonic clonic shaking of the upper arms similar to other episodes   Pt  notes pt had seizures about once a month  Pt  unsure about compliance   notes when pt misses meds takes about 1 to 2 hours for seziure onset   not sure of continence since she was on toilet   noted pt was sleeping a lot last few days  Pt has hx of alcohol abuse  On weekends pt has \"a beer\" per    doesn't know if she has drank in last few days but notes he \"wouldn't put it past her\" if she was sneaking alcohol   notes no drug abuse      Pt neurologist is desirae/ Rosanne Vega    Pt has been doing keto diet " and low carb diet   notes due pt wanting to be thin  Pt has a hx of eating disorder per   Pt  brought in meds  Tazadone 50mg every morning   [id number] bid  [id number] bid  lacosamide 150mg bid (both current diff doctors  hydroxizine 25mg Bid  Gabapentin 300mg bid     Review of Systems:  Review of Systems   Constitutional: Positive for fatigue  Eyes: Negative  Respiratory: Negative  Cardiovascular: Negative  Gastrointestinal: Positive for vomiting  Neurological: Positive for seizures  Past Medical and Surgical History:   History reviewed  No pertinent past medical history  History reviewed  No pertinent surgical history  Meds/Allergies:  Prior to Admission medications    Not on File     I have reviewed home medications with patient personally  Allergies: No Known Allergies    Social History:  Marital Status: /Civil Union   Occupation: not working  Patient Pre-hospital Living Situation: Home  Patient Pre-hospital Level of Mobility: walks  Patient Pre-hospital Diet Restrictions: None  Substance Use History:   Social History     Substance and Sexual Activity   Alcohol Use Yes     Social History     Tobacco Use   Smoking Status Never   Smokeless Tobacco Never     Social History     Substance and Sexual Activity   Drug Use Never       Family History:  History reviewed  No pertinent family history  Physical Exam:     Vitals:   Blood Pressure: 104/53 (05/11/23 2330)  Pulse: 74 (05/11/23 2330)  Temperature: (!) 97 1 °F (36 2 °C) (05/11/23 2139)  Temp Source: Rectal (05/11/23 2139)  Respirations: 14 (05/11/23 2330)  Weight - Scale: 59 3 kg (130 lb 11 7 oz) (05/11/23 2044)  SpO2: 96 % (05/11/23 2330)    Physical Exam  Vitals and nursing note reviewed  Constitutional:       Appearance: She is ill-appearing  HENT:      Head: Normocephalic        Comments: Right sided bruse temporal region     Nose: Nose normal    Eyes:      General: No scleral icterus  Conjunctiva/sclera: Conjunctivae normal    Cardiovascular:      Rate and Rhythm: Normal rate  Pulses: Normal pulses  Pulmonary:      Effort: Pulmonary effort is normal  No respiratory distress  Abdominal:      Tenderness: There is no abdominal tenderness  There is no guarding  Musculoskeletal:         General: No swelling  Cervical back: No rigidity  Lymphadenopathy:      Cervical: No cervical adenopathy  Skin:     Capillary Refill: Capillary refill takes less than 2 seconds  Coloration: Skin is not jaundiced  Neurological:      Mental Status: She is disoriented  Additional Data:     Lab Results:  Results from last 7 days   Lab Units 05/11/23 2055   WBC Thousand/uL 2 74*   HEMOGLOBIN g/dL 12 5   HEMATOCRIT % 38 7   PLATELETS Thousands/uL 48*   BANDS PCT % 3   LYMPHO PCT % 41   MONO PCT % 4   EOS PCT % 6     Results from last 7 days   Lab Units 05/11/23 2055   SODIUM mmol/L 136   POTASSIUM mmol/L 4 1   CHLORIDE mmol/L 101   CO2 mmol/L 27   BUN mg/dL 17   CREATININE mg/dL 0 70   ANION GAP mmol/L 8   CALCIUM mg/dL 8 9   ALBUMIN g/dL 4 0   TOTAL BILIRUBIN mg/dL 0 31   ALK PHOS U/L 202*   ALT U/L 53*   AST U/L 67*   GLUCOSE RANDOM mg/dL 160*         Results from last 7 days   Lab Units 05/11/23 2046   POC GLUCOSE mg/dl 155*         Results from last 7 days   Lab Units 05/11/23 2055   LACTIC ACID mmol/L 1 2       Lines/Drains:  Invasive Devices     Peripheral Intravenous Line  Duration           Peripheral IV 05/11/23 Distal;Right;Ventral (anterior) Forearm 1 day    Peripheral IV 05/11/23 Proximal;Right;Ventral (anterior) Forearm <1 day                    Imaging: Reviewed radiology reports from this admission including: CT head  CT head wo contrast   Final Result by Mervat Hampton DO (05/11 2334)      No acute intracranial abnormality                    Workstation performed: [id number]         XR chest 1 view portable    (Results Pending)       EKG and Other Studies Reviewed on Admission:   · EKG: No EKG obtained  ** Please Note: This note has been constructed using a voice recognition system   **

## 2023-05-12 NOTE — ASSESSMENT & PLAN NOTE
Pt  noted history of alcohol abuse  Still drinks one beer occasionally  Pt  noted there is potential for her drinking without his knowledge    PLAN:  As above for seizure  CIWA

## 2023-05-12 NOTE — ASSESSMENT & PLAN NOTE
Pt w/ hx of uncontrolled seizures:  notes about once a month  Unclear associating with alcohol  Unclear if pt is compliment with medication  Unclear association with eating disorder    • Current AEDs:   o Pt  brought in meds  o Tazadone 50mg every morning   o [id number] bid  o [id number] bid  o lacosamide 150mg bid (both current diff doctors  o hydroxizine 25mg Bid  o Gabapentin 300mg bid  • Current neurologist: with [facility]  unsure waiting for chart to merge    Results from last 7 days   Lab Units 05/11/23 2055   LACTIC ACID mmol/L 1 2       Lab Results   Component Value Date    AST 67 (H) 05/11/2023    ALT 53 (H) 05/11/2023    ALKPHOS 202 (H) 05/11/2023    TBILI 0 31 05/11/2023    LACTICACID 1 2 05/11/2023     Recent Labs     05/11/23 2046   POCGLU 155*         Imaging: CT head wo contrast 11MAY23: No acute intracranial abnormality         Plan:  • Blood culture x 2 to rule out infectious cause   o Monitor Fever/WBC curve and start antibiotics if fever present and concern for infectious etiology  • Utox, CK Pending  o Fluid hydration gentle 75ml/hr  • If seizure-like activity > 2 min:  o Diazepam 5 mg IV q 5 min PRN x 5 dose max,   o Keppra load completed in ED  o STAT BMP completed  • EEG, Neuro Consult  · SD2  · Tele  · CIWA protocol  · Neuro checks q4 hrs  · Seizure precautions  · BMP am to trend electrolytes  · Neuro consult  · Psych consult   · Continue home medications

## 2023-05-12 NOTE — ED ATTENDING ATTESTATION
5/11/2023  I, Balaji Villasenor MD, saw and evaluated the patient  I have discussed the patient with the resident/non-physician practitioner and agree with the resident's/non-physician practitioner's findings, Plan of Care, and MDM as documented in the resident's/non-physician practitioner's note, except where noted  All available labs and Radiology studies were reviewed  I was present for key portions of any procedure(s) performed by the resident/non-physician practitioner and I was immediately available to provide assistance  At this point I agree with the current assessment done in the Emergency Department  I have conducted an independent evaluation of this patient a history and physical is as follows: Patient presents for evaluation following 3 witnessed seizures  History of seizure disorder  EMS gave patient Ativan, resulting in somnolence  Immediate evaluation of the patient upon her arrival to the emergency department  Maintaining her airway  Appears postictal and somnolent status post benzodiazepine administration  Reviewed chart from previous visit, history of the same, patient loaded with Keppra  She was evaluated multiple times, shows clinical improvement, awake and alert  Reports of vomiting at home, unclear if she is able to tolerate her oral antiepileptic medications  Patient with no seizure activity in the emergency department, laboratory studies with no significant emergent abnormalities identified  CT head with no acute hemorrhage identified, no acute mass  Patient stable for admission to stepdown 2 under SLIM service  Results Reviewed     Procedure Component Value Units Date/Time    Prolactin [[id number]] Collected: 05/11/23 2105    Lab Status:  In process Specimen: Blood from Arm, Right Updated: 45/61/19 9344    Salicylate level [[id number]]  (Normal) Collected: 05/11/23 2105    Lab Status: Final result Specimen: Blood from Arm, Right Updated: 05/11/23 2152 Salicylate Lvl <5 mg/dL     Acetaminophen level-If concentration is detectable, please discuss with medical  on call   [[id number]]  (Abnormal) Collected: 05/11/23 2105    Lab Status: Final result Specimen: Blood from Arm, Right Updated: 05/11/23 2142     Acetaminophen Level <10 ug/mL     Comprehensive metabolic panel [[id number]]  (Abnormal) Collected: 05/11/23 2055    Lab Status: Final result Specimen: Blood from Arm, Right Updated: 05/11/23 2141     Sodium 136 mmol/L      Potassium 4 1 mmol/L      Chloride 101 mmol/L      CO2 27 mmol/L      ANION GAP 8 mmol/L      BUN 17 mg/dL      Creatinine 0 70 mg/dL      Glucose 160 mg/dL      Calcium 8 9 mg/dL      AST 67 U/L      ALT 53 U/L      Alkaline Phosphatase 202 U/L      Total Protein 7 2 g/dL      Albumin 4 0 g/dL      Total Bilirubin 0 31 mg/dL      eGFR 92 ml/min/1 73sq m     Narrative:      [place] guidelines for Chronic Kidney Disease (CKD):   •  Stage 1 with normal or high GFR (GFR > 90 mL/min/1 73 square meters)  •  Stage 2 Mild CKD (GFR = 60-89 mL/min/1 73 square meters)  •  Stage 3A Moderate CKD (GFR = 45-59 mL/min/1 73 square meters)  •  Stage 3B Moderate CKD (GFR = 30-44 mL/min/1 73 square meters)  •  Stage 4 Severe CKD (GFR = 15-29 mL/min/1 73 square meters)  •  Stage 5 End Stage CKD (GFR <15 mL/min/1 73 square meters)  Note: GFR calculation is accurate only with a steady state creatinine    Magnesium [[id number]]  (Abnormal) Collected: 05/11/23 2055    Lab Status: Final result Specimen: Blood from Arm, Right Updated: 05/11/23 2141     Magnesium 1 8 mg/dL     Lipase [[id number]]  (Normal) Collected: 05/11/23 2055    Lab Status: Final result Specimen: Blood from Arm, Right Updated: 05/11/23 2141     Lipase 20 u/L     Ethanol [[id number]]  (Normal) Collected: 05/11/23 2105    Lab Status: Final result Specimen: Blood from Arm, Right Updated: 05/11/23 2139     Ethanol Lvl <10 mg/dL     Lactic acid, plasma (w/reflex if result > 2 0) [[id number]]  (Normal) Collected: 05/11/23 2055    Lab Status: Final result Specimen: Blood from Arm, Right Updated: 05/11/23 2139     LACTIC ACID 1 2 mmol/L     Narrative:      Result may be elevated if tourniquet was used during collection  CBC and differential [[id number]]  (Abnormal) Collected: 05/11/23 2055    Lab Status: Final result Specimen: Blood from Arm, Right Updated: 05/11/23 2136     WBC 2 74 Thousand/uL      RBC 4 00 Million/uL      Hemoglobin 12 5 g/dL      Hematocrit 38 7 %      MCV 97 fL      MCH 31 3 pg      MCHC 32 3 g/dL      RDW 13 7 %      MPV 9 9 fL      Platelets 48 Thousands/uL     Manual Differential(PHLEBS Do Not Order) [[id number]]  (Abnormal) Collected: 05/11/23 2055    Lab Status: Final result Specimen: Blood from Arm, Right Updated: 05/11/23 2134     Segmented % 42 %      Bands % 3 %      Lymphocytes % 41 %      Monocytes % 4 %      Eosinophils, % 6 %      Basophils % 0 %      Atypical Lymphocytes % 4 %      Absolute Neutrophils 1 23 Thousand/uL      Lymphocytes Absolute 1 12 Thousand/uL      Monocytes Absolute 0 11 Thousand/uL      Eosinophils Absolute 0 16 Thousand/uL      Basophils Absolute 0 00 Thousand/uL      Total Counted --     RBC Morphology Present     Rouleaux Present     Platelet Estimate Decreased    Levetiracetam level [[id number]] Collected: 05/11/23 2055    Lab Status: In process Specimen: Blood from Arm, Right Updated: 05/11/23 2102    Fingerstick Glucose (POCT) [[id number]]  (Abnormal) Collected: 05/11/23 2046    Lab Status: Final result Updated: 05/11/23 2054     POC Glucose 155 mg/dl         CT head wo contrast   Final Result by Ed Badillo DO (05/11 2334)      No acute intracranial abnormality                    Workstation performed: [id number]         XR chest 1 view portable    (Results Pending)         ED Course         Critical Care Time  Procedures

## 2023-05-13 VITALS
RESPIRATION RATE: 19 BRPM | DIASTOLIC BLOOD PRESSURE: 91 MMHG | TEMPERATURE: 99.1 F | WEIGHT: 130.73 LBS | SYSTOLIC BLOOD PRESSURE: 128 MMHG | HEART RATE: 88 BPM | OXYGEN SATURATION: 93 % | BODY MASS INDEX: 22.32 KG/M2 | HEIGHT: 64 IN

## 2023-05-13 PROBLEM — F41.9 ANXIETY: Status: ACTIVE | Noted: 2023-05-13

## 2023-05-13 LAB
ANION GAP SERPL CALCULATED.3IONS-SCNC: 6 MMOL/L (ref 4–13)
ANION GAP SERPL CALCULATED.3IONS-SCNC: 6 MMOL/L (ref 4–13)
BUN SERPL-MCNC: 16 MG/DL (ref 5–25)
BUN SERPL-MCNC: 16 MG/DL (ref 5–25)
CALCIUM SERPL-MCNC: 8.6 MG/DL (ref 8.4–10.2)
CALCIUM SERPL-MCNC: 8.6 MG/DL (ref 8.4–10.2)
CHLORIDE SERPL-SCNC: 107 MMOL/L (ref 96–108)
CHLORIDE SERPL-SCNC: 107 MMOL/L (ref 96–108)
CO2 SERPL-SCNC: 24 MMOL/L (ref 21–32)
CO2 SERPL-SCNC: 24 MMOL/L (ref 21–32)
CREAT SERPL-MCNC: 0.62 MG/DL (ref 0.6–1.3)
CREAT SERPL-MCNC: 0.62 MG/DL (ref 0.6–1.3)
ERYTHROCYTE [DISTWIDTH] IN BLOOD BY AUTOMATED COUNT: 13.8 % (ref 11.6–15.1)
ERYTHROCYTE [DISTWIDTH] IN BLOOD BY AUTOMATED COUNT: 13.8 % (ref 11.6–15.1)
GFR SERPL CREATININE-BSD FRML MDRD: 96 ML/MIN/1.73SQ M
GFR SERPL CREATININE-BSD FRML MDRD: 96 ML/MIN/1.73SQ M
GLUCOSE SERPL-MCNC: 92 MG/DL (ref 65–140)
GLUCOSE SERPL-MCNC: 92 MG/DL (ref 65–140)
GLUCOSE SERPL-MCNC: 98 MG/DL (ref 65–140)
GLUCOSE SERPL-MCNC: 98 MG/DL (ref 65–140)
HCT VFR BLD AUTO: 34.5 % (ref 34.8–46.1)
HCT VFR BLD AUTO: 34.5 % (ref 34.8–46.1)
HGB BLD-MCNC: 11 G/DL (ref 11.5–15.4)
HGB BLD-MCNC: 11 G/DL (ref 11.5–15.4)
MAGNESIUM SERPL-MCNC: 1.7 MG/DL (ref 1.9–2.7)
MAGNESIUM SERPL-MCNC: 1.7 MG/DL (ref 1.9–2.7)
MCH RBC QN AUTO: 30.9 PG (ref 26.8–34.3)
MCH RBC QN AUTO: 30.9 PG (ref 26.8–34.3)
MCHC RBC AUTO-ENTMCNC: 31.9 G/DL (ref 31.4–37.4)
MCHC RBC AUTO-ENTMCNC: 31.9 G/DL (ref 31.4–37.4)
MCV RBC AUTO: 97 FL (ref 82–98)
MCV RBC AUTO: 97 FL (ref 82–98)
PLATELET # BLD AUTO: 47 THOUSANDS/UL (ref 149–390)
PLATELET # BLD AUTO: 47 THOUSANDS/UL (ref 149–390)
PMV BLD AUTO: 10.4 FL (ref 8.9–12.7)
PMV BLD AUTO: 10.4 FL (ref 8.9–12.7)
POTASSIUM SERPL-SCNC: 3.8 MMOL/L (ref 3.5–5.3)
POTASSIUM SERPL-SCNC: 3.8 MMOL/L (ref 3.5–5.3)
RBC # BLD AUTO: 3.56 MILLION/UL (ref 3.81–5.12)
RBC # BLD AUTO: 3.56 MILLION/UL (ref 3.81–5.12)
SODIUM SERPL-SCNC: 137 MMOL/L (ref 135–147)
SODIUM SERPL-SCNC: 137 MMOL/L (ref 135–147)
WBC # BLD AUTO: 3.9 THOUSAND/UL (ref 4.31–10.16)
WBC # BLD AUTO: 3.9 THOUSAND/UL (ref 4.31–10.16)

## 2023-05-13 RX ORDER — HYDROXYZINE HYDROCHLORIDE 25 MG/1
25 TABLET, FILM COATED ORAL 2 TIMES DAILY
Qty: 60 TABLET | Refills: 0 | Status: SHIPPED | OUTPATIENT
Start: 2023-05-13

## 2023-05-13 RX ORDER — TRAZODONE HYDROCHLORIDE 50 MG/1
50 TABLET ORAL
Qty: 30 TABLET | Refills: 0 | Status: SHIPPED | OUTPATIENT
Start: 2023-05-13

## 2023-05-13 RX ORDER — LANOLIN ALCOHOL/MO/W.PET/CERES
100 CREAM (GRAM) TOPICAL DAILY
Qty: 30 TABLET | Refills: 0 | Status: SHIPPED | OUTPATIENT
Start: 2023-05-14

## 2023-05-13 RX ORDER — METOPROLOL SUCCINATE 25 MG/1
25 TABLET, EXTENDED RELEASE ORAL 2 TIMES DAILY
Qty: 60 TABLET | Refills: 0 | Status: SHIPPED | OUTPATIENT
Start: 2023-05-13

## 2023-05-13 RX ORDER — MAGNESIUM SULFATE HEPTAHYDRATE 40 MG/ML
2 INJECTION, SOLUTION INTRAVENOUS ONCE
Status: COMPLETED | OUTPATIENT
Start: 2023-05-13 | End: 2023-05-13

## 2023-05-13 RX ORDER — FOLIC ACID 1 MG/1
1 TABLET ORAL DAILY
Qty: 30 TABLET | Refills: 0 | Status: SHIPPED | OUTPATIENT
Start: 2023-05-14

## 2023-05-13 RX ORDER — LACOSAMIDE 200 MG/1
200 TABLET ORAL EVERY 12 HOURS SCHEDULED
Qty: 60 TABLET | Refills: 0 | Status: SHIPPED | OUTPATIENT
Start: 2023-05-13

## 2023-05-13 RX ORDER — UREA 10 %
500 LOTION (ML) TOPICAL 2 TIMES DAILY
Qty: 60 TABLET | Refills: 0 | Status: SHIPPED | OUTPATIENT
Start: 2023-05-13

## 2023-05-13 RX ORDER — DESVENLAFAXINE SUCCINATE 50 MG/1
50 TABLET, EXTENDED RELEASE ORAL DAILY
Qty: 30 TABLET | Refills: 0 | Status: SHIPPED | OUTPATIENT
Start: 2023-05-14

## 2023-05-13 RX ORDER — GABAPENTIN 300 MG/1
300 CAPSULE ORAL 2 TIMES DAILY
Qty: 60 CAPSULE | Refills: 0 | Status: SHIPPED | OUTPATIENT
Start: 2023-05-13

## 2023-05-13 RX ADMIN — DESVENLAFAXINE 50 MG: 50 TABLET, FILM COATED, EXTENDED RELEASE ORAL at 08:12

## 2023-05-13 RX ADMIN — FOLIC ACID 1 MG: 1 TABLET ORAL at 08:12

## 2023-05-13 RX ADMIN — GABAPENTIN 300 MG: 300 CAPSULE ORAL at 08:12

## 2023-05-13 RX ADMIN — HYDROXYZINE HYDROCHLORIDE 25 MG: 25 TABLET ORAL at 08:12

## 2023-05-13 RX ADMIN — MULTIPLE VITAMINS W/ MINERALS TAB 1 TABLET: TAB ORAL at 08:11

## 2023-05-13 RX ADMIN — MAGNESIUM SULFATE HEPTAHYDRATE 2 G: 40 INJECTION, SOLUTION INTRAVENOUS at 07:54

## 2023-05-13 RX ADMIN — Medication 100 MG: at 08:09

## 2023-05-13 RX ADMIN — LACOSAMIDE 200 MG: 100 TABLET, FILM COATED ORAL at 08:11

## 2023-05-13 RX ADMIN — METOPROLOL SUCCINATE 25 MG: 25 TABLET, EXTENDED RELEASE ORAL at 08:09

## 2023-05-13 NOTE — PLAN OF CARE
Problem: MOBILITY - ADULT  Goal: Maintain or return to baseline ADL function  Description: INTERVENTIONS:  -  Assess patient's ability to carry out ADLs; assess patient's baseline for ADL function and identify physical deficits which impact ability to perform ADLs (bathing, care of mouth/teeth, toileting, grooming, dressing, etc )  - Assess/evaluate cause of self-care deficits   - Assess range of motion  - Assess patient's mobility; develop plan if impaired  - Assess patient's need for assistive devices and provide as appropriate  - Encourage maximum independence but intervene and supervise when necessary  - Involve family in performance of ADLs  - Assess for home care needs following discharge   - Consider OT consult to assist with ADL evaluation and planning for discharge  - Provide patient education as appropriate  5/13/2023 1421 by Anne López RN  Outcome: Adequate for Discharge  5/13/2023 0917 by Anne López RN  Outcome: Progressing  Goal: Maintains/Returns to pre admission functional level  Description: INTERVENTIONS:  - Perform BMAT or MOVE assessment daily    - Set and communicate daily mobility goal to care team and patient/family/caregiver  - Collaborate with rehabilitation services on mobility goals if consulted  - Perform Range of Motion  times a day  - Reposition patient every  hours    - Dangle patient  times a day  - Stand patient times a day  - Ambulate patient  times a day  - Out of bed to chaair times a day   - Out of bed for meals  times a day  - Out of bed for toileting  - Record patient progress and toleration of activity level   5/13/2023 1421 by Anne López RN  Outcome: Adequate for Discharge  5/13/2023 0917 by Anne López RN  Outcome: Progressing     Problem: Prexisting or High Potential for Compromised Skin Integrity  Goal: Skin integrity is maintained or improved  Description: INTERVENTIONS:  - Identify patients at risk for skin breakdown  - Assess and monitor skin integrity  - Assess and monitor nutrition and hydration status  - Monitor labs   - Assess for incontinence   - Turn and reposition patient  - Assist with mobility/ambulation  - Relieve pressure over bony prominences  - Avoid friction and shearing  - Provide appropriate hygiene as needed including keeping skin clean and dry  - Evaluate need for skin moisturizer/barrier cream  - Collaborate with interdisciplinary team   - Patient/family teaching  - Consider wound care consult   5/13/2023 1421 by Jearldine Cranker, RN  Outcome: Adequate for Discharge  5/13/2023 0917 by Jearldine Cranker, RN  Outcome: Progressing     Problem: Nutrition/Hydration-ADULT  Goal: Nutrient/Hydration intake appropriate for improving, restoring or maintaining nutritional needs  Description: Monitor and assess patient's nutrition/hydration status for malnutrition  Collaborate with interdisciplinary team and initiate plan and interventions as ordered  Monitor patient's weight and dietary intake as ordered or per policy  Utilize nutrition screening tool and intervene as necessary  Determine patient's food preferences and provide high-protein, high-caloric foods as appropriate       INTERVENTIONS:  - Monitor oral intake, urinary output, labs, and treatment plans  - Assess nutrition and hydration status and recommend course of action  - Evaluate amount of meals eaten  - Assist patient with eating if necessary   - Allow adequate time for meals  - Recommend/ encourage appropriate diets, oral nutritional supplements, and vitamin/mineral supplements  - Order, calculate, and assess calorie counts as needed  - Recommend, monitor, and adjust tube feedings and TPN/PPN based on assessed needs  - Assess need for intravenous fluids  - Provide specific nutrition/hydration education as appropriate  - Include patient/family/caregiver in decisions related to nutrition  5/13/2023 1421 by Jearldine Cranker, RN  Outcome: Adequate for Discharge  5/13/2023 0917 by Farideh Harris, KELTON  Outcome: Progressing

## 2023-05-13 NOTE — PLAN OF CARE
Problem: MOBILITY - ADULT  Goal: Maintain or return to baseline ADL function  Description: INTERVENTIONS:  -  Assess patient's ability to carry out ADLs; assess patient's baseline for ADL function and identify physical deficits which impact ability to perform ADLs (bathing, care of mouth/teeth, toileting, grooming, dressing, etc )  - Assess/evaluate cause of self-care deficits   - Assess range of motion  - Assess patient's mobility; develop plan if impaired  - Assess patient's need for assistive devices and provide as appropriate  - Encourage maximum independence but intervene and supervise when necessary  - Involve family in performance of ADLs  - Assess for home care needs following discharge   - Consider OT consult to assist with ADL evaluation and planning for discharge  - Provide patient education as appropriate  Outcome: Progressing  Goal: Maintains/Returns to pre admission functional level  Description: INTERVENTIONS:  - Perform BMAT or MOVE assessment daily    - Set and communicate daily mobility goal to care team and patient/family/caregiver  - Collaborate with rehabilitation services on mobility goals if consulted  - Perform Range of Motion times a day  - Reposition patient every  hours    - Dangle patient times a day  - Stand patient  times a day  - Ambulate patient  times a day  - Out of bed to chair times a day   - Out of bed for meals  times a day  - Out of bed for toileting  - Record patient progress and toleration of activity level   Outcome: Progressing     Problem: Prexisting or High Potential for Compromised Skin Integrity  Goal: Skin integrity is maintained or improved  Description: INTERVENTIONS:  - Identify patients at risk for skin breakdown  - Assess and monitor skin integrity  - Assess and monitor nutrition and hydration status  - Monitor labs   - Assess for incontinence   - Turn and reposition patient  - Assist with mobility/ambulation  - Relieve pressure over bony prominences  - Avoid friction and shearing  - Provide appropriate hygiene as needed including keeping skin clean and dry  - Evaluate need for skin moisturizer/barrier cream  - Collaborate with interdisciplinary team   - Patient/family teaching  - Consider wound care consult   Outcome: Progressing     Problem: Nutrition/Hydration-ADULT  Goal: Nutrient/Hydration intake appropriate for improving, restoring or maintaining nutritional needs  Description: Monitor and assess patient's nutrition/hydration status for malnutrition  Collaborate with interdisciplinary team and initiate plan and interventions as ordered  Monitor patient's weight and dietary intake as ordered or per policy  Utilize nutrition screening tool and intervene as necessary  Determine patient's food preferences and provide high-protein, high-caloric foods as appropriate       INTERVENTIONS:  - Monitor oral intake, urinary output, labs, and treatment plans  - Assess nutrition and hydration status and recommend course of action  - Evaluate amount of meals eaten  - Assist patient with eating if necessary   - Allow adequate time for meals  - Recommend/ encourage appropriate diets, oral nutritional supplements, and vitamin/mineral supplements  - Order, calculate, and assess calorie counts as needed  - Recommend, monitor, and adjust tube feedings and TPN/PPN based on assessed needs  - Assess need for intravenous fluids  - Provide specific nutrition/hydration education as appropriate  - Include patient/family/caregiver in decisions related to nutrition  Outcome: Progressing

## 2023-05-13 NOTE — NURSING NOTE
Patient discharged home  IV removed per protocol  AVS was reviewed in detail  Patient agreed to follow up with Neurology with in 2 weeks  All questions and concerns were addressed at this time

## 2023-05-13 NOTE — DISCHARGE INSTR - AVS FIRST PAGE
Dear Michelle Christine,     It was our pleasure to care for you here at [facility], [facility]  It is our hope that we were always able to exceed the expected standards for your care during your stay  You were hospitalized due to breakthrough seizure  You were cared for on the [facility] 2nd floor by Kathie Vela MD under the service of [address] [place] [address] with the [facility] who covers for your primary care physician (PCP), No primary care provider on file  , while you were hospitalized  If you have any questions or concerns related to this hospitalization, you may contact us at [phone]  For follow up as well as any medication refills, we recommend that you follow up with your primary care physician  A registered nurse will reach out to you by phone within a few days after your discharge to answer any additional questions that you may have after going home  However, at this time we provide for you here, the most important instructions / recommendations at discharge:     Notable Medication Adjustments -   START taking Pristiq 50 mg per day  START taking magnesium gluconate daily  Testing Required after Discharge -     Important follow up information -   Follow up primary care provider in 1 week for follow up visit and for further blood tests and chest imaging  Follow-up with psychiatry outpatient  Other Instructions -   Present to emergency department if you experience any seizures, severe headaches, vision changes, difficulty swallowing, fevers/chills, shortness of breath  Please review this entire after visit summary as additional general instructions including medication list, appointments, activity, diet, any pertinent wound care, and other additional recommendations from your care team that may be provided for you        Sincerely,     Kathie Vela MD

## 2023-05-13 NOTE — DISCHARGE SUMMARY
[facility]  Discharge- Brock Simmonds 1960, 61 y o  female MRN: [id number]  Unit/Bed#: S -01 Encounter: [id number]  Primary Care Provider: No primary care provider on file  Date and time admitted to hospital: 5/11/2023  8:41 PM    * Breakthrough seizure [facility])  Assessment & Plan  Pt w/ hx of uncontrolled seizures:  notes about once a month  Etiology likely 2/2 alcohol use  Neurology consulted, not recommending any changes to medications  EEG did not show any seizure activity  · Continue home medications  · F/u with Dr Joanne Hill  · F/u PCP 1 week  · Magnesium supplement     Anxiety  Assessment & Plan  Continue Pristiq    Hypomagnesemia  Assessment & Plan  Continue magnesium supplements  F/u PCP for magnesium levels     Alcohol abuse  Assessment & Plan  Pt  noted history of alcohol abuse  Still drinks one beer occasionally  Pt  noted there is potential for her drinking without his knowledge    PLAN:  Alcohol cessation   CM provided resources       Pancytopenia [facility])  Assessment & Plan  POA  Recent Labs     05/11/23 2055 05/12/23  0126 05/12/23  0457   PLT 48* 57* 56*       PLAN:  -NO BLOOD TRANSFUSIONS: Pt is jehovah witness  -Continue to monitor      Medical Problems     Resolved Problems  Date Reviewed: 5/13/2023   None       Discharging Resident: Sotero Cabrera MD  Discharging Attending: Valerie Villanueva*  PCP: No primary care provider on file  Admission Date:   Admission Orders (From admission, onward)     Ordered        05/11/23 2328  INPATIENT ADMISSION  Once                      Discharge Date: 05/13/23    Consultations During Hospital Stay:  · Neurology  · Psychiatry    Procedures Performed:   · EEG    Significant Findings / Test Results:   XR chest 1 view portable    Result Date: 5/12/2023  Impression: Indeterminate left retrocardiac opacity can represent pneumonia  Two-view radiograph could be considered for further assessment   Workstation "performed: [id number]     CT head wo contrast    Result Date: 5/11/2023  Impression: No acute intracranial abnormality  Workstation performed: [id number]       XR chest 1 view portable    Result Date: 5/12/2023  Impression Indeterminate left retrocardiac opacity can represent pneumonia  Two-view radiograph could be considered for further assessment  Workstation performed: [id number]   ·    ·     Incidental Findings:   ·        Test Results Pending at Discharge (will require follow up):  ·      Outpatient Tests Requested:  · Primary care provider to consider ordering magnesium and chest xray in 1 week     Complications:      Reason for Admission: breakthrough seizure     Hospital Course:   Alfredo Feng is a 61 y o  female patient who originally presented to the hospital on 5/11/2023 due to breakthrough seizure secondary to alcohol use  Neurology consulted and patient underwent EEG without any seizure activity  No anti-seizure medication changes were made  Psychiatry consulted because patient reported significant anxiety and was prescribed Pristiq  Patient did have chest imaging concerning for possible aspiration however she was monitored off antibiotics without any signs of infection  Patient also started on magnesium supplements  Clinically stable  Please see above list of diagnoses and related plan for additional information  Condition at Discharge: good    Discharge Day Visit / Exam:   Subjective:  No complaints  Comfortable and pleasant on exam      Vitals: Blood Pressure: 106/62 (05/13/23 0957)  Pulse: 75 (05/13/23 0957)  Temperature: 98 4 °F (36 9 °C) (05/13/23 0811)  Temp Source: Oral (05/13/23 0811)  Respirations: 19 (05/13/23 0811)  Height: 5' 4\" (162 6 cm) (per office visit 4/20/23) (05/12/23 1643)  Weight - Scale: 59 3 kg (130 lb 11 7 oz) (05/11/23 2044)  SpO2: 96 % (05/13/23 0811)  Exam:   Physical Exam  Vitals reviewed  Constitutional:       Appearance: Normal appearance   She is not " ill-appearing or diaphoretic  Cardiovascular:      Rate and Rhythm: Normal rate and regular rhythm  Pulses: Normal pulses  Heart sounds: Normal heart sounds  Pulmonary:      Effort: Pulmonary effort is normal       Breath sounds: Normal breath sounds  Abdominal:      General: Bowel sounds are normal       Palpations: Abdomen is soft  Skin:     General: Skin is warm  Neurological:      General: No focal deficit present  Mental Status: She is alert and oriented to person, place, and time  Psychiatric:         Mood and Affect: Mood normal          Behavior: Behavior normal           Discussion with Family: Patient declined call to   Discharge instructions/Information to patient and family:   See after visit summary for information provided to patient and family  Provisions for Follow-Up Care:  See after visit summary for information related to follow-up care and any pertinent home health orders  Disposition:   Home    Planned Readmission:     Discharge Medications:  See after visit summary for reconciled discharge medications provided to patient and/or family        **Please Note: This note may have been constructed using a voice recognition system**

## 2023-05-15 NOTE — UTILIZATION REVIEW
NOTIFICATION OF ADMISSION DISCHARGE   This is a Notification of Discharge from [facility]  Please be advised that this patient has been discharge from our facility  Below you will find the admission and discharge date and time including the patient’s disposition  UTILIZATION REVIEW CONTACT:  Bianca Young MA  Utilization   Network Utilization Review Department  Phone: [phone] x carefully listen to the prompts  All voicemails are confidential   Email: [email or web address]     ADMISSION INFORMATION  PRESENTATION DATE: 5/11/2023  8:41 PM  OBERVATION ADMISSION DATE:   INPATIENT ADMISSION DATE: 5/11/23 11:28 PM   DISCHARGE DATE: 5/13/2023  2:41 PM   DISPOSITION:Home/Self Care    IMPORTANT INFORMATION:  Send all requests for admission clinical reviews, approved or denied determinations and any other requests to dedicated fax number below belonging to the campus where the patient is receiving treatment   List of dedicated fax numbers:  [address] DENIALS (Administrative/Medical Necessity) [phone]   [address] (Maternity/NICU/Pediatrics) [phone]   [facility] [phone]   [facility] [phone]   [facility] [phone]   [address] [facility] [phone]   [address] [place] [address] [phone]   [address] [place] [address] 119 [phone]   [facility]  [phone]   [facility] [phone]   [facility] [address] [facility] [phone]

## 2023-05-16 LAB
LEVETIRACETAM SERPL-MCNC: <2 UG/ML (ref 10–40)
LEVETIRACETAM SERPL-MCNC: <2 UG/ML (ref 10–40)

## 2023-05-17 LAB
BACTERIA BLD CULT: NORMAL

## 2023-05-17 NOTE — RESULT ENCOUNTER NOTE
Keppra level was less than 2  I left a message on the patient's machine to call for the results  He will need a dose adjustment  Plan to have him follow-up with his neurologist for guidance and management of his Keppra level

## 2023-05-18 NOTE — RESULT ENCOUNTER NOTE
Spoke to the patient in regards to her  Keppra level  She is not on Keppra  She has a follow-up with her neurologist coming up  They did not discharge her home on Keppra from the hospital when she was admitted for this recent seizure  If she can get in with a neurologist, I asked her to follow-up with her family physician for further guidance and management of her seizure disorder

## 2023-05-23 ENCOUNTER — APPOINTMENT (EMERGENCY)
Dept: CT IMAGING | Facility: HOSPITAL | Age: 63
End: 2023-05-23
Payer: COMMERCIAL

## 2023-05-23 ENCOUNTER — HOSPITAL ENCOUNTER (EMERGENCY)
Facility: HOSPITAL | Age: 63
Discharge: HOME | End: 2023-05-24
Attending: EMERGENCY MEDICINE
Payer: COMMERCIAL

## 2023-05-23 DIAGNOSIS — F10.929 ALCOHOL INTOXICATION (HCC): Primary | ICD-10-CM

## 2023-05-23 LAB
ALBUMIN SERPL BCP-MCNC: 4.2 G/DL (ref 3.5–5)
ALP SERPL-CCNC: 130 U/L (ref 34–104)
ALT SERPL W P-5'-P-CCNC: 33 U/L (ref 7–52)
ANION GAP SERPL CALCULATED.3IONS-SCNC: 10 MMOL/L (ref 4–13)
AST SERPL W P-5'-P-CCNC: 36 U/L (ref 13–39)
BASOPHILS # BLD AUTO: 0.03 THOUSANDS/ÂΜL (ref 0–0.1)
BASOPHILS NFR BLD AUTO: 1 % (ref 0–1)
BILIRUB SERPL-MCNC: 0.27 MG/DL (ref 0.2–1)
BUN SERPL-MCNC: 15 MG/DL (ref 5–25)
CALCIUM SERPL-MCNC: 9.2 MG/DL (ref 8.4–10.2)
CARDIAC TROPONIN I PNL SERPL HS: <2 NG/L
CHLORIDE SERPL-SCNC: 104 MMOL/L (ref 96–108)
CO2 SERPL-SCNC: 23 MMOL/L (ref 21–32)
CREAT SERPL-MCNC: 0.58 MG/DL (ref 0.6–1.3)
EOSINOPHIL # BLD AUTO: 0.09 THOUSAND/ÂΜL (ref 0–0.61)
EOSINOPHIL NFR BLD AUTO: 2 % (ref 0–6)
ERYTHROCYTE [DISTWIDTH] IN BLOOD BY AUTOMATED COUNT: 13.5 % (ref 11.6–15.1)
ETHANOL SERPL-MCNC: 258 MG/DL
GFR SERPL CREATININE-BSD FRML MDRD: 98 ML/MIN/1.73SQ M
GLUCOSE SERPL-MCNC: 100 MG/DL (ref 65–140)
HCT VFR BLD AUTO: 37.9 % (ref 34.8–46.1)
HGB BLD-MCNC: 12.4 G/DL (ref 11.5–15.4)
IMM GRANULOCYTES # BLD AUTO: 0.01 THOUSAND/UL (ref 0–0.2)
IMM GRANULOCYTES NFR BLD AUTO: 0 % (ref 0–2)
LYMPHOCYTES # BLD AUTO: 1.79 THOUSANDS/ÂΜL (ref 0.6–4.47)
LYMPHOCYTES NFR BLD AUTO: 46 % (ref 14–44)
MCH RBC QN AUTO: 31.1 PG (ref 26.8–34.3)
MCHC RBC AUTO-ENTMCNC: 32.7 G/DL (ref 31.4–37.4)
MCV RBC AUTO: 95 FL (ref 82–98)
MONOCYTES # BLD AUTO: 0.25 THOUSAND/ÂΜL (ref 0.17–1.22)
MONOCYTES NFR BLD AUTO: 7 % (ref 4–12)
NEUTROPHILS # BLD AUTO: 1.67 THOUSANDS/ÂΜL (ref 1.85–7.62)
NEUTS SEG NFR BLD AUTO: 44 % (ref 43–75)
NRBC BLD AUTO-RTO: 0 /100 WBCS
PLATELET # BLD AUTO: 61 THOUSANDS/UL (ref 149–390)
PMV BLD AUTO: 9.9 FL (ref 8.9–12.7)
POTASSIUM SERPL-SCNC: 4.2 MMOL/L (ref 3.5–5.3)
PROT SERPL-MCNC: 7.6 G/DL (ref 6.4–8.4)
RBC # BLD AUTO: 3.99 MILLION/UL (ref 3.81–5.12)
SODIUM SERPL-SCNC: 137 MMOL/L (ref 135–147)
WBC # BLD AUTO: 3.84 THOUSAND/UL (ref 4.31–10.16)

## 2023-05-23 PROCEDURE — G1004 CDSM NDSC: HCPCS

## 2023-05-23 PROCEDURE — 84484 ASSAY OF TROPONIN QUANT: CPT | Performed by: EMERGENCY MEDICINE

## 2023-05-23 PROCEDURE — 70450 CT HEAD/BRAIN W/O DYE: CPT

## 2023-05-23 PROCEDURE — 82077 ASSAY SPEC XCP UR&BREATH IA: CPT | Performed by: EMERGENCY MEDICINE

## 2023-05-23 PROCEDURE — 82075 ASSAY OF BREATH ETHANOL: CPT | Performed by: EMERGENCY MEDICINE

## 2023-05-23 PROCEDURE — 99284 EMERGENCY DEPT VISIT MOD MDM: CPT | Performed by: EMERGENCY MEDICINE

## 2023-05-23 PROCEDURE — 85025 COMPLETE CBC W/AUTO DIFF WBC: CPT | Performed by: EMERGENCY MEDICINE

## 2023-05-23 PROCEDURE — 99285 EMERGENCY DEPT VISIT HI MDM: CPT

## 2023-05-23 PROCEDURE — 80053 COMPREHEN METABOLIC PANEL: CPT | Performed by: EMERGENCY MEDICINE

## 2023-05-23 PROCEDURE — 36415 COLL VENOUS BLD VENIPUNCTURE: CPT

## 2023-05-23 PROCEDURE — 93005 ELECTROCARDIOGRAM TRACING: CPT

## 2023-05-24 ENCOUNTER — OFFICE VISIT (OUTPATIENT)
Dept: RHEUMATOLOGY | Facility: CLINIC | Age: 63
End: 2023-05-24

## 2023-05-24 VITALS
SYSTOLIC BLOOD PRESSURE: 100 MMHG | HEART RATE: 52 BPM | DIASTOLIC BLOOD PRESSURE: 55 MMHG | TEMPERATURE: 98.1 F | OXYGEN SATURATION: 93 % | RESPIRATION RATE: 18 BRPM

## 2023-05-24 DIAGNOSIS — M80.00XD AGE-RELATED OSTEOPOROSIS WITH CURRENT PATHOLOGICAL FRACTURE WITH ROUTINE HEALING: Primary | ICD-10-CM

## 2023-05-24 LAB
ATRIAL RATE: 63 BPM
P AXIS: 34 DEGREES
PR INTERVAL: 234 MS
QRS AXIS: 47 DEGREES
QRSD INTERVAL: 78 MS
QT INTERVAL: 424 MS
QTC INTERVAL: 433 MS
T WAVE AXIS: 68 DEGREES
VENTRICULAR RATE: 63 BPM

## 2023-05-24 PROCEDURE — 80235 DRUG ASSAY LACOSAMIDE: CPT | Performed by: EMERGENCY MEDICINE

## 2023-05-24 PROCEDURE — 93010 ELECTROCARDIOGRAM REPORT: CPT | Performed by: INTERNAL MEDICINE

## 2023-05-24 PROCEDURE — 36415 COLL VENOUS BLD VENIPUNCTURE: CPT | Performed by: EMERGENCY MEDICINE

## 2023-05-24 NOTE — ED NOTES
Attempted to complete breathalyzer   PT could not follow instructions, nor could PT preform test       Nini Piper  05/23/23 2015

## 2023-05-24 NOTE — ED NOTES
Per dr Lary Gardiner, patient is to sleep here for next few hours until patient's  wakes up and can  patient, around 0600       Sienna Ferrer RN  05/24/23 5790

## 2023-05-24 NOTE — ED PROVIDER NOTES
"History  Chief Complaint   Patient presents with   • Altered Mental Status     Brought by  for ams  Hz seizures and alcohol abuse   states \"couple sips of wine\" pt confirms wine, denies drug use   states found her on ground this afternoon  Unclear story  No thinners per   Gcs 14 in triage       History provided by:  Patient   used: No    Altered Mental Status  Associated symptoms: headaches    Associated symptoms: no abdominal pain, no nausea, no rash and no vomiting      69-year-old female brought after being found on the ground this afternoon  She does report drinking wine today  She reports every few days she will have an odd sensation in her head with some pain coming down from the top  She denies any pain at the moment  Right now feels well  She reports a history of syncope often, especially when she drinks alcohol but it occurs other times as well  Per review of her medical record she does have a seizure history  Possibility that she is having seizures  Was recently admitted for seizure activity suspected to be induced by alcohol  Per notes she has been counseled not to drink  She takes lacosamide and gabapentin and denies missing any doses  She states she drinks on most days and typically a bottle of wine will last her 3 days  She states that she drinks because she is thinking about the past   She reports she has a good life now  Prior to Admission Medications   Prescriptions Last Dose Informant Patient Reported? Taking?   desvenlafaxine succinate (PRISTIQ) 50 mg 24 hr tablet   No No   Sig: Take 1 tablet (50 mg total) by mouth daily Do not start before May 14, 2023  folic acid (FOLVITE) 1 mg tablet   No No   Sig: Take 1 tablet (1 mg total) by mouth daily Do not start before March 15, 2023  folic acid (FOLVITE) 1 mg tablet   No No   Sig: Take 1 tablet (1 mg total) by mouth daily Do not start before May 14, 2023     gabapentin (NEURONTIN) 300 " mg capsule   No No   Sig: Take 1 capsule (300 mg total) by mouth 2 (two) times a day   gabapentin (NEURONTIN) 300 mg capsule   No No   Sig: Take 1 capsule (300 mg total) by mouth 2 (two) times a day   hydrOXYzine HCL (ATARAX) 25 mg tablet   No No   Sig: Take 1 tablet (25 mg total) by mouth 2 (two) times a day   hydrOXYzine HCL (ATARAX) 25 mg tablet   No No   Sig: Take 1 tablet (25 mg total) by mouth 2 (two) times a day   lacosamide (VIMPAT) 200 mg tablet   No No   Sig: Take 1 tablet (200 mg total) by mouth 2 (two) times a day   lacosamide (VIMPAT) 200 mg tablet   No No   Sig: Take 1 tablet (200 mg total) by mouth every 12 (twelve) hours   magnesium gluconate (MAGONATE) 500 mg tablet   No No   Sig: Take 1 tablet (500 mg total) by mouth 2 (two) times a day   metoprolol succinate (TOPROL-XL) 25 mg 24 hr tablet   No No   Sig: Take 1 tablet (25 mg total) by mouth 2 (two) times a day   metoprolol succinate (TOPROL-XL) 25 mg 24 hr tablet   No No   Sig: Take 1 tablet (25 mg total) by mouth 2 (two) times a day   thiamine 100 MG tablet   No No   Sig: Take 1 tablet (100 mg total) by mouth daily Do not start before March 15, 2023  thiamine 100 MG tablet   No No   Sig: Take 1 tablet (100 mg total) by mouth daily Do not start before May 14, 2023     traZODone (DESYREL) 50 mg tablet   No No   Sig: Take 1 tablet (50 mg total) by mouth in the morning   traZODone (DESYREL) 50 mg tablet   No No   Sig: Take 1 tablet (50 mg total) by mouth daily at bedtime      Facility-Administered Medications: None       Past Medical History:   Diagnosis Date   • Fracture    • Hepatitis     Hep A    • Insomnia     10mar2016 resolved   • Osteoporosis     14jun2016 resolved   • Pancreatitis    • SAH (subarachnoid hemorrhage) (HCC)    • Seasonal allergies    • Seizure ([facility] [address] )    • Seizures ([facility] [address] )    • Urine discoloration 11/11/2019   • Varicella    • Vomiting 11/13/2019       Past Surgical History:   Procedure Laterality Date   • IR BIOPSY BONE  8/17/2021 • IR BIOPSY BONE MARROW  11/14/2019   • TUBAL LIGATION  1985       Family History   Problem Relation Age of Onset   • Anxiety disorder Mother    • Depression Mother    • No Known Problems Father    • No Known Problems Sister    • No Known Problems Sister    • No Known Problems Daughter    • No Known Problems Maternal Grandmother    • Cancer Maternal Grandfather    • Cancer Paternal Grandmother         unknown    • No Known Problems Paternal Grandfather    • No Known Problems Brother    • No Known Problems Son    • No Known Problems Son    • Breast cancer Neg Hx    • Ovarian cancer Neg Hx      I have reviewed and agree with the history as documented  E-Cigarette/Vaping   • E-Cigarette Use Never User      E-Cigarette/Vaping Substances   • Nicotine No    • THC No    • CBD No    • Flavoring No    • Other No    • Unknown No      Social History     Tobacco Use   • Smoking status: Never   • Smokeless tobacco: Never   Vaping Use   • Vaping Use: Never used   Substance Use Topics   • Alcohol use: Yes     Comment: occassional   • Drug use: Never       Review of Systems   Constitutional: Negative for activity change and appetite change  Respiratory: Negative for cough, chest tightness and shortness of breath  Cardiovascular: Negative for chest pain  Gastrointestinal: Negative for abdominal pain, nausea and vomiting  Skin: Negative for color change and rash  Neurological: Positive for headaches  All other systems reviewed and are negative  Physical Exam  Physical Exam  Vitals and nursing note reviewed  HENT:      Head: Normocephalic and atraumatic  Eyes:      Extraocular Movements:      Right eye: Normal extraocular motion  Left eye: Normal extraocular motion  Cardiovascular:      Rate and Rhythm: Normal rate and regular rhythm  Pulmonary:      Effort: Pulmonary effort is normal       Breath sounds: Normal breath sounds     Abdominal:      General: Bowel sounds are normal       Palpations: Abdomen is soft  Musculoskeletal:      Cervical back: Normal range of motion  Skin:     General: Skin is warm and dry  Neurological:      Mental Status: She is alert  Cranial Nerves: No cranial nerve deficit     Psychiatric:         Mood and Affect: Mood normal          Speech: Speech normal          Behavior: Behavior normal          Vital Signs  ED Triage Vitals   Temperature Pulse Respirations Blood Pressure SpO2   05/23/23 2015 05/23/23 2002 05/23/23 2002 05/23/23 2002 05/23/23 2002   98 1 °F (36 7 °C) 66 14 161/80 93 %      Temp Source Heart Rate Source Patient Position - Orthostatic VS BP Location FiO2 (%)   05/23/23 2015 05/23/23 2002 05/23/23 2002 05/23/23 2002 --   Oral Monitor Sitting Right arm       Pain Score       --                  Vitals:    05/23/23 2200 05/23/23 2230 05/23/23 2345 05/24/23 0045   BP: 121/71 112/58 114/65 96/55   Pulse: 64 69 61 59   Patient Position - Orthostatic VS: Lying  Lying Lying         Visual Acuity      ED Medications  Medications - No data to display    Diagnostic Studies  Results Reviewed     Procedure Component Value Units Date/Time    Lacosamide [[id number]] Collected: 05/24/23 0146    Lab Status: No result Specimen: Blood from Arm, Right     HS Troponin 0hr (reflex protocol) [[id number]]  (Normal) Collected: 05/23/23 2006    Lab Status: Final result Specimen: Blood from Arm, Right Updated: 05/23/23 2055     hs TnI 0hr <2 ng/L     Comprehensive metabolic panel [[id number]]  (Abnormal) Collected: 05/23/23 2006    Lab Status: Final result Specimen: Blood from Arm, Right Updated: 05/23/23 2049     Sodium 137 mmol/L      Potassium 4 2 mmol/L      Chloride 104 mmol/L      CO2 23 mmol/L      ANION GAP 10 mmol/L      BUN 15 mg/dL      Creatinine 0 58 mg/dL      Glucose 100 mg/dL      Calcium 9 2 mg/dL      AST 36 U/L      ALT 33 U/L      Alkaline Phosphatase 130 U/L      Total Protein 7 6 g/dL      Albumin 4 2 g/dL      Total Bilirubin 0 27 mg/dL      eGFR 98 ml/min/1 73sq m     Narrative:      National Kidney Disease Foundation guidelines for Chronic Kidney Disease (CKD):   •  Stage 1 with normal or high GFR (GFR > 90 mL/min/1 73 square meters)  •  Stage 2 Mild CKD (GFR = 60-89 mL/min/1 73 square meters)  •  Stage 3A Moderate CKD (GFR = 45-59 mL/min/1 73 square meters)  •  Stage 3B Moderate CKD (GFR = 30-44 mL/min/1 73 square meters)  •  Stage 4 Severe CKD (GFR = 15-29 mL/min/1 73 square meters)  •  Stage 5 End Stage CKD (GFR <15 mL/min/1 73 square meters)  Note: GFR calculation is accurate only with a steady state creatinine    Ethanol [[id number]]  (Abnormal) Collected: 05/23/23 2014    Lab Status: Final result Specimen: Blood from Arm, Right Updated: 05/23/23 2048     Ethanol Lvl 258 mg/dL     CBC and differential [[id number]]  (Abnormal) Collected: 05/23/23 2006    Lab Status: Final result Specimen: Blood from Arm, Right Updated: 05/23/23 2018     WBC 3 84 Thousand/uL      RBC 3 99 Million/uL      Hemoglobin 12 4 g/dL      Hematocrit 37 9 %      MCV 95 fL      MCH 31 1 pg      MCHC 32 7 g/dL      RDW 13 5 %      MPV 9 9 fL      Platelets 61 Thousands/uL      nRBC 0 /100 WBCs      Neutrophils Relative 44 %      Immat GRANS % 0 %      Lymphocytes Relative 46 %      Monocytes Relative 7 %      Eosinophils Relative 2 %      Basophils Relative 1 %      Neutrophils Absolute 1 67 Thousands/µL      Immature Grans Absolute 0 01 Thousand/uL      Lymphocytes Absolute 1 79 Thousands/µL      Monocytes Absolute 0 25 Thousand/µL      Eosinophils Absolute 0 09 Thousand/µL      Basophils Absolute 0 03 Thousands/µL     POCT alcohol breath test [[id number]]     Lab Status: No result                  CT head without contrast   Final Result by Sofia Storm MD (05/24 1654)      No acute intracranial abnormality                    Workstation performed: [id number]                    Procedures  ECG 12 Lead Documentation Only    Date/Time: 5/23/2023 11:43 PM  Performed by: Clayton Lawler Zuly Paiz MD  Authorized by: Gerber Ta MD     Indications / Diagnosis:  Syncope  ECG reviewed by me, the ED Provider: yes    Patient location:  ED  Previous ECG:     Previous ECG:  Compared to current    Comparison ECG info:  3/13/23    Similarity:  No change  Rate:     ECG rate:  63  Rhythm:     Rhythm: sinus rhythm    Ectopy:     Ectopy: none    QRS:     QRS axis:  Normal  Conduction:     Conduction: abnormal      Abnormal conduction: 1st degree    ST segments:     ST segments:  Normal  T waves:     T waves: normal               ED Course  ED Course as of 05/24/23 0149   Tue May 23, 2023   2300 MEDICAL ALCOHOL(!): 258   2340 WBC(!): 3 84   2340 Platelet Count(!): 61  Chronic leukopenia and thrombocytopenia  She reports he has not yet followed up with hematology but her PCP has referred her  Wed May 24, 2023   0142 CT head unremarkable  Patient appropriate  I discussed likelihood of alcohol related seizures  She reports that she is on a waiting list for behavioral health treatment which may ultimately help her alcoholism  0143 Stable for discharge  Reports that she wants to wait until her  wakes up early in the morning for discharge with him  MDM    Disposition  Final diagnoses:   None     ED Disposition     None      Follow-up Information    None         Patient's Medications   Discharge Prescriptions    No medications on file       No discharge procedures on file      PDMP Review       Value Time User    PDMP Reviewed  Yes 5/13/2023  1:58 PM Shanon Talley MD          ED Provider  Electronically Signed by Time reflects when diagnosis was documented in both MDM as applicable and the Disposition within this note     Time User Action Codes Description Comment    5/24/2023  1:50 AM Debby November Add [F10 039] Alcohol intoxication [facility])       ED Disposition     ED Disposition   Discharge    Condition   Stable    Date/Time   Wed May 24, 2023  1:50 AM    Comment   Bety Olivares discharge to home/self care                 Follow-up Information     Follow up With Specialties Details Why Arnold Carter MD Family Medicine   [address] [address]  [address]  [phone]            Discharge Medication List as of 5/24/2023  1:51 AM      CONTINUE these medications which have NOT CHANGED    Details   folic acid (FOLVITE) 1 mg tablet Take 1 tablet (1 mg total) by mouth daily Do not start before May 14, 2023 , Starting Sun 5/14/2023, Normal      !! gabapentin (NEURONTIN) 300 mg capsule Take 1 capsule (300 mg total) by mouth 2 (two) times a day, Starting Wed 5/10/2023, Normal      !! gabapentin (NEURONTIN) 300 mg capsule Take 1 capsule (300 mg total) by mouth 2 (two) times a day, Starting Sat 5/13/2023, Normal      !! hydrOXYzine HCL (ATARAX) 25 mg tablet Take 1 tablet (25 mg total) by mouth 2 (two) times a day, Starting Wed 5/10/2023, Normal      !! hydrOXYzine HCL (ATARAX) 25 mg tablet Take 1 tablet (25 mg total) by mouth 2 (two) times a day, Starting Sat 5/13/2023, Normal      !! lacosamide (VIMPAT) 200 mg tablet Take 1 tablet (200 mg total) by mouth 2 (two) times a day, Starting Mon 2/6/2023, Normal      !! lacosamide (VIMPAT) 200 mg tablet Take 1 tablet (200 mg total) by mouth every 12 (twelve) hours, Starting Sat 5/13/2023, Normal      magnesium gluconate (MAGONATE) 500 mg tablet Take 1 tablet (500 mg total) by mouth 2 (two) times a day, Starting Sat 5/13/2023, Normal      !! metoprolol succinate (TOPROL-XL) 25 mg 24 hr tablet Take 1 tablet (25 mg total) by mouth 2 (two) times a day, Starting Wed 5/10/2023, Normal      !! metoprolol succinate (TOPROL-XL) 25 mg 24 hr tablet Take 1 tablet (25 mg total) by mouth 2 (two) times a day, Starting Sat 5/13/2023, Normal      thiamine 100 MG tablet Take 1 tablet (100 mg total) by mouth daily Do not start before May 14, 2023 , Starting Sun 5/14/2023, Normal      !! traZODone (DESYREL) 50 mg tablet Take 1 tablet (50 mg total) by mouth in the morning, Starting Wed 5/10/2023, Normal      !! traZODone (DESYREL) 50 mg tablet Take 1 tablet (50 mg total) by mouth daily at bedtime, Starting Sat 5/13/2023, Normal      desvenlafaxine succinate (PRISTIQ) 50 mg 24 hr tablet Take 1 tablet (50 mg total) by mouth daily Do not start before May 14, 2023 , Starting Sun 5/14/2023, Normal       !! - Potential duplicate medications found  Please discuss with provider  No discharge procedures on file      PDMP Review       Value Time User    PDMP Reviewed  Yes 5/13/2023  1:58 PM Nhan Talley MD          ED Provider  Electronically Signed by           Ishan Chang MD  06/21/23 8765

## 2023-05-26 LAB — LACOSAMIDE SERPL-MCNC: 24.2 UG/ML (ref 5–10)

## 2023-06-06 ENCOUNTER — TELEPHONE (OUTPATIENT)
Dept: OTHER | Facility: OTHER | Age: 63
End: 2023-06-06

## 2023-06-06 NOTE — TELEPHONE ENCOUNTER
Patient is calling regarding cancelling an appointment      Date/Time:  6 6 2023 @ 8am Hematology oncology [place]  Patient was rescheduled: YES [] NO [x]    Patient requesting call back to reschedule: YES [] NO [x]

## 2023-06-07 ENCOUNTER — TELEPHONE (OUTPATIENT)
Dept: NEUROLOGY | Facility: CLINIC | Age: 63
End: 2023-06-07

## 2023-06-08 NOTE — TELEPHONE ENCOUNTER
Thank you so much for your response, patient is now changed from [facility] to [place] on 07/03/2023 , C[place], 9 am

## 2023-06-12 ENCOUNTER — TELEPHONE (OUTPATIENT)
Dept: PSYCHIATRY | Facility: CLINIC | Age: 63
End: 2023-06-12

## 2023-06-12 NOTE — TELEPHONE ENCOUNTER
Received referral from intake dept due to dx of alcohol abuse  Attempted to contact patient - Did not answer and VM full

## 2023-06-16 ENCOUNTER — TELEPHONE (OUTPATIENT)
Dept: PSYCHIATRY | Facility: CLINIC | Age: 63
End: 2023-06-16

## 2023-06-16 DIAGNOSIS — F41.9 ANXIETY: ICD-10-CM

## 2023-06-16 PROBLEM — I51.81 STRESS-INDUCED CARDIOMYOPATHY: Status: ACTIVE | Noted: 2022-04-27

## 2023-06-16 RX ORDER — DESVENLAFAXINE SUCCINATE 50 MG/1
50 TABLET, EXTENDED RELEASE ORAL DAILY
Qty: 30 TABLET | Refills: 0 | Status: SHIPPED | OUTPATIENT
Start: 2023-06-16 | End: 2023-07-18 | Stop reason: SDUPTHER

## 2023-06-16 NOTE — TELEPHONE ENCOUNTER
Patient has been added to the Medication Management wait list with a referral     Insurance: [employer]  Insurance Type:    Commercial [x]   Medicaid []   [place] (if applicable)   Medicare []  Location Preference: [place]  Provider Preference: no prov pref  Virtual: Yes [] No [x]

## 2023-07-03 ENCOUNTER — OFFICE VISIT (OUTPATIENT)
Dept: NEUROLOGY | Facility: CLINIC | Age: 63
End: 2023-07-03
Payer: COMMERCIAL

## 2023-07-03 VITALS
HEART RATE: 64 BPM | TEMPERATURE: 97.5 F | SYSTOLIC BLOOD PRESSURE: 140 MMHG | BODY MASS INDEX: 21.41 KG/M2 | DIASTOLIC BLOOD PRESSURE: 78 MMHG | HEIGHT: 64 IN | OXYGEN SATURATION: 95 % | WEIGHT: 125.4 LBS

## 2023-07-03 DIAGNOSIS — G40.109 NONINTRACTABLE FOCAL EPILEPSY (HCC): Primary | ICD-10-CM

## 2023-07-03 DIAGNOSIS — G40.109 SYMPTOMATIC FOCAL EPILEPSY (HCC): ICD-10-CM

## 2023-07-03 PROCEDURE — 99215 OFFICE O/P EST HI 40 MIN: CPT | Performed by: PSYCHIATRY & NEUROLOGY

## 2023-07-03 RX ORDER — ZONISAMIDE 100 MG/1
CAPSULE ORAL
Qty: 60 CAPSULE | Refills: 5 | Status: SHIPPED | OUTPATIENT
Start: 2023-07-03

## 2023-07-03 RX ORDER — LACOSAMIDE 200 MG/1
200 TABLET ORAL 2 TIMES DAILY
Qty: 60 TABLET | Refills: 5 | Status: SHIPPED | OUTPATIENT
Start: 2023-07-03

## 2023-07-03 NOTE — PROGRESS NOTES
Review of Systems   Constitutional: Negative for appetite change, fatigue and fever. HENT: Negative. Negative for hearing loss, tinnitus, trouble swallowing and voice change. Eyes: Negative. Negative for photophobia, pain and visual disturbance. Respiratory: Negative. Negative for shortness of breath. Cardiovascular: Negative. Negative for palpitations. Gastrointestinal: Negative. Negative for nausea and vomiting. Endocrine: Negative. Negative for cold intolerance. Genitourinary: Negative. Negative for dysuria, frequency and urgency. Musculoskeletal: Negative for back pain, gait problem, myalgias and neck pain. Skin: Negative. Negative for rash. Allergic/Immunologic: Negative. Neurological: Positive for dizziness, seizures (no recent sz) and light-headedness. Negative for tremors, syncope, facial asymmetry, speech difficulty, weakness, numbness and headaches. Hematological: Negative. Does not bruise/bleed easily. Psychiatric/Behavioral: Positive for confusion (daily). Negative for hallucinations and sleep disturbance. All other systems reviewed and are negative.

## 2023-07-03 NOTE — PROGRESS NOTES
[facility]  Follow Up Visit    Impression/Plan    Ms. Jamie Tirado is a 61 y.o. female with history that includes alcohol abuse, anxiety, osteoporosis, pancytopenia/thrombocytopenia, traumatic SAH, and vitamin D deficiency returning for follow-up regarding focal epilepsy, left temporal onset, in the setting of prior traumatic SAH (multifocal, right hemisphere, 8/2021). There was focal status epilepticus in 3/2022 (on VEEG, with associated left hippocampal DWI signal). She continues to have focal aware seizures involving panic/terror lasting about a minute. Will add zonisamide. Continue lacosamide unchanged (most recent levels were supratherapeutic). She may required EMU admission if events do not improve with escalation in therapy. Patient Instructions   1. Start zonisamide 100 mg nightly x 2 weeks and then take 200 mg nightly. 2. Let us know if there are clear seizures. 3. Keep a log of you events of "terror" which may be focal aware seizures. 4. Return in 3 months. Diagnoses and all orders for this visit:    Nonintractable focal epilepsy ([address])  -     zonisamide (Zonegran) 100 mg capsule; Take one pill nightly for 2 weeks and then 2 pills nightly. Symptomatic focal epilepsy (HCC)  -     lacosamide (VIMPAT) 200 mg tablet; Take 1 tablet (200 mg total) by mouth 2 (two) times a day        Lucero Ray is returning to the [facility] for follow up. Interval Events:   Seizures since last visit: Hospital visits in May for seizure, believes there have been none since. Focal aware seizures involving sensation of "terror" occur multiple times per day. Hospitalizations: yes    Last seen for virtual visit on 4/13/2023 by Connie Malave. I last saw her in February 2023. In February she reported brief "panic attacks" lasting 1 to 2 minutes and occurring daily without trigger concerning for temporal lobe seizures.   There have been no panic attacks prior to her hospitalization in March 2022 when multiple electrographic seizures arising from the left temporal region were captured on video EEG. At that February 2023 visit she was instructed to continue lacosamide 150 mg twice daily and have EEGs completed. She was hospitalized in 2/10/2023, 1 week after her last visit, for "breakthrough seizures, panic attacks and EEG monitoring to characterize spells."  On February 11 she had one clinical event captured on video EEG that she described as flashback of memories, feeling scared and feeling short of breath. There was no EEG correlate, but focal aware seizure without EEG change was considered possible. Her lacosamide was increased to 200 mg twice daily. On March 13, 2023 she presented to [facility] the event described as staring off, unresponsive, urinary incontinence. The notes described that she was "actively seizing on arrival and appeared to have bruises all over her body. GCS of 14 initially, followed by unresponsiveness with rightward gaze and right focal seizure."  She was given Ativan and loaded with levetiracetam.  Seizure apparently stopped. Chart review while she was in the hospital revealed that there was concern that levetiracetam may have contributed to thrombocytopenia and therefore it was stopped. There was some concern for medication nonadherence, although the patient denied. Lacosamide level on March 13 was 19.2 (elevated). She was continued on lacosamide 200 mg twice daily. Admission in May for seizure, thought possibly related to alcohol. At that time there was vomiting she was in the bathroom. There was blank stare and she started to have right lip biting, right arm shaking and bilateral leg weakness and then tonic-clonic shaking. The event was described at the time as lasting 10 to 15 minutes. Afterwards according to her spouse she was not responding appropriately.   Believes there have been no seizures since. Had drink last night, but nothing for a week prior. Lacosamide level was supratherapeutic when checked in the emergency department in May. She was continued on lacosamide 200 mg twice daily and gabapentin 300 mg twice daily. Continues to have episodes of "terror" lasting a minute or 2, 2-3 times per day. As noted above, one of these was captured on video EEG earlier this year and had no EEG correlate, but focal aware seizure without scalp EEG correlate was thought possible. Ran out of some medication that had resulted in improvement in her daily episodes of severe anxiety. She cannot recall what the medication is. I read off all the recent medications that have been prescribed in the EMR and she remarks that she is still taking all of them. Had episodes of brief terror when she was a child. Recalls one in her 25s where she couldn't talk. Started again in 2021/2022. She is waiting to get in with psychiatry. She remarks that she has been having more trouble with her memory lately. Current AEDs:  Lacosamide 200 mg twice daily  Gabapentin 300 mg twice daily  Medication side effects: None  Medication adherence: Yes    Event/Seizure semiology:  · Focal seizure: Hand clenching and dropping GCS  · Panic attack feeling (unclear if it is seizure or psychiatric related): brief 1-2 min panic attacks occurring 2-3 times per day without any specific triggers. Captured on VEEG in 2/2023 ("An event involving flashback of memory, scary feeling and feeling short of breath did not have EEG correlate. Seizure is not excluded. Focal aware seizures often have no EEG correlate. ")  · The event captured without vEEG correlate at the hospital in March 2022: the patient was shaking/tensing bilateral arms and hands with some rhythmic right shoulder jerking activity. There was no electrographic seizure during this time.  She looked like she was waking up on the EEG background.   · Recurrent focal seizures from the left temporal region on VEEG in 3/2022. No clear clinical correlate. Special Features  Status epilepticus: yes - subclinical electrographic seizures captured on vEEG in March 2022  Self Injury Seizures: no  Precipitating Factors: none     Epilepsy Risk Factors:  Alcohol abuse and prior SAH in Aug 2021     Prior AEDs:  Levetiracetam: Lowered platelets     Prior Evaluation:     10/28/2022: MRI brain seizure with and without contrast   Stable superficial siderosis and scattered foci of chronic hemosiderin deposition consistent with patient's history of prior intracranial hemorrhage and trauma. Mild chronic microangiopathy.     Symmetric hippocampal formations with regard to size and signal. The previously seen subtle diffusion abnormality within the left hippocampus is no longer evident.     3/10/2022: MRI brain with and without contrast  Focal diffusion-weighted and FLAIR signal hyperintensity involving the left hippocampal formation is consistent with status epilepticus.      10/12/2022: Routine EEG impression   This is an abnormal routine EEG recording due to: Generalized irregular theta activity and slow PDR consistent with mild generalized nonspecific encephalopathy.  No electrographic seizures.     3/8/2022: EEG video monitoring  This is an abnormal 22 hours continuous video EEG recording due to excessive diffuse delta activity during the waking state, but the background primarily consists of diffuse beta-alpha activity. This finding indicates mild-moderate diffuse cerebral dysfunction. Excessive beta activity is seen in the presence of CNS activating agents, such as benzodiazepines and barbiturates.      3/7/2022: EEG video monitoring  Events:   07:58 - nurse notices that the patient is shaking her right arm and hand but also the left hand is shaking. Naveen Slack is some rhythmic right shoulder jerking activity, but it also appears that the arms and hands are tensing up.   There is no electrographic seizure during this time. Angela Cancer looks like she is waking up on the EEG background. Interpretation:   This is an abnormal nearly 23.5 hours continuous video EEG recording due to the presence of recurrent focal seizures from the left temporal region. This finding indicate the presence of an epileptogenic focus in the left temporal region.     Routine EEG 10/12/2022:  Clinical Interpretation: This abnormal study is consistent with mild generalized non-specific encephalopathy. No electrographic seizures, interictal epileptiform discharges (seizure tendency) or focal slowing were seen. No diagnostic clinical events were captured.      Video EEG 2/10-2/13/2023:  Interpretation: This prolonged, continuous video-EEG recording is essentially normal.   Enhanced beta activities are likely a medication effect related to benzodiazepine administration.    An event involving flashback of memory, scary feeling and feeling short of breath did not have EEG correlate. Seizure is not excluded. Focal aware seizures often have no EEG correlate. No epileptiform discharges or electrographic seizures.      Routine EEG 3/13/2023:  EEG impression: This is an abnormal routine EEG recording due to:  1. Left temporal irregular delta  2. Generalized irregular delta activity in drowsiness     Clinical Interpretation: This abnormal study is consistent with a mild generalized non-specific encephalopathy and focal left temporal non-specific cerebral dysfunction. No electrographic seizures or interictal epileptiform discharges (seizure tendency) were seen.  No diagnostic clinical events were captured.      Psychiatric History:  Anxiety  Psychiatric admission     Social History:   Driving: No  Lives Alone: No  Occupation: unknown occupation      Objective    /78 (BP Location: Right arm, Patient Position: Sitting, Cuff Size: Standard)   Pulse 64   Temp 97.5 °F (36.4 °C) (Temporal)   Ht 5' 4" (1.626 m)   Wt 56.9 kg (125 lb 6.4 oz)   SpO2 95% BMI 21.52 kg/m²      General Exam  No acute distress. Neurologic Exam  Mental Status:  Alert and oriented x 3. Language: normal fluency and comprehension. Cranial Nerves:  VFFTC. EOMI, no nystagums. Face symmetric. No dysarthria. Motor:  No drift. Strength 5/5 throughout. Coordination: Finger to nose intact. Gait: Steady casual gait. I have spent a total time of 50 minutes on 07/07/23 in caring for this patient including Diagnostic results, Prognosis, Risks and benefits of tx options, Instructions for management, Patient and family education, Importance of tx compliance, Risk factor reductions, Impressions, Counseling / Coordination of care, Documenting in the medical record, Reviewing / ordering tests, medicine, procedures   and Obtaining or reviewing history  . yes

## 2023-07-03 NOTE — PATIENT INSTRUCTIONS
Start zonisamide 100 mg nightly x 2 weeks and then take 200 mg nightly. Let us know if there are clear seizures. Keep a log of you events of "terror" which may be focal aware seizures. Return in 3 months.

## 2023-07-09 ENCOUNTER — HOSPITAL ENCOUNTER (EMERGENCY)
Facility: HOSPITAL | Age: 63
End: 2023-07-09
Attending: EMERGENCY MEDICINE
Payer: COMMERCIAL

## 2023-07-09 ENCOUNTER — APPOINTMENT (EMERGENCY)
Dept: CT IMAGING | Facility: HOSPITAL | Age: 63
End: 2023-07-09
Payer: COMMERCIAL

## 2023-07-09 ENCOUNTER — HOSPITAL ENCOUNTER (INPATIENT)
Facility: HOSPITAL | Age: 63
LOS: 3 days | Discharge: HOME | DRG: 897 | End: 2023-07-12
Attending: EMERGENCY MEDICINE | Admitting: EMERGENCY MEDICINE
Payer: COMMERCIAL

## 2023-07-09 VITALS
HEART RATE: 62 BPM | DIASTOLIC BLOOD PRESSURE: 76 MMHG | WEIGHT: 127.87 LBS | BODY MASS INDEX: 21.95 KG/M2 | TEMPERATURE: 97.8 F | RESPIRATION RATE: 16 BRPM | SYSTOLIC BLOOD PRESSURE: 125 MMHG | OXYGEN SATURATION: 99 %

## 2023-07-09 DIAGNOSIS — G40.109 NONINTRACTABLE FOCAL EPILEPSY (HCC): ICD-10-CM

## 2023-07-09 DIAGNOSIS — I50.21 ACUTE SYSTOLIC CONGESTIVE HEART FAILURE (HCC): ICD-10-CM

## 2023-07-09 DIAGNOSIS — F10.10 ALCOHOL ABUSE: Primary | ICD-10-CM

## 2023-07-09 DIAGNOSIS — F41.0 PANIC ATTACK: ICD-10-CM

## 2023-07-09 DIAGNOSIS — I42.9 CARDIOMYOPATHY (HCC): ICD-10-CM

## 2023-07-09 DIAGNOSIS — F10.929 ALCOHOL INTOXICATION (HCC): ICD-10-CM

## 2023-07-09 DIAGNOSIS — G40.919 BREAKTHROUGH SEIZURE (HCC): ICD-10-CM

## 2023-07-09 DIAGNOSIS — F41.9 ANXIETY: ICD-10-CM

## 2023-07-09 DIAGNOSIS — R56.9 SEIZURE (HCC): Primary | ICD-10-CM

## 2023-07-09 PROBLEM — F10.939 ALCOHOL WITHDRAWAL SYNDROME WITH COMPLICATION (HCC): Status: ACTIVE | Noted: 2023-07-09

## 2023-07-09 PROBLEM — F32.A DEPRESSION: Status: ACTIVE | Noted: 2022-03-15

## 2023-07-09 PROBLEM — I50.40 COMBINED SYSTOLIC AND DIASTOLIC CONGESTIVE HEART FAILURE (HCC): Status: ACTIVE | Noted: 2023-07-09

## 2023-07-09 PROBLEM — D70.9 NEUTROPENIA (HCC): Status: ACTIVE | Noted: 2023-07-09

## 2023-07-09 PROBLEM — F10.20 ALCOHOL USE DISORDER, SEVERE, DEPENDENCE (HCC): Status: ACTIVE | Noted: 2023-07-09

## 2023-07-09 LAB
ALBUMIN SERPL BCP-MCNC: 4.2 G/DL (ref 3.5–5)
ALP SERPL-CCNC: 118 U/L (ref 34–104)
ALT SERPL W P-5'-P-CCNC: 31 U/L (ref 7–52)
ANION GAP SERPL CALCULATED.3IONS-SCNC: 9 MMOL/L
AST SERPL W P-5'-P-CCNC: 48 U/L (ref 13–39)
BASOPHILS # BLD AUTO: 0.01 THOUSANDS/ÂΜL (ref 0–0.1)
BASOPHILS NFR BLD AUTO: 0 % (ref 0–1)
BILIRUB SERPL-MCNC: 0.47 MG/DL (ref 0.2–1)
BUN SERPL-MCNC: 19 MG/DL (ref 5–25)
CALCIUM SERPL-MCNC: 9.1 MG/DL (ref 8.4–10.2)
CARDIAC TROPONIN I PNL SERPL HS: <2 NG/L
CARDIAC TROPONIN I PNL SERPL HS: <2 NG/L
CHLORIDE SERPL-SCNC: 104 MMOL/L (ref 96–108)
CO2 SERPL-SCNC: 21 MMOL/L (ref 21–32)
CREAT SERPL-MCNC: 0.73 MG/DL (ref 0.6–1.3)
EOSINOPHIL # BLD AUTO: 0.08 THOUSAND/ÂΜL (ref 0–0.61)
EOSINOPHIL NFR BLD AUTO: 3 % (ref 0–6)
ERYTHROCYTE [DISTWIDTH] IN BLOOD BY AUTOMATED COUNT: 13.3 % (ref 11.6–15.1)
ETHANOL SERPL-MCNC: 89 MG/DL
GFR SERPL CREATININE-BSD FRML MDRD: 87 ML/MIN/1.73SQ M
GLUCOSE SERPL-MCNC: 97 MG/DL (ref 65–140)
HCT VFR BLD AUTO: 40.8 % (ref 34.8–46.1)
HGB BLD-MCNC: 13.3 G/DL (ref 11.5–15.4)
IMM GRANULOCYTES # BLD AUTO: 0 THOUSAND/UL (ref 0–0.2)
IMM GRANULOCYTES NFR BLD AUTO: 0 % (ref 0–2)
LYMPHOCYTES # BLD AUTO: 1.35 THOUSANDS/ÂΜL (ref 0.6–4.47)
LYMPHOCYTES NFR BLD AUTO: 43 % (ref 14–44)
MAGNESIUM SERPL-MCNC: 2.1 MG/DL (ref 1.9–2.7)
MCH RBC QN AUTO: 31 PG (ref 26.8–34.3)
MCHC RBC AUTO-ENTMCNC: 32.6 G/DL (ref 31.4–37.4)
MCV RBC AUTO: 95 FL (ref 82–98)
MONOCYTES # BLD AUTO: 0.28 THOUSAND/ÂΜL (ref 0.17–1.22)
MONOCYTES NFR BLD AUTO: 9 % (ref 4–12)
NEUTROPHILS # BLD AUTO: 1.43 THOUSANDS/ÂΜL (ref 1.85–7.62)
NEUTS SEG NFR BLD AUTO: 45 % (ref 43–75)
NRBC BLD AUTO-RTO: 0 /100 WBCS
PLATELET # BLD AUTO: 76 THOUSANDS/UL (ref 149–390)
PLATELET BLD QL SMEAR: ABNORMAL
PMV BLD AUTO: 10.6 FL (ref 8.9–12.7)
POTASSIUM SERPL-SCNC: 4.5 MMOL/L (ref 3.5–5.3)
PROT SERPL-MCNC: 7.6 G/DL (ref 6.4–8.4)
RBC # BLD AUTO: 4.29 MILLION/UL (ref 3.81–5.12)
RBC MORPH BLD: NORMAL
SODIUM SERPL-SCNC: 134 MMOL/L (ref 135–147)
WBC # BLD AUTO: 3.15 THOUSAND/UL (ref 4.31–10.16)

## 2023-07-09 PROCEDURE — 96367 TX/PROPH/DG ADDL SEQ IV INF: CPT

## 2023-07-09 PROCEDURE — 80235 DRUG ASSAY LACOSAMIDE: CPT

## 2023-07-09 PROCEDURE — 96375 TX/PRO/DX INJ NEW DRUG ADDON: CPT

## 2023-07-09 PROCEDURE — 82077 ASSAY SPEC XCP UR&BREATH IA: CPT

## 2023-07-09 PROCEDURE — 80053 COMPREHEN METABOLIC PANEL: CPT | Performed by: EMERGENCY MEDICINE

## 2023-07-09 PROCEDURE — 99291 CRITICAL CARE FIRST HOUR: CPT | Performed by: NURSE PRACTITIONER

## 2023-07-09 PROCEDURE — 36415 COLL VENOUS BLD VENIPUNCTURE: CPT | Performed by: EMERGENCY MEDICINE

## 2023-07-09 PROCEDURE — HZ2ZZZZ DETOXIFICATION SERVICES FOR SUBSTANCE ABUSE TREATMENT: ICD-10-PCS | Performed by: EMERGENCY MEDICINE

## 2023-07-09 PROCEDURE — 96368 THER/DIAG CONCURRENT INF: CPT

## 2023-07-09 PROCEDURE — 84484 ASSAY OF TROPONIN QUANT: CPT | Performed by: EMERGENCY MEDICINE

## 2023-07-09 PROCEDURE — 96365 THER/PROPH/DIAG IV INF INIT: CPT

## 2023-07-09 PROCEDURE — 93005 ELECTROCARDIOGRAM TRACING: CPT

## 2023-07-09 PROCEDURE — 70450 CT HEAD/BRAIN W/O DYE: CPT

## 2023-07-09 PROCEDURE — 99285 EMERGENCY DEPT VISIT HI MDM: CPT

## 2023-07-09 PROCEDURE — G1004 CDSM NDSC: HCPCS

## 2023-07-09 PROCEDURE — 83735 ASSAY OF MAGNESIUM: CPT

## 2023-07-09 PROCEDURE — 85025 COMPLETE CBC W/AUTO DIFF WBC: CPT | Performed by: EMERGENCY MEDICINE

## 2023-07-09 RX ORDER — PHENOBARBITAL SODIUM 130 MG/ML
130 INJECTION INTRAMUSCULAR ONCE
Status: COMPLETED | OUTPATIENT
Start: 2023-07-09 | End: 2023-07-09

## 2023-07-09 RX ORDER — GABAPENTIN 300 MG/1
300 CAPSULE ORAL 2 TIMES DAILY
Status: DISCONTINUED | OUTPATIENT
Start: 2023-07-09 | End: 2023-07-12 | Stop reason: HOSPADM

## 2023-07-09 RX ORDER — LANOLIN ALCOHOL/MO/W.PET/CERES
100 CREAM (GRAM) TOPICAL DAILY
Status: DISCONTINUED | OUTPATIENT
Start: 2023-07-10 | End: 2023-07-10

## 2023-07-09 RX ORDER — CHLORDIAZEPOXIDE HYDROCHLORIDE 25 MG/1
50 CAPSULE, GELATIN COATED ORAL ONCE
Status: COMPLETED | OUTPATIENT
Start: 2023-07-09 | End: 2023-07-09

## 2023-07-09 RX ORDER — LORAZEPAM 2 MG/ML
2 INJECTION INTRAMUSCULAR ONCE AS NEEDED
Status: DISCONTINUED | OUTPATIENT
Start: 2023-07-09 | End: 2023-07-12 | Stop reason: HOSPADM

## 2023-07-09 RX ORDER — ACETAMINOPHEN 325 MG/1
650 TABLET ORAL EVERY 6 HOURS PRN
Status: DISCONTINUED | OUTPATIENT
Start: 2023-07-09 | End: 2023-07-12 | Stop reason: HOSPADM

## 2023-07-09 RX ORDER — DESVENLAFAXINE SUCCINATE 50 MG/1
50 TABLET, EXTENDED RELEASE ORAL DAILY
Status: DISCONTINUED | OUTPATIENT
Start: 2023-07-10 | End: 2023-07-12 | Stop reason: HOSPADM

## 2023-07-09 RX ORDER — FOLIC ACID 1 MG/1
1 TABLET ORAL DAILY
Status: DISCONTINUED | OUTPATIENT
Start: 2023-07-10 | End: 2023-07-12 | Stop reason: HOSPADM

## 2023-07-09 RX ORDER — PHENOBARBITAL SODIUM 130 MG/ML
260 INJECTION INTRAMUSCULAR ONCE
Status: COMPLETED | OUTPATIENT
Start: 2023-07-09 | End: 2023-07-09

## 2023-07-09 RX ORDER — LACOSAMIDE 200 MG/1
200 TABLET ORAL EVERY 12 HOURS SCHEDULED
Status: DISCONTINUED | OUTPATIENT
Start: 2023-07-09 | End: 2023-07-12 | Stop reason: HOSPADM

## 2023-07-09 RX ORDER — PHENOBARBITAL 64.8 MG/1
64.8 TABLET ORAL ONCE
Status: COMPLETED | OUTPATIENT
Start: 2023-07-09 | End: 2023-07-10

## 2023-07-09 RX ORDER — ENOXAPARIN SODIUM 100 MG/ML
40 INJECTION SUBCUTANEOUS DAILY
Status: DISCONTINUED | OUTPATIENT
Start: 2023-07-10 | End: 2023-07-09

## 2023-07-09 RX ORDER — TRAZODONE HYDROCHLORIDE 50 MG/1
50 TABLET ORAL
Status: DISCONTINUED | OUTPATIENT
Start: 2023-07-09 | End: 2023-07-12 | Stop reason: HOSPADM

## 2023-07-09 RX ORDER — DIAZEPAM 5 MG/ML
10 INJECTION, SOLUTION INTRAMUSCULAR; INTRAVENOUS ONCE
Status: COMPLETED | OUTPATIENT
Start: 2023-07-09 | End: 2023-07-09

## 2023-07-09 RX ORDER — ONDANSETRON 2 MG/ML
4 INJECTION INTRAMUSCULAR; INTRAVENOUS EVERY 6 HOURS PRN
Status: DISCONTINUED | OUTPATIENT
Start: 2023-07-09 | End: 2023-07-12 | Stop reason: HOSPADM

## 2023-07-09 RX ORDER — METOPROLOL SUCCINATE 25 MG/1
25 TABLET, EXTENDED RELEASE ORAL 2 TIMES DAILY
Status: DISCONTINUED | OUTPATIENT
Start: 2023-07-09 | End: 2023-07-12 | Stop reason: HOSPADM

## 2023-07-09 RX ORDER — HYDROXYZINE HYDROCHLORIDE 25 MG/1
25 TABLET, FILM COATED ORAL 2 TIMES DAILY
Status: DISCONTINUED | OUTPATIENT
Start: 2023-07-09 | End: 2023-07-12 | Stop reason: HOSPADM

## 2023-07-09 RX ORDER — ZONISAMIDE 100 MG/1
200 CAPSULE ORAL
Status: DISCONTINUED | OUTPATIENT
Start: 2023-07-09 | End: 2023-07-12 | Stop reason: HOSPADM

## 2023-07-09 RX ORDER — SODIUM CHLORIDE 9 MG/ML
50 INJECTION, SOLUTION INTRAVENOUS CONTINUOUS
Status: DISCONTINUED | OUTPATIENT
Start: 2023-07-09 | End: 2023-07-10

## 2023-07-09 RX ADMIN — PHENOBARBITAL SODIUM 130 MG: 130 INJECTION INTRAMUSCULAR; INTRAVENOUS at 17:28

## 2023-07-09 RX ADMIN — SODIUM CHLORIDE 100 ML/HR: 0.9 INJECTION, SOLUTION INTRAVENOUS at 16:41

## 2023-07-09 RX ADMIN — HYDROXYZINE HYDROCHLORIDE 25 MG: 25 TABLET ORAL at 19:30

## 2023-07-09 RX ADMIN — FOLIC ACID 1 MG: 5 INJECTION, SOLUTION INTRAMUSCULAR; INTRAVENOUS; SUBCUTANEOUS at 12:56

## 2023-07-09 RX ADMIN — LACOSAMIDE 200 MG: 200 TABLET, FILM COATED ORAL at 21:21

## 2023-07-09 RX ADMIN — THIAMINE HYDROCHLORIDE 100 MG: 100 INJECTION, SOLUTION INTRAMUSCULAR; INTRAVENOUS at 12:08

## 2023-07-09 RX ADMIN — TRAZODONE HYDROCHLORIDE 50 MG: 50 TABLET ORAL at 21:21

## 2023-07-09 RX ADMIN — ZONISAMIDE 200 MG: 100 CAPSULE ORAL at 21:21

## 2023-07-09 RX ADMIN — SODIUM CHLORIDE, SODIUM LACTATE, POTASSIUM CHLORIDE, AND CALCIUM CHLORIDE 1000 ML: .6; .31; .03; .02 INJECTION, SOLUTION INTRAVENOUS at 11:46

## 2023-07-09 RX ADMIN — LEVETIRACETAM 1000 MG: 100 INJECTION, SOLUTION INTRAVENOUS at 13:48

## 2023-07-09 RX ADMIN — PHENOBARBITAL SODIUM 260 MG: 130 INJECTION INTRAMUSCULAR at 16:52

## 2023-07-09 RX ADMIN — GABAPENTIN 300 MG: 300 CAPSULE ORAL at 19:30

## 2023-07-09 RX ADMIN — LEVETIRACETAM 750 MG: 250 TABLET, FILM COATED ORAL at 21:21

## 2023-07-09 RX ADMIN — DIAZEPAM 10 MG: 10 INJECTION, SOLUTION INTRAMUSCULAR; INTRAVENOUS at 11:38

## 2023-07-09 RX ADMIN — CHLORDIAZEPOXIDE HYDROCHLORIDE 50 MG: 25 CAPSULE ORAL at 12:09

## 2023-07-09 NOTE — ASSESSMENT & PLAN NOTE
Pt with hx of alcohol use disorder  States one bottle of wine per night for "several years"   H/o withdrawal seizures vs seizure disorder   Withdrawal management as above  Daily thiamine/folic acid supplementation, and MVI  Consult case management for assistance with aftercare resources - pt interested in outpatient follow up.

## 2023-07-09 NOTE — ASSESSMENT & PLAN NOTE
· Last drink 7/9 AM   Serum alcohol 89 mg/dL in the ED  No current alcohol withdrawal symptoms, appears to be through the time window for alcohol withdrawal  SEWs discontinued yesterday.  Patient has been monitored off of protocol and no longer displays signs or symptoms of alcohol withdrawal

## 2023-07-09 NOTE — ED NOTES
This RN came into room to give IV valium. Pt received dose and immediately became nonverbal and eyes started rolling back. No jerking movements noted.  Pt turned to side and provider called into room     January Contreras RN  07/09/23 [id number]

## 2023-07-09 NOTE — ED NOTES
Report given to [facility] from [facility] [place], [place], RN  07/09/23 [id number]

## 2023-07-09 NOTE — ED PROVIDER NOTES
History  Chief Complaint   Patient presents with   • Seizure - Prior Hx Of     Per ems, pt was at [employer] and felt a seizure coming along and lowered self. Pt's  was there and caught pt. Per ems, pt has hx of seizures for past 3 years and relate it to alcohol. Per pt, had half a beer this morning. Pt has no complaints in triage     The patient is a 57-year-old female with a past medical history of seizure currently on Vimpat and subarachnoid hemorrhage who presents to the emergency department after sustaining a seizure at the grocery store. Her  is at bedside and reports that while shopping this morning he began to notice her slumping over the grocery cart. He asked if she was okay but she did not respond. He then asked if she was having a seizure and she shook her head yes. She then started to fall to the ground but he caught her and lowered her to the ground. He states that her tongue was caught between her teeth while they were clenched and he was able to get her child and push her tongue out of the way. There was no report of bleeding from the mouth. He states there was no convulsions but her eyes did roll into the back of her head. There was no urinary incontinence. The patient does have a history of seizures and is taking Vimpat 200 mg twice daily. She reports that she took her medication this morning as prescribed. The patient states that it was believed that her seizures have been occurring secondary to alcohol abuse. She states that she used to drink four 12 ounce cans of beer a day but she now reports only drinking 1 beer occasionally on weekends. She states that she had a half an 8 ounce can of beer last night and finished that bear this morning before going to the grocery store. She denies any other alcohol consumption in the past week.   Her  is at bedside and reports that he had a bottle of bourbon hidden in his closet and it appears that she found it last week and drank approximately half of the fifth. She currently denies headache, change in vision, lightheadedness, chest pain, shortness of breath, abdominal pain, nausea, vomiting, diarrhea, hematuria, dysuria, numbness, tingling, rash or fever. Prior to Admission Medications   Prescriptions Last Dose Informant Patient Reported? Taking?   desvenlafaxine succinate (PRISTIQ) 50 mg 24 hr tablet  Self No No   Sig: Take 1 tablet (50 mg total) by mouth daily   Patient not taking: Reported on 5/1/6414   folic acid (FOLVITE) 1 mg tablet   No No   Sig: Take 1 tablet (1 mg total) by mouth daily Do not start before March 15, 2023. folic acid (FOLVITE) 1 mg tablet  Self No No   Sig: Take 1 tablet (1 mg total) by mouth daily Do not start before May 14, 2023. gabapentin (NEURONTIN) 300 mg capsule  Self No No   Sig: Take 1 capsule (300 mg total) by mouth 2 (two) times a day   gabapentin (NEURONTIN) 300 mg capsule  Self No No   Sig: Take 1 capsule (300 mg total) by mouth 2 (two) times a day   hydrOXYzine HCL (ATARAX) 25 mg tablet  Self No No   Sig: Take 1 tablet (25 mg total) by mouth 2 (two) times a day   hydrOXYzine HCL (ATARAX) 25 mg tablet  Self No No   Sig: Take 1 tablet (25 mg total) by mouth 2 (two) times a day   lacosamide (VIMPAT) 200 mg tablet   No No   Sig: Take 1 tablet (200 mg total) by mouth 2 (two) times a day   magnesium gluconate (MAGONATE) 500 mg tablet  Self No No   Sig: Take 1 tablet (500 mg total) by mouth 2 (two) times a day   metoprolol succinate (TOPROL-XL) 25 mg 24 hr tablet  Self No No   Sig: Take 1 tablet (25 mg total) by mouth 2 (two) times a day   metoprolol succinate (TOPROL-XL) 25 mg 24 hr tablet  Self No No   Sig: Take 1 tablet (25 mg total) by mouth 2 (two) times a day   thiamine 100 MG tablet   No No   Sig: Take 1 tablet (100 mg total) by mouth daily Do not start before March 15, 2023.    Patient not taking: Reported on 7/3/2023   thiamine 100 MG tablet  Self No No   Sig: Take 1 tablet (100 mg total) by mouth daily Do not start before May 14, 2023. Patient not taking: Reported on 7/3/2023   traZODone (DESYREL) 50 mg tablet  Self No No   Sig: Take 1 tablet (50 mg total) by mouth in the morning   traZODone (DESYREL) 50 mg tablet  Self No No   Sig: Take 1 tablet (50 mg total) by mouth daily at bedtime   zonisamide (Zonegran) 100 mg capsule   No No   Sig: Take one pill nightly for 2 weeks and then 2 pills nightly. Facility-Administered Medications: None       Past Medical History:   Diagnosis Date   • Fracture    • Hepatitis     Hep A    • Insomnia     10mar2016 resolved   • Osteoporosis     14jun2016 resolved   • Pancreatitis    • SAH (subarachnoid hemorrhage) (HCC)    • Seasonal allergies    • Seizure ([address])    • Seizures (HCC)    • Urine discoloration 11/11/2019   • Varicella    • Vomiting 11/13/2019       Past Surgical History:   Procedure Laterality Date   • IR BIOPSY BONE  8/17/2021   • IR BIOPSY BONE MARROW  11/14/2019   • TUBAL LIGATION  1985       Family History   Problem Relation Age of Onset   • Anxiety disorder Mother    • Depression Mother    • No Known Problems Father    • No Known Problems Sister    • No Known Problems Sister    • No Known Problems Daughter    • No Known Problems Maternal Grandmother    • Cancer Maternal Grandfather    • Cancer Paternal Grandmother         unknown    • No Known Problems Paternal Grandfather    • No Known Problems Brother    • No Known Problems Son    • No Known Problems Son    • Breast cancer Neg Hx    • Ovarian cancer Neg Hx      I have reviewed and agree with the history as documented.     E-Cigarette/Vaping   • E-Cigarette Use Never User      E-Cigarette/Vaping Substances   • Nicotine No    • THC No    • CBD No    • Flavoring No    • Other No    • Unknown No      Social History     Tobacco Use   • Smoking status: Never   • Smokeless tobacco: Never   Vaping Use   • Vaping Use: Never used   Substance Use Topics   • Alcohol use: Yes     Comment: occassional   • Drug use: Never        Review of Systems   Constitutional: Negative for chills, fatigue and fever. HENT: Negative for congestion, ear pain and sore throat. Eyes: Negative for photophobia, pain and visual disturbance. Respiratory: Negative for cough, shortness of breath, wheezing and stridor. Cardiovascular: Negative for chest pain, palpitations and leg swelling. Gastrointestinal: Negative for abdominal distention, abdominal pain, constipation, diarrhea, nausea and vomiting. Endocrine: Negative. Genitourinary: Negative for dysuria and hematuria. Musculoskeletal: Negative for arthralgias, back pain, neck pain and neck stiffness. Skin: Negative for color change and rash. Allergic/Immunologic: Negative. Neurological: Positive for tremors and seizures. Negative for dizziness, syncope, weakness, light-headedness, numbness and headaches. Hematological: Negative. Psychiatric/Behavioral: Negative. All other systems reviewed and are negative. Physical Exam  ED Triage Vitals [07/09/23 1038]   Temperature Pulse Respirations Blood Pressure SpO2   97.8 °F (36.6 °C) 64 16 119/67 97 %      Temp Source Heart Rate Source Patient Position - Orthostatic VS BP Location FiO2 (%)   Oral Monitor Sitting Right arm --      Pain Score       --             Orthostatic Vital Signs  Vitals:    07/09/23 1038 07/09/23 1246 07/09/23 1400   BP: 119/67 143/65 125/76   Pulse: 64 62 62   Patient Position - Orthostatic VS: Sitting Lying Sitting       Physical Exam  Vitals and nursing note reviewed. Constitutional:       General: She is not in acute distress. Appearance: She is well-developed and normal weight. She is ill-appearing. HENT:      Head: Normocephalic and atraumatic. Nose: Nose normal.      Mouth/Throat:      Mouth: Mucous membranes are moist.      Pharynx: Oropharynx is clear. Eyes:      Extraocular Movements: Extraocular movements intact.       Conjunctiva/sclera: Conjunctivae normal. Pupils: Pupils are equal, round, and reactive to light. Cardiovascular:      Rate and Rhythm: Normal rate and regular rhythm. Pulses: Normal pulses. Heart sounds: Normal heart sounds. No murmur heard. No friction rub. Pulmonary:      Effort: Pulmonary effort is normal. No respiratory distress. Breath sounds: Normal breath sounds. No stridor. No wheezing, rhonchi or rales. Abdominal:      General: Abdomen is flat. Bowel sounds are normal. There is no distension. Palpations: Abdomen is soft. Tenderness: There is no abdominal tenderness. There is no right CVA tenderness, left CVA tenderness, guarding or rebound. Musculoskeletal:         General: No swelling or tenderness. Normal range of motion. Cervical back: Normal range of motion and neck supple. No rigidity or tenderness. Right lower leg: No edema. Left lower leg: No edema. Lymphadenopathy:      Cervical: No cervical adenopathy. Skin:     General: Skin is warm and dry. Capillary Refill: Capillary refill takes less than 2 seconds. Coloration: Skin is not jaundiced. Findings: No bruising, erythema or rash. Comments: The patient has bladder angiomas on her chest   Neurological:      Mental Status: She is alert and oriented to person, place, and time. Mental status is at baseline. GCS: GCS eye subscore is 4. GCS verbal subscore is 5. GCS motor subscore is 6. Cranial Nerves: No cranial nerve deficit. Sensory: Sensation is intact. No sensory deficit. Motor: Tremor present. No weakness or atrophy. Comments: The patient has asterixis on physical exam   Psychiatric:         Mood and Affect: Mood is anxious. Behavior: Behavior is slowed.          ED Medications  Medications   thiamine (VITAMIN B1) 100 mg in sodium chloride 0.9 % 50 mL IVPB (0 mg Intravenous Stopped 8/9/50 4350)   folic acid 1 mg in sodium chloride 0.9 % 50 mL IVPB (0 mg Intravenous Stopped 7/9/23 1326)   lactated ringers bolus 1,000 mL (0 mL Intravenous Stopped 7/9/23 1255)   diazepam (VALIUM) injection 10 mg (10 mg Intravenous Given 7/9/23 1138)   chlordiazePOXIDE (LIBRIUM) capsule 50 mg (50 mg Oral Given 7/9/23 1209)   levETIRAcetam (KEPPRA) 1,000 mg in sodium chloride 0.9 % 100 mL IVPB (0 mg Intravenous Stopped 7/9/23 1403)       Diagnostic Studies  Results Reviewed     Procedure Component Value Units Date/Time    HS Troponin I 2hr [[id number]] Collected: 07/09/23 1305    Lab Status: Final result Specimen: Blood from Arm, Right Updated: 07/09/23 1334     hs TnI 2hr <2 ng/L      Delta 2hr hsTnI --    Ethanol [[id number]]  (Abnormal) Collected: 07/09/23 1148    Lab Status: Final result Specimen: Blood from Arm, Left Updated: 07/09/23 1232     Ethanol Lvl 89 mg/dL     Magnesium [[id number]]  (Normal) Collected: 07/09/23 1055    Lab Status: Final result Specimen: Blood from Arm, Left Updated: 07/09/23 1218     Magnesium 2.1 mg/dL     Lacosamide [[id number]] Collected: 07/09/23 1148    Lab Status: In process Specimen: Blood from Arm, Left Updated: 07/09/23 1200    CBC and differential [[id number]]  (Abnormal) Collected: 07/09/23 1055    Lab Status: Final result Specimen: Blood from Arm, Left Updated: 07/09/23 1149     WBC 3.15 Thousand/uL      RBC 4.29 Million/uL      Hemoglobin 13.3 g/dL      Hematocrit 40.8 %      MCV 95 fL      MCH 31.0 pg      MCHC 32.6 g/dL      RDW 13.3 %      MPV 10.6 fL      Platelets 76 Thousands/uL      nRBC 0 /100 WBCs      Neutrophils Relative 45 %      Immat GRANS % 0 %      Lymphocytes Relative 43 %      Monocytes Relative 9 %      Eosinophils Relative 3 %      Basophils Relative 0 %      Neutrophils Absolute 1.43 Thousands/µL      Immature Grans Absolute 0.00 Thousand/uL      Lymphocytes Absolute 1.35 Thousands/µL      Monocytes Absolute 0.28 Thousand/µL      Eosinophils Absolute 0.08 Thousand/µL      Basophils Absolute 0.01 Thousands/µL     Narrative:       This is an appended report. These results have been appended to a previously verified report. Smear Review(Phlebs Do Not Order) [[id number]]  (Abnormal) Collected: 07/09/23 1055    Lab Status: Final result Specimen: Blood from Arm, Left Updated: 07/09/23 1149     RBC Morphology Normal     Platelet Estimate Decreased    Comprehensive metabolic panel [[id number]]  (Abnormal) Collected: 07/09/23 1055    Lab Status: Final result Specimen: Blood from Arm, Left Updated: 07/09/23 1147     Sodium 134 mmol/L      Potassium 4.5 mmol/L      Chloride 104 mmol/L      CO2 21 mmol/L      ANION GAP 9 mmol/L      BUN 19 mg/dL      Creatinine 0.73 mg/dL      Glucose 97 mg/dL      Calcium 9.1 mg/dL      AST 48 U/L      ALT 31 U/L      Alkaline Phosphatase 118 U/L      Total Protein 7.6 g/dL      Albumin 4.2 g/dL      Total Bilirubin 0.47 mg/dL      eGFR 87 ml/min/1.73sq m     Narrative:      [facility] guidelines for Chronic Kidney Disease (CKD):   •  Stage 1 with normal or high GFR (GFR > 90 mL/min/1.73 square meters)  •  Stage 2 Mild CKD (GFR = 60-89 mL/min/1.73 square meters)  •  Stage 3A Moderate CKD (GFR = 45-59 mL/min/1.73 square meters)  •  Stage 3B Moderate CKD (GFR = 30-44 mL/min/1.73 square meters)  •  Stage 4 Severe CKD (GFR = 15-29 mL/min/1.73 square meters)  •  Stage 5 End Stage CKD (GFR <15 mL/min/1.73 square meters)  Note: GFR calculation is accurate only with a steady state creatinine    HS Troponin 0hr (reflex protocol) [[id number]]  (Normal) Collected: 07/09/23 1055    Lab Status: Final result Specimen: Blood from Arm, Left Updated: 07/09/23 1147     hs TnI 0hr <2 ng/L                  CT head without contrast   Final Result by Cyrus Fernando MD (07/09 1211)      No acute intracranial abnormality.                   Workstation performed: [id number]               Procedures  ECG 12 Lead Documentation Only    Date/Time: 7/9/2023 10:44 AM    Performed by: Karly Galan DO  Authorized by: Edgar Ponce Nacho Monday, DO    Indications / Diagnosis:  Seizure  ECG reviewed by me, the ED Provider: yes    Patient location:  ED  Previous ECG:     Previous ECG:  Compared to current    Similarity:  No change    Comparison to cardiac monitor: No    Interpretation:     Interpretation: abnormal    Quality:     Tracing quality:  Limited by artifact  Rate:     ECG rate:  59    ECG rate assessment: bradycardic    Rhythm:     Rhythm: sinus bradycardia    Ectopy:     Ectopy: none    QRS:     QRS axis:  Normal    QRS intervals:  Normal  Conduction:     Conduction: abnormal      Abnormal conduction: 1st degree    ST segments:     ST segments:  Normal  T waves:     T waves: normal    Comments:      Sinus bradycardia with first-degree AV block. No evidence of acute ischemia or STEMI. No change from previous tracing. We will assess troponin. ED Course  ED Course as of 07/09/23 1516   Sun Jul 09, 2023   1221 CT head without contrast  IMPRESSION:     No acute intracranial abnormality. 1221 Comprehensive metabolic panel(!)  Mild hyponatremia with elevated AST.   1222 CBC and differential(!)  Mild leukopenia and thrombocytopenia noted   1222 hs TnI 0hr: <2  Unremarkable. Patient denies chest pain or shortness of breath. 1229 Patient had another seizure while in the emergency department. We consulted neurology but have not received an answer at this time. The patient will be started on a loading dose of Keppra to 1000 mg and we will look to admit the patient. [address](!): 80  Patient admits that she drinks more alcohol than she initially admitted to. She states that she has a problem but does not like to talk about it. She reports that she has been attempting to detox on her own at home. She still reports that she is only drink one 12 ounce bottle of beer in the past 3 days but finished half of it this morning.   The patient states that she would like to be admitted to detox at this time and would be interested in rehab afterward. We will reach out to the tox unit at this time. 1325 I spoke with Dr. Michelle Ramires of neurology who recommended a loading dose of Keppra at this time. We appreciate the recommendations and will follow. 1345 Patient now admits to 1 bottle of wine consumption daily and if she has no one then she drinks 6 to 8 12 ounce bottles of beer daily. She still says that she is only had 1 beer this weekend, one half last night and one half this morning. 5 I have spoken to Dr. Foster King of detox who informs me that he is going to have a conversation with  of neurology for further determination of patient disposition. 46 I spoke with Dr. Foster King of toxicology. He informing that he was having difficulty getting out of the neurologist and recommended that I transfer the patient to the detox unit at this time. We appreciate his recommendations and will follow. 1507 Patient is scheduled for pickup and transport to the detox unit at [facility] at 1530.   1516 ECG 12 lead  Sinus bradycardia with first-degree AV block. No evidence of acute ischemia or STEMI. No change from previous tracing. We will assess troponin. SBIRT 20yo+    Flowsheet Row Most Recent Value   Initial Alcohol Screen: US AUDIT-C     1. How often do you have a drink containing alcohol? 3 Filed at: 07/09/2023 1040   2. How many drinks containing alcohol do you have on a typical day you are drinking? 2 Filed at: 07/09/2023 1040   3b. FEMALE Any Age, or MALE 65+: How often do you have 4 or more drinks on one occassion? 1 Filed at: 07/09/2023 1040   Audit-C Score 6 Filed at: 07/09/2023 1040   ASHLY: How many times in the past year have you. .. Used an illegal drug or used a prescription medication for non-medical reasons?  Never Filed at: 07/09/2023 1040                Medical Decision Making  The patient is a 70-year-old female with a past medical history of seizure currently on Vimpat and subarachnoid hemorrhage who presents to the emergency department after sustaining a seizure at the grocery store. Upon initial presentation the patient was alert and oriented x4 and in no acute distress. The patient did seem anxious. She continues to state that she has only had one 12 ounce bottle of beer this weekend. The patient had asterixis on physical exam as well as a mild tremor and spider angioma noted on her chest.  The remainder of her physical exam was grossly unremarkable. The differential includes but is not limited to alcohol withdrawal, ACS or intracranial mass of unknown origin. I have ordered a CBC, CMP, magnesium, serum ethanol level, troponin, EKG as well as 1 L of LR, thiamine, folate, 10 mg of Valium and 50 mg of p.o. Librium at this time. Results pending. Amount and/or Complexity of Data Reviewed  Labs: ordered. Decision-making details documented in ED Course. Radiology: ordered. Decision-making details documented in ED Course. Risk  Prescription drug management. Disposition  Final diagnoses:   Seizure ([address])   Alcohol intoxication ([address])     Time reflects when diagnosis was documented in both MDM as applicable and the Disposition within this note     Time User Action Codes Description Comment    7/9/2023  2:36 PM Amedeo Babe Add [R56.9] Seizure ([address])     7/9/2023  2:36 PM Amedeo Babe Add [F10.929] Alcohol intoxication [facility])       ED Disposition     ED Disposition   Transfer to Another Facility-In Network    Condition   --    Date/Time   Sun Jul 9, 2023  2:37 PM    Comment   Remy Garrison should be transferred out to [facility] to Detox unit.            MD Documentation    Smita Bryant Most Recent Value   Patient Condition The patient has been stabilized such that within reasonable medical probability, no material deterioration of the patient condition or the condition of the unborn child(tang) is likely to result from the transfer   Reason for Transfer Level of Care needed not available at this facility   Benefits of Transfer Specialized equipment and/or services available at the receiving facility (Include comment)________________________  [Detox]   Risks of Transfer Potential for delay in receiving treatment, Potential deterioration of medical condition, Loss of IV, Possible worsening of condition or death during transfer, Increased discomfort during transfer   [facility]   Transported by (Company and Unit #) [facility]   Provider Certification General risk, such as traffic hazards, adverse weather conditions, rough terrain or turbulence, possible failure of equipment (including vehicle or aircraft), or consequences of actions of persons outside the control of the transport personnel      RN Documentation    [address] Name, JAYCE Velasquez PA   Transport Mode Ambulance   Transported by (Company and Unit #) [facility]   Level of Care Advanced life support      Follow-up Information    None         Patient's Medications   Discharge Prescriptions    No medications on file     No discharge procedures on file. PDMP Review       Value Time User    PDMP Reviewed  Yes 5/13/2023  1:58 PM Rj Talley MD           ED Provider  Attending physically available and evaluated Vonda Hernandez. I managed the patient along with the ED Attending.     Electronically Signed by         Paula Cannon DO  07/09/23 8074

## 2023-07-09 NOTE — ED ATTENDING ATTESTATION
7/9/2023  IDel MD, saw and evaluated the patient. I have discussed the patient with the resident/non-physician practitioner and agree with the resident's/non-physician practitioner's findings, Plan of Care, and MDM as documented in the resident's/non-physician practitioner's note, except where noted. All available labs and Radiology studies were reviewed. I was present for key portions of any procedure(s) performed by the resident/non-physician practitioner and I was immediately available to provide assistance. At this point I agree with the current assessment done in the Emergency Department. I have conducted an independent evaluation of this patient a history and physical is as follows:    20-year-old female presenting to emergency department after seizure like episode earlier today. Was placed to the ground by . Patient is an alcoholic. Still drinking. Patient will not provide us exactly how much she drinks, intoxicated currently. Being worked up for seizures by epileptologist.  No fevers. No chills. No head strike. Tremors currently in her room. Agree with treatment for alcohol withdrawal DTs. Resident discussed case with neurology and tox.       ED Course         Critical Care Time  Procedures

## 2023-07-09 NOTE — EMTALA/ACUTE CARE TRANSFER
[address] [address]  Dept: [phone]      EMTALA TRANSFER CONSENT    NAME Vonda Hernandez                                         1960                              MRN [id number]    I have been informed of my rights regarding examination, treatment, and transfer   by Dr. Frandy Chester MD    Benefits: Specialized equipment and/or services available at the receiving facility (Include comment)________________________ (Detox)    Risks: Potential for delay in receiving treatment, Potential deterioration of medical condition, Loss of IV, Possible worsening of condition or death during transfer, Increased discomfort during transfer      Consent for Transfer:  I acknowledge that my medical condition has been evaluated and explained to me by the emergency department physician or other qualified medical person and/or my attending physician, who has recommended that I be transferred to the service of  Accepting Physician: Dr. Charo Peoples at [address] [place] [address] [address] Name, [address] [place] [address] Street : [facility]. The above potential benefits of such transfer, the potential risks associated with such transfer, and the probable risks of not being transferred have been explained to me, and I fully understand them. The doctor has explained that, in my case, the benefits of transfer outweigh the risks. I agree to be transferred. I authorize the performance of emergency medical procedures and treatments upon me in both transit and upon arrival at the receiving facility. Additionally, I authorize the release of any and all medical records to the receiving facility and request they be transported with me, if possible. I understand that the safest mode of transportation during a medical emergency is an ambulance and that the Hospital advocates the use of this mode of transport.  Risks of traveling to the receiving facility by car, including absence of medical control, life sustaining equipment, such as oxygen, and medical personnel has been explained to me and I fully understand them. (AMOR CORRECT BOX BELOW)  [  ]  I consent to the stated transfer and to be transported by ambulance/helicopter. [  ]  I consent to the stated transfer, but refuse transportation by ambulance and accept full responsibility for my transportation by car. I understand the risks of non-ambulance transfers and I exonerate the Hospital and its staff from any deterioration in my condition that results from this refusal.    X___________________________________________    DATE  23  TIME________  Signature of patient or legally responsible individual signing on patient behalf           RELATIONSHIP TO PATIENT_________________________          Provider Certification    NAME Carlton Rose                                        [facility] 1960                              MRN [id number]    A medical screening exam was performed on the above named patient. Based on the examination:    Condition Necessitating Transfer The primary encounter diagnosis was Seizure [facility]). A diagnosis of Alcohol intoxication ([address]) was also pertinent to this visit.     Patient Condition: The patient has been stabilized such that within reasonable medical probability, no material deterioration of the patient condition or the condition of the unborn child(tang) is likely to result from the transfer    Reason for Transfer: Level of Care needed not available at this facility    Transfer Requirements: [facility]   · Space available and qualified personnel available for treatment as acknowledged by    · Agreed to accept transfer and to provide appropriate medical treatment as acknowledged by       Dr. Sloan Nieves  · Appropriate medical records of the examination and treatment of the patient are provided at the time of transfer   [address] _______  · Transfer will be performed by qualified personnel from [employer]  and appropriate transfer equipment as required, including the use of necessary and appropriate life support measures. Provider Certification: I have examined the patient and explained the following risks and benefits of being transferred/refusing transfer to the patient/family:  General risk, such as traffic hazards, adverse weather conditions, rough terrain or turbulence, possible failure of equipment (including vehicle or aircraft), or consequences of actions of persons outside the control of the transport personnel      Based on these reasonable risks and benefits to the patient and/or the unborn child(tang), and based upon the information available at the time of the patient’s examination, I certify that the medical benefits reasonably to be expected from the provision of appropriate medical treatments at another medical facility outweigh the increasing risks, if any, to the individual’s medical condition, and in the case of labor to the unborn child, from effecting the transfer.     X____________________________________________ DATE 07/09/23        TIME_______      ORIGINAL - SEND TO MEDICAL RECORDS   COPY - SEND WITH PATIENT DURING TRANSFER

## 2023-07-09 NOTE — ASSESSMENT & PLAN NOTE
· Mildly elevated AST and Alk phos -continuing to improve  · Likely secondary to chronic alcohol use   · No abdominal pain on exam. Consider imaging if develops.    · Encourage cessation of alcohol

## 2023-07-09 NOTE — ASSESSMENT & PLAN NOTE
Wt Readings from Last 3 Encounters:   07/09/23 54 kg (119 lb 0.8 oz)   07/09/23 58 kg (127 lb 13.9 oz)   07/03/23 56.9 kg (125 lb 6.4 oz)   · Follows with [facility] cardiology for mgmt of suspected stress induced cardiomyopathy   · Mixed etiology: status epilepticus in early 2022 per cardiology notes vs chronic alcohol use   · Echo 7/2022 LVEF 60% and G1DD  · Managed on metoprolol BUD as outpatient; will continue  · Encourage outpatient follow up with cardiology

## 2023-07-09 NOTE — ASSESSMENT & PLAN NOTE
· Pt with history of seizure disorder vs alcohol withdrawal related seizures. · Presented to ED after witnessed seizure in grocery store, unclear if provoked by withdrawal.   · CT head: no acute intracranial abnormality   · Follows with outpatient neurology who recently adjusted lacosamide dosing. · Discussed with neurology prior to admission.  Recommended levetiracetam 750mg BID, and continuing home regimen of lacosamide 200mg BID, gabapentin 300mg BID  · EEG done   · Lacosamide level pending   · Seizure precautions, neuro checks Q 4 hours  Consulted neurology- no further recommendations based on EEG can follow up with epileptologist. CM made aware and outpatient appointments were requested   ·

## 2023-07-09 NOTE — H&P
HISTORY & PHYSICAL EXAM  DEPARTMENT OF MEDICAL TOXICOLOGY  LEVEL 4 MEDICAL DETOX UNIT  Blaine Saini 61 y.o. female MRN: [id number]  Unit/Bed#:  DETOX 501-01 Encounter: [id number]      Reason for Admission/Principal Problem: Ethanol withdrawal, Ethanol use disorder  Admitting Provider: PAZ Davison  Attending Provider: Maurice Garcia MD   7/9/2023  4:06 PM        * Nonintractable focal epilepsy ([address])  Assessment & Plan  · Pt with history of seizure disorder vs alcohol withdrawal related seizures. · Presented to ED after witnessed seizure in grocery store, unclear if provoked by withdrawal.   · CT head: no acute intracranial abnormality   · Follows with outpatient neurology who recently adjusted lacosamide dosing. · Discussed with neurology prior to admission. Recommended levetiracetam 750mg BID, and continuing home regimen of lacosamide 200mg BID, gabapentin 300mg BID  · Obtain EEG  · Lacosamide level pending   · Seizure precautions, neuro checks Q 4 hours  · Consult neurology, appreciate recommendations     Alcohol withdrawal syndrome with complication [facility])  Assessment & Plan  · Last drink 7/9 AM   Serum alcohol 89 mg/dL in the ED  Initiate SEWS protocol for medical management of alcohol withdrawal  Currently exhibits anxiety, restlessness, and mild tremor  Continue monitoring under protocol and administer phenobarbital as indicated  Continuous pulse ox and telemetry monitoring      Alcohol use disorder, severe, dependence ([address])  Assessment & Plan  Pt with hx of alcohol use disorder  Unclear drinking quantity. Initially stated one beer daily and after further questioning admitted to one bottle of wine daily.    H/o withdrawal seizures vs seizure disorder   Withdrawal management as above  Initiate IVFs, daily thiamine/folic acid supplementation, and MVI  Consult case management for assistance with aftercare resources - pt interested in outpatient follow up       Combined systolic and diastolic congestive heart failure (HCC)  Assessment & Plan  Wt Readings from Last 3 Encounters:   07/09/23 54 kg (119 lb 0.8 oz)   07/09/23 58 kg (127 lb 13.9 oz)   07/03/23 56.9 kg (125 lb 6.4 oz)   · Follows with  cardiology for mgmt of suspected stress induced cardiomyopathy   · Mixed etiology: status epilepticus in early 2022 per cardiology notes vs chronic alcohol use   · Echo 7/2022 LVEF 60% and G1DD  · Managed on metoprolol BUD as outpatient; will continue  · Gentle IVFs  · Encourage outpatient follow up with cardiology           Neutropenia [facility])  Assessment & Plan  Recent Labs     07/09/23  1055   WBC 3.15*   · Likely secondary to antiepileptic regimen   · Baseline appears 3-4  · No sign of infection. Afebrile  · Continue to monitor CBC    Depression  Assessment & Plan  · Hx of anxiety and depression managed on Trazodone QHS, Pristique 50mg QD, hydroxyzine 25mg PRN  · Will continue current management   · Denies SI/HI  · Encourage continued follow up with psychiatry     Thrombocytopenia ([address])  Assessment & Plan  Recent Labs     07/09/23  1055   PLT 76*   · Thrombocytopenia on admission labs, appears to be chronic   · No sign of acute bleeding   · CT head: no intracranial abnormality   · Will hold dvt ppx and continue assessing risk vs benefit of ppx   · REFUSES BLOOD transfusion : Mamadou Ganser witness     Abnormal transaminases  Assessment & Plan  · Mildly elevated AST and Alk phos   · Likely secondary to chronic alcohol use   · Initiate IVFs   · No abdominal pain on exam. Consider imaging if develops. · Continue to monitor CMP        VTE Prophylaxis: Pharmacologic VTE Prophylaxis contraindicated due to thrombocytopenia  / sequential compression device   Code Status: Level 1       Anticipated Length of Stay:  Patient will be admitted on an Inpatient basis with an anticipated length of stay of  2  midnights.    Justification for Hospital Stay: medically monitored alcohol withdrawal and evaluation of uncontrolled seizure disorder     For any questions or concerns, please Tiger Text the advanced practitioner in the role of [facility]-DETOX-AP On Call      This patient qualifies for Level IV medically managed intensive inpatient services under the criteria set by the American Society of Addiction Medicine, including dimensions 1-3. The patient is in withdrawal (or is intoxicated with high risk of withdrawal), with severe and unstable medical and/or psychiatric (dual diagnosis) problems, requiring requires 24-hour medical and nursing care and the full resources of a licensed hospital.          [facility] patient to medical detox unit and continue supportive care and stabilization of acute ethanol withdrawal per medical toxicology/detox treatment pathway. Monitor ethanol withdrawal severity via the Severity of Ethanol Withdrawal Scale (SEWS) Q4 hours and then hourly if/when SEWS > 6. Treat withdrawal per pathway and reassess Q30-60 minutes. Mild SEWS Score 1-6  Administer medications* (IV or PO; PO preferred):  • If initial SEWS score: diazepam 10mg PO/IV x 1 AND phenobarbital 65 mg PO/IV x 1  • If repeat SEWS score 1-6: phenobarbital 65 mg PO/IV q1 hour x 5 doses maximum   Reassessment:   • SEWS q1 hour after each dose until SEWS 0 x 2 hours  • VS q1 hours (until SEWS 0, then q4 hours)  • Notify provider for bedside evaluation if 5-dose maximum is reached, RASS of -3 to -5, or SEWS score escalates to moderate or severe.    Moderate SEWS Score 7-12  Administer medications* (IV):  • If initial SEWS score: diazepam 10mg IV x 1 AND phenobarbital 260 mg IV x 1  • If repeat SEWS score 7-12 or score escalated from mild: phenobarbital 130 mg IV q30 minutes x 5 doses maximum   Reassessment:  • SEWS q30 minutes after each dose until SEWS < 7 (then hourly until SEWS 0 x 2 hours)  • VS q30 minutes until SEWS < 7 (then hourly until SEWS 0, then q4 hours)  • Notify provider for bedside evaluation if 5-dose maximum is reached, RASS of -3 to -5, or SEWS score escalates to severe. Severe SEWS Score ? 13  Administer medications* (IV):  • If initial SEWS score: Diazepam 10 mg IV x 1 AND phenobarbital 650 mg IV piggyback x 1 over 15-30 minutes  • If repeat SEWS score ? 13 or score escalated from mild or moderate: phenobarbital 130 mg IV q30 minutes x 5 doses maximum   Reassessment:  • SEWS q30 minutes after each dose until SEWS < 7 (then hourly until SEWS 0 x 2 hours)   • VS q30 minutes until SEWS < 7 (then hourly until SEWS 0, then q4 hours)  • Notify provider for bedside evaluation if 5-dose maximum is reached or RASS of -3 to -5   *Hold medications and notify provider if CNS depression, respirations < 10/min, or RASS of -3 to -5. Medications to be administered adjunctively if more than 2 grams of phenobarbital is needed for stabilization of withdrawal; require attending approval.   • Dexmedetomidine infusion 0.1-1mcg/kg/hr IV infusion, titratable to reduced agitation (Goal: RASS -2)  • Ketamine   o Acute agitated delirium: 1-2 mg/kg IV or 4-5 mg/kg IM  o Refractory withdrawal: 0.1-1mg/kg/hr IV infusion, titratable to reduced agitation (Goal: RASS -2)    Further evaluation, screening and treatment:  Evaluate complete metabolic panel, transaminases, INR, and lipase. Assess hepatic ultrasound for any sign of alcoholic liver disease or cirrhosis, and ultimately refer for further hepatic evaluation and care as/if indicated. Additional medications for ethanol associated malnutrition: Thiamine 100 mg IV daily, increase to 500 mg TID for signs/symptoms of Wernicke's Encephalopathy or Wernicke Korsakoff Syndrome   Folic acid 1 mg IV daily   Multivitamin PO daily      Will offer first monthly injection of Naltrexone 380 mg IM, once patient is stabilized, as it has been shown to assist in decreasing cravings for ethanol. Evaluate and treat for coexisting substance use, such as opioids and nicotine. Discuss risk factors for infectious disease, such as history of intravenous drug abuse, and offer hepatitis and HIV screening if indicated. Case management consultation to assist with coordination of subsequent treatment after discharge. Hx and PE limited by: Not limited     HPI: Petty Roblero is a 61y.o. year old female with PMHx of seizure disorder on AED, alcohol use disorder, combine systolic and diastolic CHF, anxiety/depression, chronic thrombocytopenia and neutropenia who presented to ED via EMS after having witnessed seizure at grocery store. She initially endorsed history of chronic alcohol use that has reduced to one beer per week. However after further questioning she admitted to drinking approximately one bottle of wine per day. Last drink was this morning. She has been told in the past her seizures may likely be provoked by alcohol withdrawal. She currently follows with neurology for monitoring and management of seizures and AED, endorses compliance of Vimpat and gabapentin. Seizures began approximately 3 years ago. Pt was discussed with neurology prior to transfer with recommendations for Keppra load, EEG, and continuing home regimen. Upon arrival she is anxious, restless, exhibiting mild tremor on exam. She denies any cp, sob, N/V, abdominal pain, headache, or visual disturbances. Preferred alcoholic beverage(s): unclear, per hx wine and beer   Quantity and frequency of alcohol intake: Unclear.  Most recently stated bottle of wine daily   Use of any ethanol substitutes (toxic alcohols): no  Date/Time of last alcohol intake: 7/9 AM   Current signs and symptoms of ethanol withdrawal: anxiety, insomnia and tremor    SEWS Total Score: 8 (7/9/2023  5:00 PM)      Ethanol Withdrawal History  Previous ethanol withdrawal? yes  Prior inpatient treatment for ethanol withdrawal? no  Prior outpatient treatment for ethanol withdrawal? no  History of seizures with prior ethanol withdrawal? yes  Prior treatment with naltrexone (Vivitrol)? no  Current treatment with naltrexone (Vivitrol)? no  Other current treatment for ethanol use disorder? no  Co-existing substance use? no    Review of PDMP: yes     Social History     Substance and Sexual Activity   Alcohol Use Yes    Comment: 6 bottles of wine during the week, now only drinks on the weekend. Social History     Substance and Sexual Activity   Drug Use Never     Social History     Tobacco Use   Smoking Status Never   Smokeless Tobacco Never       Review of Systems   Constitutional: Negative for chills, diaphoresis and fatigue. Eyes: Negative for visual disturbance. Respiratory: Negative for cough, shortness of breath and wheezing. Cardiovascular: Negative for chest pain, palpitations and leg swelling. Gastrointestinal: Negative for abdominal pain, diarrhea, nausea and vomiting. Genitourinary: Negative for dysuria and hematuria. Neurological: Positive for tremors and seizures. Negative for headaches. Psychiatric/Behavioral: Positive for sleep disturbance. Negative for agitation and suicidal ideas. The patient is nervous/anxious.         Historical Information   Past Medical History:   Diagnosis Date   • Fracture    • Hepatitis     Hep A    • Insomnia     10mar2016 resolved   • Osteoporosis     14jun2016 resolved   • Pancreatitis    • SAH (subarachnoid hemorrhage) ([address])    • Seasonal allergies    • Seizure ([address])    • Seizures ([address])    • Urine discoloration 11/11/2019   • Varicella    • Vomiting 11/13/2019     Past Surgical History:   Procedure Laterality Date   • IR BIOPSY BONE  8/17/2021   • IR BIOPSY BONE MARROW  11/14/2019   • TUBAL LIGATION  1985     Family History   Problem Relation Age of Onset   • Anxiety disorder Mother    • Depression Mother    • No Known Problems Father    • No Known Problems Sister    • No Known Problems Sister    • No Known Problems Daughter    • No Known Problems Maternal Grandmother    • Cancer Maternal Grandfather • Cancer Paternal Grandmother         unknown    • No Known Problems Paternal Grandfather    • No Known Problems Brother    • No Known Problems Son    • No Known Problems Son    • Breast cancer Neg Hx    • Ovarian cancer Neg Hx      Social History   Marital Status: /Civil Union   Patient Pre-hospital Living Situation: stable  Patient Pre-hospital Level of Mobility: independent   Patient Pre-hospital Diet Restrictions: none    No Known Allergies    Prior to Admission medications    Medication Sig Start Date End Date Taking? Authorizing Provider   desvenlafaxine succinate (PRISTIQ) 50 mg 24 hr tablet Take 1 tablet (50 mg total) by mouth daily 6/16/23  Yes Arielle Tong MD   folic acid (FOLVITE) 1 mg tablet Take 1 tablet (1 mg total) by mouth daily Do not start before May 14, 2023. 5/14/23  Yes Chris Judd MD   gabapentin (NEURONTIN) 300 mg capsule Take 1 capsule (300 mg total) by mouth 2 (two) times a day 5/10/23  Yes Arielle Tong MD   hydrOXYzine HCL (ATARAX) 25 mg tablet Take 1 tablet (25 mg total) by mouth 2 (two) times a day 5/10/23  Yes Parul Reina MD   lacosamide (VIMPAT) 200 mg tablet Take 1 tablet (200 mg total) by mouth 2 (two) times a day 7/3/23  Yes Sotero Arnold MD   magnesium gluconate (MAGONATE) 500 mg tablet Take 1 tablet (500 mg total) by mouth 2 (two) times a day 5/13/23  Yes Chris Judd MD   thiamine 100 MG tablet Take 1 tablet (100 mg total) by mouth daily Do not start before May 14, 2023. 5/14/23  Yes Chris Judd MD   traZODone (DESYREL) 50 mg tablet Take 1 tablet (50 mg total) by mouth in the morning 5/10/23  Yes Parul Reina MD   zonisamide (Zonegran) 100 mg capsule Take one pill nightly for 2 weeks and then 2 pills nightly.  7/3/23  Yes Sotero Arnold MD   metoprolol succinate (TOPROL-XL) 25 mg 24 hr tablet Take 1 tablet (25 mg total) by mouth 2 (two) times a day 5/10/23   Reshma Fraser MD   folic acid (FOLVITE) 1 mg tablet Take 1 tablet (1 mg total) by mouth daily Do not start before March 15, 2023. 3/15/23 7/9/23  Genny Simmons MD   gabapentin (NEURONTIN) 300 mg capsule Take 1 capsule (300 mg total) by mouth 2 (two) times a day 5/13/23 7/9/23  Chris Judd MD   hydrOXYzine HCL (ATARAX) 25 mg tablet Take 1 tablet (25 mg total) by mouth 2 (two) times a day 5/13/23 7/9/23  Chris Judd MD   metoprolol succinate (TOPROL-XL) 25 mg 24 hr tablet Take 1 tablet (25 mg total) by mouth 2 (two) times a day 5/13/23 7/9/23  Chris Judd MD   thiamine 100 MG tablet Take 1 tablet (100 mg total) by mouth daily Do not start before March 15, 2023.   Patient not taking: Reported on 7/3/2023 3/15/23 7/9/23  Genny Simmons MD   traZODone (DESYREL) 50 mg tablet Take 1 tablet (50 mg total) by mouth daily at bedtime 5/13/23 7/9/23  Chris Judd MD       Current Facility-Administered Medications   Medication Dose Route Frequency   • acetaminophen (TYLENOL) tablet 650 mg  650 mg Oral Q6H PRN   • [START ON 7/10/2023] desvenlafaxine succinate (PRISTIQ) 24 hr tablet 50 mg  50 mg Oral Daily   • [START ON 7/05/5506] folic acid (FOLVITE) tablet 1 mg  1 mg Oral Daily   • gabapentin (NEURONTIN) capsule 300 mg  300 mg Oral BID   • hydrOXYzine HCL (ATARAX) tablet 25 mg  25 mg Oral BID   • lacosamide (VIMPAT) tablet 200 mg  200 mg Oral Q12H [address]   • levETIRAcetam (KEPPRA) tablet 750 mg  750 mg Oral Q12H [address]   • metoprolol succinate (TOPROL-XL) 24 hr tablet 25 mg  25 mg Oral BID   • [START ON 7/10/2023] multivitamin-minerals (CENTRUM) tablet 1 tablet  1 tablet Oral Daily   • ondansetron (ZOFRAN) injection 4 mg  4 mg Intravenous Q6H PRN   • sodium chloride 0.9 % infusion  50 mL/hr Intravenous Continuous   • [START ON 7/10/2023] thiamine tablet 100 mg  100 mg Oral Daily   • traZODone (DESYREL) tablet 50 mg  50 mg Oral HS       Continuous Infusions:sodium chloride, 50 mL/hr, Last Rate: 50 mL/hr (07/09/23 9494)             Objective     No intake or output data in the 24 hours ending 07/09/23 1735    Invasive Devices:   Peripheral IV 23 Right Arm (Active)   Site Assessment WDL 23 1100   Dressing Type Transparent 23 1100   Line Status Blood return noted; Flushed 23 1100   Dressing Status Clean;Dry; Intact 23 1100       Peripheral IV 23 Left Arm (Active)   Site Assessment WDL 23 1503   Dressing Type Transparent 23 1503   Line Status Blood return noted; Flushed 23 1503   Dressing Status Clean; Intact;Dry 23 1503       Vitals   Vitals:    23 1645   BP: 125/76   Pulse: 69   Resp: 16   Temp: (!) 96.8 °F (36 °C)       Physical Exam  Vitals and nursing note reviewed. HENT:      Head: Normocephalic. Mouth/Throat:      Mouth: Mucous membranes are moist.      Pharynx: Oropharynx is clear. Eyes:      Conjunctiva/sclera: Conjunctivae normal.      Pupils: Pupils are equal, round, and reactive to light. Cardiovascular:      Rate and Rhythm: Normal rate and regular rhythm. Heart sounds: Normal heart sounds. No murmur heard. Pulmonary:      Effort: Pulmonary effort is normal. No respiratory distress. Breath sounds: Normal breath sounds. Abdominal:      General: Abdomen is flat. Bowel sounds are normal.      Palpations: Abdomen is soft. Tenderness: There is no abdominal tenderness. Musculoskeletal:         General: Normal range of motion. Skin:     General: Skin is warm and dry. Neurological:      General: No focal deficit present. Mental Status: She is alert and oriented to person, place, and time. Mental status is at baseline. GCS: GCS eye subscore is 4. GCS verbal subscore is 5. GCS motor subscore is 6. Motor: Tremor (BL UE intention tremor ) present. Psychiatric:         Mood and Affect: Mood is anxious. Speech: Speech normal.         Behavior: Behavior is cooperative. Data:    EKG, Pathology, and Other Studies: I have personally reviewed pertinent reports.     EKbpm, sinus bradycardia, QTc 427ms. Sinus bradycardia with first-degree AV block. No evidence of acute ischemia or STEMI. No change from previous tracing. Lab Results:  CBC ETOH     Lab Results   Component Value Date    WBC 3.15 (L) 07/09/2023    WBC 4.9 06/14/2016    RBC 4.29 07/09/2023    RBC 3.30 (L) 06/14/2016    HGB 13.3 07/09/2023    HGB 11.2 (L) 06/14/2016    HCT 40.8 07/09/2023    HCT 33.8 (L) 06/14/2016    MCV 95 07/09/2023    .4 (H) 06/14/2016    MCH 31.0 07/09/2023    MCH 33.8 (H) 06/14/2016    MCHC 32.6 07/09/2023    MCHC 33.0 06/14/2016    RDW 13.3 07/09/2023    RDW 16.7 (H) 06/14/2016    PLT 76 (L) 07/09/2023    PLT  06/14/2016     Review of the peripheral smear revealsdecreased numbers of platelets.     PLT 88 (L) 06/14/2016    MPV 10.6 07/09/2023    MPV 8.8 06/14/2016      Lab Results   Component Value Date    LACTICACID 1.2 05/11/2023      CMP UA         Component Value Date/Time     06/14/2016 1201    K 4.5 07/09/2023 1055    K 3.8 06/14/2016 1201     07/09/2023 1055     06/14/2016 1201    CO2 21 07/09/2023 1055    CO2 20 (L) 03/07/2022 1713    BUN 19 07/09/2023 1055    BUN 6 (L) 06/14/2016 1201    CREATININE 0.73 07/09/2023 1055    CREATININE 0.50 06/14/2016 1201         Component Value Date/Time    CALCIUM 9.1 07/09/2023 1055    CALCIUM 9.4 06/14/2016 1201    ALKPHOS 118 (H) 07/09/2023 1055    ALKPHOS 123 06/14/2016 1201    AST 48 (H) 07/09/2023 1055    AST 89 (H) 06/14/2016 1201    ALT 31 07/09/2023 1055    ALT 38 (H) 06/14/2016 1201    BILITOT 0.7 06/14/2016 1201      Lab Results   Component Value Date    CLARITYU Clear 03/13/2023    CLARITYU Transparent 06/14/2016    COLORU Light Yellow 03/13/2023    COLORU Yellow 06/14/2016    SPECGRAV >=1.050 (H) 03/13/2023    SPECGRAV 1.010 06/14/2016    PHUR 6.0 03/13/2023    PHUR 6 06/14/2016    GLUCOSEU Negative 03/13/2023    GLUCOSEU Negative 10/09/2014    KETONESU Negative 03/13/2023    KETONESU neg 06/14/2016    BLOODU Negative 03/13/2023    BLOODU neg 06/14/2016    PROTEIN UA 70 (1+) (A) 03/13/2023    NITRITE Negative 03/13/2023    NITRITE neg 06/14/2016    BILIRUBINUR Negative 03/13/2023    BILIRUBINUR neg 06/14/2016    UROBILINOGEN <2.0 03/13/2023    UROBILINOGEN 0.2 03/08/2022    UROBILINOGEN neg 06/14/2016    LEUKOCYTESUR Negative 03/13/2023    LEUKOCYTESUR 500 06/14/2016    WBCUA 1-2 03/13/2023    WBCUA Innumerable 10/09/2014    RBCUA 1-2 03/13/2023    RBCUA 0-1 10/09/2014    HYALINE NONE SEEN 06/14/2016    BACTERIA None Seen 03/13/2023    BACTERIA FEW (A) 06/14/2016    EPIS Occasional 03/13/2023    EPIS None Seen 10/09/2014        Liver Function Test: ASA     Lab Results   Component Value Date    TBILI 0.47 07/09/2023    BILIDIR 0.26 (H) 03/08/2022    BILIDIR 0.2 06/14/2016    ALKPHOS 118 (H) 07/09/2023    ALKPHOS 123 06/14/2016    AST 48 (H) 07/09/2023    AST 89 (H) 06/14/2016    ALT 31 07/09/2023    ALT 38 (H) 06/14/2016    TP 7.6 07/09/2023    ALB 4.2 07/09/2023    ALB 4.2 06/14/2016      Lab Results   Component Value Date    SALICYLATE <5 04/62/4360    SALICYLATE 6 23/87/5453      Troponin APAP     Lab Results   Component Value Date    TROPONINI <0.02 08/15/2021      Lab Results   Component Value Date    ACTMNPHEN <10 (L) 05/11/2023    ACTMNPHEN 0 (L) 10/09/2014      VBG HCG     No results found for: "PHVEN", "XMP6PWT", "PO2VEN", "PJM4BVM", "Jerilee Lipa", "W1ABONEZL", "R6NERBO"   No results found for: "HCGQUANT"   ABG Urine Drug Screen     Lab Results   Component Value Date/Time    PHART 7.425 03/08/2022 02:53 AM    LPU6OJC 34.6 (L) 03/08/2022 02:53 AM    PO2ART 84.1 03/08/2022 02:53 AM    OFE0HUA 22.2 03/08/2022 02:53 AM    BEART -1.8 03/08/2022 02:53 AM    Q4VBBJFXU 13.4 (L) 03/08/2022 02:53 AM    O2HGB 95.4 03/08/2022 02:53 AM    FIDELINA Yes 03/07/2022 07:32 PM    VTAC AC 03/07/2022 07:32 PM    ACRATE 14 03/07/2022 07:32 PM    INSPIREDAIR 100 03/07/2022 07:32 PM    PEEP 6 03/07/2022 07:32 PM      Lab Results   Component Value Date AMPMETHUR Negative 03/13/2023    BARBTUR Negative 03/13/2023    BARBTUR Negative 10/09/2014    BDZUR Negative 03/13/2023    BDZUR Negative 10/09/2014    COCAINEUR Negative 03/13/2023    COCAINEUR Negative 10/09/2014    METHADONEUR Negative 03/13/2023    METHADONEUR Negative 10/09/2014    OPIATEUR Negative 03/13/2023    OPIATEUR Negative 10/09/2014    PCPUR Negative 03/13/2023    PCPUR Negative 10/09/2014    THCUR Negative 03/13/2023    THCUR Negative 10/09/2014    OXYCODONEUR Negative 03/13/2023      Lactate INR     Lab Results   Component Value Date    LACTICACID 1.2 05/11/2023      Lab Results   Component Value Date    INR 1.11 05/12/2023    INR 1.0 06/14/2016      PTT Protime     Lab Results   Component Value Date/Time    PTT 26 05/12/2023 01:26 AM    PTT 25 06/14/2016 12:01 PM        Lab Results   Component Value Date/Time    PROTIME 14.5 05/12/2023 01:26 AM    PROTIME 10.6 06/14/2016 12:01 PM              Imaging Studies: I have personally reviewed pertinent reports. Counseling / Coordination of Care  Total floor / unit time spent today 40 minutes. Greater than 50% of total time was spent with the patient and / or family counseling and / or coordination of care.        Minutes of critical care time 39  -Critical care time was exclusive of separately billable procedures and teaching time.   -Critical care was necessary to treat or prevent imminent or life-threatening deterioration of the following condition: CNS failure/compromise, toxidrome (ethanol withdrawal),  withdrawal and seizure disorder  -Critical care time was spent personally by me on the following activities as well as the above as per the course and rest of chart: obtaining history from patient/surrogate, development of a treatment plan, discussions with referring provider(s), evaluation of patient's response to the treatment, examination of the patient, performing treatments and interventions, re-evaluation of the patient's condition, review of old charts, ordering/interpreting laboratory studies, ordering/interpreting of radiographic studies. ** Please Note: This note has been constructed using a voice recognition system.  **

## 2023-07-09 NOTE — ASSESSMENT & PLAN NOTE
Recent Labs     07/10/23  0456 07/11/23  0517 07/12/23  0439   WBC 3.39* 2.84* 3.30*   · Likely secondary to antiepileptic regimen   · Baseline appears 3-4  · No sign of infection.  Afebrile  · Continue to monitor CBC

## 2023-07-09 NOTE — ASSESSMENT & PLAN NOTE
· Hx of anxiety and depression managed on Trazodone QHS, Pristique 50mg QD, hydroxyzine 25mg PRN  · Will continue current management   · Denies SI/HI  · Encourage continued follow up with psychiatry

## 2023-07-09 NOTE — ED NOTES
Pt awake, A&Ox4. Pt recalls event. Vitals WNL.  Pt denies headache/any symptoms     Sonia Walker RN  07/09/23 6505

## 2023-07-09 NOTE — ASSESSMENT & PLAN NOTE
Recent Labs     07/10/23  0456 07/11/23  0517 07/12/23  0439   PLT 53* 41* 53*   · Thrombocytopenia on admission labs, appears to be chronic   · No sign of acute bleeding   · CT head: no intracranial abnormality   · Will hold dvt ppx and continue assessing risk vs benefit of ppx   · REFUSES BLOOD transfusion : Macey Rush witness   · Discussed with neuro 1 day ago, patient was taken off Keppra due to concern of thrombocytopenia and recommended that we discontinue it as well. · Keppra was discontinued yesterday.   · Continue to monitor while admitted

## 2023-07-10 LAB
ALBUMIN SERPL BCP-MCNC: 3.3 G/DL (ref 3.5–5)
ALP SERPL-CCNC: 91 U/L (ref 34–104)
ALT SERPL W P-5'-P-CCNC: 29 U/L (ref 7–52)
ANION GAP SERPL CALCULATED.3IONS-SCNC: 8 MMOL/L
AST SERPL W P-5'-P-CCNC: 56 U/L (ref 13–39)
ATRIAL RATE: 59 BPM
BILIRUB SERPL-MCNC: 0.44 MG/DL (ref 0.2–1)
BUN SERPL-MCNC: 17 MG/DL (ref 5–25)
CALCIUM ALBUM COR SERPL-MCNC: 9 MG/DL (ref 8.3–10.1)
CALCIUM SERPL-MCNC: 8.4 MG/DL (ref 8.4–10.2)
CHLORIDE SERPL-SCNC: 106 MMOL/L (ref 96–108)
CO2 SERPL-SCNC: 22 MMOL/L (ref 21–32)
CREAT SERPL-MCNC: 0.63 MG/DL (ref 0.6–1.3)
ERYTHROCYTE [DISTWIDTH] IN BLOOD BY AUTOMATED COUNT: 13.2 % (ref 11.6–15.1)
GFR SERPL CREATININE-BSD FRML MDRD: 95 ML/MIN/1.73SQ M
GLUCOSE SERPL-MCNC: 117 MG/DL (ref 65–140)
HCT VFR BLD AUTO: 31.5 % (ref 34.8–46.1)
HGB BLD-MCNC: 10.7 G/DL (ref 11.5–15.4)
MAGNESIUM SERPL-MCNC: 1.8 MG/DL (ref 1.9–2.7)
MCH RBC QN AUTO: 31.9 PG (ref 26.8–34.3)
MCHC RBC AUTO-ENTMCNC: 34 G/DL (ref 31.4–37.4)
MCV RBC AUTO: 94 FL (ref 82–98)
P AXIS: 21 DEGREES
PLATELET # BLD AUTO: 53 THOUSANDS/UL (ref 149–390)
PMV BLD AUTO: 10.4 FL (ref 8.9–12.7)
POTASSIUM SERPL-SCNC: 3.7 MMOL/L (ref 3.5–5.3)
PR INTERVAL: 216 MS
PROT SERPL-MCNC: 5.8 G/DL (ref 6.4–8.4)
QRS AXIS: 50 DEGREES
QRSD INTERVAL: 72 MS
QT INTERVAL: 432 MS
QTC INTERVAL: 427 MS
RBC # BLD AUTO: 3.35 MILLION/UL (ref 3.81–5.12)
SODIUM SERPL-SCNC: 136 MMOL/L (ref 135–147)
T WAVE AXIS: 68 DEGREES
TSH SERPL DL<=0.05 MIU/L-ACNC: 1.32 UIU/ML (ref 0.45–4.5)
VENTRICULAR RATE: 59 BPM
VIT B12 SERPL-MCNC: 222 PG/ML (ref 180–914)
WBC # BLD AUTO: 3.39 THOUSAND/UL (ref 4.31–10.16)

## 2023-07-10 PROCEDURE — 99232 SBSQ HOSP IP/OBS MODERATE 35: CPT | Performed by: EMERGENCY MEDICINE

## 2023-07-10 PROCEDURE — 82607 VITAMIN B-12: CPT | Performed by: PSYCHIATRY & NEUROLOGY

## 2023-07-10 PROCEDURE — 93010 ELECTROCARDIOGRAM REPORT: CPT | Performed by: INTERNAL MEDICINE

## 2023-07-10 PROCEDURE — 85027 COMPLETE CBC AUTOMATED: CPT | Performed by: NURSE PRACTITIONER

## 2023-07-10 PROCEDURE — 80053 COMPREHEN METABOLIC PANEL: CPT | Performed by: NURSE PRACTITIONER

## 2023-07-10 PROCEDURE — 84443 ASSAY THYROID STIM HORMONE: CPT | Performed by: PSYCHIATRY & NEUROLOGY

## 2023-07-10 PROCEDURE — 83735 ASSAY OF MAGNESIUM: CPT | Performed by: NURSE PRACTITIONER

## 2023-07-10 PROCEDURE — G0427 INPT/ED TELECONSULT70: HCPCS | Performed by: PSYCHIATRY & NEUROLOGY

## 2023-07-10 RX ORDER — MAGNESIUM SULFATE HEPTAHYDRATE 40 MG/ML
2 INJECTION, SOLUTION INTRAVENOUS ONCE
Status: COMPLETED | OUTPATIENT
Start: 2023-07-10 | End: 2023-07-10

## 2023-07-10 RX ADMIN — DESVENLAFAXINE 50 MG: 50 TABLET, FILM COATED, EXTENDED RELEASE ORAL at 08:19

## 2023-07-10 RX ADMIN — THIAMINE HYDROCHLORIDE 500 MG: 100 INJECTION, SOLUTION INTRAMUSCULAR; INTRAVENOUS at 22:33

## 2023-07-10 RX ADMIN — GABAPENTIN 300 MG: 300 CAPSULE ORAL at 17:02

## 2023-07-10 RX ADMIN — METOPROLOL SUCCINATE 25 MG: 25 TABLET, EXTENDED RELEASE ORAL at 17:02

## 2023-07-10 RX ADMIN — THIAMINE HCL TAB 100 MG 100 MG: 100 TAB at 08:20

## 2023-07-10 RX ADMIN — LACOSAMIDE 200 MG: 200 TABLET, FILM COATED ORAL at 22:31

## 2023-07-10 RX ADMIN — HYDROXYZINE HYDROCHLORIDE 25 MG: 25 TABLET ORAL at 08:20

## 2023-07-10 RX ADMIN — HYDROXYZINE HYDROCHLORIDE 25 MG: 25 TABLET ORAL at 17:02

## 2023-07-10 RX ADMIN — GABAPENTIN 300 MG: 300 CAPSULE ORAL at 08:19

## 2023-07-10 RX ADMIN — MAGNESIUM SULFATE 2 G: 2 INJECTION INTRAVENOUS at 10:57

## 2023-07-10 RX ADMIN — MULTIPLE VITAMINS W/ MINERALS TAB 1 TABLET: TAB ORAL at 08:20

## 2023-07-10 RX ADMIN — FOLIC ACID 1 MG: 1 TABLET ORAL at 08:20

## 2023-07-10 RX ADMIN — LACOSAMIDE 200 MG: 200 TABLET, FILM COATED ORAL at 08:19

## 2023-07-10 RX ADMIN — LEVETIRACETAM 750 MG: 250 TABLET, FILM COATED ORAL at 08:20

## 2023-07-10 RX ADMIN — PHENOBARBITAL 64.8 MG: 64.8 TABLET ORAL at 00:06

## 2023-07-10 RX ADMIN — ZONISAMIDE 200 MG: 100 CAPSULE ORAL at 22:33

## 2023-07-10 RX ADMIN — METOPROLOL SUCCINATE 25 MG: 25 TABLET, EXTENDED RELEASE ORAL at 08:20

## 2023-07-10 RX ADMIN — TRAZODONE HYDROCHLORIDE 50 MG: 50 TABLET ORAL at 22:31

## 2023-07-10 NOTE — UTILIZATION REVIEW
Initial Clinical Review    Pt initially presented to [address] [place] [address] ED. Pt was transferred by EMS to [facility] for its Level IV medically managed intensive inpatient detox unit, not available at [facility]. Admission: Date/Time/Statement:   Admission Orders (From admission, onward)     Ordered        07/09/23 1611  Inpatient Admission  Once                      Orders Placed This Encounter   Procedures   • Inpatient Admission     Standing Status:   Standing     Number of Occurrences:   1     Order Specific Question:   Level of Care     Answer:   Level 2 Stepdown / HOT [14]     Order Specific Question:   Estimated length of stay     Answer:   More than 2 Midnights     Order Specific Question:   Certification     Answer:   I certify that inpatient services are medically necessary for this patient for a duration of greater than two midnights. See H&P and MD Progress Notes for additional information about the patient's course of treatment. Initial Presentation: 61 y.o. female who presented to medical detox. Inpatient admission for evaluation and treatment of alcohol withdrawal syndrome. Presented w/ need for detox from alcohol. Had witnessed seizure at the grocery store. Serum ETOH: 89. Unclear amount daily but most recent statement was a bottle of wine daily, last drink on 7/9 AM. Has no prior rehab treatment for withdrawal. Reports hx of withdrawal seizures. On exam, anxiety, tremors, restless. Endorses insomnia. SEWS 8. Plan: SEWS monitoring w/ phenobarbital management, PO thiamine/folic acid supplement, IVF, telemetry, continuous pulse ox, continue PTA meds, trend labs, replete electrolytes as needed, EEG, check lacosamide level, q4h neuro checks. Date: 07/10/23       Day 2: Pt reports feeling well this morning, would like to pursue outpatient treatment for alcohol use disorder. On exam, unremarkable. SEWS 0.  Plan: continue SEWS monitoring w/ phenobarbital management, PO thiamine/folic acid supplement, telemetry, continuous pulse ox, continue PTA meds, trend labs, replete electrolytes as needed. q4h neuro checks. Wt Readings from Last 1 Encounters:   07/09/23 54 kg (119 lb 0.8 oz)     Vital Signs:   Date/Time Temp Pulse Resp BP MAP (mmHg) SpO2 O2 Device   07/10/23 0730 96.6 °F (35.9 °C) Abnormal  -- 16 115/68 83 -- --   07/10/23 0330 96.6 °F (35.9 °C) Abnormal  80 16 98/63 -- 95 % None (Room air)   07/09/23 2330 97 °F (36.1 °C) Abnormal  67 16 136/83 -- 98 % None (Room air)   07/09/23 1930 96.1 °F (35.6 °C) Abnormal  69 16 94/31 Abnormal  -- 97 % None (Room air)   07/09/23 1645 96.8 °F (36 °C) Abnormal  69 16 125/76 -- -- --       Severity of Ethanol Withdrawal Scale (SEWS):    07/10/23 0730 07/10/23 0330 07/09/23 2330 07/09/23 1930 07/09/23 1700 07/09/23 1621   ANXIETY: Do you feel that something bad is about to happen to you right now? 0  -BH 0  -RF 0  -RF 0  -RF 3  -TN 3   NAUSEA and DRY HEAVES or VOMITING? 0  -BH 0  -RF 0  -RF 0  -RF 0  -TN 3   SWEATING: (includes moist palms, sweating now)? Score 0 or 2 0  -BH 0  -RF 0  -RF 0  -RF 0  -TN 0   TREMOR: with arms extended eyes closed? 0  -BH 0  -RF 0  -RF 2  -RF 2  -TN 2   AGITATION: Fidgety, restless, pacing?  0  -BH 0  -RF 0  -RF 0  -RF 0  -TN 0   DISORIENTATION: 0  -BH 0  -RF 1  -RF 3  -RF 0  -TN 0   HALLUCINATIONS: 0  -BH 0  -RF 0  -RF 0  -RF 0  -TN 0   VITAL SIGNS: ANY (Pulse >214, Diastolic BP >36, Temp >53.2) 0  -BH 0  -RF 0  -RF 0  -RF 3  -TN 0   SEWS Total Score 0  -BH 0  -RF 1  -RF 5  -RF 8  -TN 8       Pertinent Labs/Diagnostic Test Results:   Results from last 7 days   Lab Units 07/10/23  0456 07/09/23  1055   WBC Thousand/uL 3.39* 3.15*   HEMOGLOBIN g/dL 10.7* 13.3   HEMATOCRIT % 31.5* 40.8   PLATELETS Thousands/uL 53* 76*   NEUTROS ABS Thousands/µL  --  1.43*         Results from last 7 days   Lab Units 07/10/23  0456 07/09/23  1055   SODIUM mmol/L 136 134*   POTASSIUM mmol/L 3.7 4.5   CHLORIDE mmol/L 106 104   CO2 mmol/L 22 21   ANION GAP mmol/L 8 9   BUN mg/dL 17 19   CREATININE mg/dL 0.63 0.73   EGFR ml/min/1.73sq m 95 87   CALCIUM mg/dL 8.4 9.1   MAGNESIUM mg/dL 1.8* 2.1     Results from last 7 days   Lab Units 07/10/23  0456 07/09/23  1055   AST U/L 56* 48*   ALT U/L 29 31   ALK PHOS U/L 91 118*   TOTAL PROTEIN g/dL 5.8* 7.6   ALBUMIN g/dL 3.3* 4.2   TOTAL BILIRUBIN mg/dL 0.44 0.47         Results from last 7 days   Lab Units 07/10/23  0456 07/09/23  1055   GLUCOSE RANDOM mg/dL 117 97     Results from last 7 days   Lab Units 07/09/23  1305 07/09/23  1055   HS TNI 0HR ng/L  --  <2   HS TNI 2HR ng/L <2  --          Results from last 7 days   Lab Units 07/09/23  1148   ETHANOL LVL mg/dL 89*         Past Medical History:   Diagnosis Date   • Fracture    • Hepatitis     Hep A    • Insomnia     10mar2016 resolved   • Osteoporosis     14jun2016 resolved   • Pancreatitis    • SAH (subarachnoid hemorrhage) (HCC)    • Seasonal allergies    • Seizure (HCC)    • Seizures ([address])    • Urine discoloration 11/11/2019   • Varicella    • Vomiting 11/13/2019     Present on Admission:  • Alcohol use disorder, severe, dependence ([address])  • Alcohol withdrawal syndrome with complication (HCC)  • Nonintractable focal epilepsy (HCC)  • Thrombocytopenia (HCC)  • Neutropenia (HCC)  • Combined systolic and diastolic congestive heart failure (HCC)      Admitting Diagnosis: Seizure ([address]) [R56.9]  Age/Sex: 61 y.o. female  Admission Orders:  Regular Diet. SCDs. Fall & Seizure Precautions. EEG. SEWS monitoring. Telemetry & Continuous Pulse Ox.     Scheduled Medications:  desvenlafaxine succinate, 50 mg, Oral, Daily  folic acid, 1 mg, Oral, Daily  gabapentin, 300 mg, Oral, BID  hydrOXYzine HCL, 25 mg, Oral, BID  lacosamide, 200 mg, Oral, Q12H DESTIN  levETIRAcetam, 750 mg, Oral, Q12H DESTIN  magnesium sulfate, 2 g, Intravenous, Once  metoprolol succinate, 25 mg, Oral, BID  multivitamin-minerals, 1 tablet, Oral, Daily  thiamine, 100 mg, Oral, Daily  traZODone, 50 mg, Oral, HS  zonisamide, 200 mg, Oral, HS    Continuous IV Infusions:    sodium chloride 0.9 %, 50 mL/hr, Intravenous, Continuous    PRN Meds:  acetaminophen, 650 mg, Oral, Q6H PRN  LORazepam, 2 mg, Intravenous, Once PRN  ondansetron, 4 mg, Intravenous, Q6H PRN  phenobarbital, 260 mg, Intravenous; 7/9 x1  phenobarbital, 130 mg, Intravenous; 7/9 x1  phenobarbital, 64.8 mg, Oral; 7/10 x1        IP CONSULT TO CASE MANAGEMENT  IP CONSULT TO NEUROLOGY    Network Utilization Review Department  ATTENTION: Please call with any questions or concerns to [phone] and carefully listen to the prompts so that you are directed to the right person. All voicemails are confidential.  Augustine Myles all requests for admission clinical reviews, approved or denied determinations and any other requests to dedicated fax number below belonging to the campus where the patient is receiving treatment.  List of dedicated fax numbers for the Facilities:  [place] DENIALS (Administrative/Medical Necessity) [phone]   [address] [facility] (Maternity/NICU/Pediatrics) [phone]   [address] [place] [address] Drive [phone]   [facility] [address] [facility] [phone]   [address] [place] [address] 207 [facility] [id number] [place] [address] [phone]   [address] [facility] [phone]
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
## 2023-07-10 NOTE — CASE MANAGEMENT
Cm met with pt regarding aftercare planning. Pt signed an MADELAINE for [facility] outpatient IRISH treatment in [facility]. Cm contacted [facility] and pt was scheduled an intake appointment for 7/14/2023.

## 2023-07-10 NOTE — PLAN OF CARE
Problem: SUBSTANCE USE/ABUSE  Goal: By discharge, will develop insight into their chemical dependency and sustain motivation to continue in recovery  Description: INTERVENTIONS:  - Attends all daily group sessions and scheduled AA groups  - Actively practices coping skills through participation in the therapeutic community and adherence to program rules  - Reviews and completes assignments from individual treatment plan  - Assist patient development of understanding of their personal cycle of addiction and relapse triggers  7/10/2023 0819 by Kevon Garrison RN  Outcome: Progressing  7/10/2023 0818 by Kevon Garrison RN  Outcome: Progressing  Goal: By discharge, patient will have ongoing treatment plan addressing chemical dependency  Description: INTERVENTIONS:  - Assist patient with resources and/or appointments for ongoing recovery based living  7/10/2023 0819 by Kevon Garrison RN  Outcome: Progressing  7/10/2023 0818 by Kevon Garrison RN  Outcome: Progressing     Problem: DISCHARGE PLANNING  Goal: Discharge to home or other facility with appropriate resources  Description: INTERVENTIONS:  - Identify barriers to discharge w/patient and caregiver  - Arrange for needed discharge resources and transportation as appropriate  - Identify discharge learning needs (meds, wound care, etc.)  - Arrange for interpretive services to assist at discharge as needed  - Refer to Case Management Department for coordinating discharge planning if the patient needs post-hospital services based on physician/advanced practitioner order or complex needs related to functional status, cognitive ability, or social support system  7/10/2023 0819 by Kevon Garrison RN  Outcome: Progressing  7/10/2023 0818 by Kevon Garrison RN  Outcome: Progressing

## 2023-07-10 NOTE — NURSING NOTE
Made provider aware of BP, neuro assessment, and SEWS score. Hold phenobarbital at this time. No new orders will continue to monitor.
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
## 2023-07-10 NOTE — PROGRESS NOTES
PROGRESS NOTE  DEPARTMENT OF MEDICAL TOXICOLOGY  LEVEL 4 MEDICAL DETOX UNIT  Vonda Hernandez 61 y.o. female MRN: [id number]  Unit/Bed#: 5T DETOX 501-01 Encounter: [id number]      Reason for Admission/Principal Problem: Alcohol Withdrawal  Rounding Provider: Ronna Omalley DO  Attending Provider: Aaron Young MD   7/9/2023  4:06 PM           Combined systolic and diastolic congestive heart failure [facility])  Assessment & Plan  Wt Readings from Last 3 Encounters:   07/09/23 54 kg (119 lb 0.8 oz)   07/09/23 58 kg (127 lb 13.9 oz)   07/03/23 56.9 kg (125 lb 6.4 oz)   · Follows with [facility] for mgmt of suspected stress induced cardiomyopathy   · Mixed etiology: status epilepticus in early 2022 per cardiology notes vs chronic alcohol use   · Echo 7/2022 LVEF 60% and G1DD  · Managed on metoprolol BUD as outpatient; will continue  · Gentle IVFs  · Encourage outpatient follow up with cardiology           Neutropenia [facility])  Assessment & Plan  Recent Labs     07/09/23  1055 07/10/23  0456   WBC 3.15* 3.39*   · Likely secondary to antiepileptic regimen   · Baseline appears 3-4  · No sign of infection.  Afebrile  · Continue to monitor CBC    Alcohol withdrawal syndrome with complication [facility])  Assessment & Plan  · Last drink 7/9 AM   Serum alcohol 89 mg/dL in the ED  Initiate SEWS protocol for medical management of alcohol withdrawal  Currently exhibits anxiety, restlessness, and mild tremor - improved at this time  Continue monitoring under protocol and administer phenobarbital as indicated  Continuous pulse ox and telemetry monitoring      Alcohol use disorder, severe, dependence (HCC)  Assessment & Plan  Pt with hx of alcohol use disorder  States one bottle of wine per night for "several years"   H/o withdrawal seizures vs seizure disorder   Withdrawal management as above  Initiate IVFs, daily thiamine/folic acid supplementation, and MVI  Consult case management for assistance with aftercare resources - pt interested in outpatient follow up       Depression  Assessment & Plan  · Hx of anxiety and depression managed on Trazodone QHS, Pristique 50mg QD, hydroxyzine 25mg PRN  · Will continue current management   · Denies SI/HI  · Encourage continued follow up with psychiatry     Thrombocytopenia [facility])  Assessment & Plan  Recent Labs     07/09/23  1055 07/10/23  0456   PLT 76* 53*   · Thrombocytopenia on admission labs, appears to be chronic   · No sign of acute bleeding   · CT head: no intracranial abnormality   · Will hold dvt ppx and continue assessing risk vs benefit of ppx   · REFUSES BLOOD transfusion : jehovah witness     Abnormal transaminases  Assessment & Plan  · Mildly elevated AST and Alk phos - improving  · Likely secondary to chronic alcohol use   · No abdominal pain on exam. Consider imaging if develops. · Continue to monitor CMP     * Nonintractable focal epilepsy ([address])  Assessment & Plan  · Pt with history of seizure disorder vs alcohol withdrawal related seizures. · Presented to ED after witnessed seizure in grocery store, unclear if provoked by withdrawal.   · CT head: no acute intracranial abnormality   · Follows with outpatient neurology who recently adjusted lacosamide dosing. · Discussed with neurology prior to admission. Recommended levetiracetam 750mg BID, and continuing home regimen of lacosamide 200mg BID, gabapentin 300mg BID  · Consider obtaining EEG  · Lacosamide level pending   · Seizure precautions, neuro checks Q 4 hours  · Consult neurology, appreciate recommendations             VTE Pharmacologic Prophylaxis:   Pharmacologic: Pharmacologic VTE Prophylaxis contraindicated due to thrombocytopenia  Mechanical VTE Prophylaxis in Place: yes    Code Status: Level 1 - Full Code    Patient Centered Rounds: I have performed bedside rounds with nursing staff today.     Discussions with Specialists or Other Care Team Provider: None     Education and Discussions with Family / Patient: None    Time Spent for Care: 20 minutes. More than 50% of total time spent on counseling and coordination of care as described above. Current Length of Stay: 1 day(s)    Current Patient Status: Inpatient     Certification Statement: The patient will continue to require additional inpatient hospital stay due to alcohol withdrawal Discharge Plan: d/c home with OP treatment        Subjective:   Patient is feeling well this morning. Has no complaints including. No headache, abdominal pain, nausea/vomiting, CP, SOB. Patient Is eating well. Would like OP treatment of alcohol use disorder.  When questioning patient on seizure history: she notes that she has seizures without trying to stop drinking alcohol in the past.     Objective:     Clinical Opiate Withdrawal Scale  Pulse: 80    SEWS Total Score: 0 (7/10/2023  3:30 AM)        Last 24 Hours Medication List:   Current Facility-Administered Medications   Medication Dose Route Frequency Provider Last Rate   • acetaminophen  650 mg Oral Q6H PRN PAZ Truong     • desvenlafaxine succinate  50 mg Oral Daily PAZ Truong     • folic acid  1 mg Oral Daily PAZ Truong     • gabapentin  300 mg Oral BID PAZ Carballo     • hydrOXYzine HCL  25 mg Oral BID PAZ Carballo     • lacosamide  200 mg Oral Q12H [address] PAZ Truong     • levETIRAcetam  750 mg Oral Q12H [address] PAZ Truong     • LORazepam  2 mg Intravenous Once PRN Rajinder Storey PA-C     • metoprolol succinate  25 mg Oral BID PAZ Carballo     • multivitamin-minerals  1 tablet Oral Daily PAZ Truong     • ondansetron  4 mg Intravenous Q6H PRN PAZ Carballo     • sodium chloride  50 mL/hr Intravenous Continuous PAZ Truong 50 mL/hr (07/09/23 2562)   • thiamine  100 mg Oral Daily PAZ Truong     • traZODone  50 mg Oral HS PAZ Truong     • zonisamide  200 mg Oral HS PAZ Carballo Vitals:   Temp (24hrs), Av.8 °F (36 °C), Min:96.1 °F (35.6 °C), Max:97.8 °F (36.6 °C)    Temp:  [96.1 °F (35.6 °C)-97.8 °F (36.6 °C)] 96.6 °F (35.9 °C)  HR:  [62-80] 80  Resp:  [16-18] 16  BP: ()/(31-83) 115/68  SpO2:  [95 %-99 %] 95 %  Body mass index is 20.43 kg/m². Input and Output Summary (last 24 hours):No intake or output data in the 24 hours ending 07/10/23 0839    Physical Exam:   Physical Exam  Vitals and nursing note reviewed. Constitutional:       General: She is not in acute distress. Appearance: She is well-developed. HENT:      Head: Normocephalic and atraumatic. Mouth/Throat:      Mouth: Mucous membranes are moist.   Eyes:      Conjunctiva/sclera: Conjunctivae normal.   Cardiovascular:      Rate and Rhythm: Normal rate and regular rhythm. Heart sounds: No murmur heard. Pulmonary:      Effort: Pulmonary effort is normal. No respiratory distress. Breath sounds: Normal breath sounds. Abdominal:      Palpations: Abdomen is soft. Tenderness: There is no abdominal tenderness. Musculoskeletal:         General: No swelling. Cervical back: Neck supple. Skin:     General: Skin is warm and dry. Capillary Refill: Capillary refill takes less than 2 seconds. Neurological:      Mental Status: She is alert.    Psychiatric:         Mood and Affect: Mood normal.         Additional Data:     Labs:   Results from last 7 days   Lab Units 07/10/23  0456 23  1055   WBC Thousand/uL 3.39* 3.15*   HEMOGLOBIN g/dL 10.7* 13.3   HEMATOCRIT % 31.5* 40.8   PLATELETS Thousands/uL 53* 76*   NEUTROS PCT %  --  45   LYMPHS PCT %  --  43   MONOS PCT %  --  9   EOS PCT %  --  3      Results from last 7 days   Lab Units 07/10/23  0456   SODIUM mmol/L 136   POTASSIUM mmol/L 3.7   CHLORIDE mmol/L 106   CO2 mmol/L 22   BUN mg/dL 17   CREATININE mg/dL 0.63   ANION GAP mmol/L 8   CALCIUM mg/dL 8.4   ALBUMIN g/dL 3.3*   TOTAL BILIRUBIN mg/dL 0.44   ALK PHOS U/L 91   ALT U/L 29   AST U/L 56*   GLUCOSE RANDOM mg/dL 117                              * I Have Reviewed All Lab Data Listed Above. * Additional Pertinent Lab Tests Reviewed: [address] Admission Reviewed      Imaging Studies: I have personally reviewed pertinent reports. Recent Cultures (last 7 days): Today, Patient Was Seen By: Titus Bravo DO    ** Please Note: Dictation voice to text software may have been used in the creation of this document.  **

## 2023-07-10 NOTE — CASE MANAGEMENT
Cm contacted by [address] [place] [address] and informed pt insurance not accepted and pt cannot begin OP IRISH treatment at this location.

## 2023-07-10 NOTE — DISCHARGE INSTR - OTHER ORDERS
Drug and Alcohol Resources in [facility]    If you have health insurance, including medical assistance, there should be a phone number on your insurance card that you can call to find out how to access services. The card may say, “For [address] [address] or “For Drug and Alcohol Services” or “For Substance Abuse Services” call the number provided. [address] [facility]  [address] [facility], [address] [facility]. [phone]. A  is available Monday through Friday from 8:00 am to 5:00 pm to provide you with assistance on accessing services for substance abuse. If you do not have health insurance and are in financial need, this office may also help you get the funding for the services that are necessary. [address] [place] [address] Drug & Alcohol provides funding to support three Recovery Centers in [facility]. These centers offer a safe, sober environment to those in recovery. A variety of programming including 12-Step Meetings, Alveda Challenger, Life Skills Workshops, etc. is offered at each location. [address]  [phone]  www. [facility] Hospital[facility]ban[facility]. org Change on Main  [facility]  [phone]  fenidv-eq-hkcg. [address], [address] [place] [address]  [phone]   [address] [place] [address], [address] [place] [address]  [phone]  www. [facility]. [facility]  [address], [address] [facility]  [phone]  [facility]. [facility]  Confidential free help, from public health agencies, to find substance use treatment and information. [phone]    Link for Zoom Codes for Virtual 12 step Meetings  NoteleafSuzanneWin the Planetco.uk. aspx  Alcoholics Anonymous  [facility]  [phone]    http://www.arellano.com/  Narcotics Anonymous  [phone]  https://LittleLives.CN Creative/     Mental health resources in [facility]    [address] [facility] [address], [address] [facility] [address] [address]  Phone: [phone]    Crisis Intervention number: 4-592-815-084-446-1702    Prevent suicide [place]: In Crisis?  Call    5-828-966-WOBT (9705)    National Suicide Prevention Lifeline: 8-852-799-534-579-0968    [address] [place] [address] Street:   [address] [place] [address], [address] [place] [address],[address], [address] [facility]  [address], [address] [place] [address] [address]  [facility], 1200 [facility]  [phone]

## 2023-07-11 ENCOUNTER — APPOINTMENT (OUTPATIENT)
Dept: NEUROLOGY | Facility: HOSPITAL | Age: 63
DRG: 897 | End: 2023-07-11
Payer: COMMERCIAL

## 2023-07-11 LAB
ALBUMIN SERPL BCP-MCNC: 3.2 G/DL (ref 3.5–5)
ALP SERPL-CCNC: 84 U/L (ref 34–104)
ALT SERPL W P-5'-P-CCNC: 30 U/L (ref 7–52)
ANION GAP SERPL CALCULATED.3IONS-SCNC: 7 MMOL/L
AST SERPL W P-5'-P-CCNC: 43 U/L (ref 13–39)
BILIRUB SERPL-MCNC: 0.4 MG/DL (ref 0.2–1)
BUN SERPL-MCNC: 12 MG/DL (ref 5–25)
CALCIUM ALBUM COR SERPL-MCNC: 9 MG/DL (ref 8.3–10.1)
CALCIUM SERPL-MCNC: 8.4 MG/DL (ref 8.4–10.2)
CHLORIDE SERPL-SCNC: 105 MMOL/L (ref 96–108)
CK SERPL-CCNC: 20 U/L (ref 26–192)
CO2 SERPL-SCNC: 22 MMOL/L (ref 21–32)
CREAT SERPL-MCNC: 0.59 MG/DL (ref 0.6–1.3)
ERYTHROCYTE [DISTWIDTH] IN BLOOD BY AUTOMATED COUNT: 12.7 % (ref 11.6–15.1)
GFR SERPL CREATININE-BSD FRML MDRD: 97 ML/MIN/1.73SQ M
GLUCOSE SERPL-MCNC: 100 MG/DL (ref 65–140)
HCT VFR BLD AUTO: 31.5 % (ref 34.8–46.1)
HGB BLD-MCNC: 10.6 G/DL (ref 11.5–15.4)
MAGNESIUM SERPL-MCNC: 1.7 MG/DL (ref 1.9–2.7)
MCH RBC QN AUTO: 31.4 PG (ref 26.8–34.3)
MCHC RBC AUTO-ENTMCNC: 33.7 G/DL (ref 31.4–37.4)
MCV RBC AUTO: 93 FL (ref 82–98)
PLATELET # BLD AUTO: 41 THOUSANDS/UL (ref 149–390)
PMV BLD AUTO: 10 FL (ref 8.9–12.7)
POTASSIUM SERPL-SCNC: 3.7 MMOL/L (ref 3.5–5.3)
PROT SERPL-MCNC: 5.7 G/DL (ref 6.4–8.4)
RBC # BLD AUTO: 3.38 MILLION/UL (ref 3.81–5.12)
SODIUM SERPL-SCNC: 134 MMOL/L (ref 135–147)
WBC # BLD AUTO: 2.84 THOUSAND/UL (ref 4.31–10.16)

## 2023-07-11 PROCEDURE — 95816 EEG AWAKE AND DROWSY: CPT

## 2023-07-11 PROCEDURE — 85027 COMPLETE CBC AUTOMATED: CPT

## 2023-07-11 PROCEDURE — 80053 COMPREHEN METABOLIC PANEL: CPT

## 2023-07-11 PROCEDURE — 83735 ASSAY OF MAGNESIUM: CPT

## 2023-07-11 PROCEDURE — 99232 SBSQ HOSP IP/OBS MODERATE 35: CPT | Performed by: EMERGENCY MEDICINE

## 2023-07-11 PROCEDURE — 82550 ASSAY OF CK (CPK): CPT | Performed by: EMERGENCY MEDICINE

## 2023-07-11 PROCEDURE — 95816 EEG AWAKE AND DROWSY: CPT | Performed by: PSYCHIATRY & NEUROLOGY

## 2023-07-11 PROCEDURE — 97163 PT EVAL HIGH COMPLEX 45 MIN: CPT

## 2023-07-11 RX ORDER — MAGNESIUM SULFATE HEPTAHYDRATE 40 MG/ML
2 INJECTION, SOLUTION INTRAVENOUS ONCE
Status: COMPLETED | OUTPATIENT
Start: 2023-07-11 | End: 2023-07-12

## 2023-07-11 RX ADMIN — HYDROXYZINE HYDROCHLORIDE 25 MG: 25 TABLET ORAL at 17:14

## 2023-07-11 RX ADMIN — GABAPENTIN 300 MG: 300 CAPSULE ORAL at 17:14

## 2023-07-11 RX ADMIN — HYDROXYZINE HYDROCHLORIDE 25 MG: 25 TABLET ORAL at 08:04

## 2023-07-11 RX ADMIN — METOPROLOL SUCCINATE 25 MG: 25 TABLET, EXTENDED RELEASE ORAL at 17:14

## 2023-07-11 RX ADMIN — GABAPENTIN 300 MG: 300 CAPSULE ORAL at 08:04

## 2023-07-11 RX ADMIN — TRAZODONE HYDROCHLORIDE 50 MG: 50 TABLET ORAL at 21:47

## 2023-07-11 RX ADMIN — FOLIC ACID 1 MG: 1 TABLET ORAL at 08:04

## 2023-07-11 RX ADMIN — MULTIPLE VITAMINS W/ MINERALS TAB 1 TABLET: TAB ORAL at 08:04

## 2023-07-11 RX ADMIN — METOPROLOL SUCCINATE 25 MG: 25 TABLET, EXTENDED RELEASE ORAL at 08:04

## 2023-07-11 RX ADMIN — LACOSAMIDE 200 MG: 200 TABLET, FILM COATED ORAL at 08:04

## 2023-07-11 RX ADMIN — LACOSAMIDE 200 MG: 200 TABLET, FILM COATED ORAL at 21:47

## 2023-07-11 RX ADMIN — DESVENLAFAXINE 50 MG: 50 TABLET, FILM COATED, EXTENDED RELEASE ORAL at 08:04

## 2023-07-11 RX ADMIN — THIAMINE HYDROCHLORIDE 500 MG: 100 INJECTION, SOLUTION INTRAMUSCULAR; INTRAVENOUS at 15:09

## 2023-07-11 RX ADMIN — ZONISAMIDE 200 MG: 100 CAPSULE ORAL at 21:51

## 2023-07-11 RX ADMIN — THIAMINE HYDROCHLORIDE 500 MG: 100 INJECTION, SOLUTION INTRAMUSCULAR; INTRAVENOUS at 06:12

## 2023-07-11 RX ADMIN — THIAMINE HYDROCHLORIDE 500 MG: 100 INJECTION, SOLUTION INTRAMUSCULAR; INTRAVENOUS at 21:51

## 2023-07-11 RX ADMIN — MAGNESIUM SULFATE HEPTAHYDRATE 2 G: 2 INJECTION, SOLUTION INTRAVENOUS at 11:31

## 2023-07-11 NOTE — PLAN OF CARE
Problem: SUBSTANCE USE/ABUSE  Goal: By discharge, will develop insight into their chemical dependency and sustain motivation to continue in recovery  Description: INTERVENTIONS:  - Attends all daily group sessions and scheduled AA groups  - Actively practices coping skills through participation in the therapeutic community and adherence to program rules  - Reviews and completes assignments from individual treatment plan  - Assist patient development of understanding of their personal cycle of addiction and relapse triggers  Outcome: Progressing  Goal: By discharge, patient will have ongoing treatment plan addressing chemical dependency  Description: INTERVENTIONS:  - Assist patient with resources and/or appointments for ongoing recovery based living  Outcome: Progressing     Problem: DISCHARGE PLANNING  Goal: Discharge to home or other facility with appropriate resources  Description: INTERVENTIONS:  - Identify barriers to discharge w/patient and caregiver  - Arrange for needed discharge resources and transportation as appropriate  - Identify discharge learning needs (meds, wound care, etc.)  - Arrange for interpretive services to assist at discharge as needed  - Refer to Case Management Department for coordinating discharge planning if the patient needs post-hospital services based on physician/advanced practitioner order or complex needs related to functional status, cognitive ability, or social support system  Outcome: Progressing     Problem: MOBILITY - ADULT  Goal: Maintain or return to baseline ADL function  Description: INTERVENTIONS:  -  Assess patient's ability to carry out ADLs; assess patient's baseline for ADL function and identify physical deficits which impact ability to perform ADLs (bathing, care of mouth/teeth, toileting, grooming, dressing, etc.)  - Assess/evaluate cause of self-care deficits   - Assess range of motion  - Assess patient's mobility; develop plan if impaired  - Assess patient's need for assistive devices and provide as appropriate  - Encourage maximum independence but intervene and supervise when necessary  - Involve family in performance of ADLs  - Assess for home care needs following discharge   - Consider OT consult to assist with ADL evaluation and planning for discharge  - Provide patient education as appropriate  Outcome: Progressing  Goal: Maintains/Returns to pre admission functional level  Description: INTERVENTIONS:  - Perform BMAT or MOVE assessment daily.   - Set and communicate daily mobility goal to care team and patient/family/caregiver.    - Collaborate with rehabilitation services on mobility goals if consulted  - Ambulate patient 4 times a day  - Out of bed for toileting  - Record patient progress and toleration of activity level   Outcome: Progressing

## 2023-07-11 NOTE — CASE MANAGEMENT
Cm reviewed with pt her discharge plan and pt was in agreement with this plan. Pt presented with some confusion and requested she be provided with her spouse's phone number again. This was provided.

## 2023-07-11 NOTE — UTILIZATION REVIEW
Continued Stay Review    Date: 07/11/23                          Current Patient Class: IP  Current Level of Care: Level 4 Medical Detox    HPI:63 y.o. female initially admitted on 7/9. Assessment/Plan: Reports feeling well, notes improved steadiness w/ walking today. No further seizure activity since admission. Neurology recommended stopping Keppra s/t thrombocytopenia. Physical exam unremarkable. Plan: continue high dose IV thiamine, PO folic acid supplement, continue other current meds. EEG pending. Trend labs, replete electrolytes as needed.     Vital Signs:   Date/Time Temp Pulse Resp BP MAP (mmHg) SpO2 O2 Device   07/11/23 1052 -- 61 18 107/60 -- 95 % None (Room air)   07/11/23 0709 97.7 °F (36.5 °C) 62 18 127/68 -- 97 % None (Room air)   07/10/23 1915 98 °F (36.7 °C) 65 18 117/74 -- 95 % None (Room air)   07/10/23 1700 -- 67 -- 116/68 -- 95 % None (Room air)   07/10/23 1638 98.1 °F (36.7 °C) 74 18 124/71 -- 92 % None (Room air)   07/10/23 1133 97 °F (36.1 °C) Abnormal  75 16 131/76 94 -- None (Room air)       Pertinent Labs/Diagnostic Results:   Results from last 7 days   Lab Units 07/11/23  0517 07/10/23  0456 07/09/23  1055   WBC Thousand/uL 2.84* 3.39* 3.15*   HEMOGLOBIN g/dL 10.6* 10.7* 13.3   HEMATOCRIT % 31.5* 31.5* 40.8   PLATELETS Thousands/uL 41* 53* 76*   NEUTROS ABS Thousands/µL  --   --  1.43*         Results from last 7 days   Lab Units 07/11/23  0517 07/10/23  0456 07/09/23  1055   SODIUM mmol/L 134* 136 134*   POTASSIUM mmol/L 3.7 3.7 4.5   CHLORIDE mmol/L 105 106 104   CO2 mmol/L 22 22 21   ANION GAP mmol/L 7 8 9   BUN mg/dL 12 17 19   CREATININE mg/dL 0.59* 0.63 0.73   EGFR ml/min/1.73sq m 97 95 87   CALCIUM mg/dL 8.4 8.4 9.1   MAGNESIUM mg/dL 1.7* 1.8* 2.1     Results from last 7 days   Lab Units 07/11/23  0517 07/10/23  0456 07/09/23  1055   AST U/L 43* 56* 48*   ALT U/L 30 29 31   ALK PHOS U/L 84 91 118*   TOTAL PROTEIN g/dL 5.7* 5.8* 7.6   ALBUMIN g/dL 3.2* 3.3* 4.2   TOTAL BILIRUBIN mg/dL 0.40 0.44 0.47         Results from last 7 days   Lab Units 07/11/23  0517 07/10/23  0456 07/09/23  1055   GLUCOSE RANDOM mg/dL 100 117 97     Results from last 7 days   Lab Units 07/11/23  0517   CK TOTAL U/L 20*     Results from last 7 days   Lab Units 07/09/23  1305 07/09/23  1055   HS TNI 0HR ng/L  --  <2   HS TNI 2HR ng/L <2  --              Results from last 7 days   Lab Units 07/10/23  0456   TSH 3RD GENERATON uIU/mL 1.321     Results from last 7 days   Lab Units 07/09/23  1148   ETHANOL LVL mg/dL 89*         Medications:   Scheduled Medications:  desvenlafaxine succinate, 50 mg, Oral, Daily  folic acid, 1 mg, Oral, Daily  gabapentin, 300 mg, Oral, BID  hydrOXYzine HCL, 25 mg, Oral, BID  lacosamide, 200 mg, Oral, Q12H DESTIN  magnesium sulfate, 2 g, Intravenous, Once  metoprolol succinate, 25 mg, Oral, BID  multivitamin-minerals, 1 tablet, Oral, Daily  thiamine, 500 mg, Intravenous, Q8H DESTIN  traZODone, 50 mg, Oral, HS  zonisamide, 200 mg, Oral, HS    Continuous IV Infusions: none    PRN Meds:  acetaminophen, 650 mg, Oral, Q6H PRN  LORazepam, 2 mg, Intravenous, Once PRN  ondansetron, 4 mg, Intravenous, Q6H PRN        Discharge Plan: D    Network Utilization Review Department  ATTENTION: Please call with any questions or concerns to [phone] and carefully listen to the prompts so that you are directed to the right person. All voicemails are confidential.  Maryan Gitelman all requests for admission clinical reviews, approved or denied determinations and any other requests to dedicated fax number below belonging to the campus where the patient is receiving treatment.  List of dedicated fax numbers for the Facilities:  [place] DENIALS (Administrative/Medical Necessity) [phone] [address] [facility] (Maternity/NICU/Pediatrics) [address] [address] [facility] [address] [facility] [address] [place] [address] [facility] [phone]

## 2023-07-11 NOTE — PROGRESS NOTES
PROGRESS NOTE  DEPARTMENT OF MEDICAL TOXICOLOGY  LEVEL 4 MEDICAL DETOX UNIT  Eliazar Modi 61 y.o. female MRN: [id number]  Unit/Bed#: 5T DETOX 501-01 Encounter: [id number]      Reason for Admission/Principal Problem: Alcohol withdrawal  Rounding Provider: Tonie Sinha DO  Attending Provider: Brody Kruger MD   7/9/2023  4:06 PM           Combined systolic and diastolic congestive heart failure [facility])  Assessment & Plan  Wt Readings from Last 3 Encounters:   07/09/23 54 kg (119 lb 0.8 oz)   07/09/23 58 kg (127 lb 13.9 oz)   07/03/23 56.9 kg (125 lb 6.4 oz)   · Follows with [facility] for mgmt of suspected stress induced cardiomyopathy   · Mixed etiology: status epilepticus in early 2022 per cardiology notes vs chronic alcohol use   · Echo 7/2022 LVEF 60% and G1DD  · Managed on metoprolol BUD as outpatient; will continue  · Encourage outpatient follow up with cardiology           Neutropenia [facility])  Assessment & Plan  Recent Labs     07/09/23  1055 07/10/23  0456 07/11/23  0517   WBC 3.15* 3.39* 2.84*   · Likely secondary to antiepileptic regimen   · Baseline appears 3-4  · No sign of infection.  Afebrile  · Continue to monitor CBC    Alcohol withdrawal syndrome with complication [facility])  Assessment & Plan  · Last drink 7/9 AM   Serum alcohol 89 mg/dL in the ED  No current alcohol withdrawal symptoms, appears to be through the time window for alcohol withdrawal  SEWs discontinued at this time    Alcohol use disorder, severe, dependence ([address])  Assessment & Plan  Pt with hx of alcohol use disorder  States one bottle of wine per night for "several years"   H/o withdrawal seizures vs seizure disorder   Withdrawal management as above  Daily thiamine/folic acid supplementation, and MVI  Consult case management for assistance with aftercare resources - pt interested in outpatient follow up       Depression  Assessment & Plan  · Hx of anxiety and depression managed on Trazodone QHS, Pristique 50mg QD, hydroxyzine 25mg PRN  · Will continue current management   · Denies SI/HI  · Encourage continued follow up with psychiatry     Thrombocytopenia [facility])  Assessment & Plan  Recent Labs     07/09/23  1055 07/10/23  0456 07/11/23  0517   PLT 76* 53* 41*   · Thrombocytopenia on admission labs, appears to be chronic   · No sign of acute bleeding   · CT head: no intracranial abnormality   · Will hold dvt ppx and continue assessing risk vs benefit of ppx   · REFUSES BLOOD transfusion : Deyvi March witness   · Discussed with neuro 1 day ago, patient was taken off Keppra due to concern of thrombocytopenia and recommended that we discontinue it as well. · Keppra was discontinued yesterday. · Continue to monitor while admitted    Abnormal transaminases  Assessment & Plan  · Mildly elevated AST and Alk phos -continuing to improve  · Likely secondary to chronic alcohol use   · No abdominal pain on exam. Consider imaging if develops. · Continue to monitor CMP     * Nonintractable focal epilepsy ([address])  Assessment & Plan  · Pt with history of seizure disorder vs alcohol withdrawal related seizures. · Presented to ED after witnessed seizure in grocery store, unclear if provoked by withdrawal.   · CT head: no acute intracranial abnormality   · Follows with outpatient neurology who recently adjusted lacosamide dosing. · Discussed with neurology prior to admission. Recommended levetiracetam 750mg BID, and continuing home regimen of lacosamide 200mg BID, gabapentin 300mg BID  · EEG will be done today at 9 AM  · Lacosamide level pending   · Seizure precautions, neuro checks Q 4 hours  · Consult neurology, appreciate recommendations           VTE Pharmacologic Prophylaxis:   Pharmacologic: Held for thrombocytopenia  Mechanical VTE Prophylaxis in Place: yes    Code Status: Level 1 - Full Code    Patient Centered Rounds: I have performed bedside rounds with nursing staff today.     Discussions with Specialists or Other Care Team Provider: None Education and Discussions with Family / Patient: none -only want  to know that she is doing well    Time Spent for Care: 15 minutes. More than 50% of total time spent on counseling and coordination of care as described above. Current Length of Stay: 2 day(s)    Current Patient Status: Inpatient     Certification Statement: The patient will continue to require additional inpatient hospital stay due to EEG results and stable with ambulation Discharge Plan: d/c to out/in-patient rehab facility       Subjective:   Patient is feeling well this morning. Patient is eating breakfast and tolerating it well. Patient noted yesterday she felt unsteady with walking but states this has improved today. Patient denies any chest pain, shortness of breath, abdominal pain, nausea, vomiting. Patient has not had a seizure since being admitted. Patient was evaluated by neurology yesterday who recommended obtaining a spot EEG today. Patient was taken off [facility] at the request of neurology due to concern that it may be worsening her thrombocytopenia.     Objective:     Clinical Opiate Withdrawal Scale  Pulse: 62    SEWS Total Score: 0 (7/10/2023  4:00 PM)        Last 24 Hours Medication List:   Current Facility-Administered Medications   Medication Dose Route Frequency Provider Last Rate   • acetaminophen  650 mg Oral Q6H PRN PAZ Truong     • desvenlafaxine succinate  50 mg Oral Daily PAZ Truong     • folic acid  1 mg Oral Daily PAZ Truong     • gabapentin  300 mg Oral BID DierdPAZ Sheth     • hydrOXYzine HCL  25 mg Oral BID PAZ Davison     • lacosamide  200 mg Oral Q12H [address] PAZ Truong     • LORazepam  2 mg Intravenous Once PRN Eugenia Saez PA-C     • metoprolol succinate  25 mg Oral BID PAZ Davison     • multivitamin-minerals  1 tablet Oral Daily PAZ Truong     • ondansetron  4 mg Intravenous Q6H PRN PAZ Davison     • thiamine  500 mg Intravenous [facility] Leila Roca COLT Burrell 500 mg (23)   • traZODone  50 mg Oral HS PAZ Truong     • zonisamide  200 mg Oral HS Orin PAZ Ruiz           Vitals:   Temp (24hrs), Av.7 °F (36.5 °C), Min:97 °F (36.1 °C), Max:98.1 °F (36.7 °C)    Temp:  [97 °F (36.1 °C)-98.1 °F (36.7 °C)] 97.7 °F (36.5 °C)  HR:  [62-75] 62  Resp:  [16-18] 18  BP: (116-131)/(68-76) 127/68  SpO2:  [92 %-97 %] 97 %  Body mass index is 20.43 kg/m². Input and Output Summary (last 24 hours):No intake or output data in the 24 hours ending 23 0903    Physical Exam:   Physical Exam  Vitals and nursing note reviewed. Constitutional:       General: She is not in acute distress. Appearance: She is well-developed. HENT:      Head: Normocephalic and atraumatic. Eyes:      Conjunctiva/sclera: Conjunctivae normal.   Cardiovascular:      Rate and Rhythm: Normal rate and regular rhythm. Heart sounds: No murmur heard. Pulmonary:      Effort: Pulmonary effort is normal. No respiratory distress. Breath sounds: Normal breath sounds. Abdominal:      Palpations: Abdomen is soft. Tenderness: There is no abdominal tenderness. Musculoskeletal:         General: No swelling. Cervical back: Neck supple. Skin:     General: Skin is warm and dry. Capillary Refill: Capillary refill takes less than 2 seconds. Neurological:      General: No focal deficit present. Mental Status: She is alert and oriented to person, place, and time. Motor: No tremor.    Psychiatric:         Mood and Affect: Mood normal.         Additional Data:     Labs:   Results from last 7 days   Lab Units 23  0517 07/10/23  0456 23  1055   WBC Thousand/uL 2.84*   < > 3.15*   HEMOGLOBIN g/dL 10.6*   < > 13.3   HEMATOCRIT % 31.5*   < > 40.8   PLATELETS Thousands/uL 41*   < > 76*   NEUTROS PCT %  --   --  45   LYMPHS PCT %  --   --  43   MONOS PCT %  --   --  9   EOS PCT %  -- --  3    < > = values in this interval not displayed. Results from last 7 days   Lab Units 07/11/23  0517   SODIUM mmol/L 134*   POTASSIUM mmol/L 3.7   CHLORIDE mmol/L 105   CO2 mmol/L 22   BUN mg/dL 12   CREATININE mg/dL 0.59*   ANION GAP mmol/L 7   CALCIUM mg/dL 8.4   ALBUMIN g/dL 3.2*   TOTAL BILIRUBIN mg/dL 0.40   ALK PHOS U/L 84   ALT U/L 30   AST U/L 43*   GLUCOSE RANDOM mg/dL 100                              * I Have Reviewed All Lab Data Listed Above. * Additional Pertinent Lab Tests Reviewed: [facility] Admission Reviewed      Imaging Studies: I have personally reviewed pertinent reports. Recent Cultures (last 7 days): Today, Patient Was Seen By: Odette Garvey DO    ** Please Note: Dictation voice to text software may have been used in the creation of this document.  **

## 2023-07-12 ENCOUNTER — OFFICE VISIT (OUTPATIENT)
Dept: RHEUMATOLOGY | Facility: CLINIC | Age: 63
End: 2023-07-12
Payer: COMMERCIAL

## 2023-07-12 ENCOUNTER — TELEPHONE (OUTPATIENT)
Dept: NEUROLOGY | Facility: CLINIC | Age: 63
End: 2023-07-12

## 2023-07-12 ENCOUNTER — TRANSITIONAL CARE MANAGEMENT (OUTPATIENT)
Dept: FAMILY MEDICINE CLINIC | Facility: CLINIC | Age: 63
End: 2023-07-12

## 2023-07-12 VITALS
BODY MASS INDEX: 20.32 KG/M2 | OXYGEN SATURATION: 97 % | SYSTOLIC BLOOD PRESSURE: 101 MMHG | TEMPERATURE: 97 F | DIASTOLIC BLOOD PRESSURE: 58 MMHG | HEIGHT: 64 IN | WEIGHT: 119.05 LBS | RESPIRATION RATE: 17 BRPM | HEART RATE: 55 BPM

## 2023-07-12 DIAGNOSIS — M81.0 POST-MENOPAUSAL OSTEOPOROSIS: ICD-10-CM

## 2023-07-12 DIAGNOSIS — Z79.899 LONG TERM CURRENT USE OF THERAPEUTIC DRUG: ICD-10-CM

## 2023-07-12 DIAGNOSIS — M80.00XD AGE-RELATED OSTEOPOROSIS WITH CURRENT PATHOLOGICAL FRACTURE WITH ROUTINE HEALING: Primary | ICD-10-CM

## 2023-07-12 LAB
ANION GAP SERPL CALCULATED.3IONS-SCNC: 7 MMOL/L
BUN SERPL-MCNC: 12 MG/DL (ref 5–25)
CALCIUM SERPL-MCNC: 8.4 MG/DL (ref 8.4–10.2)
CHLORIDE SERPL-SCNC: 105 MMOL/L (ref 96–108)
CO2 SERPL-SCNC: 23 MMOL/L (ref 21–32)
CREAT SERPL-MCNC: 0.68 MG/DL (ref 0.6–1.3)
ERYTHROCYTE [DISTWIDTH] IN BLOOD BY AUTOMATED COUNT: 13 % (ref 11.6–15.1)
GFR SERPL CREATININE-BSD FRML MDRD: 93 ML/MIN/1.73SQ M
GLUCOSE SERPL-MCNC: 96 MG/DL (ref 65–140)
HCT VFR BLD AUTO: 34 % (ref 34.8–46.1)
HGB BLD-MCNC: 11.1 G/DL (ref 11.5–15.4)
LACOSAMIDE SERPL-MCNC: 24.3 UG/ML (ref 5–10)
MAGNESIUM SERPL-MCNC: 1.6 MG/DL (ref 1.9–2.7)
MCH RBC QN AUTO: 31.2 PG (ref 26.8–34.3)
MCHC RBC AUTO-ENTMCNC: 32.6 G/DL (ref 31.4–37.4)
MCV RBC AUTO: 96 FL (ref 82–98)
PLATELET # BLD AUTO: 53 THOUSANDS/UL (ref 149–390)
PMV BLD AUTO: 10.7 FL (ref 8.9–12.7)
POTASSIUM SERPL-SCNC: 3.9 MMOL/L (ref 3.5–5.3)
RBC # BLD AUTO: 3.56 MILLION/UL (ref 3.81–5.12)
SODIUM SERPL-SCNC: 135 MMOL/L (ref 135–147)
WBC # BLD AUTO: 3.3 THOUSAND/UL (ref 4.31–10.16)

## 2023-07-12 PROCEDURE — 99239 HOSP IP/OBS DSCHRG MGMT >30: CPT

## 2023-07-12 PROCEDURE — 83735 ASSAY OF MAGNESIUM: CPT | Performed by: PHYSICIAN ASSISTANT

## 2023-07-12 PROCEDURE — 80048 BASIC METABOLIC PNL TOTAL CA: CPT | Performed by: PHYSICIAN ASSISTANT

## 2023-07-12 PROCEDURE — 85027 COMPLETE CBC AUTOMATED: CPT | Performed by: PHYSICIAN ASSISTANT

## 2023-07-12 PROCEDURE — 96372 THER/PROPH/DIAG INJ SC/IM: CPT

## 2023-07-12 RX ORDER — MAGNESIUM SULFATE HEPTAHYDRATE 40 MG/ML
2 INJECTION, SOLUTION INTRAVENOUS ONCE
Status: COMPLETED | OUTPATIENT
Start: 2023-07-12 | End: 2023-07-12

## 2023-07-12 RX ORDER — METOPROLOL SUCCINATE 25 MG/1
25 TABLET, EXTENDED RELEASE ORAL 2 TIMES DAILY
Qty: 60 TABLET | Refills: 0 | Status: SHIPPED | OUTPATIENT
Start: 2023-07-12 | End: 2023-08-11

## 2023-07-12 RX ORDER — TRAZODONE HYDROCHLORIDE 50 MG/1
50 TABLET ORAL DAILY
Qty: 30 TABLET | Refills: 0 | Status: SHIPPED | OUTPATIENT
Start: 2023-07-12 | End: 2023-08-11

## 2023-07-12 RX ADMIN — MULTIPLE VITAMINS W/ MINERALS TAB 1 TABLET: TAB ORAL at 08:39

## 2023-07-12 RX ADMIN — THIAMINE HYDROCHLORIDE 500 MG: 100 INJECTION, SOLUTION INTRAMUSCULAR; INTRAVENOUS at 05:53

## 2023-07-12 RX ADMIN — DESVENLAFAXINE 50 MG: 50 TABLET, FILM COATED, EXTENDED RELEASE ORAL at 08:45

## 2023-07-12 RX ADMIN — FOLIC ACID 1 MG: 1 TABLET ORAL at 08:44

## 2023-07-12 RX ADMIN — GABAPENTIN 300 MG: 300 CAPSULE ORAL at 08:39

## 2023-07-12 RX ADMIN — METOPROLOL SUCCINATE 25 MG: 25 TABLET, EXTENDED RELEASE ORAL at 08:39

## 2023-07-12 RX ADMIN — LACOSAMIDE 200 MG: 200 TABLET, FILM COATED ORAL at 08:39

## 2023-07-12 RX ADMIN — MAGNESIUM SULFATE 2 G: 2 INJECTION INTRAVENOUS at 08:33

## 2023-07-12 RX ADMIN — HYDROXYZINE HYDROCHLORIDE 25 MG: 25 TABLET ORAL at 08:39

## 2023-07-12 NOTE — PHYSICAL THERAPY NOTE
Physical Therapy Evaluation    Patient's Name: Petty Roblero    Admitting Diagnosis  Seizure [facility]) [R56.9]    Problem List  Patient Active Problem List   Diagnosis    Abnormal transaminases    Insomnia    Lipoma of right lower extremity    Age-related osteoporosis with current pathological fracture with routine healing    Vitamin D deficiency    Screening for breast cancer    Screening for colon cancer    Other hyperlipidemia    Screen for colon cancer    Fatty liver    Alcohol abuse    Refusal of blood transfusions as patient is [employer]    Anemia    Anxiety    Lumbar compression fracture ([address])    Fall    Pancreatic cyst    Gall stones    Refusal of blood transfusions as patient is Buddhist    Thrombocytopenia ([address])    Thrombopenia ([address])    Nonintractable focal epilepsy ([address])    Encephalopathy    Pancytopenia ([address])    Depression    Stress-induced cardiomyopathy    Hyponatremia    Breakthrough seizure ([address])    Pancytopenia ([address])    Alcohol abuse    Hypomagnesemia    Anxiety    Alcohol use disorder, severe, dependence ([address])    Alcohol withdrawal syndrome with complication ([address])    Neutropenia ([address])    Combined systolic and diastolic congestive heart failure ([address])       Past Medical History  Past Medical History:   Diagnosis Date    Fracture     Hepatitis     Hep A     Insomnia     10mar2016 resolved    Osteoporosis     14jun2016 resolved    Pancreatitis     SAH (subarachnoid hemorrhage) (HCC)     Seasonal allergies     Seizure ([address])     Seizures ([address])     Urine discoloration 11/11/2019    Varicella     Vomiting 11/13/2019       Past Surgical History  Past Surgical History:   Procedure Laterality Date    IR BIOPSY BONE  8/17/2021    IR BIOPSY BONE MARROW  11/14/2019    TUBAL LIGATION  1985       Recent Imaging  No orders to display       Recent Vital Signs  Vitals:    07/11/23 1905 07/12/23 0436 07/12/23 0704 07/12/23 0709   BP: 114/68 120/68 101/58    BP Location: Right arm Right arm Right arm Pulse: 62 66 55    Resp: 17 18 17    Temp: (!) 97.2 °F (36.2 °C) (!) 97.1 °F (36.2 °C)  (!) 97 °F (36.1 °C)   TempSrc: Temporal Temporal  Temporal   SpO2: 98% 95% 97%    Weight:       Height:            07/11/23 1400   PT Last Visit   PT Visit Date 07/11/23   Note Type   Note type Evaluation   Pain Assessment   Pain Assessment Tool 0-10   Pain Score No Pain   Restrictions/Precautions   Weight Bearing Precautions Per Order No   Home Living   Type of [address] Two level;Stairs to enter with rails   Prior Function   Level of [place] Independent with ADLs; Independent with functional mobility; Independent with IADLS   Lives With Spouse   Receives Help From Family   General   Family/Caregiver Present No   Cognition   Overall Cognitive Status WFL   Arousal/Participation Alert   Orientation Level Oriented X4   Memory Within functional limits   Following Commands Follows all commands and directions without difficulty   RLE Assessment   RLE Assessment   (4/5)   LLE Assessment   LLE Assessment   (4/5)   Coordination   Movements are Fluid and Coordinated 0   Coordination and Movement Description mildly unsteady gait, decreased LE coordination   Light Touch   RLE Light Touch Impaired   RLE Light Touch Comments decreased distally   LLE Light Touch Impaired   LLE Light Touch Comments decreased distally   Bed Mobility   Supine to Sit 6  Modified independent   Additional items Increased time required   Sit to Supine 6  Modified independent   Additional items Increased time required   Transfers   Sit to Stand 6  Modified independent   Additional items Increased time required   Stand to Sit 6  Modified independent   Additional items Increased time required   Additional Comments with no AD   Ambulation/Elevation   Gait pattern Step through pattern;Decreased toe off;Decreased heel strike;Decreased foot clearance; Improper Weight shift   Gait Assistance 6  Modified independent   Additional items Verbal cues Assistive Device None   Distance 400ft   Balance   Static Sitting Fair +   Dynamic Sitting Fair +   Static Standing Fair   Dynamic Standing Fair -   Ambulatory Fair -   Endurance Deficit   Endurance Deficit Yes   Endurance Deficit Description reduced from baseline due to deconditioning   Activity Tolerance   Activity Tolerance Patient tolerated treatment well   Medical Staff Made Aware spoke to CM   Nurse Made Aware spoke to RN   Assessment   Prognosis Good   Problem List Decreased strength;Decreased endurance; Impaired balance;Decreased mobility; Decreased coordination; Impaired sensation   Barriers to Discharge Inaccessible home environment;Decreased caregiver support   Goals   Patient Goals go home   Recommendation   PT Discharge Recommendation No rehabilitation needs   AM-PAC Basic Mobility Inpatient   Turning in Flat Bed Without Bedrails 4   Lying on Back to Sitting on Edge of Flat Bed Without Bedrails 4   Moving Bed to Chair 4   Standing Up From Chair Using Arms 4   Walk in Room 4   Climb 3-5 Stairs With Railing 4   Basic Mobility Inpatient Raw Score 24   Basic Mobility Standardized Score 57.68   Highest Level Of Mobility   -HLM Goal 8: Walk 250 feet or more   JH-HLM Achieved 8: Walk 250 feet ot more   End of Consult   Patient Position at End of Consult All needs within reach; Seated edge of bed         ASSESSMENT                                                                                                                     Petty Roblero is a 61 y.o. female admitted to [facility] on 7/9/2023 for Nonintractable focal epilepsy ([address]). Pt  has a past medical history of Fracture, Hepatitis, Insomnia, Osteoporosis, Pancreatitis, SAH (subarachnoid hemorrhage) ([address]), Seasonal allergies, Seizure ([address]), Seizures ([address]), Urine discoloration (11/11/2019), Varicella, and Vomiting (11/13/2019). . PT was consulted and pt was seen on 7/12/2023 for mobility assessment and d/c planning.    Pt presents supine in bed alert and agreeable to therapy. Impairments limiting pt at this time include impaired balance, decreased endurance, decreased coordination, decreased sensation, and decreased strength. Pt is currently functioning at a modified independent assistance level for bed mobility, modified independent assistance level for transfers, modified independent assistance level for ambulation with no assistive device. The patient's AM-PAC Basic Mobility Inpatient Short Form Raw Score is 24. A Raw score of greater than 16 suggests the patient may benefit from discharge to home. Please also refer to the recommendation of the Physical Therapist for safe discharge planning.     Recommendations                                                                                                                DME: None    Discharge Disposition:  Home with no needs      Courtney Johnson PT, DPT

## 2023-07-12 NOTE — TELEPHONE ENCOUNTER
Farnaz Dulce from [id number] called in, patient is currently admitted and will be discharged today at some point. She was seen by neurology and they stated she needed a 2 week follow up with neurology due to medication changes. Can you please assist in scheduling or give me a time and date I can go ahead and schedule patient. Thank you in advance!

## 2023-07-12 NOTE — PROGRESS NOTES
The patient received Evenity injections today without any immediate complications. Return to the clinic in 1 month for next set of injections.

## 2023-07-12 NOTE — DISCHARGE SUMMARY
[facility]  Discharge- Girard Sinks 1960, 61 y.o. female MRN: [id number]  Unit/Bed#: 5T DETOX 501-01 Encounter: [id number]  Primary Care Provider: ePdro Spears MD   Date and time admitted to hospital: 7/9/2023  4:06 PM    [facility], LEVEL 4  Department of Medical Toxicology  Reason for Admission/Principal Problem: alcohol withdrawal, alcohol use disorder, nonintractable focal epilepsy  Admitting provider: COLT Goode*   7/9/2023  4:06 PM       Discharging Physician / Practitioner: Keren Hernadez PA-C  PCP: Pedro Spears MD  Admission Date:   Admission Orders (From admission, onward)     Ordered        07/09/23 1611  Inpatient Admission  Once                      Discharge Date: 07/12/23    Medical Problems     Resolved Problems  Date Reviewed: 7/12/2023   None         Combined systolic and diastolic congestive heart failure ([address])  Assessment & Plan  Wt Readings from Last 3 Encounters:   07/09/23 54 kg (119 lb 0.8 oz)   07/09/23 58 kg (127 lb 13.9 oz)   07/03/23 56.9 kg (125 lb 6.4 oz)   · Follows with [facility]ology for mgmt of suspected stress induced cardiomyopathy   · Mixed etiology: status epilepticus in early 2022 per cardiology notes vs chronic alcohol use   · Echo 7/2022 LVEF 60% and G1DD  · Managed on metoprolol BUD as outpatient; will continue  · Encourage outpatient follow up with cardiology           Neutropenia [facility])  Assessment & Plan  Recent Labs     07/10/23  0456 07/11/23  0517 07/12/23  0439   WBC 3.39* 2.84* 3.30*   · Likely secondary to antiepileptic regimen   · Baseline appears 3-4  · No sign of infection. Afebrile  · Continue to monitor CBC    Alcohol withdrawal syndrome with complication [facility])  Assessment & Plan  · Last drink 7/9 AM   Serum alcohol 89 mg/dL in the ED  No current alcohol withdrawal symptoms, appears to be through the time window for alcohol withdrawal  SEWs discontinued yesterday. Patient has been monitored off of protocol and no longer displays signs or symptoms of alcohol withdrawal      Alcohol use disorder, severe, dependence (HCC)  Assessment & Plan  Pt with hx of alcohol use disorder  States one bottle of wine per night for "several years"   H/o withdrawal seizures vs seizure disorder   Withdrawal management as above  Daily thiamine/folic acid supplementation, and MVI  Consult case management for assistance with aftercare resources - pt interested in outpatient follow up. Depression  Assessment & Plan  · Hx of anxiety and depression managed on Trazodone QHS, Pristique 50mg QD, hydroxyzine 25mg PRN  · Will continue current management   · Denies SI/HI  · Encourage continued follow up with psychiatry     Thrombocytopenia [facility])  Assessment & Plan  Recent Labs     07/10/23  0456 07/11/23  0517 07/12/23  0439   PLT 53* 41* 53*   · Thrombocytopenia on admission labs, appears to be chronic   · No sign of acute bleeding   · CT head: no intracranial abnormality   · Will hold dvt ppx and continue assessing risk vs benefit of ppx   · REFUSES BLOOD transfusion : Alice Montserrat witness   · Discussed with neuro 1 day ago, patient was taken off Keppra due to concern of thrombocytopenia and recommended that we discontinue it as well. · Keppra was discontinued yesterday. · Continue to monitor while admitted    Abnormal transaminases  Assessment & Plan  · Mildly elevated AST and Alk phos -continuing to improve  · Likely secondary to chronic alcohol use   · No abdominal pain on exam. Consider imaging if develops. · Encourage cessation of alcohol     * Nonintractable focal epilepsy ([address])  Assessment & Plan  · Pt with history of seizure disorder vs alcohol withdrawal related seizures.    · Presented to ED after witnessed seizure in grocery store, unclear if provoked by withdrawal.   · CT head: no acute intracranial abnormality   · Follows with outpatient neurology who recently adjusted lacosamide dosing. · Discussed with neurology prior to admission. Recommended levetiracetam 750mg BID, and continuing home regimen of lacosamide 200mg BID, gabapentin 300mg BID  · EEG done   · Lacosamide level pending   · Seizure precautions, neuro checks Q 4 hours  Consulted neurology- no further recommendations based on EEG can follow up with epileptologist. Jean Paul Botello made aware and outpatient appointments were requested   ·       Consultations During Hospital Stay:  · Case management   · Neurology    Procedures Performed:   · None    Significant Findings / Test Results:   · Elevated transaminases - improved  · Thrombocytopenia - stable  · Neutropenia - stable  · CT head 7/9/23: no intracranial abnormality   · EEG 7/11/23: "This Routine EEG recorded during wakefulness is abnormal. Background activities are too slow suggesting mild diffuse cerebral dysfunction of nonspecific etiology."    Incidental Findings:   · None     Test Results Pending at Discharge (will require follow up): · None      Outpatient Tests / Follow Up Requested:  · Recommend f/u with PCP within 1-2 weeks of discharge   · Recommend OP f/u with Neurology    Complications:  none    Reason for Admission: alcohol withdrawal, alcohol use disorder, nonintractable focal epilepsy    Hospital Course:     Carlton Rose is a 61 y.o. female patient who originally presented to the hospital on 7/9/2023 due to alcohol withdrawal. Patient initially presented to the [facility] ED requesting detoxification from alcohol after having a witnessed withdrawal seizure at the grocery store. Patient was admitted to the [facility] medical detox unit under [facility] protocol for medically assisted alcohol withdrawal and received a total of 455 mg phenobarbital without complication. Patient's alcohol withdrawal symptoms subsequently resolved, and she has remained without objective evidence of alcohol withdrawal at this time.  During this hospitalization, patient was found to have elevated transaminases, which improved with IVFs and alcohol abstinence, along with stable thrombocytopenia and leukopenia, likely 2/2 antiepileptic regimen. Neurology was consulted for assistance with mgmt of epilepsy and medications were adjusted per their recommendations. Pt also underwent EEG which showed slow background activities suggestive of mild diffuse cerebral dysfunction of nonspecific etiology. Case management was consulted for assistance with aftercare resources, and patient will be discharged home with OP IRISH follow-up through [facility] in [place] ([place]). Please see above list of diagnoses and related plan for additional information. Condition at Discharge: stable     Discharge Day Visit / Exam:     Subjective:  Patient is alert and oriented this morning. Denies further withdrawal symptoms. She is able to ambulate without any difficulty. Denies any headache, chest pain, abdominal pain, or shortness of breath. Vitals: Blood Pressure: 101/58 (07/12/23 0704)  Pulse: 55 (07/12/23 0704)  Temperature: (!) 97 °F (36.1 °C) (07/12/23 0709)  Temp Source: Temporal (07/12/23 0709)  Respirations: 17 (07/12/23 0704)  Height: 5' 4" (162.6 cm) (07/09/23 1645)  Weight - Scale: 54 kg (119 lb 0.8 oz) (07/09/23 1645)  SpO2: 97 % (07/12/23 0704)  Exam:   Physical Exam  Constitutional:       General: She is not in acute distress. Appearance: She is not diaphoretic. Eyes:      General: No scleral icterus. Pupils: Pupils are equal, round, and reactive to light. Cardiovascular:      Rate and Rhythm: Normal rate and regular rhythm. Heart sounds: No murmur heard. Pulmonary:      Effort: No respiratory distress. Breath sounds: Normal breath sounds. Abdominal:      General: Bowel sounds are normal.      Palpations: Abdomen is soft. Neurological:      General: No focal deficit present. Mental Status: She is alert and oriented to person, place, and time. Coordination: Coordination is intact.       Gait: Gait normal. Psychiatric:         Mood and Affect: Mood is not anxious or depressed. Discussion with Family: I personally did not discuss this case with the patient's family. I reviewed the discharge plan with the patient and answered all questions to the best of my ability. Discharge instructions/Information to patient and family:   See after visit summary for information provided to patient and family. Provisions for Follow-Up Care:  See after visit summary for information related to follow-up care and any pertinent home health orders. Disposition:     Home    For Discharges to [facility] SNF:   · Not Applicable to this Patient - Not Applicable to this Patient    Planned Readmission: NA     Discharge Statement:  I spent 35 minutes discharging the patient. This time was spent on the day of discharge. I had direct contact with the patient on the day of discharge. Greater than 50% of the total time was spent examining patient, answering all patient questions, arranging and discussing plan of care with patient as well as directly providing post-discharge instructions. Additional time then spent on discharge activities. Discharge Medications:  See after visit summary for reconciled discharge medications provided to patient and family.       ** Please Note: This note has been constructed using a voice recognition system **

## 2023-07-12 NOTE — PROGRESS NOTES
Evenity injection was successfully administered subcutaneously into each of patient's arms, which were tolerated without any adverse event.

## 2023-07-12 NOTE — NURSING NOTE
AVS summary was reviewed with patient. Belongings were given to patient. A ride was secured for patient.

## 2023-07-12 NOTE — CASE MANAGEMENT
Case Management Discharge Planning Note    Patient name Remy Garrison  Location 5T DETOX 501/5T DETOX 50* MRN [id number]  : 1960 Date 2023       Current Admission Date: 2023  Current Admission Diagnosis:Nonintractable focal epilepsy [facility])   Patient Active Problem List    Diagnosis Date Noted   • Alcohol use disorder, severe, dependence ([address]) 2023   • Alcohol withdrawal syndrome with complication ([address])    • Neutropenia ([address]) 2023   • Combined systolic and diastolic congestive heart failure ([address]) 2023   • Anxiety 2023   • Pancytopenia ([address]) 2023   • Alcohol abuse 2023   • Hypomagnesemia 2023   • Breakthrough seizure ([address]) 2023   • Hyponatremia 2023   • Stress-induced cardiomyopathy 2022   • Pancytopenia ([address]) 03/15/2022   • Depression 03/15/2022   • Nonintractable focal epilepsy ([address]) 2022   • Encephalopathy 2022   • Thrombopenia ([address]) 2022   • Refusal of blood transfusions as patient is Tenriism 2021   • Thrombocytopenia ([address]) 2021   • Gall stones 2021   • Pancreatic cyst 2021   • Lumbar compression fracture ([address]) 2021   • Fall 2021   • Anemia 2019   • Anxiety 2019   • Alcohol abuse 2019   • Refusal of blood transfusions as patient is Tenriism 2019   • Fatty liver 2019   • Screen for colon cancer 2019   • Screening for breast cancer 10/03/2018   • Screening for colon cancer 10/03/2018   • Other hyperlipidemia 10/03/2018   • Age-related osteoporosis with current pathological fracture with routine healing 2016   • Abnormal transaminases 2016   • Lipoma of right lower extremity 2016   • Vitamin D deficiency 2016   • Insomnia 03/10/2016      LOS (days): 3  Geometric Mean LOS (GMLOS) (days):   Days to GMLOS:     OBJECTIVE:  Risk of Unplanned Readmission Score: 22.49         Current admission status: Inpatient   Preferred Pharmacy:   [facility] [address] [facility], [address] [facility] [address] [facility]  [address] [facility]  [address] [address]  Phone: [phone] Fax: [phone]    Primary Care Provider: Rosi Russell MD    Primary Insurance: [place] [employer]  Secondary Insurance:     DISCHARGE DETAILS: Cm informed by medical staff pt ready for discharge. Cm met with pt and pt requested that cm contact her spouse regarding discharge transportation and planning. CM contacted pt's spouse, Lance Betancur, and Lance Betancur indicated he could transport pt home. Cm reviewed discharge plan with Lance Betancur. Cm contacted  neurology and requested pt be made an appointment in 2 weeks as recommended by inpatient neuro. CM informed this request will be reviewed and pt will be contacted with appointment details. Cm contacted [facility] and arranged a ESTIVEN appointment for pt on. Pt was reminded of her OP IRISH Intake appointment 7/13/2023. Pt will be discharged home today to [address] [place] [address]. [place] [address].     Discharge planning discussed with[de-identified] patient  Freedom of Choice: Yes                   Contacts  Patient Contacts: [facility]/ neurology  Relationship to Patient[de-identified] Treatment Provider  Contact Method: Phone  Reason/Outcome: Discharge Planning, Continuity of Care              Other Referral/Resources/Interventions Provided:  Referrals Provided[de-identified] Therapist, Support Group, IOP    Would you like to participate in our [facility] service program?  : No - Declined                                              Family notified[de-identified] Carly Kern (spouse)

## 2023-07-13 NOTE — UTILIZATION REVIEW
NOTIFICATION OF ADMISSION DISCHARGE   This is a Notification of Discharge from [address] [place] [address]. Please be advised that this patient has been discharge from our facility. Below you will find the admission and discharge date and time including the patient’s disposition. UTILIZATION REVIEW CONTACT:  Amanda Dsouza MA  Utilization   Network Utilization Review Department  Phone: [phone] x carefully listen to the prompts. All voicemails are confidential.  Email: [email or web address]     ADMISSION INFORMATION  PRESENTATION DATE: 7/9/2023  4:06 PM  OBERVATION ADMISSION DATE:   INPATIENT ADMISSION DATE: 7/9/23  4:06 PM   DISCHARGE DATE: 7/12/2023 11:11 AM   DISPOSITION:Home/Self Care    IMPORTANT INFORMATION:  Send all requests for admission clinical reviews, approved or denied determinations and any other requests to dedicated fax number below belonging to the campus where the patient is receiving treatment.  List of dedicated fax numbers:  [place] DENIALS (Administrative/Medical Necessity) [phone] [address] [facility] (Maternity/NICU/Pediatrics) [phone]   [facility] [phone]   [facility] [phone] 1636 [facility] [phone] [facility] [phone]   [facility] [phone]   [address] [place] [address] 608 [facility] [phone]   [facility] [phone]   [address] [place] [address] [phone] 27[address] [facility] [address] [facility] [phone]

## 2023-07-18 ENCOUNTER — TELEPHONE (OUTPATIENT)
Dept: FAMILY MEDICINE CLINIC | Facility: CLINIC | Age: 63
End: 2023-07-18

## 2023-07-18 DIAGNOSIS — F41.9 ANXIETY: ICD-10-CM

## 2023-07-18 RX ORDER — DESVENLAFAXINE SUCCINATE 50 MG/1
50 TABLET, EXTENDED RELEASE ORAL DAILY
Qty: 30 TABLET | Refills: 0 | Status: SHIPPED | OUTPATIENT
Start: 2023-07-18

## 2023-07-18 NOTE — TELEPHONE ENCOUNTER
Pt called and needs a refill of her Pristiq I sent a 30 day she does have a follow up 8/1 with Dr Alycia Estrada I did tell her she needs to keep that appointment

## 2023-07-20 NOTE — TELEPHONE ENCOUNTER
ADD ON- ASAP Pt     Patient has accepted 7/25/2023, Radha Prince, DR. Ruth Plascencia, 9:30 am.    [facility] / Malachi walker.       Thank you all for your time and help,     Higinio Timmons

## 2023-08-01 ENCOUNTER — TELEMEDICINE (OUTPATIENT)
Dept: FAMILY MEDICINE CLINIC | Facility: CLINIC | Age: 63
End: 2023-08-01
Payer: COMMERCIAL

## 2023-08-01 VITALS — WEIGHT: 127 LBS | BODY MASS INDEX: 21.68 KG/M2 | HEIGHT: 64 IN

## 2023-08-01 DIAGNOSIS — F41.0 PANIC ATTACK: ICD-10-CM

## 2023-08-01 DIAGNOSIS — D61.818 PANCYTOPENIA (HCC): Chronic | ICD-10-CM

## 2023-08-01 DIAGNOSIS — F41.8 ANXIETY WITH DEPRESSION: ICD-10-CM

## 2023-08-01 DIAGNOSIS — S32.000A COMPRESSION FRACTURE OF LUMBAR VERTEBRA, UNSPECIFIED LUMBAR VERTEBRAL LEVEL, INITIAL ENCOUNTER (HCC): ICD-10-CM

## 2023-08-01 DIAGNOSIS — G47.00 INSOMNIA, UNSPECIFIED TYPE: ICD-10-CM

## 2023-08-01 DIAGNOSIS — F10.20 ALCOHOL USE DISORDER, SEVERE, DEPENDENCE (HCC): ICD-10-CM

## 2023-08-01 DIAGNOSIS — F41.9 ANXIETY: Primary | Chronic | ICD-10-CM

## 2023-08-01 PROCEDURE — 99214 OFFICE O/P EST MOD 30 MIN: CPT | Performed by: FAMILY MEDICINE

## 2023-08-01 RX ORDER — HYDROXYZINE HYDROCHLORIDE 25 MG/1
25 TABLET, FILM COATED ORAL 2 TIMES DAILY
Qty: 60 TABLET | Refills: 1 | Status: SHIPPED | OUTPATIENT
Start: 2023-08-01

## 2023-08-01 RX ORDER — GABAPENTIN 300 MG/1
300 CAPSULE ORAL 2 TIMES DAILY
Qty: 60 CAPSULE | Refills: 1 | Status: SHIPPED | OUTPATIENT
Start: 2023-08-01

## 2023-08-01 RX ORDER — DESVENLAFAXINE SUCCINATE 50 MG/1
50 TABLET, EXTENDED RELEASE ORAL DAILY
Qty: 30 TABLET | Refills: 1 | Status: SHIPPED | OUTPATIENT
Start: 2023-08-01

## 2023-08-02 PROBLEM — D61.818 PANCYTOPENIA (HCC): Status: RESOLVED | Noted: 2023-05-12 | Resolved: 2023-08-02

## 2023-08-02 PROBLEM — D69.6 THROMBOPENIA (HCC): Status: RESOLVED | Noted: 2022-03-07 | Resolved: 2023-08-02

## 2023-08-02 NOTE — ASSESSMENT & PLAN NOTE
According to patient her symptoms have improved significantly since she was started on Pristiq. Also on Atarax and trazodone. Continue same. Denies any suicidal ideation. Encouraged to reach out to psychiatry to establish care for monitoring and management of symptoms.

## 2023-08-02 NOTE — PROGRESS NOTES
Virtual Regular Visit    Verification of patient location:    Patient is located at Home in the following state in which I hold an active license [place]      Assessment/Plan:    Problem List Items Addressed This Visit        Musculoskeletal and Integument    Lumbar compression fracture ([address])     On Evenity through rheumatologist.            Hematopoietic and Hemostatic    Pancytopenia ([address]) (Chronic)     CHRONIC, H/O ALCOHOL ABUSE. Asymptomatic. Patient needs to establish with hematology. Relevant Orders    CBC and differential       Other    Anxiety - Primary (Chronic)     Patient reports significant improvement in her symptoms since she was started on Pristiq. Continue same. Continue hydroxyzine 25 mg twice daily, trazodone 50 mg nightly. Needs to establish with psychiatry. Being provided courtesy refills till she is able to establish care with psychiatry. Relevant Medications    desvenlafaxine succinate (PRISTIQ) 50 mg 24 hr tablet    hydrOXYzine HCL (ATARAX) 25 mg tablet    Insomnia     Improved on trazodone. Relevant Orders    Comprehensive metabolic panel    Anxiety with depression     According to patient her symptoms have improved significantly since she was started on Pristiq. Also on Atarax and trazodone. Continue same. Denies any suicidal ideation. Encouraged to reach out to psychiatry to establish care for monitoring and management of symptoms. Relevant Medications    desvenlafaxine succinate (PRISTIQ) 50 mg 24 hr tablet    gabapentin (NEURONTIN) 300 mg capsule    hydrOXYzine HCL (ATARAX) 25 mg tablet    Panic attack     Stable on gabapentin, hydroxyzine and trazodone. Continue same. Waiting to establish care with psychiatry. Recommended a 3-month follow-up.          Relevant Medications    hydrOXYzine HCL (ATARAX) 25 mg tablet    Alcohol use disorder, severe, dependence ([address])     Patient states that she has not had any alcohol since her most recent hospital discharge. Reports that the current combination of psychiatry medications are helping with anxiety and panic attacks. Recommended her to contact alcohol abstinence support group. Patient is going to discuss this with her  and Contact them. Reason for visit is   Chief Complaint   Patient presents with   • Vj Panda follow up   • Virtual Regular Visit        Encounter provider Pritesh Thomas MD    Provider located at [address] [address] [place] [address]      Recent Visits  Date Type Provider Dept   08/01/23 Ene Funk MD [facility]   Showing recent visits within past 7 days and meeting all other requirements  Future Appointments  No visits were found meeting these conditions. Showing future appointments within next 150 days and meeting all other requirements       The patient was identified by name and date of birth. Vazquez Fernando was informed that this is a telemedicine visit and that the visit is being conducted through the [employer] platform. She agrees to proceed. .  My office door was closed. No one else was in the room. She acknowledged consent and understanding of privacy and security of the video platform. The patient has agreed to participate and understands they can discontinue the visit at any time. Patient is aware this is a billable service. Subjective  Vazquez Fernando is a 61 y.o. female  . HPI     Patient has history of anxiety and depression, currently on gabapentin 300 mg twice daily, trazodone 50 mg daily, Pristiq. Needs to establish with outpatient psychiatry to discuss medications for improved control on her symptoms. Patient also has history of thrombopenia secondary to chronic alcohol abuse. According to patient she has stopped drinking alcohol  now. Hospital labs reviewed, platelet count low. Patient asymptomatic.   Patient has history of seizure disorder, currently on lacosamide, being monitored by neurology. Needs to establish with outpatient heme-onc. Past Medical History:   Diagnosis Date   • Fracture    • Hepatitis     Hep A    • Insomnia     10mar2016 resolved   • Osteoporosis     14jun2016 resolved   • Pancreatitis    • SAH (subarachnoid hemorrhage) ([address])    • Seasonal allergies    • Seizure ([address])    • Seizures ([address])    • Urine discoloration 11/11/2019   • Varicella    • Vomiting 11/13/2019       Past Surgical History:   Procedure Laterality Date   • IR BIOPSY BONE  8/17/2021   • IR BIOPSY BONE MARROW  11/14/2019   • TUBAL LIGATION  1985       Current Outpatient Medications   Medication Sig Dispense Refill   • desvenlafaxine succinate (PRISTIQ) 50 mg 24 hr tablet Take 1 tablet (50 mg total) by mouth daily 30 tablet 1   • folic acid (FOLVITE) 1 mg tablet Take 1 tablet (1 mg total) by mouth daily Do not start before May 14, 2023. 30 tablet 0   • gabapentin (NEURONTIN) 300 mg capsule Take 1 capsule (300 mg total) by mouth 2 (two) times a day 60 capsule 1   • hydrOXYzine HCL (ATARAX) 25 mg tablet Take 1 tablet (25 mg total) by mouth 2 (two) times a day 60 tablet 1   • lacosamide (VIMPAT) 200 mg tablet Take 1 tablet (200 mg total) by mouth 2 (two) times a day 60 tablet 5   • metoprolol succinate (TOPROL-XL) 25 mg 24 hr tablet Take 1 tablet (25 mg total) by mouth 2 (two) times a day 60 tablet 0   • thiamine 100 MG tablet Take 1 tablet (100 mg total) by mouth daily Do not start before May 14, 2023. 30 tablet 0   • traZODone (DESYREL) 50 mg tablet Take 1 tablet (50 mg total) by mouth in the morning 30 tablet 0   • zonisamide (Zonegran) 100 mg capsule Take one pill nightly for 2 weeks and then 2 pills nightly. 60 capsule 5     No current facility-administered medications for this visit. No Known Allergies    Review of Systems   Constitutional: Negative. Respiratory: Negative. Cardiovascular: Negative.         Video Exam    Vitals:    08/01/23 0948   Weight: 57.6 kg (127 lb)   Height: 5' 4" (1.626 m)       Physical Exam  Constitutional:       Appearance: Normal appearance. Neurological:      Mental Status: She is oriented to person, place, and time.    Psychiatric:         Mood and Affect: Mood normal.          Visit Time  Total Visit Duration: 25

## 2023-08-02 NOTE — ASSESSMENT & PLAN NOTE
Patient reports significant improvement in her symptoms since she was started on Pristiq. Continue same. Continue hydroxyzine 25 mg twice daily, trazodone 50 mg nightly. Needs to establish with psychiatry. Being provided courtesy refills till she is able to establish care with psychiatry.

## 2023-08-02 NOTE — ASSESSMENT & PLAN NOTE
Stable on gabapentin, hydroxyzine and trazodone. Continue same. Waiting to establish care with psychiatry. Recommended a 3-month follow-up.

## 2023-08-02 NOTE — ASSESSMENT & PLAN NOTE
Platelet count has improved slightly. Patient remains asymptomatic.     She is Orthodoxy  Needs to establish with hematology

## 2023-08-02 NOTE — ASSESSMENT & PLAN NOTE
Patient states that she has not had any alcohol since her most recent hospital discharge. Reports that the current combination of psychiatry medications are helping with anxiety and panic attacks. Recommended her to contact alcohol abstinence support group. Patient is going to discuss this with her  and Contact them.

## 2023-08-04 ENCOUNTER — APPOINTMENT (EMERGENCY)
Dept: CT IMAGING | Facility: HOSPITAL | Age: 63
DRG: 493 | End: 2023-08-04
Payer: COMMERCIAL

## 2023-08-04 ENCOUNTER — APPOINTMENT (EMERGENCY)
Dept: RADIOLOGY | Facility: HOSPITAL | Age: 63
DRG: 493 | End: 2023-08-04
Payer: COMMERCIAL

## 2023-08-04 ENCOUNTER — HOSPITAL ENCOUNTER (INPATIENT)
Facility: HOSPITAL | Age: 63
LOS: 7 days | Discharge: HOME HEALTH | DRG: 493 | End: 2023-08-11
Attending: SURGERY | Admitting: STUDENT IN AN ORGANIZED HEALTH CARE EDUCATION/TRAINING PROGRAM
Payer: COMMERCIAL

## 2023-08-04 ENCOUNTER — ANESTHESIA (INPATIENT)
Dept: PERIOP | Facility: HOSPITAL | Age: 63
DRG: 493 | End: 2023-08-04
Payer: COMMERCIAL

## 2023-08-04 ENCOUNTER — ANESTHESIA EVENT (INPATIENT)
Dept: PERIOP | Facility: HOSPITAL | Age: 63
DRG: 493 | End: 2023-08-04
Payer: COMMERCIAL

## 2023-08-04 ENCOUNTER — APPOINTMENT (INPATIENT)
Dept: RADIOLOGY | Facility: HOSPITAL | Age: 63
DRG: 493 | End: 2023-08-04
Payer: COMMERCIAL

## 2023-08-04 DIAGNOSIS — S82.209A TIBIAL FRACTURE: Primary | ICD-10-CM

## 2023-08-04 PROBLEM — G40.909 EPILEPSY (HCC): Status: ACTIVE | Noted: 2023-08-04
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
## 2023-08-04 PROBLEM — F10.10 CHRONIC ALCOHOL ABUSE: Status: ACTIVE | Noted: 2023-08-04

## 2023-08-04 PROBLEM — I95.9 HYPOTENSION: Status: ACTIVE | Noted: 2023-08-04

## 2023-08-04 PROBLEM — T14.8XXA HEMATOMA: Status: ACTIVE | Noted: 2023-08-04

## 2023-08-04 LAB
ABO GROUP BLD: NORMAL
ALBUMIN SERPL BCP-MCNC: 3.3 G/DL (ref 3.5–5)
ALBUMIN SERPL BCP-MCNC: 3.3 G/DL (ref 3.5–5)
ALP SERPL-CCNC: 158 U/L (ref 34–104)
ALP SERPL-CCNC: 158 U/L (ref 34–104)
ALT SERPL W P-5'-P-CCNC: 24 U/L (ref 7–52)
ALT SERPL W P-5'-P-CCNC: 24 U/L (ref 7–52)
ANION GAP SERPL CALCULATED.3IONS-SCNC: 22 MMOL/L
ANION GAP SERPL CALCULATED.3IONS-SCNC: 22 MMOL/L
ANION GAP SERPL CALCULATED.3IONS-SCNC: 8 MMOL/L
ANION GAP SERPL CALCULATED.3IONS-SCNC: 8 MMOL/L
AST SERPL W P-5'-P-CCNC: 29 U/L (ref 13–39)
AST SERPL W P-5'-P-CCNC: 29 U/L (ref 13–39)
BASE EXCESS BLDA CALC-SCNC: -7 MMOL/L (ref -2–3)
BASE EXCESS BLDA CALC-SCNC: -7 MMOL/L (ref -2–3)
BASOPHILS # BLD AUTO: 0.03 THOUSANDS/ÂΜL (ref 0–0.1)
BASOPHILS # BLD AUTO: 0.03 THOUSANDS/ÂΜL (ref 0–0.1)
BASOPHILS NFR BLD AUTO: 1 % (ref 0–1)
BASOPHILS NFR BLD AUTO: 1 % (ref 0–1)
BILIRUB SERPL-MCNC: 0.47 MG/DL (ref 0.2–1)
BILIRUB SERPL-MCNC: 0.47 MG/DL (ref 0.2–1)
BILIRUB UR QL STRIP: NEGATIVE
BILIRUB UR QL STRIP: NEGATIVE
BLD GP AB SCN SERPL QL: NEGATIVE
BLD GP AB SCN SERPL QL: NEGATIVE
BUN SERPL-MCNC: 14 MG/DL (ref 5–25)
BUN SERPL-MCNC: 14 MG/DL (ref 5–25)
BUN SERPL-MCNC: 8 MG/DL (ref 5–25)
BUN SERPL-MCNC: 8 MG/DL (ref 5–25)
CA-I BLD-SCNC: 0.98 MMOL/L (ref 1.12–1.32)
CA-I BLD-SCNC: 0.98 MMOL/L (ref 1.12–1.32)
CALCIUM ALBUM COR SERPL-MCNC: 8.8 MG/DL (ref 8.3–10.1)
CALCIUM ALBUM COR SERPL-MCNC: 8.8 MG/DL (ref 8.3–10.1)
CALCIUM SERPL-MCNC: 4.8 MG/DL (ref 8.4–10.2)
CALCIUM SERPL-MCNC: 4.8 MG/DL (ref 8.4–10.2)
CALCIUM SERPL-MCNC: 8.2 MG/DL (ref 8.4–10.2)
CALCIUM SERPL-MCNC: 8.2 MG/DL (ref 8.4–10.2)
CHLORIDE SERPL-SCNC: 100 MMOL/L (ref 96–108)
CHLORIDE SERPL-SCNC: 100 MMOL/L (ref 96–108)
CHLORIDE SERPL-SCNC: 106 MMOL/L (ref 96–108)
CHLORIDE SERPL-SCNC: 106 MMOL/L (ref 96–108)
CK SERPL-CCNC: 100 U/L (ref 26–192)
CK SERPL-CCNC: 100 U/L (ref 26–192)
CLARITY UR: CLEAR
CLARITY UR: CLEAR
CO2 SERPL-SCNC: 15 MMOL/L (ref 21–32)
CO2 SERPL-SCNC: 15 MMOL/L (ref 21–32)
CO2 SERPL-SCNC: 19 MMOL/L (ref 21–32)
CO2 SERPL-SCNC: 19 MMOL/L (ref 21–32)
COLOR UR: NORMAL
COLOR UR: NORMAL
CREAT SERPL-MCNC: 0.28 MG/DL (ref 0.6–1.3)
CREAT SERPL-MCNC: 0.28 MG/DL (ref 0.6–1.3)
CREAT SERPL-MCNC: 0.7 MG/DL (ref 0.6–1.3)
CREAT SERPL-MCNC: 0.7 MG/DL (ref 0.6–1.3)
EOSINOPHIL # BLD AUTO: 0.03 THOUSAND/ÂΜL (ref 0–0.61)
EOSINOPHIL # BLD AUTO: 0.03 THOUSAND/ÂΜL (ref 0–0.61)
EOSINOPHIL NFR BLD AUTO: 1 % (ref 0–6)
EOSINOPHIL NFR BLD AUTO: 1 % (ref 0–6)
ERYTHROCYTE [DISTWIDTH] IN BLOOD BY AUTOMATED COUNT: 13.1 % (ref 11.6–15.1)
ERYTHROCYTE [DISTWIDTH] IN BLOOD BY AUTOMATED COUNT: 13.1 % (ref 11.6–15.1)
GFR SERPL CREATININE-BSD FRML MDRD: 125 ML/MIN/1.73SQ M
GFR SERPL CREATININE-BSD FRML MDRD: 125 ML/MIN/1.73SQ M
GFR SERPL CREATININE-BSD FRML MDRD: 92 ML/MIN/1.73SQ M
GFR SERPL CREATININE-BSD FRML MDRD: 92 ML/MIN/1.73SQ M
GLUCOSE SERPL-MCNC: 110 MG/DL (ref 65–140)
GLUCOSE SERPL-MCNC: 110 MG/DL (ref 65–140)
GLUCOSE SERPL-MCNC: 116 MG/DL (ref 65–140)
GLUCOSE SERPL-MCNC: 116 MG/DL (ref 65–140)
GLUCOSE SERPL-MCNC: 71 MG/DL (ref 65–140)
GLUCOSE SERPL-MCNC: 71 MG/DL (ref 65–140)
GLUCOSE UR STRIP-MCNC: NEGATIVE MG/DL
GLUCOSE UR STRIP-MCNC: NEGATIVE MG/DL
HCO3 BLDA-SCNC: 17.1 MMOL/L (ref 24–30)
HCO3 BLDA-SCNC: 17.1 MMOL/L (ref 24–30)
HCT VFR BLD AUTO: 34.8 % (ref 34.8–46.1)
HCT VFR BLD AUTO: 34.8 % (ref 34.8–46.1)
HCT VFR BLD CALC: 29 % (ref 34.8–46.1)
HCT VFR BLD CALC: 29 % (ref 34.8–46.1)
HGB BLD-MCNC: 11 G/DL (ref 11.5–15.4)
HGB BLD-MCNC: 11 G/DL (ref 11.5–15.4)
HGB BLDA-MCNC: 9.9 G/DL (ref 11.5–15.4)
HGB BLDA-MCNC: 9.9 G/DL (ref 11.5–15.4)
HGB UR QL STRIP.AUTO: NEGATIVE
HGB UR QL STRIP.AUTO: NEGATIVE
HOLD SPECIMEN: NORMAL
IMM GRANULOCYTES # BLD AUTO: 0.02 THOUSAND/UL (ref 0–0.2)
IMM GRANULOCYTES # BLD AUTO: 0.02 THOUSAND/UL (ref 0–0.2)
IMM GRANULOCYTES NFR BLD AUTO: 0 % (ref 0–2)
IMM GRANULOCYTES NFR BLD AUTO: 0 % (ref 0–2)
INR PPP: 1.1 (ref 0.84–1.19)
INR PPP: 1.1 (ref 0.84–1.19)
KETONES UR STRIP-MCNC: NEGATIVE MG/DL
KETONES UR STRIP-MCNC: NEGATIVE MG/DL
LACTATE SERPL-SCNC: 1.2 MMOL/L (ref 0.5–2)
LACTATE SERPL-SCNC: 1.2 MMOL/L (ref 0.5–2)
LEUKOCYTE ESTERASE UR QL STRIP: NEGATIVE
LEUKOCYTE ESTERASE UR QL STRIP: NEGATIVE
LYMPHOCYTES # BLD AUTO: 0.71 THOUSANDS/ÂΜL (ref 0.6–4.47)
LYMPHOCYTES # BLD AUTO: 0.71 THOUSANDS/ÂΜL (ref 0.6–4.47)
LYMPHOCYTES NFR BLD AUTO: 11 % (ref 14–44)
LYMPHOCYTES NFR BLD AUTO: 11 % (ref 14–44)
MAGNESIUM SERPL-MCNC: 1.8 MG/DL (ref 1.9–2.7)
MAGNESIUM SERPL-MCNC: 1.8 MG/DL (ref 1.9–2.7)
MCH RBC QN AUTO: 31.1 PG (ref 26.8–34.3)
MCH RBC QN AUTO: 31.1 PG (ref 26.8–34.3)
MCHC RBC AUTO-ENTMCNC: 31.6 G/DL (ref 31.4–37.4)
MCHC RBC AUTO-ENTMCNC: 31.6 G/DL (ref 31.4–37.4)
MCV RBC AUTO: 98 FL (ref 82–98)
MCV RBC AUTO: 98 FL (ref 82–98)
MONOCYTES # BLD AUTO: 0.49 THOUSAND/ÂΜL (ref 0.17–1.22)
MONOCYTES # BLD AUTO: 0.49 THOUSAND/ÂΜL (ref 0.17–1.22)
MONOCYTES NFR BLD AUTO: 8 % (ref 4–12)
MONOCYTES NFR BLD AUTO: 8 % (ref 4–12)
NEUTROPHILS # BLD AUTO: 5.07 THOUSANDS/ÂΜL (ref 1.85–7.62)
NEUTROPHILS # BLD AUTO: 5.07 THOUSANDS/ÂΜL (ref 1.85–7.62)
NEUTS SEG NFR BLD AUTO: 79 % (ref 43–75)
NEUTS SEG NFR BLD AUTO: 79 % (ref 43–75)
NITRITE UR QL STRIP: NEGATIVE
NITRITE UR QL STRIP: NEGATIVE
NRBC BLD AUTO-RTO: 0 /100 WBCS
NRBC BLD AUTO-RTO: 0 /100 WBCS
PCO2 BLD: 18 MMOL/L (ref 21–32)
PCO2 BLD: 18 MMOL/L (ref 21–32)
PCO2 BLD: 28.9 MM HG (ref 42–50)
PCO2 BLD: 28.9 MM HG (ref 42–50)
PH BLD: 7.38 [PH] (ref 7.3–7.4)
PH BLD: 7.38 [PH] (ref 7.3–7.4)
PH UR STRIP.AUTO: 6 [PH]
PH UR STRIP.AUTO: 6 [PH]
PHOSPHATE SERPL-MCNC: 3.3 MG/DL (ref 2.3–4.1)
PHOSPHATE SERPL-MCNC: 3.3 MG/DL (ref 2.3–4.1)
PLATELET # BLD AUTO: 56 THOUSANDS/UL (ref 149–390)
PLATELET # BLD AUTO: 56 THOUSANDS/UL (ref 149–390)
PLATELET BLD QL SMEAR: ABNORMAL
PLATELET BLD QL SMEAR: ABNORMAL
PMV BLD AUTO: 10.3 FL (ref 8.9–12.7)
PMV BLD AUTO: 10.3 FL (ref 8.9–12.7)
PO2 BLD: 31 MM HG (ref 35–45)
PO2 BLD: 31 MM HG (ref 35–45)
POTASSIUM BLD-SCNC: 3.8 MMOL/L (ref 3.5–5.3)
POTASSIUM BLD-SCNC: 3.8 MMOL/L (ref 3.5–5.3)
POTASSIUM SERPL-SCNC: 4.3 MMOL/L (ref 3.5–5.3)
PROT SERPL-MCNC: 6.1 G/DL (ref 6.4–8.4)
PROT SERPL-MCNC: 6.1 G/DL (ref 6.4–8.4)
PROT UR STRIP-MCNC: NEGATIVE MG/DL
PROT UR STRIP-MCNC: NEGATIVE MG/DL
PROTHROMBIN TIME: 14.4 SECONDS (ref 11.6–14.5)
PROTHROMBIN TIME: 14.4 SECONDS (ref 11.6–14.5)
RBC # BLD AUTO: 3.54 MILLION/UL (ref 3.81–5.12)
RBC # BLD AUTO: 3.54 MILLION/UL (ref 3.81–5.12)
RBC MORPH BLD: NORMAL
RBC MORPH BLD: NORMAL
RH BLD: POSITIVE
SAO2 % BLD FROM PO2: 59 % (ref 60–85)
SAO2 % BLD FROM PO2: 59 % (ref 60–85)
SODIUM BLD-SCNC: 137 MMOL/L (ref 136–145)
SODIUM BLD-SCNC: 137 MMOL/L (ref 136–145)
SODIUM SERPL-SCNC: 133 MMOL/L (ref 135–147)
SODIUM SERPL-SCNC: 133 MMOL/L (ref 135–147)
SODIUM SERPL-SCNC: 137 MMOL/L (ref 135–147)
SODIUM SERPL-SCNC: 137 MMOL/L (ref 135–147)
SP GR UR STRIP.AUTO: 1.03 (ref 1–1.03)
SP GR UR STRIP.AUTO: 1.03 (ref 1–1.03)
SPECIMEN EXPIRATION DATE: NORMAL
SPECIMEN EXPIRATION DATE: NORMAL
SPECIMEN SOURCE: ABNORMAL
SPECIMEN SOURCE: ABNORMAL
UROBILINOGEN UR STRIP-ACNC: <2 MG/DL
UROBILINOGEN UR STRIP-ACNC: <2 MG/DL
WBC # BLD AUTO: 6.35 THOUSAND/UL (ref 4.31–10.16)
WBC # BLD AUTO: 6.35 THOUSAND/UL (ref 4.31–10.16)

## 2023-08-04 PROCEDURE — 82947 ASSAY GLUCOSE BLOOD QUANT: CPT

## 2023-08-04 PROCEDURE — 99291 CRITICAL CARE FIRST HOUR: CPT | Performed by: STUDENT IN AN ORGANIZED HEALTH CARE EDUCATION/TRAINING PROGRAM

## 2023-08-04 PROCEDURE — 80048 BASIC METABOLIC PNL TOTAL CA: CPT | Performed by: PHYSICIAN ASSISTANT

## 2023-08-04 PROCEDURE — NC001 PR NO CHARGE: Performed by: EMERGENCY MEDICINE

## 2023-08-04 PROCEDURE — C1769 GUIDE WIRE: HCPCS | Performed by: STUDENT IN AN ORGANIZED HEALTH CARE EDUCATION/TRAINING PROGRAM

## 2023-08-04 PROCEDURE — 84295 ASSAY OF SERUM SODIUM: CPT

## 2023-08-04 PROCEDURE — 81003 URINALYSIS AUTO W/O SCOPE: CPT | Performed by: PHYSICIAN ASSISTANT

## 2023-08-04 PROCEDURE — 85025 COMPLETE CBC W/AUTO DIFF WBC: CPT | Performed by: STUDENT IN AN ORGANIZED HEALTH CARE EDUCATION/TRAINING PROGRAM

## 2023-08-04 PROCEDURE — 70450 CT HEAD/BRAIN W/O DYE: CPT

## 2023-08-04 PROCEDURE — 82803 BLOOD GASES ANY COMBINATION: CPT

## 2023-08-04 PROCEDURE — 82330 ASSAY OF CALCIUM: CPT

## 2023-08-04 PROCEDURE — 73630 X-RAY EXAM OF FOOT: CPT

## 2023-08-04 PROCEDURE — 27759 TREATMENT OF TIBIA FRACTURE: CPT | Performed by: STUDENT IN AN ORGANIZED HEALTH CARE EDUCATION/TRAINING PROGRAM

## 2023-08-04 PROCEDURE — 80053 COMPREHEN METABOLIC PANEL: CPT | Performed by: STUDENT IN AN ORGANIZED HEALTH CARE EDUCATION/TRAINING PROGRAM

## 2023-08-04 PROCEDURE — C1713 ANCHOR/SCREW BN/BN,TIS/BN: HCPCS | Performed by: STUDENT IN AN ORGANIZED HEALTH CARE EDUCATION/TRAINING PROGRAM

## 2023-08-04 PROCEDURE — 93005 ELECTROCARDIOGRAM TRACING: CPT

## 2023-08-04 PROCEDURE — 73590 X-RAY EXAM OF LOWER LEG: CPT

## 2023-08-04 PROCEDURE — 74177 CT ABD & PELVIS W/CONTRAST: CPT

## 2023-08-04 PROCEDURE — 73564 X-RAY EXAM KNEE 4 OR MORE: CPT

## 2023-08-04 PROCEDURE — 96365 THER/PROPH/DIAG IV INF INIT: CPT

## 2023-08-04 PROCEDURE — 99223 1ST HOSP IP/OBS HIGH 75: CPT | Performed by: STUDENT IN AN ORGANIZED HEALTH CARE EDUCATION/TRAINING PROGRAM

## 2023-08-04 PROCEDURE — 86901 BLOOD TYPING SEROLOGIC RH(D): CPT | Performed by: STUDENT IN AN ORGANIZED HEALTH CARE EDUCATION/TRAINING PROGRAM

## 2023-08-04 PROCEDURE — 99285 EMERGENCY DEPT VISIT HI MDM: CPT

## 2023-08-04 PROCEDURE — 83735 ASSAY OF MAGNESIUM: CPT | Performed by: STUDENT IN AN ORGANIZED HEALTH CARE EDUCATION/TRAINING PROGRAM

## 2023-08-04 PROCEDURE — 82550 ASSAY OF CK (CPK): CPT | Performed by: STUDENT IN AN ORGANIZED HEALTH CARE EDUCATION/TRAINING PROGRAM

## 2023-08-04 PROCEDURE — 85014 HEMATOCRIT: CPT

## 2023-08-04 PROCEDURE — 72125 CT NECK SPINE W/O DYE: CPT

## 2023-08-04 PROCEDURE — 0QSH06Z REPOSITION LEFT TIBIA WITH INTRAMEDULLARY INTERNAL FIXATION DEVICE, OPEN APPROACH: ICD-10-PCS | Performed by: STUDENT IN AN ORGANIZED HEALTH CARE EDUCATION/TRAINING PROGRAM

## 2023-08-04 PROCEDURE — 84132 ASSAY OF SERUM POTASSIUM: CPT

## 2023-08-04 PROCEDURE — 86850 RBC ANTIBODY SCREEN: CPT | Performed by: STUDENT IN AN ORGANIZED HEALTH CARE EDUCATION/TRAINING PROGRAM

## 2023-08-04 PROCEDURE — 84100 ASSAY OF PHOSPHORUS: CPT | Performed by: STUDENT IN AN ORGANIZED HEALTH CARE EDUCATION/TRAINING PROGRAM

## 2023-08-04 PROCEDURE — 85610 PROTHROMBIN TIME: CPT | Performed by: PHYSICIAN ASSISTANT

## 2023-08-04 PROCEDURE — 83605 ASSAY OF LACTIC ACID: CPT | Performed by: PHYSICIAN ASSISTANT

## 2023-08-04 PROCEDURE — 73552 X-RAY EXAM OF FEMUR 2/>: CPT

## 2023-08-04 PROCEDURE — 71045 X-RAY EXAM CHEST 1 VIEW: CPT

## 2023-08-04 PROCEDURE — 86900 BLOOD TYPING SEROLOGIC ABO: CPT | Performed by: STUDENT IN AN ORGANIZED HEALTH CARE EDUCATION/TRAINING PROGRAM

## 2023-08-04 DEVICE — LOCKING SCREW FOR IM NAIL Ø 5MM/ 34MM/ XL25/ STERILE: Type: IMPLANTABLE DEVICE | Site: TIBIA | Status: FUNCTIONAL

## 2023-08-04 DEVICE — LOW PROF LCKNG SCREW F/IM NAIL Ø 5.0MM / L 36MM / XL25/ STER: Type: IMPLANTABLE DEVICE | Site: TIBIA | Status: FUNCTIONAL

## 2023-08-04 DEVICE — LOW PROF LCKNG SCREW F/IM NAIL Ø 5.0MM / L 44MM / XL25/ STER: Type: IMPLANTABLE DEVICE | Site: TIBIA | Status: FUNCTIONAL

## 2023-08-04 DEVICE — LOCKING SCREW FOR IM NAIL Ø 5MM/ 36MM/ XL25/ STERILE: Type: IMPLANTABLE DEVICE | Site: TIBIA | Status: FUNCTIONAL

## 2023-08-04 DEVICE — TIBIAL NAIL-ADVANCED / 11MM 330MM / STERILE: Type: IMPLANTABLE DEVICE | Site: TIBIA | Status: FUNCTIONAL

## 2023-08-04 RX ORDER — ROCURONIUM BROMIDE 10 MG/ML
INJECTION, SOLUTION INTRAVENOUS AS NEEDED
Status: DISCONTINUED | OUTPATIENT
Start: 2023-08-04 | End: 2023-08-04

## 2023-08-04 RX ORDER — METHOCARBAMOL 500 MG/1
500 TABLET, FILM COATED ORAL EVERY 6 HOURS SCHEDULED
Status: DISCONTINUED | OUTPATIENT
Start: 2023-08-04 | End: 2023-08-11 | Stop reason: HOSPADM

## 2023-08-04 RX ORDER — ONDANSETRON 2 MG/ML
INJECTION INTRAMUSCULAR; INTRAVENOUS AS NEEDED
Status: DISCONTINUED | OUTPATIENT
Start: 2023-08-04 | End: 2023-08-04

## 2023-08-04 RX ORDER — METOPROLOL SUCCINATE 25 MG/1
25 TABLET, EXTENDED RELEASE ORAL DAILY
Status: DISCONTINUED | OUTPATIENT
Start: 2023-08-05 | End: 2023-08-11 | Stop reason: HOSPADM

## 2023-08-04 RX ORDER — MAGNESIUM HYDROXIDE 1200 MG/15ML
LIQUID ORAL AS NEEDED
Status: DISCONTINUED | OUTPATIENT
Start: 2023-08-04 | End: 2023-08-04 | Stop reason: HOSPADM

## 2023-08-04 RX ORDER — POLYETHYLENE GLYCOL 3350 17 G/17G
17 POWDER, FOR SOLUTION ORAL DAILY
Status: DISCONTINUED | OUTPATIENT
Start: 2023-08-04 | End: 2023-08-11 | Stop reason: HOSPADM

## 2023-08-04 RX ORDER — LANOLIN ALCOHOL/MO/W.PET/CERES
100 CREAM (GRAM) TOPICAL DAILY
Status: DISCONTINUED | OUTPATIENT
Start: 2023-08-04 | End: 2023-08-11 | Stop reason: HOSPADM

## 2023-08-04 RX ORDER — DESVENLAFAXINE 100 MG/1
50 TABLET, EXTENDED RELEASE ORAL DAILY
COMMUNITY
Start: 2023-08-05

## 2023-08-04 RX ORDER — METOPROLOL SUCCINATE 25 MG/1
25 TABLET, EXTENDED RELEASE ORAL DAILY
COMMUNITY
Start: 2023-08-05

## 2023-08-04 RX ORDER — AMOXICILLIN 250 MG
1 CAPSULE ORAL
Status: DISCONTINUED | OUTPATIENT
Start: 2023-08-04 | End: 2023-08-11 | Stop reason: HOSPADM

## 2023-08-04 RX ORDER — TRANEXAMIC ACID 100 MG/ML
INJECTION, SOLUTION INTRAVENOUS AS NEEDED
Status: DISCONTINUED | OUTPATIENT
Start: 2023-08-04 | End: 2023-08-04

## 2023-08-04 RX ORDER — TRAZODONE HYDROCHLORIDE 50 MG/1
50 TABLET ORAL
Status: DISCONTINUED | OUTPATIENT
Start: 2023-08-04 | End: 2023-08-11 | Stop reason: HOSPADM

## 2023-08-04 RX ORDER — ZONISAMIDE 100 MG/1
200 CAPSULE ORAL
COMMUNITY

## 2023-08-04 RX ORDER — GABAPENTIN 300 MG/1
300 CAPSULE ORAL 2 TIMES DAILY
COMMUNITY

## 2023-08-04 RX ORDER — HYDROMORPHONE HCL IN WATER/PF 6 MG/30 ML
0.2 PATIENT CONTROLLED ANALGESIA SYRINGE INTRAVENOUS EVERY 2 HOUR PRN
Status: DISCONTINUED | OUTPATIENT
Start: 2023-08-04 | End: 2023-08-06

## 2023-08-04 RX ORDER — SODIUM CHLORIDE, SODIUM LACTATE, POTASSIUM CHLORIDE, CALCIUM CHLORIDE 600; 310; 30; 20 MG/100ML; MG/100ML; MG/100ML; MG/100ML
INJECTION, SOLUTION INTRAVENOUS CONTINUOUS PRN
Status: DISCONTINUED | OUTPATIENT
Start: 2023-08-04 | End: 2023-08-04

## 2023-08-04 RX ORDER — SODIUM CHLORIDE, SODIUM GLUCONATE, SODIUM ACETATE, POTASSIUM CHLORIDE, MAGNESIUM CHLORIDE, SODIUM PHOSPHATE, DIBASIC, AND POTASSIUM PHOSPHATE .53; .5; .37; .037; .03; .012; .00082 G/100ML; G/100ML; G/100ML; G/100ML; G/100ML; G/100ML; G/100ML
100 INJECTION, SOLUTION INTRAVENOUS CONTINUOUS
Status: DISCONTINUED | OUTPATIENT
Start: 2023-08-04 | End: 2023-08-05

## 2023-08-04 RX ORDER — GABAPENTIN 100 MG/1
100 CAPSULE ORAL
Status: DISCONTINUED | OUTPATIENT
Start: 2023-08-04 | End: 2023-08-11 | Stop reason: HOSPADM

## 2023-08-04 RX ORDER — SODIUM CHLORIDE, SODIUM GLUCONATE, SODIUM ACETATE, POTASSIUM CHLORIDE, MAGNESIUM CHLORIDE, SODIUM PHOSPHATE, DIBASIC, AND POTASSIUM PHOSPHATE .53; .5; .37; .037; .03; .012; .00082 G/100ML; G/100ML; G/100ML; G/100ML; G/100ML; G/100ML; G/100ML
INJECTION, SOLUTION INTRAVENOUS
Status: COMPLETED | OUTPATIENT
Start: 2023-08-04 | End: 2023-08-04

## 2023-08-04 RX ORDER — CEFAZOLIN SODIUM 2 G/50ML
2000 SOLUTION INTRAVENOUS EVERY 8 HOURS
Status: COMPLETED | OUTPATIENT
Start: 2023-08-05 | End: 2023-08-05

## 2023-08-04 RX ORDER — TRAZODONE HYDROCHLORIDE 50 MG/1
50 TABLET ORAL
COMMUNITY

## 2023-08-04 RX ORDER — LIDOCAINE HYDROCHLORIDE 10 MG/ML
INJECTION, SOLUTION EPIDURAL; INFILTRATION; INTRACAUDAL; PERINEURAL AS NEEDED
Status: DISCONTINUED | OUTPATIENT
Start: 2023-08-04 | End: 2023-08-04

## 2023-08-04 RX ORDER — LACOSAMIDE 100 MG/1
200 TABLET ORAL EVERY 12 HOURS SCHEDULED
Status: DISCONTINUED | OUTPATIENT
Start: 2023-08-04 | End: 2023-08-11 | Stop reason: HOSPADM

## 2023-08-04 RX ORDER — PROPOFOL 10 MG/ML
INJECTION, EMULSION INTRAVENOUS AS NEEDED
Status: DISCONTINUED | OUTPATIENT
Start: 2023-08-04 | End: 2023-08-04

## 2023-08-04 RX ORDER — HYDROXYZINE HYDROCHLORIDE 25 MG/1
25 TABLET, FILM COATED ORAL 2 TIMES DAILY
COMMUNITY

## 2023-08-04 RX ORDER — CEFAZOLIN SODIUM 2 G/50ML
SOLUTION INTRAVENOUS AS NEEDED
Status: DISCONTINUED | OUTPATIENT
Start: 2023-08-04 | End: 2023-08-04

## 2023-08-04 RX ORDER — FENTANYL CITRATE 50 UG/ML
INJECTION, SOLUTION INTRAMUSCULAR; INTRAVENOUS AS NEEDED
Status: DISCONTINUED | OUTPATIENT
Start: 2023-08-04 | End: 2023-08-04

## 2023-08-04 RX ORDER — ONDANSETRON 2 MG/ML
4 INJECTION INTRAMUSCULAR; INTRAVENOUS EVERY 4 HOURS PRN
Status: DISCONTINUED | OUTPATIENT
Start: 2023-08-04 | End: 2023-08-11 | Stop reason: HOSPADM

## 2023-08-04 RX ORDER — DESVENLAFAXINE SUCCINATE 50 MG/1
50 TABLET, EXTENDED RELEASE ORAL DAILY
Status: DISCONTINUED | OUTPATIENT
Start: 2023-08-05 | End: 2023-08-11 | Stop reason: HOSPADM

## 2023-08-04 RX ORDER — GABAPENTIN 300 MG/1
300 CAPSULE ORAL 2 TIMES DAILY
Status: DISCONTINUED | OUTPATIENT
Start: 2023-08-04 | End: 2023-08-11 | Stop reason: HOSPADM

## 2023-08-04 RX ORDER — METOCLOPRAMIDE HYDROCHLORIDE 5 MG/ML
10 INJECTION INTRAMUSCULAR; INTRAVENOUS ONCE AS NEEDED
Status: DISCONTINUED | OUTPATIENT
Start: 2023-08-04 | End: 2023-08-04 | Stop reason: HOSPADM

## 2023-08-04 RX ORDER — ZONISAMIDE 100 MG/1
200 CAPSULE ORAL
Status: DISCONTINUED | OUTPATIENT
Start: 2023-08-04 | End: 2023-08-11 | Stop reason: HOSPADM

## 2023-08-04 RX ORDER — LACOSAMIDE 200 MG/1
200 TABLET ORAL EVERY 12 HOURS SCHEDULED
COMMUNITY

## 2023-08-04 RX ORDER — FOLIC ACID 1 MG/1
1 TABLET ORAL DAILY
Status: DISCONTINUED | OUTPATIENT
Start: 2023-08-04 | End: 2023-08-11

## 2023-08-04 RX ORDER — OXYCODONE HYDROCHLORIDE 5 MG/1
5 TABLET ORAL EVERY 4 HOURS PRN
Status: DISCONTINUED | OUTPATIENT
Start: 2023-08-04 | End: 2023-08-06

## 2023-08-04 RX ORDER — CEFAZOLIN SODIUM 2 G/50ML
2000 SOLUTION INTRAVENOUS
Status: COMPLETED | OUTPATIENT
Start: 2023-08-04 | End: 2023-08-04

## 2023-08-04 RX ORDER — FENTANYL CITRATE/PF 50 MCG/ML
50 SYRINGE (ML) INJECTION
Status: DISCONTINUED | OUTPATIENT
Start: 2023-08-04 | End: 2023-08-04 | Stop reason: HOSPADM

## 2023-08-04 RX ORDER — HYDROMORPHONE HCL/PF 1 MG/ML
0.5 SYRINGE (ML) INJECTION
Status: DISCONTINUED | OUTPATIENT
Start: 2023-08-04 | End: 2023-08-04 | Stop reason: HOSPADM

## 2023-08-04 RX ORDER — DEXAMETHASONE SODIUM PHOSPHATE 10 MG/ML
INJECTION, SOLUTION INTRAMUSCULAR; INTRAVENOUS AS NEEDED
Status: DISCONTINUED | OUTPATIENT
Start: 2023-08-04 | End: 2023-08-04

## 2023-08-04 RX ORDER — HYDROXYZINE HYDROCHLORIDE 25 MG/1
25 TABLET, FILM COATED ORAL 2 TIMES DAILY
Status: DISCONTINUED | OUTPATIENT
Start: 2023-08-04 | End: 2023-08-11 | Stop reason: HOSPADM

## 2023-08-04 RX ORDER — TRANEXAMIC ACID 10 MG/ML
1000 INJECTION, SOLUTION INTRAVENOUS ONCE
Status: DISCONTINUED | OUTPATIENT
Start: 2023-08-04 | End: 2023-08-04 | Stop reason: HOSPADM

## 2023-08-04 RX ORDER — ENOXAPARIN SODIUM 100 MG/ML
30 INJECTION SUBCUTANEOUS EVERY 12 HOURS SCHEDULED
Status: DISCONTINUED | OUTPATIENT
Start: 2023-08-04 | End: 2023-08-11 | Stop reason: HOSPADM

## 2023-08-04 RX ADMIN — SUGAMMADEX 200 MG: 100 INJECTION, SOLUTION INTRAVENOUS at 19:06

## 2023-08-04 RX ADMIN — ROCURONIUM BROMIDE 50 MG: 10 INJECTION, SOLUTION INTRAVENOUS at 17:38

## 2023-08-04 RX ADMIN — ONDANSETRON 4 MG: 2 INJECTION INTRAMUSCULAR; INTRAVENOUS at 19:06

## 2023-08-04 RX ADMIN — ZONISAMIDE 200 MG: 100 CAPSULE ORAL at 20:38

## 2023-08-04 RX ADMIN — SODIUM CHLORIDE, SODIUM GLUCONATE, SODIUM ACETATE, POTASSIUM CHLORIDE, MAGNESIUM CHLORIDE, SODIUM PHOSPHATE, DIBASIC, AND POTASSIUM PHOSPHATE 100 ML/HR: .53; .5; .37; .037; .03; .012; .00082 INJECTION, SOLUTION INTRAVENOUS at 20:49

## 2023-08-04 RX ADMIN — CEFAZOLIN SODIUM 2000 MG: 2 SOLUTION INTRAVENOUS at 17:51

## 2023-08-04 RX ADMIN — FENTANYL CITRATE 200 MCG: 50 INJECTION, SOLUTION INTRAMUSCULAR; INTRAVENOUS at 17:38

## 2023-08-04 RX ADMIN — DEXAMETHASONE SODIUM PHOSPHATE 10 MG: 10 INJECTION, SOLUTION INTRAMUSCULAR; INTRAVENOUS at 17:40

## 2023-08-04 RX ADMIN — ENOXAPARIN SODIUM 30 MG: 30 INJECTION SUBCUTANEOUS at 20:37

## 2023-08-04 RX ADMIN — TRAZODONE HYDROCHLORIDE 50 MG: 50 TABLET ORAL at 20:38

## 2023-08-04 RX ADMIN — METHOCARBAMOL TABLETS 500 MG: 500 TABLET, COATED ORAL at 14:07

## 2023-08-04 RX ADMIN — CEFAZOLIN SODIUM 2000 MG: 2 SOLUTION INTRAVENOUS at 16:37

## 2023-08-04 RX ADMIN — PROPOFOL 150 MG: 10 INJECTION, EMULSION INTRAVENOUS at 17:38

## 2023-08-04 RX ADMIN — OXYCODONE HYDROCHLORIDE 5 MG: 5 TABLET ORAL at 20:53

## 2023-08-04 RX ADMIN — SODIUM CHLORIDE, SODIUM LACTATE, POTASSIUM CHLORIDE, AND CALCIUM CHLORIDE: .6; .31; .03; .02 INJECTION, SOLUTION INTRAVENOUS at 17:34

## 2023-08-04 RX ADMIN — FENTANYL CITRATE 50 MCG: 50 INJECTION INTRAMUSCULAR; INTRAVENOUS at 19:31

## 2023-08-04 RX ADMIN — TRANEXAMIC ACID 1000 MG: 1 INJECTION, SOLUTION INTRAVENOUS at 17:59

## 2023-08-04 RX ADMIN — MULTIPLE VITAMINS W/ MINERALS TAB 1 TABLET: TAB ORAL at 16:37

## 2023-08-04 RX ADMIN — FOLIC ACID 1 MG: 1 TABLET ORAL at 16:37

## 2023-08-04 RX ADMIN — SENNOSIDES AND DOCUSATE SODIUM 1 TABLET: 50; 8.6 TABLET ORAL at 20:38

## 2023-08-04 RX ADMIN — LACOSAMIDE 200 MG: 100 TABLET, FILM COATED ORAL at 20:39

## 2023-08-04 RX ADMIN — ASPIRIN 81 MG: 81 TABLET, COATED ORAL at 20:39

## 2023-08-04 RX ADMIN — IOHEXOL 85 ML: 350 INJECTION, SOLUTION INTRAVENOUS at 11:29

## 2023-08-04 RX ADMIN — HYDROXYZINE HYDROCHLORIDE 25 MG: 25 TABLET ORAL at 20:39

## 2023-08-04 RX ADMIN — SODIUM CHLORIDE, SODIUM GLUCONATE, SODIUM ACETATE, POTASSIUM CHLORIDE, MAGNESIUM CHLORIDE, SODIUM PHOSPHATE, DIBASIC, AND POTASSIUM PHOSPHATE 1000 ML: .53; .5; .37; .037; .03; .012; .00082 INJECTION, SOLUTION INTRAVENOUS at 11:19

## 2023-08-04 RX ADMIN — SODIUM CHLORIDE, SODIUM LACTATE, POTASSIUM CHLORIDE, AND CALCIUM CHLORIDE: .6; .31; .03; .02 INJECTION, SOLUTION INTRAVENOUS at 19:06

## 2023-08-04 RX ADMIN — SODIUM CHLORIDE, SODIUM GLUCONATE, SODIUM ACETATE, POTASSIUM CHLORIDE, MAGNESIUM CHLORIDE, SODIUM PHOSPHATE, DIBASIC, AND POTASSIUM PHOSPHATE 100 ML/HR: .53; .5; .37; .037; .03; .012; .00082 INJECTION, SOLUTION INTRAVENOUS at 13:48

## 2023-08-04 RX ADMIN — LIDOCAINE HYDROCHLORIDE 50 MG: 10 INJECTION, SOLUTION EPIDURAL; INFILTRATION; INTRACAUDAL; PERINEURAL at 17:38

## 2023-08-04 RX ADMIN — GABAPENTIN 100 MG: 300 CAPSULE ORAL at 20:37

## 2023-08-04 NOTE — PLAN OF CARE
Problem: Potential for Falls  Goal: Patient will remain free of falls  Description: INTERVENTIONS:  - Educate patient/family on patient safety including physical limitations  - Instruct patient to call for assistance with activity   - Consult OT/PT to assist with strengthening/mobility   - Keep Call bell within reach  - Keep bed low and locked with side rails adjusted as appropriate  - Keep care items and personal belongings within reach  - Initiate and maintain comfort rounds  - Make Fall Risk Sign visible to staff  - Offer Toileting every 2 Hours, in advance of need  - Initiate/Maintain bed/chair alarm    Problem: MOBILITY - ADULT  Goal: Maintain or return to baseline ADL function  Description: INTERVENTIONS:  -  Assess patient's ability to carry out ADLs; assess patient's baseline for ADL function and identify physical deficits which impact ability to perform ADLs (bathing, care of mouth/teeth, toileting, grooming, dressing, etc.)  - Assess/evaluate cause of self-care deficits   - Assess range of motion  - Assess patient's mobility; develop plan if impaired  - Assess patient's need for assistive devices and provide as appropriate  - Encourage maximum independence but intervene and supervise when necessary  - Involve family in performance of ADLs  - Assess for home care needs following discharge   - Consider OT consult to assist with ADL evaluation and planning for discharge  - Provide patient education as appropriate  Outcome: Progressing     Problem: Prexisting or High Potential for Compromised Skin Integrity  Goal: Skin integrity is maintained or improved  Description: INTERVENTIONS:  - Identify patients at risk for skin breakdown  - Assess and monitor skin integrity  - Assess and monitor nutrition and hydration status  - Monitor labs   - Assess for incontinence   - Turn and reposition patient  - Assist with mobility/ambulation  - Relieve pressure over bony prominences  - Avoid friction and shearing  - Provide appropriate hygiene as needed including keeping skin clean and dry  - Evaluate need for skin moisturizer/barrier cream  - Collaborate with interdisciplinary team   - Patient/family teaching  - Consider wound care consult   Outcome: Progressing   - Apply yellow socks and bracelet for high fall risk patients  - Consider moving patient to room near nurses station  Outcome: Progressing

## 2023-08-04 NOTE — ED PROVIDER NOTES
Emergency Department Airway Evaluation and Management Form    History  Obtained from: EMS, Patient  Patient has no known allergies. Chief Complaint   Patient presents with   • Trauma     This is a 61 y.o. old female who presents to the ED for evaluation of fall. Tripped and fell, tibial deformity, swelling. NOted by ems to be hypotensive in the 80s upon initial evaluation. Patient's BP improved with IVF by ems. Past Medical History:   Diagnosis Date   • Chronic alcohol abuse    • Epilepsy ([address])    • Hepatitis    • Pancreatitis      Past Surgical History:   Procedure Laterality Date   • BONE MARROW BIOPSY      2019, 2021   • TUBAL LIGATION       No family history on file. Social History     Tobacco Use   • Smoking status: Never   • Smokeless tobacco: Never   Substance Use Topics   • Alcohol use: Yes   • Drug use: Never     I have reviewed and agree with the history as documented.     Review of Systems    Physical Exam  /64   Pulse 73   Temp 97.8 °F (36.6 °C) (Oral)   Resp 17   Wt 66 kg (145 lb 8.1 oz)   SpO2 94%     Physical Exam    ED Medications  Medications   multi-electrolyte (PLASMALYTE-A/ISOLYTE-S PH 7.4) IV solution (100 mL/hr Intravenous New Bag 8/4/23 1348)   enoxaparin (LOVENOX) subcutaneous injection 30 mg (has no administration in time range)   methocarbamol (ROBAXIN) tablet 500 mg (500 mg Oral Given 8/4/23 1407)   gabapentin (NEURONTIN) capsule 100 mg (has no administration in time range)   oxyCODONE (ROXICODONE) split tablet 2.5 mg (has no administration in time range)   oxyCODONE (ROXICODONE) IR tablet 5 mg (has no administration in time range)   HYDROmorphone HCl (DILAUDID) injection 0.2 mg (has no administration in time range)   naloxone (NARCAN) 0.04 mg/mL syringe 0.04 mg (has no administration in time range)   ondansetron (ZOFRAN) injection 4 mg (has no administration in time range)   senna-docusate sodium (SENOKOT S) 8.6-50 mg per tablet 1 tablet (has no administration in time range)   polyethylene glycol (MIRALAX) packet 17 g (0 g Oral Hold 8/4/23 1408)   thiamine tablet 100 mg (has no administration in time range)   folic acid (FOLVITE) tablet 1 mg (has no administration in time range)   multivitamin-minerals (CENTRUM) tablet 1 tablet (has no administration in time range)   multi-electrolyte (ISOLYTE-S PH 7.4) bolus (0 mL Intravenous Stopped 8/4/23 1347)   iohexol (OMNIPAQUE) 350 MG/ML injection (SINGLE-DOSE) 85 mL (85 mL Intravenous Given 8/4/23 1129)     Intubation  Procedures    Notes  Airway patent, no acute intervention.     Final Diagnosis  Final diagnoses:   Tibial fracture       ED Provider  Electronically Signed by     Nina Renae MD  08/04/23 9777

## 2023-08-04 NOTE — ASSESSMENT & PLAN NOTE
- left anterior tibial hematoma in setting of closed left tibial fracture  - neurovascularly intact  - OR with orthopedics for repair of tibial fracture and possible fasciotomy

## 2023-08-04 NOTE — ASSESSMENT & PLAN NOTE
- closed, midshaft tibial fracture  - orthopedics consultation - OR for operative fixation later today  - NWB LLE  - pain control with multi modal regimen  - neurovascular checks  - DVT ppx with Lovenox once appropriate  - PT/OT

## 2023-08-04 NOTE — ANESTHESIA POSTPROCEDURE EVALUATION
Post-Op Assessment Note    CV Status:  Stable  Pain Score: 0    Pain management: adequate     Mental Status:  Arousable   Hydration Status:  Stable   PONV Controlled:  None   Airway Patency:  Patent      Post Op Vitals Reviewed: Yes      Staff: Anesthesiologist, CRNA         No notable events documented.     BP   118/56   Temp 98   Pulse 77   Resp 16   SpO2 96 4LNC

## 2023-08-04 NOTE — ASSESSMENT & PLAN NOTE
- mechanical fall at home, with head strike  - sustained below stated injuries  - PT/OT evaluations  - CM for dispo planning

## 2023-08-04 NOTE — ASSESSMENT & PLAN NOTE
- closed, midshaft tibial fracture  - orthopedics consultation - OR for operative fixation later today  - NWB LLE  - pain control with multi modal regimen  - neurovascular checks  - DVT ppx with Lovenox once appropriate  - PT/OT   - Patient is a [employer] and refuses all blood products  -Repeat laboratory evaluation was notable for an improvement in anion gap, not acidotic on VBG, but decrease in hemoglobin from 11-9 as well as downtrending platelet count.   -Will initiate Venofer, B12, folate acid as well as repeat H&H later in the morning on 8/5/2023 to make sure that hemoglobin does not decrease any further

## 2023-08-04 NOTE — ASSESSMENT & PLAN NOTE
- patient has a h/o chronic alcohol abuse  - recently admitted to [facility] Detox unit in July 2023  - place on CIWA protocol and obtain further alcohol history from patient post op

## 2023-08-04 NOTE — ASSESSMENT & PLAN NOTE
- resume home antiepileptic medications: Gabapentin, Vimpat and Zonegran  - last seizure reported to be 7/9/2023  - seizures may be related to alcohol withdrawal as well   - patient denies any seizure like activity prior to her fall today

## 2023-08-04 NOTE — H&P
[address]  H&P  Name: Mookie Vergara 61 y.o. female I MRN: [id number]  Unit/Bed#: OR POOL I Date of Admission: 8/4/2023   Date of Service: 8/4/2023 I Hospital Day: 0      Assessment/Plan   Fall  Assessment & Plan  - mechanical fall at home, with head strike  - sustained below stated injuries  - PT/OT evaluations  - CM for dispo planning    Tibia fracture  Assessment & Plan  - closed, midshaft tibial fracture  - orthopedics consultation - OR for operative fixation later today  - NWB LLE  - pain control with multi modal regimen  - neurovascular checks  - DVT ppx with Lovenox once appropriate  - PT/OT    Hematoma  Assessment & Plan  - left anterior tibial hematoma in setting of closed left tibial fracture  - neurovascularly intact  - OR with orthopedics for repair of tibial fracture and possible fasciotomy  - Patient is a Mosque and refuses all blood products    Chronic alcohol abuse  Assessment & Plan  - patient has a h/o chronic alcohol abuse  - recently admitted to [address] [place] [address] Detox unit in July 2023  - place on CIWA protocol and obtain further alcohol history from patient post op    Epilepsy ([address])  Assessment & Plan  - resume home antiepileptic medications: Gabapentin, Vimpat and Zonegran  - last seizure reported to be 7/9/2023  - seizures may be related to alcohol withdrawal as well   - patient denies any seizure like activity prior to her fall today    Hypotension  Assessment & Plan  - resolved s/p 1 L IVF bolus  - patient has a non-gap metabolic acidosis  - LA normal. Will obtain further history from patient regarding recent diarrhea, toxic ingestion, new medications, etc.   - Repeat BMP this afternoon at 3 pm          Trauma Alert: Level A   Model of Arrival: Ambulance    Trauma Team: Attending Mika White, Residents Lacey Means and AP [employer]  Consultants:     Orthopedics: STAT consult; Epic consult order placed and consultant notified (via phone/text @ time [phone]);        History of Present Illness     Chief Complaint: "I'm having pain in my leg"  Mechanism:Fall     HPI:    Nilo Castellano is a 61 y.o. female who presents with mechanical fall at home. She struck her head on the carpet and did not lose consciousness. She was found to have a large contusion over her left leg which was concerning for bleeding and she had pain in that area. She has a h/o seizure disorder. She confirms that this was a purely mechanical fall with no presyncopal or seizure like symptoms. She was hypotensive en route to 80/50 mmHg and was given 400 ml IVF with improvement to the 90s with her SBP. Another 1 L IVFs was administered upon arrival with improvement with SBPs > 100 mmHg. She has no other complaints. She does not take AC/AP medications. Upon chart review, she was recently admitted to [address] [place] [address] Detox unit for alcohol withdrawal after having a seizure in the grocery store. Review of Systems   Constitutional: Negative. HENT: Negative. Respiratory: Negative. Negative for chest tightness and shortness of breath. Cardiovascular: Negative. Negative for chest pain. Gastrointestinal: Negative. Genitourinary: Negative. Musculoskeletal:        + Pain in the left leg   Skin:        + ecchymosis over the anterior tibial region   Neurological: Negative. Negative for dizziness, weakness, light-headedness and numbness. Hematological: Negative. Psychiatric/Behavioral: Negative. 12-point, complete review of systems was reviewed and negative except as stated above. Historical Information     Past Medical History:   Diagnosis Date   • Chronic alcohol abuse    • Epilepsy ([address])    • Hepatitis    • Pancreatitis      Past Surgical History:   Procedure Laterality Date   • BONE MARROW BIOPSY      2019, 2021   • TUBAL LIGATION          Social History     Tobacco Use   • Smoking status: Never   • Smokeless tobacco: Never   Substance Use Topics   • Alcohol use:  Yes   • Drug use: Never     Last Tetanus: unknown  Family History: Non-contributory     Meds/Allergies   all current active meds have been reviewed  Allergies have not been reviewed; No Known Allergies    Objective   Initial Vitals:   Temperature: 98.2 °F (36.8 °C) (08/04/23 1115)  Pulse: 64 (08/04/23 1114)  Respirations: 18 (08/04/23 1114)  Blood Pressure: 138/57 (08/04/23 1114)    Primary Survey:   Airway:        Status: patent;        Pre-hospital Interventions: none        Hospital Interventions: none  Breathing:        Pre-hospital Interventions: none       Effort: normal       Right breath sounds: normal       Left breath sounds: normal  Circulation:        Rhythm: regular       Rate: regular   Right Pulses Left Pulses    R radial: 2+  R femoral: 2+  R pedal: 2+  R carotid: 2+  R popliteal: 2+ L radial: 2+  L femoral: 2+  L pedal: 2+  L carotid: 2+  L popliteal: 2+   Disability:        GCS: Eye: 4; Verbal: 5 Motor: 6 Total: 15       Right Pupil: round;  reactive         Left Pupil:  round;  reactive      R Motor Strength L Motor Strength    R : 5/5  R dorsiflex: 5/5  R plantarflex: 5/5 L : 5/5  L dorsiflex: 5/5  L plantarflex: 5/5        Sensory:  No sensory deficit  Exposure:       Completed: Yes      Secondary Survey:  Physical Exam  Constitutional:       Appearance: Normal appearance. HENT:      Head: Normocephalic. Right Ear: Tympanic membrane normal.      Left Ear: Tympanic membrane normal.      Nose: Nose normal.      Mouth/Throat:      Mouth: Mucous membranes are moist.   Eyes:      Pupils: Pupils are equal, round, and reactive to light. Cardiovascular:      Rate and Rhythm: Normal rate and regular rhythm. Pulses: Normal pulses. Pulmonary:      Effort: Pulmonary effort is normal. No respiratory distress. Breath sounds: Normal breath sounds. Abdominal:      General: Abdomen is flat. There is no distension. Palpations: Abdomen is soft. Tenderness: There is no abdominal tenderness.    Musculoskeletal: General: Normal range of motion. Cervical back: Normal range of motion and neck supple. No tenderness. Comments: + Tenderness and hematoma over the left anterior tibial region. Hematoma is firm and minimally compressible. Pulses distally are 2+ and sensation and motor function are intact in the LLE throughout.   + firm nodular growth on right hip, patient reports chronic and unchanged over the last year. Skin:     General: Skin is warm and dry. Capillary Refill: Capillary refill takes less than 2 seconds. Neurological:      General: No focal deficit present. Mental Status: She is alert and oriented to person, place, and time. Sensory: No sensory deficit. Motor: No weakness. Psychiatric:         Behavior: Behavior normal.         Invasive Devices     Peripheral Intravenous Line  Duration           Peripheral IV 08/04/23 Right;Ventral (anterior) Forearm <1 day              Lab Results: I have personally reviewed all pertinent laboratory/test results 08/04/23 and in the preceding 24 hours. Recent Labs     08/04/23  1120 08/04/23  1122 08/04/23  1122 08/04/23  1229 08/04/23  1449   WBC  --  6.35  --   --   --    HGB 9.9* 11.0*  --   --   --    HCT 29* 34.8  --   --   --    PLT  --  56*  --   --   --    SODIUM  --  133*   < >  --  137   K  --  4.3   < >  --  4.3   CL  --  106   < >  --  100   CO2 18* 19*   < >  --  15*   BUN  --  14   < >  --  8   CREATININE  --  0.70   < >  --  0.28*   GLUC  --  116   < >  --  71   CAIONIZED 0.98*  --   --   --   --    MG  --  1.8*  --   --   --    PHOS  --  3.3  --   --   --    AST  --  29  --   --   --    ALT  --  24  --   --   --    ALB  --  3.3*  --   --   --    TBILI  --  0.47  --   --   --    ALKPHOS  --  158*  --   --   --    INR  --   --   --  1.10  --    LACTICACID  --   --   --  1.2  --     < > = values in this interval not displayed. Imaging Results: I have personally reviewed pertinent images saved in PACS.  CT scan findings (and other pertinent positive findings on images) were discussed with radiology. My interpretation of the images/reports are as follows:  XR femur 2 vw left    Result Date: 8/4/2023  Impression: No acute osseous abnormality. Workstation performed: [id number]     XR tibia fibula 2 vw left    Result Date: 8/4/2023  Impression: Mid tibial shaft and distal fibular fracture. Gross alignment maintained. Workstation performed: [id number]     CT abdomen pelvis w contrast    Result Date: 8/4/2023  Impression: No acute traumatic visceral injury in the abdomen or pelvis. No acute fracture. Chronic pancreatitis with pancreatic parenchymal atrophy and extensive pancreatic parenchymal calcifications noted. There is increasing enlargement of the main pancreatic duct probably due to some combination of pancreatic parenchymal atrophy and stones in the pancreatic duct. Workstation performed: [id number]     TRAUMA - CT spine cervical wo contrast    Result Date: 8/4/2023  Impression: No cervical spine fracture or traumatic malalignment. Workstation performed: [id number]     TRAUMA - CT head wo contrast    Result Date: 8/4/2023  Impression: No acute intracranial abnormality. Workstation performed: [id number]     FAST: Neg  Other Studies: no new    Code Status: Level 1 - Full Code  Advance Directive and Living Will:      Power of :    POLST:    I have spent 45 minutes with Patient and family today in which greater than 50% of this time was spent in counseling/coordination of care regarding Diagnostic results, Prognosis, Impressions, Counseling / Coordination of care, Documenting in the medical record, Reviewing / ordering tests, medicine, procedures  , Obtaining or reviewing history   and Communicating with other healthcare professionals .

## 2023-08-04 NOTE — CASE MANAGEMENT
Case Management ED Progress Note    Patient name Tiara Mccormack  Location TR-/TR- MRN [id number]  : 1960 Date 2023        OBJECTIVE:  Chief Complaint: Tibia fracture . Patient Class: Emergency  Preferred Pharmacy: No Pharmacies Listed  Primary Care Provider: No primary care provider on file. Primary Insurance:   Secondary Insurance:     ED Progress Note:    CM responded to trauma alert. Patient was transported into trauma bay by [facility] for evaluation. Patient alert and responsive to medical team's questions and instructions. CM met with patients  in [facility] ED waiting room- general update provided.  asking if CM can let trauma know about a "chip" that patient has had in her cheek for the last 6 months following a fall. CM notified trauma AP. No current identified CM needs. CM will follow and update screening, assessment, and discharge planning as appropriate.

## 2023-08-04 NOTE — ASSESSMENT & PLAN NOTE
- resolved s/p 1 L IVF bolus  - patient has a non-gap metabolic acidosis  - LA normal. Will obtain further history from patient regarding recent diarrhea, toxic ingestion, new medications, etc.   - Repeat BMP this afternoon at 3 pm

## 2023-08-04 NOTE — OP NOTE
OPERATIVE REPORT  PATIENT NAME: Juancho Layton    :  1960  MRN: [id number]  Pt Location: AN OR ROOM 01    SURGERY DATE: 23    Surgeon(s) and Role:     * Jourdan Fang DO - Primary    * Iwona Gil MD - Assisting   I was present for the entire procedure. and I was present for all critical portions of the procedure. Preop Diagnosis:  #1 left midshaft tibial fracture, and distal one third fibular shaft fracture    Post-Op Diagnosis:  #1 left midshaft tibia fracture, distal one third fibular shaft fracture    Procedures:  #1 surgical fixation of the left tibial shaft fracture with an intramedullary nail      Specimen(s):  None    Estimated Blood Loss:   100 cc    Drains:  None    Anesthesia Type:   General endotracheal    Operative Indications:  Patient is a 60-year-old female that has a history of osteopenia, epilepsy, alcohol abuse that had a ground-level fall resulting in a left tibial shaft fracture which was minimally displaced. Discussion was had with the patient about nonoperative management as well as operative management for her left tibial shaft fracture. We discussed that we could either proceed with nonoperative management with nonweightbearing in a cast or proceed with surgical fixation to allow for immediate weightbearing and range of motion. Patient elected to proceed with surgical fixation      Implants:   Synthes 330 millimeter by 11 mm EX tibial nail    Tourniquet time:   None      Complications:   No acute complications were encountered. Patient was transferred to PACU in stable condition    Operative findings:  Patient had a comminuted distal one third tibial shaft fracture as well as a distal one third fibular shaft fracture. There was no instability of the ankle. Using closed reduction maneuvers the fracture was able to be reduced and a tibial nail was able to be implanted with fixation proximally and distally.   Once fixated the ankle was stressed and found to be stable with rotation. Procedure and Technique:  Patient was seen and examined in the preoperative holding area. The operative extremity was marked. All patient's questions were answered. Patient was then taken back to the operating room where general endotracheal anesthesia was administered by department anesthesia. The patient was then transferred over the operating table in CTL S spine precautions. All bony prominences were well-padded. The patient was then prepped and draped in a standard sterile orthopedic fashion. A timeout was performed confirm correct side, correct patient, correct procedure. All were in agreement procedure was started. A 4 cm incision was made proximal to the superior pole the patella sharply with a #10 blade through skin subcutaneous tissues. Electrocautery was used to obtain hemostasis. Dissection was carried down to the level of the quadriceps tendon. The quadriceps tendon was identified both medially and laterally and then split in the midline in the line of the with its fibers. Blunt dissection was carried down to the patellofemoral joint to break up any adhesions. There was good mobility of the patellofemoral joint. A guidewire was then inserted into the appropriate starting point with the appropriate starting angle confirmed on biplanar fluoroscopic imaging and advanced. Once this was in place and confirmed a opening reamer was then used through the sleeve to gain access to the medullary canal.  A ball-tipped guidewire was then inserted down the length of the proximal tibial shaft fragment in the appropriate alignment. Once the fracture site was reached traction was applied to the distal aspect of the extremity which allowed for reduction of the patient's fracture. The guidewire was sunk into the appropriate position and distally. Sequential reaming was then performed from 8.5 mm to 12.5 mm.   A Synthes 11 mm x 330 mm E ex tibial nail was then selected attached to the jig and slid into the intramedullary canal.  Once appropriately seated and countersunk it was secured proximally with two 5 mm interlocking screws in the static position as well as 2 distal headless interlocking screws into the distal segment. After this was performed the external jig was removed. The ankle was dorsiflexed and externally rotated to confirm that there is no instability at the ankle joint and no attention was needed for the fibula. This was stable. Final reduction and implant placement was confirmed under biplanar fluoroscopic imaging. The wounds were then copiously irrigated with sterile normal saline. The quadriceps tendon was repaired using 0 PDS suture. The subcutaneous tissues were repaired with 2-0 Monocryl suture and the skin was closed with staples. The wounds were then dressed in Adaptic 4 x 4's and Tegaderms. The patient was then placed into a Ace wrap from the toes up to the thigh. Patient was then transferred off the operating table in CTL spine precautions extubated and transferred to PACU in stable condition        Postoperative plan:  The patient will be weightbearing as tolerated to the left lower extremity. Range of motion as tolerated to the ankle and knee. The patient will receive 24 hours of postoperative antibiotics for infection prophylaxis. Patient be started on aspirin 81 mg twice daily for DVT prophylaxis. The patient will start to work with physical therapy postop day 1.   Patient will need to follow-up in 2 weeks for removal of staples and repeat x-ray evaluation of the left tibia    SIGNATURE: Nash Baumann DO  DATE: August 4, 2023  TIME: 7:09 PM

## 2023-08-04 NOTE — ANESTHESIA PREPROCEDURE EVALUATION
Procedure:  INSERTION NAIL IM TIBIA (Left: Leg Lower)    Relevant Problems   NEURO/PSYCH   (+) Epilepsy (HCC)        Physical Exam    Airway    Mallampati score: I  TM Distance: >3 FB  Neck ROM: full     Dental       Cardiovascular      Pulmonary      Other Findings        Anesthesia Plan  ASA Score- 3     Anesthesia Type- general with ASA Monitors. Additional Monitors:   Airway Plan: ETT. Plan Factors-Exercise tolerance (METS): >4 METS. Chart reviewed. EKG reviewed. Existing labs reviewed. Patient summary reviewed. Patient is not a current smoker. There is medical exclusion for perioperative obstructive sleep apnea risk education. Induction- intravenous. Postoperative Plan- Plan for postoperative opioid use. Informed Consent- Anesthetic plan and risks discussed with patient. I personally reviewed this patient with the CRNA. Discussed and agreed on the Anesthesia Plan with the CRNA. Farida Lindo

## 2023-08-04 NOTE — CONSULTS
Orthopedics   Amilcarsaman Castellano 61 y.o. female MRN: [id number]  Unit/Bed#: W -01      Chief Complaint:   left leg pain    HPI:   61 y.o. female community ambulator status post a woozy sensation followed by a trip and fall complaining of left leg pain and inability to bear weight. + Headstrike. Patient does state that both of her legs hurt. She denies any other acute complaints. Review Of Systems:   · Skin: Normal  · Neuro: See HPI  · Musculoskeletal: See HPI  · 14 point review of systems negative except as stated above     Past Medical History:   Past Medical History:   Diagnosis Date   • Chronic alcohol abuse    • Epilepsy ([address])    • Hepatitis    • Pancreatitis        Past Surgical History:   Past Surgical History:   Procedure Laterality Date   • BONE MARROW BIOPSY      2019, 2021   • TUBAL LIGATION         Family History:  Family history reviewed and non-contributory  No family history on file. Social History:  Social History     Socioeconomic History   • Marital status: /Civil Union     Spouse name: None   • Number of children: None   • Years of education: None   • Highest education level: None   Occupational History   • None   Tobacco Use   • Smoking status: Never   • Smokeless tobacco: Never   Substance and Sexual Activity   • Alcohol use:  Yes   • Drug use: Never   • Sexual activity: None   Other Topics Concern   • None   Social History Narrative   • None     Social Determinants of Health     Financial Resource Strain: Not on file   Food Insecurity: Not on file   Transportation Needs: Not on file   Physical Activity: Not on file   Stress: Not on file   Social Connections: Not on file   Intimate Partner Violence: Not on file   Housing Stability: Not on file       Allergies:   Not on File        Labs:  0   Lab Value Date/Time    HCT 34.8 08/04/2023 1122    HCT 29 (L) 08/04/2023 1120    HGB 11.0 (L) 08/04/2023 1122    HGB 9.9 (L) 08/04/2023 1120    INR 1.10 08/04/2023 1229    WBC 6.35 08/04/2023 1122       Meds:    Current Facility-Administered Medications:   •  enoxaparin (LOVENOX) subcutaneous injection 30 mg, 30 mg, Subcutaneous, Q12H [facility], Madelyn Ocasio PA-C  •  folic acid (FOLVITE) tablet 1 mg, 1 mg, Oral, Daily, Madelyn Ocasio PA-C  •  gabapentin (NEURONTIN) capsule 100 mg, 100 mg, Oral, HS, Madelyn Ocasio PA-C  •  HYDROmorphone HCl (DILAUDID) injection 0.2 mg, 0.2 mg, Intravenous, Q2H PRN, Jaz Jaci, COLT  •  methocarbamol (ROBAXIN) tablet 500 mg, 500 mg, Oral, Q6H [facility], Madelyn Ocasio PA-C  •  multi-electrolyte (PLASMALYTE-A/ISOLYTE-S PH 7.4) IV solution, 100 mL/hr, Intravenous, Continuous, Madelyn Ocasio PA-C, Last Rate: 100 mL/hr at 08/04/23 1348, 100 mL/hr at 08/04/23 1348  •  multivitamin-minerals (CENTRUM) tablet 1 tablet, 1 tablet, Oral, Daily, Madelyn Ocasio PA-C  •  naloxone (NARCAN) 0.04 mg/mL syringe 0.04 mg, 0.04 mg, Intravenous, Q1MIN PRN, Jaz Jaci, COLT  •  ondansetron [facility]) injection 4 mg, 4 mg, Intravenous, Q4H PRN, Jaz Jaci, COLT  •  oxyCODONE (ROXICODONE) IR tablet 5 mg, 5 mg, Oral, Q4H PRN, Jaz Jaci, COLT  •  oxyCODONE (ROXICODONE) split tablet 2.5 mg, 2.5 mg, Oral, Q4H PRN, Jaz Jaci, COLT  •  polyethylene glycol (MIRALAX) packet 17 g, 17 g, Oral, Daily, Madelyn Qureshi PA-C  •  senna-docusate sodium (SENOKOT S) 8.6-50 mg per tablet 1 tablet, 1 tablet, Oral, HS, Madelyn Ocasio PA-C  •  thiamine tablet 100 mg, 100 mg, Oral, Daily, Madelyn Ocasio PA-C    Blood Culture:   No results found for: "BLOODCX"    Wound Culture:   No results found for: "WOUNDCULT"    Ins and Outs:  I/O last 24 hours:   In: 400 [I.V.:400]  Out: -           Physical Exam:   /64   Pulse 73   Temp 97.8 °F (36.6 °C)   Resp 17   Wt 66 kg (145 lb 8.1 oz)   SpO2 94%   Gen: No acute distress, resting comfortably in bed  HEENT: Eyes clear, moist mucus membranes, hearing intact  Respiratory: No audible wheezing or stridor  Cardiovascular: Well Perfused peripherally, 2+ distal pulse  Abdomen: nondistended, no peritoneal signs  Musculoskeletal: left lower extremity  · Skin intact, ecchymosis over tibial shaft  · Tender to palpation over tibial shaft  · Sensation intact dp/sp/tib/abigail/saph  · Intact ankle DF/PF, fhl/ehl  · 2+  DP/PT pulse  · Musculature is soft and compressible, no pain with passive stretch  · Leg lengths equal    Radiology:   I personally reviewed the films. X-rays AP/Lateral and views left tib/fib shows nondisplaced tibial shaft fracture    _*_*_*_*_*_*_*_*_*_*_*_*_*_*_*_*_*_*_*_*_*_*_*_*_*_*_*_*_*_*_*_*_*_*_*_*_*_*_*_*_*    Assessment:  61 y.o. female status post dizzy sensation with trip and fall with left tibial shaft fracture. Placed in a long leg splint with stirrups    Plan:   · Non weight bearing left lower extremity in splint  · Analgesics for pain  · Informed consent obtained.   · Pre op labs in ED  · NPO at midnight  · To OR for operative fixation of tibial shaft fracture today  · Dispo: Ortho will follow    Davin Young PA-C

## 2023-08-05 LAB
ALBUMIN SERPL BCP-MCNC: 3.1 G/DL (ref 3.5–5)
ALBUMIN SERPL BCP-MCNC: 3.1 G/DL (ref 3.5–5)
ALP SERPL-CCNC: 124 U/L (ref 34–104)
ALP SERPL-CCNC: 124 U/L (ref 34–104)
ALT SERPL W P-5'-P-CCNC: 19 U/L (ref 7–52)
ALT SERPL W P-5'-P-CCNC: 19 U/L (ref 7–52)
ANION GAP SERPL CALCULATED.3IONS-SCNC: 5 MMOL/L
ANION GAP SERPL CALCULATED.3IONS-SCNC: 5 MMOL/L
AST SERPL W P-5'-P-CCNC: 19 U/L (ref 13–39)
AST SERPL W P-5'-P-CCNC: 19 U/L (ref 13–39)
BASE EX.OXY STD BLDV CALC-SCNC: 90.8 % (ref 60–80)
BASE EX.OXY STD BLDV CALC-SCNC: 90.8 % (ref 60–80)
BASE EXCESS BLDV CALC-SCNC: -0.2 MMOL/L
BASE EXCESS BLDV CALC-SCNC: -0.2 MMOL/L
BASOPHILS # BLD AUTO: 0 THOUSANDS/ÂΜL (ref 0–0.1)
BASOPHILS # BLD AUTO: 0 THOUSANDS/ÂΜL (ref 0–0.1)
BASOPHILS NFR BLD AUTO: 0 % (ref 0–1)
BASOPHILS NFR BLD AUTO: 0 % (ref 0–1)
BILIRUB DIRECT SERPL-MCNC: 0.11 MG/DL (ref 0–0.2)
BILIRUB DIRECT SERPL-MCNC: 0.11 MG/DL (ref 0–0.2)
BILIRUB SERPL-MCNC: 0.35 MG/DL (ref 0.2–1)
BILIRUB SERPL-MCNC: 0.35 MG/DL (ref 0.2–1)
BUN SERPL-MCNC: 11 MG/DL (ref 5–25)
BUN SERPL-MCNC: 11 MG/DL (ref 5–25)
CA-I BLD-SCNC: 1.07 MMOL/L (ref 1.12–1.32)
CA-I BLD-SCNC: 1.07 MMOL/L (ref 1.12–1.32)
CALCIUM SERPL-MCNC: 7.9 MG/DL (ref 8.4–10.2)
CALCIUM SERPL-MCNC: 7.9 MG/DL (ref 8.4–10.2)
CHLORIDE SERPL-SCNC: 108 MMOL/L (ref 96–108)
CHLORIDE SERPL-SCNC: 108 MMOL/L (ref 96–108)
CO2 SERPL-SCNC: 24 MMOL/L (ref 21–32)
CO2 SERPL-SCNC: 24 MMOL/L (ref 21–32)
CREAT SERPL-MCNC: 0.62 MG/DL (ref 0.6–1.3)
CREAT SERPL-MCNC: 0.62 MG/DL (ref 0.6–1.3)
EOSINOPHIL # BLD AUTO: 0 THOUSAND/ÂΜL (ref 0–0.61)
EOSINOPHIL # BLD AUTO: 0 THOUSAND/ÂΜL (ref 0–0.61)
EOSINOPHIL NFR BLD AUTO: 0 % (ref 0–6)
EOSINOPHIL NFR BLD AUTO: 0 % (ref 0–6)
ERYTHROCYTE [DISTWIDTH] IN BLOOD BY AUTOMATED COUNT: 13.2 % (ref 11.6–15.1)
ERYTHROCYTE [DISTWIDTH] IN BLOOD BY AUTOMATED COUNT: 13.2 % (ref 11.6–15.1)
GFR SERPL CREATININE-BSD FRML MDRD: 96 ML/MIN/1.73SQ M
GFR SERPL CREATININE-BSD FRML MDRD: 96 ML/MIN/1.73SQ M
GLUCOSE SERPL-MCNC: 178 MG/DL (ref 65–140)
GLUCOSE SERPL-MCNC: 178 MG/DL (ref 65–140)
HCO3 BLDV-SCNC: 24.7 MMOL/L (ref 24–30)
HCO3 BLDV-SCNC: 24.7 MMOL/L (ref 24–30)
HCT VFR BLD AUTO: 27.4 % (ref 34.8–46.1)
HCT VFR BLD AUTO: 27.4 % (ref 34.8–46.1)
HGB BLD-MCNC: 9 G/DL (ref 11.5–15.4)
HGB BLD-MCNC: 9 G/DL (ref 11.5–15.4)
IMM GRANULOCYTES # BLD AUTO: 0.01 THOUSAND/UL (ref 0–0.2)
IMM GRANULOCYTES # BLD AUTO: 0.01 THOUSAND/UL (ref 0–0.2)
IMM GRANULOCYTES NFR BLD AUTO: 0 % (ref 0–2)
IMM GRANULOCYTES NFR BLD AUTO: 0 % (ref 0–2)
LYMPHOCYTES # BLD AUTO: 0.42 THOUSANDS/ÂΜL (ref 0.6–4.47)
LYMPHOCYTES # BLD AUTO: 0.42 THOUSANDS/ÂΜL (ref 0.6–4.47)
LYMPHOCYTES NFR BLD AUTO: 11 % (ref 14–44)
LYMPHOCYTES NFR BLD AUTO: 11 % (ref 14–44)
MAGNESIUM SERPL-MCNC: 2 MG/DL (ref 1.9–2.7)
MAGNESIUM SERPL-MCNC: 2 MG/DL (ref 1.9–2.7)
MCH RBC QN AUTO: 31.3 PG (ref 26.8–34.3)
MCH RBC QN AUTO: 31.3 PG (ref 26.8–34.3)
MCHC RBC AUTO-ENTMCNC: 32.8 G/DL (ref 31.4–37.4)
MCHC RBC AUTO-ENTMCNC: 32.8 G/DL (ref 31.4–37.4)
MCV RBC AUTO: 95 FL (ref 82–98)
MCV RBC AUTO: 95 FL (ref 82–98)
MONOCYTES # BLD AUTO: 0.3 THOUSAND/ÂΜL (ref 0.17–1.22)
MONOCYTES # BLD AUTO: 0.3 THOUSAND/ÂΜL (ref 0.17–1.22)
MONOCYTES NFR BLD AUTO: 8 % (ref 4–12)
MONOCYTES NFR BLD AUTO: 8 % (ref 4–12)
NEUTROPHILS # BLD AUTO: 3.25 THOUSANDS/ÂΜL (ref 1.85–7.62)
NEUTROPHILS # BLD AUTO: 3.25 THOUSANDS/ÂΜL (ref 1.85–7.62)
NEUTS SEG NFR BLD AUTO: 81 % (ref 43–75)
NEUTS SEG NFR BLD AUTO: 81 % (ref 43–75)
NRBC BLD AUTO-RTO: 0 /100 WBCS
NRBC BLD AUTO-RTO: 0 /100 WBCS
O2 CT BLDV-SCNC: 13.2 ML/DL
O2 CT BLDV-SCNC: 13.2 ML/DL
PCO2 BLDV: 41.8 MM HG (ref 42–50)
PCO2 BLDV: 41.8 MM HG (ref 42–50)
PH BLDV: 7.39 [PH] (ref 7.3–7.4)
PH BLDV: 7.39 [PH] (ref 7.3–7.4)
PHOSPHATE SERPL-MCNC: 2.9 MG/DL (ref 2.3–4.1)
PHOSPHATE SERPL-MCNC: 2.9 MG/DL (ref 2.3–4.1)
PLATELET # BLD AUTO: 47 THOUSANDS/UL (ref 149–390)
PLATELET # BLD AUTO: 47 THOUSANDS/UL (ref 149–390)
PMV BLD AUTO: 10.4 FL (ref 8.9–12.7)
PMV BLD AUTO: 10.4 FL (ref 8.9–12.7)
PO2 BLDV: 66.2 MM HG (ref 35–45)
PO2 BLDV: 66.2 MM HG (ref 35–45)
POTASSIUM SERPL-SCNC: 4.2 MMOL/L (ref 3.5–5.3)
POTASSIUM SERPL-SCNC: 4.2 MMOL/L (ref 3.5–5.3)
PROT SERPL-MCNC: 5.5 G/DL (ref 6.4–8.4)
PROT SERPL-MCNC: 5.5 G/DL (ref 6.4–8.4)
RBC # BLD AUTO: 2.88 MILLION/UL (ref 3.81–5.12)
RBC # BLD AUTO: 2.88 MILLION/UL (ref 3.81–5.12)
SODIUM SERPL-SCNC: 137 MMOL/L (ref 135–147)
SODIUM SERPL-SCNC: 137 MMOL/L (ref 135–147)
WBC # BLD AUTO: 3.98 THOUSAND/UL (ref 4.31–10.16)
WBC # BLD AUTO: 3.98 THOUSAND/UL (ref 4.31–10.16)

## 2023-08-05 PROCEDURE — 82330 ASSAY OF CALCIUM: CPT | Performed by: STUDENT IN AN ORGANIZED HEALTH CARE EDUCATION/TRAINING PROGRAM

## 2023-08-05 PROCEDURE — 97163 PT EVAL HIGH COMPLEX 45 MIN: CPT

## 2023-08-05 PROCEDURE — 80048 BASIC METABOLIC PNL TOTAL CA: CPT | Performed by: STUDENT IN AN ORGANIZED HEALTH CARE EDUCATION/TRAINING PROGRAM

## 2023-08-05 PROCEDURE — 82805 BLOOD GASES W/O2 SATURATION: CPT | Performed by: STUDENT IN AN ORGANIZED HEALTH CARE EDUCATION/TRAINING PROGRAM

## 2023-08-05 PROCEDURE — 97167 OT EVAL HIGH COMPLEX 60 MIN: CPT

## 2023-08-05 PROCEDURE — 84100 ASSAY OF PHOSPHORUS: CPT | Performed by: STUDENT IN AN ORGANIZED HEALTH CARE EDUCATION/TRAINING PROGRAM

## 2023-08-05 PROCEDURE — 99232 SBSQ HOSP IP/OBS MODERATE 35: CPT | Performed by: SURGERY

## 2023-08-05 PROCEDURE — 85025 COMPLETE CBC W/AUTO DIFF WBC: CPT | Performed by: STUDENT IN AN ORGANIZED HEALTH CARE EDUCATION/TRAINING PROGRAM

## 2023-08-05 PROCEDURE — 80076 HEPATIC FUNCTION PANEL: CPT

## 2023-08-05 PROCEDURE — 83735 ASSAY OF MAGNESIUM: CPT | Performed by: STUDENT IN AN ORGANIZED HEALTH CARE EDUCATION/TRAINING PROGRAM

## 2023-08-05 PROCEDURE — 99024 POSTOP FOLLOW-UP VISIT: CPT | Performed by: STUDENT IN AN ORGANIZED HEALTH CARE EDUCATION/TRAINING PROGRAM

## 2023-08-05 PROCEDURE — 97110 THERAPEUTIC EXERCISES: CPT

## 2023-08-05 PROCEDURE — 97535 SELF CARE MNGMENT TRAINING: CPT

## 2023-08-05 RX ORDER — FOLIC ACID 1 MG/1
1 TABLET ORAL DAILY
Status: DISCONTINUED | OUTPATIENT
Start: 2023-08-05 | End: 2023-08-11 | Stop reason: HOSPADM

## 2023-08-05 RX ORDER — CALCIUM GLUCONATE 20 MG/ML
1 INJECTION, SOLUTION INTRAVENOUS ONCE
Status: COMPLETED | OUTPATIENT
Start: 2023-08-05 | End: 2023-08-05

## 2023-08-05 RX ORDER — ASCORBIC ACID 500 MG
1000 TABLET ORAL 2 TIMES DAILY
Status: DISCONTINUED | OUTPATIENT
Start: 2023-08-05 | End: 2023-08-11 | Stop reason: HOSPADM

## 2023-08-05 RX ADMIN — TRAZODONE HYDROCHLORIDE 50 MG: 50 TABLET ORAL at 21:33

## 2023-08-05 RX ADMIN — CYANOCOBALAMIN TAB 500 MCG 500 MCG: 500 TAB at 09:41

## 2023-08-05 RX ADMIN — METHOCARBAMOL TABLETS 500 MG: 500 TABLET, COATED ORAL at 00:04

## 2023-08-05 RX ADMIN — FOLIC ACID 1 MG: 1 TABLET ORAL at 09:42

## 2023-08-05 RX ADMIN — HYDROXYZINE HYDROCHLORIDE 25 MG: 25 TABLET ORAL at 09:41

## 2023-08-05 RX ADMIN — CALCIUM GLUCONATE 1 G: 20 INJECTION, SOLUTION INTRAVENOUS at 03:52

## 2023-08-05 RX ADMIN — GABAPENTIN 100 MG: 300 CAPSULE ORAL at 21:33

## 2023-08-05 RX ADMIN — SENNOSIDES AND DOCUSATE SODIUM 1 TABLET: 50; 8.6 TABLET ORAL at 21:33

## 2023-08-05 RX ADMIN — ASPIRIN 81 MG: 81 TABLET, COATED ORAL at 09:41

## 2023-08-05 RX ADMIN — LACOSAMIDE 200 MG: 100 TABLET, FILM COATED ORAL at 21:33

## 2023-08-05 RX ADMIN — ENOXAPARIN SODIUM 30 MG: 30 INJECTION SUBCUTANEOUS at 21:33

## 2023-08-05 RX ADMIN — OXYCODONE HYDROCHLORIDE AND ACETAMINOPHEN 1000 MG: 500 TABLET ORAL at 09:42

## 2023-08-05 RX ADMIN — FOLIC ACID 1 MG: 1 TABLET ORAL at 09:43

## 2023-08-05 RX ADMIN — Medication 100 MG: at 09:41

## 2023-08-05 RX ADMIN — POLYETHYLENE GLYCOL 3350 17 G: 17 POWDER, FOR SOLUTION ORAL at 09:41

## 2023-08-05 RX ADMIN — OXYCODONE HYDROCHLORIDE AND ACETAMINOPHEN 1000 MG: 500 TABLET ORAL at 18:48

## 2023-08-05 RX ADMIN — DESVENLAFAXINE 50 MG: 50 TABLET, FILM COATED, EXTENDED RELEASE ORAL at 09:43

## 2023-08-05 RX ADMIN — MULTIPLE VITAMINS W/ MINERALS TAB 1 TABLET: TAB ORAL at 09:43

## 2023-08-05 RX ADMIN — HYDROXYZINE HYDROCHLORIDE 25 MG: 25 TABLET ORAL at 18:48

## 2023-08-05 RX ADMIN — METHOCARBAMOL TABLETS 500 MG: 500 TABLET, COATED ORAL at 18:48

## 2023-08-05 RX ADMIN — LACOSAMIDE 200 MG: 100 TABLET, FILM COATED ORAL at 09:41

## 2023-08-05 RX ADMIN — GABAPENTIN 300 MG: 300 CAPSULE ORAL at 09:41

## 2023-08-05 RX ADMIN — ENOXAPARIN SODIUM 30 MG: 30 INJECTION SUBCUTANEOUS at 09:41

## 2023-08-05 RX ADMIN — GABAPENTIN 300 MG: 300 CAPSULE ORAL at 18:48

## 2023-08-05 RX ADMIN — Medication 2.5 MG: at 12:53

## 2023-08-05 RX ADMIN — CEFAZOLIN SODIUM 2000 MG: 2 SOLUTION INTRAVENOUS at 01:10

## 2023-08-05 RX ADMIN — IRON SUCROSE 300 MG: 20 INJECTION, SOLUTION INTRAVENOUS at 11:48

## 2023-08-05 RX ADMIN — METHOCARBAMOL TABLETS 500 MG: 500 TABLET, COATED ORAL at 12:33

## 2023-08-05 RX ADMIN — ASPIRIN 81 MG: 81 TABLET, COATED ORAL at 21:33

## 2023-08-05 RX ADMIN — CEFAZOLIN SODIUM 2000 MG: 2 SOLUTION INTRAVENOUS at 09:41

## 2023-08-05 RX ADMIN — METHOCARBAMOL TABLETS 500 MG: 500 TABLET, COATED ORAL at 05:05

## 2023-08-05 RX ADMIN — ZONISAMIDE 200 MG: 100 CAPSULE ORAL at 21:33

## 2023-08-05 NOTE — PROGRESS NOTES
Orthopedics   Petty Roblero 61 y.o. female MRN: [id number]  Unit/Bed#: W -01    Subjective:  61 y. o.female post operative day 1 IMN left tibia. Pt doing well. Pain controlled with medications. Patient was able to sleep overnight. No acute complaints.      Labs:  0   Lab Value Date/Time    HCT 27.4 (L) 08/05/2023 0338    HCT 34.8 08/04/2023 1122    HCT 29 (L) 08/04/2023 1120    HGB 9.0 (L) 08/05/2023 0338    HGB 11.0 (L) 08/04/2023 1122    HGB 9.9 (L) 08/04/2023 1120    INR 1.10 08/04/2023 1229    WBC 3.98 (L) 08/05/2023 0338    WBC 6.35 08/04/2023 1122       Meds:    Current Facility-Administered Medications:   •  ascorbic acid (VITAMIN C) tablet 1,000 mg, 1,000 mg, Oral, BID, Darius Ureña MD  •  aspirin (ECOTRIN LOW STRENGTH) EC tablet 81 mg, 81 mg, Oral, BID, Brittany Bell PA-C, 81 mg at 08/04/23 2039  •  ceFAZolin (ANCEF) IVPB (premix in dextrose) 2,000 mg 50 mL, 2,000 mg, Intravenous, Q8H, Brittany Bell PA-C, Last Rate: 100 mL/hr at 08/05/23 0110, 2,000 mg at 08/05/23 0110  •  cyanocobalamin (VITAMIN B-12) tablet 500 mcg, 500 mcg, Oral, Daily, Darius Ureña MD  •  desvenlafaxine succinate (PRISTIQ) 24 hr tablet 50 mg, 50 mg, Oral, Daily, Brittany Bell PA-C  •  enoxaparin (LOVENOX) subcutaneous injection 30 mg, 30 mg, Subcutaneous, Q12H [facility], Brittany Bell PA-C, 30 mg at 05/29/26 0626  •  folic acid (FOLVITE) tablet 1 mg, 1 mg, Oral, Daily, Brittany Bell PA-C, 1 mg at 76/38/27 5720  •  folic acid (FOLVITE) tablet 1 mg, 1 mg, Oral, Daily, Darius Ureña MD  •  gabapentin (NEURONTIN) capsule 100 mg, 100 mg, Oral, HS, Brittany Bell PA-C, 100 mg at 08/04/23 2037  •  gabapentin (NEURONTIN) capsule 300 mg, 300 mg, Oral, BID, Brittany Bell PA-C  •  HYDROmorphone HCl (DILAUDID) injection 0.2 mg, 0.2 mg, Intravenous, Q2H PRN, Brittany Bell PA-C  •  hydrOXYzine HCL (ATARAX) tablet 25 mg, 25 mg, Oral, BID, Dearl Last, PA-C, 25 mg at 08/04/23 2039  •  iron sucrose (VENOFER) 300 mg in sodium chloride 0.9 % 250 mL IVPB, 300 mg, Intravenous, Daily, Judith Manriquez MD  •  lacosamide (VIMPAT) tablet 200 mg, 200 mg, Oral, Q12H [facility], Dearl Last, PA-C, 200 mg at 08/04/23 2039  •  methocarbamol (ROBAXIN) tablet 500 mg, 500 mg, Oral, Q6H [facility], Dearl Last, PA-C, 500 mg at 08/05/23 0505  •  metoprolol succinate (TOPROL-XL) 24 hr tablet 25 mg, 25 mg, Oral, Daily, Dearl Last, PA-C  •  multi-electrolyte (PLASMALYTE-A/ISOLYTE-S PH 7.4) IV solution, 100 mL/hr, Intravenous, Continuous, Dearl Last, [place], Last Rate: 100 mL/hr at 08/04/23 2049, 100 mL/hr at 08/04/23 2049  •  multivitamin-minerals (CENTRUM) tablet 1 tablet, 1 tablet, Oral, Daily, Dearl Last, PA-C, 1 tablet at 08/04/23 1637  •  naloxone (NARCAN) 0.04 mg/mL syringe 0.04 mg, 0.04 mg, Intravenous, Q1MIN PRN, Dearl Last, PA-C  •  ondansetron [facility]) injection 4 mg, 4 mg, Intravenous, Q4H PRN, Dearl Last, PA-C  •  oxyCODONE (ROXICODONE) IR tablet 5 mg, 5 mg, Oral, Q4H PRN, Dearl Last, PA-C, 5 mg at 08/04/23 2053  •  oxyCODONE (ROXICODONE) split tablet 2.5 mg, 2.5 mg, Oral, Q4H PRN, Dearl Last, PA-C  •  polyethylene glycol (MIRALAX) packet 17 g, 17 g, Oral, Daily, Dearl Last, PA-C  •  senna-docusate sodium (SENOKOT S) 8.6-50 mg per tablet 1 tablet, 1 tablet, Oral, HS, Dearl Last, PA-C, 1 tablet at 08/04/23 2038  •  thiamine tablet 100 mg, 100 mg, Oral, Daily, Dearl Last, PA-C  •  traZODone (DESYREL) tablet 50 mg, 50 mg, Oral, HS, Dearl Last, PA-C, 50 mg at 08/04/23 2038  •  zonisamide (ZONEGRAN) capsule 200 mg, 200 mg, Oral, HS, Dearl Last, PA-C, 200 mg at 08/04/23 2038    Blood Culture:   No results found for: "BLOODCX"    Wound Culture:   No results found for: "WOUNDCULT"    Ins and Outs:  I/O last 24 hours: In: 3070 [I.V.:3020; IV Piggyback:50]  Out: 8394 [Urine:1125]          Physical:  Vitals:    08/05/23 0400   BP:    Pulse: 66   Resp:    Temp:    SpO2:      left lower extremity  · Dressings clean Dry Intact  · + EHL/FHL, + ankle plantarflexion/dorsiflexion  · Sensation intact L2-S1  · 2+ DP Pulse    Assessment:   61 y. o.female post operative day 1 IMN left tibia.     Plan:  · WBAT left lower extremity  · Up and out of bed  · Ice, Elevation to affected extremity  · Analgesics  · DVT prophylaxis  · Aspirin 81 mg BID x 4 weeks at discharge  · PT/OT  · Dispo: Ortho will follow  · Patient noted to have acute blood loss anemia due to a drop in Hbg of > 2.0g from preop levels, will monitor vital signs and resuscitate with IV fluids as needed    Ying Angela PA-C

## 2023-08-05 NOTE — PLAN OF CARE
Problem: PHYSICAL THERAPY ADULT  Goal: Performs mobility at highest level of function for planned discharge setting. See evaluation for individualized goals. Description: Treatment/Interventions: Functional transfer training, LE strengthening/ROM, Therapeutic exercise, Endurance training, Cognitive reorientation, Patient/family training, Gait training, Bed mobility, Equipment eval/education, Spoke to nursing, Spoke to case management, OT  Equipment Recommended: Richy Pugh (possible WC for distances?)       See flowsheet documentation for full assessment, interventions and recommendations. Note: Prognosis: Fair  Problem List: Decreased strength, Decreased range of motion, Decreased endurance, Impaired balance, Pain, Orthopedic restrictions, Decreased skin integrity, Decreased mobility, Decreased coordination (gait deviations, fear and retreat)  Assessment: Pt is a 61 y.o. female seen for PT evaluation s/p admit to [facility] on 8/4/2023 w/ Tibia fracture. Pt is now POD1, WBAT LLE , no brace ordered. Order placed for PT. Prior to admission: Pt lived w/spouse in 2 story home, 4 NATALYA, was indep w/mobility with only intermittent use of cane. Upon evaluation: Pt needed A of 1 for bed mobility, A of 2 for sit<>stand and pregait activities w/rolling walker. Pt's clinical presentation is currently unstable/unpredictable given the functional mobility deficits above, especially (but not limited to) weakness, decreased ROM and pain, and combined with medical complications of abnormal WBCs, abnormal platelets and fear/retreat. She is at risk for falls based on hx of falls, impaired balance and impaired coordination, . During this admission, pt would benefit from continued skilled inpatient PT in the acute care setting in order to address deficits as defined above to maximize function and mobility.       Recommendations:    From a PT standpoint, recommend next several sessions focus on functional mobility training with using walker during PT sessions, therex with focus on ROM/flexibility ankle and knee> strength. Nursing Recommendations:   Mobility Plan as of 08/05/23: Pt is one-2 person Assist with no AD to/from recliner and BSC (drop arm). Barriers to Discharge: Decreased caregiver support, Inaccessible home environment     PT Discharge Recommendation: (S) Post acute rehabilitation services (@ this time, recommend rehab unless pt is able to progress to more indep LOF including being able to perform NATALYA and gait w/ LRAD)    See flowsheet documentation for full assessment.

## 2023-08-05 NOTE — PHYSICAL THERAPY NOTE
PHYSICAL THERAPY EVALUATION  NAME:  Remy Garrison  DATE: 08/05/23    AGE:   61 y.o. Mrn:   [id number]  Principal problem: Principal Problem:    Tibia fracture  Active Problems:    Fall    Hematoma    Hypotension    Epilepsy ([address])    Chronic alcohol abuse      Vitals:    08/05/23 0140 08/05/23 0400 08/05/23 0832 08/05/23 1135   BP: 90/52 99/53 95/54 109/58   Pulse: 65 66 70    Resp:   16 16   Temp:   97.9 °F (36.6 °C) 97.9 °F (36.6 °C)   TempSrc:       SpO2: 98%  97%    Weight:       Height:           Length Of Stay: 1  Performed at least 2 patient identifiers during session: Name and Birthday  PHYSICAL THERAPY EVALUATION :    08/05/23 1417   PT Last Visit   PT Visit Date 08/05/23   Pain Assessment   Pain Assessment Tool 0-10   Pain Score 10 - Worst Possible Pain   Pain Location/Orientation Orientation: Left;Orientation: Lower; Location: Leg  (inlcuding ankle)   Effect of Pain on Daily Activities limits speed and indep of mobility, limits rom, limited participation   Patient's Stated Pain Goal No pain   Hospital Pain Intervention(s) Repositioned; Ambulation/increased activity;Cold applied   Multiple Pain Sites   (generalized RLE pain s/p fall)   Restrictions/Precautions   Weight Bearing Precautions Per Order Yes   LLE Weight Bearing Per Order WBAT   Other Precautions Bed Alarm; Chair Alarm;Cognitive; Fall Risk;Pain;WBS   Home Living   Type of [address] [place] [address] Layout Two level  (4STE)   Home Equipment   (used cane intermittently prior to admisison, but did not say when she used it.)   Additional Comments Pt's bedroom is on the 2nd floor, however a FFSU is available as there is a 1/2 bath on that level   Prior Function   Level of [place] Independent with ADLs; Independent with functional mobility   Lives With Spouse   Falls in the last 6 months 1 to 4   Vocational Retired   Anju's   Additional Pertinent History post operative day 1 IMN left tibia.  WBAT   Family/Caregiver Present No   Cognition   Overall Cognitive Status Impaired  (self reported)   Arousal/Participation Alert   Following Commands Follows one step commands with increased time or repetition   Subjective   Subjective Pt pleasant and cooperative, reports that she has been positioning purewick between legs in bed when she needs to urinate. Reports fear/retreat   RUE Assessment   RUE Assessment WFL   LUE Assessment   LUE Assessment WFL   Strength RLE   R Hip Flexion 4/5   R Knee Extension 4/5   R Ankle Dorsiflexion 4/5   LLE Overall PROM   L Ankle Dorsiflexion limited from neutral wiht knee flexed and extended   L Knee Flexion limited from 90   L Knee Extension limited from neutral (~-10)   Strength LLE   L Hip Flexion 2-/5   L Hip ABduction 2/5   L Ankle Dorsiflexion 3/5   L Knee Extension 3-/5   Coordination   Movements are Fluid and Coordinated 0   Coordination and Movement Description + tremors   Light Touch   RLE Light Touch Grossly intact   LLE Light Touch Grossly intact   Bed Mobility   Supine to Sit 5  Supervision   Additional items Assist x 1; Increased time required; Bedrails;HOB elevated;Verbal cues;LE management   Transfers   Sit to Stand 4  Minimal assistance   Additional items Assist x 2; Increased time required;Verbal cues;Armrests  (instruction for hand placement, technique. Needed A @ L and R side for trunk control, facilitation to minimize LLE buckling.)   Stand to Sit 4  Minimal assistance   Additional items Assist x 2; Increased time required;Verbal cues;Armrests  (instruction for hand placement, technique. Needed A @ L and R side for trunk control, facilitation to minimize LLE buckling.)   Ambulation/Elevation   Gait pattern Decreased L stance; Antalgic;Poor UE support; Excessively slow; Step to;Short stride   Gait Assistance 4  Minimal assist   Additional items Assist x 2;Verbal cues; Tactile cues   Assistive Device Rolling walker   Distance advance-retreat only one rep   Stair Management Assistance Not tested   Balance   Static Sitting Good   Static Standing Poor +  (standing tolerance one min)   Ambulatory Poor   Endurance Deficit   Endurance Deficit Yes   Endurance Deficit Description limited standing tolerance time, sitting tolerance time   Activity Tolerance   Activity Tolerance Patient limited by pain; Patient limited by fatigue   Medical Staff Made Aware care coordination w/ Kathie from OT, spoke to Carlos Gruber from case mangement   Nurse Made Aware spoke to nursing after session including re: venodyne pump, IS, using BSC     Assessment:   Pt is a 61 y.o. female seen for PT evaluation s/p admit to [address] [place] [address] on 8/4/2023 w/ Tibia fracture. Pt is now POD1, WBAT LLE , no brace ordered. Order placed for PT. Prior to admission: Pt lived w/spouse in 2 story home, 4 NATALYA, was indep w/mobility with only intermittent use of cane. Upon evaluation: Pt needed A of 1 for bed mobility, A of 2 for sit<>stand and pregait activities w/rolling walker. Pt's clinical presentation is currently unstable/unpredictable given the functional mobility deficits above, especially (but not limited to) weakness, decreased ROM and pain, and combined with medical complications of abnormal WBCs, abnormal platelets and fear/retreat. She is at risk for falls based on hx of falls, impaired balance and impaired coordination, . During this admission, pt would benefit from continued skilled inpatient PT in the acute care setting in order to address deficits as defined above to maximize function and mobility. Recommendations:    From a PT standpoint, recommend next several sessions focus on functional mobility training with using walker during PT sessions, therex with focus on ROM/flexibility ankle and knee> strength. Nursing Recommendations:   Mobility Plan as of 08/05/23: Pt is one-2 person Assist with no AD to/from recliner and BSC (drop arm) as a pivot transfer toward R side if possible.     Prognosis Fair   Problem List Decreased strength;Decreased range of motion;Decreased endurance; Impaired balance;Pain;Orthopedic restrictions;Decreased skin integrity; Decreased mobility; Decreased coordination  (gait deviations, fear and retreat)   Barriers to Discharge Decreased caregiver support; Inaccessible home environment   Goals   Patient Goals to have less pain, go home; STG--to get onto the toilet for BM   STG Expiration Date 08/15/23   Short Term Goal #1 Pt will: Perform rolling  and supine<>sit bed mobility tasks with modified I to prepare for transfers and reposition in bed. Perform transfers with Supervision to improve ease of transfers and promote proper hand placement and approach. Perform ambulation with LRAD for at least 48' with Supervision to increase Indep in home environment and promote proper use of assistive device. Assess stairs and possibly WC mobility for longer distances and set goals. Increase L ankle DF to neutral and L knee ext to 0 to promote more normalized gait patterning in the future. PT Treatment Day 1   Plan   Treatment/Interventions Functional transfer training;LE strengthening/ROM; Therapeutic exercise; Endurance training;Cognitive reorientation;Patient/family training;Gait training;Bed mobility; Equipment eval/education;Spoke to nursing;Spoke to case management;OT   PT Frequency 4-6x/wk   Recommendation   PT Discharge Recommendation (S)  Post acute rehabilitation services  (@ this time, recommend rehab unless pt is able to progress to more indep LOF including being able to perform NATALYA and gait w/ LRAD)   Equipment Recommended Walker  (possible WC for distances?)   Walker Package Recommended Wheeled walker   Additional Comments Co-morbidities affecting pt's physical performance at time of assessment include but are not limited to: Chronic alcohol abuse w/ recent treatment, Epilepsy, Hepatitis,  Pancreatitis, Bone marrow biopsy.  Personal factors affecting pt at time of IE include: steps to enter environment, multi-level environment, past experience, behavioral pattern, level of education, inability to perform ADLs, inability to navigate community distances and recent fall(s). AM-PAC Basic Mobility Inpatient   Turning in Flat Bed Without Bedrails 3   Lying on Back to Sitting on Edge of Flat Bed Without Bedrails 3   Moving Bed to Chair 2   Standing Up From Chair Using Arms 1   Walk in Room 1   Climb 3-5 Stairs With Railing 1   Basic Mobility Inpatient Raw Score 11   Basic Mobility Standardized Score 30.25   Highest Level Of Mobility   -HL Goal 4: Move to chair/commode   JH-HLM Achieved 5: Stand (1 or more minutes)   Additional Treatment Session   Start Time 1430   End Time 1452   Treatment Assessment Pt was successful in getting to recliner, but only tolerated sitting for <15 min. Pt did particiapte in therex, including heel cord stretching and knee ex, but was still unable to achieve neutral knee ext by end of session. Skilled PT recommended to progress pt toward treatment goals. Equipment Use none   Additional Treatment Day 1   Exercises   Quad Sets Supine;5 reps;AAROM; Left   Knee AROM Short Arc Quad Supine;5 reps;AAROM; Left   Heel Cord Stretch Supine;PROM  (gentle, but knee flexed for majority of stretch. 2 reps of 20 seconds)   Neuro re-ed After demonstration, pt performed partial stand pivot transfer to R to recliner. Pt performed sitting tolerance < 15 min, and OT assisted pt for pivot transfer to bed. Pt needed min A for sit->supine for LLE management. Additional pt education re: improtance of ROM/flexilibity @ knee and ankle> strength @ present time   End of Consult   Patient Position at End of Consult Supine; All needs within reach;Bed/Chair alarm activated   Pt requires PT /OT co-treat and co-eval due to required skilled interventions of at least 2 clinicians for care delivery, medical complexity, limited activity tolerance, and cognitive-behavioral impairments. PT and OT goals addressed separately.    (Please find full objective findings from PT assessment regarding body systems outlined above). The patient's -[facility] Basic Mobility Inpatient Short Form Raw Score is 11. A Raw score of less than or equal to 16 suggests the patient may benefit from discharge to post-acute rehabilitation services, which DOES coincide with CURRENT above PT recommendations. However please refer to therapist recommendation for discharge planning given other factors that may influence destination. Adapted from [employer] [facility] “6-Clicks” Basic Mobility and Daily Activity Scores With Discharge Destination. Physical Therapy, 2021;101:1-9. DOI: 10.1093/ptj/cihb167    Portions of the record may have been created with voice recognition software. Occasional wrong word or "sound a like" substitutions may have occurred due to the inherent limitations of voice recognition software.   Read the chart carefully and recognize, using context, where substitutions have occurred

## 2023-08-05 NOTE — PLAN OF CARE
Problem: Potential for Falls  Goal: Patient will remain free of falls  Description: INTERVENTIONS:  - Educate patient/family on patient safety including physical limitations  - Instruct patient to call for assistance with activity   - Consult OT/PT to assist with strengthening/mobility   - Keep Call bell within reach  - Keep bed low and locked with side rails adjusted as appropriate  - Keep care items and personal belongings within reach  - Initiate and maintain comfort rounds  - Make Fall Risk Sign visible to staff  - Offer Toileting every  Hours, in advance of need  - Initiate/Maintain alarm  - Obtain necessary fall risk management equipment:   - Apply yellow socks and bracelet for high fall risk patients  - Consider moving patient to room near nurses station  Outcome: Progressing     Problem: MOBILITY - ADULT  Goal: Maintain or return to baseline ADL function  Description: INTERVENTIONS:  -  Assess patient's ability to carry out ADLs; assess patient's baseline for ADL function and identify physical deficits which impact ability to perform ADLs (bathing, care of mouth/teeth, toileting, grooming, dressing, etc.)  - Assess/evaluate cause of self-care deficits   - Assess range of motion  - Assess patient's mobility; develop plan if impaired  - Assess patient's need for assistive devices and provide as appropriate  - Encourage maximum independence but intervene and supervise when necessary  - Involve family in performance of ADLs  - Assess for home care needs following discharge   - Consider OT consult to assist with ADL evaluation and planning for discharge  - Provide patient education as appropriate  Outcome: Progressing  Goal: Maintains/Returns to pre admission functional level  Description: INTERVENTIONS:  - Perform BMAT or MOVE assessment daily.   - Set and communicate daily mobility goal to care team and patient/family/caregiver.    - Collaborate with rehabilitation services on mobility goals if consulted  - Perform Range of Motion  times a day. - Reposition patient every  hours.   - Dangle patient  times a day  - Stand patient  times a day  - Ambulate patient  times a day  - Out of bed to chair  times a day   - Out of bed for meals times a day  - Out of bed for toileting  - Record patient progress and toleration of activity level   Outcome: Progressing     Problem: Prexisting or High Potential for Compromised Skin Integrity  Goal: Skin integrity is maintained or improved  Description: INTERVENTIONS:  - Identify patients at risk for skin breakdown  - Assess and monitor skin integrity  - Assess and monitor nutrition and hydration status  - Monitor labs   - Assess for incontinence   - Turn and reposition patient  - Assist with mobility/ambulation  - Relieve pressure over bony prominences  - Avoid friction and shearing  - Provide appropriate hygiene as needed including keeping skin clean and dry  - Evaluate need for skin moisturizer/barrier cream  - Collaborate with interdisciplinary team   - Patient/family teaching  - Consider wound care consult   Outcome: Progressing

## 2023-08-05 NOTE — PLAN OF CARE
Problem: OCCUPATIONAL THERAPY ADULT  Goal: Performs self-care activities at highest level of function for planned discharge setting. See evaluation for individualized goals. Description: Treatment Interventions: ADL retraining, Functional transfer training, Endurance training, UE strengthening/ROM, Patient/family training, Equipment evaluation/education, Compensatory technique education, Continued evaluation, Activityengagement, Energy conservation          See flowsheet documentation for full assessment, interventions and recommendations. Note: Limitation: Decreased ADL status, Decreased UE strength, Decreased endurance, Decreased self-care trans, Decreased high-level ADLs (impaired pain, sitting tolerance, balance, L LE ROM / strength, forward functional reach, standing tolerance, activity tolerance, insight into deficits.)     Assessment: Pt is a 61yo female [facility] as a trauma following a fall from home. Diagnosed w/ L tibia fracture and is s/p surgical fixation of the left tibial shaft fracture with an intramedullary nail w/ Dr. Zannie Hamman on 8/4/23. Pt reports I w/ ADLs at baseline home w/ spouse in 2 [address] [place] [address] w/ first floor set- up, 2 NATAL[address]. Personal and environmental factors supporting performance includes supportive spouse/ family, able to have first floor set- up, I at baseline; inhibiting includes increased pain, limited insight into deficits, difficulty completing IADLs, difficulty completing stairs. Upon eval, pt alert and able to follow directions w/ + time. Pt required S to complete bed mobility supine to sit. Min A sit <> stand, min A SPT to R, max A LBD, S UBD, mod I grooming. Pt completing ADLs below baseline level of I and would benefit from OT in acute care to max functional independence. Recommend post acute rehab when medically stable for discharge from acute care. Will continue to follow 3-5X / week.      OT Discharge Recommendation: Post acute rehabilitation services

## 2023-08-05 NOTE — ASSESSMENT & PLAN NOTE
- patient has a h/o chronic alcohol abuse  - recently admitted to [facility] Detox unit in July 2023  - place on CIWA protocol and obtain further alcohol history from patient post op

## 2023-08-05 NOTE — DISCHARGE INSTR - AVS FIRST PAGE
Discharge Instructions - Orthopedics  Aron Fontana 61 y.o. female MRN: [id number]  Unit/Bed#: W -01    Weight Bearing Status:                                           Weight bearing as tolerated Left Leg    DVT prophylaxis  Aspirin 81 mg BID x 4 weeks    Pain:  Continue analgesics as directed    Dressing Instructions:   Please keep clean, dry and intact until follow up     Appt Instructions: If you do not have your appointment, please call the clinic at [phone]  Otherwise follow up as scheduled. Contact the office sooner if you experience any increased numbness/tingling in the extremities.

## 2023-08-05 NOTE — ASSESSMENT & PLAN NOTE
- resolved s/p 1 L IVF bolus  - patient has a non-gap metabolic acidosis  - LA normal. Will obtain further history from patient regarding recent diarrhea, toxic ingestion, new medications, etc.   - Repeat BMP this afternoon at 3 pm  -Patient noted to have continual but consistent blood pressure readings throughout the night.   Consider repeat fluid bolusing if patient has continual decrease in blood pressure readings

## 2023-08-05 NOTE — PROGRESS NOTES
[facility]  Progress Note  Name: Pete Weinberg  MRN: [id number]  Unit/Bed#: W -01 I Date of Admission: 8/4/2023   Date of Service: 8/5/2023 I Hospital Day: 1    Assessment/Plan   Chronic alcohol abuse  Assessment & Plan  - patient has a h/o chronic alcohol abuse  - recently admitted to [address] [place] [address] Detox unit in July 2023  - place on CIWA protocol and obtain further alcohol history from patient post op    Epilepsy [facility])  Assessment & Plan  - resume home antiepileptic medications: Gabapentin, Vimpat and Zonegran  - last seizure reported to be 7/9/2023  - seizures may be related to alcohol withdrawal as well   - patient denies any seizure like activity prior to her fall today    Hypotension  Assessment & Plan  - resolved s/p 1 L IVF bolus  - patient has a non-gap metabolic acidosis  - LA normal. Will obtain further history from patient regarding recent diarrhea, toxic ingestion, new medications, etc.   - Repeat BMP this afternoon at 3 pm  -Patient noted to have continual but consistent blood pressure readings throughout the night.   Consider repeat fluid bolusing if patient has continual decrease in blood pressure readings    Hematoma  Assessment & Plan  - left anterior tibial hematoma in setting of closed left tibial fracture  - neurovascularly intact  - OR with orthopedics for repair of tibial fracture and possible fasciotomy    Fall  Assessment & Plan  - mechanical fall at home, with head strike  - sustained below stated injuries  - PT/OT evaluations  - CM for dispo planning    * Tibia fracture  Assessment & Plan  - closed, midshaft tibial fracture  - orthopedics consultation - OR for operative fixation later today  - NWB LLE  - pain control with multi modal regimen  - neurovascular checks  - DVT ppx with Lovenox once appropriate  - PT/OT   - Patient is a [employer] and refuses all blood products  -Repeat laboratory evaluation was notable for an improvement in anion gap, not acidotic on VBG, but decrease in hemoglobin from 11-9 as well as downtrending platelet count.   -Will initiate Venofer, B12, folate acid as well as repeat H&H later in the morning on 8/5/2023 to make sure that hemoglobin does not decrease any further     Disposition: Patient is status post operative fixation of left tibial fracture. Patient has been able to rest comfortably throughout the night. Follow-up orthopedic recommendations following postoperative fixation, PT OT eval as appropriate, pain control and case management disposition planning for patient since she may require rehabilitation stay. SUBJECTIVE:  Chief Complaint: "My leg hurts from time to time"    Subjective: Patient states that the pain in her leg has improved since she has been returned to her room following operative fixation. She states that there is still significant amount of pain along the shin as well as the leg whenever she attempts to move or adjust her leg in bed. She does state that the pain medications that are currently prescribed for her have been helpful. It is noted that the patient has been persistently low with blood pressures overnight with appropriate map goals. Patient had responded well to 1 L bolus administration earlier on 8/4/2023. Patient did not require additional fluid bolus overnight but will continue to trend hemodynamics to see if she could use another administration of fluid. Repeat BMP from yesterday did notice that there was an improvement in CO2, decreasing gap, and patient was not acidotic. There was concern yesterday for metabolic acidosis with non-anion gap. Calcium is noted to be 7.9 with an ionized calcium 1.07 and patient was administered 1 g of calcium gluconate. Hemoglobin repeat was noted to be present for decrease from previous hemoglobin of 11-9 as well as downtrending of platelets from 07-13.   We will proceed with administration of Venofer, Z54, and folic acid as the patient is a 3yy [employer] and was not able to receive blood products. Meds/Allergies   Medications Prior to Admission   Medication Sig Dispense Refill Last Dose   • desvenlafaxine (PRISTIQ) 100 mg 24 hr tablet Take 50 mg by mouth daily Do not start before August 5, 2023. • gabapentin (NEURONTIN) 300 mg capsule Take 300 mg by mouth 2 (two) times a day      • hydrOXYzine HCL (ATARAX) 25 mg tablet Take 25 mg by mouth 2 (two) times a day      • lacosamide (VIMPAT) 200 mg tablet Take 200 mg by mouth every 12 (twelve) hours      • metoprolol succinate (TOPROL-XL) 25 mg 24 hr tablet Take 25 mg by mouth daily Do not start before August 5, 2023.       • traZODone (DESYREL) 50 mg tablet Take 50 mg by mouth daily at bedtime      • zonisamide (ZONEGRAN) 100 mg capsule Take 200 mg by mouth daily at bedtime          Current Facility-Administered Medications:   •  aspirin (ECOTRIN LOW STRENGTH) EC tablet 81 mg, 81 mg, Oral, BID, Leo Alcaraz PA-C, 81 mg at 08/04/23 2039  •  ceFAZolin (ANCEF) IVPB (premix in dextrose) 2,000 mg 50 mL, 2,000 mg, Intravenous, Q8H, Leo Alcaraz PA-C, Last Rate: 100 mL/hr at 08/05/23 0110, 2,000 mg at 08/05/23 0110  •  cyanocobalamin (VITAMIN B-12) tablet 500 mcg, 500 mcg, Oral, Daily, Trinh Rose MD  •  desvenlafaxine succinate (PRISTIQ) 24 hr tablet 50 mg, 50 mg, Oral, Daily, Leo Alcaraz PA-C  •  enoxaparin (LOVENOX) subcutaneous injection 30 mg, 30 mg, Subcutaneous, Q12H [address], Leo Alcaraz PA-C, 30 mg at 23/71/14 7977  •  folic acid (FOLVITE) tablet 1 mg, 1 mg, Oral, Daily, Leo Alcaraz PA-C, 1 mg at 39/15/25 8371  •  folic acid (FOLVITE) tablet 1 mg, 1 mg, Oral, Daily, Trinh Rose MD  •  gabapentin (NEURONTIN) capsule 100 mg, 100 mg, Oral, HS, Leo Alcaraz PA-C, 100 mg at 08/04/23 2037  •  gabapentin (NEURONTIN) capsule 300 mg, 300 mg, Oral, BID, Leo Alcaraz PA-C  •  HYDROmorphone HCl (DILAUDID) injection 0.2 mg, 0.2 mg, Intravenous, Q2H PRN, Leo Alcaraz PA-C  •  hydrOXYzine HCL (ATARAX) tablet 25 mg, 25 mg, Oral, BID, Leo Alcaraz PA-C, 25 mg at 08/04/23 2039  •  iron sucrose (VENOFER) 300 mg in sodium chloride 0.9 % 250 mL IVPB, 300 mg, Intravenous, Daily, Meryl Beaver MD  •  lacosamide (VIMPAT) tablet 200 mg, 200 mg, Oral, Q12H [facility], Leo Alcaraz PA-C, 200 mg at 08/04/23 2039  •  methocarbamol (ROBAXIN) tablet 500 mg, 500 mg, Oral, Q6H [facility], Leo Alcaraz PA-C, 500 mg at 08/05/23 0505  •  metoprolol succinate (TOPROL-XL) 24 hr tablet 25 mg, 25 mg, Oral, Daily, Leo Alcaraz PA-C  •  multi-electrolyte (PLASMALYTE-A/ISOLYTE-S PH 7.4) IV solution, 100 mL/hr, Intravenous, Continuous, Abida Solis, Last Rate: 100 mL/hr at 08/04/23 2049, 100 mL/hr at 08/04/23 2049  •  multivitamin-minerals (CENTRUM) tablet 1 tablet, 1 tablet, Oral, Daily, Leo Alcaraz PA-C, 1 tablet at 08/04/23 1637  •  naloxone (NARCAN) 0.04 mg/mL syringe 0.04 mg, 0.04 mg, Intravenous, Q1MIN PRN, Leo Alcaraz PA-C  •  ondansetron [facility]) injection 4 mg, 4 mg, Intravenous, Q4H PRN, Leo Alcaraz PA-C  •  oxyCODONE (ROXICODONE) IR tablet 5 mg, 5 mg, Oral, Q4H PRN, Leo Alcaraz PA-C, 5 mg at 08/04/23 2053  •  oxyCODONE (ROXICODONE) split tablet 2.5 mg, 2.5 mg, Oral, Q4H PRN, Auburn Lissa, PA-C  •  polyethylene glycol (MIRALAX) packet 17 g, 17 g, Oral, Daily, Leo Alcaraz PA-C  •  senna-docusate sodium (SENOKOT S) 8.6-50 mg per tablet 1 tablet, 1 tablet, Oral, HS, Leo Alcaraz PA-C, 1 tablet at 08/04/23 2038  •  thiamine tablet 100 mg, 100 mg, Oral, Daily, Leo Alcaraz PA-C  •  traZODone (DESYREL) tablet 50 mg, 50 mg, Oral, HS, Leo Alcaraz PA-C, 50 mg at 08/04/23 2038  •  zonisamide (ZONEGRAN) capsule 200 mg, 200 mg, Oral, HS, Leoncio Dry Jerod García PA-C, 200 mg at 23    OBJECTIVE:     Vitals: Blood pressure 90/52, pulse 66, temperature 97.7 °F (36.5 °C), resp. rate 17, height 5' 6" (1.676 m), weight 66 kg (145 lb 8.1 oz), SpO2 98 %. Body mass index is 23.48 kg/m². SpO2: SpO2: 98 %, SpO2 Activity: SpO2 Activity: At Rest, SpO2 Device: O2 Device: Nasal cannula, Capnography:    Temp (24hrs), Av.1 °F (36.7 °C), Min:97.2 °F (36.2 °C), Max:100 °F (37.8 °C)  Current: Temperature: 97.7 °F (36.5 °C)    ABG: No results found for: "PHART", "VYP0XTJ", "PO2ART", "QQS0EIC", "L9LGLKJP", "BEART", "SOURCE"      Intake/Output Summary (Last 24 hours) at 2023 0601  Last data filed at 2023  Gross per 24 hour   Intake 1400 ml   Output 600 ml   Net 800 ml       Invasive Devices     Peripheral Intravenous Line  Duration           Peripheral IV 23 Right;Ventral (anterior) Forearm <1 day                          Nutrition/GI Proph/Bowel Reg: Senokot, MiraLAX, Colace,  Physical Exam:     GENERAL APPEARANCE: Patient in no acute distress. HEENT: NCAT; PERRL, EOMs intact; Mucous membranes moist  NECK / BACK: No tenderness  CV: Regular rate and rhythm; + S1, S2; no murmur/gallops/rubs appreciated. CHEST / LUNGS: Clear to auscultation; no wheezes/rales/rhonci. ABD: NABS; soft; non-distended; non-tender. EXT: +2 pulses bilaterally upper & lower extremities; no clubbing/cyanosis/edema. NEURO: GCS 15; no focal neurologic deficits; neurovascularly intact. SKIN: Warm, dry and well perfused; no rash; no jaundice.     Lab Results:   Results: I have personally reviewed all pertinent laboratory/tests results, BMP/CMP:   Lab Results   Component Value Date    SODIUM 137 2023    K 4.2 2023     2023    CO2 24 2023    CO2 18 (L) 2023    BUN 11 2023    CREATININE 0.62 2023    GLUCOSE 110 2023    CALCIUM 7.9 (L) 2023    AST 29 2023    ALT 24 2023    ALKPHOS 158 (H) 2023 EGFR 96 08/05/2023   , CBC:   Lab Results   Component Value Date    WBC 3.98 (L) 08/05/2023    HGB 9.0 (L) 08/05/2023    HCT 27.4 (L) 08/05/2023    MCV 95 08/05/2023    PLT 47 (LL) 08/05/2023    RBC 2.88 (L) 08/05/2023    MCH 31.3 08/05/2023    MCHC 32.8 08/05/2023    RDW 13.2 08/05/2023    MPV 10.4 08/05/2023    NRBC 0 08/05/2023   , Coagulation:   Lab Results   Component Value Date    INR 1.10 08/04/2023   , Lactate: No results found for: "LACTATE", Amylase: No results found for: "AMYLASE", Lipase: No results found for: "LIPASE", AST:   Lab Results   Component Value Date    AST 29 08/04/2023   , ALT:   Lab Results   Component Value Date    ALT 24 08/04/2023   , Urinalysis:   Lab Results   Component Value Date    COLORU Light Yellow 08/04/2023    CLARITYU Clear 08/04/2023    SPECGRAV 1.028 08/04/2023    PHUR 6.0 08/04/2023    LEUKOCYTESUR Negative 08/04/2023    NITRITE Negative 08/04/2023    GLUCOSEU Negative 08/04/2023    KETONESU Negative 08/04/2023    BILIRUBINUR Negative 08/04/2023    BLOODU Negative 08/04/2023   , CK:   Lab Results   Component Value Date    CKTOTAL 100 08/04/2023   , Troponin: No results found for: "TROPONINI", EtOH: No results found for: "ETOH", UDS: No components found for: "RAPIDDRUGSCREEN", Pregnancy: No results found for: "PREGTESTUR", ABG: No results found for: "PHART", "EVK9JZP", "PO2ART", "LPL2MOW", "C2TIQNRC", "BEART", "SOURCE" and ISTAT: No components found for: "VBG"  Imaging/EKG Studies: N/A  Other Studies: No new studies to review at this time  VTE Prophylaxis: Lovenox    Rod Lashawn Briseno MD  8/5/2023

## 2023-08-05 NOTE — QUICK NOTE
Trauma Surgery   Post-Op Check Progress Note   Nando Rooney 61 y.o. female MRN: [id number]  Unit/Bed#: W -[place] Encounter: [id number]    Assessment and Plan:    63-year-old female with mid-shaft tibial fracture status post surgical fixation of the left tibial shaft fracture with an intramedullary nail.   - P.R.N. Analgesia. - Encouraged incentive spirometry use. Subjective/Objective     Subjective: Patient is still doing well and offers no acute complaints at this time. Leg does not appear tense and bandages are dry, clean, and intact at this time. She endorses that the pain is improved post surgery and was resting soundly before I aroused her for my exam.    Objective:     Blood pressure 95/53, pulse 65, temperature 97.7 °F (36.5 °C), resp. rate 17, height 5' 6" (1.676 m), weight 66 kg (145 lb 8.1 oz), SpO2 97 %. ,Body mass index is 23.48 kg/m². Intake/Output Summary (Last 24 hours) at 8/5/2023 0028  Last data filed at 8/4/2023 2047  Gross per 24 hour   Intake 1400 ml   Output 600 ml   Net 800 ml       Invasive Devices     Peripheral Intravenous Line  Duration           Peripheral IV 08/04/23 Right;Ventral (anterior) Forearm <1 day                Physical Exam:    GENERAL APPEARANCE: Patient in no acute distress. HEENT: NCAT; PERRL, EOMs intact; Mucous membranes moist  CV: Regular rate and rhythm; + S1, S2; no murmur/gallops/rubs appreciated. LUNGS: Clear to auscultation; no wheezes/rales/rhonci. ABD: NABS; soft; non-distended; non-tender. EXT: +2 pulses bilaterally upper & lower extremities; no clubbing/cyanosis/edema. NEURO: GCS 15; no focal neurologic deficits; neurovascularly intact. SKIN: Warm, dry and well perfused; no rash; no jaundice.                 Slime Sands MD  8/5/2023

## 2023-08-05 NOTE — OCCUPATIONAL THERAPY NOTE
Occupational Therapy Evaluation     Patient Name: Aron Fontana  [id number] Date: 8/5/2023  Problem List  Principal Problem:    Tibia fracture  Active Problems:    Fall    Hematoma    Hypotension    Epilepsy ([address])    Chronic alcohol abuse    Past Medical History  Past Medical History:   Diagnosis Date    Chronic alcohol abuse     Epilepsy ([address])     Hepatitis     Pancreatitis      Past Surgical History  Past Surgical History:   Procedure Laterality Date    BONE MARROW BIOPSY      2019, 2021    TUBAL LIGATION           08/05/23 1439   OT Last Visit   OT Visit Date 08/05/23   Note Type   Note type Evaluation  (and tx [id number])   Pain Assessment   Pain Assessment Tool 0-10   Pain Score 10 - Worst Possible Pain   Pain Location/Orientation Orientation: Left; Location: Leg   Effect of Pain on Daily Activities limits I w/ LBD and activity tolerance during ADLs   Patient's Stated Pain Goal No pain   Hospital Pain Intervention(s) Repositioned;Cold applied; Ambulation/increased activity; Emotional support  (Michelle MELARA medicated prior to eval)   Restrictions/Precautions   Weight Bearing Precautions Per Order Yes   LLE Weight Bearing Per Order WBAT  (surgical fixation of the left tibial shaft fracture with an intramedullary nail w/ Dr. Rajat Bay on 8/4/23)   Braces or Orthoses   (ace bandage L LE)   Other Precautions Chair Alarm; Bed Alarm;WBS;Multiple lines; Fall Risk;Pain  (Rd, IV)   Home Living   Type of [address] [place] [address] Dr Two level;1/2 bath on main level;Bed/bath upstairs; Other (Comment); Ramped entrance  (2 NATALYA; has ramp that her  can put back up if needs)   Bathroom Shower/Tub Tub/shower unit   Bathroom Toilet Standard   Bathroom Accessibility Accessible   Home Equipment Walker;Cane;Other (Comment)  (rollator, cane)   Additional Comments Pt reports living w/  and their Myrtice Carbon in 2 [address] [facility] w/ 1/2 bath on main level.  Pt reports first floor set-up   Prior Function   Level of [place] Independent with ADLs; Independent with functional mobility; Needs assistance with IADLS;Other (Comment)  (- drive)   Lives With Spouse   Receives Help From Family   IADLs Independent with meal prep; Independent with medication management; Family/Friend/Other provides transportation   Falls in the last 6 months 1 to 4   Vocational Retired   Comments Pt reports I w/ ADLs at baseline. Pt does not drive   Lifestyle   Autonomy Pt reports I w/ ADLs at baseline   Reciprocal Relationships Pt reports supportive    Service to Others Pt reports retired and worked for [employer] Pt reports enjoying their dog, her grandchildren, and spending time w/ her    General   Additional Pertinent History surgical fixation of the left tibial shaft fracture with an intramedullary nail w/ Dr. Tony Florence on 8/4/23 and is WBAT   Family/Caregiver Present No   Additional General Comments Significant PMH impacting her occupational performance includes ETOH abuse / use (recent DC from rehab), seizure, T12, L2, L4 compression fracture hx, TBI, R clavicle fracture, B pubic rami, rib fracture hx, intubated during previous admission. Subjective   Subjective "I can take a pretty good cat bath"   ADL   Where Assessed Edge of bed  (vs OOB In bedside recliner chair)   Eating Assistance 6  Modified independent   Eating Deficit Setup   Grooming Assistance 6  Modified Independent  (seated after set- up w/ + time due to decreased standing royal)   Grooming Deficit Setup; Increased time to complete   UB Bathing Assistance Unable to assess  (anticipate S after set- up seated w/ + time based on fxal obs skills, clinical judgement)   LB Bathing Assistance Unable to assess  (anticipate mod A based on fxal obs skills, clinical judgement)   UB Dressing Assistance 5  Supervision/Setup   UB Dressing Deficit Setup;Supervision/safety; Increased time to complete;Verbal cueing   LB Dressing Assistance 2  Maximal Assistance   LB Dressing Deficit Don/doff R sock; Don/doff L sock; Thread RLE into pants; Thread LLE into pants; Thread RLE into underwear; Thread LLE into underwear;Setup;Steadying; Requires assistive device for steadying;Supervision/safety; Increased time to complete;Verbal cueing  (educated on tech to thread L LE first)   [address]  3  Moderate Assistance   Toileting Deficit Setup; Bedside commode;Verbal cueing;Supervison/safety; Increased time to complete;Clothing management up;Clothing management down;Steadying   Additional Comments Recommend use of commode w/ RN staff assist for toileting   Bed Mobility   Supine to Sit 5  Supervision   Additional items Assist x 1;Bedrails; Increased time required  (to pt's R)   Sit to Supine Unable to assess   Additional Comments Pt seated OOB In chair post eval w/ needs met, call bell in reach   Transfers   Sit to Stand 4  Minimal assistance   Additional items Assist x 1; Increased time required;Verbal cues; Bedrails;Armrests  (instruction for hand placement)   Stand to Sit 4  Minimal assistance   Additional items Assist x 1; Increased time required;Verbal cues; Bedrails;Armrests   Stand pivot 4  Minimal assistance  (min A SPT from EOB to bedside recliner chair to R)   Additional items Assist x 1; Increased time required;Verbal cues; Bedrails;Armrests   Additional Comments Pt required min A X2 using RW to maintain stance to participate in pre-gait tasks   Functional Mobility   Additional Comments Will continue to assess using RW. Pt declined due to pain   Additional items Rolling walker   Balance   Static Sitting Fair +   Static Standing Poor +   Ambulatory   (NT)   Activity Tolerance   Activity Tolerance Patient limited by fatigue;Patient limited by pain   Medical Staff Made Aware care coordination w/ PTJocelynn due to decresaed activity tolerance, regression from baseline and skilled physical assistance required.    Nurse Made Aware Spoke w/ RN, Michelle MARTIN Assessment   RUE Assessment [facility]   VERONICA Strength   RUE Overall Strength Within Functional Limits - able to perform ADL tasks with strength   LUE Assessment   LUE Assessment WFL   LUE Strength   LUE Overall Strength Within Functional Limits - able to perform ADL tasks with strength   Hand Function   Gross Motor Coordination Functional   Fine Motor Coordination Impaired   Hand Function Comments observed tremors   Sensation   Light Touch Not tested  (responded to light touch B UE)   Cognition   Overall Cognitive Status Impaired  (self - reported memory / recall deficits)   Arousal/Participation Alert; Cooperative   Attention Attends with cues to redirect   Orientation Level Oriented to person;Oriented to place;Oriented to situation   Memory Decreased recall of recent events;Decreased short term memory; Other (Comment)  (+ time, cued recall)   Following Commands Follows one step commands with increased time or repetition   Comments Identified pt by full name and birthdate. Alert, generally oriented and agreeable to participate w/ encouragement / education. Able to provide home set- up and follow directions w/ + time. Pt reports decreased memory / recall. Assessment   Limitation Decreased ADL status; Decreased UE strength;Decreased endurance;Decreased self-care trans;Decreased high-level ADLs  (impaired pain, sitting tolerance, balance, L LE ROM / strength, forward functional reach, standing tolerance, activity tolerance, insight into deficits.)   Assessment Pt is a 61yo female [facility] as a trauma following a fall from home. Diagnosed w/ L tibia fracture and is s/p surgical fixation of the left tibial shaft fracture with an intramedullary nail w/ Dr. Ana Castro on 8/4/23. Pt reports I w/ ADLs at baseline home w/ spouse in [facility] w/ first floor set- up, 2 NATALYA.  Personal and environmental factors supporting performance includes supportive spouse/ family, able to have first floor set- up, I at baseline; inhibiting includes increased pain, limited insight into deficits, difficulty completing IADLs, difficulty completing stairs. Upon eval, pt alert and able to follow directions w/ + time. Pt required S to complete bed mobility supine to sit. Min A sit <> stand, min A SPT to R, max A LBD, S UBD, mod I grooming. Pt completing ADLs below baseline level of I and would benefit from OT in acute care to max functional independence. Recommend post acute rehab when medically stable for discharge from acute care. Will continue to follow 3-5X / week. Goals   Patient Goals Pt stated that she would like to return home and order some food / groceries from [employer] that does not require cooking   Plan   Treatment Interventions ADL retraining;Functional transfer training; Endurance training;UE strengthening/ROM; Patient/family training;Equipment evaluation/education; Compensatory technique education;Continued evaluation; Activityengagement; Energy conservation   Goal Expiration Date 08/15/23   OT Frequency 3-5x/wk   Recommendation   OT Discharge Recommendation Post acute rehabilitation services   AM-PAC Daily Activity Inpatient   Lower Body Dressing 2   Bathing 2   Toileting 3   Upper Body Dressing 3   Grooming 4   Eating 4   Daily Activity Raw Score 18   Daily Activity Standardized Score (Calc for Raw Score >=11) 38.66   AM-PAC Applied Cognition Inpatient   Following a Speech/Presentation 3   Understanding Ordinary Conversation 4   Taking Medications 3   Remembering Where Things Are Placed or Put Away 4   Remembering List of 4-5 Errands 2   Taking Care of Complicated Tasks 2   Applied Cognition Raw Score 18   Applied Cognition Standardized Score 38.07   Barthel Index   Feeding 10   Bathing 0   Grooming Score 0   Dressing Score 5   Bladder Score 5   Bowels Score 10   Toilet Use Score 5   Transfers (Bed/Chair) Score 10   Mobility (Level Surface) Score 0   Stairs Score 0   Barthel Index Score 45   Modified Norman Scale   Modified Norman Scale 4   Additional Treatment Session   Start Time 1429   End Time 1439   Treatment Assessment Pt seen for skilled OT tx session day 1 following eval. Pt seated OOB in bedside recliner chair. Pt requested to return to bed despite education / encouragement. Pt required min HHA x2 to complete SPT from bedside recliner chair to EOB. Therapist educated pt on use of commode w/ RN staff assist for toileting. Pt completed bed mobility sit to supine w/ min A for LE mgmt. Therapist educated pt on hooking R LE under L as needed for support. Pt supine HOB elevated at end of session w/ PTDeisi. Continue to recommend post acute rehab when medically stable for discharge from acute care. Will continue to follow   End of Consult   Patient Position at End of Consult Supine;Bed/Chair alarm activated; All needs within reach   Nurse Communication Nurse aware of consult     Pt goals to be met by 8/15/23 to max I w/ ADLs and improve engagement to return home and order food from [employer] includes:    -Pt will complete bed mobility supine <> sit w/ mod I in preparation for ADLs    -Pt will complete functional transfers to bed, chair, and toilet using LRAD, DME as needed w/ S to max I w/ ADLs    -Pt will consistently engage in functional mobility using LRAD w/ S to / from bathroom to max I w/ ADLs    -Pt will demonstrate improved activity and sitting tolerance OOB In chair for all meals    -Pt will complete LBD w/ S after set- up using Shelah Barrack as needed    -Pt will complete UBD w/ mod I    -Pt will demonstrate improved functional standing tolerance for at least 5 minutes using LRAD w/ at least fair balance while engaged in grooming tasks standing at sink to max I w/ light IADLs    -Pt will demonstrate good attention and participation in ongoing eval of functional cognition to assist in DC Planning    -Pt will consistently follow multi step directions during ADLs w/ good recall to improve engagement. The patient's raw score on the AM-PAC Daily Activity Inpatient Short Form is 18.  A raw score of less than 19 suggests the patient may benefit from discharge to post-acute rehabilitation services. Please refer to the recommendation of the Occupational Therapist for safe discharge planning.     Eliot Miller OTR/L  [id number]  [id number]

## 2023-08-05 NOTE — CASE MANAGEMENT
Case Management Assessment & Discharge Planning Note    Patient name Teryl Font  Location W /W -43 MRN [id number]  : 1960 Date 2023       Current Admission Date: 2023  Current Admission [address] fracture   Patient Active Problem List    Diagnosis Date Noted   • Tibia fracture 2023   • Fall 2023   • Hematoma 2023   • Hypotension 2023   • Epilepsy ([address]) 2023   • Chronic alcohol abuse 2023      LOS (days): 1  Geometric Mean LOS (GMLOS) (days):   Days to GMLOS:     OBJECTIVE:    Risk of Unplanned Readmission Score: 14.48         Current admission status: Inpatient  Referral Reason: Drug/Alcohol Abuse    Preferred Pharmacy: No Pharmacies Listed  Primary Care Provider: Lanette Ness MD    Primary Insurance: Blood cell Storage  [employer] ([facility] HEALTHCARE  Secondary Insurance:     ASSESSMENT:   [facility], [address] [place] [address] Representative - Spouse   Primary Phone: [phone] (Mobile)  Home Phone: [phone]                         Readmission Root Cause  30 Day Readmission: No    Patient Information  Admitted from[de-identified] Home  Mental Status: Alert  During Assessment patient was accompanied by: Not accompanied during assessment  Assessment information provided by[de-identified] Patient  Primary Caregiver: Self  Support Systems: Son, Spouse/significant other, Friend  What city do you live in?: [place] ([place]), [address] [place] [address] entry access options.  Select all that apply.: Stairs  Number of steps to enter home.:  (3-4)  Do the steps have railings?: Yes  Type of Current Residence: 2 story home  Upon entering residence, is there a bedroom on the main floor (no further steps)?: No  A bedroom is located on the following floor levels of residence (select all that apply):: 2nd Floor  Upon entering residence, is there a bathroom on the main floor (no further steps)?: Yes (1/2 bath)  Number of steps to 2nd floor from main floor: One Flight  In the last 12 months, was there a time when you were not able to pay the mortgage or rent on time?: No  In the last 12 months, was there a time when you did not have a steady place to sleep or slept in a shelter (including now)?: No  Homeless/housing insecurity resource given?: N/A  Living Arrangements: Lives w/ Spouse/significant other  Is patient a ?: No    Activities of Daily Living Prior to Admission  Functional Status: Independent  Completes ADLs independently?: Yes  Ambulates independently?: Yes  Does patient use assisted devices?: Yes  Assisted Devices (DME) used: Straight Cane  Does patient currently own DME?: Yes  What DME does the patient currently own?: Straight Cane  Does patient have a history of Outpatient Therapy (PT/OT)?: Yes  Does the patient have a history of Short-Term Rehab?: No  Does patient have a history of HHC?: No  Does patient currently have [address]?: No         Patient Information Continued  Income Source: Pension/senior living  Does patient have prescription coverage?: Yes  Within the past 12 months, you worried that your food would run out before you got the money to buy more.: Never true  Within the past 12 months, the food you bought just didn't last and you didn't have money to get more.: Never true  Food insecurity resource given?: N/A  Does patient receive dialysis treatments?: No  Does patient have a history of substance abuse?: Yes  Historical substance use preference: Alcohol/ETOH  Is patient currently in treatment for substance abuse?: No. Patient declined treatment information. Does patient have a history of Mental Health Diagnosis?: Yes  Is patient receiving treatment for mental health?: No. Patient declined treatment information.   Has patient received inpatient treatment related to mental health in the last 2 years?: Yes (Voluntary IP psychiatric placement approximately 1 year ago at [address] of Transport to Appts[de-identified] Family transport  In the past 12 months, has lack of transportation kept you from medical appointments or from getting medications?: No  In the past 12 months, has lack of transportation kept you from meetings, work, or from getting things needed for daily living?: No  Was application for public transport provided?: N/A        DISCHARGE DETAILS:    Discharge planning discussed with[de-identified] Patient at bedside  Freedom of Choice: Yes     CM contacted family/caregiver?: No- see comments (declined)  Were Treatment Team discharge recommendations reviewed with patient/caregiver?: Yes  Did patient/caregiver verbalize understanding of patient care needs?: Yes  Were patient/caregiver advised of the risks associated with not following Treatment Team discharge recommendations?: Yes    Contacts  Patient Contacts: Spouse, Sudeep Dangelo  Contact Method: Phone  Phone Number: [phone]  Reason/Outcome: Discharge [address]         Is the patient interested in [address] [facility] at discharge?: No    DME Referral Provided  Referral made for DME?: No    Other Referral/Resources/Interventions Provided:  Interventions: HHC, SNF                                                      Additional Comments: CM spoke with pt at bedside to introduce role of CM and begin discharge planning. Pt admitted for a tibia fracture following a fall. Pt reports residing with  her spouse (of 27 years) in a 2L home with approximately 4 NATALYA. Pt's bedroom is on the 2nd floor, however a FFSU is available as there is a 1/2 bath on that level. Pt reports ambulating without AD at baseline (uses SPC as needed), does not own a RW. Pt declined hx of STR and C sx, however chart review indicates placement at [id number] [facility] approximately 2 years ago. CM inquired about D&SA history as CM was consulted for ETOH resources -- Pt confirmed she is a recovering alcoholic and has been sober for approximately 1 month.  Last drink was "the beginning of last month." She intends to remain sober and declined add'l resources at this time. Pt states her  and friends are very supportive of her sobriety. CM reviewed the likelihood of STR vs. HHC recommendations following her fall -- Pt states she would prefer HHC (PT/OT/SN) as opposed to STR as she would prefer not be away from her spouse. Spouse works during the day but typically arrives home around 3:30pm. Son also works nearby and can visit throughout the day. Pt agreeable to re-discussing rehab placement once PT/OT evaluations are available. CM will continue to follow.

## 2023-08-05 NOTE — UTILIZATION REVIEW
Initial Clinical Review    Admission: Date/Time/Statement:   Admission Orders (From admission, onward)     Ordered        08/04/23 1216  Inpatient Admission  Once                      Orders Placed This Encounter   Procedures   • Inpatient Admission     Standing Status:   Standing     Number of Occurrences:   1     Order Specific Question:   Level of Care     Answer:   Med Surg [16]     Order Specific Question:   Bed Type     Answer:   Trauma [7]     Order Specific Question:   Estimated length of stay     Answer:   More than 2 Midnights     Order Specific Question:   Certification     Answer:   I certify that inpatient services are medically necessary for this patient for a duration of greater than two midnights. See H&P and MD Progress Notes for additional information about the patient's course of treatment. ED Arrival Information     Expected   -    Arrival   8/4/2023 11:06    Acuity   Emergent            Means of arrival   Ambulance    Escorted by   [facility]    Service   Trauma    Admission type   [facility] complaint   -           Chief Complaint   Patient presents with   • Trauma       Initial Presentation: 61 y.o. female with hx osteopenia, seizures, recent admission for alcohol detox for alcohol withdrawal after seizure , pt is Orthodoxy- refuses all blood products , hepatitis, pancreatitiswho presents to ED via EMS with L leg pain  s/p mechanical fall at home . + head strike, denies LOC. Not on AC/AP . En route, pt hypotensive  80-/50 and given IVF . On exam, has large contusion over her left leg which was concerning for bleeding and she had pain in that area . GCS 15 . LLE hematoma is firm and minimally compressible. Pulses distally are 2+ and sensation and motor function are intact in the LLE throughout Labs-hgb 11, plt 56, Mag 1.8 . non-gap metabolic acidosis   XR shows Mid tibial shaft and distal fibular fracture.  Pt admitted as Inpatient by trauma service with L tibia fracture , L leg hematoma s/p fall. Hypotension . PLan - ortho consult, PT/OT , neurovasc checks , NWB LLE, pain control. DVT ppx . CIWA monitoring. Manjinder Mitchell BMP @1500    Ortho consult- left minimally displaced midshaft tibia fracture. She denied any numbness or paresthesias. She did complain of some bilateral foot pain which has been chronic in nature. Subacute fractures were noted on the radiographs of these extremities. On exam, pt in long leg splint. brisk capillary refill in all 5 digits. She is able to actively extend and flex the toes. She has compartments which are soft compressible. Tenderness over the midshaft of the tibia. patient elected to proceed with surgical fixation to allow for immediate weightbearing and rehabilitation    8/4/23 @ 1759  Surgical fixation of the left tibial shaft fracture with an intramedullary nail  Anesthesia- general w/ ETT  Operative findings:  Patient had a comminuted distal one third tibial shaft fracture as well as a distal one third fibular shaft fracture. There was no instability of the ankle. Using closed reduction maneuvers the fracture was able to be reduced and a tibial nail was able to be implanted with fixation proximally and distally. Once fixated the ankle was stressed and found to be stable with rotation. Date:8/5  POD #1 Day 2:  Pt with low  BP's thru the night . Consider repeat fluid bolusing if patient has continual decrease in blood pressure readings . Labs-improvement in anion gap, not acidotic on VBG, but decrease in hemoglobin from 11-9 as well as downtrending platelet count. Started Venofer, B12, folate acid. Repeat H&H later in the morning . Pt feels pain improved from yesterday, still significant amount of pain along the shin as well as the leg whenever she attempts to move or adjust her leg in bed . Plan - WBAT LLE, ice and elevate LLE, pain control, OOB PT/OT .      ED Triage Vitals   Temperature Pulse Respirations Blood Pressure SpO2   08/04/23 1115 08/04/23 1114 08/04/23 1114 08/04/23 1114 08/04/23 1114   98.2 °F (36.8 °C) 64 18 138/57 93 %      Temp Source Heart Rate Source Patient Position - Orthostatic VS BP Location FiO2 (%)   08/04/23 1115 08/04/23 1653 -- -- --   Oral Monitor         Pain Score       08/04/23 1400       2          Wt Readings from Last 1 Encounters:   08/04/23 66 kg (145 lb 8.1 oz)     Additional Vital Signs:   Date/Time Temp Pulse Resp BP MAP (mmHg) SpO2 Calculated FIO2 (%) - Nasal Cannula Nasal Cannula O2 Flow Rate (L/min)   08/05/23 11:35:36 97.9 °F (36.6 °C) -- 16 109/58 75 -- -- --   08/05/23 08:32:08 97.9 °F (36.6 °C) 70 16 95/54 68 97 % -- --   08/05/23 0400 -- 66 -- 99/53 -- -- -- --   08/05/23 01:40:27 -- 65 -- 90/52 65 98 % -- --   08/04/23 2245 -- 65 -- 95/53 67 97 % -- --   08/04/23 2230 -- 63 -- 95/52 66 97 % -- --   08/04/23 2210 -- 68 -- 98/54 69 97 % -- --   08/04/23 2200 -- 70 -- -- -- 97 % -- --   08/04/23 2150 -- 68 -- 99/54 69 96 % -- --   08/04/23 2140 -- 64 -- 100/54 69 97 % -- --   08/04/23 2130 -- 67 -- -- -- 98 % -- --   08/04/23 2120 -- 71 -- 119/96 104 96 % -- --   08/04/23 2110 -- 71 -- 96/56 69 97 % -- --   08/04/23 2030 -- 77 -- 109/58 75 97 % 28 2 L/min   08/04/23 2015 -- 70 -- 125/60 82 98 % -- --   08/04/23 20:06:48 97.7 °F (36.5 °C) 64 17 125/60 82 96 % -- --   08/04/23 1945 -- 68 20 137/66 95 98 % 28 2 L/min   08/04/23 1931 -- 74 18 129/67 92 96 % 28 2 L/min   08/04/23 1930 -- 74 20 129/67 92 98 % 28 2 L/min   08/04/23 1920 97.2 °F (36.2 °C) Abnormal  76 20 118/56 79 98 % 28 2 L/min   08/04/23 1653 100 °F (37.8 °C) 68 18 119/58 -- 98 % -- --   08/04/23 1400 97.8 °F (36.6 °C) 73 17 120/64 83 94 % -- --   08/04/23 13:54:42 97.8 °F (36.6 °C) -- 17 120/64 83 -- -- --   08/04/23 1200 -- 73 20 123/65 88 94 % -- --   08/04/23 1145 -- 76 16 148/65 94 92 % -- --   08/04/23 1130 -- 72 16 137/63 90 94 % -- --       Date and Time R Radial Pulse L Radial Pulse R Pedal Pulse L Pedal Pulse L Posterior Tibial Pulse   08/05/23 0015 -- -- -- +2 --   08/04/23 2030 +2 +2 +2 +2 --   08/04/23 1920 -- -- -- +2 +1   08/04/23 1400 +2 +2 +2 +2 --   08/04/23 1111 +2 +2 +2 +2 --   Trauma Secondary Assessment - [place] Coma Scale    Date and Time Eye Opening Best Verbal Response Best Motor Response [place] Coma Scale Score   08/05/23 0015 4 5 6 15   08/04/23 2030 4 5 6 15   08/04/23 1400 4 5 6 15   08/04/23 1200 4 5 6 15   08/04/23 1145 4 5 6 15   08/04/23 1130 4 5 6 15   08/04/23 1112 4 5 -- --       CIWA 8/4=0   CIWA 8/5 = 0      Pertinent Labs/Diagnostic Test Results:     XR foot 3+ vw right   Final Result by Indu Gonzalez MD (08/04 1419)      Fractures of the first and fifth proximal phalanges as well as the dorsum of the tarsal navicular. Workstation performed: [id number]         XR knee 4+ vw right injury   Final Result by Indu Gonzalez MD (08/04 1413)      No acute osseous abnormality. Workstation performed: [id number]         XR femur 2 vw left   Final Result by Ruba Garcia MD (08/04 1253)      No acute osseous abnormality. Workstation performed: [id number]         XR foot 3+ vw left   Final Result by Ruba Garcia MD (08/04 1424)      Fourth metatarsal neck fracture. Workstation performed: [id number]         XR tibia fibula 2 vw left   Final Result by Ruba Garcia MD (08/04 1253)      Mid tibial shaft and distal fibular fracture. Gross alignment maintained. Workstation performed: [id number]         CT abdomen pelvis w contrast   Final Result by Eloise Garcia MD (08/04 1148)      No acute traumatic visceral injury in the abdomen or pelvis. No acute fracture. Chronic pancreatitis with pancreatic parenchymal atrophy and extensive pancreatic parenchymal calcifications noted.  There is increasing enlargement of the main pancreatic duct probably due to some combination of pancreatic parenchymal atrophy and stones in the pancreatic duct. Workstation performed: [id number]         TRAUMA - CT head wo contrast   Final Result by Pankaj Saldivar MD (08/04 1136)      No acute intracranial abnormality. Workstation performed: [id number]         TRAUMA - CT spine cervical wo contrast   Final Result by Pankaj Saldivar MD (08/04 1139)      No cervical spine fracture or traumatic malalignment. Workstation performed: [id number]         XR trauma multiple   Final Result by Reagan Puga MD (08/04 1402)      No acute cardiopulmonary disease within limitations of supine imaging. Left tibial and fibular fractures.          Workstation performed: [id number]         XR chest 1 view    (Results Pending)   XR tibia fibula 2 vw left    (Results Pending)   XR tibia fibula 2 vw left    (Results Pending)         Results from last 7 days   Lab Units 08/05/23  0338 08/04/23  1122 08/04/23  1120   WBC Thousand/uL 3.98* 6.35  --    HEMOGLOBIN g/dL 9.0* 11.0*  --    I STAT HEMOGLOBIN g/dl  --   --  9.9*   HEMATOCRIT % 27.4* 34.8  --    HEMATOCRIT, ISTAT %  --   --  29*   PLATELETS Thousands/uL 47* 56*  --    NEUTROS ABS Thousands/µL 3.25 5.07  --          Results from last 7 days   Lab Units 08/05/23  0338 08/04/23  1449 08/04/23  1122 08/04/23  1120   SODIUM mmol/L 137 137 133*  --    POTASSIUM mmol/L 4.2 4.3 4.3  --    CHLORIDE mmol/L 108 100 106  --    CO2 mmol/L 24 15* 19*  --    CO2, I-STAT mmol/L  --   --   --  18*   ANION GAP mmol/L 5 22 8  --    BUN mg/dL 11 8 14  --    CREATININE mg/dL 0.62 0.28* 0.70  --    EGFR ml/min/1.73sq m 96 125 92  --    CALCIUM mg/dL 7.9* 4.8* 8.2*  --    CALCIUM, IONIZED mmol/L 1.07*  --   --   --    CALCIUM, IONIZED, ISTAT mmol/L  --   --   --  0.98*   MAGNESIUM mg/dL 2.0  --  1.8*  --    PHOSPHORUS mg/dL 2.9  --  3.3  --      Results from last 7 days   Lab Units 08/05/23  0338 08/04/23  1122   AST U/L 19 29   ALT U/L 19 24   ALK PHOS U/L 124* 158*   TOTAL PROTEIN g/dL 5.5* 6.1*   ALBUMIN g/dL 3.1* 3.3*   TOTAL BILIRUBIN mg/dL 0.35 0.47   BILIRUBIN DIRECT mg/dL 0.11  --          Results from last 7 days   Lab Units 08/05/23  0338 08/04/23  1449 08/04/23  1122   GLUCOSE RANDOM mg/dL 178* 71 116              Results from last 7 days   Lab Units 08/05/23  0405   PH NATALIA  7.390   PCO2 NATALIA mm Hg 41.8*   PO2 NATALIA mm Hg 66.2*   HCO3 NATALIA mmol/L 24.7   BASE EXC NATALIA mmol/L -0.2   O2 CONTENT NATALIA ml/dL 13.2   O2 HGB, VENOUS % 90.8*     Results from last 7 days   Lab Units 08/04/23  1120   PH, NATALIA I-STAT  7.381   PCO2, NATALIA ISTAT mm HG 28.9*   PO2, NATALIA ISTAT mm HG 31.0*   HCO3, NATALIA ISTAT mmol/L 17.1*   I STAT BASE EXC mmol/L -7*   I STAT O2 SAT % 59*     Results from last 7 days   Lab Units 08/04/23  1122   CK TOTAL U/L 100             Results from last 7 days   Lab Units 08/04/23  1229   PROTIME seconds 14.4   INR  1.10             Results from last 7 days   Lab Units 08/04/23  1229   LACTIC ACID mmol/L 1.2                       Results from last 7 days   Lab Units 08/04/23  1330   CLARITY UA  Clear   COLOR UA  Light Yellow   SPEC GRAV UA  1.028   PH UA  6.0   GLUCOSE UA mg/dl Negative   KETONES UA mg/dl Negative   BLOOD UA  Negative   PROTEIN UA mg/dl Negative   NITRITE UA  Negative   BILIRUBIN UA  Negative   UROBILINOGEN UA (BE) mg/dl <2.0   LEUKOCYTES UA  Negative             ED Treatment:   Medication Administration from 08/04/2023 1105 to 08/04/2023 1309       Date/Time Order Dose Route Action     08/04/2023 1119 EDT multi-electrolyte (ISOLYTE-S PH 7.4) bolus 1,000 mL Intravenous New Bag     08/04/2023 1129 EDT iohexol (OMNIPAQUE) 350 MG/ML injection (SINGLE-DOSE) 85 mL 85 mL Intravenous Given        Past Medical History:   Diagnosis Date   • Chronic alcohol abuse    • Epilepsy ([address])    • Hepatitis    • Pancreatitis      Present on Admission:  **None**      Admitting Diagnosis: Tibial fracture [S82.209A]  Multiple injuries due to trauma [T07. XXXA]  Age/Sex: 61 y.o. female  Admission Orders:  Scheduled Medications:  ascorbic acid, 1,000 mg, Oral, BID  aspirin, 81 mg, Oral, BID  cyanocobalamin, 500 mcg, Oral, Daily  desvenlafaxine, 50 mg, Oral, Daily  enoxaparin, 30 mg, Subcutaneous, B56S DESTIN  folic acid, 1 mg, Oral, Daily  folic acid, 1 mg, Oral, Daily  gabapentin, 100 mg, Oral, HS  gabapentin, 300 mg, Oral, BID  hydrOXYzine HCL, 25 mg, Oral, BID  iron sucrose, 300 mg, Intravenous, Daily  lacosamide, 200 mg, Oral, Q12H DESTIN  methocarbamol, 500 mg, Oral, Q6H DESTIN  metoprolol succinate, 25 mg, Oral, Daily  multivitamin-minerals, 1 tablet, Oral, Daily  polyethylene glycol, 17 g, Oral, Daily  senna-docusate sodium, 1 tablet, Oral, HS  thiamine, 100 mg, Oral, Daily  traZODone, 50 mg, Oral, HS  zonisamide, 200 mg, Oral, HS    ceFAZolin (ANCEF) IVPB (premix in dextrose) 2,000 mg 50 mL  Dose: 2,000 mg  Freq: Every 8 hours Route: IV  Last Dose: 2,000 mg (08/05/23 0941)  Start: 08/05/23 0200 End: 08/05/23 1011  calcium gluconate 1 g in sodium chloride 0.9% 50 mL (premix)  Dose: 1 g  Freq:  Once Route: IV  Last Dose: Stopped (08/05/23 0422)  Start: 08/05/23 0315 End: 08/05/23 0422        Continuous IV Infusions:    multi-electrolyte (PLASMALYTE-A/ISOLYTE-S PH 7.4) IV solution  Rate: 100 mL/hr Dose: 100 mL/hr  Freq: Continuous Route: IV  Indications of Use: IV Hydration  Last Dose: 100 mL/hr (08/04/23 2049)  Start: 08/04/23 1230 End: 08/05/23 0912  PRN Meds:  HYDROmorphone, 0.2 mg, Intravenous, Q2H PRN  naloxone, 0.04 mg, Intravenous, Q1MIN PRN  ondansetron, 4 mg, Intravenous, Q4H PRN  oxyCODONE, 5 mg, Oral, Q4H PRN x1 8/4  oxyCODONE, 2.5 mg, Oral, Q4H PRN  fentaNYL (SUBLIMAZE) injection 50 mcg  Dose: 50 mcg  Freq: Every 3 minutes PRN Route: IV  PRN Reason: Pain - PACU  PRN Comment: Breakthrough - first line  Start: 08/04/23 1916 End: 08/04/23 1959 x1 8/4          CIWA monitoring   continuous pulse ox   OOB to chair TID   SCD's neuro checks   WBAT LLE   NPO--->reg diet    IP CONSULT TO ORTHOPEDIC SURGERY  IP CONSULT TO CASE MANAGEMENT  IP CONSULT TO CASE MANAGEMENT    Network Utilization Review Department  ATTENTION: Please call with any questions or concerns to [phone] and carefully listen to the prompts so that you are directed to the right person. All voicemails are confidential.  [facility] [place] all requests for admission clinical reviews, approved or denied determinations and any other requests to dedicated fax number below belonging to the campus where the patient is receiving treatment.  List of dedicated fax numbers for the Facilities:  [place] DENIALS (Administrative/Medical Necessity) [phone] [address] [facility] (Maternity/NICU/Pediatrics) [phone]   [address] [place] [address] [phone]   [facility] [address] [facility] [phone]   [address] [place] [address] [phone]   [address] [facility] [address] [place] [address] Rd [address] [phone]

## 2023-08-06 PROBLEM — D62 ACUTE BLOOD LOSS ANEMIA: Status: ACTIVE | Noted: 2023-08-06

## 2023-08-06 PROBLEM — I95.9 HYPOTENSION: Status: RESOLVED | Noted: 2023-08-04 | Resolved: 2023-08-06

## 2023-08-06 LAB
ANION GAP SERPL CALCULATED.3IONS-SCNC: 5 MMOL/L
ATRIAL RATE: 69 BPM
ATRIAL RATE: 71 BPM
BASOPHILS # BLD AUTO: 0.02 THOUSANDS/ÂΜL (ref 0–0.1)
BASOPHILS NFR BLD AUTO: 1 % (ref 0–1)
BUN SERPL-MCNC: 12 MG/DL (ref 5–25)
CALCIUM SERPL-MCNC: 8.1 MG/DL (ref 8.4–10.2)
CHLORIDE SERPL-SCNC: 110 MMOL/L (ref 96–108)
CO2 SERPL-SCNC: 25 MMOL/L (ref 21–32)
CREAT SERPL-MCNC: 0.59 MG/DL (ref 0.6–1.3)
EOSINOPHIL # BLD AUTO: 0.1 THOUSAND/ÂΜL (ref 0–0.61)
EOSINOPHIL NFR BLD AUTO: 2 % (ref 0–6)
ERYTHROCYTE [DISTWIDTH] IN BLOOD BY AUTOMATED COUNT: 13.4 % (ref 11.6–15.1)
GFR SERPL CREATININE-BSD FRML MDRD: 97 ML/MIN/1.73SQ M
GLUCOSE SERPL-MCNC: 108 MG/DL (ref 65–140)
HCT VFR BLD AUTO: 22.6 % (ref 34.8–46.1)
HGB BLD-MCNC: 7.5 G/DL (ref 11.5–15.4)
IMM GRANULOCYTES # BLD AUTO: 0.01 THOUSAND/UL (ref 0–0.2)
IMM GRANULOCYTES NFR BLD AUTO: 0 % (ref 0–2)
LYMPHOCYTES # BLD AUTO: 1.58 THOUSANDS/ÂΜL (ref 0.6–4.47)
LYMPHOCYTES NFR BLD AUTO: 37 % (ref 14–44)
MCH RBC QN AUTO: 31.9 PG (ref 26.8–34.3)
MCHC RBC AUTO-ENTMCNC: 33.2 G/DL (ref 31.4–37.4)
MCV RBC AUTO: 96 FL (ref 82–98)
MONOCYTES # BLD AUTO: 0.53 THOUSAND/ÂΜL (ref 0.17–1.22)
MONOCYTES NFR BLD AUTO: 12 % (ref 4–12)
NEUTROPHILS # BLD AUTO: 2.06 THOUSANDS/ÂΜL (ref 1.85–7.62)
NEUTS SEG NFR BLD AUTO: 48 % (ref 43–75)
NRBC BLD AUTO-RTO: 0 /100 WBCS
P AXIS: 58 DEGREES
P AXIS: 71 DEGREES
PLATELET # BLD AUTO: 42 THOUSANDS/UL (ref 149–390)
PMV BLD AUTO: 10.1 FL (ref 8.9–12.7)
POTASSIUM SERPL-SCNC: 3.9 MMOL/L (ref 3.5–5.3)
PR INTERVAL: 242 MS
PR INTERVAL: 248 MS
QRS AXIS: 51 DEGREES
QRS AXIS: 58 DEGREES
QRSD INTERVAL: 70 MS
QRSD INTERVAL: 74 MS
QT INTERVAL: 392 MS
QT INTERVAL: 396 MS
QTC INTERVAL: 420 MS
QTC INTERVAL: 430 MS
RBC # BLD AUTO: 2.35 MILLION/UL (ref 3.81–5.12)
SODIUM SERPL-SCNC: 140 MMOL/L (ref 135–147)
T WAVE AXIS: 77 DEGREES
T WAVE AXIS: 79 DEGREES
VENTRICULAR RATE: 69 BPM
VENTRICULAR RATE: 71 BPM
WBC # BLD AUTO: 4.3 THOUSAND/UL (ref 4.31–10.16)

## 2023-08-06 PROCEDURE — 80048 BASIC METABOLIC PNL TOTAL CA: CPT | Performed by: PHYSICIAN ASSISTANT

## 2023-08-06 PROCEDURE — 99024 POSTOP FOLLOW-UP VISIT: CPT | Performed by: PHYSICIAN ASSISTANT

## 2023-08-06 PROCEDURE — 93010 ELECTROCARDIOGRAM REPORT: CPT | Performed by: INTERNAL MEDICINE

## 2023-08-06 PROCEDURE — 85025 COMPLETE CBC W/AUTO DIFF WBC: CPT | Performed by: PHYSICIAN ASSISTANT

## 2023-08-06 PROCEDURE — 99232 SBSQ HOSP IP/OBS MODERATE 35: CPT | Performed by: PHYSICIAN ASSISTANT

## 2023-08-06 RX ORDER — OXYCODONE HYDROCHLORIDE 5 MG/1
5 TABLET ORAL EVERY 4 HOURS PRN
Status: DISCONTINUED | OUTPATIENT
Start: 2023-08-06 | End: 2023-08-11 | Stop reason: HOSPADM

## 2023-08-06 RX ORDER — ACETAMINOPHEN 325 MG/1
975 TABLET ORAL EVERY 8 HOURS SCHEDULED
Status: DISCONTINUED | OUTPATIENT
Start: 2023-08-06 | End: 2023-08-11 | Stop reason: HOSPADM

## 2023-08-06 RX ADMIN — OXYCODONE HYDROCHLORIDE AND ACETAMINOPHEN 1000 MG: 500 TABLET ORAL at 17:11

## 2023-08-06 RX ADMIN — ENOXAPARIN SODIUM 30 MG: 30 INJECTION SUBCUTANEOUS at 20:39

## 2023-08-06 RX ADMIN — CYANOCOBALAMIN TAB 500 MCG 500 MCG: 500 TAB at 09:23

## 2023-08-06 RX ADMIN — HYDROXYZINE HYDROCHLORIDE 25 MG: 25 TABLET ORAL at 09:23

## 2023-08-06 RX ADMIN — ACETAMINOPHEN 975 MG: 325 TABLET, FILM COATED ORAL at 22:44

## 2023-08-06 RX ADMIN — IRON SUCROSE 300 MG: 20 INJECTION, SOLUTION INTRAVENOUS at 09:15

## 2023-08-06 RX ADMIN — ENOXAPARIN SODIUM 30 MG: 30 INJECTION SUBCUTANEOUS at 09:25

## 2023-08-06 RX ADMIN — OXYCODONE HYDROCHLORIDE 5 MG: 5 TABLET ORAL at 12:18

## 2023-08-06 RX ADMIN — FOLIC ACID 1 MG: 1 TABLET ORAL at 09:23

## 2023-08-06 RX ADMIN — ASPIRIN 81 MG: 81 TABLET, COATED ORAL at 09:22

## 2023-08-06 RX ADMIN — METHOCARBAMOL TABLETS 500 MG: 500 TABLET, COATED ORAL at 23:57

## 2023-08-06 RX ADMIN — TRAZODONE HYDROCHLORIDE 50 MG: 50 TABLET ORAL at 22:45

## 2023-08-06 RX ADMIN — DESVENLAFAXINE 50 MG: 50 TABLET, FILM COATED, EXTENDED RELEASE ORAL at 09:23

## 2023-08-06 RX ADMIN — OXYCODONE HYDROCHLORIDE 5 MG: 5 TABLET ORAL at 22:45

## 2023-08-06 RX ADMIN — LACOSAMIDE 200 MG: 100 TABLET, FILM COATED ORAL at 20:39

## 2023-08-06 RX ADMIN — GABAPENTIN 100 MG: 300 CAPSULE ORAL at 22:45

## 2023-08-06 RX ADMIN — HYDROMORPHONE HYDROCHLORIDE 0.2 MG: 0.2 INJECTION, SOLUTION INTRAMUSCULAR; INTRAVENOUS; SUBCUTANEOUS at 09:14

## 2023-08-06 RX ADMIN — GABAPENTIN 300 MG: 300 CAPSULE ORAL at 17:11

## 2023-08-06 RX ADMIN — GABAPENTIN 300 MG: 300 CAPSULE ORAL at 09:22

## 2023-08-06 RX ADMIN — OXYCODONE HYDROCHLORIDE AND ACETAMINOPHEN 1000 MG: 500 TABLET ORAL at 09:22

## 2023-08-06 RX ADMIN — Medication 100 MG: at 09:23

## 2023-08-06 RX ADMIN — METHOCARBAMOL TABLETS 500 MG: 500 TABLET, COATED ORAL at 12:18

## 2023-08-06 RX ADMIN — ZONISAMIDE 200 MG: 100 CAPSULE ORAL at 22:44

## 2023-08-06 RX ADMIN — HYDROXYZINE HYDROCHLORIDE 25 MG: 25 TABLET ORAL at 17:12

## 2023-08-06 RX ADMIN — MULTIPLE VITAMINS W/ MINERALS TAB 1 TABLET: TAB ORAL at 09:22

## 2023-08-06 RX ADMIN — HYDROMORPHONE HYDROCHLORIDE 0.2 MG: 0.2 INJECTION, SOLUTION INTRAMUSCULAR; INTRAVENOUS; SUBCUTANEOUS at 15:04

## 2023-08-06 RX ADMIN — OXYCODONE HYDROCHLORIDE 5 MG: 5 TABLET ORAL at 05:34

## 2023-08-06 RX ADMIN — METHOCARBAMOL TABLETS 500 MG: 500 TABLET, COATED ORAL at 00:30

## 2023-08-06 RX ADMIN — METHOCARBAMOL TABLETS 500 MG: 500 TABLET, COATED ORAL at 17:12

## 2023-08-06 RX ADMIN — LACOSAMIDE 200 MG: 100 TABLET, FILM COATED ORAL at 09:23

## 2023-08-06 RX ADMIN — POLYETHYLENE GLYCOL 3350 17 G: 17 POWDER, FOR SOLUTION ORAL at 09:22

## 2023-08-06 RX ADMIN — SENNOSIDES AND DOCUSATE SODIUM 1 TABLET: 50; 8.6 TABLET ORAL at 22:44

## 2023-08-06 RX ADMIN — METHOCARBAMOL TABLETS 500 MG: 500 TABLET, COATED ORAL at 05:14

## 2023-08-06 NOTE — PROGRESS NOTES
[address]  Progress Note  Name: Marisela Bryant  MRN: [id number]  Unit/Bed#: W -01 I Date of Admission: 8/4/2023   Date of Service: 8/6/2023 I Hospital Day: 2    Assessment/Plan   Fall  Assessment & Plan  - mechanical fall at home, with head strike  - sustained below stated injuries  - PT/OT recommending inpatient rehab. - CM for dispo planning    * Tibia fracture  Assessment & Plan  - closed, midshaft tibial fracture  - orthopedics consultation appreciated  - s/p ORIF w L tibial IMN on 8/4  - WBAT LLE  - pain control with multi modal regimen  - neurovascular checks  - DVT ppx with Lovenox   - PT/OT recommending inpatient rehab  - F/u with orthopedics as an outpatient    Hematoma  Assessment & Plan  - left anterior tibial hematoma in setting of closed left tibial fracture  - neurovascularly intact  - no signs of active bleeding    Acute blood loss anemia  Assessment & Plan  - Hgb 7.5 today, down-trending  - no signs of significant active bleeding. Minor skin oozing at incision. Orthopedics aware. Will hold ASA and continue Lovenox for DVT ppx at this time.    - Patient is a S[employer]ism and refuses all blood products  -Continue Venofer, B12, folate acid as well as repeat H&H later in the morning on 8/5/2023 to make sure that hemoglobin does not decrease any further  - repeat Hgb in AM    Chronic alcohol abuse  Assessment & Plan  - patient has a h/o chronic alcohol abuse  - recently admitted to [address] [place] [address] Detox unit in July 2023  - place on CIWA protocol and obtain further alcohol history from patient post op    Epilepsy [facility])  Assessment & Plan  - resume home antiepileptic medications: Gabapentin, Vimpat and Zonegran  - last seizure reported to be 7/9/2023  - seizures may be related to alcohol withdrawal as well   - patient denies any seizure like activity prior to her fall today    Hypotension-resolved as of 8/6/2023  Assessment & Plan  - resolved s/p 1 L IVF bolus  - patient has a non-gap metabolic acidosis  - LA normal. Will obtain further history from patient regarding recent diarrhea, toxic ingestion, new medications, etc.   - Repeat BMP this afternoon at 3 pm  -Patient noted to have continual but consistent blood pressure readings throughout the night. Consider repeat fluid bolusing if patient has continual decrease in blood pressure readings             Bowel Regimen: senokot  VTE Prophylaxis:Sequential compression device (Venodyne)  and Enoxaparin (Lovenox)     Disposition: continue med-surg status, rehab placement pending     Subjective   Chief Complaint: "I'm feeling sore"    Subjective: She is feeling well overall. Her pain is poorly controlled with movement. She feels well at rest.      Objective   Vitals:   Temp:  [98.1 °F (36.7 °C)-99.6 °F (37.6 °C)] 99 °F (37.2 °C)  HR:  [71-89] 80  Resp:  [15-18] 18  BP: (101-127)/(49-62) 105/52    I/O       08/04 0701  08/05 0700 08/05 0701  08/06 0700 08/06 0701  08/07 0700    P. O. 0 360     I.V. (mL/kg) 3020 (45.8)      IV Piggyback 50      Total Intake(mL/kg) 3070 (46.5) 360 (5.5)     Urine (mL/kg/hr) 1125      Total Output 1125      Net +1945 +360                   Physical Exam:   GENERAL APPEARANCE: NAD  NEURO: GCS 15,non-focal  HEENT: NCAT  CV: RRR, no MGR  LUNGS: CTA bilaterally  GI: soft,non-tender,non-distended  : voiding  MSK: + LLE with dressings C/D/I, NVI distally in LLE  SKIN: pink,w arm, dry    Invasive Devices     Peripheral Intravenous Line  Duration           Peripheral IV 08/05/23 Right;Ventral (anterior) Hand 1 day                      Lab Results:   Results: I have personally reviewed all pertinent laboratory/tests results, BMP/CMP:   Lab Results   Component Value Date    SODIUM 140 08/06/2023    K 3.9 08/06/2023     (H) 08/06/2023    CO2 25 08/06/2023    BUN 12 08/06/2023    CREATININE 0.59 (L) 08/06/2023    CALCIUM 8.1 (L) 08/06/2023    EGFR 97 08/06/2023    and CBC:   Lab Results   Component Value Date    WBC 4.30 (L) 08/06/2023    HGB 7.5 (L) 08/06/2023    HCT 22.6 (L) 08/06/2023    MCV 96 08/06/2023    PLT 42 (LL) 08/06/2023    RBC 2.35 (L) 08/06/2023    MCH 31.9 08/06/2023    MCHC 33.2 08/06/2023    RDW 13.4 08/06/2023    MPV 10.1 08/06/2023    NRBC 0 08/06/2023     Imaging: I have personally reviewed pertinent reports.      Other Studies: no new

## 2023-08-06 NOTE — UTILIZATION REVIEW
Continued Stay Review    Date: 8/6                         Current Patient Class: IP Current Level of Care: MS    HPI:63 y.o. female initially admitted on 8/4    Assessment/Plan: 8/6 POD #2 IMN left tibia. LLE drsgs C/D/I. 4/5strength to EHL/FHL, + knee flexion/extension, sensation intact LLE, 2 + DP pulse. Pt WBAT LLE, ice and elevation LLE. PT has pain LLE, increased from working w/ PT yesterday . Hgb drown to 7.5 today from 11 preop . Plt 42 . ABLA . Pt does not accept blood products . Continue to monitor . Started IV venofer daily yesterday     Vital Signs:     Date/Time Temp Pulse Resp BP MAP (mmHg) SpO2   08/06/23 15:48:36 99 °F (37.2 °C) 80 18 105/52 70 90 %   08/06/23 11:30:16 98.2 °F (36.8 °C) 71 15 101/50 67 93 %   08/06/23 10:11:37 98.1 °F (36.7 °C) 81 16 101/50 67 91 %   08/06/23 0400 -- 89 -- 101/58 -- --   08/06/23 02:37:37 -- 89 -- 101/49 Abnormal  66 89 % Abnormal    08/06/23 0000 -- 89 -- 126/62 -- --   08/05/23 23:14:23 99.6 °F (37.6 °C) 86 18 126/62 83 90 %   08/05/23 2000 -- 86 -- 127/62 -- --   08/05/23 19:41:02 99.6 °F (37.6 °C) 86 18 127/62 84 93 %   08/05/23 15:42:15 99.6 °F (37.6 °C) 76 16 112/58 76 90 %     Date and Time R Radial Pulse L Radial Pulse R Pedal Pulse L Pedal Pulse L Posterior Tibial Pulse   08/05/23 2100 +2 +2 +2 +2 +1   08/05/23 0830 +2 +2 +2 +2 +1     Date and Time Eye Opening Best Verbal Response Best Motor Response Jo-Ann Coma Scale Score   08/05/23 2100 4 5 6 15   08/05/23 0830 4 5 6 15   08/05/23 0015 4 5 6 15     CIWA 8/6=0      Pertinent Labs/Diagnostic Results:   XR tibia fibula 2 vw left   Final Result by Kassi Miller MD (08/06 7217)      Fluoroscopic guidance provided for procedure guidance. Please refer to the separate procedure notes for additional details.             Workstation performed: [id number]         XR foot 3+ vw right   Final Result by Letty Boland MD (08/04 3805)      Fractures of the first and fifth proximal phalanges as well as the dorsum of the tarsal navicular. Workstation performed: [id number]         XR knee 4+ vw right injury   Final Result by Kylah Fitzgerald MD (08/04 1413)      No acute osseous abnormality. Workstation performed: [id number]         XR femur 2 vw left   Final Result by Bianca Jones MD (08/04 1253)      No acute osseous abnormality. Workstation performed: [id number]         XR foot 3+ vw left   Final Result by Bianca Jones MD (08/04 1424)      Fourth metatarsal neck fracture. Workstation performed: [id number]         XR tibia fibula 2 vw left   Final Result by Bianca Jones MD (08/04 1253)      Mid tibial shaft and distal fibular fracture. Gross alignment maintained. Workstation performed: [id number]         CT abdomen pelvis w contrast   Final Result by Claudell Pais, MD (08/04 1148)      No acute traumatic visceral injury in the abdomen or pelvis. No acute fracture. Chronic pancreatitis with pancreatic parenchymal atrophy and extensive pancreatic parenchymal calcifications noted. There is increasing enlargement of the main pancreatic duct probably due to some combination of pancreatic parenchymal atrophy and stones    in the pancreatic duct. Workstation performed: [id number]         TRAUMA - CT head wo contrast   Final Result by Claudell Pais, MD (08/04 1136)      No acute intracranial abnormality. Workstation performed: [id number]         TRAUMA - CT spine cervical wo contrast   Final Result by Claudell Pais, MD (08/04 1139)      No cervical spine fracture or traumatic malalignment. Workstation performed: [id number]         XR trauma multiple   Final Result by Bianca Jones MD (08/04 1402)      No acute cardiopulmonary disease within limitations of supine imaging. Left tibial and fibular fractures.          Workstation performed: [id number] Results from last 7 days   Lab Units 08/06/23  0632 08/05/23  0338 08/04/23  1122 08/04/23  1120   WBC Thousand/uL 4.30* 3.98* 6.35  --    HEMOGLOBIN g/dL 7.5* 9.0* 11.0*  --    I STAT HEMOGLOBIN g/dl  --   --   --  9.9*   HEMATOCRIT % 22.6* 27.4* 34.8  --    HEMATOCRIT, ISTAT %  --   --   --  29*   PLATELETS Thousands/uL 42* 47* 56*  --    NEUTROS ABS Thousands/µL 2.06 3.25 5.07  --          Results from last 7 days   Lab Units 08/06/23  0632 08/05/23  0338 08/04/23  1449 08/04/23 1122 08/04/23  1120   SODIUM mmol/L 140 137 137 133*  --    POTASSIUM mmol/L 3.9 4.2 4.3 4.3  --    CHLORIDE mmol/L 110* 108 100 106  --    CO2 mmol/L 25 24 15* 19*  --    CO2, I-STAT mmol/L  --   --   --   --  18*   ANION GAP mmol/L 5 5 22 8  --    BUN mg/dL 12 11 8 14  --    CREATININE mg/dL 0.59* 0.62 0.28* 0.70  --    EGFR ml/min/1.73sq m 97 96 125 92  --    CALCIUM mg/dL 8.1* 7.9* 4.8* 8.2*  --    CALCIUM, IONIZED mmol/L  --  1.07*  --   --   --    CALCIUM, IONIZED, ISTAT mmol/L  --   --   --   --  0.98*   MAGNESIUM mg/dL  --  2.0  --  1.8*  --    PHOSPHORUS mg/dL  --  2.9  --  3.3  --      Results from last 7 days   Lab Units 08/05/23 0338 08/04/23  1122   AST U/L 19 29   ALT U/L 19 24   ALK PHOS U/L 124* 158*   TOTAL PROTEIN g/dL 5.5* 6.1*   ALBUMIN g/dL 3.1* 3.3*   TOTAL BILIRUBIN mg/dL 0.35 0.47   BILIRUBIN DIRECT mg/dL 0.11  --          Results from last 7 days   Lab Units 08/06/23  0632 08/05/23  0338 08/04/23  1449 08/04/23  1122   GLUCOSE RANDOM mg/dL 108 178* 71 116             No results found for: "BETA-HYDROXYBUTYRATE"       Results from last 7 days   Lab Units 08/05/23  0405   PH NATALIA  7.390   PCO2 NATALIA mm Hg 41.8*   PO2 NATALIA mm Hg 66.2*   HCO3 NATALIA mmol/L 24.7   BASE EXC NATALIA mmol/L -0.2   O2 CONTENT NATALIA ml/dL 13.2   O2 HGB, VENOUS % 90.8*     Results from last 7 days   Lab Units 08/04/23  1120   PH, NATALIA I-STAT  7.381   PCO2, NATALIA ISTAT mm HG 28.9*   PO2, NATALIA ISTAT mm HG 31.0*   HCO3, NATALIA ISTAT mmol/L 17.1*   I STAT BASE EXC mmol/L -7*   I STAT O2 SAT % 59*     Results from last 7 days   Lab Units 08/04/23  1122   CK TOTAL U/L 100             Results from last 7 days   Lab Units 08/04/23  1229   PROTIME seconds 14.4   INR  1.10             Results from last 7 days   Lab Units 08/04/23  1229   LACTIC ACID mmol/L 1.2                                                 Results from last 7 days   Lab Units 08/04/23  1330   CLARITY UA  Clear   COLOR UA  Light Yellow   SPEC GRAV UA  1.028   PH UA  6.0   GLUCOSE UA mg/dl Negative   KETONES UA mg/dl Negative   BLOOD UA  Negative   PROTEIN UA mg/dl Negative   NITRITE UA  Negative   BILIRUBIN UA  Negative   UROBILINOGEN UA (BE) mg/dl <2.0   LEUKOCYTES UA  Negative                                                   Medications:   Scheduled Medications:  ascorbic acid, 1,000 mg, Oral, BID  aspirin, 81 mg, Oral, BID  cyanocobalamin, 500 mcg, Oral, Daily  desvenlafaxine, 50 mg, Oral, Daily  enoxaparin, 30 mg, Subcutaneous, C10R DESTIN  folic acid, 1 mg, Oral, Daily  folic acid, 1 mg, Oral, Daily  gabapentin, 100 mg, Oral, HS  gabapentin, 300 mg, Oral, BID  hydrOXYzine HCL, 25 mg, Oral, BID  iron sucrose, 300 mg, Intravenous, Daily  lacosamide, 200 mg, Oral, Q12H DESTIN  methocarbamol, 500 mg, Oral, Q6H DESTIN  metoprolol succinate, 25 mg, Oral, Daily  multivitamin-minerals, 1 tablet, Oral, Daily  polyethylene glycol, 17 g, Oral, Daily  senna-docusate sodium, 1 tablet, Oral, HS  thiamine, 100 mg, Oral, Daily  traZODone, 50 mg, Oral, HS  zonisamide, 200 mg, Oral, HS      Continuous IV Infusions:     PRN Meds:  HYDROmorphone, 0.2 mg, Intravenous, Q2H PRN x2 8/6  naloxone, 0.04 mg, Intravenous, Q1MIN PRN  ondansetron, 4 mg, Intravenous, Q4H PRN  oxyCODONE, 5 mg, Oral, Q4H PRN x2 8/6  oxyCODONE, 2.5 mg, Oral, Q4H PRN        Discharge Plan: D    Network Utilization Review Department  ATTENTION: Please call with any questions or concerns to [phone] and carefully listen to the prompts so that you are directed to the right person. All voicemails are confidential.  Nereyda Nam all requests for admission clinical reviews, approved or denied determinations and any other requests to dedicated fax number below belonging to the campus where the patient is receiving treatment.  List of dedicated fax numbers for the Facilities:  [place] ANCATIANA (Administrative/Medical Necessity) [phone] [address] [facility] (Maternity/NICU/Pediatrics) [phone]   [address] [place] [address] [phone]   [facility] [phone]   [facility] [address] [place] [address] [phone]   [address] [address] [phone]

## 2023-08-06 NOTE — PROGRESS NOTES
Orthopedics   Nando Rooney 61 y.o. female MRN: [id number]  Unit/Bed#: EDITH -01    Subjective:  61 y. o.female post operative day 2 IMN left tibia. Pt doing well. Pain controlled, increased from PT yesterday. No acute complaints.     Labs:  0   Lab Value Date/Time    HCT 22.6 (L) 08/06/2023 0632    HCT 27.4 (L) 08/05/2023 0338    HCT 34.8 08/04/2023 1122    HCT 29 (L) 08/04/2023 1120    HGB 7.5 (L) 08/06/2023 0632    HGB 9.0 (L) 08/05/2023 0338    HGB 11.0 (L) 08/04/2023 1122    HGB 9.9 (L) 08/04/2023 1120    INR 1.10 08/04/2023 1229    WBC 4.30 (L) 08/06/2023 0632    WBC 3.98 (L) 08/05/2023 0338    WBC 6.35 08/04/2023 1122       Meds:    Current Facility-Administered Medications:   •  ascorbic acid (VITAMIN C) tablet 1,000 mg, 1,000 mg, Oral, BID, Lashonda Avelar MD, 1,000 mg at 08/06/23 7735  •  aspirin (ECOTRIN LOW STRENGTH) EC tablet 81 mg, 81 mg, Oral, BID, Eliseo Magallon PA-C, 81 mg at 08/06/23 8910  •  cyanocobalamin (VITAMIN B-12) tablet 500 mcg, 500 mcg, Oral, Daily, Lashonda Avelar MD, 500 mcg at 08/06/23 2172  •  desvenlafaxine succinate (PRISTIQ) 24 hr tablet 50 mg, 50 mg, Oral, Daily, Eliseo Magallon PA-C, 50 mg at 08/06/23 7685  •  enoxaparin (LOVENOX) subcutaneous injection 30 mg, 30 mg, Subcutaneous, Q12H [facility], Eliseo Magallon PA-C, 30 mg at 19/99/30 1292  •  folic acid (FOLVITE) tablet 1 mg, 1 mg, Oral, Daily, Eliseo Magallon PA-C, 1 mg at 77/87/14 3104  •  folic acid (FOLVITE) tablet 1 mg, 1 mg, Oral, Daily, Lashonda Avelar MD, 1 mg at 08/06/23 3339  •  gabapentin (NEURONTIN) capsule 100 mg, 100 mg, Oral, HS, Eliseo Magallon PA-C, 100 mg at 08/05/23 2133  •  gabapentin (NEURONTIN) capsule 300 mg, 300 mg, Oral, BID, Eliseo Magallon PA-C, 300 mg at 08/06/23 9413  •  HYDROmorphone HCl (DILAUDID) injection 0.2 mg, 0.2 mg, Intravenous, Q2H PRN, Eliseo Magallon PA-C, 0.2 mg at 08/06/23 6397  • hydrOXYzine HCL (ATARAX) tablet 25 mg, 25 mg, Oral, BID, Cloria Baize, PA-C, 25 mg at 08/06/23 2433  •  iron sucrose (VENOFER) 300 mg in sodium chloride 0.9 % 250 mL IVPB, 300 mg, Intravenous, Daily, Guillermina Babb MD, 300 mg at 08/06/23 0915  •  lacosamide (VIMPAT) tablet 200 mg, 200 mg, Oral, Q12H [address], Cloria Baize, PA-C, 200 mg at 08/06/23 6830  •  methocarbamol (ROBAXIN) tablet 500 mg, 500 mg, Oral, Q6H [address], Cloria Baize, PA-C, 500 mg at 08/06/23 1218  •  metoprolol succinate (TOPROL-XL) 24 hr tablet 25 mg, 25 mg, Oral, Daily, Cloria Baize, PA-C  •  multivitamin-minerals (CENTRUM) tablet 1 tablet, 1 tablet, Oral, Daily, Cloria Baize, PA-C, 1 tablet at 08/06/23 6216  •  naloxone (NARCAN) 0.04 mg/mL syringe 0.04 mg, 0.04 mg, Intravenous, Q1MIN PRN, Cloria Baize, PA-C  •  ondansetron [facility]) injection 4 mg, 4 mg, Intravenous, Q4H PRN, Cloria Baize, PA-C  •  oxyCODONE (ROXICODONE) IR tablet 5 mg, 5 mg, Oral, Q4H PRN, Cloria Baize, PA-C, 5 mg at 08/06/23 1218  •  oxyCODONE (ROXICODONE) split tablet 2.5 mg, 2.5 mg, Oral, Q4H PRN, Cloria Baize, PA-C, 2.5 mg at 08/05/23 1253  •  polyethylene glycol (MIRALAX) packet 17 g, 17 g, Oral, Daily, Cloria Baize, PA-C, 17 g at 08/06/23 8850  •  senna-docusate sodium (SENOKOT S) 8.6-50 mg per tablet 1 tablet, 1 tablet, Oral, HS, Cloria Baize, PA-C, 1 tablet at 08/05/23 2133  •  thiamine tablet 100 mg, 100 mg, Oral, Daily, Cloria Baize, PA-C, 100 mg at 08/06/23 0356  •  traZODone (DESYREL) tablet 50 mg, 50 mg, Oral, HS, Cloria Baize, PA-C, 50 mg at 08/05/23 2133  •  zonisamide (ZONEGRAN) capsule 200 mg, 200 mg, Oral, HS, Cloria Baize, PA-C, 200 mg at 08/05/23 2133    Blood Culture:   No results found for: "BLOODCX"    Wound Culture:   No results found for: "WOUNDCULT"    Ins and Outs:  I/O last 24 hours:   In: 360 [P.O.:360]  Out: -           Physical:  Vitals:    08/06/23 1130   BP: 101/50   Pulse: 71   Resp: 15   Temp: 98.2 °F (36.8 °C)   SpO2: 93%     left lower extremity  · Dressings clean Dry Intact  · 4/5 strength to EHL/FHL, + knee flexion/extension  · Sensation intact L2-S1  · 2+ DP Pulse    Assessment:   61 y. o.female post operative day 2 IMN left tibia.     Plan:  · WBAT left lower extremity  · Up and out of bed  · Ice, Elevation to affected extremity  · Analgesics  · DVT prophylaxis  · Asa 81 mg BID  · PT/OT  · Dispo: Ortho will follow  · Patient noted to have acute blood loss anemia due to a drop in Hbg of > 2.0g from preop levels, will monitor vital signs and resuscitate with IV fluids as needed   · Patient does not accept blood products    Monico Meredith PA-C

## 2023-08-06 NOTE — ASSESSMENT & PLAN NOTE
- left anterior tibial hematoma in setting of closed left tibial fracture  - neurovascularly intact  - no signs of active bleeding

## 2023-08-06 NOTE — ASSESSMENT & PLAN NOTE
- closed, midshaft tibial fracture  - orthopedics consultation appreciated  - s/p ORIF w L tibial IMN on 8/4  - WBAT LLE  - pain control with multi modal regimen  - neurovascular checks  - DVT ppx with Lovenox   - PT/OT recommending inpatient rehab  - F/u with orthopedics as an outpatient

## 2023-08-06 NOTE — ASSESSMENT & PLAN NOTE
- Hgb 7.5 today, down-trending  - no signs of significant active bleeding. Minor skin oozing at incision. Orthopedics aware. Will hold ASA and continue Lovenox for DVT ppx at this time.    - Patient is a Rastafari and refuses all blood products  -Continue Venofer, B12, folate acid as well as repeat H&H later in the morning on 8/5/2023 to make sure that hemoglobin does not decrease any further  - repeat Hgb in AM

## 2023-08-06 NOTE — ASSESSMENT & PLAN NOTE
- mechanical fall at home, with head strike  - sustained below stated injuries  - PT/OT recommending inpatient rehab.    - CM for dispo planning

## 2023-08-06 NOTE — ASSESSMENT & PLAN NOTE
- patient has a h/o chronic alcohol abuse  - recently admitted to [facility] in July 2023  - place on CIWA protocol and obtain further alcohol history from patient post op

## 2023-08-07 PROCEDURE — 97116 GAIT TRAINING THERAPY: CPT

## 2023-08-07 PROCEDURE — 99024 POSTOP FOLLOW-UP VISIT: CPT | Performed by: PHYSICIAN ASSISTANT

## 2023-08-07 PROCEDURE — 99232 SBSQ HOSP IP/OBS MODERATE 35: CPT | Performed by: PHYSICIAN ASSISTANT

## 2023-08-07 PROCEDURE — 97530 THERAPEUTIC ACTIVITIES: CPT

## 2023-08-07 PROCEDURE — 97110 THERAPEUTIC EXERCISES: CPT

## 2023-08-07 RX ADMIN — TRAZODONE HYDROCHLORIDE 50 MG: 50 TABLET ORAL at 21:27

## 2023-08-07 RX ADMIN — Medication 100 MG: at 09:57

## 2023-08-07 RX ADMIN — METHOCARBAMOL TABLETS 500 MG: 500 TABLET, COATED ORAL at 11:25

## 2023-08-07 RX ADMIN — OXYCODONE HYDROCHLORIDE AND ACETAMINOPHEN 1000 MG: 500 TABLET ORAL at 09:57

## 2023-08-07 RX ADMIN — ZONISAMIDE 200 MG: 100 CAPSULE ORAL at 21:27

## 2023-08-07 RX ADMIN — ACETAMINOPHEN 975 MG: 325 TABLET, FILM COATED ORAL at 21:27

## 2023-08-07 RX ADMIN — METHOCARBAMOL TABLETS 500 MG: 500 TABLET, COATED ORAL at 05:24

## 2023-08-07 RX ADMIN — LACOSAMIDE 200 MG: 100 TABLET, FILM COATED ORAL at 21:27

## 2023-08-07 RX ADMIN — GABAPENTIN 300 MG: 300 CAPSULE ORAL at 09:57

## 2023-08-07 RX ADMIN — METHOCARBAMOL TABLETS 500 MG: 500 TABLET, COATED ORAL at 17:22

## 2023-08-07 RX ADMIN — OXYCODONE HYDROCHLORIDE AND ACETAMINOPHEN 1000 MG: 500 TABLET ORAL at 17:23

## 2023-08-07 RX ADMIN — CYANOCOBALAMIN TAB 500 MCG 500 MCG: 500 TAB at 09:57

## 2023-08-07 RX ADMIN — SENNOSIDES AND DOCUSATE SODIUM 1 TABLET: 50; 8.6 TABLET ORAL at 21:27

## 2023-08-07 RX ADMIN — GABAPENTIN 100 MG: 300 CAPSULE ORAL at 21:27

## 2023-08-07 RX ADMIN — MULTIPLE VITAMINS W/ MINERALS TAB 1 TABLET: TAB ORAL at 09:57

## 2023-08-07 RX ADMIN — FOLIC ACID 1 MG: 1 TABLET ORAL at 10:05

## 2023-08-07 RX ADMIN — IRON SUCROSE 300 MG: 20 INJECTION, SOLUTION INTRAVENOUS at 10:05

## 2023-08-07 RX ADMIN — METOPROLOL SUCCINATE 25 MG: 25 TABLET, EXTENDED RELEASE ORAL at 09:58

## 2023-08-07 RX ADMIN — ACETAMINOPHEN 975 MG: 325 TABLET, FILM COATED ORAL at 05:24

## 2023-08-07 RX ADMIN — LACOSAMIDE 200 MG: 100 TABLET, FILM COATED ORAL at 09:58

## 2023-08-07 RX ADMIN — GABAPENTIN 300 MG: 300 CAPSULE ORAL at 17:22

## 2023-08-07 RX ADMIN — HYDROXYZINE HYDROCHLORIDE 25 MG: 25 TABLET ORAL at 17:23

## 2023-08-07 RX ADMIN — ENOXAPARIN SODIUM 30 MG: 30 INJECTION SUBCUTANEOUS at 09:58

## 2023-08-07 RX ADMIN — HYDROXYZINE HYDROCHLORIDE 25 MG: 25 TABLET ORAL at 09:57

## 2023-08-07 RX ADMIN — ACETAMINOPHEN 975 MG: 325 TABLET, FILM COATED ORAL at 15:51

## 2023-08-07 RX ADMIN — DESVENLAFAXINE 50 MG: 50 TABLET, FILM COATED, EXTENDED RELEASE ORAL at 09:59

## 2023-08-07 RX ADMIN — FOLIC ACID 1 MG: 1 TABLET ORAL at 10:20

## 2023-08-07 RX ADMIN — ENOXAPARIN SODIUM 30 MG: 30 INJECTION SUBCUTANEOUS at 21:27

## 2023-08-07 NOTE — CASE MANAGEMENT
Case Management Discharge Planning Note    Patient name Eliazar Modi  Location W /W -35 MRN [id number]  : 1960 Date 2023       Current Admission Date: 2023  Current Admission Diagnosis:Tibia fracture   Patient Active Problem List    Diagnosis Date Noted   • Acute blood loss anemia 2023   • Tibia fracture 2023   • Fall 2023   • Hematoma 2023   • Epilepsy ([address]) 2023   • Chronic alcohol abuse 2023      LOS (days): 3  Geometric Mean LOS (GMLOS) (days):   Days to GMLOS:     OBJECTIVE:  Risk of Unplanned Readmission Score: 13.87         Current admission status: Inpatient   Preferred Pharmacy: No Pharmacies Listed  Primary Care Provider: Jaun Tony MD    Primary Insurance: [employer]  [place] [facility] HEALTHCARE  Secondary Insurance:     DISCHARGE DETAILS:    Discharge planning discussed with[de-identified] patient at bedside  Freedom of Choice: Yes  Comments - Freedom of Choice: patient agreeable to STR referrals, stating she feels more comfortable now as her  can visit her     Were Treatment Team discharge recommendations reviewed with patient/caregiver?: Yes  Did patient/caregiver verbalize understanding of patient care needs?: Yes  Were patient/caregiver advised of the risks associated with not following Treatment Team discharge recommendations?: Yes    Requested [address]         Is the patient interested in [facility] at discharge?: No    DME Referral Provided  Referral made for DME?: No    Other Referral/Resources/Interventions Provided:  Interventions: Short Term Rehab  Referral Comments: CM met with patient at bedside to discuss STR. Patient stating she is now agreeable, as her  is able to visit her. Patient is agreeable to referrals. Referrals placed in [address] for both SNF and IRF. CM to follow up as able to continue with dcp.     Would you like to participate in our [id number] service program?  : No - Declined    Treatment Team Recommendation: Short Term Rehab  Discharge Destination Plan[de-identified] Short Term Rehab

## 2023-08-07 NOTE — UTILIZATION REVIEW
Continued Stay Review    Date: 8/7/23                          Current Patient Class: IP  Current Level of Care: MS    HPI:63 y.o. female initially admitted on 8/4     Assessment/Plan: 8/7 POD #3  Hgb down to 7.5 yesterday. Pt received 3rd dose IV venofer today. . ASA dc'ed yesterday . Continued Loveniox for DVT ppx . Serena Munoz No active bleeding noted . PT recommends post acute rehab . Pts pain controlled. Drsg LLE changed today, has bloody ooze from incision . WBAT LLE .    Vital Signs:   Date/Time Temp Pulse Resp BP MAP (mmHg) SpO2 O2 Device Patient Position - Orthostatic VS   08/07/23 11:55:08 99.1 °F (37.3 °C) 16 Abnormal  -- 111/62 78 -- None (Room air) Lying   08/07/23 07:19:50 97.7 °F (36.5 °C) 72 15 111/59 76 95 % -- --   08/07/23 02:48:52 -- 69 -- 114/61 79 97 % -- --   08/06/23 22:06:12 98.6 °F (37 °C) 76 18 129/62 84 95 % None (Room air) Lying   08/06/23 19:04:12 98.5 °F (36.9 °C) 74 18 129/61 84 93 % None (Room air) Lying   08/06/23 15:48:36 99 °F (37.2 °C) 80 18 105/52 70 90 % None (Room air) Lying         Pertinent Labs/Diagnostic Results:       Results from last 7 days   Lab Units 08/06/23  0632 08/05/23  0338 08/04/23  1122 08/04/23  1120   WBC Thousand/uL 4.30* 3.98* 6.35  --    HEMOGLOBIN g/dL 7.5* 9.0* 11.0*  --    I STAT HEMOGLOBIN g/dl  --   --   --  9.9*   HEMATOCRIT % 22.6* 27.4* 34.8  --    HEMATOCRIT, ISTAT %  --   --   --  29*   PLATELETS Thousands/uL 42* 47* 56*  --    NEUTROS ABS Thousands/µL 2.06 3.25 5.07  --          Results from last 7 days   Lab Units 08/06/23  0632 08/05/23  0338 08/04/23  1449 08/04/23  1122 08/04/23  1120   SODIUM mmol/L 140 137 137 133*  --    POTASSIUM mmol/L 3.9 4.2 4.3 4.3  --    CHLORIDE mmol/L 110* 108 100 106  --    CO2 mmol/L 25 24 15* 19*  --    CO2, I-STAT mmol/L  --   --   --   --  18*   ANION GAP mmol/L 5 5 22 8  --    BUN mg/dL 12 11 8 14  --    CREATININE mg/dL 0.59* 0.62 0.28* 0.70  --    EGFR ml/min/1.73sq m 97 96 125 92  --    CALCIUM mg/dL 8.1* 7.9* 4.8* 8.2*  --    CALCIUM, IONIZED mmol/L  --  1.07*  --   --   --    CALCIUM, IONIZED, ISTAT mmol/L  --   --   --   --  0.98*   MAGNESIUM mg/dL  --  2.0  --  1.8*  --    PHOSPHORUS mg/dL  --  2.9  --  3.3  --      Results from last 7 days   Lab Units 08/05/23  0338 08/04/23  1122   AST U/L 19 29   ALT U/L 19 24   ALK PHOS U/L 124* 158*   TOTAL PROTEIN g/dL 5.5* 6.1*   ALBUMIN g/dL 3.1* 3.3*   TOTAL BILIRUBIN mg/dL 0.35 0.47   BILIRUBIN DIRECT mg/dL 0.11  --          Results from last 7 days   Lab Units 08/06/23  0632 08/05/23  0338 08/04/23  1449 08/04/23  1122   GLUCOSE RANDOM mg/dL 108 178* 71 116             No results found for: "BETA-HYDROXYBUTYRATE"       Results from last 7 days   Lab Units 08/05/23  0405   PH NATALIA  7.390   PCO2 NATALIA mm Hg 41.8*   PO2 NATALIA mm Hg 66.2*   HCO3 NATALIA mmol/L 24.7   BASE EXC NATALIA mmol/L -0.2   O2 CONTENT NATALIA ml/dL 13.2   O2 HGB, VENOUS % 90.8*     Results from last 7 days   Lab Units 08/04/23  1120   PH, NATALIA I-STAT  7.381   PCO2, NATALIA ISTAT mm HG 28.9*   PO2, NATALIA ISTAT mm HG 31.0*   HCO3, NATALIA ISTAT mmol/L 17.1*   I STAT BASE EXC mmol/L -7*   I STAT O2 SAT % 59*     Results from last 7 days   Lab Units 08/04/23  1122   CK TOTAL U/L 100             Results from last 7 days   Lab Units 08/04/23  1229   PROTIME seconds 14.4   INR  1.10             Results from last 7 days   Lab Units 08/04/23  1229   LACTIC ACID mmol/L 1.2                                                 Results from last 7 days   Lab Units 08/04/23  1330   CLARITY UA  Clear   COLOR UA  Light Yellow   SPEC GRAV UA  1.028   PH UA  6.0   GLUCOSE UA mg/dl Negative   KETONES UA mg/dl Negative   BLOOD UA  Negative   PROTEIN UA mg/dl Negative   NITRITE UA  Negative   BILIRUBIN UA  Negative   UROBILINOGEN UA (BE) mg/dl <2.0   LEUKOCYTES UA  Negative                                                   Medications:   Scheduled Medications:  acetaminophen, 975 mg, Oral, Q8H [facility]  ascorbic acid, 1,000 mg, Oral, BID  cyanocobalamin, 500 mcg, Oral, Daily  desvenlafaxine, 50 mg, Oral, Daily  enoxaparin, 30 mg, Subcutaneous, L35Z [facility]  folic acid, 1 mg, Oral, Daily  folic acid, 1 mg, Oral, Daily  gabapentin, 100 mg, Oral, HS  gabapentin, 300 mg, Oral, BID  hydrOXYzine HCL, 25 mg, Oral, BID  lacosamide, 200 mg, Oral, Q12H DESTIN  methocarbamol, 500 mg, Oral, Q6H [facility]  metoprolol succinate, 25 mg, Oral, Daily  multivitamin-minerals, 1 tablet, Oral, Daily  polyethylene glycol, 17 g, Oral, Daily  senna-docusate sodium, 1 tablet, Oral, HS  thiamine, 100 mg, Oral, Daily  traZODone, 50 mg, Oral, HS  zonisamide, 200 mg, Oral, HS    aspirin (ECOTRIN LOW STRENGTH) EC tablet 81 mg  Dose: 81 mg  Freq: 2 times daily Route: PO  Start: 08/04/23 2100 End: 08/06/23 1749  iron sucrose (VENOFER) 300 mg in sodium chloride 0.9 % 250 mL IVPB  Dose: 300 mg  Freq: Daily Route: IV  Last Dose: 300 mg (08/07/23 1005)  Start: 08/05/23 0900 End: 08/07/23 1135        Continuous IV Infusions:     PRN Meds:  naloxone, 0.04 mg, Intravenous, Q1MIN PRN  ondansetron, 4 mg, Intravenous, Q4H PRN  oxyCODONE, 5 mg, Oral, Q4H PRN x3 8/6  oxyCODONE, 7.5 mg, Oral, Q4H PRN  HYDROmorphone HCl (DILAUDID) injection 0.2 mg  Dose: 0.2 mg  Freq: Every 2 hour PRN Route: IV  PRN Reason: breakthrough pain  Start: 08/04/23 1224 End: 08/06/23 1828   x2 8/6      Discharge Plan: D    Network Utilization Review Department  ATTENTION: Please call with any questions or concerns to [phone] and carefully listen to the prompts so that you are directed to the right person. All voicemails are confidential.  Maryan Gitelman all requests for admission clinical reviews, approved or denied determinations and any other requests to dedicated fax number below belonging to the campus where the patient is receiving treatment.  List of dedicated fax numbers for the Facilities:  [place] DENIALS (Administrative/Medical Necessity) [phone] [address] [facility] (Maternity/NICU/Pediatrics) [phone]   [facility] Ion Nielsen [address] [phone] 5035 [facility] [address] [address] [facility] [address] [place] [address] [address]  [phone]

## 2023-08-07 NOTE — PLAN OF CARE
Problem: PHYSICAL THERAPY ADULT  Goal: Performs mobility at highest level of function for planned discharge setting. See evaluation for individualized goals. Description: Treatment/Interventions: Functional transfer training, LE strengthening/ROM, Therapeutic exercise, Endurance training, Cognitive reorientation, Patient/family training, Gait training, Bed mobility, Equipment eval/education, Spoke to nursing, Spoke to case management, OT  Equipment Recommended: Tonya Steen (possible WC for distances?)       See flowsheet documentation for full assessment, interventions and recommendations. Outcome: Progressing  Note: Prognosis: Fair  Problem List: Decreased strength, Decreased range of motion, Decreased endurance, Impaired balance, Decreased mobility, Decreased skin integrity, Orthopedic restrictions, Pain  Assessment: Patient agreeable to participate in therapy session. Patient demonstrated progression with functional mobility however does continue to require physical assistance through all phases of mobility. Supine to sit remains consistent with supervision howevr require min ax1 for return for LE management. Multiple sit<>stand transfers with increased time and verbal instruction for technique. Pt able to initiate gait training with roller walker and min ax1 for few feet. Requires assistance for steadying balance and walker management as well as verbal instruction for stepping pattern and walker advancement. Pt fatigues quickly limiting further ambulation distance and trials. Pt declined sitting OOB in recliner secondary to fatigue, despite education and encouragement. Continue to focus on OOB mobility with progression of transfers and ambulation as appropriate and able. Barriers to Discharge: Decreased caregiver support, Inaccessible home environment     PT Discharge Recommendation: Post acute rehabilitation services    See flowsheet documentation for full assessment.

## 2023-08-07 NOTE — PROGRESS NOTES
Orthopedics   Petty Roblero 61 y.o. female MRN: [id number]  Unit/Bed#: W -01    Subjective:  61 y. o.female post operative day 3 IMN left tibia. Pt doing well. Pain controlled.     Labs:  0   Lab Value Date/Time    HCT 22.6 (L) 08/06/2023 0632    HCT 27.4 (L) 08/05/2023 0338    HCT 34.8 08/04/2023 1122    HCT 29 (L) 08/04/2023 1120    HGB 7.5 (L) 08/06/2023 0632    HGB 9.0 (L) 08/05/2023 0338    HGB 11.0 (L) 08/04/2023 1122    HGB 9.9 (L) 08/04/2023 1120    INR 1.10 08/04/2023 1229    WBC 4.30 (L) 08/06/2023 0632    WBC 3.98 (L) 08/05/2023 0338    WBC 6.35 08/04/2023 1122       Meds:    Current Facility-Administered Medications:   •  acetaminophen (TYLENOL) tablet 975 mg, 975 mg, Oral, Q8H [facility], Madelyn Ocasio PA-C, 975 mg at 08/07/23 1773  •  ascorbic acid (VITAMIN C) tablet 1,000 mg, 1,000 mg, Oral, BID, Darius Ureña MD, 1,000 mg at 08/07/23 5015  •  cyanocobalamin (VITAMIN B-12) tablet 500 mcg, 500 mcg, Oral, Daily, Darius Ureña MD, 500 mcg at 08/07/23 0957  •  desvenlafaxine succinate (PRISTIQ) 24 hr tablet 50 mg, 50 mg, Oral, Daily, Brittany Bell PA-C, 50 mg at 08/07/23 2280  •  enoxaparin (LOVENOX) subcutaneous injection 30 mg, 30 mg, Subcutaneous, Q12H [facility], Brittany Bell PA-C, 30 mg at 42/34/60 7302  •  folic acid (FOLVITE) tablet 1 mg, 1 mg, Oral, Daily, Brittany Bell PA-C, 1 mg at 49/03/47 4310  •  folic acid (FOLVITE) tablet 1 mg, 1 mg, Oral, Daily, Darius Ureña MD, 1 mg at 08/07/23 1020  •  gabapentin (NEURONTIN) capsule 100 mg, 100 mg, Oral, HS, Brittany Bell PA-C, 100 mg at 08/06/23 2245  •  gabapentin (NEURONTIN) capsule 300 mg, 300 mg, Oral, BID, Brittany Bell PA-C, 300 mg at 08/07/23 1267  •  hydrOXYzine HCL (ATARAX) tablet 25 mg, 25 mg, Oral, BID, Brittany Bell PA-C, 25 mg at 08/07/23 0257  •  lacosamide (VIMPAT) tablet 200 mg, 200 mg, Oral, Q12H [facility], Dale Noa Valery, PA-HOLLI, 200 mg at 08/07/23 7996  •  methocarbamol (ROBAXIN) tablet 500 mg, 500 mg, Oral, Q6H [facility], Barbara Galindo PA-C, 500 mg at 08/07/23 1125  •  metoprolol succinate (TOPROL-XL) 24 hr tablet 25 mg, 25 mg, Oral, Daily, Barbara Galindo PA-C, 25 mg at 08/07/23 0577  •  multivitamin-minerals (CENTRUM) tablet 1 tablet, 1 tablet, Oral, Daily, Barbara Galindo PA-C, 1 tablet at 08/07/23 0957  •  naloxone (NARCAN) 0.04 mg/mL syringe 0.04 mg, 0.04 mg, Intravenous, Q1MIN PRN, Barbara Galindo PA-C  •  ondansetron [facility]) injection 4 mg, 4 mg, Intravenous, Q4H PRN, Barbara Galindo PA-C  •  oxyCODONE (ROXICODONE) IR tablet 5 mg, 5 mg, Oral, Q4H PRN, CHANTE De La Rosa-HOLLI, 5 mg at 08/06/23 2245  •  oxyCODONE (ROXICODONE) split tablet 7.5 mg, 7.5 mg, Oral, Q4H PRN, QuinCHANTE Gleason-C  •  polyethylene glycol (MIRALAX) packet 17 g, 17 g, Oral, Daily, Barbara Galindo PA-C, 17 g at 08/06/23 0298  •  senna-docusate sodium (SENOKOT S) 8.6-50 mg per tablet 1 tablet, 1 tablet, Oral, HS, Barbara Galindo PA-C, 1 tablet at 08/06/23 2244  •  thiamine tablet 100 mg, 100 mg, Oral, Daily, Barbara Galindo PA-C, 100 mg at 08/07/23 0957  •  traZODone (DESYREL) tablet 50 mg, 50 mg, Oral, HS, Barbara Galindo PA-C, 50 mg at 08/06/23 2245  •  zonisamide (ZONEGRAN) capsule 200 mg, 200 mg, Oral, HS, Barbara Galindo PA-C, 200 mg at 08/06/23 1336    Blood Culture:   No results found for: "BLOODCX"    Wound Culture:   No results found for: "WOUNDCULT"    Ins and Outs:  I/O last 24 hours:   In: 180 [P.O.:180]  Out: -           Physical:  Vitals:    08/07/23 1155   BP: 111/62   Pulse: (!) 16   Resp:    Temp: 99.1 °F (37.3 °C)   SpO2:      left lower extremity  · Dressings saturated  · Changed this morning  · Minimal bloody ooze from incision  · 4/5 strength to EHL/FHL, + knee flexion/extension  · Sensation intact L2-S1  · 2+ DP Pulse    Assessment:   61 y. o.female post operative day 3 IMN left tibia.     Plan:  · WBAT left lower extremity  · Up and out of bed  · Ice, Elevation to affected extremity  · Analgesics  · DVT prophylaxis  · Recommend ASA 81 mg BID (currently being held due to thrombocytopenia)  · PT/OT  · Dispo: Ok for discharge from ortho perspective  · Patient noted to have acute blood loss anemia due to a drop in Hbg of > 2.0g from preop levels, will monitor vital signs and resuscitate with IV fluids as needed   · Patient does not accept blood products    Tonny Parr PA-C

## 2023-08-07 NOTE — PLAN OF CARE
Problem: Potential for Falls  Goal: Patient will remain free of falls  Description: INTERVENTIONS:  - Educate patient/family on patient safety including physical limitations  - Instruct patient to call for assistance with activity   - Consult OT/PT to assist with strengthening/mobility   - Keep Call bell within reach  - Keep bed low and locked with side rails adjusted as appropriate  - Keep care items and personal belongings within reach  - Initiate and maintain comfort rounds  - Make Fall Risk Sign visible to staff  - Offer Toileting every 2 Hours, in advance of need  - Initiate/Maintain bed alarm  - Obtain necessary fall risk management equipment:   - Apply yellow socks and bracelet for high fall risk patients  - Consider moving patient to room near nurses station  Outcome: Progressing     Problem: MOBILITY - ADULT  Goal: Maintain or return to baseline ADL function  Description: INTERVENTIONS:  -  Assess patient's ability to carry out ADLs; assess patient's baseline for ADL function and identify physical deficits which impact ability to perform ADLs (bathing, care of mouth/teeth, toileting, grooming, dressing, etc.)  - Assess/evaluate cause of self-care deficits   - Assess range of motion  - Assess patient's mobility; develop plan if impaired  - Assess patient's need for assistive devices and provide as appropriate  - Encourage maximum independence but intervene and supervise when necessary  - Involve family in performance of ADLs  - Assess for home care needs following discharge   - Consider OT consult to assist with ADL evaluation and planning for discharge  - Provide patient education as appropriate  Outcome: Progressing  Goal: Maintains/Returns to pre admission functional level  Description: INTERVENTIONS:  - Perform BMAT or MOVE assessment daily.   - Set and communicate daily mobility goal to care team and patient/family/caregiver.    - Collaborate with rehabilitation services on mobility goals if consulted  - Perform Range of Motion 2 times a day. - Reposition patient every 2 hours.   - Dangle patient 2 times a day  - Stand patient 2 times a day  - Ambulate patient 2 times a day  - Out of bed to chair 2 times a day   - Out of bed for meals 2 times a day  - Out of bed for toileting  - Record patient progress and toleration of activity level   Outcome: Progressing     Problem: Prexisting or High Potential for Compromised Skin Integrity  Goal: Skin integrity is maintained or improved  Description: INTERVENTIONS:  - Identify patients at risk for skin breakdown  - Assess and monitor skin integrity  - Assess and monitor nutrition and hydration status  - Monitor labs   - Assess for incontinence   - Turn and reposition patient  - Assist with mobility/ambulation  - Relieve pressure over bony prominences  - Avoid friction and shearing  - Provide appropriate hygiene as needed including keeping skin clean and dry  - Evaluate need for skin moisturizer/barrier cream  - Collaborate with interdisciplinary team   - Patient/family teaching  - Consider wound care consult   Outcome: Progressing

## 2023-08-07 NOTE — PHYSICAL THERAPY NOTE
PHYSICAL THERAPY NOTE      Patient Name: Vazquez Fernando  PZLJD'Z Date: 23 1147   PT Last Visit   PT Visit Date 23   Note Type   Note Type Treatment   Pain Assessment   Pain Assessment Tool 0-10   Pain Score 5   Pain Location/Orientation Orientation: Left; Location: Leg   Restrictions/Precautions   Weight Bearing Precautions Per Order Yes   LLE Weight Bearing Per Order WBAT   Other Precautions Chair Alarm; Bed Alarm;Cognitive; Fall Risk;Pain;WBS   General   Family/Caregiver Present No   Subjective   Subjective Patient supine in bed and is agreeable to participate in therapy session. Pt identifers obtained from name & . Bed Mobility   Supine to Sit 5  Supervision   Additional items Assist x 1;HOB elevated; Bedrails; Increased time required;Verbal cues;LE management   Sit to Supine 4  Minimal assistance   Additional items Assist x 1;HOB elevated; Bedrails; Increased time required;Verbal cues;LE management   Additional Comments Pt supine in bed post session with bed alarm engaged, call bell and belongings in reach. Transfers   Sit to Stand 4  Minimal assistance   Additional items Assist x 1; Armrests; Increased time required;Verbal cues   Stand to Sit 4  Minimal assistance   Additional items Assist x 1; Armrests; Increased time required;Verbal cues   Ambulation/Elevation   Gait pattern Decreased foot clearance; Improper Weight shift;Decreased L stance; Antalgic;Poor UE support; Excessively slow; Step to;Short stride   Gait Assistance 4  Minimal assist   Additional items Assist x 1;Verbal cues   Assistive Device Rolling walker   Distance 3 feet x2   Balance   Static Sitting Fair +   Static Standing Poor +   Ambulatory Poor   Endurance Deficit   Endurance Deficit Yes   Endurance Deficit Description limited activity and standing tolerance, limited ambulationm distance   Activity Tolerance   Activity Tolerance Patient limited by fatigue;Patient limited by pain   Nurse Made Aware Spoke to KELTON Chu   Exercises   Quad Sets Supine;10 reps;AROM; Left   Heelslides Supine;10 reps;AROM; Left   Knee AROM Short Arc Quad Supine;5 reps;AAROM; Left   Heel Cord Stretch Supine;PROM  (gentle strech)   Assessment   Prognosis Fair   Problem List Decreased strength;Decreased range of motion;Decreased endurance; Impaired balance;Decreased mobility; Decreased skin integrity;Orthopedic restrictions;Pain   Assessment Patient agreeable to participate in therapy session. Patient demonstrated progression with functional mobility however does continue to require physical assistance through all phases of mobility. Supine to sit remains consistent with supervision howevr require min ax1 for return for LE management. Multiple sit<>stand transfers with increased time and verbal instruction for technique. Pt able to initiate gait training with roller walker and min ax1 for few feet. Requires assistance for steadying balance and walker management as well as verbal instruction for stepping pattern and walker advancement. Pt fatigues quickly limiting further ambulation distance and trials. Pt declined sitting OOB in recliner secondary to fatigue, despite education and encouragement. Continue to focus on OOB mobility with progression of transfers and ambulation as appropriate and able. Barriers to Discharge Decreased caregiver support; Inaccessible home environment   Goals   Patient Goals to be able to walk more   STG Expiration Date 08/15/23   PT Treatment Day 2   Plan   Treatment/Interventions Functional transfer training;LE strengthening/ROM; Therapeutic exercise; Endurance training;Patient/family training;Equipment eval/education; Bed mobility;Gait training;Spoke to nursing   PT Frequency 4-6x/wk   Recommendation   PT Discharge Recommendation Post acute rehabilitation services   Equipment Recommended [facility] Recommended Wheeled walker   AM-PAC Basic Mobility Inpatient   Turning in Flat Bed Without Bedrails 3   Lying on Back to Sitting on Edge of Flat Bed Without Bedrails 3   Moving Bed to Chair 2   Standing Up From Chair Using Arms 3   Walk in Room 2   Climb 3-5 Stairs With Railing 1   Basic Mobility Inpatient Raw Score 14   Basic Mobility Standardized Score 35.55   Highest Level Of Mobility   -[facility] Goal 4: Move to chair/commode   -HL Achieved 5: Stand (1 or more minutes)     The patient's AM-PAC Basic Mobility Inpatient Short Form Raw Score is 14. A Raw score of less than or equal to 16 suggests the patient may benefit from discharge to post-acute rehabilitation services. Please also refer to the recommendation of the Physical Therapist for safe discharge planning.       Yannick Altamirano, PTA

## 2023-08-08 LAB
HCT VFR BLD AUTO: 25.6 % (ref 34.8–46.1)
HGB BLD-MCNC: 8.3 G/DL (ref 11.5–15.4)

## 2023-08-08 PROCEDURE — 85018 HEMOGLOBIN: CPT | Performed by: PHYSICIAN ASSISTANT

## 2023-08-08 PROCEDURE — 99232 SBSQ HOSP IP/OBS MODERATE 35: CPT | Performed by: PHYSICIAN ASSISTANT

## 2023-08-08 PROCEDURE — 85014 HEMATOCRIT: CPT | Performed by: PHYSICIAN ASSISTANT

## 2023-08-08 PROCEDURE — 97535 SELF CARE MNGMENT TRAINING: CPT

## 2023-08-08 PROCEDURE — 99024 POSTOP FOLLOW-UP VISIT: CPT | Performed by: PHYSICIAN ASSISTANT

## 2023-08-08 RX ORDER — LANOLIN ALCOHOL/MO/W.PET/CERES
3 CREAM (GRAM) TOPICAL
Status: DISCONTINUED | OUTPATIENT
Start: 2023-08-08 | End: 2023-08-11 | Stop reason: HOSPADM

## 2023-08-08 RX ORDER — OXYCODONE HYDROCHLORIDE 5 MG/1
5 TABLET ORAL ONCE
Status: COMPLETED | OUTPATIENT
Start: 2023-08-08 | End: 2023-08-08

## 2023-08-08 RX ADMIN — CYANOCOBALAMIN TAB 500 MCG 500 MCG: 500 TAB at 09:33

## 2023-08-08 RX ADMIN — MULTIPLE VITAMINS W/ MINERALS TAB 1 TABLET: TAB ORAL at 09:33

## 2023-08-08 RX ADMIN — METOPROLOL SUCCINATE 25 MG: 25 TABLET, EXTENDED RELEASE ORAL at 09:33

## 2023-08-08 RX ADMIN — FOLIC ACID 1 MG: 1 TABLET ORAL at 09:32

## 2023-08-08 RX ADMIN — METHOCARBAMOL TABLETS 500 MG: 500 TABLET, COATED ORAL at 00:24

## 2023-08-08 RX ADMIN — ACETAMINOPHEN 975 MG: 325 TABLET, FILM COATED ORAL at 21:16

## 2023-08-08 RX ADMIN — METHOCARBAMOL TABLETS 500 MG: 500 TABLET, COATED ORAL at 12:25

## 2023-08-08 RX ADMIN — SENNOSIDES AND DOCUSATE SODIUM 1 TABLET: 50; 8.6 TABLET ORAL at 21:16

## 2023-08-08 RX ADMIN — HYDROXYZINE HYDROCHLORIDE 25 MG: 25 TABLET ORAL at 09:33

## 2023-08-08 RX ADMIN — ACETAMINOPHEN 975 MG: 325 TABLET, FILM COATED ORAL at 05:16

## 2023-08-08 RX ADMIN — HYDROXYZINE HYDROCHLORIDE 25 MG: 25 TABLET ORAL at 17:26

## 2023-08-08 RX ADMIN — ZONISAMIDE 200 MG: 100 CAPSULE ORAL at 21:16

## 2023-08-08 RX ADMIN — GABAPENTIN 100 MG: 300 CAPSULE ORAL at 21:16

## 2023-08-08 RX ADMIN — LACOSAMIDE 200 MG: 100 TABLET, FILM COATED ORAL at 09:34

## 2023-08-08 RX ADMIN — Medication 3 MG: at 21:16

## 2023-08-08 RX ADMIN — POLYETHYLENE GLYCOL 3350 17 G: 17 POWDER, FOR SOLUTION ORAL at 09:32

## 2023-08-08 RX ADMIN — ENOXAPARIN SODIUM 30 MG: 30 INJECTION SUBCUTANEOUS at 09:32

## 2023-08-08 RX ADMIN — ACETAMINOPHEN 975 MG: 325 TABLET, FILM COATED ORAL at 15:08

## 2023-08-08 RX ADMIN — OXYCODONE HYDROCHLORIDE AND ACETAMINOPHEN 1000 MG: 500 TABLET ORAL at 17:27

## 2023-08-08 RX ADMIN — GABAPENTIN 300 MG: 300 CAPSULE ORAL at 17:26

## 2023-08-08 RX ADMIN — METHOCARBAMOL TABLETS 500 MG: 500 TABLET, COATED ORAL at 17:26

## 2023-08-08 RX ADMIN — DESVENLAFAXINE 50 MG: 50 TABLET, FILM COATED, EXTENDED RELEASE ORAL at 09:34

## 2023-08-08 RX ADMIN — OXYCODONE HYDROCHLORIDE 5 MG: 5 TABLET ORAL at 05:16

## 2023-08-08 RX ADMIN — Medication 7.5 MG: at 09:33

## 2023-08-08 RX ADMIN — GABAPENTIN 300 MG: 300 CAPSULE ORAL at 09:33

## 2023-08-08 RX ADMIN — Medication 100 MG: at 09:33

## 2023-08-08 RX ADMIN — OXYCODONE HYDROCHLORIDE AND ACETAMINOPHEN 1000 MG: 500 TABLET ORAL at 09:33

## 2023-08-08 RX ADMIN — METHOCARBAMOL TABLETS 500 MG: 500 TABLET, COATED ORAL at 05:16

## 2023-08-08 RX ADMIN — ENOXAPARIN SODIUM 30 MG: 30 INJECTION SUBCUTANEOUS at 21:16

## 2023-08-08 RX ADMIN — OXYCODONE HYDROCHLORIDE 5 MG: 5 TABLET ORAL at 12:32

## 2023-08-08 RX ADMIN — TRAZODONE HYDROCHLORIDE 50 MG: 50 TABLET ORAL at 21:16

## 2023-08-08 RX ADMIN — LACOSAMIDE 200 MG: 100 TABLET, FILM COATED ORAL at 21:16

## 2023-08-08 NOTE — ARC ADMISSION
Referral was received for consideration of patient for inpatient acute rehab at [facility]. After review with DANUTA ALEMAN she would like a repeat H/H. CM made aware. We will continue to follow case for medical stability and monitor functional progress.

## 2023-08-08 NOTE — CASE MANAGEMENT
Case Management Discharge Planning Note    Patient name Erika Thomas  Location W /W -24 MRN [id number]  : 1960 Date 2023       Current Admission Date: 2023  Current Admission Diagnosis:Tibia fracture   Patient Active Problem List    Diagnosis Date Noted   • Acute blood loss anemia 2023   • Tibia fracture 2023   • Fall 2023   • Hematoma 2023   • Epilepsy ([address]) 2023   • Chronic alcohol abuse 2023      LOS (days): 4  Geometric Mean LOS (GMLOS) (days):   Days to GMLOS:     OBJECTIVE:  Risk of Unplanned Readmission Score: 12.68         Current admission status: Inpatient   Preferred Pharmacy: No Pharmacies Listed  Primary Care Provider: Sandro Mann MD    Primary Insurance: [employer] [facility] HEALTHCARE  Secondary Insurance:     DISCHARGE DETAILS:    Discharge planning discussed with[de-identified] patient at bedside  Freedom of Choice: Yes  Comments - Freedom of Choice: [facility]  CM contacted family/caregiver?: No- see comments (patient declined, stating she will discuss with her )  Were Treatment Team discharge recommendations reviewed with patient/caregiver?: Yes  Did patient/caregiver verbalize understanding of patient care needs?: Yes  Were patient/caregiver advised of the risks associated with not following Treatment Team discharge recommendations?: Yes    Contacts  Reason/Outcome: Continuity of Care, Referral, Emergency Contact, Discharge  [facility] [address]         Is the patient interested in [facility] at discharge?: No    DME Referral Provided  Referral made for DME?: No    Other Referral/Resources/Interventions Provided:  Interventions: Short Term Rehab, Acute Rehab  Referral Comments: [facility], however [facility] not in network with patients insurance. Patient with a few sub- acute options as well.  CM met with patient at bedside to discuss, patient agreeable to [facility], first choice [facility], second [facility]. Facility reserved in [address] and d/c support attached to initiate insurance auth. CM to follow up as able.     Treatment Team Recommendation: Short Term Rehab  Discharge Destination Plan[de-identified] Short Term Rehab  Transport at Discharge : Wheelchair Fabi

## 2023-08-08 NOTE — ARC ADMISSION
Referral was received for consideration of patient for inpatient acute rehab at [facility]. After review with [facility] physician patient is accepted for ARC. Per CM patient is stable. Auth can be submitted. Our NPI is  and Dr Hernán Abad is .     Benefits checked: Ded $0  OOPM $0 remaining, copay $0, coins 15%

## 2023-08-08 NOTE — OCCUPATIONAL THERAPY NOTE
Occupational Therapy Progress Note     Patient Name: Rubens Altman  JDFET'T Date: 8/8/2023  Problem List  Principal Problem:    Tibia fracture  Active Problems:    Fall    Hematoma    Epilepsy ([address])    Chronic alcohol abuse    Acute blood loss anemia              08/08/23 1139   OT Last Visit   OT Visit Date 08/08/23   Note Type   Note Type Treatment for insurance authorization   Pain Assessment   Pain Assessment Tool 0-10   Pain Score 5   Pain Location/Orientation Orientation: Left; Location: Knee   Hospital Pain Intervention(s) Repositioned; Emotional support   Restrictions/Precautions   Weight Bearing Precautions Per Order Yes   LLE Weight Bearing Per Order WBAT   Braces or Orthoses   (ACE bandage on LLE)   Other Precautions Cognitive; Chair Alarm; Bed Alarm; Fall Risk;Pain   Lifestyle   Intrinsic Gratification reports that she enjoys spending time with her  and watching judge dale   ADL   Grooming Assistance 5  Supervision/Setup   Grooming Deficit Increased time to complete   UB Bathing Assistance 5  Supervision/Setup   UB Bathing Deficit Increased time to complete   UB Bathing Comments washing sitting in recliner chair   LB Bathing Assistance 4  Minimal Assistance   LB Bathing Deficit Increased time to complete;Supervision/safety;Verbal cueing;Buttocks; Left lower leg including foot   LB Bathing Comments A with LLE and buttocks in standing   UB Dressing Assistance 5  Supervision/Setup   UB Dressing Deficit Increased time to complete;Supervision/safety;Verbal cueing   UB Dressing Comments doff robe, john garay and madelinee   LB Dressing Assistance 3  Moderate Assistance   LB Dressing Deficit Increased time to complete;Supervision/safety;Verbal cueing; Don/doff R sock; Don/doff L sock   LB Dressing Comments Able to don/doff R sock, A with L sock due to pain with forward functional reaching   Bed Mobility   Supine to Sit 5  Supervision   Additional items Increased time required;Verbal cues   Transfers   Sit to Stand 4 Minimal assistance   Additional items Assist x 1; Increased time required;Verbal cues   Stand to Sit 4  Minimal assistance   Additional items Assist x 1; Increased time required;Verbal cues   Additional Comments use of RWkaryn on hand placement   Functional Mobility   Functional Mobility 4  Minimal assistance   Additional Comments Ax1, limited distance bed > recliner chair, unwilling to complete further movement due to pain   Additional items Rolling walker   Subjective   Subjective "I love my  but boy I like being here and having some 'me' time"   Cognition   Overall Cognitive Status Impaired   Arousal/Participation Alert; Cooperative   Attention Attends with cues to redirect   Orientation Level Oriented X4   Memory Decreased short term memory;Decreased recall of recent events   Following Commands Follows one step commands with increased time or repetition   Comments Pt with poor insight into deficits and poor problem solving   Activity Tolerance   Activity Tolerance Patient tolerated treatment well   Medical Staff Made Aware KELTON Weller   Assessment   Assessment Pt seen on this date for skilled OT treatment session. At start of session pt supine in bed. Pt agreeable and motivated to participate in session stating "I need to get clean". Pt required decreased assistance with bed mobility and functional transfers. Pt with improved forward functional reach as seen with RLE, however declining to participate in LLE ADLs despite ability to reach. Pt benefiting from edu on LB dressing techniques and sequencing of bathing/hygiene to conserve energy. Pt declining functional mobility in/out of bathroom due to pain and fearful of functional mobility. Pt would  continue to benefit from skilled OT treatment sessions in order to address remaining deficits and continue to recommend d/c to rehab when medically stable.    Plan   Goal Expiration Date 08/15/23   OT Treatment Day 1   OT Frequency 3-5x/wk   Recommendation   OT Discharge Recommendation Post acute rehabilitation services   -[facility] Daily Activity Inpatient   Lower Body Dressing 2   Bathing 2   Toileting 3   Upper Body Dressing 3   Grooming 4   Eating 4   Daily Activity Raw Score 18   Daily Activity Standardized Score (Calc for Raw Score >=11) 38.66   AM-PAC Applied Cognition Inpatient   Following a Speech/Presentation 3   Understanding Ordinary Conversation 4   Taking Medications 3   Remembering Where Things Are Placed or Put Away 4   Remembering List of 4-5 Errands 2   Taking Care of Complicated Tasks 2   Applied Cognition Raw Score 18   Applied Cognition Standardized Score 38.07   End of Consult   Patient Position at End of Consult Bedside chair;Bed/Chair alarm activated; All needs within reach       Pt goals to be met by 8/15/23 to max I w/ ADLs and improve engagement to return home and order food from [employer] includes:     -Pt will complete bed mobility supine <> sit w/ mod I in preparation for ADLs PROGRESSING     -Pt will complete functional transfers to bed, chair, and toilet using LRAD, DME as needed w/ S to max I w/ ADLs PROGRESSING     -Pt will consistently engage in functional mobility using LRAD w/ S to / from bathroom to max I w/ ADLs PROGRESSING     -Pt will demonstrate improved activity and sitting tolerance OOB In chair for all meals PROGRESSING     -Pt will complete LBD w/ S after set- up using LHAE as needed PROGRESSING     -Pt will complete UBD w/ mod I PROGRESSING     -Pt will demonstrate improved functional standing tolerance for at least 5 minutes using LRAD w/ at least fair balance while engaged in grooming tasks standing at sink to max I w/ light IADLs PROGRESSING     -Pt will demonstrate good attention and participation in ongoing eval of functional cognition to assist in DC Planning     -Pt will consistently follow multi step directions during ADLs w/ good recall to improve engagement.          The patient's raw score on the AM-PAC Daily Activity Inpatient Short Form is 18. A raw score of less than 19 suggests the patient may benefit from discharge to post-acute rehabilitation services. Please refer to the recommendation of the Occupational Therapist for safe discharge planning.       Gianluca Nye MS, OTR/L

## 2023-08-08 NOTE — ASSESSMENT & PLAN NOTE
- patient has a h/o chronic alcohol abuse  - recently admitted to [facility] unit in July 2023  - place on CIWA protocol and obtain further alcohol history from patient post op

## 2023-08-08 NOTE — PROGRESS NOTES
[address] [facility]  Progress Note  Name: Breana Garibay  MRN: [id number]  Unit/Bed#: W -01 I Date of Admission: 8/4/2023   Date of Service: 8/7/2023 I Hospital Day: 3    Assessment/Plan   Fall  Assessment & Plan  - mechanical fall at home, with head strike  - sustained below stated injuries  - PT/OT recommending inpatient rehab. - CM for dispo planning. Rehab referrals. * Tibia fracture  Assessment & Plan  - closed, midshaft tibial fracture  - orthopedics consultation appreciated  - s/p ORIF w L tibial IMN on 8/4  - WBAT LLE  - pain control with multi modal regimen  - neurovascular checks  - DVT ppx with Lovenox   - PT/OT recommending inpatient rehab  - F/u with orthopedics as an outpatient    Hematoma  Assessment & Plan  - left anterior tibial hematoma in setting of closed left tibial fracture  - neurovascularly intact  - no signs of active bleeding    Acute blood loss anemia  Assessment & Plan  - Hgb 7.5 on 8/6.   - no signs of significant active bleeding. Minor skin oozing at incision. Orthopedics aware. Will hold ASA and continue Lovenox for DVT ppx at this time. - Patient is a Rastafari and refuses all blood products  -Continue Venofer, B12, folate acid as well as repeat H&H later in the morning on 8/5/2023 to make sure that hemoglobin does not decrease any further  - asymptomatic and VS improved. No need for further recheck as she is doing well clinically from this standpoint and would not accept a transfusion.      Chronic alcohol abuse  Assessment & Plan  - patient has a h/o chronic alcohol abuse  - recently admitted to [address] [place] [address] Detox unit in July 2023  - place on CIWA protocol and obtain further alcohol history from patient post op    Epilepsy [facility])  Assessment & Plan  - resume home antiepileptic medications: Gabapentin, Vimpat and Zonegran  - last seizure reported to be 7/9/2023  - seizures may be related to alcohol withdrawal as well   - patient denies any seizure like activity prior to her fall today             Bowel Regimen: miralax, senokot  VTE Prophylaxis:Sequential compression device (Venodyne)  and Enoxaparin (Lovenox)     Disposition: continue med-surg status, rehab placement pending    Subjective   Chief Complaint: "I'm doing well"    Subjective: Pain control is improved. She slept well last night. Denies dizziness/lightheadedness, CP or SOB, even with ambulation. She is agreeable to rehab. She has no new complaints. Objective   Vitals:   Temp:  [97.7 °F (36.5 °C)-99.3 °F (37.4 °C)] 99.3 °F (37.4 °C)  HR:  [16-76] 16  Resp:  [15-18] 15  BP: (102-129)/(59-62) 103/59    I/O       08/06 0701  08/07 0700 08/07 0701  08/08 0700    P. O.  180    Total Intake(mL/kg)  180 (2.7)    Net  +180                 Physical Exam:   GENERAL APPEARANCE: NAD  NEURO: GCS 15,non-focal  HEENT: NCAT  CV: RRR, no MGR  LUNGS: CTA bilaterally  GI: soft,non-tender,non-distended  : voiding  MSK: + LLE with dressing in place, C/D/I, NVI distally in LLE  SKIN: pink, warm, dry    Invasive Devices     Peripheral Intravenous Line  Duration           Peripheral IV 08/05/23 Right;Ventral (anterior) Hand 2 days                      Lab Results: Results: I have personally reviewed all pertinent laboratory/tests results, BMP/CMP: No results found for: "SODIUM", "K", "CL", "CO2", "ANIONGAP", "BUN", "CREATININE", "GLUCOSE", "CALCIUM", "AST", "ALT", "ALKPHOS", "PROT", "BILITOT", "EGFR" and CBC: No results found for: "WBC", "HGB", "HCT", "MCV", "PLT", "ADJUSTEDWBC", "RBC", "MCH", "MCHC", "RDW", "MPV", "NRBC"  Imaging: I have personally reviewed pertinent reports.      Other Studies: no new

## 2023-08-08 NOTE — ASSESSMENT & PLAN NOTE
- mechanical fall at home, with head strike  - sustained below stated injuries  - PT/OT recommending inpatient rehab. - CM for dispo planning. Rehab referrals.

## 2023-08-08 NOTE — UTILIZATION REVIEW
Continued Stay Review    Date: 8/8/23                          Current Patient Class: IP  Current Level of Care: Med Surg    HPI:63 y.o. female initially admitted on 8/4     Assessment/Plan: s/p ORIF w L tibial IMN on 8/4. WBAT LLE. Pain control. PT/OT recommending inpatient rehab. CM for dispo planning. Rehab referrals - [facility] is evaluating. Continue Venofer, B12, folate acid. Continue Gabapentin, Vimpat and Zonegran.      Vital Signs:     Date/Time Temp Pulse Resp BP MAP (mmHg) SpO2 O2 Device   08/08/23 07:10:02 98.4 °F (36.9 °C) -- 20 112/58 76 -- --   08/08/23 02:37:40 -- -- -- 116/60 79 -- --   08/07/23 2241 -- 70 16 104/60 -- 95 % --   08/07/23 19:10:26 -- -- -- 103/59 74 -- --   08/07/23 15:40:15 99.3 °F (37.4 °C) -- -- 102/59 73 -- --   08/07/23 11:55:08 99.1 °F (37.3 °C) 16 Abnormal  -- 111/62 78 -- None (Room air)       Pertinent Labs/Diagnostic Results:       Results from last 7 days   Lab Units 08/06/23  0632 08/05/23  0338 08/04/23  1122 08/04/23  1120   WBC Thousand/uL 4.30* 3.98* 6.35  --    HEMOGLOBIN g/dL 7.5* 9.0* 11.0*  --    I STAT HEMOGLOBIN g/dl  --   --   --  9.9*   HEMATOCRIT % 22.6* 27.4* 34.8  --    HEMATOCRIT, ISTAT %  --   --   --  29*   PLATELETS Thousands/uL 42* 47* 56*  --    NEUTROS ABS Thousands/µL 2.06 3.25 5.07  --          Results from last 7 days   Lab Units 08/06/23  0632 08/05/23  0338 08/04/23  1449 08/04/23  1122 08/04/23  1120   SODIUM mmol/L 140 137 137 133*  --    POTASSIUM mmol/L 3.9 4.2 4.3 4.3  --    CHLORIDE mmol/L 110* 108 100 106  --    CO2 mmol/L 25 24 15* 19*  --    CO2, I-STAT mmol/L  --   --   --   --  18*   ANION GAP mmol/L 5 5 22 8  --    BUN mg/dL 12 11 8 14  --    CREATININE mg/dL 0.59* 0.62 0.28* 0.70  --    EGFR ml/min/1.73sq m 97 96 125 92  --    CALCIUM mg/dL 8.1* 7.9* 4.8* 8.2*  --    CALCIUM, IONIZED mmol/L  --  1.07*  --   --   --    CALCIUM, IONIZED, ISTAT mmol/L  --   --   --   --  0.98*   MAGNESIUM mg/dL  --  2.0  --  1.8*  --    PHOSPHORUS mg/dL  -- 2.9  --  3.3  --      Results from last 7 days   Lab Units 08/05/23  0338 08/04/23  1122   AST U/L 19 29   ALT U/L 19 24   ALK PHOS U/L 124* 158*   TOTAL PROTEIN g/dL 5.5* 6.1*   ALBUMIN g/dL 3.1* 3.3*   TOTAL BILIRUBIN mg/dL 0.35 0.47   BILIRUBIN DIRECT mg/dL 0.11  --          Results from last 7 days   Lab Units 08/06/23  0632 08/05/23  0338 08/04/23  1449 08/04/23  1122   GLUCOSE RANDOM mg/dL 108 178* 71 116         Results from last 7 days   Lab Units 08/05/23  0405   PH NATALIA  7.390   PCO2 NATALIA mm Hg 41.8*   PO2 NATALIA mm Hg 66.2*   HCO3 NATALIA mmol/L 24.7   BASE EXC NATALIA mmol/L -0.2   O2 CONTENT NATALIA ml/dL 13.2   O2 HGB, VENOUS % 90.8*     Results from last 7 days   Lab Units 08/04/23  1120   PH, NATALIA I-STAT  7.381   PCO2, NATALIA ISTAT mm HG 28.9*   PO2, NATALIA ISTAT mm HG 31.0*   HCO3, NATALIA ISTAT mmol/L 17.1*   I STAT BASE EXC mmol/L -7*   I STAT O2 SAT % 59*     Results from last 7 days   Lab Units 08/04/23  1122   CK TOTAL U/L 100             Results from last 7 days   Lab Units 08/04/23  1229   PROTIME seconds 14.4   INR  1.10             Results from last 7 days   Lab Units 08/04/23  1229   LACTIC ACID mmol/L 1.2           Results from last 7 days   Lab Units 08/04/23  1330   CLARITY UA  Clear   COLOR UA  Light Yellow   SPEC GRAV UA  1.028   PH UA  6.0   GLUCOSE UA mg/dl Negative   KETONES UA mg/dl Negative   BLOOD UA  Negative   PROTEIN UA mg/dl Negative   NITRITE UA  Negative   BILIRUBIN UA  Negative   UROBILINOGEN UA (BE) mg/dl <2.0   LEUKOCYTES UA  Negative         Medications:   Scheduled Medications:  acetaminophen, 975 mg, Oral, Q8H [address]  ascorbic acid, 1,000 mg, Oral, BID  cyanocobalamin, 500 mcg, Oral, Daily  desvenlafaxine, 50 mg, Oral, Daily  enoxaparin, 30 mg, Subcutaneous, F85B DESTIN  folic acid, 1 mg, Oral, Daily  folic acid, 1 mg, Oral, Daily  gabapentin, 100 mg, Oral, HS  gabapentin, 300 mg, Oral, BID  hydrOXYzine HCL, 25 mg, Oral, BID  lacosamide, 200 mg, Oral, Q12H DESTIN  methocarbamol, 500 mg, Oral, Q6H [address]  metoprolol succinate, 25 mg, Oral, Daily  multivitamin-minerals, 1 tablet, Oral, Daily  polyethylene glycol, 17 g, Oral, Daily  senna-docusate sodium, 1 tablet, Oral, HS  thiamine, 100 mg, Oral, Daily  traZODone, 50 mg, Oral, HS  zonisamide, 200 mg, Oral, HS      Continuous IV Infusions:     PRN Meds:  naloxone, 0.04 mg, Intravenous, Q1MIN PRN  ondansetron, 4 mg, Intravenous, Q4H PRN  oxyCODONE, 5 mg, Oral, Q4H PRN  oxyCODONE, 7.5 mg, Oral, Q4H PRN        Discharge Plan: D    Network Utilization Review Department  ATTENTION: Please call with any questions or concerns to [phone] and carefully listen to the prompts so that you are directed to the right person. All voicemails are confidential.  Sergio Mohs all requests for admission clinical reviews, approved or denied determinations and any other requests to dedicated fax number below belonging to the campus where the patient is receiving treatment.  List of dedicated fax numbers for the Facilities:  [place] DENIALS (Administrative/Medical Necessity) [phone] [address] [facility] (Maternity/NICU/Pediatrics) [phone]   [address] [place] [address] Drive [phone]   [facility] [phone]   [facility] [id number] [place] [address] [phone]   [address] [address] [phone]

## 2023-08-08 NOTE — PROGRESS NOTES
Orthopedics   Juancho Layton 61 y.o. female MRN: [id number]  Unit/Bed#: W -01    Subjective:  61 y. o.female post operative day 4 IMN left tibia. Patient reports overall her pain is controlled. She notes continued bleeding from the superior incision at the knee.     Labs:  0   Lab Value Date/Time    HCT 22.6 (L) 08/06/2023 0632    HCT 27.4 (L) 08/05/2023 0338    HCT 34.8 08/04/2023 1122    HCT 29 (L) 08/04/2023 1120    HGB 7.5 (L) 08/06/2023 0632    HGB 9.0 (L) 08/05/2023 0338    HGB 11.0 (L) 08/04/2023 1122    HGB 9.9 (L) 08/04/2023 1120    INR 1.10 08/04/2023 1229    WBC 4.30 (L) 08/06/2023 0632    WBC 3.98 (L) 08/05/2023 0338    WBC 6.35 08/04/2023 1122       Meds:    Current Facility-Administered Medications:   •  acetaminophen (TYLENOL) tablet 975 mg, 975 mg, Oral, Q8H [address], Madelyn Ocasio PA-C, 975 mg at 08/08/23 4131  •  ascorbic acid (VITAMIN C) tablet 1,000 mg, 1,000 mg, Oral, BID, Felecia Blakely MD, 1,000 mg at 08/08/23 6073  •  cyanocobalamin (VITAMIN B-12) tablet 500 mcg, 500 mcg, Oral, Daily, Felecia Blakely MD, 500 mcg at 08/08/23 8677  •  desvenlafaxine succinate (PRISTIQ) 24 hr tablet 50 mg, 50 mg, Oral, Daily, Jerlean Po, PA-C, 50 mg at 08/08/23 1638  •  enoxaparin (LOVENOX) subcutaneous injection 30 mg, 30 mg, Subcutaneous, Q12H [address], Jerlean Po, PA-C, 30 mg at 68/22/60 0446  •  folic acid (FOLVITE) tablet 1 mg, 1 mg, Oral, Daily, Jerlean Po, PA-C, 1 mg at 45/93/86 5262  •  folic acid (FOLVITE) tablet 1 mg, 1 mg, Oral, Daily, Felecia Blakely MD, 1 mg at 08/08/23 0932  •  gabapentin (NEURONTIN) capsule 100 mg, 100 mg, Oral, HS, Jerlean Po, PA-C, 100 mg at 08/07/23 2127  •  gabapentin (NEURONTIN) capsule 300 mg, 300 mg, Oral, BID, Jerlean Po, PA-C, 300 mg at 08/08/23 3302  •  hydrOXYzine HCL (ATARAX) tablet 25 mg, 25 mg, Oral, BID, Jerlean Po, PA-C, 25 mg at 08/08/23 5095  •  lacosamide (VIMPAT) tablet 200 mg, 200 mg, Oral, Q12H [address], Maile Webb PA-C, 200 mg at 08/08/23 7963  •  methocarbamol (ROBAXIN) tablet 500 mg, 500 mg, Oral, Q6H [address], Maile Webb PA-C, 500 mg at 08/08/23 1225  •  metoprolol succinate (TOPROL-XL) 24 hr tablet 25 mg, 25 mg, Oral, Daily, Maile Webb PA-C, 25 mg at 08/08/23 1606  •  multivitamin-minerals (CENTRUM) tablet 1 tablet, 1 tablet, Oral, Daily, Maile Webb PA-C, 1 tablet at 08/08/23 0933  •  naloxone (NARCAN) 0.04 mg/mL syringe 0.04 mg, 0.04 mg, Intravenous, Q1MIN PRN, Maile Webb PA-C  •  ondansetron [facility]) injection 4 mg, 4 mg, Intravenous, Q4H PRN, Maile Webb PA-C  •  oxyCODONE (ROXICODONE) IR tablet 5 mg, 5 mg, Oral, Q4H PRN, Adrian Vazquez PA-C, 5 mg at 08/08/23 5040  •  oxyCODONE (ROXICODONE) split tablet 7.5 mg, 7.5 mg, Oral, Q4H PRN, Adiran Vazquez PA-C, 7.5 mg at 08/08/23 8377  •  polyethylene glycol (MIRALAX) packet 17 g, 17 g, Oral, Daily, Maile Webb PA-C, 17 g at 08/08/23 0932  •  senna-docusate sodium (SENOKOT S) 8.6-50 mg per tablet 1 tablet, 1 tablet, Oral, HS, Maile Webb PA-C, 1 tablet at 08/07/23 2127  •  thiamine tablet 100 mg, 100 mg, Oral, Daily, Maile Webb PA-C, 100 mg at 08/08/23 1986  •  traZODone (DESYREL) tablet 50 mg, 50 mg, Oral, HS, Maile Webb PA-C, 50 mg at 08/07/23 2127  •  zonisamide (ZONEGRAN) capsule 200 mg, 200 mg, Oral, HS, Maile COLT, 200 mg at 08/07/23 2127    Blood Culture:   No results found for: "BLOODCX"    Wound Culture:   No results found for: "WOUNDCULT"    Ins and Outs:  I/O last 24 hours:   In: 540 [P.O.:540]  Out: -           Physical:  Vitals:    08/08/23 1119   BP: 111/58   Pulse:    Resp: 18   Temp: 98 °F (36.7 °C)   SpO2:      left lower extremity  · Proximal dressing saturated  · Changed this morning  · Continuous slight bloody oozing coming from the distal aspect of the proximal incision over the inferior knee. This was reinforced with a surgical staple after carefully cleansing the incision and surrounding area with alcohol  · After reinforcing the incision with another staple, drainage ceased  · New Mepilex dressing was applied  · 4/5 strength to EHL/FHL, + knee flexion/extension  · Sensation intact L2-S1  · 2+ DP Pulse    Assessment:   61 y. o.female post operative day 4 IMN left tibia.     Plan:  · WBAT left lower extremity  · Up and out of bed  · Ice, Elevation to affected extremity  · Analgesics  · DVT prophylaxis  · Recommend ASA 81 mg BID (currently being held due to thrombocytopenia)  · PT/OT  · Dispo: Ok for discharge from ortho perspective  · Patient noted to have acute blood loss anemia due to a drop in Hbg of > 2.0g from preop levels, will monitor vital signs and resuscitate with IV fluids as needed   · Patient does not accept blood products  · Hemoglobin 7.52 days ago  · Vital signs stable, continue to monitor clinically  · Patient will require outpatient follow-up with Dr. Lashawn Penaloza 2 weeks from date of surgery    Celeste Savage PA-C

## 2023-08-08 NOTE — PLAN OF CARE
Problem: OCCUPATIONAL THERAPY ADULT  Goal: Performs self-care activities at highest level of function for planned discharge setting. See evaluation for individualized goals. Description: Treatment Interventions: ADL retraining, Functional transfer training, Endurance training, UE strengthening/ROM, Patient/family training, Equipment evaluation/education, Compensatory technique education, Continued evaluation, Activityengagement, Energy conservation          See flowsheet documentation for full assessment, interventions and recommendations. Outcome: Progressing  Note: Limitation: Decreased ADL status, Decreased UE strength, Decreased endurance, Decreased self-care trans, Decreased high-level ADLs (impaired pain, sitting tolerance, balance, L LE ROM / strength, forward functional reach, standing tolerance, activity tolerance, insight into deficits.)     Assessment: Pt seen on this date for skilled OT treatment session. At start of session pt supine in bed. Pt agreeable and motivated to participate in session stating "I need to get clean". Pt required decreased assistance with bed mobility and functional transfers. Pt with improved forward functional reach as seen with RLE, however declining to participate in LLE ADLs despite ability to reach. Pt benefiting from edu on LB dressing techniques and sequencing of bathing/hygiene to conserve energy. Pt declining functional mobility in/out of bathroom due to pain and fearful of functional mobility. Pt would  continue to benefit from skilled OT treatment sessions in order to address remaining deficits and continue to recommend d/c to rehab when medically stable.      OT Discharge Recommendation: Post acute rehabilitation services

## 2023-08-08 NOTE — ASSESSMENT & PLAN NOTE
- Hgb 7.5 on 8/6.   - no signs of significant active bleeding. Minor skin oozing at incision. Orthopedics aware. Will hold ASA and continue Lovenox for DVT ppx at this time. - Patient is a Confucianist and refuses all blood products  -Continue Venofer, B12, folate acid as well as repeat H&H later in the morning on 8/5/2023 to make sure that hemoglobin does not decrease any further  - asymptomatic and VS improved. No need for further recheck as she is doing well clinically from this standpoint and would not accept a transfusion.

## 2023-08-08 NOTE — CASE MANAGEMENT
[address] received request for authorization from Care Manager.   Authorization request for: [address] Name: Mickiel Bernheim  NPI: [id number]  Facility MD:  Maxwell Novak   NPI: [id number]  Authorization initiated by contacting insurance: [facility] Via: [facility]  Pending Reference #: [id number]   Clinicals submitted via: [facility] Portal

## 2023-08-08 NOTE — ASSESSMENT & PLAN NOTE
- mechanical fall at home, with head strike  - sustained below stated injuries  - PT/OT recommending inpatient rehab. - CM for dispo planning. Rehab referrals - [facility] is evaluating.

## 2023-08-08 NOTE — ASSESSMENT & PLAN NOTE
- Hgb 7.5 on 8/6.   - no signs of significant active bleeding. Minor skin oozing at incision. Orthopedics aware. Will hold ASA and continue Lovenox for DVT ppx at this time. 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 No need for further recheck as she is doing well clinically from this standpoint and would not accept a transfusion.

## 2023-08-08 NOTE — PLAN OF CARE
Problem: Potential for Falls  Goal: Patient will remain free of falls  Description: INTERVENTIONS:  - Educate patient/family on patient safety including physical limitations  - Instruct patient to call for assistance with activity   - Consult OT/PT to assist with strengthening/mobility   - Keep Call bell within reach  - Keep bed low and locked with side rails adjusted as appropriate  - Keep care items and personal belongings within reach  - Initiate and maintain comfort rounds  - Make Fall Risk Sign visible to staff  - Offer Toileting every 2 Hours, in advance of need  - Initiate/Maintain alarm  - Obtain necessary fall risk management equipment:   - Apply yellow socks and bracelet for high fall risk patients  - Consider moving patient to room near nurses station  Outcome: Progressing     Problem: MOBILITY - ADULT  Goal: Maintain or return to baseline ADL function  Description: INTERVENTIONS:  -  Assess patient's ability to carry out ADLs; assess patient's baseline for ADL function and identify physical deficits which impact ability to perform ADLs (bathing, care of mouth/teeth, toileting, grooming, dressing, etc.)  - Assess/evaluate cause of self-care deficits   - Assess range of motion  - Assess patient's mobility; develop plan if impaired  - Assess patient's need for assistive devices and provide as appropriate  - Encourage maximum independence but intervene and supervise when necessary  - Involve family in performance of ADLs  - Assess for home care needs following discharge   - Consider OT consult to assist with ADL evaluation and planning for discharge  - Provide patient education as appropriate  Outcome: Progressing  Goal: Maintains/Returns to pre admission functional level  Description: INTERVENTIONS:  - Perform BMAT or MOVE assessment daily.   - Set and communicate daily mobility goal to care team and patient/family/caregiver.    - Collaborate with rehabilitation services on mobility goals if consulted  - Perform Range of Motion 3 times a day. - Reposition patient every 2 hours.   - Dangle patient 3 times a day  - Stand patient 3 times a day  - Ambulate patient 3 times a day  - Out of bed to chair 3 times a day   - Out of bed for meals 3 times a day  - Out of bed for toileting  - Record patient progress and toleration of activity level   Outcome: Progressing     Problem: Prexisting or High Potential for Compromised Skin Integrity  Goal: Skin integrity is maintained or improved  Description: INTERVENTIONS:  - Identify patients at risk for skin breakdown  - Assess and monitor skin integrity  - Assess and monitor nutrition and hydration status  - Monitor labs   - Assess for incontinence   - Turn and reposition patient  - Assist with mobility/ambulation  - Relieve pressure over bony prominences  - Avoid friction and shearing  - Provide appropriate hygiene as needed including keeping skin clean and dry  - Evaluate need for skin moisturizer/barrier cream  - Collaborate with interdisciplinary team   - Patient/family teaching  - Consider wound care consult   Outcome: Progressing

## 2023-08-08 NOTE — PROGRESS NOTES
[address] [facility]  Progress Note  Name: Mamadou Song  MRN: [id number]  Unit/Bed#: W -01 I Date of Admission: 8/4/2023   Date of Service: 8/8/2023 I Hospital Day: 4    Assessment/Plan   Fall  Assessment & Plan  - mechanical fall at home, with head strike  - sustained below stated injuries  - PT/OT recommending inpatient rehab. - CM for dispo planning. Rehab referrals - [facility] is evaluating. * Tibia fracture  Assessment & Plan  - closed, midshaft tibial fracture  - orthopedics consultation appreciated  - s/p ORIF w L tibial IMN on 8/4  - WBAT LLE  - pain control with multi modal regimen  - neurovascular checks  - DVT ppx with Lovenox   - PT/OT recommending inpatient rehab  - F/u with orthopedics as an outpatient    Hematoma  Assessment & Plan  - left anterior tibial hematoma in setting of closed left tibial fracture  - neurovascularly intact  - no signs of active bleeding    Acute blood loss anemia  Assessment & Plan  - Hgb 7.5 on 8/6.   - no signs of significant active bleeding. Minor skin oozing at incision. Orthopedics aware. Will hold ASA and continue Lovenox for DVT ppx at this time. - Patient is a Restorationism and refuses all blood products  -Continue Venofer, B12, folate acid as well as repeat H&H later in the morning on 8/5/2023 to make sure that hemoglobin does not decrease any further  - asymptomatic and VS improved. No need for further recheck as she is doing well clinically from this standpoint and would not accept a transfusion.      Chronic alcohol abuse  Assessment & Plan  - patient has a h/o chronic alcohol abuse  - recently admitted to [address] [place] [address] Detox unit in July 2023  - place on CIWA protocol and obtain further alcohol history from patient post op    Epilepsy [facility])  Assessment & Plan  - resume home antiepileptic medications: Gabapentin, Vimpat and Zonegran  - last seizure reported to be 7/9/2023  - seizures may be related to alcohol withdrawal as well   - patient denies any seizure like activity prior to her fall today             Bowel Regimen: miralax, senokot  VTE Prophylaxis:Sequential compression device (Venodyne)  and Enoxaparin (Lovenox)     Disposition: continue med-surg status, rehab placement pending    Subjective   Chief Complaint: "I"m feeling well"    Subjective: Patient offers no complaints. She is feeling well. She does have pain in the leg but feels the pain medications are helping. Objective   Vitals:   Temp:  [98 °F (36.7 °C)-99.3 °F (37.4 °C)] 98 °F (36.7 °C)  HR:  [70] 70  Resp:  [16-20] 18  BP: (102-116)/(58-60) 111/58    I/O       08/06 0701  08/07 0700 08/07 0701  08/08 0700 08/08 0701  08/09 0700    P. O.  180 360    Total Intake(mL/kg)  180 (2.7) 360 (5.5)    Net  +180 +360                  Physical Exam:   GENERAL APPEARANCE: NAD  NEURO: GCS 15,non-focal  HEENT: NCAT  CV: RRR, no MGR  LUNGS: CTA bilaterally  GI: soft,non-tender,non-distended  : voiding  MSK: + LLE with ACE in place, small amount of bloody drainage from incision noted. NVI distally in LLE. SKIN: pink, warm, dry    Invasive Devices     Peripheral Intravenous Line  Duration           Peripheral IV 08/05/23 Right;Ventral (anterior) Hand 3 days                      Lab Results: Results: I have personally reviewed all pertinent laboratory/tests results, BMP/CMP: No results found for: "SODIUM", "K", "CL", "CO2", "ANIONGAP", "BUN", "CREATININE", "GLUCOSE", "CALCIUM", "AST", "ALT", "ALKPHOS", "PROT", "BILITOT", "EGFR" and CBC: No results found for: "WBC", "HGB", "HCT", "MCV", "PLT", "ADJUSTEDWBC", "RBC", "MCH", "MCHC", "RDW", "MPV", "NRBC"  Imaging: I have personally reviewed pertinent reports.      Other Studies: no new

## 2023-08-08 NOTE — ASSESSMENT & PLAN NOTE
- patient has a h/o chronic alcohol abuse  - recently admitted to [facility] Detox unit in July 2023  - place on CIWA protocol and obtain further alcohol history from patient post op

## 2023-08-09 PROCEDURE — 97116 GAIT TRAINING THERAPY: CPT

## 2023-08-09 PROCEDURE — 97530 THERAPEUTIC ACTIVITIES: CPT

## 2023-08-09 PROCEDURE — 97110 THERAPEUTIC EXERCISES: CPT

## 2023-08-09 PROCEDURE — 99024 POSTOP FOLLOW-UP VISIT: CPT | Performed by: PHYSICIAN ASSISTANT

## 2023-08-09 PROCEDURE — 99232 SBSQ HOSP IP/OBS MODERATE 35: CPT | Performed by: PHYSICIAN ASSISTANT

## 2023-08-09 RX ADMIN — LACOSAMIDE 200 MG: 100 TABLET, FILM COATED ORAL at 22:24

## 2023-08-09 RX ADMIN — OXYCODONE HYDROCHLORIDE AND ACETAMINOPHEN 1000 MG: 500 TABLET ORAL at 18:17

## 2023-08-09 RX ADMIN — OXYCODONE HYDROCHLORIDE AND ACETAMINOPHEN 1000 MG: 500 TABLET ORAL at 08:18

## 2023-08-09 RX ADMIN — DESVENLAFAXINE 50 MG: 50 TABLET, FILM COATED, EXTENDED RELEASE ORAL at 08:19

## 2023-08-09 RX ADMIN — GABAPENTIN 300 MG: 300 CAPSULE ORAL at 08:19

## 2023-08-09 RX ADMIN — ACETAMINOPHEN 975 MG: 325 TABLET, FILM COATED ORAL at 05:30

## 2023-08-09 RX ADMIN — METHOCARBAMOL TABLETS 500 MG: 500 TABLET, COATED ORAL at 13:30

## 2023-08-09 RX ADMIN — MULTIPLE VITAMINS W/ MINERALS TAB 1 TABLET: TAB ORAL at 08:20

## 2023-08-09 RX ADMIN — ZONISAMIDE 200 MG: 100 CAPSULE ORAL at 22:24

## 2023-08-09 RX ADMIN — ENOXAPARIN SODIUM 30 MG: 30 INJECTION SUBCUTANEOUS at 22:23

## 2023-08-09 RX ADMIN — CYANOCOBALAMIN TAB 500 MCG 500 MCG: 500 TAB at 08:18

## 2023-08-09 RX ADMIN — ENOXAPARIN SODIUM 30 MG: 30 INJECTION SUBCUTANEOUS at 08:21

## 2023-08-09 RX ADMIN — METHOCARBAMOL TABLETS 500 MG: 500 TABLET, COATED ORAL at 18:17

## 2023-08-09 RX ADMIN — METHOCARBAMOL TABLETS 500 MG: 500 TABLET, COATED ORAL at 05:30

## 2023-08-09 RX ADMIN — Medication 3 MG: at 22:24

## 2023-08-09 RX ADMIN — GABAPENTIN 300 MG: 300 CAPSULE ORAL at 18:17

## 2023-08-09 RX ADMIN — ACETAMINOPHEN 975 MG: 325 TABLET, FILM COATED ORAL at 13:31

## 2023-08-09 RX ADMIN — HYDROXYZINE HYDROCHLORIDE 25 MG: 25 TABLET ORAL at 18:17

## 2023-08-09 RX ADMIN — POLYETHYLENE GLYCOL 3350 17 G: 17 POWDER, FOR SOLUTION ORAL at 08:20

## 2023-08-09 RX ADMIN — Medication 7.5 MG: at 07:54

## 2023-08-09 RX ADMIN — HYDROXYZINE HYDROCHLORIDE 25 MG: 25 TABLET ORAL at 08:20

## 2023-08-09 RX ADMIN — OXYCODONE HYDROCHLORIDE 5 MG: 5 TABLET ORAL at 22:24

## 2023-08-09 RX ADMIN — GABAPENTIN 100 MG: 300 CAPSULE ORAL at 22:25

## 2023-08-09 RX ADMIN — FOLIC ACID 1 MG: 1 TABLET ORAL at 08:19

## 2023-08-09 RX ADMIN — SENNOSIDES AND DOCUSATE SODIUM 1 TABLET: 50; 8.6 TABLET ORAL at 22:24

## 2023-08-09 RX ADMIN — Medication 100 MG: at 08:21

## 2023-08-09 RX ADMIN — LACOSAMIDE 200 MG: 100 TABLET, FILM COATED ORAL at 08:20

## 2023-08-09 NOTE — ASSESSMENT & PLAN NOTE
- Left lower extremity hematoma associated with left tibial fracture and and operative intervention.  - No evidence of active or ongoing bleeding.  - Continue to monitor hemodynamic status.  - Outpatient follow-up with PCP. Statement Selected

## 2023-08-09 NOTE — PROGRESS NOTES
[address] [facility]  Progress Note  Name: Peter Avila  MRN: [id number]  Unit/Bed#: W -01 I Date of Admission: 8/4/2023   Date of Service: 8/9/2023 I Hospital Day: 5    Assessment/Plan   Fall  Assessment & Plan  - Status post mechanical fall with the below noted injuries. - Fall precautions. - Geriatric Medicine consultation for evaluation, medication review and recommendations.  - PT and OT evaluation and treatment as indicated. - Case Management consultation for disposition planning. * Tibia fracture  Assessment & Plan  - Acute closed midshaft tibial fracture, present on admission.  - Appreciate Orthopedic surgery evaluation, recommendations and interventions as noted. - Status post ORIF of the left tibial fracture on 8/4/2023.  - May be weightbearing as tolerated on the left lower extremity.  - Monitor left lower extremity neurovascular exam.  - Continue multimodal analgesic regimen.  - Continue DVT prophylaxis. - PT and OT evaluation and treatment as indicated. - Outpatient follow up with Orthopedic surgery for re-evaluation. Hematoma  Assessment & Plan  - Left lower extremity hematoma associated with left tibial fracture and and operative intervention.  - No evidence of active or ongoing bleeding.  - Continue to monitor hemodynamic status.  - Outpatient follow-up with PCP. Acute blood loss anemia  Assessment & Plan  - Patient with acute blood loss anemia secondary to fracture and operative intervention.  - No evidence of active or ongoing bleeding at this time. - Continue to monitor hemodynamic status. - Patient is a Confucianist and refuses all blood products  - Outpatient follow-up with PCP. Chronic alcohol abuse  Assessment & Plan  - Patient has a history of chronic alcohol abuse. - Patient was recently admitted to [facility] Detox unit in July 2023.  - Continue to monitor for signs or symptoms of alcohol withdrawal.  - Outpatient follow-up with PCP. Epilepsy [facility])  Assessment & Plan  - Patient with chronic history of epilepsy.   - Patient's last seizure reported to be 7/9/2023.  - Continue home antiepileptic medications: Gabapentin, Vimpat and Zonegran. - Seizures may be related to alcohol withdrawal as well. - Patient denies any seizure like activity prior to her fall today. Bowel Regimen: On MiraLAX and Senokot-S.  VTE Prophylaxis:Sequential compression device (Venodyne)  and Enoxaparin (Lovenox)     Disposition: Continue current level of care. Continue therapy evaluation extremities indicated. Case management following for disposition planning. Subjective   Chief Complaint: "I'm feeling pretty well."    Subjective: Patient is overall doing pretty well today. She notes some pain in her leg with movement, but is very comfortable at rest.  She notes her pain regimen is working adequately. She is tolerating oral intake without nausea or vomiting. She has no new complaints at this time. Objective   Vitals:   Temp:  [97.8 °F (36.6 °C)-98.5 °F (36.9 °C)] 98.5 °F (36.9 °C)  HR:  [72-76] 76  Resp:  [12-18] 16  BP: (100-114)/(54-60) 114/54    I/O       08/07 0701  08/08 0700 08/08 0701  08/09 0700 08/09 0701  08/10 0700    P. O. 180 1080 180    Total Intake(mL/kg) 180 (2.7) 1080 (16.4) 180 (2.7)    Net +180 +1080 +180           Unmeasured Urine Occurrence  3 x     Unmeasured Stool Occurrence  4 x            Physical Exam:   GENERAL APPEARANCE: Patient in no acute distress. HEENT: NCAT; EOMs intact; Mucous membranes moist  CV: Regular rate and rhythm; no murmur/gallops/rubs appreciated. CHEST / LUNGS: Clear to auscultation; no wheezes/rales/rhonci. ABD: NABS; soft; non-distended; non-tender. : Voiding spontaneously. EXT: +2 pulses bilaterally upper & lower extremities. Patient with mild to moderate tenderness over the left proximal lower leg with some swelling.   Current dressing is clean/dry/intact with soft and compressible muscle compartments and intact neurovascular exam distally. NEURO: GCS 15; no focal neurologic deficits; neurovascularly intact. SKIN: Warm, dry and well perfused; no rash; no jaundice. Invasive Devices     Peripheral Intravenous Line  Duration           Peripheral IV 08/09/23 Proximal;Right;Ventral (anterior) Forearm <1 day                      Lab Results: Results: I have personally reviewed all pertinent laboratory/tests results  Imaging: I have personally reviewed pertinent reports.      Other Studies: N/A      Kal Myers PA-C  8/9/2023  06:44 AM

## 2023-08-09 NOTE — ASSESSMENT & PLAN NOTE
- Patient has a history of chronic alcohol abuse. - Patient was recently admitted to [address] [place] [address] Detox unit in July 2023.  - Continue to monitor for signs or symptoms of alcohol withdrawal.  - Outpatient follow-up with PCP.

## 2023-08-09 NOTE — ASSESSMENT & PLAN NOTE
- Patient with chronic history of epilepsy.   - Patient's last seizure reported to be 7/9/2023.  - Continue home antiepileptic medications: Gabapentin, Vimpat and Zonegran. - Seizures may be related to alcohol withdrawal as well. - Patient denies any seizure like activity prior to her fall today.

## 2023-08-09 NOTE — PROGRESS NOTES
Orthopedics   Mamie Prey 61 y.o. female MRN: [id number]  Unit/Bed#: W -01    Subjective:  61 y. o.female post operative day 5 IMN left tibia. Pt doing well. Pain controlled. Bleeding has stopped since the additional staple placed yesterday.     Labs:  0   Lab Value Date/Time    HCT 25.6 (L) 08/08/2023 1246    HCT 22.6 (L) 08/06/2023 0632    HCT 27.4 (L) 08/05/2023 0338    HGB 8.3 (L) 08/08/2023 1246    HGB 7.5 (L) 08/06/2023 0632    HGB 9.0 (L) 08/05/2023 0338    INR 1.10 08/04/2023 1229    WBC 4.30 (L) 08/06/2023 0632    WBC 3.98 (L) 08/05/2023 0338    WBC 6.35 08/04/2023 1122       Meds:    Current Facility-Administered Medications:   •  acetaminophen (TYLENOL) tablet 975 mg, 975 mg, Oral, Q8H [facility], Madelyn Ocasio PA-C, 975 mg at 08/09/23 0530  •  ascorbic acid (VITAMIN C) tablet 1,000 mg, 1,000 mg, Oral, BID, Rosendo Bueno MD, 1,000 mg at 08/09/23 0818  •  cyanocobalamin (VITAMIN B-12) tablet 500 mcg, 500 mcg, Oral, Daily, Rosendo Bueno MD, 500 mcg at 08/09/23 0818  •  desvenlafaxine succinate (PRISTIQ) 24 hr tablet 50 mg, 50 mg, Oral, Daily, Bisi Stern PA-C, 50 mg at 08/09/23 8901  •  enoxaparin (LOVENOX) subcutaneous injection 30 mg, 30 mg, Subcutaneous, Q12H [facility], Bisi Stern PA-C, 30 mg at 63/77/72 9100  •  folic acid (FOLVITE) tablet 1 mg, 1 mg, Oral, Daily, Bisi Stern PA-C, 1 mg at 32/81/75 7293  •  folic acid (FOLVITE) tablet 1 mg, 1 mg, Oral, Daily, Rosendo Bueno MD, 1 mg at 08/09/23 9757  •  gabapentin (NEURONTIN) capsule 100 mg, 100 mg, Oral, HS, Bisi Stern PA-C, 100 mg at 08/08/23 2116  •  gabapentin (NEURONTIN) capsule 300 mg, 300 mg, Oral, BID, Bisi Stern PA-C, 300 mg at 08/09/23 5885  •  hydrOXYzine HCL (ATARAX) tablet 25 mg, 25 mg, Oral, BID, CHANTE Mendoza-C, 25 mg at 08/09/23 0820  •  lacosamide (VIMPAT) tablet 200 mg, 200 mg, Oral, Q12H [facility], Paul Mcdonald Lynn Newsome, COLT, 200 mg at 08/09/23 0820  •  melatonin tablet 3 mg, 3 mg, Oral, HS, Brittney Briscoe, PA-C, 3 mg at 08/08/23 2116  •  methocarbamol (ROBAXIN) tablet 500 mg, 500 mg, Oral, Q6H [address], Yani Latham PA-C, 500 mg at 08/09/23 0530  •  metoprolol succinate (TOPROL-XL) 24 hr tablet 25 mg, 25 mg, Oral, Daily, CHANTE Dorsey-C, 25 mg at 08/08/23 1580  •  multivitamin-minerals (CENTRUM) tablet 1 tablet, 1 tablet, Oral, Daily, CHANTE Dorsey-C, 1 tablet at 08/09/23 0820  •  naloxone (NARCAN) 0.04 mg/mL syringe 0.04 mg, 0.04 mg, Intravenous, Q1MIN PRN, Yani Latham PA-C  •  ondansetron [facility]) injection 4 mg, 4 mg, Intravenous, Q4H PRN, CHANTE Dorsey-C  •  oxyCODONE (ROXICODONE) IR tablet 5 mg, 5 mg, Oral, Q4H PRN, Daphne Ring, PA-C, 5 mg at 08/08/23 7163  •  oxyCODONE (ROXICODONE) split tablet 7.5 mg, 7.5 mg, Oral, Q4H PRN, Linard Ring, PA-C, 7.5 mg at 08/09/23 0754  •  polyethylene glycol (MIRALAX) packet 17 g, 17 g, Oral, Daily, CHANTE Dorsey-C, 17 g at 08/09/23 0820  •  senna-docusate sodium (SENOKOT S) 8.6-50 mg per tablet 1 tablet, 1 tablet, Oral, HS, CHANTE Dorsey-C, 1 tablet at 08/08/23 2116  •  thiamine tablet 100 mg, 100 mg, Oral, Daily, CHANTE Dorsey-C, 100 mg at 08/09/23 9677  •  traZODone (DESYREL) tablet 50 mg, 50 mg, Oral, HS, CHANTE Dorsey-C, 50 mg at 08/08/23 2116  •  zonisamide (ZONEGRAN) capsule 200 mg, 200 mg, Oral, HS, Yani Latham PA-C, 200 mg at 08/08/23 2116    Blood Culture:   No results found for: "BLOODCX"    Wound Culture:   No results found for: "WOUNDCULT"    Ins and Outs:  I/O last 24 hours:   In: 1260 [P.O.:1260]  Out: -           Physical:  Vitals:    08/09/23 1137   BP: 114/54   Pulse:    Resp: 16   Temp: 98.5 °F (36.9 °C)   SpO2:      left lower extremity  · Dressings clean Dry Intact  · 4/5 strength to EHL/FHL, + ankle plantarflexion and dorsiflexion  · Sensation intact L2-S1  · 2+ DP Pulse    Assessment:   61 y. o.female post operative day 5 IMN left tibia.     Plan:  · WBAT left lower extremity  · Up and out of bed  · Ice, Elevation to affected extremity  · Analgesics  · DVT prophylaxis  · Aspirin 81 mg BID due to low platelets  · PT/OT  · Dispo: Ok for discharge from ortho perspective   · Will need ARC placement  · Patient noted to have acute blood loss anemia due to a drop in Hbg of > 2.0g from preop levels, will monitor vital signs and resuscitate with IV fluids as needed   · Stable hgb at this time  · Patient does not accept blood products    Valerie Renae PA-C

## 2023-08-09 NOTE — ASSESSMENT & PLAN NOTE
- Status post mechanical fall with the below noted injuries. - Fall precautions. - Geriatric Medicine consultation for evaluation, medication review and recommendations.  - PT and OT evaluation and treatment as indicated. - Case Management consultation for disposition planning.

## 2023-08-09 NOTE — ASSESSMENT & PLAN NOTE
- Acute closed midshaft tibial fracture, present on admission.  - Appreciate Orthopedic surgery evaluation, recommendations and interventions as noted. - Status post ORIF of the left tibial fracture on 8/4/2023.  - May be weightbearing as tolerated on the left lower extremity.  - Monitor left lower extremity neurovascular exam.  - Continue multimodal analgesic regimen.  - Continue DVT prophylaxis. - PT and OT evaluation and treatment as indicated. - Outpatient follow up with Orthopedic surgery for re-evaluation.

## 2023-08-09 NOTE — UTILIZATION REVIEW
Continued Stay Review    Date: 8/9/23                          Current Patient Class: IP  Current Level of Care:  MS    HPI:63 y.o. female initially admitted on 8/4/23 . S/P IMN L tibia. Assessment/Plan: 8/9 POD #5  Pt has additional staple placed yesterday and bleeding from the superior incision at the knee has stopped . Hgb stable, 8.3 yesterday . Pt does not accept blood products , S/P Venofer x 3 days, completed 8/7. Pt will need ARC placement, is accepted for  ARC. WBAT LLE  Pain controlled.    Vital Signs:   Date/Time Temp Pulse Resp BP MAP (mmHg) SpO2   08/09/23 11:37:41 98.5 °F (36.9 °C) -- 16 114/54 74 --   08/09/23 0754 -- 76 -- -- -- 92 %   08/09/23 07:44:09 98.4 °F (36.9 °C) -- 12 109/59 76 --   08/09/23 02:20:50 -- -- -- 100/57 71 --   08/08/23 2100 -- -- -- -- -- --   08/08/23 20:17:01 97.8 °F (36.6 °C) -- -- 111/60 77 --   08/08/23 1605 -- 72 18 110/60 -- 94 %   08/08/23 11:19:40 98 °F (36.7 °C) -- 18 111/58 76 --         Pertinent Labs/Diagnostic Results:       Results from last 7 days   Lab Units 08/08/23  1246 08/06/23  0632 08/05/23  0338 08/04/23  1122 08/04/23  1120   WBC Thousand/uL  --  4.30* 3.98* 6.35  --    HEMOGLOBIN g/dL 8.3* 7.5* 9.0* 11.0*  --    I STAT HEMOGLOBIN g/dl  --   --   --   --  9.9*   HEMATOCRIT % 25.6* 22.6* 27.4* 34.8  --    HEMATOCRIT, ISTAT %  --   --   --   --  29*   PLATELETS Thousands/uL  --  42* 47* 56*  --    NEUTROS ABS Thousands/µL  --  2.06 3.25 5.07  --          Results from last 7 days   Lab Units 08/06/23  0632 08/05/23  0338 08/04/23  1449 08/04/23  1122 08/04/23  1120   SODIUM mmol/L 140 137 137 133*  --    POTASSIUM mmol/L 3.9 4.2 4.3 4.3  --    CHLORIDE mmol/L 110* 108 100 106  --    CO2 mmol/L 25 24 15* 19*  --    CO2, I-STAT mmol/L  --   --   --   --  18*   ANION GAP mmol/L 5 5 22 8  --    BUN mg/dL 12 11 8 14  --    CREATININE mg/dL 0.59* 0.62 0.28* 0.70  --    EGFR ml/min/1.73sq m 97 96 125 92  --    CALCIUM mg/dL 8.1* 7.9* 4.8* 8.2*  --    CALCIUM, IONIZED mmol/L  --  1.07*  --   --   --    CALCIUM, IONIZED, ISTAT mmol/L  --   --   --   --  0.98*   MAGNESIUM mg/dL  --  2.0  --  1.8*  --    PHOSPHORUS mg/dL  --  2.9  --  3.3  --      Results from last 7 days   Lab Units 08/05/23  0338 08/04/23  1122   AST U/L 19 29   ALT U/L 19 24   ALK PHOS U/L 124* 158*   TOTAL PROTEIN g/dL 5.5* 6.1*   ALBUMIN g/dL 3.1* 3.3*   TOTAL BILIRUBIN mg/dL 0.35 0.47   BILIRUBIN DIRECT mg/dL 0.11  --          Results from last 7 days   Lab Units 08/06/23  0632 08/05/23  0338 08/04/23  1449 08/04/23  1122   GLUCOSE RANDOM mg/dL 108 178* 71 116             No results found for: "BETA-HYDROXYBUTYRATE"       Results from last 7 days   Lab Units 08/05/23  0405   PH NATALIA  7.390   PCO2 NATALIA mm Hg 41.8*   PO2 NATALIA mm Hg 66.2*   HCO3 NATALIA mmol/L 24.7   BASE EXC NATALIA mmol/L -0.2   O2 CONTENT NATALIA ml/dL 13.2   O2 HGB, VENOUS % 90.8*     Results from last 7 days   Lab Units 08/04/23  1120   PH, NATALIA I-STAT  7.381   PCO2, NATALIA ISTAT mm HG 28.9*   PO2, NATALIA ISTAT mm HG 31.0*   HCO3, NATALIA ISTAT mmol/L 17.1*   I STAT BASE EXC mmol/L -7*   I STAT O2 SAT % 59*     Results from last 7 days   Lab Units 08/04/23  1122   CK TOTAL U/L 100             Results from last 7 days   Lab Units 08/04/23  1229   PROTIME seconds 14.4   INR  1.10             Results from last 7 days   Lab Units 08/04/23  1229   LACTIC ACID mmol/L 1.2                                                 Results from last 7 days   Lab Units 08/04/23  1330   CLARITY UA  Clear   COLOR UA  Light Yellow   SPEC GRAV UA  1.028   PH UA  6.0   GLUCOSE UA mg/dl Negative   KETONES UA mg/dl Negative   BLOOD UA  Negative   PROTEIN UA mg/dl Negative   NITRITE UA  Negative   BILIRUBIN UA  Negative   UROBILINOGEN UA (BE) mg/dl <2.0   LEUKOCYTES UA  Negative                                                   Medications:   Scheduled Medications:  acetaminophen, 975 mg, Oral, Q8H [address]  ascorbic acid, 1,000 mg, Oral, BID  cyanocobalamin, 500 mcg, Oral, Daily  desvenlafaxine, 50 mg, Oral, Daily  enoxaparin, 30 mg, Subcutaneous, W08M DESTIN  folic acid, 1 mg, Oral, Daily  folic acid, 1 mg, Oral, Daily  gabapentin, 100 mg, Oral, HS  gabapentin, 300 mg, Oral, BID  hydrOXYzine HCL, 25 mg, Oral, BID  lacosamide, 200 mg, Oral, Q12H [address]  melatonin, 3 mg, Oral, HS  methocarbamol, 500 mg, Oral, Q6H DESTIN  metoprolol succinate, 25 mg, Oral, Daily  multivitamin-minerals, 1 tablet, Oral, Daily  polyethylene glycol, 17 g, Oral, Daily  senna-docusate sodium, 1 tablet, Oral, HS  thiamine, 100 mg, Oral, Daily  traZODone, 50 mg, Oral, HS  zonisamide, 200 mg, Oral, HS    oxyCODONE (ROXICODONE) IR tablet 5 mg  Dose: 5 mg  Freq: Once Route: PO  Start: 08/08/23 1245 End: 08/08/23 1232  Continuous IV Infusions:     PRN Meds:  naloxone, 0.04 mg, Intravenous, Q1MIN PRN  ondansetron, 4 mg, Intravenous, Q4H PRN  oxyCODONE, 5 mg, Oral, Q4H PRN x1 8/8  oxyCODONE, 7.5 mg, Oral, Q4H PRN x1 8/8, x 1 8/9        Discharge Plan:D    Network Utilization Review Department  ATTENTION: Please call with any questions or concerns to [phone] and carefully listen to the prompts so that you are directed to the right person. All voicemails are confidential.  Meryl Metzger all requests for admission clinical reviews, approved or denied determinations and any other requests to dedicated fax number below belonging to the campus where the patient is receiving treatment.  List of dedicated fax numbers for the Facilities:  [place] DENIALS (Administrative/Medical Necessity) [phone] [address] (Maternity/NICU/Pediatrics) [phone]   [address] [place] [address] [address] [facility] [phone] [address] [facility] 1000 [facility] [place] [address] [address] [phone]

## 2023-08-09 NOTE — CASE MANAGEMENT
Support Center has received intent to deny.    Denial received for: Acute Rehab  Facility: [facility]    Denial #: [id number]  Denial Reason: care can be received at current setting, needs can be met at lower LOC  Peer to Peer phone#:  [phone] opt 4     Deadline: 8/11 @ [place] notified: Billy Mccarty

## 2023-08-09 NOTE — PLAN OF CARE
Problem: PHYSICAL THERAPY ADULT  Goal: Performs mobility at highest level of function for planned discharge setting. See evaluation for individualized goals. Description: Treatment/Interventions: Functional transfer training, LE strengthening/ROM, Therapeutic exercise, Endurance training, Cognitive reorientation, Patient/family training, Gait training, Bed mobility, Equipment eval/education, Spoke to nursing, Spoke to case management, OT  Equipment Recommended: Ion Carpio (possible WC for distances?)       See flowsheet documentation for full assessment, interventions and recommendations. Outcome: Progressing  Note: Prognosis: Fair  Problem List: Decreased strength, Decreased range of motion, Decreased endurance, Impaired balance, Decreased mobility, Decreased safety awareness, Orthopedic restrictions, Pain  Assessment: Patient agreeable and motivated to participate in therapy session. Patient demonstrates progression with transfers requiring less person assistance however continues to require assistance for L LE management. Multiple sit<>stand transfers with min ax1 and verbal instruction for hand placement. Pt able to ambulate increased gait distance with roller walker and min ax1 however remains limited secondary to pain and L LE weakness. demonstrates improvement in gait quality with weight shift and LE advancement. Participated in supine L LE exercise program with AROM and input for form and pace, expresses compliance with exercise. Continue to focus on OOB mobility with progression of transfers and ambulation as appropriate and able. Barriers to Discharge: Inaccessible home environment, Decreased caregiver support     PT Discharge Recommendation: Post acute rehabilitation services    See flowsheet documentation for full assessment.

## 2023-08-09 NOTE — PLAN OF CARE
Problem: Potential for Falls  Goal: Patient will remain free of falls  Description: INTERVENTIONS:  - Educate patient/family on patient safety including physical limitations  - Instruct patient to call for assistance with activity   - Consult OT/PT to assist with strengthening/mobility   - Keep Call bell within reach  - Keep bed low and locked with side rails adjusted as appropriate  - Keep care items and personal belongings within reach  - Initiate and maintain comfort rounds  - Make Fall Risk Sign visible to staff  - Offer Toileting every  Hours, in advance of need  - Initiate/Maintain alarm  - Obtain necessary fall risk management equipment:   - Apply yellow socks and bracelet for high fall risk patients  - Consider moving patient to room near nurses station  Outcome: Progressing     Problem: MOBILITY - ADULT  Goal: Maintain or return to baseline ADL function  Description: INTERVENTIONS:  -  Assess patient's ability to carry out ADLs; assess patient's baseline for ADL function and identify physical deficits which impact ability to perform ADLs (bathing, care of mouth/teeth, toileting, grooming, dressing, etc.)  - Assess/evaluate cause of self-care deficits   - Assess range of motion  - Assess patient's mobility; develop plan if impaired  - Assess patient's need for assistive devices and provide as appropriate  - Encourage maximum independence but intervene and supervise when necessary  - Involve family in performance of ADLs  - Assess for home care needs following discharge   - Consider OT consult to assist with ADL evaluation and planning for discharge  - Provide patient education as appropriate  Outcome: Progressing     Problem: MOBILITY - ADULT  Goal: Maintains/Returns to pre admission functional level  Description: INTERVENTIONS:  - Perform BMAT or MOVE assessment daily.   - Set and communicate daily mobility goal to care team and patient/family/caregiver.    - Collaborate with rehabilitation services on mobility goals if consulted  - Perform Range of Motion  times a day. - Reposition patient every  hours.   - Dangle patient  times a day  - Stand patient  times a day  - Ambulate patient  times a day  - Out of bed to chair  times a day   - Out of bed for meals times a day  - Out of bed for toileting  - Record patient progress and toleration of activity level   Outcome: Progressing     Problem: Prexisting or High Potential for Compromised Skin Integrity  Goal: Skin integrity is maintained or improved  Description: INTERVENTIONS:  - Identify patients at risk for skin breakdown  - Assess and monitor skin integrity  - Assess and monitor nutrition and hydration status  - Monitor labs   - Assess for incontinence   - Turn and reposition patient  - Assist with mobility/ambulation  - Relieve pressure over bony prominences  - Avoid friction and shearing  - Provide appropriate hygiene as needed including keeping skin clean and dry  - Evaluate need for skin moisturizer/barrier cream  - Collaborate with interdisciplinary team   - Patient/family teaching  - Consider wound care consult   Outcome: Progressing

## 2023-08-09 NOTE — CASE MANAGEMENT
Case Management Progress Note    Patient name Rubens Altman  Location W /W -01 MRN [id number]  : 1960 Date 2023       LOS (days): 5  Geometric Mean LOS (GMLOS) (days):   Days to GMLOS:        OBJECTIVE:        Current admission status: Inpatient  Preferred Pharmacy: No Pharmacies Listed  Primary Care Provider: Michael Rodriguez MD    Primary Insurance: [place] [employer]  Secondary Insurance:     PROGRESS NOTE:  CM notified by discharge support that patients insurance has offered a P2P for [facility] placement. Info forwarded to trauma AP. CM re-opened subacute referral and patient was accepted by [facility]. CM met with patient at bedside to discuss, patient agreeable to having P2P completed. Patient stating she is also agreeable to TCF, or going home with [facility] if needed. Patient stating her  is very supportive. Patient aware trauma AP will complete P2P and CM will follow up as able to assist with dcp.

## 2023-08-09 NOTE — ASSESSMENT & PLAN NOTE
- Patient with acute blood loss anemia secondary to fracture and operative intervention.  - No evidence of active or ongoing bleeding at this time. - Continue to monitor hemodynamic status. - Patient is a [employer] and refuses all blood products  - Outpatient follow-up with PCP.

## 2023-08-10 PROCEDURE — 99232 SBSQ HOSP IP/OBS MODERATE 35: CPT | Performed by: PHYSICIAN ASSISTANT

## 2023-08-10 PROCEDURE — 97116 GAIT TRAINING THERAPY: CPT

## 2023-08-10 PROCEDURE — 97530 THERAPEUTIC ACTIVITIES: CPT

## 2023-08-10 PROCEDURE — 99024 POSTOP FOLLOW-UP VISIT: CPT | Performed by: PHYSICIAN ASSISTANT

## 2023-08-10 PROCEDURE — 97110 THERAPEUTIC EXERCISES: CPT

## 2023-08-10 RX ORDER — HYDROMORPHONE HCL IN WATER/PF 6 MG/30 ML
0.2 PATIENT CONTROLLED ANALGESIA SYRINGE INTRAVENOUS ONCE
Status: COMPLETED | OUTPATIENT
Start: 2023-08-10 | End: 2023-08-10

## 2023-08-10 RX ADMIN — ACETAMINOPHEN 975 MG: 325 TABLET, FILM COATED ORAL at 14:30

## 2023-08-10 RX ADMIN — METHOCARBAMOL TABLETS 500 MG: 500 TABLET, COATED ORAL at 23:35

## 2023-08-10 RX ADMIN — CYANOCOBALAMIN TAB 500 MCG 500 MCG: 500 TAB at 09:14

## 2023-08-10 RX ADMIN — METHOCARBAMOL TABLETS 500 MG: 500 TABLET, COATED ORAL at 00:11

## 2023-08-10 RX ADMIN — ENOXAPARIN SODIUM 30 MG: 30 INJECTION SUBCUTANEOUS at 09:13

## 2023-08-10 RX ADMIN — ENOXAPARIN SODIUM 30 MG: 30 INJECTION SUBCUTANEOUS at 23:36

## 2023-08-10 RX ADMIN — POLYETHYLENE GLYCOL 3350 17 G: 17 POWDER, FOR SOLUTION ORAL at 09:14

## 2023-08-10 RX ADMIN — METHOCARBAMOL TABLETS 500 MG: 500 TABLET, COATED ORAL at 12:43

## 2023-08-10 RX ADMIN — HYDROMORPHONE HYDROCHLORIDE 0.2 MG: 0.2 INJECTION, SOLUTION INTRAMUSCULAR; INTRAVENOUS; SUBCUTANEOUS at 10:34

## 2023-08-10 RX ADMIN — GABAPENTIN 300 MG: 300 CAPSULE ORAL at 17:43

## 2023-08-10 RX ADMIN — Medication 100 MG: at 09:14

## 2023-08-10 RX ADMIN — METOPROLOL SUCCINATE 25 MG: 25 TABLET, EXTENDED RELEASE ORAL at 09:14

## 2023-08-10 RX ADMIN — METHOCARBAMOL TABLETS 500 MG: 500 TABLET, COATED ORAL at 17:43

## 2023-08-10 RX ADMIN — GABAPENTIN 100 MG: 300 CAPSULE ORAL at 23:35

## 2023-08-10 RX ADMIN — SENNOSIDES AND DOCUSATE SODIUM 1 TABLET: 50; 8.6 TABLET ORAL at 23:35

## 2023-08-10 RX ADMIN — HYDROXYZINE HYDROCHLORIDE 25 MG: 25 TABLET ORAL at 09:14

## 2023-08-10 RX ADMIN — OXYCODONE HYDROCHLORIDE 5 MG: 5 TABLET ORAL at 06:44

## 2023-08-10 RX ADMIN — MULTIPLE VITAMINS W/ MINERALS TAB 1 TABLET: TAB ORAL at 09:14

## 2023-08-10 RX ADMIN — ZONISAMIDE 200 MG: 100 CAPSULE ORAL at 23:34

## 2023-08-10 RX ADMIN — TRAZODONE HYDROCHLORIDE 50 MG: 50 TABLET ORAL at 00:00

## 2023-08-10 RX ADMIN — METHOCARBAMOL TABLETS 500 MG: 500 TABLET, COATED ORAL at 06:45

## 2023-08-10 RX ADMIN — HYDROXYZINE HYDROCHLORIDE 25 MG: 25 TABLET ORAL at 17:43

## 2023-08-10 RX ADMIN — OXYCODONE HYDROCHLORIDE AND ACETAMINOPHEN 1000 MG: 500 TABLET ORAL at 09:14

## 2023-08-10 RX ADMIN — Medication 7.5 MG: at 17:48

## 2023-08-10 RX ADMIN — GABAPENTIN 300 MG: 300 CAPSULE ORAL at 09:14

## 2023-08-10 RX ADMIN — Medication 3 MG: at 23:35

## 2023-08-10 RX ADMIN — FOLIC ACID 1 MG: 1 TABLET ORAL at 09:17

## 2023-08-10 RX ADMIN — OXYCODONE HYDROCHLORIDE 5 MG: 5 TABLET ORAL at 23:42

## 2023-08-10 RX ADMIN — DESVENLAFAXINE 50 MG: 50 TABLET, FILM COATED, EXTENDED RELEASE ORAL at 09:17

## 2023-08-10 RX ADMIN — LACOSAMIDE 200 MG: 100 TABLET, FILM COATED ORAL at 09:14

## 2023-08-10 RX ADMIN — OXYCODONE HYDROCHLORIDE AND ACETAMINOPHEN 1000 MG: 500 TABLET ORAL at 17:43

## 2023-08-10 RX ADMIN — LACOSAMIDE 200 MG: 100 TABLET, FILM COATED ORAL at 23:35

## 2023-08-10 NOTE — PLAN OF CARE
Problem: PHYSICAL THERAPY ADULT  Goal: Performs mobility at highest level of function for planned discharge setting. See evaluation for individualized goals. Description: Treatment/Interventions: Functional transfer training, LE strengthening/ROM, Therapeutic exercise, Endurance training, Cognitive reorientation, Patient/family training, Gait training, Bed mobility, Equipment eval/education, Spoke to nursing, Spoke to case management, OT  Equipment Recommended: Kori Odor (possible WC for distances?)       See flowsheet documentation for full assessment, interventions and recommendations. Outcome: Progressing  Note: Prognosis: Fair  Problem List: Decreased strength, Decreased range of motion, Decreased endurance, Impaired balance, Decreased mobility, Decreased safety awareness, Orthopedic restrictions, Pain  Assessment: Patient seen split session secondary to pain management and fatigue. Inital part of session pt participate in supine LE exercise program with improvement noted in DF and knee extension and requiring less assistance, provided verbal instruction for form and pacing. Patient remains consistent with min ax1 for sit<>stand transfers with steadying balance assist and increased time. Patient able to ambulate increased gait distance with roller walker and min ax1 however does continue to remain limited by pain impacting gait distance and additional trials. Continue to focus on OOB moblity with progression of transfers and ambulation as appropriate and able. Barriers to Discharge: Inaccessible home environment, Decreased caregiver support     PT Discharge Recommendation: Post acute rehabilitation services    See flowsheet documentation for full assessment.

## 2023-08-10 NOTE — PHYSICAL THERAPY NOTE
PHYSICAL THERAPY NOTE    Patient Name: Arina Bales  CLJKC'Q Date: 8/10/2023       08/10/23 0956   PT Last Visit   PT Visit Date 08/10/23   Note Type   Note Type Treatment   Pain Assessment   Pain Assessment Tool 0-10   Pain Score 7   Pain Location/Orientation Orientation: Left; Location: Leg   Restrictions/Precautions   Weight Bearing Precautions Per Order Yes   LLE Weight Bearing Per Order WBAT   Other Precautions Cognitive; Chair Alarm; Bed Alarm; Fall Risk;Pain;WBS   General   Family/Caregiver Present No   Subjective   Subjective Patient supine in bed and is agreeable to participate in therapy session. Patient identifers obtained from name & . Bed Mobility   Supine to Sit 5  Supervision   Additional items Assist x 1;HOB elevated; Bedrails; Increased time required;Verbal cues   Sit to Supine 5  Supervision   Additional items Assist x 1;HOB elevated; Bedrails; Increased time required;Verbal cues   Additional Comments Pt supine in bed post session with bed alarm engaged, call bell and belongings in reach, declined sitting OOB in recliner   Transfers   Sit to Stand 4  Minimal assistance   Additional items Assist x 1; Armrests; Increased time required;Verbal cues   Stand to Sit 4  Minimal assistance   Additional items Assist x 1; Armrests; Increased time required;Verbal cues   Ambulation/Elevation   Gait pattern Decreased foot clearance;Decreased L stance; Antalgic; Excessively slow; Step to;Short stride   Gait Assistance 4  Minimal assist   Additional items Assist x 1;Verbal cues   Assistive Device Rolling walker   Distance 18' x1   Balance   Static Sitting Fair +   Static Standing Poor +   Ambulatory Poor +   Endurance Deficit   Endurance Deficit Yes   Endurance Deficit Description limited activity tolerance and ambulation distance   Activity Tolerance   Activity Tolerance Patient limited by fatigue;Patient limited by pain   Nurse Made Aware Spoke to [employer] Supine;15 reps;AROM; Bilateral   Heelslides Sitting;10 reps;AROM; Left   Knee AROM Short Arc Quad Supine;10 reps;AROM;AAROM; Left  (AROM degressing to AAROM)   Ankle Pumps Supine;15 reps;AROM; Bilateral   Assessment   Prognosis Fair   Problem List Decreased strength;Decreased range of motion;Decreased endurance; Impaired balance;Decreased mobility; Decreased safety awareness;Orthopedic restrictions;Pain   Assessment Patient seen split session secondary to pain management and fatigue. Inital part of session pt participate in supine LE exercise program with improvement noted in DF and knee extension and requiring less assistance, provided verbal instruction for form and pacing. Patient remains consistent with min ax1 for sit<>stand transfers with steadying balance assist and increased time. Patient able to ambulate increased gait distance with roller walker and min ax1 however does continue to remain limited by pain impacting gait distance and additional trials. Continue to focus on OOB moblity with progression of transfers and ambulation as appropriate and able. Barriers to Discharge Inaccessible home environment;Decreased caregiver support   Goals   Patient Goals to have less pain and be able to walk more   STG Expiration Date 08/15/23   PT Treatment Day 4   Plan   Treatment/Interventions LE strengthening/ROM; Functional transfer training; Therapeutic exercise; Endurance training;Patient/family training;Equipment eval/education; Bed mobility;Gait training;Spoke to nursing   PT Frequency 4-6x/wk   Recommendation   PT Discharge Recommendation Post acute rehabilitation services   Equipment Recommended [facility] Recommended Wheeled walker   AM-PAC Basic Mobility Inpatient   Turning in Flat Bed Without Bedrails 3   Lying on Back to Sitting on Edge of Flat Bed Without Bedrails 3   Moving Bed to Chair 2   Standing Up From Chair Using Arms 3   Walk in Room 2   Climb 3-5 Stairs With Railing 1   Basic Mobility Inpatient Raw Score 14   Basic Mobility Standardized Score 35.55   Highest Level Of Mobility   -[facility] Goal 4: Move to chair/commode   -HL Achieved 6: Walk 10 steps or more     The patient's AM-PAC Basic Mobility Inpatient Short Form Raw Score is 14. A Raw score of less than or equal to 16 suggests the patient may benefit from discharge to post-acute rehabilitation services. Please also refer to the recommendation of the Physical Therapist for safe discharge planning.       Riky Jules, PTA

## 2023-08-10 NOTE — PROGRESS NOTES
Orthopedics   Evelyn Brice 61 y.o. female MRN: [id number]  Unit/Bed#: W -01    Subjective:  61 y. o.female post operative day 6 IMN left tibia. Pt doing well. Pain controlled. Patient feels that she has more swelling in the leg. Improvement with PT activities.     Labs:  0   Lab Value Date/Time    HCT 25.6 (L) 08/08/2023 1246    HCT 22.6 (L) 08/06/2023 0632    HCT 27.4 (L) 08/05/2023 0338    HGB 8.3 (L) 08/08/2023 1246    HGB 7.5 (L) 08/06/2023 0632    HGB 9.0 (L) 08/05/2023 0338    INR 1.10 08/04/2023 1229    WBC 4.30 (L) 08/06/2023 0632    WBC 3.98 (L) 08/05/2023 0338    WBC 6.35 08/04/2023 1122       Meds:    Current Facility-Administered Medications:   •  acetaminophen (TYLENOL) tablet 975 mg, 975 mg, Oral, Q8H [address], Madelyn Ocasio PA-C, 975 mg at 08/09/23 1331  •  ascorbic acid (VITAMIN C) tablet 1,000 mg, 1,000 mg, Oral, BID, Atilio Rose MD, 1,000 mg at 08/10/23 9356  •  cyanocobalamin (VITAMIN B-12) tablet 500 mcg, 500 mcg, Oral, Daily, Atilio Rose MD, 500 mcg at 08/10/23 0914  •  desvenlafaxine succinate (PRISTIQ) 24 hr tablet 50 mg, 50 mg, Oral, Daily, Monico Meredith PA-C, 50 mg at 08/10/23 0715  •  enoxaparin (LOVENOX) subcutaneous injection 30 mg, 30 mg, Subcutaneous, Q12H [address], Monico Meredith PA-C, 30 mg at 20/44/02 1329  •  folic acid (FOLVITE) tablet 1 mg, 1 mg, Oral, Daily, Monico Meredith PA-C, 1 mg at 34/33/68 6945  •  folic acid (FOLVITE) tablet 1 mg, 1 mg, Oral, Daily, Atilio Rose MD, 1 mg at 08/10/23 1758  •  gabapentin (NEURONTIN) capsule 100 mg, 100 mg, Oral, HS, Monico Meredith PA-C, 100 mg at 08/09/23 2225  •  gabapentin (NEURONTIN) capsule 300 mg, 300 mg, Oral, BID, Monico Meredith PA-C, 300 mg at 08/10/23 9368  •  hydrOXYzine HCL (ATARAX) tablet 25 mg, 25 mg, Oral, BID, Monico Meredith PA-C, 25 mg at 08/10/23 2573  •  lacosamide (VIMPAT) tablet 200 mg, 200 mg, Oral, Q12H [address], Tano Vieira PA-C, 200 mg at 08/10/23 2881  •  melatonin tablet 3 mg, 3 mg, Oral, HS, Brittney Briscoe PA-C, 3 mg at 08/09/23 2224  •  methocarbamol (ROBAXIN) tablet 500 mg, 500 mg, Oral, Q6H [address], Tano Vieira PA-C, 500 mg at 08/10/23 1243  •  metoprolol succinate (TOPROL-XL) 24 hr tablet 25 mg, 25 mg, Oral, Daily, Tano Vieira PA-C, 25 mg at 08/10/23 7782  •  multivitamin-minerals (CENTRUM) tablet 1 tablet, 1 tablet, Oral, Daily, Tano Vieira PA-C, 1 tablet at 08/10/23 0914  •  naloxone (NARCAN) 0.04 mg/mL syringe 0.04 mg, 0.04 mg, Intravenous, Q1MIN PRN, Tano Veiira PA-C  •  ondansetron [facility]) injection 4 mg, 4 mg, Intravenous, Q4H PRN, Tano Vieira PA-C  •  oxyCODONE (ROXICODONE) IR tablet 5 mg, 5 mg, Oral, Q4H PRN, Isaiah Vivar PA-C, 5 mg at 08/10/23 2818  •  oxyCODONE (ROXICODONE) split tablet 7.5 mg, 7.5 mg, Oral, Q4H PRN, Isaiah Vivar PA-C, 7.5 mg at 08/09/23 0754  •  polyethylene glycol (MIRALAX) packet 17 g, 17 g, Oral, Daily, Tano Vieira PA-C, 17 g at 08/10/23 6586  •  senna-docusate sodium (SENOKOT S) 8.6-50 mg per tablet 1 tablet, 1 tablet, Oral, HS, Tano Vieira PA-C, 1 tablet at 08/09/23 2224  •  thiamine tablet 100 mg, 100 mg, Oral, Daily, Tano Vieira PA-C, 100 mg at 08/10/23 2173  •  traZODone (DESYREL) tablet 50 mg, 50 mg, Oral, HS, Tano Vieira PA-C, 50 mg at 08/10/23 0000  •  zonisamide (ZONEGRAN) capsule 200 mg, 200 mg, Oral, HS, Tano Vieira PA-C, 200 mg at 08/09/23 2224    Blood Culture:   No results found for: "BLOODCX"    Wound Culture:   No results found for: "WOUNDCULT"    Ins and Outs:  I/O last 24 hours:   In: 540 [P.O.:540]  Out: -           Physical:  Vitals:    08/10/23 0803   BP: 112/59   Pulse:    Resp: 16   Temp: 97.6 °F (36.4 °C)   SpO2:      left lower extremity  · Dressings clean Dry Intact  · 4/5 strength to EHL/FHL, + ankle plantarflexion and dorsiflexion  · Sensation intact L2-S1  · 2+ DP Pulse    Assessment:   61 y. o.female post operative day 6 IMN left tibia.     Plan:  · WBAT left lower extremity  · Up and out of bed  · Ice, Elevation to affected extremity  · Analgesics  · DVT prophylaxis  · PT/OT  · Dispo: Silvina Parker for discharge from ortho perspective  · Patient noted to have acute blood loss anemia due to a drop in Hbg of > 2.0g from preop levels, will monitor vital signs and resuscitate with IV fluids as needed    Rg Madrigal PA-C

## 2023-08-10 NOTE — ASSESSMENT & PLAN NOTE
- Patient has a history of chronic alcohol abuse. - Patient was recently admitted to [address] [place] [address] Detox unit in July 2023.  - Continue to monitor for signs or symptoms of alcohol withdrawal.  - Outpatient follow-up with PCP.

## 2023-08-10 NOTE — ASSESSMENT & PLAN NOTE
- Patient with acute blood loss anemia secondary to fracture and operative intervention.  - No evidence of active or ongoing bleeding at this time. - Continue to monitor hemodynamic status. - Patient is a [employer] and refuses all blood products. - Outpatient follow-up with PCP.

## 2023-08-10 NOTE — UTILIZATION REVIEW
Continued Stay Review    Date: 8/10                          Current Patient Class: Inpatient  Current Level of Care: Med surg    HPI:63 y.o. female initially admitted on 8/4     Assessment/Plan:  8/10  Awaiting IP Rehab placement. 8/9  Per  notes; Insurance auth denied for [employer], offered Peer to peer and attending physician notified with contact info. Awaiting Peer to peer determination. Referral to [address] [place] [address] [facility] and accepted. Pt agreeable to having P2P completed.  She states she is also agreeable to TCF, or going home with [facility]  [address] and DME if needed    Vital Signs:   08/10/23 08:03:39 97.6 °F (36.4 °C) -- 16 112/59 77 -- --   08/10/23 02:07:38 -- -- -- 115/59 78       Pertinent Labs/Diagnostic Results:       Results from last 7 days   Lab Units 08/08/23  1246 08/06/23  0632 08/05/23  0338 08/04/23  1122 08/04/23  1120   WBC Thousand/uL  --  4.30* 3.98* 6.35  --    HEMOGLOBIN g/dL 8.3* 7.5* 9.0* 11.0*  --    I STAT HEMOGLOBIN g/dl  --   --   --   --  9.9*   HEMATOCRIT % 25.6* 22.6* 27.4* 34.8  --    HEMATOCRIT, ISTAT %  --   --   --   --  29*   PLATELETS Thousands/uL  --  42* 47* 56*  --    NEUTROS ABS Thousands/µL  --  2.06 3.25 5.07  --          Results from last 7 days   Lab Units 08/06/23  0632 08/05/23  0338 08/04/23  1449 08/04/23  1122 08/04/23  1120   SODIUM mmol/L 140 137 137 133*  --    POTASSIUM mmol/L 3.9 4.2 4.3 4.3  --    CHLORIDE mmol/L 110* 108 100 106  --    CO2 mmol/L 25 24 15* 19*  --    CO2, I-STAT mmol/L  --   --   --   --  18*   ANION GAP mmol/L 5 5 22 8  --    BUN mg/dL 12 11 8 14  --    CREATININE mg/dL 0.59* 0.62 0.28* 0.70  --    EGFR ml/min/1.73sq m 97 96 125 92  --    CALCIUM mg/dL 8.1* 7.9* 4.8* 8.2*  --    CALCIUM, IONIZED mmol/L  --  1.07*  --   --   --    CALCIUM, IONIZED, ISTAT mmol/L  --   --   --   --  0.98*   MAGNESIUM mg/dL  --  2.0  --  1.8*  --    PHOSPHORUS mg/dL  --  2.9  --  3.3  --      Results from last 7 days   Lab Units 08/05/23  0338 08/04/23  1122   AST U/L 19 29 ALT U/L 19 24   ALK PHOS U/L 124* 158*   TOTAL PROTEIN g/dL 5.5* 6.1*   ALBUMIN g/dL 3.1* 3.3*   TOTAL BILIRUBIN mg/dL 0.35 0.47   BILIRUBIN DIRECT mg/dL 0.11  --          Results from last 7 days   Lab Units 08/06/23  0632 08/05/23  0338 08/04/23  1449 08/04/23  1122   GLUCOSE RANDOM mg/dL 108 178* 71 116       Results from last 7 days   Lab Units 08/05/23  0405   PH NATALIA  7.390   PCO2 NATALIA mm Hg 41.8*   PO2 NATALIA mm Hg 66.2*   HCO3 NATALIA mmol/L 24.7   BASE EXC NATALIA mmol/L -0.2   O2 CONTENT NATALIA ml/dL 13.2   O2 HGB, VENOUS % 90.8*     Results from last 7 days   Lab Units 08/04/23  1120   PH, NATALIA I-STAT  7.381   PCO2, NATALIA ISTAT mm HG 28.9*   PO2, NATALIA ISTAT mm HG 31.0*   HCO3, NATALIA ISTAT mmol/L 17.1*   I STAT BASE EXC mmol/L -7*   I STAT O2 SAT % 59*     Results from last 7 days   Lab Units 08/04/23  1122   CK TOTAL U/L 100             Results from last 7 days   Lab Units 08/04/23  1229   PROTIME seconds 14.4   INR  1.10             Results from last 7 days   Lab Units 08/04/23  1229   LACTIC ACID mmol/L 1.2       Results from last 7 days   Lab Units 08/04/23  1330   CLARITY UA  Clear   COLOR UA  Light Yellow   SPEC GRAV UA  1.028   PH UA  6.0   GLUCOSE UA mg/dl Negative   KETONES UA mg/dl Negative   BLOOD UA  Negative   PROTEIN UA mg/dl Negative   NITRITE UA  Negative   BILIRUBIN UA  Negative   UROBILINOGEN UA (BE) mg/dl <2.0   LEUKOCYTES UA  Negative       Medications:   Scheduled Medications:  acetaminophen, 975 mg, Oral, Q8H [facility]  ascorbic acid, 1,000 mg, Oral, BID  cyanocobalamin, 500 mcg, Oral, Daily  desvenlafaxine, 50 mg, Oral, Daily  enoxaparin, 30 mg, Subcutaneous, F02T DESTIN  folic acid, 1 mg, Oral, Daily  folic acid, 1 mg, Oral, Daily  gabapentin, 100 mg, Oral, HS  gabapentin, 300 mg, Oral, BID  hydrOXYzine HCL, 25 mg, Oral, BID  lacosamide, 200 mg, Oral, Q12H [facility]  melatonin, 3 mg, Oral, HS  methocarbamol, 500 mg, Oral, Q6H DESTIN  metoprolol succinate, 25 mg, Oral, Daily  multivitamin-minerals, 1 tablet, Oral, Daily  polyethylene glycol, 17 g, Oral, Daily  senna-docusate sodium, 1 tablet, Oral, HS  thiamine, 100 mg, Oral, Daily  traZODone, 50 mg, Oral, HS  zonisamide, 200 mg, Oral, HS      Continuous IV Infusions: None     PRN Meds:  naloxone, 0.04 mg, Intravenous, Q1MIN PRN  ondansetron, 4 mg, Intravenous, Q4H PRN  oxyCODONE, 5 mg, Oral, Q4H PRN 8/10 x1  oxyCODONE, 7.5 mg, Oral, Q4H PRN      Discharge Plan: See above    Network Utilization Review Department  ATTENTION: Please call with any questions or concerns to [phone] and carefully listen to the prompts so that you are directed to the right person. All voicemails are confidential.  Raul Lloyd all requests for admission clinical reviews, approved or denied determinations and any other requests to dedicated fax number below belonging to the campus where the patient is receiving treatment.  List of dedicated fax numbers for the Facilities:  [place] DENIALS (Administrative/Medical Necessity) [phone] [address] [facility] (Maternity/NICU/Pediatrics) [phone]   [address] [place] [address] Drive [phone]   [facility] [phone]   [facility] [address] [phone]   [address] [address] [phone]

## 2023-08-10 NOTE — PROGRESS NOTES
[address] [facility] [place]  Progress Note  Name: Rishi Garza  MRN: [id number]  Unit/Bed#: W -01 I Date of Admission: 8/4/2023   Date of Service: 8/10/2023 I Hospital Day: 6    Assessment/Plan   Fall  Assessment & Plan  - Status post mechanical fall with the below noted injuries. - Fall precautions. - Geriatric Medicine consultation for evaluation, medication review and recommendations.  - PT and OT evaluation and treatment as indicated. - Case Management consultation for disposition planning. * Tibia fracture  Assessment & Plan  - Acute closed midshaft tibial fracture, present on admission.  - Appreciate Orthopedic surgery evaluation, recommendations and interventions as noted. - Status post ORIF of the left tibial fracture on 8/4/2023.  - May be weightbearing as tolerated on the left lower extremity.  - Monitor left lower extremity neurovascular exam.  - Continue multimodal analgesic regimen.  - Continue DVT prophylaxis. - PT and OT evaluation and treatment as indicated. - Outpatient follow up with Orthopedic surgery for re-evaluation. Hematoma  Assessment & Plan  - Left lower extremity hematoma associated with left tibial fracture and and operative intervention.  - No evidence of active or ongoing bleeding.  - Continue to monitor hemodynamic status.  - Outpatient follow-up with PCP. Acute blood loss anemia  Assessment & Plan  - Patient with acute blood loss anemia secondary to fracture and operative intervention.  - No evidence of active or ongoing bleeding at this time. - Continue to monitor hemodynamic status. - Patient is a Zoroastrian and refuses all blood products. - Outpatient follow-up with PCP. Chronic alcohol abuse  Assessment & Plan  - Patient has a history of chronic alcohol abuse. - Patient was recently admitted to [facility] Detox unit in July 2023.  - Continue to monitor for signs or symptoms of alcohol withdrawal.  - Outpatient follow-up with PCP. Epilepsy [facility])  Assessment & Plan  - Patient with chronic history of epilepsy.   - Patient's last seizure reported to be 7/9/2023.  - Continue home antiepileptic medications: Gabapentin, Vimpat and Zonegran. - Seizures may be related to alcohol withdrawal as well. - Patient denies any seizure like activity prior to her fall today. Bowel Regimen: On MiraLAX and Senokot-S.  VTE Prophylaxis:Sequential compression device (Venodyne)  and Enoxaparin (Lovenox)     Disposition: Continue current level of care. Awaiting placement at a postacute care facility for rehab. Case management following for disposition planning. Subjective   Chief Complaint: "I'm cheerful."    Subjective: Patient is doing well this morning. She notes she had a good night sleep. She is noting some increased soreness in her left leg today, but attributes that to increased activity and working with therapy yesterday. Her pain is overall reasonably controlled and she has no new complaints today. She continues to tolerate her diet without any nausea or vomiting. Objective   Vitals:   Temp:  [97.8 °F (36.6 °C)-98.8 °F (37.1 °C)] 97.8 °F (36.6 °C)  HR:  [72-76] 72  Resp:  [12-18] 18  BP: (109-116)/(54-59) 115/59    I/O       08/08 0701  08/09 0700 08/09 0701  08/10 0700    P. O. 1080 540    Total Intake(mL/kg) 1080 (16.4) 540 (8.2)    Net +1080 +540          Unmeasured Urine Occurrence 3 x 1 x    Unmeasured Stool Occurrence 4 x            Physical Exam:   GENERAL APPEARANCE: Patient in no acute distress. HEENT: NCAT; EOMs intact; Mucous membranes moist  CV: Regular rate and rhythm; no murmur/gallops/rubs appreciated. CHEST / LUNGS: Clear to auscultation; no wheezes/rales/rhonci. ABD: NABS; soft; non-distended; non-tender. : Voiding spontaneously. EXT: +2 pulses bilaterally upper & lower extremities.   Mild diffuse left lower extremity swelling, particularly more distal.  Mild tenderness of the left lower leg and around the knee with soft and compressible muscle compartments, intact neurovascular exam distally, and a clean/dry/intact dressing in place. NEURO: GCS 15; no focal neurologic deficits; neurovascularly intact. SKIN: Warm, dry and well perfused; no rash; no jaundice. Invasive Devices     Peripheral Intravenous Line  Duration           Peripheral IV 08/09/23 Proximal;Right;Ventral (anterior) Forearm 1 day                      Lab Results: Results: I have personally reviewed all pertinent laboratory/tests results, BMP/CMP: No results found for: "SODIUM", "K", "CL", "CO2", "ANIONGAP", "BUN", "CREATININE", "GLUCOSE", "CALCIUM", "AST", "ALT", "ALKPHOS", "PROT", "BILITOT", "EGFR" and CBC: No results found for: "WBC", "HGB", "HCT", "MCV", "PLT", "ADJUSTEDWBC", "RBC", "MCH", "MCHC", "RDW", "MPV", "NRBC"  Imaging: I have personally reviewed pertinent reports.      Other Studies: N/A      Latasha Olivares PA-C  8/10/2023  06:42 AM

## 2023-08-10 NOTE — CASE MANAGEMENT
Case Management Discharge Planning Note    Patient name Juancho Layton  Location W /W -46 MRN [id number]  : 1960 Date 8/10/2023       Current Admission Date: 2023  Current Admission Diagnosis:Tibia fracture   Patient Active Problem List    Diagnosis Date Noted   • Acute blood loss anemia 2023   • Tibia fracture 2023   • Fall 2023   • Hematoma 2023   • Epilepsy ([address]) 2023   • Chronic alcohol abuse 2023      LOS (days): 6  Geometric Mean LOS (GMLOS) (days):   Days to GMLOS:     OBJECTIVE:  Risk of Unplanned Readmission Score: 11.58         Current admission status: Inpatient   Preferred Pharmacy: No Pharmacies Listed  Primary Care Provider: Lulu Carbone MD    Primary Insurance: [email or web address] ([facility]) HEALTHCARE  Secondary Insurance:     DISCHARGE DETAILS:    Discharge planning discussed with[de-identified] patient at bedside  Freedom of Choice: Yes  Comments - Freedom of Choice: home w/ HHC and DME  CM contacted family/caregiver?: No- see comments (patient declined, stating she will discuss with her )  Were Treatment Team discharge recommendations reviewed with patient/caregiver?: Yes  Did patient/caregiver verbalize understanding of patient care needs?: Yes  Were patient/caregiver advised of the risks associated with not following Treatment Team discharge recommendations?: Yes    Contacts  Reason/Outcome: Continuity of Care, Referral, Emergency Contact, Discharge  [address]         Is the patient interested in [facility] at discharge?: No    DME Referral Provided  Referral made for DME?: No    Other Referral/Resources/Interventions Provided:  Interventions: C  Referral Comments: CM notified by trauma AP that P2P was completed and denial was upheld. CM met with patient at bedside follow up with d/c options. CM discussed that patient was accepted to [address] [place] [address] and that an [place] can be started.  Wife stating she would strongly prefer to return home at this time with DME and HHC. Referral placed in [address] for walker and referral placed in [address] for [address]. Patient stating she has strong supports at home with  and family. CM to follow up in AM to continue with dcp.     Would you like to participate in our [facility] [employer] service program?  : No - Declined    Treatment Team Recommendation: Short Term Rehab  Discharge Destination Plan[de-identified] Home with [address] [facility]

## 2023-08-10 NOTE — ASSESSMENT & PLAN NOTE
- Left lower extremity hematoma associated with left tibial fracture and and operative intervention.  - No evidence of active or ongoing bleeding.  - Continue to monitor hemodynamic status.  - Outpatient follow-up with PCP.

## 2023-08-11 VITALS
WEIGHT: 145.5 LBS | BODY MASS INDEX: 23.38 KG/M2 | SYSTOLIC BLOOD PRESSURE: 121 MMHG | RESPIRATION RATE: 12 BRPM | HEIGHT: 66 IN | OXYGEN SATURATION: 98 % | TEMPERATURE: 98.8 F | DIASTOLIC BLOOD PRESSURE: 69 MMHG | HEART RATE: 75 BPM

## 2023-08-11 LAB
DME PARACHUTE DELIVERY DATE ACTUAL: NORMAL
DME PARACHUTE DELIVERY DATE REQUESTED: NORMAL
DME PARACHUTE ITEM DESCRIPTION: NORMAL
DME PARACHUTE ORDER STATUS: NORMAL
DME PARACHUTE SUPPLIER NAME: NORMAL
DME PARACHUTE SUPPLIER PHONE: NORMAL

## 2023-08-11 PROCEDURE — 99238 HOSP IP/OBS DSCHRG MGMT 30/<: CPT | Performed by: PHYSICIAN ASSISTANT

## 2023-08-11 PROCEDURE — NC001 PR NO CHARGE: Performed by: PHYSICIAN ASSISTANT

## 2023-08-11 RX ORDER — ASPIRIN 81 MG/1
81 TABLET ORAL DAILY
Qty: 28 TABLET | Refills: 0 | Status: SHIPPED | OUTPATIENT
Start: 2023-08-11 | End: 2023-09-08

## 2023-08-11 RX ORDER — POLYETHYLENE GLYCOL 3350 17 G/17G
17 POWDER, FOR SOLUTION ORAL DAILY
Qty: 85 G | Refills: 0 | Status: SHIPPED | OUTPATIENT
Start: 2023-08-11 | End: 2023-08-16

## 2023-08-11 RX ORDER — OXYCODONE HYDROCHLORIDE 5 MG/1
2.5-5 TABLET ORAL EVERY 4 HOURS PRN
Qty: 20 TABLET | Refills: 0 | Status: SHIPPED | OUTPATIENT
Start: 2023-08-11 | End: 2023-08-14

## 2023-08-11 RX ORDER — METHOCARBAMOL 500 MG/1
500 TABLET, FILM COATED ORAL EVERY 6 HOURS SCHEDULED
Qty: 56 TABLET | Refills: 0 | Status: SHIPPED | OUTPATIENT
Start: 2023-08-11 | End: 2023-08-25

## 2023-08-11 RX ORDER — ACETAMINOPHEN 325 MG/1
650 TABLET ORAL EVERY 4 HOURS PRN
Refills: 0
Start: 2023-08-11

## 2023-08-11 RX ORDER — AMOXICILLIN 250 MG
1 CAPSULE ORAL
Qty: 5 TABLET | Refills: 0 | Status: SHIPPED | OUTPATIENT
Start: 2023-08-11 | End: 2023-08-16

## 2023-08-11 RX ADMIN — METHOCARBAMOL TABLETS 500 MG: 500 TABLET, COATED ORAL at 12:39

## 2023-08-11 RX ADMIN — OXYCODONE HYDROCHLORIDE AND ACETAMINOPHEN 1000 MG: 500 TABLET ORAL at 09:35

## 2023-08-11 RX ADMIN — TRAZODONE HYDROCHLORIDE 50 MG: 50 TABLET ORAL at 00:00

## 2023-08-11 RX ADMIN — MULTIPLE VITAMINS W/ MINERALS TAB 1 TABLET: TAB ORAL at 09:34

## 2023-08-11 RX ADMIN — LACOSAMIDE 200 MG: 100 TABLET, FILM COATED ORAL at 09:35

## 2023-08-11 RX ADMIN — METHOCARBAMOL TABLETS 500 MG: 500 TABLET, COATED ORAL at 05:47

## 2023-08-11 RX ADMIN — Medication 7.5 MG: at 05:47

## 2023-08-11 RX ADMIN — ACETAMINOPHEN 975 MG: 325 TABLET, FILM COATED ORAL at 14:21

## 2023-08-11 RX ADMIN — ENOXAPARIN SODIUM 30 MG: 30 INJECTION SUBCUTANEOUS at 09:34

## 2023-08-11 RX ADMIN — FOLIC ACID 1 MG: 1 TABLET ORAL at 09:36

## 2023-08-11 RX ADMIN — HYDROXYZINE HYDROCHLORIDE 25 MG: 25 TABLET ORAL at 09:35

## 2023-08-11 RX ADMIN — METOPROLOL SUCCINATE 25 MG: 25 TABLET, EXTENDED RELEASE ORAL at 09:36

## 2023-08-11 RX ADMIN — CYANOCOBALAMIN TAB 500 MCG 500 MCG: 500 TAB at 09:36

## 2023-08-11 RX ADMIN — DESVENLAFAXINE 50 MG: 50 TABLET, FILM COATED, EXTENDED RELEASE ORAL at 09:37

## 2023-08-11 RX ADMIN — POLYETHYLENE GLYCOL 3350 17 G: 17 POWDER, FOR SOLUTION ORAL at 09:34

## 2023-08-11 RX ADMIN — GABAPENTIN 300 MG: 300 CAPSULE ORAL at 09:35

## 2023-08-11 RX ADMIN — Medication 100 MG: at 09:36

## 2023-08-11 NOTE — PROGRESS NOTES
[address] [facility]  Progress Note  Name: Jaswinder Villagomez  MRN: [id number]  Unit/Bed#: W -01 I Date of Admission: 8/4/2023   Date of Service: 8/11/2023 I Hospital Day: 7    Assessment/Plan   Fall  Assessment & Plan  - Status post mechanical fall with the below noted injuries. - Fall precautions. - Geriatric Medicine consultation for evaluation, medication review and recommendations.  - PT and OT evaluation and treatment as indicated. - Case Management consultation for disposition planning. * Tibia fracture  Assessment & Plan  - Acute closed midshaft tibial fracture, present on admission.  - Appreciate Orthopedic surgery evaluation, recommendations and interventions as noted. - Status post ORIF of the left tibial fracture on 8/4/2023.  - May be weightbearing as tolerated on the left lower extremity.  - Monitor left lower extremity neurovascular exam.  - Continue multimodal analgesic regimen.  - Continue DVT prophylaxis. - PT and OT evaluation and treatment as indicated. - Outpatient follow up with Orthopedic surgery for re-evaluation. Hematoma  Assessment & Plan  - Left lower extremity hematoma associated with left tibial fracture and and operative intervention.  - No evidence of active or ongoing bleeding.  - Continue to monitor hemodynamic status.  - Outpatient follow-up with PCP. Acute blood loss anemia  Assessment & Plan  - Patient with acute blood loss anemia secondary to fracture and operative intervention.  - No evidence of active or ongoing bleeding at this time. - Continue to monitor hemodynamic status. - Patient is a Pentecostal and refuses all blood products. - Outpatient follow-up with PCP. Chronic alcohol abuse  Assessment & Plan  - Patient has a history of chronic alcohol abuse. - Patient was recently admitted to [address] [place] [address] Detox unit in July 2023.  - Continue to monitor for signs or symptoms of alcohol withdrawal.  - Outpatient follow-up with PCP. Epilepsy [facility])  Assessment & Plan  - Patient with chronic history of epilepsy.   - Patient's last seizure reported to be 7/9/2023.  - Continue home antiepileptic medications: Gabapentin, Vimpat and Zonegran. - Seizures may be related to alcohol withdrawal as well. - Patient denies any seizure like activity prior to her fall today. Bowel Regimen: On MiraLAX and Senokot-S.  VTE Prophylaxis:Sequential compression device (Venodyne)  and Enoxaparin (Lovenox)     Disposition: Continue current level of care. Anticipate possible discharge home versus postacute care facility for rehab pending further discussion with patient and case management to determine what home services are available as she was denied acute rehab by her insurance provider. Subjective   Chief Complaint: "I'm doing good."    Subjective: Patient is doing well this morning. She notes some pain still in her left leg, but it is manageable. She is feeling better about her mobility. She continues tolerated diet without nausea or vomiting. She is anticipating going home today. Objective   Vitals:   Temp:  [97.6 °F (36.4 °C)-98.3 °F (36.8 °C)] 98.1 °F (36.7 °C)  HR:  [75] 75  Resp:  [16] 16  BP: (101-112)/(56-85) 101/57    I/O       08/09 0701  08/10 0700 08/10 0701  08/11 0700 08/11 0701  08/12 0700    P. O. 540 1096     Total Intake(mL/kg) 540 (8.2) 1096 (16.6)     Net +540 +1096            Unmeasured Urine Occurrence 1 x 2 x     Unmeasured Stool Occurrence  2 x            Physical Exam:   GENERAL APPEARANCE: Patient in no acute distress. HEENT: NCAT; EOMs intact; Mucous membranes moist  CV: Regular rate and rhythm; no murmur/gallops/rubs appreciated. CHEST / LUNGS: Clear to auscultation; no wheezes/rales/rhonci. ABD: NABS; soft; non-distended; non-tender. : Voiding spontaneously. EXT: +2 pulses bilaterally upper & lower extremities.   Persistent but improving swelling throughout the left lower extremity with continued tenderness around the left knee and proximal lower leg with clean/dry/intact postoperative dressings in place, soft and compressible muscle compartments and intact neurovascular exam in the left lower extremity. NEURO: GCS 15; no focal neurologic deficits; neurovascularly intact. SKIN: Warm, dry and well perfused; no rash; no jaundice. Invasive Devices     Peripheral Intravenous Line  Duration           Peripheral IV 08/09/23 Proximal;Right;Ventral (anterior) Forearm 2 days                      Lab Results: Results: I have personally reviewed all pertinent laboratory/tests results  Imaging: I have personally reviewed pertinent reports.      Other Studies: N/A      Joey Mahmood PA-C  8/11/2023  06:41 AM

## 2023-08-11 NOTE — INCIDENTAL FINDINGS
The following findings require follow up:  Radiographic finding     Findings and Follow up required:       1) Chronic pancreatitis with pancreatic parenchymal atrophy and extensive pancreatic parenchymal calcifications noted. There is increasing enlargement of the main pancreatic duct probably due to some combination of pancreatic parenchymal atrophy and stones in the pancreatic duct. These findings were noted incidentally on your trauma imaging. A malignancy (cancer) cannot be completely excluded based on trauma imaging alone. Recommend short-term outpatient follow-up with primary care provider to review the finding and for further surveillance as indicated. 2) One or more sharply circumscribed subcentimeter renal hypodensities are present, too small to accurately characterize, and statistically most likely benign findings. These findings were incidentally noted under trauma imaging. A malignancy (cancer) cannot be completely excluded based on trauma imaging alone. Recommend short-term outpatient follow-up with primary care provider to review the finding and for further surveillance as indicated. According to recent literature (Radiology 2019) no further workup of these findings is recommended at this time. Follow up should be done within 2 week(s)    The above noted incidental findings were discussed with the patient. The patient demonstrated understanding of the findings and the follow-up recommendations.     Please notify the following clinician to assist with the follow up:   Dr. Allison Learn

## 2023-08-11 NOTE — ASSESSMENT & PLAN NOTE
- Patient with acute blood loss anemia secondary to fracture and operative intervention.  - No evidence of active or ongoing bleeding at this time. - Continue to monitor hemodynamic status. - Patient is a Jew and refuses all blood products. - Outpatient follow-up with PCP.

## 2023-08-11 NOTE — ASSESSMENT & PLAN NOTE
- Patient has a history of chronic alcohol abuse. - Patient was recently admitted to [address] [place] [address] Detox unit in July 2023.  - Continue to monitor for signs or symptoms of alcohol withdrawal.  - Outpatient follow-up with PCP.

## 2023-08-11 NOTE — CASE MANAGEMENT
Support Center has received denial.  Denial received for: Acute Rehab  Facility: [address] [place] [address]  Denial #: [id number]  Denial Reason: care can be received at current setting, needs can be met at lower [id number] [facility] Appeal F#:  [phone] notified: Hanh Smith

## 2023-08-11 NOTE — CASE MANAGEMENT
Case Management Discharge Planning Note    Patient name Jennifer Sandoval  Location W /W -54 MRN [id number]  : 1960 Date 2023       Current Admission Date: 2023  Current Admission Diagnosis:Tibia fracture   Patient Active Problem List    Diagnosis Date Noted   • Acute blood loss anemia 2023   • Tibia fracture 2023   • Fall 2023   • Hematoma 2023   • Epilepsy ([address]) 2023   • Chronic alcohol abuse 2023      LOS (days): 7  Geometric Mean LOS (GMLOS) (days):   Days to GMLOS:     OBJECTIVE:  Risk of Unplanned Readmission Score: 12.26         Current admission status: Inpatient   Preferred Pharmacy:   [place] [address] [facility], [address] [place] [address]  Phone: [phone] Fax: [phone]    Primary Care Provider: Favian Kim MD    Primary Insurance: [employer] HEALTHCARE  Secondary Insurance:     DISCHARGE DETAILS:    Discharge planning discussed with[de-identified] patient at bedside  Freedom of Choice: Yes  Comments - Freedom of Choice: [address]         Is the patient interested in [address] [facility] at discharge?: Yes  [facility] requested[de-identified] Occupational Therapy, Nursing, Physical [address] Name[de-identified] [address] [facility] Provider[de-identified] PCP  Home Health Services Needed[de-identified] Gait/ADL Training, Evaluate Functional Status and Safety, Strengthening/Theraputic Exercises to Improve Function  Homebound Criteria Met[de-identified] Requires the Assistance of Another Person for Safe Ambulation or to Leave the Home  Supporting Clincal Findings[de-identified] Limited Endurance    DME Referral Provided  Referral made for DME?: Yes  DME referral completed for the following items[de-identified] Vinh Calvert  DME Supplier Name[de-identified] [employer]    Other Referral/Resources/Interventions Provided:  Interventions: HHC, DME  Referral Comments: CM met with patient at bedside to provide walker- delivery slip signed, patient provided with copy, original placed in [place] folder. Patient provided with accepting [facility] agencies- would like to work with Garret Ware- reserved in [address] and added to follow up providers. CM to attach AVS and HHC orders to [address] as able. ARC aware patient will be going home- referral canceled in [address]. Patients  to provide transportation home. No further CM needs anticipated at this time.     Would you like to participate in our [facility] [employer] service program?  : No - Declined    Treatment Team Recommendation: Short Term Rehab  Discharge Destination Plan[de-identified] Home with [facility] at Discharge : Family

## 2023-08-11 NOTE — DISCHARGE SUMMARY
[address] [facility] [address]  Discharge- Lulu Sandoval 1960, 61 y.o. female MRN: [id number]  Unit/Bed#: W -01 Encounter: [id number]  Primary Care Provider: Alex Valencia MD   Date and time admitted to hospital: 8/4/2023 11:06 AM    Fall  Assessment & Plan  - Status post mechanical fall with the below noted injuries. - Fall precautions. - Geriatric Medicine consultation for evaluation, medication review and recommendations.  - PT and OT evaluation and treatment as indicated. - Case Management consultation for disposition planning. * Tibia fracture  Assessment & Plan  - Acute closed midshaft tibial fracture, present on admission.  - Appreciate Orthopedic surgery evaluation, recommendations and interventions as noted. - Status post ORIF of the left tibial fracture on 8/4/2023.  - May be weightbearing as tolerated on the left lower extremity.  - Monitor left lower extremity neurovascular exam.  - Continue multimodal analgesic regimen.  - Continue DVT prophylaxis. - PT and OT evaluation and treatment as indicated. - Outpatient follow up with Orthopedic surgery for re-evaluation. Hematoma  Assessment & Plan  - Left lower extremity hematoma associated with left tibial fracture and and operative intervention.  - No evidence of active or ongoing bleeding.  - Continue to monitor hemodynamic status.  - Outpatient follow-up with PCP. Acute blood loss anemia  Assessment & Plan  - Patient with acute blood loss anemia secondary to fracture and operative intervention.  - No evidence of active or ongoing bleeding at this time. - Continue to monitor hemodynamic status. - Patient is a Denominational and refuses all blood products. - Outpatient follow-up with PCP. Chronic alcohol abuse  Assessment & Plan  - Patient has a history of chronic alcohol abuse.    - Patient was recently admitted to [address] [place] [address] Detox unit in July 2023.  - Continue to monitor for signs or symptoms of alcohol withdrawal.  - Outpatient follow-up with PCP. Epilepsy [facility])  Assessment & Plan  - Patient with chronic history of epilepsy.   - Patient's last seizure reported to be 7/9/2023.  - Continue home antiepileptic medications: Gabapentin, Vimpat and Zonegran. - Seizures may be related to alcohol withdrawal as well. - Patient denies any seizure like activity prior to her fall today. Discharge Summary - Trauma Service   Buddy Kendall 61 y.o. female MRN: [id number]  Unit/Bed#: W -01 Encounter: [id number]    Admission Date: 8/4/2023     Discharge Date: 8/11/2023    Admitting Diagnosis: Tibial fracture [S82.209A]  Multiple injuries due to trauma [T07. XXXA]    Discharge Diagnosis: See above. Attending and Service: Dr. Deirdre Quintero, Acute Care Surgical Services. Consulting Physician(s):     1. Orthopedic surgery. Imaging and Procedures Performed:     XR chest 1 view    Result Date: 8/7/2023  Impression: No acute cardiopulmonary disease within limitations of supine imaging. Left tibial and fibular fractures. Workstation performed: [id number]     XR tibia fibula 2 vw left    Result Date: 8/7/2023  Impression: No acute cardiopulmonary disease within limitations of supine imaging. Left tibial and fibular fractures. Workstation performed: [id number]     XR tibia fibula 2 vw left    Result Date: 8/6/2023  Impression: Fluoroscopic guidance provided for procedure guidance. Please refer to the separate procedure notes for additional details. Workstation performed: [id number]     XR foot 3+ vw left    Result Date: 8/4/2023  Impression: Fourth metatarsal neck fracture. Workstation performed: [id number]     XR foot 3+ vw right    Result Date: 8/4/2023  Impression: Fractures of the first and fifth proximal phalanges as well as the dorsum of the tarsal navicular. Workstation performed: [id number]     XR knee 4+ vw right injury    Result Date: 8/4/2023  Impression: No acute osseous abnormality.  Workstation performed: [id number]     XR trauma multiple    Result Date: 8/4/2023  Impression: No acute cardiopulmonary disease within limitations of supine imaging. Left tibial and fibular fractures. Workstation performed: [id number]     XR femur 2 vw left    Result Date: 8/4/2023  Impression: No acute osseous abnormality. Workstation performed: [id number]     XR tibia fibula 2 vw left    Result Date: 8/4/2023  Impression: Mid tibial shaft and distal fibular fracture. Gross alignment maintained. Workstation performed: [id number]     CT abdomen pelvis w contrast    Result Date: 8/4/2023  Impression: No acute traumatic visceral injury in the abdomen or pelvis. No acute fracture. Chronic pancreatitis with pancreatic parenchymal atrophy and extensive pancreatic parenchymal calcifications noted. There is increasing enlargement of the main pancreatic duct probably due to some combination of pancreatic parenchymal atrophy and stones in the pancreatic duct. Workstation performed: [id number]     TRAUMA - CT spine cervical wo contrast    Result Date: 8/4/2023  Impression: No cervical spine fracture or traumatic malalignment. Workstation performed: [id number]     TRAUMA - CT head wo contrast    Result Date: 8/4/2023  Impression: No acute intracranial abnormality. Workstation performed: [id number]     ORIF of the left tibial fracture on 8/4/2023. Hospital Course: Uyen Davis is a 59-year-old female who presented as a level A trauma alert via ambulance after a mechanical fall at home with head strike on the carpet, but no loss of consciousness. She was found to have a large contusion over her left leg and there was concern for bleeding with pain noted in that area per the patient. She was noted to be hypotensive during transportation and IV fluid hydration/resuscitation was initiated with some response and her systolic blood pressure. During her initial trauma evaluation, her primary survey was unremarkable.   On secondary survey, she was afebrile with normal vital signs; she had tenderness and hematoma over the left anterior tibial region with intact neurovascular exam; she had a firm nodular growth on the right hip which is reported to be chronic and unchanged over the last year; remainder of her exam is unremarkable. Her initial workup included labs and the above and imaging studies. She was admitted to the trauma service status post mechanical fall with closed midshaft left tibial fracture, associated left lower extremity hematoma, hypotension responsive to fluid resuscitation and chronic medical comorbidities including chronic alcohol abuse and epilepsy. Orthopedic surgery was consulted and operative intervention was recommended; the patient agreed to proceed with surgery. She underwent ORIF of the left tibial fracture on 8/4/2023. She did experience some acute blood loss anemia secondary to her hematoma and operative intervention, but did not require transfusion and had declined consideration of transfusion of blood products due to her status as a Lutheran. She was closely monitored from a hemodynamic status and provided supportive care throughout her hospital encounter. Postoperatively, she was deemed appropriate for weightbearing as tolerated on the left lower extremity. She was transitioned to an oral multimodal analgesic regimen. Therapy evaluated her and recommended rehab. Case management assisted with disposition planning, but the patient's insurance provider ultimately denied her admission to acute rehab facility and the patient elected for discharge home with home health therapy services and DME. The patient was deemed stable for discharge on 8/11/2023. For further details of her hospital encounter, please see her complete medical records. On discharge, the patient is instructed to follow-up with the patient's primary care provider to review the events of the patient's recent hospitalization and incidental findings.  The patient is instructed to follow-up in the Trauma Clinic as needed. The patient is instructed to follow-up with orthopedic surgery in 2 weeks for postoperative reevaluation. The patient should follow the provided discharge instructions. Condition at Discharge: stable     Discharge instructions/Information to patient and family:   See after visit summary for information provided to patient and family. Provisions for Follow-Up Care:  See after visit summary for information related to follow-up care and any pertinent home health orders. Disposition: See After Visit Summary for discharge disposition information. Planned Readmission: No    Discharge Statement   I spent 25 minutes discharging the patient. This time was spent on the day of discharge. I had direct contact with the patient on the day of discharge. Additional documentation is required if more than 30 minutes were spent on discharge. Discharge Medications:  See after visit summary for reconciled discharge medications provided to patient and family.       Nati Gilliam PA-C  8/11/2023  09:28 AM

## 2023-08-14 ENCOUNTER — TRANSITIONAL CARE MANAGEMENT (OUTPATIENT)
Dept: FAMILY MEDICINE CLINIC | Facility: CLINIC | Age: 63
End: 2023-08-14

## 2023-08-14 NOTE — UTILIZATION REVIEW
NOTIFICATION OF ADMISSION DISCHARGE   This is a Notification of Discharge from [address] [place] [address]. Please be advised that this patient has been discharge from our facility. Below you will find the admission and discharge date and time including the patient’s disposition. UTILIZATION REVIEW CONTACT:  Gilbert Bustamante MA  Utilization   Network Utilization Review Department  Phone: [phone] x carefully listen to the prompts. All voicemails are confidential.  Email: [email or web address]     ADMISSION INFORMATION  PRESENTATION DATE: 8/4/2023 11:06 AM  OBERVATION ADMISSION DATE:   INPATIENT ADMISSION DATE: 8/4/23 12:16 PM   DISCHARGE DATE: 8/11/2023  5:27 PM   DISPOSITION:Home with Home Health Care    IMPORTANT INFORMATION:  Send all requests for admission clinical reviews, approved or denied determinations and any other requests to dedicated fax number below belonging to the campus where the patient is receiving treatment.  List of dedicated fax numbers:  [place] DENIALS (Administrative/Medical Necessity) [phone] [address] [facility] (Maternity/NICU/Pediatrics) [phone]   [place] [facility] [phone]   [facility] [phone] [address] [facility] [address] [facility] [address] [phone]   [address] [place] [address] [phone]   [facility] [phone]   [address] [place] [address] [address] [facility] [phone]   [address] [place] [address] [phone]   [facility] [phone] [address] [facility] [address] [facility] [phone]

## 2023-08-15 ENCOUNTER — CLINICAL SUPPORT (OUTPATIENT)
Dept: RHEUMATOLOGY | Facility: CLINIC | Age: 63
End: 2023-08-15
Payer: COMMERCIAL

## 2023-08-15 DIAGNOSIS — M81.0 AGE-RELATED OSTEOPOROSIS WITHOUT CURRENT PATHOLOGICAL FRACTURE: Primary | ICD-10-CM

## 2023-08-15 PROCEDURE — 96372 THER/PROPH/DIAG INJ SC/IM: CPT

## 2023-08-16 DIAGNOSIS — M80.00XD AGE-RELATED OSTEOPOROSIS WITH CURRENT PATHOLOGICAL FRACTURE WITH ROUTINE HEALING: Primary | ICD-10-CM

## 2023-08-16 DIAGNOSIS — M81.0 AGE-RELATED OSTEOPOROSIS WITHOUT CURRENT PATHOLOGICAL FRACTURE: ICD-10-CM

## 2023-08-17 ENCOUNTER — OFFICE VISIT (OUTPATIENT)
Dept: OBGYN CLINIC | Facility: CLINIC | Age: 63
End: 2023-08-17

## 2023-08-17 ENCOUNTER — APPOINTMENT (OUTPATIENT)
Dept: RADIOLOGY | Facility: AMBULARY SURGERY CENTER | Age: 63
End: 2023-08-17
Attending: STUDENT IN AN ORGANIZED HEALTH CARE EDUCATION/TRAINING PROGRAM
Payer: COMMERCIAL

## 2023-08-17 VITALS — HEIGHT: 66 IN | BODY MASS INDEX: 23.38 KG/M2 | WEIGHT: 145.51 LBS

## 2023-08-17 DIAGNOSIS — Z98.890 STATUS POST SURGERY: Primary | ICD-10-CM

## 2023-08-17 DIAGNOSIS — Z98.890 STATUS POST SURGERY: ICD-10-CM

## 2023-08-17 PROCEDURE — 99024 POSTOP FOLLOW-UP VISIT: CPT | Performed by: STUDENT IN AN ORGANIZED HEALTH CARE EDUCATION/TRAINING PROGRAM

## 2023-08-17 PROCEDURE — 73590 X-RAY EXAM OF LOWER LEG: CPT

## 2023-08-17 NOTE — PROGRESS NOTES
Orthopaedics Office Visit - 1st post op Patient Visit    ASSESSMENT/PLAN:    Assessment:   #1 status post surgical fixation of left tibial shaft fracture with an intramedullary nail on 8//23  2. First through fifth proximal phalanx fractures left foot date of injury 8/4/2023        Plan:     1. New xrays of the left tib/fib were obtained today and reviewed with the patient and her spouse. Demonstrate maintained alignment of the patient's tibial shaft fracture with no signs of hardware loosening or failure  2. Recommended to continue/start vitamin D 1000 iu and Calcium 1200mg daily to help with overall bone health  3. Patient will continue physical therapy. Weightbearing as tolerated range of motion as tolerated to the left lower extremity  4. Increase her baby aspirin to 2x/daily for DVT/PE prevention   5. ROM as tolerated to left lower extremity  6. Sutures removed. Steri strips removed. Ok to shower. Do not submerge under water. 7. Follow up in 6 weeks for repeat x-ray evaluation of the left tibia  To Do Next Visit:  New xrays of the left tib/fib    _____________________________________________________  CHIEF COMPLAINT:  Chief Complaint   Patient presents with   • Left Ankle - Post-op         SUBJECTIVE:  Radha Martinez is a 61 y.o. female who presents today for her 1st post op visit for her left tibia ORIF,  8/4/2023. The patient has been doing well and has been able to regain ambulation. The patient is currently walking with a walker. The patient has started at home therapy for gait mobilization and range of motion exercises. She has been doing a home exercise program to increase her range of motion of the left knee and the left ankle. Patient's pain is currently well controlled on current pain medications. The patient has been taking 1 baby aspirin for DVT prophylaxis despite recommendation for 2 baby aspirin's for DVT prophylaxis. Discussed with the patient the change in her dosage.   Patient has no chest pain or shortness of breath. No fevers or chills. No numbness or paresthesias in the lower extremity. PAST MEDICAL HISTORY:  Past Medical History:   Diagnosis Date   • Chronic alcohol abuse    • Epilepsy ([address])    • Fracture    • Hepatitis     Hep A    • Hepatitis    • Insomnia     10mar2016 resolved   • Osteoporosis     14jun2016 resolved   • Pancreatitis    • SAH (subarachnoid hemorrhage) (HCC)    • Seasonal allergies    • Seizure ([address])    • Seizures ([address])    • Urine discoloration 11/11/2019   • Varicella    • Vomiting 11/13/2019       PAST SURGICAL HISTORY:  Past Surgical History:   Procedure Laterality Date   • BONE MARROW BIOPSY      2019, 2021   • IR BIOPSY BONE  8/17/2021   • IR BIOPSY BONE MARROW  11/14/2019   • CT TX TIBL SHFT FX IMED IMPLT W/WO SCREWS&/CERCLA Left 8/4/2023    Procedure: INSERTION NAIL IM TIBIA;  Surgeon: Jersey Lawson DO;  Location: [facility];  Service: Orthopedics   • TUBAL LIGATION  1985   • TUBAL LIGATION         FAMILY HISTORY:  Family History   Problem Relation Age of Onset   • Anxiety disorder Mother    • Depression Mother    • No Known Problems Father    • No Known Problems Sister    • No Known Problems Sister    • No Known Problems Daughter    • No Known Problems Maternal Grandmother    • Cancer Maternal Grandfather    • Cancer Paternal Grandmother         unknown    • No Known Problems Paternal Grandfather    • No Known Problems Brother    • No Known Problems Son    • No Known Problems Son    • Breast cancer Neg Hx    • Ovarian cancer Neg Hx        SOCIAL HISTORY:  Social History     Tobacco Use   • Smoking status: Never   • Smokeless tobacco: Never   Vaping Use   • Vaping Use: Never used   Substance Use Topics   • Alcohol use: Yes     Comment: 6 bottles of wine during the week, now only drinks on the weekend.    • Drug use: Never       MEDICATIONS:    Current Outpatient Medications:   •  acetaminophen (TYLENOL) 325 mg tablet, Take 2 tablets (650 mg total) by mouth every 4 (four) hours as needed for mild pain, Disp: , Rfl: 0  •  aspirin (ECOTRIN LOW STRENGTH) 81 mg EC tablet, Take 1 tablet (81 mg total) by mouth daily for 28 days, Disp: 28 tablet, Rfl: 0  •  desvenlafaxine (PRISTIQ) 100 mg 24 hr tablet, Take 50 mg by mouth daily Do not start before August 5, 2023., Disp: , Rfl:   •  desvenlafaxine succinate (PRISTIQ) 50 mg 24 hr tablet, Take 1 tablet (50 mg total) by mouth daily, Disp: 30 tablet, Rfl: 1  •  folic acid (FOLVITE) 1 mg tablet, Take 1 tablet (1 mg total) by mouth daily Do not start before May 14, 2023., Disp: 30 tablet, Rfl: 0  •  gabapentin (NEURONTIN) 300 mg capsule, Take 1 capsule (300 mg total) by mouth 2 (two) times a day, Disp: 60 capsule, Rfl: 1  •  gabapentin (NEURONTIN) 300 mg capsule, Take 300 mg by mouth 2 (two) times a day, Disp: , Rfl:   •  hydrOXYzine HCL (ATARAX) 25 mg tablet, Take 1 tablet (25 mg total) by mouth 2 (two) times a day, Disp: 60 tablet, Rfl: 1  •  hydrOXYzine HCL (ATARAX) 25 mg tablet, Take 25 mg by mouth 2 (two) times a day, Disp: , Rfl:   •  lacosamide (VIMPAT) 200 mg tablet, Take 1 tablet (200 mg total) by mouth 2 (two) times a day, Disp: 60 tablet, Rfl: 5  •  lacosamide (VIMPAT) 200 mg tablet, Take 200 mg by mouth every 12 (twelve) hours, Disp: , Rfl:   •  methocarbamol (ROBAXIN) 500 mg tablet, Take 1 tablet (500 mg total) by mouth every 6 (six) hours for 14 days, Disp: 56 tablet, Rfl: 0  •  metoprolol succinate (TOPROL-XL) 25 mg 24 hr tablet, Take 1 tablet (25 mg total) by mouth 2 (two) times a day, Disp: 60 tablet, Rfl: 0  •  metoprolol succinate (TOPROL-XL) 25 mg 24 hr tablet, Take 25 mg by mouth daily Do not start before August 5, 2023., Disp: , Rfl:   •  thiamine 100 MG tablet, Take 1 tablet (100 mg total) by mouth daily Do not start before May 14, 2023., Disp: 30 tablet, Rfl: 0  •  traZODone (DESYREL) 50 mg tablet, Take 50 mg by mouth daily at bedtime, Disp: , Rfl:   •  zonisamide (Zonegran) 100 mg capsule, Take one pill nightly for 2 weeks and then 2 pills nightly., Disp: 60 capsule, Rfl: 5  •  zonisamide (ZONEGRAN) 100 mg capsule, Take 200 mg by mouth daily at bedtime, Disp: , Rfl:   •  polyethylene glycol (MIRALAX) 17 g packet, Take 17 g by mouth daily for 5 days, Disp: 85 g, Rfl: 0  •  senna-docusate sodium (SENOKOT S) 8.6-50 mg per tablet, Take 1 tablet by mouth daily at bedtime for 5 days, Disp: 5 tablet, Rfl: 0  •  traZODone (DESYREL) 50 mg tablet, Take 1 tablet (50 mg total) by mouth in the morning, Disp: 30 tablet, Rfl: 0    ALLERGIES:  No Known Allergies    REVIEW OF SYSTEMS:  MSK: Left lower extremity  Neuro: intact  Pertinent items are otherwise noted in HPI. A comprehensive review of systems was otherwise negative. LABS:  HgA1c:   Lab Results   Component Value Date    HGBA1C 5.1 03/13/2023     BMP:   Lab Results   Component Value Date    GLUCOSE 110 08/04/2023    CALCIUM 8.1 (L) 08/06/2023     06/14/2016    K 3.9 08/06/2023    CO2 25 08/06/2023     (H) 08/06/2023    BUN 12 08/06/2023    CREATININE 0.59 (L) 08/06/2023     CBC: No components found for: "CBC"    _____________________________________________________  PHYSICAL EXAMINATION:  Vital signs: Ht 5' 6" (1.676 m)   Wt 66 kg (145 lb 8.1 oz)   BMI 23.49 kg/m²   General: No acute distress, awake and alert  Psychiatric: Mood and affect appear appropriate  HEENT: Trachea Midline, No torticollis, no apparent facial trauma  Cardiovascular: No audible murmurs; Extremities appear perfused  Pulmonary: No audible wheezing or stridor  Skin: No open lesions; see further details (if any) below    MUSCULOSKELETAL EXAMINATION:  Extremities:      Left lower extremity:   Left lower extremity was exposed inspected. The patient's previous surgical incisions are healing well without any surrounding erythema or induration. The patient's previously placed staples were removed. No dehiscence noted. Steri-Strips applied.   The patient has some resolving ecchymosis over the lower leg. She is no tenderness to palpation over the anterior aspect of the knee. Range of motion of the knee is from 0 to 120 degrees of flexion. Some tenderness in the knee with full flexion. Patient has full range of motion of the left ankle comparable to the contralateral side. Mild tenderness over her fibular shaft fracture. The patient's sensation is intact to light touch in superficial peroneal, deep peroneal, sural, saphenous, plantar nerve distributions. Tibialis anterior, extensor hallux longus, gastrocnemius muscles are intact. Limb is well-perfused. _____________________________________________________  STUDIES REVIEWED:  I personally reviewed the images and interpretation is as follows:  Xrays of the tib/fib 2 views:   X-ray of the left tibia demonstrate maintained alignment of the patient's left distal one third tibial shaft fracture and distal fibula fracture. No change in the alignment of the ankle mortise.   No signs of hardware loosening or failure  PROCEDURES PERFORMED:  Procedures    Scribe Attestation    I,:  Lexis Alvarado am acting as a scribe while in the presence of the attending physician.:       I,:  Flako Burnett DO personally performed the services described in this documentation    as scribed in my presence.:

## 2023-08-21 RX ORDER — METOPROLOL SUCCINATE 25 MG/1
25 TABLET, EXTENDED RELEASE ORAL DAILY
Refills: 0 | Status: CANCELLED | OUTPATIENT
Start: 2023-08-21

## 2023-08-22 ENCOUNTER — OFFICE VISIT (OUTPATIENT)
Dept: CARDIOLOGY CLINIC | Facility: CLINIC | Age: 63
End: 2023-08-22
Payer: COMMERCIAL

## 2023-08-22 ENCOUNTER — TELEPHONE (OUTPATIENT)
Age: 63
End: 2023-08-22

## 2023-08-22 VITALS
HEART RATE: 99 BPM | SYSTOLIC BLOOD PRESSURE: 118 MMHG | OXYGEN SATURATION: 97 % | DIASTOLIC BLOOD PRESSURE: 64 MMHG | BODY MASS INDEX: 19.93 KG/M2 | HEIGHT: 66 IN | WEIGHT: 124 LBS

## 2023-08-22 DIAGNOSIS — S82.252A CLOSED DISPLACED COMMINUTED FRACTURE OF SHAFT OF LEFT TIBIA, INITIAL ENCOUNTER: Primary | ICD-10-CM

## 2023-08-22 DIAGNOSIS — I50.21 ACUTE SYSTOLIC CONGESTIVE HEART FAILURE (HCC): ICD-10-CM

## 2023-08-22 DIAGNOSIS — I51.81 STRESS-INDUCED CARDIOMYOPATHY: Primary | ICD-10-CM

## 2023-08-22 DIAGNOSIS — I42.9 CARDIOMYOPATHY (HCC): ICD-10-CM

## 2023-08-22 PROCEDURE — 99214 OFFICE O/P EST MOD 30 MIN: CPT | Performed by: INTERNAL MEDICINE

## 2023-08-22 RX ORDER — OXYCODONE HYDROCHLORIDE 5 MG/1
5 TABLET ORAL EVERY 4 HOURS PRN
Qty: 30 TABLET | Refills: 0 | Status: SHIPPED | OUTPATIENT
Start: 2023-08-22

## 2023-08-22 RX ORDER — METOPROLOL SUCCINATE 25 MG/1
25 TABLET, EXTENDED RELEASE ORAL 2 TIMES DAILY
Qty: 180 TABLET | Refills: 3 | Status: SHIPPED | OUTPATIENT
Start: 2023-08-22

## 2023-08-22 NOTE — PROGRESS NOTES
Cardiology Follow Up    Fosterleesa Kessler  1960  [id number]  [address] [facility]  [address]  [address]  [phone]    1. Stress-induced cardiomyopathy        2. Cardiomyopathy (HCC)  metoprolol succinate (TOPROL-XL) 25 mg 24 hr tablet      3. Acute systolic congestive heart failure (HCC)  metoprolol succinate (TOPROL-XL) 25 mg 24 hr tablet        Discussion/Summary:    History of stress-induced cardiomyopathy with normalization of LV function. Recommend continuing metoprolol which was renewed for her. From a cardiac standpoint, she has no indication to be on aspirin. Surgeon recommended she take 162 mg. She has follow-up with him in October. She is pancytopenic and platelets are less than 50,000. Advised her to review with him if this can be discontinued if she is far out enough from surgery. Also recommended she schedule with hematology for further evaluation as was recommended by her primary care physician. History of Present Illness:     80-year-old female. Seizure disorder. H/o EtOH abuse. Prior shock, elevated troponin (non MI). EF was 40%, felt to be stress-induced pattern. Was started on beta blocker, BP too low for other CHF meds. Intermittent diuretics during the hospitalization, but these have not been continued since. Follow-up echo with normalization of LV function. Interval history:    Turns for follow-up. She had fall with femur fracture underwent surgery on the left leg. No cardiac complications. Also found to have pancytopenia. She needs to make an appointment with hematology. He was advised to take 162 mg of aspirin by the orthopedic surgeon to prevent PE/DVT. She has follow-up with him in October.       Medical Problems             Problem List     Abnormal liver enzymes    Insomnia    Lipoma of right lower extremity    Age-related osteoporosis with current pathological fracture with routine healing    Vitamin D deficiency    Screening for breast cancer    Screening for colon cancer    Other hyperlipidemia    Screen for colon cancer    Overview Signed 3/4/2019 10:50 AM by Jada Thomason     Added automatically from request for surgery [id number]         Fatty liver    ETOH abuse    Refusal of blood transfusions as patient is [employer]    Lumbar compression fracture ([address])    Pancreatic cyst    Gall stones    Refusal of blood transfusions as patient is [employer] (Chronic)    HAP (hospital-acquired pneumonia)    Thrombopenia ([address])    Fall from standing    Seizure ([address])    Aspiration pneumonia ([address])    Possible Drug overdose    Pancytopenia ([address])    Anxiety with depression (Chronic)    Panic attack    Chronic combined systolic and diastolic congestive heart failure (HCC)    Wt Readings from Last 3 Encounters:   08/22/23 56.2 kg (124 lb)   08/17/23 66 kg (145 lb 8.1 oz)   08/04/23 66 kg (145 lb 8.1 oz)                 Past Medical History:   Diagnosis Date   • Chronic alcohol abuse    • Epilepsy ([address])    • Fracture    • Hepatitis     Hep A    • Hepatitis    • Insomnia     10mar2016 resolved   • Osteoporosis     14jun2016 resolved   • Pancreatitis    • SAH (subarachnoid hemorrhage) (HCC)    • Seasonal allergies    • Seizure ([address])    • Seizures ([address])    • Urine discoloration 11/11/2019   • Varicella    • Vomiting 11/13/2019     Social History     Tobacco Use   • Smoking status: Never   • Smokeless tobacco: Never   Vaping Use   • Vaping Use: Never used   Substance Use Topics   • Alcohol use: Yes     Comment: 6 bottles of wine during the week, now only drinks on the weekend.    • Drug use: Never      Family History   Problem Relation Age of Onset   • Anxiety disorder Mother    • Depression Mother    • No Known Problems Father    • No Known Problems Sister    • No Known Problems Sister    • No Known Problems Daughter    • No Known Problems Maternal Grandmother    • Cancer Maternal Grandfather • Cancer Paternal Grandmother         unknown    • No Known Problems Paternal Grandfather    • No Known Problems Brother    • No Known Problems Son    • No Known Problems Son    • Breast cancer Neg Hx    • Ovarian cancer Neg Hx      Past Surgical History:   Procedure Laterality Date   • BONE MARROW BIOPSY      2019, 2021   • IR BIOPSY BONE  8/17/2021   • IR BIOPSY BONE MARROW  11/14/2019   • AL TX TIBL SHFT FX IMED IMPLT W/WO SCREWS&/CERCLA Left 8/4/2023    Procedure: INSERTION NAIL IM TIBIA;  Surgeon: Giovanni Vasquez DO;  Location: [facility];  Service: Orthopedics   • TUBAL LIGATION  1985   • TUBAL LIGATION         Current Outpatient Medications:   •  acetaminophen (TYLENOL) 325 mg tablet, Take 2 tablets (650 mg total) by mouth every 4 (four) hours as needed for mild pain, Disp: , Rfl: 0  •  aspirin (ECOTRIN LOW STRENGTH) 81 mg EC tablet, Take 1 tablet (81 mg total) by mouth daily for 28 days, Disp: 28 tablet, Rfl: 0  •  desvenlafaxine (PRISTIQ) 100 mg 24 hr tablet, Take 50 mg by mouth daily Do not start before August 5, 2023., Disp: , Rfl:   •  desvenlafaxine succinate (PRISTIQ) 50 mg 24 hr tablet, Take 1 tablet (50 mg total) by mouth daily, Disp: 30 tablet, Rfl: 1  •  folic acid (FOLVITE) 1 mg tablet, Take 1 tablet (1 mg total) by mouth daily Do not start before May 14, 2023., Disp: 30 tablet, Rfl: 0  •  gabapentin (NEURONTIN) 300 mg capsule, Take 1 capsule (300 mg total) by mouth 2 (two) times a day, Disp: 60 capsule, Rfl: 1  •  gabapentin (NEURONTIN) 300 mg capsule, Take 300 mg by mouth 2 (two) times a day, Disp: , Rfl:   •  hydrOXYzine HCL (ATARAX) 25 mg tablet, Take 1 tablet (25 mg total) by mouth 2 (two) times a day, Disp: 60 tablet, Rfl: 1  •  hydrOXYzine HCL (ATARAX) 25 mg tablet, Take 25 mg by mouth 2 (two) times a day, Disp: , Rfl:   •  lacosamide (VIMPAT) 200 mg tablet, Take 1 tablet (200 mg total) by mouth 2 (two) times a day, Disp: 60 tablet, Rfl: 5  •  lacosamide (VIMPAT) 200 mg tablet, Take 200 mg by mouth every 12 (twelve) hours, Disp: , Rfl:   •  methocarbamol (ROBAXIN) 500 mg tablet, Take 1 tablet (500 mg total) by mouth every 6 (six) hours for 14 days, Disp: 56 tablet, Rfl: 0  •  metoprolol succinate (TOPROL-XL) 25 mg 24 hr tablet, Take 1 tablet (25 mg total) by mouth 2 (two) times a day, Disp: 180 tablet, Rfl: 3  •  oxyCODONE (Roxicodone) 5 immediate release tablet, Take 1 tablet (5 mg total) by mouth every 4 (four) hours as needed for moderate pain Max Daily Amount: 30 mg, Disp: 30 tablet, Rfl: 0  •  polyethylene glycol (MIRALAX) 17 g packet, Take 17 g by mouth daily for 5 days, Disp: 85 g, Rfl: 0  •  senna-docusate sodium (SENOKOT S) 8.6-50 mg per tablet, Take 1 tablet by mouth daily at bedtime for 5 days, Disp: 5 tablet, Rfl: 0  •  thiamine 100 MG tablet, Take 1 tablet (100 mg total) by mouth daily Do not start before May 14, 2023., Disp: 30 tablet, Rfl: 0  •  traZODone (DESYREL) 50 mg tablet, Take 50 mg by mouth daily at bedtime, Disp: , Rfl:   •  zonisamide (ZONEGRAN) 100 mg capsule, Take 200 mg by mouth daily at bedtime, Disp: , Rfl:   •  traZODone (DESYREL) 50 mg tablet, Take 1 tablet (50 mg total) by mouth in the morning (Patient not taking: Reported on 8/22/2023), Disp: 30 tablet, Rfl: 0  •  zonisamide (Zonegran) 100 mg capsule, Take one pill nightly for 2 weeks and then 2 pills nightly. (Patient not taking: Reported on 8/22/2023), Disp: 60 capsule, Rfl: 5  No Known Allergies    Vitals:    08/22/23 1557   BP: 118/64   Pulse: 99   SpO2: 97%   Weight: 56.2 kg (124 lb)   Height: 5' 6" (1.676 m)     Vitals:    08/22/23 1557   Weight: 56.2 kg (124 lb)      Height: 5' 6" (167.6 cm)   Body mass index is 20.01 kg/m².     Physical Exam:  GEN: Kin Paredes appears well, alert and oriented x 3, pleasant and cooperative   HEENT: pupils equal, round, and reactive to light; extraocular muscles intact  NECK: supple, no carotid bruits   HEART: regular rhythm, normal S1 and S2, no murmurs, clicks, gallops or rubs   LUNGS: clear to auscultation bilaterally; no wheezes, rales, or rhonchi   ABDOMEN: normal bowel sounds, soft, no tenderness, no distention  EXTREMITIES: lle edema 1+ after surgery  NEURO: no focal findings   SKIN: normal without suspicious lesions on exposed skin    ROS:  Positive for anxiety, palpitations. Shortness of breath. Shoulder pain, back pain, slow gait. Fatigue, weakness. Dizziness, lightheadedness. Except as noted in HPI, is otherwise reviewed in detail and a 12 point review of systems is negative. ROS reviewed and is unchanged    Labs:  Lab Results   Component Value Date    SODIUM 140 08/06/2023    K 3.9 08/06/2023     (H) 08/06/2023    CREATININE 0.59 (L) 08/06/2023    BUN 12 08/06/2023    CO2 25 08/06/2023    ALT 19 08/05/2023    AST 19 08/05/2023    INR 1.10 08/04/2023    HGBA1C 5.1 03/13/2023    WBC 4.30 (L) 08/06/2023    HGB 8.3 (L) 08/08/2023    HCT 25.6 (L) 08/08/2023    PLT 42 (LL) 08/06/2023     Lab Results   Component Value Date    CHOL 236 (H) 04/22/2016     No results found for: "[facility]"  Lab Results   Component Value Date    HDL 86 04/22/2016     Lab Results   Component Value Date    TRIG 125 04/22/2016     Testing:  Echo 3/8/22:  •  Left Ventricle: Left ventricular cavity size is normal. The left ventricular ejection fraction is 40%. Systolic function is mildly reduced. There is hypokinesis of the mid-anterior, mid-anteroseptal, mid-anterolateral, mid-inferoseptal and mid-inferior. There appears to be preservation of apical, basal and mid inferolateral segments. •  Right Ventricle: Right ventricular cavity size is normal. Systolic function is normal.     In the absence of epicardial coronary disease, consider Takotsubo cardiomyopathy mid-ventricular type.

## 2023-08-22 NOTE — TELEPHONE ENCOUNTER
Medication: oxyCODONE (ROXICODONE) 5 immediate release tablet     Quantity: 20    Pharmacy:  [address] [facility]  [place] [address] [facility]  Phone: [phone]    PCP/Speciality Komal Cobos for 3 days -Yes/No, If no please send as HP to POD: No Patient only has a half pill    PATIENT HAD SURGERY ON 08/17/23

## 2023-08-28 ENCOUNTER — TELEPHONE (OUTPATIENT)
Age: 63
End: 2023-08-28

## 2023-08-28 NOTE — TELEPHONE ENCOUNTER
Caller: Louis at [facility]: Shannon Ferro    Reason for call:    Alison Gracia is calling about several sutures showing from her surgery on left tibia. He is asking if the Dr would like to remove them or would he like their office to remove them? He is asking for a call back relating this.     Call back#: [phone]

## 2023-08-31 ENCOUNTER — TELEPHONE (OUTPATIENT)
Dept: FAMILY MEDICINE CLINIC | Facility: CLINIC | Age: 63
End: 2023-08-31

## 2023-08-31 NOTE — TELEPHONE ENCOUNTER
Chapito Morales, Occupational Therapist with [facility], called to report the patient has requested to be discharged from OT home health services as she feels she no longer needs it and is doing pretty well. She will continue with PT and nursing home services.

## 2023-09-11 DIAGNOSIS — G47.00 INSOMNIA, UNSPECIFIED TYPE: ICD-10-CM

## 2023-09-11 DIAGNOSIS — F41.9 ANXIETY: Primary | ICD-10-CM

## 2023-09-12 ENCOUNTER — TELEPHONE (OUTPATIENT)
Dept: NEUROLOGY | Facility: CLINIC | Age: 63
End: 2023-09-12

## 2023-09-12 RX ORDER — TRAZODONE HYDROCHLORIDE 50 MG/1
50 TABLET ORAL
Qty: 30 TABLET | Refills: 0 | Status: SHIPPED | OUTPATIENT
Start: 2023-09-12

## 2023-09-12 NOTE — TELEPHONE ENCOUNTER
Despite repeatedly telling patient that she needs to make effort to get in with psychiatrist for treatment of anxiety and insomnia/ panic attacks, she is not. Please call patient to check on this.    She needs to get in touch with psych and get on their wait list.  Please provide her list of local psych and  alcohol detox list.

## 2023-09-12 NOTE — TELEPHONE ENCOUNTER
received  from 9/11 at 2:47pm- hello, this is Al Busch. I'm a patient of Dr. Asher Valadez. I had had a prescription for trazadone refilled by a different Dr. And I'm I'm, I'm out of it. I had I have one left which I will be using tonight. And it seems like dr. Alesha Chan would be the appropriate physician to refill that for me, it's working very well. i'm very balanced out on the medicine i'm  taking right now. So I'm hoping perhaps he would be able to refill that for me until our and we have a, an appointment coming up very shortly. So I'd really appreciate that if he would refill that for me, if you'd like to call me back, that would be great. Phone number here is [phone]. And again, my name is Al Busch.  Thank you so much bye now.  ---------------------------------------  Per chart, PCP sent refill this morning    Left message for pt advising that pcp sent refill

## 2023-09-13 NOTE — TELEPHONE ENCOUNTER
Patient left voicemail regarding trazodone. Requesting refill. As previously noted, this was sent today by PCP. [employer] message sent to patient.

## 2023-09-19 ENCOUNTER — TELEPHONE (OUTPATIENT)
Dept: NEUROLOGY | Facility: CLINIC | Age: 63
End: 2023-09-19

## 2023-09-19 ENCOUNTER — CLINICAL SUPPORT (OUTPATIENT)
Dept: RHEUMATOLOGY | Facility: CLINIC | Age: 63
End: 2023-09-19
Payer: COMMERCIAL

## 2023-09-19 DIAGNOSIS — M81.0 AGE-RELATED OSTEOPOROSIS WITHOUT CURRENT PATHOLOGICAL FRACTURE: Primary | ICD-10-CM

## 2023-09-19 PROCEDURE — 96372 THER/PROPH/DIAG INJ SC/IM: CPT

## 2023-09-19 NOTE — TELEPHONE ENCOUNTER
[facility] this is Renee Dozier. My phone number is [phone]. I have an appointment with Dr Niall Estrada tomorrow and I just was looking it over,  the appointment is for 10 o'clock in the morning and I assumed that it was at [facility]. at this time, I'm not allowed to drive and I was able to procure a ride [facility] [place] and I just saw that it's in [address] and I don't think I can get a ride there. However, I've really been looking forward to this appointment. I'm wondering if he has any appointments available sooner at [facility] or if this is the only appointment I can get, very soon in [place], in which case I will hire an Charlie Hamlin which is way out of my budget, but I'll do it if that's my option. So again, my name is Renee Dozier and I can get to [facility] any time, day or night, if I can see Dr. Kaveh Lincoln, that would be super. [facility] is a little bit of a pickle for me. So please give me a jingle back and let me know where I stand on that. Okay. Again, evelia wade. [phone].  Thank you so much bye now.  ------------------------------------------------  Please assist

## 2023-09-19 NOTE — TELEPHONE ENCOUNTER
----- Message from Geovanny Victor sent at 9/18/2023  4:46 PM EDT -----  Regarding: Transportation  Good afternoon,     Patient had an appointment set up for tomorrow and she doesn't have transportation until she gets her license back. I rescheduled her for 10/10.  Please assist.    Thank you

## 2023-09-20 NOTE — TELEPHONE ENCOUNTER
SW called patient to discuss upcoming apt, her preference. Patient really likes Dr. White Patches however, she would prefer if someone could see her at the [facility] office for her seizures, etc.  Patient is able to get transportation to and from the [facility] office. Patient would like if going forward she can have apts at [facility] and later in the day if possible. Clerical- Can an apt be rescheduled for [facility] office that will meet patient's needs? Please call patient with a new apt if possible for [facility], later in afternoon if able. Thank you in advance.

## 2023-09-26 NOTE — TELEPHONE ENCOUNTER
Cara Fraire Morning,     My apologies the [place] was for  to  to be seen at the [facility]. Please advise if approve or Declined? I did inform patient we will call her back as we do not usually ESTIVEN due to Location and at this time it would to the providers.     Thank you all for your time and help,     Richmond Max

## 2023-09-28 NOTE — TELEPHONE ENCOUNTER
FE called patient at [phone]. Patient reports having transportation for her upcoming appointment on 10/10. Patient is interested in local transportation through the [facility]. There is not a way that patient can get to a bus and may qualify for [address] door to door services. FE to send via mail application to patient. SW remains available.

## 2023-10-02 ENCOUNTER — APPOINTMENT (OUTPATIENT)
Dept: RADIOLOGY | Facility: AMBULARY SURGERY CENTER | Age: 63
End: 2023-10-02
Attending: STUDENT IN AN ORGANIZED HEALTH CARE EDUCATION/TRAINING PROGRAM
Payer: COMMERCIAL

## 2023-10-02 ENCOUNTER — OFFICE VISIT (OUTPATIENT)
Dept: OBGYN CLINIC | Facility: CLINIC | Age: 63
End: 2023-10-02

## 2023-10-02 VITALS — HEIGHT: 66 IN | BODY MASS INDEX: 19.93 KG/M2 | WEIGHT: 124 LBS

## 2023-10-02 DIAGNOSIS — Z98.890 STATUS POST SURGERY: ICD-10-CM

## 2023-10-02 DIAGNOSIS — Z98.890 STATUS POST SURGERY: Primary | ICD-10-CM

## 2023-10-02 DIAGNOSIS — S82.252D CLOSED DISPLACED COMMINUTED FRACTURE OF SHAFT OF LEFT TIBIA WITH ROUTINE HEALING, SUBSEQUENT ENCOUNTER: ICD-10-CM

## 2023-10-02 PROCEDURE — 73590 X-RAY EXAM OF LOWER LEG: CPT

## 2023-10-02 PROCEDURE — 99024 POSTOP FOLLOW-UP VISIT: CPT | Performed by: STUDENT IN AN ORGANIZED HEALTH CARE EDUCATION/TRAINING PROGRAM

## 2023-10-02 NOTE — PROGRESS NOTES
Orthopaedics Office Visit - 1st post op Patient Visit    ASSESSMENT/PLAN:    Assessment:   #1 status post surgical fixation of left tibial shaft fracture with an intramedullary nail date of surgery 8/04/23  2. First through fifth proximal phalanx fractures left foot date of injury 8/4/2023        Plan:     1. New xrays of the left tib/fib were obtained today and reviewed with the patient and her spouse. Demonstrate maintained alignment of the patient's tibial shaft fracture with no signs of hardware loosening or failure. Interval fracture healing present  2. Recommended to continue/start vitamin D 1000 iu and Calcium 1200mg daily to help with overall bone health  3. Patient has declined to utilize physical therapy. She completed some in-home physical therapy but does not wish to continue in an outpatient basis  4. Patient also declined to take her prophylactic aspirin 81 mg twice daily. She has only been taking it daily  5. ROM as tolerated to left lower extremity  6. Continue range of motion activity, weightbearing as tolerated to the left lower extremity   7. Follow up in 6 weeks for repeat x-ray evaluation of the left tibia      To Do Next Visit:  New xrays of the left tib/fib    _____________________________________________________  CHIEF COMPLAINT:  Chief Complaint   Patient presents with   • Left Ankle - Post-op         SUBJECTIVE:  Amy Ortiz is a 61 y.o. female who presents today for her 1st post op visit for her left tibia ORIF,  8/4/2023. The patient has been doing well and has been able to regain ambulation. The patient is currently walking with a walker. The patient has started at home therapy for gait mobilization and range of motion exercises. She has been doing a home exercise program to increase her range of motion of the left knee and the left ankle. Patient's pain is currently well controlled on current pain medications.   The patient has been taking 1 baby aspirin for DVT prophylaxis despite recommendation for 2 baby aspirin's for DVT prophylaxis. Discussed with the patient the change in her dosage. Patient has no chest pain or shortness of breath. No fevers or chills. No numbness or paresthesias in the lower extremity. Interval history 10/2/2023:  The patient presents 6 weeks s/p left tibia ORIF, 8/17/2023. Today she complains of left generalized anteromedial knee, medial ankle pain and pain over left mid-shin. Overall the patient is doing well. She is ambulating without an assistive device without a limp. The patient completed some in-home physical therapy and then when she had regain her ambulatory status with a DC'd her from outpatient therapy. The patient did not wish to proceed with formal outpatient therapy as she does not feel like she needs it. She was prescribed aspirin 81 mg twice daily but is only been taking it once daily. No signs of DVT. No isolated lower extremity swelling, redness, chest pain, shortness of breath. she does use oxycodone, tylenol and gabapentin for pain.       PAST MEDICAL HISTORY:  Past Medical History:   Diagnosis Date   • Chronic alcohol abuse    • Epilepsy ([address])    • Fracture    • Hepatitis     Hep A    • Hepatitis    • Insomnia     10mar2016 resolved   • Osteoporosis     14jun2016 resolved   • Pancreatitis    • SAH (subarachnoid hemorrhage) (HCC)    • Seasonal allergies    • Seizure ([address])    • Seizures (HCC)    • Urine discoloration 11/11/2019   • Varicella    • Vomiting 11/13/2019       PAST SURGICAL HISTORY:  Past Surgical History:   Procedure Laterality Date   • BONE MARROW BIOPSY      2019, 2021   • IR BIOPSY BONE  8/17/2021   • IR BIOPSY BONE MARROW  11/14/2019   • MI TX TIBL SHFT FX IMED IMPLT W/WO SCREWS&/CERCLA Left 8/4/2023    Procedure: INSERTION NAIL IM TIBIA;  Surgeon: Prem Villeda DO;  Location: [facility];  Service: Orthopedics   • TUBAL LIGATION  1985   • TUBAL LIGATION         FAMILY HISTORY:  Family History   Problem Relation Age of Onset   • Anxiety disorder Mother    • Depression Mother    • No Known Problems Father    • No Known Problems Sister    • No Known Problems Sister    • No Known Problems Daughter    • No Known Problems Maternal Grandmother    • Cancer Maternal Grandfather    • Cancer Paternal Grandmother         unknown    • No Known Problems Paternal Grandfather    • No Known Problems Brother    • No Known Problems Son    • No Known Problems Son    • Breast cancer Neg Hx    • Ovarian cancer Neg Hx        SOCIAL HISTORY:  Social History     Tobacco Use   • Smoking status: Never   • Smokeless tobacco: Never   Vaping Use   • Vaping Use: Never used   Substance Use Topics   • Alcohol use: Yes     Comment: 6 bottles of wine during the week, now only drinks on the weekend.    • Drug use: Never       MEDICATIONS:    Current Outpatient Medications:   •  acetaminophen (TYLENOL) 325 mg tablet, Take 2 tablets (650 mg total) by mouth every 4 (four) hours as needed for mild pain, Disp: , Rfl: 0  •  desvenlafaxine (PRISTIQ) 100 mg 24 hr tablet, Take 50 mg by mouth daily Do not start before August 5, 2023., Disp: , Rfl:   •  desvenlafaxine succinate (PRISTIQ) 50 mg 24 hr tablet, Take 1 tablet (50 mg total) by mouth daily, Disp: 30 tablet, Rfl: 1  •  folic acid (FOLVITE) 1 mg tablet, Take 1 tablet (1 mg total) by mouth daily Do not start before May 14, 2023., Disp: 30 tablet, Rfl: 0  •  gabapentin (NEURONTIN) 300 mg capsule, Take 1 capsule (300 mg total) by mouth 2 (two) times a day, Disp: 60 capsule, Rfl: 1  •  gabapentin (NEURONTIN) 300 mg capsule, Take 300 mg by mouth 2 (two) times a day, Disp: , Rfl:   •  hydrOXYzine HCL (ATARAX) 25 mg tablet, Take 1 tablet (25 mg total) by mouth 2 (two) times a day, Disp: 60 tablet, Rfl: 1  •  hydrOXYzine HCL (ATARAX) 25 mg tablet, Take 25 mg by mouth 2 (two) times a day, Disp: , Rfl:   •  lacosamide (VIMPAT) 200 mg tablet, Take 1 tablet (200 mg total) by mouth 2 (two) times a day, Disp: 60 tablet, Rfl: 5  •  lacosamide (VIMPAT) 200 mg tablet, Take 200 mg by mouth every 12 (twelve) hours, Disp: , Rfl:   •  metoprolol succinate (TOPROL-XL) 25 mg 24 hr tablet, Take 1 tablet (25 mg total) by mouth 2 (two) times a day, Disp: 180 tablet, Rfl: 3  •  oxyCODONE (Roxicodone) 5 immediate release tablet, Take 1 tablet (5 mg total) by mouth every 4 (four) hours as needed for moderate pain Max Daily Amount: 30 mg, Disp: 30 tablet, Rfl: 0  •  thiamine 100 MG tablet, Take 1 tablet (100 mg total) by mouth daily Do not start before May 14, 2023., Disp: 30 tablet, Rfl: 0  •  traZODone (DESYREL) 50 mg tablet, Take 1 tablet (50 mg total) by mouth daily at bedtime, Disp: 30 tablet, Rfl: 0  •  zonisamide (ZONEGRAN) 100 mg capsule, Take 200 mg by mouth daily at bedtime, Disp: , Rfl:   •  aspirin (ECOTRIN LOW STRENGTH) 81 mg EC tablet, Take 1 tablet (81 mg total) by mouth daily for 28 days, Disp: 28 tablet, Rfl: 0  •  methocarbamol (ROBAXIN) 500 mg tablet, Take 1 tablet (500 mg total) by mouth every 6 (six) hours for 14 days, Disp: 56 tablet, Rfl: 0  •  polyethylene glycol (MIRALAX) 17 g packet, Take 17 g by mouth daily for 5 days, Disp: 85 g, Rfl: 0  •  senna-docusate sodium (SENOKOT S) 8.6-50 mg per tablet, Take 1 tablet by mouth daily at bedtime for 5 days, Disp: 5 tablet, Rfl: 0  •  traZODone (DESYREL) 50 mg tablet, Take 1 tablet (50 mg total) by mouth in the morning (Patient not taking: Reported on 8/22/2023), Disp: 30 tablet, Rfl: 0  •  zonisamide (Zonegran) 100 mg capsule, Take one pill nightly for 2 weeks and then 2 pills nightly. (Patient not taking: Reported on 8/22/2023), Disp: 60 capsule, Rfl: 5    ALLERGIES:  No Known Allergies    REVIEW OF SYSTEMS:  MSK: Left lower extremity  Neuro: intact  Pertinent items are otherwise noted in HPI. A comprehensive review of systems was otherwise negative.     LABS:  HgA1c:   Lab Results   Component Value Date    HGBA1C 5.1 03/13/2023     BMP:   Lab Results   Component Value Date    GLUCOSE 110 08/04/2023    CALCIUM 8.1 (L) 08/06/2023     06/14/2016    K 3.9 08/06/2023    CO2 25 08/06/2023     (H) 08/06/2023    BUN 12 08/06/2023    CREATININE 0.59 (L) 08/06/2023     CBC: No components found for: "CBC"    _____________________________________________________  PHYSICAL EXAMINATION:  Vital signs: Ht 5' 6" (1.676 m)   Wt 56.2 kg (124 lb)   BMI 20.01 kg/m²   General: No acute distress, awake and alert  Psychiatric: Mood and affect appear appropriate  HEENT: Trachea Midline, No torticollis, no apparent facial trauma  Cardiovascular: No audible murmurs; Extremities appear perfused  Pulmonary: No audible wheezing or stridor  Skin: No open lesions; see further details (if any) below    MUSCULOSKELETAL EXAMINATION:  Extremities:      Left lower extremity:   Left lower extremity was exposed inspected. The patient's previous surgical incisions are healing well without any surrounding erythema or induration. They are all well sealed and healed. She is no tenderness to palpation over the anterior aspect of the knee. Range of motion of the knee is from 0 to 120 degrees of flexion. Patient has full range of motion of the left ankle comparable to the contralateral side. no tenderness over her fibular shaft fracture. The patient's sensation is intact to light touch in superficial peroneal, deep peroneal, sural, saphenous, plantar nerve distributions. Tibialis anterior, extensor hallux longus, gastrocnemius muscles are intact. Limb is well-perfused. _____________________________________________________  STUDIES REVIEWED:  I personally reviewed the images and interpretation is as follows:  Xrays of the tib/fib 2 views:   X-ray of the left tibia and fibula demonstrate maintained alignment of the patient's distal one third tibial shaft fracture with interval fracture healing. There is callus formation most notably located over the posterior medial aspect of the tibia.   Fracture line still visible.   No signs of hardware loosening or failure  PROCEDURES PERFORMED:  Procedures    Scribe Attestation    I,:  Felicia Blanchard am acting as a scribe while in the presence of the attending physician.:       I,:  Nidhi Horner, DO personally performed the services described in this documentation    as scribed in my presence.:

## 2023-10-04 ENCOUNTER — DOCUMENTATION (OUTPATIENT)
Dept: BEHAVIORAL/MENTAL HEALTH CLINIC | Facility: CLINIC | Age: 63
End: 2023-10-04

## 2023-10-04 DIAGNOSIS — F41.9 ANXIETY: Primary | Chronic | ICD-10-CM

## 2023-10-04 DIAGNOSIS — F41.0 PANIC ATTACK: ICD-10-CM

## 2023-10-04 NOTE — PROGRESS NOTES
Psychotherapy Discharge Summary    Preferred Name: Collette Martinez  YOB: 1960    Admission date to psychotherapy: 10/11/22    Referred by: self    Presenting Problem: Sadie Woodall reported recurrent panic attacks and overall anxiety symptoms that she would like to work on better managing. Course of treatment included : psychoeducation and individual therapy     Progress/Outcome of Treatment Goals (brief summary of course of treatment) Sadie Woodall was not engaged in treatment and was inconsistent in keep appointments therefore goals were not completed. Treatment Complications (if any): non compliance    Treatment Progress: fair    Current [facility] Psychiatric Provider: n/a    Discharge Medications include: n/a    Discharge Date: 10/4/23    Discharge Diagnosis:   1. Anxiety        2. Panic attack            Criteria for Discharge: demonstrated failure to uphold their treatment plan/contract    Aftercare recommendations include (include specific referral names and phone numbers, if appropriate): Sadie Woodall should reengage in therapy in the future if needed.      Prognosis: fair

## 2023-10-09 ENCOUNTER — TELEPHONE (OUTPATIENT)
Dept: NEUROLOGY | Facility: CLINIC | Age: 63
End: 2023-10-09

## 2023-10-09 ENCOUNTER — TELEPHONE (OUTPATIENT)
Age: 63
End: 2023-10-09

## 2023-10-09 NOTE — TELEPHONE ENCOUNTER
Left voice massage to confirm her appointment for 10/10 at 1230 with . Give the [facility] office number to call back in confirm.

## 2023-10-10 ENCOUNTER — TELEPHONE (OUTPATIENT)
Dept: NEUROLOGY | Facility: CLINIC | Age: 63
End: 2023-10-10

## 2023-10-10 ENCOUNTER — TELEMEDICINE (OUTPATIENT)
Dept: NEUROLOGY | Facility: CLINIC | Age: 63
End: 2023-10-10
Payer: COMMERCIAL

## 2023-10-10 DIAGNOSIS — G40.109 SYMPTOMATIC FOCAL EPILEPSY (HCC): Primary | ICD-10-CM

## 2023-10-10 DIAGNOSIS — F10.10 ALCOHOL ABUSE: ICD-10-CM

## 2023-10-10 DIAGNOSIS — F41.9 ANXIETY: ICD-10-CM

## 2023-10-10 DIAGNOSIS — G47.00 INSOMNIA, UNSPECIFIED TYPE: ICD-10-CM

## 2023-10-10 DIAGNOSIS — F41.8 ANXIETY WITH DEPRESSION: ICD-10-CM

## 2023-10-10 PROCEDURE — 99213 OFFICE O/P EST LOW 20 MIN: CPT | Performed by: PSYCHIATRY & NEUROLOGY

## 2023-10-10 RX ORDER — TRAZODONE HYDROCHLORIDE 50 MG/1
50 TABLET ORAL
Qty: 30 TABLET | Refills: 0 | Status: SHIPPED | OUTPATIENT
Start: 2023-10-10

## 2023-10-10 RX ORDER — LACOSAMIDE 200 MG/1
200 TABLET ORAL 2 TIMES DAILY
Qty: 180 TABLET | Refills: 1 | Status: SHIPPED | OUTPATIENT
Start: 2023-10-10

## 2023-10-10 RX ORDER — ZONISAMIDE 100 MG/1
200 CAPSULE ORAL
Qty: 180 CAPSULE | Refills: 3 | Status: SHIPPED | OUTPATIENT
Start: 2023-10-10

## 2023-10-10 NOTE — TELEPHONE ENCOUNTER
Collette Martinez called stating she an appt for today 10/10/23 at 1230 w/singh however she is not feeling well at all and is unable to come in. Offered Virtual ? Pt agreed. Please use [employer]- pt does not have a smart device.     Can call either home or cell at  [phone] [facility] Phone)    [phone] [facility])

## 2023-10-10 NOTE — PROGRESS NOTES
Virtual Regular Visit    Verification of patient location:    Patient is located at Home in the following state in which I hold an active license [place]      Assessment/Plan:    Problem List Items Addressed This Visit          Other    Alcohol abuse (Chronic)    Anxiety with depression     Other Visit Diagnoses       Symptomatic focal epilepsy ([address])    -  Primary    Relevant Medications    zonisamide (ZONEGRAN) 100 mg capsule    lacosamide (VIMPAT) 200 mg tablet                 Reason for visit is   Chief Complaint   Patient presents with    Virtual Regular Visit        Encounter provider Rosi Carpenter MD    Provider located at [address] [place] [address]  [phone]      Recent Visits  Date Type Provider Dept   [facility]   10/09/23 Telephone Rosi Carpenter MD [facility]   Showing recent visits within past 7 days and meeting all other requirements  Future Appointments  No visits were found meeting these conditions. Showing future appointments within next 150 days and meeting all other requirements       The patient was identified by name and date of birth. Kin Paredes was informed that this is a telemedicine visit and that the visit is being conducted through the [employer]. She agrees to proceed. .  My office door was closed. No one else was in the room. She acknowledged consent and understanding of privacy and security of the video platform. The patient has agreed to participate and understands they can discontinue the visit at any time. Patient is aware this is a billable service. [facility] [address] [facility]  Follow Up Visit    Impression/Plan    Ms. Iglesia Raza is a 61 y.o. female with history that includes alcohol abuse, anxiety, osteoporosis, pancytopenia/thrombocytopenia, traumatic SAH, and vitamin D deficiency returning for follow-up regarding focal epilepsy, left temporal onset, in the setting of prior traumatic SAH (multifocal, right hemisphere, 8/2021). There was focal status epilepticus in 3/2022 (on VEEG, with associated left hippocampal DWI signal). With the exception of an apparent alcohol withdrawal seizure on 7/9/2023 seizures have been controlled since zonisamide was added on 7/3/2023. Zonisamide could be increased if there are additional seizures. EMU admission could be necessary if events persist or if there is need to characterize events. Patient Instructions   Continue current seizure medications unchanged. Let us know if there are seizures or problems with your medication. Return in 6 months (AP). Can submit seizure reporting form to [facility] DOT when you are seizure free for 6 months . Diagnoses and all orders for this visit:    Symptomatic focal epilepsy ([address])  -     zonisamide (ZONEGRAN) 100 mg capsule; Take 2 capsules (200 mg total) by mouth daily at bedtime  -     lacosamide (VIMPAT) 200 mg tablet; Take 1 tablet (200 mg total) by mouth 2 (two) times a day    Anxiety, Depression, and Panic Attacks    Alcohol abuse        Subjective    Karina Nguyen is returning to the [facility] for follow up. Interval Events:   Seizures since last visit: yes, July  Hospitalizations: yes    Last seen 7/3/2023. Zonisamide added due to continued focal aware seizures involving panic/terror. Alcohol withdrawal seizure 7/9/2023 and then admitted to [facility]. No seizures at [facility]. No additional events concerning for seizure. No panic attack feeling. 7/9/2023 seizure description in ED note:   " Her  is at bedside and reports that while shopping this morning he began to notice her slumping over the grocery cart. He asked if she was okay but she did not respond. He then asked if she was having a seizure and she shook her head yes.   She then started to fall to the ground but he caught her and lowered her to the ground. He states that her tongue was caught between her teeth while they were clenched and he was able to get her child and push her tongue out of the way.   "    Current AEDs:  Lacosamide 200 mg twice daily  Zonisamide 200 mg nightly  Gabapentin 300 mg twice daily  Medication side effects: None  Medication adherence: Yes     Event/Seizure semiology:  Focal seizure: Hand clenching and dropping GCS  Panic attack feeling (unclear if it is seizure or psychiatric related): brief 1-2 min panic attacks occurring 2-3 times per day without any specific triggers. Captured on VEEG in 2/2023 ("An event involving flashback of memory, scary feeling and feeling short of breath did not have EEG correlate. Seizure is not excluded. Focal aware seizures often have no EEG correlate. ")  The event captured without vEEG correlate at the hospital in March 2022: the patient was shaking/tensing bilateral arms and hands with some rhythmic right shoulder jerking activity. There was no electrographic seizure during this time. She looked like she was waking up on the EEG background. Recurrent focal seizures from the left temporal region on VEEG in 3/2022. No clear clinical correlate. Special Features  Status epilepticus: yes - subclinical electrographic seizures captured on vEEG in March 2022  Self Injury Seizures: no  Precipitating Factors: none     Epilepsy Risk Factors:  Alcohol abuse and prior [facility] in Aug 2021     Prior AEDs:  Levetiracetam: Lowered platelets     Prior Evaluation:  10/28/2022: MRI brain seizure with and without contrast   Stable superficial siderosis and scattered foci of chronic hemosiderin deposition consistent with patient's history of prior intracranial hemorrhage and trauma. Mild chronic microangiopathy. Symmetric hippocampal formations with regard to size and signal. The previously seen subtle diffusion abnormality within the left hippocampus is no longer evident. 3/10/2022: MRI brain with and without contrast  Focal diffusion-weighted and FLAIR signal hyperintensity involving the left hippocampal formation is consistent with status epilepticus. 10/12/2022: Routine EEG impression   This is an abnormal routine EEG recording due to: Generalized irregular theta activity and slow PDR consistent with mild generalized nonspecific encephalopathy. No electrographic seizures. 3/8/2022: EEG video monitoring  This is an abnormal 22 hours continuous video EEG recording due to excessive diffuse delta activity during the waking state, but the background primarily consists of diffuse beta-alpha activity. This finding indicates mild-moderate diffuse cerebral dysfunction. Excessive beta activity is seen in the presence of CNS activating agents, such as benzodiazepines and barbiturates. 3/7/2022: EEG video monitoring  Events:   07:58 - nurse notices that the patient is shaking her right arm and hand but also the left hand is shaking. There is some rhythmic right shoulder jerking activity, but it also appears that the arms and hands are tensing up. There is no electrographic seizure during this time. She looks like she is waking up on the EEG background. Interpretation: This is an abnormal nearly 23.5 hours continuous video EEG recording due to the presence of recurrent focal seizures from the left temporal region. This finding indicate the presence of an epileptogenic focus in the left temporal region. Routine EEG 10/12/2022:  Clinical Interpretation: This abnormal study is consistent with mild generalized non-specific encephalopathy. No electrographic seizures, interictal epileptiform discharges (seizure tendency) or focal slowing were seen. No diagnostic clinical events were captured. Video EEG 2/10-2/13/2023:  Interpretation:    This prolonged, continuous video-EEG recording is essentially normal.   Enhanced beta activities are likely a medication effect related to benzodiazepine administration. An event involving flashback of memory, scary feeling and feeling short of breath did not have EEG correlate. Seizure is not excluded. Focal aware seizures often have no EEG correlate. No epileptiform discharges or electrographic seizures. Routine EEG 3/13/2023:  EEG impression: This is an abnormal routine EEG recording due to:  Left temporal irregular delta  Generalized irregular delta activity in drowsiness     Clinical Interpretation: This abnormal study is consistent with a mild generalized non-specific encephalopathy and focal left temporal non-specific cerebral dysfunction. No electrographic seizures or interictal epileptiform discharges (seizure tendency) were seen. No diagnostic clinical events were captured. Psychiatric History:  Anxiety  Psychiatric admission     Social History:   Driving: No  Lives Alone: No  Occupation: unknown occupation      Objective    There were no vitals taken for this visit. General Exam  No acute distress. Neurologic Exam  Mental Status:  Alert and oriented x 3. Language: normal fluency and comprehension. Cranial Nerves: Face symmetric. No dysarthria.

## 2023-10-10 NOTE — PATIENT INSTRUCTIONS
Continue current seizure medications unchanged. Let us know if there are seizures or problems with your medication. Return in 6 months (AP). Can submit seizure reporting form to [facility] DOT when you are seizure free for 6 months .

## 2023-10-11 ENCOUNTER — TELEPHONE (OUTPATIENT)
Dept: PSYCHIATRY | Facility: CLINIC | Age: 63
End: 2023-10-11

## 2023-10-14 DIAGNOSIS — F41.8 ANXIETY WITH DEPRESSION: ICD-10-CM

## 2023-10-14 DIAGNOSIS — F41.9 ANXIETY: Chronic | ICD-10-CM

## 2023-10-16 RX ORDER — DESVENLAFAXINE SUCCINATE 50 MG/1
50 TABLET, EXTENDED RELEASE ORAL DAILY
Qty: 30 TABLET | Refills: 1 | Status: SHIPPED | OUTPATIENT
Start: 2023-10-16

## 2023-10-16 RX ORDER — GABAPENTIN 300 MG/1
300 CAPSULE ORAL 2 TIMES DAILY
Qty: 60 CAPSULE | Refills: 1 | Status: SHIPPED | OUTPATIENT
Start: 2023-10-16

## 2023-10-23 ENCOUNTER — TELEPHONE (OUTPATIENT)
Age: 63
End: 2023-10-23

## 2023-10-23 NOTE — TELEPHONE ENCOUNTER
Radha Pena from [address] [place] [address],[address] refaxed a home care order 10/9/23 and wanted to confirm if it was received. Tierra Pena was transferred to the office and they kindly assisted her.

## 2023-10-25 ENCOUNTER — OFFICE VISIT (OUTPATIENT)
Dept: RHEUMATOLOGY | Facility: CLINIC | Age: 63
End: 2023-10-25
Payer: COMMERCIAL

## 2023-10-25 VITALS — WEIGHT: 124 LBS | BODY MASS INDEX: 19.93 KG/M2 | HEIGHT: 66 IN

## 2023-10-25 DIAGNOSIS — M81.0 POST-MENOPAUSAL OSTEOPOROSIS: Primary | ICD-10-CM

## 2023-10-25 DIAGNOSIS — Z79.899 LONG TERM CURRENT USE OF THERAPEUTIC DRUG: ICD-10-CM

## 2023-10-25 DIAGNOSIS — M81.0 AGE-RELATED OSTEOPOROSIS WITHOUT CURRENT PATHOLOGICAL FRACTURE: ICD-10-CM

## 2023-10-25 DIAGNOSIS — E55.9 VITAMIN D DEFICIENCY: ICD-10-CM

## 2023-10-25 PROCEDURE — 99213 OFFICE O/P EST LOW 20 MIN: CPT

## 2023-10-25 PROCEDURE — 96372 THER/PROPH/DIAG INJ SC/IM: CPT

## 2023-10-25 NOTE — PROGRESS NOTES
Assessment and Plan:   Ms. Theotis Canavan is a 61yo female who presents for rheumatology follow-up of osteoporosis. The patient remains stable on Evenity every 1 month subcutaneous injections since 12/31/22. Today she received injection 10 of 12 (due to missing one dose in June). No falls or fractures since last visit. Most recent serum calcium stable at 8.1 (improved from previous), therefore I recommend continuation of daily supplementation. Will update vit D 25-OH with next labs since has not been checked recently. Continue Vit D supplementation as well. We discussed that she is due for updated DEXA and should get this done in the next month or so. Will most likely plan to transition to Prolia every 6 month subcutaneous injections. Return in 1 month for next set of Evenity injections. Plan:  Diagnoses and all orders for this visit:    Post-menopausal osteoporosis  -     Vitamin D 25 hydroxy  -     romosozumab-aqqg (EVENITY) subcutaneous injection 210 mg    Age-related osteoporosis without current pathological fracture  -     Vitamin D 25 hydroxy  -     romosozumab-aqqg (EVENITY) subcutaneous injection 210 mg    Vitamin D deficiency  -     Vitamin D 25 hydroxy    Long term current use of therapeutic drug        I have personally reviewed prior notes, recent laboratory results, and pertinent films in PACS. Activities as tolerated. Exercise: try to maintain a low impact exercise regimen as much as possible. Walk for 30 minutes a day for at least 3 days a week. Continue other medications as prescribed by PCP and other specialists. RTC in 1 mo    Rheumatic Disease Summary:  1.  Severe osteoporosis   -Rheum eval 2/24/22 for severe osteoporosis with h/o compression fractures, H/O right clavicle and pubic rami fractures, multiple rib fractures; no prior tx  -DEXA 10/19/21: T -4.3 L hip, -4 L FN, -3.9 lumbar  -Initial visit: rec’d Evenity-approved by insurance, pt supposed to check with out of pocket with her insurance and get back to us  -Visit 10/25/22: evenity previously approved but never got back to us regarding evenity copay, advised to apply for copay card online and get back to us to start her injections   -Visit 10/25/23: Continue Evenity - rec'd dose 10 of 12 today. Update DEXA and most likely transition to Prolia after Evenity course complete. 2. Comorbidities: chronic pancreatitis, H/O alcohol abuse, thrombocytopenia, anemia, H/O SAH/ICH, H/O anxiety and depression, GERD, seizures, stress-induced CM       HPI  Ms. Dulce Maria Morales is a 59yo female who presents for rheumatology follow-up of osteoporosis. Doing well. Has had some falls, but no recent fractures. Reports that she is taking ca and vit D. The following portions of the patient's history were reviewed and updated as appropriate: allergies, current medications, past family history, past medical history, past social history, past surgical history and problem list.      Review of Systems  Constitutional: Negative for weight change, fevers, chills, night sweats, fatigue. ENT/Mouth: Negative for hearing changes, ear pain, nasal congestion, sinus pain, hoarseness, sore throat, rhinorrhea, swallowing difficulty. Eyes: Negative for pain, redness, discharge, vision changes. Cardiovascular: Negative for chest pain, SOB, palpitations. Respiratory: Negative for cough, sputum, wheezing, dyspnea. Gastrointestinal: Negative for nausea, vomiting, diarrhea, constipation, pain, heartburn. Genitourinary: Negative for dysuria, urinary frequency, hematuria. Musculoskeletal: As per HPI. Skin: Negative for skin rash, color changes. Neuro: Negative for weakness, numbness, tingling, loss of consciousness. Psych: Negative for anxiety, depression. Heme/Lymph: Negative for easy bruising, bleeding, lymphadenopathy.         Past Medical History:   Diagnosis Date    Chronic alcohol abuse     Epilepsy ([address])     Fracture     Hepatitis     Hep A     Hepatitis Insomnia     10mar2016 resolved    Osteoporosis     14jun2016 resolved    Pancreatitis     SAH (subarachnoid hemorrhage) (HCC)     Seasonal allergies     Seizure ([address])     Seizures ([address])     Urine discoloration 11/11/2019    Varicella     Vomiting 11/13/2019       Past Surgical History:   Procedure Laterality Date    BONE MARROW BIOPSY      2019, 2021    IR BIOPSY BONE  8/17/2021    IR BIOPSY BONE MARROW  11/14/2019    [place] TX TIBL SHFT FX IMED IMPLT W/WO SCREWS&/CERCLA Left 8/4/2023    Procedure: INSERTION NAIL IM TIBIA;  Surgeon: Cassia Kidd DO;  [facility]: [facility];  Service: Orthopedics    TUBAL LIGATION  1985    TUBAL LIGATION         Social History     Socioeconomic History    Marital status: /Civil Union     Spouse name: Not on file    Number of children: Not on file    Years of education: Not on file    Highest education level: Not on file   Occupational History    Not on file   Tobacco Use    Smoking status: Never    Smokeless tobacco: Never   Vaping Use    Vaping Use: Never used   Substance and Sexual Activity    Alcohol use: Yes     Comment: 6 bottles of wine during the week, now only drinks on the weekend. Drug use: Never    Sexual activity: Yes     Partners: Male   Other Topics Concern    Not on file   Social History Narrative    ** Merged History Encounter **         ** Merged History Encounter **         ** Merged History Encounter **         ** Merged History Encounter **         Drinks coffee  tea     Social Determinants of Health     Financial Resource Strain: Not on file   Food Insecurity: No Food Insecurity (8/5/2023)    Hunger Vital Sign     Worried About Running Out of Food in the Last Year: Never true     Ran Out of Food in the Last Year: Never true   Transportation Needs: No Transportation Needs (8/5/2023)    PRAPARE - Transportation     Lack of Transportation (Medical): No     Lack of Transportation (Non-Medical):  No   Physical Activity: Not on file   Stress: Not on file Social Connections: Not on file   Intimate Partner Violence: Not on file   Housing Stability: Unknown (8/5/2023)    Housing Stability Vital Sign     Unable to Pay for Housing in the Last Year: No     Number of Places Lived in the Last Year: Not on file     Unstable Housing in the Last Year: No       Family History   Problem Relation Age of Onset    Anxiety disorder Mother     Depression Mother     No Known Problems Father     No Known Problems Sister     No Known Problems Sister     No Known Problems Daughter     No Known Problems Maternal Grandmother     Cancer Maternal Grandfather     Cancer Paternal Grandmother         unknown     No Known Problems Paternal Grandfather     No Known Problems Brother     No Known Problems Son     No Known Problems Son     Breast cancer Neg Hx     Ovarian cancer Neg Hx        No Known Allergies      Current Outpatient Medications:     acetaminophen (TYLENOL) 325 mg tablet, Take 2 tablets (650 mg total) by mouth every 4 (four) hours as needed for mild pain, Disp: , Rfl: 0    aspirin (ECOTRIN LOW STRENGTH) 81 mg EC tablet, Take 1 tablet (81 mg total) by mouth daily for 28 days, Disp: 28 tablet, Rfl: 0    desvenlafaxine (PRISTIQ) 100 mg 24 hr tablet, Take 50 mg by mouth daily Do not start before August 5, 2023., Disp: , Rfl:     desvenlafaxine succinate (PRISTIQ) 50 mg 24 hr tablet, TAKE ONE TABLET BY MOUTH EVERY DAY, Disp: 30 tablet, Rfl: 1    folic acid (FOLVITE) 1 mg tablet, Take 1 tablet (1 mg total) by mouth daily Do not start before May 14, 2023.  (Patient not taking: Reported on 10/10/2023), Disp: 30 tablet, Rfl: 0    gabapentin (NEURONTIN) 300 mg capsule, TAKE ONE CAPSULE BY MOUTH TWICE A DAY, Disp: 60 capsule, Rfl: 1    gabapentin (NEURONTIN) 300 mg capsule, Take 300 mg by mouth 2 (two) times a day (Patient not taking: Reported on 10/10/2023), Disp: , Rfl:     hydrOXYzine HCL (ATARAX) 25 mg tablet, Take 1 tablet (25 mg total) by mouth 2 (two) times a day, Disp: 60 tablet, Rfl: 1    hydrOXYzine HCL (ATARAX) 25 mg tablet, Take 25 mg by mouth 2 (two) times a day (Patient not taking: Reported on 10/10/2023), Disp: , Rfl:     lacosamide (VIMPAT) 200 mg tablet, Take 1 tablet (200 mg total) by mouth 2 (two) times a day, Disp: 180 tablet, Rfl: 1    methocarbamol (ROBAXIN) 500 mg tablet, Take 1 tablet (500 mg total) by mouth every 6 (six) hours for 14 days, Disp: 56 tablet, Rfl: 0    metoprolol succinate (TOPROL-XL) 25 mg 24 hr tablet, Take 1 tablet (25 mg total) by mouth 2 (two) times a day, Disp: 180 tablet, Rfl: 3    oxyCODONE (Roxicodone) 5 immediate release tablet, Take 1 tablet (5 mg total) by mouth every 4 (four) hours as needed for moderate pain Max Daily Amount: 30 mg, Disp: 30 tablet, Rfl: 0    polyethylene glycol (MIRALAX) 17 g packet, Take 17 g by mouth daily for 5 days, Disp: 85 g, Rfl: 0    senna-docusate sodium (SENOKOT S) 8.6-50 mg per tablet, Take 1 tablet by mouth daily at bedtime for 5 days (Patient not taking: Reported on 10/10/2023), Disp: 5 tablet, Rfl: 0    thiamine 100 MG tablet, Take 1 tablet (100 mg total) by mouth daily Do not start before May 14, 2023. (Patient not taking: Reported on 10/10/2023), Disp: 30 tablet, Rfl: 0    traZODone (DESYREL) 50 mg tablet, Take 1 tablet (50 mg total) by mouth in the morning, Disp: 30 tablet, Rfl: 0    traZODone (DESYREL) 50 mg tablet, TAKE ONE TABLET BY MOUTH AT BEDTIME, Disp: 30 tablet, Rfl: 0    zonisamide (ZONEGRAN) 100 mg capsule, Take 2 capsules (200 mg total) by mouth daily at bedtime, Disp: 180 capsule, Rfl: 3  No current facility-administered medications for this visit. Objective:    Vitals:    10/25/23 1551   Weight: 56.2 kg (124 lb)   Height: 5' 6" (1.676 m)       Physical Exam  General: Well appearing, well nourished, in no acute distress. Oriented x 3, normal mood and affect. Ambulating without difficulty. Skin: Good turgor, no rash, unusual bruising or prominent lesions.   Hair: Normal texture and distribution. Nails: Normal color, no deformities. HEENT:  Head: Normocephalic, atraumatic. Eyes: Conjunctiva clear, sclera non-icteric, EOM intact. Nose: No external lesions, mucosa non-inflamed. Mouth: Mucous membranes moist, no mucosal lesions. Neck: Supple  Musculoskeletal:   No asymmetry or deformities noted of bilateral upper and lower extremities. Neurologic: Alert and oriented. No focal neurological deficits appreciated. Psychiatric: Normal mood and affect.        Lestine Kanner, PA-C  Rheumatology

## 2023-11-05 DIAGNOSIS — F41.9 ANXIETY: Chronic | ICD-10-CM

## 2023-11-05 DIAGNOSIS — F41.8 ANXIETY WITH DEPRESSION: ICD-10-CM

## 2023-11-05 DIAGNOSIS — F41.0 PANIC ATTACK: ICD-10-CM

## 2023-11-06 RX ORDER — HYDROXYZINE HYDROCHLORIDE 25 MG/1
25 TABLET, FILM COATED ORAL 2 TIMES DAILY
Qty: 60 TABLET | Refills: 0 | Status: SHIPPED | OUTPATIENT
Start: 2023-11-06

## 2023-11-13 ENCOUNTER — OFFICE VISIT (OUTPATIENT)
Dept: OBGYN CLINIC | Facility: CLINIC | Age: 63
End: 2023-11-13
Payer: COMMERCIAL

## 2023-11-13 ENCOUNTER — APPOINTMENT (OUTPATIENT)
Dept: RADIOLOGY | Facility: AMBULARY SURGERY CENTER | Age: 63
End: 2023-11-13
Attending: STUDENT IN AN ORGANIZED HEALTH CARE EDUCATION/TRAINING PROGRAM
Payer: COMMERCIAL

## 2023-11-13 ENCOUNTER — TELEPHONE (OUTPATIENT)
Dept: OBGYN CLINIC | Facility: CLINIC | Age: 63
End: 2023-11-13

## 2023-11-13 VITALS
WEIGHT: 124 LBS | HEIGHT: 66 IN | SYSTOLIC BLOOD PRESSURE: 108 MMHG | HEART RATE: 59 BPM | DIASTOLIC BLOOD PRESSURE: 69 MMHG | BODY MASS INDEX: 19.93 KG/M2

## 2023-11-13 DIAGNOSIS — Z98.890 STATUS POST SURGERY: Primary | ICD-10-CM

## 2023-11-13 DIAGNOSIS — Z98.890 STATUS POST SURGERY: ICD-10-CM

## 2023-11-13 PROCEDURE — 73590 X-RAY EXAM OF LOWER LEG: CPT

## 2023-11-13 PROCEDURE — 99213 OFFICE O/P EST LOW 20 MIN: CPT | Performed by: STUDENT IN AN ORGANIZED HEALTH CARE EDUCATION/TRAINING PROGRAM

## 2023-11-13 NOTE — TELEPHONE ENCOUNTER
Caller: [facility]     Doctor: Dr Barb Segovia     Reason for call: Pt is calling to let  you know you know that she is not able to come in early. Due to a ride.     Call back#: [phone]

## 2023-11-13 NOTE — PROGRESS NOTES
Orthopaedics Office Visit - 3 month post op Patient Visit    ASSESSMENT/PLAN:    Assessment:   #1 status post surgical fixation of left tibial shaft fracture with an intramedullary nail date of surgery 8/04/23  2. First through fifth proximal phalanx fractures left foot date of injury 8/4/2023        Plan:     New xrays of the left tib/fib were obtained today and reviewed with the patient and her spouse. Demonstrate maintained alignment of the patient's tibial shaft fracture with no signs of hardware loosening or failure. Continued callus formation and maturity is noted on x-rays  Recommended to continue/start vitamin D 1000 iu and Calcium 1200mg daily to help with overall bone health  Patient has declined to utilize physical therapy. She completed some in-home physical therapy but does not wish to continue in an outpatient basis  Patient can continue to be weightbearing as tolerated activity as tolerated to the left lower extremity   ROM as tolerated to left lower extremity  Continue range of motion activity, weightbearing as tolerated to the left lower extremity   7. Follow up in 3 months for repeat x-ray evaluation of the left tibia      To Do Next Visit:  New xrays of the left tib/fib    _____________________________________________________  CHIEF COMPLAINT:  Chief Complaint   Patient presents with    Left Ankle - Post-op         SUBJECTIVE:  Shirin Son is a 61 y.o. female who presents today for her 1st post op visit for her left tibia ORIF,  8/4/2023. The patient has been doing well and has been able to regain ambulation. The patient is currently walking with a walker. The patient has started at home therapy for gait mobilization and range of motion exercises. She has been doing a home exercise program to increase her range of motion of the left knee and the left ankle. Patient's pain is currently well controlled on current pain medications.   The patient has been taking 1 baby aspirin for DVT prophylaxis despite recommendation for 2 baby aspirin's for DVT prophylaxis. Discussed with the patient the change in her dosage. Patient has no chest pain or shortness of breath. No fevers or chills. No numbness or paresthesias in the lower extremity. Interval history 10/2/2023:  The patient presents 6 weeks s/p left tibia ORIF, 8/17/2023. Today she complains of left generalized anteromedial knee, medial ankle pain and pain over left mid-shin. Overall the patient is doing well. She is ambulating without an assistive device without a limp. The patient completed some in-home physical therapy and then when she had regain her ambulatory status with a DC'd her from outpatient therapy. The patient did not wish to proceed with formal outpatient therapy as she does not feel like she needs it. She was prescribed aspirin 81 mg twice daily but is only been taking it once daily. No signs of DVT. No isolated lower extremity swelling, redness, chest pain, shortness of breath. she does use oxycodone, tylenol and gabapentin for pain. Interval history 11/13/2023  Patient is now 3 months status post surgical fixation of a left midshaft tibia fracture. The patient has been doing home exercises and has been able to return to ambulatory function from baseline. The patient is able to do stairs and has no discernible pain in the left lower extremity with gait. The patient denies any anteriorly based knee pain. Denies any fevers or chills. Denies any numbness or paresthesias. The patient has no physical limitations at this time is no longer taking any medications for pain.     PAST MEDICAL HISTORY:  Past Medical History:   Diagnosis Date    Chronic alcohol abuse     Epilepsy ([address])     Fracture     Hepatitis     Hep A     Hepatitis     Insomnia     10mar2016 resolved    Osteoporosis     14jun2016 resolved    Pancreatitis     SAH (subarachnoid hemorrhage) (HCC)     Seasonal allergies     Seizure (HCC)     Seizures [facility])     Urine discoloration 11/11/2019    Varicella     Vomiting 11/13/2019       PAST SURGICAL HISTORY:  Past Surgical History:   Procedure Laterality Date    BONE MARROW BIOPSY      2019, 2021    IR BIOPSY BONE  8/17/2021    IR BIOPSY BONE MARROW  11/14/2019    [place] TX TIBL SHFT FX IMED IMPLT W/WO SCREWS&/CERCLA Left 8/4/2023    Procedure: INSERTION NAIL IM TIBIA;  Surgeon: Jodi Marrufo DO;  [facility];  Service: Orthopedics    TUBAL LIGATION  1985    TUBAL LIGATION         FAMILY HISTORY:  Family History   Problem Relation Age of Onset    Anxiety disorder Mother     Depression Mother     No Known Problems Father     No Known Problems Sister     No Known Problems Sister     No Known Problems Daughter     No Known Problems Maternal Grandmother     Cancer Maternal Grandfather     Cancer Paternal Grandmother         unknown     No Known Problems Paternal Grandfather     No Known Problems Brother     No Known Problems Son     No Known Problems Son     Breast cancer Neg Hx     Ovarian cancer Neg Hx        SOCIAL HISTORY:  Social History     Tobacco Use    Smoking status: Never    Smokeless tobacco: Never   Vaping Use    Vaping Use: Never used   Substance Use Topics    Alcohol use: Yes     Comment: 6 bottles of wine during the week, now only drinks on the weekend.     Drug use: Never       MEDICATIONS:    Current Outpatient Medications:     acetaminophen (TYLENOL) 325 mg tablet, Take 2 tablets (650 mg total) by mouth every 4 (four) hours as needed for mild pain, Disp: , Rfl: 0    desvenlafaxine (PRISTIQ) 100 mg 24 hr tablet, Take 50 mg by mouth daily Do not start before August 5, 2023., Disp: , Rfl:     desvenlafaxine succinate (PRISTIQ) 50 mg 24 hr tablet, TAKE ONE TABLET BY MOUTH EVERY DAY, Disp: 30 tablet, Rfl: 1    gabapentin (NEURONTIN) 300 mg capsule, TAKE ONE CAPSULE BY MOUTH TWICE A DAY, Disp: 60 capsule, Rfl: 1    lacosamide (VIMPAT) 200 mg tablet, Take 1 tablet (200 mg total) by mouth 2 (two) times a day, Disp: 180 tablet, Rfl: 1    metoprolol succinate (TOPROL-XL) 25 mg 24 hr tablet, Take 1 tablet (25 mg total) by mouth 2 (two) times a day, Disp: 180 tablet, Rfl: 3    oxyCODONE (Roxicodone) 5 immediate release tablet, Take 1 tablet (5 mg total) by mouth every 4 (four) hours as needed for moderate pain Max Daily Amount: 30 mg, Disp: 30 tablet, Rfl: 0    traZODone (DESYREL) 50 mg tablet, TAKE ONE TABLET BY MOUTH AT BEDTIME, Disp: 30 tablet, Rfl: 0    zonisamide (ZONEGRAN) 100 mg capsule, Take 2 capsules (200 mg total) by mouth daily at bedtime, Disp: 180 capsule, Rfl: 3    aspirin (ECOTRIN LOW STRENGTH) 81 mg EC tablet, Take 1 tablet (81 mg total) by mouth daily for 28 days, Disp: 28 tablet, Rfl: 0    folic acid (FOLVITE) 1 mg tablet, Take 1 tablet (1 mg total) by mouth daily Do not start before May 14, 2023. (Patient not taking: Reported on 10/10/2023), Disp: 30 tablet, Rfl: 0    gabapentin (NEURONTIN) 300 mg capsule, Take 300 mg by mouth 2 (two) times a day (Patient not taking: Reported on 10/10/2023), Disp: , Rfl:     hydrOXYzine HCL (ATARAX) 25 mg tablet, Take 1 tablet (25 mg total) by mouth 2 (two) times a day (Patient not taking: Reported on 11/13/2023), Disp: 60 tablet, Rfl: 0    hydrOXYzine HCL (ATARAX) 25 mg tablet, Take 25 mg by mouth 2 (two) times a day (Patient not taking: Reported on 10/10/2023), Disp: , Rfl:     methocarbamol (ROBAXIN) 500 mg tablet, Take 1 tablet (500 mg total) by mouth every 6 (six) hours for 14 days, Disp: 56 tablet, Rfl: 0    polyethylene glycol (MIRALAX) 17 g packet, Take 17 g by mouth daily for 5 days, Disp: 85 g, Rfl: 0    senna-docusate sodium (SENOKOT S) 8.6-50 mg per tablet, Take 1 tablet by mouth daily at bedtime for 5 days (Patient not taking: Reported on 10/10/2023), Disp: 5 tablet, Rfl: 0    thiamine 100 MG tablet, Take 1 tablet (100 mg total) by mouth daily Do not start before May 14, 2023.  (Patient not taking: Reported on 10/10/2023), Disp: 30 tablet, Rfl: 0 traZODone (DESYREL) 50 mg tablet, Take 1 tablet (50 mg total) by mouth in the morning, Disp: 30 tablet, Rfl: 0    ALLERGIES:  No Known Allergies    REVIEW OF SYSTEMS:  MSK: Left lower extremity  Neuro: intact  Pertinent items are otherwise noted in HPI. A comprehensive review of systems was otherwise negative. LABS:  HgA1c:   Lab Results   Component Value Date    HGBA1C 5.1 03/13/2023     BMP:   Lab Results   Component Value Date    GLUCOSE 110 08/04/2023    CALCIUM 8.1 (L) 08/06/2023     06/14/2016    K 3.9 08/06/2023    CO2 25 08/06/2023     (H) 08/06/2023    BUN 12 08/06/2023    CREATININE 0.59 (L) 08/06/2023     CBC: No components found for: "CBC"    _____________________________________________________  PHYSICAL EXAMINATION:  Vital signs: /69 (BP Location: Right arm, Patient Position: Sitting, Cuff Size: Standard)   Pulse 59   Ht 5' 6" (1.676 m)   Wt 56.2 kg (124 lb)   BMI 20.01 kg/m²   General: No acute distress, awake and alert  Psychiatric: Mood and affect appear appropriate  HEENT: Trachea Midline, No torticollis, no apparent facial trauma  Cardiovascular: No audible murmurs; Extremities appear perfused  Pulmonary: No audible wheezing or stridor  Skin: No open lesions; see further details (if any) below    MUSCULOSKELETAL EXAMINATION:  Extremities:      Left lower extremity:   Left lower extremity was exposed inspected. The patient's previous surgical incisions are healing well without any surrounding erythema or induration. They are all well sealed and healed. She is no tenderness to palpation over the anterior aspect of the knee. Range of motion of the knee is from 0 to 120 degrees of flexion. Patient has full range of motion of the left ankle comparable to the contralateral side. no tenderness over her fibular shaft fracture. The patient's sensation is intact to light touch in superficial peroneal, deep peroneal, sural, saphenous, plantar nerve distributions.   Tibialis anterior, extensor hallux longus, gastrocnemius muscles are intact. Limb is well-perfused. _____________________________________________________  STUDIES REVIEWED:  I personally reviewed the images and interpretation is as follows:  Xrays of the tib/fib 2 views:   X-ray of the left tibia demonstrates healing of the patient's midshaft tibia fracture with interval callus formation. The patient has bridging callus over the posterior lateral aspect of her tibia posterior medial aspect of her tibia. A fracture line is still able to be visualized over the anterior aspect of the tibia. The patient's has no signs of hardware loosening or failure.   No change in alignment  PROCEDURES PERFORMED:  Procedures  None today

## 2023-11-14 DIAGNOSIS — G47.00 INSOMNIA, UNSPECIFIED TYPE: ICD-10-CM

## 2023-11-14 DIAGNOSIS — F41.9 ANXIETY: ICD-10-CM

## 2023-11-14 RX ORDER — TRAZODONE HYDROCHLORIDE 50 MG/1
50 TABLET ORAL
Qty: 30 TABLET | Refills: 5 | Status: SHIPPED | OUTPATIENT
Start: 2023-11-14

## 2023-11-16 ENCOUNTER — TELEPHONE (OUTPATIENT)
Age: 63
End: 2023-11-16

## 2023-11-16 NOTE — TELEPHONE ENCOUNTER
Vance Wall from [facility] called states orders were received however not signed by provider, will be faxing orders for signature.

## 2023-11-21 ENCOUNTER — TELEPHONE (OUTPATIENT)
Dept: NEUROLOGY | Facility: CLINIC | Age: 63
End: 2023-11-21

## 2023-11-21 NOTE — TELEPHONE ENCOUNTER
received vm from 11/17 at 2:25pm- Yes, for some reason, I need to re call a, a prescription that I've already been approved for, lacosamide 200 milligram caps. My name is Renzo wade. I was approved by a Mr. Alfaro. I really need this. Uh, I don't know what else you need my number to call back [phone]. You thank you.  -----------------------------------------------  Lacosamide last sent to pharm on 10/10 for 90 day supply with 1 refill.  Looks like pt was receiving 30 day supply and then dispensed 15 day supply on 11/18.

## 2023-11-27 NOTE — TELEPHONE ENCOUNTER
11/22/23 at 1:41    Hello, this is Renzo Pop ,date of birth 1960. I'm a patient of Dr. Alfaro. I had run out of lacosamide. The pharmacist was kind enough to give me a short supply of it because I had run out. I didn't take it for a few days and that wasn't cool. So apparently I need some kind of an approval from the doctor  because of it being controlled substance. And I really don't want to run out of it. So if we can take care of that, it would be really super. My pharmacy is on, it's [facility], that's a grocery store on [address] in [place]. So if we could make sure that goes through the really great.  don't know what else you need, but you have my number, [phone]. And if you need anything else from me, it's definitely cool. All right, thank you so very much.

## 2023-11-28 NOTE — TELEPHONE ENCOUNTER
spoke to pharmacy lacosamide picked up on 11/18/2023 for 15 day supply. Insurance will only cover 30 tablets.    PA submitted for required quantity. Awaiting determination.    Key: [id number]

## 2023-11-30 NOTE — TELEPHONE ENCOUNTER
Lacosamide PA approved through 11/28/24. Called pharmacy to make aware and spoke with Brisa, who states that the claim went through.    Outcome  Approvedon November 28  Request Reference Number: [id number]. LACOSAMIDE TAB 200MG is approved through 11/28/2024. Your patient may now fill this prescription and it will be covered.

## 2023-12-01 ENCOUNTER — TELEPHONE (OUTPATIENT)
Age: 63
End: 2023-12-01

## 2023-12-01 NOTE — TELEPHONE ENCOUNTER
Caller: Patient    Doctor: Lois Dutton    Reason for call:     Patient is calling to schedule her Evenity Injection, she forgot to schedule when she left her appointment. Her last one was 10/25/23.     Call back#: [phone]

## 2023-12-03 DIAGNOSIS — F41.8 ANXIETY WITH DEPRESSION: ICD-10-CM

## 2023-12-03 DIAGNOSIS — F41.0 PANIC ATTACK: ICD-10-CM

## 2023-12-03 DIAGNOSIS — F41.9 ANXIETY: Chronic | ICD-10-CM

## 2023-12-04 ENCOUNTER — TELEPHONE (OUTPATIENT)
Dept: RHEUMATOLOGY | Facility: CLINIC | Age: 63
End: 2023-12-04

## 2023-12-04 RX ORDER — HYDROXYZINE HYDROCHLORIDE 25 MG/1
25 TABLET, FILM COATED ORAL 2 TIMES DAILY
Qty: 60 TABLET | Refills: 0 | Status: SHIPPED | OUTPATIENT
Start: 2023-12-04

## 2023-12-04 NOTE — TELEPHONE ENCOUNTER
Caller: Patient     Reason for call: Patient is calling back to schedule her injection.     Call back#: [phone]

## 2023-12-11 DIAGNOSIS — F41.9 ANXIETY: Chronic | ICD-10-CM

## 2023-12-11 DIAGNOSIS — F41.8 ANXIETY WITH DEPRESSION: ICD-10-CM

## 2023-12-11 RX ORDER — GABAPENTIN 300 MG/1
300 CAPSULE ORAL 2 TIMES DAILY
Qty: 60 CAPSULE | Refills: 1 | Status: SHIPPED | OUTPATIENT
Start: 2023-12-11

## 2023-12-11 RX ORDER — DESVENLAFAXINE SUCCINATE 50 MG/1
50 TABLET, EXTENDED RELEASE ORAL DAILY
Qty: 30 TABLET | Refills: 1 | Status: SHIPPED | OUTPATIENT
Start: 2023-12-11

## 2023-12-14 ENCOUNTER — TELEPHONE (OUTPATIENT)
Age: 63
End: 2023-12-14

## 2023-12-14 NOTE — TELEPHONE ENCOUNTER
Plan of Care from [facility] being faxed . This needs to be signed. [facility] [phone] Please call if not received or have any questions.     Thank you

## 2023-12-22 ENCOUNTER — CLINICAL SUPPORT (OUTPATIENT)
Dept: RHEUMATOLOGY | Facility: CLINIC | Age: 63
End: 2023-12-22
Payer: COMMERCIAL

## 2023-12-22 DIAGNOSIS — M81.0 POST-MENOPAUSAL OSTEOPOROSIS: Primary | ICD-10-CM

## 2023-12-22 DIAGNOSIS — M81.0 AGE-RELATED OSTEOPOROSIS WITHOUT CURRENT PATHOLOGICAL FRACTURE: ICD-10-CM

## 2023-12-22 PROCEDURE — 96372 THER/PROPH/DIAG INJ SC/IM: CPT

## 2024-01-04 DIAGNOSIS — F41.8 ANXIETY WITH DEPRESSION: ICD-10-CM

## 2024-01-04 DIAGNOSIS — F41.0 PANIC ATTACK: ICD-10-CM

## 2024-01-04 DIAGNOSIS — F41.9 ANXIETY: Chronic | ICD-10-CM

## 2024-01-04 RX ORDER — HYDROXYZINE HYDROCHLORIDE 25 MG/1
25 TABLET, FILM COATED ORAL 2 TIMES DAILY
Qty: 60 TABLET | Refills: 0 | Status: SHIPPED | OUTPATIENT
Start: 2024-01-04

## 2024-01-09 ENCOUNTER — TELEPHONE (OUTPATIENT)
Age: 64
End: 2024-01-09

## 2024-01-09 NOTE — TELEPHONE ENCOUNTER
Rich from [facility] called asking if the office received a plan of care form from them, order #[id number]. It is scanned into the chart, and I did confirm this the last time they called, they do need this to be faxed back to them, [phone]. Thank you!

## 2024-01-15 ENCOUNTER — TELEPHONE (OUTPATIENT)
Age: 64
End: 2024-01-15

## 2024-01-15 NOTE — TELEPHONE ENCOUNTER
Caller: Renzo     Doctor: Christine     Reason for call: Renzo is calling and would like to r/s her nurse appointment for any day out of the week but it will have to be after 3pm . Please advise     Call back#: [phone]

## 2024-01-23 ENCOUNTER — APPOINTMENT (OUTPATIENT)
Dept: RADIOLOGY | Facility: AMBULARY SURGERY CENTER | Age: 64
End: 2024-01-23
Attending: STUDENT IN AN ORGANIZED HEALTH CARE EDUCATION/TRAINING PROGRAM
Payer: COMMERCIAL

## 2024-01-23 ENCOUNTER — OFFICE VISIT (OUTPATIENT)
Dept: OBGYN CLINIC | Facility: CLINIC | Age: 64
End: 2024-01-23
Payer: COMMERCIAL

## 2024-01-23 ENCOUNTER — TELEPHONE (OUTPATIENT)
Dept: RHEUMATOLOGY | Facility: CLINIC | Age: 64
End: 2024-01-23

## 2024-01-23 ENCOUNTER — OFFICE VISIT (OUTPATIENT)
Dept: RHEUMATOLOGY | Facility: CLINIC | Age: 64
End: 2024-01-23
Payer: COMMERCIAL

## 2024-01-23 VITALS
DIASTOLIC BLOOD PRESSURE: 76 MMHG | WEIGHT: 124 LBS | HEIGHT: 66 IN | BODY MASS INDEX: 19.93 KG/M2 | SYSTOLIC BLOOD PRESSURE: 115 MMHG | HEART RATE: 52 BPM

## 2024-01-23 DIAGNOSIS — Z98.890 STATUS POST SURGERY: ICD-10-CM

## 2024-01-23 DIAGNOSIS — S22.41XD CLOSED FRACTURE OF MULTIPLE RIBS OF RIGHT SIDE WITH ROUTINE HEALING, SUBSEQUENT ENCOUNTER: ICD-10-CM

## 2024-01-23 DIAGNOSIS — Z98.890 STATUS POST SURGERY: Primary | ICD-10-CM

## 2024-01-23 DIAGNOSIS — Z79.899 LONG TERM CURRENT USE OF THERAPEUTIC DRUG: ICD-10-CM

## 2024-01-23 DIAGNOSIS — M81.0 POST-MENOPAUSAL OSTEOPOROSIS: Primary | ICD-10-CM

## 2024-01-23 PROCEDURE — 99213 OFFICE O/P EST LOW 20 MIN: CPT | Performed by: STUDENT IN AN ORGANIZED HEALTH CARE EDUCATION/TRAINING PROGRAM

## 2024-01-23 PROCEDURE — 96372 THER/PROPH/DIAG INJ SC/IM: CPT

## 2024-01-23 PROCEDURE — 73590 X-RAY EXAM OF LOWER LEG: CPT

## 2024-01-23 PROCEDURE — 99211 OFF/OP EST MAY X REQ PHY/QHP: CPT | Performed by: PHYSICIAN ASSISTANT

## 2024-01-23 NOTE — TELEPHONE ENCOUNTER
Please initiate prior authorization for Prolia 60 mg every 6 months subcutaneous injections.    Diagnosis: osteoporosis    Meds tried: evenity x 12

## 2024-01-23 NOTE — PROGRESS NOTES
Assessment/Plan:    Renzo Pop came into the [facility] Rheumatology Office today 01/23/24 to receive Evenity injection.      Verbal consent obtained.  Consent given by: patient    patient states patient has been medically healthy with no underlining concerns/complications.      Renzo Pop presents with No symptoms today.       All insturctions were reviewed with the patient.    If the patient should have any questions/concerns, advised patient to contacted [facility] Rheumatology Office.       Subjective:     History provided by: patient    Patient ID: Renzo Pop is a 63 y.o. female      Objective:    There were no vitals filed for this visit.    Patient tolerated the injection well without any complications.  Injection site/s both arms.  Medication was provided by Rheumatology.    Patient signed consent form no   Patient signed ABN form no (If no patient is not a medicare patient).   Patient waited 15 minutes after injection no (This only applies to patient's receiving first time injection).       Last Visit: 12/22/2023  Next visit:Visit date not found

## 2024-01-23 NOTE — PROGRESS NOTES
Orthopaedics Office Visit - 3 month post op Patient Visit    ASSESSMENT/PLAN:    Assessment:   #1 status post surgical fixation of left tibial shaft fracture with an intramedullary nail date of surgery 8/04/23  2.  First through fifth proximal phalanx fractures left foot date of injury 8/4/2023        Plan:     New xrays of the left tib/fib were obtained today and reviewed with the patient. Demonstrate maintained alignment of the patient's tibial shaft fracture with no signs of hardware loosening or failure.  Patient has bridging fracture healing fracture line no longer visible on x-rays.  Recommended to continue/start vitamin D 1000 iu and Calcium 1200mg daily to help with overall bone health  Patient can continue to be weightbearing as tolerated activity as tolerated to the left lower extremity   ROM as tolerated to left lower extremity  Continue range of motion activity, weightbearing as tolerated to the left lower extremity   7. Follow up in 6 months for repeat x-ray evaluation of the left tibia      To Do Next Visit:  New xrays of the left tib/fib    _____________________________________________________  CHIEF COMPLAINT:  Chief Complaint   Patient presents with    Left Ankle - Post-op         SUBJECTIVE:  Renzo Pop is a 63 y.o. female who presents today for her 1st post op visit for her left tibia ORIF,  8/4/2023.  The patient has been doing well and has been able to regain ambulation.  The patient is currently walking with a walker.  The patient has started at home therapy for gait mobilization and range of motion exercises.  She has been doing a home exercise program to increase her range of motion of the left knee and the left ankle.  Patient's pain is currently well controlled on current pain medications.  The patient has been taking 1 baby aspirin for DVT prophylaxis despite recommendation for 2 baby aspirin's for DVT prophylaxis.  Discussed with the patient the change in her dosage.  Patient has no  chest pain or shortness of breath.  No fevers or chills.  No numbness or paresthesias in the lower extremity.    Interval history 10/2/2023:  The patient presents 6 weeks s/p left tibia ORIF, 8/17/2023.  Today she complains of left generalized anteromedial knee, medial ankle pain and pain over left mid-shin.  Overall the patient is doing well.  She is ambulating without an assistive device without a limp.  The patient completed some in-home physical therapy and then when she had regain her ambulatory status with a DC'd her from outpatient therapy.  The patient did not wish to proceed with formal outpatient therapy as she does not feel like she needs it.  She was prescribed aspirin 81 mg twice daily but is only been taking it once daily.  No signs of DVT.  No isolated lower extremity swelling, redness, chest pain, shortness of breath. she does use oxycodone, tylenol and gabapentin for pain.      Interval history 11/13/2023  Patient is now 3 months status post surgical fixation of a left midshaft tibia fracture.  The patient has been doing home exercises and has been able to return to ambulatory function from baseline.  The patient is able to do stairs and has no discernible pain in the left lower extremity with gait.  The patient denies any anteriorly based knee pain.  Denies any fevers or chills.  Denies any numbness or paresthesias.  The patient has no physical limitations at this time is no longer taking any medications for pain.    Interval history 1/23/24  Pt presents 5 1/2 months s/p surgical fixation of left tibial shaft fracture with an intramedullary nail date of surgery 8/04/23. The patient has been doing home exercises and has been able to return to ambulatory function from baseline. She denies any significant pain or stiffness in the left lower extremity. Denies any numbness or paresthesias.  The patient has no physical limitations at this time is no longer taking any medications for pain.    PAST MEDICAL  HISTORY:  Past Medical History:   Diagnosis Date    Chronic alcohol abuse     Epilepsy (HCC)     Fracture     Hepatitis     Hep A     Hepatitis     Insomnia     10mar2016 resolved    Osteoporosis     14jun2016 resolved    Pancreatitis     SAH (subarachnoid hemorrhage) (HCC)     Seasonal allergies     Seizure (HCC)     Seizures (HCC)     Urine discoloration 11/11/2019    Varicella     Vomiting 11/13/2019       PAST SURGICAL HISTORY:  Past Surgical History:   Procedure Laterality Date    BONE MARROW BIOPSY      2019, 2021    IR BIOPSY BONE  8/17/2021    IR BIOPSY BONE MARROW  11/14/2019    CT TX TIBL SHFT FX IMED IMPLT W/WO SCREWS&/CERCLA Left 8/4/2023    Procedure: INSERTION NAIL IM TIBIA;  Surgeon: Danielito Sauceda DO;  Location: [facility];  Service: Orthopedics    TUBAL LIGATION  1985    TUBAL LIGATION         FAMILY HISTORY:  Family History   Problem Relation Age of Onset    Anxiety disorder Mother     Depression Mother     No Known Problems Father     No Known Problems Sister     No Known Problems Sister     No Known Problems Daughter     No Known Problems Maternal Grandmother     Cancer Maternal Grandfather     Cancer Paternal Grandmother         unknown     No Known Problems Paternal Grandfather     No Known Problems Brother     No Known Problems Son     No Known Problems Son     Breast cancer Neg Hx     Ovarian cancer Neg Hx        SOCIAL HISTORY:  Social History     Tobacco Use    Smoking status: Never    Smokeless tobacco: Never   Vaping Use    Vaping status: Never Used   Substance Use Topics    Alcohol use: Yes     Comment: 6 bottles of wine during the week, now only drinks on the weekend.    Drug use: Never       MEDICATIONS:    Current Outpatient Medications:     acetaminophen (TYLENOL) 325 mg tablet, Take 2 tablets (650 mg total) by mouth every 4 (four) hours as needed for mild pain, Disp: , Rfl: 0    lacosamide (VIMPAT) 200 mg tablet, Take 1 tablet (200 mg total) by mouth 2 (two) times a day, Disp: 180  tablet, Rfl: 1    metoprolol succinate (TOPROL-XL) 25 mg 24 hr tablet, Take 1 tablet (25 mg total) by mouth 2 (two) times a day, Disp: 180 tablet, Rfl: 3    zonisamide (ZONEGRAN) 100 mg capsule, Take 2 capsules (200 mg total) by mouth daily at bedtime, Disp: 180 capsule, Rfl: 3    aspirin (ECOTRIN LOW STRENGTH) 81 mg EC tablet, Take 1 tablet (81 mg total) by mouth daily for 28 days, Disp: 28 tablet, Rfl: 0    desvenlafaxine (PRISTIQ) 100 mg 24 hr tablet, Take 50 mg by mouth daily Do not start before August 5, 2023., Disp: , Rfl:     desvenlafaxine succinate (PRISTIQ) 50 mg 24 hr tablet, TAKE ONE TABLET BY MOUTH EVERY DAY, Disp: 30 tablet, Rfl: 1    folic acid (FOLVITE) 1 mg tablet, Take 1 tablet (1 mg total) by mouth daily Do not start before May 14, 2023. (Patient not taking: Reported on 1/23/2024), Disp: 30 tablet, Rfl: 0    gabapentin (NEURONTIN) 300 mg capsule, TAKE ONE CAPSULE BY MOUTH TWICE A DAY, Disp: 60 capsule, Rfl: 1    gabapentin (NEURONTIN) 300 mg capsule, Take 300 mg by mouth 2 (two) times a day (Patient not taking: Reported on 10/10/2023), Disp: , Rfl:     hydrOXYzine HCL (ATARAX) 25 mg tablet, Take 25 mg by mouth 2 (two) times a day (Patient not taking: Reported on 10/10/2023), Disp: , Rfl:     hydrOXYzine HCL (ATARAX) 25 mg tablet, TAKE ONE TABLET BY MOUTH TWICE A DAY, Disp: 60 tablet, Rfl: 0    methocarbamol (ROBAXIN) 500 mg tablet, Take 1 tablet (500 mg total) by mouth every 6 (six) hours for 14 days, Disp: 56 tablet, Rfl: 0    oxyCODONE (Roxicodone) 5 immediate release tablet, Take 1 tablet (5 mg total) by mouth every 4 (four) hours as needed for moderate pain Max Daily Amount: 30 mg, Disp: 30 tablet, Rfl: 0    polyethylene glycol (MIRALAX) 17 g packet, Take 17 g by mouth daily for 5 days, Disp: 85 g, Rfl: 0    senna-docusate sodium (SENOKOT S) 8.6-50 mg per tablet, Take 1 tablet by mouth daily at bedtime for 5 days (Patient not taking: Reported on 10/10/2023), Disp: 5 tablet, Rfl: 0    thiamine  "100 MG tablet, Take 1 tablet (100 mg total) by mouth daily Do not start before May 14, 2023. (Patient not taking: Reported on 10/10/2023), Disp: 30 tablet, Rfl: 0    traZODone (DESYREL) 50 mg tablet, Take 1 tablet (50 mg total) by mouth in the morning, Disp: 30 tablet, Rfl: 0    traZODone (DESYREL) 50 mg tablet, TAKE ONE TABLET BY MOUTH AT BEDTIME, Disp: 30 tablet, Rfl: 5  No current facility-administered medications for this visit.    ALLERGIES:  No Known Allergies    REVIEW OF SYSTEMS:  MSK: Left lower extremity  Neuro: intact  Pertinent items are otherwise noted in HPI.  A comprehensive review of systems was otherwise negative.    LABS:  HgA1c:   Lab Results   Component Value Date    HGBA1C 5.1 03/13/2023     BMP:   Lab Results   Component Value Date    GLUCOSE 110 08/04/2023    CALCIUM 8.1 (L) 08/06/2023     06/14/2016    K 3.9 08/06/2023    CO2 25 08/06/2023     (H) 08/06/2023    BUN 12 08/06/2023    CREATININE 0.59 (L) 08/06/2023     CBC: No components found for: \"CBC\"    _____________________________________________________  PHYSICAL EXAMINATION:  Vital signs: /76   Pulse (!) 52   Ht 5' 6\" (1.676 m)   Wt 56.2 kg (124 lb)   BMI 20.01 kg/m²   General: No acute distress, awake and alert  Psychiatric: Mood and affect appear appropriate  HEENT: Trachea Midline, No torticollis, no apparent facial trauma  Cardiovascular: No audible murmurs; Extremities appear perfused  Pulmonary: No audible wheezing or stridor  Skin: No open lesions; see further details (if any) below    MUSCULOSKELETAL EXAMINATION:  Extremities:      Left lower extremity:   Left lower extremity was exposed inspected.  The patient's previous surgical incisions are well-healed and sealed  without any surrounding erythema or induration.She is no tenderness to palpation over the anterior aspect of the knee.  Range of motion of the knee is from 0 to 120 degrees of flexion. Patient has full range of motion of the left ankle " comparable to the contralateral side.  no tenderness over her fibular shaft fracture.  The patient's sensation is intact to light touch in superficial peroneal, deep peroneal, sural, saphenous, plantar nerve distributions.  Tibialis anterior, extensor hallux longus, gastrocnemius muscles are intact.  Limb is well-perfused.      _____________________________________________________  STUDIES REVIEWED:  I personally reviewed the images and interpretation is as follows:  X-ray of the left tibia demonstrates maintained alignment of the patient's left tibial shaft fracture with fracture union.  No signs of hardware loosening or failure  PROCEDURES PERFORMED:  Procedures  None today

## 2024-01-23 NOTE — PROGRESS NOTES
The patient received her 12th set of Evenity injections today without any immediate complications.   Will initiate prior authorization for denosumab 60 mg every 6 months subcutaneous injection.   Return in 1 month for first injection + Provider visit.

## 2024-01-24 NOTE — TELEPHONE ENCOUNTER
Patient information placed into [employer] Portal-Awaiting insurance information cover letter.

## 2024-01-25 NOTE — TELEPHONE ENCOUNTER
Patient demographics received  and prior authorization paperwork completed Request # [id number] approval.

## 2024-01-26 ENCOUNTER — TELEPHONE (OUTPATIENT)
Dept: RHEUMATOLOGY | Facility: CLINIC | Age: 64
End: 2024-01-26

## 2024-01-26 DIAGNOSIS — Z79.899 LONG TERM CURRENT USE OF THERAPEUTIC DRUG: ICD-10-CM

## 2024-01-26 DIAGNOSIS — M81.0 POST-MENOPAUSAL OSTEOPOROSIS: Primary | ICD-10-CM

## 2024-01-26 DIAGNOSIS — M81.0 AGE-RELATED OSTEOPOROSIS WITHOUT CURRENT PATHOLOGICAL FRACTURE: ICD-10-CM

## 2024-01-31 NOTE — TELEPHONE ENCOUNTER
Insurance called the RX Refill Line. Message is being forwarded to the office.     Insurance called to let us know the Reclast has been denied as a plan exclusion. They stated it may be covered under the patients medical plan. Please review and advise      They they the follow call back information.  Reff # [id number]  Phone number:  [phone]  
Insurance company called the RX Refill Line.     They were calling to notify the office the Reclast is denied. Advised it is noted 1/30/24.      
Paperwork for prior authorization was faxed to patient's primary Insurance-awaiting Authorization.  
Prior authorization paperwork was sent to the patient's insurance for Reclast. Request # [id number] approval.  
patient/caregiver

## 2024-02-02 DIAGNOSIS — F41.0 PANIC ATTACK: ICD-10-CM

## 2024-02-02 DIAGNOSIS — F41.9 ANXIETY: Chronic | ICD-10-CM

## 2024-02-02 DIAGNOSIS — F41.8 ANXIETY WITH DEPRESSION: ICD-10-CM

## 2024-02-02 RX ORDER — HYDROXYZINE HYDROCHLORIDE 25 MG/1
25 TABLET, FILM COATED ORAL 2 TIMES DAILY
Qty: 60 TABLET | Refills: 5 | Status: SHIPPED | OUTPATIENT
Start: 2024-02-02

## 2024-02-12 DIAGNOSIS — F41.9 ANXIETY: Chronic | ICD-10-CM

## 2024-02-12 DIAGNOSIS — F41.8 ANXIETY WITH DEPRESSION: ICD-10-CM

## 2024-02-13 RX ORDER — DESVENLAFAXINE SUCCINATE 50 MG/1
50 TABLET, EXTENDED RELEASE ORAL DAILY
Qty: 30 TABLET | Refills: 5 | Status: SHIPPED | OUTPATIENT
Start: 2024-02-13

## 2024-02-13 RX ORDER — GABAPENTIN 300 MG/1
300 CAPSULE ORAL 2 TIMES DAILY
Qty: 60 CAPSULE | Refills: 5 | Status: SHIPPED | OUTPATIENT
Start: 2024-02-13

## 2024-02-16 DIAGNOSIS — G40.109 SYMPTOMATIC FOCAL EPILEPSY (HCC): ICD-10-CM

## 2024-02-16 RX ORDER — LACOSAMIDE 200 MG/1
200 TABLET ORAL 2 TIMES DAILY
Qty: 180 TABLET | Refills: 1 | Status: SHIPPED | OUTPATIENT
Start: 2024-02-16

## 2024-02-20 DIAGNOSIS — M80.00XD AGE-RELATED OSTEOPOROSIS WITH CURRENT PATHOLOGICAL FRACTURE WITH ROUTINE HEALING: Primary | ICD-10-CM

## 2024-02-20 RX ORDER — ZOLEDRONIC ACID 5 MG/100ML
5 INJECTION, SOLUTION INTRAVENOUS ONCE
OUTPATIENT
Start: 2024-02-29

## 2024-02-20 RX ORDER — SODIUM CHLORIDE 9 MG/ML
20 INJECTION, SOLUTION INTRAVENOUS ONCE
OUTPATIENT
Start: 2024-02-29

## 2024-02-21 PROBLEM — Z12.11 SCREEN FOR COLON CANCER: Status: RESOLVED | Noted: 2019-03-04 | Resolved: 2024-02-21

## 2024-02-21 PROBLEM — Z12.11 SCREENING FOR COLON CANCER: Status: RESOLVED | Noted: 2018-10-03 | Resolved: 2024-02-21

## 2024-02-21 PROBLEM — Z12.39 SCREENING FOR BREAST CANCER: Status: RESOLVED | Noted: 2018-10-03 | Resolved: 2024-02-21

## 2024-02-28 ENCOUNTER — TELEPHONE (OUTPATIENT)
Dept: INFUSION CENTER | Facility: CLINIC | Age: 64
End: 2024-02-28

## 2024-02-28 NOTE — TELEPHONE ENCOUNTER
Called patient to review infusion center policies and confirm reclst appointment for 2/29 at 4pm. Patient made aware to have CMP labwork drawn, per patient she will go today.

## 2024-02-29 ENCOUNTER — TELEPHONE (OUTPATIENT)
Dept: INFUSION CENTER | Facility: CLINIC | Age: 64
End: 2024-02-29

## 2024-02-29 NOTE — TELEPHONE ENCOUNTER
Called patient as no lab results noted in her chart as reviewed with patient yesterday CMP was needed for infusion appointment. Called home and cell number listed, no answer left VM with request to call back.

## 2024-03-11 ENCOUNTER — TELEPHONE (OUTPATIENT)
Dept: RHEUMATOLOGY | Facility: CLINIC | Age: 64
End: 2024-03-11

## 2024-03-11 NOTE — TELEPHONE ENCOUNTER
Patient was called and a VM was left asking her to reschedule her Reclast infusion that she missed, and also to complete her lab work prior to her Infusion.

## 2024-03-11 NOTE — TELEPHONE ENCOUNTER
Looks like patient missed her first Reclast infusion that was scheduled for 2/29.  If you please reach out to her to let her know that she should reschedule this and also collect the blood test prior to the infusion.  Thanks!

## 2024-03-29 ENCOUNTER — TELEPHONE (OUTPATIENT)
Age: 64
End: 2024-03-29

## 2024-04-08 ENCOUNTER — OFFICE VISIT (OUTPATIENT)
Dept: RHEUMATOLOGY | Facility: CLINIC | Age: 64
End: 2024-04-08
Payer: COMMERCIAL

## 2024-04-08 VITALS
BODY MASS INDEX: 19.93 KG/M2 | DIASTOLIC BLOOD PRESSURE: 70 MMHG | WEIGHT: 124 LBS | HEIGHT: 66 IN | SYSTOLIC BLOOD PRESSURE: 122 MMHG

## 2024-04-08 DIAGNOSIS — S22.41XD CLOSED FRACTURE OF MULTIPLE RIBS OF RIGHT SIDE WITH ROUTINE HEALING, SUBSEQUENT ENCOUNTER: ICD-10-CM

## 2024-04-08 DIAGNOSIS — M80.00XD AGE-RELATED OSTEOPOROSIS WITH CURRENT PATHOLOGICAL FRACTURE WITH ROUTINE HEALING: Primary | ICD-10-CM

## 2024-04-08 DIAGNOSIS — M81.0 POST-MENOPAUSAL OSTEOPOROSIS: ICD-10-CM

## 2024-04-08 DIAGNOSIS — Z79.899 LONG TERM CURRENT USE OF THERAPEUTIC DRUG: ICD-10-CM

## 2024-04-08 PROCEDURE — 99213 OFFICE O/P EST LOW 20 MIN: CPT | Performed by: PHYSICIAN ASSISTANT

## 2024-04-08 NOTE — PROGRESS NOTES
Assessment and Plan:   Ms. Pop is a 63yo female who presents for rheumatology follow-up of osteoporosis.     Renzo completed 12 doses of Evenity 210 mg subcutaneous injections as of 1/23/2024.  The plan was to transition to Prolia 1 to 2 months after completion of Evenity, but co-pay was too high.  Instead, she was scheduled for Reclast 5mg IV once annual infusion in February, but missed appt. I encouraged the patient today to call and schedule the infusion as soon as possible (also needs to update CMP and vitamin D as ordered) and confirmed that she does not have any upcoming invasive dental work or any eye or hip pain.  There have been no falls or fractures in the interim and she continues to take calcium and vitamin D per her report.  Due for updated DEXA.  Phone number was provided for the infusion center at [place] and also for central scheduling phone number for her to call and schedule her appointments.    Follow-up in 1 year, or sooner as needed    Plan:  Diagnoses and all orders for this visit:    Age-related osteoporosis with current pathological fracture with routine healing    Post-menopausal osteoporosis    Long term current use of therapeutic drug    Closed fracture of multiple ribs of right side with routine healing, subsequent encounter        I have personally reviewed prior notes, recent laboratory results, and pertinent films in PACS.     Activities as tolerated.   Exercise: try to maintain a low impact exercise regimen as much as possible. Walk for 30 minutes a day for at least 3 days a week.   Continue other medications as prescribed by PCP and other specialists.       RTC in 1 year    Rheumatic Disease Summary:  1. Severe osteoporosis   -Rheum eval 2/24/22 for severe osteoporosis with h/o compression fractures, H/O right clavicle and pubic rami fractures, multiple rib fractures; no prior tx  -DEXA 10/19/21: T -4.3 L hip, -4 L FN, -3.9 lumbar  -Initial visit: rec’d Evenity-approved by  insurance, pt supposed to check with out of pocket with her insurance and get back to us  -Visit 10/25/22: evenity previously approved but never got back to us regarding evenity copay, advised to apply for copay card online and get back to us to start her injections   -Evenity x 12 completed 1/23/2024   -Prolia co-pay too high   -Visit 4/8/2024: Missed appointment for Reclast #1 in February.  Encouraged to call and reschedule as soon as possible, update DEXA   2. Comorbidities: chronic pancreatitis, H/O alcohol abuse, thrombocytopenia, anemia, H/O SAH/ICH, H/O anxiety and depression, GERD, seizures, stress-induced CM       HPI  Ms. Pop is a 63yo female who presents for rheumatology follow-up of osteoporosis.     Notes that she missed the appointment for her first infusion.  She confirms that she is taking calcium and vitamin D.  She denies any falls or fractures since her last visit.  She confirms that she is not planning for any invasive or denies any hip or thigh pain.  No joint pains to speak of or joint swelling.    The following portions of the patient's history were reviewed and updated as appropriate: allergies, current medications, past family history, past medical history, past social history, past surgical history and problem list.      Review of Systems  Constitutional: Negative for weight change, fevers, chills, night sweats, fatigue.  ENT/Mouth: Negative for hearing changes, ear pain, nasal congestion, sinus pain, hoarseness, sore throat, rhinorrhea, swallowing difficulty.   Eyes: Negative for pain, redness, discharge, vision changes.   Cardiovascular: Negative for chest pain, SOB, palpitations.   Respiratory: Negative for cough, sputum, wheezing, dyspnea.   Gastrointestinal: Negative for nausea, vomiting, diarrhea, constipation, pain, heartburn.  Genitourinary: Negative for dysuria, urinary frequency, hematuria.   Musculoskeletal: As per HPI.  Skin: Negative for skin rash, color changes.   Neuro:  Negative for weakness, numbness, tingling, loss of consciousness.   Psych: Negative for anxiety, depression.   Heme/Lymph: Negative for easy bruising, bleeding, lymphadenopathy.        Past Medical History:   Diagnosis Date    Chronic alcohol abuse     Epilepsy (HCC)     Fracture     Hepatitis     Hep A     Hepatitis     Insomnia     10mar2016 resolved    Osteoporosis     14jun2016 resolved    Pancreatitis     SAH (subarachnoid hemorrhage) (HCC)     Seasonal allergies     Seizure (HCC)     Seizures (HCC)     Urine discoloration 11/11/2019    Varicella     Vomiting 11/13/2019       Past Surgical History:   Procedure Laterality Date    BONE MARROW BIOPSY      2019, 2021    IR BIOPSY BONE  8/17/2021    IR BIOPSY BONE MARROW  11/14/2019    OR TX TIBL SHFT FX IMED IMPLT W/WO SCREWS&/CERCLA Left 8/4/2023    Procedure: INSERTION NAIL IM TIBIA;  Surgeon: Danielito Sauceda DO;  Location: [facility];  Service: Orthopedics    TUBAL LIGATION  1985    TUBAL LIGATION         Social History     Socioeconomic History    Marital status: /Civil Union     Spouse name: Not on file    Number of children: Not on file    Years of education: Not on file    Highest education level: Not on file   Occupational History    Not on file   Tobacco Use    Smoking status: Never    Smokeless tobacco: Never   Vaping Use    Vaping status: Never Used   Substance and Sexual Activity    Alcohol use: Yes     Comment: 6 bottles of wine during the week, now only drinks on the weekend.    Drug use: Never    Sexual activity: Yes     Partners: Male   Other Topics Concern    Not on file   Social History Narrative    ** Merged History Encounter **         ** Merged History Encounter **         ** Merged History Encounter **         ** Merged History Encounter **         Drinks coffee  tea     Social Determinants of Health     Financial Resource Strain: Not on file   Food Insecurity: No Food Insecurity (8/5/2023)    Hunger Vital Sign     Worried About Running  Out of Food in the Last Year: Never true     Ran Out of Food in the Last Year: Never true   Transportation Needs: No Transportation Needs (8/5/2023)    PRAPARE - Transportation     Lack of Transportation (Medical): No     Lack of Transportation (Non-Medical): No   Physical Activity: Not on file   Stress: Not on file   Social Connections: Not on file   Intimate Partner Violence: Not on file   Housing Stability: Unknown (8/5/2023)    Housing Stability Vital Sign     Unable to Pay for Housing in the Last Year: No     Number of Places Lived in the Last Year: Not on file     Unstable Housing in the Last Year: No       Family History   Problem Relation Age of Onset    Anxiety disorder Mother     Depression Mother     No Known Problems Father     No Known Problems Sister     No Known Problems Sister     No Known Problems Daughter     No Known Problems Maternal Grandmother     Cancer Maternal Grandfather     Cancer Paternal Grandmother         unknown     No Known Problems Paternal Grandfather     No Known Problems Brother     No Known Problems Son     No Known Problems Son     Breast cancer Neg Hx     Ovarian cancer Neg Hx        No Known Allergies      Current Outpatient Medications:     desvenlafaxine succinate (PRISTIQ) 50 mg 24 hr tablet, TAKE ONE TABLET BY MOUTH EVERY DAY, Disp: 30 tablet, Rfl: 5    gabapentin (NEURONTIN) 300 mg capsule, TAKE ONE CAPSULE BY MOUTH TWICE A DAY, Disp: 60 capsule, Rfl: 5    hydrOXYzine HCL (ATARAX) 25 mg tablet, Take 25 mg by mouth 2 (two) times a day (Patient not taking: Reported on 10/10/2023), Disp: , Rfl:     hydrOXYzine HCL (ATARAX) 25 mg tablet, TAKE ONE TABLET BY MOUTH TWICE A DAY, Disp: 60 tablet, Rfl: 5    lacosamide (VIMPAT) 200 mg tablet, Take 1 tablet (200 mg total) by mouth 2 (two) times a day, Disp: 180 tablet, Rfl: 1    metoprolol succinate (TOPROL-XL) 25 mg 24 hr tablet, Take 1 tablet (25 mg total) by mouth 2 (two) times a day, Disp: 180 tablet, Rfl: 3    traZODone  "(DESYREL) 50 mg tablet, TAKE ONE TABLET BY MOUTH AT BEDTIME, Disp: 30 tablet, Rfl: 5    zonisamide (ZONEGRAN) 100 mg capsule, Take 2 capsules (200 mg total) by mouth daily at bedtime, Disp: 180 capsule, Rfl: 3      Objective:    Vitals:    04/08/24 1531   BP: 122/70   Weight: 56.2 kg (124 lb)   Height: 5' 6\" (1.676 m)       Physical Exam  General: Well appearing, well nourished, in no acute distress. Oriented x 3, normal mood and affect.  Ambulating without difficulty.  Skin: Good turgor, no rash, unusual bruising or prominent lesions.  Hair: Normal texture and distribution.  Nails: Normal color, no deformities.  HEENT:  Head: Normocephalic, atraumatic.  Eyes: Conjunctiva clear, sclera non-icteric, EOM intact.  Nose: No external lesions, mucosa non-inflamed.  Mouth: Mucous membranes moist, no mucosal lesions.  Neck: Supple  Musculoskeletal:   + Squaring of the bilateral CMC's  No tenderness to palpation or swelling of joints bilaterally to include: shoulder, elbow, wrist, MCP I-V, PIP I-V, knee, ankle, MTP I-V.  Neurologic: Alert and oriented. No focal neurological deficits appreciated.   Psychiatric: Normal mood and affect.       Vasquez King PA-C  Rheumatology  "

## 2024-04-20 DIAGNOSIS — G40.109 SYMPTOMATIC FOCAL EPILEPSY (HCC): ICD-10-CM

## 2024-04-23 ENCOUNTER — TELEPHONE (OUTPATIENT)
Dept: INFUSION CENTER | Facility: CLINIC | Age: 64
End: 2024-04-23

## 2024-04-23 RX ORDER — LACOSAMIDE 200 MG/1
200 TABLET ORAL 2 TIMES DAILY
Qty: 180 TABLET | Refills: 1 | Status: SHIPPED | OUTPATIENT
Start: 2024-04-23

## 2024-04-23 NOTE — TELEPHONE ENCOUNTER
Called patient to remind her to get labs done prior to appointment tomorrow Wednesday 4/24. Patient is aware.

## 2024-04-24 ENCOUNTER — HOSPITAL ENCOUNTER (OUTPATIENT)
Dept: INFUSION CENTER | Facility: CLINIC | Age: 64
Discharge: HOME | End: 2024-04-24

## 2024-04-29 ENCOUNTER — APPOINTMENT (OUTPATIENT)
Dept: LAB | Facility: CLINIC | Age: 64
End: 2024-04-29
Payer: COMMERCIAL

## 2024-04-29 ENCOUNTER — HOSPITAL ENCOUNTER (OUTPATIENT)
Dept: INFUSION CENTER | Facility: CLINIC | Age: 64
Discharge: HOME | End: 2024-04-29

## 2024-04-29 DIAGNOSIS — M80.00XD AGE-RELATED OSTEOPOROSIS WITH CURRENT PATHOLOGICAL FRACTURE WITH ROUTINE HEALING: ICD-10-CM

## 2024-04-29 LAB
ALBUMIN SERPL BCP-MCNC: 3.9 G/DL (ref 3.5–5)
ALP SERPL-CCNC: 107 U/L (ref 34–104)
ALT SERPL W P-5'-P-CCNC: 17 U/L (ref 7–52)
ANION GAP SERPL CALCULATED.3IONS-SCNC: 8 MMOL/L (ref 4–13)
AST SERPL W P-5'-P-CCNC: 26 U/L (ref 13–39)
BILIRUB SERPL-MCNC: 0.26 MG/DL (ref 0.2–1)
BUN SERPL-MCNC: 15 MG/DL (ref 5–25)
CALCIUM SERPL-MCNC: 8.7 MG/DL (ref 8.4–10.2)
CHLORIDE SERPL-SCNC: 105 MMOL/L (ref 96–108)
CO2 SERPL-SCNC: 27 MMOL/L (ref 21–32)
CREAT SERPL-MCNC: 1.13 MG/DL (ref 0.6–1.3)
GFR SERPL CREATININE-BSD FRML MDRD: 51 ML/MIN/1.73SQ M
GLUCOSE SERPL-MCNC: 67 MG/DL (ref 65–140)
POTASSIUM SERPL-SCNC: 4.4 MMOL/L (ref 3.5–5.3)
PROT SERPL-MCNC: 6.7 G/DL (ref 6.4–8.4)
SODIUM SERPL-SCNC: 140 MMOL/L (ref 135–147)

## 2024-04-29 PROCEDURE — 80053 COMPREHEN METABOLIC PANEL: CPT

## 2024-04-29 PROCEDURE — 36415 COLL VENOUS BLD VENIPUNCTURE: CPT

## 2024-04-30 ENCOUNTER — HOSPITAL ENCOUNTER (OUTPATIENT)
Dept: INFUSION CENTER | Facility: CLINIC | Age: 64
Discharge: HOME | End: 2024-04-30
Payer: COMMERCIAL

## 2024-04-30 VITALS
TEMPERATURE: 98.3 F | SYSTOLIC BLOOD PRESSURE: 115 MMHG | OXYGEN SATURATION: 98 % | HEIGHT: 66 IN | HEART RATE: 64 BPM | RESPIRATION RATE: 18 BRPM | DIASTOLIC BLOOD PRESSURE: 68 MMHG | WEIGHT: 125.5 LBS | BODY MASS INDEX: 20.17 KG/M2

## 2024-04-30 DIAGNOSIS — M80.00XD AGE-RELATED OSTEOPOROSIS WITH CURRENT PATHOLOGICAL FRACTURE WITH ROUTINE HEALING: Primary | ICD-10-CM

## 2024-04-30 PROCEDURE — 96374 THER/PROPH/DIAG INJ IV PUSH: CPT

## 2024-04-30 RX ORDER — SODIUM CHLORIDE 9 MG/ML
20 INJECTION, SOLUTION INTRAVENOUS ONCE
Status: COMPLETED | OUTPATIENT
Start: 2024-04-30 | End: 2024-04-30

## 2024-04-30 RX ORDER — ZOLEDRONIC ACID 5 MG/100ML
5 INJECTION, SOLUTION INTRAVENOUS ONCE
OUTPATIENT
Start: 2025-04-29

## 2024-04-30 RX ORDER — SODIUM CHLORIDE 9 MG/ML
20 INJECTION, SOLUTION INTRAVENOUS ONCE
OUTPATIENT
Start: 2025-04-29

## 2024-04-30 RX ORDER — ZOLEDRONIC ACID 5 MG/100ML
5 INJECTION, SOLUTION INTRAVENOUS ONCE
Status: COMPLETED | OUTPATIENT
Start: 2024-04-30 | End: 2024-04-30

## 2024-04-30 RX ADMIN — ZOLEDRONIC ACID 5 MG: 5 INJECTION, SOLUTION INTRAVENOUS at 16:29

## 2024-04-30 RX ADMIN — SODIUM CHLORIDE 20 ML/HR: 0.9 INJECTION, SOLUTION INTRAVENOUS at 16:29

## 2024-04-30 NOTE — PROGRESS NOTES
Patient arrives to infusion center for first dose Reclast. Labs reviewed from 4/29/24, calcium 8.7; calculated CrCl 40.2 which are within parameters for treatment. Patient states she did have 3 cavities filled 3 weeks ago - spoke with CHANTE Scales; OK to proceed but she must make her dentist aware of the medication, will need clearance from dentist for any future dental work. Patient aware. PIV placed without issue, patient tolerated well. Patient resting on recliner chair, call bell within reach.

## 2024-04-30 NOTE — PROGRESS NOTES
AVS printed and provided, patient reminded to let dentist know about the Reclast she received today. Reinforced to follow up in 1 year, unable to schedule that far in advance at this time.

## 2024-05-29 DIAGNOSIS — G47.00 INSOMNIA, UNSPECIFIED TYPE: ICD-10-CM

## 2024-05-29 DIAGNOSIS — F41.9 ANXIETY: ICD-10-CM

## 2024-05-29 DIAGNOSIS — G40.109 SYMPTOMATIC FOCAL EPILEPSY (HCC): ICD-10-CM

## 2024-05-29 RX ORDER — TRAZODONE HYDROCHLORIDE 50 MG/1
50 TABLET ORAL
Qty: 30 TABLET | Refills: 0 | Status: ON HOLD | OUTPATIENT
Start: 2024-05-29

## 2024-05-29 RX ORDER — ZONISAMIDE 100 MG/1
200 CAPSULE ORAL
Qty: 180 CAPSULE | Refills: 1 | Status: ON HOLD | OUTPATIENT
Start: 2024-05-29

## 2024-05-29 RX ORDER — HYDROXYZINE HYDROCHLORIDE 25 MG/1
25 TABLET, FILM COATED ORAL 2 TIMES DAILY
Qty: 60 TABLET | Refills: 0 | Status: ON HOLD | OUTPATIENT
Start: 2024-05-29

## 2024-05-29 NOTE — TELEPHONE ENCOUNTER
Patient called to follow up on requests to refill traZODone (DESYREL) 50 mg tablet and hydrOXYzine HCL (ATARAX) 25 mg tablet.  Patient stated she has been taking 1/2 tablets of Trazadone to stretch the medication while she was away on vacation.  Her last pill was 4 days ago.  She misplaced the hydroxyzine before her vacation and has searched all over to find it but cannot.  The last time she took the medication was 1 week ago.  Patient stated she is starting to feel queezy from not taking the medications and would like to know if a short supply can be sent to the pharmacy to last until her visit on 6/6.

## 2024-06-06 ENCOUNTER — HOSPITAL ENCOUNTER (INPATIENT)
Facility: HOSPITAL | Age: 64
LOS: 4 days | Discharge: HOME HEALTH | DRG: 100 | End: 2024-06-10
Attending: EMERGENCY MEDICINE | Admitting: INTERNAL MEDICINE
Payer: COMMERCIAL

## 2024-06-06 ENCOUNTER — APPOINTMENT (EMERGENCY)
Dept: RADIOLOGY | Facility: HOSPITAL | Age: 64
DRG: 100 | End: 2024-06-06
Payer: COMMERCIAL

## 2024-06-06 ENCOUNTER — APPOINTMENT (EMERGENCY)
Dept: CT IMAGING | Facility: HOSPITAL | Age: 64
DRG: 100 | End: 2024-06-06
Payer: COMMERCIAL

## 2024-06-06 DIAGNOSIS — G40.901 STATUS EPILEPTICUS (HCC): Primary | ICD-10-CM

## 2024-06-06 DIAGNOSIS — A41.9 SEPTIC SHOCK (HCC): ICD-10-CM

## 2024-06-06 DIAGNOSIS — R56.9 SEIZURE (HCC): ICD-10-CM

## 2024-06-06 DIAGNOSIS — R65.21 SEPTIC SHOCK (HCC): ICD-10-CM

## 2024-06-06 PROBLEM — S82.209A TIBIA FRACTURE: Status: RESOLVED | Noted: 2023-08-04 | Resolved: 2024-06-06
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
## 2024-06-06 PROBLEM — F10.10 ALCOHOL ABUSE: Chronic | Status: RESOLVED | Noted: 2019-11-12 | Resolved: 2024-06-06

## 2024-06-06 PROBLEM — D64.9 ANEMIA: Status: RESOLVED | Noted: 2019-11-13 | Resolved: 2024-06-06

## 2024-06-06 PROBLEM — S32.000A LUMBAR COMPRESSION FRACTURE (HCC): Status: RESOLVED | Noted: 2021-03-19 | Resolved: 2024-06-06

## 2024-06-06 PROBLEM — I51.81 STRESS-INDUCED CARDIOMYOPATHY: Chronic | Status: ACTIVE | Noted: 2022-04-27

## 2024-06-06 PROBLEM — G93.40 ENCEPHALOPATHY: Status: RESOLVED | Noted: 2022-03-08 | Resolved: 2024-06-06

## 2024-06-06 PROBLEM — F10.10 CHRONIC ALCOHOL ABUSE: Chronic | Status: RESOLVED | Noted: 2023-08-04 | Resolved: 2024-06-06

## 2024-06-06 PROBLEM — IMO0001 REFUSAL OF BLOOD TRANSFUSIONS AS PATIENT IS JEHOVAH'S WITNESS: Status: RESOLVED | Noted: 2019-11-12 | Resolved: 2024-06-06

## 2024-06-06 PROBLEM — E78.49 OTHER HYPERLIPIDEMIA: Chronic | Status: ACTIVE | Noted: 2018-10-03

## 2024-06-06 PROBLEM — K76.0 FATTY LIVER: Status: RESOLVED | Noted: 2019-11-11 | Resolved: 2024-06-06

## 2024-06-06 PROBLEM — D70.9 NEUTROPENIA (HCC): Status: RESOLVED | Noted: 2023-07-09 | Resolved: 2024-06-06
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
## 2024-06-06 PROBLEM — F10.939 ALCOHOL WITHDRAWAL SYNDROME WITH COMPLICATION (HCC): Chronic | Status: ACTIVE | Noted: 2023-07-09

## 2024-06-06 PROBLEM — G40.909 EPILEPSY (HCC): Chronic | Status: ACTIVE | Noted: 2023-08-04

## 2024-06-06 PROBLEM — Z53.1 REFUSAL OF BLOOD TRANSFUSIONS AS PATIENT IS JEHOVAH'S WITNESS: Status: RESOLVED | Noted: 2019-11-12 | Resolved: 2024-06-06

## 2024-06-06 PROBLEM — F10.10 CHRONIC ALCOHOL ABUSE: Chronic | Status: ACTIVE | Noted: 2023-08-04

## 2024-06-06 PROBLEM — I50.40 COMBINED SYSTOLIC AND DIASTOLIC CONGESTIVE HEART FAILURE (HCC): Chronic | Status: ACTIVE | Noted: 2023-07-09

## 2024-06-06 PROBLEM — F41.9 ANXIETY: Chronic | Status: RESOLVED | Noted: 2019-11-13 | Resolved: 2024-06-06

## 2024-06-06 PROBLEM — D62 ACUTE BLOOD LOSS ANEMIA: Status: RESOLVED | Noted: 2023-08-06 | Resolved: 2024-06-06

## 2024-06-06 PROBLEM — T14.8XXA HEMATOMA: Status: RESOLVED | Noted: 2023-08-04 | Resolved: 2024-06-06

## 2024-06-06 PROBLEM — E83.42 HYPOMAGNESEMIA: Status: RESOLVED | Noted: 2023-05-12 | Resolved: 2024-06-06

## 2024-06-06 PROBLEM — F10.10 ALCOHOL ABUSE: Chronic | Status: ACTIVE | Noted: 2023-05-12

## 2024-06-06 PROBLEM — E87.1 HYPONATREMIA: Status: RESOLVED | Noted: 2023-03-13 | Resolved: 2024-06-06

## 2024-06-06 PROBLEM — F10.20 ALCOHOL USE DISORDER, SEVERE, DEPENDENCE (HCC): Chronic | Status: ACTIVE | Noted: 2023-07-09

## 2024-06-06 LAB
ALBUMIN SERPL BCP-MCNC: 4.2 G/DL (ref 3.5–5)
ALP SERPL-CCNC: 87 U/L (ref 34–104)
ALT SERPL W P-5'-P-CCNC: 19 U/L (ref 7–52)
ANION GAP SERPL CALCULATED.3IONS-SCNC: 6 MMOL/L (ref 4–13)
APTT PPP: 28 SECONDS (ref 23–37)
AST SERPL W P-5'-P-CCNC: 24 U/L (ref 13–39)
BACTERIA UR QL AUTO: ABNORMAL /HPF
BASE EXCESS BLDA CALC-SCNC: -7 MMOL/L (ref -2–3)
BASOPHILS # BLD AUTO: 0.02 THOUSANDS/ÂΜL (ref 0–0.1)
BASOPHILS NFR BLD AUTO: 1 % (ref 0–1)
BILIRUB SERPL-MCNC: 0.48 MG/DL (ref 0.2–1)
BILIRUB UR QL STRIP: NEGATIVE
BUN SERPL-MCNC: 10 MG/DL (ref 5–25)
CA-I BLD-SCNC: 1.1 MMOL/L (ref 1.12–1.32)
CALCIUM SERPL-MCNC: 8.6 MG/DL (ref 8.4–10.2)
CARDIAC TROPONIN I PNL SERPL HS: 3 NG/L
CHLORIDE SERPL-SCNC: 100 MMOL/L (ref 96–108)
CLARITY UR: CLEAR
CO2 SERPL-SCNC: 24 MMOL/L (ref 21–32)
COLOR UR: ABNORMAL
CREAT SERPL-MCNC: 0.62 MG/DL (ref 0.6–1.3)
EOSINOPHIL # BLD AUTO: 0.02 THOUSAND/ÂΜL (ref 0–0.61)
EOSINOPHIL NFR BLD AUTO: 1 % (ref 0–6)
ERYTHROCYTE [DISTWIDTH] IN BLOOD BY AUTOMATED COUNT: 13.2 % (ref 11.6–15.1)
GFR SERPL CREATININE-BSD FRML MDRD: 95 ML/MIN/1.73SQ M
GLUCOSE SERPL-MCNC: 148 MG/DL (ref 65–140)
GLUCOSE SERPL-MCNC: 170 MG/DL (ref 65–140)
GLUCOSE SERPL-MCNC: 178 MG/DL (ref 65–140)
GLUCOSE UR STRIP-MCNC: NEGATIVE MG/DL
HCO3 BLDA-SCNC: 17.2 MMOL/L (ref 24–30)
HCT VFR BLD AUTO: 38.8 % (ref 34.8–46.1)
HCT VFR BLD CALC: 26 % (ref 34.8–46.1)
HGB BLD-MCNC: 12.7 G/DL (ref 11.5–15.4)
HGB BLDA-MCNC: 8.8 G/DL (ref 11.5–15.4)
HGB UR QL STRIP.AUTO: NEGATIVE
IMM GRANULOCYTES # BLD AUTO: 0.01 THOUSAND/UL (ref 0–0.2)
IMM GRANULOCYTES NFR BLD AUTO: 0 % (ref 0–2)
INR PPP: 0.95 (ref 0.84–1.19)
KETONES UR STRIP-MCNC: NEGATIVE MG/DL
LACTATE SERPL-SCNC: 0.9 MMOL/L (ref 0.5–2)
LEUKOCYTE ESTERASE UR QL STRIP: NEGATIVE
LYMPHOCYTES # BLD AUTO: 0.62 THOUSANDS/ÂΜL (ref 0.6–4.47)
LYMPHOCYTES NFR BLD AUTO: 18 % (ref 14–44)
MCH RBC QN AUTO: 32.3 PG (ref 26.8–34.3)
MCHC RBC AUTO-ENTMCNC: 32.7 G/DL (ref 31.4–37.4)
MCV RBC AUTO: 99 FL (ref 82–98)
MONOCYTES # BLD AUTO: 0.55 THOUSAND/ÂΜL (ref 0.17–1.22)
MONOCYTES NFR BLD AUTO: 16 % (ref 4–12)
NEUTROPHILS # BLD AUTO: 2.19 THOUSANDS/ÂΜL (ref 1.85–7.62)
NEUTS SEG NFR BLD AUTO: 64 % (ref 43–75)
NITRITE UR QL STRIP: NEGATIVE
NON-SQ EPI CELLS URNS QL MICRO: ABNORMAL /HPF
NRBC BLD AUTO-RTO: 0 /100 WBCS
PCO2 BLD: 18 MMOL/L (ref 21–32)
PCO2 BLD: 30 MM HG (ref 42–50)
PH BLD: 7.37 [PH] (ref 7.3–7.4)
PH UR STRIP.AUTO: 6.5 [PH]
PLATELET # BLD AUTO: 67 THOUSANDS/UL (ref 149–390)
PMV BLD AUTO: 10.2 FL (ref 8.9–12.7)
PO2 BLD: 60 MM HG (ref 35–45)
POTASSIUM BLD-SCNC: 3.1 MMOL/L (ref 3.5–5.3)
POTASSIUM SERPL-SCNC: 4 MMOL/L (ref 3.5–5.3)
PROCALCITONIN SERPL-MCNC: 0.08 NG/ML
PROT SERPL-MCNC: 7.6 G/DL (ref 6.4–8.4)
PROT UR STRIP-MCNC: ABNORMAL MG/DL
PROTHROMBIN TIME: 13.3 SECONDS (ref 11.6–14.5)
RBC # BLD AUTO: 3.93 MILLION/UL (ref 3.81–5.12)
RBC #/AREA URNS AUTO: ABNORMAL /HPF
SAO2 % BLD FROM PO2: 90 % (ref 60–85)
SODIUM BLD-SCNC: 130 MMOL/L (ref 136–145)
SODIUM SERPL-SCNC: 130 MMOL/L (ref 135–147)
SP GR UR STRIP.AUTO: 1.01 (ref 1–1.03)
SPECIMEN SOURCE: ABNORMAL
UROBILINOGEN UR STRIP-ACNC: <2 MG/DL
WBC # BLD AUTO: 3.41 THOUSAND/UL (ref 4.31–10.16)
WBC #/AREA URNS AUTO: ABNORMAL /HPF

## 2024-06-06 PROCEDURE — 94760 N-INVAS EAR/PLS OXIMETRY 1: CPT

## 2024-06-06 PROCEDURE — 71250 CT THORAX DX C-: CPT

## 2024-06-06 PROCEDURE — 5A1935Z RESPIRATORY VENTILATION, LESS THAN 24 CONSECUTIVE HOURS: ICD-10-PCS | Performed by: INTERNAL MEDICINE

## 2024-06-06 PROCEDURE — 84132 ASSAY OF SERUM POTASSIUM: CPT

## 2024-06-06 PROCEDURE — 96368 THER/DIAG CONCURRENT INF: CPT

## 2024-06-06 PROCEDURE — 96365 THER/PROPH/DIAG IV INF INIT: CPT

## 2024-06-06 PROCEDURE — 31500 INSERT EMERGENCY AIRWAY: CPT | Performed by: EMERGENCY MEDICINE

## 2024-06-06 PROCEDURE — 99285 EMERGENCY DEPT VISIT HI MDM: CPT

## 2024-06-06 PROCEDURE — 80053 COMPREHEN METABOLIC PANEL: CPT | Performed by: EMERGENCY MEDICINE

## 2024-06-06 PROCEDURE — 81001 URINALYSIS AUTO W/SCOPE: CPT | Performed by: EMERGENCY MEDICINE

## 2024-06-06 PROCEDURE — 85014 HEMATOCRIT: CPT

## 2024-06-06 PROCEDURE — 74176 CT ABD & PELVIS W/O CONTRAST: CPT

## 2024-06-06 PROCEDURE — 99291 CRITICAL CARE FIRST HOUR: CPT | Performed by: EMERGENCY MEDICINE

## 2024-06-06 PROCEDURE — 83605 ASSAY OF LACTIC ACID: CPT | Performed by: EMERGENCY MEDICINE

## 2024-06-06 PROCEDURE — 36600 WITHDRAWAL OF ARTERIAL BLOOD: CPT

## 2024-06-06 PROCEDURE — 36415 COLL VENOUS BLD VENIPUNCTURE: CPT | Performed by: EMERGENCY MEDICINE

## 2024-06-06 PROCEDURE — 93005 ELECTROCARDIOGRAM TRACING: CPT

## 2024-06-06 PROCEDURE — 82947 ASSAY GLUCOSE BLOOD QUANT: CPT

## 2024-06-06 PROCEDURE — 70450 CT HEAD/BRAIN W/O DYE: CPT

## 2024-06-06 PROCEDURE — 84145 PROCALCITONIN (PCT): CPT | Performed by: EMERGENCY MEDICINE

## 2024-06-06 PROCEDURE — 71045 X-RAY EXAM CHEST 1 VIEW: CPT

## 2024-06-06 PROCEDURE — 84295 ASSAY OF SERUM SODIUM: CPT

## 2024-06-06 PROCEDURE — 31500 INSERT EMERGENCY AIRWAY: CPT

## 2024-06-06 PROCEDURE — 82948 REAGENT STRIP/BLOOD GLUCOSE: CPT

## 2024-06-06 PROCEDURE — 96375 TX/PRO/DX INJ NEW DRUG ADDON: CPT

## 2024-06-06 PROCEDURE — 85025 COMPLETE CBC W/AUTO DIFF WBC: CPT | Performed by: EMERGENCY MEDICINE

## 2024-06-06 PROCEDURE — 84484 ASSAY OF TROPONIN QUANT: CPT | Performed by: EMERGENCY MEDICINE

## 2024-06-06 PROCEDURE — 94002 VENT MGMT INPAT INIT DAY: CPT

## 2024-06-06 PROCEDURE — 85730 THROMBOPLASTIN TIME PARTIAL: CPT | Performed by: EMERGENCY MEDICINE

## 2024-06-06 PROCEDURE — 82803 BLOOD GASES ANY COMBINATION: CPT

## 2024-06-06 PROCEDURE — 87040 BLOOD CULTURE FOR BACTERIA: CPT | Performed by: EMERGENCY MEDICINE

## 2024-06-06 PROCEDURE — 82330 ASSAY OF CALCIUM: CPT

## 2024-06-06 PROCEDURE — 85610 PROTHROMBIN TIME: CPT | Performed by: EMERGENCY MEDICINE

## 2024-06-06 PROCEDURE — 0BH17EZ INSERTION OF ENDOTRACHEAL AIRWAY INTO TRACHEA, VIA NATURAL OR ARTIFICIAL OPENING: ICD-10-PCS | Performed by: INTERNAL MEDICINE

## 2024-06-06 RX ORDER — ACETAMINOPHEN 10 MG/ML
1000 INJECTION, SOLUTION INTRAVENOUS ONCE
Status: COMPLETED | OUTPATIENT
Start: 2024-06-06 | End: 2024-06-06

## 2024-06-06 RX ORDER — FENTANYL CITRATE 50 UG/ML
50 INJECTION, SOLUTION INTRAMUSCULAR; INTRAVENOUS ONCE
Status: COMPLETED | OUTPATIENT
Start: 2024-06-06 | End: 2024-06-06

## 2024-06-06 RX ORDER — LORAZEPAM 2 MG/ML
4 INJECTION INTRAMUSCULAR ONCE
Status: COMPLETED | OUTPATIENT
Start: 2024-06-06 | End: 2024-06-06

## 2024-06-06 RX ORDER — FENTANYL CITRATE-0.9 % NACL/PF 10 MCG/ML
50 PLASTIC BAG, INJECTION (ML) INTRAVENOUS CONTINUOUS
Status: DISCONTINUED | OUTPATIENT
Start: 2024-06-06 | End: 2024-06-08

## 2024-06-06 RX ORDER — LORAZEPAM 2 MG/ML
INJECTION INTRAMUSCULAR
Status: COMPLETED
Start: 2024-06-06 | End: 2024-06-06

## 2024-06-06 RX ORDER — VECURONIUM BROMIDE 1 MG/ML
0.1 INJECTION, POWDER, LYOPHILIZED, FOR SOLUTION INTRAVENOUS ONCE
Status: COMPLETED | OUTPATIENT
Start: 2024-06-06 | End: 2024-06-06

## 2024-06-06 RX ORDER — LORAZEPAM 2 MG/ML
2 INJECTION INTRAMUSCULAR ONCE
Status: DISCONTINUED | OUTPATIENT
Start: 2024-06-06 | End: 2024-06-10 | Stop reason: HOSPADM

## 2024-06-06 RX ORDER — KETAMINE HYDROCHLORIDE 50 MG/ML
INJECTION, SOLUTION INTRAMUSCULAR; INTRAVENOUS
Status: COMPLETED
Start: 2024-06-06 | End: 2024-06-06

## 2024-06-06 RX ORDER — LEVETIRACETAM 500 MG/5ML
3400 INJECTION, SOLUTION, CONCENTRATE INTRAVENOUS ONCE
Status: COMPLETED | OUTPATIENT
Start: 2024-06-06 | End: 2024-06-06

## 2024-06-06 RX ADMIN — VANCOMYCIN HYDROCHLORIDE 1500 MG: 5 INJECTION, POWDER, LYOPHILIZED, FOR SOLUTION INTRAVENOUS at 22:56

## 2024-06-06 RX ADMIN — VECURONIUM BROMIDE 5.69 MG: 1 INJECTION, POWDER, LYOPHILIZED, FOR SOLUTION INTRAVENOUS at 21:34

## 2024-06-06 RX ADMIN — LORAZEPAM 4 MG: 2 INJECTION INTRAMUSCULAR; INTRAVENOUS at 21:20

## 2024-06-06 RX ADMIN — ACETAMINOPHEN 1000 MG: 10 INJECTION INTRAVENOUS at 22:49

## 2024-06-06 RX ADMIN — KETAMINE HYDROCHLORIDE 116 MG: 50 INJECTION INTRAMUSCULAR; INTRAVENOUS at 21:33

## 2024-06-06 RX ADMIN — FENTANYL CITRATE 50 MCG: 50 INJECTION INTRAMUSCULAR; INTRAVENOUS at 22:51

## 2024-06-06 RX ADMIN — LORAZEPAM 2 MG: 2 INJECTION INTRAMUSCULAR; INTRAVENOUS at 21:14

## 2024-06-06 RX ADMIN — SODIUM CHLORIDE 0.5 MG/KG/HR: 0.9 INJECTION, SOLUTION INTRAVENOUS at 21:58

## 2024-06-06 RX ADMIN — SODIUM CHLORIDE 707 ML: 0.9 INJECTION, SOLUTION INTRAVENOUS at 22:33

## 2024-06-06 RX ADMIN — LEVETIRACETAM 3400 MG: 100 INJECTION, SOLUTION INTRAVENOUS at 21:25

## 2024-06-06 RX ADMIN — Medication 50 MCG/HR: at 21:55

## 2024-06-06 RX ADMIN — PIPERACILLIN SODIUM AND TAZOBACTAM SODIUM 4.5 G: 36; 4.5 INJECTION, POWDER, LYOPHILIZED, FOR SOLUTION INTRAVENOUS at 22:32

## 2024-06-06 RX ADMIN — LORAZEPAM 2 MG: 2 INJECTION INTRAMUSCULAR; INTRAVENOUS at 21:13

## 2024-06-06 RX ADMIN — NOREPINEPHRINE BITARTRATE 1 MCG/MIN: 1 INJECTION INTRAVENOUS at 23:29

## 2024-06-06 RX ADMIN — SODIUM CHLORIDE 1000 ML: 0.9 INJECTION, SOLUTION INTRAVENOUS at 21:46

## 2024-06-07 ENCOUNTER — APPOINTMENT (INPATIENT)
Dept: MRI IMAGING | Facility: HOSPITAL | Age: 64
DRG: 100 | End: 2024-06-07
Payer: COMMERCIAL

## 2024-06-07 ENCOUNTER — APPOINTMENT (INPATIENT)
Dept: ULTRASOUND IMAGING | Facility: HOSPITAL | Age: 64
DRG: 100 | End: 2024-06-07
Payer: COMMERCIAL

## 2024-06-07 ENCOUNTER — APPOINTMENT (INPATIENT)
Dept: NEUROLOGY | Facility: HOSPITAL | Age: 64
DRG: 100 | End: 2024-06-07
Payer: COMMERCIAL

## 2024-06-07 PROBLEM — R56.9 SEIZURE (HCC): Status: ACTIVE | Noted: 2024-06-07

## 2024-06-07 PROBLEM — G40.901 STATUS EPILEPTICUS (HCC): Status: ACTIVE | Noted: 2024-06-07

## 2024-06-07 PROBLEM — R65.10 SIRS (SYSTEMIC INFLAMMATORY RESPONSE SYNDROME) (HCC): Status: ACTIVE | Noted: 2024-06-07

## 2024-06-07 LAB
2HR DELTA HS TROPONIN: 82 NG/L
ALBUMIN SERPL BCP-MCNC: 2.9 G/DL (ref 3.5–5)
ALP SERPL-CCNC: 58 U/L (ref 34–104)
ALT SERPL W P-5'-P-CCNC: 14 U/L (ref 7–52)
AMMONIA PLAS-SCNC: 26 UMOL/L (ref 18–72)
AMPHETAMINES SERPL QL SCN: NEGATIVE
AMYLASE SERPL-CCNC: 84 IU/L (ref 29–103)
ANION GAP SERPL CALCULATED.3IONS-SCNC: 5 MMOL/L (ref 4–13)
ARTERIAL PATENCY WRIST A: YES
AST SERPL W P-5'-P-CCNC: 21 U/L (ref 13–39)
ATRIAL RATE: 73 BPM
ATRIAL RATE: 86 BPM
BARBITURATES UR QL: NEGATIVE
BASE EXCESS BLDA CALC-SCNC: -6.3 MMOL/L
BASOPHILS # BLD AUTO: 0.01 THOUSANDS/ÂΜL (ref 0–0.1)
BASOPHILS NFR BLD AUTO: 0 % (ref 0–1)
BENZODIAZ UR QL: NEGATIVE
BILIRUB SERPL-MCNC: 0.41 MG/DL (ref 0.2–1)
BUN SERPL-MCNC: 6 MG/DL (ref 5–25)
CALCIUM ALBUM COR SERPL-MCNC: 7 MG/DL (ref 8.3–10.1)
CALCIUM SERPL-MCNC: 6.1 MG/DL (ref 8.4–10.2)
CARDIAC TROPONIN I PNL SERPL HS: 85 NG/L
CHLORIDE SERPL-SCNC: 110 MMOL/L (ref 96–108)
CO2 SERPL-SCNC: 20 MMOL/L (ref 21–32)
COCAINE UR QL: NEGATIVE
CREAT SERPL-MCNC: 0.51 MG/DL (ref 0.6–1.3)
EOSINOPHIL # BLD AUTO: 0.01 THOUSAND/ÂΜL (ref 0–0.61)
EOSINOPHIL NFR BLD AUTO: 0 % (ref 0–6)
ERYTHROCYTE [DISTWIDTH] IN BLOOD BY AUTOMATED COUNT: 13.5 % (ref 11.6–15.1)
ETHANOL SERPL-MCNC: <10 MG/DL
FENTANYL UR QL SCN: POSITIVE
FLUAV RNA RESP QL NAA+PROBE: NEGATIVE
FLUBV RNA RESP QL NAA+PROBE: NEGATIVE
GFR SERPL CREATININE-BSD FRML MDRD: 101 ML/MIN/1.73SQ M
GLUCOSE SERPL-MCNC: 111 MG/DL (ref 65–140)
HCO3 BLDA-SCNC: 18.5 MMOL/L (ref 22–28)
HCT VFR BLD AUTO: 35 % (ref 34.8–46.1)
HGB BLD-MCNC: 11 G/DL (ref 11.5–15.4)
HYDROCODONE UR QL SCN: NEGATIVE
IMM GRANULOCYTES # BLD AUTO: 0.01 THOUSAND/UL (ref 0–0.2)
IMM GRANULOCYTES NFR BLD AUTO: 0 % (ref 0–2)
LIPASE SERPL-CCNC: 180 U/L (ref 11–82)
LYMPHOCYTES # BLD AUTO: 0.29 THOUSANDS/ÂΜL (ref 0.6–4.47)
LYMPHOCYTES NFR BLD AUTO: 8 % (ref 14–44)
MAGNESIUM SERPL-MCNC: 1.6 MG/DL (ref 1.9–2.7)
MCH RBC QN AUTO: 31.9 PG (ref 26.8–34.3)
MCHC RBC AUTO-ENTMCNC: 31.4 G/DL (ref 31.4–37.4)
MCV RBC AUTO: 101 FL (ref 82–98)
METHADONE UR QL: NEGATIVE
MONOCYTES # BLD AUTO: 0.24 THOUSAND/ÂΜL (ref 0.17–1.22)
MONOCYTES NFR BLD AUTO: 7 % (ref 4–12)
NEUTROPHILS # BLD AUTO: 2.94 THOUSANDS/ÂΜL (ref 1.85–7.62)
NEUTS SEG NFR BLD AUTO: 85 % (ref 43–75)
NRBC BLD AUTO-RTO: 0 /100 WBCS
O2 CT BLDA-SCNC: 16.6 ML/DL (ref 16–23)
OPIATES UR QL SCN: NEGATIVE
OXYCODONE+OXYMORPHONE UR QL SCN: NEGATIVE
OXYHGB MFR BLDA: 97.6 % (ref 94–97)
P AXIS: 69 DEGREES
P AXIS: 77 DEGREES
PCO2 BLDA: 34.2 MM HG (ref 36–44)
PCP UR QL: NEGATIVE
PH BLDA: 7.35 [PH] (ref 7.35–7.45)
PHOSPHATE SERPL-MCNC: 2 MG/DL (ref 2.3–4.1)
PLATELET # BLD AUTO: 59 THOUSANDS/UL (ref 149–390)
PMV BLD AUTO: 9.3 FL (ref 8.9–12.7)
PO2 BLDA: 111.4 MM HG (ref 75–129)
POTASSIUM SERPL-SCNC: 3.4 MMOL/L (ref 3.5–5.3)
PR INTERVAL: 222 MS
PR INTERVAL: 224 MS
PROCALCITONIN SERPL-MCNC: 0.09 NG/ML
PROT SERPL-MCNC: 5.2 G/DL (ref 6.4–8.4)
PS CM H2O: 6
PS VENT FIO2: 40
PS VENT PEEP: 6
QRS AXIS: 74 DEGREES
QRS AXIS: 76 DEGREES
QRSD INTERVAL: 74 MS
QRSD INTERVAL: 82 MS
QT INTERVAL: 380 MS
QT INTERVAL: 406 MS
QTC INTERVAL: 447 MS
QTC INTERVAL: 454 MS
RBC # BLD AUTO: 3.45 MILLION/UL (ref 3.81–5.12)
RSV RNA RESP QL NAA+PROBE: NEGATIVE
SARS-COV-2 RNA RESP QL NAA+PROBE: NEGATIVE
SODIUM SERPL-SCNC: 135 MMOL/L (ref 135–147)
SPECIMEN SOURCE: ABNORMAL
T WAVE AXIS: 82 DEGREES
T WAVE AXIS: 82 DEGREES
THC UR QL: NEGATIVE
VANCOMYCIN TROUGH SERPL-MCNC: 16.8 UG/ML (ref 10–20)
VENT - PS: ABNORMAL
VENTRICULAR RATE: 73 BPM
VENTRICULAR RATE: 86 BPM
WBC # BLD AUTO: 3.5 THOUSAND/UL (ref 4.31–10.16)

## 2024-06-07 PROCEDURE — 94760 N-INVAS EAR/PLS OXIMETRY 1: CPT

## 2024-06-07 PROCEDURE — 93010 ELECTROCARDIOGRAM REPORT: CPT | Performed by: INTERNAL MEDICINE

## 2024-06-07 PROCEDURE — 84484 ASSAY OF TROPONIN QUANT: CPT | Performed by: EMERGENCY MEDICINE

## 2024-06-07 PROCEDURE — 82140 ASSAY OF AMMONIA: CPT | Performed by: NURSE PRACTITIONER

## 2024-06-07 PROCEDURE — 94150 VITAL CAPACITY TEST: CPT

## 2024-06-07 PROCEDURE — 99291 CRITICAL CARE FIRST HOUR: CPT | Performed by: NURSE PRACTITIONER

## 2024-06-07 PROCEDURE — 82805 BLOOD GASES W/O2 SATURATION: CPT | Performed by: NURSE PRACTITIONER

## 2024-06-07 PROCEDURE — 80053 COMPREHEN METABOLIC PANEL: CPT | Performed by: NURSE PRACTITIONER

## 2024-06-07 PROCEDURE — 82077 ASSAY SPEC XCP UR&BREATH IA: CPT

## 2024-06-07 PROCEDURE — 83690 ASSAY OF LIPASE: CPT

## 2024-06-07 PROCEDURE — 80202 ASSAY OF VANCOMYCIN: CPT | Performed by: NURSE PRACTITIONER

## 2024-06-07 PROCEDURE — 0241U HB NFCT DS VIR RESP RNA 4 TRGT: CPT | Performed by: NURSE PRACTITIONER

## 2024-06-07 PROCEDURE — 82150 ASSAY OF AMYLASE: CPT

## 2024-06-07 PROCEDURE — 85025 COMPLETE CBC W/AUTO DIFF WBC: CPT | Performed by: NURSE PRACTITIONER

## 2024-06-07 PROCEDURE — 95816 EEG AWAKE AND DROWSY: CPT

## 2024-06-07 PROCEDURE — 84100 ASSAY OF PHOSPHORUS: CPT | Performed by: NURSE PRACTITIONER

## 2024-06-07 PROCEDURE — 83735 ASSAY OF MAGNESIUM: CPT | Performed by: NURSE PRACTITIONER

## 2024-06-07 PROCEDURE — 84145 PROCALCITONIN (PCT): CPT | Performed by: NURSE PRACTITIONER

## 2024-06-07 PROCEDURE — 99222 1ST HOSP IP/OBS MODERATE 55: CPT | Performed by: PSYCHIATRY & NEUROLOGY

## 2024-06-07 PROCEDURE — 95819 EEG AWAKE AND ASLEEP: CPT | Performed by: PSYCHIATRY & NEUROLOGY

## 2024-06-07 PROCEDURE — 94003 VENT MGMT INPAT SUBQ DAY: CPT

## 2024-06-07 PROCEDURE — 36600 WITHDRAWAL OF ARTERIAL BLOOD: CPT

## 2024-06-07 PROCEDURE — 80307 DRUG TEST PRSMV CHEM ANLYZR: CPT | Performed by: NURSE PRACTITIONER

## 2024-06-07 PROCEDURE — 76705 ECHO EXAM OF ABDOMEN: CPT

## 2024-06-07 RX ORDER — ACETAMINOPHEN 10 MG/ML
1000 INJECTION, SOLUTION INTRAVENOUS ONCE
Status: DISCONTINUED | OUTPATIENT
Start: 2024-06-07 | End: 2024-06-07

## 2024-06-07 RX ORDER — ACETAMINOPHEN 160 MG/5ML
650 SUSPENSION ORAL EVERY 6 HOURS PRN
Status: DISCONTINUED | OUTPATIENT
Start: 2024-06-07 | End: 2024-06-10 | Stop reason: HOSPADM

## 2024-06-07 RX ORDER — ENOXAPARIN SODIUM 100 MG/ML
40 INJECTION SUBCUTANEOUS DAILY
Status: DISCONTINUED | OUTPATIENT
Start: 2024-06-07 | End: 2024-06-10 | Stop reason: HOSPADM

## 2024-06-07 RX ORDER — VANCOMYCIN HYDROCHLORIDE 1 G/200ML
1000 INJECTION, SOLUTION INTRAVENOUS EVERY 12 HOURS
Status: DISCONTINUED | OUTPATIENT
Start: 2024-06-07 | End: 2024-06-07

## 2024-06-07 RX ORDER — CHLORHEXIDINE GLUCONATE ORAL RINSE 1.2 MG/ML
15 SOLUTION DENTAL EVERY 12 HOURS SCHEDULED
Status: DISCONTINUED | OUTPATIENT
Start: 2024-06-07 | End: 2024-06-10 | Stop reason: HOSPADM

## 2024-06-07 RX ORDER — PROPOFOL 10 MG/ML
5-50 INJECTION, EMULSION INTRAVENOUS
Status: DISCONTINUED | OUTPATIENT
Start: 2024-06-07 | End: 2024-06-07

## 2024-06-07 RX ORDER — LEVETIRACETAM 500 MG/5ML
500 INJECTION, SOLUTION, CONCENTRATE INTRAVENOUS EVERY 12 HOURS SCHEDULED
Status: DISCONTINUED | OUTPATIENT
Start: 2024-06-07 | End: 2024-06-08

## 2024-06-07 RX ORDER — ACETAMINOPHEN 10 MG/ML
1000 INJECTION, SOLUTION INTRAVENOUS ONCE
Status: COMPLETED | OUTPATIENT
Start: 2024-06-07 | End: 2024-06-07

## 2024-06-07 RX ORDER — MAGNESIUM SULFATE HEPTAHYDRATE 40 MG/ML
2 INJECTION, SOLUTION INTRAVENOUS ONCE
Status: COMPLETED | OUTPATIENT
Start: 2024-06-07 | End: 2024-06-07

## 2024-06-07 RX ORDER — FENTANYL CITRATE 50 UG/ML
50 INJECTION, SOLUTION INTRAMUSCULAR; INTRAVENOUS
Status: DISCONTINUED | OUTPATIENT
Start: 2024-06-07 | End: 2024-06-08

## 2024-06-07 RX ORDER — CHLORHEXIDINE GLUCONATE ORAL RINSE 1.2 MG/ML
15 SOLUTION DENTAL EVERY 12 HOURS SCHEDULED
Status: DISCONTINUED | OUTPATIENT
Start: 2024-06-07 | End: 2024-06-07 | Stop reason: SDUPTHER

## 2024-06-07 RX ORDER — ACETAMINOPHEN 650 MG/1
650 SUPPOSITORY RECTAL EVERY 6 HOURS PRN
Status: DISCONTINUED | OUTPATIENT
Start: 2024-06-07 | End: 2024-06-07

## 2024-06-07 RX ORDER — SODIUM CHLORIDE, SODIUM GLUCONATE, SODIUM ACETATE, POTASSIUM CHLORIDE, MAGNESIUM CHLORIDE, SODIUM PHOSPHATE, DIBASIC, AND POTASSIUM PHOSPHATE .53; .5; .37; .037; .03; .012; .00082 G/100ML; G/100ML; G/100ML; G/100ML; G/100ML; G/100ML; G/100ML
100 INJECTION, SOLUTION INTRAVENOUS CONTINUOUS
Status: DISCONTINUED | OUTPATIENT
Start: 2024-06-07 | End: 2024-06-08

## 2024-06-07 RX ORDER — POTASSIUM CHLORIDE 14.9 MG/ML
20 INJECTION INTRAVENOUS
Status: COMPLETED | OUTPATIENT
Start: 2024-06-07 | End: 2024-06-07

## 2024-06-07 RX ORDER — FENTANYL CITRATE-0.9 % NACL/PF 10 MCG/ML
50 PLASTIC BAG, INJECTION (ML) INTRAVENOUS CONTINUOUS
Status: DISCONTINUED | OUTPATIENT
Start: 2024-06-07 | End: 2024-06-07

## 2024-06-07 RX ORDER — LACOSAMIDE 10 MG/ML
200 SOLUTION ORAL EVERY 12 HOURS SCHEDULED
Status: DISCONTINUED | OUTPATIENT
Start: 2024-06-07 | End: 2024-06-10 | Stop reason: HOSPADM

## 2024-06-07 RX ADMIN — VANCOMYCIN HYDROCHLORIDE 1250 MG: 5 INJECTION, POWDER, LYOPHILIZED, FOR SOLUTION INTRAVENOUS at 07:49

## 2024-06-07 RX ADMIN — CHLORHEXIDINE GLUCONATE 15 ML: 1.2 RINSE ORAL at 21:19

## 2024-06-07 RX ADMIN — VANCOMYCIN HYDROCHLORIDE 1250 MG: 5 INJECTION, POWDER, LYOPHILIZED, FOR SOLUTION INTRAVENOUS at 20:00

## 2024-06-07 RX ADMIN — MAGNESIUM SULFATE HEPTAHYDRATE 2 G: 40 INJECTION, SOLUTION INTRAVENOUS at 08:00

## 2024-06-07 RX ADMIN — SODIUM CHLORIDE, SODIUM GLUCONATE, SODIUM ACETATE, POTASSIUM CHLORIDE, MAGNESIUM CHLORIDE, SODIUM PHOSPHATE, DIBASIC, AND POTASSIUM PHOSPHATE 100 ML/HR: .53; .5; .37; .037; .03; .012; .00082 INJECTION, SOLUTION INTRAVENOUS at 00:38

## 2024-06-07 RX ADMIN — ENOXAPARIN SODIUM 40 MG: 40 INJECTION SUBCUTANEOUS at 08:03

## 2024-06-07 RX ADMIN — POTASSIUM CHLORIDE 20 MEQ: 14.9 INJECTION, SOLUTION INTRAVENOUS at 08:56

## 2024-06-07 RX ADMIN — PROPOFOL 20 MCG/KG/MIN: 10 INJECTION, EMULSION INTRAVENOUS at 04:14

## 2024-06-07 RX ADMIN — NOREPINEPHRINE BITARTRATE 4000 MCG: 1 INJECTION INTRAVENOUS at 12:20

## 2024-06-07 RX ADMIN — ACETAMINOPHEN 1000 MG: 10 INJECTION INTRAVENOUS at 04:14

## 2024-06-07 RX ADMIN — PIPERACILLIN SODIUM AND TAZOBACTAM SODIUM 4.5 G: 36; 4.5 INJECTION, POWDER, LYOPHILIZED, FOR SOLUTION INTRAVENOUS at 17:39

## 2024-06-07 RX ADMIN — CHLORHEXIDINE GLUCONATE 15 ML: 1.2 RINSE ORAL at 00:38

## 2024-06-07 RX ADMIN — SODIUM CHLORIDE, SODIUM GLUCONATE, SODIUM ACETATE, POTASSIUM CHLORIDE, MAGNESIUM CHLORIDE, SODIUM PHOSPHATE, DIBASIC, AND POTASSIUM PHOSPHATE 100 ML/HR: .53; .5; .37; .037; .03; .012; .00082 INJECTION, SOLUTION INTRAVENOUS at 13:49

## 2024-06-07 RX ADMIN — FENTANYL CITRATE 50 MCG: 50 INJECTION INTRAMUSCULAR; INTRAVENOUS at 17:47

## 2024-06-07 RX ADMIN — FENTANYL CITRATE 50 MCG: 50 INJECTION INTRAMUSCULAR; INTRAVENOUS at 20:28

## 2024-06-07 RX ADMIN — FENTANYL CITRATE 50 MCG: 50 INJECTION INTRAMUSCULAR; INTRAVENOUS at 03:50

## 2024-06-07 RX ADMIN — Medication 50 MCG/HR: at 13:51

## 2024-06-07 RX ADMIN — Medication 200 MG: at 08:03

## 2024-06-07 RX ADMIN — Medication 200 MG: at 21:20

## 2024-06-07 RX ADMIN — NOREPINEPHRINE BITARTRATE 3 MCG/KG/MIN: 1 INJECTION INTRAVENOUS at 05:08

## 2024-06-07 RX ADMIN — CHLORHEXIDINE GLUCONATE 15 ML: 1.2 RINSE ORAL at 08:56

## 2024-06-07 RX ADMIN — POTASSIUM CHLORIDE 20 MEQ: 14.9 INJECTION, SOLUTION INTRAVENOUS at 10:11

## 2024-06-07 RX ADMIN — ACETAMINOPHEN 650 MG: 650 SUSPENSION ORAL at 16:02

## 2024-06-07 RX ADMIN — LEVETIRACETAM 500 MG: 100 INJECTION, SOLUTION INTRAVENOUS at 08:03

## 2024-06-07 RX ADMIN — PIPERACILLIN SODIUM AND TAZOBACTAM SODIUM 4.5 G: 36; 4.5 INJECTION, POWDER, LYOPHILIZED, FOR SOLUTION INTRAVENOUS at 02:24

## 2024-06-07 RX ADMIN — LEVETIRACETAM 500 MG: 100 INJECTION, SOLUTION INTRAVENOUS at 21:20

## 2024-06-07 RX ADMIN — FENTANYL CITRATE 50 MCG: 50 INJECTION INTRAMUSCULAR; INTRAVENOUS at 11:09

## 2024-06-07 RX ADMIN — PIPERACILLIN SODIUM AND TAZOBACTAM SODIUM 4.5 G: 36; 4.5 INJECTION, POWDER, LYOPHILIZED, FOR SOLUTION INTRAVENOUS at 10:05

## 2024-06-07 RX ADMIN — Medication 20 MG: at 08:03

## 2024-06-07 NOTE — PROGRESS NOTES
Renzo Pop is a 64 y.o. female who is currently ordered Vancomycin IV with management by the Pharmacy Consult service.  Relevant clinical data and objective / subjective history reviewed.  Vancomycin Assessment:  Indication and Goal AUC/Trough: Other, risk of infection; unknown source., -600, trough >10  Clinical Status: stable  Micro:     Renal Function:  SCr: 0.62 mg/dL  CrCl: 85.5 mL/min  Renal replacement: Not on dialysis  Days of Therapy: 1  Current Dose:    Vancomycin Plan:  New Dosing: vancomycin 1000 mg q12h  Estimated AUC: 504 mcg*hr/mL  Estimated Trough: 12.9 mcg/mL  Next Level: 6/8 0600  Renal Function Monitoring: Daily BMP and UOP  Pharmacy will continue to follow closely for s/sx of nephrotoxicity, infusion reactions and appropriateness of therapy.  BMP and CBC will be ordered per protocol. We will continue to follow the patient’s culture results and clinical progress daily.    Marcellus Layton, Pharmacist

## 2024-06-07 NOTE — ASSESSMENT & PLAN NOTE
Pt w/ known seizure hx, last reported seizure was August 2023 per    reports compliance w/ meds Vimpat, Zonegran, Pristiq   also states pt has not been drinking alcohol to his knowledge  Intubated for airway protection in the ED  Baseline seizure is tonic clonic but tonight she just had an upward gaze  CTH negative  Neuro c/s placed  EEG ordered  MRI pending  Keppra loaded in ED will continue 500 mg Q12H  Cont home Vimpat  Seizure precautions  Q1H Neuro checks  On Ketamine and Fentanyl for sedation while on vent

## 2024-06-07 NOTE — ASSESSMENT & PLAN NOTE
65 y/o female with seizures on Vimpat and Zonegran, prior traumatic SAH in 2021, alcohol abuse, who presented on 6/6 with seizure-like activity. Patient was reported to be staring and unresponsive at home. EMS was initiated. Patient was reportedly postictal upon arrival to ED but then began having seizure-like activity, described as GTC. Seizure-like activity continued despite Ativan 4 mg being given. Patient was then given another Ativan 4 mg without abatement of seizure-like activity. Patient was subsequently intubated for airway protection and was given Keppra bolus and started on Ketamine and Fentanyl infusions. BP on presentation 192/96, temp 102.9.    Workup:  -CT head: No acute intracranial abnormality.  -CT chest abdomen pelvis:  1.  Cholelithiasis with gallbladder wall thickening, however lumen is partially decompressed. Recommend ultrasound if there is clinical concern for acute gallbladder pathology.  2.  Inflammatory stranding near the pancreatic tail, concerning for pancreatitis  3.  Findings of chronic pancreatitis  4.  Dependent pulmonary opacities, favored to be atelectasis, less likely pneumonia  -R UQ US: Distended gallbladder with sludge. Nonspecific irregularity and mild thickening of the wall.   -Labs: Sodium on presentation 130, lactic acid 0.9, UA negative, UDS positive for fentanyl (obtained after fentanyl was given in ED), ammonia 26, COVID/flu/RSV negative, 6/7 ethanol < 10    Plan:  -MRI brain w/wo contrast  -Blood cultures pending  -Routine EEG pending  -Current AED regimen:  S/p Keppra 3.4 g x 1; currently on 500 mg BID  Vimpat 200 mg BID  -Seizure precautions  -Ativan PRN for seizure activity > 2 minutes  -[employer] form completed and submitted online. Will need to discuss driving restrictions with patient once mental status improves.  -PT/OT/ST when able  -Monitor neuro exam; notify with any changes  -Medical management and supportive care per primary team. Correction of any metabolic or  infectious disturbances.

## 2024-06-07 NOTE — SEPSIS NOTE
"  Sepsis Note   Renzo Pop 64 y.o. female MRN: [id number]  Unit/Bed#: ED-38 Encounter: [id number]       Initial Sepsis Screening       Row Name 06/06/24 2339                Is the patient's history suggestive of a new or worsening infection? Yes (Proceed)  -CL        Suspected source of infection suspect infection, source unknown  -CL        Indicate SIRS criteria Hyperthemia > 38.3C (100.9F) OR Hypothermia <36C (96.8F);Tachycardia > 90 bpm  -CL        Are two or more of the above signs & symptoms of infection both present and new to the patient? Yes (Proceed)  -CL        Assess for evidence of organ dysfunction: Are any of the below criteria present within 6 hours of suspected infection and SIRS criteria that are NOT considered to be chronic conditions? SBP < 90  -CL        Date of presentation of septic shock 06/06/24  -CL        Time of presentation of septic shock 2339  -CL        Fluid Resuscitation: 30 ml/kg IV fluid bolus will be given based on actual body weight  -CL        Is the patient is persistently hypotensive in the hour after fluid bolus administration? If yes, patient meets criteria for vasopressor use. YES  -CL        Sepsis Note: Click \"NEXT\" below (NOT \"close\") to generate sepsis note based on above information. YES (proceed by clicking \"NEXT\")  -CL                  User Key  (r) = Recorded By, (t) = Taken By, (c) = Cosigned By      Initials Name Provider Type    CL Rhys Alexander MD Physician                    Default Flowsheet Data (Last 720 Hours)       Sepsis Reassess       Row Name 06/06/24 2339                   Repeat Volume Status and Tissue Perfusion Assessment Performed    Date of Reassessment: 06/06/24  -CL        Time of Reassessment: 2339  -CL        Sepsis Reassessment Note: Click \"NEXT\" below (NOT \"close\") to generate sepsis reassessment note. YES (proceed by clicking \"NEXT\")  -CL        Repeat Volume Status and Tissue Perfusion Assessment Performed --                  " User Key  (r) = Recorded By, (t) = Taken By, (c) = Cosigned By      Initials Name Provider Type    CL Rhys Alexander MD Physician                    Body mass index is 20.26 kg/m².  Wt Readings from Last 1 Encounters:   04/30/24 56.9 kg (125 lb 8 oz)     IBW (Ideal Body Weight): 59.3 kg    Ideal body weight: 59.3 kg (130 lb 11.7 oz)

## 2024-06-07 NOTE — ED ATTENDING ATTESTATION
6/6/2024  I, Rhys Alexander MD, saw and evaluated the patient. I have discussed the patient with the resident/non-physician practitioner and agree with the resident's/non-physician practitioner's findings, Plan of Care, and MDM as documented in the resident's/non-physician practitioner's note, except where noted. All available labs and Radiology studies were reviewed.  I was present for key portions of any procedure(s) performed by the resident/non-physician practitioner and I was immediately available to provide assistance.       At this point I agree with the current assessment done in the Emergency Department.  I have conducted an independent evaluation of this patient a history and physical is as follows:    History    Patient is a 64-year-old female, with a history significant for epilepsy and chronic alcohol abuse as well as subarachnoid hemorrhage per my view of the medical record, who presents to the ED today for evaluation of seizure.  Subjective history from patient limited due to postictal state/status epilepticus.  On arrival to the emergency department, patient was no longer seizing but she was noted to be postictal.  Shortly after arrival, patient began convulsing with generalized tonic-clonic movements.  At this time, I briefly reviewed patient's medical record and saw that she had a history of alcohol abuse.  Initially 4 mg of Ativan was attempted to break the seizure but patient continued to have generalized tonic-clonic movements as well as rightward eye deviation.  Another 4 mg of Ativan was subsequently administered and patient continued to have fasciculations and tonic-clonic motions as well as continued eye deviation intermittently.  Patient's GCS was also noted to be 3 at that time.  Given status epilepticus as well and has depressed mental status and concern for patient's ability to protect airway, patient was intubated after discussion with  who stated that patient is full  code in order to protect airway and provide further treatment for status epilepticus.  Patient's  also provides collateral history that patient has been feeling overall unwell over the past couple of days and complained of headache but, shortly prior to arrival, patient was up, conversing normally, and cooking dinner for him.  He denies head trauma.  Furthermore, on questionable patient's alcohol use, he states that he is uncertain of how much patient drinks each day but he normally sees signs of around the house; however, over the last few days he has not seen evidence of her drinking.      ROS  Limited due to acute encephalopathy/seizure    Physical Exam    GENERAL APPEARANCE: Acute distress, toxic appearing NEURO: Patient arrived in a postictal state but, shortly after presenting to the ED, patient developed another seizure with generalized tonic-clonic movements and left eye deviation.  This persisted  HEENT: Rightward eye deviation, dry mucous membranes,  Neck: No cervical adenopathy  CV: Tachycardic rate, RR. No murmurs, rubs, gallops  LUNGS: Clear to auscultation: No wheezes, stridor, rhonchi, rales  GI: Abdomen non-distended. Soft.  No apparent tenderness to palpation  : Chaperone present.  No erythema or abscess or signs of Marquis's in the groin.  MSK: No limb shortening.   Skin: Warm and dry  Capillary refill: 2-3 seconds    Patient arrived febrile, tachycardic, otherwise stable    Assessment/Plan/MDM  Recurrent seizures without return to baseline mental status, fever  -Concern for status epilepticus, ICH, type II MI.  At risk for bacteremia, pneumonia, UTI.  -Will investigate with sepsis panel order set, cardiac workup, CT head/chest abdomen pelvis   -Critical care made aware of patient  -As stated above, patient initially managed with multiple doses of benzodiazepines.  Given persistence of seizure as well as depressed mental status, patient was intubated for airway protection, and seizure  management.  Will manage further with ketamine, Keppra load, broad-spectrum antibiotics, fluids, antipyretics, further based on workup/response to    ED Course  ED Course as of 06/07/24 0131   u Jun 06, 2024 2211 ECG per my independent interpretation: Normal rate, regular rhythm, no ectopy, normal axis, no ST elevations or depressions     2236 Sodium(!): 130  Low   2236 LACTIC ACID: 0.9  WNL   2237 WBC(!): 3.41  Low   2257 On reevaluation, patient appears to be no longer seizing.  Eyes midline, pupils reactive, she is not following commands but is bucking the vent.  Will increase sedation/analgesia   2257 Patient is chronically thrombocytopenic with platelets often below 50.  Given high risk lumbar puncture, will hold at this time.  Patient is undergoing treatment with broad-spectrum antibiotics   2342 Patient persistently hypotensive despite 30 cc/kg bolus.  Vasopressor started         Critical Care Time  CriticalCare Time    Date/Time: 6/6/2024 10:34 AM    Performed by: Rhys Alexander MD  Authorized by: Rhys Alexander MD    Critical care provider statement:     Critical care time (minutes):  52    Critical care start time:  6/6/2024 9:40 PM    Critical care end time:  6/6/2024 10:32 PM    Critical care time was exclusive of:  Separately billable procedures and treating other patients and teaching time    Critical care was necessary to treat or prevent imminent or life-threatening deterioration of the following conditions:  Shock, sepsis, circulatory failure, respiratory failure and CNS failure or compromise    Critical care was time spent personally by me on the following activities:  Obtaining history from patient or surrogate, development of treatment plan with patient or surrogate, discussions with consultants, evaluation of patient's response to treatment, examination of patient, review of old charts, re-evaluation of patient's condition, ordering and review of radiographic studies, ordering  and review of laboratory studies and ordering and performing treatments and interventions    I assumed direction of critical care for this patient from another provider in my specialty: yes

## 2024-06-07 NOTE — PLAN OF CARE
Problem: SAFETY,RESTRAINT: NV/NON-SELF DESTRUCTIVE BEHAVIOR  Goal: Remains free of harm/injury (restraint for non violent/non self-detsructive behavior)  Description: INTERVENTIONS:  - Instruct patient/family regarding restraint use   - Assess and monitor physiologic and psychological status   - Provide interventions and comfort measures to meet assessed patient needs   - Identify and implement measures to help patient regain control  - Assess readiness for release of restraint   Outcome: Progressing  Goal: Returns to optimal restraint-free functioning  Description: INTERVENTIONS:  - Assess the patient's behavior and symptoms that indicate continued need for restraint  - Identify and implement measures to help patient regain control  - Assess readiness for release of restraint   Outcome: Progressing     Problem: PAIN - ADULT  Goal: Verbalizes/displays adequate comfort level or baseline comfort level  Description: Interventions:  - Encourage patient to monitor pain and request assistance  - Assess pain using appropriate pain scale  - Administer analgesics based on type and severity of pain and evaluate response  - Implement non-pharmacological measures as appropriate and evaluate response  - Consider cultural and social influences on pain and pain management  - Notify physician/advanced practitioner if interventions unsuccessful or patient reports new pain  Outcome: Progressing     Problem: INFECTION - ADULT  Goal: Absence or prevention of progression during hospitalization  Description: INTERVENTIONS:  - Assess and monitor for signs and symptoms of infection  - Monitor lab/diagnostic results  - Monitor all insertion sites, i.e. indwelling lines, tubes, and drains  - Monitor endotracheal if appropriate and nasal secretions for changes in amount and color  - [place] appropriate cooling/warming therapies per order  - Administer medications as ordered  - Instruct and encourage patient and family to use good hand  hygiene technique  - Identify and instruct in appropriate isolation precautions for identified infection/condition  Outcome: Progressing  Goal: Absence of fever/infection during neutropenic period  Description: INTERVENTIONS:  - Monitor WBC    Outcome: Progressing     Problem: SAFETY ADULT  Goal: Patient will remain free of falls  Description: INTERVENTIONS:  - Educate patient/family on patient safety including physical limitations  - Instruct patient to call for assistance with activity   - Consult OT/PT to assist with strengthening/mobility   - Keep Call bell within reach  - Keep bed low and locked with side rails adjusted as appropriate  - Keep care items and personal belongings within reach  - Initiate and maintain comfort rounds  - Make Fall Risk Sign visible to staff  - Offer Toileting every  Hours, in advance of need  - Initiate/Maintain alarm  - Obtain necessary fall risk management equipment:   - Apply yellow socks and bracelet for high fall risk patients  - Consider moving patient to room near nurses station  Outcome: Progressing  Goal: Maintain or return to baseline ADL function  Description: INTERVENTIONS:  -  Assess patient's ability to carry out ADLs; assess patient's baseline for ADL function and identify physical deficits which impact ability to perform ADLs (bathing, care of mouth/teeth, toileting, grooming, dressing, etc.)  - Assess/evaluate cause of self-care deficits   - Assess range of motion  - Assess patient's mobility; develop plan if impaired  - Assess patient's need for assistive devices and provide as appropriate  - Encourage maximum independence but intervene and supervise when necessary  - Involve family in performance of ADLs  - Assess for home care needs following discharge   - Consider OT consult to assist with ADL evaluation and planning for discharge  - Provide patient education as appropriate  Outcome: Progressing  Goal: Maintains/Returns to pre admission functional  level  Description: INTERVENTIONS:  - Perform AM-PAC 6 Click Basic Mobility/ Daily Activity assessment daily.  - Set and communicate daily mobility goal to care team and patient/family/caregiver.   - Collaborate with rehabilitation services on mobility goals if consulted  - Perform Range of Motion  times a day.  - Reposition patient every  hours.  - Dangle patient  times a day  - Stand patient  times a day  - Ambulate patient  times a day  - Out of bed to chair  times a day   - Out of bed for meal times a day  - Out of bed for toileting  - Record patient progress and toleration of activity level   Outcome: Progressing     Problem: DISCHARGE PLANNING  Goal: Discharge to home or other facility with appropriate resources  Description: INTERVENTIONS:  - Identify barriers to discharge w/patient and caregiver  - Arrange for needed discharge resources and transportation as appropriate  - Identify discharge learning needs (meds, wound care, etc.)  - Arrange for interpretive services to assist at discharge as needed  - Refer to Case Management Department for coordinating discharge planning if the patient needs post-hospital services based on physician/advanced practitioner order or complex needs related to functional status, cognitive ability, or social support system  Outcome: Progressing     Problem: Knowledge Deficit  Goal: Patient/family/caregiver demonstrates understanding of disease process, treatment plan, medications, and discharge instructions  Description: Complete learning assessment and assess knowledge base.  Interventions:  - Provide teaching at level of understanding  - Provide teaching via preferred learning methods  Outcome: Progressing

## 2024-06-07 NOTE — OCCUPATIONAL THERAPY NOTE
Occupational Therapy Cancellation Note     Patient Name: Renzo Pop  Today's Date: 6/7/2024 06/07/24 1020   Note Type   Note type Cancelled Session   Cancel Reasons Medical status   Additional Comments OT consult received and chart reviewed. RN reports at ICU mobility rounds that patient is intubated and not appropriate for OT evaluation at this time. Will f/u as able and appropriate.     Niurka Mooney MS OTR/L   [place] Licensure# [id number]

## 2024-06-07 NOTE — ED PROVIDER NOTES
History  Chief Complaint   Patient presents with    Seizure - New Onset     Patient was hot/cold +fever, hx of seizures. No changes in medications recently. Seizing in car in parking lot.      64-year-old female with significant history of epilepsy, alcohol abuse, subarachnoid hemorrhage; presenting to the ED due to seizures.  Upon arrival, patient was actively seizing and brought back into the ED by staff.  Once back in the ED, patient was no longer seizing but was postictal.  Shortly after arrival, patient began having another seizure.      4 mg of Ativan was given however the patient's seizure continued.  Another 4 mg was attempted and the patient continued having tonic-clonic motions as well as eye deviation.  Patient's GCS was noted to be 3 and at this time it was decided that patient was in status epilepticus with a depressed mental status.  In order to protect the patient's airway, patient was intubated.    Patient's  was available for ancillary history and consent.  Prior to intubation, patient's  was discussed the risks and benefits and stated that patient was a full code and intubation can proceed.  Following intubation,  stated that patient had not been feeling herself over the past couple of days and then been complaining about a headache.  Prior to arrival, patient's  states the patient was conversing normally and cooking dinner at home.  Patient's  denies any head trauma and states that the patient may have a questionable alcohol use but is uncertain how much daily the patient would be drinking.  Patient's  does follow-up stating that he had not seen any evidence of drinking over the past few days.        Prior to Admission Medications   Prescriptions Last Dose Informant Patient Reported? Taking?   desvenlafaxine succinate (PRISTIQ) 50 mg 24 hr tablet   No No   Sig: TAKE ONE TABLET BY MOUTH EVERY DAY   gabapentin (NEURONTIN) 300 mg capsule   No No   Sig: TAKE  ONE CAPSULE BY MOUTH TWICE A DAY   hydrOXYzine HCL (ATARAX) 25 mg tablet   No No   Sig: TAKE ONE TABLET BY MOUTH TWICE A DAY   hydrOXYzine HCL (ATARAX) 25 mg tablet   No No   Sig: Take 1 tablet (25 mg total) by mouth 2 (two) times a day   lacosamide (VIMPAT) 200 mg tablet   No No   Sig: TAKE ONE TABLET BY MOUTH TWICE A DAY   metoprolol succinate (TOPROL-XL) 25 mg 24 hr tablet  Self No No   Sig: Take 1 tablet (25 mg total) by mouth 2 (two) times a day   traZODone (DESYREL) 50 mg tablet   No No   Sig: Take 1 tablet (50 mg total) by mouth daily at bedtime   zonisamide (ZONEGRAN) 100 mg capsule   No No   Sig: Take 2 capsules (200 mg total) by mouth daily at bedtime      Facility-Administered Medications: None       Past Medical History:   Diagnosis Date    Chronic alcohol abuse     Epilepsy (HCC)     Fracture     Hepatitis     Hep A     Hepatitis     Insomnia     10mar2016 resolved    Osteoporosis     14jun2016 resolved    Pancreatitis     SAH (subarachnoid hemorrhage) (HCC)     Seasonal allergies     Seizure (HCC)     Seizures (HCC)     Urine discoloration 11/11/2019    Varicella     Vomiting 11/13/2019       Past Surgical History:   Procedure Laterality Date    BONE MARROW BIOPSY      2019, 2021    IR BIOPSY BONE  8/17/2021    IR BIOPSY BONE MARROW  11/14/2019    AR TX TIBL SHFT FX IMED IMPLT W/WO SCREWS&/CERCLA Left 8/4/2023    Procedure: INSERTION NAIL IM TIBIA;  Surgeon: Danielito Sauceda DO;  Location: [facility];  Service: Orthopedics    TUBAL LIGATION  1985    TUBAL LIGATION         Family History   Problem Relation Age of Onset    Anxiety disorder Mother     Depression Mother     No Known Problems Father     No Known Problems Sister     No Known Problems Sister     No Known Problems Daughter     No Known Problems Maternal Grandmother     Cancer Maternal Grandfather     Cancer Paternal Grandmother         unknown     No Known Problems Paternal Grandfather     No Known Problems Brother     No Known Problems Son      No Known Problems Son     Breast cancer Neg Hx     Ovarian cancer Neg Hx      I have reviewed and agree with the history as documented.    E-Cigarette/Vaping    E-Cigarette Use Never User      E-Cigarette/Vaping Substances    Nicotine No     THC No     CBD No     Flavoring No     Other No     Unknown No      Social History     Tobacco Use    Smoking status: Never    Smokeless tobacco: Never   Vaping Use    Vaping status: Never Used   Substance Use Topics    Alcohol use: Yes     Comment: 6 bottles of wine during the week, now only drinks on the weekend.    Drug use: Never        Review of Systems   Unable to perform ROS: Patient unresponsive     Physical Exam  ED Triage Vitals   Temperature Pulse Respirations Blood Pressure SpO2   06/06/24 2140 06/06/24 2110 06/06/24 2110 06/06/24 2110 06/06/24 2110   (!) 102.9 °F (39.4 °C) (!) 106 20 (!) 192/96 94 %      Temp Source Heart Rate Source Patient Position - Orthostatic VS BP Location FiO2 (%)   06/06/24 2140 06/06/24 2110 06/06/24 2110 06/06/24 2110 --   Rectal Monitor Lying Right arm       Pain Score       06/07/24 0314       Med Not Given for Pain - for MAR use only             Orthostatic Vital Signs  Vitals:    06/07/24 1700 06/07/24 1747 06/07/24 1800 06/07/24 1851   BP: 104/56 113/57 114/62    Pulse: 84 87 87    Patient Position - Orthostatic VS:    Lying       Physical Exam  Constitutional:       General: She is in acute distress.      Appearance: She is toxic-appearing.   HENT:      Head: Normocephalic and atraumatic.      Right Ear: External ear normal.      Left Ear: External ear normal.      Nose: Nose normal.   Eyes:      General:         Right eye: Discharge present.         Left eye: Discharge present.     Comments: Rightward deviation of eyes   Cardiovascular:      Rate and Rhythm: Regular rhythm. Tachycardia present.      Pulses: Normal pulses.      Heart sounds: Normal heart sounds.   Pulmonary:      Effort: Pulmonary effort is normal.      Breath  sounds: Normal breath sounds.   Abdominal:      General: There is no distension.      Palpations: Abdomen is soft.   Musculoskeletal:      Comments: No shortening noted.  Large mass at the top of the right hip.  Noted in previous charts is known to patient.   Skin:     General: Skin is warm.      Capillary Refill: Capillary refill takes 2 to 3 seconds.   Neurological:      Mental Status: She is unresponsive.      GCS: GCS eye subscore is 1. GCS verbal subscore is 1. GCS motor subscore is 1.      Comments: In postictal state on arrival         ED Medications  Medications   LORazepam (ATIVAN) injection 2 mg (0 mg Intravenous Hold 6/6/24 2120)   fentaNYL 1000 mcg in sodium chloride 0.9% 100mL infusion (50 mcg/hr Intravenous New Bag 6/7/24 1351)   chlorhexidine (PERIDEX) 0.12 % oral rinse 15 mL (15 mL Mouth/Throat Given 6/7/24 2119)   enoxaparin (LOVENOX) subcutaneous injection 40 mg (40 mg Subcutaneous Given 6/7/24 0803)   omeprazole (PRILOSEC) suspension 2 mg/mL (20 mg Oral Given 6/7/24 0803)   fentaNYL injection 50 mcg (50 mcg Intravenous Given 6/7/24 2028)   multi-electrolyte (ISOLYTE-S PH 7.4 equivalent) IV solution (100 mL/hr Intravenous New Bag 6/7/24 1349)   lacosamide (VIMPAT) oral solution 200 mg (200 mg Per NG Tube Given 6/7/24 2120)   levETIRAcetam (KEPPRA) injection 500 mg (500 mg Intravenous Given 6/7/24 2120)   piperacillin-tazobactam (ZOSYN) 4.5 g in sodium chloride 0.9 % 100 mL IVPB (EXTENDED INFUSION) (4.5 g Intravenous New Bag 6/7/24 1739)   vancomycin (VANCOCIN) 1,250 mg in sodium chloride 0.9 % 250 mL IVPB (1,250 mg Intravenous New Bag 6/7/24 2000)   NOREPINEPHRINE 4 MG  ML NSS (CMPD ORDER) infusion (0 mcg/min Intravenous Stopped 6/7/24 1353)   acetaminophen (TYLENOL) oral suspension 650 mg (650 mg Oral Given 6/7/24 1602)   LORazepam (ATIVAN) 2 mg/mL injection **ADS Override Pull** (2 mg  Given 6/6/24 2113)   LORazepam (ATIVAN) 2 mg/mL injection **ADS Override Pull** (2 mg  Given 6/6/24 2114)    LORazepam (ATIVAN) injection 4 mg (4 mg Intravenous Given 6/6/24 2120)   levETIRAcetam (KEPPRA) injection 3,400 mg (3,400 mg Intravenous Given 6/6/24 2125)   ketamine (Ketalar) 50 mg/mL injection **ADS Override Pull** (116 mg  Given 6/6/24 2133)   vecuronium (NORCURON) injection 5.69 mg (5.69 mg Intravenous Given 6/6/24 2134)   sodium chloride 0.9 % bolus 1,000 mL (0 mL Intravenous Stopped 6/6/24 2216)   piperacillin-tazobactam (ZOSYN) IVPB 4.5 g (0 g Intravenous Stopped 6/6/24 2302)   vancomycin (VANCOCIN) 1500 mg in sodium chloride 0.9% 250 mL IVPB (0 mg Intravenous Stopped 6/7/24 0225)   sodium chloride 0.9 % bolus 707 mL (0 mL Intravenous Stopped 6/6/24 2333)   acetaminophen (Ofirmev) injection 1,000 mg (0 mg Intravenous Stopped 6/6/24 2304)   fentaNYL injection 50 mcg (50 mcg Intravenous Given 6/6/24 2251)   acetaminophen (Ofirmev) injection 1,000 mg (0 mg Intravenous Stopped 6/7/24 0443)   NOREPINEPHRINE 4 MG  ML NSS (CMPD ORDER) infusion **ADS Override Pull** (3 mcg/kg/min  New Bag 6/7/24 0508)   potassium chloride 20 mEq IVPB (premix) (0 mEq Intravenous Stopped 6/7/24 1221)   magnesium sulfate 2 g/50 mL IVPB (premix) 2 g (0 g Intravenous Stopped 6/7/24 0900)   NOREPINEPHRINE 4 MG  ML NSS (CMPD ORDER) infusion **ADS Override Pull** (4,000 mcg  New Bag 6/7/24 1220)       Diagnostic Studies  Results Reviewed       Procedure Component Value Units Date/Time    HS Troponin I 2hr [[id number]]  (Abnormal) Collected: 06/07/24 0008    Lab Status: Final result Specimen: Blood from Arm, Right Updated: 06/07/24 0046     hs TnI 2hr 85 ng/L      Delta 2hr hsTnI 82 ng/L     Blood culture #1 [[id number]] Collected: 06/06/24 2142    Lab Status: Preliminary result Specimen: Blood from Arm, Left Updated: 06/07/24 0001     Blood Culture Received in Microbiology Lab. Culture in Progress.    Blood culture #2 [[id number]] Collected: 06/06/24 2142    Lab Status: Preliminary result Specimen: Blood from Arm, Right Updated:  06/07/24 0001     Blood Culture Received in Microbiology Lab. Culture in Progress.    POCT Blood Gas (CG8+) [[id number]]  (Abnormal) Collected: 06/06/24 2331    Lab Status: Final result Specimen: Venous Updated: 06/06/24 2335     ph, Chaz ISTAT 7.367     pCO2, Chaz i-STAT 30.0 mm HG      pO2, Chaz i-STAT 60.0 mm HG      BE, i-STAT -7 mmol/L      HCO3, Chaz i-STAT 17.2 mmol/L      CO2, i-STAT 18 mmol/L      O2 Sat, i-STAT 90 %      SODIUM, I-STAT 130 mmol/l      Potassium, i-STAT 3.1 mmol/L      Calcium, Ionized i-STAT 1.10 mmol/L      Hct, i-STAT 26 %      Hgb, i-STAT 8.8 g/dl      Glucose, i-STAT 148 mg/dl      Specimen Type VENOUS    Blood gas, arterial [[id number]]     Lab Status: No result Specimen: Blood, Arterial     Procalcitonin [[id number]]  (Normal) Collected: 06/06/24 2142    Lab Status: Final result Specimen: Blood from Arm, Right Updated: 06/06/24 2221     Procalcitonin 0.08 ng/ml     Comprehensive metabolic panel [[id number]]  (Abnormal) Collected: 06/06/24 2142    Lab Status: Final result Specimen: Blood from Arm, Right Updated: 06/06/24 2220     Sodium 130 mmol/L      Potassium 4.0 mmol/L      Chloride 100 mmol/L      CO2 24 mmol/L      ANION GAP 6 mmol/L      BUN 10 mg/dL      Creatinine 0.62 mg/dL      Glucose 170 mg/dL      Calcium 8.6 mg/dL      AST 24 U/L      ALT 19 U/L      Alkaline Phosphatase 87 U/L      Total Protein 7.6 g/dL      Albumin 4.2 g/dL      Total Bilirubin 0.48 mg/dL      eGFR 95 ml/min/1.73sq m     Narrative:      [employer] guidelines for Chronic Kidney Disease (CKD):     Stage 1 with normal or high GFR (GFR > 90 mL/min/1.73 square meters)    Stage 2 Mild CKD (GFR = 60-89 mL/min/1.73 square meters)    Stage 3A Moderate CKD (GFR = 45-59 mL/min/1.73 square meters)    Stage 3B Moderate CKD (GFR = 30-44 mL/min/1.73 square meters)    Stage 4 Severe CKD (GFR = 15-29 mL/min/1.73 square meters)    Stage 5 End Stage CKD (GFR <15 mL/min/1.73 square meters)  Note: GFR  calculation is accurate only with a steady state creatinine    Lactic acid [[id number]]  (Normal) Collected: 06/06/24 2142    Lab Status: Final result Specimen: Blood from Arm, Right Updated: 06/06/24 2220     LACTIC ACID 0.9 mmol/L     Narrative:      Result may be elevated if tourniquet was used during collection.    HS Troponin 0hr (reflex protocol) [[id number]]  (Normal) Collected: 06/06/24 2142    Lab Status: Final result Specimen: Blood from Arm, Right Updated: 06/06/24 2218     hs TnI 0hr 3 ng/L     Protime-INR [[id number]]  (Normal) Collected: 06/06/24 2142    Lab Status: Final result Specimen: Blood from Arm, Right Updated: 06/06/24 2207     Protime 13.3 seconds      INR 0.95    APTT [[id number]]  (Normal) Collected: 06/06/24 2142    Lab Status: Final result Specimen: Blood from Arm, Right Updated: 06/06/24 2207     PTT 28 seconds     Urine Microscopic [[id number]]  (Abnormal) Collected: 06/06/24 2145    Lab Status: Final result Specimen: Urine, Indwelling Moon Catheter Updated: 06/06/24 2204     RBC, UA None Seen /hpf      WBC, UA 2-4 /hpf      Epithelial Cells Occasional /hpf      Bacteria, UA Occasional /hpf     UA w Reflex to Microscopic w Reflex to Culture [[id number]]  (Abnormal) Collected: 06/06/24 2145    Lab Status: Final result Specimen: Urine, Indwelling Moon Catheter Updated: 06/06/24 2204     Color, UA Light Yellow     Clarity, UA Clear     Specific Gravity, UA 1.013     pH, UA 6.5     Leukocytes, UA Negative     Nitrite, UA Negative     Protein, UA Trace mg/dl      Glucose, UA Negative mg/dl      Ketones, UA Negative mg/dl      Urobilinogen, UA <2.0 mg/dl      Bilirubin, UA Negative     Occult Blood, UA Negative    CBC and differential [[id number]]  (Abnormal) Collected: 06/06/24 2142    Lab Status: Final result Specimen: Blood from Arm, Right Updated: 06/06/24 2202     WBC 3.41 Thousand/uL      RBC 3.93 Million/uL      Hemoglobin 12.7 g/dL      Hematocrit 38.8 %      MCV 99 fL      MCH 32.3 pg       MCHC 32.7 g/dL      RDW 13.2 %      MPV 10.2 fL      Platelets 67 Thousands/uL      nRBC 0 /100 WBCs      Segmented % 64 %      Immature Grans % 0 %      Lymphocytes % 18 %      Monocytes % 16 %      Eosinophils Relative 1 %      Basophils Relative 1 %      Absolute Neutrophils 2.19 Thousands/µL      Absolute Immature Grans 0.01 Thousand/uL      Absolute Lymphocytes 0.62 Thousands/µL      Absolute Monocytes 0.55 Thousand/µL      Eosinophils Absolute 0.02 Thousand/µL      Basophils Absolute 0.02 Thousands/µL     Fingerstick Glucose (POCT) [[id number]]  (Abnormal) Collected: 06/06/24 2108    Lab Status: Final result Specimen: Blood Updated: 06/06/24 2109     POC Glucose 178 mg/dl                    US right upper quadrant   Final Result by Yong Moore MD (06/07 1217)      Distended gallbladder with sludge. Nonspecific irregularity and mild thickening of the wall.      Limited assessment for acute cholecystitis as the patient was given pain medications; a sonographic Carbajal sign could not be assessed.      Workstation performed: [id number]         CT head without contrast   Final Result by Calos Poon MD (06/06 2316)      No acute intracranial abnormality.                  Workstation performed: [id number]         CT chest abdomen pelvis wo contrast   Final Result by Adam Neal MD (06/07 0013)      1.  Cholelithiasis with gallbladder wall thickening, however lumen is partially decompressed. Recommend ultrasound if there is clinical concern for acute gallbladder pathology.   2.  Inflammatory stranding near the pancreatic tail, concerning for pancreatitis   3.  Findings of chronic pancreatitis   4.  Dependent pulmonary opacities, favored to be atelectasis, less likely pneumonia               Workstation performed: [id number]         XR chest 1 view   ED Interpretation by Rhys Alexander MD (06/06 2211)   Per my independent interpretation: No acute cardiopulmonary process.  ET tube at the  level of the clavicles -will advance to      Final Result by Juan Diego Gooden MD (06/07 1550)      Endotracheal tube tip 3.2 cm above the dedrick.            Workstation performed: [id number]         MRI inpatient order    (Results Pending)         Procedures  Intubation    Date/Time: 6/6/2024 9:34 PM    Performed by: Rhys Rojas MD  Authorized by: Rhys Rojas MD    Patient location:  ED  Other Assisting Provider: Yes (comment)    Consent:     Consent obtained:  Emergent situation    Consent given by:  Spouse    Risks discussed:  Aspiration, bleeding, death, hypoxia, brain injury, dental trauma, laryngeal injury and pneumothorax  Universal protocol:     Patient identity confirmed:  Arm band  Pre-procedure details:     Patient status:  Unresponsive    Mallampati score:  1    Pretreatment medications:  Ketamine    Paralytics:  Vecuronium  Indications:     Indications for intubation: respiratory distress, respiratory failure and airway protection    Procedure details:     Preoxygenation:  Bag valve mask    CPR in progress: no      Intubation method:  Oral    Oral intubation technique:  Glidescope    Laryngoscope size: S3.    Tube size (mm):  7.5    Tube type:  Cuffed    Number of attempts:  1  Placement assessment:     ETT to lip:  22    ETT to teeth:  24    Tube secured with:  ETT calix    Breath sounds:  Absent over the epigastrium and equal    Placement verification: chest rise, condensation, colorimetric ETCO2 device, CXR verification, direct visualization, equal breath sounds, esophageal detector, ETCO2 detector and tube exhalation      CXR findings:  ETT in proper place    Ventilator settings:  VCAC 16/450/60/6  Post-procedure details:     Patient tolerance of procedure:  Tolerated well, no immediate complications  ECG 12 Lead Documentation Only    Date/Time: 6/6/2024 9:58 PM    Performed by: Rhys Rojas MD  Authorized by: Rhys Rojas MD    Indications / Diagnosis:  Altered mental  status  ECG reviewed by me, the ED Provider: yes    Patient location:  ED  Previous ECG:     Previous ECG:  Compared to current    Similarity:  Changes noted    Comparison to cardiac monitor: Yes    Interpretation:     Interpretation: normal    Rate:     ECG rate:  86    ECG rate assessment: normal    Rhythm:     Rhythm: sinus rhythm    Ectopy:     Ectopy: none    QRS:     QRS axis:  Normal    QRS intervals:  Normal  Conduction:     Conduction: normal    ST segments:     ST segments:  Normal  T waves:     T waves: normal          ED Course  ED Course as of 06/07/24 2132   Thu Jun 06, 2024   2316 IMPRESSION:     No acute intracranial abnormality.                 Workstation performed: [id number]     Fri Jun 07, 2024   0013 IMPRESSION:     1.  Cholelithiasis with gallbladder wall thickening, however lumen is partially decompressed. Recommend ultrasound if there is clinical concern for acute gallbladder pathology.  2.  Inflammatory stranding near the pancreatic tail, concerning for pancreatitis  3.  Findings of chronic pancreatitis  4.  Dependent pulmonary opacities, favored to be atelectasis, less likely pneumonia              Workstation performed: [id number]               HEART Risk Score      Flowsheet Row Most Recent Value   Heart Score Risk Calculator    History 0 Filed at: 06/07/2024 2132   ECG 1 Filed at: 06/07/2024 2132   Age 1 Filed at: 06/07/2024 2132   Risk Factors 1 Filed at: 06/07/2024 2132   Troponin 2 Filed at: 06/07/2024 2132   HEART Score 5 Filed at: 06/07/2024 2132                     Initial Sepsis Screening       Row Name 06/06/24 2340 06/06/24 2339             Is the patient's history suggestive of a new or worsening infection? Yes (Proceed)  -CD Yes (Proceed)  -CL       Suspected source of infection suspect infection, source unknown  -CD suspect infection, source unknown  -CL       Indicate SIRS criteria Hyperthemia > 38.3C (100.9F) OR Hypothermia <36C (96.8F);Tachycardia > 90 bpm  -CD Hyperthemia  "> 38.3C (100.9F) OR Hypothermia <36C (96.8F);Tachycardia > 90 bpm  -CL       Are two or more of the above signs & symptoms of infection both present and new to the patient? Yes (Proceed)  -CD Yes (Proceed)  -CL       Assess for evidence of organ dysfunction: Are any of the below criteria present within 6 hours of suspected infection and SIRS criteria that are NOT considered to be chronic conditions? SBP < 90  -CD SBP < 90  -CL       Date of presentation of septic shock -- 06/06/24  -CL       Time of presentation of septic shock -- 2339 -CL       Fluid Resuscitation: 30 ml/kg IV fluid bolus will be given based on actual body weight  -CD 30 ml/kg IV fluid bolus will be given based on actual body weight  -CL       Is the patient is persistently hypotensive in the hour after fluid bolus administration? If yes, patient meets criteria for vasopressor use. YES  -CD YES  -CL       Sepsis Note: Click \"NEXT\" below (NOT \"close\") to generate sepsis note based on above information. YES (proceed by clicking \"NEXT\")  -CD YES (proceed by clicking \"NEXT\")  -CL                 User Key  (r) = Recorded By, (t) = Taken By, (c) = Cosigned By      Initials Name Provider Type    CL Rhys Alexander MD Physician    CD Rhys Rojas MD Resident                  Default Flowsheet Data (Last 720 Hours)       Sepsis Reassess       Row Name 06/06/24 2339                   Repeat Volume Status and Tissue Perfusion Assessment Performed    Date of Reassessment: 06/06/24  -CL        Time of Reassessment: 2339 -CL        Sepsis Reassessment Note: Click \"NEXT\" below (NOT \"close\") to generate sepsis reassessment note. YES (proceed by clicking \"NEXT\")  -CL        Repeat Volume Status and Tissue Perfusion Assessment Performed --                  User Key  (r) = Recorded By, (t) = Taken By, (c) = Cosigned By      Initials Name Provider Type    CL Rhys Alexander MD Physician                            Medical Decision " Making  64-year-old female presenting to the ED following a seizure.  Patient was postictal on initial evaluation subsequently followed by a second and third seizure continuing to status epilepticus.  At this time my main concern is recurrent seizures without return to baseline.  Patient likely in status epilepticus, also considering intracranial hypertension, stroke, MI.  Also considering patient and sepsis.  Will workup and infectious workup, cardiac workup, CT of head, chest, abdomen, pelvis.    During initial resuscitation, ICU was made aware of patient.  ICU provider was able to come to bedside during resuscitation.    Patient was managed with benzodiazepines for seizure as well as loaded with Keppra.  Intubation was completed using vecuronium and ketamine.  Postintubation sedation was continued with ketamine and fentanyl.  Broad-spectrum antibiotics as well as fluids and  antipyretics were initiated.      A 30 cc/kg fluid bolus has been given.  Consideration for lumbar puncture was done however given patient's platelet status was decided to hold off until ICU admission.  Patient's lab work showed a decreased white blood cell count and decreased sodium and a normal lactic acid.    Patient ultimately admitted to ICU for critical care.        Amount and/or Complexity of Data Reviewed  Labs: ordered.  Radiology: ordered and independent interpretation performed.    Risk  Prescription drug management.  Decision regarding hospitalization.          Disposition  Final diagnoses:   Seizure (HCC)   Status epilepticus (HCC)   Septic shock (HCC)     Time reflects when diagnosis was documented in both MDM as applicable and the Disposition within this note       Time User Action Codes Description Comment    6/6/2024 10:59 PM Rhys Fernandez [R56.9] Seizure (HCC)     6/6/2024 11:00 PM Rhys Fernandez [G40.901] Status epilepticus (HCC)     6/7/2024  1:19 AM Rhys Alexander Add [A41.9,  R65.21] Septic shock  ([facility])     6/7/2024  1:19 AM Rhys Alexander Modify [R56.9] Seizure ([facility])     6/7/2024  1:19 AM Rhys Alexander Modify [G40.901] Status epilepticus ([facility])           ED Disposition       ED Disposition   Admit    Condition   Stable    Date/Time   Thu Jun 6, 2024 7375    Comment   Case was discussed with ICU and the patient's admission status was agreed to be Admission Status: inpatient status to the service of Dr. Marlow .               Follow-up Information       Follow up With Specialties Details Why Contact Info Additional Information    [facility] Follow up Please follow up with neurology. The  will call you to set up an appointment. If you do not hear from the  within 1 week, please call the number above to set up an appointment. [address] [facility]  [address]  [facility] [address] [phone] [facility], [address] [facility], [address], , [address] [phone]            Current Discharge Medication List        CONTINUE these medications which have NOT CHANGED    Details   desvenlafaxine succinate (PRISTIQ) 50 mg 24 hr tablet TAKE ONE TABLET BY MOUTH EVERY DAY  Qty: 30 tablet, Refills: 5    Associated Diagnoses: Anxiety      gabapentin (NEURONTIN) 300 mg capsule TAKE ONE CAPSULE BY MOUTH TWICE A DAY  Qty: 60 capsule, Refills: 5    Associated Diagnoses: Anxiety with depression      !! hydrOXYzine HCL (ATARAX) 25 mg tablet TAKE ONE TABLET BY MOUTH TWICE A DAY  Qty: 60 tablet, Refills: 5    Associated Diagnoses: Anxiety; Anxiety with depression; Panic attack      !! hydrOXYzine HCL (ATARAX) 25 mg tablet Take 1 tablet (25 mg total) by mouth 2 (two) times a day  Qty: 60 tablet, Refills: 0    Associated Diagnoses: Anxiety; Insomnia, unspecified type      lacosamide (VIMPAT) 200 mg tablet TAKE ONE TABLET BY MOUTH TWICE A DAY  Qty: 180 tablet, Refills: 1    Associated Diagnoses: Symptomatic focal  epilepsy (HCC)      metoprolol succinate (TOPROL-XL) 25 mg 24 hr tablet Take 1 tablet (25 mg total) by mouth 2 (two) times a day  Qty: 180 tablet, Refills: 3    Associated Diagnoses: Cardiomyopathy (HCC); Acute systolic congestive heart failure (HCC)      traZODone (DESYREL) 50 mg tablet Take 1 tablet (50 mg total) by mouth daily at bedtime  Qty: 30 tablet, Refills: 0    Associated Diagnoses: Anxiety; Insomnia, unspecified type      zonisamide (ZONEGRAN) 100 mg capsule Take 2 capsules (200 mg total) by mouth daily at bedtime  Qty: 180 capsule, Refills: 1    Associated Diagnoses: Symptomatic focal epilepsy (HCC)       !! - Potential duplicate medications found. Please discuss with provider.        No discharge procedures on file.    PDMP Review         Value Time User    PDMP Reviewed  Yes 8/22/2023  3:18 PM Danielito Sauceda DO             ED Provider  Attending physically available and evaluated Renzo Pop. I managed the patient along with the ED Attending.    Electronically Signed by           Rhys Rojas MD  06/07/24 2113       Rhys Rojas MD  06/07/24 6303

## 2024-06-07 NOTE — SEPSIS NOTE
"  Sepsis Note   Renzo Pop 64 y.o. female MRN: [id number]  Unit/Bed#: ED-38 Encounter: [id number]       Initial Sepsis Screening       Row Name 06/06/24 2339                Is the patient's history suggestive of a new or worsening infection? Yes (Proceed)  -CL        Suspected source of infection suspect infection, source unknown  -CL        Indicate SIRS criteria Hyperthemia > 38.3C (100.9F) OR Hypothermia <36C (96.8F);Tachycardia > 90 bpm  -CL        Are two or more of the above signs & symptoms of infection both present and new to the patient? Yes (Proceed)  -CL        Assess for evidence of organ dysfunction: Are any of the below criteria present within 6 hours of suspected infection and SIRS criteria that are NOT considered to be chronic conditions? SBP < 90  -CL        Date of presentation of septic shock 06/06/24  -CL        Time of presentation of septic shock 2339  -CL        Fluid Resuscitation: 30 ml/kg IV fluid bolus will be given based on actual body weight  -CL        Is the patient is persistently hypotensive in the hour after fluid bolus administration? If yes, patient meets criteria for vasopressor use. YES  -CL        Sepsis Note: Click \"NEXT\" below (NOT \"close\") to generate sepsis note based on above information. YES (proceed by clicking \"NEXT\")  -CL                  User Key  (r) = Recorded By, (t) = Taken By, (c) = Cosigned By      Initials Name Provider Type    CL Rhys Alexander MD Physician                        Body mass index is 20.26 kg/m².  Wt Readings from Last 1 Encounters:   04/30/24 56.9 kg (125 lb 8 oz)     IBW (Ideal Body Weight): 59.3 kg    Ideal body weight: 59.3 kg (130 lb 11.7 oz)    "

## 2024-06-07 NOTE — PROGRESS NOTES
Renzo Pop is a 64 y.o. female who is currently ordered Vancomycin IV with management by the Pharmacy Consult service.  Relevant clinical data and objective / subjective history reviewed.  Vancomycin Assessment:  Indication and Goal AUC/Trough: Other, risk of infection; unknown source., -600, trough >10  Micro:     Renal Function:  SCr: 0.51 mg/dL  CrCl: 103.9 mL/min  Renal replacement: Not on dialysis  Days of Therapy: 2  Current Dose:  1000 mg IV every 12 hours  Vancomycin Plan:  New Dosin mg IV every 12 hours  Estimated AUC: 468 mcg*hr/mL  Estimated Trough: 10.4 mcg/mL  Next Level:  at 0600  Renal Function Monitoring: Daily BMP and UOP  Pharmacy will continue to follow closely for s/sx of nephrotoxicity, infusion reactions and appropriateness of therapy.  BMP and CBC will be ordered per protocol. We will continue to follow the patient’s culture results and clinical progress daily.    Esme Vizcarra, Pharmacist

## 2024-06-07 NOTE — UTILIZATION REVIEW
Initial Clinical Review    Admission: Date/Time/Statement:   Admission Orders (From admission, onward)       Ordered        06/06/24 2300  INPATIENT ADMISSION  Once                          Orders Placed This Encounter   Procedures    INPATIENT ADMISSION     Standing Status:   Standing     Number of Occurrences:   1     Order Specific Question:   Level of Care     Answer:   Critical Care [15]     Order Specific Question:   Estimated length of stay     Answer:   More than 2 Midnights     Order Specific Question:   Certification     Answer:   I certify that inpatient services are medically necessary for this patient for a duration of greater than two midnights. See H&P and MD Progress Notes for additional information about the patient's course of treatment.     ED Arrival Information       Expected   -    Arrival   6/6/2024 21:05    Acuity   Emergent              Means of arrival   Wheelchair    Escorted by   Spouse    Service   Critical Care/ICU    Admission type   Emergency              Arrival complaint   Seizure like activity             Chief Complaint   Patient presents with    Seizure - New Onset     Patient was hot/cold +fever, hx of seizures. No changes in medications recently. Seizing in car in parking lot.        Initial Presentation: 64 y.o. female  to ED via private vehicle from home.    Admitted to inpatient with Dx: status epilepticus/SIRS/alcohol use disorder, severe dependence.  Presented to ED with seizure on day of arrival.   Over last couple days, did not feel well,  on day of arrival headache.  Per spouse no evidence of drinking last few days. Arrived in ED Post ictal and shortly after arrival  began convulsing with tonic clonic movements.  Given ativan 4 mg IV and seizure continued rightward eye deviations, given additional ativan 4 mg and continued fasciculations and tonic-clonic motions, continued eye deviation intermittently. . PMHx: non-intractable focal epilepsy, alcohol use, anxiety with  depression  . On exam:  Acute distress, toxic appearing.  Patient arrived in a postictal state but, shortly after presenting to the ED, patient developed another seizure with generalized tonic-clonic movements and left eye deviation.  This persisted .    Rightward eye deviation, dry mucous membranes.   Tachycardia.  Febrile.  Na 130.  Glucose 170.  Wbc 3.41.  platelets 67.   Imaging shows no acute finding on ct head.  Chronic pancreatitis , cholelithiasis and possible acute pancreatitis on ct abdomen.   Pulmonary opacities on ct chest. ED treatment:  multiple doses of benzodiazepines.  Intubated.  Given Ketamine, Keppra load, broad spectrum antibiotics, IVF, antipyretics.   Post treatment seizing stopped, bucking vent and sedation increased.   Hypotensive despite 30 cc/kg bolus and started on Vasopressors.    Plan includes continue Keppra. Continue home Vimpat.    Eeg.  MRI.  Neuro checks every hour.  Consult neurology.  Mechanical ventilation, Ketamine and Fentanyl for sedation.   Levophed, keep  MAP > 65.   Monitor CBC.  Hold home Gabapentin and Trazodone. Continue antibiotics and follow cultures.     Anticipated Length of Stay/Certification Statement: Anticipated Length of Stay is > 2 midnights     Date: 6/7/24    Day 2:  per spouse, may have run out of a anxiety medication  3 to 4 days ago.   Today intubated and sedated.    On exam: febrile.   Appears ill.  Bilateral soft wrist restraints in place.   Sedated, able to open eyes.  Nods yes or now.  Followed commands.   Pupils equal and sluggish.   Tracked examiner.   Able to wiggle toes.   Wbc 3.50.   K 3.4.   to continue Keppra, Lacosamide. Spot eeg.  MRI brain.  Neuro checks.  Wean Propofol prior to eeg.  Continue fentanyl drip.   RASS goal 0 - -1.   Continue antibiotics.  NPO.   PPI.   US RUQ.     6/7/24 per neurology - Seizure like activity.  PMH of seizures on Vimpat and Zonegran, prior traumatic SAH in 2021, alcohol abuse.  Recommend MRI brain. Follow blood  cultures.  Routine eeg.   Currently on Keppra and Vimpat.   Use Ativan for seizure > 2 minutes.  Seizure precautions.  Monitor neuro exam.   Pm CIWA, unclear last alcohol use.  Primary team managing SIRS, cultures pending on antibiotics.     ED Triage Vitals   Temperature Pulse Respirations Blood Pressure SpO2   06/06/24 2140 06/06/24 2110 06/06/24 2110 06/06/24 2110 06/06/24 2110   (!) 102.9 °F (39.4 °C) (!) 106 20 (!) 192/96 94 %      Temp Source Heart Rate Source Patient Position - Orthostatic VS BP Location FiO2 (%)   06/06/24 2140 06/06/24 2110 06/06/24 2110 06/06/24 2110 --   Rectal Monitor Lying Right arm       Pain Score       06/07/24 0314       Med Not Given for Pain - for MAR use only          Wt Readings from Last 1 Encounters:   06/07/24 60.7 kg (133 lb 13.1 oz)     Additional Vital Signs:                6/07/24 0400 102.5 °F (39.2 °C) Abnormal  -- -- 117/62 84 -- -- -- -- --   06/07/24 0300 100.8 °F (38.2 °C) Abnormal  90 16 131/64 92 99 % -- -- -- --   06/07/24 0245 99.9 °F (37.7 °C) 92 15 150/63 90 97 % -- -- -- --   06/07/24 0230 99 °F (37.2 °C) 85 14 157/66 95 100 % -- -- -- --   06/07/24 0219 98.4 °F (36.9 °C) 73 16 122/68 89 100 % -- -- -- --   06/07/24 0200 97.9 °F (36.6 °C) -- -- -- -- -- -- -- -- --   06/07/24 0115 97.9 °F (36.6 °C) -- -- -- -- -- -- -- -- --   06/07/24 0000 99.1 °F (37.3 °C) 70 25 Abnormal  123/71 93 99 % -- -- -- --   06/06/24 2300 100.8 °F (38.2 °C) Abnormal  80 18 88/51 Abnormal  64 Abnormal  100 % -- -- Ventilator --   06/06/24 2245 101.3 °F (38.5 °C) Abnormal  83 16 118/74 90 100 % -- -- Ventilator --   06/06/24 2200 -- 84 12 101/61 75 100 % -- -- Ventilator Sitting   06/06/24 2140 102.9 °F (39.4 °C) Abnormal  -- -- -- -- -- -- -- -- --   06/06/24 2130 -- 89 12 118/63 86 100 % 28 2 L/min Nasal cannula        Date and Time Eye Opening Best Verbal Response Best Motor Response [place] Coma Scale Score   06/07/24 0400 2 1 4 7   06/07/24 0300 2 1 4 7   06/07/24 0200 2 1 4 7    06/07/24 0115 2 1 4 7     Pertinent Labs/Diagnostic Test Results:   US right upper quadrant   Final Result by Yong Moore MD (06/07 1217)      Distended gallbladder with sludge. Nonspecific irregularity and mild thickening of the wall.      Limited assessment for acute cholecystitis as the patient was given pain medications; a sonographic Carbajal sign could not be assessed.      Workstation performed: [id number]         CT head without contrast   Final Result by Claos Poon MD (06/06 2316)      No acute intracranial abnormality.                  Workstation performed: [id number]         CT chest abdomen pelvis wo contrast   Final Result by Adam Neal MD (06/07 0013)      1.  Cholelithiasis with gallbladder wall thickening, however lumen is partially decompressed. Recommend ultrasound if there is clinical concern for acute gallbladder pathology.   2.  Inflammatory stranding near the pancreatic tail, concerning for pancreatitis   3.  Findings of chronic pancreatitis   4.  Dependent pulmonary opacities, favored to be atelectasis, less likely pneumonia               Workstation performed: [id number]         XR chest 1 view   ED Interpretation by Rhys Alexander MD (06/06 2211)   Per my independent interpretation: No acute cardiopulmonary process.  ET tube at the level of the clavicles -will advance to      MRI inpatient order    (Results Pending)       6/7/24 eeg - This Routine EEG recorded during wakefulness, drowsiness, and sleep is abnormal. Intermittent left temporal delta activities suggest an underlying area of neuronal dysfunction.  Enhanced beta activities are commonly seen due to medication effect of benzodiazepines and/or barbiturates.       Results from last 7 days   Lab Units 06/07/24  0412   SARS-COV-2  Negative     Results from last 7 days   Lab Units 06/07/24  0333 06/06/24  2331 06/06/24  2142   WBC Thousand/uL 3.50*  --  3.41*   HEMOGLOBIN g/dL 11.0*  --  12.7   I STAT  HEMOGLOBIN g/dl  --  8.8*  --    HEMATOCRIT % 35.0  --  38.8   HEMATOCRIT, ISTAT %  --  26*  --    PLATELETS Thousands/uL 59*  --  67*   TOTAL NEUT ABS Thousands/µL 2.94  --  2.19     Results from last 7 days   Lab Units 06/07/24  0333 06/06/24  2331 06/06/24  2142   SODIUM mmol/L 135  --  130*   POTASSIUM mmol/L 3.4*  --  4.0   CHLORIDE mmol/L 110*  --  100   CO2 mmol/L 20*  --  24   CO2, I-STAT mmol/L  --  18*  --    ANION GAP mmol/L 5  --  6   BUN mg/dL 6  --  10   CREATININE mg/dL 0.51*  --  0.62   EGFR ml/min/1.73sq m 101  --  95   CALCIUM mg/dL 6.1*  --  8.6   CALCIUM, IONIZED, ISTAT mmol/L  --  1.10*  --    MAGNESIUM mg/dL 1.6*  --   --    PHOSPHORUS mg/dL 2.0*  --   --      Results from last 7 days   Lab Units 06/07/24  0333 06/06/24  2142   AST U/L 21 24   ALT U/L 14 19   ALK PHOS U/L 58 87   TOTAL PROTEIN g/dL 5.2* 7.6   ALBUMIN g/dL 2.9* 4.2   TOTAL BILIRUBIN mg/dL 0.41 0.48   AMMONIA umol/L 26  --      Results from last 7 days   Lab Units 06/06/24  2108   POC GLUCOSE mg/dl 178*     Results from last 7 days   Lab Units 06/07/24  0333 06/06/24  2142   GLUCOSE RANDOM mg/dL 111 170*     BETA-HYDROXYBUTYRATE   Date Value Ref Range Status   03/07/2022 0.2 <0.6 mmol/L Final      Results from last 7 days   Lab Units 06/07/24  0454   PH ART  7.350   PCO2 ART mm Hg 34.2*   PO2 ART mm Hg 111.4   HCO3 ART mmol/L 18.5*   BASE EXC ART mmol/L -6.3   O2 CONTENT ART mL/dL 16.6   O2 HGB, ARTERIAL % 97.6*   ABG SOURCE  Radial, Left     Results from last 7 days   Lab Units 06/06/24  2331   PH, NATALIA I-STAT  7.367   PCO2, NATALIA ISTAT mm HG 30.0*   PO2, NATALIA ISTAT mm HG 60.0*   HCO3, NATALIA ISTAT mmol/L 17.2*   I STAT BASE EXC mmol/L -7*   I STAT O2 SAT % 90*     Results from last 7 days   Lab Units 06/07/24  0008 06/06/24  2142   HS TNI 0HR ng/L  --  3   HS TNI 2HR ng/L 85*  --    HSTNI D2 ng/L 82*  --      Results from last 7 days   Lab Units 06/06/24  2142   PROTIME seconds 13.3   INR  0.95   PTT seconds 28     Results from last 7  days   Lab Units 06/07/24  0333 06/06/24  2142   PROCALCITONIN ng/ml 0.09 0.08     Results from last 7 days   Lab Units 06/06/24  2142   LACTIC ACID mmol/L 0.9     Results from last 7 days   Lab Units 06/06/24  2145   CLARITY UA  Clear   COLOR UA  Light Yellow   SPEC GRAV UA  1.013   PH UA  6.5   GLUCOSE UA mg/dl Negative   KETONES UA mg/dl Negative   BLOOD UA  Negative   PROTEIN UA mg/dl Trace*   NITRITE UA  Negative   BILIRUBIN UA  Negative   UROBILINOGEN UA (BE) mg/dl <2.0   LEUKOCYTES UA  Negative   WBC UA /hpf 2-4*   RBC UA /hpf None Seen   BACTERIA UA /hpf Occasional   EPITHELIAL CELLS WET PREP /hpf Occasional     Results from last 7 days   Lab Units 06/07/24  0412   INFLUENZA A PCR  Negative   INFLUENZA B PCR  Negative   RSV PCR  Negative     Results from last 7 days   Lab Units 06/07/24  0028   AMPH/METH  Negative   BARBITURATE UR  Negative   BENZODIAZEPINE UR  Negative   COCAINE UR  Negative   METHADONE URINE  Negative   OPIATE UR  Negative   PCP UR  Negative   THC UR  Negative     Results from last 7 days   Lab Units 06/06/24  2142   BLOOD CULTURE  Received in Microbiology Lab. Culture in Progress.  Received in Microbiology Lab. Culture in Progress.     Results from last 7 days   Lab Units 06/07/24  0333   VANCOMYCIN TR ug/mL 16.8       ED Treatment:   Medication Administration from 06/06/2024 2105 to 06/06/2024 2350         Date/Time Order Dose Route Action Comments     06/06/2024 2113 EDT LORazepam (ATIVAN) 2 mg/mL injection **ADS Override Pull** 2 mg  Given --     06/06/2024 2114 EDT LORazepam (ATIVAN) 2 mg/mL injection **ADS Override Pull** 2 mg  Given --     06/06/2024 2120 EDT LORazepam (ATIVAN) injection 4 mg 4 mg Intravenous Given --     06/06/2024 2125 EDT levETIRAcetam (KEPPRA) injection 3,400 mg 3,400 mg Intravenous Given --     06/06/2024 2133 EDT ketamine (Ketalar) 50 mg/mL injection **ADS Override Pull** 116 mg  Given --     06/06/2024 2134 EDT vecuronium (NORCURON) injection 5.69 mg 5.69 mg  Intravenous Given --     06/06/2024 2146 EDT sodium chloride 0.9 % bolus 1,000 mL 1,000 mL Intravenous New Bag --     06/06/2024 2306 EDT ketamine 250 mg (STANDARD CONCENTRATION) IV in sodium chloride 0.9% 250 mL 0.5 mg/kg/hr Intravenous Rate/Dose Change --     06/06/2024 2249 EDT ketamine 250 mg (STANDARD CONCENTRATION) IV in sodium chloride 0.9% 250 mL 1 mg/kg/hr Intravenous Rate/Dose Change --     06/06/2024 2158 EDT ketamine 250 mg (STANDARD CONCENTRATION) IV in sodium chloride 0.9% 250 mL 0.5 mg/kg/hr Intravenous New Bag --     06/06/2024 2155 EDT fentaNYL 1000 mcg in sodium chloride 0.9% 100mL infusion 50 mcg/hr Intravenous New Bag --     06/06/2024 2232 EDT piperacillin-tazobactam (ZOSYN) IVPB 4.5 g 4.5 g Intravenous New Bag --     06/06/2024 2256 EDT vancomycin (VANCOCIN) 1500 mg in sodium chloride 0.9% 250 mL IVPB 1,500 mg Intravenous New Bag --     06/06/2024 2233 EDT sodium chloride 0.9 % bolus 707 mL 707 mL Intravenous New Bag --     06/06/2024 2249 EDT acetaminophen (Ofirmev) injection 1,000 mg 1,000 mg Intravenous New Bag --     06/06/2024 2251 EDT fentaNYL injection 50 mcg 50 mcg Intravenous Given --     06/06/2024 2329 EDT norepinephrine (LEVOPHED) 4 mg (STANDARD CONCENTRATION) IV in sodium chloride 0.9% 250 mL 1 mcg/min Intravenous New Bag --          Past Medical History:   Diagnosis Date    Chronic alcohol abuse     Epilepsy (HCC)     Fracture     Hepatitis     Hep A     Hepatitis     Insomnia     10mar2016 resolved    Osteoporosis     14jun2016 resolved    Pancreatitis     SAH (subarachnoid hemorrhage) (HCC)     Seasonal allergies     Seizure (HCC)     Seizures (HCC)     Urine discoloration 11/11/2019    Varicella     Vomiting 11/13/2019     Present on Admission:   Status epilepticus (HCC)   Nonintractable focal epilepsy (HCC)   Alcohol use disorder, severe, dependence (HCC)   Anxiety with depression   Thrombocytopenia (HCC)   SIRS (systemic inflammatory response syndrome) (HCC)      Admitting  Diagnosis: Status epilepticus (HCC) [G40.901]  Seizure (HCC) [R56.9]  Age/Sex: 64 y.o. female  Admission Orders:  Scheduled Medications:  chlorhexidine, 15 mL, Mouth/Throat, Q12H DESTIN  enoxaparin, 40 mg, Subcutaneous, Daily  lacosamide, 200 mg, Per NG Tube, Q12H DESTIN  levETIRAcetam, 500 mg, Intravenous, Q12H DESTIN  magnesium sulfate, 2 g, Intravenous, Once 0800 6/7/24  omeprazole (PRILOSEC) suspension 2 mg/mL, 20 mg, Oral, Daily  piperacillin-tazobactam, 4.5 g, Intravenous, Q8H  potassium chloride, 20 mEq, Intravenous, Q2H  vancomycin, 1,250 mg, Intravenous, Q12H    acetaminophen (Ofirmev) injection 1,000 mg  Dose: 1,000 mg  Freq: Once Route: IV  Last Dose: Stopped (06/07/24 0443)  Start: 06/07/24 0415 End: 06/07/24 0443    Continuous IV Infusions:  fentaNYL, 50 mcg/hr, Intravenous, Continuous  ketamine, 1 mg/kg/hr, Intravenous, Continuous  multi-electrolyte, 100 mL/hr, Intravenous, Continuous  norepinephrine, 1-30 mcg/min, Intravenous, Titrated  propofol, 5-50 mcg/kg/min, Intravenous, Titrated      PRN Meds:  acetaminophen, 650 mg, Rectal, Q6H PRN  fentaNYL, 50 mcg, Intravenous, Q1H PRN x 1 6/7    Cardio pulmonary monitoring  Mechanical ventilation.    Neuro checks every hour  Restraints non violent      IP CONSULT TO NEUROLOGY      Network Utilization Review Department  ATTENTION: Please call with any questions or concerns to [phone] and carefully listen to the prompts so that you are directed to the right person. All voicemails are confidential.   For Discharge needs, contact Care Management DC Support Team at [phone] opt. [phone]  Send all requests for admission clinical reviews, approved or denied determinations and any other requests to dedicated fax number below belonging to the campus where the patient is receiving treatment. List of dedicated fax numbers for the Facilities:  FACILITY NAME UR FAX NUMBER   ADMISSION DENIALS (Administrative/Medical Necessity) [phone]   DISCHARGE SUPPORT TEAM (NETWORK)  [phone]   PARENT CHILD HEALTH (Maternity/NICU/Pediatrics) [phone]   [facility] [phone]   [facility] [phone]   [facility] [phone]   [facility] [phone]   [facility] [phone]   [facility] [phone]   [facility] [phone]   [facility] [phone]   [facility] [phone]   [facility] [phone]   [facility] [phone]   [facility] [phone]

## 2024-06-07 NOTE — CONSULTS
Consultation - Neurology   Renzo Pop 64 y.o. female MRN: [id number]  Unit/Bed#: ICU 11 Encounter: [id number]      Assessment & Plan     Seizure (HCC)  Assessment & Plan  63 y/o female with seizures on Vimpat and Zonegran, prior traumatic SAH in 2021, alcohol abuse, who presented on 6/6 with seizure-like activity. Patient was reported to be staring and unresponsive at home. EMS was initiated. Patient was reportedly postictal upon arrival to ED but then began having seizure-like activity, described as GTC. Seizure-like activity continued despite Ativan 4 mg being given. Patient was then given another Ativan 4 mg without abatement of seizure-like activity. Patient was subsequently intubated for airway protection and was given Keppra bolus and started on Ketamine and Fentanyl infusions. BP on presentation 192/96, temp 102.9.    Workup:  -CT head: No acute intracranial abnormality.  -CT chest abdomen pelvis:  1.  Cholelithiasis with gallbladder wall thickening, however lumen is partially decompressed. Recommend ultrasound if there is clinical concern for acute gallbladder pathology.  2.  Inflammatory stranding near the pancreatic tail, concerning for pancreatitis  3.  Findings of chronic pancreatitis  4.  Dependent pulmonary opacities, favored to be atelectasis, less likely pneumonia  -R UQ US: Distended gallbladder with sludge. Nonspecific irregularity and mild thickening of the wall.   -Labs: Sodium on presentation 130, lactic acid 0.9, UA negative, UDS positive for fentanyl (obtained after fentanyl was given in ED), ammonia 26, COVID/flu/RSV negative, 6/7 ethanol < 10    Plan:  -MRI brain w/wo contrast  -Blood cultures pending  -Routine EEG pending  -Current AED regimen:  S/p Keppra 3.4 g x 1; currently on 500 mg BID  Vimpat 200 mg BID  -Seizure precautions  -Ativan PRN for seizure activity > 2 minutes  -[employer] [place] completed and submitted [place]. Will need to discuss driving restrictions with patient once mental  status improves.  -PT/OT/ST when able  -Monitor neuro exam; notify with any changes  -Medical management and supportive care per primary team. Correction of any metabolic or infectious disturbances.     SIRS (systemic inflammatory response syndrome) (HCC)  Assessment & Plan  -Blood cultures pending  -Currently on Zosyn and vancomycin  -Currently afebrile  -Medical management per primary team    Alcohol use disorder, severe, dependence (HCC)  Assessment & Plan  -Chronic alcohol abuse  -Unclear recent alcohol use at home  -CIWA protocol  -6/7 ethanol < 10  -Medical management per primary team      Renzo Pop will need follow up in in 6 weeks with epilepsy attending or advance practitioner. She will not require outpatient neurological testing.    Case and treatment plan reviewed with attending neurologist, Dr. Tolbert. Please see attending attestation for any further recommendations.    History of Present Illness     Reason for Consult / Principal Problem: Seizure-like activity  Hx and PE limited by: Intubated, sedated  HPI: Renzo Pop is a 64 y.o.  female with seizures on Vimpat and Zonegran, prior traumatic SAH in 2021, alcohol abuse, who presents with seizure-like activity.    Patient's  reported that patient was noted to be staring at the ceiling and unresponsive. EMS was initiaited. Patient was noted to be postictal upon arrival to ED. She then later began having seizure-like activity, described as GTC. Ativan 4 mg was given but seizure-like activity continued along with R eye deviation. Patient was given another 4 mg Ativan, which unfortunately did not delia the seizure-like activity. Due to decreased mental status and concern for airway protection, patient was subsequently intubated. Keppra bolus was given and patient was started on Ketamine and Fentanyl infusions.    Patient unable to provide history due to intubation/sedation.  at bedside. He reports that he witnessed patient staring and  not responding last night. He states her last hospitalization for seizure was in 08/2023. He is unsure of her medications as they have changed the dosing a few times now. He states he thinks that she ran out of a medication for anxiety x 3-4 days recently.    History obtained via chart review:  Patient was seen by neurology in 03/2022 during an admission for fall/AMS. Patient was found to be in status epilepticus. Etiology was suspected to be secondary to alcohol withdrawal and med overdose vs TBI s/p fall. Patient was continued on Vimpat 100 mg BID at that time. She followed up outpatient with Epilepsy and dose was later increased to 150 mg BID due to feelings of panic and dread concerning for possible focal aware seizures.    Patient was hospitalized at [facility] on 2/5/23 with seizure, suspected secondary to vomiting her medications. Patient's epileptologist recommended increasing Vimpat to 200 mg BID. Patient then presented to [facility] on 2/6/23 with another episode of seizure-like activity. She was subsequently transferred to [facility] for continuous video EEG monitoring due to recurrent seizure-like activity. Video EEG did not demonstrate epileptiform discharges or electrographic seizures. Patient had an event of feeling of shortness of breath with flashback memories and feeling scared that did not have EEG correlate but it was noted that focal aware seizures may not have EEG correlate. Patient was continued on Vimpat 200 mg BID.    Patient re-presented to the hospital in 03/2023 for breakthrough seizure, consisting of staring off and unresponsiveness. She was noted in the ED to be unresponsive with R gaze and R focal seizure. Patient was loaded with Keppra with resolution of seizure activity.  There was concern for breakthrough seizure secondary to alcohol use and possible sleep deprivation. She was continued on Vimpat 200 mg BID. She re-presented in 05/2023 for another breakthrough seizure that was suspected to  be provoked in the setting of alcohol use. Patient was continued on the same AED regimen.    Patient followed up outpatient in 07/2023 and due to concern for continued focal aware seizures, patient was started on Zonisamide 200 mg HS in addition to the Vimpat. Patient's last office visit was in 10/2023 and no AED changes were made at that time.    Inpatient consult to Neurology  Consult performed by: PAZ Altamirano  Consult ordered by: PAZ Keller          Review of Systems   Unable to perform ROS: Intubated       Historical Information   Past Medical History:   Diagnosis Date    Chronic alcohol abuse     Epilepsy (HCC)     Fracture     Hepatitis     Hep A     Hepatitis     Insomnia     10mar2016 resolved    Osteoporosis     14jun2016 resolved    Pancreatitis     SAH (subarachnoid hemorrhage) (HCC)     Seasonal allergies     Seizure (HCC)     Seizures (HCC)     Urine discoloration 11/11/2019    Varicella     Vomiting 11/13/2019     Past Surgical History:   Procedure Laterality Date    BONE MARROW BIOPSY      2019, 2021    IR BIOPSY BONE  8/17/2021    IR BIOPSY BONE MARROW  11/14/2019    MT TX TIBL SHFT FX IMED IMPLT W/WO SCREWS&/CERCLA Left 8/4/2023    Procedure: INSERTION NAIL IM TIBIA;  Surgeon: Danielito Sauceda DO;  Location: [facility];  Service: Orthopedics    TUBAL LIGATION  1985    TUBAL LIGATION       Social History   Social History     Substance and Sexual Activity   Alcohol Use Yes    Comment: 6 bottles of wine during the week, now only drinks on the weekend.     Social History     Substance and Sexual Activity   Drug Use Never     E-Cigarette/Vaping    E-Cigarette Use Never User      E-Cigarette/Vaping Substances    Nicotine No     THC No     CBD No     Flavoring No     Other No     Unknown No      Social History     Tobacco Use   Smoking Status Never   Smokeless Tobacco Never     Family History:   Family History   Problem Relation Age of Onset    Anxiety disorder Mother     Depression  Mother     No Known Problems Father     No Known Problems Sister     No Known Problems Sister     No Known Problems Daughter     No Known Problems Maternal Grandmother     Cancer Maternal Grandfather     Cancer Paternal Grandmother         unknown     No Known Problems Paternal Grandfather     No Known Problems Brother     No Known Problems Son     No Known Problems Son     Breast cancer Neg Hx     Ovarian cancer Neg Hx        Review of previous medical records was completed.     Meds/Allergies   all current active meds have been reviewed, current meds:   Current Facility-Administered Medications   Medication Dose Route Frequency    acetaminophen (TYLENOL) rectal suppository 650 mg  650 mg Rectal Q6H PRN    chlorhexidine (PERIDEX) 0.12 % oral rinse 15 mL  15 mL Mouth/Throat Q12H DESTIN    enoxaparin (LOVENOX) subcutaneous injection 40 mg  40 mg Subcutaneous Daily    fentaNYL 1000 mcg in sodium chloride 0.9% 100mL infusion  50 mcg/hr Intravenous Continuous    fentaNYL injection 50 mcg  50 mcg Intravenous Q1H PRN    lacosamide (VIMPAT) oral solution 200 mg  200 mg Per NG Tube Q12H DESTIN    levETIRAcetam (KEPPRA) injection 500 mg  500 mg Intravenous Q12H DESTIN    LORazepam (ATIVAN) injection 2 mg  2 mg Intravenous Once    multi-electrolyte (ISOLYTE-S PH 7.4 equivalent) IV solution  100 mL/hr Intravenous Continuous    NOREPINEPHRINE 4 MG  ML NSS (CMPD ORDER) infusion  1-30 mcg/min Intravenous Titrated    omeprazole (PRILOSEC) suspension 2 mg/mL  20 mg Oral Daily    piperacillin-tazobactam (ZOSYN) 4.5 g in sodium chloride 0.9 % 100 mL IVPB (EXTENDED INFUSION)  4.5 g Intravenous Q8H    vancomycin (VANCOCIN) 1,250 mg in sodium chloride 0.9 % 250 mL IVPB  1,250 mg Intravenous Q12H   , and PTA meds:   Prior to Admission Medications   Prescriptions Last Dose Informant Patient Reported? Taking?   desvenlafaxine succinate (PRISTIQ) 50 mg 24 hr tablet   No No   Sig: TAKE ONE TABLET BY MOUTH EVERY DAY   gabapentin (NEURONTIN) 300  "mg capsule   No No   Sig: TAKE ONE CAPSULE BY MOUTH TWICE A DAY   hydrOXYzine HCL (ATARAX) 25 mg tablet   No No   Sig: TAKE ONE TABLET BY MOUTH TWICE A DAY   hydrOXYzine HCL (ATARAX) 25 mg tablet   No No   Sig: Take 1 tablet (25 mg total) by mouth 2 (two) times a day   lacosamide (VIMPAT) 200 mg tablet   No No   Sig: TAKE ONE TABLET BY MOUTH TWICE A DAY   metoprolol succinate (TOPROL-XL) 25 mg 24 hr tablet  Self No No   Sig: Take 1 tablet (25 mg total) by mouth 2 (two) times a day   traZODone (DESYREL) 50 mg tablet   No No   Sig: Take 1 tablet (50 mg total) by mouth daily at bedtime   zonisamide (ZONEGRAN) 100 mg capsule   No No   Sig: Take 2 capsules (200 mg total) by mouth daily at bedtime      Facility-Administered Medications: None       No Known Allergies    Objective   Vitals:Blood pressure 128/67, pulse 73, temperature (!) 100.9 °F (38.3 °C), resp. rate 17, height 5' 6\" (1.676 m), weight 60.7 kg (133 lb 13.1 oz), SpO2 99%.,Body mass index is 21.6 kg/m².    Intake/Output Summary (Last 24 hours) at 6/7/2024 1419  Last data filed at 6/7/2024 1400  Gross per 24 hour   Intake 5056.27 ml   Output 2030 ml   Net 3026.27 ml       Invasive Devices:   Invasive Devices       Peripheral Intravenous Line  Duration             Peripheral IV 06/06/24 Distal;Dorsal (posterior);Right Forearm <1 day    Peripheral IV 06/06/24 Left Antecubital <1 day    Peripheral IV 06/06/24 Right Antecubital <1 day              Drain  Duration             NG/OG/Enteral Tube Orogastric 16 Fr Center mouth <1 day    Urethral Catheter 18 Fr. <1 day              Airway  Duration             ETT  Oral 7.5 mm <1 day                    Physical Exam  Vitals and nursing note reviewed.   Constitutional:       General: She is not in acute distress.     Appearance: She is normal weight. She is ill-appearing. She is not diaphoretic.      Comments: Bilateral soft wrist restraints in place   HENT:      Head: Normocephalic.   Eyes:      General: No scleral " icterus.        Right eye: No discharge.         Left eye: No discharge.      Conjunctiva/sclera: Conjunctivae normal.   Pulmonary:      Effort: Pulmonary effort is normal. No respiratory distress.      Comments: ET tube in place  Skin:     General: Skin is warm and dry.      Coloration: Skin is not jaundiced or pale.       Neurologic Exam     Mental Status   Sedated but able to open eyes. Nods yes or no. Followed commands.     Cranial Nerves   Bilateral blink to threat  Pupils equal and sluggish  No gaze preference noted  Tracked examiner bilaterally from side to side  Unable to assess facial symmetry due to ET tube in place     Motor Exam   Muscle bulk: normal  Bilateral hand  5/5  Bilateral hip flexion 4/5  Able to wiggle toes bilaterally     Sensory Exam   Light touch normal.     Gait, Coordination, and Reflexes     Tremor   Resting tremor: absent  Intention tremor: absent      Lab Results: I have personally reviewed pertinent reports.  , CBC:   Results from last 7 days   Lab Units 06/07/24 0333 06/06/24 2331 06/06/24  2142   WBC Thousand/uL 3.50*  --  3.41*   RBC Million/uL 3.45*  --  3.93   HEMOGLOBIN g/dL 11.0*  --  12.7   I STAT HEMOGLOBIN g/dl  --  8.8*  --    HEMATOCRIT % 35.0  --  38.8   HEMATOCRIT, ISTAT %  --  26*  --    MCV fL 101*  --  99*   PLATELETS Thousands/uL 59*  --  67*   , BMP/CMP:   Results from last 7 days   Lab Units 06/07/24 0333 06/06/24 2331 06/06/24 2142   SODIUM mmol/L 135  --  130*   POTASSIUM mmol/L 3.4*  --  4.0   CHLORIDE mmol/L 110*  --  100   CO2 mmol/L 20*  --  24   CO2, I-STAT mmol/L  --  18*  --    BUN mg/dL 6  --  10   CREATININE mg/dL 0.51*  --  0.62   GLUCOSE, ISTAT mg/dl  --  148*  --    CALCIUM mg/dL 6.1*  --  8.6   AST U/L 21  --  24   ALT U/L 14  --  19   ALK PHOS U/L 58  --  87   EGFR ml/min/1.73sq m 101  --  95   , Vitamin B12:   , HgBA1C:   , TSH:   , Coagulation:   Results from last 7 days   Lab Units 06/06/24  2142   INR  0.95   , Lipid Profile:   ,  "Ammonia:   Results from last 7 days   Lab Units 06/07/24  0333   AMMONIA umol/L 26   , Urinalysis:   Results from last 7 days   Lab Units 06/06/24  2145   COLOR UA  Light Yellow   CLARITY UA  Clear   SPEC GRAV UA  1.013   PH UA  6.5   LEUKOCYTES UA  Negative   NITRITE UA  Negative   GLUCOSE UA mg/dl Negative   KETONES UA mg/dl Negative   BILIRUBIN UA  Negative   BLOOD UA  Negative   , Drug Screen:   Results from last 7 days   Lab Units 06/07/24  0028   BARBITURATE UR  Negative   BENZODIAZEPINE UR  Negative   THC UR  Negative   COCAINE UR  Negative   METHADONE URINE  Negative   OPIATE UR  Negative   PCP UR  Negative   , Medication Drug Levels:       Invalid input(s): \"CARBAMAZEPINE\", \"OXCARBAZEPINE\"    Imaging Studies: I have personally reviewed pertinent reports.   and I have personally reviewed pertinent films in PACS  EKG, Pathology, and Other Studies: I have personally reviewed pertinent reports.    VTE Prophylaxis: Sequential compression device (Venodyne)  and Enoxaparin (Lovenox)    Code Status: Level 1 - Full Code  Advance Directive and Living Will: Yes    Power of : Yes  POLST:    "

## 2024-06-07 NOTE — CASE MANAGEMENT
Case Management Assessment & Discharge Planning Note    Patient name Renzo Pop  Location ICU 11/ICU 11 MRN [id number]  : 1960 Date 2024       Current Admission Date: 2024  Current Admission Diagnosis:Status epilepticus (HCC)   Patient Active Problem List    Diagnosis Date Noted Date Diagnosed    Status epilepticus (HCC) 2024     SIRS (systemic inflammatory response syndrome) (HCC) 2024     Seizure (HCC) 2024     Epilepsy (HCC) 2023     Alcohol use disorder, severe, dependence (HCC) 2023     Alcohol withdrawal syndrome with complication (HCC) 2023     Combined systolic and diastolic congestive heart failure (HCC) 2023     Anxiety 2023     Alcohol abuse 2023     Breakthrough seizure (HCC) 2023     Panic attack 2022     Stress-induced cardiomyopathy 2022     Pancytopenia (HCC) 03/15/2022     Anxiety with depression 03/15/2022     Nonintractable focal epilepsy (HCC) 2022     Refusal of blood transfusions as patient is Yazidi 2021     Thrombocytopenia (HCC) 2021     Gall stones 2021     Pancreatic cyst 2021     Other hyperlipidemia 10/03/2018     Age-related osteoporosis with current pathological fracture with routine healing 2016     Lipoma of right lower extremity 2016     Vitamin D deficiency 2016     Insomnia 03/10/2016       LOS (days): 1  Geometric Mean LOS (GMLOS) (days):   Days to GMLOS:     OBJECTIVE:    Risk of Unplanned Readmission Score: 18.99         Current admission status: Inpatient       Preferred Pharmacy:   [facility] [address] [place] [address] [place] - [address] [address]  Phone: [phone] Fax: [phone]    Primary Care Provider: Arielle Tong MD    Primary Insurance: [employer]  Secondary Insurance:     ASSESSMENT:  Active Health Care Proxies       Dannie Pop Health Care Representative - Spouse    Primary Phone: [phone] (Mobile)  Home Phone: [phone]                           Readmission Root Cause  30 Day Readmission: No    Patient Information  Admitted from:: Home  Mental Status: Intubated  During Assessment patient was accompanied by: Spouse  Assessment information provided by:: Spouse  Primary Caregiver: Self  Support Systems: Spouse/significant other  County of Residence: [place]  What city do you live in?: [place]  Home entry access options. Select all that apply.: Stairs  Number of steps to enter home.: 4  Do the steps have railings?: Yes  Type of Current Residence: 2 story home  Upon entering residence, is there a bedroom on the main floor (no further steps)?: No  A bedroom is located on the following floor levels of residence (select all that apply):: 2nd Floor  Upon entering residence, is there a bathroom on the main floor (no further steps)?: Yes  Number of steps to 2nd floor from main floor: One Flight  Living Arrangements: Lives w/ Spouse/significant other  Is patient a ?: No    Activities of Daily Living Prior to Admission  Functional Status: Independent  Completes ADLs independently?: Yes  Ambulates independently?: Yes  Does patient use assisted devices?: Yes  Assisted Devices (DME) used: Straight Cane  Does patient currently own DME?: Yes  What DME does the patient currently own?: Straight Cane  Does patient have a history of Outpatient Therapy (PT/OT)?: Yes  Does the patient have a history of Short-Term Rehab?: No  Does patient have a history of HHC?: No  Does patient currently have HHC?: No         Patient Information Continued  Income Source: Pension/correction  Does patient have prescription coverage?: Yes  Does patient receive dialysis treatments?: No  Does patient have a history of substance abuse?: Yes  Historical substance use preference: Alcohol/ETOH  History of Withdrawal Symptoms: Other withdrawal symptoms (specify in comment)  Is patient currently in treatment  for substance abuse?: No. Treatment options provided  Does patient have a history of Mental Health Diagnosis?: Yes (Anxiety & Depression)  Has patient received inpatient treatment related to mental health in the last 2 years?: No    PHQ 2/9 Screening   Reviewed PHQ 2/9 Depression Screening Score?: No    Means of Transportation  Means of Transport to Appts:: Family transport      Social Determinants of Health (SDOH)      Flowsheet Row Most Recent Value   Housing Stability    In the last 12 months, was there a time when you were not able to pay the mortgage or rent on time? N   In the past 12 months, how many times have you moved where you were living? 1   At any time in the past 12 months, were you homeless or living in a shelter (including now)? N   Transportation Needs    In the past 12 months, has lack of transportation kept you from medical appointments or from getting medications? no   In the past 12 months, has lack of transportation kept you from meetings, work, or from getting things needed for daily living? No   Food Insecurity    Within the past 12 months, you worried that your food would run out before you got the money to buy more. Never true   Within the past 12 months, the food you bought just didn't last and you didn't have money to get more. Never true   Utilities    In the past 12 months has the electric, gas, oil, or water company threatened to shut off services in your home? No            DISCHARGE DETAILS:    Discharge planning discussed with:: Dannie-spouse  Freedom of Choice: Yes  Comments - Freedom of Choice: Dannie provided the information for the completion of the Pt's assessment.  CM contacted family/caregiver?: Yes             Contacts  Patient Contacts: Dannie  Relationship to Patient:: Family  Contact Method: In Person  Reason/Outcome: Continuity of Care    Requested Home Health Care         Is the patient interested in HHC at discharge?:  (TBD)         Other Referral/Resources/Interventions  Provided:  Interventions:  (TBD)         Treatment Team Recommendation:  (TBD)

## 2024-06-07 NOTE — RESPIRATORY THERAPY NOTE
06/06/24 2130   AC/VC Actuals   Resp Rate (BPM) 16 BPM   VT (mL) 486   MV 7.28   MAP (cmH2O) 9.2 cmH2O   Peak Pressure (cmH2O) 20 cmH2O   I/E Ratio (Obs) 1:3.6   Heater Temperature (Obs) 98.6 °F (37 °C)   Static Compliance (mL/cmH20) 65 mL/cmH2O   Plateau Pressure (cm H2O) 13 cm H2O     Patient was intubated in ED for Airway protection. ETT 7.5 and 24cm @ lip

## 2024-06-07 NOTE — ASSESSMENT & PLAN NOTE
Pt with fever on arrival 102.9 and tachycardia  Tachycardia resolved after fluids, lactate 1.0  BC x 2 sent, CT C/A/P- atelectasis, GB thickening and cholelithiasis recommend RUQ U/S  Cont ZOsyn and Vanco started in ED  May need LP as no clear source found  Received 30 cc/kg in ED now w/ hypotension so Levo started

## 2024-06-07 NOTE — ASSESSMENT & PLAN NOTE
-Chronic alcohol abuse  -Unclear recent alcohol use at home  -[facility] protocol  -6/7 ethanol < 10  -Medical management per primary team

## 2024-06-07 NOTE — ASSESSMENT & PLAN NOTE
-Blood cultures pending  -Currently on Zosyn and vancomycin  -Currently afebrile  -Medical management per primary team

## 2024-06-07 NOTE — SEPSIS NOTE
"  Sepsis Note   Renzo Pop 64 y.o. female MRN: [id number]  Unit/Bed#: ED-38 Encounter: [id number]       Initial Sepsis Screening       Row Name 06/06/24 2340 06/06/24 2339             Is the patient's history suggestive of a new or worsening infection? Yes (Proceed)  -CD Yes (Proceed)  -CL       Suspected source of infection suspect infection, source unknown  -CD suspect infection, source unknown  -CL       Indicate SIRS criteria Hyperthemia > 38.3C (100.9F) OR Hypothermia <36C (96.8F);Tachycardia > 90 bpm  -CD Hyperthemia > 38.3C (100.9F) OR Hypothermia <36C (96.8F);Tachycardia > 90 bpm  -CL       Are two or more of the above signs & symptoms of infection both present and new to the patient? Yes (Proceed)  -CD Yes (Proceed)  -CL       Assess for evidence of organ dysfunction: Are any of the below criteria present within 6 hours of suspected infection and SIRS criteria that are NOT considered to be chronic conditions? SBP < 90  -CD SBP < 90  -CL       Date of presentation of septic shock -- 06/06/24  -CL       Time of presentation of septic shock -- 2339  -CL       Fluid Resuscitation: 30 ml/kg IV fluid bolus will be given based on actual body weight  -CD 30 ml/kg IV fluid bolus will be given based on actual body weight  -CL       Is the patient is persistently hypotensive in the hour after fluid bolus administration? If yes, patient meets criteria for vasopressor use. YES  -CD YES  -CL       Sepsis Note: Click \"NEXT\" below (NOT \"close\") to generate sepsis note based on above information. YES (proceed by clicking \"NEXT\")  -CD YES (proceed by clicking \"NEXT\")  -CL                 User Key  (r) = Recorded By, (t) = Taken By, (c) = Cosigned By      Initials Name Provider Type    CL Rhys Alexander MD Physician    CD Rhys Rojas MD Resident                    Default Flowsheet Data (Last 720 Hours)       Sepsis Reassess       Row Name 06/06/24 2339                   Repeat Volume Status and Tissue " "Perfusion Assessment Performed    Date of Reassessment: 06/06/24  -CL        Time of Reassessment: 2339  -CL        Sepsis Reassessment Note: Click \"NEXT\" below (NOT \"close\") to generate sepsis reassessment note. YES (proceed by clicking \"NEXT\")  -CL        Repeat Volume Status and Tissue Perfusion Assessment Performed --                  User Key  (r) = Recorded By, (t) = Taken By, (c) = Cosigned By      Initials Name Provider Type    CL Rhys Alexander MD Physician                    Body mass index is 20.26 kg/m².  Wt Readings from Last 1 Encounters:   04/30/24 56.9 kg (125 lb 8 oz)     IBW (Ideal Body Weight): 59.3 kg    Ideal body weight: 59.3 kg (130 lb 11.7 oz)    "

## 2024-06-07 NOTE — PHYSICAL THERAPY NOTE
PHYSICAL THERAPY NOTE    Patient Name: Renzo Pop  Today's Date: 6/7/2024 06/07/24 1030   Note Type   Note type Cancelled Session   Cancel Reasons Medical status   Additional Comments Patient discussed in ICU mobility rounds.  Per staff patient is not appropriate at this time for IP PT session (intubated). Will follow      Deisi Gallegos, PT

## 2024-06-08 ENCOUNTER — APPOINTMENT (INPATIENT)
Dept: MRI IMAGING | Facility: HOSPITAL | Age: 64
DRG: 100 | End: 2024-06-08
Payer: COMMERCIAL

## 2024-06-08 LAB
ALBUMIN SERPL BCP-MCNC: 2.8 G/DL (ref 3.5–5)
ALP SERPL-CCNC: 53 U/L (ref 34–104)
ALT SERPL W P-5'-P-CCNC: 14 U/L (ref 7–52)
ANION GAP SERPL CALCULATED.3IONS-SCNC: 1 MMOL/L (ref 4–13)
AST SERPL W P-5'-P-CCNC: 22 U/L (ref 13–39)
BASOPHILS # BLD AUTO: 0.02 THOUSANDS/ÂΜL (ref 0–0.1)
BASOPHILS NFR BLD AUTO: 1 % (ref 0–1)
BILIRUB SERPL-MCNC: 0.33 MG/DL (ref 0.2–1)
BUN SERPL-MCNC: 6 MG/DL (ref 5–25)
CALCIUM ALBUM COR SERPL-MCNC: 8 MG/DL (ref 8.3–10.1)
CALCIUM SERPL-MCNC: 7 MG/DL (ref 8.4–10.2)
CHLORIDE SERPL-SCNC: 111 MMOL/L (ref 96–108)
CO2 SERPL-SCNC: 22 MMOL/L (ref 21–32)
CREAT SERPL-MCNC: 0.52 MG/DL (ref 0.6–1.3)
EOSINOPHIL # BLD AUTO: 0.05 THOUSAND/ÂΜL (ref 0–0.61)
EOSINOPHIL NFR BLD AUTO: 2 % (ref 0–6)
ERYTHROCYTE [DISTWIDTH] IN BLOOD BY AUTOMATED COUNT: 13.6 % (ref 11.6–15.1)
GFR SERPL CREATININE-BSD FRML MDRD: 101 ML/MIN/1.73SQ M
GLUCOSE SERPL-MCNC: 105 MG/DL (ref 65–140)
HCT VFR BLD AUTO: 31.1 % (ref 34.8–46.1)
HGB BLD-MCNC: 9.7 G/DL (ref 11.5–15.4)
IMM GRANULOCYTES # BLD AUTO: 0.01 THOUSAND/UL (ref 0–0.2)
IMM GRANULOCYTES NFR BLD AUTO: 0 % (ref 0–2)
INR PPP: 1.14 (ref 0.84–1.19)
LYMPHOCYTES # BLD AUTO: 0.33 THOUSANDS/ÂΜL (ref 0.6–4.47)
LYMPHOCYTES NFR BLD AUTO: 13 % (ref 14–44)
MAGNESIUM SERPL-MCNC: 2.3 MG/DL (ref 1.9–2.7)
MCH RBC QN AUTO: 32 PG (ref 26.8–34.3)
MCHC RBC AUTO-ENTMCNC: 31.2 G/DL (ref 31.4–37.4)
MCV RBC AUTO: 103 FL (ref 82–98)
MONOCYTES # BLD AUTO: 0.27 THOUSAND/ÂΜL (ref 0.17–1.22)
MONOCYTES NFR BLD AUTO: 10 % (ref 4–12)
NEUTROPHILS # BLD AUTO: 1.95 THOUSANDS/ÂΜL (ref 1.85–7.62)
NEUTS SEG NFR BLD AUTO: 74 % (ref 43–75)
NRBC BLD AUTO-RTO: 0 /100 WBCS
PLATELET # BLD AUTO: 58 THOUSANDS/UL (ref 149–390)
PMV BLD AUTO: 10 FL (ref 8.9–12.7)
POTASSIUM SERPL-SCNC: 4.5 MMOL/L (ref 3.5–5.3)
PROT SERPL-MCNC: 5.1 G/DL (ref 6.4–8.4)
PROTHROMBIN TIME: 15.3 SECONDS (ref 11.6–14.5)
RBC # BLD AUTO: 3.03 MILLION/UL (ref 3.81–5.12)
SODIUM SERPL-SCNC: 134 MMOL/L (ref 135–147)
VANCOMYCIN SERPL-MCNC: 15 UG/ML (ref 10–20)
WBC # BLD AUTO: 2.63 THOUSAND/UL (ref 4.31–10.16)

## 2024-06-08 PROCEDURE — 99232 SBSQ HOSP IP/OBS MODERATE 35: CPT | Performed by: INTERNAL MEDICINE

## 2024-06-08 PROCEDURE — 97163 PT EVAL HIGH COMPLEX 45 MIN: CPT

## 2024-06-08 PROCEDURE — 94760 N-INVAS EAR/PLS OXIMETRY 1: CPT

## 2024-06-08 PROCEDURE — 80202 ASSAY OF VANCOMYCIN: CPT | Performed by: NURSE PRACTITIONER

## 2024-06-08 PROCEDURE — 83735 ASSAY OF MAGNESIUM: CPT

## 2024-06-08 PROCEDURE — A9585 GADOBUTROL INJECTION: HCPCS | Performed by: FAMILY MEDICINE

## 2024-06-08 PROCEDURE — 92610 EVALUATE SWALLOWING FUNCTION: CPT

## 2024-06-08 PROCEDURE — NC001 PR NO CHARGE: Performed by: PHYSICIAN ASSISTANT

## 2024-06-08 PROCEDURE — 85025 COMPLETE CBC W/AUTO DIFF WBC: CPT

## 2024-06-08 PROCEDURE — 80053 COMPREHEN METABOLIC PANEL: CPT

## 2024-06-08 PROCEDURE — 70553 MRI BRAIN STEM W/O & W/DYE: CPT

## 2024-06-08 PROCEDURE — 85610 PROTHROMBIN TIME: CPT

## 2024-06-08 PROCEDURE — 87081 CULTURE SCREEN ONLY: CPT

## 2024-06-08 RX ORDER — GABAPENTIN 300 MG/1
300 CAPSULE ORAL 2 TIMES DAILY
Status: DISCONTINUED | OUTPATIENT
Start: 2024-06-08 | End: 2024-06-10 | Stop reason: HOSPADM

## 2024-06-08 RX ORDER — GADOBUTROL 604.72 MG/ML
6 INJECTION INTRAVENOUS
Status: COMPLETED | OUTPATIENT
Start: 2024-06-08 | End: 2024-06-08

## 2024-06-08 RX ORDER — ZONISAMIDE 100 MG/1
200 CAPSULE ORAL
Status: DISCONTINUED | OUTPATIENT
Start: 2024-06-08 | End: 2024-06-10 | Stop reason: HOSPADM

## 2024-06-08 RX ORDER — TRAZODONE HYDROCHLORIDE 50 MG/1
50 TABLET ORAL
Status: DISCONTINUED | OUTPATIENT
Start: 2024-06-08 | End: 2024-06-10 | Stop reason: HOSPADM

## 2024-06-08 RX ADMIN — Medication 20 MG: at 08:01

## 2024-06-08 RX ADMIN — TRAZODONE HYDROCHLORIDE 50 MG: 50 TABLET ORAL at 21:12

## 2024-06-08 RX ADMIN — LEVETIRACETAM 500 MG: 100 INJECTION, SOLUTION INTRAVENOUS at 08:06

## 2024-06-08 RX ADMIN — GABAPENTIN 300 MG: 300 CAPSULE ORAL at 08:01

## 2024-06-08 RX ADMIN — GADOBUTROL 6 ML: 604.72 INJECTION INTRAVENOUS at 12:07

## 2024-06-08 RX ADMIN — CHLORHEXIDINE GLUCONATE 15 ML: 1.2 RINSE ORAL at 20:47

## 2024-06-08 RX ADMIN — PIPERACILLIN SODIUM AND TAZOBACTAM SODIUM 4.5 G: 36; 4.5 INJECTION, POWDER, LYOPHILIZED, FOR SOLUTION INTRAVENOUS at 17:54

## 2024-06-08 RX ADMIN — PIPERACILLIN SODIUM AND TAZOBACTAM SODIUM 4.5 G: 36; 4.5 INJECTION, POWDER, LYOPHILIZED, FOR SOLUTION INTRAVENOUS at 10:06

## 2024-06-08 RX ADMIN — Medication 200 MG: at 08:01

## 2024-06-08 RX ADMIN — GABAPENTIN 300 MG: 300 CAPSULE ORAL at 17:21

## 2024-06-08 RX ADMIN — VANCOMYCIN HYDROCHLORIDE 1250 MG: 5 INJECTION, POWDER, LYOPHILIZED, FOR SOLUTION INTRAVENOUS at 06:34

## 2024-06-08 RX ADMIN — CHLORHEXIDINE GLUCONATE 15 ML: 1.2 RINSE ORAL at 08:01

## 2024-06-08 RX ADMIN — Medication 200 MG: at 20:47

## 2024-06-08 RX ADMIN — ENOXAPARIN SODIUM 40 MG: 40 INJECTION SUBCUTANEOUS at 08:01

## 2024-06-08 RX ADMIN — PIPERACILLIN SODIUM AND TAZOBACTAM SODIUM 4.5 G: 36; 4.5 INJECTION, POWDER, LYOPHILIZED, FOR SOLUTION INTRAVENOUS at 02:58

## 2024-06-08 RX ADMIN — ACETAMINOPHEN 650 MG: 650 SUSPENSION ORAL at 20:47

## 2024-06-08 RX ADMIN — ZONISAMIDE 200 MG: 100 CAPSULE ORAL at 21:12

## 2024-06-08 NOTE — ASSESSMENT & PLAN NOTE
Chronic, normally in the 50's, has not yet established with hematology  Supportive care at this time  Monitor daily

## 2024-06-08 NOTE — ASSESSMENT & PLAN NOTE
reports no recent use, pt reports no more than 2 beers a day and not every day  UDS and blood ethanol negative  CIWA

## 2024-06-08 NOTE — ASSESSMENT & PLAN NOTE
Pt w/ known seizure hx, last reported seizure was August 2023 per    reports compliance w/ meds Vimpat, Zonegran  Having about 2 alcoholic beverages daily reported, CIWA  Intubated for airway protection in the ED  Baseline seizure is tonic clonic but PTA she just had an upward gaze  Neuro following  CTH negative  EEG without seizure activity  MRI pending  Keppra loaded in ED, neurology has advised transitioning back to home Zonrgran  Cont home Vimpat  Seizure precautions

## 2024-06-08 NOTE — PLAN OF CARE
Problem: PHYSICAL THERAPY ADULT  Goal: Performs mobility at highest level of function for planned discharge setting.  See evaluation for individualized goals.  Description: Treatment/Interventions: Functional transfer training, LE strengthening/ROM, Therapeutic exercise, Elevations, Endurance training, Cognitive reorientation, Patient/family training, Bed mobility, Gait training, Equipment eval/education, Spoke to nursing  Equipment Recommended: Cane       See flowsheet documentation for full assessment, interventions and recommendations.  Note: Prognosis: Fair  Problem List: Decreased strength, Decreased endurance, Impaired balance, Decreased mobility, Decreased coordination, Decreased cognition, Decreased safety awareness, Impaired judgement (gait deviations)  Assessment: Pt is a 64 y.o. female seen for PT evaluation s/p admit to [facility] on 6/6/2024 w/ Status epilepticus (HCC).  Order placed for PT.      Prior to admission: Pt lived with spouse in a two-story home, 4 steps to enter, second-floor bedroom.  Discrepancies noted in between patient's reports of PLOF compared to case management documentation.  Care management reports the patient owns and uses SPC, and goes up the flight of stairs to stay on her second floor bedroom.  Patient reports during session that she does not use any DME for mobility and has been staying on the first floor. Upon evaluation: Pt needed only supervision for transfers, but min assist for ambulation without using any assistive device in the room.      Pt's clinical presentation is currently unstable/unpredictable given the functional mobility deficits above, especially (but not limited to) weakness, gait deviations, and decreased functional mobility tolerance, and combined with medical complications including abnormal H&H, abnormal WBCs, abnormal sodium values, abnormal potassium values, impulsivity during admission, and self extubation .  Pt IS NOT at her mobility  baseline.  She is at risk for falls based on impulsivity, impaired balance, impaired judgement, decreased safety awareness, and +/- recent falls .       During this admission, pt would benefit from continued skilled inpatient PT in the acute care setting in order to address deficits as defined above to maximize function and mobility.      Recommendations:     From a PT standpoint, recommend next several sessions focus on BLE strengthening, especially closed chain, higher-level balance activities, trials with SPC, stair training.     Recommend OT for cognition  Barriers to Discharge: Decreased caregiver support, Inaccessible home environment     Rehab Resource Intensity Level, PT: II (Moderate Resource Intensity) (will likely progress to level 3 pending mobility progression including stairs)    See flowsheet documentation for full assessment.

## 2024-06-08 NOTE — PHYSICAL THERAPY NOTE
PHYSICAL THERAPY EVALUATION  NAME:  Renzo Pop  DATE: 06/08/24    AGE:   64 y.o.  Mrn:   [id number]  Principal problem: Principal Problem:    Status epilepticus (HCC)  Active Problems:    Thrombocytopenia (HCC)    Nonintractable focal epilepsy (HCC)    Anxiety with depression    Alcohol use disorder, severe, dependence (HCC)    SIRS (systemic inflammatory response syndrome) (HCC)    Seizure (HCC)      Vitals:    06/08/24 0810 06/08/24 0820 06/08/24 1100 06/08/24 1129   BP: 102/56 115/59 110/56 103/54   Pulse: 77 75 75    Resp: 18 (!) 24 16    Temp:    100.1 °F (37.8 °C)   TempSrc:    Oral   SpO2: 95% 97% 98%    Weight:       Height:           Length Of Stay: 2  Performed at least 2 patient identifiers during session: Name and Epic photo  PHYSICAL THERAPY EVALUATION :    06/08/24 1320   PT Last Visit   PT Visit Date 06/08/24   Note Type   Note type Evaluation   Pain Assessment   Pain Assessment Tool 0-10   Pain Score No Pain   Restrictions/Precautions   Other Precautions Chair Alarm;Bed Alarm;Cognitive;Impulsive;Seizure;Multiple lines;Telemetry;Fall Risk   Home Living   Type of Home House   Home Layout Two level   Home Equipment   (? cane)   Additional Comments (S)  Patient lived with spouse in a two-story home, 4 steps to enter, second-floor bedroom.  Discrepancies noted in between patient's reports of PLOF compared to case management documentation.  Care management reports the patient owns and uses SPC, and goes up the flight of stairs to stay on her second floor bedroom.  Patient reports during session that she does not use any DME for mobility and has been staying on the first floor.   Prior Function   Level of [place] Independent with functional mobility;Independent with ADLs   Lives With Spouse  (reportedly works about 1 mile away)   Falls in the last 6 months   (Pt initially reports no falls, and later reports falls (attributes to L clavicle abnormality))   General   Additional Pertinent History Status  epilepticus, epilepsy, SIRS, TCP, Alcohol use disorder (severe dependence), seizure. Pt self extubated earlier this am   Family/Caregiver Present No   Cognition   Overall Cognitive Status Impaired   Arousal/Participation Alert   Following Commands Follows one step commands with increased time or repetition   Subjective   Subjective Patient recently returned from testing, with nursing assisting her to the bathroom.  Patient reports feeling a little off balance, denies lightheadedness.  Inconsistent attending to task   RUE Assessment   RUE Assessment WFL   LUE Assessment   LUE Assessment WFL   Strength RLE   R Hip Flexion 3+/5   R Knee Extension 4/5   R Ankle Dorsiflexion 4/5   Strength LLE   L Hip Flexion 3+/5   L Knee Extension 4/5   L Ankle Dorsiflexion 4/5   Coordination   Movements are Fluid and Coordinated 0   Light Touch   RLE Light Touch Grossly intact   LLE Light Touch Grossly intact   Bed Mobility   Supine to Sit 5  Supervision   Additional items Assist x 1;Bedrails;HOB elevated;Increased time required;Verbal cues   Sit to Supine Unable to assess   Transfers   Sit to Stand 5  Supervision   Additional items Assist x 1;Increased time required;Bedrails;Verbal cues;Impulsive   Stand to Sit 5  Supervision   Additional items Assist x 1;Increased time required;Armrests;Verbal cues   Ambulation/Elevation   Gait pattern Excessively slow;Step through pattern;Ataxia;Inconsistent gabby   Gait Assistance 4  Minimal assist   Additional items Assist x 1;Verbal cues   Assistive Device None  (Inconsistent wall and furniture support)   Distance 20'   Stair Management Assistance Not tested   Balance   Static Sitting Good   Static Standing Fair   Dynamic Standing Poor +   Ambulatory Poor +   Endurance Deficit   Endurance Deficit Yes   Endurance Deficit Description Limited ambulation distance and standing tolerance.   Activity Tolerance   Activity Tolerance Patient limited by fatigue   Nurse Made Aware Care Coordination with  nursing     Assessment:   Pt is a 64 y.o. female seen for PT evaluation s/p admit to [facility] on 6/6/2024 w/ Status epilepticus (HCC).  Order placed for PT.      Prior to admission: Pt lived with spouse in a two-story home, 4 steps to enter, second-floor bedroom.  Discrepancies noted in between patient's reports of PLOF compared to case management documentation.  Care management reports the patient owns and uses SPC, and goes up the flight of stairs to stay on her second floor bedroom.  Patient reports during session that she does not use any DME for mobility and has been staying on the first floor. Upon evaluation: Pt needed only supervision for transfers, but min assist for ambulation without using any assistive device in the room.      Pt's clinical presentation is currently unstable/unpredictable given the functional mobility deficits above, especially (but not limited to) weakness, gait deviations, and decreased functional mobility tolerance, and combined with medical complications including abnormal H&H, abnormal WBCs, abnormal sodium values, abnormal potassium values, impulsivity during admission, and self extubation.  Pt IS NOT at her mobility baseline.  She is at risk for falls based on impulsivity, impaired balance, impaired judgement, decreased safety awareness, and +/- recent falls.       During this admission, pt would benefit from continued skilled inpatient PT in the acute care setting in order to address deficits as defined above to maximize function and mobility.      Recommendations:    From a PT standpoint, recommend next several sessions focus on BLE strengthening, especially closed chain, higher-level balance activities, trials with SPC, stair training.    Recommend OT for cognition   Prognosis Fair   Problem List Decreased strength;Decreased endurance;Impaired balance;Decreased mobility;Decreased coordination;Decreased cognition;Decreased safety awareness;Impaired judgement  (gait  deviations)   Barriers to Discharge Decreased caregiver support;Inaccessible home environment   Goals   Patient Goals none stated other than to get washed up   STG Expiration Date 06/18/24   Short Term Goal #1 Goals: Pt will: Perform rolling  and supine<>sit bed mobility tasks with modified I to prepare for transfers and reposition in bed. Perform transfers with modified I to promote proper hand placement and approach. Perform ambulation with LRAD for up to 150' with Supervision to decrease risk for falls and promote proper use of assistive device. Perform flight of stairs w/ railing +/- DME and w/Supervision to return to home with NATALYA and return to [facility] home. Perform TUG and 4 point DGI and improve baseline score to demonstrate less risk for falls.   PT Treatment Day 0   Plan   Treatment/Interventions Functional transfer training;LE strengthening/ROM;Therapeutic exercise;Elevations;Endurance training;Cognitive reorientation;Patient/family training;Bed mobility;Gait training;Equipment eval/education;Spoke to nursing   PT Frequency 3-5x/wk   Discharge Recommendation   Rehab Resource Intensity Level, PT II (Moderate Resource Intensity)  (will likely progress to level 3 pending mobility progression including stairs)   Equipment Recommended Cane   Additional Comments 2 Personal factors affecting pt at time of IE include: steps to enter environment, multi-level environment, limited home support, past experience, behavioral pattern, inability to ambulate household distances, inability to navigate community distances, and limited insight into impairments.   AM-PAC Basic Mobility Inpatient   Turning in Flat Bed Without Bedrails 3   Lying on Back to Sitting on Edge of Flat Bed Without Bedrails 2   Moving Bed to Chair 3   Standing Up From Chair Using Arms 3   Walk in Room 3   Climb 3-5 Stairs With Railing 2   Basic Mobility Inpatient Raw Score 16   Basic Mobility Standardized Score 38.32   [facility] Level Of  "Mobility   JH-HLM Goal 5: Stand one or more mins   JH-HLM Achieved 6: Walk 10 steps or more   End of Consult   Patient Position at End of Consult All needs within reach;Bed/Chair alarm activated;Bedside chair   (Please find full objective findings from PT assessment regarding body systems outlined above).     The patient's -[facility] Basic Mobility Inpatient Short Form Raw Score is 16. A Raw score of less than or equal to 16 suggests the patient may benefit from discharge to post-acute rehabilitation services, which DOES coincide with CURRENT above PT recommendations.     However please refer to therapist recommendation for discharge planning given other factors that may influence destination.     Adapted from Klaus KRISHNAN, Judy J, Lexus J, Thelma ROBERT. Association of -[facility] “6-Clicks” Basic Mobility and Daily Activity Scores With Discharge Destination. Physical Therapy, 2021;101:1-9. DOI: [email or web address]    Portions of the record may have been created with voice recognition software.  Occasional wrong word or \"sound a like\" substitutions may have occurred due to the inherent limitations of voice recognition software.  Read the chart carefully and recognize, using context, where substitutions have occurred    "

## 2024-06-08 NOTE — PROGRESS NOTES
[facility]  Progress Note  Name: Renzo Pop I  MRN: [id number]  Unit/Bed#: ICU 11 I Date of Admission: 6/6/2024   Date of Service: 6/8/2024 I Hospital Day: 2    Assessment & Plan   * Status epilepticus (HCC)  Assessment & Plan  Pt w/ known seizure hx, last reported seizure was August 2023 per    reports compliance w/ meds Vimpat, Zonegran  Having about 2 alcoholic beverages daily reported, CIWA  Intubated for airway protection in the ED  Baseline seizure is tonic clonic but PTA she just had an upward gaze  Neuro following  CTH negative  EEG without seizure activity  MRI pending  Keppra loaded in ED, neurology has advised transitioning back to home Zonrgran  Cont home Vimpat  Seizure precautions    Nonintractable focal epilepsy (HCC)  Assessment & Plan  Onset following traumatic SAH in 2021  Maintenance outpatient on Vimpat 200mg BID and Zonegran 200mg qHS.  S.p. Keppra load in ED and continued on Vimpat with Keppra added this admission  Neuro has advised transitioning back to home regimen    SIRS (systemic inflammatory response syndrome) (HCC)  Assessment & Plan  Pt with fever on arrival 102.9 and tachycardia  Tachycardia resolved after fluids, lactate 1.0  BC x 2 sent, CT C/A/P- atelectasis, GB thickening and cholelithiasis, RUQ U/S equivocal  Chronic pancreatitis with some inflammation to tail, pt without symptoms and Lipase mildly elevated  Cont Zosyn and Vanco started in ED  May need LP as no clear source found, neuro has advised holding off on LP for now  Received 30 cc/kg in ED now w/ hypotension so Levo started  Levo stopped 6/7 2pm with BP remaining in tolerable range    Thrombocytopenia (HCC)  Assessment & Plan  Chronic, normally in the 50's, has not yet established with hematology  Supportive care at this time  Monitor daily    Alcohol use disorder, severe, dependence (HCC)  Assessment & Plan   reports no recent use, pt reports no more than 2 beers a day  and not every day  UDS and blood ethanol negative  CIWA    Seizure (HCC)  Assessment & Plan  Please see plan for Status and epilsepsy    Anxiety with depression  Assessment & Plan  Resume home Gabapentin and Trazadone at this time. Pt reports she was not taking Pristiq             Disposition: Critical care    ICU Core Measures     Vented Patient  VAP Bundle  VAP bundle ordered     A: Assess, Prevent, and Manage Pain Has pain been assessed? Yes  Need for changes to pain regimen? No   B: Both Spontaneous Awakening Trials (SATs) and Spontaneous Breathing Trials (SBTs) Plan to perform spontaneous awakening trial today? N/A   Plan to perform spontaneous breathing trial today? N/A   Obvious barriers to extubation? NA   C: Choice of Sedation RASS Goal: 0 Alert and Calm  Need for changes to sedation or analgesia regimen? No   D: Delirium CAM-ICU: Negative   E: Early Mobility  Plan for early mobility? Yes   F: Family Engagement Plan for family engagement today? Yes       Antibiotic Review: Awaiting culture results.     Review of Invasive Devices:    Moon Plan: Voiding trial after improvement in ambulation         Prophylaxis:  VTE VTE covered by:  enoxaparin, Subcutaneous, 40 mg at 06/07/24 0803       Stress Ulcer  covered byomeprazole (PRILOSEC) suspension 2 mg/mL [[id number]]         Significant 24hr Events     24hr events: Overnight pt self-extubated and tolerated room air without desaturations or respiratory distress. She reports mild chills, otherwise no concerns or complaints.     Subjective   Review of Systems: See HPI for Review of Systems     Objective                            Vitals I/O      Most Recent Min/Max in 24hrs   Temp 98.5 °F (36.9 °C) Temp  Min: 98.5 °F (36.9 °C)  Max: 102.9 °F (39.4 °C)   Pulse 74 Pulse  Min: 57  Max: 93   Resp 20 Resp  Min: 13  Max: 23   BP 91/51 BP  Min: 81/50  Max: 128/67   O2 Sat 100 % SpO2  Min: 98 %  Max: 100 %      Intake/Output Summary (Last 24 hours) at 6/8/2024 0801  Last  data filed at 6/8/2024 0800  Gross per 24 hour   Intake 1796.84 ml   Output 1770 ml   Net 26.84 ml       Diet NPO    Invasive Monitoring   none        Physical Exam   Physical Exam  Eyes:      General:         Right eye: No discharge.         Left eye: No discharge.      Conjunctiva/sclera: Conjunctivae normal.   Skin:     General: Skin is warm and dry.   HENT:      Head: Normocephalic and atraumatic.      Mouth/Throat:      Mouth: Mucous membranes are moist.   Cardiovascular:      Rate and Rhythm: Normal rate and regular rhythm.      Pulses: Normal pulses.      Heart sounds: Normal heart sounds.   Musculoskeletal:         General: No deformity.   Abdominal: General: Bowel sounds are normal. There is no distension.      Palpations: Abdomen is soft.      Tenderness: There is no abdominal tenderness. There is no guarding.   Constitutional:       General: She is not in acute distress.     Appearance: She is ill-appearing. She is not toxic-appearing.   Pulmonary:      Effort: Pulmonary effort is normal. No respiratory distress.      Comments: Diminished air movement R base in setting of hepatomegaly  Neurological:      Mental Status: She is alert and oriented to person, place and time.   Genitourinary/Anorectal:  Moon present.          Diagnostic Studies      EKG: sinus rhythm, 1st degree AV block  Imaging: RUQ US, CTCAP, CTH, CXR I have personally reviewed pertinent reports.       Medications:  Scheduled PRN   chlorhexidine, 15 mL, Q12H DESTIN  enoxaparin, 40 mg, Daily  gabapentin, 300 mg, BID  lacosamide, 200 mg, Q12H DESTIN  levETIRAcetam, 500 mg, Q12H DESTIN  LORazepam, 2 mg, Once  omeprazole (PRILOSEC) suspension 2 mg/mL, 20 mg, Daily  piperacillin-tazobactam, 4.5 g, Q8H  traZODone, 50 mg, HS  vancomycin, 1,250 mg, Q12H      acetaminophen, 650 mg, Q6H PRN  fentaNYL, 50 mcg, Q1H PRN       Continuous    fentaNYL, 50 mcg/hr, Last Rate: Stopped (06/07/24 2330)  multi-electrolyte, 100 mL/hr, Last Rate: 100 mL/hr (06/07/24  1349)  norepinephrine, 1-30 mcg/min, Last Rate: Stopped (06/07/24 1353)         Labs:    CBC    Recent Labs     06/07/24 0333 06/08/24  0436   WBC 3.50* 2.63*   HGB 11.0* 9.7*   HCT 35.0 31.1*   PLT 59* 58*     BMP    Recent Labs     06/07/24 0333 06/08/24  0436   SODIUM 135 134*   K 3.4* 4.5   * 111*   CO2 20* 22   AGAP 5 1*   BUN 6 6   CREATININE 0.51* 0.52*   CALCIUM 6.1* 7.0*       Coags    Recent Labs     06/06/24 2142 06/08/24  0436   INR 0.95 1.14   PTT 28  --         Additional Electrolytes  Recent Labs     06/06/24 2331 06/07/24 0333 06/08/24  0436   MG  --  1.6* 2.3   PHOS  --  2.0*  --    CAIONIZED 1.10*  --   --           Blood Gas    Recent Labs     06/07/24  0454   PHART 7.350   EGR4STY 34.2*   PO2ART 111.4   [id number] 18.5*   BEART -6.3   SOURCE Radial, Left     Recent Labs     06/07/24  0454   SOURCE Radial, Left    LFTs  Recent Labs     06/07/24 0333 06/08/24  0436   ALT 14 14   AST 21 22   ALKPHOS 58 53   ALB 2.9* 2.8*   TBILI 0.41 0.33       Infectious  Recent Labs     06/06/24 2142 06/07/24  0333   PROCALCITONI 0.08 0.09     Glucose  Recent Labs     06/06/24 2142 06/07/24 0333 06/08/24  0436   GLUC 170* 111 105               Erich Rivero MD

## 2024-06-08 NOTE — SPEECH THERAPY NOTE
Speech-Language Pathology Bedside Swallow Evaluation      Patient Name: Renzo Pop    Today's Date: 6/8/2024     Problem List  Principal Problem:    Status epilepticus (HCC)  Active Problems:    Thrombocytopenia (HCC)    Nonintractable focal epilepsy (HCC)    Anxiety with depression    Alcohol use disorder, severe, dependence (HCC)    SIRS (systemic inflammatory response syndrome) (HCC)    Seizure (HCC)      Past Medical History  Past Medical History:   Diagnosis Date    Chronic alcohol abuse     Epilepsy (HCC)     Fracture     Hepatitis     Hep A     Hepatitis     Insomnia     10mar2016 resolved    Osteoporosis     14jun2016 resolved    Pancreatitis     SAH (subarachnoid hemorrhage) (HCC)     Seasonal allergies     Seizure (HCC)     Seizures (HCC)     Urine discoloration 11/11/2019    Varicella     Vomiting 11/13/2019       Past Surgical History  Past Surgical History:   Procedure Laterality Date    BONE MARROW BIOPSY      2019, 2021    IR BIOPSY BONE  8/17/2021    IR BIOPSY BONE MARROW  11/14/2019    PA TX TIBL SHFT FX IMED IMPLT W/WO SCREWS&/CERCLA Left 8/4/2023    Procedure: INSERTION NAIL IM TIBIA;  Surgeon: Danielito Sauceda DO;  Location: [facility];  Service: Orthopedics    TUBAL LIGATION  1985    TUBAL LIGATION         Summary   Pt presented with functional appearing oral and pharyngeal stage swallowing skills with materials administered today (ice chips, thin liquid, applesauce, soft & hard cookies)    Recommended Diet: regular diet and thin liquids   Recommended Form of Meds: whole with liquid   Aspiration precautions: close supervision to begin given recent seizure activity  Other Recommendations: Continue frequent oral care        Current Medical Status  Renzo Pop is a 64 y.o. w/ pmhx non-intractable focal epilepsy, alcohol use, anxiety with depression who presented 6/6 with suspected status epilepticus. She was intubated for airway protection.  She was given Ativan 4 mg IV and started on a ketamine  and fentanyl infusions.  She was Keppra loaded.  CT head negative for acute findings.    DX: Status epilepticus, not on intractable focal epilepsy, SIRS, thrombocytopenia, alcohol use disorder  (on CIWA protocol ), anxiety with depression.  She self-extubated early 6/8.  SLP swallowing evaluation requested at this time.     Current Precautions:  Fall, Aspiration, Seizure    Allergies:  No known food allergies    Past medical history:  Please see H&P for details    Special Studies:  6/6 CT of head: No acute intracranial abnormality.    6/6 CT of chest/abd/pelvis:  1.  Cholelithiasis with gallbladder wall thickening, however lumen is partially decompressed. Recommend ultrasound if there is clinical concern for acute gallbladder pathology.  2.  Inflammatory stranding near the pancreatic tail, concerning for pancreatitis  3.  Findings of chronic pancreatitis  4.  Dependent pulmonary opacities, favored to be atelectasis, less likely pneumonia      6/7 US of R UQ :Distended gallbladder with sludge. Nonspecific irregularity and mild thickening of the wall.  Limited assessment for acute cholecystitis as the patient was given pain medications; a sonographic Carbajal sign could not be assessed.    Social/Education/Vocational Hx:  Pt lives w/ Dannie.  Multiple family members present this am    Swallow Information   Current Risks for Dysphagia & Aspiration: recent intubation  Current Diet: NPO   Baseline Diet: regular diet and thin liquids      Baseline Assessment   Behavior/Cognition: alert  Speech/Language Status: able to comprehend all ?'s.commands and participate in conversation with reduced vocal volume present (baseline per )  Patient Positioning: upright in bed  Pain Status/Interventions/Response to Interventions:  No report of or nonverbal indications of pain.       Swallow Mechanism Exam  Facial: symmetrical  Labial: WFL  Lingual: WFL (s/p laceration in past)  Velum: symmetrical  Mandible: adequate  ROM  Dentition: adequate  Vocal quality: weak/mod reduced volume    Respiratory Status: on RA       Consistencies Assessed and Performance   Materials administered included ice chips, thin liquid, applesauce, soft & hard cookies    Oral Stage: WFL  Mastication was adequate with the materials administered today.  Bolus formation and transfer were functional with no significant oral residue noted.  No overt s/s reduced oral control.    Pharyngeal Stage: WFL suspected  Swallow Mechanics:  Swallowing initiation appeared prompt.  Laryngeal rise was palpated and judged to be within functional limits.  No coughing, throat clearing, change in vocal quality or respiratory status noted today.     Esophageal Concerns: none reported    Summary and Recommendations (see above)    Results Reviewed with: patient, RN, and family     Treatment Recommended: None at this time     Thank you for this referral.  Please do not hesitate to contact me with any questions or concerns.    Leora Burgos MS CCC-SLP  [place] License [id number]  [place] License [id number]  Available via [employer]     24-Nov-2022 03:34

## 2024-06-08 NOTE — ASSESSMENT & PLAN NOTE
Pt with fever on arrival 102.9 and tachycardia  Tachycardia resolved after fluids, lactate 1.0  BC x 2 sent, CT C/A/P- atelectasis, GB thickening and cholelithiasis, RUQ U/S equivocal  Chronic pancreatitis with some inflammation to tail, pt without symptoms and Lipase mildly elevated  Cont Zosyn and Vanco started in ED  May need LP as no clear source found, neuro has advised holding off on LP for now  Received 30 cc/kg in ED now w/ hypotension so Levo started  Levo stopped 6/7 2pm with BP remaining in tolerable range

## 2024-06-08 NOTE — PROGRESS NOTES
Renzo Pop is a 64 y.o. female who is currently ordered Vancomycin IV with management by the Pharmacy Consult service.  Relevant clinical data and objective / subjective history reviewed.  Vancomycin Assessment:  Indication and Goal AUC/Trough: Other, risk of infection; unknown source., -600, trough >10  Clinical Status:  critical care  Micro:     Renal Function:  SCr: 0.52 mg/dL  CrCl: 102 mL/min  Renal replacement: Not on dialysis  Days of Therapy: 3  Current Dose: 1250 mg IV every 12 hours  Vancomycin Plan:  New Dosing: continue current dosing  Estimated AUC: 520 mcg*hr/mL  Estimated Trough: 12.6 mcg/mL  Next Level: 6/15 @ 0600  Renal Function Monitoring: Daily BMP and UOP  Pharmacy will continue to follow closely for s/sx of nephrotoxicity, infusion reactions and appropriateness of therapy.  BMP and CBC will be ordered per protocol. We will continue to follow the patient’s culture results and clinical progress daily.    Staci Shi, Pharmacist

## 2024-06-08 NOTE — PROGRESS NOTES
Critical Care Services- Self Extubation Progress Note   Renzo Pop 64 y.o. female MRN: [id number]  Unit/Bed#: ICU 11 Encounter: [id number]    Date occurred: 6/8/2024   Time occurred (): 2330  Assigned Nurse: Frannie    Restraints: yes    Intubation Date: 06/6/2024  Vent settings:  Mode: PSV                6/6 50%    Weaning trial: no    Continuous medications:   fentaNYL, 50 mcg/hr, Last Rate: 50 mcg/hr (06/07/24 1351)  multi-electrolyte, 100 mL/hr, Last Rate: 100 mL/hr (06/07/24 1349)  norepinephrine, 1-30 mcg/min, Last Rate: Stopped (06/07/24 1353)      Sedation HOLD: no        PRN medications:   acetaminophen, 650 mg, Q6H PRN  fentaNYL, 50 mcg, Q1H PRN      Last date/ time () PRN sedation given: 6/8/2024 2028    RASS goal: 0 Alert and Calm  Last documented RASS: 0    Did patient need reintubation: no  If NO what is their oxygen requirement: Room Air    Was the patient receiving therapy, testing, or procedure at time of extubation: no      Was family notified: no  Who was notified: attempted to call Spouse Dannie Pop. No answer. [phone]    Narrative of Events/Additional comments: Notified by RN patient self extubated. Immediate eval post self extubation patient was sating well, managing secretions and is awake/alert.     Attending made aware.     Ghazal Mcmullen PA-C

## 2024-06-08 NOTE — PLAN OF CARE
Problem: SAFETY,RESTRAINT: NV/NON-SELF DESTRUCTIVE BEHAVIOR  Goal: Remains free of harm/injury (restraint for non violent/non self-detsructive behavior)  Description: INTERVENTIONS:  - Instruct patient/family regarding restraint use   - Assess and monitor physiologic and psychological status   - Provide interventions and comfort measures to meet assessed patient needs   - Identify and implement measures to help patient regain control  - Assess readiness for release of restraint   Outcome: Progressing  Goal: Returns to optimal restraint-free functioning  Description: INTERVENTIONS:  - Assess the patient's behavior and symptoms that indicate continued need for restraint  - Identify and implement measures to help patient regain control  - Assess readiness for release of restraint   Outcome: Progressing     Problem: PAIN - ADULT  Goal: Verbalizes/displays adequate comfort level or baseline comfort level  Description: Interventions:  - Encourage patient to monitor pain and request assistance  - Assess pain using appropriate pain scale  - Administer analgesics based on type and severity of pain and evaluate response  - Implement non-pharmacological measures as appropriate and evaluate response  - Consider cultural and social influences on pain and pain management  - Notify physician/advanced practitioner if interventions unsuccessful or patient reports new pain  Outcome: Progressing     Problem: INFECTION - ADULT  Goal: Absence or prevention of progression during hospitalization  Description: INTERVENTIONS:  - Assess and monitor for signs and symptoms of infection  - Monitor lab/diagnostic results  - Monitor all insertion sites, i.e. indwelling lines, tubes, and drains  - Monitor endotracheal if appropriate and nasal secretions for changes in amount and color  - [place] appropriate cooling/warming therapies per order  - Administer medications as ordered  - Instruct and encourage patient and family to use good hand  hygiene technique  - Identify and instruct in appropriate isolation precautions for identified infection/condition  Outcome: Progressing  Goal: Absence of fever/infection during neutropenic period  Description: INTERVENTIONS:  - Monitor WBC    Outcome: Progressing     Problem: SAFETY ADULT  Goal: Patient will remain free of falls  Description: INTERVENTIONS:  - Educate patient/family on patient safety including physical limitations  - Instruct patient to call for assistance with activity   - Consult OT/PT to assist with strengthening/mobility   - Keep Call bell within reach  - Keep bed low and locked with side rails adjusted as appropriate  - Keep care items and personal belongings within reach  - Initiate and maintain comfort rounds  - Make Fall Risk Sign visible to staff  - Apply yellow socks and bracelet for high fall risk patients  - Consider moving patient to room near nurses station  Outcome: Progressing  Goal: Maintain or return to baseline ADL function  Description: INTERVENTIONS:  -  Assess patient's ability to carry out ADLs; assess patient's baseline for ADL function and identify physical deficits which impact ability to perform ADLs (bathing, care of mouth/teeth, toileting, grooming, dressing, etc.)  - Assess/evaluate cause of self-care deficits   - Assess range of motion  - Assess patient's mobility; develop plan if impaired  - Assess patient's need for assistive devices and provide as appropriate  - Encourage maximum independence but intervene and supervise when necessary  - Involve family in performance of ADLs  - Assess for home care needs following discharge   - Consider OT consult to assist with ADL evaluation and planning for discharge  - Provide patient education as appropriate  Outcome: Progressing  Goal: Maintains/Returns to pre admission functional level  Description: INTERVENTIONS:  - Perform AM-PAC 6 Click Basic Mobility/ Daily Activity assessment daily.  - Set and communicate daily mobility  goal to care team and patient/family/caregiver.   - Collaborate with rehabilitation services on mobility goals if consulted  - Out of bed for toileting  - Record patient progress and toleration of activity level   Outcome: Progressing     Problem: DISCHARGE PLANNING  Goal: Discharge to home or other facility with appropriate resources  Description: INTERVENTIONS:  - Identify barriers to discharge w/patient and caregiver  - Arrange for needed discharge resources and transportation as appropriate  - Identify discharge learning needs (meds, wound care, etc.)  - Arrange for interpretive services to assist at discharge as needed  - Refer to Case Management Department for coordinating discharge planning if the patient needs post-hospital services based on physician/advanced practitioner order or complex needs related to functional status, cognitive ability, or social support system  Outcome: Progressing     Problem: Knowledge Deficit  Goal: Patient/family/caregiver demonstrates understanding of disease process, treatment plan, medications, and discharge instructions  Description: Complete learning assessment and assess knowledge base.  Interventions:  - Provide teaching at level of understanding  - Provide teaching via preferred learning methods  Outcome: Progressing     Problem: Prexisting or High Potential for Compromised Skin Integrity  Goal: Skin integrity is maintained or improved  Description: INTERVENTIONS:  - Identify patients at risk for skin breakdown  - Assess and monitor skin integrity  - Assess and monitor nutrition and hydration status  - Monitor labs   - Assess for incontinence   - Turn and reposition patient  - Assist with mobility/ambulation  - Relieve pressure over bony prominences  - Avoid friction and shearing  - Provide appropriate hygiene as needed including keeping skin clean and dry  - Evaluate need for skin moisturizer/barrier cream  - Collaborate with interdisciplinary team   - Patient/family  teaching  - Consider wound care consult   Outcome: Progressing     Problem: Nutrition/Hydration-ADULT  Goal: Nutrient/Hydration intake appropriate for improving, restoring or maintaining nutritional needs  Description: Monitor and assess patient's nutrition/hydration status for malnutrition. Collaborate with interdisciplinary team and initiate plan and interventions as ordered.  Monitor patient's weight and dietary intake as ordered or per policy. Utilize nutrition screening tool and intervene as necessary. Determine patient's food preferences and provide high-protein, high-caloric foods as appropriate.     INTERVENTIONS:  - Monitor oral intake, urinary output, labs, and treatment plans  - Assess nutrition and hydration status and recommend course of action  - Evaluate amount of meals eaten  - Assist patient with eating if necessary   - Allow adequate time for meals  - Recommend/ encourage appropriate diets, oral nutritional supplements, and vitamin/mineral supplements  - Order, calculate, and assess calorie counts as needed  - Recommend, monitor, and adjust tube feedings and TPN/PPN based on assessed needs  - Assess need for intravenous fluids  - Provide specific nutrition/hydration education as appropriate  - Include patient/family/caregiver in decisions related to nutrition  Outcome: Progressing

## 2024-06-09 LAB
ALBUMIN SERPL BCP-MCNC: 3.1 G/DL (ref 3.5–5)
ALP SERPL-CCNC: 59 U/L (ref 34–104)
ALT SERPL W P-5'-P-CCNC: 14 U/L (ref 7–52)
ANION GAP SERPL CALCULATED.3IONS-SCNC: 4 MMOL/L (ref 4–13)
AST SERPL W P-5'-P-CCNC: 24 U/L (ref 13–39)
BASOPHILS # BLD AUTO: 0.01 THOUSANDS/ÂΜL (ref 0–0.1)
BASOPHILS NFR BLD AUTO: 1 % (ref 0–1)
BILIRUB SERPL-MCNC: 0.29 MG/DL (ref 0.2–1)
BUN SERPL-MCNC: 7 MG/DL (ref 5–25)
CALCIUM ALBUM COR SERPL-MCNC: 8.7 MG/DL (ref 8.3–10.1)
CALCIUM SERPL-MCNC: 8 MG/DL (ref 8.4–10.2)
CHLORIDE SERPL-SCNC: 114 MMOL/L (ref 96–108)
CO2 SERPL-SCNC: 20 MMOL/L (ref 21–32)
CREAT SERPL-MCNC: 0.6 MG/DL (ref 0.6–1.3)
EOSINOPHIL # BLD AUTO: 0.15 THOUSAND/ÂΜL (ref 0–0.61)
EOSINOPHIL NFR BLD AUTO: 7 % (ref 0–6)
ERYTHROCYTE [DISTWIDTH] IN BLOOD BY AUTOMATED COUNT: 13.4 % (ref 11.6–15.1)
GFR SERPL CREATININE-BSD FRML MDRD: 96 ML/MIN/1.73SQ M
GLUCOSE SERPL-MCNC: 98 MG/DL (ref 65–140)
HCT VFR BLD AUTO: 34.7 % (ref 34.8–46.1)
HGB BLD-MCNC: 10.6 G/DL (ref 11.5–15.4)
IMM GRANULOCYTES # BLD AUTO: 0.01 THOUSAND/UL (ref 0–0.2)
IMM GRANULOCYTES NFR BLD AUTO: 1 % (ref 0–2)
LACTATE SERPL-SCNC: 0.7 MMOL/L (ref 0.5–2)
LYMPHOCYTES # BLD AUTO: 1.03 THOUSANDS/ÂΜL (ref 0.6–4.47)
LYMPHOCYTES NFR BLD AUTO: 46 % (ref 14–44)
MCH RBC QN AUTO: 31.5 PG (ref 26.8–34.3)
MCHC RBC AUTO-ENTMCNC: 30.5 G/DL (ref 31.4–37.4)
MCV RBC AUTO: 103 FL (ref 82–98)
MONOCYTES # BLD AUTO: 0.27 THOUSAND/ÂΜL (ref 0.17–1.22)
MONOCYTES NFR BLD AUTO: 13 % (ref 4–12)
NEUTROPHILS # BLD AUTO: 0.69 THOUSANDS/ÂΜL (ref 1.85–7.62)
NEUTS SEG NFR BLD AUTO: 32 % (ref 43–75)
NRBC BLD AUTO-RTO: 0 /100 WBCS
PLATELET # BLD AUTO: 61 THOUSANDS/UL (ref 149–390)
PMV BLD AUTO: 9.9 FL (ref 8.9–12.7)
POTASSIUM SERPL-SCNC: 4.7 MMOL/L (ref 3.5–5.3)
PROT SERPL-MCNC: 5.7 G/DL (ref 6.4–8.4)
RBC # BLD AUTO: 3.36 MILLION/UL (ref 3.81–5.12)
SODIUM SERPL-SCNC: 138 MMOL/L (ref 135–147)
WBC # BLD AUTO: 2.16 THOUSAND/UL (ref 4.31–10.16)

## 2024-06-09 PROCEDURE — 85025 COMPLETE CBC W/AUTO DIFF WBC: CPT

## 2024-06-09 PROCEDURE — 83605 ASSAY OF LACTIC ACID: CPT | Performed by: INTERNAL MEDICINE

## 2024-06-09 PROCEDURE — 80053 COMPREHEN METABOLIC PANEL: CPT

## 2024-06-09 PROCEDURE — 93005 ELECTROCARDIOGRAM TRACING: CPT

## 2024-06-09 PROCEDURE — 99232 SBSQ HOSP IP/OBS MODERATE 35: CPT | Performed by: INTERNAL MEDICINE

## 2024-06-09 PROCEDURE — 97165 OT EVAL LOW COMPLEX 30 MIN: CPT

## 2024-06-09 PROCEDURE — 99223 1ST HOSP IP/OBS HIGH 75: CPT | Performed by: STUDENT IN AN ORGANIZED HEALTH CARE EDUCATION/TRAINING PROGRAM

## 2024-06-09 RX ORDER — VANCOMYCIN HYDROCHLORIDE 1 G/200ML
1000 INJECTION, SOLUTION INTRAVENOUS EVERY 12 HOURS
Status: DISCONTINUED | OUTPATIENT
Start: 2024-06-09 | End: 2024-06-09

## 2024-06-09 RX ADMIN — Medication 200 MG: at 08:04

## 2024-06-09 RX ADMIN — VANCOMYCIN HYDROCHLORIDE 1000 MG: 1 INJECTION, SOLUTION INTRAVENOUS at 12:24

## 2024-06-09 RX ADMIN — TRAZODONE HYDROCHLORIDE 50 MG: 50 TABLET ORAL at 21:18

## 2024-06-09 RX ADMIN — ZONISAMIDE 200 MG: 100 CAPSULE ORAL at 21:18

## 2024-06-09 RX ADMIN — Medication 200 MG: at 21:18

## 2024-06-09 RX ADMIN — ENOXAPARIN SODIUM 40 MG: 40 INJECTION SUBCUTANEOUS at 08:04

## 2024-06-09 RX ADMIN — GABAPENTIN 300 MG: 300 CAPSULE ORAL at 17:24

## 2024-06-09 RX ADMIN — CHLORHEXIDINE GLUCONATE 15 ML: 1.2 RINSE ORAL at 21:18

## 2024-06-09 RX ADMIN — GABAPENTIN 300 MG: 300 CAPSULE ORAL at 08:04

## 2024-06-09 RX ADMIN — CHLORHEXIDINE GLUCONATE 15 ML: 1.2 RINSE ORAL at 08:04

## 2024-06-09 RX ADMIN — VANCOMYCIN HYDROCHLORIDE 1250 MG: 5 INJECTION, POWDER, LYOPHILIZED, FOR SOLUTION INTRAVENOUS at 00:34

## 2024-06-09 RX ADMIN — PIPERACILLIN SODIUM AND TAZOBACTAM SODIUM 4.5 G: 36; 4.5 INJECTION, POWDER, LYOPHILIZED, FOR SOLUTION INTRAVENOUS at 03:31

## 2024-06-09 NOTE — CASE MANAGEMENT
Case Management Progress Note    Patient name Renzo Pop  Location W /W -01 MRN [id number]  : 1960 Date 2024       LOS (days): 3  Geometric Mean LOS (GMLOS) (days):   Days to GMLOS:        OBJECTIVE:        Current admission status: Inpatient  Preferred Pharmacy:   [facility] [address] [place] [id number]  Phone: [phone] Fax: [phone]    Primary Care Provider: Arielle Tong MD    Primary Insurance: [employer]  Secondary Insurance:     PROGRESS NOTE:    CM met with patient to review choice of HHC. Patient used  [facility] in the past and would like to work with them again. Cm reserved agency in [email or web address].

## 2024-06-09 NOTE — PROGRESS NOTES
[facility]  Progress Note  Name: Renzo Pop I  MRN: [id number]  Unit/Bed#: W -01 I Date of Admission: 6/6/2024   Date of Service: 6/9/2024 I Hospital Day: 3    Assessment & Plan   SIRS (systemic inflammatory response syndrome) (HCC)  Assessment & Plan  Currently leukopenia only.   On ABx  Unclear what we are treating   Cultures negative and CTCAP shows ? Pancreatitis and gallbladder sludge   Will DC abx     Alcohol use disorder, severe, dependence (HCC)  Assessment & Plan  Last drink was approximately 9 months ago.    Anxiety with depression  Assessment & Plan  Stable no acute issues    Nonintractable focal epilepsy (HCC)  Assessment & Plan  No seizures since she came in   On Vimpat 200 mg BID   On Zonegran 200 mg HS  On Gabapending 300 mg BID    Thrombocytopenia (HCC)  Assessment & Plan  Platelets of 61    * Status epilepticus (HCC)  Assessment & Plan  As above   On gabapentin , vimpat, zonegran               VTE Pharmacologic Prophylaxis:   Moderate Risk (Score 3-4) - Pharmacological DVT Prophylaxis Ordered: heparin.    Mobility:   Basic Mobility Inpatient Raw Score: 20  -HLM Goal: 6: Walk 10 steps or more  -HLM Achieved: 6: Walk 10 steps or more      Patient Centered Rounds: I performed bedside rounds with nursing staff today.   Discussions with Specialists or Other Care Team Provider: not discussed with specialties.     Education and Discussions with Family / Patient:  will call. .     Total Time Spent on Date of Encounter in care of patient: 30 mins. This time was spent on one or more of the following: performing physical exam; counseling and coordination of care; obtaining or reviewing history; documenting in the medical record; reviewing/ordering tests, medications or procedures; communicating with other healthcare professionals and discussing with patient's family/caregivers.    Current Length of Stay: 3 day(s)  Current Patient Status: Inpatient   Certification  "Statement: The patient will continue to require additional inpatient hospital stay due to monitor off abx.   Discharge Plan: Anticipate discharge in 24-48 hrs to home.    Code Status: Level 1 - Full Code    Subjective:   Patient seen and examined   Feeling \"okay\"    Objective:     Vitals:   Temp (24hrs), Av.5 °F (36.9 °C), Min:97.5 °F (36.4 °C), Max:100.1 °F (37.8 °C)    Temp:  [97.5 °F (36.4 °C)-100.1 °F (37.8 °C)] 97.5 °F (36.4 °C)  HR:  [51-78] 51  Resp:  [16-18] 18  BP: (103-127)/(53-77) 109/53  SpO2:  [95 %-98 %] 97 %  Body mass index is 20.79 kg/m².     Input and Output Summary (last 24 hours):     Intake/Output Summary (Last 24 hours) at 2024 0852  Last data filed at 2024 0459  Gross per 24 hour   Intake 280 ml   Output 2820 ml   Net -2540 ml       Physical Exam:   Physical Exam  Constitutional:       General: She is not in acute distress.     Appearance: She is not ill-appearing, toxic-appearing or diaphoretic.   HENT:      Head: Normocephalic.      Mouth/Throat:      Mouth: Mucous membranes are moist.   Eyes:      General: No scleral icterus.        Right eye: No discharge.         Left eye: No discharge.      Pupils: Pupils are equal, round, and reactive to light.   Cardiovascular:      Rate and Rhythm: Normal rate.   Abdominal:      General: There is no distension.      Palpations: There is no mass.      Tenderness: There is no abdominal tenderness. There is no right CVA tenderness, left CVA tenderness or guarding.      Hernia: No hernia is present.   Musculoskeletal:      Right lower leg: No edema.      Left lower leg: No edema.   Skin:     Coloration: Skin is not jaundiced or pale.      Findings: No bruising, erythema, lesion or rash.   Neurological:      General: No focal deficit present.      Mental Status: She is alert.      Cranial Nerves: No cranial nerve deficit.      Sensory: No sensory deficit.      Motor: No weakness.      Coordination: Coordination normal.      Gait: Gait normal. "      Deep Tendon Reflexes: Reflexes normal.   Psychiatric:         Mood and Affect: Mood normal.          Additional Data:     Labs:  Results from last 7 days   Lab Units 06/09/24  0436   WBC Thousand/uL 2.16*   HEMOGLOBIN g/dL 10.6*   HEMATOCRIT % 34.7*   PLATELETS Thousands/uL 61*   SEGS PCT % 32*   LYMPHO PCT % 46*   MONO PCT % 13*   EOS PCT % 7*     Results from last 7 days   Lab Units 06/09/24  0436   SODIUM mmol/L 138   POTASSIUM mmol/L 4.7   CHLORIDE mmol/L 114*   CO2 mmol/L 20*   BUN mg/dL 7   CREATININE mg/dL 0.60   ANION GAP mmol/L 4   CALCIUM mg/dL 8.0*   ALBUMIN g/dL 3.1*   TOTAL BILIRUBIN mg/dL 0.29   ALK PHOS U/L 59   ALT U/L 14   AST U/L 24   GLUCOSE RANDOM mg/dL 98     Results from last 7 days   Lab Units 06/08/24  0436   INR  1.14     Results from last 7 days   Lab Units 06/06/24  2108   POC GLUCOSE mg/dl 178*         Results from last 7 days   Lab Units 06/07/24  0333 06/06/24  2142   LACTIC ACID mmol/L  --  0.9   PROCALCITONIN ng/ml 0.09 0.08       Lines/Drains:  Invasive Devices       Peripheral Intravenous Line  Duration             Peripheral IV 06/08/24 Distal;Dorsal (posterior);Right Forearm <1 day                          Imaging: No pertinent imaging reviewed.    Recent Cultures (last 7 days):   Results from last 7 days   Lab Units 06/06/24  2142   BLOOD CULTURE  No Growth at 48 hrs.  No Growth at 48 hrs.       Last 24 Hours Medication List:   Current Facility-Administered Medications   Medication Dose Route Frequency Provider Last Rate    acetaminophen  650 mg Oral Q6H PRN Erich Wise MD      chlorhexidine  15 mL Mouth/Throat Q12H [facility] Erich Wise MD      enoxaparin  40 mg Subcutaneous Daily Erich Wise MD      gabapentin  300 mg Oral BID Erich Wise MD      lacosamide  200 mg Per NG Tube Q12H [facility] Erich Wise MD      LORazepam  2 mg Intravenous Once Erich Wise MD      piperacillin-tazobactam  4.5 g Intravenous Q8H Erich Wise MD 4.5 g (06/09/24 0331)     traZODone  50 mg Oral HS Erich Wise MD      vancomycin  1,000 mg Intravenous Q12H Kailey Miller MD      zonisamide  200 mg Oral HS Erich Wise MD          Today, Patient Was Seen By: Kailey Miller MD    **Please Note: This note may have been constructed using a voice recognition system.**

## 2024-06-09 NOTE — PROGRESS NOTES
Renzo Pop is a 64 y.o. female who is currently ordered Vancomycin IV with management by the Pharmacy Consult service.  Relevant clinical data and objective / subjective history reviewed.  Vancomycin Assessment:  Indication and Goal AUC/Trough: Other, risk of infection; unknown source., -600, trough >10  Clinical Status: improving  Micro:     Renal Function:  SCr: 0.6 mg/dL  CrCl: 87 mL/min  Renal replacement: Not on dialysis  Days of Therapy: 4  Current Dose: 1250 mg IV every 12 hours  Vancomycin Plan:  New Dosin mg IV every 12 hours  Estimated AUC: 483 mcg*hr/mL  Estimated Trough: 12.2 mcg/mL  Next Level: 6/15 @ 0600  Renal Function Monitoring: Daily BMP and UOP  Pharmacy will continue to follow closely for s/sx of nephrotoxicity, infusion reactions and appropriateness of therapy.  BMP and CBC will be ordered per protocol. We will continue to follow the patient’s culture results and clinical progress daily.    Staci Shi, Pharmacist

## 2024-06-09 NOTE — ASSESSMENT & PLAN NOTE
Currently leukopenia only.   On ABx  Unclear what we are treating   Cultures negative and CTCAP shows ? Pancreatitis and gallbladder sludge   Will DC abx

## 2024-06-09 NOTE — PLAN OF CARE
Problem: SAFETY,RESTRAINT: NV/NON-SELF DESTRUCTIVE BEHAVIOR  Goal: Remains free of harm/injury (restraint for non violent/non self-detsructive behavior)  Description: INTERVENTIONS:  - Instruct patient/family regarding restraint use   - Assess and monitor physiologic and psychological status   - Provide interventions and comfort measures to meet assessed patient needs   - Identify and implement measures to help patient regain control  - Assess readiness for release of restraint   Outcome: Progressing  Goal: Returns to optimal restraint-free functioning  Description: INTERVENTIONS:  - Assess the patient's behavior and symptoms that indicate continued need for restraint  - Identify and implement measures to help patient regain control  - Assess readiness for release of restraint   Outcome: Progressing     Problem: PAIN - ADULT  Goal: Verbalizes/displays adequate comfort level or baseline comfort level  Description: Interventions:  - Encourage patient to monitor pain and request assistance  - Assess pain using appropriate pain scale  - Administer analgesics based on type and severity of pain and evaluate response  - Implement non-pharmacological measures as appropriate and evaluate response  - Consider cultural and social influences on pain and pain management  - Notify physician/advanced practitioner if interventions unsuccessful or patient reports new pain  Outcome: Progressing     Problem: INFECTION - ADULT  Goal: Absence or prevention of progression during hospitalization  Description: INTERVENTIONS:  - Assess and monitor for signs and symptoms of infection  - Monitor lab/diagnostic results  - Monitor all insertion sites, i.e. indwelling lines, tubes, and drains  - Monitor endotracheal if appropriate and nasal secretions for changes in amount and color  - [place] appropriate cooling/warming therapies per order  - Administer medications as ordered  - Instruct and encourage patient and family to use good hand  hygiene technique  - Identify and instruct in appropriate isolation precautions for identified infection/condition  Outcome: Progressing  Goal: Absence of fever/infection during neutropenic period  Description: INTERVENTIONS:  - Monitor WBC    Outcome: Progressing     Problem: SAFETY ADULT  Goal: Patient will remain free of falls  Description: INTERVENTIONS:  - Educate patient/family on patient safety including physical limitations  - Instruct patient to call for assistance with activity   - Consult OT/PT to assist with strengthening/mobility   - Keep Call bell within reach  - Keep bed low and locked with side rails adjusted as appropriate  - Keep care items and personal belongings within reach  - Initiate and maintain comfort rounds  - Make Fall Risk Sign visible to staff  - Offer Toileting every  Hours, in advance of need  - Initiate/Maintain alarm  - Obtain necessary fall risk management equipment:   - Apply yellow socks and bracelet for high fall risk patients  - Consider moving patient to room near nurses station  Outcome: Progressing  Goal: Maintain or return to baseline ADL function  Description: INTERVENTIONS:  -  Assess patient's ability to carry out ADLs; assess patient's baseline for ADL function and identify physical deficits which impact ability to perform ADLs (bathing, care of mouth/teeth, toileting, grooming, dressing, etc.)  - Assess/evaluate cause of self-care deficits   - Assess range of motion  - Assess patient's mobility; develop plan if impaired  - Assess patient's need for assistive devices and provide as appropriate  - Encourage maximum independence but intervene and supervise when necessary  - Involve family in performance of ADLs  - Assess for home care needs following discharge   - Consider OT consult to assist with ADL evaluation and planning for discharge  - Provide patient education as appropriate  Outcome: Progressing  Goal: Maintains/Returns to pre admission functional  level  Description: INTERVENTIONS:  - Perform AM-PAC 6 Click Basic Mobility/ Daily Activity assessment daily.  - Set and communicate daily mobility goal to care team and patient/family/caregiver.   - Collaborate with rehabilitation services on mobility goals if consulted  - Perform Range of Motion  times a day.  - Reposition patient every  hours.  - Dangle patient  times a day  - Stand patient  times a day  - Ambulate patient  times a day  - Out of bed to chair  times a day   - Out of bed for meals times a day  - Out of bed for toileting  - Record patient progress and toleration of activity level   Outcome: Progressing     Problem: DISCHARGE PLANNING  Goal: Discharge to home or other facility with appropriate resources  Description: INTERVENTIONS:  - Identify barriers to discharge w/patient and caregiver  - Arrange for needed discharge resources and transportation as appropriate  - Identify discharge learning needs (meds, wound care, etc.)  - Arrange for interpretive services to assist at discharge as needed  - Refer to Case Management Department for coordinating discharge planning if the patient needs post-hospital services based on physician/advanced practitioner order or complex needs related to functional status, cognitive ability, or social support system  Outcome: Progressing     Problem: Prexisting or High Potential for Compromised Skin Integrity  Goal: Skin integrity is maintained or improved  Description: INTERVENTIONS:  - Identify patients at risk for skin breakdown  - Assess and monitor skin integrity  - Assess and monitor nutrition and hydration status  - Monitor labs   - Assess for incontinence   - Turn and reposition patient  - Assist with mobility/ambulation  - Relieve pressure over bony prominences  - Avoid friction and shearing  - Provide appropriate hygiene as needed including keeping skin clean and dry  - Evaluate need for skin moisturizer/barrier cream  - Collaborate with interdisciplinary team    - Patient/family teaching  - Consider wound care consult   Outcome: Progressing     Problem: Knowledge Deficit  Goal: Patient/family/caregiver demonstrates understanding of disease process, treatment plan, medications, and discharge instructions  Description: Complete learning assessment and assess knowledge base.  Interventions:  - Provide teaching at level of understanding  - Provide teaching via preferred learning methods  Outcome: Progressing     Problem: Nutrition/Hydration-ADULT  Goal: Nutrient/Hydration intake appropriate for improving, restoring or maintaining nutritional needs  Description: Monitor and assess patient's nutrition/hydration status for malnutrition. Collaborate with interdisciplinary team and initiate plan and interventions as ordered.  Monitor patient's weight and dietary intake as ordered or per policy. Utilize nutrition screening tool and intervene as necessary. Determine patient's food preferences and provide high-protein, high-caloric foods as appropriate.     INTERVENTIONS:  - Monitor oral intake, urinary output, labs, and treatment plans  - Assess nutrition and hydration status and recommend course of action  - Evaluate amount of meals eaten  - Assist patient with eating if necessary   - Allow adequate time for meals  - Recommend/ encourage appropriate diets, oral nutritional supplements, and vitamin/mineral supplements  - Order, calculate, and assess calorie counts as needed  - Recommend, monitor, and adjust tube feedings and TPN/PPN based on assessed needs  - Assess need for intravenous fluids  - Provide specific nutrition/hydration education as appropriate  - Include patient/family/caregiver in decisions related to nutrition  Outcome: Progressing

## 2024-06-09 NOTE — CASE MANAGEMENT
Case Management Progress Note    Patient name Renzo Pop  Location W /W -01 MRN [id number]  : 1960 Date 2024       LOS (days): 3  Geometric Mean LOS (GMLOS) (days):   Days to GMLOS:        OBJECTIVE:        Current admission status: Inpatient  Preferred Pharmacy:   [facility] [address] [place] [id number]  Phone: [phone] Fax: [phone]    Primary Care Provider: Arielle Tong MD    Primary Insurance: [employer]  Secondary Insurance:     PROGRESS NOTE:    Cm met with patient and family at bedside. Patient is not interested in rehab upon discharge. Patient would like to use HHC. Patient does not remember agency she used in the past but would like to use them again. Upon review of patients chart- she has used [facility] in the past. Patient in agreement with blanket referral for HHC in case [facility] does not have availability.

## 2024-06-09 NOTE — OCCUPATIONAL THERAPY NOTE
Occupational Therapy Evaluation     Patient Name: Renzo Pop  Today's Date: 6/9/2024  Problem List  Principal Problem:    Status epilepticus (HCC)  Active Problems:    Thrombocytopenia (HCC)    Nonintractable focal epilepsy (HCC)    Anxiety with depression    Alcohol use disorder, severe, dependence (HCC)    SIRS (systemic inflammatory response syndrome) (HCC)    Seizure (HCC)    Past Medical History  Past Medical History:   Diagnosis Date    Chronic alcohol abuse     Epilepsy (HCC)     Fracture     Hepatitis     Hep A     Hepatitis     Insomnia     10mar2016 resolved    Osteoporosis     14jun2016 resolved    Pancreatitis     SAH (subarachnoid hemorrhage) (HCC)     Seasonal allergies     Seizure (HCC)     Seizures (HCC)     Urine discoloration 11/11/2019    Varicella     Vomiting 11/13/2019     Past Surgical History  Past Surgical History:   Procedure Laterality Date    BONE MARROW BIOPSY      2019, 2021    IR BIOPSY BONE  8/17/2021    IR BIOPSY BONE MARROW  11/14/2019    CO TX TIBL SHFT FX IMED IMPLT W/WO SCREWS&/CERCLA Left 8/4/2023    Procedure: INSERTION NAIL IM TIBIA;  Surgeon: Danielito Sauceda DO;  Location: [facility];  Service: Orthopedics    TUBAL LIGATION  1985    TUBAL LIGATION           06/09/24 1212   Note Type   Note type Evaluation   Pain Assessment   Pain Assessment Tool 0-10   Pain Score No Pain   Restrictions/Precautions   Other Precautions Chair Alarm;Bed Alarm;Fall Risk;Seizure   Home Living   Type of Home House  (4 NATALYA)   Home Layout Two level;1/2 bath on main level;Bed/bath upstairs   Bathroom Shower/Tub Tub/shower unit   Bathroom Toilet Standard   Home Equipment Cane;Walker   Additional Comments Per patient owns AD but does not use it. Per CM note patient uses a cane.   Prior Function   Level of [place] Independent with ADLs;Independent with IADLS;Independent with functional mobility   Lives With Spouse   Receives Help From Family   IADLs Independent with meal prep;Independent with  medication management;Family/Friend/Other provides transportation   ADL   Eating Assistance 7  Independent   Grooming Assistance 5  Supervision/Setup  (standing)   UB Bathing Assistance 6  Modified Independent   UB Dressing Assistance 7  Independent   LB Dressing Assistance 6  Modified independent   Toileting Assistance  6  Modified independent   Additional Comments (S)  Patient displayed unsteady balance when standing to perform ADL   Bed Mobility   Supine to Sit 6  Modified independent   Sit to Supine 6  Modified independent   Transfers   Sit to Stand 5  Supervision   Stand to Sit 5  Supervision   Toilet transfer 5  Supervision   Functional Mobility   Additional Comments Patient ambulated household distance without AD at Sup. Patient was unsteady with gait and negotiating turns. Patient did not want to use a AD.   Balance   Static Sitting Good   Dynamic Sitting Good   Static Standing Fair   Dynamic Standing Fair   Activity Tolerance   Activity Tolerance Patient tolerated treatment well   Nurse Made Aware RN   RUE Assessment   RUE Assessment WFL   LUE Assessment   LUE Assessment WFL   Cognition   Orientation Level Oriented X4   Following Commands Follows all commands and directions without difficulty   Comments Pt ID by wristband name and    Assessment   Limitation Decreased ADL status;Decreased Safe judgement during ADL;Decreased self-care trans;Decreased high-level ADLs   Prognosis Fair   Assessment Patient evaluated by Occupational Therapy.  Patient admitted with Status epilepticus (HCC).  The patients occupational profile, medical and therapy history includes a expanded review of medical and/or therapy records and additional review of physical, cognitive, or psychosocial history related to current functional performance.  Comorbidities affecting functional mobility and ADLS include: anxiety and seizures.  Prior to admission, patient was independent with functional mobility without assistive device,  "independent with ADLS, independent with IADLS, and living with spouse in a 2 level home with 4 steps to enter. See above for performance levels.  The evaluation identifies the following performance deficits: impaired balance, decreased coordination, increased fall risk, new onset of impairment of functional mobility, decreased ADLS, and decreased IADLS, that result in activity limitations and/or participation restrictions. This evaluation requires clinical decision making of low complexity, because the patients presents with no comorbidities that affect occupational performance and required no modification of tasks or assistance with consideration of a limited number of treatment options.  The Barthel Index was used as a functional outcome tool presenting with a score of Barthel Index Score: 70,  The patient's raw score on the -PAC Daily Activity Inpatient Short Form is 22. A raw score of greater than or equal to 19 suggests the patient may benefit from discharge to home. Please refer to the recommendation of the Occupational Therapist for safe discharge planning. Patient will benefit from skilled Occupational Therapy services to address above deficits and facilitate a safe return to prior level of function.   Goals   Patient Goals \"Eat lunch\"   LTG Time Frame   (8-14)   Long Term Goal #1 See below   Plan   Treatment Interventions ADL retraining;Neuromuscular reeducation;Equipment evaluation/education;Continued evaluation;Energy conservation;Activityengagement   Goal Expiration Date 06/23/24   OT Frequency 1-2x/wk   Discharge Recommendation   Rehab Resource Intensity Level, OT III (Minimum Resource Intensity)   AM-PAC Daily Activity Inpatient   Lower Body Dressing 3   Bathing 4   Toileting 3   Upper Body Dressing 4   Grooming 4   Eating 4   Daily Activity Raw Score 22   Daily Activity Standardized Score (Calc for Raw Score >=11) 47.1   AM-PAC Applied Cognition Inpatient   Following a Speech/Presentation 4 "   Understanding Ordinary Conversation 4   Taking Medications 4   Remembering Where Things Are Placed or Put Away 4   Remembering List of 4-5 Errands 4   Taking Care of Complicated Tasks 4   Applied Cognition Raw Score 24   Applied Cognition Standardized Score 62.21   Barthel Index   Feeding 10   Bathing 5   Grooming Score 5   Dressing Score 5   Bladder Score 10   Bowels Score 10   Toilet Use Score 5   Transfers (Bed/Chair) Score 10   Mobility (Level Surface) Score 0   Stairs Score 10   Barthel Index Score 70   End of Consult   Patient Position at End of Consult Bed/Chair alarm activated;All needs within reach   Nurse Communication Nurse aware of consult   GOALS    1) Pt will improve activity tolerance to G for min 30 min txment sessions for increase engagement in functional tasks    2) Pt will complete UB/LB dressing/self care at  Independent using adaptive device and DME as needed    3) Pt will complete bathing at Independent w/ use of AE and DME as needed    4) Pt will complete toileting w/ mod I w/ G hygiene/thoroughness using DME as needed    5) Pt will improve functional transfers to Independent on/off all surfaces using DME as needed w/ G balance/safety     6) Pt will improve functional mobility during ADL/IADL/leisure tasks to Mod I using DME as needed w/ G balance.

## 2024-06-09 NOTE — PLAN OF CARE
Problem: SAFETY,RESTRAINT: NV/NON-SELF DESTRUCTIVE BEHAVIOR  Goal: Remains free of harm/injury (restraint for non violent/non self-detsructive behavior)  Description: INTERVENTIONS:  - Instruct patient/family regarding restraint use   - Assess and monitor physiologic and psychological status   - Provide interventions and comfort measures to meet assessed patient needs   - Identify and implement measures to help patient regain control  - Assess readiness for release of restraint   Outcome: Progressing  Goal: Returns to optimal restraint-free functioning  Description: INTERVENTIONS:  - Assess the patient's behavior and symptoms that indicate continued need for restraint  - Identify and implement measures to help patient regain control  - Assess readiness for release of restraint   Outcome: Progressing     Problem: PAIN - ADULT  Goal: Verbalizes/displays adequate comfort level or baseline comfort level  Description: Interventions:  - Encourage patient to monitor pain and request assistance  - Assess pain using appropriate pain scale  - Administer analgesics based on type and severity of pain and evaluate response  - Implement non-pharmacological measures as appropriate and evaluate response  - Consider cultural and social influences on pain and pain management  - Notify physician/advanced practitioner if interventions unsuccessful or patient reports new pain  Outcome: Progressing     Problem: INFECTION - ADULT  Goal: Absence or prevention of progression during hospitalization  Description: INTERVENTIONS:  - Assess and monitor for signs and symptoms of infection  - Monitor lab/diagnostic results  - Monitor all insertion sites, i.e. indwelling lines, tubes, and drains  - Monitor endotracheal if appropriate and nasal secretions for changes in amount and color  - [place] appropriate cooling/warming therapies per order  - Administer medications as ordered  - Instruct and encourage patient and family to use good hand  hygiene technique  - Identify and instruct in appropriate isolation precautions for identified infection/condition  Outcome: Progressing  Goal: Absence of fever/infection during neutropenic period  Description: INTERVENTIONS:  - Monitor WBC    Outcome: Progressing     Problem: SAFETY ADULT  Goal: Patient will remain free of falls  Description: INTERVENTIONS:  - Educate patient/family on patient safety including physical limitations  - Instruct patient to call for assistance with activity   - Consult OT/PT to assist with strengthening/mobility   - Keep Call bell within reach  - Keep bed low and locked with side rails adjusted as appropriate  - Keep care items and personal belongings within reach  - Initiate and maintain comfort rounds  - Make Fall Risk Sign visible to staff  - Offer Toileting every  Hours, in advance of need  - Initiate/Maintain alarm  - Obtain necessary fall risk management equipment:   - Apply yellow socks and bracelet for high fall risk patients  - Consider moving patient to room near nurses station  Outcome: Progressing  Goal: Maintain or return to baseline ADL function  Description: INTERVENTIONS:  -  Assess patient's ability to carry out ADLs; assess patient's baseline for ADL function and identify physical deficits which impact ability to perform ADLs (bathing, care of mouth/teeth, toileting, grooming, dressing, etc.)  - Assess/evaluate cause of self-care deficits   - Assess range of motion  - Assess patient's mobility; develop plan if impaired  - Assess patient's need for assistive devices and provide as appropriate  - Encourage maximum independence but intervene and supervise when necessary  - Involve family in performance of ADLs  - Assess for home care needs following discharge   - Consider OT consult to assist with ADL evaluation and planning for discharge  - Provide patient education as appropriate  Outcome: Progressing  Goal: Maintains/Returns to pre admission functional  level  Description: INTERVENTIONS:  - Perform AM-PAC 6 Click Basic Mobility/ Daily Activity assessment daily.  - Set and communicate daily mobility goal to care team and patient/family/caregiver.   - Collaborate with rehabilitation services on mobility goals if consulted  - Perform Range of Motion  times a day.  - Reposition patient every  hours.  - Dangle patient  times a day  - Stand patient  times a day  - Ambulate patient  times a day  - Out of bed to chair  times a day   - Out of bed for meals  times a day  - Out of bed for toileting  - Record patient progress and toleration of activity level   Outcome: Progressing     Problem: DISCHARGE PLANNING  Goal: Discharge to home or other facility with appropriate resources  Description: INTERVENTIONS:  - Identify barriers to discharge w/patient and caregiver  - Arrange for needed discharge resources and transportation as appropriate  - Identify discharge learning needs (meds, wound care, etc.)  - Arrange for interpretive services to assist at discharge as needed  - Refer to Case Management Department for coordinating discharge planning if the patient needs post-hospital services based on physician/advanced practitioner order or complex needs related to functional status, cognitive ability, or social support system  Outcome: Progressing     Problem: Knowledge Deficit  Goal: Patient/family/caregiver demonstrates understanding of disease process, treatment plan, medications, and discharge instructions  Description: Complete learning assessment and assess knowledge base.  Interventions:  - Provide teaching at level of understanding  - Provide teaching via preferred learning methods  Outcome: Progressing     Problem: Prexisting or High Potential for Compromised Skin Integrity  Goal: Skin integrity is maintained or improved  Description: INTERVENTIONS:  - Identify patients at risk for skin breakdown  - Assess and monitor skin integrity  - Assess and monitor nutrition and  hydration status  - Monitor labs   - Assess for incontinence   - Turn and reposition patient  - Assist with mobility/ambulation  - Relieve pressure over bony prominences  - Avoid friction and shearing  - Provide appropriate hygiene as needed including keeping skin clean and dry  - Evaluate need for skin moisturizer/barrier cream  - Collaborate with interdisciplinary team   - Patient/family teaching  - Consider wound care consult   Outcome: Progressing     Problem: Nutrition/Hydration-ADULT  Goal: Nutrient/Hydration intake appropriate for improving, restoring or maintaining nutritional needs  Description: Monitor and assess patient's nutrition/hydration status for malnutrition. Collaborate with interdisciplinary team and initiate plan and interventions as ordered.  Monitor patient's weight and dietary intake as ordered or per policy. Utilize nutrition screening tool and intervene as necessary. Determine patient's food preferences and provide high-protein, high-caloric foods as appropriate.     INTERVENTIONS:  - Monitor oral intake, urinary output, labs, and treatment plans  - Assess nutrition and hydration status and recommend course of action  - Evaluate amount of meals eaten  - Assist patient with eating if necessary   - Allow adequate time for meals  - Recommend/ encourage appropriate diets, oral nutritional supplements, and vitamin/mineral supplements  - Order, calculate, and assess calorie counts as needed  - Recommend, monitor, and adjust tube feedings and TPN/PPN based on assessed needs  - Assess need for intravenous fluids  - Provide specific nutrition/hydration education as appropriate  - Include patient/family/caregiver in decisions related to nutrition  Outcome: Progressing

## 2024-06-09 NOTE — ASSESSMENT & PLAN NOTE
No seizures since she came in   On Vimpat 200 mg BID   On Zonegran 200 mg HS  On Gabapending 300 mg BID

## 2024-06-09 NOTE — CONSULTS
Consultation - Infectious Disease   Renzo Pop 64 y.o. female MRN: [id number]  Unit/Bed#: W -01 Encounter: [id number]      IMPRESSION & RECOMMENDATIONS:     1.  SIRS.  Fever, tachycardia, hypotension.  Patient briefly required vasopressor support following intubation and sedation.  She is now afebrile, hemodynamically stable.  Patient felt well prior to seizure activity without any symptoms of infection at home.  Low concern for infection at this time.  The fevers were likely caused by seizure activity, medications.  Imaging shows gallbladder wall thickening however the patient has no right upper quadrant pain to suggest cholecystitis.  There is possible acute on chronic pancreatitis on imaging which can cause a SIRS response.  COVID/flu/RSV PCR is negative, blood cultures no growth.  The patient has chronic pancytopenia but is developing worsening neutropenia which may be a side effect of antibiotics.   -Stop vancomycin and Zosyn and monitor off antibiotics   -Repeat CBC tomorrow to monitor white blood cell count and drug toxicity   -Monitor fever curve off antibiotics   -Okay for discharge from ID perspective if she remains afebrile off antibiotic therapy    2.  Status epilepticus.  Patient with a history of seizure disorder, alcohol withdrawal seizures. She reports emotional distress prior to seizure onset which has precipitated seizures in the past.  Otherwise no symptoms of infection prior to her seizure.  Patient initially intubated in the ICU, now extubated and transferred to the floors.   -Neurology follow-up   -AEDs per neurology    3.  Pancytopenia.  Chronic since 2019.  Patient saw hematology in 2016 but has not followed since.  At the time was suspected to be due to alcohol use.  Imaging without cirrhosis.  White blood cell count worsening since admission and patient with moderate neutropenia today.  May be related to medication toxicity.   -Discontinue antibiotics   -Monitor CBC   -Recommend  hematology follow-up outpatient    4.  Anxiety with depression.  Continue home medications.    I have discussed the above management plan to monitor off antibiotics with Dr. Fabian who is in agreement.  Discussed with the patient at bedside.  ID will follow.    I have performed an extensive review of the medical records in Epic including review of the notes, radiographs, and laboratory results     HISTORY OF PRESENT ILLNESS:  Reason for Consult: Fever  HPI: Renzo Pop is a 64 y.o. woman with a history of focal epilepsy, alcohol use, pancytopenia, anxiety with depression who was brought to the [facility] ED on 6/6 with suspected status epilepticus.  The patient was at home with her  when all of a sudden she became nonresponsive, stared at the ceiling and developed tonic-clonic activity with upper gaze.  In the ED the patient was intubated for airway protection.  She was given Ativan and started on ketamine and fentanyl infusions as well as loaded with Keppra.  The patient was febrile and hypotensive following this, and briefly required vasopressor support.  She was started on vancomycin and Zosyn.  CT C/A/P showed cholelithiasis with gallbladder wall thickening, inflammatory stranding near the pancreatic tail concerning for possible acute pancreatitis along with findings of chronic pancreatitis.  Right upper quadrant ultrasound showed distended gallbladder with sludge.  MRI brain showed no acute intracranial pathology.  Fevers have resolved.  The patient has chronic pancytopenia although has developed neutropenia today.  ID is consulted for further antibiotic management.  The patient has been transferred to the floors.  Patient reported to ICU she was drinking about 2 beers per day prior to admission.  She states that she was in her normal state of health and feeling well prior to the onset of the seizure.  She was emotionally distressed and frustrated which she says has precipitated  seizures before.  The patient denies any shortness of breath, fever, chills, abdominal pain, diarrhea.  She reports being depressed.    REVIEW OF SYSTEMS:  A complete review of systems is negative other than that noted in the HPI.    PAST MEDICAL HISTORY:  Past Medical History:   Diagnosis Date    Chronic alcohol abuse     Epilepsy (HCC)     Fracture     Hepatitis     Hep A     Hepatitis     Insomnia     2016 resolved    Osteoporosis     2016 resolved    Pancreatitis     SAH (subarachnoid hemorrhage) (HCC)     Seasonal allergies     Seizure (HCC)     Seizures (HCC)     Urine discoloration 2019    Varicella     Vomiting 2019     Past Surgical History:   Procedure Laterality Date    BONE MARROW BIOPSY      2021    IR BIOPSY BONE  2021    IR BIOPSY BONE MARROW  2019    MO TX TIBL SHFT FX IMED IMPLT W/WO SCREWS&/CERCLA Left 2023    Procedure: INSERTION NAIL IM TIBIA;  Surgeon: Danielito Sauceda DO;  Location: [facility];  Service: Orthopedics    TUBAL LIGATION  1985    TUBAL LIGATION         FAMILY HISTORY:  Non-contributory    SOCIAL HISTORY:  Social History   Social History     Substance and Sexual Activity   Alcohol Use Yes    Comment: 6 bottles of wine during the week, now only drinks on the weekend.     Social History     Substance and Sexual Activity   Drug Use Never     Social History     Tobacco Use   Smoking Status Never   Smokeless Tobacco Never       ALLERGIES:  No Known Allergies    MEDICATIONS:  All current active medications have been reviewed.    PHYSICAL EXAM:  Temp:  [97.5 °F (36.4 °C)-99 °F (37.2 °C)] 97.5 °F (36.4 °C)  HR:  [51-78] 71  Resp:  [16-18] 18  BP: (107-127)/(53-77) 127/71  SpO2:  [95 %-98 %] 98 %  Temp (24hrs), Av.1 °F (36.7 °C), Min:97.5 °F (36.4 °C), Max:99 °F (37.2 °C)  Current: Temperature: 97.5 °F (36.4 °C)    Intake/Output Summary (Last 24 hours) at 2024 1424  Last data filed at 2024 1236  Gross per 24 hour   Intake 240 ml   Output  1775 ml   Net -1535 ml       General Appearance:  Frail appearing but no acute distress   Head:  Normocephalic, without obvious abnormality, atraumatic   Eyes:  Conjunctiva pink and sclera anicteric, both eyes   Nose: Nares normal, mucosa normal, no drainage   Throat: Oropharynx moist without lesions   Neck: Supple, symmetrical, no adenopathy, no tenderness/mass/nodules   Back:   Symmetric, no curvature, ROM normal, no CVA tenderness   Lungs:   Clear to auscultation bilaterally, respirations unlabored   Chest Wall:  No tenderness or deformity   Heart:  RRR; no murmur, rub or gallop   Abdomen:   Soft, non-tender, non-distended, positive bowel sounds    Extremities: No cyanosis, clubbing or edema   Skin: Scattered bruises   Lymph nodes: Cervical, supraclavicular nodes normal   Neurologic: Alert and oriented times 3, extremity strength 5/5 and symmetric       LABS, IMAGING, & OTHER STUDIES:  Lab Results:  I have personally reviewed pertinent labs.  Results from last 7 days   Lab Units 06/09/24 0436 06/08/24 0436 06/07/24  0333   WBC Thousand/uL 2.16* 2.63* 3.50*   HEMOGLOBIN g/dL 10.6* 9.7* 11.0*   PLATELETS Thousands/uL 61* 58* 59*     Results from last 7 days   Lab Units 06/09/24 0436 06/08/24  0436 06/07/24  0333 06/06/24  2331   SODIUM mmol/L 138 134* 135  --    POTASSIUM mmol/L 4.7 4.5 3.4*  --    CHLORIDE mmol/L 114* 111* 110*  --    CO2 mmol/L 20* 22 20*  --    CO2, I-STAT mmol/L  --   --   --  18*   BUN mg/dL 7 6 6  --    CREATININE mg/dL 0.60 0.52* 0.51*  --    EGFR ml/min/1.73sq m 96 101 101  --    GLUCOSE, ISTAT mg/dl  --   --   --  148*   CALCIUM mg/dL 8.0* 7.0* 6.1*  --    AST U/L 24 22 21  --    ALT U/L 14 14 14  --    ALK PHOS U/L 59 53 58  --      Results from last 7 days   Lab Units 06/06/24  2142   BLOOD CULTURE  No Growth at 48 hrs.  No Growth at 48 hrs.     Results from last 7 days   Lab Units 06/07/24  0333 06/06/24  2142   PROCALCITONIN ng/ml 0.09 0.08                   Imaging Studies:   I  have personally reviewed pertinent imaging study reports and images in PACS.    MRI brain with no acute intracranial pathology, stable left greater than right scattered bilateral cerebral sulcal hemosiderosis    CT C/A/P with cholelithiasis with gallbladder wall thickening, possible acute on chronic pancreatitis

## 2024-06-09 NOTE — RESPIRATORY THERAPY NOTE
"RT Protocol Note  Renzo Pop 64 y.o. female MRN: [id number]  Unit/Bed#: W -01 Encounter: [id number]    Assessment    Principal Problem:    Status epilepticus (HCC)  Active Problems:    Thrombocytopenia (HCC)    Nonintractable focal epilepsy (HCC)    Anxiety with depression    Alcohol use disorder, severe, dependence (HCC)    SIRS (systemic inflammatory response syndrome) (HCC)    Seizure (HCC)      Home Pulmonary Medications:  N/A       Subjective         Objective    Physical Exam:   Assessment Type: Assess only  General Appearance: Awake, Alert  Respiratory Pattern: Normal  Chest Assessment: Chest expansion symmetrical  Bilateral Breath Sounds: Clear  Cough: Strong  O2 Device: RA    Vitals:  Blood pressure 109/66, pulse 78, temperature 99 °F (37.2 °C), temperature source Oral, resp. rate 16, height 5' 6\" (1.676 m), weight 63.3 kg (139 lb 8.8 oz), SpO2 96%.    Results from last 7 days   Lab Units 06/07/24  0454   PH ART  7.350   PCO2 ART mm Hg 34.2*   PO2 ART mm Hg 111.4   HCO3 ART mmol/L 18.5*   BASE EXC ART mmol/L -6.3   O2 CONTENT ART mL/dL 16.6   O2 HGB, ARTERIAL % 97.6*   ABG SOURCE  Radial, Left   FIDELINA TEST  Yes       Imaging and other studies: I have personally reviewed pertinent reports.      O2 Device: RA     Plan    Respiratory Plan: Discontinue Protocol, No distress/Pulmonary history  Airway Clearance Plan: Discontinue Protocol     Resp Comments: Patient states she has no previous respiratory hx and does not have any home medications. The patient is not in any distress at this time, discontinue protocol.   "

## 2024-06-09 NOTE — PLAN OF CARE
Problem: OCCUPATIONAL THERAPY ADULT  Goal: Performs self-care activities at highest level of function for planned discharge setting.  See evaluation for individualized goals.  Description: Treatment Interventions: ADL retraining, Neuromuscular reeducation, Equipment evaluation/education, Continued evaluation, Energy conservation, Activityengagement          See flowsheet documentation for full assessment, interventions and recommendations.   Note: Limitation: Decreased ADL status, Decreased Safe judgement during ADL, Decreased self-care trans, Decreased high-level ADLs  Prognosis: Fair  Assessment: Patient evaluated by Occupational Therapy.  Patient admitted with Status epilepticus (HCC).  The patients occupational profile, medical and therapy history includes a expanded review of medical and/or therapy records and additional review of physical, cognitive, or psychosocial history related to current functional performance.  Comorbidities affecting functional mobility and ADLS include: anxiety and seizures.  Prior to admission, patient was independent with functional mobility without assistive device, independent with ADLS, independent with IADLS, and living with spouse in a 2 level home with 4 steps to enter. See above for performance levels.  The evaluation identifies the following performance deficits: impaired balance, decreased coordination, increased fall risk, new onset of impairment of functional mobility, decreased ADLS, and decreased IADLS, that result in activity limitations and/or participation restrictions. This evaluation requires clinical decision making of low complexity, because the patients presents with no comorbidities that affect occupational performance and required no modification of tasks or assistance with consideration of a limited number of treatment options.  The Barthel Index was used as a functional outcome tool presenting with a score of Barthel Index Score: 70,  The patient's raw  score on the AM-PAC Daily Activity Inpatient Short Form is 22. A raw score of greater than or equal to 19 suggests the patient may benefit from discharge to home. Please refer to the recommendation of the Occupational Therapist for safe discharge planning. Patient will benefit from skilled Occupational Therapy services to address above deficits and facilitate a safe return to prior level of function.     Rehab Resource Intensity Level, OT: III (Minimum Resource Intensity)

## 2024-06-10 VITALS
SYSTOLIC BLOOD PRESSURE: 113 MMHG | RESPIRATION RATE: 16 BRPM | BODY MASS INDEX: 20.2 KG/M2 | TEMPERATURE: 97.6 F | DIASTOLIC BLOOD PRESSURE: 65 MMHG | WEIGHT: 125.66 LBS | HEIGHT: 66 IN | HEART RATE: 71 BPM | OXYGEN SATURATION: 98 %

## 2024-06-10 LAB
ANION GAP SERPL CALCULATED.3IONS-SCNC: 5 MMOL/L (ref 4–13)
ATRIAL RATE: 115 BPM
BUN SERPL-MCNC: 10 MG/DL (ref 5–25)
CALCIUM SERPL-MCNC: 8.5 MG/DL (ref 8.4–10.2)
CHLORIDE SERPL-SCNC: 108 MMOL/L (ref 96–108)
CO2 SERPL-SCNC: 24 MMOL/L (ref 21–32)
CREAT SERPL-MCNC: 0.54 MG/DL (ref 0.6–1.3)
GFR SERPL CREATININE-BSD FRML MDRD: 100 ML/MIN/1.73SQ M
GLUCOSE SERPL-MCNC: 105 MG/DL (ref 65–140)
MRSA NOSE QL CULT: NORMAL
P AXIS: 65 DEGREES
POTASSIUM SERPL-SCNC: 3.9 MMOL/L (ref 3.5–5.3)
PR INTERVAL: 174 MS
QRS AXIS: 89 DEGREES
QRSD INTERVAL: 120 MS
QT INTERVAL: 352 MS
QTC INTERVAL: 486 MS
SODIUM SERPL-SCNC: 137 MMOL/L (ref 135–147)
T WAVE AXIS: 48 DEGREES
VENTRICULAR RATE: 115 BPM

## 2024-06-10 PROCEDURE — 99232 SBSQ HOSP IP/OBS MODERATE 35: CPT | Performed by: INTERNAL MEDICINE

## 2024-06-10 PROCEDURE — 99239 HOSP IP/OBS DSCHRG MGMT >30: CPT | Performed by: INTERNAL MEDICINE

## 2024-06-10 PROCEDURE — 80048 BASIC METABOLIC PNL TOTAL CA: CPT | Performed by: INTERNAL MEDICINE

## 2024-06-10 PROCEDURE — 93010 ELECTROCARDIOGRAM REPORT: CPT | Performed by: INTERNAL MEDICINE

## 2024-06-10 RX ADMIN — CHLORHEXIDINE GLUCONATE 15 ML: 1.2 RINSE ORAL at 08:26

## 2024-06-10 RX ADMIN — Medication 200 MG: at 08:26

## 2024-06-10 RX ADMIN — GABAPENTIN 300 MG: 300 CAPSULE ORAL at 08:26

## 2024-06-10 NOTE — UTILIZATION REVIEW
Continued Stay Review    Date: 6/10/24  for 6/8 and 6/10                  Current Patient Class: IP  Current Level of Care:  MS as of 6/8 from critical care    HPI:64 y.o. female with hx epilepsy following traumatic SAH  initially admitted on 6/6  with seizure like activity . Pt was at home making dinner and found to be staring at ceiling. Pt noted to have tonic-clonic seizure in ED for which she was treated with ativan. Pt was intubated for airway protection. The patient was hypertensive and febrile upon admission requiring Levophed. Pt self extubated in late PM of 6/7 and did not require reintubation. Pt reports she is recently drinking about 2 beers per day. Her etoh level was negative upon admission . Neuro following .     Assessment/Plan:   6/8   Overnight pt self-extubated and tolerated room air without desaturations or respiratory distress. She reports mild chills .  Diminished air movement R base  ,. No focal deficits . EEG w/o seizure activity . Resume zongran and vimpat; stop keppra . F/U MRI brain . Pt on RA . + SIRS; unclear source of infection , possible pancreatitis. Inflammatory stranding at tail of pancreas On IV Vanco, IV Unasyn .  F/U blood cx. ID consult placed today .   Pancytopenia- thought in setting ETOH abuse .   Moon to b d/c'ed today and pt downgraded to med surg . CIWA monitoring. Levo stopped 6/7 2pm with BP remaining in tolerable range. SLP recommends reg diet, thin liquids.    PT- level II rehab resource intensity . will likely progress to level 3 pending mobility progression including stairs)      6/9   ID consult :Low concern for infection at this time. The fevers were likely caused by seizure activity, medications. Imaging shows gallbladder wall thickening however the patient has no right upper quadrant pain to suggest cholecystitis. There is possible acute on chronic pancreatitis on imaging which can cause a SIRS response. Blood cultures no growth. The patient has chronic  pancytopenia but is developing worsening neutropenia which may be a side effect of antibiotics. Plan- d/c IV Zosyn, IV vanco. CBC in  am . Pancytopenia- Recommend hematology follow-up outpatient     6/10   Afebrile. Neutropenia with WBC down to 2.16 today . MRI brain 6/8 shows nothing acute . Pt AAO x 4 . CIWA 0 .     Vital Signs:   Date/Time Temp Pulse Resp BP MAP (mmHg) SpO2   06/10/24 08:18:09 -- 71 -- 113/65 81 98 %   06/10/24 07:25:53 97.6 °F (36.4 °C) 72 -- 122/70 87 95 %   06/10/24 07:25:43 97.6 °F (36.4 °C) 70 -- 122/70 87 96 %   06/09/24 22:05:10 98.2 °F (36.8 °C) 61 16 136/70 92 97 %   06/09/24 2000 -- -- -- 122/60 -- --   06/09/24 15:49:10 97.7 °F (36.5 °C) 55 11 Abnormal  124/66 85 99 %   06/09/24 12:13:42 -- 71 -- 127/71 90 98 %   06/09/24 1200 -- -- -- 127/71 -- --   06/09/24 08:03:09 97.5 °F (36.4 °C) 51 Abnormal  -- 109/53 72 97 %   06/09/24 07:55:39 97.7 °F (36.5 °C) 52 Abnormal  18 107/54 72 95 %   06/08/24 22:35:41 98.1 °F (36.7 °C) 63 16 107/57 74 98 %   06/08/24 2135 -- 78 -- -- -- 96 %   06/08/24 2000 -- -- -- 110/65 -- --   06/08/24 19:19:22 -- -- -- 109/66 80 --   06/08/24 1524 99 °F (37.2 °C) -- -- 127/77 85 --   06/08/24 1129 100.1 °F (37.8 °C) -- -- 103/54 76 --   06/08/24 1100 -- 75 16 110/56 77 98 %   06/08/24 0820 -- 75 24 Abnormal  115/59 82 97 %   06/08/24 0810 -- 77 18 102/56 74 95 %   06/08/24 0800 -- 74 25 Abnormal  90/51 65 97 %   06/08/24 0750 -- 71 21 100/57 72 98 %   06/08/24 0740 -- 72 20 99/58 76 98 %   06/08/24 0726 98.5 °F (36.9 °C) 76 27 Abnormal  105/64 80 96 %   06/08/24 0720 -- 77 18 99/54 72 97 %   06/08/24 0710 -- 70 16 97/54 72 97 %   06/08/24 0700 -- 72 17 99/56 75 97 %   06/08/24 0542 -- 74 20 91/51 66 100 %   06/08/24 0510 -- 73 21 90/55 68 100 %   06/08/24 0410 -- 69 23 Abnormal  89/54 Abnormal  67 100 %   06/08/24 0303 98.5 °F (36.9 °C) -- -- -- -- --   06/08/24 0300 -- 75 15 94/55 69 99 %   06/08/24 0200 -- 78 18 94/55 69 99 %   06/08/24 0100 -- 84 13 108/57 78  99 %   06/08/24 0000 -- 85 14 100/58 76 99 %       Date and Time Eye Opening Best Verbal Response Best Motor Response [place] Coma Scale Score   06/09/24 2000 4 5 6 15   06/09/24 0803 4 5 6 15   06/08/24 2000 4 5 6 15   06/08/24 1300 4 5 6 15   06/08/24 1200 4 5 6 15   06/08/24 1100 4 5 6 15   06/08/24 1000 4 5 6 15   06/08/24 0900 4 5 6 15   06/08/24 0800 4 5 6 15   06/08/24 0700 4 5 6 15   06/08/24 0400 4 5 6 15   06/08/24 0300 4 5 6 15   06/08/24 0200 4 5 6 15   06/07/24 1600 3 1 6 10   06/07/24 1200 2 1 5 8         CIWA-Ar Score    Row Name 06/10/24 08:18:09 06/10/24 07:25:53 06/10/24 07:25:43 06/09/24 22:05:10 06/09/24 2000   CIWA-Ar   /65  -/70  -/70  -/70  -/60  -LT   Pulse 71  -DI 72  -DI 70  -DI 61  -DI --   Nausea and Vomiting 0  -PH -- -- -- 0  -LT   Tactile Disturbances 0  -PH -- -- -- 0  -LT   Tremor 0  -PH -- -- -- 0  -LT   Auditory Disturbances 0  -PH -- -- -- 0  -LT   Paroxysmal Sweats 0  -PH -- -- -- 0  -LT   Visual Disturbances 0  -PH -- -- -- 0  -LT   Anxiety 0  -PH -- -- -- 0  -LT   Headache, Fullness in Head 0  -PH -- -- -- 0  -LT   Agitation 0  -PH -- -- -- 0  -LT   Orientation and Clouding of Sensorium 0  -PH -- -- -- 0  -LT   CIWA-Ar Total 0  -PH -- -- -- 0  -LT   Row Name 06/09/24 15:49:10 06/09/24 12:13:42 06/09/24 1200 06/09/24 08:03:09 06/09/24 07:55:39   CIWA-Ar   /66  -/71  -/71  -/53  -/54  -DI   Pulse 55  -DI 71  -DI -- 51 Abnormal   -DI 52 Abnormal   -DI   Nausea and Vomiting -- -- 0  -PH 0  -PH --   Tactile Disturbances -- -- 0  -PH 0  -PH --   Tremor -- -- 0  -PH 0  -PH --   Auditory Disturbances -- -- 0  -PH 0  -PH --   Paroxysmal Sweats -- -- 0  -PH 0  -PH --   Visual Disturbances -- -- 0  -PH 0  -PH --   Anxiety -- -- 0  -PH 0  -PH --   Headache, Fullness in Head -- -- 0  -PH 0  -PH --   Agitation -- -- 0  -PH 0  -PH --   Orientation and Clouding of Sensorium -- -- 0  -PH 0  -PH --   CIWA-Ar Total -- -- 0  -PH 0  -PH --    Row Name 06/08/24 22:35:41 06/08/24 2135 06/08/24 2000 06/08/24 19:19:22 06/08/24 1640   WA-Ar   /57  -DI -- 110/65  -/66  -DI --   Pulse 63  -DI 78  -MO -- -- --   Nausea and Vomiting -- -- 0  -LT -- 0  -CM   Tactile Disturbances -- -- 0  -LT -- 0  -CM   Tremor -- -- 0  -LT -- 0  -CM   Auditory Disturbances -- -- 0  -LT -- 0  -CM   Paroxysmal Sweats -- -- 0  -LT -- 0  -CM   Visual Disturbances -- -- 0  -LT -- 0  -CM   Anxiety -- -- 0  -LT -- 0  -CM   Headache, Fullness in Head -- -- 0  -LT -- 3  -CM   Agitation -- -- 0  -LT -- 0  -CM   Orientation and Clouding of Sensorium -- -- 0  -LT -- 0  -CM   CIWA-Ar Total -- -- 0  -LT -- 3  -CM   Row Name 06/08/24 1524 06/08/24 1256 06/08/24 1129 06/08/24 1100 06/08/24 0820   WA-Ar   /77  -GB -- 103/54  -/56  -/59  -CM   Pulse -- -- -- 75  -CM 75  -CM   Nausea and Vomiting -- 0  -CM -- -- 0  -CM   Tactile Disturbances -- 0  -CM -- -- 0  -CM   Tremor -- 0  -CM -- -- 0  -CM   Auditory Disturbances -- 0  -CM -- -- 0  -CM   Paroxysmal Sweats -- 0  -CM -- -- 0  -CM   Visual Disturbances -- 0  -CM -- -- 0  -CM   Anxiety -- 0  -CM -- -- 0  -CM   Headache, Fullness in Head -- 2  -CM -- -- 2  -CM   Agitation -- 0  -CM -- -- 0  -CM   Orientation and Clouding of Sensorium -- 0  -CM -- -- 0  -CM   CIWA-Ar Total -- 2  -CM -- -- 2  -CM     Pertinent Labs/Diagnostic Results:    6/8 MRI brain   1.  No acute intracranial pathology. No significant interval change.  2.  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  Results from last 7 days   Lab Units 06/07/24  0412   SARS-COV-2  Negative     Results from last 7 days   Lab Units 06/09/24  0436 06/08/24  0436 06/07/24  0333 06/06/24  2331 06/06/24  2142   WBC Thousand/uL 2.16* 2.63* 3.50*  --  3.41*   HEMOGLOBIN g/dL 10.6* 9.7* 11.0*  --  12.7   I STAT HEMOGLOBIN g/dl  --   --   --  8.8*  --    HEMATOCRIT % 34.7* 31.1* 35.0  --  38.8   HEMATOCRIT, ISTAT %  --   --   --  26*   --    PLATELETS Thousands/uL 61* 58* 59*  --  67*   TOTAL NEUT ABS Thousands/µL 0.69* 1.95 2.94  --  2.19         Results from last 7 days   Lab Units 06/10/24  0432 06/09/24  0436 06/08/24  0436 06/07/24  0333 06/06/24  2331 06/06/24  2142   SODIUM mmol/L 137 138 134* 135  --  130*   POTASSIUM mmol/L 3.9 4.7 4.5 3.4*  --  4.0   CHLORIDE mmol/L 108 114* 111* 110*  --  100   CO2 mmol/L 24 20* 22 20*  --  24   CO2, I-STAT mmol/L  --   --   --   --  18*  --    ANION GAP mmol/L 5 4 1* 5  --  6   BUN mg/dL 10 7 6 6  --  10   CREATININE mg/dL 0.54* 0.60 0.52* 0.51*  --  0.62   EGFR ml/min/1.73sq m 100 96 101 101  --  95   CALCIUM mg/dL 8.5 8.0* 7.0* 6.1*  --  8.6   CALCIUM, IONIZED, ISTAT mmol/L  --   --   --   --  1.10*  --    MAGNESIUM mg/dL  --   --  2.3 1.6*  --   --    PHOSPHORUS mg/dL  --   --   --  2.0*  --   --      Results from last 7 days   Lab Units 06/09/24 0436 06/08/24 0436 06/07/24 0333 06/06/24  2142   AST U/L 24 22 21 24   ALT U/L 14 14 14 19   ALK PHOS U/L 59 53 58 87   TOTAL PROTEIN g/dL 5.7* 5.1* 5.2* 7.6   ALBUMIN g/dL 3.1* 2.8* 2.9* 4.2   TOTAL BILIRUBIN mg/dL 0.29 0.33 0.41 0.48   AMMONIA umol/L  --   --  26  --      Results from last 7 days   Lab Units 06/06/24  2108   POC GLUCOSE mg/dl 178*     Results from last 7 days   Lab Units 06/10/24  0432 06/09/24  0436 06/08/24  0436 06/07/24  0333 06/06/24  2142   GLUCOSE RANDOM mg/dL 105 98 105 111 170*             BETA-HYDROXYBUTYRATE   Date Value Ref Range Status   03/07/2022 0.2 <0.6 mmol/L Final      Results from last 7 days   Lab Units 06/07/24  0454   PH ART  7.350   PCO2 ART mm Hg 34.2*   PO2 ART mm Hg 111.4   HCO3 ART mmol/L 18.5*   BASE EXC ART mmol/L -6.3   O2 CONTENT ART mL/dL 16.6   O2 HGB, ARTERIAL % 97.6*   ABG SOURCE  Radial, Left         Results from last 7 days   Lab Units 06/06/24  2331   PH, NATALIA I-STAT  7.367   PCO2, NATALIA ISTAT mm HG 30.0*   PO2, NATALIA ISTAT mm HG 60.0*   HCO3, NATALIA ISTAT mmol/L 17.2*   I STAT BASE EXC mmol/L -7*   I  STAT O2 SAT % 90*         Results from last 7 days   Lab Units 06/07/24  0008 06/06/24  2142   HS TNI 0HR ng/L  --  3   HS TNI 2HR ng/L 85*  --    HSTNI D2 ng/L 82*  --          Results from last 7 days   Lab Units 06/08/24  0436 06/06/24  2142   PROTIME seconds 15.3* 13.3   INR  1.14 0.95   PTT seconds  --  28         Results from last 7 days   Lab Units 06/07/24  0333 06/06/24  2142   PROCALCITONIN ng/ml 0.09 0.08     Results from last 7 days   Lab Units 06/09/24  0937 06/06/24  2142   LACTIC ACID mmol/L 0.7 0.9               Results from last 7 days   Lab Units 06/07/24  0333   LIPASE u/L 180*   AMYLASE IU/L 84                 Results from last 7 days   Lab Units 06/06/24  2145   CLARITY UA  Clear   COLOR UA  Light Yellow   SPEC GRAV UA  1.013   PH UA  6.5   GLUCOSE UA mg/dl Negative   KETONES UA mg/dl Negative   BLOOD UA  Negative   PROTEIN UA mg/dl Trace*   NITRITE UA  Negative   BILIRUBIN UA  Negative   UROBILINOGEN UA (BE) mg/dl <2.0   LEUKOCYTES UA  Negative   WBC UA /hpf 2-4*   RBC UA /hpf None Seen   BACTERIA UA /hpf Occasional   EPITHELIAL CELLS WET PREP /hpf Occasional     Results from last 7 days   Lab Units 06/07/24  0412   INFLUENZA A PCR  Negative   INFLUENZA B PCR  Negative   RSV PCR  Negative         Results from last 7 days   Lab Units 06/07/24  0028   AMPH/METH  Negative   BARBITURATE UR  Negative   BENZODIAZEPINE UR  Negative   COCAINE UR  Negative   METHADONE URINE  Negative   OPIATE UR  Negative   PCP UR  Negative   THC UR  Negative     Results from last 7 days   Lab Units 06/07/24  1009   ETHANOL LVL mg/dL <10                 Results from last 7 days   Lab Units 06/06/24  2142   BLOOD CULTURE  No Growth at 72 hrs.  No Growth at 72 hrs.             Results from last 7 days   Lab Units 06/08/24  0436 06/07/24  0333   VANCOMYCIN RM ug/mL 15.0  --    VANCOMYCIN TR ug/mL  --  16.8       Medications:   Scheduled Medications:  chlorhexidine, 15 mL, Mouth/Throat, Q12H DESTIN  enoxaparin, 40 mg,  Subcutaneous, Daily  gabapentin, 300 mg, Oral, BID  lacosamide, 200 mg, Per NG Tube, Q12H DESTIN  LORazepam, 2 mg, Intravenous, Once  traZODone, 50 mg, Oral, HS  zonisamide, 200 mg, Oral, HS    vancomycin (VANCOCIN) 1,250 mg in sodium chloride 0.9 % 250 mL IVPB  Dose: 1,250 mg  Freq: Every 12 hours Route: IV  Last Dose: 1,250 mg (06/09/24 0034)  Start: 06/07/24 0700 End: 06/09/24 0657  piperacillin-tazobactam (ZOSYN) 4.5 g in sodium chloride 0.9 % 100 mL IVPB (EXTENDED INFUSION)  Dose: 4.5 g  Freq: Every 8 hours Route: IV  Last Dose: 4.5 g (06/09/24 0331)  Start: 06/07/24 0230 End: 06/09/24 0854  levETIRAcetam (KEPPRA) injection 500 mg  Dose: 500 mg  Freq: Every 12 hours scheduled Route: IV  Start: 06/07/24 0900 End: 06/08/24 1333    Continuous IV Infusions:     PRN Meds:  acetaminophen, 650 mg, Oral, Q6H PRN x1 6/8         Discharge Plan: TBD    Network Utilization Review Department  ATTENTION: Please call with any questions or concerns to [phone] and carefully listen to the prompts so that you are directed to the right person. All voicemails are confidential.   For Discharge needs, contact Care Management DC Support Team at [phone] opt. [phone]  Send all requests for admission clinical reviews, approved or denied determinations and any other requests to dedicated fax number below belonging to the campus where the patient is receiving treatment. List of dedicated fax numbers for the Facilities:  FACILITY NAME UR FAX NUMBER   ADMISSION DENIALS (Administrative/Medical Necessity) [phone]   DISCHARGE SUPPORT TEAM (NETWORK) [phone]   PARENT CHILD HEALTH (Maternity/NICU/Pediatrics) [phone]   [facility] [phone]   [facility] [phone]   [facility] [phone]   [facility] [phone]   [facility] [phone]   [facility] [phone]   [facility]  General acute hospital [phone]   [facility] [phone]   [facility] [phone]   [facility] [phone]   [facility] [phone]   [facility] [phone]

## 2024-06-10 NOTE — PLAN OF CARE
Problem: SAFETY,RESTRAINT: NV/NON-SELF DESTRUCTIVE BEHAVIOR  Goal: Remains free of harm/injury (restraint for non violent/non self-detsructive behavior)  Description: INTERVENTIONS:  - Instruct patient/family regarding restraint use   - Assess and monitor physiologic and psychological status   - Provide interventions and comfort measures to meet assessed patient needs   - Identify and implement measures to help patient regain control  - Assess readiness for release of restraint   6/10/2024 1731 by Mary Parkinson RN  Outcome: Adequate for Discharge  6/10/2024 1008 by Mary Parkinson RN  Outcome: Progressing  6/10/2024 1008 by Mary Parkinson RN  Outcome: Progressing  Goal: Returns to optimal restraint-free functioning  Description: INTERVENTIONS:  - Assess the patient's behavior and symptoms that indicate continued need for restraint  - Identify and implement measures to help patient regain control  - Assess readiness for release of restraint   6/10/2024 1731 by Mary Parkinson RN  Outcome: Adequate for Discharge  6/10/2024 1008 by Mary Parkinson RN  Outcome: Progressing  6/10/2024 1008 by Mary Parkinson RN  Outcome: Progressing     Problem: PAIN - ADULT  Goal: Verbalizes/displays adequate comfort level or baseline comfort level  Description: Interventions:  - Encourage patient to monitor pain and request assistance  - Assess pain using appropriate pain scale  - Administer analgesics based on type and severity of pain and evaluate response  - Implement non-pharmacological measures as appropriate and evaluate response  - Consider cultural and social influences on pain and pain management  - Notify physician/advanced practitioner if interventions unsuccessful or patient reports new pain  6/10/2024 1731 by Mary Parkinson RN  Outcome: Adequate for Discharge  6/10/2024 1008 by Mary Parkinson RN  Outcome: Progressing  6/10/2024 1008 by Mary Parkinson RN  Outcome: Progressing     Problem: INFECTION -  ADULT  Goal: Absence or prevention of progression during hospitalization  Description: INTERVENTIONS:  - Assess and monitor for signs and symptoms of infection  - Monitor lab/diagnostic results  - Monitor all insertion sites, i.e. indwelling lines, tubes, and drains  - Monitor endotracheal if appropriate and nasal secretions for changes in amount and color  - [place] appropriate cooling/warming therapies per order  - Administer medications as ordered  - Instruct and encourage patient and family to use good hand hygiene technique  - Identify and instruct in appropriate isolation precautions for identified infection/condition  6/10/2024 1731 by Mary Parkinson RN  Outcome: Adequate for Discharge  6/10/2024 1008 by Mary Parkinson RN  Outcome: Progressing  6/10/2024 1008 by Mary Parkinson RN  Outcome: Progressing  Goal: Absence of fever/infection during neutropenic period  Description: INTERVENTIONS:  - Monitor WBC    6/10/2024 1731 by Mary Parkinson RN  Outcome: Adequate for Discharge  6/10/2024 1008 by Mary Parkinson RN  Outcome: Progressing  6/10/2024 1008 by Mary Parkinson RN  Outcome: Progressing     Problem: SAFETY ADULT  Goal: Patient will remain free of falls  Description: INTERVENTIONS:  - Educate patient/family on patient safety including physical limitations  - Instruct patient to call for assistance with activity   - Consult OT/PT to assist with strengthening/mobility   - Keep Call bell within reach  - Keep bed low and locked with side rails adjusted as appropriate  - Keep care items and personal belongings within reach  - Initiate and maintain comfort rounds  - Make Fall Risk Sign visible to staff  - Offer Toileting every  Hours, in advance of need  - Initiate/Maintain alarm  - Obtain necessary fall risk management equipment:   - Apply yellow socks and bracelet for high fall risk patients  - Consider moving patient to room near nurses station  6/10/2024 1731 by Mary Parkinson RN  Outcome:  Adequate for Discharge  6/10/2024 1008 by Mary Parkinson RN  Outcome: Progressing  6/10/2024 1008 by Mary Parkinson RN  Outcome: Progressing  Goal: Maintain or return to baseline ADL function  Description: INTERVENTIONS:  -  Assess patient's ability to carry out ADLs; assess patient's baseline for ADL function and identify physical deficits which impact ability to perform ADLs (bathing, care of mouth/teeth, toileting, grooming, dressing, etc.)  - Assess/evaluate cause of self-care deficits   - Assess range of motion  - Assess patient's mobility; develop plan if impaired  - Assess patient's need for assistive devices and provide as appropriate  - Encourage maximum independence but intervene and supervise when necessary  - Involve family in performance of ADLs  - Assess for home care needs following discharge   - Consider OT consult to assist with ADL evaluation and planning for discharge  - Provide patient education as appropriate  6/10/2024 1731 by Mary Parkinson RN  Outcome: Adequate for Discharge  6/10/2024 1008 by Mary Parkinson RN  Outcome: Progressing  6/10/2024 1008 by Mary Parkinson RN  Outcome: Progressing  Goal: Maintains/Returns to pre admission functional level  Description: INTERVENTIONS:  - Perform AM-PAC 6 Click Basic Mobility/ Daily Activity assessment daily.  - Set and communicate daily mobility goal to care team and patient/family/caregiver.   - Collaborate with rehabilitation services on mobility goals if consulted  - Perform Range of Motion  times a day.  - Reposition patient every  hours.  - Dangle patient  times a day  - Stand patient  times a day  - Ambulate patient  times a day  - Out of bed to chair  times a day   - Out of bed for meals  times a day  - Out of bed for toileting  - Record patient progress and toleration of activity level   6/10/2024 1731 by Mary Parkinson RN  Outcome: Adequate for Discharge  6/10/2024 1008 by Mary Parkinson RN  Outcome: Progressing  6/10/2024 1008  by [place] Parkinson, RN  Outcome: Progressing     Problem: DISCHARGE PLANNING  Goal: Discharge to home or other facility with appropriate resources  Description: INTERVENTIONS:  - Identify barriers to discharge w/patient and caregiver  - Arrange for needed discharge resources and transportation as appropriate  - Identify discharge learning needs (meds, wound care, etc.)  - Arrange for interpretive services to assist at discharge as needed  - Refer to Case Management Department for coordinating discharge planning if the patient needs post-hospital services based on physician/advanced practitioner order or complex needs related to functional status, cognitive ability, or social support system  6/10/2024 1731 by Mary Parkinson RN  Outcome: Adequate for Discharge  6/10/2024 1008 by Mary Parkinson RN  Outcome: Progressing  6/10/2024 1008 by Mary Parkinson RN  Outcome: Progressing     Problem: Knowledge Deficit  Goal: Patient/family/caregiver demonstrates understanding of disease process, treatment plan, medications, and discharge instructions  Description: Complete learning assessment and assess knowledge base.  Interventions:  - Provide teaching at level of understanding  - Provide teaching via preferred learning methods  6/10/2024 1731 by Mary Parkinson RN  Outcome: Adequate for Discharge  6/10/2024 1008 by Mary Parkinson RN  Outcome: Progressing  6/10/2024 1008 by Mary Parkinson RN  Outcome: Progressing     Problem: Prexisting or High Potential for Compromised Skin Integrity  Goal: Skin integrity is maintained or improved  Description: INTERVENTIONS:  - Identify patients at risk for skin breakdown  - Assess and monitor skin integrity  - Assess and monitor nutrition and hydration status  - Monitor labs   - Assess for incontinence   - Turn and reposition patient  - Assist with mobility/ambulation  - Relieve pressure over bony prominences  - Avoid friction and shearing  - Provide appropriate hygiene as needed  including keeping skin clean and dry  - Evaluate need for skin moisturizer/barrier cream  - Collaborate with interdisciplinary team   - Patient/family teaching  - Consider wound care consult   6/10/2024 1731 by Mary Parkinson RN  Outcome: Adequate for Discharge  6/10/2024 1008 by Mary Parkinson RN  Outcome: Progressing  6/10/2024 1008 by Mary Parkinson RN  Outcome: Progressing     Problem: Nutrition/Hydration-ADULT  Goal: Nutrient/Hydration intake appropriate for improving, restoring or maintaining nutritional needs  Description: Monitor and assess patient's nutrition/hydration status for malnutrition. Collaborate with interdisciplinary team and initiate plan and interventions as ordered.  Monitor patient's weight and dietary intake as ordered or per policy. Utilize nutrition screening tool and intervene as necessary. Determine patient's food preferences and provide high-protein, high-caloric foods as appropriate.     INTERVENTIONS:  - Monitor oral intake, urinary output, labs, and treatment plans  - Assess nutrition and hydration status and recommend course of action  - Evaluate amount of meals eaten  - Assist patient with eating if necessary   - Allow adequate time for meals  - Recommend/ encourage appropriate diets, oral nutritional supplements, and vitamin/mineral supplements  - Order, calculate, and assess calorie counts as needed  - Recommend, monitor, and adjust tube feedings and TPN/PPN based on assessed needs  - Assess need for intravenous fluids  - Provide specific nutrition/hydration education as appropriate  - Include patient/family/caregiver in decisions related to nutrition  6/10/2024 1731 by Mary Parkinson RN  Outcome: Adequate for Discharge  6/10/2024 1008 by Mary Parkinson RN  Outcome: Progressing  6/10/2024 1008 by Mary Parkinson RN  Outcome: Progressing

## 2024-06-10 NOTE — DISCHARGE INSTR - AVS FIRST PAGE
Dear Renzo Pop,     It was our pleasure to care for you here at [facility].  It is our hope that we were always able to exceed the expected standards for your care during your stay.  You were hospitalized due to a seizure.  You were cared for on the  4th floor under the service of Kailey Miller MD with the [facility] who covers for your primary care physician (PCP), rAielle Tong MD, while you were hospitalized.  If you have any questions or concerns related to this hospitalization, you may contact us at .  For follow up as well as medication refills, we recommend that you follow up with your primary care physician.  A registered nurse will reach out to you by phone within a few days after your discharge to answer any additional questions that you may have after going home.  However, at this time we provide for you here, the most important instructions / recommendations at discharge:     Notable Medication Adjustments -   We did not change any medications.   Testing Required after Discharge -   None.   ** Please contact your PCP to request testing orders for any of the testing recommended here **  Important follow up information -   Please come in to the ED if you have any fevers or chills.   Also please come in to the ER if you have any further seizures.   Other Instructions -   You were recommended to go to a rehab facility upon discharge.     Please review this entire after visit summary as additional general instructions including medication list, appointments, activity, diet, any pertinent wound care, and other additional recommendations from your care team that may be provided for you.      Sincerely,     Kailey Miller MD

## 2024-06-10 NOTE — PLAN OF CARE
Problem: SAFETY,RESTRAINT: NV/NON-SELF DESTRUCTIVE BEHAVIOR  Goal: Remains free of harm/injury (restraint for non violent/non self-detsructive behavior)  Description: INTERVENTIONS:  - Instruct patient/family regarding restraint use   - Assess and monitor physiologic and psychological status   - Provide interventions and comfort measures to meet assessed patient needs   - Identify and implement measures to help patient regain control  - Assess readiness for release of restraint   Outcome: Progressing  Goal: Returns to optimal restraint-free functioning  Description: INTERVENTIONS:  - Assess the patient's behavior and symptoms that indicate continued need for restraint  - Identify and implement measures to help patient regain control  - Assess readiness for release of restraint   Outcome: Progressing     Problem: PAIN - ADULT  Goal: Verbalizes/displays adequate comfort level or baseline comfort level  Description: Interventions:  - Encourage patient to monitor pain and request assistance  - Assess pain using appropriate pain scale  - Administer analgesics based on type and severity of pain and evaluate response  - Implement non-pharmacological measures as appropriate and evaluate response  - Consider cultural and social influences on pain and pain management  - Notify physician/advanced practitioner if interventions unsuccessful or patient reports new pain  Outcome: Progressing     Problem: INFECTION - ADULT  Goal: Absence or prevention of progression during hospitalization  Description: INTERVENTIONS:  - Assess and monitor for signs and symptoms of infection  - Monitor lab/diagnostic results  - Monitor all insertion sites, i.e. indwelling lines, tubes, and drains  - Monitor endotracheal if appropriate and nasal secretions for changes in amount and color  - [place] appropriate cooling/warming therapies per order  - Administer medications as ordered  - Instruct and encourage patient and family to use good hand  hygiene technique  - Identify and instruct in appropriate isolation precautions for identified infection/condition  Outcome: Progressing  Goal: Absence of fever/infection during neutropenic period  Description: INTERVENTIONS:  - Monitor WBC    Outcome: Progressing     Problem: SAFETY ADULT  Goal: Patient will remain free of falls  Description: INTERVENTIONS:  - Educate patient/family on patient safety including physical limitations  - Instruct patient to call for assistance with activity   - Consult OT/PT to assist with strengthening/mobility   - Keep Call bell within reach  - Keep bed low and locked with side rails adjusted as appropriate  - Keep care items and personal belongings within reach  - Initiate and maintain comfort rounds  - Make Fall Risk Sign visible to staff  - Offer Toileting every  Hours, in advance of need  - Initiate/Maintain alarm  - Obtain necessary fall risk management equipment:   - Apply yellow socks and bracelet for high fall risk patients  - Consider moving patient to room near nurses station  Outcome: Progressing  Goal: Maintain or return to baseline ADL function  Description: INTERVENTIONS:  -  Assess patient's ability to carry out ADLs; assess patient's baseline for ADL function and identify physical deficits which impact ability to perform ADLs (bathing, care of mouth/teeth, toileting, grooming, dressing, etc.)  - Assess/evaluate cause of self-care deficits   - Assess range of motion  - Assess patient's mobility; develop plan if impaired  - Assess patient's need for assistive devices and provide as appropriate  - Encourage maximum independence but intervene and supervise when necessary  - Involve family in performance of ADLs  - Assess for home care needs following discharge   - Consider OT consult to assist with ADL evaluation and planning for discharge  - Provide patient education as appropriate  Outcome: Progressing  Goal: Maintains/Returns to pre admission functional  level  Description: INTERVENTIONS:  - Perform AM-PAC 6 Click Basic Mobility/ Daily Activity assessment daily.  - Set and communicate daily mobility goal to care team and patient/family/caregiver.   - Collaborate with rehabilitation services on mobility goals if consulted  - Perform Range of Motion  times a day.  - Reposition patient every  hours.  - Dangle patient  times a day  - Stand patient  times a day  - Ambulate patient  times a day  - Out of bed to chair  times a day   - Out of bed for meals  times a day  - Out of bed for toileting  - Record patient progress and toleration of activity level   Outcome: Progressing     Problem: DISCHARGE PLANNING  Goal: Discharge to home or other facility with appropriate resources  Description: INTERVENTIONS:  - Identify barriers to discharge w/patient and caregiver  - Arrange for needed discharge resources and transportation as appropriate  - Identify discharge learning needs (meds, wound care, etc.)  - Arrange for interpretive services to assist at discharge as needed  - Refer to Case Management Department for coordinating discharge planning if the patient needs post-hospital services based on physician/advanced practitioner order or complex needs related to functional status, cognitive ability, or social support system  Outcome: Progressing     Problem: Knowledge Deficit  Goal: Patient/family/caregiver demonstrates understanding of disease process, treatment plan, medications, and discharge instructions  Description: Complete learning assessment and assess knowledge base.  Interventions:  - Provide teaching at level of understanding  - Provide teaching via preferred learning methods  Outcome: Progressing     Problem: Prexisting or High Potential for Compromised Skin Integrity  Goal: Skin integrity is maintained or improved  Description: INTERVENTIONS:  - Identify patients at risk for skin breakdown  - Assess and monitor skin integrity  - Assess and monitor nutrition and  hydration status  - Monitor labs   - Assess for incontinence   - Turn and reposition patient  - Assist with mobility/ambulation  - Relieve pressure over bony prominences  - Avoid friction and shearing  - Provide appropriate hygiene as needed including keeping skin clean and dry  - Evaluate need for skin moisturizer/barrier cream  - Collaborate with interdisciplinary team   - Patient/family teaching  - Consider wound care consult   Outcome: Progressing     Problem: Nutrition/Hydration-ADULT  Goal: Nutrient/Hydration intake appropriate for improving, restoring or maintaining nutritional needs  Description: Monitor and assess patient's nutrition/hydration status for malnutrition. Collaborate with interdisciplinary team and initiate plan and interventions as ordered.  Monitor patient's weight and dietary intake as ordered or per policy. Utilize nutrition screening tool and intervene as necessary. Determine patient's food preferences and provide high-protein, high-caloric foods as appropriate.     INTERVENTIONS:  - Monitor oral intake, urinary output, labs, and treatment plans  - Assess nutrition and hydration status and recommend course of action  - Evaluate amount of meals eaten  - Assist patient with eating if necessary   - Allow adequate time for meals  - Recommend/ encourage appropriate diets, oral nutritional supplements, and vitamin/mineral supplements  - Order, calculate, and assess calorie counts as needed  - Recommend, monitor, and adjust tube feedings and TPN/PPN based on assessed needs  - Assess need for intravenous fluids  - Provide specific nutrition/hydration education as appropriate  - Include patient/family/caregiver in decisions related to nutrition  Outcome: Progressing

## 2024-06-10 NOTE — PROGRESS NOTES
Progress Note - Infectious Disease   Renzo Pop 64 y.o. female MRN: [id number]  Unit/Bed#: W -01 Encounter: [id number]      Impression/Plan:  1.  SIRS.  Fever, tachycardia, hypotension.  Patient briefly required vasopressor support following intubation and sedation.  She is now afebrile, hemodynamically stable.  Patient felt well prior to seizure activity without any symptoms of infection at home.  Low concern for infection at this time.  The fevers were likely caused by seizure activity, medications. Imaging shows gallbladder wall thickening however the patient has no right upper quadrant pain to suggest cholecystitis.  There is possible acute on chronic pancreatitis on imaging which can cause a SIRS response.  COVID/flu/RSV PCR is negative, blood cultures no growth.  The patient has chronic pancytopenia but is developing worsening neutropenia which may be a side effect of antibiotics.              -Maintain off antibiotics              -Trend CBC/chemistry while admitted              -Monitor fever curve off antibiotics while admitted              -Patient otherwise stable from ID standpoint for discharge   -Additional care per primary     2.  Status epilepticus.  Patient with a history of seizure disorder, alcohol withdrawal seizures. She reports emotional distress prior to seizure onset which has precipitated seizures in the past.  She was also self titrating medications at home. Otherwise no symptoms of infection prior to her seizure.  Patient initially intubated in the ICU, now extubated and transferred to the floors.              -Neurology follow-up              -AEDs per neurology     3.  Pancytopenia.  Chronic since 2019.  Patient saw hematology in 2016 but has not followed since.  At the time was suspected to be due to alcohol use.  Imaging without cirrhosis.  White blood cell count worsening since admission and patient with moderate neutropenia.  May be related to medication toxicity.               -Maintain off antibiotics as above              -Monitor CBC while admitted              -Recommend hematology follow-up outpatient   -Follow-up with PCP as outpatient     4.  Anxiety with depression.  Continue home medications.    Above plan discussed briefly with the patient at bedside  Above plan discussed with primary service attending who is aware of plans to maintain off antibiotics and clearance from ID standpoint    ID consult service will continue to follow while admitted.    Antibiotics:  Off systemic antibiotic day 1    24 Hour events:  Yesterday and overnight notes reviewed and no acute events noted    Subjective:  Patient seen at bedside and she denied having any nausea, vomiting, chest pain or shortness of breath.  She reports recent self titration of her medication which she thinks may have propagated her episode of seizure.  No other localizing symptoms today    Objective:  Vitals:  Temp:  [97.6 °F (36.4 °C)-98.2 °F (36.8 °C)] 97.6 °F (36.4 °C)  HR:  [55-72] 71  Resp:  [11-16] 16  BP: (113-136)/(60-71) 113/65  SpO2:  [95 %-99 %] 98 %  Temp (24hrs), Av.8 °F (36.6 °C), Min:97.6 °F (36.4 °C), Max:98.2 °F (36.8 °C)  Current: Temperature: 97.6 °F (36.4 °C)    Physical Exam:   General Appearance:  Alert, interactive, nontoxic, no acute distress.   Throat: Oropharynx moist without lesions.    Lungs:   Clear to auscultation bilaterally; no wheezes, rhonchi or rales; respirations unlabored on room air   Heart:  RRR; no murmur, rub or gallop noted   Abdomen:   Soft, non-tender, non-distended, positive bowel sounds.     Extremities: No clubbing, cyanosis or edema   Skin: No new rashes or lesions. No new draining wounds noted.       Labs, Imaging, & Other studies:   All pertinent labs and imaging studies in PACS were personally reviewed as below.  Results from last 7 days   Lab Units 24  0436 24  0436 24  0333   WBC Thousand/uL 2.16* 2.63* 3.50*   HEMOGLOBIN g/dL 10.6* 9.7* 11.0*    PLATELETS Thousands/uL 61* 58* 59*     Results from last 7 days   Lab Units 06/10/24  0432 06/09/24  0436 06/08/24  0436 06/07/24  0333 06/06/24  2331   POTASSIUM mmol/L 3.9 4.7 4.5 3.4*  --    CHLORIDE mmol/L 108 114* 111* 110*  --    CO2 mmol/L 24 20* 22 20*  --    CO2, I-STAT mmol/L  --   --   --   --  18*   BUN mg/dL 10 7 6 6  --    CREATININE mg/dL 0.54* 0.60 0.52* 0.51*  --    EGFR ml/min/1.73sq m 100 96 101 101  --    GLUCOSE, ISTAT mg/dl  --   --   --   --  148*   CALCIUM mg/dL 8.5 8.0* 7.0* 6.1*  --    AST U/L  --  24 22 21  --    ALT U/L  --  14 14 14  --    ALK PHOS U/L  --  59 53 58  --      Results from last 7 days   Lab Units 06/08/24  1639 06/06/24  2142   BLOOD CULTURE   --  No Growth at 72 hrs.  No Growth at 72 hrs.   MRSA CULTURE ONLY  No Methicillin Resistant Staphlyococcus aureus (MRSA) isolated  --        Lab interpretation/comments: Creatinine today unremarkable    Imaging interpretation/comments: No new imaging overnight    Culture data: Blood cultures remain without growth    External notes: None

## 2024-06-10 NOTE — PROGRESS NOTES
Vancomycin IV Pharmacy-to-Dose Consultation    Renzo Pop is a 64 y.o. female who was receiving Vancomycin IV with management by the Pharmacy Consult service for treatment of Other risk of infection; unknown source..    The patient’s Vancomycin therapy has been completed / discontinued. Thank you for allowing us to take part in this patient's care. Pharmacy will sign-off now; please call or re-consult if there are any questions.    Lucita Castillo  Pharmacist

## 2024-06-10 NOTE — PLAN OF CARE
Problem: SAFETY,RESTRAINT: NV/NON-SELF DESTRUCTIVE BEHAVIOR  Goal: Remains free of harm/injury (restraint for non violent/non self-detsructive behavior)  Description: INTERVENTIONS:  - Instruct patient/family regarding restraint use   - Assess and monitor physiologic and psychological status   - Provide interventions and comfort measures to meet assessed patient needs   - Identify and implement measures to help patient regain control  - Assess readiness for release of restraint   Outcome: Progressing  Goal: Returns to optimal restraint-free functioning  Description: INTERVENTIONS:  - Assess the patient's behavior and symptoms that indicate continued need for restraint  - Identify and implement measures to help patient regain control  - Assess readiness for release of restraint   Outcome: Progressing     Problem: PAIN - ADULT  Goal: Verbalizes/displays adequate comfort level or baseline comfort level  Description: Interventions:  - Encourage patient to monitor pain and request assistance  - Assess pain using appropriate pain scale  - Administer analgesics based on type and severity of pain and evaluate response  - Implement non-pharmacological measures as appropriate and evaluate response  - Consider cultural and social influences on pain and pain management  - Notify physician/advanced practitioner if interventions unsuccessful or patient reports new pain  Outcome: Progressing     Problem: INFECTION - ADULT  Goal: Absence or prevention of progression during hospitalization  Description: INTERVENTIONS:  - Assess and monitor for signs and symptoms of infection  - Monitor lab/diagnostic results  - Monitor all insertion sites, i.e. indwelling lines, tubes, and drains  - Monitor endotracheal if appropriate and nasal secretions for changes in amount and color  - [place] appropriate cooling/warming therapies per order  - Administer medications as ordered  - Instruct and encourage patient and family to use good hand  hygiene technique  - Identify and instruct in appropriate isolation precautions for identified infection/condition  Outcome: Progressing  Goal: Absence of fever/infection during neutropenic period  Description: INTERVENTIONS:  - Monitor WBC    Outcome: Progressing     Problem: SAFETY ADULT  Goal: Patient will remain free of falls  Description: INTERVENTIONS:  - Educate patient/family on patient safety including physical limitations  - Instruct patient to call for assistance with activity   - Consult OT/PT to assist with strengthening/mobility   - Keep Call bell within reach  - Keep bed low and locked with side rails adjusted as appropriate  - Keep care items and personal belongings within reach  - Initiate and maintain comfort rounds  - Make Fall Risk Sign visible to staff  - Offer Toileting every  Hours, in advance of need  - Initiate/Maintain alarm  - Obtain necessary fall risk management equipment:   - Apply yellow socks and bracelet for high fall risk patients  - Consider moving patient to room near nurses station  Outcome: Progressing  Goal: Maintain or return to baseline ADL function  Description: INTERVENTIONS:  -  Assess patient's ability to carry out ADLs; assess patient's baseline for ADL function and identify physical deficits which impact ability to perform ADLs (bathing, care of mouth/teeth, toileting, grooming, dressing, etc.)  - Assess/evaluate cause of self-care deficits   - Assess range of motion  - Assess patient's mobility; develop plan if impaired  - Assess patient's need for assistive devices and provide as appropriate  - Encourage maximum independence but intervene and supervise when necessary  - Involve family in performance of ADLs  - Assess for home care needs following discharge   - Consider OT consult to assist with ADL evaluation and planning for discharge  - Provide patient education as appropriate  Outcome: Progressing  Goal: Maintains/Returns to pre admission functional  level  Description: INTERVENTIONS:  - Perform AM-PAC 6 Click Basic Mobility/ Daily Activity assessment daily.  - Set and communicate daily mobility goal to care team and patient/family/caregiver.   - Collaborate with rehabilitation services on mobility goals if consulted  - Perform Range of Motion  times a day.  - Reposition patient every  hours.  - Dangle patient  times a day  - Stand patient  times a day  - Ambulate patient  times a day  - Out of bed to chair  times a day   - Out of bed for meals  times a day  - Out of bed for toileting  - Record patient progress and toleration of activity level   Outcome: Progressing     Problem: DISCHARGE PLANNING  Goal: Discharge to home or other facility with appropriate resources  Description: INTERVENTIONS:  - Identify barriers to discharge w/patient and caregiver  - Arrange for needed discharge resources and transportation as appropriate  - Identify discharge learning needs (meds, wound care, etc.)  - Arrange for interpretive services to assist at discharge as needed  - Refer to Case Management Department for coordinating discharge planning if the patient needs post-hospital services based on physician/advanced practitioner order or complex needs related to functional status, cognitive ability, or social support system  Outcome: Progressing     Problem: Knowledge Deficit  Goal: Patient/family/caregiver demonstrates understanding of disease process, treatment plan, medications, and discharge instructions  Description: Complete learning assessment and assess knowledge base.  Interventions:  - Provide teaching at level of understanding  - Provide teaching via preferred learning methods  Outcome: Progressing     Problem: Prexisting or High Potential for Compromised Skin Integrity  Goal: Skin integrity is maintained or improved  Description: INTERVENTIONS:  - Identify patients at risk for skin breakdown  - Assess and monitor skin integrity  - Assess and monitor nutrition and  hydration status  - Monitor labs   - Assess for incontinence   - Turn and reposition patient  - Assist with mobility/ambulation  - Relieve pressure over bony prominences  - Avoid friction and shearing  - Provide appropriate hygiene as needed including keeping skin clean and dry  - Evaluate need for skin moisturizer/barrier cream  - Collaborate with interdisciplinary team   - Patient/family teaching  - Consider wound care consult   Outcome: Progressing     Problem: Nutrition/Hydration-ADULT  Goal: Nutrient/Hydration intake appropriate for improving, restoring or maintaining nutritional needs  Description: Monitor and assess patient's nutrition/hydration status for malnutrition. Collaborate with interdisciplinary team and initiate plan and interventions as ordered.  Monitor patient's weight and dietary intake as ordered or per policy. Utilize nutrition screening tool and intervene as necessary. Determine patient's food preferences and provide high-protein, high-caloric foods as appropriate.     INTERVENTIONS:  - Monitor oral intake, urinary output, labs, and treatment plans  - Assess nutrition and hydration status and recommend course of action  - Evaluate amount of meals eaten  - Assist patient with eating if necessary   - Allow adequate time for meals  - Recommend/ encourage appropriate diets, oral nutritional supplements, and vitamin/mineral supplements  - Order, calculate, and assess calorie counts as needed  - Recommend, monitor, and adjust tube feedings and TPN/PPN based on assessed needs  - Assess need for intravenous fluids  - Provide specific nutrition/hydration education as appropriate  - Include patient/family/caregiver in decisions related to nutrition  Outcome: Progressing

## 2024-06-11 ENCOUNTER — TRANSITIONAL CARE MANAGEMENT (OUTPATIENT)
Dept: FAMILY MEDICINE CLINIC | Facility: CLINIC | Age: 64
End: 2024-06-11

## 2024-06-11 LAB
BACTERIA BLD CULT: NORMAL
BACTERIA BLD CULT: NORMAL

## 2024-06-11 NOTE — UTILIZATION REVIEW
NOTIFICATION OF ADMISSION DISCHARGE   This is a Notification of Discharge from [facility]. Please be advised that this patient has been discharge from our facility. Below you will find the admission and discharge date and time including the patient’s disposition.   UTILIZATION REVIEW CONTACT:  Che Welch  Utilization   Network Utilization Review Department  Phone: [phone] carefully listen to the prompts. All voicemails are confidential.  Email: [email or web address]     ADMISSION INFORMATION  PRESENTATION DATE: 6/6/2024  9:07 PM  OBERVATION ADMISSION DATE:   INPATIENT ADMISSION DATE: 6/6/24 11:00 PM   DISCHARGE DATE: 6/10/2024  5:37 PM   DISPOSITION:Home with Home Health Care    Network Utilization Review Department  ATTENTION: Please call with any questions or concerns to [phone] and carefully listen to the prompts so that you are directed to the right person. All voicemails are confidential.   For Discharge needs, contact Care Management DC Support Team at [phone] opt. [phone]  Send all requests for admission clinical reviews, approved or denied determinations and any other requests to dedicated fax number below belonging to the campus where the patient is receiving treatment. List of dedicated fax numbers for the Facilities:  FACILITY NAME UR FAX NUMBER   ADMISSION DENIALS (Administrative/Medical Necessity) [phone]   DISCHARGE SUPPORT TEAM ([facility]) [phone]   PARENT CHILD HEALTH (Maternity/NICU/Pediatrics) [phone]   [facility] [phone]   [facility] [phone]   [facility] [phone]   [facility] [phone]   [facility] [phone]   [facility] [phone]   [facility] [phone]   [facility]  [phone]   [facility] [phone]   [facility] [phone]   [facility] [phone]   [facility] [phone]

## 2024-06-13 NOTE — ASSESSMENT & PLAN NOTE
Platelets of 61  Stable and trending up in fact   Should follow up with PCP and may need further work up  No bleeding including no petechia no blood in oropharynx

## 2024-06-13 NOTE — ASSESSMENT & PLAN NOTE
No seizures since she came in   On Vimpat 200 mg BID   On Zonegran 200 mg HS  On Gabapending 300 mg BID  Discussed with attending neurologist Dr. Perez and no need to add any additional meds at this time despite initial consult note from neurology.

## 2024-06-13 NOTE — DISCHARGE SUMMARY
"[facility]  Discharge- Renzo Pop 1960, 64 y.o. female MRN: [id number]  Unit/Bed#: W -01 Encounter: [id number]  Primary Care Provider: Arielle Tong MD   Date and time admitted to hospital: 6/6/2024  9:07 PM    Seizure (HCC)  Assessment & Plan  As above.     SIRS (systemic inflammatory response syndrome) (HCC)  Assessment & Plan  Currently leukopenia only.   On ABx  Unclear what we are treating   Cultures negative and CTCAP shows ? Pancreatitis and gallbladder sludge   Will DC abx - ID consult appreciated and they agree with continued discontinuation.     Alcohol use disorder, severe, dependence (HCC)  Assessment & Plan  Last drink was approximately 9 months ago.    Anxiety with depression  Assessment & Plan  Stable no acute issues    Nonintractable focal epilepsy (HCC)  Assessment & Plan  No seizures since she came in   On Vimpat 200 mg BID   On Zonegran 200 mg HS  On Gabapending 300 mg BID  Discussed with attending neurologist Dr. Perez and no need to add any additional meds at this time despite initial consult note from neurology.     Thrombocytopenia (HCC)  Assessment & Plan  Platelets of 61  Stable and trending up in fact   Should follow up with PCP and may need further work up  No bleeding including no petechia no blood in oropharynx    * Status epilepticus (HCC)  Assessment & Plan  As above   On gabapentin , vimpat, zonegran        Medical Problems       Resolved Problems  Date Reviewed: 6/13/2024   None       Discharging Physician / Practitioner: Kailey Miller MD  PCP: Arielle Tong MD  Admission Date:   Admission Orders (From admission, onward)       Ordered        06/06/24 2300  INPATIENT ADMISSION  Once                          Discharge Date: 06/13/24    Consultations During Hospital Stay:  Neurology   Was on critical care service initially   ID     Procedures Performed:   CT CAP -   \"1.  Cholelithiasis with gallbladder wall thickening, however lumen is " "partially decompressed. Recommend ultrasound if there is clinical concern for acute gallbladder pathology.  2.  Inflammatory stranding near the pancreatic tail, concerning for pancreatitis  3.  Findings of chronic pancreatitis  4.  Dependent pulmonary opacities, favored to be atelectasis, less likely pneumonia     MRI brain -   \"1.  No acute intracranial pathology. No significant interval change.  2.  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     Incidental Findings:   As above.    I reviewed the above mentioned incidental findings with the patient and/or family and they expressed understanding.    Test Results Pending at Discharge (will require follow up):   Needs close follow up with PCP      Outpatient Tests Requested:  Needs repeat labs     Complications:  none.     Reason for Admission: seizures.     Hospital Course:   Renzo Pop is a 64 y.o. female patient who originally presented to the hospital on 6/6/2024 due to breakthrough seizures.   Seen by neurology and no change to medications were recommended , MRI was reviewed by neurology as well.     Patient felt back to normal and was eager to be discharged home on the day of discharge.  ID were consulted because of SIRS however no clear source of infection noted.      Please see above list of diagnoses and related plan for additional information.     Condition at Discharge: stable    Discharge Day Visit / Exam:   Subjective:    Patient seen and examined,  No new symptoms  Feeling \" great\"  Vitals: Blood Pressure: 113/65 (06/10/24 0818)  Pulse: 71 (06/10/24 0818)  Temperature: 97.6 °F (36.4 °C) (06/10/24 0725)  Temp Source: Oral (06/09/24 2205)  Respirations: 16 (06/09/24 2205)  Height: 5' 6\" (167.6 cm) (06/06/24 2356)  Weight - Scale: 57 kg (125 lb 10.6 oz) (06/10/24 0600)  SpO2: 98 % (06/10/24 0818)  Exam:   Physical Exam  Constitutional:       General: She is not in " acute distress.     Appearance: She is not ill-appearing, toxic-appearing or diaphoretic.   HENT:      Head: Normocephalic.      Nose: Nose normal.      Mouth/Throat:      Mouth: Mucous membranes are moist.   Eyes:      General: No scleral icterus.        Right eye: No discharge.         Left eye: No discharge.      Pupils: Pupils are equal, round, and reactive to light.   Cardiovascular:      Rate and Rhythm: Normal rate.      Heart sounds: No murmur heard.     No friction rub. No gallop.   Pulmonary:      Effort: No respiratory distress.      Breath sounds: No stridor. No wheezing, rhonchi or rales.   Chest:      Chest wall: No tenderness.   Abdominal:      General: There is no distension.      Palpations: There is no mass.      Tenderness: There is no abdominal tenderness. There is no right CVA tenderness, left CVA tenderness, guarding or rebound.      Hernia: No hernia is present.   Musculoskeletal:      Right lower leg: No edema.      Left lower leg: No edema.   Skin:     Coloration: Skin is not jaundiced or pale.      Findings: No bruising, erythema, lesion or rash.   Neurological:      General: No focal deficit present.      Mental Status: She is alert.      Cranial Nerves: No cranial nerve deficit.      Sensory: No sensory deficit.      Motor: No weakness.      Coordination: Coordination normal.      Gait: Gait normal.      Deep Tendon Reflexes: Reflexes normal.   Psychiatric:         Mood and Affect: Mood normal.          Discussion with Family:   updated him.     Discharge instructions/Information to patient and family:   See after visit summary for information provided to patient and family.      Provisions for Follow-Up Care:  See after visit summary for information related to follow-up care and any pertinent home health orders.      Mobility at time of Discharge:   Basic Mobility Inpatient Raw Score: 20  JH-HLM Goal: 6: Walk 10 steps or more  JH-HLM Achieved: 7: Walk 25 feet or more  No needs      Disposition:   Home    Planned Readmission: None     Discharge Statement:  I spent 45 minutes discharging the patient. This time was spent on the day of discharge. I had direct contact with the patient on the day of discharge. Greater than 50% of the total time was spent examining patient, answering all patient questions, arranging and discussing plan of care with patient as well as directly providing post-discharge instructions.  Additional time then spent on discharge activities.    Discharge Medications:  See after visit summary for reconciled discharge medications provided to patient and/or family.      **Please Note: This note may have been constructed using a voice recognition system**

## 2024-06-13 NOTE — ASSESSMENT & PLAN NOTE
Currently leukopenia only.   On ABx  Unclear what we are treating   Cultures negative and CTCAP shows ? Pancreatitis and gallbladder sludge   Will DC abx - ID consult appreciated and they agree with continued discontinuation.

## 2024-06-18 ENCOUNTER — OFFICE VISIT (OUTPATIENT)
Dept: FAMILY MEDICINE CLINIC | Facility: CLINIC | Age: 64
End: 2024-06-18
Payer: COMMERCIAL

## 2024-06-18 VITALS
BODY MASS INDEX: 19.77 KG/M2 | HEIGHT: 66 IN | SYSTOLIC BLOOD PRESSURE: 110 MMHG | OXYGEN SATURATION: 99 % | WEIGHT: 123 LBS | DIASTOLIC BLOOD PRESSURE: 68 MMHG | HEART RATE: 59 BPM

## 2024-06-18 DIAGNOSIS — I77.819 AORTIC ECTASIA (HCC): ICD-10-CM

## 2024-06-18 DIAGNOSIS — I50.42 CHRONIC COMBINED SYSTOLIC AND DIASTOLIC CONGESTIVE HEART FAILURE (HCC): Chronic | ICD-10-CM

## 2024-06-18 DIAGNOSIS — G40.901 STATUS EPILEPTICUS (HCC): ICD-10-CM

## 2024-06-18 DIAGNOSIS — K86.1 OTHER CHRONIC PANCREATITIS (HCC): ICD-10-CM

## 2024-06-18 DIAGNOSIS — Z13.220 SCREENING FOR LIPID DISORDERS: ICD-10-CM

## 2024-06-18 DIAGNOSIS — D69.6 THROMBOCYTOPENIA (HCC): ICD-10-CM

## 2024-06-18 DIAGNOSIS — F10.10 ALCOHOL ABUSE: Chronic | ICD-10-CM

## 2024-06-18 DIAGNOSIS — G47.00 INSOMNIA, UNSPECIFIED TYPE: ICD-10-CM

## 2024-06-18 DIAGNOSIS — F41.8 ANXIETY WITH DEPRESSION: Chronic | ICD-10-CM

## 2024-06-18 DIAGNOSIS — F41.9 ANXIETY: ICD-10-CM

## 2024-06-18 DIAGNOSIS — D61.818 PANCYTOPENIA (HCC): Chronic | ICD-10-CM

## 2024-06-18 DIAGNOSIS — F41.0 PANIC ATTACK: ICD-10-CM

## 2024-06-18 DIAGNOSIS — K80.20 GALL STONES: Primary | ICD-10-CM

## 2024-06-18 PROBLEM — F10.939 ALCOHOL WITHDRAWAL SYNDROME WITH COMPLICATION (HCC): Chronic | Status: RESOLVED | Noted: 2023-07-09 | Resolved: 2024-06-18

## 2024-06-18 PROCEDURE — 99496 TRANSJ CARE MGMT HIGH F2F 7D: CPT | Performed by: FAMILY MEDICINE

## 2024-06-18 RX ORDER — HYDROXYZINE HYDROCHLORIDE 25 MG/1
25 TABLET, FILM COATED ORAL 2 TIMES DAILY
Qty: 60 TABLET | Refills: 2 | Status: SHIPPED | OUTPATIENT
Start: 2024-06-18

## 2024-06-18 RX ORDER — TRAZODONE HYDROCHLORIDE 50 MG/1
50 TABLET ORAL
Qty: 30 TABLET | Refills: 2 | Status: SHIPPED | OUTPATIENT
Start: 2024-06-18

## 2024-06-18 NOTE — ASSESSMENT & PLAN NOTE
Currently stable on Pristiq and Atarax combination.  Continue for now.  Patient states that she was unable to get in with psychiatrist and or therapist due to a long waiting list.  She has been reached out by  during recent hospitalization is waiting to hear back from them so she can establish care with psychiatric services in the outpatient.

## 2024-06-18 NOTE — ASSESSMENT & PLAN NOTE
Diagnosed with recent hospitalization, admitted for SIRS.   Etiology of source remains unclear during hospitalization.  Patient advised evaluation by gastroenterologist

## 2024-06-18 NOTE — ASSESSMENT & PLAN NOTE
There are gallstone(s) within the gallbladder, without pericholecystic inflammatory changes.  Patient prefers monitoring for symptoms.

## 2024-06-18 NOTE — PROGRESS NOTES
FAMILY MEDICINE TRANSITION OF CARE OFFICE VISIT  [facility]    NAME: Renzo Pop  AGE: 64 y.o. SEX: female  : 1960       DATE: 2024    Assessment and Plan     Combined systolic and diastolic congestive heart failure (HCC)  Wt Readings from Last 3 Encounters:   24 55.8 kg (123 lb)   06/10/24 57 kg (125 lb 10.6 oz)   24 56.9 kg (125 lb 8 oz)     Asymptomatic, continue beta-blocker per cardiology.          Aortic ectasia (HCC)  Ectatic ascending aorta measures 4.2 cm. No thoracic aortic aneurysm. Recommended to continue monitoring per cardiology.     Gall stones  There are gallstone(s) within the gallbladder, without pericholecystic inflammatory changes.  Patient prefers monitoring for symptoms.     Other chronic pancreatitis (HCC)  Diagnosed with recent hospitalization, admitted for SIRS.   Etiology of source remains unclear during hospitalization.  Patient advised evaluation by gastroenterologist    Pancytopenia (HCC)  History of chronic alcohol abuse in the past.  Currently patient is asymptomatic.  I would again strongly encourage her to establish care with hematology.    Thrombocytopenia (HCC)  Platelet count improving, low but stable.  Patient is asymptomatic with no bleeding episodes.  Encouraged evaluation by hematology.    Anxiety with depression  Currently stable on Pristiq and Atarax combination.  Continue for now.  Patient states that she was unable to get in with psychiatrist and or therapist due to a long waiting list.  She has been reached out by  during recent hospitalization is waiting to hear back from them so she can establish care with psychiatric services in the outpatient.    Alcohol abuse  Patient has past history of alcohol abuse, stopped drinking for at least 9 months now.    Panic attack  Stable on gabapentin, hydroxyzine and trazodone.  Continue same.  Waiting to establish care with psychiatry.  Recommended  a 3-month follow-up.    Insomnia  Improved on trazodone.  Continue same.    Status epilepticus (HCC)  On gabapentin , vimpat, zonegran.   Continue per neurology.       - Counseling Documentation: patient was counseled regarding: diagnostic results, instructions for management, risk factor reductions, prognosis, patient and family education, impressions, risks and benefits of treatment options, and importance of compliance with treatment    Transitional Care Management Review     Renzo Pop is a 64 y.o. female here for TCM follow-up    During the TCM phone call patient stated:    TCM Call       Date and time call was made  6/11/2024  1:07 PM    Hospital care reviewed  Records reviewed    Patient was hospitialized at  [facility] from 3/7/22-3/18/22 and transferred to [facility] from 3/18/22-3/22/22    Date of Admission  06/06/24    Date of discharge  06/10/24    Diagnosis  Status epilepticus    Disposition  Home    Were the patients medications reviewed and updated  Yes    Current Symptoms  None          TCM Call       Post hospital issues  None    Should patient be enrolled in anticoag monitoring?  No    Scheduled for follow up?  Yes    Patient refusal reason  will not make time frame     Patients specialists  Neurologist    Neurologist name  NEUROSURGERY     Other specialists names  JAMES LACHMAN (ORTHOPEDIC SURGERY) AND NEENA PRECIADO (SURGERICAL ONCOLOGY)    Referrals needed  None    Did you obtain your prescribed medications  Yes    Do you need help managing your prescriptions or medications  No    Is transportation to your appointment needed  No    I have advised the patient to call PCP with any new or worsening symptoms  Genesis Vazquez MA    Living Arrangements  Spouse or Significiant other    Support System  Spouse    The type of support provided  Emotional; Financial; Physical    Do you have social support  Yes, as much as I need    Are you recieving any outpatient services  No     Are you recieving home care services  No    Types of home care services  Home PT; Nurse visit  OT    Are you using any community resources  No    Current waiver services  No    Have you fallen in the last 12 months  No    Interperter language line needed  No    Counseling  Patient            History of Present Illness     HPI  Renzo Pop is a 64 y.o. woman with a history of focal epilepsy, alcohol use, pancytopenia, anxiety with depression who was brought to the [facility] ED on 6/6 with suspected status epilepticus.  The patient was at home with her  when all of a sudden she became nonresponsive, stared at the ceiling and developed tonic-clonic activity with upper gaze.  In the ED the patient was intubated for airway protection.     The patient was febrile and hypotensive following this, and briefly required vasopressor support. She was started on vancomycin and Zosyn. CT C/A/P showed cholelithiasis with gallbladder wall thickening, inflammatory stranding near the pancreatic tail concerning for possible acute pancreatitis along with findings of chronic pancreatitis. Patient reported to ICU she was drinking about 2 beers per day prior to admission.   Patient was evaluated by ID specialist.  After initial workup it was advised to monitor her off all antibiotics.  Patient was advised outpatient hematology follow-up due to chronically abnormal CBC with pancytopenia.  Patient is also evaluated by neurology, no changes to the medications were recommended.  Patient reports feeling great since she has returned home from hospitalization.  Denies any fever/chills/abdominal pain/seizure Activity.  The following portions of the patient's history were reviewed and updated as appropriate: allergies, current medications, past family history, past medical history, past social history, past surgical history and problem list.    Review of Systems       Review of Systems   Constitutional: Negative.   "  Respiratory: Negative.     Cardiovascular: Negative.        Active Problem List     Patient Active Problem List   Diagnosis    Insomnia    Lipoma of right lower extremity    Age-related osteoporosis with current pathological fracture with routine healing    Vitamin D deficiency    Other hyperlipidemia    Pancreatic cyst    Gall stones    Refusal of blood transfusions as patient is Hoah[employer]ism    Thrombocytopenia (HCC)    Nonintractable focal epilepsy (HCC)    Pancytopenia (HCC)    Anxiety with depression    Panic attack    Stress-induced cardiomyopathy    Breakthrough seizure (HCC)    Alcohol abuse    Alcohol use disorder, severe, dependence (HCC)    Combined systolic and diastolic congestive heart failure (HCC)    Epilepsy (HCC)    Status epilepticus (HCC)    SIRS (systemic inflammatory response syndrome) (HCC)    Seizure (HCC)    Other chronic pancreatitis (HCC)    Aortic ectasia (HCC)       Objective     /68   Pulse 59   Ht 5' 6\" (1.676 m)   Wt 55.8 kg (123 lb)   SpO2 99%   BMI 19.85 kg/m²     Physical Exam  Constitutional:       Appearance: Normal appearance.   Cardiovascular:      Heart sounds: Normal heart sounds.   Pulmonary:      Breath sounds: Normal breath sounds.   Abdominal:      General: There is no distension.      Palpations: Abdomen is soft.      Tenderness: There is no abdominal tenderness. There is no guarding.   Neurological:      General: No focal deficit present.      Mental Status: She is oriented to person, place, and time. Mental status is at baseline.   Psychiatric:         Mood and Affect: Mood normal.         Laboratory Results: I have personally reviewed the pertinent laboratory results/reports     Radiology/Other Diagnostic Testing Results: I have personally reviewed pertinent reports.      XR chest 1 view    Result Date: 6/7/2024  XR CHEST 1 VIEW INDICATION: post intubation. COMPARISON: None FINDINGS: Endotracheal tube tip is 3.2 cm above the dedrick. Clear lungs. No " pneumothorax or pleural effusion. Normal cardiomediastinal silhouette. Bones are unremarkable for age. Normal upper abdomen.     Endotracheal tube tip 3.2 cm above the dedrick. Workstation performed: [id number]     EEG Routine and awake    Result Date: 2024  Table formatting from the original result was not included. ELECTROENCEPHALOGRAM (EEG) Patient Name:  Renzo Pop  MRN: [id number] :  1960 File #: [id number] Age: 64 y.o.  Date performed: 2024        Report date: 2024      Study type: Routine EEG ICD 10 diagnosis: Complex partial epilepsy not intractable without status G40.209 Start time: 10:17 End time: 10:46 --------------------------------------------------------------------------- ---------------------------------------- Patient History: This recording was observed in a 64 y.o. female with epilepsy to better characterize epilepsy. Medications include: desvenlafaxine, gabapentin, Lacosamide, Zonisamide --------------------------------------------------------------------------- ---------------------------------------- Description of Procedure: 32 channel digital recording with electrodes placed according to the International 10-20 system with additional T1/T2 electrodes, EOG, EKG, and simultaneous video. The recording was technically satisfactory. --------------------------------------------------------------------------- ---------------------------------------- EEG Description: The recording was performed with the patient awake, drowsy, and asleep. During wakefulness, there were long runs of well regulated, low amplitude, posteriorly dominant, symmetric 11-12 cps alpha rhythm that attenuated with eye opening. There were symmetric low amplitude, diffuse, enhanced beta activities. There were intermittent left temporal, medium amplitude, 2-3 cps delta activities. With drowsiness, alpha activity attenuated and was replaced by diffusely distributed theta activities. With sleep, symmetric vertex  sharp waves, K complexes and sleep spindles were present. --------------------------------------------------------------------------- ---------------------------------------- Activation Procedures: Hyperventilation was not performed. Stepped photic stimulation between 1-30 cps was performed and induced symmetric photic driving. Other findings: The single lead ECG did not detect a consistent tracing. --------------------------------------------------------------------------- ---------------------------------------- EEG Interpretation: This Routine EEG recorded during wakefulness, drowsiness, and sleep is abnormal. Intermittent left temporal delta activities suggest an underlying area of neuronal dysfunction.  Enhanced beta activities are commonly seen due to medication effect of benzodiazepines and/or barbiturates.  Danielito Batista MD [facility]     US right upper quadrant    Result Date: 6/7/2024  RIGHT UPPER QUADRANT ULTRASOUND INDICATION: gallbaldder thickening, cholelithiasis. COMPARISON: CT chest abdomen pelvis 6/6/2024 TECHNIQUE: Real-time ultrasound of the right upper quadrant was performed with a curvilinear transducer with both volumetric sweeps and still imaging techniques. FINDINGS: PANCREAS: Calcifications better seen on the CT in keeping with chronic pancreatitis. AORTA AND IVC: Visualized portions are normal for patient age. LIVER: Size: Within normal range. The liver measures 13.7 cm in the midclavicular line. Contour: Surface contour is smooth. Parenchyma: Echogenicity and echotexture are within normal limits. No liver mass identified. Limited imaging of the main portal vein shows it to be patent and hepatopetal. BILIARY: Distended gallbladder with gallbladder sludge. Irregular, mildly thickened gallbladder wall without pericholecystic fluid. Patient was given pain medications therefore presence of a sonographic Carbajal sign cannot be assessed. No  intrahepatic biliary dilatation. CBD measures 3.0 mm. No choledocholithiasis. KIDNEY: Right kidney measures 11.6 x 4.6 x 5.2 cm. Volume 143.3 mL Kidney within normal limits. ASCITES: None.     Distended gallbladder with sludge. Nonspecific irregularity and mild thickening of the wall. Limited assessment for acute cholecystitis as the patient was given pain medications; a sonographic Carbajal sign could not be assessed. Workstation performed: [id number]     CT chest abdomen pelvis wo contrast    Result Date: 6/7/2024  CT CHEST, ABDOMEN AND PELVIS WITHOUT IV CONTRAST INDICATION: Fever, status epilepticus, fever. COMPARISON: CT 8/4/2023, 3/7/2022 TECHNIQUE: CT examination of the chest, abdomen and pelvis was performed without intravenous contrast. Multiplanar 2D reformatted images were created from the source data. This examination, like all CT scans performed in the [facility], was performed utilizing techniques to minimize radiation dose exposure, including the use of iterative reconstruction and automated exposure control. Radiation dose length product (DLP) for this visit: 369 mGy-cm Enteric Contrast: Not administered. FINDINGS: CHEST LUNGS: Dependent bilateral opacities, favored to be atelectasis ET tube tip is above the dedrick PLEURA: Unremarkable. HEART/GREAT VESSELS: Ectatic ascending aorta measures 4.2 cm. No thoracic aortic aneurysm. MEDIASTINUM AND MARYANNE: Unremarkable. CHEST WALL AND LOWER NECK: Unremarkable. ABDOMEN LIVER/BILIARY TREE: Unremarkable. GALLBLADDER: Partially decompressed. Mild wall thickening. Layering gallstones noted. SPLEEN: Unremarkable. PANCREAS: Scattered parenchymal calcifications, increased from prior, specifically within the body and tail. Duct dilation by the tail is better appreciated on prior enhanced study Inflammatory stranding is present surrounding the pancreatic tail which appears new from prior ADRENAL GLANDS: Unremarkable. KIDNEYS/URETERS: Unremarkable. No  hydronephrosis. STOMACH AND BOWEL: OG tube tip in stomach Tube/probe within the rectum APPENDIX: Normal. ABDOMINOPELVIC CAVITY: No ascites. No pneumoperitoneum. No lymphadenopathy. VESSELS: Unremarkable for patient's age. PELVIS REPRODUCTIVE ORGANS: Unremarkable for patient's age. URINARY BLADDER: Unremarkable. ABDOMINAL WALL/INGUINAL REGIONS: 10.6 x 7.4 cm lipomatous lesion noted in the right proximal thigh within the gluteus and quadriceps compartments BONES: Chronic posttraumatic deformities of the pubic rami Stable L2 and T12 and T6 compressions     1.  Cholelithiasis with gallbladder wall thickening, however lumen is partially decompressed. Recommend ultrasound if there is clinical concern for acute gallbladder pathology. 2.  Inflammatory stranding near the pancreatic tail, concerning for pancreatitis 3.  Findings of chronic pancreatitis 4.  Dependent pulmonary opacities, favored to be atelectasis, less likely pneumonia Workstation performed: [id number]     CT head without contrast    Result Date: 6/6/2024  CT BRAIN - WITHOUT CONTRAST INDICATION:   Seizure. COMPARISON: 8/4/2023. TECHNIQUE:  CT examination of the brain was performed.  Multiplanar 2D reformatted images were created from the source data. Radiation dose length product (DLP) for this visit:  1043 mGy-cm .  This examination, like all CT scans performed in the [facility], was performed utilizing techniques to minimize radiation dose exposure, including the use of iterative reconstruction and automated exposure control. IMAGE QUALITY:  Diagnostic. FINDINGS: PARENCHYMA: No acute intracranial hemorrhage or mass effect. VENTRICLES AND EXTRA-AXIAL SPACES: No hydrocephalus or extra-axial collection. VISUALIZED ORBITS: Intact. PARANASAL SINUSES: Mucosal thickening in the ethmoid air cells. CALVARIUM AND EXTRACRANIAL SOFT TISSUES: No lytic or blastic lesion or fracture.     No acute intracranial abnormality. Workstation performed: [id number]         Current Medications     Current Outpatient Medications:     desvenlafaxine succinate (PRISTIQ) 50 mg 24 hr tablet, TAKE ONE TABLET BY MOUTH EVERY DAY, Disp: 30 tablet, Rfl: 5    gabapentin (NEURONTIN) 300 mg capsule, TAKE ONE CAPSULE BY MOUTH TWICE A DAY, Disp: 60 capsule, Rfl: 5    hydrOXYzine HCL (ATARAX) 25 mg tablet, Take 1 tablet (25 mg total) by mouth 2 (two) times a day, Disp: 60 tablet, Rfl: 2    lacosamide (VIMPAT) 200 mg tablet, TAKE ONE TABLET BY MOUTH TWICE A DAY, Disp: 180 tablet, Rfl: 1    metoprolol succinate (TOPROL-XL) 25 mg 24 hr tablet, Take 1 tablet (25 mg total) by mouth 2 (two) times a day, Disp: 180 tablet, Rfl: 3    traZODone (DESYREL) 50 mg tablet, Take 1 tablet (50 mg total) by mouth daily at bedtime, Disp: 30 tablet, Rfl: 2    zonisamide (ZONEGRAN) 100 mg capsule, Take 2 capsules (200 mg total) by mouth daily at bedtime, Disp: 180 capsule, Rfl: 1    Arielle Tong MD  [facility]

## 2024-06-18 NOTE — ASSESSMENT & PLAN NOTE
Platelet count improving, low but stable.  Patient is asymptomatic with no bleeding episodes.  Encouraged evaluation by hematology.

## 2024-06-18 NOTE — ASSESSMENT & PLAN NOTE
Wt Readings from Last 3 Encounters:   06/18/24 55.8 kg (123 lb)   06/10/24 57 kg (125 lb 10.6 oz)   04/30/24 56.9 kg (125 lb 8 oz)     Asymptomatic, continue beta-blocker per cardiology.

## 2024-06-18 NOTE — ASSESSMENT & PLAN NOTE
Ectatic ascending aorta measures 4.2 cm. No thoracic aortic aneurysm. Recommended to continue monitoring per cardiology.

## 2024-07-11 ENCOUNTER — TELEPHONE (OUTPATIENT)
Age: 64
End: 2024-07-11

## 2024-07-31 ENCOUNTER — OFFICE VISIT (OUTPATIENT)
Dept: PODIATRY | Facility: CLINIC | Age: 64
End: 2024-07-31
Payer: COMMERCIAL

## 2024-07-31 VITALS
HEART RATE: 59 BPM | DIASTOLIC BLOOD PRESSURE: 81 MMHG | HEIGHT: 66 IN | BODY MASS INDEX: 20.25 KG/M2 | OXYGEN SATURATION: 98 % | WEIGHT: 126 LBS | SYSTOLIC BLOOD PRESSURE: 129 MMHG

## 2024-07-31 DIAGNOSIS — M20.41 HAMMERTOE OF RIGHT FOOT: Primary | ICD-10-CM

## 2024-07-31 DIAGNOSIS — M79.674 TOE PAIN, RIGHT: ICD-10-CM

## 2024-07-31 PROCEDURE — 99203 OFFICE O/P NEW LOW 30 MIN: CPT | Performed by: PODIATRIST

## 2024-07-31 NOTE — PROGRESS NOTES
Assessment/Plan:     The patient's clinical examination today significant for a semirigid contracture deformity of the right third toe.  There is tenderness palpation to the distal aspect of the right digit.  Callus formation is noted to the distal right 3rd toe. No pulses are palpable.  No open lesions or signs of infection.    The patient notes chronic and significant tenderness to the right third digit which is inhibiting her activities of daily living.  She would like to proceed with a simple tenotomy of the digit to reduce the contracture deformity.  I feel this is reasonable and of minimal risk.  I did explain to the patient that due to the semirigid nature of the hammertoe deformity, a simple soft tissue release can only achieve so much.  There will likely be some residual contracture of the digit secondary to osseous adaptation of the hammertoe deformity over time.  However, alignment should be improved hopefully enough to resolve the tenderness to her distal toe.    A digital block was performed to the right third digit utilizing 3 mL of 0.25% bupivacaine plain.  Once anesthesia was achieved, the right third toe was then scrubbed prepped with Betadine and draped.  A number 18-gauge needle was then utilized to transect the flexor tendon at the level of the PIPJ.  Manual pressure was then held to control bleeding and a compression dressing was then applied with Coflex.  She will keep the dressing clean and dry for 48 hours.    Recommend follow-up in 2 to 3 weeks.     Diagnoses and all orders for this visit:    Hammertoe of right foot    Toe pain, right          Subjective:     Patient ID: Renzo Pop is a 64 y.o. female.    The patient presents today with a chief complaint of a chronically painful contracture deformity of her right third toe that has been present now for several months.  It is gotten more painful over the course the last few weeks to the point that she finds it difficult to wear shoes and  difficult to ambulate and perform her ADLs.      PAST MEDICAL HISTORY:  Past Medical History:   Diagnosis Date    Chronic alcohol abuse     Epilepsy (HCC)     Fracture     Hepatitis     Hep A     Hepatitis     Insomnia     10mar2016 resolved    Osteoporosis     14jun2016 resolved    Pancreatitis     SAH (subarachnoid hemorrhage) (HCC)     Seasonal allergies     Seizure (HCC)     Seizures (HCC)     Urine discoloration 11/11/2019    Varicella     Vomiting 11/13/2019       PAST SURGICAL HISTORY:  Past Surgical History:   Procedure Laterality Date    BONE MARROW BIOPSY      2019, 2021    IR BIOPSY BONE  8/17/2021    IR BIOPSY BONE MARROW  11/14/2019    [place] TX TIBL SHFT FX IMED IMPLT W/WO SCREWS&/CERCLA Left 8/4/2023    Procedure: INSERTION NAIL IM TIBIA;  Surgeon: Danielito Sauceda DO;  [facility];  Service: Orthopedics    TUBAL LIGATION  1985    TUBAL LIGATION          ALLERGIES:  Patient has no known allergies.    MEDICATIONS:  Current Outpatient Medications   Medication Sig Dispense Refill    desvenlafaxine succinate (PRISTIQ) 50 mg 24 hr tablet TAKE ONE TABLET BY MOUTH EVERY DAY 30 tablet 5    gabapentin (NEURONTIN) 300 mg capsule TAKE ONE CAPSULE BY MOUTH TWICE A DAY 60 capsule 5    hydrOXYzine HCL (ATARAX) 25 mg tablet Take 1 tablet (25 mg total) by mouth 2 (two) times a day 60 tablet 2    lacosamide (VIMPAT) 200 mg tablet TAKE ONE TABLET BY MOUTH TWICE A  tablet 1    metoprolol succinate (TOPROL-XL) 25 mg 24 hr tablet Take 1 tablet (25 mg total) by mouth 2 (two) times a day 180 tablet 3    traZODone (DESYREL) 50 mg tablet Take 1 tablet (50 mg total) by mouth daily at bedtime 30 tablet 2    zonisamide (ZONEGRAN) 100 mg capsule Take 2 capsules (200 mg total) by mouth daily at bedtime 180 capsule 1     No current facility-administered medications for this visit.       SOCIAL HISTORY:  Social History     Socioeconomic History    Marital status: /Civil Union     Spouse name: None    Number of  children: None    Years of education: None    Highest education level: None   Occupational History    None   Tobacco Use    Smoking status: Never    Smokeless tobacco: Never   Vaping Use    Vaping status: Never Used   Substance and Sexual Activity    Alcohol use: Yes     Comment: 6 bottles of wine during the week, now only drinks on the weekend.    Drug use: Never    Sexual activity: Yes     Partners: Male   Other Topics Concern    None   Social History Narrative    ** Merged History Encounter **         ** Merged History Encounter **         ** Merged History Encounter **         ** Merged History Encounter **         Drinks coffee  tea     Social Determinants of Health     Financial Resource Strain: Not on file   Food Insecurity: No Food Insecurity (6/7/2024)    Hunger Vital Sign     Worried About Running Out of Food in the Last Year: Never true     Ran Out of Food in the Last Year: Never true   Transportation Needs: No Transportation Needs (6/7/2024)    PRAPARE - Transportation     Lack of Transportation (Medical): No     Lack of Transportation (Non-Medical): No   Physical Activity: Not on file   Stress: Not on file   Social Connections: Unknown (6/18/2024)    Received from [facility]    Social [employer]     How often do you feel lonely or isolated from those around you? (Adult - for ages 18 years and over): Not on file   Intimate Partner Violence: Not on file   Housing Stability: Low Risk  (6/7/2024)    Housing Stability Vital Sign     Unable to Pay for Housing in the Last Year: No     Number of Times Moved in the Last Year: 1     Homeless in the Last Year: No        Review of Systems   Constitutional: Negative.    HENT: Negative.     Eyes: Negative.    Respiratory: Negative.     Cardiovascular: Negative.    Endocrine: Negative.    Musculoskeletal: Negative.    Neurological: Negative.    Hematological: Negative.    Psychiatric/Behavioral: Negative.           Objective:     Physical Exam  Constitutional:        Appearance: Normal appearance.   HENT:      Head: Normocephalic and atraumatic.      Nose: Nose normal.   Cardiovascular:      Pulses:           Dorsalis pedis pulses are 2+ on the right side.        Posterior tibial pulses are 2+ on the right side.   Pulmonary:      Effort: Pulmonary effort is normal.   Feet:      Right foot:      Skin integrity: Callus present.      Comments: The patient's clinical examination today significant for a semirigid contracture deformity of the right third toe.  There is tenderness palpation to the distal aspect of the right digit.  No pulses are palpable.  No open lesions or signs of infection.  Skin:     General: Skin is warm.      Capillary Refill: Capillary refill takes less than 2 seconds.   Neurological:      General: No focal deficit present.      Mental Status: She is alert and oriented to person, place, and time.   Psychiatric:         Mood and Affect: Mood normal.         Behavior: Behavior normal.         Thought Content: Thought content normal.           Operative note    Preoperative diagnosis: Painful semirigid contracture deformity of the right third toe    Postoperative diagnosis: The same    Procedure performed: Percutaneous flexor tenotomy of the right third toe    Complications: none    Estimated blood loss: negligible    Procedure in detail:    The patient was seen in the office today.  She is resting in the clinic chair in [facility].  She was agreeable to proceed with a percutaneous tenotomy of the right third toe flexor tendon due to the contracture deformity that is causing significant tenderness to the tip of her toe.  Oral consent was obtained.    A digital nerve block was then performed with 3 mL of 0.25% bupivacaine plain.  The skin was then prepped with Betadine and draped.  Once anesthesia was achieved, a sterile #18-gauge needle was utilized to transect the flexor tendon through a stab incision at the level of the PIPJ.  Once the right third toe was adequately  released, a Steri-Strip was applied over the stab incision with silver alginate as the contact layer.  A sterile dressing was then applied and splinting with Coflex was applied.  The patient tolerated procedure well without any complications.    Recommend follow-up with me in 2 to 3 weeks.

## 2024-08-07 ENCOUNTER — TELEPHONE (OUTPATIENT)
Age: 64
End: 2024-08-07

## 2024-08-07 NOTE — TELEPHONE ENCOUNTER
1ST ATTEMPT,     Called pt no answer, LMOM.    Thank you,     Abeba LOPEZ/ KELLIE Sorto/ Seizure-like activity    Dc- home- 6/10/2024    Renzo Pop will need follow up in in 6 weeks with epilepsy attending or advance practitioner. She will not require outpatient neurological testing.    LOV :10/10/2023  [facility] Marco Alfaro MD  Neurology Symptomatic focal epilepsy (HCC) +2 more  Dx Virtual Regular Visit  Reason for Visit      Instructions    Continue current seizure medications unchanged.   Let us know if there are seizures or problems with your medication.   Return in 6 months (AP).  Can submit seizure reporting form to [facility] when you are seizure free for 6 months .

## 2024-08-08 ENCOUNTER — OFFICE VISIT (OUTPATIENT)
Dept: CARDIOLOGY CLINIC | Facility: CLINIC | Age: 64
End: 2024-08-08
Payer: COMMERCIAL

## 2024-08-08 VITALS
TEMPERATURE: 98.2 F | HEART RATE: 65 BPM | DIASTOLIC BLOOD PRESSURE: 60 MMHG | WEIGHT: 124 LBS | SYSTOLIC BLOOD PRESSURE: 112 MMHG | BODY MASS INDEX: 19.93 KG/M2 | OXYGEN SATURATION: 98 % | RESPIRATION RATE: 12 BRPM | HEIGHT: 66 IN

## 2024-08-08 DIAGNOSIS — I51.81 STRESS-INDUCED CARDIOMYOPATHY: Chronic | ICD-10-CM

## 2024-08-08 DIAGNOSIS — E78.49 OTHER HYPERLIPIDEMIA: Chronic | ICD-10-CM

## 2024-08-08 DIAGNOSIS — I50.42 CHRONIC COMBINED SYSTOLIC AND DIASTOLIC CONGESTIVE HEART FAILURE (HCC): Chronic | ICD-10-CM

## 2024-08-08 DIAGNOSIS — F10.10 ALCOHOL ABUSE: Chronic | ICD-10-CM

## 2024-08-08 DIAGNOSIS — I77.819 AORTIC ECTASIA (HCC): ICD-10-CM

## 2024-08-08 PROCEDURE — 99214 OFFICE O/P EST MOD 30 MIN: CPT

## 2024-08-08 NOTE — PROGRESS NOTES
Renzo Pop  1960  [id number]  [facility] [id number] [phone]    1. Chronic combined systolic and diastolic congestive heart failure (HCC)        2. Stress-induced cardiomyopathy        3. Aortic ectasia (HCC)  Ambulatory Referral to Cardiology      4. Other hyperlipidemia        5. Alcohol abuse            Summary/Discussion:  HFpEF  Cardiomyopathy  - hx of stress induced cardiomyopathy with normalization of LV function  - echo (7/2022): LV wall mildly increased, concentric remodeling, LVEF 60%, G1DD, aortic valve sclerosis  - continue metoprolol succinate 25 mg twice daily   further initiation of GDMT limited due to low blood pressures   - euvolemic on physical exam-- not on diuretics     Aortic ectasia:  - CT chest (09399): ascending aorta 4.2 cm   - continue surveillance management and blood pressure control     Dyslipidemia:  - due for updated lipids-- active order present   - historically not on statin therapy   - encouraged low cholesterol, mediterranean diet, and annual lipid follow up    Alcohol abuse:  - reports daily alcohol use   - complete alcohol cessation encouraged       Interval History: Renzo Pop is a 64 y.o. year old female with history of HfpEF, stress induced cardiomyopathy, aortic ectasia, HLD, and alcohol abuse who presents to the office today for routine follow up.    Since her last office visit she has no significant cardiac complaints. She denies any chest pain/pressure/discomfort or shortness of breath. She denies lower extremity edema, orthopnea, and PND. She denies lightheadedness, dizziness, and syncope. She denies palpitations.          No further cardiac testing indicated at this time.     She will RTO in 6 months with Dr. Juarez or sooner if necessary. She will call with any concerns.         Medical Problems       Problem List       Insomnia    Lipoma of right lower extremity (Chronic)    Age-related  osteoporosis with current pathological fracture with routine healing (Chronic)    Vitamin D deficiency    Other hyperlipidemia (Chronic)    Pancreatic cyst    Gall stones    Refusal of blood transfusions as patient is Taoist (Chronic)    Thrombocytopenia (HCC)    Nonintractable focal epilepsy (HCC) (Chronic)    Pancytopenia (HCC) (Chronic)    Anxiety with depression (Chronic)    Panic attack    Stress-induced cardiomyopathy (Chronic)    Breakthrough seizure (HCC)    Alcohol abuse (Chronic)    Alcohol use disorder, severe, dependence (HCC) (Chronic)    Combined systolic and diastolic congestive heart failure (HCC) (Chronic)    Wt Readings from Last 3 Encounters:   08/08/24 56.2 kg (124 lb)   07/31/24 57.2 kg (126 lb)   06/18/24 55.8 kg (123 lb)                 Epilepsy (HCC) (Chronic)    Status epilepticus (HCC)    SIRS (systemic inflammatory response syndrome) (HCC)    Seizure (HCC)    Other chronic pancreatitis (HCC)    Aortic ectasia (HCC)        Past Medical History:   Diagnosis Date    Chronic alcohol abuse     Epilepsy (HCC)     Fracture     Hepatitis     Hep A     Hepatitis     Insomnia     10mar2016 resolved    Osteoporosis     14jun2016 resolved    Pancreatitis     SAH (subarachnoid hemorrhage) (HCC)     Seasonal allergies     Seizure (HCC)     Seizures (HCC)     Urine discoloration 11/11/2019    Varicella     Vomiting 11/13/2019     Social History     Socioeconomic History    Marital status: /Civil Union     Spouse name: Not on file    Number of children: Not on file    Years of education: Not on file    Highest education level: Not on file   Occupational History    Not on file   Tobacco Use    Smoking status: Never    Smokeless tobacco: Never   Vaping Use    Vaping status: Never Used   Substance and Sexual Activity    Alcohol use: Yes     Comment: 6 bottles of wine during the week, now only drinks on the weekend.    Drug use: Never    Sexual activity: Yes     Partners: Male   Other Topics  Concern    Not on file   Social History Narrative    ** Merged History Encounter **         ** Merged History Encounter **         ** Merged History Encounter **         ** Merged History Encounter **         Drinks coffee  tea     Social Determinants of Health     Financial Resource Strain: Not on file   Food Insecurity: No Food Insecurity (6/7/2024)    Hunger Vital Sign     Worried About Running Out of Food in the Last Year: Never true     Ran Out of Food in the Last Year: Never true   Transportation Needs: No Transportation Needs (6/7/2024)    PRAPARE - Transportation     Lack of Transportation (Medical): No     Lack of Transportation (Non-Medical): No   Physical Activity: Not on file   Stress: Not on file   Social Connections: Unknown (6/18/2024)    Received from [employer]     How often do you feel lonely or isolated from those around you? (Adult - for ages 18 years and over): Not on file   Intimate Partner Violence: Not on file   Housing Stability: Low Risk  (6/7/2024)    Housing Stability Vital Sign     Unable to Pay for Housing in the Last Year: No     Number of Times Moved in the Last Year: 1     Homeless in the Last Year: No      Family History   Problem Relation Age of Onset    Anxiety disorder Mother     Depression Mother     No Known Problems Father     No Known Problems Sister     No Known Problems Sister     No Known Problems Daughter     No Known Problems Maternal Grandmother     Cancer Maternal Grandfather     Cancer Paternal Grandmother         unknown     No Known Problems Paternal Grandfather     No Known Problems Brother     No Known Problems Son     No Known Problems Son     Breast cancer Neg Hx     Ovarian cancer Neg Hx      Past Surgical History:   Procedure Laterality Date    BONE MARROW BIOPSY      2019, 2021    IR BIOPSY BONE  8/17/2021    IR BIOPSY BONE MARROW  11/14/2019    ID TX TIBL SHFT FX IMED IMPLT W/WO SCREWS&/CERCLA Left 8/4/2023    Procedure: INSERTION NAIL  "IM TIBIA;  Surgeon: Danielito Sauceda DO;  [facility];  Service: Orthopedics    TUBAL LIGATION  1985    TUBAL LIGATION         Current Outpatient Medications:     desvenlafaxine succinate (PRISTIQ) 50 mg 24 hr tablet, TAKE ONE TABLET BY MOUTH EVERY DAY, Disp: 30 tablet, Rfl: 5    gabapentin (NEURONTIN) 300 mg capsule, TAKE ONE CAPSULE BY MOUTH TWICE A DAY, Disp: 60 capsule, Rfl: 5    hydrOXYzine HCL (ATARAX) 25 mg tablet, Take 1 tablet (25 mg total) by mouth 2 (two) times a day, Disp: 60 tablet, Rfl: 2    lacosamide (VIMPAT) 200 mg tablet, TAKE ONE TABLET BY MOUTH TWICE A DAY, Disp: 180 tablet, Rfl: 1    metoprolol succinate (TOPROL-XL) 25 mg 24 hr tablet, Take 1 tablet (25 mg total) by mouth 2 (two) times a day, Disp: 180 tablet, Rfl: 3    traZODone (DESYREL) 50 mg tablet, Take 1 tablet (50 mg total) by mouth daily at bedtime, Disp: 30 tablet, Rfl: 2    zonisamide (ZONEGRAN) 100 mg capsule, Take 2 capsules (200 mg total) by mouth daily at bedtime, Disp: 180 capsule, Rfl: 1  No Known Allergies    Labs:     Chemistry        Component Value Date/Time     06/14/2016 1201    K 3.9 06/10/2024 0432    K 4.7 02/05/2023 1611     06/10/2024 0432    CL 99 02/05/2023 1611    CO2 24 06/10/2024 0432    CO2 18 (L) 06/06/2024 2331    CO2 22 02/05/2023 1611    BUN 10 06/10/2024 0432    BUN 13 02/05/2023 1611    CREATININE 0.54 (L) 06/10/2024 0432    CREATININE 0.7 02/05/2023 1611        Component Value Date/Time    CALCIUM 8.5 06/10/2024 0432    CALCIUM 8.6 02/05/2023 1611    ALKPHOS 59 06/09/2024 0436    ALKPHOS 172 (H) 02/05/2023 1611    AST 24 06/09/2024 0436    AST 60 (H) 02/05/2023 1611    ALT 14 06/09/2024 0436    ALT 43 (H) 02/05/2023 1611    BILITOT 0.7 06/14/2016 1201            Lab Results   Component Value Date    CHOL 236 (H) 04/22/2016     Lab Results   Component Value Date    HDL 86 04/22/2016     No results found for: \"LDLCALC\"  Lab Results   Component Value Date    TRIG 125 04/22/2016     No " "results found for: \"CHOLHDL\"    Imaging: No results found.    ECG:  n/a    Review of Systems   Constitutional: Negative.   HENT: Negative.     Eyes: Negative.    Cardiovascular:  Negative for chest pain, dyspnea on exertion, irregular heartbeat, leg swelling, near-syncope, orthopnea, palpitations, paroxysmal nocturnal dyspnea and syncope.   Respiratory:  Negative for shortness of breath and sleep disturbances due to breathing.    Endocrine: Negative.    Hematologic/Lymphatic: Negative.    Skin: Negative.    Musculoskeletal: Negative.    Gastrointestinal: Negative.    Genitourinary: Negative.    Neurological:  Negative for dizziness and light-headedness.   Psychiatric/Behavioral: Negative.     Allergic/Immunologic: Negative.        Vitals:    08/08/24 1541   BP: 112/60   Pulse: 65   Resp: 12   Temp: 98.2 °F (36.8 °C)   SpO2: 98%     Vitals:    08/08/24 1541   Weight: 56.2 kg (124 lb)     Height: 5' 6\" (167.6 cm)   Body mass index is 20.01 kg/m².    Physical Exam  Vitals and nursing note reviewed.   Constitutional:       General: She is not in acute distress.  HENT:      Head: Normocephalic.      Nose: Nose normal.      Mouth/Throat:      Mouth: Mucous membranes are moist.      Pharynx: Oropharynx is clear.   Cardiovascular:      Rate and Rhythm: Normal rate and regular rhythm.      Heart sounds: No murmur heard.  Pulmonary:      Breath sounds: Decreased breath sounds present.   Musculoskeletal:         General: Normal range of motion.      Cervical back: Normal range of motion.      Right lower leg: No edema.      Left lower leg: No edema.   Skin:     General: Skin is warm and dry.   Neurological:      Mental Status: She is alert and oriented to person, place, and time.   Psychiatric:         Mood and Affect: Mood normal.         Behavior: Behavior normal.        "

## 2024-08-17 DIAGNOSIS — F41.8 ANXIETY WITH DEPRESSION: ICD-10-CM

## 2024-08-17 DIAGNOSIS — F41.9 ANXIETY: Chronic | ICD-10-CM

## 2024-08-17 RX ORDER — GABAPENTIN 300 MG/1
300 CAPSULE ORAL 2 TIMES DAILY
Qty: 60 CAPSULE | Refills: 5 | Status: SHIPPED | OUTPATIENT
Start: 2024-08-17

## 2024-08-17 RX ORDER — DESVENLAFAXINE 50 MG/1
50 TABLET, FILM COATED, EXTENDED RELEASE ORAL DAILY
Qty: 30 TABLET | Refills: 5 | Status: SHIPPED | OUTPATIENT
Start: 2024-08-17

## 2024-08-29 DIAGNOSIS — I50.21 ACUTE SYSTOLIC CONGESTIVE HEART FAILURE (HCC): ICD-10-CM

## 2024-08-29 DIAGNOSIS — I42.9 CARDIOMYOPATHY (HCC): ICD-10-CM

## 2024-08-29 RX ORDER — METOPROLOL SUCCINATE 25 MG/1
25 TABLET, EXTENDED RELEASE ORAL 2 TIMES DAILY
Qty: 180 TABLET | Refills: 1 | Status: SHIPPED | OUTPATIENT
Start: 2024-08-29

## 2024-08-29 NOTE — TELEPHONE ENCOUNTER
Patient called in to check the status of refill. Patient made aware that we are waiting for approval.

## 2024-09-10 ENCOUNTER — CONSULT (OUTPATIENT)
Dept: HEMATOLOGY ONCOLOGY | Facility: CLINIC | Age: 64
End: 2024-09-10
Payer: COMMERCIAL

## 2024-09-10 VITALS
HEIGHT: 66 IN | RESPIRATION RATE: 15 BRPM | BODY MASS INDEX: 20.57 KG/M2 | SYSTOLIC BLOOD PRESSURE: 118 MMHG | OXYGEN SATURATION: 97 % | TEMPERATURE: 97.7 F | WEIGHT: 128 LBS | HEART RATE: 57 BPM | DIASTOLIC BLOOD PRESSURE: 80 MMHG

## 2024-09-10 DIAGNOSIS — D61.818 PANCYTOPENIA (HCC): Chronic | ICD-10-CM

## 2024-09-10 DIAGNOSIS — D69.6 THROMBOCYTOPENIA (HCC): ICD-10-CM

## 2024-09-10 DIAGNOSIS — F10.10 ALCOHOL ABUSE: Primary | Chronic | ICD-10-CM

## 2024-09-10 DIAGNOSIS — Z53.1 REFUSAL OF BLOOD TRANSFUSIONS AS PATIENT IS JEHOVAH'S WITNESS: Chronic | ICD-10-CM

## 2024-09-10 PROBLEM — D72.819 LEUKOPENIA: Status: ACTIVE | Noted: 2024-09-10

## 2024-09-10 PROCEDURE — 99204 OFFICE O/P NEW MOD 45 MIN: CPT | Performed by: PHYSICIAN ASSISTANT

## 2024-09-10 PROCEDURE — 99214 OFFICE O/P EST MOD 30 MIN: CPT | Performed by: PHYSICIAN ASSISTANT

## 2024-09-10 NOTE — PROGRESS NOTES
Oncology Outpatient Consult Note  Renzo Pop 64 y.o. female MRN: @ Encounter: [id number]        Date:  9/10/2024      Assessment/ Plan:      Chronic thrombocytopenia.  Platelet count 40s to 60s- 8/2021 - 6/2024 6/2016 platelet count was 109,000    Multiple imaging studies of abdomen over the years since did not identify any evidence of splenomegaly.  2014 noncontrast CT scan abdomen pelvis, fatty liver noted with enlargement though size not given  8/2023 CT liver and spleen unremarkable  6/7/24 RUQ u/s liver 13.7 cm, smooth contour with normal echogenicity    2.  Chronic fluctuant leukopenia  WBC 2.1 - 8.7 since at least 2019 with episodes of reversed differential, no immature cells    3.  Fluctuant hgb 6- 13.3 g/dL since at least 2019    4.  Chronic alcohol use.  She can drink up to 1.5 L of wine daily or 12 beers daily.  Alcohol avoidance recommended  10/2021 consult with GI regarding elevated LFTs.  Elastography was ordered.  This was not done. She did not follow-up    5.  Seizure disorder dx 3/2022.  Multiple admissions for seizures since.  primary epilepsy vs. ETOH W/D.  Compliance with meds?     6.  Osteoporosis.  She is on medication per rheumatology.  She received 12 doses of Evenity 210 mg subcutaneous injections as of 1/23/2024.  The plan was to transition to Reclast 5 mg in February.  At her April 2024 appointment, it was noted that she missed her Reclast.  Updated DEXA was also ordered- not done as of 9/10/24    7.  Cancer screening- mammogram, colon, gynecologic does not appear to be done.  F/U with PCP/ gynecology.    History of noncompliance with appointments/ tests.      Regarding her pancytopenia, likely secondary to bone marrow suppression from alcohol and some vitamin deficiencies.    11/12/2019 BMBX- Hypercellular bone marrow with trilineage dysplastic change and no significant increase in myeloblasts, cannot exclude reactive changes versus MDS with multilineage dysplasia (MDS-MLD) (see  note).  -  Mild background T-cell lymphocytosis and polytypic plasmacytosis, favor reactive.  -  Mildly decreased stainable storage iron.  -  Normal reticulin fibers without fibrosis.  -  Negative for collagen fibrosis, granulomata, vasculitis, necrosis.  46, XX;  OnkoSight-  One unclear variant in DNMT3A       Patient states she does take multiple supplements as well as B complex and separate thiamine vitamin.  She did not bring a vitamin/supplement list with her today, however.  She is asked to bring this to the next appointment    Orders Placed This Encounter   Procedures    RY w/Reflex if Positive    C-reactive protein    CBC and differential    Rheumatoid Factor    Vitamin B12    Leukemia/Lymphoma flow cytometry    IgG, IgA, IgM    Vitamin B1, whole blood    Folate    Comprehensive metabolic panel      F/u in 6-8 weeks with labs prior    Of note, patient is Gnosticism, not accepting of blood products            HPI:  Renzo Pop is a 64 y.o. seen for initial consultation 9/10/2024 at the referral of Arielle Tong MD regarding pancytopenia.    She had history of hepatitis A, urinary tract infection, fractured neck and ribs in 2013, chronic bronchitis, congestive heart failure, aortic ectasia, chronic pancreatitis, cardiomyopathy epilepsy, osteoporosis.    Denies tobacco use.      Chronic alcohol use disorder many years.  She would drink 750 mL of wine or 12 beers daily.    She is a Gnosticism and does not accept blood products.    6/2016 consult with Dr. Greenberg she reported noticed easy bruisability with ecchymotic area involving the upper and the lower extremities over the past 6 months, bleeding from her mouth occasionally. Epistaxis lasting as long as 6 hours   She was found to have platelets count of 109,000, no anemia or leukopenia, normal PT, PTT  She reported she drank wine every night about half bottle daily, she does not smoke  She does not take any aspirin, NSAIDs  No family history  of bleeding disorder    Abd/ u/s ordered- she did not have this done  PT/INR, PTT, CBCD, CMP and 1 month f/u requested.  Lost to f/u       11/2019 admitted to the hospital 2nd to anbnormal labs.   November 11, 2019 hemoglobin 10.3, , white blood cell count 3.26, platelets 30,000, 24% segs, 64% lymphocytes, 8% monocytes    11/12/2019 CT A/P -hepatomegaly noted.  Spleen was unremarkable.  Sequela of chronic pancreatitis.  Age-indeterminate T12 superior endplate mild, vertebral compression deformity    11/12/2019 BMBX- Hypercellular bone marrow with trilineage dysplastic change and no significant increase in myeloblasts, cannot exclude reactive changes versus MDS with multilineage dysplasia (MDS-MLD) (see note).  -  Mild background T-cell lymphocytosis and polytypic plasmacytosis, favor reactive.  -  Mildly decreased stainable storage iron.  -  Normal reticulin fibers without fibrosis.  -  Negative for collagen fibrosis, granulomata, vasculitis, necrosis.  46, XX;  OnkoSight-  One unclear variant in DNMT3A     11/18/2013 (day of discharge)-hemoglobin 9.1, , white blood cell count 3.11, 41% segs, 41% lymphocytes, 15% monocytes, platelets 53,000    Patient has been referred to us on multiple occasions since 2019 and appointments have been made, however, she has not come to these appointments.    3/2021 admitted status post fall.  Found to have a femur fracture  Admitting hemoglobin March 18, 2021 hemoglobin 8.6, , white blood cell count 2.23, 18% segs, 68% lymphocytes, 9% monocytes, platelets 55,000  3/18/2021     8/4/2021 brought in via EMS after being found down by  unresponsive.  Was blood noted on her face-had to have multiple rib fractures, bilateral pubic rami fractures, thoracic transverse process fracture, hematoma to the buttocks, right shoulder, tongue laceration, intracranial hemorrhage    8/4/2021 Admitting hemoglobin was 6, white blood cell count 7.85, platelet count  20,000    8/18/21 one 8.1, white blood cell count 2.34, platelets 64,000, 48% segs, 31% lymphocytes, 15% monocytes    10/2021 consult with GI regarding elevated LFTs.  She did not follow-up    3/7/22 - 3/18/22 admitted secondary to unwitnessed seizure-like activity with no history of seizures.  3 electrographic seizures noted on EEG    3/2022 weight 147      2/2023 admitted secondary to seizure  Hgb hemoglobin 11.1, , white blood cell count 3.14, platelets 39,000, 54% segs, 32% lymphocytes, 14% monocytes  3/2023 admitted secondary to seizure 2nd to noncompliance vs alcohol use  Hemoglobin 12.9, white blood cell count 4.74, 56,000 platelet count, 24% segs, 66% lymphocytes  5/2023 admitted 2nd to seizure   Hgb 12.5, white blood cell count 2.74, platelets 48,000, 42% segs, 41% lymphocytes  8/2023 admitted s/p fall.  ORIF left tibial fracture   8/4/23 Hgb 11, MCV 98, white blood cell count 6.35, platelets 56,000      No Outpatient labs between these admissions    6/2024 admitted secondary to seizure  6/6/24 CT scan chest abdomen and pelvis-liver and spleen were unremarkable    6/8/24 hemoglobin 9.7, , white blood cell count 2.63, platelets 58,000, 74% segs, 13% lymphocytes, 10% monocytes  Creatinine 0.52, calcium 7, albumin 2.8, total protein 5.1    States she is not prone to illness, does not bruise or bleed easily.  Denies any dizziness, lightheadedness, chest pain or shortness of breath.  Reports that a few years ago she lost 40 pounds without really trying.  She is not vegetarian.  States she does take a B complex vitamin daily as well as a separate thiamine (B1) supplement.    States she is trying to cut back on her alcohol use and for 2 days, has not drank any alcohol.    Review of Systems   Constitutional:  Negative for appetite change, chills, diaphoresis, fatigue, fever and unexpected weight change.   HENT:   Negative for mouth sores, nosebleeds, sore throat, tinnitus and voice change.    Eyes:   Negative for eye problems.   Respiratory:  Negative for chest tightness, cough, shortness of breath and wheezing.    Cardiovascular:  Negative for chest pain, leg swelling and palpitations.   Gastrointestinal:  Negative for abdominal distention, abdominal pain, blood in stool, constipation, diarrhea, nausea, rectal pain and vomiting.   Endocrine: Negative for hot flashes.   Genitourinary: Negative.     Musculoskeletal:  Negative for gait problem and myalgias.   Skin:  Negative for itching and rash.   Neurological:  Negative for dizziness, gait problem, headaches, light-headedness and numbness.   Hematological:  Negative for adenopathy.   Psychiatric/Behavioral:  Negative for confusion and sleep disturbance. The patient is not nervous/anxious.         Test Results:      Labs:   Lab Results   Component Value Date    HGB 10.6 (L) 06/09/2024    HCT 34.7 (L) 06/09/2024     (H) 06/09/2024    PLT 61 (L) 06/09/2024    WBC 2.16 (L) 06/09/2024    NRBC 0 06/09/2024    BANDSPCT 3 05/11/2023    ATYLMPCT 4 (H) 05/11/2023     Lab Results   Component Value Date     06/14/2016    K 3.9 06/10/2024     06/10/2024    CO2 24 06/10/2024    ANIONGAP 12 10/09/2014    BUN 10 06/10/2024    CREATININE 0.54 (L) 06/10/2024    GLUCOSE 148 (H) 06/06/2024    CALCIUM 8.5 06/10/2024    CORRECTEDCA 8.7 06/09/2024    AST 24 06/09/2024    ALT 14 06/09/2024    ALKPHOS 59 06/09/2024    PROT 7.4 06/14/2016    BILITOT 0.7 06/14/2016    EGFR 100 06/10/2024           Imaging: .No results found.          Active Problems:   Patient Active Problem List   Diagnosis    Insomnia    Lipoma of right lower extremity    Age-related osteoporosis with current pathological fracture with routine healing    Vitamin D deficiency    Other hyperlipidemia    Pancreatic cyst    Gall stones    Refusal of blood transfusions as patient is Sabianism    Thrombocytopenia (HCC)    Nonintractable focal epilepsy (HCC)    Pancytopenia (HCC)    Anxiety with  depression    Panic attack    Stress-induced cardiomyopathy    Breakthrough seizure (HCC)    Alcohol abuse    Alcohol use disorder, severe, dependence (HCC)    Combined systolic and diastolic congestive heart failure (HCC)    Epilepsy (HCC)    Status epilepticus (HCC)    SIRS (systemic inflammatory response syndrome) (HCC)    Seizure (HCC)    Other chronic pancreatitis (HCC)    Aortic ectasia (HCC)       Past Medical History:   Past Medical History:   Diagnosis Date    Chronic alcohol abuse     Epilepsy (HCC)     Fracture     Hepatitis     Hep A     Hepatitis     Insomnia     10mar2016 resolved    Osteoporosis     14jun2016 resolved    Pancreatitis     SAH (subarachnoid hemorrhage) (HCC)     Seasonal allergies     Seizure (HCC)     Seizures (HCC)     Urine discoloration 11/11/2019    Varicella     Vomiting 11/13/2019       Surgical History:   Past Surgical History:   Procedure Laterality Date    BONE MARROW BIOPSY      2019, 2021    IR BIOPSY BONE  8/17/2021    IR BIOPSY BONE MARROW  11/14/2019    TX TX TIBL SHFT FX IMED IMPLT W/WO SCREWS&/CERCLA Left 8/4/2023    Procedure: INSERTION NAIL IM TIBIA;  Surgeon: Danielito Sauceda DO;  Location: [facility];  Service: Orthopedics    TUBAL LIGATION  1985    TUBAL LIGATION         Family History:    Family History   Problem Relation Age of Onset    Anxiety disorder Mother     Depression Mother     No Known Problems Father     No Known Problems Sister     No Known Problems Sister     No Known Problems Daughter     No Known Problems Maternal Grandmother     Cancer Maternal Grandfather     Cancer Paternal Grandmother         unknown     No Known Problems Paternal Grandfather     No Known Problems Brother     No Known Problems Son     No Known Problems Son     Breast cancer Neg Hx     Ovarian cancer Neg Hx        Cancer-related family history includes Cancer in her maternal grandfather and paternal grandmother. There is no history of Breast cancer or Ovarian cancer.    Social  History:   Social History     Socioeconomic History    Marital status: /Civil Union     Spouse name: Not on file    Number of children: Not on file    Years of education: Not on file    Highest education level: Not on file   Occupational History    Not on file   Tobacco Use    Smoking status: Never    Smokeless tobacco: Never   Vaping Use    Vaping status: Never Used   Substance and Sexual Activity    Alcohol use: Yes     Comment: 6 bottles of wine during the week, now only drinks on the weekend.    Drug use: Never    Sexual activity: Yes     Partners: Male   Other Topics Concern    Not on file   Social History Narrative    ** Merged History Encounter **         ** Merged History Encounter **         ** Merged History Encounter **         ** Merged History Encounter **         Drinks coffee  tea     Social Determinants of Health     Financial Resource Strain: Not on file   Food Insecurity: No Food Insecurity (6/7/2024)    Hunger Vital Sign     Worried About Running Out of Food in the Last Year: Never true     Ran Out of Food in the Last Year: Never true   Transportation Needs: No Transportation Needs (6/7/2024)    PRAPARE - Transportation     Lack of Transportation (Medical): No     Lack of Transportation (Non-Medical): No   Physical Activity: Not on file   Stress: Not on file   Social Connections: Unknown (6/18/2024)    Received from [facility]     How often do you feel lonely or isolated from those around you? (Adult - for ages 18 years and over): Not on file   Intimate Partner Violence: Not on file   Housing Stability: Low Risk  (6/7/2024)    Housing Stability Vital Sign     Unable to Pay for Housing in the Last Year: No     Number of Times Moved in the Last Year: 1     Homeless in the Last Year: No       Current Medications:   Current Outpatient Medications   Medication Sig Dispense Refill    desvenlafaxine succinate (PRISTIQ) 50 mg 24 hr tablet TAKE ONE TABLET BY MOUTH EVERY DAY 30  "tablet 5    gabapentin (NEURONTIN) 300 mg capsule TAKE ONE CAPSULE BY MOUTH TWICE A DAY 60 capsule 5    hydrOXYzine HCL (ATARAX) 25 mg tablet Take 1 tablet (25 mg total) by mouth 2 (two) times a day 60 tablet 2    lacosamide (VIMPAT) 200 mg tablet TAKE ONE TABLET BY MOUTH TWICE A  tablet 1    metoprolol succinate (TOPROL-XL) 25 mg 24 hr tablet TAKE ONE TABLET BY MOUTH TWICE A  tablet 1    traZODone (DESYREL) 50 mg tablet Take 1 tablet (50 mg total) by mouth daily at bedtime 30 tablet 2    zonisamide (ZONEGRAN) 100 mg capsule Take 2 capsules (200 mg total) by mouth daily at bedtime 180 capsule 1     No current facility-administered medications for this visit.       Allergies: No Known Allergies      Physical Exam:    Body surface area is 1.66 meters squared.   Ht Readings from Last 3 Encounters:   09/10/24 5' 6\" (1.676 m)   08/08/24 5' 6\" (1.676 m)   07/31/24 5' 6\" (1.676 m)       Wt Readings from Last 3 Encounters:   09/10/24 58.1 kg (128 lb)   08/08/24 56.2 kg (124 lb)   07/31/24 57.2 kg (126 lb)        Temp Readings from Last 3 Encounters:   09/10/24 97.7 °F (36.5 °C) (Temporal)   08/08/24 98.2 °F (36.8 °C) (Tympanic)   06/10/24 97.6 °F (36.4 °C)        BP Readings from Last 3 Encounters:   08/08/24 112/60   07/31/24 129/81   06/18/24 110/68         Pulse Readings from Last 3 Encounters:   09/10/24 57   08/08/24 65   07/31/24 59          Physical Exam    Physical Exam  Vitals reviewed.   Constitutional:       General: She is not in acute distress.     Appearance: She is well-developed. She is not diaphoretic.      Comments: Appears underweight;  hair thin   HENT:      Head: Normocephalic and atraumatic.   Eyes:      Conjunctiva/sclera: Conjunctivae normal.   Neck:      Trachea: No tracheal deviation.   Cardiovascular:      Rate and Rhythm: Normal rate and regular rhythm.      Heart sounds: No murmur heard.     No friction rub. No gallop.   Pulmonary:      Effort: Pulmonary effort is normal. No " respiratory distress.      Breath sounds: Normal breath sounds. No wheezing or rales.   Chest:      Chest wall: No tenderness.   Abdominal:      General: There is no distension.      Palpations: Abdomen is soft.      Tenderness: There is no abdominal tenderness.      Comments: Liver edge palpable with deep inspiration;  spleen nonpalpable   Musculoskeletal:      Cervical back: Normal range of motion and neck supple.   Lymphadenopathy:      Cervical: No cervical adenopathy.   Skin:     General: Skin is warm and dry.      Coloration: Skin is not pale.      Findings: No erythema.   Neurological:      Mental Status: She is alert. Mental status is at baseline.   Psychiatric:         Behavior: Behavior normal.         Thought Content: Thought content normal.             Emergency Contacts:    Extended Emergency Contact Information  Primary Emergency Contact: Dannie Pop  Address: [address] [place] [id number] [place]  Home Phone: [phone]  Mobile Phone: [phone]  Relation: Spouse

## 2024-09-14 DIAGNOSIS — G47.00 INSOMNIA, UNSPECIFIED TYPE: ICD-10-CM

## 2024-09-14 DIAGNOSIS — F41.9 ANXIETY: ICD-10-CM

## 2024-09-15 RX ORDER — HYDROXYZINE HYDROCHLORIDE 25 MG/1
25 TABLET, FILM COATED ORAL 2 TIMES DAILY
Qty: 60 TABLET | Refills: 5 | Status: SHIPPED | OUTPATIENT
Start: 2024-09-15

## 2024-09-28 DIAGNOSIS — G47.00 INSOMNIA, UNSPECIFIED TYPE: ICD-10-CM

## 2024-09-28 DIAGNOSIS — F41.9 ANXIETY: ICD-10-CM

## 2024-09-29 RX ORDER — TRAZODONE HYDROCHLORIDE 50 MG/1
50 TABLET, FILM COATED ORAL
Qty: 30 TABLET | Refills: 5 | Status: SHIPPED | OUTPATIENT
Start: 2024-09-29

## 2024-10-14 ENCOUNTER — TELEPHONE (OUTPATIENT)
Dept: HEMATOLOGY ONCOLOGY | Facility: CLINIC | Age: 64
End: 2024-10-14

## 2024-10-14 NOTE — TELEPHONE ENCOUNTER
Left message advising if bloodwork was not completed, todays appointment would need to be rescheduled.

## 2024-10-24 DIAGNOSIS — G40.109 SYMPTOMATIC FOCAL EPILEPSY (HCC): ICD-10-CM

## 2024-10-25 RX ORDER — LACOSAMIDE 200 MG/1
200 TABLET ORAL 2 TIMES DAILY
Qty: 180 TABLET | Refills: 1 | Status: SHIPPED | OUTPATIENT
Start: 2024-10-25

## 2024-10-25 NOTE — TELEPHONE ENCOUNTER
Patient Id Prescription # Filled Written Drug Label Qty Days Strength MME** Prescriber Pharmacy Payment REFILL #/Auth State Detail  1 [id number] 09/25/2024 04/23/2024 Lacosamide (Tablet) 60.0 30 200 MG NA LOUISE MABRY [facility] Commercial Insurance 4 / 1 [place]   1 [id number] 08/23/2024 04/23/2024 Lacosamide (Tablet) 60.0 30 200 MG NA LOUISE MABRY [facility] Commercial Insurance 3 / 1 [place]   1 [id number] 07/16/2024 04/23/2024 Lacosamide (Tablet) 60.0 30 200 MG GRACIE MABRY [facility] Commercial Insurance 2 / 1 PA   1 [id number] 06/18/2024 04/23/2024 Lacosamide (Tablet) 60.0 30 200 MG NA LOUISE MABRY [facility] Commercial Insurance 1 / 1 PA   1 [id number] 05/19/2024 04/23/2024 Lacosamide (Tablet) 60.0 30 200 MG NA LOUISE MABRY [facility] Commercial Insurance 0 / 1 PA   1 [id number] 04/23/2024 02/16/2024 Lacosamide (Tablet) 60.0 30 200 MG GRACIE MOSES [facility] Commercial Insurance 0 / 1 PA   1 [id number] 03/17/2024 10/10/2023 Lacosamide (Tablet) 60.0 30 200 MG GRACIE MABRY [facility] Commercial Insurance 6 / 1 PA   1 [id number] 02/14/2024 10/10/2023 Lacosamide (Tablet) 60.0 30 200 MG GRACIE MABRY [facility] Commercial Insurance 5 / 1 PA   1 [id number] 01/18/2024 10/10/2023 Lacosamide (Tablet) 60.0 30 200 MG GRACIE MABRY [facility] Commercial Insurance 4 / 1 PA   1 [id number] 12/16/2023 10/10/2023 Lacosamide (Tablet) 60.0 30 200 MG GRACIE MABRY [facility] Commercial Insurance 3 / 1 PA     Follow up scheduled 10/30/24

## 2024-10-31 ENCOUNTER — TELEPHONE (OUTPATIENT)
Dept: HEMATOLOGY ONCOLOGY | Facility: CLINIC | Age: 64
End: 2024-10-31

## 2024-10-31 NOTE — TELEPHONE ENCOUNTER
Left  for pt. She was a NS for her appt w/ Dr. Turner on 10/31 at 8:00 am (was a [facility] pt so appt should have been 40 mins long). Left hope line # for pt to call back to rs appt.

## 2024-11-02 ENCOUNTER — NURSE TRIAGE (OUTPATIENT)
Dept: OTHER | Facility: OTHER | Age: 64
End: 2024-11-02

## 2024-11-02 DIAGNOSIS — G40.109 SYMPTOMATIC FOCAL EPILEPSY (HCC): ICD-10-CM

## 2024-11-02 DIAGNOSIS — G40.109 SYMPTOMATIC FOCAL EPILEPSY (HCC): Primary | ICD-10-CM

## 2024-11-02 RX ORDER — ZONISAMIDE 100 MG/1
200 CAPSULE ORAL
Qty: 14 CAPSULE | Refills: 0 | Status: SHIPPED | OUTPATIENT
Start: 2024-11-02 | End: 2024-11-09

## 2024-11-02 NOTE — TELEPHONE ENCOUNTER
"Regarding: zonisonide 100 mg  ----- Message from Beulah DE LA GARZA sent at 11/2/2024 11:31 AM EDT -----  \"I need a refill on my zonisonide. I dont hav enough for the weekend\"    "

## 2024-11-02 NOTE — TELEPHONE ENCOUNTER
"Reason for Disposition  • [1] Caller requesting a prescription renewal (no refills left), no triage required, AND [2] triager able to renew prescription per department policy    Answer Assessment - Initial Assessment Questions  1. DRUG NAME: \"What medicine do you need to have refilled?\"      zonisamide  2. REFILLS REMAINING: \"How many refills are remaining?\" (Note: The label on the medicine or pill bottle will show how many refills are remaining. If there are no refills remaining, then a renewal may be needed.)      0    4. PRESCRIBING HCP: \"Who prescribed it?\" Reason: If prescribed by specialist, call should be referred to that group.      Mike  5. SYMPTOMS: \"Do you have any symptoms?\"      denies    Protocols used: Medication Refill and Renewal Call-Adult-    "

## 2024-11-02 NOTE — TELEPHONE ENCOUNTER
Medication Refill Request     Name zonisamide   Dose/Frequency 100mg daily bedtime  Quantity   Verified pharmacy   [x]  Verified ordering Provider   [x]  Does patient have enough for the next 3 days? Yes [] No [x]     Only enough for tonight

## 2024-11-04 RX ORDER — ZONISAMIDE 100 MG/1
CAPSULE ORAL
Qty: 60 CAPSULE | Refills: 5 | Status: SHIPPED | OUTPATIENT
Start: 2024-11-04

## 2024-12-17 ENCOUNTER — TELEPHONE (OUTPATIENT)
Dept: FAMILY MEDICINE CLINIC | Facility: CLINIC | Age: 64
End: 2024-12-17

## 2024-12-17 NOTE — TELEPHONE ENCOUNTER
Left message for pt to call back to confirm her appointment on Thursday 12/19 and to make sure she gets her labs done. Please transfer to the office if she calls back

## 2024-12-20 ENCOUNTER — TELEPHONE (OUTPATIENT)
Dept: CARDIOLOGY CLINIC | Facility: CLINIC | Age: 64
End: 2024-12-20

## 2024-12-20 NOTE — TELEPHONE ENCOUNTER
Lvm for pt. Provider will be out on day of current visit, 3/5/2025.    Due to Dr. Juarez's availability, please offer appt with PAZ Goldberg as first option.

## 2025-01-02 NOTE — TELEPHONE ENCOUNTER
Cara Alfaro,     Patient is scheduled with you on 07/03/2023, FIONA mandel at 9 am     Can you please advise if we can keep and just change to an HFU appt for 30 minutes or will you like patient to be re-scheduled to your next available HFU 60 minute slot? HFU/ [facility]/ No Seizures    Dc home = 5/13/23  Charlene Lopez will need follow up in in 4 weeks with epilepsy Dr Rex Greer  She will not require outpatient neurological testing          Thank you in advance for your time and help,    Paris Hanna Scheduled.  Patient asking if results were back on Blood cultures she had done while in the hospital.

## 2025-01-27 DIAGNOSIS — F41.8 ANXIETY WITH DEPRESSION: ICD-10-CM

## 2025-01-28 RX ORDER — GABAPENTIN 300 MG/1
300 CAPSULE ORAL 2 TIMES DAILY
Qty: 60 CAPSULE | Refills: 4 | Status: SHIPPED | OUTPATIENT
Start: 2025-01-28

## 2025-01-28 NOTE — TELEPHONE ENCOUNTER
Patient needs an appointment. Please contact the patient to schedule an appointment. Last office visit:06.2024

## 2025-02-07 ENCOUNTER — TELEPHONE (OUTPATIENT)
Dept: FAMILY MEDICINE CLINIC | Facility: CLINIC | Age: 65
End: 2025-02-07

## 2025-02-07 NOTE — TELEPHONE ENCOUNTER
I left a message for patient to have fasting labs that are due in the system and to schedule an appointment to establish with a new provider in the office since Dr. Tong has left. Patient is due for follow up and annual.

## 2025-02-14 ENCOUNTER — APPOINTMENT (EMERGENCY)
Dept: RADIOLOGY | Facility: HOSPITAL | Age: 65
DRG: 071 | End: 2025-02-14
Payer: COMMERCIAL

## 2025-02-14 ENCOUNTER — APPOINTMENT (EMERGENCY)
Dept: CT IMAGING | Facility: HOSPITAL | Age: 65
DRG: 071 | End: 2025-02-14
Payer: COMMERCIAL

## 2025-02-14 ENCOUNTER — HOSPITAL ENCOUNTER (INPATIENT)
Facility: HOSPITAL | Age: 65
LOS: 2 days | Discharge: HOME | DRG: 071 | End: 2025-02-16
Attending: EMERGENCY MEDICINE | Admitting: INTERNAL MEDICINE
Payer: COMMERCIAL

## 2025-02-14 DIAGNOSIS — G40.919 BREAKTHROUGH SEIZURE (HCC): ICD-10-CM

## 2025-02-14 DIAGNOSIS — R56.9 OBSERVED SEIZURE-LIKE ACTIVITY (HCC): Primary | ICD-10-CM

## 2025-02-14 DIAGNOSIS — N39.0 URINARY TRACT INFECTION: ICD-10-CM

## 2025-02-14 DIAGNOSIS — F05 ACUTE CONFUSIONAL STATE: ICD-10-CM

## 2025-02-14 DIAGNOSIS — J96.01 ACUTE HYPOXEMIC RESPIRATORY FAILURE (HCC): ICD-10-CM

## 2025-02-14 PROBLEM — G93.40 ACUTE ENCEPHALOPATHY: Status: ACTIVE | Noted: 2025-02-14

## 2025-02-14 PROBLEM — Z53.1: Status: ACTIVE | Noted: 2021-08-06

## 2025-02-14 LAB
ALBUMIN SERPL BCG-MCNC: 4.5 G/DL (ref 3.5–5)
ALP SERPL-CCNC: 70 U/L (ref 34–104)
ALT SERPL W P-5'-P-CCNC: 29 U/L (ref 7–52)
ANION GAP SERPL CALCULATED.3IONS-SCNC: 10 MMOL/L (ref 4–13)
APTT PPP: 25 SECONDS (ref 23–34)
ARTERIAL PATENCY WRIST A: YES
AST SERPL W P-5'-P-CCNC: 40 U/L (ref 13–39)
ATRIAL RATE: 70 BPM
BACTERIA UR QL AUTO: ABNORMAL /HPF
BASE EXCESS BLDA CALC-SCNC: -5.9 MMOL/L
BASOPHILS # BLD AUTO: 0.02 THOUSANDS/ΜL (ref 0–0.1)
BASOPHILS NFR BLD AUTO: 0 % (ref 0–1)
BILIRUB SERPL-MCNC: 0.5 MG/DL (ref 0.2–1)
BILIRUB UR QL STRIP: NEGATIVE
BODY TEMPERATURE: 100.7 DEGREES FEHRENHEIT
BUN SERPL-MCNC: 11 MG/DL (ref 5–25)
CALCIUM SERPL-MCNC: 9.1 MG/DL (ref 8.4–10.2)
CARDIAC TROPONIN I PNL SERPL HS: 3 NG/L (ref ?–50)
CHLORIDE SERPL-SCNC: 96 MMOL/L (ref 96–108)
CLARITY UR: CLEAR
CO2 SERPL-SCNC: 25 MMOL/L (ref 21–32)
COLOR UR: YELLOW
CREAT SERPL-MCNC: 0.69 MG/DL (ref 0.6–1.3)
EOSINOPHIL # BLD AUTO: 0.05 THOUSAND/ΜL (ref 0–0.61)
EOSINOPHIL NFR BLD AUTO: 1 % (ref 0–6)
ERYTHROCYTE [DISTWIDTH] IN BLOOD BY AUTOMATED COUNT: 12.3 % (ref 11.6–15.1)
ETHANOL SERPL-MCNC: <10 MG/DL
FLUAV AG UPPER RESP QL IA.RAPID: NEGATIVE
FLUBV AG UPPER RESP QL IA.RAPID: NEGATIVE
GFR SERPL CREATININE-BSD FRML MDRD: 92 ML/MIN/1.73SQ M
GLUCOSE SERPL-MCNC: 131 MG/DL (ref 65–140)
GLUCOSE UR STRIP-MCNC: NEGATIVE MG/DL
HCO3 BLDA-SCNC: 18.7 MMOL/L (ref 22–28)
HCT VFR BLD AUTO: 38.5 % (ref 34.8–46.1)
HGB BLD-MCNC: 13.1 G/DL (ref 11.5–15.4)
HGB UR QL STRIP.AUTO: NEGATIVE
IMM GRANULOCYTES # BLD AUTO: 0.02 THOUSAND/UL (ref 0–0.2)
IMM GRANULOCYTES NFR BLD AUTO: 0 % (ref 0–2)
INR PPP: 1.02 (ref 0.85–1.19)
KETONES UR STRIP-MCNC: NEGATIVE MG/DL
LACOSAMIDE SERPL-MCNC: 18.93 UG/ML (ref 1–20)
LACTATE SERPL-SCNC: 1.1 MMOL/L (ref 0.5–2)
LEUKOCYTE ESTERASE UR QL STRIP: ABNORMAL
LYMPHOCYTES # BLD AUTO: 1.18 THOUSANDS/ΜL (ref 0.6–4.47)
LYMPHOCYTES NFR BLD AUTO: 25 % (ref 14–44)
MAGNESIUM SERPL-MCNC: 1.8 MG/DL (ref 1.9–2.7)
MCH RBC QN AUTO: 31.8 PG (ref 26.8–34.3)
MCHC RBC AUTO-ENTMCNC: 34 G/DL (ref 31.4–37.4)
MCV RBC AUTO: 93 FL (ref 82–98)
MONOCYTES # BLD AUTO: 0.25 THOUSAND/ΜL (ref 0.17–1.22)
MONOCYTES NFR BLD AUTO: 5 % (ref 4–12)
NASAL CANNULA: 4
NEUTROPHILS # BLD AUTO: 3.22 THOUSANDS/ΜL (ref 1.85–7.62)
NEUTS SEG NFR BLD AUTO: 69 % (ref 43–75)
NITRITE UR QL STRIP: NEGATIVE
NON-SQ EPI CELLS URNS QL MICRO: ABNORMAL /HPF
NRBC BLD AUTO-RTO: 0 /100 WBCS
O2 CT BLDA-SCNC: 17.9 ML/DL (ref 16–23)
OXYHGB MFR BLDA: 96.6 % (ref 94–97)
P AXIS: 46 DEGREES
PCO2 BLDA: 34.4 MM HG (ref 36–44)
PH BLDA: 7.35 [PH] (ref 7.35–7.45)
PH UR STRIP.AUTO: 6 [PH]
PLATELET # BLD AUTO: 95 THOUSANDS/UL (ref 149–390)
PMV BLD AUTO: 9.5 FL (ref 8.9–12.7)
PO2 BLDA: 99.1 MM HG (ref 75–129)
POTASSIUM SERPL-SCNC: 3.7 MMOL/L (ref 3.5–5.3)
PR INTERVAL: 248 MS
PROCALCITONIN SERPL-MCNC: <0.05 NG/ML
PROT SERPL-MCNC: 7.6 G/DL (ref 6.4–8.4)
PROT UR STRIP-MCNC: NEGATIVE MG/DL
PROTHROMBIN TIME: 13.8 SECONDS (ref 12.3–15)
QRS AXIS: 53 DEGREES
QRSD INTERVAL: 76 MS
QT INTERVAL: 424 MS
QTC INTERVAL: 457 MS
RBC # BLD AUTO: 4.12 MILLION/UL (ref 3.81–5.12)
RBC #/AREA URNS AUTO: ABNORMAL /HPF
SARS-COV+SARS-COV-2 AG RESP QL IA.RAPID: NEGATIVE
SODIUM SERPL-SCNC: 131 MMOL/L (ref 135–147)
SP GR UR STRIP.AUTO: 1.01 (ref 1–1.03)
SPECIMEN SOURCE: ABNORMAL
T WAVE AXIS: 97 DEGREES
UROBILINOGEN UR QL STRIP.AUTO: 0.2 E.U./DL
VENTRICULAR RATE: 70 BPM
VIT B12 SERPL-MCNC: 291 PG/ML (ref 180–914)
WBC # BLD AUTO: 4.74 THOUSAND/UL (ref 4.31–10.16)
WBC #/AREA URNS AUTO: ABNORMAL /HPF

## 2025-02-14 PROCEDURE — 82805 BLOOD GASES W/O2 SATURATION: CPT | Performed by: PHYSICIAN ASSISTANT

## 2025-02-14 PROCEDURE — 36415 COLL VENOUS BLD VENIPUNCTURE: CPT | Performed by: EMERGENCY MEDICINE

## 2025-02-14 PROCEDURE — 87077 CULTURE AEROBIC IDENTIFY: CPT | Performed by: EMERGENCY MEDICINE

## 2025-02-14 PROCEDURE — 87154 CUL TYP ID BLD PTHGN 6+ TRGT: CPT | Performed by: EMERGENCY MEDICINE

## 2025-02-14 PROCEDURE — 99223 1ST HOSP IP/OBS HIGH 75: CPT | Performed by: PHYSICIAN ASSISTANT

## 2025-02-14 PROCEDURE — 96365 THER/PROPH/DIAG IV INF INIT: CPT

## 2025-02-14 PROCEDURE — 80203 DRUG SCREEN QUANT ZONISAMIDE: CPT | Performed by: EMERGENCY MEDICINE

## 2025-02-14 PROCEDURE — 82607 VITAMIN B-12: CPT | Performed by: PHYSICIAN ASSISTANT

## 2025-02-14 PROCEDURE — 93005 ELECTROCARDIOGRAM TRACING: CPT

## 2025-02-14 PROCEDURE — 87040 BLOOD CULTURE FOR BACTERIA: CPT | Performed by: EMERGENCY MEDICINE

## 2025-02-14 PROCEDURE — 80235 DRUG ASSAY LACOSAMIDE: CPT | Performed by: EMERGENCY MEDICINE

## 2025-02-14 PROCEDURE — 96375 TX/PRO/DX INJ NEW DRUG ADDON: CPT

## 2025-02-14 PROCEDURE — 84484 ASSAY OF TROPONIN QUANT: CPT | Performed by: EMERGENCY MEDICINE

## 2025-02-14 PROCEDURE — 80053 COMPREHEN METABOLIC PANEL: CPT | Performed by: EMERGENCY MEDICINE

## 2025-02-14 PROCEDURE — 96367 TX/PROPH/DG ADDL SEQ IV INF: CPT

## 2025-02-14 PROCEDURE — 87804 INFLUENZA ASSAY W/OPTIC: CPT | Performed by: EMERGENCY MEDICINE

## 2025-02-14 PROCEDURE — 87811 SARS-COV-2 COVID19 W/OPTIC: CPT | Performed by: EMERGENCY MEDICINE

## 2025-02-14 PROCEDURE — 99291 CRITICAL CARE FIRST HOUR: CPT | Performed by: EMERGENCY MEDICINE

## 2025-02-14 PROCEDURE — 70450 CT HEAD/BRAIN W/O DYE: CPT

## 2025-02-14 PROCEDURE — 81003 URINALYSIS AUTO W/O SCOPE: CPT | Performed by: EMERGENCY MEDICINE

## 2025-02-14 PROCEDURE — 83735 ASSAY OF MAGNESIUM: CPT | Performed by: EMERGENCY MEDICINE

## 2025-02-14 PROCEDURE — 85610 PROTHROMBIN TIME: CPT | Performed by: EMERGENCY MEDICINE

## 2025-02-14 PROCEDURE — 85025 COMPLETE CBC W/AUTO DIFF WBC: CPT | Performed by: EMERGENCY MEDICINE

## 2025-02-14 PROCEDURE — 83605 ASSAY OF LACTIC ACID: CPT | Performed by: EMERGENCY MEDICINE

## 2025-02-14 PROCEDURE — 81001 URINALYSIS AUTO W/SCOPE: CPT | Performed by: EMERGENCY MEDICINE

## 2025-02-14 PROCEDURE — 82077 ASSAY SPEC XCP UR&BREATH IA: CPT | Performed by: EMERGENCY MEDICINE

## 2025-02-14 PROCEDURE — 71045 X-RAY EXAM CHEST 1 VIEW: CPT

## 2025-02-14 PROCEDURE — 71046 X-RAY EXAM CHEST 2 VIEWS: CPT

## 2025-02-14 PROCEDURE — 93010 ELECTROCARDIOGRAM REPORT: CPT | Performed by: INTERNAL MEDICINE

## 2025-02-14 PROCEDURE — 99285 EMERGENCY DEPT VISIT HI MDM: CPT

## 2025-02-14 PROCEDURE — 85730 THROMBOPLASTIN TIME PARTIAL: CPT | Performed by: EMERGENCY MEDICINE

## 2025-02-14 PROCEDURE — 84145 PROCALCITONIN (PCT): CPT | Performed by: EMERGENCY MEDICINE

## 2025-02-14 RX ORDER — HYDROXYZINE HYDROCHLORIDE 25 MG/1
25 TABLET, FILM COATED ORAL 2 TIMES DAILY
Status: DISCONTINUED | OUTPATIENT
Start: 2025-02-14 | End: 2025-02-16 | Stop reason: HOSPADM

## 2025-02-14 RX ORDER — ACETAMINOPHEN 325 MG/1
650 TABLET ORAL ONCE
Status: DISCONTINUED | OUTPATIENT
Start: 2025-02-14 | End: 2025-02-14

## 2025-02-14 RX ORDER — ONDANSETRON 2 MG/ML
4 INJECTION INTRAMUSCULAR; INTRAVENOUS EVERY 6 HOURS PRN
Status: DISCONTINUED | OUTPATIENT
Start: 2025-02-14 | End: 2025-02-16 | Stop reason: HOSPADM

## 2025-02-14 RX ORDER — SODIUM CHLORIDE, SODIUM GLUCONATE, SODIUM ACETATE, POTASSIUM CHLORIDE, MAGNESIUM CHLORIDE, SODIUM PHOSPHATE, DIBASIC, AND POTASSIUM PHOSPHATE .53; .5; .37; .037; .03; .012; .00082 G/100ML; G/100ML; G/100ML; G/100ML; G/100ML; G/100ML; G/100ML
75 INJECTION, SOLUTION INTRAVENOUS CONTINUOUS
Status: DISCONTINUED | OUTPATIENT
Start: 2025-02-14 | End: 2025-02-15

## 2025-02-14 RX ORDER — ACETAMINOPHEN 325 MG/1
650 TABLET ORAL EVERY 6 HOURS PRN
Status: DISCONTINUED | OUTPATIENT
Start: 2025-02-14 | End: 2025-02-16 | Stop reason: HOSPADM

## 2025-02-14 RX ORDER — BUTALBITAL, ACETAMINOPHEN AND CAFFEINE 50; 325; 40 MG/1; MG/1; MG/1
1 TABLET ORAL ONCE
Status: COMPLETED | OUTPATIENT
Start: 2025-02-14 | End: 2025-02-14

## 2025-02-14 RX ORDER — MAGNESIUM HYDROXIDE/ALUMINUM HYDROXICE/SIMETHICONE 120; 1200; 1200 MG/30ML; MG/30ML; MG/30ML
30 SUSPENSION ORAL EVERY 6 HOURS PRN
Status: DISCONTINUED | OUTPATIENT
Start: 2025-02-14 | End: 2025-02-16 | Stop reason: HOSPADM

## 2025-02-14 RX ORDER — TRAZODONE HYDROCHLORIDE 50 MG/1
50 TABLET, FILM COATED ORAL
Status: DISCONTINUED | OUTPATIENT
Start: 2025-02-14 | End: 2025-02-16 | Stop reason: HOSPADM

## 2025-02-14 RX ORDER — LEVETIRACETAM 500 MG/5ML
1000 INJECTION, SOLUTION, CONCENTRATE INTRAVENOUS ONCE
Status: COMPLETED | OUTPATIENT
Start: 2025-02-14 | End: 2025-02-14

## 2025-02-14 RX ORDER — CEFTRIAXONE 1 G/50ML
1000 INJECTION, SOLUTION INTRAVENOUS ONCE
Status: COMPLETED | OUTPATIENT
Start: 2025-02-14 | End: 2025-02-14

## 2025-02-14 RX ORDER — FOLIC ACID 1 MG/1
1 TABLET ORAL DAILY
Status: DISCONTINUED | OUTPATIENT
Start: 2025-02-15 | End: 2025-02-16 | Stop reason: HOSPADM

## 2025-02-14 RX ORDER — CEFTRIAXONE 1 G/50ML
1000 INJECTION, SOLUTION INTRAVENOUS EVERY 24 HOURS
Status: DISCONTINUED | OUTPATIENT
Start: 2025-02-15 | End: 2025-02-16 | Stop reason: HOSPADM

## 2025-02-14 RX ORDER — METOPROLOL SUCCINATE 25 MG/1
25 TABLET, EXTENDED RELEASE ORAL 2 TIMES DAILY
Status: DISCONTINUED | OUTPATIENT
Start: 2025-02-14 | End: 2025-02-16 | Stop reason: HOSPADM

## 2025-02-14 RX ORDER — LANOLIN ALCOHOL/MO/W.PET/CERES
100 CREAM (GRAM) TOPICAL DAILY
Status: DISCONTINUED | OUTPATIENT
Start: 2025-02-15 | End: 2025-02-16 | Stop reason: HOSPADM

## 2025-02-14 RX ORDER — DESVENLAFAXINE 50 MG/1
50 TABLET, FILM COATED, EXTENDED RELEASE ORAL DAILY
Status: DISCONTINUED | OUTPATIENT
Start: 2025-02-15 | End: 2025-02-16 | Stop reason: HOSPADM

## 2025-02-14 RX ORDER — ACETAMINOPHEN 10 MG/ML
1000 INJECTION, SOLUTION INTRAVENOUS ONCE
Status: COMPLETED | OUTPATIENT
Start: 2025-02-14 | End: 2025-02-14

## 2025-02-14 RX ORDER — LACOSAMIDE 100 MG/1
200 TABLET ORAL 2 TIMES DAILY
Status: DISCONTINUED | OUTPATIENT
Start: 2025-02-14 | End: 2025-02-16 | Stop reason: HOSPADM

## 2025-02-14 RX ORDER — MIDAZOLAM HYDROCHLORIDE 2 MG/2ML
2 INJECTION, SOLUTION INTRAMUSCULAR; INTRAVENOUS ONCE
Status: COMPLETED | OUTPATIENT
Start: 2025-02-14 | End: 2025-02-14

## 2025-02-14 RX ORDER — ZONISAMIDE 100 MG/1
200 CAPSULE ORAL
Status: DISCONTINUED | OUTPATIENT
Start: 2025-02-14 | End: 2025-02-15

## 2025-02-14 RX ORDER — ENOXAPARIN SODIUM 100 MG/ML
40 INJECTION SUBCUTANEOUS DAILY
Status: DISCONTINUED | OUTPATIENT
Start: 2025-02-15 | End: 2025-02-16 | Stop reason: HOSPADM

## 2025-02-14 RX ORDER — MAGNESIUM SULFATE HEPTAHYDRATE 40 MG/ML
2 INJECTION, SOLUTION INTRAVENOUS ONCE
Status: COMPLETED | OUTPATIENT
Start: 2025-02-14 | End: 2025-02-14

## 2025-02-14 RX ORDER — VANCOMYCIN HYDROCHLORIDE 750 MG/150ML
15 INJECTION, SOLUTION INTRAVENOUS ONCE
Status: COMPLETED | OUTPATIENT
Start: 2025-02-14 | End: 2025-02-14

## 2025-02-14 RX ORDER — GABAPENTIN 300 MG/1
300 CAPSULE ORAL 2 TIMES DAILY
Status: DISCONTINUED | OUTPATIENT
Start: 2025-02-14 | End: 2025-02-16 | Stop reason: HOSPADM

## 2025-02-14 RX ADMIN — GABAPENTIN 300 MG: 300 CAPSULE ORAL at 18:30

## 2025-02-14 RX ADMIN — CEFTRIAXONE 1000 MG: 1 INJECTION, SOLUTION INTRAVENOUS at 16:04

## 2025-02-14 RX ADMIN — LEVETIRACETAM 1000 MG: 100 INJECTION, SOLUTION INTRAVENOUS at 15:24

## 2025-02-14 RX ADMIN — HYDROXYZINE HYDROCHLORIDE 25 MG: 25 TABLET ORAL at 18:30

## 2025-02-14 RX ADMIN — VANCOMYCIN HYDROCHLORIDE 750 MG: 750 INJECTION, SOLUTION INTRAVENOUS at 16:33

## 2025-02-14 RX ADMIN — SODIUM CHLORIDE, SODIUM GLUCONATE, SODIUM ACETATE, POTASSIUM CHLORIDE, MAGNESIUM CHLORIDE, SODIUM PHOSPHATE, DIBASIC, AND POTASSIUM PHOSPHATE 75 ML/HR: .53; .5; .37; .037; .03; .012; .00082 INJECTION, SOLUTION INTRAVENOUS at 18:29

## 2025-02-14 RX ADMIN — MAGNESIUM SULFATE HEPTAHYDRATE 2 G: 40 INJECTION, SOLUTION INTRAVENOUS at 15:03

## 2025-02-14 RX ADMIN — LACOSAMIDE 200 MG: 100 TABLET, FILM COATED ORAL at 18:30

## 2025-02-14 RX ADMIN — TRAZODONE HYDROCHLORIDE 50 MG: 50 TABLET ORAL at 22:00

## 2025-02-14 RX ADMIN — MIDAZOLAM 2 MG: 1 INJECTION INTRAMUSCULAR; INTRAVENOUS at 15:24

## 2025-02-14 RX ADMIN — BUTALBITAL, ACETAMINOPHEN, AND CAFFEINE 1 TABLET: 50; 325; 40 TABLET, COATED ORAL at 22:00

## 2025-02-14 RX ADMIN — ZONISAMIDE 200 MG: 100 CAPSULE ORAL at 22:01

## 2025-02-14 RX ADMIN — SODIUM CHLORIDE 1000 ML: 0.9 INJECTION, SOLUTION INTRAVENOUS at 15:03

## 2025-02-14 RX ADMIN — ACETAMINOPHEN 1000 MG: 10 INJECTION INTRAVENOUS at 16:00

## 2025-02-14 RX ADMIN — METOPROLOL SUCCINATE 25 MG: 25 TABLET, EXTENDED RELEASE ORAL at 18:43

## 2025-02-14 NOTE — ASSESSMENT & PLAN NOTE
While patient was being evaluated in ED, had an episode where she became unresponsive, cyanotic, smacking her lips, apneic and shaking but no tonic-clonic movements, concerning for absence seizure.  Likely brought on by UTI versus alcohol use lowering seizure threshold  Hypoxic with SpO2 67% on room air, briefly placed on 15L NRB with improvement and was weaned to 2L NC O2  Lactic acid wnl. Procal negative. Troponin negative.  Ethanol levels negative. ABG overall acceptable.  Lacosamide levels wnl 18.93. Zonasimade levels pending.  Received 2 mg IV Versed & 1 g IV Keppra in ED with improvement  ED discussed with neurology/epileptologist, okay to stay at [facility] so long as patient does not have recurring/intractable breakthrough seizures  Neurology consulted, continued on home medications including lacosamide 200 mg BID, zonisamide 200 mg qHS, gabapentin 300 mg BID for now

## 2025-02-14 NOTE — ASSESSMENT & PLAN NOTE
Wt Readings from Last 3 Encounters:   09/10/24 58.1 kg (128 lb)   08/08/24 56.2 kg (124 lb)   07/31/24 57.2 kg (126 lb)   Clinically euvolemic/borderline dry on admission  Echo (2022): EF 60%, grade 1 diastolic dysfunction.  Continue Toprol-XL 25 mg BID  Daily weights, I/Os

## 2025-02-14 NOTE — ED PROVIDER NOTES
Time reflects when diagnosis was documented in both MDM as applicable and the Disposition within this note       Time User Action Codes Description Comment    2/14/2025  3:17 PM Rhys Longoria [R56.9] Observed seizure-like activity (HCC)     2/14/2025  3:17 PM Rhys Longoria Add [J96.11] Hypoxemic respiratory failure, chronic (HCC)     2/14/2025  3:17 PM Rhys Longoria Remove [J96.11] Hypoxemic respiratory failure, chronic (HCC)     2/14/2025  3:17 PM Rhys Longoria Add [J96.01] Acute hypoxemic respiratory failure (HCC)     2/14/2025  3:18 PM Rhys Longoria [N39.0] Urinary tract infection     2/14/2025  3:18 PM Rhys Longoria [F05] Acute confusional state           ED Disposition       ED Disposition   Admit    Condition   Stable    Date/Time   Fri Feb 14, 2025  4:23 PM    Comment   Case was discussed with PRAVIN and the patient's admission status was agreed to be Admission Status: inpatient status to the service of Dr. Morales .               Assessment & Plan       Medical Decision Making  64-year-old female presents with a period of confusion, with a recent cough, diarrhea, will be evaluated for intracranial trauma including skull fractures or hemorrhage anemia, electrolyte abnormalities, or possible infection which may be worsening an underlying neurologic condition such as pneumonia or urinary tract infection.  If the patient's workup is unremarkable she will likely be admitted for this confusional state to an further workup a possible cause.    The patient's during her workup began having an episode where she was smacking her lips, was unresponsive, and appeared to turn extremely cyanotic, the nurse called me to the bedside, and I found her to be apneic, shaking, but not in a tonic-clonic fashion, more as a parkinsonian type tremor, but she was extremely cyanotic in her face and hands, the patient was placed on oxygen, I have significant concern for possible absence type  seizure, I gave her 2 mg of Versed and a gram of Keppra, a chest x-ray and an ABG.  By the time the ABG was done the patient condition had improved significantly, I discussed the patient with the epileptologist who reports that and less she is having multiple seizures he is comfortable with her being admitted here for a urinary tract infection and possible sepsis, which may be exacerbating and underlying neurologic condition.  The patient has been started on antibiotics, and has been admitted to the hospital for further management.    Critical Care Time Statement: Upon my evaluation, this patient had a high probability of imminent or life-threatening deterioration due to neurologic and respiratory failure, which required my direct attention, intervention, and personal management.  I spent a total of 30 minutes directly providing critical care services, including evaluating for the presence of life-threatening injuries or illnesses, management of organ system failure(s) , complex medical decision making (to support/prevent further life-threatening deterioration)., and interpretation of hemodynamic data. This time is exclusive of procedures, teaching, treating other patients, family meetings, and any prior time recorded by providers other than myself.        Amount and/or Complexity of Data Reviewed  Labs: ordered.  Radiology: ordered.    Risk  Prescription drug management.  Decision regarding hospitalization.        ED Course as of 02/14/25 1630   Fri Feb 14, 2025   1341 My wet read of the patient's chest x-ray is unremarkable with no focal consolidation or other pathology.       Medications   magnesium sulfate 2 g/50 mL IVPB (premix) 2 g (2 g Intravenous New Bag 2/14/25 1503)   cefTRIAXone (ROCEPHIN) IVPB (premix in dextrose) 1,000 mg 50 mL (1,000 mg Intravenous New Bag 2/14/25 1604)   vancomycin (VANCOCIN) IVPB (premix in dextrose) 750 mg 150 mL (has no administration in time range)   sodium chloride 0.9 % bolus  1,000 mL (1,000 mL Intravenous New Bag 2/14/25 1503)   midazolam (VERSED) injection 2 mg (2 mg Intravenous Given 2/14/25 1524)   levETIRAcetam (KEPPRA) injection 1,000 mg (1,000 mg Intravenous Given 2/14/25 1524)   acetaminophen (Ofirmev) injection 1,000 mg (1,000 mg Intravenous New Bag 2/14/25 1600)       ED Risk Strat Scores              History of Present Illness       Chief Complaint   Patient presents with    Dizziness     Pt presents to ed for dizziness and mid abd pain discomfort, states both have been ongoing intermittently for months, denies sob, denies cp       Past Medical History:   Diagnosis Date    Chronic alcohol abuse     Epilepsy (HCC)     Fracture     Hepatitis     Hep A     Hepatitis     Insomnia     10mar2016 resolved    Osteoporosis     14jun2016 resolved    Pancreatitis     SAH (subarachnoid hemorrhage) (HCC)     Seasonal allergies     Seizure (HCC)     Seizures (HCC)     Urine discoloration 11/11/2019    Varicella     Vomiting 11/13/2019      Past Surgical History:   Procedure Laterality Date    BONE MARROW BIOPSY      2019, 2021    IR BIOPSY BONE  8/17/2021    IR BIOPSY BONE MARROW  11/14/2019    NC TX TIBL SHFT FX IMED IMPLT W/WO SCREWS&/CERCLA Left 8/4/2023    Procedure: INSERTION NAIL IM TIBIA;  Surgeon: Danielito Sauceda DO;  Location: [facility];  Service: Orthopedics    TUBAL LIGATION  1985    TUBAL LIGATION        Family History   Problem Relation Age of Onset    Anxiety disorder Mother     Depression Mother     No Known Problems Father     No Known Problems Sister     No Known Problems Sister     No Known Problems Daughter     No Known Problems Maternal Grandmother     Cancer Maternal Grandfather     Cancer Paternal Grandmother         unknown     No Known Problems Paternal Grandfather     No Known Problems Brother     No Known Problems Son     No Known Problems Son     Breast cancer Neg Hx     Ovarian cancer Neg Hx       Social History     Tobacco Use    Smoking status: Never     Smokeless tobacco: Never   Vaping Use    Vaping status: Never Used   Substance Use Topics    Alcohol use: Yes     Comment: 6 bottles of wine during the week, now only drinks on the weekend.    Drug use: Never      E-Cigarette/Vaping    E-Cigarette Use Never User       E-Cigarette/Vaping Substances    Nicotine No     THC No     CBD No     Flavoring No     Other No     Unknown No       I have reviewed and agree with the history as documented.     64-year-old female with a history of intracranial hemorrhage, subsequent seizure disorders, and had an intracranial infection, is currently on antiepileptic medications, and her  this morning asked her to grab a hair dryer and then found her in the garage in the car with no shoes on playing with the buttons, appeared to be very confused, the patient notes that she remembers being in the car but does not remember why, the patient's  reports that she does have some periods of confusion, but no history of parkinsonism or Alzheimer's, she has no reported baseline focal neurologic deficits, is not on any blood thinners, and notably 3 weeks ago she fell and hit her head on the bathtub and did not seek medical attention at that time for the fall with a head trauma, she denies any nausea, vomiting, the patient's  does note that lately she has had a cough, but she has had no fevers, she did report a headache yesterday but not today, no dizziness, no chest pain, abdominal pain, she does endorse some diarrhea, but no hematochezia, dysuria, hematuria, or other complaints.        Review of Systems   Constitutional:  Negative for fever.   Respiratory:  Positive for cough. Negative for shortness of breath.    Cardiovascular:  Negative for chest pain.   Gastrointestinal:  Positive for diarrhea. Negative for abdominal pain, constipation, nausea and vomiting.   Genitourinary:  Negative for dysuria and hematuria.   Neurological:  Positive for headaches. Negative for  light-headedness.           Objective       ED Triage Vitals   Temperature Pulse Blood Pressure Respirations SpO2 Patient Position - Orthostatic VS   02/14/25 1211 02/14/25 1211 02/14/25 1211 02/14/25 1211 02/14/25 1211 02/14/25 1245   98.2 °F (36.8 °C) 77 131/61 17 98 % Sitting      Temp Source Heart Rate Source BP Location FiO2 (%) Pain Score    02/14/25 1211 02/14/25 1211 02/14/25 1211 -- 02/14/25 1211    Temporal Monitor Left arm  4      Vitals      Date and Time Temp Pulse SpO2 Resp BP Pain Score FACES Pain Rating User   02/14/25 1552 100.7 °F (38.2 °C) -- -- -- -- -- -- MS   02/14/25 1445 -- 75 95 % 18 169/93 No Pain -- WD   02/14/25 1321 -- 69 97 % 18 135/75 -- -- WD   02/14/25 1245 -- 72 97 % 18 138/72 5 -- WD   02/14/25 1220 -- -- -- -- -- No Pain --    02/14/25 1211 98.2 °F (36.8 °C) 77 98 % 17 131/61 4 mid abd -- JV            Physical Exam  Vitals and nursing note reviewed.   Constitutional:       General: She is not in acute distress.     Appearance: Normal appearance. She is well-developed.   HENT:      Head: Normocephalic and atraumatic.   Eyes:      Extraocular Movements: Extraocular movements intact.      Conjunctiva/sclera: Conjunctivae normal.   Cardiovascular:      Rate and Rhythm: Normal rate and regular rhythm.   Pulmonary:      Effort: Pulmonary effort is normal. No respiratory distress.      Breath sounds: Normal breath sounds.   Abdominal:      General: There is no distension.      Palpations: Abdomen is soft.      Tenderness: There is no abdominal tenderness.   Musculoskeletal:         General: No swelling.      Cervical back: Normal range of motion.   Skin:     General: Skin is warm and dry.      Capillary Refill: Capillary refill takes less than 2 seconds.   Neurological:      General: No focal deficit present.      Mental Status: She is alert and oriented to person, place, and time.      Cranial Nerves: No cranial nerve deficit.      Sensory: No sensory deficit.      Motor: No  weakness.      Coordination: Coordination normal.      Comments: Notably the patient has a tremor at rest in her hands with a pill-rolling type abnormality   Psychiatric:         Mood and Affect: Mood normal.         Results Reviewed       Procedure Component Value Units Date/Time    Procalcitonin [[id number]]  (Normal) Collected: 02/14/25 1553    Lab Status: Final result Specimen: Blood from Arm, Left Updated: 02/14/25 1625     Procalcitonin <0.05 ng/ml     Lactic acid [[id number]]  (Normal) Collected: 02/14/25 1553    Lab Status: Final result Specimen: Blood from Arm, Left Updated: 02/14/25 1615     LACTIC ACID 1.1 mmol/L     Narrative:      Result may be elevated if tourniquet was used during collection.    Protime-INR [[id number]]  (Normal) Collected: 02/14/25 1553    Lab Status: Final result Specimen: Blood from Arm, Left Updated: 02/14/25 1614     Protime 13.8 seconds      INR 1.02    Narrative:      INR Therapeutic Range    Indication                                             INR Range      Atrial Fibrillation                                               2.0-3.0  Hypercoagulable State                                    2.0.2.3  Left Ventricular Asist Device                            2.0-3.0  Mechanical Heart Valve                                  -    Aortic(with afib, MI, embolism, HF, LA enlargement,    and/or coagulopathy)                                     2.0-3.0 (2.5-3.5)     Mitral                                                             2.5-3.5  Prosthetic/Bioprosthetic Heart Valve               2.0-3.0  Venous thromboembolism (VTE: VT, PE        2.0-3.0    APTT [[id number]]  (Normal) Collected: 02/14/25 1553    Lab Status: Final result Specimen: Blood from Arm, Left Updated: 02/14/25 1614     PTT 25 seconds     Blood culture #2 [[id number]] Collected: 02/14/25 1553    Lab Status: In process Specimen: Blood from Arm, Right Updated: 02/14/25 1600    Blood culture #1 [[id number]] Collected: 02/14/25  1553    Lab Status: In process Specimen: Blood from Arm, Left Updated: 02/14/25 1600    Blood gas, arterial [[id number]]  (Abnormal) Collected: 02/14/25 1519    Lab Status: Final result Specimen: Blood, Arterial from Radial, Right Updated: 02/14/25 1528     pH, Arterial 7.354     pCO2, Arterial 34.4 mm Hg      pO2, Arterial 99.1 mm Hg      HCO3, Arterial 18.7 mmol/L      Base Excess, Arterial -5.9 mmol/L      O2 Content, Arterial 17.9 mL/dL      O2 HGB,Arterial  96.6 %      SOURCE Radial, Right     FIDELINA TEST Yes     Temperature 100.7 Degrees Fehrenheit      Nasal Cannula 4    Urine Microscopic [[id number]]  (Abnormal) Collected: 02/14/25 1351    Lab Status: Final result Specimen: Urine, Clean Catch Updated: 02/14/25 1442     RBC, UA None Seen /hpf      WBC, UA 20-30 /hpf      Epithelial Cells Occasional /hpf      Bacteria, UA Moderate /hpf     HS Troponin I 4hr [[id number]]     Lab Status: No result Specimen: Blood     Ethanol [[id number]]  (Normal) Collected: 02/14/25 1351    Lab Status: Final result Specimen: Blood from Arm, Right Updated: 02/14/25 1416     Ethanol Lvl <10 mg/dL     UA (URINE) with reflex to Scope [[id number]]  (Abnormal) Collected: 02/14/25 1351    Lab Status: Final result Specimen: Urine, Clean Catch Updated: 02/14/25 1404     Color, UA Yellow     Clarity, UA Clear     Specific Gravity, UA 1.010     pH, UA 6.0     Leukocytes, UA 2+     Nitrite, UA Negative     Protein, UA Negative mg/dl      Glucose, UA Negative mg/dl      Ketones, UA Negative mg/dl      Urobilinogen, UA 0.2 E.U./dl      Bilirubin, UA Negative     Occult Blood, UA Negative    HS Troponin 0hr (reflex protocol) [[id number]]  (Normal) Collected: 02/14/25 1241    Lab Status: Final result Specimen: Blood from Arm, Right Updated: 02/14/25 1319     hs TnI 0hr 3 ng/L     FLU/COVID Rapid Antigen (30 min. TAT) - Preferred screening test in ED [[id number]]  (Normal) Collected: 02/14/25 1241    Lab Status: Final result Specimen: Nares from Nose  Updated: 02/14/25 1314     SARS COV Rapid Antigen Negative     Influenza A Rapid Antigen Negative     Influenza B Rapid Antigen Negative    Narrative:      This test has been performed using the [employer] Shwetha 2 FLU+SARS Antigen test under the Emergency Use Authorization (EUA). This test has been validated by the  and verified by the performing laboratory. The Shwetha uses lateral flow immunofluorescent sandwich assay to detect SARS-COV, Influenza A and Influenza B Antigen.     The Quidel Shwetha 2 SARS Antigen test does not differentiate between SARS-CoV and SARS-CoV-2.     Negative results are presumptive and may be confirmed with a molecular assay, if necessary, for patient management. Negative results do not rule out SARS-CoV-2 or influenza infection and should not be used as the sole basis for treatment or patient management decisions. A negative test result may occur if the level of antigen in a sample is below the limit of detection of this test.     Positive results are indicative of the presence of viral antigens, but do not rule out bacterial infection or co-infection with other viruses.     All test results should be used as an adjunct to clinical observations and other information available to the provider.    FOR PEDIATRIC PATIENTS - copy/paste COVID Guidelines URL to browser: [email or web address]    HS Troponin I 2hr [[id number]]     Lab Status: No result Specimen: Blood     CBC and differential [[id number]]  (Abnormal) Collected: 02/14/25 1241    Lab Status: Final result Specimen: Blood from Arm, Right Updated: 02/14/25 1311     WBC 4.74 Thousand/uL      RBC 4.12 Million/uL      Hemoglobin 13.1 g/dL      Hematocrit 38.5 %      MCV 93 fL      MCH 31.8 pg      MCHC 34.0 g/dL      RDW 12.3 %      MPV 9.5 fL      Platelets 95 Thousands/uL      nRBC 0 /100 WBCs      Segmented % 69 %      Immature Grans % 0 %      Lymphocytes % 25 %      Monocytes %  5 %      Eosinophils Relative 1 %      Basophils Relative 0 %      Absolute Neutrophils 3.22 Thousands/µL      Absolute Immature Grans 0.02 Thousand/uL      Absolute Lymphocytes 1.18 Thousands/µL      Absolute Monocytes 0.25 Thousand/µL      Eosinophils Absolute 0.05 Thousand/µL      Basophils Absolute 0.02 Thousands/µL     Comprehensive metabolic panel [[id number]]  (Abnormal) Collected: 02/14/25 1241    Lab Status: Final result Specimen: Blood from Arm, Right Updated: 02/14/25 1309     Sodium 131 mmol/L      Potassium 3.7 mmol/L      Chloride 96 mmol/L      CO2 25 mmol/L      ANION GAP 10 mmol/L      BUN 11 mg/dL      Creatinine 0.69 mg/dL      Glucose 131 mg/dL      Calcium 9.1 mg/dL      AST 40 U/L      ALT 29 U/L      Alkaline Phosphatase 70 U/L      Total Protein 7.6 g/dL      Albumin 4.5 g/dL      Total Bilirubin 0.50 mg/dL      eGFR 92 ml/min/1.73sq m     Narrative:      National Kid[employer] Disease Foundation guidelines for Chronic Kidney Disease (CKD):     Stage 1 with normal or high GFR (GFR > 90 mL/min/1.73 square meters)    Stage 2 Mild CKD (GFR = 60-89 mL/min/1.73 square meters)    Stage 3A Moderate CKD (GFR = 45-59 mL/min/1.73 square meters)    Stage 3B Moderate CKD (GFR = 30-44 mL/min/1.73 square meters)    Stage 4 Severe CKD (GFR = 15-29 mL/min/1.73 square meters)    Stage 5 End Stage CKD (GFR <15 mL/min/1.73 square meters)  Note: GFR calculation is accurate only with a steady state creatinine    Magnesium [[id number]]  (Abnormal) Collected: 02/14/25 1241    Lab Status: Final result Specimen: Blood from Arm, Right Updated: 02/14/25 1309     Magnesium 1.8 mg/dL     Zonisamide level [[id number]] Collected: 02/14/25 1241    Lab Status: In process Specimen: Blood from Arm, Right Updated: 02/14/25 1250    Lacosamide [[id number]] Collected: 02/14/25 1241    Lab Status: In process Specimen: Blood from Arm, Right Updated: 02/14/25 1250            XR chest 2 views   Final Interpretation by Rhys Beck MD  (02/14 1525)      No acute cardiopulmonary disease.      Marked right-sided acromioclavicular joint separation with elevated right clavicle. Correlate for any interval trauma from the prior study.      Workstation performed: [id number]         CT head without contrast   Final Interpretation by Erich Sharpe MD (02/14 1314)      No acute intracranial abnormality no significant change from prior.                     Resident: LIO ALVARES I, the attending radiologist, have reviewed the images and agree with the final report above.      Workstation performed: [id number]         XR chest 1 view portable    (Results Pending)       ECG 12 Lead Documentation Only    Date/Time: 2/14/2025 12:42 PM    Performed by: Rhys Longoria MD  Authorized by: Rhys Longoria MD    ECG reviewed by me, the ED Provider: yes    Patient location:  ED  Previous ECG:     Previous ECG:  Compared to current    Similarity:  Changes noted  Interpretation:     Interpretation: abnormal    Rate:     ECG rate:  70    ECG rate assessment: normal    Rhythm:     Rhythm: sinus rhythm    Ectopy:     Ectopy: none    QRS:     QRS axis:  Normal    QRS intervals:  Normal  Conduction:     Conduction: abnormal      Abnormal conduction: 1st degree    ST segments:     ST segments:  Normal  T waves:     T waves: normal        ED Medication and Procedure Management   Prior to Admission Medications   Prescriptions Last Dose Informant Patient Reported? Taking?   desvenlafaxine succinate (PRISTIQ) 50 mg 24 hr tablet   No No   Sig: TAKE ONE TABLET BY MOUTH EVERY DAY   gabapentin (NEURONTIN) 300 mg capsule   No No   Sig: TAKE ONE CAPSULE BY MOUTH TWICE A DAY   hydrOXYzine HCL (ATARAX) 25 mg tablet   No No   Sig: TAKE ONE TABLET BY MOUTH TWICE A DAY   lacosamide (VIMPAT) 200 mg tablet   No No   Sig: TAKE ONE TABLET BY MOUTH TWICE A DAY   metoprolol succinate (TOPROL-XL) 25 mg 24 hr tablet   No No   Sig: TAKE ONE TABLET BY MOUTH TWICE A DAY    traZODone (DESYREL) 50 mg tablet   No No   Sig: TAKE ONE TABLET BY MOUTH DAILY AT BEDTIME   zonisamide (ZONEGRAN) 100 mg capsule   No No   Sig: TAKE TWO CAPSULE BY MOUTH DAILY AT BEDTIME   zonisamide (ZONEGRAN) 100 mg capsule   No No   Sig: Take 2 capsules (200 mg total) by mouth daily at bedtime for 7 days      Facility-Administered Medications: None     Patient's Medications   Discharge Prescriptions    No medications on file     No discharge procedures on file.  ED SEPSIS DOCUMENTATION   Time reflects when diagnosis was documented in both MDM as applicable and the Disposition within this note       Time User Action Codes Description Comment    2/14/2025  3:17 PM Rhys Longoria [R56.9] Observed seizure-like activity (HCC)     2/14/2025  3:17 PM Rhys Longoria [J96.11] Hypoxemic respiratory failure, chronic (HCC)     2/14/2025  3:17 PM Rhys Longoria Remove [J96.11] Hypoxemic respiratory failure, chronic (HCC)     2/14/2025  3:17 PM Rhys Longoria [J96.01] Acute hypoxemic respiratory failure (HCC)     2/14/2025  3:18 PM Rhys Longoria [N39.0] Urinary tract infection     2/14/2025  3:18 PM Rhys Longoria [F05] Acute confusional state                  Rhys Longoria MD  02/14/25 [id number]

## 2025-02-14 NOTE — ASSESSMENT & PLAN NOTE
Patient initially presented with confusion that began today per , was found in her garage in car with no shoes on and confused as to why she was there. Also reports recent cough and occasional diarrhea last few days.  Reportedly had a fall 3 weeks ago where she hit her head on bathtub but did not seek medical attention at the time, though not on anticoagulation.    No acute injuries or deformities on exam, patient with no complaints  CXR with no acute cardiopulmonary disease, marked right-sided acromioclavicular joint separation which is chronic   CT head showed no acute intracranial abnormality  Did not meet SIRS criteria, though UA was suggestive of UTI  Start gentle IVF hydration, check vitamin B12 levels  Add neuro checks, place on telemetry, fall precautions

## 2025-02-14 NOTE — ASSESSMENT & PLAN NOTE
UA suggestive of UTI with 2+ leukocytes, 20-30 WBCs  Continue IV Rocephin, likely can switch to PO abx when improved

## 2025-02-14 NOTE — ASSESSMENT & PLAN NOTE
Patient reports history of alcohol use, typically will have 1 beer per day  Last drink was earlier this morning, though ethanol levels negative in ED  Unclear if seizures could be from alcohol withdrawal, though given minimal use more likely that alcohol use is lowering seizure threshold  CIWA protocol, seizure and aspiration precautions  Start thiamine, folate, also vitamin supplementation

## 2025-02-14 NOTE — ASSESSMENT & PLAN NOTE
Hyponatremia with Na 131 in ED, though no JP with stable creatinine  Likely due to volume depletion/dehydration, hemoconcentrated on CBC  Received 1L NS bolus in ED, started on gentle IVF with Isolyte  Check BMP in a.m., avoid overcorrection as able

## 2025-02-14 NOTE — H&P
H&P - Hospitalist   Name: Renzo Pop 64 y.o. female I MRN: [id number]  Unit/Bed#: ED 10 I Date of Admission: 2/14/2025   Date of Service: 2/14/2025 I Hospital Day: 0     Assessment & Plan  Acute encephalopathy  Patient initially presented with confusion that began today per , was found in her garage in car with no shoes on and confused as to why she was there. Also reports recent cough and occasional diarrhea last few days.  Reportedly had a fall 3 weeks ago where she hit her head on bathtub but did not seek medical attention at the time, though not on anticoagulation.    No acute injuries or deformities on exam, patient with no complaints  CXR with no acute cardiopulmonary disease, marked right-sided acromioclavicular joint separation which is chronic   CT head showed no acute intracranial abnormality  Did not meet SIRS criteria, though UA was suggestive of UTI  Start gentle IVF hydration, check vitamin B12 levels  Add neuro checks, place on telemetry, fall precautions  Breakthrough seizure (HCC)  While patient was being evaluated in ED, had an episode where she became unresponsive, cyanotic, smacking her lips, apneic and shaking but no tonic-clonic movements, concerning for absence seizure.  Likely brought on by UTI versus alcohol use lowering seizure threshold  Hypoxic with SpO2 67% on room air, briefly placed on 15L NRB with improvement and was weaned to 2L NC O2  Lactic acid wnl. Procal negative. Troponin negative.  Ethanol levels negative. ABG overall acceptable.  Lacosamide levels wnl 18.93. Zonasimade levels pending.  Received 2 mg IV Versed & 1 g IV Keppra in ED with improvement  ED discussed with neurology/epileptologist, okay to stay at [facility] so long as patient does not have recurring/intractable breakthrough seizures  Neurology consulted, continued on home medications including lacosamide 200 mg BID, zonisamide 200 mg qHS, gabapentin 300 mg BID for now  UTI (urinary tract infection)  UA  suggestive of UTI with 2+ leukocytes, 20-30 WBCs  Continue IV Rocephin, likely can switch to PO abx when improved  Hyponatremia  Hyponatremia with Na 131 in ED, though no JP with stable creatinine  Likely due to volume depletion/dehydration, hemoconcentrated on CBC  Received 1L NS bolus in ED, started on gentle IVF with Isolyte  Check BMP in a.m., avoid overcorrection as able  Alcohol abuse  Patient reports history of alcohol use, typically will have 1 beer per day  Last drink was earlier this morning, though ethanol levels negative in ED  Unclear if seizures could be from alcohol withdrawal, though given minimal use more likely that alcohol use is lowering seizure threshold  CIWA protocol, seizure and aspiration precautions  Start thiamine, folate, also vitamin supplementation  Combined systolic and diastolic congestive heart failure (HCC)  Wt Readings from Last 3 Encounters:   09/10/24 58.1 kg (128 lb)   08/08/24 56.2 kg (124 lb)   07/31/24 57.2 kg (126 lb)   Clinically euvolemic/borderline dry on admission  Echo (2022): EF 60%, grade 1 diastolic dysfunction.  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  Code Status: Level 1 - Full Code   Discussion with family: Updated  () via phone.    Anticipated Length of Stay: Patient will be admitted on an inpatient basis with an anticipated length of stay of greater than 2 midnights secondary to acute encephalopathy, breakthrough seizures.    History of Present Illness   Chief Complaint: MARTINE Pop is a 64 y.o. female with a PMH of seizure disorder, alcohol abuse, CHF, insomnia,  anxiety, who presents with AMS, brought in by  with confusion. Patient's  reports she was found in her garage in the car with no shoes on, appeared confused trying to play with buttons unable to remember why she went to the car. Of note, patient reportedly had a fall 3 weeks ago where she fell and hit her head on the bathtub, did not seek medical attention at the time though did not have any head trauma, injuries, bleeding or vomiting. While patient was in ED, RN noted that patient became unresponsive with lipsmacking, face became cyanotic and was noted to be hypoxic with shaking, appeared to be having absence seizure requiring Versed and Keppra loading dose with improvement.    Review of Systems   Unable to perform ROS: Mental status change       Historical Information   Past Medical History:   Diagnosis Date    Chronic alcohol abuse     Epilepsy (HCC)     Fracture     Hepatitis     Hep A     Hepatitis     Insomnia     10mar2016 resolved    Osteoporosis     14jun2016 resolved    Pancreatitis     SAH (subarachnoid hemorrhage) (HCC)     Seasonal allergies     Seizure (HCC)     Seizures (HCC)     Urine discoloration 11/11/2019    Varicella     Vomiting 11/13/2019     Past Surgical History:   Procedure Laterality Date    BONE MARROW BIOPSY      2019, 2021    IR BIOPSY BONE  8/17/2021    IR BIOPSY BONE MARROW  11/14/2019    [place] TX TIBL SHFT FX IMED IMPLT W/WO SCREWS&/CERCLA Left 8/4/2023    Procedure: INSERTION NAIL IM TIBIA;  Surgeon: Danielito Sauceda DO;  Location: [facility];  Service: Orthopedics    TUBAL LIGATION  1985    TUBAL LIGATION       Social History     Tobacco Use    Smoking status: Never    Smokeless tobacco: Never   Vaping Use    Vaping status: Never Used   Substance and Sexual Activity    Alcohol use: Yes     Comment: 6 bottles of wine during the week, now only drinks on the weekend.    Drug use: Never    Sexual activity: Yes     Partners: Male     E-Cigarette/Vaping    E-Cigarette Use Never  User      E-Cigarette/Vaping Substances    Nicotine No     THC No     CBD No     Flavoring No     Other No     Unknown No      Family History   Problem Relation Age of Onset    Anxiety disorder Mother     Depression Mother     No Known Problems Father     No Known Problems Sister     No Known Problems Sister     No Known Problems Daughter     No Known Problems Maternal Grandmother     Cancer Maternal Grandfather     Cancer Paternal Grandmother         unknown     No Known Problems Paternal Grandfather     No Known Problems Brother     No Known Problems Son     No Known Problems Son     Breast cancer Neg Hx     Ovarian cancer Neg Hx      Social History:  Marital Status: /Civil Union   Occupation: NA  Patient Pre-hospital Living Situation: Home, With spouse  Patient Pre-hospital Level of Mobility: walks  Patient Pre-hospital Diet Restrictions: None    Meds/Allergies   I have reviewed home medications with patient family member.  Prior to Admission medications    Medication Sig Start Date End Date Taking? Authorizing Provider   desvenlafaxine succinate (PRISTIQ) 50 mg 24 hr tablet TAKE ONE TABLET BY MOUTH EVERY DAY 8/17/24   Arielle Tong MD   gabapentin (NEURONTIN) 300 mg capsule TAKE ONE CAPSULE BY MOUTH TWICE A DAY 1/28/25   Arielle Tong MD   hydrOXYzine HCL (ATARAX) 25 mg tablet TAKE ONE TABLET BY MOUTH TWICE A DAY 9/15/24   Arielle Tong MD   lacosamide (VIMPAT) 200 mg tablet TAKE ONE TABLET BY MOUTH TWICE A DAY 10/25/24   Boyd Alfaro MD   metoprolol succinate (TOPROL-XL) 25 mg 24 hr tablet TAKE ONE TABLET BY MOUTH TWICE A DAY 8/29/24   Rui Juarez MD   traZODone (DESYREL) 50 mg tablet TAKE ONE TABLET BY MOUTH DAILY AT BEDTIME 9/29/24   Arielle Tong MD   zonisamide (ZONEGRAN) 100 mg capsule TAKE TWO CAPSULE BY MOUTH DAILY AT BEDTIME 11/4/24   Danielito Batista MD   zonisamide (ZONEGRAN) 100 mg capsule Take 2 capsules (200 mg total) by mouth daily at bedtime for 7 days 11/2/24 11/9/24  José Miguel  JAKOB Mckeon MD     No Known Allergies    Objective :  Temp:  [98.2 °F (36.8 °C)-100.7 °F (38.2 °C)] 99 °F (37.2 °C)  HR:  [69-77] 75  BP: (131-169)/(61-93) 131/69  Resp:  [17-22] 22  SpO2:  [95 %-99 %] 99 %  O2 Device: None (Room air)  Nasal Cannula O2 Flow Rate (L/min):  [2 L/min] 2 L/min    Physical Exam  Constitutional:       General: She is not in acute distress.     Appearance: She is not ill-appearing, toxic-appearing or diaphoretic.   HENT:      Mouth/Throat:      Mouth: Mucous membranes are moist.      Pharynx: Oropharynx is clear.   Cardiovascular:      Rate and Rhythm: Normal rate and regular rhythm.      Pulses: Normal pulses.      Heart sounds: Normal heart sounds.   Pulmonary:      Effort: Pulmonary effort is normal. No respiratory distress.      Breath sounds: Normal breath sounds.   Abdominal:      General: Bowel sounds are normal. There is no distension.      Palpations: Abdomen is soft.      Tenderness: There is no abdominal tenderness. There is no guarding.   Musculoskeletal:         General: No swelling or tenderness.      Cervical back: Normal range of motion. No rigidity or tenderness.   Skin:     General: Skin is warm and dry.   Neurological:      General: No focal deficit present.      Mental Status: She is alert.      Comments: Awake, alert, oriented to self and answers questions mostly appropriately   Psychiatric:         Mood and Affect: Mood normal.         Behavior: Behavior normal.          Lines/Drains:            Lab Results: I have reviewed the following results:  Results from last 7 days   Lab Units 02/14/25  1241   WBC Thousand/uL 4.74   HEMOGLOBIN g/dL 13.1   HEMATOCRIT % 38.5   PLATELETS Thousands/uL 95*   SEGS PCT % 69   LYMPHO PCT % 25   MONO PCT % 5   EOS PCT % 1     Results from last 7 days   Lab Units 02/14/25  1241   SODIUM mmol/L 131*   POTASSIUM mmol/L 3.7   CHLORIDE mmol/L 96   CO2 mmol/L 25   BUN mg/dL 11   CREATININE mg/dL 0.69   ANION GAP mmol/L 10   CALCIUM mg/dL 9.1    ALBUMIN g/dL 4.5   TOTAL BILIRUBIN mg/dL 0.50   ALK PHOS U/L 70   ALT U/L 29   AST U/L 40*   GLUCOSE RANDOM mg/dL 131     Results from last 7 days   Lab Units 02/14/25  1553   INR  1.02         Lab Results   Component Value Date    HGBA1C 5.1 03/13/2023    HGBA1C 4.7 03/09/2022    HGBA1C 5.3 04/22/2016     Results from last 7 days   Lab Units 02/14/25  1553   LACTIC ACID mmol/L 1.1   PROCALCITONIN ng/ml <0.05       Imaging Results Review: I reviewed radiology reports from this admission including: chest xray and CT head.  Other Study Results Review: EKG was reviewed.     Administrative Statements   I have spent a total time of 55 minutes in caring for this patient on the day of the visit/encounter including Counseling / Coordination of care, Documenting in the medical record, Reviewing / ordering tests, medicine, procedures  , Obtaining or reviewing history  , and Communicating with other healthcare professionals .    ** Please Note: This note has been constructed using a voice recognition system. **

## 2025-02-14 NOTE — ED NOTES
This RN at bedside when pt stopped answering my questions, began smacking her lips and face started turning blue. O2 sat 67%. Provider called to bedside. Pt placed on 15 L NRB. O2 96% on 15 L NRB, skin warm and pink. Pt now placed on 3 L NC. O2 98% on 3 L NC. Care ongoing.      Henna Kwan RN  02/14/25 1700       Henna Kwan RN  02/14/25 1700

## 2025-02-15 LAB
ALBUMIN SERPL BCG-MCNC: 3.7 G/DL (ref 3.5–5)
ALP SERPL-CCNC: 49 U/L (ref 34–104)
ALT SERPL W P-5'-P-CCNC: 18 U/L (ref 7–52)
ANION GAP SERPL CALCULATED.3IONS-SCNC: 7 MMOL/L (ref 4–13)
AST SERPL W P-5'-P-CCNC: 25 U/L (ref 13–39)
BASOPHILS # BLD AUTO: 0.01 THOUSANDS/ΜL (ref 0–0.1)
BASOPHILS NFR BLD AUTO: 0 % (ref 0–1)
BILIRUB SERPL-MCNC: 0.87 MG/DL (ref 0.2–1)
BUN SERPL-MCNC: 9 MG/DL (ref 5–25)
CALCIUM SERPL-MCNC: 8.4 MG/DL (ref 8.4–10.2)
CHLORIDE SERPL-SCNC: 107 MMOL/L (ref 96–108)
CO2 SERPL-SCNC: 24 MMOL/L (ref 21–32)
CREAT SERPL-MCNC: 0.63 MG/DL (ref 0.6–1.3)
EOSINOPHIL # BLD AUTO: 0.03 THOUSAND/ΜL (ref 0–0.61)
EOSINOPHIL NFR BLD AUTO: 1 % (ref 0–6)
ERYTHROCYTE [DISTWIDTH] IN BLOOD BY AUTOMATED COUNT: 12.6 % (ref 11.6–15.1)
GFR SERPL CREATININE-BSD FRML MDRD: 95 ML/MIN/1.73SQ M
GLUCOSE SERPL-MCNC: 97 MG/DL (ref 65–140)
HCT VFR BLD AUTO: 31.6 % (ref 34.8–46.1)
HGB BLD-MCNC: 10.7 G/DL (ref 11.5–15.4)
IMM GRANULOCYTES # BLD AUTO: 0.01 THOUSAND/UL (ref 0–0.2)
IMM GRANULOCYTES NFR BLD AUTO: 0 % (ref 0–2)
LYMPHOCYTES # BLD AUTO: 2.04 THOUSANDS/ΜL (ref 0.6–4.47)
LYMPHOCYTES NFR BLD AUTO: 45 % (ref 14–44)
MAGNESIUM SERPL-MCNC: 2.2 MG/DL (ref 1.9–2.7)
MCH RBC QN AUTO: 32.3 PG (ref 26.8–34.3)
MCHC RBC AUTO-ENTMCNC: 33.9 G/DL (ref 31.4–37.4)
MCV RBC AUTO: 96 FL (ref 82–98)
MONOCYTES # BLD AUTO: 0.34 THOUSAND/ΜL (ref 0.17–1.22)
MONOCYTES NFR BLD AUTO: 8 % (ref 4–12)
NEUTROPHILS # BLD AUTO: 2.1 THOUSANDS/ΜL (ref 1.85–7.62)
NEUTS SEG NFR BLD AUTO: 46 % (ref 43–75)
NRBC BLD AUTO-RTO: 0 /100 WBCS
PLATELET # BLD AUTO: 74 THOUSANDS/UL (ref 149–390)
PMV BLD AUTO: 9.7 FL (ref 8.9–12.7)
POTASSIUM SERPL-SCNC: 3.3 MMOL/L (ref 3.5–5.3)
PROT SERPL-MCNC: 5.9 G/DL (ref 6.4–8.4)
RBC # BLD AUTO: 3.31 MILLION/UL (ref 3.81–5.12)
SODIUM SERPL-SCNC: 138 MMOL/L (ref 135–147)
WBC # BLD AUTO: 4.53 THOUSAND/UL (ref 4.31–10.16)

## 2025-02-15 PROCEDURE — 83735 ASSAY OF MAGNESIUM: CPT | Performed by: PHYSICIAN ASSISTANT

## 2025-02-15 PROCEDURE — 85025 COMPLETE CBC W/AUTO DIFF WBC: CPT | Performed by: PHYSICIAN ASSISTANT

## 2025-02-15 PROCEDURE — 80053 COMPREHEN METABOLIC PANEL: CPT | Performed by: PHYSICIAN ASSISTANT

## 2025-02-15 PROCEDURE — 99232 SBSQ HOSP IP/OBS MODERATE 35: CPT | Performed by: INTERNAL MEDICINE

## 2025-02-15 PROCEDURE — 36415 COLL VENOUS BLD VENIPUNCTURE: CPT | Performed by: PHYSICIAN ASSISTANT

## 2025-02-15 PROCEDURE — 99223 1ST HOSP IP/OBS HIGH 75: CPT | Performed by: PSYCHIATRY & NEUROLOGY

## 2025-02-15 RX ORDER — SODIUM CHLORIDE, SODIUM GLUCONATE, SODIUM ACETATE, POTASSIUM CHLORIDE, MAGNESIUM CHLORIDE, SODIUM PHOSPHATE, DIBASIC, AND POTASSIUM PHOSPHATE .53; .5; .37; .037; .03; .012; .00082 G/100ML; G/100ML; G/100ML; G/100ML; G/100ML; G/100ML; G/100ML
500 INJECTION, SOLUTION INTRAVENOUS ONCE
Status: COMPLETED | OUTPATIENT
Start: 2025-02-15 | End: 2025-02-15

## 2025-02-15 RX ORDER — ALBUMIN (HUMAN) 12.5 G/50ML
25 SOLUTION INTRAVENOUS ONCE
Status: COMPLETED | OUTPATIENT
Start: 2025-02-15 | End: 2025-02-15

## 2025-02-15 RX ORDER — ZONISAMIDE 100 MG/1
300 CAPSULE ORAL
Status: DISCONTINUED | OUTPATIENT
Start: 2025-02-15 | End: 2025-02-16 | Stop reason: HOSPADM

## 2025-02-15 RX ORDER — KETOROLAC TROMETHAMINE 30 MG/ML
15 INJECTION, SOLUTION INTRAMUSCULAR; INTRAVENOUS ONCE
Status: COMPLETED | OUTPATIENT
Start: 2025-02-15 | End: 2025-02-15

## 2025-02-15 RX ORDER — MAGNESIUM SULFATE HEPTAHYDRATE 40 MG/ML
2 INJECTION, SOLUTION INTRAVENOUS ONCE
Status: COMPLETED | OUTPATIENT
Start: 2025-02-15 | End: 2025-02-15

## 2025-02-15 RX ORDER — LIDOCAINE 40 MG/G
CREAM TOPICAL ONCE
Status: COMPLETED | OUTPATIENT
Start: 2025-02-15 | End: 2025-02-15

## 2025-02-15 RX ORDER — POTASSIUM CHLORIDE 1500 MG/1
40 TABLET, EXTENDED RELEASE ORAL ONCE
Status: COMPLETED | OUTPATIENT
Start: 2025-02-15 | End: 2025-02-15

## 2025-02-15 RX ADMIN — GABAPENTIN 300 MG: 300 CAPSULE ORAL at 17:47

## 2025-02-15 RX ADMIN — LACOSAMIDE 200 MG: 100 TABLET, FILM COATED ORAL at 17:47

## 2025-02-15 RX ADMIN — HYDROXYZINE HYDROCHLORIDE 25 MG: 25 TABLET ORAL at 08:46

## 2025-02-15 RX ADMIN — SODIUM CHLORIDE, SODIUM GLUCONATE, SODIUM ACETATE, POTASSIUM CHLORIDE, MAGNESIUM CHLORIDE, SODIUM PHOSPHATE, DIBASIC, AND POTASSIUM PHOSPHATE 500 ML: .53; .5; .37; .037; .03; .012; .00082 INJECTION, SOLUTION INTRAVENOUS at 02:31

## 2025-02-15 RX ADMIN — SODIUM CHLORIDE, SODIUM GLUCONATE, SODIUM ACETATE, POTASSIUM CHLORIDE, MAGNESIUM CHLORIDE, SODIUM PHOSPHATE, DIBASIC, AND POTASSIUM PHOSPHATE 75 ML/HR: .53; .5; .37; .037; .03; .012; .00082 INJECTION, SOLUTION INTRAVENOUS at 06:50

## 2025-02-15 RX ADMIN — POTASSIUM CHLORIDE 40 MEQ: 1500 TABLET, EXTENDED RELEASE ORAL at 09:15

## 2025-02-15 RX ADMIN — FOLIC ACID 1 MG: 1 TABLET ORAL at 08:45

## 2025-02-15 RX ADMIN — ACETAMINOPHEN 650 MG: 325 TABLET, FILM COATED ORAL at 17:49

## 2025-02-15 RX ADMIN — LIDOCAINE 1 APPLICATION: 40 CREAM TOPICAL at 22:52

## 2025-02-15 RX ADMIN — MAGNESIUM SULFATE HEPTAHYDRATE 2 G: 40 INJECTION, SOLUTION INTRAVENOUS at 14:54

## 2025-02-15 RX ADMIN — SODIUM CHLORIDE, SODIUM GLUCONATE, SODIUM ACETATE, POTASSIUM CHLORIDE, MAGNESIUM CHLORIDE, SODIUM PHOSPHATE, DIBASIC, AND POTASSIUM PHOSPHATE 500 ML: .53; .5; .37; .037; .03; .012; .00082 INJECTION, SOLUTION INTRAVENOUS at 05:31

## 2025-02-15 RX ADMIN — ZONISAMIDE 300 MG: 100 CAPSULE ORAL at 20:56

## 2025-02-15 RX ADMIN — TRAZODONE HYDROCHLORIDE 50 MG: 50 TABLET ORAL at 20:57

## 2025-02-15 RX ADMIN — ALBUMIN (HUMAN) 25 G: 0.25 INJECTION, SOLUTION INTRAVENOUS at 05:31

## 2025-02-15 RX ADMIN — DESVENLAFAXINE 50 MG: 50 TABLET, FILM COATED, EXTENDED RELEASE ORAL at 08:45

## 2025-02-15 RX ADMIN — ENOXAPARIN SODIUM 40 MG: 40 INJECTION SUBCUTANEOUS at 08:49

## 2025-02-15 RX ADMIN — SODIUM CHLORIDE, SODIUM LACTATE, POTASSIUM CHLORIDE, AND CALCIUM CHLORIDE 500 ML: .6; .31; .03; .02 INJECTION, SOLUTION INTRAVENOUS at 07:37

## 2025-02-15 RX ADMIN — KETOROLAC TROMETHAMINE 15 MG: 30 INJECTION, SOLUTION INTRAMUSCULAR at 22:55

## 2025-02-15 RX ADMIN — GABAPENTIN 300 MG: 300 CAPSULE ORAL at 08:46

## 2025-02-15 RX ADMIN — Medication 1 TABLET: at 08:45

## 2025-02-15 RX ADMIN — CEFTRIAXONE 1000 MG: 1 INJECTION, SOLUTION INTRAVENOUS at 14:54

## 2025-02-15 RX ADMIN — ALBUMIN (HUMAN) 25 G: 0.25 INJECTION, SOLUTION INTRAVENOUS at 02:30

## 2025-02-15 RX ADMIN — THIAMINE HCL TAB 100 MG 100 MG: 100 TAB at 08:45

## 2025-02-15 RX ADMIN — HYDROXYZINE HYDROCHLORIDE 25 MG: 25 TABLET ORAL at 17:47

## 2025-02-15 RX ADMIN — LACOSAMIDE 200 MG: 100 TABLET, FILM COATED ORAL at 08:45

## 2025-02-15 NOTE — ASSESSMENT & PLAN NOTE
While patient was being evaluated in ED, had an episode where she became unresponsive, cyanotic, smacking her lips, apneic and shaking but no tonic-clonic movements, concerning for absence seizure.  Likely brought on by UTI versus alcohol use lowering seizure threshold  Hypoxic with SpO2 67% on room air, briefly placed on 15L NRB with improvement and was weaned to 2L NC O2  Lactic acid wnl. Procal negative. Troponin negative.  Ethanol levels negative. ABG overall acceptable.  Lacosamide levels wnl 18.93. Zonasimade levels pending.  Received 2 mg IV Versed & 1 g IV Keppra in ED with improvement  ED discussed with neurology/epileptologist, okay to stay at [facility] so long as patient does not have recurring/intractable breakthrough seizures  Neurology consulted, continued on home medications including lacosamide 200 mg BID, zonisamide 200 mg qHS, gabapentin 300 mg BID for now

## 2025-02-15 NOTE — CONSULTS
TeleConsultation - Neurology   Renzo Pop 64 y.o. female MRN: [id number]  Unit/Bed#: -01 Encounter: [id number]      REQUIRED DOCUMENTATION:     1. This service was provided via Telemedicine.  2. Provider located in [place].  3. TeleMed provider: Vijaya Rose MD.  4. Identify all parties in room with patient during tele consult:  None  5. After connecting through televideo, patient was identified by name and date of birth and assistant checked wristband.  Patient was then informed that this was a Telemedicine visit and that the exam was being conducted confidentially over secure lines. My office door was closed. No one else was in the room.  Patient acknowledged consent and understanding of privacy and security of the Telemedicine visit, and gave us permission to have the assistant stay in the room in order to assist with the history and to conduct the exam.  I informed the patient that I have reviewed their record in Epic and presented the opportunity for them to ask any questions regarding the visit today.  The patient agreed to participate.       Assessment & Plan   Localization-related epilepsy with breakthrough seizure:    Patient present with episode of confusion, followed by a witnessed focal-seizure while in ED.  Patient reports compliance with meds.     Both events are highly suggestive of seizures related to her left temporal lobe epilepsy.  As per last note from Dr. Alfaro, we will increase Zonisamide.   Increase dose to 300 mg QHS. Level from admission is pending. If it comes very elevated, then call our service to revisit the recommendation.    C/w Vimpat 200 mg BID and Gabapentin 300 BID.      History of Present Illness     Reason for Consult / Principal Problem: confusion  Hx and PE limited by: none  HPI: Renzo Pop is a 64 y.o.  female who presents with confusion. She was found in the garage by her , fumbling with the car buttons, unaware of what she was trying to do.  When she was  brought here, she was at baseline. However shortly after she was seen with an episode of lip smacking.    Patient reported that she spends most of her day alone, so she is not sure if she has seizures. However she believes it has been a long time since her last seizure. She reports compliance with meds.    Patient has a known history of previous SAH and localizaton-related epilepsy with electrographical findings of left temporal onset.  Semiology includes hand clenching and reduced responsiveness, and panic/terror attacks.  Last seen in clinic 7/2023.      Inpatient consult to Neurology  Consult performed by: Vijaya Rose MD  Consult ordered by: Sheree Menezes PA-C           Review of Systems  Complete ROS was done and is negative other than what is mentioned in HPI    Historical Information   Past Medical History:   Diagnosis Date    Chronic alcohol abuse     Epilepsy (HCC)     Fracture     Hepatitis     Hep A     Hepatitis     Insomnia     10mar2016 resolved    Osteoporosis     14jun2016 resolved    Pancreatitis     SAH (subarachnoid hemorrhage) (HCC)     Seasonal allergies     Seizure (HCC)     Seizures (HCC)     Urine discoloration 11/11/2019    Varicella     Vomiting 11/13/2019     Past Surgical History:   Procedure Laterality Date    BONE MARROW BIOPSY      2019, 2021    IR BIOPSY BONE  8/17/2021    IR BIOPSY BONE MARROW  11/14/2019    [place] TX TIBL SHFT FX IMED IMPLT W/WO SCREWS&/CERCLA Left 8/4/2023    Procedure: INSERTION NAIL IM TIBIA;  Surgeon: Danielito Sauceda DO;  Location: [facility];  Service: Orthopedics    TUBAL LIGATION  1985    TUBAL LIGATION       Social History   Social History     Substance and Sexual Activity   Alcohol Use Yes    Comment: 6 bottles of wine during the week, now only drinks on the weekend.     Social History     Substance and Sexual Activity   Drug Use Never     E-Cigarette/Vaping    E-Cigarette Use Never User      E-Cigarette/Vaping Substances    Nicotine No     THC No     CBD No   "   Flavoring No     Other No     Unknown No      Social History     Tobacco Use   Smoking Status Never   Smokeless Tobacco Never     Family History: Family history non-contributory    Review of previous medical records was completed.     Meds/Allergies   current meds:   Current Facility-Administered Medications:     acetaminophen (TYLENOL) tablet 650 mg, Q6H PRN    aluminum-magnesium hydroxide-simethicone (MAALOX) oral suspension 30 mL, Q6H PRN    cefTRIAXone (ROCEPHIN) IVPB (premix in dextrose) 1,000 mg 50 mL, Q24H, Last Rate: 1,000 mg (02/15/25 1454)    desvenlafaxine succinate (PRISTIQ) 24 hr tablet 50 mg, Daily    enoxaparin (LOVENOX) subcutaneous injection 40 mg, Daily    folic acid (FOLVITE) tablet 1 mg, Daily    gabapentin (NEURONTIN) capsule 300 mg, BID    hydrOXYzine HCL (ATARAX) tablet 25 mg, BID    lacosamide (VIMPAT) tablet 200 mg, BID    magnesium sulfate 2 g/50 mL IVPB (premix) 2 g, Once, Last Rate: 2 g (02/15/25 1454)    metoprolol succinate (TOPROL-XL) 24 hr tablet 25 mg, BID    multivitamin-minerals (CENTRUM) tablet 1 tablet, Daily    ondansetron (ZOFRAN) injection 4 mg, Q6H PRN    thiamine tablet 100 mg, Daily    traZODone (DESYREL) tablet 50 mg, HS    zonisamide (ZONEGRAN) capsule 300 mg, HS    No Known Allergies    Objective   Vitals:Blood pressure 97/68, pulse 68, temperature (!) 97.4 °F (36.3 °C), temperature source Tympanic, resp. rate 22, height 5' 7\" (1.702 m), weight 59 kg (130 lb), SpO2 95%.,Body mass index is 20.36 kg/m².    Intake/Output Summary (Last 24 hours) at 2/15/2025 1327  Last data filed at 2/15/2025 0631  Gross per 24 hour   Intake 3175 ml   Output 2875 ml   Net 300 ml       Invasive Devices:   Invasive Devices       Peripheral Intravenous Line  Duration             Peripheral IV 02/14/25 Proximal;Right;Ventral (anterior) Antecubital 1 day    Peripheral IV 02/14/25 Left Antecubital <1 day                    Physical Exam NAD  Skin: no seen lesions  HEENT: ATNC  Chest: breathing " comfortably on room air  GI: no apparent distension.    Neurological Exam   Alert and oriented for self, place and time. Memory is intact to recent and remote events. Language is intact to comprehension and expression. No neglect. Speech is normal. EOMI. Pupils are symmetric. Visual field appears intact. Face is symmetric. Tongue is midline. Able to move all extremities against gravity. No dysmetria noted.      Lab Results: CBC:   Results from last 7 days   Lab Units 02/15/25  0549 02/14/25  1241   WBC Thousand/uL 4.53 4.74   RBC Million/uL 3.31* 4.12   HEMOGLOBIN g/dL 10.7* 13.1   HEMATOCRIT % 31.6* 38.5   MCV fL 96 93   PLATELETS Thousands/uL 74* 95*   , BMP/CMP:   Results from last 7 days   Lab Units 02/15/25  0549 02/14/25  1241   SODIUM mmol/L 138 131*   POTASSIUM mmol/L 3.3* 3.7   CHLORIDE mmol/L 107 96   CO2 mmol/L 24 25   BUN mg/dL 9 11   CREATININE mg/dL 0.63 0.69   CALCIUM mg/dL 8.4 9.1   AST U/L 25 40*   ALT U/L 18 29   ALK PHOS U/L 49 70   EGFR ml/min/1.73sq m 95 92   , Vitamin B12:   Results from last 7 days   Lab Units 02/14/25  1241   VITAMIN B 12 pg/mL 291   , HgBA1C:   , Medication Drug Levels:   Results from last 7 days   Lab Units 02/14/25  1241   LACOSAMIDE ug/mL 18.93     Imaging Studies: Results Review Statement: I reviewed radiology reports from this admission including: CT head.  EKG, Pathology, and Other Studies: Results Review Statement: No pertinent imaging studies reviewed.  VTE Prophylaxis: VTE covered by:  enoxaparin, Subcutaneous, 40 mg at 02/15/25 0849       Code Status: Level 1 - Full Code  Advance Directive and Living Will: Yes    Power of : Yes  POLST:      Counseling / Coordination of Care  N/A

## 2025-02-15 NOTE — PROGRESS NOTES
Progress Note - Hospitalist   Name: Renzo Pop 64 y.o. female I MRN: [id number]  Unit/Bed#: -01 I Date of Admission: 2/14/2025   Date of Service: 2/15/2025 I Hospital Day: 1    Assessment & Plan  Acute encephalopathy  Patient initially presented with confusion that began today per , was found in her garage in car with no shoes on and confused as to why she was there. Also reports recent cough and occasional diarrhea last few days.  Reportedly had a fall 3 weeks ago where she hit her head on bathtub but did not seek medical attention at the time, though not on anticoagulation.    No acute injuries or deformities on exam, patient with no complaints  CXR with no acute cardiopulmonary disease, marked right-sided acromioclavicular joint separation which is chronic   CT head showed no acute intracranial abnormality  Did not meet SIRS criteria, though UA was suggestive of UTI  Start gentle IVF hydration, check vitamin B12 levels  Add neuro checks, place on telemetry, fall precautions    Today on 2/15/2025 patient is alert and oriented x 3 will continue antibiotics for UTI  Breakthrough seizure (HCC)  While patient was being evaluated in ED, had an episode where she became unresponsive, cyanotic, smacking her lips, apneic and shaking but no tonic-clonic movements, concerning for absence seizure.  Likely brought on by UTI versus alcohol use lowering seizure threshold  Hypoxic with SpO2 67% on room air, briefly placed on 15L NRB with improvement and was weaned to 2L NC O2  Lactic acid wnl. Procal negative. Troponin negative.  Ethanol levels negative. ABG overall acceptable.  Lacosamide levels wnl 18.93. Zonasimade levels pending.  Received 2 mg IV Versed & 1 g IV Keppra in ED with improvement  ED discussed with neurology/epileptologist, okay to stay at [place] so long as patient does not have recurring/intractable breakthrough seizures  Neurology consulted, continued on home medications including lacosamide 200  mg BID, zonisamide 200 mg qHS, gabapentin 300 mg BID for now    UTI (urinary tract infection)  UA suggestive of UTI with 2+ leukocytes, 20-30 WBCs  Continue IV Rocephin, likely can switch to PO abx when improved  Hyponatremia  Hyponatremia with Na 131 in ED, though no JP with stable creatinine  Natremia resolved, will discontinue fluids  Alcohol abuse  Patient reports history of alcohol use, typically will have 1 beer per day  Last drink was earlier this morning, though ethanol levels negative in ED  Unclear if seizures could be from alcohol withdrawal, though given minimal use more likely that alcohol use is lowering seizure threshold  CIWA protocol, seizure and aspiration precautions  Start thiamine, folate, also vitamin supplementation  Combined systolic and diastolic congestive heart failure (HCC)  Wt Readings from Last 3 Encounters:   02/15/25 59 kg (130 lb)   09/10/24 58.1 kg (128 lb)   08/08/24 56.2 kg (124 lb)   Clinically euvolemic/borderline dry on admission  Echo (2022): EF 60%, grade 1 diastolic dysfunction.  Continue Toprol-XL 25 mg BID  Daily weights, I/Os  Blood transfusion declined due to [employer]  Patient is a [employer], declines blood products/transfusion  Insomnia  Continue trazodone 50 mg qHS  Anxiety with depression  Continue Pristiq 50 mg daily, Atarax 25 mg BID    VTE Pharmacologic Prophylaxis: VTE Score: 3 Moderate Risk (Score 3-4) - Pharmacological DVT Prophylaxis Ordered: enoxaparin (Lovenox).    Mobility:   Basic Mobility Inpatient Raw Score: 22  JH-HLM Goal: 7: Walk 25 feet or more  JH-HLM Achieved: 1: Laying in bed  JH-HLM Goal achieved. Continue to encourage appropriate mobility.    Patient Centered Rounds: I performed bedside rounds with nursing staff today.   Discussions with Specialists or Other Care Team Provider:     Education and Discussions with Family / Patient: Updated  (significant other) at bedside.    Current Length of Stay: 1 day(s)  Current Patient  Status: Inpatient   Certification Statement: The patient will continue to require additional inpatient hospital stay due to uti  Discharge Plan: Anticipate discharge tomorrow to discharge location to be determined pending rehab evaluations.    Code Status: Level 1 - Full Code    Subjective   Patient is alert oriented x 3, denies any acute complaints    Objective :  Temp:  [97.4 °F (36.3 °C)-100.7 °F (38.2 °C)] 97.4 °F (36.3 °C)  HR:  [52-77] 68  BP: ()/(50-93) 97/68  Resp:  [14-36] 22  SpO2:  [93 %-99 %] 95 %  O2 Device: None (Room air)  Nasal Cannula O2 Flow Rate (L/min):  [2 L/min] 2 L/min    Body mass index is 20.36 kg/m².     Input and Output Summary (last 24 hours):     Intake/Output Summary (Last 24 hours) at 2/15/2025 1210  Last data filed at 2/15/2025 0631  Gross per 24 hour   Intake 3175 ml   Output 2875 ml   Net 300 ml       Physical Exam  Vitals and nursing note reviewed.   Constitutional:       General: She is not in acute distress.     Appearance: She is well-developed. She is not diaphoretic.   HENT:      Head: Normocephalic and atraumatic.   Eyes:      General: No scleral icterus.     Conjunctiva/sclera: Conjunctivae normal.   Cardiovascular:      Rate and Rhythm: Normal rate and regular rhythm.      Heart sounds: No murmur heard.     No friction rub. No gallop.   Pulmonary:      Effort: Pulmonary effort is normal. No respiratory distress.      Breath sounds: Normal breath sounds. No stridor. No wheezing, rhonchi or rales.   Chest:      Chest wall: No tenderness.   Abdominal:      General: There is no distension.      Palpations: Abdomen is soft. There is no mass.      Tenderness: There is no abdominal tenderness. There is no guarding or rebound.      Hernia: No hernia is present.   Musculoskeletal:         General: No swelling or tenderness.      Cervical back: Neck supple.   Skin:     General: Skin is warm and dry.      Capillary Refill: Capillary refill takes less than 2 seconds.    Neurological:      Mental Status: She is alert and oriented to person, place, and time.   Psychiatric:         Mood and Affect: Mood normal.           Lines/Drains:        Telemetry:  Telemetry Orders (From admission, onward)               24 Hour Telemetry Monitoring  Continuous x 24 Hours (Telem)        Expiring   Question:  Reason for 24 Hour Telemetry  Answer:  Metabolic/electrolyte disturbance with high probability of dysrhythmia. K level <3 or >6 OR KCL infusion >10mEq/hr                                Lab Results: I have reviewed the following results:   Results from last 7 days   Lab Units 02/15/25  0549   WBC Thousand/uL 4.53   HEMOGLOBIN g/dL 10.7*   HEMATOCRIT % 31.6*   PLATELETS Thousands/uL 74*   SEGS PCT % 46   LYMPHO PCT % 45*   MONO PCT % 8   EOS PCT % 1     Results from last 7 days   Lab Units 02/15/25  0549   SODIUM mmol/L 138   POTASSIUM mmol/L 3.3*   CHLORIDE mmol/L 107   CO2 mmol/L 24   BUN mg/dL 9   CREATININE mg/dL 0.63   ANION GAP mmol/L 7   CALCIUM mg/dL 8.4   ALBUMIN g/dL 3.7   TOTAL BILIRUBIN mg/dL 0.87   ALK PHOS U/L 49   ALT U/L 18   AST U/L 25   GLUCOSE RANDOM mg/dL 97     Results from last 7 days   Lab Units 02/14/25  1553   INR  1.02             Results from last 7 days   Lab Units 02/14/25  1553   LACTIC ACID mmol/L 1.1   PROCALCITONIN ng/ml <0.05       Recent Cultures (last 7 days):   Results from last 7 days   Lab Units 02/14/25  1553   BLOOD CULTURE  Received in Microbiology Lab. Culture in Progress.  Received in Microbiology Lab. Culture in Progress.       Imaging Results Review: No pertinent imaging studies reviewed.  Other Study Results Review: No additional pertinent studies reviewed.    Last 24 Hours Medication List:     Current Facility-Administered Medications:     acetaminophen (TYLENOL) tablet 650 mg, Q6H PRN    aluminum-magnesium hydroxide-simethicone (MAALOX) oral suspension 30 mL, Q6H PRN    cefTRIAXone (ROCEPHIN) IVPB (premix in dextrose) 1,000 mg 50 mL, Q24H     desvenlafaxine succinate (PRISTIQ) 24 hr tablet 50 mg, Daily    enoxaparin (LOVENOX) subcutaneous injection 40 mg, Daily    folic acid (FOLVITE) tablet 1 mg, Daily    gabapentin (NEURONTIN) capsule 300 mg, BID    hydrOXYzine HCL (ATARAX) tablet 25 mg, BID    lacosamide (VIMPAT) tablet 200 mg, BID    magnesium sulfate 2 g/50 mL IVPB (premix) 2 g, Once    metoprolol succinate (TOPROL-XL) 24 hr tablet 25 mg, BID    multivitamin-minerals (CENTRUM) tablet 1 tablet, Daily    ondansetron (ZOFRAN) injection 4 mg, Q6H PRN    thiamine tablet 100 mg, Daily    traZODone (DESYREL) tablet 50 mg, HS    zonisamide (ZONEGRAN) capsule 200 mg, HS    Administrative Statements   Today, Patient Was Seen By: Charles Morales DO      **Please Note: This note may have been constructed using a voice recognition system.**

## 2025-02-15 NOTE — UTILIZATION REVIEW
Initial Clinical Review    Admission: Date/Time/Statement:   Admission Orders (From admission, onward)       Ordered        02/14/25 1624  INPATIENT ADMISSION  Once                          Orders Placed This Encounter   Procedures    INPATIENT ADMISSION     Standing Status:   Standing     Number of Occurrences:   1     Level of Care:   Med Surg [16]     Estimated length of stay:   More than 2 Midnights     Certification:   I certify that inpatient services are medically necessary for this patient for a duration of greater than two midnights. See H&P and MD Progress Notes for additional information about the patient's course of treatment.     ED Arrival Information       Expected   -    Arrival   2/14/2025 12:03    Acuity   Urgent              Means of arrival   Walk-In    Escorted by   Self    Service   Hospitalist    Admission type   Emergency              Arrival complaint   Possible Seizure AMS             Chief Complaint   Patient presents with    Dizziness     Pt presents to ed for dizziness and mid abd pain discomfort, states both have been ongoing intermittently for months, denies sob, denies cp       Initial Presentation:  64 yof to ER from home for AMS. Patient's  reports she was found in her garage in the car with no shoes on, appeared confused trying to play with buttons unable to remember why she went to the car. Of note, patient reportedly had a fall 3 weeks ago where she fell and hit her head on the bathtub, did not seek medical attention at the time though did not have any head trauma, injuries, bleeding or vomiting.  Hx intracranial hemorrhage, subsequent seizure disorders, and had an intracranial infection, is currently on antiepileptic medications, alcohol abuse, CHF, insomnia, anxiety. While patient was in ED, patient became unresponsive with lipsmacking, face became cyanotic and was noted to be hypoxic with shaking, appeared to be having absence seizure requiring Versed and Keppra loading  dose with improvement. Admission CT head neg. CXR: Marked right-sided acromioclavicular joint separation with elevated right clavicle. Labs: PLT 95, Na 131, Mg 1.8, ast 40, ABG: pCO2 34.4, HCO3 18.7, u/a+leuk, WBC, bacteria.  Admitted to inpatient status for acute encephalopathy. Plan: neuro checks, seizure & aspiration precautions, neuro consult, IVABT, cultures, CIWA protocol, telemetry, O2 to keep sats>90%    Anticipated Length of Stay/Certification Statement:   Patient will be admitted on an inpatient basis with an anticipated length of stay of greater than 2 midnights secondary to acute encephalopathy, breakthrough seizures.     Date: 2/15/25   Day 2:   Tmax 100.7. A&Ox 3 will continue antibiotics for UTI. Unclear if seizures could be from alcohol withdrawal, though given minimal use more likely that alcohol use is lowering seizure threshold. CIWA protocol, seizure and aspiration precautions. Start thiamine, folate, also vitamin supplementation. Continue to monitor neuro/mental status, continue neuro checks, telemetry, monitor VS, follow labs.      ED Treatment-Medication Administration from 02/14/2025 1203 to 02/15/2025 0958         Date/Time Order Dose Route Action     02/14/2025 1503 sodium chloride 0.9 % bolus 1,000 mL 1,000 mL Intravenous New Bag     02/14/2025 1503 magnesium sulfate 2 g/50 mL IVPB (premix) 2 g 2 g Intravenous New Bag     02/14/2025 1524 midazolam (VERSED) injection 2 mg 2 mg Intravenous Given     02/14/2025 1524 levETIRAcetam (KEPPRA) injection 1,000 mg 1,000 mg Intravenous Given     02/14/2025 1604 cefTRIAXone (ROCEPHIN) IVPB (premix in dextrose) 1,000 mg 50 mL 1,000 mg Intravenous New Bag     02/14/2025 1633 vancomycin (VANCOCIN) IVPB (premix in dextrose) 750 mg 150 mL 750 mg Intravenous New Bag     02/14/2025 1600 acetaminophen (Ofirmev) injection 1,000 mg 1,000 mg Intravenous New Bag     02/14/2025 1830 gabapentin (NEURONTIN) capsule 300 mg 300 mg Oral Given     02/15/2025 0846  gabapentin (NEURONTIN) capsule 300 mg 300 mg Oral Given     02/15/2025 0845 desvenlafaxine succinate (PRISTIQ) 24 hr tablet 50 mg 50 mg Oral Given     02/14/2025 1830 hydrOXYzine HCL (ATARAX) tablet 25 mg 25 mg Oral Given     02/15/2025 0846 hydrOXYzine HCL (ATARAX) tablet 25 mg 25 mg Oral Given     02/14/2025 1830 lacosamide (VIMPAT) tablet 200 mg 200 mg Oral Given     02/15/2025 0845 lacosamide (VIMPAT) tablet 200 mg 200 mg Oral Given     02/14/2025 1843 metoprolol succinate (TOPROL-XL) 24 hr tablet 25 mg 25 mg Oral Given     02/14/2025 2200 traZODone (DESYREL) tablet 50 mg 50 mg Oral Given     02/14/2025 2201 zonisamide (ZONEGRAN) capsule 200 mg 200 mg Oral Given     02/14/2025 1829 multi-electrolyte (PLASMALYTE-A/ISOLYTE-S PH 7.4) IV solution 75 mL/hr Intravenous New Bag     02/15/2025 0650 multi-electrolyte (PLASMALYTE-A/ISOLYTE-S PH 7.4) IV solution 75 mL/hr Intravenous New Bag     02/15/2025 0849 enoxaparin (LOVENOX) subcutaneous injection 40 mg 40 mg Subcutaneous Given     02/15/2025 0845 thiamine tablet 100 mg 100 mg Oral Given     02/15/2025 0845 folic acid (FOLVITE) tablet 1 mg 1 mg Oral Given     02/15/2025 0845 multivitamin-minerals (CENTRUM) tablet 1 tablet 1 tablet Oral Given     02/14/2025 2200 butalbital-acetaminophen-caffeine (FIORICET,ESGIC) -40 mg per tablet 1 tablet 1 tablet Oral Given     02/15/2025 0231 multi-electrolyte (ISOLYTE-S PH 7.4) bolus 500 mL 500 mL Intravenous New Bag     02/15/2025 0230 albumin human (FLEXBUMIN) 25 % injection 25 g 25 g Intravenous New Bag     02/15/2025 0531 multi-electrolyte (ISOLYTE-S PH 7.4) bolus 500 mL 500 mL Intravenous New Bag     02/15/2025 0531 albumin human (FLEXBUMIN) 25 % injection 25 g 25 g Intravenous New Bag     02/15/2025 0737 lactated ringers bolus 500 mL 500 mL Intravenous New Bag     02/15/2025 0915 potassium chloride (Klor-Con M20) CR tablet 40 mEq 40 mEq Oral Given            Scheduled Medications:  Medications 02/06 02/07 02/08 02/09  02/10 02/11 02/12 02/13 02/14 02/15   acetaminophen (Ofirmev) injection 1,000 mg  Dose: 1,000 mg  Freq: Once Route: IV  Last Dose: Stopped (02/14/25 1633)  Start: 02/14/25 1600 End: 02/14/25 1633   Admin Instructions:               1600     1633         acetaminophen (TYLENOL) tablet 650 mg  Dose: 650 mg  Freq: Once Route: PO  Start: 02/14/25 1600 End: 02/14/25 1558            1558-D/C'd       albumin human (FLEXBUMIN) 25 % injection 25 g  Dose: 25 g  Freq: Once Route: IV  Last Dose: Stopped (02/15/25 0621)  Start: 02/15/25 0530 End: 02/15/25 0621   Admin Instructions:                0531     0621        albumin human (FLEXBUMIN) 25 % injection 25 g  Dose: 25 g  Freq: Once Route: IV  Last Dose: Stopped (02/15/25 0401)  Start: 02/15/25 0215 End: 02/15/25 0401   Admin Instructions:                0230     0401        butalbital-acetaminophen-caffeine (FIORICET,ESGIC) -40 mg per tablet 1 tablet  Dose: 1 tablet  Freq: Once Route: PO  Start: 02/14/25 2145 End: 02/14/25 2200   Admin Instructions:               2200         cefTRIAXone (ROCEPHIN) IVPB (premix in dextrose) 1,000 mg 50 mL  Dose: 1,000 mg  Freq: Every 24 hours Route: IV  Start: 02/15/25 1400   Admin Instructions:      Order specific questions:                1400        cefTRIAXone (ROCEPHIN) IVPB (premix in dextrose) 1,000 mg 50 mL  Dose: 1,000 mg  Freq: Once Route: IV  Last Dose: Stopped (02/14/25 1633)  Start: 02/14/25 1530 End: 02/14/25 1633   Admin Instructions:      Order specific questions:               1604     1633         cephalexin (KEFLEX) capsule 500 mg  Dose: 500 mg  Freq: Once Route: PO  Indications of Use: UNCOMPLICATED URINARY TRACT INFECTION  Start: 02/14/25 1500 End: 02/14/25 1522   Admin Instructions:      Order specific questions:               (1503) [C]     1522-D/C'd       desvenlafaxine succinate (PRISTIQ) 24 hr tablet 50 mg  Dose: 50 mg  Freq: Daily Route: PO  Start: 02/15/25 0900   Admin Instructions:                0845         enoxaparin (LOVENOX) subcutaneous injection 40 mg  Dose: 40 mg  Freq: Daily Route: SC  Start: 02/15/25 0900   Admin Instructions:                0849        folic acid (FOLVITE) tablet 1 mg  Dose: 1 mg  Freq: Daily Route: PO  Start: 02/15/25 0900             0845        gabapentin (NEURONTIN) capsule 300 mg  Dose: 300 mg  Freq: 2 times daily Route: PO  Start: 02/14/25 1800   Admin Instructions:               1830 0846     1800        hydrOXYzine HCL (ATARAX) tablet 25 mg  Dose: 25 mg  Freq: 2 times daily Route: PO  Start: 02/14/25 1800   Admin Instructions:               1830 0846     1800        lacosamide (VIMPAT) tablet 200 mg  Dose: 200 mg  Freq: 2 times daily Route: PO  Start: 02/14/25 1800   Admin Instructions:               1830 0845     1800        lactated ringers bolus 500 mL  Dose: 500 mL  Freq: Once Route: IV  Last Dose: 500 mL (02/15/25 0737)  Start: 02/15/25 0715 End: 02/15/25 0937             0737        levETIRAcetam (KEPPRA) injection 1,000 mg  Dose: 1,000 mg  Freq: Once Route: IV  Start: 02/14/25 1530 End: 02/14/25 1524   Admin Instructions:               1524         magnesium sulfate 2 g/50 mL IVPB (premix) 2 g  Dose: 2 g  Freq: Once Route: IV  Start: 02/15/25 1215   Admin Instructions:                1215        magnesium sulfate 2 g/50 mL IVPB (premix) 2 g  Dose: 2 g  Freq: Once Route: IV  Last Dose: Stopped (02/14/25 1703)  Start: 02/14/25 1500 End: 02/14/25 1703   Admin Instructions:               1503     1703         metoprolol succinate (TOPROL-XL) 24 hr tablet 25 mg  Dose: 25 mg  Freq: 2 times daily Route: PO  Start: 02/14/25 1800   Admin Instructions:      Order specific questions:               0046 (3411) 3710        midazolam (VERSED) injection 2 mg  Dose: 2 mg  Freq: Once Route: IV  Start: 02/14/25 1530 End: 02/14/25 1524   Admin Instructions:               1524         multi-electrolyte (ISOLYTE-S PH 7.4) bolus 500 mL  Dose: 500 mL  Freq: Once Route:  IV  Last Dose: Stopped (02/15/25 0631)  Start: 02/15/25 0530 End: 02/15/25 0631             0531     0631        multi-electrolyte (ISOLYTE-S PH 7.4) bolus 500 mL  Dose: 500 mL  Freq: Once Route: IV  Last Dose: Stopped (02/15/25 0331)  Start: 02/15/25 0215 End: 02/15/25 0331             0231     0331        multivitamin-minerals (CENTRUM) tablet 1 tablet  Dose: 1 tablet  Freq: Daily Route: PO  Start: 02/15/25 0900   Admin Instructions:                0845        potassium chloride (Klor-Con M20) CR tablet 40 mEq  Dose: 40 mEq  Freq: Once Route: PO  Start: 02/15/25 0915 End: 02/15/25 0915   Admin Instructions:                0915        sodium chloride 0.9 % bolus 1,000 mL  Dose: 1,000 mL  Freq: Once Route: IV  Last Dose: Stopped (02/14/25 1702)  Start: 02/14/25 1500 End: 02/14/25 1702            1503     1702         thiamine tablet 100 mg  Dose: 100 mg  Freq: Daily Route: PO  Start: 02/15/25 0900             0845        traZODone (DESYREL) tablet 50 mg  Dose: 50 mg  Freq: Daily at bedtime Route: PO  Start: 02/14/25 2200   Admin Instructions:               2200      2200        vancomycin (VANCOCIN) IVPB (premix in dextrose) 750 mg 150 mL  Dose: 15 mg/kg  Weight Dosing Info: 58.1 kg  Freq: Once Route: IV  Last Dose: Stopped (02/14/25 1733)  Start: 02/14/25 1545 End: 02/14/25 1733   Admin Instructions:      Order specific questions:               8281     1730         zonisamide (ZONEGRAN) capsule 200 mg  Dose: 200 mg  Freq: Daily at bedtime Route: PO  Start: 02/14/25 2200 End: 02/15/25 1335   Admin Instructions:               2201      1335-D/C'd      zonisamide (ZONEGRAN) capsule 300 mg  Dose: 300 mg  Freq: Daily at bedtime Route: PO  Start: 02/15/25 2200   Admin Instructions:                2200                    Continuous Meds Sorted by Name  for Renzo Pop as of 02/06/25 through 2/15/25  Legend:       Medications 02/06 02/07 02/08 02/09 02/10 02/11 02/12 02/13 02/14 02/15   multi-electrolyte  (PLASMALYTE-A/ISOLYTE-S PH 7.4) IV solution  Rate: 75 mL/hr Dose: 75 mL/hr  Freq: Continuous Route: IV  Indications of Use: IV Hydration  Last Dose: Stopped (02/15/25 1340)  Start: 02/14/25 1730 End: 02/15/25 1209            1829      0650     1209-D/C'd  1340 [C]                    PRN Meds Sorted by Name  for Renzo Pop as of 02/06/25 through 2/15/25  Legend:       Medications 02/06 02/07 02/08 02/09 02/10 02/11 02/12 02/13 02/14 02/15   acetaminophen (TYLENOL) tablet 650 mg  Dose: 650 mg  Freq: Every 6 hours PRN Route: PO  PRN Reasons: mild pain,headaches,fever  Indications of Use: FEVER,HEADACHE,MILD PAIN  Start: 02/14/25 1726                aluminum-magnesium hydroxide-simethicone (MAALOX) oral suspension 30 mL  Dose: 30 mL  Freq: Every 6 hours PRN Route: PO  PRN Reasons: indigestion,heartburn  Start: 02/14/25 1726   Admin Instructions:                   ondansetron (ZOFRAN) injection 4 mg  Dose: 4 mg  Freq: Every 6 hours PRN Route: IV  PRN Reasons: nausea,vomiting  Start: 02/14/25 1726   Admin Instructions:                   Legend:       [id number]/0602/0702/0802/0902/1002/1102/1202/1302/1402/15            ED Triage Vitals [02/14/25 1211]   Temperature Pulse Respirations Blood Pressure SpO2 Pain Score   98.2 °F (36.8 °C) 77 17 131/61 98 % 4     Weight (last 2 days)       Date/Time Weight    02/15/25 1054 59 (130)    02/14/25 1900 56.2 (123.9)            Vital Signs (last 3 days)       Date/Time Temp Pulse Resp BP MAP (mmHg) SpO2 Calculated FIO2 (%) - Nasal Cannula Nasal Cannula O2 Flow Rate (L/min) O2 Device Patient Position - Orthostatic VS Jo-Ann Coma Scale Score CIWA-Ar Total Pain    02/15/25 1054 97.4 °F (36.3 °C) 68 22 97/68 78 95 % -- -- None (Room air) Lying -- -- --    02/15/25 1030 -- 66 36 100/55 72 96 % -- -- -- -- -- -- --    02/15/25 0844 -- 57 15 99/54 -- 93 % -- -- -- Lying -- -- --    02/15/25 0800 -- 59 14 111/55 77 94 % -- -- -- -- -- -- --    02/15/25 0645 -- 54 22 113/57 76 97 % --  -- None (Room air) Lying -- -- --    02/15/25 0630 -- 53 22 120/61 81 96 % -- -- None (Room air) Lying -- -- --    02/15/25 0626 -- 63 -- 92/51 -- -- -- -- -- -- -- 2 --    02/15/25 0600 -- 57 22 92/51 66 95 % -- -- None (Room air) Lying -- -- --    02/15/25 0545 -- 52 18 108/55 76 97 % -- -- None (Room air) Lying -- -- --    02/15/25 0515 -- 53 16 94/50 66 94 % -- -- None (Room air) Lying -- -- --    02/15/25 0450 -- 58 22 91/55 71 93 % -- -- None (Room air) Lying -- -- --    02/15/25 0430 -- 60 22 97/55 73 96 % -- -- None (Room air) Lying -- -- --    02/15/25 0415 -- 60 20 93/58 71 95 % -- -- None (Room air) Lying -- -- --    02/15/25 0400 -- -- -- -- -- -- -- -- -- -- 15 -- 3    02/15/25 0245 -- 55 22 179/74 90 96 % -- -- None (Room air) Lying -- -- --    02/15/25 0226 -- 65 -- 84/53 -- -- -- -- -- -- -- 0 --    02/15/25 0000 -- -- -- -- -- -- -- -- -- -- 15 -- 3    02/14/25 2230 -- 71 22 126/66 91 95 % -- -- None (Room air) Sitting -- -- --    02/14/25 2226 -- 70 -- 126/66 -- -- -- -- -- -- -- 0 --    02/14/25 2200 -- -- -- -- -- -- -- -- -- -- -- -- 7    02/14/25 2100 -- 75 22 123/67 89 96 % -- -- None (Room air) Lying -- -- --    02/14/25 2030 -- 74 18 123/65 89 95 % -- -- None (Room air) Lying -- -- --    02/14/25 2000 -- -- -- -- -- -- -- -- -- -- 15 -- 7    02/14/25 1907 -- -- -- -- -- -- -- -- -- -- 15 -- --    02/14/25 1900 -- 75 22 127/69 93 97 % -- -- None (Room air) Sitting -- -- --    02/14/25 1845 -- 77 22 125/69 91 96 % -- -- None (Room air) Sitting -- -- --    02/14/25 1843 -- 76 -- 125/69 -- -- -- -- -- -- -- -- --    02/14/25 1826 -- 75 -- 140/71 -- -- -- -- -- -- -- 4 --    02/14/25 1738 99 °F (37.2 °C) -- -- -- -- -- -- -- -- -- -- -- --    02/14/25 1700 -- 75 22 131/69 94 99 % -- -- None (Room air) Lying -- -- --    02/14/25 1630 -- 77 20 141/62 89 98 % 28 2 L/min Nasal cannula Lying -- -- --    02/14/25 1552 100.7 °F (38.2 °C) -- -- -- -- -- -- -- -- -- -- -- --    02/14/25 1445 -- 75 18  169/93 -- 95 % -- -- None (Room air) Sitting -- -- No Pain    02/14/25 1321 -- 69 18 135/75 -- 97 % -- -- None (Room air) Sitting -- -- --    02/14/25 1245 -- 72 18 138/72 -- 97 % -- -- None (Room air) Sitting -- -- 5    02/14/25 1220 -- -- -- -- -- -- -- -- None (Room air) -- 15 -- No Pain    02/14/25 1211 98.2 °F (36.8 °C) 77 17 131/61 -- 98 % -- -- None (Room air) -- -- -- 4           CIWA-Ar Score       Row Name 02/15/25 0626 02/15/25 0226 02/14/25 2235       CIWA-Ar    BP 92/51 84/53 --    Pulse 63 65 --    Nausea and Vomiting 0 0 --    Tactile Disturbances 0 0 --    Tremor 0 0 --    Auditory Disturbances 0 0 --    Paroxysmal Sweats 0 0 --    Visual Disturbances 0 0 --    Anxiety 0 0 --    Headache, Fullness in Head 2 0 --    Agitation 0 0 --    Orientation and Clouding of Sensorium 0 0 --    CIWA-Ar Total 2 0 --      Row Name 02/14/25 2226 02/14/25 1826          CIWA-Ar    /66 140/71     Pulse 70 75     Nausea and Vomiting 0 0     Tactile Disturbances 0 0     Tremor 0 0     Auditory Disturbances 0 0     Paroxysmal Sweats 0 0     Visual Disturbances 0 0     Anxiety 0 0     Headache, Fullness in Head 0 4     Agitation 0 0     Orientation and Clouding of Sensorium 0 0     CIWA-Ar Total 0 4                     Pertinent Labs/Diagnostic Test Results:   Radiology:  XR chest 1 view portable   Final Interpretation by Juan Diego Bhatia MD (02/15 0808)      No radiographic evidence of acute intrathoracic process on this examination which is somewhat limited by low lung volumes.                  Workstation performed: [id number]         XR chest 2 views   Final Interpretation by Rhys Beck MD (02/14 1525)      No acute cardiopulmonary disease.      Marked right-sided acromioclavicular joint separation with elevated right clavicle. Correlate for any interval trauma from the prior study.      Workstation performed: [id number]         CT head without contrast   Final Interpretation by Erich SHARMA  MD Dell (02/14 1314)      No acute intracranial abnormality no significant change from prior.                     Resident: LIO ALVARES I, the attending radiologist, have reviewed the images and agree with the final report above.      Workstation performed: [id number]           Cardiology:  ECG 12 lead   Final Result by Robinson Chatterjee MD (02/14 1219)   Sinus rhythm with 1st degree A-V block   Nonspecific ST and T wave abnormality   Abnormal ECG   When compared with ECG of 09-Jun-2024 22:41,   Premature atrial complexes are no longer Present   TN interval has increased   Vent. rate has decreased by  45 bpm   Questionable change in QRS duration   Confirmed by Robinson Chatterjee ([id number]) on 2/14/2025 12:19:21 PM        GI:  No orders to display           Results from last 7 days   Lab Units 02/15/25  0549 02/14/25  1241   WBC Thousand/uL 4.53 4.74   HEMOGLOBIN g/dL 10.7* 13.1   HEMATOCRIT % 31.6* 38.5   PLATELETS Thousands/uL 74* 95*   TOTAL NEUT ABS Thousands/µL 2.10 3.22         Results from last 7 days   Lab Units 02/15/25  0549 02/14/25  1241   SODIUM mmol/L 138 131*   POTASSIUM mmol/L 3.3* 3.7   CHLORIDE mmol/L 107 96   CO2 mmol/L 24 25   ANION GAP mmol/L 7 10   BUN mg/dL 9 11   CREATININE mg/dL 0.63 0.69   EGFR ml/min/1.73sq m 95 92   CALCIUM mg/dL 8.4 9.1   MAGNESIUM mg/dL 2.2 1.8*     Results from last 7 days   Lab Units 02/15/25  0549 02/14/25  1241   AST U/L 25 40*   ALT U/L 18 29   ALK PHOS U/L 49 70   TOTAL PROTEIN g/dL 5.9* 7.6   ALBUMIN g/dL 3.7 4.5   TOTAL BILIRUBIN mg/dL 0.87 0.50         Results from last 7 days   Lab Units 02/15/25  0549 02/14/25  1241   GLUCOSE RANDOM mg/dL 97 131             BETA-HYDROXYBUTYRATE   Date Value Ref Range Status   03/07/2022 0.2 <0.6 mmol/L Final      Results from last 7 days   Lab Units 02/14/25  1519   PH ART  7.354   PCO2 ART mm Hg 34.4*   PO2 ART mm Hg 99.1   HCO3 ART mmol/L 18.7*   BASE EXC ART mmol/L -5.9   O2 CONTENT ART mL/dL 17.9   O2 HGB, ARTERIAL % 96.6    ABG SOURCE  Radial, Right                 Results from last 7 days   Lab Units 02/14/25  1241   HS TNI 0HR ng/L 3         Results from last 7 days   Lab Units 02/14/25  1553   PROTIME seconds 13.8   INR  1.02   PTT seconds 25         Results from last 7 days   Lab Units 02/14/25  1553   PROCALCITONIN ng/ml <0.05     Results from last 7 days   Lab Units 02/14/25  1553   LACTIC ACID mmol/L 1.1                                                 Results from last 7 days   Lab Units 02/14/25  1351   CLARITY UA  Clear   COLOR UA  Yellow   SPEC GRAV UA  1.010   PH UA  6.0   GLUCOSE UA mg/dl Negative   KETONES UA mg/dl Negative   BLOOD UA  Negative   PROTEIN UA mg/dl Negative   NITRITE UA  Negative   BILIRUBIN UA  Negative   UROBILINOGEN UA E.U./dl 0.2   LEUKOCYTES UA  2+*   WBC UA /hpf 20-30*   RBC UA /hpf None Seen   BACTERIA UA /hpf Moderate*   EPITHELIAL CELLS WET PREP /hpf Occasional                 Results from last 7 days   Lab Units 02/14/25  1351   ETHANOL LVL mg/dL <10                 Results from last 7 days   Lab Units 02/14/25  1553   BLOOD CULTURE  Received in Microbiology Lab. Culture in Progress.  Received in Microbiology Lab. Culture in Progress.                   Past Medical History:   Diagnosis Date    Chronic alcohol abuse     Epilepsy (HCC)     Fracture     Hepatitis     Hep A     Hepatitis     Insomnia     10mar2016 resolved    Osteoporosis     14jun2016 resolved    Pancreatitis     SAH (subarachnoid hemorrhage) (HCC)     Seasonal allergies     Seizure (HCC)     Seizures (HCC)     Urine discoloration 11/11/2019    Varicella     Vomiting 11/13/2019     Present on Admission:   Breakthrough seizure (HCC)   Combined systolic and diastolic congestive heart failure (HCC)   Alcohol abuse   Insomnia   Anxiety with depression   Hyponatremia      Admitting Diagnosis: Seizure (HCC) [R56.9]  Acute confusional state [F05]  Urinary tract infection [N39.0]  Observed seizure-like activity (HCC) [R56.9]  Breakthrough  seizure (HCC) [G40.919]  Acute hypoxemic respiratory failure (HCC) [J96.01]  Age/Sex: 64 y.o. female    Network Utilization Review Department  ATTENTION: Please call with any questions or concerns to [phone] and carefully listen to the prompts so that you are directed to the right person. All voicemails are confidential.   For Discharge needs, contact Care Management DC Support Team at [phone] opt. [phone]  Send all requests for admission clinical reviews, approved or denied determinations and any other requests to dedicated fax number below belonging to the campus where the patient is receiving treatment. List of dedicated fax numbers for the Facilities:  FACILITY NAME UR FAX NUMBER   ADMISSION DENIALS (Administrative/Medical Necessity) [phone]   DISCHARGE SUPPORT TEAM (NETWORK) [phone]   PARENT CHILD HEALTH (Maternity/NICU/Pediatrics) [phone]   [facility] [phone]   [facility] [phone]   [facility] [phone]   [facility] [phone]   [facility] [phone]   [facility] [phone]   [facility] [phone]   [facility] [phone]   [facility] [phone]   [facility] [phone]   [facility] [phone]   [facility] [phone]

## 2025-02-15 NOTE — ASSESSMENT & PLAN NOTE
Hyponatremia with Na 131 in ED, though no JP with stable creatinine  Natremia resolved, will discontinue fluids

## 2025-02-15 NOTE — PLAN OF CARE
Problem: NEUROSENSORY - ADULT  Goal: Achieves stable or improved neurological status  Description: INTERVENTIONS  - Monitor and report changes in neurological status  - Monitor vital signs such as temperature, blood pressure, glucose, and any other labs ordered   - Initiate measures to prevent increased intracranial pressure  - Monitor for seizure activity and implement precautions if appropriate      Outcome: Progressing     Problem: GENITOURINARY - ADULT  Goal: Maintains or returns to baseline urinary function  Description: INTERVENTIONS:  - Assess urinary function  - Encourage oral fluids to ensure adequate hydration if ordered  - Administer IV fluids as ordered to ensure adequate hydration  - Administer ordered medications as needed  - Offer frequent toileting  - Follow urinary retention protocol if ordered  Outcome: Progressing  Goal: Absence of urinary retention  Description: INTERVENTIONS:  - Assess patient’s ability to void and empty bladder  - Monitor I/O  - Bladder scan as needed  - Discuss with physician/AP medications to alleviate retention as needed  - Discuss catheterization for long term situations as appropriate  Outcome: Progressing

## 2025-02-15 NOTE — ASSESSMENT & PLAN NOTE
Wt Readings from Last 3 Encounters:   02/15/25 59 kg (130 lb)   09/10/24 58.1 kg (128 lb)   08/08/24 56.2 kg (124 lb)   Clinically euvolemic/borderline dry on admission  Echo (2022): EF 60%, grade 1 diastolic dysfunction.  Continue Toprol-XL 25 mg BID  Daily weights, I/Os

## 2025-02-15 NOTE — ASSESSMENT & PLAN NOTE
Patient initially presented with confusion that began today per , was found in her garage in car with no shoes on and confused as to why she was there. Also reports recent cough and occasional diarrhea last few days.  Reportedly had a fall 3 weeks ago where she hit her head on bathtub but did not seek medical attention at the time, though not on anticoagulation.    No acute injuries or deformities on exam, patient with no complaints  CXR with no acute cardiopulmonary disease, marked right-sided acromioclavicular joint separation which is chronic   CT head showed no acute intracranial abnormality  Did not meet SIRS criteria, though UA was suggestive of UTI  Start gentle IVF hydration, check vitamin B12 levels  Add neuro checks, place on telemetry, fall precautions    Today on 2/15/2025 patient is alert and oriented x 3 will continue antibiotics for UTI

## 2025-02-15 NOTE — QUICK NOTE
Alerted from the patient nurse several times about blood pressure being low in the 80s systolic.  Patient is alert oriented with no symptoms.  Patient was given bolus of Isolyte 500 x 2 and albumin 25% x 2.  Patient has no symptoms currently.  Will continue to monitor closely.

## 2025-02-16 VITALS
TEMPERATURE: 97.4 F | WEIGHT: 131 LBS | SYSTOLIC BLOOD PRESSURE: 113 MMHG | BODY MASS INDEX: 20.56 KG/M2 | RESPIRATION RATE: 16 BRPM | HEART RATE: 55 BPM | DIASTOLIC BLOOD PRESSURE: 63 MMHG | OXYGEN SATURATION: 95 % | HEIGHT: 67 IN

## 2025-02-16 LAB
ANION GAP SERPL CALCULATED.3IONS-SCNC: 7 MMOL/L (ref 4–13)
BASOPHILS # BLD AUTO: 0.01 THOUSANDS/ΜL (ref 0–0.1)
BASOPHILS NFR BLD AUTO: 0 % (ref 0–1)
BUN SERPL-MCNC: 14 MG/DL (ref 5–25)
CALCIUM SERPL-MCNC: 8.8 MG/DL (ref 8.4–10.2)
CHLORIDE SERPL-SCNC: 110 MMOL/L (ref 96–108)
CO2 SERPL-SCNC: 24 MMOL/L (ref 21–32)
CREAT SERPL-MCNC: 0.64 MG/DL (ref 0.6–1.3)
EOSINOPHIL # BLD AUTO: 0.07 THOUSAND/ΜL (ref 0–0.61)
EOSINOPHIL NFR BLD AUTO: 2 % (ref 0–6)
ERYTHROCYTE [DISTWIDTH] IN BLOOD BY AUTOMATED COUNT: 12.8 % (ref 11.6–15.1)
GFR SERPL CREATININE-BSD FRML MDRD: 94 ML/MIN/1.73SQ M
GLUCOSE SERPL-MCNC: 107 MG/DL (ref 65–140)
HCT VFR BLD AUTO: 32.7 % (ref 34.8–46.1)
HGB BLD-MCNC: 10.6 G/DL (ref 11.5–15.4)
IMM GRANULOCYTES # BLD AUTO: 0.01 THOUSAND/UL (ref 0–0.2)
IMM GRANULOCYTES NFR BLD AUTO: 0 % (ref 0–2)
LYMPHOCYTES # BLD AUTO: 1.67 THOUSANDS/ΜL (ref 0.6–4.47)
LYMPHOCYTES NFR BLD AUTO: 53 % (ref 14–44)
MCH RBC QN AUTO: 31.5 PG (ref 26.8–34.3)
MCHC RBC AUTO-ENTMCNC: 32.4 G/DL (ref 31.4–37.4)
MCV RBC AUTO: 97 FL (ref 82–98)
MONOCYTES # BLD AUTO: 0.28 THOUSAND/ΜL (ref 0.17–1.22)
MONOCYTES NFR BLD AUTO: 9 % (ref 4–12)
NEUTROPHILS # BLD AUTO: 1.12 THOUSANDS/ΜL (ref 1.85–7.62)
NEUTS SEG NFR BLD AUTO: 36 % (ref 43–75)
NRBC BLD AUTO-RTO: 0 /100 WBCS
PLATELET # BLD AUTO: 62 THOUSANDS/UL (ref 149–390)
PMV BLD AUTO: 9.9 FL (ref 8.9–12.7)
POTASSIUM SERPL-SCNC: 4.1 MMOL/L (ref 3.5–5.3)
RBC # BLD AUTO: 3.36 MILLION/UL (ref 3.81–5.12)
SODIUM SERPL-SCNC: 141 MMOL/L (ref 135–147)
WBC # BLD AUTO: 3.16 THOUSAND/UL (ref 4.31–10.16)

## 2025-02-16 PROCEDURE — 85025 COMPLETE CBC W/AUTO DIFF WBC: CPT | Performed by: INTERNAL MEDICINE

## 2025-02-16 PROCEDURE — 99239 HOSP IP/OBS DSCHRG MGMT >30: CPT | Performed by: INTERNAL MEDICINE

## 2025-02-16 PROCEDURE — 80048 BASIC METABOLIC PNL TOTAL CA: CPT | Performed by: INTERNAL MEDICINE

## 2025-02-16 RX ORDER — CEFPODOXIME PROXETIL 200 MG/1
200 TABLET, FILM COATED ORAL 2 TIMES DAILY
Qty: 4 TABLET | Refills: 0 | Status: SHIPPED | OUTPATIENT
Start: 2025-02-17 | End: 2025-02-19

## 2025-02-16 RX ORDER — ACETAMINOPHEN 325 MG/1
975 TABLET ORAL ONCE
Status: DISCONTINUED | OUTPATIENT
Start: 2025-02-16 | End: 2025-02-16 | Stop reason: HOSPADM

## 2025-02-16 RX ORDER — MAGNESIUM SULFATE HEPTAHYDRATE 40 MG/ML
2 INJECTION, SOLUTION INTRAVENOUS ONCE
Status: COMPLETED | OUTPATIENT
Start: 2025-02-16 | End: 2025-02-16

## 2025-02-16 RX ORDER — ZONISAMIDE 100 MG/1
300 CAPSULE ORAL
Qty: 90 CAPSULE | Refills: 0 | Status: SHIPPED | OUTPATIENT
Start: 2025-02-16

## 2025-02-16 RX ADMIN — DESVENLAFAXINE 50 MG: 50 TABLET, FILM COATED, EXTENDED RELEASE ORAL at 08:25

## 2025-02-16 RX ADMIN — THIAMINE HCL TAB 100 MG 100 MG: 100 TAB at 08:25

## 2025-02-16 RX ADMIN — FOLIC ACID 1 MG: 1 TABLET ORAL at 08:25

## 2025-02-16 RX ADMIN — HYDROXYZINE HYDROCHLORIDE 25 MG: 25 TABLET ORAL at 08:25

## 2025-02-16 RX ADMIN — LACOSAMIDE 200 MG: 100 TABLET, FILM COATED ORAL at 08:25

## 2025-02-16 RX ADMIN — ENOXAPARIN SODIUM 40 MG: 40 INJECTION SUBCUTANEOUS at 08:25

## 2025-02-16 RX ADMIN — Medication 1 TABLET: at 08:25

## 2025-02-16 RX ADMIN — GABAPENTIN 300 MG: 300 CAPSULE ORAL at 08:25

## 2025-02-16 RX ADMIN — MAGNESIUM SULFATE HEPTAHYDRATE 2 G: 40 INJECTION, SOLUTION INTRAVENOUS at 10:24

## 2025-02-16 RX ADMIN — METOPROLOL SUCCINATE 25 MG: 25 TABLET, EXTENDED RELEASE ORAL at 08:25

## 2025-02-16 NOTE — NURSING NOTE
Pt is discharge to home today.  Pt still complain of headache.  Pain at this time is acceptable for the pt.  No acute distress noted at this time.  Review discharge information and follow up care.   is present to take the pt home.

## 2025-02-16 NOTE — ASSESSMENT & PLAN NOTE
Wt Readings from Last 3 Encounters:   02/16/25 59.4 kg (131 lb)   09/10/24 58.1 kg (128 lb)   08/08/24 56.2 kg (124 lb)   Clinically euvolemic/borderline dry on admission  Echo (2022): EF 60%, grade 1 diastolic dysfunction.  Continue Toprol-XL 25 mg BID  Daily weights, I/Os

## 2025-02-16 NOTE — PLAN OF CARE
Problem: NEUROSENSORY - ADULT  Goal: Achieves stable or improved neurological status  Description: INTERVENTIONS  - Monitor and report changes in neurological status  - Monitor vital signs such as temperature, blood pressure, glucose, and any other labs ordered   - Initiate measures to prevent increased intracranial pressure  - Monitor for seizure activity and implement precautions if appropriate      Outcome: Adequate for Discharge  Goal: Remains free of injury related to seizures activity  Description: INTERVENTIONS  - Maintain airway, patient safety  and administer oxygen as ordered  - Monitor patient for seizure activity, document and report duration and description of seizure to physician/advanced practitioner  - If seizure occurs,  ensure patient safety during seizure  - Reorient patient post seizure  - Seizure pads on all 4 side rails  - Instruct patient/family to notify RN of any seizure activity including if an aura is experienced  - Instruct patient/family to call for assistance with activity based on nursing assessment  - Administer anti-seizure medications if ordered    Outcome: Adequate for Discharge  Goal: Achieves maximal functionality and self care  Description: INTERVENTIONS  - Monitor swallowing and airway patency with patient fatigue and changes in neurological status  - Encourage and assist patient to increase activity and self care.   - Encourage visually impaired, hearing impaired and aphasic patients to use assistive/communication devices  Outcome: Adequate for Discharge     Problem: GENITOURINARY - ADULT  Goal: Maintains or returns to baseline urinary function  Description: INTERVENTIONS:  - Assess urinary function  - Encourage oral fluids to ensure adequate hydration if ordered  - Administer IV fluids as ordered to ensure adequate hydration  - Administer ordered medications as needed  - Offer frequent toileting  - Follow urinary retention protocol if ordered  Outcome: Adequate for  Discharge  Goal: Absence of urinary retention  Description: INTERVENTIONS:  - Assess patient’s ability to void and empty bladder  - Monitor I/O  - Bladder scan as needed  - Discuss with physician/AP medications to alleviate retention as needed  - Discuss catheterization for long term situations as appropriate  Outcome: Adequate for Discharge  Goal: Urinary catheter remains patent  Description: INTERVENTIONS:  - Assess patency of urinary catheter  - If patient has a chronic castro, consider changing catheter if non-functioning  - Follow guidelines for intermittent irrigation of non-functioning urinary catheter  Outcome: Adequate for Discharge

## 2025-02-16 NOTE — ASSESSMENT & PLAN NOTE
While patient was being evaluated in ED, had an episode where she became unresponsive, cyanotic, smacking her lips, apneic and shaking but no tonic-clonic movements, concerning for absence seizure.  Likely brought on by UTI versus alcohol use lowering seizure threshold  Hypoxic with SpO2 67% on room air, briefly placed on 15L NRB with improvement and was weaned to 2L NC O2  Lactic acid wnl. Procal negative. Troponin negative.  Ethanol levels negative. ABG overall acceptable.  Lacosamide levels wnl 18.93. Zonasimade levels pending.  Received 2 mg IV Versed & 1 g IV Keppra in ED with improvement  ED discussed with neurology/epileptologist, okay to stay at [facility] so long as patient does not have recurring/intractable breakthrough seizures  Neurology consulted, continued on home medications including lacosamide 200 mg BID, zonisamide 200 mg qHS, gabapentin 300 mg BID for now

## 2025-02-16 NOTE — DISCHARGE SUMMARY
Discharge Summary - Hospitalist   Name: Renzo Pop 64 y.o. female I MRN: [id number]  Unit/Bed#: -01 I Date of Admission: 2/14/2025   Date of Service: 2/16/2025 I Hospital Day: 2     Assessment & Plan  Acute encephalopathy  Patient initially presented with confusion that began today per , was found in her garage in car with no shoes on and confused as to why she was there. Also reports recent cough and occasional diarrhea last few days.  Reportedly had a fall 3 weeks ago where she hit her head on bathtub but did not seek medical attention at the time, though not on anticoagulation.    No acute injuries or deformities on exam, patient with no complaints  CXR with no acute cardiopulmonary disease, marked right-sided acromioclavicular joint separation which is chronic   CT head showed no acute intracranial abnormality  Did not meet SIRS criteria, though UA was suggestive of UTI  Start gentle IVF hydration, check vitamin B12 levels  Add neuro checks, place on telemetry, fall precautions    Today on 2/15/2025 patient is alert and oriented x 3 will continue antibiotics for UTI  Breakthrough seizure (HCC)  While patient was being evaluated in ED, had an episode where she became unresponsive, cyanotic, smacking her lips, apneic and shaking but no tonic-clonic movements, concerning for absence seizure.  Likely brought on by UTI versus alcohol use lowering seizure threshold  Hypoxic with SpO2 67% on room air, briefly placed on 15L NRB with improvement and was weaned to 2L NC O2  Lactic acid wnl. Procal negative. Troponin negative.  Ethanol levels negative. ABG overall acceptable.  Lacosamide levels wnl 18.93. Zonasimade levels pending.  Received 2 mg IV Versed & 1 g IV Keppra in ED with improvement  ED discussed with neurology/epileptologist, okay to stay at [facility] so long as patient does not have recurring/intractable breakthrough seizures  Neurology consulted, continued on home medications including lacosamide  200 mg BID, zonisamide 200 mg qHS, gabapentin 300 mg BID for now    UTI (urinary tract infection)  UA suggestive of UTI with 2+ leukocytes, 20-30 WBCs  Continue IV Rocephin, likely can switch to PO abx when improved  Hyponatremia  Hyponatremia with Na 131 in ED, though no JP with stable creatinine  Natremia resolved, will discontinue fluids  Alcohol abuse  Patient reports history of alcohol use, typically will have 1 beer per day  Last drink was earlier this morning, though ethanol levels negative in ED  Unclear if seizures could be from alcohol withdrawal, though given minimal use more likely that alcohol use is lowering seizure threshold  CIWA protocol, seizure and aspiration precautions  Start thiamine, folate, also vitamin supplementation  Combined systolic and diastolic congestive heart failure (HCC)  Wt Readings from Last 3 Encounters:   02/16/25 59.4 kg (131 lb)   09/10/24 58.1 kg (128 lb)   08/08/24 56.2 kg (124 lb)   Clinically euvolemic/borderline dry on admission  Echo (2022): EF 60%, grade 1 diastolic dysfunction.  Continue Toprol-XL 25 mg BID  Daily weights, I/Os  Blood transfusion declined due to Rastafari  Patient is a Judaism, declines blood products/transfusion  Insomnia  Continue trazodone 50 mg qHS  Anxiety with depression  Continue Pristiq 50 mg daily, Atarax 25 mg BID     Medical Problems       Resolved Problems  Date Reviewed: 6/18/2024   None       Discharging Physician / Practitioner: Charles Morales DO  PCP: Arielle Tong MD  Admission Date:   Admission Orders (From admission, onward)       Ordered        02/14/25 1624  INPATIENT ADMISSION  Once                          Discharge Date: 02/16/25        Reason for Admission: Altered mental status    Hospital Course:   Renzo Pop is a 64 y.o. female patient who originally presented to the hospital on 2/14/2025 due to altered mental status in the setting of UTI and breakthrough seizure, patient improved with treatment of urinary  "tract infection as well as increasing her zonisamide as instructed by neurology.  At time of Discharge she was alert and oriented x 4          Please see above list of diagnoses and related plan for additional information.     Condition at Discharge: stable    Discharge Day Visit / Exam:   Subjective: Patient denies any acute complaints apart from mild headache  Vitals: Blood Pressure: 113/63 (02/16/25 0800)  Pulse: 55 (02/16/25 0825)  Temperature: (!) 97.4 °F (36.3 °C) (02/16/25 0800)  Temp Source: Oral (02/16/25 0800)  Respirations: 16 (02/16/25 0300)  Height: 5' 7\" (170.2 cm) (02/15/25 1054)  Weight - Scale: 59.4 kg (131 lb) (02/16/25 0600)  SpO2: 95 % (02/16/25 0300)  Physical Exam  Vitals and nursing note reviewed.   Constitutional:       General: She is not in acute distress.     Appearance: She is well-developed. She is not toxic-appearing or diaphoretic.   HENT:      Head: Normocephalic and atraumatic.   Eyes:      General: No scleral icterus.     Conjunctiva/sclera: Conjunctivae normal.   Cardiovascular:      Rate and Rhythm: Normal rate and regular rhythm.      Heart sounds: No murmur heard.     No friction rub. No gallop.   Pulmonary:      Effort: Pulmonary effort is normal. No respiratory distress.      Breath sounds: Normal breath sounds. No stridor. No wheezing, rhonchi or rales.   Chest:      Chest wall: No tenderness.   Abdominal:      General: There is no distension.      Palpations: Abdomen is soft. There is no mass.      Tenderness: There is no abdominal tenderness. There is no guarding or rebound.      Hernia: No hernia is present.   Musculoskeletal:         General: No swelling or tenderness.      Cervical back: Neck supple.   Skin:     General: Skin is warm and dry.      Capillary Refill: Capillary refill takes less than 2 seconds.   Neurological:      Mental Status: She is alert and oriented to person, place, and time.   Psychiatric:         Mood and Affect: Mood normal.          Discussion " with Family: Updated  (significant other) at bedside.    Discharge instructions/Information to patient and family:   See after visit summary for information provided to patient and family.      Provisions for Follow-Up Care:  See after visit summary for information related to follow-up care and any pertinent home health orders.      Mobility at time of Discharge:   Basic Mobility Inpatient Raw Score: 22  JH-HLM Goal: 7: Walk 25 feet or more  JH-HLM Achieved: 7: Walk 25 feet or more  HLM Goal achieved. Continue to encourage appropriate mobility.     Disposition:   Home    Planned Readmission: no    Discharge Medications:  See after visit summary for reconciled discharge medications provided to patient and/or family.      Administrative Statements   Discharge Statement:  I have spent a total time of  minutes in caring for this patient on the day of the visit/encounter. .    **Please Note: This note may have been constructed using a voice recognition system**

## 2025-02-17 ENCOUNTER — TRANSITIONAL CARE MANAGEMENT (OUTPATIENT)
Dept: FAMILY MEDICINE CLINIC | Facility: CLINIC | Age: 65
End: 2025-02-17

## 2025-02-17 LAB — ZONISAMIDE SERPL-MCNC: 18.4 UG/ML (ref 10–40)

## 2025-02-17 NOTE — UTILIZATION REVIEW
NOTIFICATION OF ADMISSION DISCHARGE   This is a Notification of Discharge from [facility]. Please be advised that this patient has been discharge from our facility. Below you will find the admission and discharge date and time including the patient’s disposition.   UTILIZATION REVIEW CONTACT:  Allyssa Ba  Utilization   Network Utilization Review Department  Phone: [phone] x carefully listen to the prompts. All voicemails are confidential.  Email: [email or web address]     ADMISSION INFORMATION  PRESENTATION DATE: 2/14/2025 12:05 PM  OBERVATION ADMISSION DATE: N/A  INPATIENT ADMISSION DATE: 2/14/25  4:24 PM   DISCHARGE DATE: 2/16/2025 12:49 PM   DISPOSITION:Home/Self Care    Network Utilization Review Department  ATTENTION: Please call with any questions or concerns to [phone] and carefully listen to the prompts so that you are directed to the right person. All voicemails are confidential.   For Discharge needs, contact Care Management DC Support Team at [phone] opt. [phone]  Send all requests for admission clinical reviews, approved or denied determinations and any other requests to dedicated fax number below belonging to the campus where the patient is receiving treatment. List of dedicated fax numbers for the Facilities:  FACILITY NAME UR FAX NUMBER   ADMISSION DENIALS (Administrative/Medical Necessity) [phone]   DISCHARGE SUPPORT TEAM ([facility]) [phone]   PARENT CHILD HEALTH (Maternity/NICU/Pediatrics) [phone]   [facility] [phone]   [facility] [phone]   [facility] [phone]   [facility] [phone]   [facility] [phone]   [facility] [phone]   General acute hospital [phone]   [facility]  [phone]   [facility] [phone]   [facility] [phone]   [facility] [phone]   [facility] [phone]

## 2025-02-18 ENCOUNTER — RESULTS FOLLOW-UP (OUTPATIENT)
Dept: EMERGENCY DEPT | Facility: HOSPITAL | Age: 65
End: 2025-02-18

## 2025-02-19 LAB — BACTERIA BLD CULT: NORMAL

## 2025-02-20 LAB
ALL TARGETS: NOT DETECTED
BACTERIA BLD CULT: ABNORMAL
GRAM STN SPEC: ABNORMAL

## 2025-02-26 DIAGNOSIS — I42.9 CARDIOMYOPATHY (HCC): ICD-10-CM

## 2025-02-26 DIAGNOSIS — I50.21 ACUTE SYSTOLIC CONGESTIVE HEART FAILURE (HCC): ICD-10-CM

## 2025-02-27 RX ORDER — METOPROLOL SUCCINATE 25 MG/1
25 TABLET, EXTENDED RELEASE ORAL 2 TIMES DAILY
Qty: 180 TABLET | Refills: 0 | Status: SHIPPED | OUTPATIENT
Start: 2025-02-27

## 2025-03-04 ENCOUNTER — OFFICE VISIT (OUTPATIENT)
Dept: NEUROLOGY | Facility: CLINIC | Age: 65
End: 2025-03-04
Payer: COMMERCIAL

## 2025-03-04 VITALS
TEMPERATURE: 97.5 F | SYSTOLIC BLOOD PRESSURE: 100 MMHG | WEIGHT: 127.8 LBS | HEIGHT: 67 IN | OXYGEN SATURATION: 93 % | BODY MASS INDEX: 20.06 KG/M2 | HEART RATE: 66 BPM | DIASTOLIC BLOOD PRESSURE: 62 MMHG | RESPIRATION RATE: 18 BRPM

## 2025-03-04 DIAGNOSIS — E53.8 B12 DEFICIENCY: ICD-10-CM

## 2025-03-04 DIAGNOSIS — G40.109 NONINTRACTABLE FOCAL EPILEPSY (HCC): Primary | Chronic | ICD-10-CM

## 2025-03-04 DIAGNOSIS — R41.3 COMPLAINTS OF MEMORY DISTURBANCE: ICD-10-CM

## 2025-03-04 PROBLEM — G40.919 BREAKTHROUGH SEIZURE (HCC): Status: RESOLVED | Noted: 2023-05-11 | Resolved: 2025-03-04

## 2025-03-04 PROBLEM — G40.909 EPILEPSY (HCC): Chronic | Status: RESOLVED | Noted: 2023-08-04 | Resolved: 2025-03-04

## 2025-03-04 PROBLEM — R56.9 SEIZURE (HCC): Status: RESOLVED | Noted: 2024-06-07 | Resolved: 2025-03-04

## 2025-03-04 PROCEDURE — 99214 OFFICE O/P EST MOD 30 MIN: CPT | Performed by: PHYSICIAN ASSISTANT

## 2025-03-04 NOTE — ASSESSMENT & PLAN NOTE
Patient with chronic B12 deficiency.  She has been seen by hematology on several occasions over the years, last 6 months ago.  She says she is taking an oral B12 supplement.  Recent level was low in the hospital.  She has extensive outstanding labs ordered by hematology.  I encouraged she get these done.  B12 level is included in this.  She should discuss B12 replacement with hematology as it is chronically low despite an oral supplement.  Discussed chronically low B12 can be contributing to her memory complaints.

## 2025-03-04 NOTE — PROGRESS NOTES
Name: Renzo Pop      : 1960      MRN: [id number]  Encounter Provider: Scarlett Medina PA-C  Encounter Date: 3/4/2025   Encounter department: [facility] [place]  :  Assessment & Plan  Nonintractable focal epilepsy (HCC)  Ms. Pop is a 65 y.o. female with history that includes alcohol abuse, anxiety, osteoporosis, pancytopenia/thrombocytopenia, traumatic SAH, B12 and vitamin D deficiency returning for follow-up regarding focal epilepsy, left temporal onset, in the setting of prior traumatic SAH (multifocal, right hemisphere, 2021). There was focal status epilepticus in 3/2022 (on VEEG, with associated left hippocampal DWI signal).    She has not been seen in the clinic in over a year.  She had breakthrough seizure in 2024 in the setting of sepsis, AEDs were unchanged.  She more recently had witnessed seizure activity on 2025 in the setting of UTI and reported compliance with medication, confirmed with therapeutic lacosamide and zonisamide levels.  Her zonisamide was increased and she is tolerating well.  Will recheck a zonisamide level on higher dose.    Again discussed the importance of compliance with her AEDs, avoiding alcohol.  She does not drive.     Plan:  - Continue lacosamide 200 mg twice daily  - Continue zonisamide 300 mg at bedtime  - Will check a zonisamide level on higher dose.  She should also get the extensive labs previously ordered by hematology which are pending in her chart  - Call the office if there are any seizures or problems with the medications  - Follow-up in 6 months or sooner if needed    Orders:    Zonisamide level; Future    Complaints of memory disturbance  Likely multifactorial.  She has a long history of chronic alcohol abuse.  She has chronic B12 deficiency.  Recent B12 noted to be low-see below.  Will check TSH.  Consider formal neuropsychology evaluation.    Orders:    TSH, 3rd generation with Free T4 reflex; Future    B12  deficiency  Patient with chronic B12 deficiency.  She has been seen by hematology on several occasions over the years, last 6 months ago.  She says she is taking an oral B12 supplement.  Recent level was low in the hospital.  She has extensive outstanding labs ordered by hematology.  I encouraged she get these done.  B12 level is included in this.  She should discuss B12 replacement with hematology as it is chronically low despite an oral supplement.  Discussed chronically low B12 can be contributing to her memory complaints.             History of Present Illness   HPI   Renzo Pop is a 65 y.o. female who presents today for a hospital follow up visit.  She follows with Dr. Alfaro for history of epilepsy, last seen 10/10/2023 via telemedicine.     Interval Events:   Seizures since last visit: yes  Hospitalizations: yes     Last seen 10/10/2023. At that time, there was an apparent alcohol withdrawal seizure on 7/9/23, otherwise stable.  She was continued on current medication.    Patient is overall a poor historian.  She could not tell me how many seizures she has had since her last visit.  She did recall more recent hospitalization for seizures and knew her zonisamide was increased.      On chart review, she was seen by inpatient neurology in June 2024 for apparent seizure-noted to be staring and unresponsive at home, EMS initiated, said to be post-ictal and then having seizure-like activity in the ED.  She required multiple doses of Ativan and intubation for airway protection.  She was febrile and met SIRS criteria. MRI brain completed and no evidence of acute findings.  Routine EEG with intermittent left temporal delta activity suggesting an underlying area of neuronal dysfunction.  Enhanced beta activities which are commonly seen due to medication effects of benzodiazepines and/or barbiturates.  No medication changes were made during this admission.    More recently, she presented to the ED on 2/14/2025 with  "confusion, cough, diarrhea.  She apparently was found by her  in the garage in her car with no shoes on and confused as to why she was there.  , CT head unremarkable.  During workup there was an episode where she was \"smacking her lips, was unresponsive, and appeared to turn extremely cyanotic, the nurse called me to the bedside, and I found her to be apneic, shaking, but not in a tonic-clonic fashion, more as a parkinsonian type tremor, but she was extremely cyanotic in her face and hands, the patient was placed on oxygen, I have significant concern for possible absence type seizure, I gave her 2 mg of Versed and a gram of Keppra, a chest x-ray and an ABG. By the time the ABG was done the patient condition had improved significantly\".  There was concern for UTI and possible sepsis.  She was admitted and had telemed consult with inpatient neurology.  Patient reported compliance with her AEDs, levels were therapeutic when checked on arrival.  Per inpatient neurology, increase zonisamide to 300mg daily.     Patient reports she has been doing fine since this hospitalization last month.  She has not had any issues with the increase in the zonisamide and is taking her medications as prescribed.  She lives at home with her .  She does admit she has had some forgetfulness.  Her last B12 level was 291 when checked in the hospital on 2/14/2025.  She says she is taking a B12 supplement.  About 6 months ago she saw hematology for several issues including chronic thrombocytopenia, chronic fluctuant leukopenia, fluctuant hemoglobin, and extensive lab testing was ordered, but not completed.  B12 was included in this.     Current AEDs:  Lacosamide 200 mg twice daily  Zonisamide 300 mg nightly    Gabapentin 300 mg twice daily (Rx by PCP)  Medication side effects: None  Medication adherence: Yes     Event/Seizure semiology:  Focal seizure: Hand clenching and dropping GCS  Panic attack feeling (unclear if it is " "seizure or psychiatric related): brief 1-2 min panic attacks occurring 2-3 times per day without any specific triggers. Captured on VEEG in 2/2023 (\"An event involving flashback of memory, scary feeling and feeling short of breath did not have EEG correlate. Seizure is not excluded. Focal aware seizures often have no EEG correlate.\")  The event captured without vEEG correlate at the hospital in March 2022: the patient was shaking/tensing bilateral arms and hands with some rhythmic right shoulder jerking activity. There was no electrographic seizure during this time.  She looked like she was waking up on the EEG background.  Recurrent focal seizures from the left temporal region on VEEG in 3/2022. No clear clinical correlate.      Special Features  Status epilepticus: yes - subclinical electrographic seizures captured on vEEG in March 2022  Self Injury Seizures: no  Precipitating Factors: none     Epilepsy Risk Factors:  Alcohol abuse and prior SAH in Aug 2021     Prior AEDs:  Levetiracetam: Lowered platelets     Prior Evaluation:  10/28/2022: MRI brain seizure with and without contrast   Stable superficial siderosis and scattered foci of chronic hemosiderin deposition consistent with patient's history of prior intracranial hemorrhage and trauma. Mild chronic microangiopathy.     Symmetric hippocampal formations with regard to size and signal. The previously seen subtle diffusion abnormality within the left hippocampus is no longer evident.     3/10/2022: MRI brain with and without contrast  Focal diffusion-weighted and FLAIR signal hyperintensity involving the left hippocampal formation is consistent with status epilepticus.      10/12/2022: Routine EEG impression   This is an abnormal routine EEG recording due to: Generalized irregular theta activity and slow PDR consistent with mild generalized nonspecific encephalopathy.  No electrographic seizures.     3/8/2022: EEG video monitoring  This is an abnormal 22 hours " continuous video EEG recording due to excessive diffuse delta activity during the waking state, but the background primarily consists of diffuse beta-alpha activity. This finding indicates mild-moderate diffuse cerebral dysfunction.  Excessive beta activity is seen in the presence of CNS activating agents, such as benzodiazepines and barbiturates.      3/7/2022: EEG video monitoring  Events:   07:58 - nurse notices that the patient is shaking her right arm and hand but also the left hand is shaking.  There is some rhythmic right shoulder jerking activity, but it also appears that the arms and hands are tensing up.  There is no electrographic seizure during this time.  She looks like she is waking up on the EEG background.  Interpretation:   This is an abnormal nearly 23.5 hours continuous video EEG recording due to the presence of recurrent focal seizures from the left temporal region.  This finding indicate the presence of an epileptogenic focus in the left temporal region.     Routine EEG 10/12/2022:  Clinical Interpretation:   This abnormal study is consistent with mild generalized non-specific encephalopathy. No electrographic seizures, interictal epileptiform discharges (seizure tendency) or focal slowing were seen. No diagnostic clinical events were captured.      Video EEG 2/10-2/13/2023:  Interpretation:   This prolonged, continuous video-EEG recording is essentially normal.   Enhanced beta activities are likely a medication effect related to benzodiazepine administration.    An event involving flashback of memory, scary feeling and feeling short of breath did not have EEG correlate. Seizure is not excluded. Focal aware seizures often have no EEG correlate.  No epileptiform discharges or electrographic seizures.      Routine EEG 3/13/2023:  EEG impression: This is an abnormal routine EEG recording due to:  Left temporal irregular delta  Generalized irregular delta activity in drowsiness     Clinical  Interpretation:   This abnormal study is consistent with a mild generalized non-specific encephalopathy and focal left temporal non-specific cerebral dysfunction. No electrographic seizures or interictal epileptiform discharges (seizure tendency) were seen. No diagnostic clinical events were captured.      Psychiatric History:  Anxiety  Psychiatric admission     Social History:   Driving: No  Lives Alone: No  Occupation: unknown occupation    Review of Systems   Constitutional: Negative.  Negative for fatigue and fever.   HENT: Negative.  Negative for hearing loss, tinnitus and trouble swallowing.    Eyes:  Negative for photophobia, pain and visual disturbance.   Respiratory: Negative.  Negative for cough and shortness of breath.    Cardiovascular: Negative.  Negative for chest pain and palpitations.   Gastrointestinal:  Negative for constipation, diarrhea, nausea and vomiting.   Endocrine: Negative.    Genitourinary: Negative.  Negative for difficulty urinating and urgency.   Musculoskeletal: Negative.  Negative for back pain, gait problem and neck pain.   Skin: Negative.  Negative for rash.   Neurological: Negative.  Negative for dizziness, tremors, seizures, syncope, speech difficulty, weakness, numbness and headaches.   Hematological: Negative.    Psychiatric/Behavioral:  Positive for confusion and decreased concentration. Negative for sleep disturbance. The patient is not nervous/anxious.     I have personally reviewed the MA's review of systems and made changes as necessary.    Current Outpatient Medications on File Prior to Visit   Medication Sig Dispense Refill    desvenlafaxine succinate (PRISTIQ) 50 mg 24 hr tablet TAKE ONE TABLET BY MOUTH EVERY DAY 30 tablet 5    gabapentin (NEURONTIN) 300 mg capsule TAKE ONE CAPSULE BY MOUTH TWICE A DAY 60 capsule 4    hydrOXYzine HCL (ATARAX) 25 mg tablet TAKE ONE TABLET BY MOUTH TWICE A DAY 60 tablet 5    lacosamide (VIMPAT) 200 mg tablet TAKE ONE TABLET BY MOUTH TWICE  "A  tablet 1    metoprolol succinate (TOPROL-XL) 25 mg 24 hr tablet TAKE ONE TABLET BY MOUTH TWICE A  tablet 0    traZODone (DESYREL) 50 mg tablet TAKE ONE TABLET BY MOUTH DAILY AT BEDTIME 30 tablet 5    zonisamide (ZONEGRAN) 100 mg capsule Take 3 capsules (300 mg total) by mouth daily at bedtime 90 capsule 0     No current facility-administered medications on file prior to visit.         Objective   /62 (BP Location: Left arm, Patient Position: Sitting, Cuff Size: Standard)   Pulse 66   Temp 97.5 °F (36.4 °C) (Temporal)   Resp 18   Ht 5' 7\" (1.702 m)   Wt 58 kg (127 lb 12.8 oz)   SpO2 93%   BMI 20.02 kg/m²     Physical Exam  Constitutional:       Appearance: Normal appearance.   Eyes:      Extraocular Movements: EOM normal.      Pupils: Pupils are equal, round, and reactive to light.   Neurological:      Mental Status: She is alert.      Motor: Motor strength is normal.  Psychiatric:         Mood and Affect: Mood normal.         Speech: Speech normal.         Behavior: Behavior normal.       Neurological Exam  Mental Status  Alert. Oriented to person, place, time and situation. Speech is normal. Language is fluent with no aphasia. Attention and concentration are normal.    Cranial Nerves  CN II: Visual fields full to confrontation.  CN III, IV, VI: Extraocular movements intact bilaterally. Pupils equal round and reactive to light bilaterally.  CN V: Facial sensation is normal.  CN VII: Full and symmetric facial movement.  CN VIII: Hearing is normal.  CN IX, X: Palate elevates symmetrically  CN XI: Shoulder shrug strength is normal.  CN XII: Tongue midline without atrophy or fasciculations.    Motor   Normal muscle tone. No abnormal involuntary movements. Strength is 5/5 throughout all four extremities.    Sensory  Light touch is normal in upper and lower extremities.     Coordination  Right: Finger-to-nose normal.Left: Finger-to-nose normal.    Gait  Casual gait is normal including stance, " stride, and arm swing.       Statement Selected

## 2025-03-04 NOTE — ASSESSMENT & PLAN NOTE
Ms. Pop is a 65 y.o. female with history that includes alcohol abuse, anxiety, osteoporosis, pancytopenia/thrombocytopenia, traumatic SAH, B12 and vitamin D deficiency returning for follow-up regarding focal epilepsy, left temporal onset, in the setting of prior traumatic SAH (multifocal, right hemisphere, 8/2021). There was focal status epilepticus in 3/2022 (on VEEG, with associated left hippocampal DWI signal).    She has not been seen in the clinic in over a year.  She had breakthrough seizure in June 2024 in the setting of sepsis, AEDs were unchanged.  She more recently had witnessed seizure activity on 2/14/2025 in the setting of UTI and reported compliance with medication, confirmed with therapeutic lacosamide and zonisamide levels.  Her zonisamide was increased and she is tolerating well.  Will recheck a zonisamide level on higher dose.    Again discussed the importance of compliance with her AEDs, avoiding alcohol.  She does not drive.     Plan:  - Continue lacosamide 200 mg twice daily  - Continue zonisamide 300 mg at bedtime  - Will check a zonisamide level on higher dose.  She should also get the extensive labs previously ordered by hematology which are pending in her chart  - Call the office if there are any seizures or problems with the medications  - Follow-up in 6 months or sooner if needed    Orders:    Zonisamide level; Future

## 2025-03-04 NOTE — PATIENT INSTRUCTIONS
Continue lacosamide 200mg twice a day  Continue zonisamide 300mg at bedtime  Get blood work done--make sure you tell the lab to draw both my blood work ordered today, as well as the blood work ordered by hematology back in Sept  Make a follow up with hematology.  I would discuss B12 still being low despite taking an oral supplement.  You may need B12 injections for replacement instead  Follow up with Dr. Alfaro in 6 months   Call if there are any seizures or problems with the medication

## 2025-03-16 PROBLEM — N39.0 UTI (URINARY TRACT INFECTION): Status: RESOLVED | Noted: 2025-02-14 | Resolved: 2025-03-16

## 2025-04-07 ENCOUNTER — TELEPHONE (OUTPATIENT)
Dept: RHEUMATOLOGY | Facility: CLINIC | Age: 65
End: 2025-04-07

## 2025-04-07 NOTE — TELEPHONE ENCOUNTER
Patient due for Reclast in April.     Patient has Medicare A&B/ [facility]. No prior authorization is needed. Patient is covered at 100%.

## 2025-04-08 NOTE — TELEPHONE ENCOUNTER
Hello!     Patient is due for her next dose of Reclast. Therapy plan is in and no prior authorization is needed.    Thank you!

## 2025-04-09 ENCOUNTER — OFFICE VISIT (OUTPATIENT)
Dept: FAMILY MEDICINE CLINIC | Facility: CLINIC | Age: 65
End: 2025-04-09
Payer: MEDICARE

## 2025-04-09 ENCOUNTER — TELEPHONE (OUTPATIENT)
Dept: INFUSION CENTER | Facility: CLINIC | Age: 65
End: 2025-04-09

## 2025-04-09 VITALS
TEMPERATURE: 97.1 F | WEIGHT: 127 LBS | BODY MASS INDEX: 19.93 KG/M2 | RESPIRATION RATE: 16 BRPM | OXYGEN SATURATION: 98 % | DIASTOLIC BLOOD PRESSURE: 54 MMHG | SYSTOLIC BLOOD PRESSURE: 100 MMHG | HEART RATE: 61 BPM | HEIGHT: 67 IN

## 2025-04-09 DIAGNOSIS — Z78.0 ENCOUNTER FOR OSTEOPOROSIS SCREENING IN ASYMPTOMATIC POSTMENOPAUSAL PATIENT: ICD-10-CM

## 2025-04-09 DIAGNOSIS — R22.41 LOCALIZED SWELLING, MASS, OR LUMP OF RIGHT LOWER EXTREMITY: ICD-10-CM

## 2025-04-09 DIAGNOSIS — G89.29 CHRONIC TMJ PAIN: ICD-10-CM

## 2025-04-09 DIAGNOSIS — M26.629 CHRONIC TMJ PAIN: ICD-10-CM

## 2025-04-09 DIAGNOSIS — Z12.31 ENCOUNTER FOR SCREENING MAMMOGRAM FOR BREAST CANCER: ICD-10-CM

## 2025-04-09 DIAGNOSIS — M25.522 PAIN AND SWELLING OF LEFT ELBOW: ICD-10-CM

## 2025-04-09 DIAGNOSIS — F33.1 MODERATE RECURRENT MAJOR DEPRESSION (HCC): ICD-10-CM

## 2025-04-09 DIAGNOSIS — Z13.6 SCREENING FOR CARDIOVASCULAR CONDITION: ICD-10-CM

## 2025-04-09 DIAGNOSIS — K86.1 OTHER CHRONIC PANCREATITIS (HCC): ICD-10-CM

## 2025-04-09 DIAGNOSIS — I44.0 FIRST DEGREE AV BLOCK: ICD-10-CM

## 2025-04-09 DIAGNOSIS — D61.818 PANCYTOPENIA (HCC): ICD-10-CM

## 2025-04-09 DIAGNOSIS — D70.4 CYCLIC NEUTROPENIA (HCC): ICD-10-CM

## 2025-04-09 DIAGNOSIS — L82.1 SEBORRHEIC KERATOSIS: ICD-10-CM

## 2025-04-09 DIAGNOSIS — G47.00 INSOMNIA, UNSPECIFIED TYPE: ICD-10-CM

## 2025-04-09 DIAGNOSIS — Z12.11 COLON CANCER SCREENING: ICD-10-CM

## 2025-04-09 DIAGNOSIS — S49.91XA INJURY OF CLAVICLE, RIGHT, INITIAL ENCOUNTER: ICD-10-CM

## 2025-04-09 DIAGNOSIS — R56.9 OBSERVED SEIZURE-LIKE ACTIVITY (HCC): ICD-10-CM

## 2025-04-09 DIAGNOSIS — Z00.00 WELCOME TO MEDICARE PREVENTIVE VISIT: Primary | ICD-10-CM

## 2025-04-09 DIAGNOSIS — Z13.820 ENCOUNTER FOR OSTEOPOROSIS SCREENING IN ASYMPTOMATIC POSTMENOPAUSAL PATIENT: ICD-10-CM

## 2025-04-09 DIAGNOSIS — M25.422 PAIN AND SWELLING OF LEFT ELBOW: ICD-10-CM

## 2025-04-09 DIAGNOSIS — M81.0 AGE RELATED OSTEOPOROSIS, UNSPECIFIED PATHOLOGICAL FRACTURE PRESENCE: ICD-10-CM

## 2025-04-09 DIAGNOSIS — I42.9 CARDIOMYOPATHY (HCC): ICD-10-CM

## 2025-04-09 DIAGNOSIS — I50.22 CHRONIC SYSTOLIC CONGESTIVE HEART FAILURE (HCC): ICD-10-CM

## 2025-04-09 DIAGNOSIS — F41.9 ANXIETY: Chronic | ICD-10-CM

## 2025-04-09 DIAGNOSIS — F10.20 ALCOHOL DEPENDENCE, UNCOMPLICATED (HCC): ICD-10-CM

## 2025-04-09 DIAGNOSIS — S43.004A DISLOCATED SHOULDER, RIGHT, INITIAL ENCOUNTER: ICD-10-CM

## 2025-04-09 PROCEDURE — G0403 EKG FOR INITIAL PREVENT EXAM: HCPCS | Performed by: FAMILY MEDICINE

## 2025-04-09 PROCEDURE — G2211 COMPLEX E/M VISIT ADD ON: HCPCS | Performed by: FAMILY MEDICINE

## 2025-04-09 PROCEDURE — G0402 INITIAL PREVENTIVE EXAM: HCPCS | Performed by: FAMILY MEDICINE

## 2025-04-09 PROCEDURE — 99215 OFFICE O/P EST HI 40 MIN: CPT | Performed by: FAMILY MEDICINE

## 2025-04-09 RX ORDER — METOPROLOL SUCCINATE 25 MG/1
25 TABLET, EXTENDED RELEASE ORAL 2 TIMES DAILY
Qty: 180 TABLET | Refills: 0 | Status: SHIPPED | OUTPATIENT
Start: 2025-04-09

## 2025-04-09 RX ORDER — HYDROXYZINE HYDROCHLORIDE 25 MG/1
25 TABLET, FILM COATED ORAL 2 TIMES DAILY PRN
Qty: 60 TABLET | Refills: 5 | Status: SHIPPED | OUTPATIENT
Start: 2025-04-09

## 2025-04-09 RX ORDER — ZONISAMIDE 100 MG/1
300 CAPSULE ORAL
Qty: 90 CAPSULE | Refills: 0 | Status: SHIPPED | OUTPATIENT
Start: 2025-04-09

## 2025-04-09 RX ORDER — DESVENLAFAXINE 50 MG/1
50 TABLET, FILM COATED, EXTENDED RELEASE ORAL DAILY
Qty: 30 TABLET | Refills: 5 | Status: SHIPPED | OUTPATIENT
Start: 2025-04-09

## 2025-04-09 RX ORDER — GABAPENTIN 300 MG/1
300 CAPSULE ORAL 2 TIMES DAILY
Qty: 60 CAPSULE | Refills: 4 | Status: SHIPPED | OUTPATIENT
Start: 2025-04-09

## 2025-04-09 RX ORDER — DESVENLAFAXINE 50 MG/1
50 TABLET, FILM COATED, EXTENDED RELEASE ORAL DAILY
Qty: 30 TABLET | Refills: 5 | Status: SHIPPED | OUTPATIENT
Start: 2025-04-09 | End: 2025-04-09

## 2025-04-09 RX ORDER — SENNOSIDES 8.6 MG
650 CAPSULE ORAL EVERY 8 HOURS PRN
Qty: 30 TABLET | Refills: 0 | Status: SHIPPED | OUTPATIENT
Start: 2025-04-09

## 2025-04-09 NOTE — ASSESSMENT & PLAN NOTE
Wt Readings from Last 3 Encounters:   04/09/25 57.6 kg (127 lb)   03/04/25 58 kg (127 lb 12.8 oz)   02/16/25 59.4 kg (131 lb)               Orders:    metoprolol succinate (TOPROL-XL) 25 mg 24 hr tablet; Take 1 tablet (25 mg total) by mouth 2 (two) times a day

## 2025-04-09 NOTE — PATIENT INSTRUCTIONS
You will continue Vimpat please contact your neurologist for the refills.  This is for your seizures.  You have been off the levetiracetam.  You will continue metoprolol 25 mg daily.    You will continue Pristiq daily.  You will use hydroxyzine 25 mg as needed for anxiety panic attack or at bedtime for insomnia.  You will continue gabapentin 300 mg twice daily which will also help with alcohol use, pain as well as anxiety.  You will discontinue trazodone, you will continue zonisamide instead at bedtime.    You will obtain the x-rays of left elbow, right hip, right femur, right clavicle and right shoulder as I ordered today.  Medicare Preventive Visit Patient Instructions  Thank you for completing your Welcome to Medicare Visit or Medicare Annual Wellness Visit today. Your next wellness visit will be due in one year (4/10/2026).  The screening/preventive services that you may require over the next 5-10 years are detailed below. Some tests may not apply to you based off risk factors and/or age. Screening tests ordered at today's visit but not completed yet may show as past due. Also, please note that scanned in results may not display below.  Preventive Screenings:  Service Recommendations Previous Testing/Comments   Colorectal Cancer Screening  * Colonoscopy    * Fecal Occult Blood Test (FOBT)/Fecal Immunochemical Test (FIT)  * Fecal DNA/Cologuard Test  * Flexible Sigmoidoscopy Age: 45-75 years old   Colonoscopy: every 10 years (may be performed more frequently if at higher risk)  OR  FOBT/FIT: every 1 year  OR  Cologuard: every 3 years  OR  Sigmoidoscopy: every 5 years  Screening may be recommended earlier than age 45 if at higher risk for colorectal cancer. Also, an individualized decision between you and your healthcare provider will decide whether screening between the ages of 76-85 would be appropriate. Colonoscopy: Not on file  FOBT/FIT: Not on file  Cologuard: Not on file  Sigmoidoscopy: Not on file           Breast Cancer Screening Age: 40+ years old  Frequency: every 1-2 years  Not required if history of left and right mastectomy Mammogram: 04/11/2023    Screening Current   Cervical Cancer Screening Between the ages of 21-29, pap smear recommended once every 3 years.   Between the ages of 30-65, can perform pap smear with HPV co-testing every 5 years.   Recommendations may differ for women with a history of total hysterectomy, cervical cancer, or abnormal pap smears in past. Pap Smear: 10/07/2021    Screening Not Indicated   Hepatitis C Screening Once for adults born between 1945 and 1965  More frequently in patients at high risk for Hepatitis C Hep C Antibody: 06/14/2016    Screening Current   Diabetes Screening 1-2 times per year if you're at risk for diabetes or have pre-diabetes Fasting glucose: No results in last 5 years (No results in last 5 years)  A1C: 5.1 % (3/13/2023)  Screening Current   Cholesterol Screening Once every 5 years if you don't have a lipid disorder. May order more often based on risk factors. Lipid panel: Not on file    Screening Not Indicated  History Lipid Disorder     Other Preventive Screenings Covered by Medicare:  Abdominal Aortic Aneurysm (AAA) Screening: covered once if your at risk. You're considered to be at risk if you have a family history of AAA.  Lung Cancer Screening: covers low dose CT scan once per year if you meet all of the following conditions: (1) Age 55-77; (2) No signs or symptoms of lung cancer; (3) Current smoker or have quit smoking within the last 15 years; (4) You have a tobacco smoking history of at least 20 pack years (packs per day multiplied by number of years you smoked); (5) You get a written order from a healthcare provider.  Glaucoma Screening: covered annually if you're considered high risk: (1) You have diabetes OR (2) Family history of glaucoma OR (3)  aged 50 and older OR (4)  American aged 65 and older  Osteoporosis Screening:  covered every 2 years if you meet one of the following conditions: (1) You're estrogen deficient and at risk for osteoporosis based off medical history and other findings; (2) Have a vertebral abnormality; (3) On glucocorticoid therapy for more than 3 months; (4) Have primary hyperparathyroidism; (5) On osteoporosis medications and need to assess response to drug therapy.   Last bone density test (DXA Scan): 10/25/2021.  HIV Screening: covered annually if you're between the age of 15-65. Also covered annually if you are younger than 15 and older than 65 with risk factors for HIV infection. For pregnant patients, it is covered up to 3 times per pregnancy.    Immunizations:  Immunization Recommendations   Influenza Vaccine Annual influenza vaccination during flu season is recommended for all persons aged >= 6 months who do not have contraindications   Pneumococcal Vaccine   * Pneumococcal conjugate vaccine = PCV13 (Prevnar 13), PCV15 (Vaxneuvance), PCV20 (Prevnar 20)  * Pneumococcal polysaccharide vaccine = PPSV23 (Pneumovax) Adults 19-63 yo with certain risk factors or if 65+ yo  If never received any pneumonia vaccine: recommend Prevnar 20 (PCV20)  Give PCV20 if previously received 1 dose of PCV13 or PPSV23   Hepatitis B Vaccine 3 dose series if at intermediate or high risk (ex: diabetes, end stage renal disease, liver disease)   Respiratory syncytial virus (RSV) Vaccine - COVERED BY MEDICARE PART D  * RSVPreF3 (Arexvy) CDC recommends that adults 60 years of age and older may receive a single dose of RSV vaccine using shared clinical decision-making (SCDM)   Tetanus (Td) Vaccine - COST NOT COVERED BY MEDICARE PART B Following completion of primary series, a booster dose should be given every 10 years to maintain immunity against tetanus. Td may also be given as tetanus wound prophylaxis.   Tdap Vaccine - COST NOT COVERED BY MEDICARE PART B Recommended at least once for all adults. For pregnant patients, recommended  with each pregnancy.   Shingles Vaccine (Shingrix) - COST NOT COVERED BY MEDICARE PART B  2 shot series recommended in those 19 years and older who have or will have weakened immune systems or those 50 years and older     Health Maintenance Due:      Topic Date Due    HIV Screening  Never done    Colorectal Cancer Screening  Never done    Breast Cancer Screening: Mammogram  04/11/2024    Hepatitis C Screening  Completed     Immunizations Due:      Topic Date Due    Pneumococcal Vaccine: 65+ Years (1 of 2 - PCV) Never done    Influenza Vaccine (1) 09/01/2024    COVID-19 Vaccine (7 - 2024-25 season) 09/01/2024     Advance Directives   What are advance directives?  Advance directives are legal documents that state your wishes and plans for medical care. These plans are made ahead of time in case you lose your ability to make decisions for yourself. Advance directives can apply to any medical decision, such as the treatments you want, and if you want to donate organs.   What are the types of advance directives?  There are many types of advance directives, and each state has rules about how to use them. You may choose a combination of any of the following:  Living will:  This is a written record of the treatment you want. You can also choose which treatments you do not want, which to limit, and which to stop at a certain time. This includes surgery, medicine, IV fluid, and tube feedings.   Durable power of  for healthcare (DPAHC):  This is a written record that states who you want to make healthcare choices for you when you are unable to make them for yourself. This person, called a proxy, is usually a family member or a friend. You may choose more than 1 proxy.  Do not resuscitate (DNR) order:  A DNR order is used in case your heart stops beating or you stop breathing. It is a request not to have certain forms of treatment, such as CPR. A DNR order may be included in other types of advance directives.  Medical  directive:  This covers the care that you want if you are in a coma, near death, or unable to make decisions for yourself. You can list the treatments you want for each condition. Treatment may include pain medicine, surgery, blood transfusions, dialysis, IV or tube feedings, and a ventilator (breathing machine).  Values history:  This document has questions about your views, beliefs, and how you feel and think about life. This information can help others choose the care that you would choose.  Why are advance directives important?  An advance directive helps you control your care. Although spoken wishes may be used, it is better to have your wishes written down. Spoken wishes can be misunderstood, or not followed. Treatments may be given even if you do not want them. An advance directive may make it easier for your family to make difficult choices about your care.       © Copyright [employer] 2018 Information is for End User's use only and may not be sold, redistributed or otherwise used for commercial purposes. All illustrations and images included in CareNotes® are the copyrighted property of [employer].D.A[employer]M., Inc. or [employer]

## 2025-04-09 NOTE — ASSESSMENT & PLAN NOTE
Depression Screening Follow-up Plan: Patient's depression screening was positive with a PHQ-9 score of 10. Patient with underlying depression and was advised to continue current medications as prescribed.    Orders:    gabapentin (NEURONTIN) 300 mg capsule; Take 1 capsule (300 mg total) by mouth 2 (two) times a day    Ambulatory referral to Psych Services; Future    desvenlafaxine succinate (PRISTIQ) 50 mg 24 hr tablet; Take 1 tablet (50 mg total) by mouth daily

## 2025-04-09 NOTE — ASSESSMENT & PLAN NOTE
Orders:    hydrOXYzine HCL (ATARAX) 25 mg tablet; Take 1 tablet (25 mg total) by mouth 2 (two) times a day as needed for anxiety    Ambulatory referral to Psych Services; Future

## 2025-04-09 NOTE — ASSESSMENT & PLAN NOTE
Should follow-up with hematology, this is likely secondary to chronic alcohol use and malabsorption [id number]      Orders:    Ambulatory Referral to Hematology / Oncology; Future

## 2025-04-09 NOTE — PROGRESS NOTES
Name: Renzo Pop      : 1960      MRN: [id number]  Encounter Provider: Lian Moreno MD  Encounter Date: 2025   Encounter department: [facility] MEDICINE  :  Assessment & Plan  Welcome to Medicare preventive visit         First degree AV block         Screening for cardiovascular condition    Orders:  •  POCT ECG    Cyclic neutropenia (HCC)    Orders:  •  Ambulatory Referral to Hematology / Oncology; Future    Pancytopenia (HCC)  Should follow-up with hematology, this is likely secondary to chronic alcohol use and malabsorption [id number]}      Orders:  •  Ambulatory Referral to Hematology / Oncology; Future    Other chronic pancreatitis (HCC)         Alcohol dependence, uncomplicated (HCC)    Orders:  •  Ambulatory referral to Psych Services; Future    Observed seizure-like activity (HCC)    Orders:  •  zonisamide (ZONEGRAN) 100 mg capsule; Take 3 capsules (300 mg total) by mouth daily at bedtime    Anxiety    Orders:  •  hydrOXYzine HCL (ATARAX) 25 mg tablet; Take 1 tablet (25 mg total) by mouth 2 (two) times a day as needed for anxiety    Cardiomyopathy (HCC)    Orders:  •  metoprolol succinate (TOPROL-XL) 25 mg 24 hr tablet; Take 1 tablet (25 mg total) by mouth 2 (two) times a day    Chronic systolic congestive heart failure (HCC)  Wt Readings from Last 3 Encounters:   25 57.6 kg (127 lb)   25 58 kg (127 lb 12.8 oz)   25 59.4 kg (131 lb)               Orders:  •  metoprolol succinate (TOPROL-XL) 25 mg 24 hr tablet; Take 1 tablet (25 mg total) by mouth 2 (two) times a day    Moderate recurrent major depression (HCC)  Depression Screening Follow-up Plan: Patient's depression screening was positive with a PHQ-9 score of 10. Patient with underlying depression and was advised to continue current medications as prescribed.    Orders:  •  gabapentin (NEURONTIN) 300 mg capsule; Take 1 capsule (300 mg total) by mouth 2 (two) times a day  •  Ambulatory referral to Psych  Services; Future  •  desvenlafaxine succinate (PRISTIQ) 50 mg 24 hr tablet; Take 1 tablet (50 mg total) by mouth daily    Insomnia, unspecified type    Orders:  •  hydrOXYzine HCL (ATARAX) 25 mg tablet; Take 1 tablet (25 mg total) by mouth 2 (two) times a day as needed for anxiety  •  Ambulatory referral to Psych Services; Future    Colon cancer screening    Orders:  •  Cologuard    Encounter for screening mammogram for breast cancer    Orders:  •  Mammo screening bilateral w 3d and cad; Future    Seborrheic keratosis         Localized swelling, mass, or lump of right lower extremity  It is unclear if this is anteriorly dislocated femur versus bony lesion order nonunion of the femoral fracture versus malignancy.  Will start with x-ray of the femur and the hip and ultrasound of the lower extremity.  Orders:  •  US extremity soft tissue; Future  •  XR femur 2 vw right; Future  •  XR hip/pelv 2-3 vws right if performed; Future    Chronic TMJ pain    Orders:  •  acetaminophen (TYLENOL) 650 mg CR tablet; Take 1 tablet (650 mg total) by mouth every 8 (eight) hours as needed for mild pain    Injury of clavicle, right, initial encounter  As below  Orders:  •  XR shoulder 2+ vw right; Future  •  XR clavicle right; Future  •  Ambulatory Referral to Orthopedic Surgery; Future    Dislocated shoulder, right, initial encounter  Suspect dislocated shoulder and clavicle fracture with nonhealing or malunion, would recommend x-ray of the shoulder and clavicle as well as follow-up with orthopedic surgery  Orders:  •  XR shoulder 2+ vw right; Future  •  XR clavicle right; Future  •  Ambulatory Referral to Orthopedic Surgery; Future    Pain and swelling of left elbow  Possible intra-articular fracture of the left elbow recommend x-ray of the elbow  Orders:  •  XR elbow 3+ vw left; Future    Encounter for osteoporosis screening in asymptomatic postmenopausal patient    Orders:  •  DXA bone density spine hip and pelvis; Future    Age  related osteoporosis, unspecified pathological fracture presence  Deviously she did have a prescription for DEXA scan however has not scheduled it since 2023.  Orders:  •  DXA bone density spine hip and pelvis; Future      Depression Screening and Follow-up Plan:   Patient with underlying depression and was advised to continue current medications as prescribed.     Preventive health issues were discussed with patient, and age appropriate screening tests were ordered as noted in patient's After Visit Summary. Personalized health advice and appropriate referrals for health education or preventive services given if needed, as noted in patient's After Visit Summary.    History of Present Illness     Patient has multiple complaints today including lump on her right lower extremity, left elbow pain, pain in her scalp, pain around her TMJ, pain over the right clavicle and right shoulder.  She states that she has fallen multiple times under the influence of alcohol and may have broken bones.  States that all of these pains and dislocations have been present for months.     Patient has history of seizure disorder, currently on lacosamide/Vimpat, being monitored by neurology.    Depression and anxiety -currently on gabapentin 300 mg twice daily, ran out of trazodone 50 mg daily, on Pristiq, also on zonisamide.  Needs to establish with outpatient psychiatry to discuss medications for improved control on her symptoms.  Continues to drink alcohol intermittently.  Pancytopenia-has to follow-up with hematology     Patient Care Team:  Lian Moreno MD as PCP - General (Family Medicine)    Review of Systems   Constitutional:  Negative for fatigue and fever.   HENT:  Negative for congestion, facial swelling, mouth sores, rhinorrhea, sore throat and trouble swallowing.    Eyes:  Negative for pain and redness.   Respiratory:  Negative for cough, shortness of breath and wheezing.    Cardiovascular:  Negative for chest pain, palpitations  and leg swelling.   Gastrointestinal:  Negative for abdominal pain, blood in stool, constipation, diarrhea and nausea.   Genitourinary:  Negative for dysuria, hematuria and urgency.   Musculoskeletal:  Positive for arthralgias and joint swelling. Negative for back pain and myalgias.   Skin:  Negative for rash and wound.   Neurological:  Negative for seizures, syncope and headaches.   Hematological:  Negative for adenopathy.   Psychiatric/Behavioral:  Positive for sleep disturbance. Negative for agitation and behavioral problems. The patient is nervous/anxious.      Medical History Reviewed by provider this encounter:  Tobacco  Allergies  Meds  Problems  Med Hx  Surg Hx  Fam Hx       Annual Wellness Visit Questionnaire   Renzo is here for her Welcome to Medicare visit.     Health Risk Assessment:   Patient rates overall health as good. Patient feels that their physical health rating is much better. Patient is very satisfied with their life. Eyesight was rated as slightly worse. Hearing was rated as same. Patient feels that their emotional and mental health rating is same. Patients states they are sometimes angry. Patient states they are often unusually tired/fatigued. Pain experienced in the last 7 days has been none. Patient states that she has experienced no weight loss or gain in last 6 months.     Depression Screening:   PHQ-9 Score: 10      Fall Risk Screening:   In the past year, patient has experienced: no history of falling in past year      Urinary Incontinence Screening:   Patient has not leaked urine accidently in the last six months.     Home Safety:  Patient has trouble with stairs inside or outside of their home. Patient has working smoke alarms and has working carbon monoxide detector. Home safety hazards include: none.     Nutrition:   Current diet is Regular.     Medications:   Patient is currently taking over-the-counter supplements. OTC medications include: see medication list. Patient is  able to manage medications.     Activities of Daily Living (ADLs)/Instrumental Activities of Daily Living (IADLs):   Walk and transfer into and out of bed and chair?: Yes  Dress and groom yourself?: Yes    Bathe or shower yourself?: Yes    Feed yourself? Yes  Do your laundry/housekeeping?: Yes  Manage your money, pay your bills and track your expenses?: Yes  Make your own meals?: Yes    Do your own shopping?: Yes    Previous Hospitalizations:   Any hospitalizations or ED visits within the last 12 months?: Yes    How many hospitalizations have you had in the last year?: 1-2    Advance Care Planning:   Living will: No    Durable POA for healthcare: No    Advanced directive: No    Advanced directive counseling given: No    Five wishes given: No    Patient declined ACP directive: No    End of Life Decisions reviewed with patient: No      Cognitive Screening:   Provider or family/friend/caregiver concerned regarding cognition?: No    Preventive Screenings      Cardiovascular Screening:    General: Screening Not Indicated and History Lipid Disorder      Diabetes Screening:     General: Screening Current      Colorectal Cancer Screening:     General: Risks and Benefits Discussed    Due for: Cologuard      Breast Cancer Screening:     General: Screening Current and Risks and Benefits Discussed    Due for: Mammogram        Cervical Cancer Screening:    General: Screening Not Indicated      Osteoporosis Screening:    General: Screening Not Indicated and History Osteoporosis    Due for: DXA Axial      Lung Cancer Screening:     General: Screening Not Indicated      Hepatitis C Screening:    General: Screening Current    Immunizations:  - Immunizations due: Influenza, Prevnar 20 and Zoster (Shingrix)    Screening, Brief Intervention, and Referral to Treatment (SBIRT)     Screening      AUDIT-C Screenin) How often did you have a drink containing alcohol in the past year? never  2) How many drinks did you have on a typical  "day when you were drinking in the past year? 0  3) How often did you have 6 or more drinks on one occasion in the past year? never    AUDIT-C Score: 0  Interpretation: Score 0-2 (female): Negative screen for alcohol misuse    Single Item Drug Screening:  How often have you used an illegal drug (including marijuana) or a prescription medication for non-medical reasons in the past year? never    Single Item Drug Screen Score: 0  Interpretation: Negative screen for possible drug use disorder    SDOH Risk Assessment  Social determinants of health (SDOH) risk assesment tool was completed. The tool at a minimum covered housing stability, food insecurity, transportation needs, and utility difficulty. Patient had at risk responses for the following SDOH domains: transportation needs.     Social Drivers of Health     Food Insecurity: No Food Insecurity (4/9/2025)    Hunger Vital Sign    • Worried About Running Out of Food in the Last Year: Never true    • Ran Out of Food in the Last Year: Never true   Transportation Needs: Unmet Transportation Needs (4/9/2025)    PRAPARE - Transportation    • Lack of Transportation (Medical): Yes    • Lack of Transportation (Non-Medical): Yes   Housing Stability: Low Risk  (4/9/2025)    Housing Stability Vital Sign    • Unable to Pay for Housing in the Last Year: No    • Number of Times Moved in the Last Year: 1    • Homeless in the Last Year: No   Utilities: Not At Risk (4/9/2025)    [facility] Utilities    • Threatened with loss of utilities: No     Vision Screening    Right eye Left eye Both eyes   Without correction 20/20 20/25 20/20   With correction          Objective   /54 (BP Location: Left arm, Patient Position: Sitting, Cuff Size: Adult)   Pulse 61   Temp (!) 97.1 °F (36.2 °C) (Tympanic)   Resp 16   Ht 5' 7\" (1.702 m)   Wt 57.6 kg (127 lb)   SpO2 98%   BMI 19.89 kg/m²     Physical Exam  Vitals and nursing note reviewed.   Constitutional:       Appearance: Normal appearance. " She is well-developed.   HENT:      Head: Normocephalic and atraumatic.      Right Ear: Tympanic membrane, ear canal and external ear normal.      Left Ear: Tympanic membrane, ear canal and external ear normal.      Nose: Nose normal.      Mouth/Throat:      Pharynx: No oropharyngeal exudate.   Eyes:      General: No scleral icterus.        Right eye: No discharge.         Left eye: No discharge.      Conjunctiva/sclera: Conjunctivae normal.      Pupils: Pupils are equal, round, and reactive to light.   Neck:      Thyroid: No thyromegaly.   Cardiovascular:      Rate and Rhythm: Normal rate and regular rhythm.      Heart sounds: No murmur heard.     No gallop.   Pulmonary:      Effort: Pulmonary effort is normal. No respiratory distress.      Breath sounds: Normal breath sounds. No wheezing or rales.   Abdominal:      Palpations: Abdomen is soft.      Tenderness: There is no abdominal tenderness.   Musculoskeletal:         General: Swelling, tenderness, deformity and signs of injury present.      Cervical back: Normal range of motion.      Right lower leg: No edema.      Left lower leg: No edema.      Comments: Severely deformed distal end of the right clavicle , left elbow swelling and tenderness,, left anterior leg bony overgrowth over the right femur   Lymphadenopathy:      Cervical: No cervical adenopathy.   Skin:     General: Skin is warm.      Capillary Refill: Capillary refill takes less than 2 seconds.      Findings: Lesion (Several pigmented macules as well as seborrheic keratosis noted on the back) present. No erythema or rash.   Neurological:      Mental Status: She is alert and oriented to person, place, and time.      Deep Tendon Reflexes: Reflexes normal.   Psychiatric:         Behavior: Behavior normal.         Thought Content: Thought content normal.         Judgment: Judgment normal.

## 2025-04-09 NOTE — ASSESSMENT & PLAN NOTE
Orders:    metoprolol succinate (TOPROL-XL) 25 mg 24 hr tablet; Take 1 tablet (25 mg total) by mouth 2 (two) times a day

## 2025-04-09 NOTE — TELEPHONE ENCOUNTER
Called and left VM for pt to return call to [facility] to get Reclast scheduled for the end of April. Left call back info.

## 2025-04-10 ENCOUNTER — TELEPHONE (OUTPATIENT)
Age: 65
End: 2025-04-10

## 2025-04-10 NOTE — TELEPHONE ENCOUNTER
Okay! I will make the provider aware. Thank you!     Macario Rodgers has attempted to call the patient 3x, VM was left with no return call. Just an FYI.

## 2025-04-10 NOTE — TELEPHONE ENCOUNTER
PA for Zonisamide 100 mg  APPROVED     Date(s) approved March 11, 2025 to April 10, 2026     Case #[id number]     Patient advised by          []Dial a Dealerhart Message  [x]Phone call   []LMOM  []L/M to call office as no active Communication consent on file  []Unable to leave detailed message as VM not approved on Communication consent       Pharmacy advised by    [x]Fax  []Phone call  []Secure Chat    Specialty Pharmacy    []     Approval letter scanned into Media No

## 2025-04-10 NOTE — TELEPHONE ENCOUNTER
PA for hydroxyzine 25 mg SUBMITTED to [facility]     via    []CMM-KEY:   [x]Surescripts-Case ID # [id number]   []Availity-Auth ID # NDC #   []Faxed to plan   []Other website   []Phone call Case ID #     []PA sent as URGENT    All office notes, labs and other pertaining documents and studies sent. Clinical questions answered. Awaiting determination from insurance company.     Turnaround time for your insurance to make a decision on your Prior Authorization can take 7-21 business days.

## 2025-04-10 NOTE — TELEPHONE ENCOUNTER
PA for hydroxyzine 25 mg  APPROVED     Date(s) approved March 11, 2025 to April 10, 2026     Case #[id number]     Patient advised by          [x][employer] Message  []Phone call   [x]LMOM  []L/M to call office as no active Communication consent on file  []Unable to leave detailed message as VM not approved on Communication consent       Pharmacy advised by    [x]Fax  []Phone call  []Secure Chat    Specialty Pharmacy    []     Approval letter scanned into Media No

## 2025-04-10 NOTE — TELEPHONE ENCOUNTER
PA for Zonisamide 100 mg SUBMITTED to [facility]     via    []CMM-KEY:   [x]Surescripts-Case ID # [id number]   []Availity-Auth ID # NDC #   []Faxed to plan   []Other website   []Phone call Case ID #     []PA sent as URGENT    All office notes, labs and other pertaining documents and studies sent. Clinical questions answered. Awaiting determination from insurance company.     Turnaround time for your insurance to make a decision on your Prior Authorization can take 7-21 business days.

## 2025-04-14 ENCOUNTER — TELEPHONE (OUTPATIENT)
Age: 65
End: 2025-04-14

## 2025-04-14 NOTE — TELEPHONE ENCOUNTER
She can stop the hydroxyzine.  If she has suicidal or homicidal ideations or is very paranoid she should go straight to the emergency room.

## 2025-04-14 NOTE — TELEPHONE ENCOUNTER
Patient stated she had a change in medication when she was in the office on 4/9 and she is having a paranoid, anxious reaction to whatever the change was (she wasn't quite sure what was changed - added or increased). Stated she is crying and feels scared and doesn't want to go anywhere.    Please contact patient to advise.

## 2025-04-25 ENCOUNTER — TELEPHONE (OUTPATIENT)
Dept: OBGYN CLINIC | Facility: CLINIC | Age: 65
End: 2025-04-25

## 2025-04-26 LAB — COLOGUARD RESULT REPORTABLE: NEGATIVE

## 2025-04-26 NOTE — CONSULTS
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, anxiety who presents to [facility] ED on 3/13/2023 with breakthrough seizure  Event described as patient being found staring off, unresponsive, with urinary incontinence after transporting to vehicle  Presented to [facility] ED with right focal seizure  GCS of 14, followed by rightward gaze and unresponsiveness  Work-up:  - CT facial bones wo: Unremarkable for acute maxillofacial fracture  - CTA head and neck: Unremarkable for acute intracranial abnormalities, large vessel occlusion, or critical stenosis  Labs on presentation:  - AST elevated 100, ALT elevated 61, alk phos elevated 147  - Magnesium low on presentation, 1 6  - Phosphorus elevated, 4 4  - UA: Negative nitrite, 1-2 WBC, no bacteria seen  - Labs WNL: TSH, lactic acid, total CK, coma panel, RDU, procalcitonin    Unlikely alcohol withdrawal seizure as  reports patient quit drinking last month following her recent admission  Patient and  unable to state if patient is fully compliant with her home Vimpat, lacosamide level pending  Plan:  · Routine EEG pending  · Low threshold to transfer to [facility] for video EEG monitoring if patient does not return to baseline  · Pending: Lacosamide level, blood cultures x2, hemoglobin A1c, vitamin B12, folate, vitamin B1, ammonia  · S/p 3 mg total IV Ativan on arrival  · S/p IV Keppra loading dose 3000 mg x 1  · Continue Vimpat 200 mg BID   · Seizure precautions  Discussed seizure precautions:  No driving, lifting heavy machinery, climbing heights, swimming unattended, hot tub baths or any other activity that will place you in danger in case of a seizure for at least six months    · [employer] form to be completed MD Delgado made aware of B/P- 163/144. New orders placed.    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, anxiety who presents to [facility] ED on 3/13/2023 with breakthrough seizure  Patient presented to [address] [place] [address] on 3/13/2023 following a breakthrough seizure at home  Per discussion with , patient suffered a fall on 3/9 or 3/10, resulting in bruising of her face  Patient was found the morning of 3/13 sitting up in her chair staring off, unresponsive  No urinary/fecal incontinence at that time   carried patient to the car where he noted she was incontinent of urine in the seat  Patient denies witnessing any seizure activity   is unable to state if patient is compliant with her medications   does report patient recently quit alcohol after her most recent admission in February 2023   states he has not found anything at home that would make him think she was drinking  Per chart review, patient was actively seizing on arrival and appeared to have bruises all over her body  GCS of 14 initially, followed by unresponsiveness with rightward gaze and right focal seizure  Patient received a total of 3 mg IV Ativan and a loading dose of Keppra 3000 mg x 1 with seizure abatement  Of note patient was recently admitted at [facility] on 2/6/2023 for breakthrough seizure, and was transferred to [facility] for video EEG monitoring  On 2/11 patient had 1 event described as flashbacks of memories, feeling scared and SOB   There was no EEG correlate at that time, however focal aware seizures were not excluded  Vimpat was increased from home 150 mg BID to 200 mg BID  Prior to the most recent admission, patient was also seen in March 2022 where video EEG demonstrated multiple electrographic seizures from left temporal region  MRI brain at that time demonstrated changes in left hippocampus consistent with status epilepticus  Patient was discharged on Vimpat and repeat MRI brain demonstrated resolution of left hippocampal changes  Previous seizures manifested as hand clenching and decreased alertness  Inpatient consult to Neurology  Consult performed by: Frank Sherman PA-C  Consult ordered by: PAZ Lomeli        Review of Systems  12 point ROS performed, as stated above, all others negative    Historical Information   Past Medical History:   Diagnosis Date   • Fracture    • Hepatitis     Hep A    • Insomnia     10mar2016 resolved   • Osteoporosis     14jun2016 resolved   • Pancreatitis    • SAH (subarachnoid hemorrhage) ([facility] [address] )    • Seasonal allergies    • Seizure ([facility] [address] )    • Seizures ([facility] [address] )    • Urine discoloration 11/11/2019   • Varicella    • Vomiting 11/13/2019     Past Surgical History:   Procedure Laterality Date   • IR BIOPSY BONE  8/17/2021   • IR BIOPSY BONE MARROW  11/14/2019   • TUBAL LIGATION  1985     Social History   Social History     Substance and Sexual Activity   Alcohol Use Yes   • Alcohol/week: 7 0 standard drinks   • Types: 7 Cans of beer per week    Comment: occassional     Social History     Substance and Sexual Activity   Drug Use No     E-Cigarette/Vaping   • E-Cigarette Use Never User      E-Cigarette/Vaping Substances   • Nicotine No    • THC No    • CBD No    • Flavoring No    • Other No    • Unknown No      Social History     Tobacco Use   Smoking Status Never   Smokeless Tobacco Never     Family History:   Family History   Problem Relation Age of Onset   • Anxiety disorder Mother    • Depression Mother    • No Known Problems Father    • No Known Problems Sister    • No Known Problems Sister    • No Known Problems Brother    • Cancer Paternal Grandmother         unknown    • No Known Problems Daughter    • No Known Problems Maternal Grandmother    • Cancer Maternal Grandfather    • No Known Problems Paternal Grandfather    • Breast cancer Neg Hx    • Ovarian cancer Neg Hx        Review of previous medical records was completed  Meds/Allergies   all current active meds have been reviewed, current meds:   Current Facility-Administered Medications   Medication Dose Route Frequency   • enoxaparin (LOVENOX) subcutaneous injection 40 mg  40 mg Subcutaneous Daily   • [START ON 2/98/9128] folic acid (FOLVITE) tablet 1 mg  1 mg Oral Daily   • gabapentin (NEURONTIN) capsule 300 mg  300 mg Oral BID   • lacosamide (VIMPAT) tablet 200 mg  200 mg Oral BID   • [START ON 3/14/2023] multivitamin-minerals (CENTRUM) tablet 1 tablet  1 tablet Oral Daily   • [START ON 3/14/2023] thiamine tablet 100 mg  100 mg Oral Daily   , PTA meds:   Prior to Admission Medications   Prescriptions Last Dose Informant Patient Reported?  Taking?   gabapentin (NEURONTIN) 300 mg capsule Unknown  No No   Sig: Take 1 capsule (300 mg total) by mouth 2 (two) times a day   hydrOXYzine (VISTARIL) 100 MG capsule Unknown  Yes No   Sig: Take 100 mg by mouth 3 (three) times a day as needed for itching   hydrOXYzine HCL (ATARAX) 25 mg tablet Unknown  No No   Sig: Take 1 tablet (25 mg total) by mouth 2 (two) times a day   lacosamide (VIMPAT) 200 mg tablet Unknown  No No   Sig: Take 1 tablet (200 mg total) by mouth 2 (two) times a day   metoprolol succinate (TOPROL-XL) 25 mg 24 hr tablet Unknown  No No   Sig: Take 1 tablet (25 mg total) by mouth 2 (two) times a day   traZODone (DESYREL) 50 mg tablet Unknown  No No   Sig: Take 1 tablet (50 mg total) by mouth in the morning      Facility-Administered Medications: None    and     No Known Allergies    Objective   Vitals:Blood pressure 121/59, pulse 92, temperature 97 5 °F (36 4 °C), temperature source Axillary, resp  rate (!) 38, weight 55 7 kg (122 lb 12 7 oz), SpO2 95 %  ,Body mass index is 21 08 kg/m²  Intake/Output Summary (Last 24 hours) at 3/13/2023 1731  Last data filed at 3/13/2023 1400  Gross per 24 hour   Intake 1350 ml   Output 150 ml   Net 1200 ml       Invasive Devices: Invasive Devices     Peripheral Intravenous Line  Duration           Peripheral IV 03/13/23 Right;Upper;Ventral (anterior) Arm <1 day    Peripheral IV 03/13/23 Right;Ventral (anterior) Forearm <1 day          Drain  Duration           External Urinary Catheter <1 day              Physical Exam  Vitals and nursing note reviewed  Constitutional:       General: She is not in acute distress  Appearance: She is not ill-appearing, toxic-appearing or diaphoretic  HENT:      Head:      Comments: Bruising noted on chin  Eyes:      General: No scleral icterus  Right eye: No discharge  Left eye: No discharge  Extraocular Movements: Extraocular movements intact  Conjunctiva/sclera: Conjunctivae normal       Pupils: Pupils are equal, round, and reactive to light  Musculoskeletal:         General: Normal range of motion  Cervical back: Normal range of motion and neck supple  Skin:     General: Skin is warm and dry  Coloration: Skin is not jaundiced or pale  Findings: Bruising present  Neurological:      Mental Status: She is alert  Psychiatric:         Mood and Affect: Mood normal          Behavior: Behavior normal        Neurologic Exam     Mental Status   Patient is alert, sitting up in bed  Oriented to person  Able to states she is in the hospital, however unable to name the hospital   Able to state the current month  Incorrectly states the current year is 0  Unable to name the president, however is able to pick correctly when given multiple choices  Able to name all objects provided  Follows central and appendicular commands  Follows simple and multistep commands  Answers questions appropriately  No dysarthria or aphasia noted  Does perseverate several times throughout exam      Cranial Nerves     CN III, IV, VI   Pupils are equal, round, and reactive to light  Primary gaze midline, no gaze preference or forced gaze deviation  Conjugate gaze noted  Pupils equal, round, reactive bilaterally  EOMs intact  End gaze nystagmus noted bilaterally, nonsustained  No visual field deficits noted  Facial sensation intact bilaterally  Patient mouth asymmetric at rest, unclear if true facial weakness; resolves with smiling  Tongue midline, erythema and swelling on the right without laceration or bruising     Motor Exam   Muscle bulk: normal  Overall muscle tone: normal  No pronation or drift noted in BUE  Bilateral  5/5  Bilateral upper extremity strength testing 5/5 throughout    Bilateral hip flexion 5/5  Bilateral dorsiflexion and plantarflexion strength 5/5     Sensory Exam   Sensation to light touch intact throughout  Pinprick slightly diminished in distal BLE  Proprioception absent in bilateral toes   No evidence of extinction with bilateral simultaneous stimulation     Gait, Coordination, and Reflexes   No ataxia or dysmetria in bilateral upper extremity finger-nose testing    Asterixis noted in BUE    Bilateral upper and lower extremity reflexes 2+ throughout    Rhythmic moderate frequency, low amplitude tremor noted in RUE lasting approximately 5 seconds     Lab Results: I have personally reviewed pertinent reports    Recent Results (from the past 24 hour(s))   Fingerstick Glucose (POCT)    Collection Time: 03/13/23  6:17 AM   Result Value Ref Range    POC Glucose 166 (H) 65 - 140 mg/dl   ECG 12 lead    Collection Time: 03/13/23  6:21 AM   Result Value Ref Range    Ventricular Rate 115 BPM    Atrial Rate 115 BPM    PA Interval 188 ms    QRSD Interval 86 ms    QT Interval 324 ms    QTC Interval 448 ms    P Axis 71 degrees    QRS Axis 58 degrees    T Wave Axis 68 degrees   CBC and differential    Collection Time: 03/13/23  6:31 AM   Result Value Ref Range    WBC 4 74 4 31 - 10 16 Thousand/uL    RBC 4 07 3 81 - 5 12 Million/uL    Hemoglobin 12 9 11 5 - 15 4 g/dL    Hematocrit 39 6 34 8 - 46 1 %    MCV 97 82 - 98 fL    MCH 31 7 26 8 - 34 3 pg    MCHC 32 6 31 4 - 37 4 g/dL    RDW 12 8 11 6 - 15 1 %    MPV 10 1 8 9 - 12 7 fL    Platelets 56 (L) 709 - 390 Thousands/uL    nRBC 0 /100 WBCs    Neutrophils Relative 24 (L) 43 - 75 %    Immat GRANS % 0 0 - 2 %    Lymphocytes Relative 66 (H) 14 - 44 %    Monocytes Relative 6 4 - 12 %    Eosinophils Relative 3 0 - 6 %    Basophils Relative 1 0 - 1 %    Neutrophils Absolute 1 14 (L) 1 85 - 7 62 Thousands/µL    Immature Grans Absolute 0 01 0 00 - 0 20 Thousand/uL    Lymphocytes Absolute 3 15 0 60 - 4 47 Thousands/µL    Monocytes Absolute 0 28 0 17 - 1 22 Thousand/µL    Eosinophils Absolute 0 13 0 00 - 0 61 Thousand/µL    Basophils Absolute 0 03 0 00 - 0 10 Thousands/µL   Protime-INR    Collection Time: 03/13/23  6:31 AM   Result Value Ref Range    Protime 13 9 11 6 - 14 5 seconds    INR 1 04 0 84 - 1 19   APTT    Collection Time: 03/13/23  6:31 AM   Result Value Ref Range    PTT 24 23 - 37 seconds   Comprehensive metabolic panel    Collection Time: 03/13/23  6:31 AM   Result Value Ref Range    Sodium 134 (L) 135 - 147 mmol/L    Potassium 3 9 3 5 - 5 3 mmol/L    Chloride 100 96 - 108 mmol/L    CO2 23 21 - 32 mmol/L    ANION GAP 11 4 - 13 mmol/L    BUN 14 5 - 25 mg/dL    Creatinine 0 73 0 60 - 1 30 mg/dL    Glucose 178 (H) 65 - 140 mg/dL    Calcium 8 7 8 4 - 10 2 mg/dL     (H) 13 - 39 U/L    ALT 61 (H) 7 - 52 U/L    Alkaline Phosphatase 147 (H) 34 - 104 U/L    Total Protein 7 1 6 4 - 8 4 g/dL    Albumin 4 0 3 5 - 5 0 g/dL    Total Bilirubin 0 42 0 20 - 1 00 mg/dL    eGFR 87 ml/min/1 73sq m   TSH, 3rd generation with Free T4 reflex    Collection Time: 03/13/23  6:31 AM   Result Value Ref Range    TSH 3RD GENERATON 3 625 0 450 - 4 500 uIU/mL   Lactic acid    Collection Time: 03/13/23  6:31 AM   Result Value Ref Range    LACTIC ACID 1 6 0 5 - 2 0 mmol/L   CK Total with Reflex CKMB    Collection Time: 03/13/23  6:31 AM   Result Value Ref Range    Total CK 39 26 - 192 U/L   Magnesium    Collection Time: 03/13/23  6:31 AM   Result Value Ref Range    Magnesium 1 6 (L) 1 9 - 2 7 mg/dL   Phosphorus    Collection Time: 03/13/23  6:31 AM   Result Value Ref Range    Phosphorus 4 4 (H) 2 3 - 4 1 mg/dL   Ethanol    Collection Time: 03/13/23  6:31 AM   Result Value Ref Range    Ethanol Lvl <68 <70 mg/dL   Salicylate level    Collection Time: 03/13/23  6:31 AM   Result Value Ref Range    Salicylate Lvl <5 3 - 20 mg/dL   Acetaminophen level-If concentration is detectable, please discuss with medical  on call  Collection Time: 03/13/23  6:31 AM   Result Value Ref Range    Acetaminophen Level <10 (L) 10 - 20 ug/mL   Blood culture #1    Collection Time: 03/13/23  7:00 AM    Specimen: Arm, Right; Blood   Result Value Ref Range    Blood Culture Received in Microbiology Lab  Culture in Progress      Rapid drug screen, urine    Collection Time: 03/13/23  7:50 AM   Result Value Ref Range    Amph/Meth UR Negative Negative    Barbiturate Ur Negative Negative    Benzodiazepine Urine Negative Negative    Cocaine Urine Negative Negative    Methadone Urine Negative Negative    Opiate Urine Negative Negative    PCP Ur Negative Negative    THC Urine Negative Negative    Oxycodone Urine Negative Negative   UA w Reflex to Microscopic w Reflex to Culture    Collection Time: 03/13/23  7:50 AM    Specimen: Urine, Clean Catch   Result Value Ref Range    Color, UA Light Yellow     Clarity, UA Clear     Specific Gravity, UA >=1 050 (H) 1 003 - 1 030    pH, UA 6 0 4 5, 5 0, 5 5, 6 0, 6 5, 7 0, 7 5, 8 0    Leukocytes, UA Negative Negative    Nitrite, UA Negative Negative    Protein, UA 70 (1+) (A) Negative mg/dl    Glucose, UA Negative Negative mg/dl    Ketones, UA Negative Negative mg/dl    Urobilinogen, UA <2 0 <2 0 mg/dl mg/dl    Bilirubin, UA Negative Negative    Occult Blood, UA Negative Negative   Urine Microscopic    Collection Time: 03/13/23  7:50 AM   Result Value Ref Range    RBC, UA 1-2 None Seen, 1-2 /hpf    WBC, UA 1-2 None Seen, 1-2 /hpf    Epithelial Cells Occasional None Seen, Occasional /hpf    Bacteria, UA None Seen None Seen, Occasional /hpf    MUCUS THREADS Occasional (A) None Seen   Blood culture #2    Collection Time: 03/13/23  8:00 AM    Specimen: Arm, Left; Blood   Result Value Ref Range    Blood Culture Received in Microbiology Lab  Culture in Progress  Procalcitonin    Collection Time: 03/13/23  8:00 AM   Result Value Ref Range    Procalcitonin <0 05 <=0 25 ng/ml   ]  Imaging Studies: I have personally reviewed pertinent reports and I have personally reviewed pertinent films in PACS  EKG, Pathology, and Other Studies: I have personally reviewed pertinent reports  VTE Prophylaxis: Enoxaparin (Lovenox)    Dictation voice to text software has been used in the creation of this document  Please consider this in light of any contextual or grammatical errors

## 2025-04-28 ENCOUNTER — TELEPHONE (OUTPATIENT)
Age: 65
End: 2025-04-28

## 2025-04-28 ENCOUNTER — RESULTS FOLLOW-UP (OUTPATIENT)
Dept: FAMILY MEDICINE CLINIC | Facility: CLINIC | Age: 65
End: 2025-04-28

## 2025-04-28 NOTE — TELEPHONE ENCOUNTER
Patient called in and would like to know if she could take her desvenlafaxine succinate (PRISTIQ) 50 mg 24 hr tablet and cut in half and take half twice a day. Patient stated taking in even ing and feels good but notices in the morning that she is dreading the day and would like to know if she could cut pill in half. Please advise. Thank you

## 2025-04-28 NOTE — TELEPHONE ENCOUNTER
Message was left for pt to return call. Please verify pt need of service and place on proper wait list

## 2025-04-29 DIAGNOSIS — G40.109 SYMPTOMATIC FOCAL EPILEPSY (HCC): ICD-10-CM

## 2025-04-29 NOTE — TELEPHONE ENCOUNTER
ER prescriptions generally cannot be cut, but she can ask the pharmacist about this particularly.   She might need an adjunct medication if symptoms are uncontrolled, can discuss at her next visit or sooner if she likes

## 2025-04-30 DIAGNOSIS — M80.00XD AGE-RELATED OSTEOPOROSIS WITH CURRENT PATHOLOGICAL FRACTURE WITH ROUTINE HEALING: Primary | ICD-10-CM

## 2025-04-30 RX ORDER — ZOLEDRONIC ACID 0.05 MG/ML
5 INJECTION, SOLUTION INTRAVENOUS ONCE
OUTPATIENT
Start: 2025-05-02

## 2025-04-30 RX ORDER — SODIUM CHLORIDE 9 MG/ML
20 INJECTION, SOLUTION INTRAVENOUS ONCE
OUTPATIENT
Start: 2025-05-02

## 2025-04-30 RX ORDER — LACOSAMIDE 200 MG/1
200 TABLET ORAL 2 TIMES DAILY
Qty: 180 TABLET | Refills: 1 | Status: SHIPPED | OUTPATIENT
Start: 2025-04-30

## 2025-05-01 ENCOUNTER — HOSPITAL ENCOUNTER (OUTPATIENT)
Dept: ULTRASOUND IMAGING | Facility: HOSPITAL | Age: 65
Discharge: HOME | End: 2025-05-01
Attending: FAMILY MEDICINE
Payer: MEDICARE

## 2025-05-01 DIAGNOSIS — R22.41 LOCALIZED SWELLING, MASS, OR LUMP OF RIGHT LOWER EXTREMITY: ICD-10-CM

## 2025-05-01 PROCEDURE — 76882 US LMTD JT/FCL EVL NVASC XTR: CPT

## 2025-05-02 ENCOUNTER — HOSPITAL ENCOUNTER (OUTPATIENT)
Dept: INFUSION CENTER | Facility: CLINIC | Age: 65
Discharge: HOME | End: 2025-05-02
Attending: STUDENT IN AN ORGANIZED HEALTH CARE EDUCATION/TRAINING PROGRAM

## 2025-05-05 ENCOUNTER — OFFICE VISIT (OUTPATIENT)
Dept: OBGYN CLINIC | Facility: CLINIC | Age: 65
End: 2025-05-05
Attending: FAMILY MEDICINE
Payer: MEDICARE

## 2025-05-05 ENCOUNTER — APPOINTMENT (OUTPATIENT)
Dept: RADIOLOGY | Facility: AMBULARY SURGERY CENTER | Age: 65
End: 2025-05-05
Attending: ORTHOPAEDIC SURGERY
Payer: MEDICARE

## 2025-05-05 VITALS — BODY MASS INDEX: 19.93 KG/M2 | WEIGHT: 127 LBS | HEIGHT: 67 IN

## 2025-05-05 DIAGNOSIS — S43.004A DISLOCATED SHOULDER, RIGHT, INITIAL ENCOUNTER: ICD-10-CM

## 2025-05-05 DIAGNOSIS — M25.511 RIGHT SHOULDER PAIN, UNSPECIFIED CHRONICITY: Primary | ICD-10-CM

## 2025-05-05 DIAGNOSIS — S49.91XA INJURY OF CLAVICLE, RIGHT, INITIAL ENCOUNTER: ICD-10-CM

## 2025-05-05 DIAGNOSIS — M25.511 RIGHT SHOULDER PAIN, UNSPECIFIED CHRONICITY: ICD-10-CM

## 2025-05-05 PROCEDURE — 73030 X-RAY EXAM OF SHOULDER: CPT

## 2025-05-05 PROCEDURE — 99214 OFFICE O/P EST MOD 30 MIN: CPT | Performed by: ORTHOPAEDIC SURGERY

## 2025-05-05 NOTE — TELEPHONE ENCOUNTER
Contacted patient in regards to ROUTINE Referral, LVM to contact [phone] to discuss services needed at this time in order to be added to proper wait list.    Please add patient to proper WL(s).

## 2025-05-05 NOTE — PROGRESS NOTES
Name: Renzo Pop      : 1960      MRN: [id number]  Encounter Provider: Waylon Oconnell DO  Encounter Date: 2025   Encounter department: [facility]  :  Assessment & Plan  Right shoulder pain, unspecified chronicity    Orders:    XR shoulder 2+ vw right; Future    Injury of clavicle, right, initial encounter    Orders:    Ambulatory Referral to Orthopedic Surgery    Dislocated shoulder, right, initial encounter    Orders:    Ambulatory Referral to Orthopedic Surgery            Plan:  Renzo Pop is a 65 y.o. female with right distal clavicle malunion DOI 2021  Physical exam, diagnostic imaging, and subjective history are all most consistent with the above diagnosis. The pathoanatomy and natural history of this diagnosis was explained to the patient in detail.   X-ray obtained today and reviewed with the patient demonstrating malunion of the distal clavicle, no interval change when compared to post-injury x-ray from   Discussed that treatment options would include surgical fixation, for which we would refer her to Dr. Patel. Discussed that this surgery would likely result in increased pain, decreased range of motion. Discussed that on x-ray her fracture is stable.   Patient wishes to proceed non-operatively at this time  May use ice or heat as needed for pain relief  May take NSAIDs/Tylenol as needed for pain control   Follow up as needed    Subjective:    Patient ID:  Renzo Pop 65 y.o. female    Renzo Pop is a 65 y.o. female who presents today for evaluation and treatment of right shoulder pain. She notes a history of clavicle fracture sustained 2021. She denies pain today. She notes frequent clunking and mechanical symptoms, arm weakness. She denies radicular pain, paraesthesias. She notes her other arm is weak as well due to elbow injury. She is concerned with the cosmetic deformity. She cannot sleep on her right side. She notes difficulty with  activities such as holding her purse on her shoulder. She notes seizures resulting in at least one fall onto the shoulder.       The following portions of the patient's history were reviewed and updated as appropriate: allergies, current medications, past family history, past social history, past surgical history and problem list.      Social History     Socioeconomic History    Marital status: /Civil Union     Spouse name: Not on file    Number of children: Not on file    Years of education: Not on file    Highest education level: Not on file   Occupational History    Not on file   Tobacco Use    Smoking status: Never    Smokeless tobacco: Never   Vaping Use    Vaping status: Never Used   Substance and Sexual Activity    Alcohol use: Not Currently     Alcohol/week: 4.0 standard drinks of alcohol     Types: 2 Glasses of wine, 2 Cans of beer per week     Comment: 6 bottles of wine during the week, now only drinks on the weekend.    Drug use: No    Sexual activity: Yes     Partners: Male     Birth control/protection: Post-menopausal   Other Topics Concern    Not on file   Social History Narrative    ** Merged History Encounter **         ** Merged History Encounter **         ** Merged History Encounter **         ** Merged History Encounter **         Drinks coffee  tea     Social Drivers of Health     Financial Resource Strain: Not on file   Food Insecurity: No Food Insecurity (4/9/2025)    Hunger Vital Sign     Worried About Running Out of Food in the Last Year: Never true     Ran Out of Food in the Last Year: Never true   Transportation Needs: Unmet Transportation Needs (4/9/2025)    PRAPARE - Transportation     Lack of Transportation (Medical): Yes     Lack of Transportation (Non-Medical): Yes   Physical Activity: Not on file   Stress: Not on file   Social Connections: Unknown (6/18/2024)    Received from [facility]    Social Connections     How often do you feel lonely or isolated from those around you?  (Adult - for ages 18 years and over): Not on file   Intimate Partner Violence: Unknown (2/15/2025)    Nursing IPS     Feels Physically and Emotionally Safe: Not on file     Physically Hurt by Someone: Not on file     Humiliated or Emotionally Abused by Someone: Not on file     Physically Hurt by Someone: No     Hurt or Threatened by Someone: No   Housing Stability: Low Risk  (4/9/2025)    Housing Stability Vital Sign     Unable to Pay for Housing in the Last Year: No     Number of Times Moved in the Last Year: 1     Homeless in the Last Year: No     Past Medical History:   Diagnosis Date    Anxiety     Chronic alcohol abuse     Dental disease     Depression     Ear problems     Epilepsy (HCC)     Fracture     Hepatitis     Hep A     Hepatitis     Insomnia     10mar2016 resolved    Memory loss     Osteoporosis     14jun2016 resolved    Pancreatitis     SAH (subarachnoid hemorrhage) (HCC)     Seasonal allergies     Seizure (HCC)     Seizures (HCC)     Speech impairment     Stroke (HCC)     Tinnitus     Urine discoloration 11/11/2019    Varicella     Visual impairment     Voice disorder     Vomiting 11/13/2019     Past Surgical History:   Procedure Laterality Date    BONE MARROW BIOPSY      2019, 2021    IR BIOPSY BONE  8/17/2021    IR BIOPSY BONE MARROW  11/14/2019    DE TX TIBL SHFT FX IMED IMPLT W/WO SCREWS&/CERCLA Left 8/4/2023    Procedure: INSERTION NAIL IM TIBIA;  Surgeon: Danielito Sauceda DO;  Location: [facility];  Service: Orthopedics    TUBAL LIGATION  1985    TUBAL LIGATION       No Known Allergies  Current Outpatient Medications on File Prior to Visit   Medication Sig Dispense Refill    acetaminophen (TYLENOL) 650 mg CR tablet Take 1 tablet (650 mg total) by mouth every 8 (eight) hours as needed for mild pain 30 tablet 0    desvenlafaxine succinate (PRISTIQ) 50 mg 24 hr tablet Take 1 tablet (50 mg total) by mouth daily 30 tablet 5    gabapentin (NEURONTIN) 300 mg capsule Take 1 capsule (300 mg total) by  "mouth 2 (two) times a day 60 capsule 4    hydrOXYzine HCL (ATARAX) 25 mg tablet Take 1 tablet (25 mg total) by mouth 2 (two) times a day as needed for anxiety 60 tablet 5    lacosamide (VIMPAT) 200 mg tablet TAKE ONE TABLET BY MOUTH TWICE A  tablet 1    metoprolol succinate (TOPROL-XL) 25 mg 24 hr tablet Take 1 tablet (25 mg total) by mouth 2 (two) times a day 180 tablet 0    zonisamide (ZONEGRAN) 100 mg capsule Take 3 capsules (300 mg total) by mouth daily at bedtime 90 capsule 0     No current facility-administered medications on file prior to visit.            Objective:    Review of Systems  Pertinent items are noted in HPI.  All other systems were reviewed and are negative.    Physical Exam  Cons: Appears well.  No apparent distress.  Psych: Alert. Oriented x3.  Mood and affect normal.  Eyes: PERRLA, EOMI  Resp: Normal effort.  No audible wheezing or stridor.  CV: Palpable pulse.  No discernable arrhythmia.  No LE edema.  Lymph:  No palpable cervical, axillary, or inguinal lymphadenopathy.  Skin: Warm.  No palpable masses.  No visible lesions.  Neuro: Normal muscle tone.  Normal and symmetric DTR's.    BMI:   Estimated body mass index is 19.89 kg/m² as calculated from the following:    Height as of this encounter: 5' 7\" (1.702 m).    Weight as of this encounter: 57.6 kg (127 lb).  Lab Results   Component Value Date    HGBA1C 5.1 03/13/2023         Right Shoulder Exam     Tenderness   The patient is experiencing no tenderness.    Range of Motion   Active abduction:  150   Forward flexion:  180+   Internal rotation 0 degrees:  Upperthoracic     Muscle Strength   Abduction: 5/5   Internal rotation: 5/5   External rotation: 5/5   Supraspinatus: 5/5   Subscapularis: 5/5   Biceps: 5/5     Other   Erythema: absent  Scars: absent  Sensation: normal  Pulse: present    Comments:    Significant deformity of right distal clavicle   Palpable scar tissue between distal clavicle and coracoid. " "            Procedures  Procedures  No Procedures performed today    Diagnostics, reviewed and taken today if performed as documented:  I have personally reviewed pertinent films in PACS and my interpretation is  distal clavicle fracture .  Unchanged 3.8 cm superior elevation of the proximal fracture fragment relative to the distal..        Scribe Attestation      I,:  Brigid Nicolas am acting as a scribe while in the presence of the attending physician.:       I,:  Waylon Oconnell, DO personally performed the services described in this documentation    as scribed in my presence.:             Portions of the record may have been created with voice recognition software.  Occasional wrong word or \"sound a like\" substitutions may have occurred due to the inherent limitations of voice recognition software.  Read the chart carefully and recognize, using context, where substitutions have occurred.  "

## 2025-05-07 ENCOUNTER — TELEPHONE (OUTPATIENT)
Dept: INFUSION CENTER | Facility: CLINIC | Age: 65
End: 2025-05-07

## 2025-05-08 ENCOUNTER — HOSPITAL ENCOUNTER (OUTPATIENT)
Dept: INFUSION CENTER | Facility: CLINIC | Age: 65
Discharge: HOME | End: 2025-05-08
Attending: INTERNAL MEDICINE
Payer: MEDICARE

## 2025-05-08 VITALS
SYSTOLIC BLOOD PRESSURE: 105 MMHG | HEIGHT: 67 IN | WEIGHT: 125 LBS | HEART RATE: 57 BPM | RESPIRATION RATE: 18 BRPM | TEMPERATURE: 97 F | OXYGEN SATURATION: 98 % | DIASTOLIC BLOOD PRESSURE: 68 MMHG | BODY MASS INDEX: 19.62 KG/M2

## 2025-05-08 DIAGNOSIS — M80.00XD AGE-RELATED OSTEOPOROSIS WITH CURRENT PATHOLOGICAL FRACTURE WITH ROUTINE HEALING: Primary | ICD-10-CM

## 2025-05-08 PROCEDURE — 96374 THER/PROPH/DIAG INJ IV PUSH: CPT

## 2025-05-08 RX ORDER — ZOLEDRONIC ACID 0.05 MG/ML
5 INJECTION, SOLUTION INTRAVENOUS ONCE
Status: COMPLETED | OUTPATIENT
Start: 2025-05-08 | End: 2025-05-08

## 2025-05-08 RX ORDER — SODIUM CHLORIDE 9 MG/ML
20 INJECTION, SOLUTION INTRAVENOUS ONCE
OUTPATIENT
Start: 2026-05-02

## 2025-05-08 RX ORDER — ZOLEDRONIC ACID 0.05 MG/ML
5 INJECTION, SOLUTION INTRAVENOUS ONCE
OUTPATIENT
Start: 2026-05-02

## 2025-05-08 RX ORDER — SODIUM CHLORIDE 9 MG/ML
20 INJECTION, SOLUTION INTRAVENOUS ONCE
Status: COMPLETED | OUTPATIENT
Start: 2025-05-08 | End: 2025-05-08

## 2025-05-08 RX ADMIN — SODIUM CHLORIDE 20 ML/HR: 0.9 INJECTION, SOLUTION INTRAVENOUS at 16:38

## 2025-05-08 RX ADMIN — ZOLEDRONIC ACID 5 MG: 5 INJECTION, SOLUTION INTRAVENOUS at 16:38

## 2025-05-08 NOTE — PROGRESS NOTES
Patient tolerated Reclast without issue. PIV removed intact, coban wrap in place. Patient aware of next appt at [facility] 5/11/2026 at 1530.

## 2025-05-08 NOTE — PROGRESS NOTES
Spoke with Dr Benjamin yesterday 5/7 - OK to use labs from 2/16/2025. Patient arrives for Reclast. Denies recent/upcoming dental work. CrCl 67.1 which meets parameters for treatment today. Calcium 8.8 within parameters for tx.

## 2025-05-09 DIAGNOSIS — R56.9 OBSERVED SEIZURE-LIKE ACTIVITY (HCC): ICD-10-CM

## 2025-05-10 RX ORDER — ZONISAMIDE 100 MG/1
CAPSULE ORAL
Qty: 90 CAPSULE | Refills: 0 | Status: SHIPPED | OUTPATIENT
Start: 2025-05-10

## 2025-06-10 PROBLEM — M81.0 AGE-RELATED OSTEOPOROSIS WITHOUT CURRENT PATHOLOGICAL FRACTURE: Status: ACTIVE | Noted: 2025-06-10

## 2025-06-10 PROBLEM — Z79.83 LONG TERM USE OF BISPHOSPHONATES: Status: ACTIVE | Noted: 2025-06-10

## 2025-06-12 DIAGNOSIS — R56.9 OBSERVED SEIZURE-LIKE ACTIVITY (HCC): ICD-10-CM

## 2025-06-13 ENCOUNTER — TELEMEDICINE (OUTPATIENT)
Dept: RHEUMATOLOGY | Facility: CLINIC | Age: 65
End: 2025-06-13
Payer: MEDICARE

## 2025-06-13 DIAGNOSIS — M81.0 AGE-RELATED OSTEOPOROSIS WITHOUT CURRENT PATHOLOGICAL FRACTURE: Primary | ICD-10-CM

## 2025-06-13 DIAGNOSIS — E55.9 VITAMIN D DEFICIENCY: ICD-10-CM

## 2025-06-13 DIAGNOSIS — Z79.83 LONG TERM USE OF BISPHOSPHONATES: ICD-10-CM

## 2025-06-13 PROCEDURE — G2211 COMPLEX E/M VISIT ADD ON: HCPCS | Performed by: INTERNAL MEDICINE

## 2025-06-13 PROCEDURE — 99213 OFFICE O/P EST LOW 20 MIN: CPT | Performed by: INTERNAL MEDICINE

## 2025-06-13 RX ORDER — ZONISAMIDE 100 MG/1
300 CAPSULE ORAL
Qty: 90 CAPSULE | Refills: 0 | Status: SHIPPED | OUTPATIENT
Start: 2025-06-13

## 2025-06-15 ENCOUNTER — TELEPHONE (OUTPATIENT)
Dept: RHEUMATOLOGY | Facility: CLINIC | Age: 65
End: 2025-06-15

## 2025-06-15 NOTE — PROGRESS NOTES
Virtual Regular Visit  Name: Renzo Pop      : 1960      MRN: [id number]  Encounter Provider: Ayanna Benjamin MD  Encounter Date: 2025   Encounter department: [facility] [place]  :  Assessment & Plan  Age-related osteoporosis without current pathological fracture  Ms. Pop is a 65-year-old female with history significant for postmenopausal osteoporosis, who presents for a follow-up.  She is currently receiving IV zoledronic acid infusions on an annual basis that was started in 2024.  She is transferring care from Vasquez King PA-C.      Rheumatic Disease Summary:  1. Severe osteoporosis   -Rheum eval 22 for severe osteoporosis with h/o compression fractures, H/O right clavicle and pubic rami fractures, multiple rib fractures; no prior tx  -DEXA 10/19/21: T -4.3 L hip, -4 L FN, -3.9 lumbar  -Initial visit: rec’d Evenity-approved by insurance, pt supposed to check with out of pocket with her insurance and get back to us  -Visit 10/25/22: evenity previously approved but never got back to us regarding evenity copay, advised to apply for copay card online and get back to us to start her injections   -Evenity x 12 completed 2024   -Prolia co-pay too high   -Visit 2024: Missed appointment for Reclast #1 in February.  Encouraged to call and reschedule as soon as possible, update DEXA   - 6/15/15: continue Reclast, received 2 thus far.  2. Comorbidities: chronic pancreatitis, H/O alcohol abuse, thrombocytopenia, anemia, H/O SAH/ICH, H/O anxiety and depression, GERD, seizures, stress-induced CM       - Renzo presents for a follow-up of osteoporosis for which she is currently receiving annual zoledronic acid infusions and has received 2 thus far.  The plan will be to continue this for 3-6 total infusions.  I advised her to limit calcium supplements to 1200 mg daily and continue vitamin D at least 2000 IU once daily.  Weightbearing exercises are encouraged.  She is due  to update a DEXA scan.    Orders:    DXA bone density spine hip and pelvis    Protein electrophoresis, serum; Future    PTH, intact; Future    Long term use of bisphosphonates    Orders:    Basic metabolic panel; Future    Vitamin D deficiency    Orders:    Vitamin D 25 hydroxy; Future    Age-related osteoporosis without current pathological fracture         Long term use of bisphosphonates         Vitamin D deficiency           Patient's rheumatologic disease(s) threaten long-term function if not appropriately managed.      History of Present Illness     HPI    Ms. Pop is a 65-year-old female with history significant for postmenopausal osteoporosis, who presents for a follow-up.  She is currently receiving IV zoledronic acid infusions on an annual basis that was started in 4/2024.  She is transferring care from Vasquez King PA-C.    She denies interim fractures or falls.  She has received 2 Reclast infusions thus far and has been tolerating them well.  She has been taking calcium supplements up to 3000 mg daily and is also taking vitamin D supplements.  She attempts to perform weightbearing exercises.      Review of Systems  Constitutional: Negative for weight change, fevers, chills, night sweats, fatigue.  ENT/Mouth: Negative for hearing changes, ear pain, nasal congestion, sinus pain, hoarseness, sore throat, rhinorrhea, swallowing difficulty.   Eyes: Negative for pain, redness, discharge, vision changes.   Cardiovascular: Negative for chest pain, SOB, palpitations.   Respiratory: Negative for cough, sputum, wheezing, dyspnea.   Gastrointestinal: Negative for nausea, vomiting, diarrhea, constipation, pain, heartburn.  Genitourinary: Negative for dysuria, urinary frequency, hematuria.   Musculoskeletal: As per HPI.  Skin: Negative for skin rash, color changes.   Neuro: Negative for weakness, numbness, tingling, loss of consciousness.   Psych: Negative for anxiety, depression.   Heme/Lymph: Negative for easy  bruising, bleeding, lymphadenopathy.      Objective   There were no vitals taken for this visit.    Physical Exam  General: Well appearing, well nourished, in no distress. Oriented x 3, normal mood and affect.  Skin: Good turgor, no rash, unusual bruising or prominent lesions.  Hair: Normal texture and distribution.  HEENT:  Head: Normocephalic, atraumatic.  Eyes: Conjunctiva clear, sclera non-icteric, EOM intact.  Nose: No external lesions.  Neck: Supple.  Neurologic: Alert and oriented. No focal neurological deficits appreciated.   Psychiatric: Normal mood and affect.     Administrative Statements   Encounter provider Ayanna Benjamin MD    The Patient is located at Home and in the following state in which I hold an active license [place].    The patient was identified by name and date of birth. Renzo Pop was informed that this is a telemedicine visit and that the visit is being conducted through the Epic Embedded platform. She agrees to proceed..  My office door was closed. No one else was in the room.  She acknowledged consent and understanding of privacy and security of the video platform. The patient has agreed to participate and understands they can discontinue the visit at any time.    I have spent a total time of 21 minutes in caring for this patient on the day of the visit/encounter including Instructions for management, Patient and family education, Impressions, Documenting in the medical record, Reviewing/placing orders in the medical record (including tests, medications, and/or procedures), and Obtaining or reviewing history  , not including the time spent for establishing the audio/video connection.

## 2025-06-15 NOTE — ASSESSMENT & PLAN NOTE
Ms. Pop is a 65-year-old female with history significant for postmenopausal osteoporosis, who presents for a follow-up.  She is currently receiving IV zoledronic acid infusions on an annual basis that was started in 4/2024.  She is transferring care from Vasquez King PA-C.      Rheumatic Disease Summary:  1. Severe osteoporosis   -Rheum eval 2/24/22 for severe osteoporosis with h/o compression fractures, H/O right clavicle and pubic rami fractures, multiple rib fractures; no prior tx  -DEXA 10/19/21: T -4.3 L hip, -4 L FN, -3.9 lumbar  -Initial visit: rec’d Evenity-approved by insurance, pt supposed to check with out of pocket with her insurance and get back to us  -Visit 10/25/22: evenity previously approved but never got back to us regarding evenity copay, advised to apply for copay card online and get back to us to start her injections   -Evenity x 12 completed 1/23/2024   -Prolia co-pay too high   -Visit 4/8/2024: Missed appointment for Reclast #1 in February.  Encouraged to call and reschedule as soon as possible, update DEXA   - 6/15/15: continue Reclast, received 2 thus far.  2. Comorbidities: chronic pancreatitis, H/O alcohol abuse, thrombocytopenia, anemia, H/O SAH/ICH, H/O anxiety and depression, GERD, seizures, stress-induced CM       - Renzo presents for a follow-up of osteoporosis for which she is currently receiving annual zoledronic acid infusions and has received 2 thus far.  The plan will be to continue this for 3-6 total infusions.  I advised her to limit calcium supplements to 1200 mg daily and continue vitamin D at least 2000 IU once daily.  Weightbearing exercises are encouraged.  She is due to update a DEXA scan.    Orders:    DXA bone density spine hip and pelvis    Protein electrophoresis, serum; Future    PTH, intact; Future

## 2025-06-22 ENCOUNTER — APPOINTMENT (EMERGENCY)
Dept: CT IMAGING | Facility: HOSPITAL | Age: 65
End: 2025-06-22
Payer: MEDICARE

## 2025-06-22 ENCOUNTER — HOSPITAL ENCOUNTER (INPATIENT)
Facility: HOSPITAL | Age: 65
LOS: 2 days | Discharge: HOME HEALTH | End: 2025-06-24
Attending: EMERGENCY MEDICINE | Admitting: INTERNAL MEDICINE
Payer: MEDICARE

## 2025-06-22 DIAGNOSIS — R56.9 SEIZURE (HCC): ICD-10-CM

## 2025-06-22 DIAGNOSIS — F10.10 ALCOHOL ABUSE: Chronic | ICD-10-CM

## 2025-06-22 DIAGNOSIS — R41.82 ALTERED MENTAL STATUS: Primary | ICD-10-CM

## 2025-06-22 DIAGNOSIS — G93.41 ACUTE METABOLIC ENCEPHALOPATHY: ICD-10-CM

## 2025-06-22 DIAGNOSIS — F10.20 ALCOHOL USE DISORDER, SEVERE, DEPENDENCE (HCC): Chronic | ICD-10-CM

## 2025-06-22 LAB
ALBUMIN SERPL BCG-MCNC: 4 G/DL (ref 3.5–5)
ALP SERPL-CCNC: 50 U/L (ref 34–104)
ALT SERPL W P-5'-P-CCNC: 21 U/L (ref 7–52)
ANION GAP SERPL CALCULATED.3IONS-SCNC: 8 MMOL/L (ref 4–13)
APAP SERPL-MCNC: <2 UG/ML (ref 10–20)
AST SERPL W P-5'-P-CCNC: 33 U/L (ref 13–39)
BASE EX.OXY STD BLDV CALC-SCNC: 62.8 % (ref 60–80)
BASE EXCESS BLDV CALC-SCNC: -5.6 MMOL/L
BASOPHILS # BLD AUTO: 0.01 THOUSANDS/ÂΜL (ref 0–0.1)
BASOPHILS NFR BLD AUTO: 0 % (ref 0–1)
BILIRUB SERPL-MCNC: 0.54 MG/DL (ref 0.2–1)
BUN SERPL-MCNC: 11 MG/DL (ref 5–25)
CALCIUM SERPL-MCNC: 8.9 MG/DL (ref 8.4–10.2)
CHLORIDE SERPL-SCNC: 108 MMOL/L (ref 96–108)
CK SERPL-CCNC: 42 U/L (ref 26–192)
CO2 SERPL-SCNC: 21 MMOL/L (ref 21–32)
CREAT SERPL-MCNC: 0.67 MG/DL (ref 0.6–1.3)
EOSINOPHIL # BLD AUTO: 0.05 THOUSAND/ÂΜL (ref 0–0.61)
EOSINOPHIL NFR BLD AUTO: 1 % (ref 0–6)
ERYTHROCYTE [DISTWIDTH] IN BLOOD BY AUTOMATED COUNT: 12.7 % (ref 11.6–15.1)
ETHANOL SERPL-MCNC: <10 MG/DL
GFR SERPL CREATININE-BSD FRML MDRD: 92 ML/MIN/1.73SQ M
GLUCOSE SERPL-MCNC: 107 MG/DL (ref 65–140)
GLUCOSE SERPL-MCNC: 114 MG/DL (ref 65–140)
HCO3 BLDV-SCNC: 20.2 MMOL/L (ref 24–30)
HCT VFR BLD AUTO: 36.2 % (ref 34.8–46.1)
HGB BLD-MCNC: 11.9 G/DL (ref 11.5–15.4)
IMM GRANULOCYTES # BLD AUTO: 0.02 THOUSAND/UL (ref 0–0.2)
IMM GRANULOCYTES NFR BLD AUTO: 1 % (ref 0–2)
LYMPHOCYTES # BLD AUTO: 1.36 THOUSANDS/ÂΜL (ref 0.6–4.47)
LYMPHOCYTES NFR BLD AUTO: 38 % (ref 14–44)
MCH RBC QN AUTO: 32.9 PG (ref 26.8–34.3)
MCHC RBC AUTO-ENTMCNC: 32.9 G/DL (ref 31.4–37.4)
MCV RBC AUTO: 100 FL (ref 82–98)
MONOCYTES # BLD AUTO: 0.28 THOUSAND/ÂΜL (ref 0.17–1.22)
MONOCYTES NFR BLD AUTO: 8 % (ref 4–12)
NEUTROPHILS # BLD AUTO: 1.82 THOUSANDS/ÂΜL (ref 1.85–7.62)
NEUTS SEG NFR BLD AUTO: 52 % (ref 43–75)
NRBC BLD AUTO-RTO: 0 /100 WBCS
O2 CT BLDV-SCNC: 10.4 ML/DL
PCO2 BLDV: 40.6 MM HG (ref 42–50)
PH BLDV: 7.31 [PH] (ref 7.3–7.4)
PLATELET # BLD AUTO: 71 THOUSANDS/UL (ref 149–390)
PMV BLD AUTO: 10.3 FL (ref 8.9–12.7)
PO2 BLDV: 35 MM HG (ref 35–45)
POTASSIUM SERPL-SCNC: 4 MMOL/L (ref 3.5–5.3)
PROCALCITONIN SERPL-MCNC: <0.05 NG/ML
PROT SERPL-MCNC: 6.4 G/DL (ref 6.4–8.4)
RBC # BLD AUTO: 3.62 MILLION/UL (ref 3.81–5.12)
SALICYLATES SERPL-MCNC: <5 MG/DL (ref 5–20)
SODIUM SERPL-SCNC: 137 MMOL/L (ref 135–147)
TSH SERPL DL<=0.05 MIU/L-ACNC: 5.11 UIU/ML (ref 0.45–4.5)
WBC # BLD AUTO: 3.54 THOUSAND/UL (ref 4.31–10.16)

## 2025-06-22 PROCEDURE — 84145 PROCALCITONIN (PCT): CPT

## 2025-06-22 PROCEDURE — 80179 DRUG ASSAY SALICYLATE: CPT

## 2025-06-22 PROCEDURE — 82805 BLOOD GASES W/O2 SATURATION: CPT

## 2025-06-22 PROCEDURE — NC001 PR NO CHARGE: Performed by: EMERGENCY MEDICINE

## 2025-06-22 PROCEDURE — 70450 CT HEAD/BRAIN W/O DYE: CPT

## 2025-06-22 PROCEDURE — 36415 COLL VENOUS BLD VENIPUNCTURE: CPT

## 2025-06-22 PROCEDURE — 96361 HYDRATE IV INFUSION ADD-ON: CPT

## 2025-06-22 PROCEDURE — 80053 COMPREHEN METABOLIC PANEL: CPT

## 2025-06-22 PROCEDURE — 80235 DRUG ASSAY LACOSAMIDE: CPT

## 2025-06-22 PROCEDURE — 82607 VITAMIN B-12: CPT

## 2025-06-22 PROCEDURE — 82550 ASSAY OF CK (CPK): CPT

## 2025-06-22 PROCEDURE — 93005 ELECTROCARDIOGRAM TRACING: CPT

## 2025-06-22 PROCEDURE — 84443 ASSAY THYROID STIM HORMONE: CPT

## 2025-06-22 PROCEDURE — 96375 TX/PRO/DX INJ NEW DRUG ADDON: CPT

## 2025-06-22 PROCEDURE — 80143 DRUG ASSAY ACETAMINOPHEN: CPT

## 2025-06-22 PROCEDURE — 82948 REAGENT STRIP/BLOOD GLUCOSE: CPT

## 2025-06-22 PROCEDURE — 85025 COMPLETE CBC W/AUTO DIFF WBC: CPT

## 2025-06-22 PROCEDURE — 99285 EMERGENCY DEPT VISIT HI MDM: CPT

## 2025-06-22 PROCEDURE — 84439 ASSAY OF FREE THYROXINE: CPT

## 2025-06-22 PROCEDURE — 82077 ASSAY SPEC XCP UR&BREATH IA: CPT

## 2025-06-22 PROCEDURE — 96374 THER/PROPH/DIAG INJ IV PUSH: CPT

## 2025-06-22 PROCEDURE — 99285 EMERGENCY DEPT VISIT HI MDM: CPT | Performed by: EMERGENCY MEDICINE

## 2025-06-22 RX ORDER — LEVETIRACETAM 500 MG/5ML
60 INJECTION, SOLUTION, CONCENTRATE INTRAVENOUS ONCE
Status: COMPLETED | OUTPATIENT
Start: 2025-06-22 | End: 2025-06-22

## 2025-06-22 RX ORDER — LACOSAMIDE 100 MG/1
200 TABLET ORAL EVERY 12 HOURS SCHEDULED
Status: DISCONTINUED | OUTPATIENT
Start: 2025-06-22 | End: 2025-06-24 | Stop reason: HOSPADM

## 2025-06-22 RX ORDER — LORAZEPAM 2 MG/ML
4 INJECTION INTRAMUSCULAR ONCE
Status: COMPLETED | OUTPATIENT
Start: 2025-06-22 | End: 2025-06-22

## 2025-06-22 RX ORDER — ENOXAPARIN SODIUM 100 MG/ML
40 INJECTION SUBCUTANEOUS DAILY
Status: DISCONTINUED | OUTPATIENT
Start: 2025-06-23 | End: 2025-06-24

## 2025-06-22 RX ORDER — LORAZEPAM 2 MG/ML
INJECTION INTRAMUSCULAR
Status: COMPLETED
Start: 2025-06-22 | End: 2025-06-22

## 2025-06-22 RX ORDER — CHLORHEXIDINE GLUCONATE ORAL RINSE 1.2 MG/ML
15 SOLUTION DENTAL EVERY 12 HOURS SCHEDULED
Status: DISCONTINUED | OUTPATIENT
Start: 2025-06-22 | End: 2025-06-24 | Stop reason: HOSPADM

## 2025-06-22 RX ADMIN — LORAZEPAM 4 MG: 2 INJECTION INTRAMUSCULAR; INTRAVENOUS at 20:44

## 2025-06-22 RX ADMIN — LORAZEPAM 4 MG: 2 INJECTION INTRAMUSCULAR; INTRAVENOUS at 20:35

## 2025-06-22 RX ADMIN — LORAZEPAM 4 MG: 2 INJECTION INTRAMUSCULAR at 20:44

## 2025-06-22 RX ADMIN — LORAZEPAM 4 MG: 2 INJECTION INTRAMUSCULAR at 20:35

## 2025-06-22 RX ADMIN — SODIUM CHLORIDE 1000 ML: 0.9 INJECTION, SOLUTION INTRAVENOUS at 20:04

## 2025-06-22 RX ADMIN — LEVETIRACETAM 3400 MG: 100 INJECTION, SOLUTION INTRAVENOUS at 20:45
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
## 2025-06-23 PROBLEM — R29.6 FREQUENT FALLS: Status: ACTIVE | Noted: 2025-06-23

## 2025-06-23 PROBLEM — I50.30 (HFPEF) HEART FAILURE WITH PRESERVED EJECTION FRACTION (HCC): Status: ACTIVE | Noted: 2025-06-23

## 2025-06-23 PROBLEM — F10.90 ALCOHOL USE: Status: ACTIVE | Noted: 2025-06-23

## 2025-06-23 PROBLEM — G93.41 ACUTE METABOLIC ENCEPHALOPATHY: Status: ACTIVE | Noted: 2025-06-23

## 2025-06-23 LAB
AMPHETAMINES SERPL QL SCN: NEGATIVE
ANION GAP SERPL CALCULATED.3IONS-SCNC: 2 MMOL/L (ref 4–13)
ATRIAL RATE: 91 BPM
B-OH-BUTYR SERPL-MCNC: 0.36 MMOL/L (ref 0.2–0.6)
BACTERIA UR QL AUTO: NORMAL /HPF
BARBITURATES UR QL: NEGATIVE
BASOPHILS # BLD AUTO: 0.01 THOUSANDS/ÂΜL (ref 0–0.1)
BASOPHILS NFR BLD AUTO: 0 % (ref 0–1)
BENZODIAZ UR QL: NEGATIVE
BILIRUB UR QL STRIP: NEGATIVE
BUN SERPL-MCNC: 10 MG/DL (ref 5–25)
CA-I BLD-SCNC: 1.08 MMOL/L (ref 1.12–1.32)
CALCIUM SERPL-MCNC: 7.4 MG/DL (ref 8.4–10.2)
CHLORIDE SERPL-SCNC: 114 MMOL/L (ref 96–108)
CLARITY UR: CLEAR
CO2 SERPL-SCNC: 21 MMOL/L (ref 21–32)
COCAINE UR QL: NEGATIVE
COLOR UR: ABNORMAL
CREAT SERPL-MCNC: 0.59 MG/DL (ref 0.6–1.3)
EOSINOPHIL # BLD AUTO: 0.04 THOUSAND/ÂΜL (ref 0–0.61)
EOSINOPHIL NFR BLD AUTO: 1 % (ref 0–6)
ERYTHROCYTE [DISTWIDTH] IN BLOOD BY AUTOMATED COUNT: 12.6 % (ref 11.6–15.1)
FENTANYL UR QL SCN: NEGATIVE
GFR SERPL CREATININE-BSD FRML MDRD: 96 ML/MIN/1.73SQ M
GLUCOSE SERPL-MCNC: 88 MG/DL (ref 65–140)
GLUCOSE UR STRIP-MCNC: NEGATIVE MG/DL
HCT VFR BLD AUTO: 30.6 % (ref 34.8–46.1)
HGB BLD-MCNC: 10 G/DL (ref 11.5–15.4)
HGB UR QL STRIP.AUTO: NEGATIVE
HYDROCODONE UR QL SCN: NEGATIVE
IMM GRANULOCYTES # BLD AUTO: 0.03 THOUSAND/UL (ref 0–0.2)
IMM GRANULOCYTES NFR BLD AUTO: 1 % (ref 0–2)
KETONES UR STRIP-MCNC: NEGATIVE MG/DL
LACOSAMIDE SERPL-MCNC: 10.35 UG/ML (ref 1–20)
LEUKOCYTE ESTERASE UR QL STRIP: NEGATIVE
LYMPHOCYTES # BLD AUTO: 1.44 THOUSANDS/ÂΜL (ref 0.6–4.47)
LYMPHOCYTES NFR BLD AUTO: 35 % (ref 14–44)
MAGNESIUM SERPL-MCNC: 1.5 MG/DL (ref 1.9–2.7)
MCH RBC QN AUTO: 32.9 PG (ref 26.8–34.3)
MCHC RBC AUTO-ENTMCNC: 32.7 G/DL (ref 31.4–37.4)
MCV RBC AUTO: 101 FL (ref 82–98)
METHADONE UR QL: NEGATIVE
MONOCYTES # BLD AUTO: 0.38 THOUSAND/ÂΜL (ref 0.17–1.22)
MONOCYTES NFR BLD AUTO: 9 % (ref 4–12)
NEUTROPHILS # BLD AUTO: 2.27 THOUSANDS/ÂΜL (ref 1.85–7.62)
NEUTS SEG NFR BLD AUTO: 54 % (ref 43–75)
NITRITE UR QL STRIP: POSITIVE
NON-SQ EPI CELLS URNS QL MICRO: NORMAL /HPF
NRBC BLD AUTO-RTO: 0 /100 WBCS
OPIATES UR QL SCN: NEGATIVE
OXYCODONE+OXYMORPHONE UR QL SCN: NEGATIVE
P AXIS: 59 DEGREES
PCP UR QL: NEGATIVE
PH UR STRIP.AUTO: 5 [PH]
PHOSPHATE SERPL-MCNC: 2.5 MG/DL (ref 2.3–4.1)
PLATELET # BLD AUTO: 62 THOUSANDS/UL (ref 149–390)
PMV BLD AUTO: 10.3 FL (ref 8.9–12.7)
POTASSIUM SERPL-SCNC: 3.4 MMOL/L (ref 3.5–5.3)
PR INTERVAL: 212 MS
PROT UR STRIP-MCNC: NEGATIVE MG/DL
QRS AXIS: 48 DEGREES
QRSD INTERVAL: 80 MS
QT INTERVAL: 368 MS
QTC INTERVAL: 452 MS
RBC # BLD AUTO: 3.04 MILLION/UL (ref 3.81–5.12)
RBC #/AREA URNS AUTO: NORMAL /HPF
SODIUM SERPL-SCNC: 137 MMOL/L (ref 135–147)
SP GR UR STRIP.AUTO: 1.01 (ref 1–1.03)
T WAVE AXIS: 114 DEGREES
T4 FREE SERPL-MCNC: 0.83 NG/DL (ref 0.61–1.12)
THC UR QL: NEGATIVE
UROBILINOGEN UR STRIP-ACNC: <2 MG/DL
VENTRICULAR RATE: 91 BPM
VIT B12 SERPL-MCNC: 333 PG/ML (ref 180–914)
WBC # BLD AUTO: 4.17 THOUSAND/UL (ref 4.31–10.16)
WBC #/AREA URNS AUTO: NORMAL /HPF

## 2025-06-23 PROCEDURE — NC001 PR NO CHARGE

## 2025-06-23 PROCEDURE — 80307 DRUG TEST PRSMV CHEM ANLYZR: CPT

## 2025-06-23 PROCEDURE — 99223 1ST HOSP IP/OBS HIGH 75: CPT | Performed by: PSYCHIATRY & NEUROLOGY

## 2025-06-23 PROCEDURE — 85025 COMPLETE CBC W/AUTO DIFF WBC: CPT

## 2025-06-23 PROCEDURE — 83735 ASSAY OF MAGNESIUM: CPT

## 2025-06-23 PROCEDURE — 93010 ELECTROCARDIOGRAM REPORT: CPT | Performed by: STUDENT IN AN ORGANIZED HEALTH CARE EDUCATION/TRAINING PROGRAM

## 2025-06-23 PROCEDURE — 82330 ASSAY OF CALCIUM: CPT

## 2025-06-23 PROCEDURE — 82010 KETONE BODYS QUAN: CPT

## 2025-06-23 PROCEDURE — 81001 URINALYSIS AUTO W/SCOPE: CPT

## 2025-06-23 PROCEDURE — 99223 1ST HOSP IP/OBS HIGH 75: CPT

## 2025-06-23 PROCEDURE — 80048 BASIC METABOLIC PNL TOTAL CA: CPT

## 2025-06-23 PROCEDURE — 84100 ASSAY OF PHOSPHORUS: CPT

## 2025-06-23 RX ORDER — GABAPENTIN 300 MG/1
300 CAPSULE ORAL 2 TIMES DAILY
Status: DISCONTINUED | OUTPATIENT
Start: 2025-06-23 | End: 2025-06-24 | Stop reason: HOSPADM

## 2025-06-23 RX ORDER — METOPROLOL SUCCINATE 25 MG/1
25 TABLET, EXTENDED RELEASE ORAL 2 TIMES DAILY
Status: DISCONTINUED | OUTPATIENT
Start: 2025-06-23 | End: 2025-06-24 | Stop reason: HOSPADM

## 2025-06-23 RX ORDER — POTASSIUM CHLORIDE 14.9 MG/ML
20 INJECTION INTRAVENOUS
Status: COMPLETED | OUTPATIENT
Start: 2025-06-23 | End: 2025-06-23

## 2025-06-23 RX ORDER — MAGNESIUM SULFATE HEPTAHYDRATE 40 MG/ML
4 INJECTION, SOLUTION INTRAVENOUS ONCE
Status: COMPLETED | OUTPATIENT
Start: 2025-06-23 | End: 2025-06-23

## 2025-06-23 RX ORDER — LANOLIN ALCOHOL/MO/W.PET/CERES
100 CREAM (GRAM) TOPICAL DAILY
Status: DISCONTINUED | OUTPATIENT
Start: 2025-06-23 | End: 2025-06-24 | Stop reason: HOSPADM

## 2025-06-23 RX ORDER — LORAZEPAM 2 MG/ML
1 INJECTION INTRAMUSCULAR ONCE
Status: DISCONTINUED | OUTPATIENT
Start: 2025-06-23 | End: 2025-06-23

## 2025-06-23 RX ORDER — LACOSAMIDE 100 MG/1
200 TABLET ORAL 2 TIMES DAILY
Status: DISCONTINUED | OUTPATIENT
Start: 2025-06-23 | End: 2025-06-23

## 2025-06-23 RX ORDER — ZONISAMIDE 100 MG/1
300 CAPSULE ORAL
Status: DISCONTINUED | OUTPATIENT
Start: 2025-06-23 | End: 2025-06-24 | Stop reason: HOSPADM

## 2025-06-23 RX ORDER — DESVENLAFAXINE 50 MG/1
50 TABLET, FILM COATED, EXTENDED RELEASE ORAL DAILY
Status: DISCONTINUED | OUTPATIENT
Start: 2025-06-23 | End: 2025-06-24 | Stop reason: HOSPADM

## 2025-06-23 RX ORDER — CALCIUM GLUCONATE 20 MG/ML
2 INJECTION, SOLUTION INTRAVENOUS ONCE
Status: COMPLETED | OUTPATIENT
Start: 2025-06-23 | End: 2025-06-23

## 2025-06-23 RX ORDER — FOLIC ACID 1 MG/1
1 TABLET ORAL DAILY
Status: DISCONTINUED | OUTPATIENT
Start: 2025-06-23 | End: 2025-06-24 | Stop reason: HOSPADM

## 2025-06-23 RX ADMIN — POTASSIUM CHLORIDE 20 MEQ: 14.9 INJECTION, SOLUTION INTRAVENOUS at 10:57

## 2025-06-23 RX ADMIN — MAGNESIUM SULFATE HEPTAHYDRATE 4 G: 40 INJECTION, SOLUTION INTRAVENOUS at 09:23

## 2025-06-23 RX ADMIN — Medication 100 MG: at 11:59

## 2025-06-23 RX ADMIN — POTASSIUM CHLORIDE 20 MEQ: 14.9 INJECTION, SOLUTION INTRAVENOUS at 09:23

## 2025-06-23 RX ADMIN — ZONISAMIDE 300 MG: 100 CAPSULE ORAL at 22:00

## 2025-06-23 RX ADMIN — METOPROLOL SUCCINATE 25 MG: 25 TABLET, EXTENDED RELEASE ORAL at 09:22

## 2025-06-23 RX ADMIN — LACOSAMIDE 200 MG: 100 TABLET, FILM COATED ORAL at 22:00

## 2025-06-23 RX ADMIN — GABAPENTIN 300 MG: 300 CAPSULE ORAL at 09:22

## 2025-06-23 RX ADMIN — CHLORHEXIDINE GLUCONATE 15 ML: 1.2 SOLUTION ORAL at 09:22

## 2025-06-23 RX ADMIN — CALCIUM GLUCONATE 2 G: 20 INJECTION, SOLUTION INTRAVENOUS at 06:28

## 2025-06-23 RX ADMIN — FOLIC ACID 1 MG: 1 TABLET ORAL at 11:59

## 2025-06-23 RX ADMIN — LACOSAMIDE 200 MG: 100 TABLET, FILM COATED ORAL at 09:22

## 2025-06-23 RX ADMIN — GABAPENTIN 300 MG: 300 CAPSULE ORAL at 17:19

## 2025-06-23 RX ADMIN — CHLORHEXIDINE GLUCONATE 15 ML: 1.2 SOLUTION ORAL at 22:00

## 2025-06-23 RX ADMIN — METOPROLOL SUCCINATE 25 MG: 25 TABLET, EXTENDED RELEASE ORAL at 17:19

## 2025-06-23 RX ADMIN — ENOXAPARIN SODIUM 40 MG: 40 INJECTION SUBCUTANEOUS at 09:23

## 2025-06-23 RX ADMIN — DESVENLAFAXINE 50 MG: 50 TABLET, EXTENDED RELEASE ORAL at 11:59

## 2025-06-23 NOTE — ED ATTENDING ATTESTATION
6/22/2025  I, Mely Argueta MD, saw and evaluated the patient. I have discussed the patient with the resident/non-physician practitioner and agree with the resident's/non-physician practitioner's findings, Plan of Care, and MDM as documented in the resident's/non-physician practitioner's note, except where noted. All available labs and Radiology studies were reviewed.  I was present for key portions of any procedure(s) performed by the resident/non-physician practitioner and I was immediately available to provide assistance.       At this point I agree with the current assessment done in the Emergency Department.  I have conducted an independent evaluation of this patient a history and physical is as follows:    65-year-old female presenting to the ER due to altered mental status.  EMS called by .  States she was confused.  He was concerned for potential seizure as patient history of seizures.  Also history of alcohol abuse.  On arrival to the ER patient is not confused.  Not in any distress.  No complaints.  No tremors.    Agree with workup ordered by resident, CT head, check electrolytes, CBC, Colon panel, EKG.    Patient with seizure-like activity.  Was medicated by Ativan and Keppra.    On my arrival to see patient seizure activity has stopped.  Patient is responding to stimuli.  Protecting airway.  Vital stable.  Resident discussed with neurology for admission for potential continuous EEG.      ED Course         Critical Care Time  Procedures

## 2025-06-23 NOTE — HOSPITAL COURSE
64 y/o F hx focal epilepsy (lacosamide, zanosamide, gabapentin), HFpEF (G1DD), anxiety, alcohol misuse, pancytopenia who presented with altered mental status and was admitted for generalized tonic clonic seizure. Per  she developed altered mental status yesterday and was not re-directable prompting EMS call. On EMS arrival she was oriented but distressed about current world events. She had consumed ~3 beers and cannabinoid gummy earlier in the day. He reports she experienced typical aura prior to having a seizure in which she was not interactive.  reports she typically consumes 2-3 beers daily and fall frequently.     In ED pt had witnessed 5 minute generalized tonic clonic seizure that resolved with 8mg ativan. Also administered keppra 60mgkg IV.  Afterwards she became somnolent and upon re-awakening was A&O x 3. She was admitted to ICU for further workup and planned cEEG monitoring.

## 2025-06-23 NOTE — ED PROVIDER NOTES
Alerted by nursing that patient had acute change in mental status.  Primary attending not available so I went to room to assess patient along with resident who had seen patient initially.  Patient has history of status epilepticus within this last month per my review of the medical record.  On my arrival, patient alert with tremor in her bilateral upper extremities.  Not responding to questioning.  Patient's son reports patient is full code.  Also reports that this is how her seizures usually start.  After 1 to 2 minutes, patient developed generalized tonic-clonic activity and was unresponsive.  Placed on nasal cannula.  4 mg Ativan administered promptly.  At the 5-minute nusrat, Keppra with status epilepticus dosing ordered.  Couple nights later, patient's tremor stopped and then would come and go briefly.  Responding to noxious stimuli.  Given improvement and responsive to external stimuli, generalized tonic-clonic seizure like activity appears to have ended.  Patient somewhat somnolent but protecting airway.  Given depressed mental status as well as improving seizure-like activity/responding to stimuli, additional Ativan held at this time.  Dr. Coulter also arrived and evaluated patient at this point.     Rhys Alexander MD  06/22/25 2049       Rhys Alexander MD  06/22/25 2052

## 2025-06-23 NOTE — ED NOTES
Family called and updated of transfer to ICU at this time     Antionette Rivera, RN  06/23/25 2221

## 2025-06-23 NOTE — ED NOTES
RN was walking down hallway when patients family member yelled out for help. When entering room patient was altered and smacking lips, attempting to pull at medical equipment and get out of bed. Patient was unable to follow verbal commands. This RN stayed with patient and called for provider to bedside. Provider Ketty ALEMAN arrived to bedside. Patient continued to become stiff and then had seizure like activity, witnessed by provider Ketty ALEMAN, Benjamin ALEMAN, Antionette Rivera RN, and Gabby Ovalle RN. Lasted approx 5-10 minutes. Patient suctioned by RN and verbal orders given by providers at bedside.      Antinoette Rivera RN  06/22/25 4807

## 2025-06-23 NOTE — ASSESSMENT & PLAN NOTE
Wt Readings from Last 3 Encounters:   06/23/25 57.7 kg (127 lb 3.3 oz)   05/08/25 56.7 kg (125 lb)   05/05/25 57.6 kg (127 lb)     Echo 7/2022: LVEF 60%, G1DD, no valvular incompetence   Continue home metoprolol XL 25 mg daily

## 2025-06-23 NOTE — QUICK NOTE
HOSP day 1  ICU day 1      HPI: ***  66 y/o F hx focal epilepsy (lacosamide, zanosamide, gabapentin), HFpEF (G1DD), anxiety, alcohol misuse, pancytopenia who presented with altered mental status and was admitted for generalized tonic clonic seizure. Per  she developed altered mental status yesterday and was not re-directable prompting EMS call. On EMS arrival she was oriented but distressed about current world events. She had consumed ~3 beers and cannabinoid gummy earlier in the day. He reports she experienced typical aura prior to having a seizure in which she was not interactive.  reports she typically consumes 2-3 beers daily and fall frequently.      In ED , after an aura of staring off into space, pt had witnessed 5 minute generalized tonic clonic seizure that resolved with 8mg ativan. Also administered keppra 60mgkg IV.  Afterwards she became somnolent and upon re-awakening was A&O x 3. She was admitted to ICU for further workup and planned vEEG monitoring.     SUBJ: ***    Yesterdays episode     , Dannie is at bedside and provides some history including that patient has not had tonic-clonic seizure activity at home for many years, she did have episodes last week and back in February of blank, staring off into space or acting strange type episodes but denies whole body shaking, tongue biting, loss of urinary continence.   endorses that for the most part she takes her medication regularly, but lately the patient has become more forgetful, and he worries that sometimes she either does not take her meds or takes them more often than directed due to forgetfulness.  He also reports that patient has always turned to drinking, especially when she's stressed.         VS: ***  Afebrile, pulse in 70s, rr in 15s, bp averaging 110/high 50s, satting 99% on 4L, turned to room air    I/Os: pos 1 l  Only in    Running totalpos 1L      LINES/tubes:  Peripheral IVs    DRIPS/TF Rate:      No current  drips    PE: ***  Patient is awake and oriented x 3 when woken up for my exam, however falls back asleep during my conversation.     Heart - normal sinus and regular  Lungs - clear  Abd - soft nondistended        LABS: ***   Vbg showing pH 7.31 CO2 40.6, bicarb 20.2 -metabolic acidosis    Sodium 137 potassium 3.4 chloride 114 bicarb 21 gap 2 BUN 10 creatinine 0.59 glucose 88 calcium 7.4 mag 1.5 iCal 1.08    Procal <0.05 CK 42  Lacosamide level 10.35  Tylenol less than 2, salicylate less than 5  TSH 5.11 Free T4 normal at 0.83  Ethanol less than 10  UDS and UA pending  Pancytopenic with WBC at 4.17 hemoglobin at 10, platelet 60/70    MICRO: ***  None    IMAGING:***  Cth - no acute pathology        Medications: ***  -------------------------------------------  PROBLEM TITLES: ***  Toxic/metabolic encephalopathy  Seizure  Anxiety with depression  Pancytopenia - likely secondary to alcohol use  HFpEF  Frequent falls    NEURO:    Ano  Hx of non-intractable focal epilepsy with focal status epilepticus in 3/2022 on VEEG  Home AED Rx: Gabapentin 300 mg BID, lacosamide 200 mg BID, zonisamide 300 mg qHS  Pristiq for depression    CV:  Combined systolic and diastolic congestive heart failure   metoprolol succinate 25 twice daily    RESP:  No acute issues,ra    GI:  Reg diet      /RENAL:  Creat stable  Met acidosis  Checking bhb    ID:  No infectious etiology      HEME/ONC: lovenox  Pancytopenic - alcohol    ENDO:  Sugars?  Insulin?

## 2025-06-23 NOTE — ASSESSMENT & PLAN NOTE
Home Rx Pristiq 50mg daily   Restarted at home dose, initially held for lowering seizure threshold

## 2025-06-23 NOTE — ASSESSMENT & PLAN NOTE
Hx of non-intractable focal epilepsy with focal status epilepticus in 3/2022 on VEEG  Home AED Rx: Gabapentin 300 mg BID, lacosamide 200 mg BID, zonisamide 300 mg qHS  Breakthrough seizure lasting 5 minutes in ED likely in the setting of alcohol and cannabinoid use lowering threshold  Received ativan 4mg x2 doses and Keppra 60mg/kg load in ED   Lacosmide level pending   Continue home AED regimen  Neurology consulted, planning for VEEG monitoring 6/23  Frequent neuro checks  Seizure precautions

## 2025-06-23 NOTE — PLAN OF CARE
Problem: SAFETY ADULT  Goal: Patient will remain free of falls  Description: INTERVENTIONS:  - Educate patient/family on patient safety including physical limitations  - Instruct patient to call for assistance with activity   - Consider consulting OT/PT to assist with strengthening/mobility based on AM PAC & JH-HLM score  - Consult OT/PT to assist with strengthening/mobility   - Keep Call bell within reach  - Keep bed low and locked with side rails adjusted as appropriate  - Keep care items and personal belongings within reach  - Initiate and maintain comfort rounds  - Make Fall Risk Sign visible to staff  - Offer Toileting every 2 Hours, in advance of need  - Initiate/Maintain bed alarm  - Obtain necessary fall risk management equipment:   - Apply yellow socks and bracelet for high fall risk patients  - Consider moving patient to room near nurses station  Outcome: Progressing  Goal: Maintain or return to baseline ADL function  Description: INTERVENTIONS:  -  Assess patient's ability to carry out ADLs; assess patient's baseline for ADL function and identify physical deficits which impact ability to perform ADLs (bathing, care of mouth/teeth, toileting, grooming, dressing, etc.)  - Assess/evaluate cause of self-care deficits   - Assess range of motion  - Assess patient's mobility; develop plan if impaired  - Assess patient's need for assistive devices and provide as appropriate  - Encourage maximum independence but intervene and supervise when necessary  - Involve family in performance of ADLs  - Assess for home care needs following discharge   - Consider OT consult to assist with ADL evaluation and planning for discharge  - Provide patient education as appropriate  - Monitor functional capacity and physical performance, use of AM PAC & JH-HLM   - Monitor gait, balance and fatigue with ambulation    Outcome: Progressing  Goal: Maintains/Returns to pre admission functional level  Description: INTERVENTIONS:  -  Perform AM-PAC 6 Click Basic Mobility/ Daily Activity assessment daily.  - Set and communicate daily mobility goal to care team and patient/family/caregiver.   - Collaborate with rehabilitation services on mobility goals if consulted  - Perform Range of Motion 12 times a day.  - Reposition patient every 2 hours.  - Dangle patient 3 times a day  - Stand patient 3 times a day  - Ambulate patient 3 times a day  - Out of bed to chair 3 times a day   - Out of bed for meals 3 times a day  - Out of bed for toileting  - Record patient progress and toleration of activity level   Outcome: Progressing

## 2025-06-23 NOTE — ASSESSMENT & PLAN NOTE
Multifactorial in the setting of seizure activity, alcohol use, cannabinoid use  Reportedly consumed 3 beers, liquor, cannabinoid gummy prior to arrival  Coma panel, VBG, CMP unremarkable  TSH 5.1  CT head negative  U/A and UDS pending  Witnessed tonic-clonic seizure in ED  See plan under Seizure  Frequent neurochecks  Delirium precautions  Neurology consult

## 2025-06-23 NOTE — PLAN OF CARE
Popeye Erum  06/23/25    1:46 PM  Patient straight catheterized for lab specimens with BB RN at bedside. Patient agreeable and tolerated well.         Problem: SAFETY ADULT  Goal: Patient will remain free of falls  Description: INTERVENTIONS:  - Educate patient/family on patient safety including physical limitations  - Instruct patient to call for assistance with activity   - Consider consulting OT/PT to assist with strengthening/mobility based on AM PAC & JH-HLM score  - Consult OT/PT to assist with strengthening/mobility   - Keep Call bell within reach  - Keep bed low and locked with side rails adjusted as appropriate  - Keep care items and personal belongings within reach  - Initiate and maintain comfort rounds  - Make Fall Risk Sign visible to staff  - Apply yellow socks and bracelet for high fall risk patients  - Consider moving patient to room near nurses station  Outcome: Progressing  Goal: Maintain or return to baseline ADL function  Description: INTERVENTIONS:  -  Assess patient's ability to carry out ADLs; assess patient's baseline for ADL function and identify physical deficits which impact ability to perform ADLs (bathing, care of mouth/teeth, toileting, grooming, dressing, etc.)  - Assess/evaluate cause of self-care deficits   - Assess range of motion  - Assess patient's mobility; develop plan if impaired  - Assess patient's need for assistive devices and provide as appropriate  - Encourage maximum independence but intervene and supervise when necessary  - Involve family in performance of ADLs  - Assess for home care needs following discharge   - Consider OT consult to assist with ADL evaluation and planning for discharge  - Provide patient education as appropriate  - Monitor functional capacity and physical performance, use of AM PAC & JH-HLM   - Monitor gait, balance and fatigue with ambulation    Outcome: Progressing  Goal: Maintains/Returns to pre admission functional level  Description:  INTERVENTIONS:  - Perform AM-PAC 6 Click Basic Mobility/ Daily Activity assessment daily.  - Set and communicate daily mobility goal to care team and patient/family/caregiver.   - Collaborate with rehabilitation services on mobility goals if consulted  - Out of bed for toileting  - Record patient progress and toleration of activity level   Outcome: Progressing

## 2025-06-23 NOTE — ASSESSMENT & PLAN NOTE
Multifactorial in the setting of seizure activity, alcohol use, cannabinoid use  Reportedly consumed 3 beers, liquor, cannabinoid gummy prior to arrival  Coma panel, VBG, CMP unremarkable  TSH 5.1  CT head negative    Plan:  U/A and UDS pending  Witnessed tonic-clonic seizure in ED  See plan under Seizure  Frequent neurochecks  Delirium precautions  Neurology consulted  Advised that pt does not require VEEG

## 2025-06-23 NOTE — ASSESSMENT & PLAN NOTE
Wt Readings from Last 3 Encounters:   06/23/25 57.7 kg (127 lb 3.3 oz)   05/08/25 56.7 kg (125 lb)   05/05/25 57.6 kg (127 lb)

## 2025-06-23 NOTE — PROGRESS NOTES
Progress Note - Critical Care/ICU   Name: Renzo Pop 65 y.o. female I MRN: [id number]  Unit/Bed#: ICU 10 I Date of Admission: 6/22/2025   Date of Service: 6/23/2025 I Hospital Day: 1      Assessment & Plan  Acute metabolic encephalopathy  Multifactorial in the setting of seizure activity, alcohol use, cannabinoid use  Reportedly consumed 3 beers, liquor, cannabinoid gummy prior to arrival  Coma panel, VBG, CMP unremarkable  TSH 5.1  CT head negative    Plan:  U/A and UDS pending  Witnessed tonic-clonic seizure in ED  See plan under Seizure  Frequent neurochecks  Delirium precautions  Neurology consulted  Advised that pt does not require VEEG  Seizure (HCC)  Hx of non-intractable focal epilepsy with focal status epilepticus in 3/2022 on VEEG  Home AED Rx: Gabapentin 300 mg BID, lacosamide 200 mg BID, zonisamide 300 mg qHS  Breakthrough seizure lasting 5 minutes in ED likely in the setting of alcohol and cannabinoid use lowering threshold  Received ativan 4mg x2 doses and Keppra 60mg/kg load in ED     Plan:  Lacosmide level normal at 10.35  Continue home AED regimen  Neurology consulted, will not require VEEG   Frequent neuro checks  Seizure precautions  Anxiety with depression  Home Rx Pristiq 50mg daily   Restarted at home dose, initially held for lowering seizure threshold  Pancytopenia (HCC)  AEB WBC 3.54, RBC3.62, plt 71  Etiology unknown   Previously was seen by heme/onc   No signs of active bleeding  Trend daily CBC  Alcohol use  Reported 2-3 beers daily   Prior detox admission 2023    Plan:  On CIWA protocol  (HFpEF) heart failure with preserved ejection fraction (HCC)  Wt Readings from Last 3 Encounters:   06/23/25 57.7 kg (127 lb 3.3 oz)   05/08/25 56.7 kg (125 lb)   05/05/25 57.6 kg (127 lb)     Echo 7/2022: LVEF 60%, G1DD, no valvular incompetence   Continue home metoprolol XL 25 mg daily  Frequent falls  Suspect due to alcohol consumption  CT head negative  No focal injuries on exam  PT OT when  appropriate  Disposition: Stepdown Level 1    ICU Core Measures     A: Assess, Prevent, and Manage Pain Has pain been assessed? Yes  Need for changes to pain regimen? No   B: Both SAT/SAT  N/A   C: Choice of Sedation RASS Goal: 0 Alert and Calm  Need for changes to sedation or analgesia regimen? No   D: Delirium CAM-ICU: Negative   E: Early Mobility  Plan for early mobility? Yes   F: Family Engagement Plan for family engagement today? Yes         Prophylaxis:  VTE VTE covered by:  enoxaparin, Subcutaneous       Stress Ulcer  not ordered           Subjective   Patient is awake and oriented x 3 when woken up for my exam, however falls back asleep during my conversation.  , Dannie is at bedside and provides some history including that patient has not had tonic-clonic seizure activity at home for many years, she did have episodes last week and back in February of blank, staring off into space or acting strange type episodes but denies whole body shaking, tongue biting, loss of urinary continence.   endorses that for the most part she takes her medication regularly, but lately the patient has become more forgetful, and he worries that sometimes she either does not take her meds or takes them more often than directed due to forgetfulness.  He also reports that patient has always turned to drinking, especially when she's stressed.      Objective :                   Vitals I/O      Most Recent Min/Max in 24hrs   Temp 98.8 °F (37.1 °C) Temp  Min: 98.3 °F (36.8 °C)  Max: 98.8 °F (37.1 °C)   Pulse 64 Pulse  Min: 64  Max: 94   Resp 14 Resp  Min: 12  Max: 28   /60 BP  Min: 110/56  Max: 144/76   O2 Sat 99 % SpO2  Min: 98 %  Max: 100 %      Intake/Output Summary (Last 24 hours) at 6/23/2025 0731  Last data filed at 6/23/2025 0400  Gross per 24 hour   Intake 1000 ml   Output 2 ml   Net 998 ml       Diet NPO    Invasive Monitoring           Physical Exam   Physical Exam  Cardiovascular:      Rate and Rhythm:  Normal rate and regular rhythm.   Musculoskeletal:      Right lower leg: No edema.      Left lower leg: No edema.   Abdominal:      Palpations: Abdomen is soft.   Constitutional:       Appearance: She is ill-appearing and toxic-appearing.   Pulmonary:      Effort: Pulmonary effort is normal. No accessory muscle usage, respiratory distress or accessory muscle usage.      Breath sounds: Normal breath sounds. No wheezing or rales.   Neurological:      Mental Status: She is oriented to person, place and time. Mental status is at baseline. She is somnolent and calm.          Diagnostic Studies        Lab Results: I have reviewed the following results:     Medications:  Scheduled PRN   chlorhexidine, 15 mL, Q12H DESTIN  enoxaparin, 40 mg, Daily  gabapentin, 300 mg, BID  lacosamide, 200 mg, Q12H DESTIN  metoprolol succinate, 25 mg, BID  zonisamide, 300 mg, HS          Continuous          Labs:   CBC    Recent Labs     06/22/25 1951 06/23/25 0449   WBC 3.54* 4.17*   HGB 11.9 10.0*   HCT 36.2 30.6*   PLT 71* 62*     BMP    Recent Labs     06/22/25 1951 06/23/25 0449   SODIUM 137 137   K 4.0 3.4*    114*   CO2 21 21   AGAP 8 2*   BUN 11 10   CREATININE 0.67 0.59*   CALCIUM 8.9 7.4*       Coags    No recent results     Additional Electrolytes  Recent Labs     06/23/25 0449   MG 1.5*   PHOS 2.5   CAIONIZED 1.08*          Blood Gas    No recent results  Recent Labs     06/22/25  2209   PHVEN 7.314   YRG5MEE 40.6*   PO2VEN 35.0   SKC4GGD 20.2*   BEVEN -5.6   [id number] 62.8    LFTs  Recent Labs     06/22/25 1951   ALT 21   AST 33   ALKPHOS 50   ALB 4.0   TBILI 0.54       Infectious  Recent Labs     06/22/25 1951   PROCALCITONI <0.05     Glucose  Recent Labs     06/22/25 1951 06/23/25 0449   GLUC 107 88

## 2025-06-23 NOTE — ASSESSMENT & PLAN NOTE
"Renzo Pop is a 65 y.o. female with a pertinent PMH of epilepsy, anxiety, alcohol misuse, neurology consulted for c/f for seizure activity. She initially presented to the hospital on 6/22/25 for AMS. Upon arrival to the ED, patient was noted to have a seizure.    Event(s) Description: Activity occurred while in the ED and was witnessed by ER medical personnel. There was only one episode, lasting 5-10 minutes. Abnormal movements - tonic-clonic movements present. Jerking and twitching movements were recognized on the bilateral upper extremities and bilateral lower extremities. She has no recall of the event.     Prior History of Seizures:   She does have a history prior seizure and/or epilepsy.   Current Home AEDs: Lacosamide (Vimpat) 200mg BID  Zonisamide (Zonegran) 300mg QHS. Prior AEDs: Levetiracetam (Keppra)    Epilepsy Risk Factors: Head injury (moderate/severe)  Alcohol abuse  Drug abuse    Event/Seizure semiology:  Focal seizure: Hand clenching and dropping GCS  Panic attack feeling (unclear if it is seizure or psychiatric related): brief 1-2 min panic attacks occurring 2-3 times per day without any specific triggers. Captured on VEEG in 2/2023 (\"An event involving flashback of memory, scary feeling and feeling short of breath did not have EEG correlate. Seizure is not excluded. Focal aware seizures often have no EEG correlate.\")  The event captured without vEEG correlate at the hospital in March 2022: the patient was shaking/tensing bilateral arms and hands with some rhythmic right shoulder jerking activity. There was no electrographic seizure during this time.  She looked like she was waking up on the EEG background.  Recurrent focal seizures from the left temporal region on VEEG in 3/2022. No clear clinical correlate.     Relevant Work Up:  Labs:  CK: 42 (6/22/2025) Ethanol: <10 (6/22/2025)  ASM Levels: Lacosamide - 10.35  Imaging:  NC CTH: No acute intracranial abnormalities    Current AEDs:  Lacosamide " 200mg BID  Zonisamide 300mg QHS    Impression: Recurrent Seizures G40.909  - Patient with breakthrough seizure in ED, concern for breakthrough seizure provoked in the setting of alcohol misuse vs withdrawal seizure.    Plan:  Monitoring and precautions:  Frequent Neuro Checks, notify Neurology team if any acute changes in exam and obtain STAT CTH  Seizure / Fall Precautions  Seizure Management:  ASMs: Continue home Lacosamide (Vimpat) 200mg BID, Zonisamide 300mg nightly  Rescue Meds: Ativan IV 2mg prn 2 complex partial seizures or 1 GTC seizure  Diagnostics:Labs and testing  Telemetry monitoring  EEG: will defer EEG at this time as patient appears to be back to her baseline  Lab Studies:  Evaluate for infectious or toxic/metabolic etiology as indicated. Correct any electrolyte/metabolic derangements  Avoid epileptogenic medications such as wellbutrin, cefepime, ultram, quinolones, and imipenum   Consultations:   PT/OT Evaluate and Treat  Patient Safety/Counseling:  [place] DOT: Needs to be completed prior to discharge  Seizure precautions outpatient: no driving, no operating heavy machinery, no swimming alone, no climbing, no baths only showers, no cooking alone, or any activity that would put them at increased risk of harm if they were to have a seizure.  DVT PPx: Enoxaparin (Lovenox) SQ, and SCDs  Rest of care per primary medical team

## 2025-06-23 NOTE — ASSESSMENT & PLAN NOTE
Suspect due to alcohol consumption  CT head negative  No focal injuries on exam  PT OT when appropriate

## 2025-06-23 NOTE — H&P
H&P - Critical Care/ICU   Name: Renzo Pop 65 y.o. female I MRN: [id number]  Unit/Bed#: ICU 10 I Date of Admission: 6/22/2025   Date of Service: 6/23/2025 I Hospital Day: 1       Assessment & Plan  Acute metabolic encephalopathy  Multifactorial in the setting of seizure activity, alcohol use, cannabinoid use  Reportedly consumed 3 beers, liquor, cannabinoid gummy prior to arrival  Coma panel, VBG, CMP unremarkable  TSH 5.1  CT head negative  U/A and UDS pending  Witnessed tonic-clonic seizure in ED  See plan under Seizure  Frequent neurochecks  Delirium precautions  Neurology consult  Seizure (HCC)  Hx of non-intractable focal epilepsy with focal status epilepticus in 3/2022 on VEEG  Home AED Rx: Gabapentin 300 mg BID, lacosamide 200 mg BID, zonisamide 300 mg qHS  Breakthrough seizure lasting 5 minutes in ED likely in the setting of alcohol and cannabinoid use lowering threshold  Received ativan 4mg x2 doses and Keppra 60mg/kg load in ED   Lacosmide level pending   Continue home AED regimen  Neurology consulted, planning for VEEG monitoring 6/23  Frequent neuro checks  Seizure precautions  Anxiety with depression  Home Rx Pristiq 50mg daily   Currently held in the setting of breakthrough seizure  Pancytopenia (HCC)  AEB WBC 3.54, RBC3.62, plt 71  Etiology unknown   Previously was seen by heme/onc   No signs of active bleeding  Trend daily CBC  Alcohol use  Reported 2-3 beers daily   Prior detox admission 2023  (HFpEF) heart failure with preserved ejection fraction (HCC)  Wt Readings from Last 3 Encounters:   06/23/25 57.7 kg (127 lb 3.3 oz)   05/08/25 56.7 kg (125 lb)   05/05/25 57.6 kg (127 lb)     Echo 7/2022: LVEF 60%, G1DD, no valvular incompetence   Continue home metoprolol XL 25 mg daily  Frequent falls  Suspect due to alcohol consumption  CT head negative  No focal injuries on exam  PT OT when appropriate  Disposition: Stepdown Level 1    History of Present Illness   Renzo Pop is a 64 y/o F hx focal  epilepsy (lacosamide, zanosamide, gabapentin), HFpEF (G1DD), anxiety, alcohol misuse, pancytopenia who presented with altered mental status and was admitted for generalized tonic clonic seizure. Per  she developed altered mental status yesterday and was not re-directable prompting EMS call. On EMS arrival she was oriented but distressed about current world events. She had consumed ~3 beers and cannabinoid gummy earlier in the day. He reports she experienced typical aura prior to having a seizure in which she was not interactive.  reports she typically consumes 2-3 beers daily and fall frequently.     In ED pt had witnessed 5 minute generalized tonic clonic seizure that resolved with 8mg ativan. Also administered keppra 60mgkg IV.  Afterwards she became somnolent and upon re-awakening was A&O x 3. She was admitted to ICU for further workup and planned cEEG monitoring.     History obtained from chart review and the patient.  Review of Systems: Review of Systems   Constitutional:  Positive for activity change.   Respiratory:  Positive for shortness of breath.    Cardiovascular:  Positive for chest pain.   Genitourinary: Negative.    Musculoskeletal:  Positive for arthralgias and myalgias.   Neurological:  Positive for seizures.   Psychiatric/Behavioral:  The patient is nervous/anxious.        Historical Information   Past Medical History:  No date: Anxiety  No date: Chronic alcohol abuse  No date: Dental disease  No date: Depression  No date: Ear problems  No date: Epilepsy (HCC)  No date: Fracture  No date: Hepatitis      Comment:  Hep A   No date: Hepatitis  No date: Insomnia      Comment:  10mar2016 resolved  No date: Memory loss  No date: Osteoporosis      Comment:  14jun2016 resolved  No date: Pancreatitis  No date: SAH (subarachnoid hemorrhage) ([facility])  No date: Seasonal allergies  No date: Seizure (HCC)  No date: Seizures ([facility])  No date: Speech impairment  No date: Stroke ([facility])  No date:  Tinnitus  11/11/2019: Urine discoloration  No date: Varicella  No date: Visual impairment  No date: Voice disorder  11/13/2019: Vomiting Past Surgical History:  No date: BONE MARROW BIOPSY      Comment:  2019, 2021 8/17/2021: IR BIOPSY BONE  11/14/2019: IR BIOPSY BONE MARROW  8/4/2023: [place] TX TIBL SHFT FX IMED IMPLT W/WO SCREWS&/CERCLA; Left      Comment:  Procedure: INSERTION NAIL IM TIBIA;  Surgeon: Danielito Sauceda DO;  Location: [facility];  Service:                Orthopedics  1985: TUBAL LIGATION  No date: TUBAL LIGATION   Current Outpatient Medications   Medication Instructions    acetaminophen (TYLENOL) 650 mg, Oral, Every 8 hours PRN    desvenlafaxine succinate (PRISTIQ) 50 mg, Oral, Daily    gabapentin (NEURONTIN) 300 mg, Oral, 2 times daily    hydrOXYzine HCL (ATARAX) 25 mg, Oral, 2 times daily PRN    lacosamide (VIMPAT) 200 mg, Oral, 2 times daily    metoprolol succinate (TOPROL-XL) 25 mg, Oral, 2 times daily    zonisamide (ZONEGRAN) 300 mg, Oral, Daily at bedtime    Allergies[1]   Social History[2] Family History[3]       Objective :                   Vitals I/O      Most Recent Min/Max in 24hrs   Temp 98.8 °F (37.1 °C) Temp  Min: 98.3 °F (36.8 °C)  Max: 98.8 °F (37.1 °C)   Pulse 85 Pulse  Min: 85  Max: 94   Resp 16 Resp  Min: 16  Max: 16   /60 BP  Min: 110/56  Max: 144/76   O2 Sat 99 % SpO2  Min: 98 %  Max: 100 %      Intake/Output Summary (Last 24 hours) at 6/23/2025 0149  Last data filed at 6/22/2025 2249  Gross per 24 hour   Intake 1000 ml   Output --   Net 1000 ml       Diet NPO    Invasive Monitoring           Physical Exam   Physical Exam  Eyes:      Pupils: Pupils are equal, round, and reactive to light.   Skin:     General: Skin is warm and dry.      Capillary Refill: Capillary refill takes less than 2 seconds.   HENT:      Head: Normocephalic and atraumatic.   Cardiovascular:      Rate and Rhythm: Normal rate and regular rhythm.      Pulses: Normal pulses.      Heart  sounds: Normal heart sounds.   Musculoskeletal:      Right lower leg: No edema.      Left lower leg: No edema.      Comments: R wrist skin tear, erythematous. Multiple bruises on UE and LE   Abdominal: General: There is no distension.      Palpations: Abdomen is soft.      Tenderness: There is no abdominal tenderness.   Constitutional:       Appearance: She is well-developed. She is ill-appearing.   Pulmonary:      Effort: Pulmonary effort is normal.      Breath sounds: Normal breath sounds.   Neurological:      General: No focal deficit present.      Mental Status: She is alert and oriented to person, place and time. She is somnolent and calm.      Sensory: Sensation is intact.      Motor: gross motor function is at baseline for patient. Strength full and intact in all extremities.          Diagnostic Studies        Lab Results: I have reviewed the following results:     Medications:  Scheduled PRN   chlorhexidine, 15 mL, Q12H DESTIN  enoxaparin, 40 mg, Daily  gabapentin, 300 mg, BID  lacosamide, 200 mg, Q12H DESTIN  metoprolol succinate, 25 mg, BID  zonisamide, 300 mg, HS          Continuous          Labs:   CBC    Recent Labs     06/22/25 1951   WBC 3.54*   HGB 11.9   HCT 36.2   PLT 71*     BMP    Recent Labs     06/22/25 1951   SODIUM 137   K 4.0      CO2 21   AGAP 8   BUN 11   CREATININE 0.67   CALCIUM 8.9       Coags    No recent results     Additional Electrolytes  No recent results       Blood Gas    No recent results  Recent Labs     06/22/25 2209   PHVEN 7.314   QEJ1OLB 40.6*   PO2VEN 35.0   ZKX4BNL 20.2*   BEVEN -5.6   [id number] 62.8    LFTs  Recent Labs     06/22/25 1951   ALT 21   AST 33   ALKPHOS 50   ALB 4.0   TBILI 0.54       Infectious  Recent Labs     06/22/25 1951   PROCALCITONI <0.05     Glucose  Recent Labs     06/22/25 1951   GLUC 107        Administrative Statements        [1] No Known Allergies  [2]   Social History  Tobacco Use    Smoking status: Never    Smokeless tobacco: Never    Vaping Use    Vaping status: Never Used   Substance Use Topics    Alcohol use: Yes     Alcohol/week: 4.0 standard drinks of alcohol     Types: 2 Glasses of wine, 2 Cans of beer per week     Comment: beer all throughout the day.    Drug use: Yes     Types: Other     Comment: patient unsure of what is in the gummies shes taking.   [3]   Family History  Problem Relation Name Age of Onset    Anxiety disorder Mother Mother     Depression Mother Mother     No Known Problems Father      No Known Problems Paternal Grandmother Roselia     No Known Problems Sister half sister     No Known Problems Daughter      No Known Problems Maternal Grandmother      Cancer Maternal Grandfather ?     Cancer Paternal Grandmother ?         unknown     No Known Problems Paternal Grandfather      No Known Problems Brother half brother     No Known Problems Son      No Known Problems Son      Breast cancer Neg Hx      Ovarian cancer Neg Hx

## 2025-06-23 NOTE — PROGRESS NOTES
Progress Note - Critical Care/ICU   Name: Renzo Pop 65 y.o. female I MRN: [id number]  Unit/Bed#: ICU 10 I Date of Admission: 6/22/2025   Date of Service: 6/23/2025 I Hospital Day: 1       Critical Care Interval Transfer Note:    Brief Hospital Summary:   65 y.o. female with a Pmh of Epilepsy, HFpEF, Anxiety, alcohol misuse, pancytopenia who presented to the hospital on 6/22/25 via EMS due to AMS. The patient's  called EMS because the patient appeared to be confused and speaking nonsensically. The patient did not appear to be confused in the ED or in any distress. The patient developed seizure-like activity shortly after being in the ED. Reportedly had tonic-clonic activity for 5-10 minutes, aborted with 8mg lorazepam and 60mg/kg of Levetiracetam. The patient was admitted for further workup and eeg. Neuro consulted, does not need VEEG at this time, their plan was to continue home meds and not give further keppra at this time.  Plan:  CIWA protocol  continue daily folic and thiamine supplements  Pt/OT consulted  consider psych consult, as patient's alcohol misuse and multiple bruises are questionable for frequent falls versus unsafe home.    Barriers to discharge:   PT/OT evaluation  Clearance by neurology     Consults: IP CONSULT TO CASE MANAGEMENT  IP CONSULT TO NEUROLOGY         Discharge Plan: Anticipate discharge in 24-48 hrs to discharge location to be determined pending rehab evaluations.       Patient seen and evaluated by Critical Care today and deemed to be appropriate for transfer to Med Surg. Spoke to Dr. Bishop from [facility] to accept transfer. Critical care can be contacted via SecureChat with any questions or concerns. Please use the Critical Care AP Role in Secure Chat for any provider inquires until the patient is transferred out of the ICU or until tomorrow at 0600.

## 2025-06-23 NOTE — CONSULTS
"Consultation - Neurology   Name: Renzo Pop 65 y.o. female I MRN: [id number]  Unit/Bed#: ICU 10 I Date of Admission: 6/22/2025   Date of Service: 6/23/2025 I Hospital Day: 1   Inpatient consult to Neurology  Consult performed by: Dianh La  Consult ordered by: Jef Fairchild PA-C        Physician Requesting Evaluation: Jamie Beverly MD   Reason for Evaluation / Principal Problem: Seizure     Assessment & Plan  Acute metabolic encephalopathy    Pancytopenia (HCC)    Anxiety with depression    Seizure (HCC)  Renzo Pop is a 65 y.o. female with a pertinent PMH of epilepsy, anxiety, alcohol misuse, neurology consulted for c/f for seizure activity. She initially presented to the hospital on 6/22/25 for AMS. Upon arrival to the ED, patient was noted to have a seizure.    Event(s) Description: Activity occurred while in the ED and was witnessed by ER medical personnel. There was only one episode, lasting 5-10 minutes. Abnormal movements - tonic-clonic movements present. Jerking and twitching movements were recognized on the bilateral upper extremities and bilateral lower extremities. She has no recall of the event.     Prior History of Seizures:   She does have a history prior seizure and/or epilepsy.   Current Home AEDs: Lacosamide (Vimpat) 200mg BID  Zonisamide (Zonegran) 300mg QHS. Prior AEDs: Levetiracetam (Keppra)    Epilepsy Risk Factors: Head injury (moderate/severe)  Alcohol abuse  Drug abuse    Event/Seizure semiology:  Focal seizure: Hand clenching and dropping GCS  Panic attack feeling (unclear if it is seizure or psychiatric related): brief 1-2 min panic attacks occurring 2-3 times per day without any specific triggers. Captured on VEEG in 2/2023 (\"An event involving flashback of memory, scary feeling and feeling short of breath did not have EEG correlate. Seizure is not excluded. Focal aware seizures often have no EEG correlate.\")  The event captured without vEEG correlate at the hospital in " March 2022: the patient was shaking/tensing bilateral arms and hands with some rhythmic right shoulder jerking activity. There was no electrographic seizure during this time.  She looked like she was waking up on the EEG background.  Recurrent focal seizures from the left temporal region on VEEG in 3/2022. No clear clinical correlate.     Relevant Work Up:  Labs:  CK: 42 (6/22/2025) Ethanol: <10 (6/22/2025)  ASM Levels: Lacosamide - 10.35  Imaging:  NC CTH: No acute intracranial abnormalities    Current AEDs:  Lacosamide 200mg BID  Zonisamide 300mg QHS    Impression: Recurrent Seizures G40.909  - Patient with breakthrough seizure in ED, concern for breakthrough seizure provoked in the setting of alcohol misuse vs withdrawal seizure.    Plan:  Monitoring and precautions:  Frequent Neuro Checks, notify Neurology team if any acute changes in exam and obtain STAT CTH  Seizure / Fall Precautions  Seizure Management:  ASMs: Continue home Lacosamide (Vimpat) 200mg BID, Zonisamide 300mg nightly  Rescue Meds: Ativan IV 2mg prn 2 complex partial seizures or 1 GTC seizure  Diagnostics:Labs and testing  Telemetry monitoring  EEG: will defer EEG at this time as patient appears to be back to her baseline  Lab Studies:  Evaluate for infectious or toxic/metabolic etiology as indicated. Correct any electrolyte/metabolic derangements  Avoid epileptogenic medications such as wellbutrin, cefepime, ultram, quinolones, and imipenum   Consultations:   PT/OT Evaluate and Treat  Patient Safety/Counseling:  [place]: Needs to be completed prior to discharge  Seizure precautions outpatient: no driving, no operating heavy machinery, no swimming alone, no climbing, no baths only showers, no cooking alone, or any activity that would put them at increased risk of harm if they were to have a seizure.  DVT PPx: Enoxaparin (Lovenox) SQ, and SCDs  Rest of care per primary medical team    Alcohol use    (HFpEF) heart failure with preserved ejection  "fraction (HCC)  Wt Readings from Last 3 Encounters:   06/23/25 57.7 kg (127 lb 3.3 oz)   05/08/25 56.7 kg (125 lb)   05/05/25 57.6 kg (127 lb)             Frequent falls        Recommendations for outpatient neurological follow up have yet to be determined.    History of Present Illness   Renzo Pop is a 65 y.o. female with a Pmh of Epilepsy, HFpEF, Anxiety, alcohol misuse, pancytopenia who presented to the hospital on 6/22/25 via EMS due to AMS. The patient's  called EMS because the patient appeared to be confused and speaking nonsensically. The patient did not appear to be confused in the ED or in any distress. The patient developed seizure-like activity shortly after being in the ED. Reportedly had tonic-clonic activity for 5-10 minutes, aborted with 8mg lorazepam and 60mg/kg of Levetiracetam. The patient was admitted for further workup and eeg.    Patient seen this AM at bedside with  present. The  reports the patient drinks approximately 3 beers per day on average and sometimes more. The  reports as far as he knows the patient has been compliant with her medications because the patient takes her medications on the same schedule as their dog, who also has epilepsy. The patient's lacosamide level checked in the ED was normal. The patient's labs showed an alcohol level less than 10. Patient upon further questioning states she has not been drinking and had bought vanilla extract to add to her coffee to \"take the edge off\" but did not.     At this time the patient's breakthrough seizure activity seems to be related to her alcohol misuse vs withdrawal if she truly has not been drinking. Patient was admitted to ICU with plans for cEEG but the patient appears to be awake and alert and has good exam. At this time will not pursue cEEG unless she appears to worsen.    Of note, patient has had an alcohol withdrawal seizure in the past.       Medical History Review: I have reviewed the " patient's PMH, PSH, Social History, Family History, Meds, and Allergies   Historical Information   Past Medical History[1]  Past Surgical History[2]  Social History[3]  E-Cigarette/Vaping    E-Cigarette Use Never User      E-Cigarette/Vaping Substances    Nicotine No     THC No     CBD No     Flavoring No     Other No     Unknown No      Family history non-contributory  Social History[4]    Current Facility-Administered Medications:     chlorhexidine (PERIDEX) 0.12 % oral rinse 15 mL, Q12H DESTIN    desvenlafaxine succinate (PRISTIQ) 24 hr tablet 50 mg, Daily    enoxaparin (LOVENOX) subcutaneous injection 40 mg, Daily    folic acid (FOLVITE) tablet 1 mg, Daily    gabapentin (NEURONTIN) capsule 300 mg, BID    lacosamide (VIMPAT) tablet 200 mg, Q12H DESTIN    metoprolol succinate (TOPROL-XL) 24 hr tablet 25 mg, BID    thiamine tablet 100 mg, Daily    zonisamide (ZONEGRAN) capsule 300 mg, HS  Prior to Admission Medications   Prescriptions Last Dose Informant Patient Reported? Taking?   acetaminophen (TYLENOL) 650 mg CR tablet Past Month  No Yes   Sig: Take 1 tablet (650 mg total) by mouth every 8 (eight) hours as needed for mild pain   desvenlafaxine succinate (PRISTIQ) 50 mg 24 hr tablet 6/21/2025  No Yes   Sig: Take 1 tablet (50 mg total) by mouth daily   gabapentin (NEURONTIN) 300 mg capsule 6/22/2025 Morning  No Yes   Sig: Take 1 capsule (300 mg total) by mouth 2 (two) times a day   hydrOXYzine HCL (ATARAX) 25 mg tablet 6/22/2025 Morning  No Yes   Sig: Take 1 tablet (25 mg total) by mouth 2 (two) times a day as needed for anxiety   lacosamide (VIMPAT) 200 mg tablet 6/22/2025 Morning  No Yes   Sig: TAKE ONE TABLET BY MOUTH TWICE A DAY   metoprolol succinate (TOPROL-XL) 25 mg 24 hr tablet 6/22/2025 Morning  No Yes   Sig: Take 1 tablet (25 mg total) by mouth 2 (two) times a day   zonisamide (ZONEGRAN) 100 mg capsule 6/22/2025 Morning  No Yes   Sig: TAKE 3 CAPSULES BY MOUTH DAILY AT BEDTIME      Facility-Administered  Medications: None     Patient has no known allergies.    Objective :  Temp:  [97.5 °F (36.4 °C)-98.8 °F (37.1 °C)] 97.7 °F (36.5 °C)  HR:  [64-94] 74  BP: (101-144)/(56-76) 118/59  Resp:  [12-28] 14  SpO2:  [98 %-100 %] 99 %  O2 Device: Nasal cannula  Nasal Cannula O2 Flow Rate (L/min):  [4 L/min] 4 L/min    Physical Exam  Vitals reviewed.     Eyes:      General: Lids are normal.      Extraocular Movements: Extraocular movements intact.      Pupils: Pupils are equal, round, and reactive to light.       Neurological:      Motor: Motor strength is normal.    Neurological Exam  Mental Status   Oriented only to person, time and situation.    Cranial Nerves  CN II: Visual acuity is normal. Visual fields full to confrontation.  CN III, IV, VI: Extraocular movements intact bilaterally. Normal lids and orbits bilaterally. Pupils equal round and reactive to light bilaterally.  CN V: Facial sensation is normal.  CN VII: Full and symmetric facial movement.  CN VIII: Hearing is normal.  CN IX, X: Palate elevates symmetrically. Normal gag reflex.  CN XI: Shoulder shrug strength is normal.  CN XII: Tongue midline without atrophy or fasciculations.    Motor  Normal muscle bulk throughout. Normal muscle tone. Strength is 5/5 throughout all four extremities.    Sensory  Light touch is normal in upper and lower extremities. Pinprick is normal in upper and lower extremities.     Coordination  Right: Finger-to-nose normal.Left: Finger-to-nose normal.        Lab Results: I have reviewed the following results:I have personally reviewed pertinent reports.    Recent Labs     06/23/25  0449   WBC 4.17*   HGB 10.0*   HCT 30.6*   PLT 62*   SODIUM 137   K 3.4*   *   CO2 21   BUN 10   CREATININE 0.59*   GLUC 88   CAIONIZED 1.08*   MG 1.5*   PHOS 2.5     Imaging Results Review: I personally reviewed the following image studies in PACS and associated radiology reports: CT head. My interpretation of the radiology images/reports is: No acute  intracranial abnormality.  Other Study Results Review: No additional pertinent studies reviewed.    VTE Prophylaxis: VTE covered by:  enoxaparin, Subcutaneous, 40 mg at 06/23/25 0923              [1]   Past Medical History:  Diagnosis Date    Anxiety     Chronic alcohol abuse     Dental disease     Depression     Ear problems     Epilepsy (HCC)     Fracture     Hepatitis     Hep A     Hepatitis     Insomnia     10mar2016 resolved    Memory loss     Osteoporosis     14jun2016 resolved    Pancreatitis     SAH (subarachnoid hemorrhage) (HCC)     Seasonal allergies     Seizure (HCC)     Seizures (HCC)     Speech impairment     Stroke (HCC)     Tinnitus     Urine discoloration 11/11/2019    Varicella     Visual impairment     Voice disorder     Vomiting 11/13/2019   [2]   Past Surgical History:  Procedure Laterality Date    BONE MARROW BIOPSY      2019, 2021    IR BIOPSY BONE  8/17/2021    IR BIOPSY BONE MARROW  11/14/2019    [place] TX TIBL SHFT FX IMED IMPLT W/WO SCREWS&/CERCLA Left 8/4/2023    Procedure: INSERTION NAIL IM TIBIA;  Surgeon: Danielito Sauceda DO;  Location: [facility];  Service: Orthopedics    TUBAL LIGATION  1985    TUBAL LIGATION     [3]   Social History  Tobacco Use    Smoking status: Never    Smokeless tobacco: Never   Vaping Use    Vaping status: Never Used   Substance and Sexual Activity    Alcohol use: Yes     Alcohol/week: 4.0 standard drinks of alcohol     Types: 2 Glasses of wine, 2 Cans of beer per week     Comment: beer all throughout the day.    Drug use: Yes     Types: Other     Comment: patient unsure of what is in the gummies shes taking.    Sexual activity: Yes     Partners: Male     Birth control/protection: Post-menopausal   [4]   Social History  Tobacco Use    Smoking status: Never    Smokeless tobacco: Never   Vaping Use    Vaping status: Never Used   Substance and Sexual Activity    Alcohol use: Yes     Alcohol/week: 4.0 standard drinks of alcohol     Types: 2 Glasses of wine, 2 Cans of  beer per week     Comment: beer all throughout the day.    Drug use: Yes     Types: Other     Comment: patient unsure of what is in the gummies shes taking.    Sexual activity: Yes     Partners: Male     Birth control/protection: Post-menopausal

## 2025-06-23 NOTE — ASSESSMENT & PLAN NOTE
Hx of non-intractable focal epilepsy with focal status epilepticus in 3/2022 on VEEG  Home AED Rx: Gabapentin 300 mg BID, lacosamide 200 mg BID, zonisamide 300 mg qHS  Breakthrough seizure lasting 5 minutes in ED likely in the setting of alcohol and cannabinoid use lowering threshold  Received ativan 4mg x2 doses and Keppra 60mg/kg load in ED     Plan:  Lacosmide level normal at 10.35  Continue home AED regimen  Neurology consulted, will not require VEEG   Frequent neuro checks  Seizure precautions

## 2025-06-23 NOTE — ASSESSMENT & PLAN NOTE
AEB WBC 3.54, RBC3.62, plt 71  Etiology unknown   Previously was seen by heme/onc   No signs of active bleeding  Trend daily CBC

## 2025-06-24 VITALS
TEMPERATURE: 98.8 F | BODY MASS INDEX: 19.97 KG/M2 | DIASTOLIC BLOOD PRESSURE: 54 MMHG | HEIGHT: 67 IN | OXYGEN SATURATION: 98 % | WEIGHT: 127.21 LBS | HEART RATE: 70 BPM | RESPIRATION RATE: 19 BRPM | SYSTOLIC BLOOD PRESSURE: 102 MMHG

## 2025-06-24 LAB
ANION GAP SERPL CALCULATED.3IONS-SCNC: 3 MMOL/L (ref 4–13)
BASOPHILS # BLD AUTO: 0.02 THOUSANDS/ÂΜL (ref 0–0.1)
BASOPHILS NFR BLD AUTO: 1 % (ref 0–1)
BUN SERPL-MCNC: 12 MG/DL (ref 5–25)
CALCIUM SERPL-MCNC: 8.1 MG/DL (ref 8.4–10.2)
CHLORIDE SERPL-SCNC: 110 MMOL/L (ref 96–108)
CO2 SERPL-SCNC: 22 MMOL/L (ref 21–32)
CREAT SERPL-MCNC: 0.68 MG/DL (ref 0.6–1.3)
EOSINOPHIL # BLD AUTO: 0.07 THOUSAND/ÂΜL (ref 0–0.61)
EOSINOPHIL NFR BLD AUTO: 2 % (ref 0–6)
ERYTHROCYTE [DISTWIDTH] IN BLOOD BY AUTOMATED COUNT: 12.7 % (ref 11.6–15.1)
GFR SERPL CREATININE-BSD FRML MDRD: 92 ML/MIN/1.73SQ M
GLUCOSE SERPL-MCNC: 108 MG/DL (ref 65–140)
GLUCOSE SERPL-MCNC: 99 MG/DL (ref 65–140)
HCT VFR BLD AUTO: 35.4 % (ref 34.8–46.1)
HGB BLD-MCNC: 11.8 G/DL (ref 11.5–15.4)
IMM GRANULOCYTES # BLD AUTO: 0.02 THOUSAND/UL (ref 0–0.2)
IMM GRANULOCYTES NFR BLD AUTO: 1 % (ref 0–2)
LYMPHOCYTES # BLD AUTO: 1.27 THOUSANDS/ÂΜL (ref 0.6–4.47)
LYMPHOCYTES NFR BLD AUTO: 34 % (ref 14–44)
MAGNESIUM SERPL-MCNC: 1.9 MG/DL (ref 1.9–2.7)
MCH RBC QN AUTO: 33.2 PG (ref 26.8–34.3)
MCHC RBC AUTO-ENTMCNC: 33.3 G/DL (ref 31.4–37.4)
MCV RBC AUTO: 100 FL (ref 82–98)
MONOCYTES # BLD AUTO: 0.34 THOUSAND/ÂΜL (ref 0.17–1.22)
MONOCYTES NFR BLD AUTO: 9 % (ref 4–12)
NEUTROPHILS # BLD AUTO: 1.97 THOUSANDS/ÂΜL (ref 1.85–7.62)
NEUTS SEG NFR BLD AUTO: 53 % (ref 43–75)
NRBC BLD AUTO-RTO: 0 /100 WBCS
PLATELET # BLD AUTO: 73 THOUSANDS/UL (ref 149–390)
PMV BLD AUTO: 10.1 FL (ref 8.9–12.7)
POTASSIUM SERPL-SCNC: 4.3 MMOL/L (ref 3.5–5.3)
RBC # BLD AUTO: 3.55 MILLION/UL (ref 3.81–5.12)
SODIUM SERPL-SCNC: 135 MMOL/L (ref 135–147)
WBC # BLD AUTO: 3.69 THOUSAND/UL (ref 4.31–10.16)

## 2025-06-24 PROCEDURE — 80048 BASIC METABOLIC PNL TOTAL CA: CPT | Performed by: PHYSICIAN ASSISTANT

## 2025-06-24 PROCEDURE — 97163 PT EVAL HIGH COMPLEX 45 MIN: CPT

## 2025-06-24 PROCEDURE — 83735 ASSAY OF MAGNESIUM: CPT | Performed by: PHYSICIAN ASSISTANT

## 2025-06-24 PROCEDURE — 85025 COMPLETE CBC W/AUTO DIFF WBC: CPT | Performed by: PHYSICIAN ASSISTANT

## 2025-06-24 PROCEDURE — 97167 OT EVAL HIGH COMPLEX 60 MIN: CPT

## 2025-06-24 PROCEDURE — RECHECK: Performed by: PHYSICIAN ASSISTANT

## 2025-06-24 PROCEDURE — 99239 HOSP IP/OBS DSCHRG MGMT >30: CPT | Performed by: PHYSICIAN ASSISTANT

## 2025-06-24 PROCEDURE — 82948 REAGENT STRIP/BLOOD GLUCOSE: CPT

## 2025-06-24 RX ORDER — LORATADINE 10 MG/1
10 TABLET ORAL DAILY
Status: DISCONTINUED | OUTPATIENT
Start: 2025-06-24 | End: 2025-06-24 | Stop reason: HOSPADM

## 2025-06-24 RX ORDER — GUAIFENESIN 600 MG/1
600 TABLET, EXTENDED RELEASE ORAL EVERY 12 HOURS SCHEDULED
Status: DISCONTINUED | OUTPATIENT
Start: 2025-06-24 | End: 2025-06-24 | Stop reason: HOSPADM

## 2025-06-24 RX ORDER — LANOLIN ALCOHOL/MO/W.PET/CERES
100 CREAM (GRAM) TOPICAL DAILY
Qty: 30 TABLET | Refills: 0 | Status: SHIPPED | OUTPATIENT
Start: 2025-06-25 | End: 2025-07-25

## 2025-06-24 RX ORDER — FOLIC ACID 1 MG/1
1 TABLET ORAL DAILY
Qty: 30 TABLET | Refills: 0 | Status: SHIPPED | OUTPATIENT
Start: 2025-06-25 | End: 2025-07-25

## 2025-06-24 RX ADMIN — LACOSAMIDE 200 MG: 100 TABLET, FILM COATED ORAL at 09:27

## 2025-06-24 RX ADMIN — Medication 100 MG: at 09:28

## 2025-06-24 RX ADMIN — DESVENLAFAXINE 50 MG: 50 TABLET, EXTENDED RELEASE ORAL at 09:29

## 2025-06-24 RX ADMIN — CHLORHEXIDINE GLUCONATE 15 ML: 1.2 SOLUTION ORAL at 09:31

## 2025-06-24 RX ADMIN — FOLIC ACID 1 MG: 1 TABLET ORAL at 09:27

## 2025-06-24 RX ADMIN — GUAIFENESIN 600 MG: 600 TABLET ORAL at 10:14

## 2025-06-24 RX ADMIN — LORATADINE 10 MG: 10 TABLET ORAL at 10:14

## 2025-06-24 RX ADMIN — ENOXAPARIN SODIUM 40 MG: 40 INJECTION SUBCUTANEOUS at 09:31

## 2025-06-24 RX ADMIN — GABAPENTIN 300 MG: 300 CAPSULE ORAL at 09:28

## 2025-06-24 NOTE — ASSESSMENT & PLAN NOTE
Patient with history of frequent falls, likely due to chronic alcohol consumption  CT head: No acute intracranial abnormality.  PT/OT consulted, recommendations appreciated

## 2025-06-24 NOTE — ASSESSMENT & PLAN NOTE
Chronic pancytopenia likely in setting of alcohol use  Monitor CBC while inpatient, currently stable

## 2025-06-24 NOTE — OCCUPATIONAL THERAPY NOTE
"    Occupational Therapy Evaluation     Patient Name: Renzo Pop  Today's Date: 6/24/2025  Problem List  Principal Problem:    Acute metabolic encephalopathy  Active Problems:    Pancytopenia (HCC)    Anxiety with depression    Seizure (HCC)    Alcohol use    (HFpEF) heart failure with preserved ejection fraction (HCC)    Frequent falls    Past Medical History  Past Medical History[1]  Past Surgical History  Past Surgical History[2]        06/24/25 1114   OT Last Visit   OT Visit Date 06/24/25   Note Type   Note type Evaluation   Pain Assessment   Pain Assessment Tool 0-10   Pain Score No Pain   Restrictions/Precautions   Weight Bearing Precautions Per Order No   Other Precautions Cognitive;Chair Alarm;Bed Alarm;Seizure;Multiple lines;Fall Risk   Home Living   Type of Home House   Home Layout Stairs to enter with rails;Multi-level;1/2 bath on main level;Bed/bath upstairs  (6 NATALYA, storage in the basement)   Bathroom Shower/Tub Tub/shower unit  (tasks baths)   Bathroom Toilet Raised   Bathroom Equipment Other (Comment)  (none)   Bathroom Accessibility Accessible   Home Equipment Walker;Cane;Wheelchair-manual  (RW on each floor, rollator)   Additional Comments Intermittent use of RW vs SPC when feeling weak and/or unsteady   Prior Function   Level of [place] Independent with ADLs;Independent with functional mobility;Needs assistance with IADLS  (SBA ADLs)   Lives With Spouse  (+ dog)   Receives Help From Family   IADLs Independent with meal prep;Independent with medication management;Family/Friend/Other provides transportation  (shares IADL tasks with spouse)   Falls in the last 6 months 1 to 4  (less than five in the last 6 monts, \"I am clumsy\", reports bumping into walls frequently)   Vocational Retired   Lifestyle   Autonomy Pt lives w/ spouse, alone during the day. SBA for bathing and dressing. SHAUN for fnxl mobility w/ intermittent use of AD and independent with IADL tasks. (+) falls and (-)  " "  Reciprocal Relationships supportive spouse   Service to Others retired   General   Additional Pertinent History Pt admit with AMS after having generalized tonic-clonic seizure. Hx of focal epilepsy as well as alcohol abuse admits to drinking 3 beers and having cannabinoid gummy earlier prior to episode onset. PMHx: focal epilepsy, HFpEF, anxiety, alcohol misuse, pancytopenia   Family/Caregiver Present No   Subjective   Subjective \"I want to get home so I can go swimming\"   ADL   Eating Assistance 7  Independent   Grooming Assistance 7  Independent   UB Bathing Assistance 5  Supervision/Setup   LB Bathing Assistance 5  Supervision/Setup   UB Dressing Assistance 5  Supervision/Setup   LB Dressing Assistance 5  Supervision/Setup   LB Dressing Deficit Don/doff R sock;Don/doff L sock;Thread RLE into underwear;Thread LLE into underwear;Pull up over hips   Toileting Assistance  5  Supervision/Setup   Additional Comments The above levels of assistance are anticipated based on functional performance deficits with use of clinical judgement.   Bed Mobility   Supine to Sit 5  Supervision   Additional items HOB elevated;Increased time required;Verbal cues   Sit to Supine   (NT: OOB to recliner chair)   Additional Comments Close S to maintain static sitting balance at the EOB. Denies lightheadedness/dizziness   Transfers   Sit to Stand 5  Supervision   Additional items Increased time required;Verbal cues   Stand to Sit 5  Supervision   Additional items Increased time required;Verbal cues   Additional Comments no use of AD   Functional Mobility   Functional Mobility 5  Supervision   Additional Comments for functional household distance in the hallway with use of RW. + ataxia noted   Additional items Rolling walker   Balance   Static Sitting Fair +   Dynamic Sitting Fair   Static Standing Fair -  (w/ RW)   Dynamic Standing Fair -   Activity Tolerance   Activity Tolerance Patient limited by fatigue;Other (Comment)  (impaired " cognition)   Medical Staff Made Aware Spoke with CM, PT   Nurse Made Aware Spoke with RN pre/post   RUE Assessment   RUE Assessment WFL   LUE Assessment   LUE Assessment WFL   Cognition   Overall Cognitive Status Impaired  (WFL conversationally, questionable higher level cognition)   Arousal/Participation Responsive;Cooperative  (flat affect)   Attention Attends with cues to redirect   Orientation Level Oriented to person;Oriented to place;Oriented to time;Oriented to situation   Memory Decreased recall of precautions;Decreased short term memory;Decreased recall of recent events   Following Commands Follows one step commands with increased time or repetition   Comments Pt ID via wristband, name and . Pt is pleasent and cooperative w/ a flat affect. Decreased insight into current limitaitions and deficits. Recommend formal cognitive evaluation to aide in safe discharge planning.  Of note, MOCA was 27/30 in .   Assessment   Limitation Decreased ADL status;Decreased Safe judgement during ADL;Decreased cognition;Decreased endurance;Decreased self-care trans;Decreased high-level ADLs   Prognosis Fair   Assessment Patient is a 65 y.o. female seen for OT evaluation following admission on 2025  s/p Acute metabolic encephalopathy. Please see above for comprehensive list of comorbidities and significant PMHx impacting functional performance. Upon initial evaluation, pt appears to be performing below baseline functional status. Pt requires supervision for UB ADLs, supervision for LB ADLs, supervision for bed mobility, supervision for transfers and supervision for functional mobility household distance with RW. The AM-PAC & Barthel Index outcome tools were used to assist in determining pt safety w/self care /mobility and appropriate d/c recommendations, see above for score. Occupational performance is affected by the following deficits: impaired coordination , decreased balance , decreased standing tolerance for  self care tasks , decreased dynamic balance impacting functional reach, decreased activity tolerance , impaired memory , attention to task, impaired judgement and problem solving , impaired safety awareness , and mood limitation. Personal/Environmental factors impacting D/C include: (+) Hx of falls , Assistance needed for ADL/IADLs, Assistance needed for ADLs and functional mobility, High fall risk , decreased insight toward deficits , decreased recall of precautions , and health management. Supporting factors include: support system available and attitude towards recovery . Patient would benefit from OT services within the acute care setting to maximize level of functional independence in the following areas fall prevention , energy conservation , self-care transfers, bed mobility , functional mobility, formal cognitive evaluation, and ADLs.  From OT standpoint, recommendation at time of D/C would be Level III (Minimum Resource Intensity).   Goals   Patient Goals to go home + go swimming   LTG Time Frame 10-14   Long Term Goal See below   Plan   Treatment Interventions ADL retraining;Functional transfer training;Cognitive reorientation;Patient/family training;Equipment evaluation/education;Compensatory technique education;Energy conservation;Activityengagement  (cognitive assessments)   Goal Expiration Date 07/04/25   OT Treatment Day 0   OT Frequency 2-3x/wk   Discharge Recommendation   Rehab Resource Intensity Level, OT III (Minimum Resource Intensity)   AM-PAC Daily Activity Inpatient   Lower Body Dressing 3   Bathing 3   Toileting 3   Upper Body Dressing 3   Grooming 4   Eating 4   Daily Activity Raw Score 20   Daily Activity Standardized Score (Calc for Raw Score >=11) 42.03   AM-PAC Applied Cognition Inpatient   Following a Speech/Presentation 3   Understanding Ordinary Conversation 4   Taking Medications 2   Remembering Where Things Are Placed or Put Away 3   Remembering List of 4-5 Errands 3   Taking Care  of Complicated Tasks 2   Applied Cognition Raw Score 17   Applied Cognition Standardized Score 36.52   Barthel Index   Feeding 10   Bathing 0   Grooming Score 5   Dressing Score 5   Bladder Score 10   Bowels Score 10   Toilet Use Score 5   Transfers (Bed/Chair) Score 10   Mobility (Level Surface) Score 10   Stairs Score 5   Barthel Index Score 70   End of Consult   Education Provided Yes   Patient Position at End of Consult Bedside chair;Bed/Chair alarm activated;All needs within reach   Nurse Communication Nurse aware of consult       GOALS:      -Patient will perform grooming tasks standing at sink with overall Mod I in order to increase overall independence     -Patient will be Mod I with UB dressing using AE and AD as needed in order to increase (I) with ADLs     -Patient will be Mod I with UB bathing using AE and AD as needed in order to increase (I) with ADLs     -Patient will be Mod I with LB dressing with use of AE and AD as needed in order to increase (I) with ADLs     -Patient will be Mod I with LB bathing with use of AE and AD as needed in order to increase (I) with ADLs    -Patient will complete toileting w/ Mod I w/ G hygiene/thoroughness in order to reduce caregiver burden     -Patient will demonstrate Mod I with bed mobility for ability to manage own comfort and initiate OOB tasks.      -Patient will perform functional transfers with Mod I to/from all surfaces using DME as needed in order to increase (I) with functional tasks     -Patient will be Mod I with functional mobility to/from bathroom for increased independence with toileting tasks     -Patient will tolerate therapeutic activities for greater than 30 min, in order to increase tolerance for functional activities.      -Patient will engage in ongoing cognitive assessment in order to assist with safe discharge planning/recommendations.     -Patient will demonstrate standing for 6-8 mins in order to increase active participation in functional  activities    -Patient will independently identify and utilize 2-3 positive coping strategies to enhance overall wellbeing and engagement in valued occupations.    The patient's raw score on the AM-PAC Daily Activity Inpatient Short Form is 20. A raw score of greater than or equal to 19 suggests the patient may benefit from discharge to home. Please refer to the recommendation of the Occupational Therapist for safe discharge planning.    This session, pt required and most appropriately benefited from skilled OT/PT co-eval due to  regression from functional baseline, and decreased activity tolerance. OT and PT goals were addressed separately as seen in documentation    Niurka Mooney, MS OTR/L   [place] Licensure# [id number]            [1]   Past Medical History:  Diagnosis Date    Anxiety     Chronic alcohol abuse     Dental disease     Depression     Ear problems     Epilepsy (HCC)     Fracture     Hepatitis     Hep A     Hepatitis     Insomnia     10mar2016 resolved    Memory loss     Osteoporosis     14jun2016 resolved    Pancreatitis     SAH (subarachnoid hemorrhage) (HCC)     Seasonal allergies     Seizure (HCC)     Seizures (HCC)     Speech impairment     Stroke (HCC)     Tinnitus     Urine discoloration 11/11/2019    Varicella     Visual impairment     Voice disorder     Vomiting 11/13/2019   [2]   Past Surgical History:  Procedure Laterality Date    BONE MARROW BIOPSY      2019, 2021    IR BIOPSY BONE  8/17/2021    IR BIOPSY BONE MARROW  11/14/2019    ID TX TIBL SHFT FX IMED IMPLT W/WO SCREWS&/CERCLA Left 8/4/2023    Procedure: INSERTION NAIL IM TIBIA;  Surgeon: Danielito Sauceda DO;  Location: [facility];  Service: Orthopedics    TUBAL LIGATION  1985    TUBAL LIGATION

## 2025-06-24 NOTE — ASSESSMENT & PLAN NOTE
History of chronic alcohol use, reports drinking 2-3 beers daily & had prior detox admission in 2023  Continue CIWA protocol, thiamine & folate supplements

## 2025-06-24 NOTE — CASE MANAGEMENT
Case Management Discharge Planning Note    Patient name Renzo Pop  Location ICU 10/ICU 10 MRN [id number]  : 1960 Date 2025       Current Admission Date: 2025  Current Admission Diagnosis:Acute metabolic encephalopathy   Patient Active Problem List    Diagnosis Date Noted    Acute metabolic encephalopathy 2025    Alcohol use 2025    (HFpEF) heart failure with preserved ejection fraction (HCC) 2025    Frequent falls 2025    Age-related osteoporosis without current pathological fracture 06/10/2025    Long term use of bisphosphonates 06/10/2025    Injury of clavicle, right, initial encounter 2025    Dislocated shoulder, right, initial encounter 2025    First degree AV block 2025    Cyclic neutropenia (HCC) 2025    B12 deficiency 2025    Acute encephalopathy 2025    Leukopenia 09/10/2024    Other chronic pancreatitis (HCC) 2024    Aortic ectasia (HCC) 2024    Status epilepticus (HCC) 2024    SIRS (systemic inflammatory response syndrome) (HCC) 2024    Alcohol use disorder, severe, dependence (HCC) 2023    Combined systolic and diastolic congestive heart failure (HCC) 2023    Alcohol abuse 2023    Hyponatremia 2023    Seizure ([facility])     Chronic systolic congestive heart failure (HCC) 02/10/2023    Panic attack 2022    Cardiomyopathy (HCC) 2022    Pancytopenia ([facility]) 03/15/2022    Anxiety with depression 03/15/2022    Nonintractable focal epilepsy (HCC) 2022    Right shoulder pain 2021    Blood transfusion declined due to Buddhism 2021    Thrombocytopenia (HCC) 2021    Gall stones 2021    Pancreatic cyst 2021    Other hyperlipidemia 10/03/2018    Age-related osteoporosis with current pathological fracture with routine healing 2016    Lipoma of right lower extremity 2016    Vitamin D deficiency 2016    Insomnia 03/10/2016      LOS  (days): 2  Geometric Mean LOS (GMLOS) (days): 4.4  Days to GMLOS:2.8     OBJECTIVE:  Risk of Unplanned Readmission Score: 13.39         Current admission status: Inpatient   Preferred Pharmacy:   "[facility] [address] [place], [place] [address]on, [place] - [address] [address]  Phone: [phone] Fax: [phone]    Primary Care Provider: Lian Moreno MD    Primary Insurance: MEDICARE  [facility]    DISCHARGE DETAILS:    Discharge planning discussed with:: patient  Freedom of Choice: Yes  Comments - Freedom of Choice: CC informed that patient is being recommended for Level 3 care. Patient in agreement with C services. per patient she had in the past, and enjoyed the services. Patient has no preference on agency. CC will place blanket referral for review.  CM contacted family/caregiver?: No- see comments  Were Treatment Team discharge recommendations reviewed with patient/caregiver?: Yes  Did patient/caregiver verbalize understanding of patient care needs?: Yes  Were patient/caregiver advised of the risks associated with not following Treatment Team discharge recommendations?: Yes         Requested Home Health Care         Is the patient interested in HHC at discharge?: Yes  Home Health Discipline requested:: Physical Therapy, Occupational Therapy, Nursing  Home Health Follow-Up Provider:: PCP  Home Health Services Needed:: Evaluate Functional Status and Safety, Gait/ADL Training, Strengthening/Theraputic Exercises to Improve Function  Homebound Criteria Met:: Requires the Assistance of Another Person for Safe Ambulation or to Leave the Home, Uses an Assist Device (i.e. cane, walker, etc)  Supporting Clincal Findings:: Fatigues Easliy in Short Distances, Limited Endurance    DME Referral Provided  Referral made for DME?: No    Other Referral/Resources/Interventions Provided:  Referral Comments: [facility] referral sent for HHC services.         Treatment Team Recommendation: Home with  Home Health Care  Expected Discharge Disposition: Home Health Services  Additional Discharge Dispositions: Home Health Services  Transport at Discharge : Family

## 2025-06-24 NOTE — PROGRESS NOTES
Patient:  GREGORIA DRAKE    MRN:  [id number]    Alicein Request ID:  [id number]    Level of care reserved:  Home Health Agency    Partner Reserved:  [place] [address] [phone]    Clinical needs requested:    Geography searched:  [id number]    Start of Service:    Request sent:  4:03pm EDT on 6/24/2025 by Tatum Earl    Partner reserved:  4:22pm EDT on 6/24/2025 by Jules Castro    Choice list shared:  4:22pm EDT on 6/24/2025 by Jules Castro

## 2025-06-24 NOTE — DISCHARGE SUMMARY
Discharge Summary - Hospitalist   Name: Renzo Pop 65 y.o. female I MRN: [id number]  Unit/Bed#: ICU 10 I Date of Admission: 6/22/2025   Date of Service: 6/24/2025 I Hospital Day: 2     Assessment & Plan  Acute metabolic encephalopathy  Patient initially presented from home with AMS after having generalized tonic-clonic seizure. Hx of focal epilepsy as well as alcohol abuse admits to drinking 3 beers and having cannabinoid gummy earlier prior to episode onset.  Initially under critical care service, had breakthrough seizure lasting 5 minutes in ED requiring Ativan + Keppra  Lacosamide levels wnl. UDS negative. UA benign.  CK wnl. Procal negative. Vitamin B12 levels wnl.   Neurology consulted, can continue home Vimpat 200 mg BID + zonisamide 300 mg qHS & PRN IV Ativan  No need for inpatient EEG per neurology, at baseline.  Mentation improved, A&O x4 with no further seizure activity - can continue home AEDs with outpatient F/U with neurology outpatient  Frequent falls  Patient with history of frequent falls, likely due to chronic alcohol consumption  CT head: No acute intracranial abnormality.  PT/OT recommended home with [facility], set up per CM  Seizure (HCC)  History of non-intractable focal epilepsy with status epilepticus in 3/2022 on vEEG  Home AED Rx: Gabapentin 300 mg BID, lacosamide 200 mg BID, zonisamide 300 mg qHS.  Breakthrough seizure lasting 5 minutes in ED likely in the setting of alcohol and cannabinoid use lowering threshold  Received ativan 4mg x2 + Keppra 60mg/kg load in ED   Continue home AED regimen, no further seizure-like activity noted & can see additional plan above for acute metabolic encephalopathy  Frequent neuro checks, seizure precautions  Alcohol use  History of chronic alcohol use, reports drinking 2-3 beers daily & had prior detox admission in 2023  Continue CIWA protocol, thiamine & folate supplements  (HFpEF) heart failure with preserved ejection fraction (HCC)  Wt Readings from Last 3  Encounters:   06/23/25 57.7 kg (127 lb 3.3 oz)   05/08/25 56.7 kg (125 lb)   05/05/25 57.6 kg (127 lb)     Currently compensated, not in acute exacerbation  Echo (2022): EF 60%, grade 1 diastolic dysfunction.  Continue home Toprol-XL 25 mg BID  Pancytopenia (HCC)  Chronic pancytopenia likely in setting of alcohol use  Monitor CBC while inpatient, currently stable  Anxiety with depression  Home Pristiq since resumed     Medical Problems       Resolved Problems  Date Reviewed: 6/23/2025   None       Discharging Physician / Practitioner: Sheree Menezes PA-C  PCP: Lian Moreno MD  Admission Date:   Admission Orders (From admission, onward)       Ordered        06/22/25 2303  INPATIENT ADMISSION  Once                          Discharge Date: 06/24/25    Next Steps for Physician/AP Assuming Care:  Follow-up with neurology office for continued management of epilepsy    Test Results Pending at Discharge (will require follow up):  None    Medication Changes for Discharge & Rationale:   Start thiamine 100 mg daily, folic acid 1 mg daily (for alcohol use)  See after visit summary for reconciled discharge medications provided to patient and/or family.     Consultations During Hospital Stay:  Neurology  PT/OT    Procedures Performed:   None    Significant Findings / Test Results:   CT head: No acute intracranial abnormality.  Lacosamide levels: WNL at 10.35.    Incidental Findings:   None    Hospital Course:   Renzo Pop is a 65 y.o. female patient, PMH epilepsy, alcohol abuse, HFpEF, pancytopenia, anxiety with depression, who originally presented to the hospital on 6/22/2025 due to AMS after generalized seizure at home, thought to be brought on by drinking alcohol and having cannabinoid gummy prior to episode. On arrival to ED, patient had a breakthrough seizure lasting 5 minutes requiring doses of IV Ativan and Keppra, initially admitted under critical care service for monitoring.  Patient's mental status returned to  baseline, had no further seizure-like activity while inpatient. Neurology was consulted recommended continuing prior home AED regimen. PT/OT were consulted, recommended home with HHS set up by case management.    64 y/o F hx focal epilepsy (lacosamide, zanosamide, gabapentin), HFpEF (G1DD), anxiety, alcohol misuse, pancytopenia who presented with altered mental status and was admitted for generalized tonic clonic seizure. Per  she developed altered mental status yesterday and was not re-directable prompting EMS call. On EMS arrival she was oriented but distressed about current world events. She had consumed ~3 beers and cannabinoid gummy earlier in the day. He reports she experienced typical aura prior to having a seizure in which she was not interactive.  reports she typically consumes 2-3 beers daily and fall frequently.     In ED pt had witnessed 5 minute generalized tonic clonic seizure that resolved with 8mg ativan. Also administered keppra 60mgkg IV.  Afterwards she became somnolent and upon re-awakening was A&O x 3. She was admitted to ICU for further workup and planned cEEG monitoring.     Please see above list of diagnoses and related plan for additional information.     Discharge Day Visit / Exam:   * Please refer to separate progress note for these details *    Discussion with Family: Updated  () at bedside.    Discharge instructions/Information to patient and family:   See after visit summary for information provided to patient and family.      Provisions for Follow-Up Care:  See after visit summary for information related to follow-up care and any pertinent home health orders.      Mobility at time of Discharge:   Basic Mobility Inpatient Raw Score: 18  JH-HLM Goal: 6: Walk 10 steps or more  JH-HLM Achieved: 7: Walk 25 feet or more  HLM Goal achieved. Continue to encourage appropriate mobility.     Disposition:   Home with VNA Services (Reminder: Complete face to face  encounter)    Planned Readmission: None    Administrative Statements   Discharge Statement:  I have spent a total time of 55 minutes in caring for this patient on the day of the visit/encounter. >30 minutes of time was spent on: Patient and family education, Counseling / Coordination of care, Documenting in the medical record, Reviewing / ordering tests, medicine, procedures  , and Communicating with other healthcare professionals .    **Please Note: This note may have been constructed using a voice recognition system**

## 2025-06-24 NOTE — ASSESSMENT & PLAN NOTE
History of non-intractable focal epilepsy with status epilepticus in 3/2022 on vEEG  Home AED Rx: Gabapentin 300 mg BID, lacosamide 200 mg BID, zonisamide 300 mg qHS.  Breakthrough seizure lasting 5 minutes in ED likely in the setting of alcohol and cannabinoid use lowering threshold  Received ativan 4mg x2 + Keppra 60mg/kg load in ED   Continue home AED regimen, no further seizure-like activity noted & can see additional plan above for acute metabolic encephalopathy  Frequent neuro checks, seizure precautions

## 2025-06-24 NOTE — CASE MANAGEMENT
Case Management Discharge Planning Note    Patient name Renzo Pop  Location ICU 10/ICU 10 MRN [id number]  : 1960 Date 2025       Current Admission Date: 2025  Current Admission Diagnosis:Acute metabolic encephalopathy   Patient Active Problem List    Diagnosis Date Noted    Acute metabolic encephalopathy 2025    Alcohol use 2025    (HFpEF) heart failure with preserved ejection fraction (HCC) 2025    Frequent falls 2025    Age-related osteoporosis without current pathological fracture 06/10/2025    Long term use of bisphosphonates 06/10/2025    Injury of clavicle, right, initial encounter 2025    Dislocated shoulder, right, initial encounter 2025    First degree AV block 2025    Cyclic neutropenia (HCC) 2025    B12 deficiency 2025    Acute encephalopathy 2025    Leukopenia 09/10/2024    Other chronic pancreatitis (HCC) 2024    Aortic ectasia (HCC) 2024    Status epilepticus (HCC) 2024    SIRS (systemic inflammatory response syndrome) (HCC) 2024    Alcohol use disorder, severe, dependence (HCC) 2023    Combined systolic and diastolic congestive heart failure (HCC) 2023    Alcohol abuse 2023    Hyponatremia 2023    Seizure ([facility])     Chronic systolic congestive heart failure (HCC) 02/10/2023    Panic attack 2022    Cardiomyopathy (HCC) 2022    Pancytopenia ([facility]) 03/15/2022    Anxiety with depression 03/15/2022    Nonintractable focal epilepsy (HCC) 2022    Right shoulder pain 2021    Blood transfusion declined due to Muslim 2021    Thrombocytopenia (HCC) 2021    Gall stones 2021    Pancreatic cyst 2021    Other hyperlipidemia 10/03/2018    Age-related osteoporosis with current pathological fracture with routine healing 2016    Lipoma of right lower extremity 2016    Vitamin D deficiency 2016    Insomnia 03/10/2016      LOS  (days): 2  Geometric Mean LOS (GMLOS) (days): 4.4  Days to GMLOS:2.7     OBJECTIVE:  Risk of Unplanned Readmission Score: 13.14         Current admission status: Inpatient   Preferred Pharmacy:   [facility] - [address] [address]  Phone: [phone] Fax: [phone]    Primary Care Provider: Lian Moreno MD    Primary Insurance: MEDICARE  Secondary Insurance: AARP    DISCHARGE DETAILS:    Discharge planning discussed with:: patient- at bedside  [place] of Choice: Yes  Comments - Freedom of Choice: Nave[place]  CM contacted family/caregiver?: No- see comments    Requested Home Health Care         Is the patient interested in HHC at discharge?: Yes  Home Health Discipline requested:: Nursing, Occupational Therapy, Physical Therapy  Home Health Agency Name:: [facility]  Home Health Follow-Up Provider:: ALYSIA  Home Health Services Needed:: Strengthening/Theraputic Exercises to Improve Function, Gait/ADL Training, Evaluate Functional Status and Safety  Homebound Criteria Met:: Requires the Assistance of Another Person for Safe Ambulation or to Leave the Home  Supporting Clincal Findings:: Limited Endurance    Other Referral/Resources/Interventions Provided:  Interventions: HHC  [facility] and added to follow up providers. Met with patient at bedside, who is agreeable to services. F2F and AVS attached to referral. No further CM needs.    Would you like to participate in our [facility] service program?  : No - Declined    Treatment Team Recommendation: Home with Home Health Care    Additional Discharge Dispositions: Home Health Services  Transport at Discharge : Family

## 2025-06-24 NOTE — PLAN OF CARE
Problem: OCCUPATIONAL THERAPY ADULT  Goal: Performs self-care activities at highest level of function for planned discharge setting.  See evaluation for individualized goals.  Description: Treatment Interventions: ADL retraining, Functional transfer training, Cognitive reorientation, Patient/family training, Equipment evaluation/education, Compensatory technique education, Energy conservation, Activityengagement (cognitive assessments)          See flowsheet documentation for full assessment, interventions and recommendations.   Note: Limitation: Decreased ADL status, Decreased Safe judgement during ADL, Decreased cognition, Decreased endurance, Decreased self-care trans, Decreased high-level ADLs  Prognosis: Fair  Assessment: Patient is a 65 y.o. female seen for OT evaluation following admission on 6/22/2025  s/p Acute metabolic encephalopathy. Please see above for comprehensive list of comorbidities and significant PMHx impacting functional performance. Upon initial evaluation, pt appears to be performing below baseline functional status. Pt requires supervision for UB ADLs, supervision for LB ADLs, supervision for bed mobility, supervision for transfers and supervision for functional mobility household distance with RW. The AM-PAC & Barthel Index outcome tools were used to assist in determining pt safety w/self care /mobility and appropriate d/c recommendations, see above for score. Occupational performance is affected by the following deficits: impaired coordination , decreased balance , decreased standing tolerance for self care tasks , decreased dynamic balance impacting functional reach, decreased activity tolerance , impaired memory , attention to task, impaired judgement and problem solving , impaired safety awareness , and mood limitation. Personal/Environmental factors impacting D/C include: (+) Hx of falls , Assistance needed for ADL/IADLs, Assistance needed for ADLs and functional mobility, High fall  risk , decreased insight toward deficits , decreased recall of precautions , and health management. Supporting factors include: support system available and attitude towards recovery . Patient would benefit from OT services within the acute care setting to maximize level of functional independence in the following areas fall prevention , energy conservation , self-care transfers, bed mobility , functional mobility, formal cognitive evaluation, and ADLs.  From OT standpoint, recommendation at time of D/C would be Level III (Minimum Resource Intensity).     Rehab Resource Intensity Level, OT: III (Minimum Resource Intensity)     Niurka Mooney MS OTR/L   [place] Licensure# [id number]

## 2025-06-24 NOTE — PHYSICAL THERAPY NOTE
"   Physical Therapy Evaluation    Patient's Name: Renzo Pop    Admitting Diagnosis  Seizure ([facility]) [R56.9]  Altered mental status [R41.82]  Alcohol use disorder, severe, dependence (HCC) [F10.20]  AMS (altered mental status) [R41.82]    Problem List  Problem List[1]    Past Medical History  Past Medical History[2]    Past Surgical History  Past Surgical History[3]         25 1127   PT Last Visit   PT Visit Date 25   Note Type   Note type Evaluation   Pain Assessment   Pain Assessment Tool 0-10   Pain Score No Pain   Restrictions/Precautions   Weight Bearing Precautions Per Order No   Other Precautions Cognitive;Chair Alarm;Bed Alarm;Fall Risk;Multiple lines   Home Living   Type of Home House   Home Layout Multi-level;1/2 bath on main level;Bed/bath upstairs  (6 NATALYA)   Bathroom Shower/Tub Tub/shower unit   Bathroom Toilet Raised   Home Equipment Walker;Cane;Wheelchair-manual  (RW on each level)   Prior Function   Level of [place] Independent with functional mobility   Lives With Spouse   Receives Help From Family   Falls in the last 6 months 1 to 4  (reports x5 falls)   Comments per pt at baseline ambulates independently will utilize RW vs SPC when feeling \"unsteady\"   General   Family/Caregiver Present No   Cognition   Orientation Level Oriented to person;Oriented to place;Oriented to time;Oriented to situation   Following Commands Follows one step commands with increased time or repetition   Comments pt ID by wristband, name and    Subjective   Subjective pt agreeable to PT eval   RLE Assessment   RLE Assessment X  (grossly assessed to at least 3+/5 with mobility)   LLE Assessment   LLE Assessment X  (grossly assessed to at least 3+/5 with mobility)   Coordination   Movements are Fluid and Coordinated 0   Coordination and Movement Description ataxic, rigid, bradkinetic   Bed Mobility   Supine to Sit 5  Supervision   Additional items HOB elevated;Increased time required   Additional Comments " denies light headedness/dizziness with positional change, OOB in recliner post   Transfers   Sit to Stand 5  Supervision   Additional items Increased time required   Stand to Sit 5  Supervision   Additional items Increased time required   Additional Comments no AD for performance of transfer   Ambulation/Elevation   Gait pattern Improper Weight shift;Ataxia;Short stride;Step to  (no heel strike R LE, BL forefoot walking)   Gait Assistance 5  Supervision   Additional items Verbal cues   Assistive Device None;Rolling walker   Distance 8'x1(no AD), 45'x1, 65'x1, 50'x1   Stair Management Assistance 5  Supervision   Additional items Increased time required   Stair Management Technique One rail R;Alternating pattern;Step to pattern  (alternating to ascend, step to descend)   Number of Stairs 14   Ambulation/Elevation Additional Comments grossly unsteading, reaching for support with UE. utilized RW with improved stability, no LOB noted. does demonstrate slightly ataxic gait with decreased BL heel strike   Balance   Static Sitting Fair +   Dynamic Sitting Fair   Static Standing Poor +   Dynamic Standing Poor +   Ambulatory Poor +   Activity Tolerance   Activity Tolerance Patient limited by fatigue   Medical Staff Made Aware care coordinated with OT due to medical complexity, comorbidites and deviation from functional baseline, spoke with CM   Nurse Made Aware RN pre/post   Assessment   Prognosis Fair   Problem List Decreased strength;Decreased range of motion;Impaired balance;Decreased mobility;Decreased cognition   Assessment Pt is a 65 y.o. female seen for PT evaluation s/p admit to [facility] on 6/22/2025. Pt was admitted with a primary dx of: acute metabolic encephalopathy, seizure, AMS, alcohol use disorder.  PT now consulted for assessment of mobility and d/c needs. Pt with  PT eval and treat  orders.  Pts current comorbidities and personal factors effecting treatment include: chronic alcohol abuse,  depression, osteoporosis, history of stroke,. Pts current clinical presentation is Unstable/Unpredictable (high complexity) due to Ongoing medical management for primary dx, Decreased activity tolerance compared to baseline, Fall risk, Increased assistance needed from caregiver at current time, Ongoing telemetry monitoring, Cog status. Prior to admission, pt was independent without AD. Upon evaluation, pt currently is requiring Supervision for bed mobility; Supervision for transfers and Supervision for ambulation 65 ft w/ RW. Pt presents at PT eval functioning below baseline and currently w/ overall mobility deficits 2* to: BLE weakness, impaired balance, gait deviations, decreased activity tolerance compared to baseline, decreased functional mobility tolerance compared to baseline, decreased safety awareness, impaired judgement, fall risk, decreased cognition. Pt currently at a fall risk 2* to impairments listed above.  Pt will continue to benefit from skilled acute PT interventions to address stated impairments; to maximize functional mobility; for ongoing pt/ family training; and DME needs. At conclusion of PT session chair alarm engaged, all needs in reach, RN notified of session findings/recommendations, and pt returned back in recliner chair with phone and call bell within reach. Pt denies any further questions at this time. Recommend Level III (Minimum Resource Intensity)  upon hospital D/C.   Goals   Patient Goals to go home   STG Expiration Date 07/04/25   Short Term Goal #1 In 10 days pt will be able to: 1. Demonstrate ability to perform all aspects of bed mobility independently to improve functional safety.  2. Perform functional transfers independently to facilitate safe return to previous living environment.  3.  Ambulate 150 ft with LRAD independently with stable vitals to improve safety with household distances and reduce fall risk.  4. Improve LE strength grades by 1 to increase ease of functional  mobility with transfers and gait. 5. Pt will demonstrate improved balance by one grade in order to decrease risk of falls. 6. Climb at least 14 steps with unilateral HR with LRAD independently to simulate entrance to home.   PT Treatment Day 0   Plan   Treatment/Interventions Functional transfer training;LE strengthening/ROM;Elevations;Therapeutic exercise;Patient/family training;Cognitive reorientation;Equipment eval/education;Bed mobility;Gait training;Spoke to nursing;Spoke to case management;OT   PT Frequency 2-3x/wk   Discharge Recommendation   Rehab Resource Intensity Level, PT III (Minimum Resource Intensity)   Equipment Recommended Walker   Walker Package Recommended Wheeled walker   Change/add to Walker Package? No   AM-PAC Basic Mobility Inpatient   Turning in Flat Bed Without Bedrails 3   Lying on Back to Sitting on Edge of Flat Bed Without Bedrails 3   Moving Bed to Chair 3   Standing Up From Chair Using Arms 3   Walk in Room 3   Climb 3-5 Stairs With Railing 3   Basic Mobility Inpatient Raw Score 18   Basic Mobility Standardized Score 41.05   [facility] Highest Level Of Mobility   -HLM Goal 6: Walk 10 steps or more   JH-HLM Achieved 7: Walk 25 feet or more   End of Consult   Patient Position at End of Consult Bedside chair;All needs within reach;Bed/Chair alarm activated   The patient's AM-PAC Basic Mobility Inpatient Short Form Raw Score is 18. A Raw score of greater than 16 suggests the patient may benefit from discharge to home. Please also refer to the recommendation of the Physical Therapist for safe discharge planning.    Rhys Patterson, PT             [1]   Patient Active Problem List  Diagnosis    Insomnia    Lipoma of right lower extremity    Age-related osteoporosis with current pathological fracture with routine healing    Vitamin D deficiency    Other hyperlipidemia    Pancreatic cyst    Gall stones    Blood transfusion declined due to Confucianism    Thrombocytopenia (HCC)    Right  shoulder pain    Nonintractable focal epilepsy (HCC)    Pancytopenia (HCC)    Anxiety with depression    Panic attack    Cardiomyopathy (HCC)    Chronic systolic congestive heart failure (HCC)    Hyponatremia    Seizure (HCC)    Alcohol abuse    Alcohol use disorder, severe, dependence (HCC)    Combined systolic and diastolic congestive heart failure (HCC)    Status epilepticus (HCC)    SIRS (systemic inflammatory response syndrome) (HCC)    Other chronic pancreatitis (HCC)    Aortic ectasia (HCC)    Leukopenia    Acute encephalopathy    B12 deficiency    First degree AV block    Cyclic neutropenia (HCC)    Injury of clavicle, right, initial encounter    Dislocated shoulder, right, initial encounter    Age-related osteoporosis without current pathological fracture    Long term use of bisphosphonates    Acute metabolic encephalopathy    Alcohol use    (HFpEF) heart failure with preserved ejection fraction (HCC)    Frequent falls   [2]   Past Medical History:  Diagnosis Date    Anxiety     Chronic alcohol abuse     Dental disease     Depression     Ear problems     Epilepsy (HCC)     Fracture     Hepatitis     Hep A     Hepatitis     Insomnia     10mar2016 resolved    Memory loss     Osteoporosis     14jun2016 resolved    Pancreatitis     SAH (subarachnoid hemorrhage) (HCC)     Seasonal allergies     Seizure (HCC)     Seizures (HCC)     Speech impairment     Stroke (HCC)     Tinnitus     Urine discoloration 11/11/2019    Varicella     Visual impairment     Voice disorder     Vomiting 11/13/2019   [3]   Past Surgical History:  Procedure Laterality Date    BONE MARROW BIOPSY      2019, 2021    IR BIOPSY BONE  8/17/2021    IR BIOPSY BONE MARROW  11/14/2019    CO TX TIBL SHFT FX IMED IMPLT W/WO SCREWS&/CERCLA Left 8/4/2023    Procedure: INSERTION NAIL IM TIBIA;  Surgeon: Danielito Sauceda DO;  Location: [facility];  Service: Orthopedics    TUBAL LIGATION  1985    TUBAL LIGATION

## 2025-06-24 NOTE — NURSING NOTE
Telemetry and IV removed, patient  assisted to dress.  Accompanied by staff to front of building via wheelchair.

## 2025-06-24 NOTE — PLAN OF CARE
Problem: PHYSICAL THERAPY ADULT  Goal: Performs mobility at highest level of function for planned discharge setting.  See evaluation for individualized goals.  Description: Treatment/Interventions: Functional transfer training, LE strengthening/ROM, Elevations, Therapeutic exercise, Patient/family training, Cognitive reorientation, Equipment eval/education, Bed mobility, Gait training, Spoke to nursing, Spoke to case management, OT  Equipment Recommended: Walker       See flowsheet documentation for full assessment, interventions and recommendations.  Note: Prognosis: Fair  Problem List: Decreased strength, Decreased range of motion, Impaired balance, Decreased mobility, Decreased cognition  Assessment: Pt is a 65 y.o. female seen for PT evaluation s/p admit to [facility] on 6/22/2025. Pt was admitted with a primary dx of: acute metabolic encephalopathy, seizure, AMS, alcohol use disorder.  PT now consulted for assessment of mobility and d/c needs. Pt with  PT eval and treat  orders.  Pts current comorbidities and personal factors effecting treatment include: chronic alcohol abuse, depression, osteoporosis, history of stroke,. Pts current clinical presentation is Unstable/Unpredictable (high complexity) due to Ongoing medical management for primary dx, Decreased activity tolerance compared to baseline, Fall risk, Increased assistance needed from caregiver at current time, Ongoing telemetry monitoring, Cog status. Prior to admission, pt was independent without AD. Upon evaluation, pt currently is requiring Supervision for bed mobility; Supervision for transfers and Supervision for ambulation 65 ft w/ RW. Pt presents at PT eval functioning below baseline and currently w/ overall mobility deficits 2* to: BLE weakness, impaired balance, gait deviations, decreased activity tolerance compared to baseline, decreased functional mobility tolerance compared to baseline, decreased safety awareness, impaired  judgement, fall risk, decreased cognition. Pt currently at a fall risk 2* to impairments listed above.  Pt will continue to benefit from skilled acute PT interventions to address stated impairments; to maximize functional mobility; for ongoing pt/ family training; and DME needs. At conclusion of PT session chair alarm engaged, all needs in reach, RN notified of session findings/recommendations, and pt returned back in recliner chair with phone and call bell within reach. Pt denies any further questions at this time. Recommend Level III (Minimum Resource Intensity)  upon hospital D/C.        Rehab Resource Intensity Level, PT: III (Minimum Resource Intensity)    See flowsheet documentation for full assessment.

## 2025-06-24 NOTE — ASSESSMENT & PLAN NOTE
Wt Readings from Last 3 Encounters:   06/23/25 57.7 kg (127 lb 3.3 oz)   05/08/25 56.7 kg (125 lb)   05/05/25 57.6 kg (127 lb)     Currently compensated, not in acute exacerbation  Echo (2022): EF 60%, grade 1 diastolic dysfunction.  Continue home Toprol-XL 25 mg BID

## 2025-06-24 NOTE — ASSESSMENT & PLAN NOTE
Patient with history of frequent falls, likely due to chronic alcohol consumption  CT head: No acute intracranial abnormality.  PT/OT recommended home with [facility], set up per CM

## 2025-06-24 NOTE — ASSESSMENT & PLAN NOTE
Patient initially presented from home with AMS after having generalized tonic-clonic seizure. Hx of focal epilepsy as well as alcohol abuse admits to drinking 3 beers and having cannabinoid gummy earlier prior to episode onset.  Initially under critical care service, had breakthrough seizure lasting 5 minutes in ED requiring Ativan + Keppra  Lacosamide levels wnl. UDS negative. UA benign.  CK wnl. Procal negative. Vitamin B12 levels wnl.   Neurology consulted, can continue home Vimpat 200 mg BID + zonisamide 300 mg qHS & PRN IV Ativan  No need for inpatient EEG per neurology, at baseline.  Mentation improved, A&O x4 with no further seizure activity - neurology recommendations appreciated   Normal for race

## 2025-06-24 NOTE — ASSESSMENT & PLAN NOTE
Patient initially presented from home with AMS after having generalized tonic-clonic seizure. Hx of focal epilepsy as well as alcohol abuse admits to drinking 3 beers and having cannabinoid gummy earlier prior to episode onset.  Initially under critical care service, had breakthrough seizure lasting 5 minutes in ED requiring Ativan + Keppra  Lacosamide levels wnl. UDS negative. UA benign.  CK wnl. Procal negative. Vitamin B12 levels wnl.   Neurology consulted, can continue home Vimpat 200 mg BID + zonisamide 300 mg qHS & PRN IV Ativan  No need for inpatient EEG per neurology, at baseline.  Mentation improved, A&O x4 with no further seizure activity - can continue home AEDs with outpatient F/U with neurology outpatient

## 2025-06-24 NOTE — PROGRESS NOTES
Progress Note - Hospitalist   Name: Renzo Pop 65 y.o. female I MRN: [id number]  Unit/Bed#: ICU 10 I Date of Admission: 6/22/2025   Date of Service: 6/24/2025 I Hospital Day: 2    Assessment & Plan  Acute metabolic encephalopathy  Patient initially presented from home with AMS after having generalized tonic-clonic seizure. Hx of focal epilepsy as well as alcohol abuse admits to drinking 3 beers and having cannabinoid gummy earlier prior to episode onset.  Initially under critical care service, had breakthrough seizure lasting 5 minutes in ED requiring Ativan + Keppra  Lacosamide levels wnl. UDS negative. UA benign.  CK wnl. Procal negative. Vitamin B12 levels wnl.   Neurology consulted, can continue home Vimpat 200 mg BID + zonisamide 300 mg qHS & PRN IV Ativan  No need for inpatient EEG per neurology, at baseline.  Mentation improved, A&O x4 with no further seizure activity - neurology recommendations appreciated  Frequent falls  Patient with history of frequent falls, likely due to chronic alcohol consumption  CT head: No acute intracranial abnormality.  PT/OT consulted, recommendations appreciated  Seizure (HCC)  History of non-intractable focal epilepsy with status epilepticus in 3/2022 on vEEG  Home AED Rx: Gabapentin 300 mg BID, lacosamide 200 mg BID, zonisamide 300 mg qHS.  Breakthrough seizure lasting 5 minutes in ED likely in the setting of alcohol and cannabinoid use lowering threshold  Received ativan 4mg x2 + Keppra 60mg/kg load in ED   Continue home AED regimen, no further seizure-like activity noted & can see additional plan above for acute metabolic encephalopathy  Frequent neuro checks, seizure precautions  Alcohol use  History of chronic alcohol use, reports drinking 2-3 beers daily & had prior detox admission in 2023  Continue CIWA protocol, thiamine & folate supplements  (HFpEF) heart failure with preserved ejection fraction (HCC)  Wt Readings from Last 3 Encounters:   06/23/25 57.7 kg (127 lb  3.3 oz)   05/08/25 56.7 kg (125 lb)   05/05/25 57.6 kg (127 lb)     Currently compensated, not in acute exacerbation  Echo (2022): EF 60%, grade 1 diastolic dysfunction.  Continue home Toprol-XL 25 mg BID  Pancytopenia (HCC)  Chronic pancytopenia likely in setting of alcohol use  Monitor CBC while inpatient, currently stable  Anxiety with depression  Home Pristiq since resumed    VTE Pharmacologic Prophylaxis: VTE Score: 2 Low Risk (Score 0-2) - Encourage Ambulation.    Mobility:   Basic Mobility Inpatient Raw Score: 18  JH-HLM Goal: 6: Walk 10 steps or more  JH-HLM Achieved: 3: Sit at edge of bed  JH-HLM Goal NOT achieved. Continue with multidisciplinary rounding and encourage appropriate mobility to improve upon JH-HLM goals.    Patient Centered Rounds: I performed bedside rounds with nursing staff today.   Discussions with Specialists or Other Care Team Provider: None    Education and Discussions with Family / Patient: Updated  () at bedside.    Current Length of Stay: 2 day(s)  Current Patient Status: Inpatient   Certification Statement: The patient will continue to require additional inpatient hospital stay due to PT/OT evaluation, neurology clearance  Discharge Plan: Anticipate discharge later today or tomorrow to discharge location to be determined pending rehab evaluations.    Code Status: Level 1 - Full Code    Subjective   Patient is seen at bedside this a.m., denies any acute complaints only having having mild nasal congestion.    Objective :  Temp:  [97.5 °F (36.4 °C)-98.4 °F (36.9 °C)] 98.2 °F (36.8 °C)  HR:  [64-74] 70  BP: ()/(57-59) 104/57  Resp:  [15-29] 29  SpO2:  [98 %-99 %] 98 %  O2 Device: None (Room air)    Body mass index is 19.92 kg/m².     Input and Output Summary (last 24 hours):     Intake/Output Summary (Last 24 hours) at 6/24/2025 0903  Last data filed at 6/24/2025 0600  Gross per 24 hour   Intake --   Output 900 ml   Net -900 ml       Physical  Exam  Constitutional:       General: She is not in acute distress.     Appearance: She is not ill-appearing, toxic-appearing or diaphoretic.     Cardiovascular:      Rate and Rhythm: Normal rate and regular rhythm.      Pulses: Normal pulses.      Heart sounds: Normal heart sounds.   Pulmonary:      Effort: Pulmonary effort is normal. No respiratory distress.      Breath sounds: Normal breath sounds.   Abdominal:      General: There is no distension.      Palpations: Abdomen is soft.      Tenderness: There is no abdominal tenderness.     Musculoskeletal:         General: No swelling or tenderness.     Skin:     General: Skin is warm.     Neurological:      General: No focal deficit present.      Mental Status: She is alert and oriented to person, place, and time.     Psychiatric:         Mood and Affect: Mood normal.         Behavior: Behavior normal.           Lines/Drains:              Lab Results: I have reviewed the following results:   Results from last 7 days   Lab Units 06/24/25  0730   WBC Thousand/uL 3.69*   HEMOGLOBIN g/dL 11.8   HEMATOCRIT % 35.4   PLATELETS Thousands/uL 73*   SEGS PCT % 53   LYMPHO PCT % 34   MONO PCT % 9   EOS PCT % 2     Results from last 7 days   Lab Units 06/24/25  0730 06/23/25  0449 06/22/25 1951   SODIUM mmol/L 135   < > 137   POTASSIUM mmol/L 4.3   < > 4.0   CHLORIDE mmol/L 110*   < > 108   CO2 mmol/L 22   < > 21   BUN mg/dL 12   < > 11   CREATININE mg/dL 0.68   < > 0.67   ANION GAP mmol/L 3*   < > 8   CALCIUM mg/dL 8.1*   < > 8.9   ALBUMIN g/dL  --   --  4.0   TOTAL BILIRUBIN mg/dL  --   --  0.54   ALK PHOS U/L  --   --  50   ALT U/L  --   --  21   AST U/L  --   --  33   GLUCOSE RANDOM mg/dL 108   < > 107    < > = values in this interval not displayed.         Results from last 7 days   Lab Units 06/22/25 2028   POC GLUCOSE mg/dl 114         Results from last 7 days   Lab Units 06/22/25 1951   PROCALCITONIN ng/ml <0.05       Recent Cultures (last 7 days):         Imaging  Results Review: I reviewed radiology reports from this admission including: CT head.  Other Study Results Review: EKG was reviewed.     Last 24 Hours Medication List:     Current Facility-Administered Medications:     chlorhexidine (PERIDEX) 0.12 % oral rinse 15 mL, Q12H DESTIN    desvenlafaxine succinate (PRISTIQ) 24 hr tablet 50 mg, Daily    folic acid (FOLVITE) tablet 1 mg, Daily    gabapentin (NEURONTIN) capsule 300 mg, BID    guaiFENesin (MUCINEX) 12 hr tablet 600 mg, Q12H DESTIN    lacosamide (VIMPAT) tablet 200 mg, Q12H DESTIN    loratadine (CLARITIN) tablet 10 mg, Daily    metoprolol succinate (TOPROL-XL) 24 hr tablet 25 mg, BID    thiamine tablet 100 mg, Daily    zonisamide (ZONEGRAN) capsule 300 mg, HS    Administrative Statements   Today, Patient Was Seen By: Sheree Menezes PA-C  I have spent a total time of 45 minutes in caring for this patient on the day of the visit/encounter including Patient and family education, Documenting in the medical record, Reviewing/placing orders in the medical record (including tests, medications, and/or procedures), Obtaining or reviewing history  , and Communicating with other healthcare professionals .    **Please Note: This note may have been constructed using a voice recognition system.**

## 2025-06-25 ENCOUNTER — TRANSITIONAL CARE MANAGEMENT (OUTPATIENT)
Dept: FAMILY MEDICINE CLINIC | Facility: CLINIC | Age: 65
End: 2025-06-25

## 2025-06-26 ENCOUNTER — TELEPHONE (OUTPATIENT)
Age: 65
End: 2025-06-26

## 2025-06-26 NOTE — TELEPHONE ENCOUNTER
Received call from javy Sears pharmacist for [employer] insurer post speaking with patient earlier to confirmi If patient has diagnosis for CVA (stroke) and Congestive Heart Failure. Please follow up with [facility] for release of information. Insurer not identified on patient chart; just Medicare and [facility].

## 2025-07-03 DIAGNOSIS — I50.22 CHRONIC SYSTOLIC CONGESTIVE HEART FAILURE (HCC): ICD-10-CM

## 2025-07-03 DIAGNOSIS — I42.9 CARDIOMYOPATHY (HCC): ICD-10-CM

## 2025-07-03 RX ORDER — METOPROLOL SUCCINATE 25 MG/1
25 TABLET, EXTENDED RELEASE ORAL 2 TIMES DAILY
Qty: 180 TABLET | Refills: 1 | Status: SHIPPED | OUTPATIENT
Start: 2025-07-03

## 2025-07-07 ENCOUNTER — TELEPHONE (OUTPATIENT)
Age: 65
End: 2025-07-07

## 2025-07-07 NOTE — TELEPHONE ENCOUNTER
Daija, Occupational Therapist with [facility], called to report that the occupational therapy eval was missed last week as she was unable to reach the patient.  She will try to reach the patient again this week.

## 2025-07-08 ENCOUNTER — RESULTS FOLLOW-UP (OUTPATIENT)
Dept: RHEUMATOLOGY | Facility: CLINIC | Age: 65
End: 2025-07-08

## 2025-07-08 ENCOUNTER — APPOINTMENT (OUTPATIENT)
Dept: LAB | Facility: CLINIC | Age: 65
End: 2025-07-08
Payer: MEDICARE

## 2025-07-08 DIAGNOSIS — E55.9 VITAMIN D DEFICIENCY: ICD-10-CM

## 2025-07-08 DIAGNOSIS — D61.818 PANCYTOPENIA (HCC): ICD-10-CM

## 2025-07-08 DIAGNOSIS — R41.3 COMPLAINTS OF MEMORY DISTURBANCE: ICD-10-CM

## 2025-07-08 DIAGNOSIS — E55.9 VITAMIN D DEFICIENCY: Primary | ICD-10-CM

## 2025-07-08 DIAGNOSIS — Z79.83 LONG TERM USE OF BISPHOSPHONATES: ICD-10-CM

## 2025-07-08 DIAGNOSIS — M80.00XD AGE-RELATED OSTEOPOROSIS WITH CURRENT PATHOLOGICAL FRACTURE WITH ROUTINE HEALING: ICD-10-CM

## 2025-07-08 DIAGNOSIS — M81.0 AGE-RELATED OSTEOPOROSIS WITHOUT CURRENT PATHOLOGICAL FRACTURE: ICD-10-CM

## 2025-07-08 DIAGNOSIS — G40.109 NONINTRACTABLE FOCAL EPILEPSY (HCC): Chronic | ICD-10-CM

## 2025-07-08 DIAGNOSIS — D69.6 THROMBOCYTOPENIA (HCC): ICD-10-CM

## 2025-07-08 LAB
25(OH)D3 SERPL-MCNC: 9.8 NG/ML (ref 30–100)
ALBUMIN SERPL BCG-MCNC: 3.9 G/DL (ref 3.5–5)
ALP SERPL-CCNC: 93 U/L (ref 34–104)
ALT SERPL W P-5'-P-CCNC: 19 U/L (ref 7–52)
ANION GAP SERPL CALCULATED.3IONS-SCNC: 5 MMOL/L (ref 4–13)
AST SERPL W P-5'-P-CCNC: 26 U/L (ref 13–39)
BASOPHILS # BLD AUTO: 0.03 THOUSANDS/ÂΜL (ref 0–0.1)
BASOPHILS NFR BLD AUTO: 1 % (ref 0–1)
BILIRUB SERPL-MCNC: 0.27 MG/DL (ref 0.2–1)
BUN SERPL-MCNC: 13 MG/DL (ref 5–25)
CALCIUM SERPL-MCNC: 9.4 MG/DL (ref 8.4–10.2)
CHLORIDE SERPL-SCNC: 107 MMOL/L (ref 96–108)
CO2 SERPL-SCNC: 27 MMOL/L (ref 21–32)
CREAT SERPL-MCNC: 0.67 MG/DL (ref 0.6–1.3)
CRP SERPL QL: 1.7 MG/L
EOSINOPHIL # BLD AUTO: 0.13 THOUSAND/ÂΜL (ref 0–0.61)
EOSINOPHIL NFR BLD AUTO: 3 % (ref 0–6)
ERYTHROCYTE [DISTWIDTH] IN BLOOD BY AUTOMATED COUNT: 12.6 % (ref 11.6–15.1)
FERRITIN SERPL-MCNC: 103 NG/ML (ref 30–307)
FOLATE SERPL-MCNC: >22.3 NG/ML
GFR SERPL CREATININE-BSD FRML MDRD: 92 ML/MIN/1.73SQ M
GLUCOSE SERPL-MCNC: 112 MG/DL (ref 65–140)
HCT VFR BLD AUTO: 34.9 % (ref 34.8–46.1)
HGB BLD-MCNC: 11.5 G/DL (ref 11.5–15.4)
IGA SERPL-MCNC: 517 MG/DL (ref 66–433)
IGG SERPL-MCNC: 980 MG/DL (ref 635–1741)
IGM SERPL-MCNC: 154 MG/DL (ref 45–281)
IMM GRANULOCYTES # BLD AUTO: 0.03 THOUSAND/UL (ref 0–0.2)
IMM GRANULOCYTES NFR BLD AUTO: 1 % (ref 0–2)
IRON SATN MFR SERPL: 18 % (ref 15–50)
IRON SERPL-MCNC: 51 UG/DL (ref 50–212)
LYMPHOCYTES # BLD AUTO: 0.94 THOUSANDS/ÂΜL (ref 0.6–4.47)
LYMPHOCYTES NFR BLD AUTO: 22 % (ref 14–44)
MCH RBC QN AUTO: 32.5 PG (ref 26.8–34.3)
MCHC RBC AUTO-ENTMCNC: 33 G/DL (ref 31.4–37.4)
MCV RBC AUTO: 99 FL (ref 82–98)
MONOCYTES # BLD AUTO: 0.33 THOUSAND/ÂΜL (ref 0.17–1.22)
MONOCYTES NFR BLD AUTO: 8 % (ref 4–12)
NEUTROPHILS # BLD AUTO: 2.87 THOUSANDS/ÂΜL (ref 1.85–7.62)
NEUTS SEG NFR BLD AUTO: 65 % (ref 43–75)
NRBC BLD AUTO-RTO: 0 /100 WBCS
PLATELET # BLD AUTO: 175 THOUSANDS/UL (ref 149–390)
PMV BLD AUTO: 9.8 FL (ref 8.9–12.7)
POTASSIUM SERPL-SCNC: 4.5 MMOL/L (ref 3.5–5.3)
PROT SERPL-MCNC: 7.4 G/DL (ref 6.4–8.4)
PTH-INTACT SERPL-MCNC: 41.8 PG/ML (ref 12–88)
RBC # BLD AUTO: 3.54 MILLION/UL (ref 3.81–5.12)
RHEUMATOID FACT SERPL-ACNC: <10 IU/ML
SODIUM SERPL-SCNC: 139 MMOL/L (ref 135–147)
TIBC SERPL-MCNC: 285.6 UG/DL (ref 250–450)
TRANSFERRIN SERPL-MCNC: 204 MG/DL (ref 203–362)
TSH SERPL DL<=0.05 MIU/L-ACNC: 1.27 UIU/ML (ref 0.45–4.5)
UIBC SERPL-MCNC: 235 UG/DL (ref 155–355)
VIT B12 SERPL-MCNC: 426 PG/ML (ref 180–914)
WBC # BLD AUTO: 4.33 THOUSAND/UL (ref 4.31–10.16)

## 2025-07-08 PROCEDURE — 80203 DRUG SCREEN QUANT ZONISAMIDE: CPT

## 2025-07-08 PROCEDURE — 82607 VITAMIN B-12: CPT

## 2025-07-08 PROCEDURE — 82728 ASSAY OF FERRITIN: CPT

## 2025-07-08 PROCEDURE — 86140 C-REACTIVE PROTEIN: CPT

## 2025-07-08 PROCEDURE — 83540 ASSAY OF IRON: CPT

## 2025-07-08 PROCEDURE — 88184 FLOWCYTOMETRY/ TC 1 MARKER: CPT

## 2025-07-08 PROCEDURE — 82746 ASSAY OF FOLIC ACID SERUM: CPT

## 2025-07-08 PROCEDURE — 84443 ASSAY THYROID STIM HORMONE: CPT

## 2025-07-08 PROCEDURE — 84425 ASSAY OF VITAMIN B-1: CPT

## 2025-07-08 PROCEDURE — 82306 VITAMIN D 25 HYDROXY: CPT

## 2025-07-08 PROCEDURE — 88185 FLOWCYTOMETRY/TC ADD-ON: CPT | Performed by: PHYSICIAN ASSISTANT

## 2025-07-08 PROCEDURE — 36415 COLL VENOUS BLD VENIPUNCTURE: CPT

## 2025-07-08 PROCEDURE — 83970 ASSAY OF PARATHORMONE: CPT

## 2025-07-08 PROCEDURE — 82784 ASSAY IGA/IGD/IGG/IGM EACH: CPT

## 2025-07-08 PROCEDURE — 86038 ANTINUCLEAR ANTIBODIES: CPT

## 2025-07-08 PROCEDURE — 86225 DNA ANTIBODY NATIVE: CPT

## 2025-07-08 PROCEDURE — 83550 IRON BINDING TEST: CPT

## 2025-07-08 PROCEDURE — 84165 PROTEIN E-PHORESIS SERUM: CPT

## 2025-07-08 PROCEDURE — 86431 RHEUMATOID FACTOR QUANT: CPT

## 2025-07-08 RX ORDER — ERGOCALCIFEROL 1.25 MG/1
50000 CAPSULE, LIQUID FILLED ORAL WEEKLY
Qty: 12 CAPSULE | Refills: 0 | Status: SHIPPED | OUTPATIENT
Start: 2025-07-08

## 2025-07-09 LAB
ALBUMIN SERPL ELPH-MCNC: 3.45 G/DL (ref 3.2–5.1)
ALBUMIN SERPL ELPH-MCNC: 50.7 % (ref 48–70)
ALPHA1 GLOB SERPL ELPH-MCNC: 0.4 G/DL (ref 0.15–0.47)
ALPHA1 GLOB SERPL ELPH-MCNC: 5.9 % (ref 1.8–7)
ALPHA2 GLOB SERPL ELPH-MCNC: 0.92 G/DL (ref 0.42–1.04)
ALPHA2 GLOB SERPL ELPH-MCNC: 13.6 % (ref 5.9–14.9)
BETA GLOB ABNORMAL SERPL ELPH-MCNC: 0.43 G/DL (ref 0.31–0.57)
BETA1 GLOB SERPL ELPH-MCNC: 6.3 % (ref 4.7–7.7)
BETA2 GLOB SERPL ELPH-MCNC: 8.1 % (ref 3.1–7.9)
BETA2+GAMMA GLOB SERPL ELPH-MCNC: 0.55 G/DL (ref 0.2–0.58)
GAMMA GLOB ABNORMAL SERPL ELPH-MCNC: 1.05 G/DL (ref 0.4–1.66)
GAMMA GLOB SERPL ELPH-MCNC: 15.4 % (ref 6.9–22.3)
IGG/ALB SER: 1.03 {RATIO} (ref 1.1–1.8)
PROT SERPL-MCNC: 6.8 G/DL (ref 6.4–8.4)

## 2025-07-09 PROCEDURE — 84165 PROTEIN E-PHORESIS SERUM: CPT | Performed by: STUDENT IN AN ORGANIZED HEALTH CARE EDUCATION/TRAINING PROGRAM

## 2025-07-09 NOTE — TELEPHONE ENCOUNTER
Please let her know Vit D is very low and I will prescribe 50,000 IU once weekly x 12 weeks, after that she should resume 2000 IU once daily OTC.

## 2025-07-10 ENCOUNTER — OFFICE VISIT (OUTPATIENT)
Dept: FAMILY MEDICINE CLINIC | Facility: CLINIC | Age: 65
End: 2025-07-10
Payer: MEDICARE

## 2025-07-10 VITALS
OXYGEN SATURATION: 98 % | SYSTOLIC BLOOD PRESSURE: 114 MMHG | BODY MASS INDEX: 19.81 KG/M2 | TEMPERATURE: 97.7 F | HEART RATE: 83 BPM | HEIGHT: 67 IN | DIASTOLIC BLOOD PRESSURE: 70 MMHG | RESPIRATION RATE: 16 BRPM | WEIGHT: 126.2 LBS

## 2025-07-10 DIAGNOSIS — I50.22 CHRONIC SYSTOLIC CONGESTIVE HEART FAILURE (HCC): Primary | ICD-10-CM

## 2025-07-10 DIAGNOSIS — R56.9 SEIZURE (HCC): ICD-10-CM

## 2025-07-10 DIAGNOSIS — R56.9 OBSERVED SEIZURE-LIKE ACTIVITY (HCC): ICD-10-CM

## 2025-07-10 DIAGNOSIS — G47.00 INSOMNIA, UNSPECIFIED TYPE: ICD-10-CM

## 2025-07-10 DIAGNOSIS — I42.6 ALCOHOLIC CARDIOMYOPATHY (HCC): ICD-10-CM

## 2025-07-10 DIAGNOSIS — F41.9 ANXIETY: Chronic | ICD-10-CM

## 2025-07-10 DIAGNOSIS — R45.851 PASSIVE SUICIDAL IDEATIONS: ICD-10-CM

## 2025-07-10 DIAGNOSIS — F10.10 ALCOHOL ABUSE: Chronic | ICD-10-CM

## 2025-07-10 DIAGNOSIS — F10.20 ALCOHOL USE DISORDER, SEVERE, DEPENDENCE (HCC): Chronic | ICD-10-CM

## 2025-07-10 DIAGNOSIS — F32.2 SEVERE DEPRESSION (HCC): ICD-10-CM

## 2025-07-10 PROCEDURE — 99214 OFFICE O/P EST MOD 30 MIN: CPT | Performed by: FAMILY MEDICINE

## 2025-07-10 PROCEDURE — G2211 COMPLEX E/M VISIT ADD ON: HCPCS | Performed by: FAMILY MEDICINE

## 2025-07-10 RX ORDER — ZONISAMIDE 100 MG/1
300 CAPSULE ORAL
Qty: 90 CAPSULE | Refills: 0 | Status: SHIPPED | OUTPATIENT
Start: 2025-07-10

## 2025-07-10 RX ORDER — LANOLIN ALCOHOL/MO/W.PET/CERES
100 CREAM (GRAM) TOPICAL DAILY
Qty: 90 TABLET | Refills: 1 | Status: SHIPPED | OUTPATIENT
Start: 2025-07-10 | End: 2025-08-09

## 2025-07-10 RX ORDER — HYDROXYZINE HYDROCHLORIDE 25 MG/1
25 TABLET, FILM COATED ORAL 2 TIMES DAILY PRN
Qty: 60 TABLET | Refills: 5 | Status: SHIPPED | OUTPATIENT
Start: 2025-07-10

## 2025-07-11 ENCOUNTER — TELEPHONE (OUTPATIENT)
Age: 65
End: 2025-07-11

## 2025-07-11 LAB — ZONISAMIDE SERPL-MCNC: 22.2 UG/ML (ref 10–40)

## 2025-07-11 NOTE — TELEPHONE ENCOUNTER
[facility] sent to MAT    [facility]  Good Afternoon,    Please see referral below and advise for MAT services.    Thank you

## 2025-07-12 LAB — VIT B1 BLD-SCNC: 124.7 NMOL/L (ref 66.5–200)

## 2025-07-13 NOTE — ASSESSMENT & PLAN NOTE
Orders:    hydrOXYzine HCL (ATARAX) 25 mg tablet; Take 1 tablet (25 mg total) by mouth 2 (two) times a day as needed for anxiety

## 2025-07-13 NOTE — ASSESSMENT & PLAN NOTE
Follow up and management per neurology, on Vimpat and zonergan  Orders:    zonisamide (ZONEGRAN) 100 mg capsule; Take 3 capsules (300 mg total) by mouth daily at bedtime

## 2025-07-13 NOTE — ASSESSMENT & PLAN NOTE
Continue thiamine and folic acid supplements  Orders:    thiamine 100 MG tablet; Take 1 tablet (100 mg total) by mouth daily

## 2025-07-13 NOTE — ASSESSMENT & PLAN NOTE
Wt Readings from Last 3 Encounters:   07/10/25 57.2 kg (126 lb 3.2 oz)   06/23/25 57.7 kg (127 lb 3.3 oz)   05/08/25 56.7 kg (125 lb)     stable

## 2025-07-13 NOTE — PROGRESS NOTES
Name: Renzo Pop      : 1960     MRN: [id number]  Encounter Provider: Lian Moreno MD  Encounter Date: 7/10/2025  Encounter department: [facility]    Assessment & Plan  Chronic systolic congestive heart failure (HCC)  Wt Readings from Last 3 Encounters:   07/10/25 57.2 kg (126 lb 3.2 oz)   25 57.7 kg (127 lb 3.3 oz)   25 56.7 kg (125 lb)     stable               Severe depression (HCC)  Depression Screening Follow-up Plan: Patient's depression screening was positive with a PHQ-9 score of 24. Patient with underlying depression and was advised to continue current medications as prescribed. Recommend partial hospitalization give signifcantly worsened symptoms, alcohol abuse, does not  and that may pose a problem for partial hospitalizations    Orders:    Ambulatory referral to Psych Services; Future    Alcohol abuse  Continue thiamine and folic acid supplements  Orders:    thiamine 100 MG tablet; Take 1 tablet (100 mg total) by mouth daily    Seizure (HCC)  Follow up and management per neurology, on Vimpat and zonergan  Orders:    zonisamide (ZONEGRAN) 100 mg capsule; Take 3 capsules (300 mg total) by mouth daily at bedtime    Alcohol use disorder, severe, dependence (HCC)         Alcoholic cardiomyopathy (HCC)            Anxiety    Orders:    hydrOXYzine HCL (ATARAX) 25 mg tablet; Take 1 tablet (25 mg total) by mouth 2 (two) times a day as needed for anxiety    Insomnia, unspecified type      Orders:    hydrOXYzine HCL (ATARAX) 25 mg tablet; Take 1 tablet (25 mg total) by mouth 2 (two) times a day as needed for anxiety     Observed seizure-like activity (HCC)         Passive suicidal ideations  Psych referral placed, recommend partial hospitalization or go to ED and get crisis involved , may need inpatient admission, patient refused going to the ER, does not pose immediate threat to self or anyone                      Read package inserts for all medications  before starting a new medications, call me if you have any questions.    Patient was given opportunity to ask questions and all questions were answered.    Subjective:     Renzo Pop is a 65 y.o. female.    PMH epilepsy, alcohol abuse, HFpEF, pancytopenia, anxiety with depression, who was hospitalized from 6/22/2025 through 6/24/2025 with the admitting diagnosis of AMS after generalized seizure at home, thought to be brought on by drinking alcohol and having cannabinoid gummy prior to episode. On arrival to ED, patient had a breakthrough seizure lasting 5 minutes requiring doses of IV Ativan and Keppra, initially admitted under critical care service for monitoring.  Patient's mental status returned to baseline, had no further seizure-like activity while inpatient. Neurology was consulted recommended continuing prior home AED regimen. PT/OT were consulted, recommended home with HHS set up by case management.   She continues to drink alcohol and would not like to be labelled as alcoholic and is upset as her  takes her to the ER due to seizures and she does not think she had seizures or alcohol use disorder    She does have depression and would like to to see psychiatry immediately              Past Medical History[1]    Family History[2]    Past Surgical History[3]     reports that she has never smoked. She has never used smokeless tobacco. She reports current alcohol use of about 4.0 standard drinks of alcohol per week. She reports current drug use. Drug: Other.    Current Medications[4]    The following portions of the patient's history were reviewed and updated as appropriate: allergies, current medications, past family history, past medical history, past social history, past surgical history and problem list.    Review of Systems      PHQ-2/9 Depression Screening    Little interest or pleasure in doing things: 1 - several days  Feeling down, depressed, or hopeless: 2 - more than half the days  Trouble  "falling or staying asleep, or sleeping too much: 3 - nearly every day  Feeling tired or having little energy: 3 - nearly every day  Poor appetite or overeating: 3 - nearly every day  Feeling bad about yourself - or that you are a failure or have let yourself or your family down: 3 - nearly every day  Trouble concentrating on things, such as reading the newspaper or watching television: 3 - nearly every day  Moving or speaking so slowly that other people could have noticed. Or the opposite - being so fidgety or restless that you have been moving around a lot more than usual: 3 - nearly every day  Thoughts that you would be better off dead, or of hurting yourself in some way: 3 - nearly every day  PHQ-9 Score: 24  PHQ-9 Interpretation: Severe depression       JAY-7 Flowsheet Screening      Flowsheet Row Most Recent Value   Over the last two weeks, how often have you been bothered by the following problems?     Feeling nervous, anxious, or on edge 3   Not being able to stop or control worrying 3   Worrying too much about different things 3   Trouble relaxing  3   Being so restless that it's hard to sit still 3   Becoming easily annoyed or irritable  0   Feeling afraid as if something awful might happen 3   How difficult have these problems made it for you to do your work, take care of things at home, or get along with other people?  Somewhat difficult   JAY Score  18              Objective:    /70 (BP Location: Left arm, Patient Position: Sitting, Cuff Size: Adult)   Pulse 83   Temp 97.7 °F (36.5 °C) (Tympanic)   Resp 16   Ht 5' 7\" (1.702 m)   Wt 57.2 kg (126 lb 3.2 oz)   SpO2 98%   BMI 19.77 kg/m²      Physical Exam      Recent Results (from the past 52 weeks)   ECG 12 lead    Collection Time: 02/14/25 12:13 PM   Result Value Ref Range    Ventricular Rate 70 BPM    Atrial Rate 70 BPM    ID Interval 248 ms    QRSD Interval 76 ms    QT Interval 424 ms    QTC Interval 457 ms    P Axis 46 degrees    QRS Axis " "53 degrees    T Wave Axis 97 degrees   CBC and differential    Collection Time: 02/14/25 12:41 PM   Result Value Ref Range    WBC 4.74 4.31 - 10.16 Thousand/uL    RBC 4.12 3.81 - 5.12 Million/uL    Hemoglobin 13.1 11.5 - 15.4 g/dL    Hematocrit 38.5 34.8 - 46.1 %    MCV 93 82 - 98 fL    MCH 31.8 26.8 - 34.3 pg    MCHC 34.0 31.4 - 37.4 g/dL    RDW 12.3 11.6 - 15.1 %    MPV 9.5 8.9 - 12.7 fL    Platelets 95 (L) 149 - 390 Thousands/uL    nRBC 0 /100 WBCs    Segmented % 69 43 - 75 %    Immature Grans % 0 0 - 2 %    Lymphocytes % 25 14 - 44 %    Monocytes % 5 4 - 12 %    Eosinophils Relative 1 0 - 6 %    Basophils Relative 0 0 - 1 %    Absolute Neutrophils 3.22 1.85 - 7.62 Thousands/µL    Absolute Immature Grans 0.02 0.00 - 0.20 Thousand/uL    Absolute Lymphocytes 1.18 0.60 - 4.47 Thousands/µL    Absolute Monocytes 0.25 0.17 - 1.22 Thousand/µL    Eosinophils Absolute 0.05 0.00 - 0.61 Thousand/µL    Basophils Absolute 0.02 0.00 - 0.10 Thousands/µL   Comprehensive metabolic panel    Collection Time: 02/14/25 12:41 PM   Result Value Ref Range    Sodium 131 (L) 135 - 147 mmol/L    Potassium 3.7 3.5 - 5.3 mmol/L    Chloride 96 96 - 108 mmol/L    CO2 25 21 - 32 mmol/L    ANION GAP 10 4 - 13 mmol/L    BUN 11 5 - 25 mg/dL    Creatinine 0.69 0.60 - 1.30 mg/dL    Glucose 131 65 - 140 mg/dL    Calcium 9.1 8.4 - 10.2 mg/dL    AST 40 (H) 13 - 39 U/L    ALT 29 7 - 52 U/L    Alkaline Phosphatase 70 34 - 104 U/L    Total Protein 7.6 6.4 - 8.4 g/dL    Albumin 4.5 3.5 - 5.0 g/dL    Total Bilirubin 0.50 0.20 - 1.00 mg/dL    eGFR 92 ml/min/1.73sq m   HS Troponin 0hr (reflex protocol)    Collection Time: 02/14/25 12:41 PM   Result Value Ref Range    hs TnI 0hr 3 \"Refer to ACS Flowchart\"- see link ng/L   Magnesium    Collection Time: 02/14/25 12:41 PM   Result Value Ref Range    Magnesium 1.8 (L) 1.9 - 2.7 mg/dL   Zonisamide level    Collection Time: 02/14/25 12:41 PM   Result Value Ref Range    Zonisamide Lvl 18.4 10.0 - 40.0 ug/mL "   Lacosamide    Collection Time: 02/14/25 12:41 PM   Result Value Ref Range    Lacosamide 18.93 1.00 - 20.00 ug/mL   FLU/COVID Rapid Antigen (30 min. TAT) - Preferred screening test in ED    Collection Time: 02/14/25 12:41 PM    Specimen: Nose; Nares   Result Value Ref Range    SARS COV Rapid Antigen Negative Negative    Influenza A Rapid Antigen Negative Negative    Influenza B Rapid Antigen Negative Negative   Vitamin B12    Collection Time: 02/14/25 12:41 PM   Result Value Ref Range    Vitamin B-12 291 180 - 914 pg/mL   UA (URINE) with reflex to Scope    Collection Time: 02/14/25  1:51 PM   Result Value Ref Range    Color, UA Yellow Yellow    Clarity, UA Clear Clear    Specific Gravity, UA 1.010 1.001 - 1.030    pH, UA 6.0 5.0, 5.5, 6.0, 6.5, 7.0, 7.5, 8.0    Leukocytes, UA 2+ (A) Negative    Nitrite, UA Negative Negative    Protein, UA Negative Negative, Interference- unable to analyze mg/dl    Glucose, UA Negative Negative mg/dl    Ketones, UA Negative Negative mg/dl    Urobilinogen, UA 0.2 0.2, 1.0 E.U./dl E.U./dl    Bilirubin, UA Negative Negative    Occult Blood, UA Negative Negative   Ethanol    Collection Time: 02/14/25  1:51 PM   Result Value Ref Range    Ethanol Lvl <10 <10 mg/dL   Urine Microscopic    Collection Time: 02/14/25  1:51 PM   Result Value Ref Range    RBC, UA None Seen None Seen, 0-1, 1-2, 2-4, 0-5 /hpf    WBC, UA 20-30 (A) None Seen, 0-1, 1-2, 0-5, 2-4 /hpf    Epithelial Cells Occasional None Seen, Occasional /hpf    Bacteria, UA Moderate (A) None Seen, Occasional /hpf   Blood gas, arterial    Collection Time: 02/14/25  3:19 PM   Result Value Ref Range    pH, Arterial 7.354 7.350 - 7.450    pCO2, Arterial 34.4 (L) 36.0 - 44.0 mm Hg    pO2, Arterial 99.1 75.0 - 129.0 mm Hg    HCO3, Arterial 18.7 (L) 22.0 - 28.0 mmol/L    Base Excess, Arterial -5.9 mmol/L    O2 Content, Arterial 17.9 16.0 - 23.0 mL/dL    O2 HGB,Arterial 96.6 94.0 - 97.0 %    SOURCE Radial, Right     FIDELINA TEST Yes      Temperature 100.7 Degrees Fehrenheit    Nasal Cannula 4    Lactic acid    Collection Time: 02/14/25  3:53 PM   Result Value Ref Range    LACTIC ACID 1.1 0.5 - 2.0 mmol/L   Procalcitonin    Collection Time: 02/14/25  3:53 PM   Result Value Ref Range    Procalcitonin <0.05 <=0.25 ng/ml   Protime-INR    Collection Time: 02/14/25  3:53 PM   Result Value Ref Range    Protime 13.8 12.3 - 15.0 seconds    INR 1.02 0.85 - 1.19   APTT    Collection Time: 02/14/25  3:53 PM   Result Value Ref Range    PTT 25 23 - 34 seconds   Blood culture #1    Collection Time: 02/14/25  3:53 PM    Specimen: Arm, Left; Blood   Result Value Ref Range    Blood Culture Cutibacterium acnes (A)     Gram Stain Result Gram positive rods (A)        Susceptibility    Cutibacterium acnes - DOMINGO     ZID Performed Yes     Blood culture #2    Collection Time: 02/14/25  3:53 PM    Specimen: Arm, Right; Blood   Result Value Ref Range    Blood Culture No Growth After 5 Days.    Blood Culture Identification Panel    Collection Time: 02/14/25  3:53 PM    Specimen: Arm, Left; Blood   Result Value Ref Range    ALL TARGETS Not Detected    Comprehensive metabolic panel    Collection Time: 02/15/25  5:49 AM   Result Value Ref Range    Sodium 138 135 - 147 mmol/L    Potassium 3.3 (L) 3.5 - 5.3 mmol/L    Chloride 107 96 - 108 mmol/L    CO2 24 21 - 32 mmol/L    ANION GAP 7 4 - 13 mmol/L    BUN 9 5 - 25 mg/dL    Creatinine 0.63 0.60 - 1.30 mg/dL    Glucose 97 65 - 140 mg/dL    Calcium 8.4 8.4 - 10.2 mg/dL    AST 25 13 - 39 U/L    ALT 18 7 - 52 U/L    Alkaline Phosphatase 49 34 - 104 U/L    Total Protein 5.9 (L) 6.4 - 8.4 g/dL    Albumin 3.7 3.5 - 5.0 g/dL    Total Bilirubin 0.87 0.20 - 1.00 mg/dL    eGFR 95 ml/min/1.73sq m   Magnesium    Collection Time: 02/15/25  5:49 AM   Result Value Ref Range    Magnesium 2.2 1.9 - 2.7 mg/dL   CBC and differential    Collection Time: 02/15/25  5:49 AM   Result Value Ref Range    WBC 4.53 4.31 - 10.16 Thousand/uL    RBC 3.31 (L) 3.81  - 5.12 Million/uL    Hemoglobin 10.7 (L) 11.5 - 15.4 g/dL    Hematocrit 31.6 (L) 34.8 - 46.1 %    MCV 96 82 - 98 fL    MCH 32.3 26.8 - 34.3 pg    MCHC 33.9 31.4 - 37.4 g/dL    RDW 12.6 11.6 - 15.1 %    MPV 9.7 8.9 - 12.7 fL    Platelets 74 (L) 149 - 390 Thousands/uL    nRBC 0 /100 WBCs    Segmented % 46 43 - 75 %    Immature Grans % 0 0 - 2 %    Lymphocytes % 45 (H) 14 - 44 %    Monocytes % 8 4 - 12 %    Eosinophils Relative 1 0 - 6 %    Basophils Relative 0 0 - 1 %    Absolute Neutrophils 2.10 1.85 - 7.62 Thousands/µL    Absolute Immature Grans 0.01 0.00 - 0.20 Thousand/uL    Absolute Lymphocytes 2.04 0.60 - 4.47 Thousands/µL    Absolute Monocytes 0.34 0.17 - 1.22 Thousand/µL    Eosinophils Absolute 0.03 0.00 - 0.61 Thousand/µL    Basophils Absolute 0.01 0.00 - 0.10 Thousands/µL   CBC and differential    Collection Time: 02/16/25  5:04 AM   Result Value Ref Range    WBC 3.16 (L) 4.31 - 10.16 Thousand/uL    RBC 3.36 (L) 3.81 - 5.12 Million/uL    Hemoglobin 10.6 (L) 11.5 - 15.4 g/dL    Hematocrit 32.7 (L) 34.8 - 46.1 %    MCV 97 82 - 98 fL    MCH 31.5 26.8 - 34.3 pg    MCHC 32.4 31.4 - 37.4 g/dL    RDW 12.8 11.6 - 15.1 %    MPV 9.9 8.9 - 12.7 fL    Platelets 62 (L) 149 - 390 Thousands/uL    nRBC 0 /100 WBCs    Segmented % 36 (L) 43 - 75 %    Immature Grans % 0 0 - 2 %    Lymphocytes % 53 (H) 14 - 44 %    Monocytes % 9 4 - 12 %    Eosinophils Relative 2 0 - 6 %    Basophils Relative 0 0 - 1 %    Absolute Neutrophils 1.12 (L) 1.85 - 7.62 Thousands/µL    Absolute Immature Grans 0.01 0.00 - 0.20 Thousand/uL    Absolute Lymphocytes 1.67 0.60 - 4.47 Thousands/µL    Absolute Monocytes 0.28 0.17 - 1.22 Thousand/µL    Eosinophils Absolute 0.07 0.00 - 0.61 Thousand/µL    Basophils Absolute 0.01 0.00 - 0.10 Thousands/µL   Basic metabolic panel    Collection Time: 02/16/25  5:04 AM   Result Value Ref Range    Sodium 141 135 - 147 mmol/L    Potassium 4.1 3.5 - 5.3 mmol/L    Chloride 110 (H) 96 - 108 mmol/L    CO2 24 21 - 32  mmol/L    ANION GAP 7 4 - 13 mmol/L    BUN 14 5 - 25 mg/dL    Creatinine 0.64 0.60 - 1.30 mg/dL    Glucose 107 65 - 140 mg/dL    Calcium 8.8 8.4 - 10.2 mg/dL    eGFR 94 ml/min/1.73sq m   [facility]    Collection Time: 04/15/25 11:15 AM   Result Value Ref Range    Cologuard Result Negative Negative   ECG 12 lead    Collection Time: 06/22/25  6:50 PM   Result Value Ref Range    Ventricular Rate 91 BPM    Atrial Rate 91 BPM    ID Interval 212 ms    QRSD Interval 80 ms    QT Interval 368 ms    QTC Interval 452 ms    P [place] 59 degrees    QRS Axis 48 degrees    T Wave Axis 114 degrees   CBC and differential    Collection Time: 06/22/25  7:51 PM   Result Value Ref Range    WBC 3.54 (L) 4.31 - 10.16 Thousand/uL    RBC 3.62 (L) 3.81 - 5.12 Million/uL    Hemoglobin 11.9 11.5 - 15.4 g/dL    Hematocrit 36.2 34.8 - 46.1 %     (H) 82 - 98 fL    MCH 32.9 26.8 - 34.3 pg    MCHC 32.9 31.4 - 37.4 g/dL    RDW 12.7 11.6 - 15.1 %    MPV 10.3 8.9 - 12.7 fL    Platelets 71 (L) 149 - 390 Thousands/uL    nRBC 0 /100 WBCs    Segmented % 52 43 - 75 %    Immature Grans % 1 0 - 2 %    Lymphocytes % 38 14 - 44 %    Monocytes % 8 4 - 12 %    Eosinophils Relative 1 0 - 6 %    Basophils Relative 0 0 - 1 %    Absolute Neutrophils 1.82 (L) 1.85 - 7.62 Thousands/µL    Absolute Immature Grans 0.02 0.00 - 0.20 Thousand/uL    Absolute Lymphocytes 1.36 0.60 - 4.47 Thousands/µL    Absolute Monocytes 0.28 0.17 - 1.22 Thousand/µL    Eosinophils Absolute 0.05 0.00 - 0.61 Thousand/µL    Basophils Absolute 0.01 0.00 - 0.10 Thousands/µL   Comprehensive metabolic panel    Collection Time: 06/22/25  7:51 PM   Result Value Ref Range    Sodium 137 135 - 147 mmol/L    Potassium 4.0 3.5 - 5.3 mmol/L    Chloride 108 96 - 108 mmol/L    CO2 21 21 - 32 mmol/L    ANION GAP 8 4 - 13 mmol/L    BUN 11 5 - 25 mg/dL    Creatinine 0.67 0.60 - 1.30 mg/dL    Glucose 107 65 - 140 mg/dL    Calcium 8.9 8.4 - 10.2 mg/dL    AST 33 13 - 39 U/L    ALT 21 7 - 52 U/L    Alkaline  Phosphatase 50 34 - 104 U/L    Total Protein 6.4 6.4 - 8.4 g/dL    Albumin 4.0 3.5 - 5.0 g/dL    Total Bilirubin 0.54 0.20 - 1.00 mg/dL    eGFR 92 ml/min/1.73sq m   Lacosamide    Collection Time: 06/22/25  7:51 PM   Result Value Ref Range    Lacosamide 10.35 1.00 - 20.00 ug/mL   Ethanol    Collection Time: 06/22/25  7:51 PM   Result Value Ref Range    Ethanol Lvl <10 <10 mg/dL   Salicylate level    Collection Time: 06/22/25  7:51 PM   Result Value Ref Range    Salicylate Lvl <5 (L) 5 - 20 mg/dL   Acetaminophen level-If concentration is detectable, please discuss with medical  on call.    Collection Time: 06/22/25  7:51 PM   Result Value Ref Range    Acetaminophen Level <2 (L) 10 - 20 ug/mL   TSH, 3rd generation with Free T4 reflex    Collection Time: 06/22/25  7:51 PM   Result Value Ref Range    TSH 3RD GENERATION 5.114 (H) 0.450 - 4.500 uIU/mL   T4, free    Collection Time: 06/22/25  7:51 PM   Result Value Ref Range    Free T4 0.83 0.61 - 1.12 ng/dL   CK    Collection Time: 06/22/25  7:51 PM   Result Value Ref Range    Total CK 42 26 - 192 U/L   Procalcitonin    Collection Time: 06/22/25  7:51 PM   Result Value Ref Range    Procalcitonin <0.05 <=0.25 ng/ml   Vitamin B12    Collection Time: 06/22/25  7:51 PM   Result Value Ref Range    Vitamin B-12 333 180 - 914 pg/mL   Fingerstick Glucose (POCT)    Collection Time: 06/22/25  8:28 PM   Result Value Ref Range    POC Glucose 114 65 - 140 mg/dl   Blood gas, venous    Collection Time: 06/22/25 10:09 PM   Result Value Ref Range    pH, Chaz 7.314 7.300 - 7.400    pCO2, Chaz 40.6 (L) 42.0 - 50.0 mm Hg    pO2, Chaz 35.0 35.0 - 45.0 mm Hg    HCO3, Chaz 20.2 (L) 24 - 30 mmol/L    Base Excess, Chaz -5.6 mmol/L    O2 Content, Chaz 10.4 ml/dL    O2 HGB, VENOUS 62.8 60.0 - 80.0 %   CBC and differential    Collection Time: 06/23/25  4:49 AM   Result Value Ref Range    WBC 4.17 (L) 4.31 - 10.16 Thousand/uL    RBC 3.04 (L) 3.81 - 5.12 Million/uL    Hemoglobin 10.0 (L) 11.5 -  15.4 g/dL    Hematocrit 30.6 (L) 34.8 - 46.1 %     (H) 82 - 98 fL    MCH 32.9 26.8 - 34.3 pg    MCHC 32.7 31.4 - 37.4 g/dL    RDW 12.6 11.6 - 15.1 %    MPV 10.3 8.9 - 12.7 fL    Platelets 62 (L) 149 - 390 Thousands/uL    nRBC 0 /100 WBCs    Segmented % 54 43 - 75 %    Immature Grans % 1 0 - 2 %    Lymphocytes % 35 14 - 44 %    Monocytes % 9 4 - 12 %    Eosinophils Relative 1 0 - 6 %    Basophils Relative 0 0 - 1 %    Absolute Neutrophils 2.27 1.85 - 7.62 Thousands/µL    Absolute Immature Grans 0.03 0.00 - 0.20 Thousand/uL    Absolute Lymphocytes 1.44 0.60 - 4.47 Thousands/µL    Absolute Monocytes 0.38 0.17 - 1.22 Thousand/µL    Eosinophils Absolute 0.04 0.00 - 0.61 Thousand/µL    Basophils Absolute 0.01 0.00 - 0.10 Thousands/µL   Basic metabolic panel    Collection Time: 06/23/25  4:49 AM   Result Value Ref Range    Sodium 137 135 - 147 mmol/L    Potassium 3.4 (L) 3.5 - 5.3 mmol/L    Chloride 114 (H) 96 - 108 mmol/L    CO2 21 21 - 32 mmol/L    ANION GAP 2 (L) 4 - 13 mmol/L    BUN 10 5 - 25 mg/dL    Creatinine 0.59 (L) 0.60 - 1.30 mg/dL    Glucose 88 65 - 140 mg/dL    Calcium 7.4 (L) 8.4 - 10.2 mg/dL    eGFR 96 ml/min/1.73sq m   Magnesium    Collection Time: 06/23/25  4:49 AM   Result Value Ref Range    Magnesium 1.5 (L) 1.9 - 2.7 mg/dL   Phosphorus    Collection Time: 06/23/25  4:49 AM   Result Value Ref Range    Phosphorus 2.5 2.3 - 4.1 mg/dL   Calcium, ionized    Collection Time: 06/23/25  4:49 AM   Result Value Ref Range    Calcium, Ionized 1.08 (L) 1.12 - 1.32 mmol/L   Beta Hydroxybutyrate    Collection Time: 06/23/25  4:49 AM   Result Value Ref Range    Beta- Hydroxybutyrate 0.36 0.20 - 0.60 mmol/L   UA w Reflex to Microscopic w Reflex to Culture    Collection Time: 06/23/25  1:31 PM    Specimen: Urine, Straight Cath   Result Value Ref Range    Color, UA Light Yellow     Clarity, UA Clear     Specific Gravity, UA 1.006 1.003 - 1.030    pH, UA 5.0 4.5, 5.0, 5.5, 6.0, 6.5, 7.0, 7.5, 8.0    Leukocytes, UA  Negative Negative    Nitrite, UA Positive (A) Negative    Protein, UA Negative Negative mg/dl    Glucose, UA Negative Negative mg/dl    Ketones, UA Negative Negative mg/dl    Urobilinogen, UA <2.0 <2.0 mg/dl mg/dl    Bilirubin, UA Negative Negative    Occult Blood, UA Negative Negative   Rapid drug screen, urine    Collection Time: 06/23/25  1:31 PM   Result Value Ref Range    Amph/Meth UR Negative Negative    Barbiturate Ur Negative Negative    Benzodiazepine Urine Negative Negative    Cocaine Urine Negative Negative    Methadone Urine Negative Negative    Opiate Urine Negative Negative    PCP Ur Negative Negative    THC Urine Negative Negative    Oxycodone Urine Negative Negative    Fentanyl Urine Negative Negative    HYDROCODONE URINE Negative Negative   Urine Microscopic    Collection Time: 06/23/25  1:31 PM   Result Value Ref Range    RBC, UA None Seen None Seen, 1-2 /hpf    WBC, UA 1-2 None Seen, 1-2 /hpf    Epithelial Cells Occasional None Seen, Occasional /hpf    Bacteria, UA Occasional None Seen, Occasional /hpf   CBC and differential    Collection Time: 06/24/25  7:30 AM   Result Value Ref Range    WBC 3.69 (L) 4.31 - 10.16 Thousand/uL    RBC 3.55 (L) 3.81 - 5.12 Million/uL    Hemoglobin 11.8 11.5 - 15.4 g/dL    Hematocrit 35.4 34.8 - 46.1 %     (H) 82 - 98 fL    MCH 33.2 26.8 - 34.3 pg    MCHC 33.3 31.4 - 37.4 g/dL    RDW 12.7 11.6 - 15.1 %    MPV 10.1 8.9 - 12.7 fL    Platelets 73 (L) 149 - 390 Thousands/uL    nRBC 0 /100 WBCs    Segmented % 53 43 - 75 %    Immature Grans % 1 0 - 2 %    Lymphocytes % 34 14 - 44 %    Monocytes % 9 4 - 12 %    Eosinophils Relative 2 0 - 6 %    Basophils Relative 1 0 - 1 %    Absolute Neutrophils 1.97 1.85 - 7.62 Thousands/µL    Absolute Immature Grans 0.02 0.00 - 0.20 Thousand/uL    Absolute Lymphocytes 1.27 0.60 - 4.47 Thousands/µL    Absolute Monocytes 0.34 0.17 - 1.22 Thousand/µL    Eosinophils Absolute 0.07 0.00 - 0.61 Thousand/µL    Basophils Absolute 0.02 0.00  - 0.10 Thousands/µL   Basic metabolic panel    Collection Time: 06/24/25  7:30 AM   Result Value Ref Range    Sodium 135 135 - 147 mmol/L    Potassium 4.3 3.5 - 5.3 mmol/L    Chloride 110 (H) 96 - 108 mmol/L    CO2 22 21 - 32 mmol/L    ANION GAP 3 (L) 4 - 13 mmol/L    BUN 12 5 - 25 mg/dL    Creatinine 0.68 0.60 - 1.30 mg/dL    Glucose 108 65 - 140 mg/dL    Calcium 8.1 (L) 8.4 - 10.2 mg/dL    eGFR 92 ml/min/1.73sq m   Magnesium    Collection Time: 06/24/25  7:30 AM   Result Value Ref Range    Magnesium 1.9 1.9 - 2.7 mg/dL   Fingerstick Glucose (POCT)    Collection Time: 06/24/25 11:35 AM   Result Value Ref Range    POC Glucose 99 65 - 140 mg/dl   CBC and differential    Collection Time: 07/08/25  3:31 PM   Result Value Ref Range    WBC 4.33 4.31 - 10.16 Thousand/uL    RBC 3.54 (L) 3.81 - 5.12 Million/uL    Hemoglobin 11.5 11.5 - 15.4 g/dL    Hematocrit 34.9 34.8 - 46.1 %    MCV 99 (H) 82 - 98 fL    MCH 32.5 26.8 - 34.3 pg    MCHC 33.0 31.4 - 37.4 g/dL    RDW 12.6 11.6 - 15.1 %    MPV 9.8 8.9 - 12.7 fL    Platelets 175 149 - 390 Thousands/uL    nRBC 0 /100 WBCs    Segmented % 65 43 - 75 %    Immature Grans % 1 0 - 2 %    Lymphocytes % 22 14 - 44 %    Monocytes % 8 4 - 12 %    Eosinophils Relative 3 0 - 6 %    Basophils Relative 1 0 - 1 %    Absolute Neutrophils 2.87 1.85 - 7.62 Thousands/µL    Absolute Immature Grans 0.03 0.00 - 0.20 Thousand/uL    Absolute Lymphocytes 0.94 0.60 - 4.47 Thousands/µL    Absolute Monocytes 0.33 0.17 - 1.22 Thousand/µL    Eosinophils Absolute 0.13 0.00 - 0.61 Thousand/µL    Basophils Absolute 0.03 0.00 - 0.10 Thousands/µL   Comprehensive metabolic panel    Collection Time: 07/08/25  3:31 PM   Result Value Ref Range    Sodium 139 135 - 147 mmol/L    Potassium 4.5 3.5 - 5.3 mmol/L    Chloride 107 96 - 108 mmol/L    CO2 27 21 - 32 mmol/L    ANION GAP 5 4 - 13 mmol/L    BUN 13 5 - 25 mg/dL    Creatinine 0.67 0.60 - 1.30 mg/dL    Glucose 112 65 - 140 mg/dL    Calcium 9.4 8.4 - 10.2 mg/dL     AST 26 13 - 39 U/L    ALT 19 7 - 52 U/L    Alkaline Phosphatase 93 34 - 104 U/L    Total Protein 7.4 6.4 - 8.4 g/dL    Albumin 3.9 3.5 - 5.0 g/dL    Total Bilirubin 0.27 0.20 - 1.00 mg/dL    eGFR 92 ml/min/1.73sq m   TSH, 3rd generation with Free T4 reflex    Collection Time: 07/08/25  3:31 PM   Result Value Ref Range    TSH 3RD GENERATION 1.275 0.450 - 4.500 uIU/mL   Zonisamide level    Collection Time: 07/08/25  3:31 PM   Result Value Ref Range    Zonisamide Lvl 22.2 10.0 - 40.0 ug/mL   Vitamin D 25 hydroxy    Collection Time: 07/08/25  3:31 PM   Result Value Ref Range    Vit D, 25-Hydroxy 9.8 (L) 30.0 - 100.0 ng/mL   Protein electrophoresis, serum    Collection Time: 07/08/25  3:31 PM   Result Value Ref Range    A/G Ratio 1.03 (L) 1.10 - 1.80    Albumin % 50.7 48.0 - 70.0 %    Albumin 3.45 3.20 - 5.10 g/dl    Alpha-1 Globulin % 5.9 1.8 - 7.0 %    Alpha-1 Globulin 0.40 0.15 - 0.47 g/dL    Alpha-2 Globulin % 13.6 5.9 - 14.9 %    Alpha-2 Globulin 0.92 0.42 - 1.04 g/dL    Beta-1 Globulin % 6.3 4.7 - 7.7 %    Beta-1 Globulin 0.43 0.31 - 0.57 g/dL    Beta-2 Globulin % 8.1 (H) 3.1 - 7.9 %    Beta-2 Globulin 0.55 0.20 - 0.58 g/dL    Gamma Globulin % 15.4 6.9 - 22.3 %    Gamma Globulin 1.05 0.40 - 1.66 g/dL    Total Protein 6.8 6.4 - 8.4 g/dL   PTH, intact    Collection Time: 07/08/25  3:31 PM   Result Value Ref Range    PTH 41.8 12.0 - 88.0 pg/mL   C-reactive protein    Collection Time: 07/08/25  3:31 PM   Result Value Ref Range    CRP 1.7 <3.0 mg/L   RF Screen w/ Reflex to Titer    Collection Time: 07/08/25  3:31 PM   Result Value Ref Range    RHEUMATOID FACTOR <10.00 <=14.00 IU/mL   Vitamin B12    Collection Time: 07/08/25  3:31 PM   Result Value Ref Range    Vitamin B-12 426 180 - 914 pg/mL   IgG, IgA, IgM    Collection Time: 07/08/25  3:31 PM   Result Value Ref Range     (H) 66 - 433 mg/dL     635 - 1,741 mg/dL     45 - 281 mg/dL   Vitamin B1, whole blood    Collection Time: 07/08/25  3:31 PM    Result Value Ref Range    Vitamin B1, Whole Blood 124.7 66.5 - 200.0 nmol/L   Folate    Collection Time: 07/08/25  3:31 PM   Result Value Ref Range    Folate >22.3 >5.9 ng/mL   TIBC Panel (incl. Iron, TIBC, % Iron Saturation)    Collection Time: 07/08/25  3:31 PM   Result Value Ref Range    Iron Saturation 18 15 - 50 %    TIBC 285.6 250 - 450 ug/dL    Iron 51 50 - 212 ug/dL    Transferrin 204 203 - 362 mg/dL    UIBC 235 155 - 355 ug/dL   Ferritin    Collection Time: 07/08/25  3:31 PM   Result Value Ref Range    Ferritin 103 30 - 307 ng/mL   Path Interpretation, Serum Protein Electrophoresis    Collection Time: 07/08/25  3:31 PM   Result Value Ref Range    Case Report       Electrophoresis Case                              Case: [id number]                                   Authorizing Provider:  Ayanna Benjamin MD        Collected:           07/08/2025 1531              Ordering Location:     [facility]      Received:            07/08/2025 [address] [place] [address]                                                                          Pathologist:           Gus Ching MD                                                          Specimen:    Arm, Right                                                                                 SPEP Interpretation       No monoclonal bands noted.          Laboratory Results: I have personally reviewed the pertinent laboratory results/reports     Radiology/Other Diagnostic Testing Results: I have reviewed the following imaging and agree with the interpretation below.    CT head without contrast  Result Date: 6/22/2025  CT BRAIN - WITHOUT CONTRAST INDICATION:   AMS. COMPARISON: Head CT from 2/14/2025 TECHNIQUE:  CT examination of the brain was performed.  Multiplanar 2D reformatted images were created from the source data. Radiation dose length  "product (DLP) for this visit:  965 mGy-cm. .  This examination, like all CT scans performed in the [facility], was performed utilizing techniques to minimize radiation dose exposure, including the use of iterative reconstruction and automated exposure control. IMAGE QUALITY:  Diagnostic. FINDINGS: PARENCHYMA:No intracranial mass, mass effect or midline shift. No CT signs of acute infarction.  No acute parenchymal hemorrhage. VENTRICLES AND EXTRA-AXIAL SPACES:  Normal for the patient's age. VISUALIZED ORBITS: Normal visualized orbits. PARANASAL SINUSES: Normal visualized paranasal sinuses. CALVARIUM AND EXTRACRANIAL SOFT TISSUES: Normal.     No acute intracranial abnormality. Workstation performed: [id number]        Disclaimer: Portions of the record may have been created with voice recognition software. Occasional wrong word or \"sound a like\" substitutions may have occurred due to the inherent limitations of voice recognition software. Read the chart carefully and recognize, using context, where substitutions have occurred. I have used the Epic copy/forward function to compose this note. I have reviewed my current note to ensure it reflects the current patient status, exam, assessment and plan.       [1]   Past Medical History:  Diagnosis Date    Anxiety     Chronic alcohol abuse     Dental disease     Depression     Ear problems     Epilepsy (HCC)     Fracture     Hepatitis     Hep A     Hepatitis     Insomnia     10mar2016 resolved    Memory loss     Osteoporosis     14jun2016 resolved    Pancreatitis     SAH (subarachnoid hemorrhage) (HCC)     Seasonal allergies     Seizure (HCC)     Seizures (HCC)     Speech impairment     Stroke (HCC)     Tinnitus     Urine discoloration 11/11/2019    Varicella     Visual impairment     Voice disorder     Vomiting 11/13/2019   [2]   Family History  Problem Relation Name Age of Onset    Anxiety disorder Mother Mother     Depression Mother Mother     No Known " Problems Father      No Known Problems Paternal Grandmother Roselia     No Known Problems Sister half sister     No Known Problems Daughter      No Known Problems Maternal Grandmother      Cancer Maternal Grandfather ?     Cancer Paternal Grandmother ?         unknown     No Known Problems Paternal Grandfather      No Known Problems Brother half brother     No Known Problems Son      No Known Problems Son      Breast cancer Neg Hx      Ovarian cancer Neg Hx     [3]   Past Surgical History:  Procedure Laterality Date    BONE MARROW BIOPSY      2019, 2021    IR BIOPSY BONE  8/17/2021    IR BIOPSY BONE MARROW  11/14/2019    TN TX TIBL SHFT FX IMED IMPLT W/WO SCREWS&/CERCLA Left 8/4/2023    Procedure: INSERTION NAIL IM TIBIA;  Surgeon: Danielito Sauceda DO;  Location: [facility];  Service: Orthopedics    TUBAL LIGATION  1985    TUBAL LIGATION     [4]   Current Outpatient Medications:     desvenlafaxine succinate (PRISTIQ) 50 mg 24 hr tablet, Take 1 tablet (50 mg total) by mouth daily, Disp: 30 tablet, Rfl: 5    folic acid (FOLVITE) 1 mg tablet, Take 1 tablet (1 mg total) by mouth daily, Disp: 30 tablet, Rfl: 0    gabapentin (NEURONTIN) 300 mg capsule, Take 1 capsule (300 mg total) by mouth 2 (two) times a day, Disp: 60 capsule, Rfl: 4    hydrOXYzine HCL (ATARAX) 25 mg tablet, Take 1 tablet (25 mg total) by mouth 2 (two) times a day as needed for anxiety, Disp: 60 tablet, Rfl: 5    lacosamide (VIMPAT) 200 mg tablet, TAKE ONE TABLET BY MOUTH TWICE A DAY, Disp: 180 tablet, Rfl: 1    metoprolol succinate (TOPROL-XL) 25 mg 24 hr tablet, TAKE 1 TABLET BY MOUTH TWO TIMES A DAY, Disp: 180 tablet, Rfl: 1    thiamine 100 MG tablet, Take 1 tablet (100 mg total) by mouth daily, Disp: 90 tablet, Rfl: 1    zonisamide (ZONEGRAN) 100 mg capsule, Take 3 capsules (300 mg total) by mouth daily at bedtime, Disp: 90 capsule, Rfl: 0    acetaminophen (TYLENOL) 650 mg CR tablet, Take 1 tablet (650 mg total) by mouth every 8 (eight) hours as needed  for mild pain, Disp: 30 tablet, Rfl: 0    ergocalciferol (VITAMIN D2) 50,000 units, Take 1 capsule (50,000 Units total) by mouth once a week, Disp: 12 capsule, Rfl: 0

## 2025-07-14 ENCOUNTER — TELEPHONE (OUTPATIENT)
Dept: PSYCHIATRY | Facility: CLINIC | Age: 65
End: 2025-07-14

## 2025-07-14 LAB — SCAN RESULT: NORMAL

## 2025-07-14 NOTE — TELEPHONE ENCOUNTER
Received a new pt referral for the [facility] from pt's PCP. Originally a psych referral. Called and left a VM for Renzo regarding the referral.  [facility] office number provided.      For your review.

## 2025-07-14 NOTE — PROGRESS NOTES
Patient was seen in consult 9/2024 re: chronic thrombocytopenia.  Labs and 6-8 week f/u were ordered.    Patient had labs drawn  7/8/25    Hemoglobin 11.5, MCV 99, WBC 4.3, platelets 175, normal differential.  CMP normal  Normal CRP, B12, RF, folate, vitamin B1.  B12 426  Ferritin 103, iron saturation 18%  No monoclonal band on SPEP  IgA 517, normal IgG, IgM    Flow cytometric analysis does not show significant numbers of circulating blasts or abnormal lymphoid or myeloid population.    Vitamin D 9.8- Dr. Nugyen sent prescription for 50,000 units daily for 12 weeks and then to resume 2000 units/day  Normal PTH  TSH 1.2  RY pending.    From hematology standpoint, F/U prn.  [employer] Message sent

## 2025-07-14 NOTE — TELEPHONE ENCOUNTER
She was not sure if she could proceed with partial hospitalization as she does not drive and hence both the referrals we placed

## 2025-07-15 LAB
DSDNA IGG SERPL IA-ACNC: <0.9 IU/ML (ref ?–15)
NUCLEAR IGG SER IA-RTO: 0.2 RATIO (ref ?–1)

## 2025-07-23 ENCOUNTER — TELEPHONE (OUTPATIENT)
Age: 65
End: 2025-07-23

## 2025-07-23 NOTE — TELEPHONE ENCOUNTER
Marcela from [facility] called to report the patient didn't get any service this week since she do not answer the phone or return any call back.

## 2025-07-29 ENCOUNTER — TELEPHONE (OUTPATIENT)
Age: 65
End: 2025-07-29

## 2025-08-20 DIAGNOSIS — R56.9 SEIZURE (HCC): ICD-10-CM

## 2025-08-20 RX ORDER — ZONISAMIDE 100 MG/1
300 CAPSULE ORAL
Qty: 90 CAPSULE | Refills: 2 | Status: SHIPPED | OUTPATIENT
Start: 2025-08-20

## (undated) DEVICE — 14.5MM OUTER PROTECTION SLEEVE FOR SUPRAPATELLAR - STERILE

## (undated) DEVICE — ARTHROSCOPY FLOOR MAT

## (undated) DEVICE — ACE WRAP 4 IN STERILE

## (undated) DEVICE — INTENDED FOR TISSUE SEPARATION, AND OTHER PROCEDURES THAT REQUIRE A SHARP SURGICAL BLADE TO PUNCTURE OR CUT.: Brand: BARD-PARKER SAFETY BLADES SIZE 15, STERILE

## (undated) DEVICE — GLOVE INDICATOR PI UNDERGLOVE SZ 8 BLUE

## (undated) DEVICE — 3.2MM GUIDE WIRE 400MM

## (undated) DEVICE — BETHLEHEM UNIV MAJOR ORTHO,KIT: Brand: CARDINAL HEALTH

## (undated) DEVICE — PENCIL ELECTROSURG E-Z CLEAN -0035H

## (undated) DEVICE — 4.2MM THREE-FLUTED DRILL BIT QC/NEEDLE POINT/145MM

## (undated) DEVICE — CURITY NON-ADHERENT STRIPS: Brand: CURITY

## (undated) DEVICE — 3M™ TEGADERM™ TRANSPARENT FILM DRESSING FRAME STYLE, 1628, 6 IN X 8 IN (15 CM X 20 CM), 10/CT 8CT/CASE: Brand: 3M™ TEGADERM™

## (undated) DEVICE — ELECTRODE BLADE MOD E-Z CLEAN  2.75IN 7CM -0012AM

## (undated) DEVICE — CHLORAPREP HI-LITE 26ML ORANGE

## (undated) DEVICE — 3M™ TEGADERM™ TRANSPARENT FILM DRESSING FRAME STYLE, 1626W, 4 IN X 4-3/4 IN (10 CM X 12 CM), 50/CT 4CT/CASE: Brand: 3M™ TEGADERM™

## (undated) DEVICE — 2.5MM REAMING ROD WITH BALL TIP/950MM-STERILE

## (undated) DEVICE — SUT MONOCRYL 2-0 SH 27 IN Y417H

## (undated) DEVICE — DRAPE C-ARMOUR

## (undated) DEVICE — GLOVE SRG BIOGEL 8

## (undated) DEVICE — SPONGE SCRUB 4 PCT CHLORHEXIDINE

## (undated) DEVICE — PROXIMATE SKIN STAPLERS (35 WIDE) CONTAINS 35 STAINLESS STEEL STAPLES (FIXED HEAD): Brand: PROXIMATE

## (undated) DEVICE — SUT PDS II 0 CT-1 27 IN Z340H

## (undated) DEVICE — PADDING CAST 4 IN  COTTON STRL

## (undated) DEVICE — ALCOHOL ISOPROPYL BLUE

## (undated) DEVICE — Device

## (undated) DEVICE — DRAPE C-ARM X-RAY

## (undated) DEVICE — 3.9/4.2MM THREE-FLT DRL BIT QC 331MM/100MM CALIB FOR 5.0 ASL

## (undated) DEVICE — DRAPE SHEET X-LG

## (undated) DEVICE — DISPOSABLE OR TOWEL: Brand: CARDINAL HEALTH

## (undated) DEVICE — ACE WRAP 6 IN STERILE

## (undated) DEVICE — 3M™ IOBAN™ 2 ANTIMICROBIAL INCISE DRAPE 6650EZ: Brand: IOBAN™ 2